# Patient Record
Sex: FEMALE | Race: WHITE | NOT HISPANIC OR LATINO | Employment: OTHER | URBAN - METROPOLITAN AREA
[De-identification: names, ages, dates, MRNs, and addresses within clinical notes are randomized per-mention and may not be internally consistent; named-entity substitution may affect disease eponyms.]

---

## 2018-01-17 ENCOUNTER — OFFICE VISIT CONVERTED (OUTPATIENT)
Dept: ONCOLOGY | Facility: HOSPITAL | Age: 68
End: 2018-01-17
Attending: INTERNAL MEDICINE

## 2018-01-29 ENCOUNTER — CONVERSION ENCOUNTER (OUTPATIENT)
Dept: FAMILY MEDICINE CLINIC | Facility: CLINIC | Age: 68
End: 2018-01-29

## 2018-01-29 ENCOUNTER — OFFICE VISIT CONVERTED (OUTPATIENT)
Dept: FAMILY MEDICINE CLINIC | Facility: CLINIC | Age: 68
End: 2018-01-29
Attending: PHYSICIAN ASSISTANT

## 2018-03-01 ENCOUNTER — OFFICE VISIT CONVERTED (OUTPATIENT)
Dept: FAMILY MEDICINE CLINIC | Facility: CLINIC | Age: 68
End: 2018-03-01
Attending: PHYSICIAN ASSISTANT

## 2018-03-22 ENCOUNTER — OFFICE VISIT CONVERTED (OUTPATIENT)
Dept: ONCOLOGY | Facility: HOSPITAL | Age: 68
End: 2018-03-22
Attending: INTERNAL MEDICINE

## 2018-05-22 ENCOUNTER — OFFICE VISIT CONVERTED (OUTPATIENT)
Dept: FAMILY MEDICINE CLINIC | Facility: CLINIC | Age: 68
End: 2018-05-22
Attending: PHYSICIAN ASSISTANT

## 2018-07-10 ENCOUNTER — CONVERSION ENCOUNTER (OUTPATIENT)
Dept: GASTROENTEROLOGY | Facility: CLINIC | Age: 68
End: 2018-07-10

## 2018-07-10 ENCOUNTER — OFFICE VISIT CONVERTED (OUTPATIENT)
Dept: GASTROENTEROLOGY | Facility: CLINIC | Age: 68
End: 2018-07-10
Attending: NURSE PRACTITIONER

## 2018-08-24 ENCOUNTER — CONVERSION ENCOUNTER (OUTPATIENT)
Dept: FAMILY MEDICINE CLINIC | Facility: CLINIC | Age: 68
End: 2018-08-24

## 2018-08-24 ENCOUNTER — OFFICE VISIT CONVERTED (OUTPATIENT)
Dept: FAMILY MEDICINE CLINIC | Facility: CLINIC | Age: 68
End: 2018-08-24
Attending: PHYSICIAN ASSISTANT

## 2018-09-05 ENCOUNTER — OFFICE VISIT CONVERTED (OUTPATIENT)
Dept: ONCOLOGY | Facility: HOSPITAL | Age: 68
End: 2018-09-05
Attending: INTERNAL MEDICINE

## 2018-10-16 ENCOUNTER — OFFICE VISIT CONVERTED (OUTPATIENT)
Dept: ONCOLOGY | Facility: HOSPITAL | Age: 68
End: 2018-10-16
Attending: INTERNAL MEDICINE

## 2018-11-08 ENCOUNTER — OFFICE VISIT CONVERTED (OUTPATIENT)
Dept: FAMILY MEDICINE CLINIC | Facility: CLINIC | Age: 68
End: 2018-11-08
Attending: PHYSICIAN ASSISTANT

## 2018-11-19 ENCOUNTER — OFFICE VISIT CONVERTED (OUTPATIENT)
Dept: FAMILY MEDICINE CLINIC | Facility: CLINIC | Age: 68
End: 2018-11-19
Attending: PHYSICIAN ASSISTANT

## 2018-12-18 ENCOUNTER — CONVERSION ENCOUNTER (OUTPATIENT)
Dept: FAMILY MEDICINE CLINIC | Facility: CLINIC | Age: 68
End: 2018-12-18

## 2018-12-18 ENCOUNTER — OFFICE VISIT CONVERTED (OUTPATIENT)
Dept: FAMILY MEDICINE CLINIC | Facility: CLINIC | Age: 68
End: 2018-12-18
Attending: PHYSICIAN ASSISTANT

## 2019-01-10 ENCOUNTER — HOSPITAL ENCOUNTER (OUTPATIENT)
Dept: LAB | Facility: HOSPITAL | Age: 69
Discharge: HOME | End: 2019-01-10
Attending: NURSE PRACTITIONER

## 2019-01-10 LAB
BASOPHILS # BLD AUTO: 0.08 10*3/UL (ref 0–0.2)
BASOPHILS NFR BLD AUTO: 0.68 % (ref 0–3)
EOSINOPHIL # BLD AUTO: 0.12 10*3/UL (ref 0–0.7)
EOSINOPHIL # BLD AUTO: 1.1 % (ref 0–7)
ERYTHROCYTE [DISTWIDTH] IN BLOOD BY AUTOMATED COUNT: 13.5 % (ref 11.5–14.5)
HBA1C MFR BLD: 15.3 G/DL (ref 12–16)
HCT VFR BLD AUTO: 46.8 % (ref 37–47)
LYMPHOCYTES # BLD AUTO: 4.55 10*3/UL (ref 1–5)
MCH RBC QN AUTO: 32.2 PG (ref 27–31)
MCHC RBC AUTO-ENTMCNC: 32.8 G/DL (ref 33–37)
MCV RBC AUTO: 98.4 FL (ref 81–99)
MONOCYTES # BLD AUTO: 0.54 10*3/UL (ref 0.2–1.2)
MONOCYTES NFR BLD AUTO: 4.86 % (ref 3–10)
NEUTROPHILS # BLD AUTO: 5.81 10*3/UL (ref 2–8)
NEUTROPHILS NFR BLD AUTO: 52.4 % (ref 30–85)
NRBC BLD AUTO-RTO: 0 % (ref 0–0.01)
PLATELET # BLD AUTO: 328 10*3/UL (ref 130–400)
PMV BLD AUTO: 7.1 FL (ref 7.4–10.4)
RBC # BLD AUTO: 4.75 10*6/UL (ref 4.2–5.4)
VARIANT LYMPHS NFR BLD MANUAL: 41 % (ref 20–45)
WBC # BLD AUTO: 11.1 10*3/UL (ref 4.8–10.8)

## 2019-01-11 LAB
ALBUMIN SERPL-MCNC: 3.7 G/DL (ref 2.9–4.4)
ALBUMIN/GLOB SERPL: 0.8 {RATIO} (ref 0.7–1.7)
ALPHA2 GLOB SERPL ELPH-MCNC: 0.8 G/DL (ref 0.4–1)
BETA GLOBULIN: 1.1 G/DL (ref 0.7–1.3)
CONV ALPHA-1-GLOBULIN: 0.2 G/DL (ref 0–0.4)
CONV IMMUNOGLOBULIN G (IGG): 2966 MG/DL (ref 700–1600)
CONV IMMUNOGLOBULIN G (IGG): 3094 MG/DL (ref 700–1600)
CONV IMMUNOGLOBULIN M (IGM): 50 MG/DL (ref 26–217)
CONV IMMUNOGLOBULIN M (IGM): 52 MG/DL (ref 26–217)
CONV PE INTERPRETATION: ABNORMAL
CONV PE NOTE: ABNORMAL
CONV TOTAL PROTEIN: 8.4 G/DL (ref 6–8.5)
FREE LIGHT CHAINS: 11.7 MG/L (ref 3.3–19.4)
GAMMA GLOB SERPL ELPH-MCNC: 2.7 G/DL (ref 0.4–1.8)
GLOBULIN UR ELPH-MCNC: 4.7 G/DL (ref 2.2–3.9)
IGA SERPL-MCNC: 40 MG/DL (ref 87–352)
IGA SERPL-MCNC: 40 MG/DL (ref 87–352)
KAPPA LC/LAMBDA SER: 1.38 {RATIO} (ref 0.26–1.65)
LAMBDA LC FREE SERPL-MCNC: 8.5 MG/L (ref 5.7–26.3)
M-SPIKE: 2.5 G/DL
PROT PATTERN SERPL IFE-IMP: ABNORMAL

## 2019-01-14 ENCOUNTER — HOSPITAL ENCOUNTER (OUTPATIENT)
Dept: ONCOLOGY | Facility: HOSPITAL | Age: 69
Discharge: HOME | End: 2019-01-14
Attending: INTERNAL MEDICINE

## 2019-01-14 ENCOUNTER — OFFICE VISIT CONVERTED (OUTPATIENT)
Dept: ONCOLOGY | Facility: HOSPITAL | Age: 69
End: 2019-01-14
Attending: INTERNAL MEDICINE

## 2019-01-14 LAB
ALBUMIN 24H MFR UR ELPH: 49.8 %
ALPHA1 GLOB 24H MFR UR ELPH: 0 %
ALPHA2 GLOB 24H MFR UR ELPH: 4.5 %
B-GLOBULIN MFR UR ELPH: 29.7 %
CONV IMMUNOFIXATION (URINE): ABNORMAL
CONV PE NOTE: ABNORMAL
GAMMA GLOB 24H MFR UR ELPH: 15.9 %
M PROTEIN MFR UR ELPH: 12.6 %
PROT UR-MCNC: 158.5 MG/DL

## 2019-02-07 ENCOUNTER — HOSPITAL ENCOUNTER (OUTPATIENT)
Dept: OTHER | Facility: HOSPITAL | Age: 69
Discharge: HOME | End: 2019-02-07
Attending: INTERNAL MEDICINE

## 2019-02-12 ENCOUNTER — HOSPITAL ENCOUNTER (OUTPATIENT)
Dept: OTHER | Facility: HOSPITAL | Age: 69
Discharge: HOME | End: 2019-02-12
Attending: INTERNAL MEDICINE

## 2019-02-12 LAB
C DIFF TOX B STL QL CT TISS CULT: POSITIVE
CONV 027 TOXIN: NEGATIVE

## 2019-04-01 ENCOUNTER — HOSPITAL ENCOUNTER (OUTPATIENT)
Dept: PET IMAGING | Facility: HOSPITAL | Age: 69
Discharge: HOME | End: 2019-04-01
Attending: NURSE PRACTITIONER

## 2019-04-08 ENCOUNTER — HOSPITAL ENCOUNTER (OUTPATIENT)
Dept: OTHER | Facility: HOSPITAL | Age: 69
Discharge: HOME | End: 2019-04-08
Attending: INTERNAL MEDICINE

## 2019-04-08 ENCOUNTER — OFFICE VISIT CONVERTED (OUTPATIENT)
Dept: ONCOLOGY | Facility: HOSPITAL | Age: 69
End: 2019-04-08
Attending: INTERNAL MEDICINE

## 2019-04-08 LAB
ALBUMIN SERPL-MCNC: 3.5 G/DL (ref 3.5–5)
ALBUMIN/GLOB SERPL: 0.8 {RATIO} (ref 1.4–2.6)
ALP SERPL-CCNC: 75 U/L (ref 43–160)
ALT SERPL-CCNC: 10 U/L (ref 10–40)
ANION GAP SERPL CALC-SCNC: 13 MMOL/L (ref 8–19)
AST SERPL-CCNC: 13 U/L (ref 15–50)
BASOPHILS # BLD AUTO: 0.07 10*3/UL (ref 0–0.2)
BASOPHILS NFR BLD AUTO: 0.7 % (ref 0–3)
BILIRUB SERPL-MCNC: 0.35 MG/DL (ref 0.2–1.3)
BUN SERPL-MCNC: 8 MG/DL (ref 5–25)
BUN/CREAT SERPL: 13 {RATIO} (ref 6–20)
CALCIUM SERPL-MCNC: 9.1 MG/DL (ref 8.7–10.4)
CHLORIDE SERPL-SCNC: 108 MMOL/L (ref 99–111)
CONV ABS IMM GRAN: 0.04 10*3/UL (ref 0–0.2)
CONV CO2: 22 MMOL/L (ref 22–32)
CONV IMMATURE GRAN: 0.4 % (ref 0–1.8)
CONV TOTAL PROTEIN: 7.9 G/DL (ref 6.3–8.2)
CREAT UR-MCNC: 0.64 MG/DL (ref 0.5–0.9)
DEPRECATED RDW RBC AUTO: 56.5 FL (ref 36.4–46.3)
EOSINOPHIL # BLD AUTO: 0.2 10*3/UL (ref 0–0.7)
EOSINOPHIL # BLD AUTO: 2 % (ref 0–7)
ERYTHROCYTE [DISTWIDTH] IN BLOOD BY AUTOMATED COUNT: 15.2 % (ref 11.7–14.4)
GFR SERPLBLD BASED ON 1.73 SQ M-ARVRAT: >60 ML/MIN/{1.73_M2}
GLOBULIN UR ELPH-MCNC: 4.4 G/DL (ref 2–3.5)
GLUCOSE SERPL-MCNC: 90 MG/DL (ref 65–99)
HBA1C MFR BLD: 13.5 G/DL (ref 12–16)
HCT VFR BLD AUTO: 42.2 % (ref 37–47)
LYMPHOCYTES # BLD AUTO: 4.09 10*3/UL (ref 1–5)
MCH RBC QN AUTO: 31.9 PG (ref 27–31)
MCHC RBC AUTO-ENTMCNC: 32 G/DL (ref 33–37)
MCV RBC AUTO: 99.8 FL (ref 81–99)
MONOCYTES # BLD AUTO: 0.52 10*3/UL (ref 0.2–1.2)
MONOCYTES NFR BLD AUTO: 5.3 % (ref 3–10)
NEUTROPHILS # BLD AUTO: 4.95 10*3/UL (ref 2–8)
NEUTROPHILS NFR BLD AUTO: 50.2 % (ref 30–85)
NRBC CBCN: 0 % (ref 0–0.7)
OSMOLALITY SERPL CALC.SUM OF ELEC: 286 MOSM/KG (ref 273–304)
PLATELET # BLD AUTO: 274 10*3/UL (ref 130–400)
PMV BLD AUTO: 9.4 FL (ref 9.4–12.3)
POTASSIUM SERPL-SCNC: 3.9 MMOL/L (ref 3.5–5.3)
RBC # BLD AUTO: 4.23 10*6/UL (ref 4.2–5.4)
SODIUM SERPL-SCNC: 139 MMOL/L (ref 135–147)
VARIANT LYMPHS NFR BLD MANUAL: 41.4 % (ref 20–45)
WBC # BLD AUTO: 9.87 10*3/UL (ref 4.8–10.8)

## 2019-04-09 LAB
ALBUMIN SERPL-MCNC: 3.5 G/DL (ref 2.9–4.4)
ALBUMIN/GLOB SERPL: 0.9 {RATIO} (ref 0.7–1.7)
ALPHA2 GLOB SERPL ELPH-MCNC: 0.7 G/DL (ref 0.4–1)
BETA GLOBULIN: 0.8 G/DL (ref 0.7–1.3)
CONV ALPHA-1-GLOBULIN: 0.2 G/DL (ref 0–0.4)
CONV IMMUNOGLOBULIN G (IGG): 2592 MG/DL (ref 700–1600)
CONV IMMUNOGLOBULIN M (IGM): 44 MG/DL (ref 26–217)
CONV PE INTERPRETATION: ABNORMAL
CONV PE NOTE: ABNORMAL
CONV TOTAL PROTEIN: 7.5 G/DL (ref 6–8.5)
GAMMA GLOB SERPL ELPH-MCNC: 2.3 G/DL (ref 0.4–1.8)
GLOBULIN UR ELPH-MCNC: 4 G/DL (ref 2.2–3.9)
IGA SERPL-MCNC: 32 MG/DL (ref 87–352)
M-SPIKE: 2.2 G/DL
PROT PATTERN SERPL IFE-IMP: ABNORMAL

## 2019-04-10 LAB
ALBUMIN 24H MFR UR ELPH: 32.4 %
ALPHA1 GLOB 24H MFR UR ELPH: 4.4 %
ALPHA2 GLOB 24H MFR UR ELPH: 16.9 %
CONV BETA GLOBULIN, URINE: 28.6 %
GAMMA GLOB 24H MFR UR ELPH: 17.6 %
Lab: ABNORMAL
M PROTEIN 24H MFR UR ELPH: 5.7 %
PROT UR-MCNC: 13.5 MG/DL

## 2019-04-11 ENCOUNTER — HOSPITAL ENCOUNTER (OUTPATIENT)
Dept: LAB | Facility: HOSPITAL | Age: 69
Discharge: HOME | End: 2019-04-11
Attending: NURSE PRACTITIONER

## 2019-04-11 LAB
APTT BLD: 22.1 S (ref 22.2–34.2)
INR PPP: 0.96 (ref 2–3)
PROTHROMBIN TIME: 10.1 S (ref 9.4–12)

## 2019-04-12 ENCOUNTER — HOSPITAL ENCOUNTER (OUTPATIENT)
Dept: CT IMAGING | Facility: HOSPITAL | Age: 69
Discharge: HOME | End: 2019-04-12
Attending: INTERNAL MEDICINE

## 2019-04-23 ENCOUNTER — OFFICE VISIT CONVERTED (OUTPATIENT)
Dept: ONCOLOGY | Facility: HOSPITAL | Age: 69
End: 2019-04-23
Attending: INTERNAL MEDICINE

## 2019-04-23 ENCOUNTER — HOSPITAL ENCOUNTER (OUTPATIENT)
Dept: OTHER | Facility: HOSPITAL | Age: 69
Discharge: HOME | End: 2019-04-23
Attending: INTERNAL MEDICINE

## 2019-05-01 ENCOUNTER — HOSPITAL ENCOUNTER (OUTPATIENT)
Dept: LAB | Facility: HOSPITAL | Age: 69
Discharge: HOME | End: 2019-05-01
Attending: INTERNAL MEDICINE

## 2019-05-01 LAB
C DIFF TOX B STL QL CT TISS CULT: POSITIVE
CONV 027 TOXIN: NEGATIVE

## 2019-05-03 LAB
CONV NEUTRAL FATS: NORMAL
FAT STL QL: NORMAL

## 2019-05-05 LAB
CHLORIDE STL-SCNT: 56 MMOL/L
POTASSIUM STL-SCNC: 46 MMOL/L
SODIUM STL-SCNC: 91 MMOL/L

## 2019-07-03 ENCOUNTER — HOSPITAL ENCOUNTER (OUTPATIENT)
Dept: LAB | Facility: HOSPITAL | Age: 69
Discharge: HOME | End: 2019-07-03
Attending: INTERNAL MEDICINE

## 2019-07-03 LAB
C DIFF TOX B STL QL CT TISS CULT: NEGATIVE
CONV 027 TOXIN: NEGATIVE

## 2019-08-07 ENCOUNTER — HOSPITAL ENCOUNTER (OUTPATIENT)
Dept: LAB | Facility: HOSPITAL | Age: 69
Discharge: HOME | End: 2019-08-07

## 2019-08-08 LAB
C DIFF TOX B STL QL CT TISS CULT: NEGATIVE
CONV 027 TOXIN: NEGATIVE

## 2019-08-10 LAB
TTG IGA SER-ACNC: 0 U/ML (ref 0–3)
TTG IGG SER-ACNC: 2 U/ML (ref 0–5)

## 2019-09-05 ENCOUNTER — HOSPITAL ENCOUNTER (OUTPATIENT)
Dept: GENERAL RADIOLOGY | Facility: HOSPITAL | Age: 69
Discharge: HOME | End: 2019-09-05
Attending: PHYSICIAN ASSISTANT

## 2019-09-05 ENCOUNTER — OFFICE VISIT CONVERTED (OUTPATIENT)
Dept: FAMILY MEDICINE CLINIC | Facility: CLINIC | Age: 69
End: 2019-09-05
Attending: PHYSICIAN ASSISTANT

## 2020-01-28 ENCOUNTER — OFFICE VISIT CONVERTED (OUTPATIENT)
Dept: FAMILY MEDICINE CLINIC | Facility: CLINIC | Age: 70
End: 2020-01-28
Attending: PHYSICIAN ASSISTANT

## 2020-01-28 ENCOUNTER — HOSPITAL ENCOUNTER (OUTPATIENT)
Dept: LAB | Facility: HOSPITAL | Age: 70
Discharge: HOME | End: 2020-01-28
Attending: PHYSICIAN ASSISTANT

## 2020-01-28 LAB
25(OH)D3 SERPL-MCNC: 16.6 NG/ML (ref 30–100)
ALBUMIN SERPL-MCNC: 4.1 G/DL (ref 3.5–5)
ALBUMIN/GLOB SERPL: 0.9 {RATIO} (ref 1.4–2.6)
ALP SERPL-CCNC: 72 U/L (ref 43–160)
ALT SERPL-CCNC: 12 U/L (ref 10–40)
AMYLASE SERPL-CCNC: 67 U/L (ref 30–150)
ANION GAP SERPL CALC-SCNC: 19 MMOL/L (ref 8–19)
APPEARANCE UR: CLEAR
AST SERPL-CCNC: 16 U/L (ref 15–50)
BASOPHILS # BLD AUTO: 0.06 10*3/UL (ref 0–0.2)
BASOPHILS NFR BLD AUTO: 0.6 % (ref 0–3)
BILIRUB SERPL-MCNC: 0.38 MG/DL (ref 0.2–1.3)
BILIRUB UR QL: NEGATIVE
BUN SERPL-MCNC: 10 MG/DL (ref 5–25)
BUN/CREAT SERPL: 13 {RATIO} (ref 6–20)
CALCIUM SERPL-MCNC: 9.4 MG/DL (ref 8.7–10.4)
CHLORIDE SERPL-SCNC: 103 MMOL/L (ref 99–111)
CHOLEST SERPL-MCNC: 164 MG/DL (ref 107–200)
CHOLEST/HDLC SERPL: 3.5 {RATIO} (ref 3–6)
COLOR UR: YELLOW
CONV ABS IMM GRAN: 0.04 10*3/UL (ref 0–0.2)
CONV BACTERIA: NEGATIVE
CONV CO2: 20 MMOL/L (ref 22–32)
CONV COLLECTION SOURCE (UA): ABNORMAL
CONV IMMATURE GRAN: 0.4 % (ref 0–1.8)
CONV TOTAL PROTEIN: 8.8 G/DL (ref 6.3–8.2)
CONV UROBILINOGEN IN URINE BY AUTOMATED TEST STRIP: 0.2 {EHRLICHU}/DL (ref 0.1–1)
CREAT UR-MCNC: 0.76 MG/DL (ref 0.5–0.9)
DEPRECATED RDW RBC AUTO: 52 FL (ref 36.4–46.3)
EOSINOPHIL # BLD AUTO: 0.15 10*3/UL (ref 0–0.7)
EOSINOPHIL # BLD AUTO: 1.5 % (ref 0–7)
ERYTHROCYTE [DISTWIDTH] IN BLOOD BY AUTOMATED COUNT: 14.1 % (ref 11.7–14.4)
FOLATE SERPL-MCNC: 17.7 NG/ML (ref 4.8–20)
GFR SERPLBLD BASED ON 1.73 SQ M-ARVRAT: >60 ML/MIN/{1.73_M2}
GLOBULIN UR ELPH-MCNC: 4.7 G/DL (ref 2–3.5)
GLUCOSE SERPL-MCNC: 87 MG/DL (ref 65–99)
GLUCOSE UR QL: NEGATIVE MG/DL
HCT VFR BLD AUTO: 44 % (ref 37–47)
HDLC SERPL-MCNC: 47 MG/DL (ref 40–60)
HGB BLD-MCNC: 14.2 G/DL (ref 12–16)
HGB UR QL STRIP: ABNORMAL
KETONES UR QL STRIP: NEGATIVE MG/DL
LDLC SERPL CALC-MCNC: 62 MG/DL (ref 70–100)
LEUKOCYTE ESTERASE UR QL STRIP: NEGATIVE
LIPASE SERPL-CCNC: 32 U/L (ref 5–51)
LYMPHOCYTES # BLD AUTO: 4.5 10*3/UL (ref 1–5)
LYMPHOCYTES NFR BLD AUTO: 44.7 % (ref 20–45)
MCH RBC QN AUTO: 32.1 PG (ref 27–31)
MCHC RBC AUTO-ENTMCNC: 32.3 G/DL (ref 33–37)
MCV RBC AUTO: 99.3 FL (ref 81–99)
MONOCYTES # BLD AUTO: 0.67 10*3/UL (ref 0.2–1.2)
MONOCYTES NFR BLD AUTO: 6.7 % (ref 3–10)
NEUTROPHILS # BLD AUTO: 4.64 10*3/UL (ref 2–8)
NEUTROPHILS NFR BLD AUTO: 46.1 % (ref 30–85)
NITRITE UR QL STRIP: NEGATIVE
NRBC CBCN: 0 % (ref 0–0.7)
OSMOLALITY SERPL CALC.SUM OF ELEC: 284 MOSM/KG (ref 273–304)
PH UR STRIP.AUTO: 5 [PH] (ref 5–8)
PLATELET # BLD AUTO: 342 10*3/UL (ref 130–400)
PMV BLD AUTO: 9 FL (ref 9.4–12.3)
POTASSIUM SERPL-SCNC: 4 MMOL/L (ref 3.5–5.3)
PROT UR QL: NEGATIVE MG/DL
RBC # BLD AUTO: 4.43 10*6/UL (ref 4.2–5.4)
RBC #/AREA URNS HPF: ABNORMAL /[HPF]
SODIUM SERPL-SCNC: 138 MMOL/L (ref 135–147)
SP GR UR: 1.02 (ref 1–1.03)
SQUAMOUS SPT QL MICRO: ABNORMAL /[HPF]
T4 FREE SERPL-MCNC: 1.5 NG/DL (ref 0.9–1.8)
TRIGL SERPL-MCNC: 276 MG/DL (ref 40–150)
TSH SERPL-ACNC: 1.95 M[IU]/L (ref 0.27–4.2)
VIT B12 SERPL-MCNC: 218 PG/ML (ref 211–911)
VLDLC SERPL-MCNC: 55 MG/DL (ref 5–37)
WBC # BLD AUTO: 10.06 10*3/UL (ref 4.8–10.8)
WBC #/AREA URNS HPF: ABNORMAL /[HPF]

## 2020-01-29 ENCOUNTER — HOSPITAL ENCOUNTER (OUTPATIENT)
Dept: GENERAL RADIOLOGY | Facility: HOSPITAL | Age: 70
Discharge: HOME | End: 2020-01-29
Attending: PHYSICIAN ASSISTANT

## 2020-02-05 ENCOUNTER — HOSPITAL ENCOUNTER (OUTPATIENT)
Dept: ONCOLOGY | Facility: HOSPITAL | Age: 70
Discharge: HOME | End: 2020-02-05
Attending: INTERNAL MEDICINE

## 2020-02-05 ENCOUNTER — OFFICE VISIT CONVERTED (OUTPATIENT)
Dept: ONCOLOGY | Facility: HOSPITAL | Age: 70
End: 2020-02-05
Attending: INTERNAL MEDICINE

## 2020-02-05 LAB — LDH SERPL-CCNC: 171 U/L (ref 120–240)

## 2020-02-06 LAB
ALBUMIN SERPL-MCNC: 3.4 G/DL (ref 2.9–4.4)
ALBUMIN/GLOB SERPL: 0.8 {RATIO} (ref 0.7–1.7)
ALPHA2 GLOB SERPL ELPH-MCNC: 0.8 G/DL (ref 0.4–1)
BETA GLOBULIN: 1 G/DL (ref 0.7–1.3)
CONV ALPHA-1-GLOBULIN: 0.2 G/DL (ref 0–0.4)
CONV IMMUNOGLOBULIN G (IGG): 2821 MG/DL (ref 700–1600)
CONV IMMUNOGLOBULIN M (IGM): 41 MG/DL (ref 26–217)
CONV PE INTERPRETATION: ABNORMAL
CONV PE NOTE: ABNORMAL
CONV TOTAL PROTEIN: 7.8 G/DL (ref 6–8.5)
FREE LIGHT CHAINS: 10.2 MG/L (ref 3.3–19.4)
GAMMA GLOB SERPL ELPH-MCNC: 2.4 G/DL (ref 0.4–1.8)
GLOBULIN UR ELPH-MCNC: 4.4 G/DL (ref 2.2–3.9)
IGA SERPL-MCNC: 35 MG/DL (ref 87–352)
KAPPA LC/LAMBDA SER: 1.31 {RATIO} (ref 0.26–1.65)
LAMBDA LC FREE SERPL-MCNC: 7.8 MG/L (ref 5.7–26.3)
M-SPIKE: 2.2 G/DL
PROT PATTERN SERPL IFE-IMP: ABNORMAL

## 2020-02-07 LAB
ALBUMIN 24H MFR UR ELPH: 36.2 %
ALPHA1 GLOB 24H MFR UR ELPH: 0.7 %
ALPHA2 GLOB 24H MFR UR ELPH: 9.7 %
B-GLOBULIN MFR UR ELPH: 28.9 %
CONV IMMUNOFIXATION (URINE): ABNORMAL
CONV PE NOTE: ABNORMAL
GAMMA GLOB 24H MFR UR ELPH: 24.5 %
M PROTEIN MFR UR ELPH: 9.1 %
PROT UR-MCNC: 14.9 MG/DL

## 2020-02-19 ENCOUNTER — HOSPITAL ENCOUNTER (OUTPATIENT)
Dept: GENERAL RADIOLOGY | Facility: HOSPITAL | Age: 70
Discharge: HOME | End: 2020-02-19
Attending: PHYSICIAN ASSISTANT

## 2020-02-28 ENCOUNTER — CONVERSION ENCOUNTER (OUTPATIENT)
Dept: FAMILY MEDICINE CLINIC | Facility: CLINIC | Age: 70
End: 2020-02-28

## 2020-02-28 ENCOUNTER — OFFICE VISIT CONVERTED (OUTPATIENT)
Dept: FAMILY MEDICINE CLINIC | Facility: CLINIC | Age: 70
End: 2020-02-28
Attending: PHYSICIAN ASSISTANT

## 2020-03-03 ENCOUNTER — HOSPITAL ENCOUNTER (OUTPATIENT)
Dept: GENERAL RADIOLOGY | Facility: HOSPITAL | Age: 70
Discharge: HOME | End: 2020-03-03
Attending: PHYSICIAN ASSISTANT

## 2020-04-28 ENCOUNTER — HOSPITAL ENCOUNTER (OUTPATIENT)
Dept: FAMILY MEDICINE CLINIC | Facility: CLINIC | Age: 70
Discharge: HOME | End: 2020-04-28
Attending: PHYSICIAN ASSISTANT

## 2020-04-28 ENCOUNTER — OFFICE VISIT CONVERTED (OUTPATIENT)
Dept: FAMILY MEDICINE CLINIC | Facility: CLINIC | Age: 70
End: 2020-04-28
Attending: PHYSICIAN ASSISTANT

## 2020-05-05 ENCOUNTER — HOSPITAL ENCOUNTER (OUTPATIENT)
Dept: LAB | Facility: HOSPITAL | Age: 70
Discharge: HOME | End: 2020-05-05
Attending: PHYSICIAN ASSISTANT

## 2020-05-05 LAB
25(OH)D3 SERPL-MCNC: 21.8 NG/ML (ref 30–100)
ALBUMIN SERPL-MCNC: 3.6 G/DL (ref 3.5–5)
ALBUMIN/GLOB SERPL: 0.9 {RATIO} (ref 1.4–2.6)
ALP SERPL-CCNC: 77 U/L (ref 43–160)
ALT SERPL-CCNC: 10 U/L (ref 10–40)
ANION GAP SERPL CALC-SCNC: 17 MMOL/L (ref 8–19)
AST SERPL-CCNC: 13 U/L (ref 15–50)
BASOPHILS # BLD AUTO: 0.08 10*3/UL (ref 0–0.2)
BASOPHILS NFR BLD AUTO: 1.1 % (ref 0–3)
BILIRUB SERPL-MCNC: 0.3 MG/DL (ref 0.2–1.3)
BUN SERPL-MCNC: 8 MG/DL (ref 5–25)
BUN/CREAT SERPL: 11 {RATIO} (ref 6–20)
CALCIUM SERPL-MCNC: 8.8 MG/DL (ref 8.7–10.4)
CHLORIDE SERPL-SCNC: 109 MMOL/L (ref 99–111)
CHOLEST SERPL-MCNC: 142 MG/DL (ref 107–200)
CHOLEST/HDLC SERPL: 3.6 {RATIO} (ref 3–6)
CONV ABS IMM GRAN: 0.03 10*3/UL (ref 0–0.2)
CONV CO2: 22 MMOL/L (ref 22–32)
CONV IMMATURE GRAN: 0.4 % (ref 0–1.8)
CONV TOTAL PROTEIN: 7.8 G/DL (ref 6.3–8.2)
CREAT UR-MCNC: 0.72 MG/DL (ref 0.5–0.9)
DEPRECATED RDW RBC AUTO: 51.3 FL (ref 36.4–46.3)
EOSINOPHIL # BLD AUTO: 0.17 10*3/UL (ref 0–0.7)
EOSINOPHIL # BLD AUTO: 2.3 % (ref 0–7)
ERYTHROCYTE [DISTWIDTH] IN BLOOD BY AUTOMATED COUNT: 14.3 % (ref 11.7–14.4)
EST. AVERAGE GLUCOSE BLD GHB EST-MCNC: 126 MG/DL
GFR SERPLBLD BASED ON 1.73 SQ M-ARVRAT: >60 ML/MIN/{1.73_M2}
GLOBULIN UR ELPH-MCNC: 4.2 G/DL (ref 2–3.5)
GLUCOSE SERPL-MCNC: 96 MG/DL (ref 65–99)
HBA1C MFR BLD: 6 % (ref 3.5–5.7)
HCT VFR BLD AUTO: 43.9 % (ref 37–47)
HDLC SERPL-MCNC: 40 MG/DL (ref 40–60)
HGB BLD-MCNC: 14.2 G/DL (ref 12–16)
LDLC SERPL CALC-MCNC: 45 MG/DL (ref 70–100)
LYMPHOCYTES # BLD AUTO: 3.57 10*3/UL (ref 1–5)
LYMPHOCYTES NFR BLD AUTO: 47.9 % (ref 20–45)
MCH RBC QN AUTO: 31.3 PG (ref 27–31)
MCHC RBC AUTO-ENTMCNC: 32.3 G/DL (ref 33–37)
MCV RBC AUTO: 96.7 FL (ref 81–99)
MONOCYTES # BLD AUTO: 0.49 10*3/UL (ref 0.2–1.2)
MONOCYTES NFR BLD AUTO: 6.6 % (ref 3–10)
NEUTROPHILS # BLD AUTO: 3.11 10*3/UL (ref 2–8)
NEUTROPHILS NFR BLD AUTO: 41.7 % (ref 30–85)
NRBC CBCN: 0 % (ref 0–0.7)
OSMOLALITY SERPL CALC.SUM OF ELEC: 296 MOSM/KG (ref 273–304)
PLATELET # BLD AUTO: 309 10*3/UL (ref 130–400)
PMV BLD AUTO: 9.1 FL (ref 9.4–12.3)
POTASSIUM SERPL-SCNC: 3.7 MMOL/L (ref 3.5–5.3)
RBC # BLD AUTO: 4.54 10*6/UL (ref 4.2–5.4)
SODIUM SERPL-SCNC: 144 MMOL/L (ref 135–147)
T4 FREE SERPL-MCNC: 1.2 NG/DL (ref 0.9–1.8)
TRIGL SERPL-MCNC: 284 MG/DL (ref 40–150)
TSH SERPL-ACNC: 2.8 M[IU]/L (ref 0.27–4.2)
VLDLC SERPL-MCNC: 57 MG/DL (ref 5–37)
WBC # BLD AUTO: 7.45 10*3/UL (ref 4.8–10.8)

## 2020-05-08 ENCOUNTER — HOSPITAL ENCOUNTER (OUTPATIENT)
Dept: PREADMISSION TESTING | Facility: HOSPITAL | Age: 70
Discharge: HOME | End: 2020-05-08
Attending: PHYSICIAN ASSISTANT

## 2020-05-10 LAB — SARS-COV-2 RNA SPEC QL NAA+PROBE: NOT DETECTED

## 2020-05-11 ENCOUNTER — HOSPITAL ENCOUNTER (OUTPATIENT)
Dept: CARDIOLOGY | Facility: HOSPITAL | Age: 70
Discharge: HOME | End: 2020-05-11
Attending: PHYSICIAN ASSISTANT

## 2020-07-28 ENCOUNTER — OFFICE VISIT CONVERTED (OUTPATIENT)
Dept: FAMILY MEDICINE CLINIC | Facility: CLINIC | Age: 70
End: 2020-07-28
Attending: PHYSICIAN ASSISTANT

## 2020-07-29 ENCOUNTER — HOSPITAL ENCOUNTER (OUTPATIENT)
Dept: ONCOLOGY | Facility: HOSPITAL | Age: 70
Discharge: HOME | End: 2020-07-29
Attending: INTERNAL MEDICINE

## 2020-07-29 LAB
ALBUMIN SERPL-MCNC: 3.9 G/DL (ref 3.5–5)
ALBUMIN/GLOB SERPL: 1 {RATIO} (ref 1.4–2.6)
ALP SERPL-CCNC: 89 U/L (ref 43–160)
ALT SERPL-CCNC: 12 U/L (ref 10–40)
ANION GAP SERPL CALC-SCNC: 16 MMOL/L (ref 8–19)
AST SERPL-CCNC: 14 U/L (ref 15–50)
BASOPHILS # BLD AUTO: 0.05 10*3/UL (ref 0–0.2)
BASOPHILS NFR BLD AUTO: 0.6 % (ref 0–3)
BILIRUB SERPL-MCNC: 0.49 MG/DL (ref 0.2–1.3)
BUN SERPL-MCNC: 7 MG/DL (ref 5–25)
BUN/CREAT SERPL: 9 {RATIO} (ref 6–20)
CALCIUM SERPL-MCNC: 9.4 MG/DL (ref 8.7–10.4)
CHLORIDE SERPL-SCNC: 108 MMOL/L (ref 99–111)
CONV ABS IMM GRAN: 0.03 10*3/UL (ref 0–0.2)
CONV CO2: 19 MMOL/L (ref 22–32)
CONV IMMATURE GRAN: 0.4 % (ref 0–1.8)
CONV TOTAL PROTEIN: 7.8 G/DL (ref 6.3–8.2)
CREAT UR-MCNC: 0.75 MG/DL (ref 0.5–0.9)
DEPRECATED RDW RBC AUTO: 51.8 FL (ref 36.4–46.3)
EOSINOPHIL # BLD AUTO: 0.13 10*3/UL (ref 0–0.7)
EOSINOPHIL # BLD AUTO: 1.6 % (ref 0–7)
ERYTHROCYTE [DISTWIDTH] IN BLOOD BY AUTOMATED COUNT: 14.7 % (ref 11.7–14.4)
GFR SERPLBLD BASED ON 1.73 SQ M-ARVRAT: >60 ML/MIN/{1.73_M2}
GLOBULIN UR ELPH-MCNC: 3.9 G/DL (ref 2–3.5)
GLUCOSE SERPL-MCNC: 98 MG/DL (ref 65–99)
HCT VFR BLD AUTO: 43.1 % (ref 37–47)
HGB BLD-MCNC: 14.3 G/DL (ref 12–16)
LDH SERPL-CCNC: 153 U/L (ref 120–240)
LYMPHOCYTES # BLD AUTO: 3.07 10*3/UL (ref 1–5)
LYMPHOCYTES NFR BLD AUTO: 36.7 % (ref 20–45)
MCH RBC QN AUTO: 31.3 PG (ref 27–31)
MCHC RBC AUTO-ENTMCNC: 33.2 G/DL (ref 33–37)
MCV RBC AUTO: 94.3 FL (ref 81–99)
MONOCYTES # BLD AUTO: 0.47 10*3/UL (ref 0.2–1.2)
MONOCYTES NFR BLD AUTO: 5.6 % (ref 3–10)
NEUTROPHILS # BLD AUTO: 4.62 10*3/UL (ref 2–8)
NEUTROPHILS NFR BLD AUTO: 55.1 % (ref 30–85)
NRBC CBCN: 0 % (ref 0–0.7)
OSMOLALITY SERPL CALC.SUM OF ELEC: 286 MOSM/KG (ref 273–304)
PLATELET # BLD AUTO: 299 10*3/UL (ref 130–400)
PMV BLD AUTO: 9.1 FL (ref 9.4–12.3)
POTASSIUM SERPL-SCNC: 3.5 MMOL/L (ref 3.5–5.3)
RBC # BLD AUTO: 4.57 10*6/UL (ref 4.2–5.4)
SODIUM SERPL-SCNC: 139 MMOL/L (ref 135–147)
WBC # BLD AUTO: 8.37 10*3/UL (ref 4.8–10.8)

## 2020-07-30 LAB
ALBUMIN SERPL-MCNC: 3.5 G/DL (ref 2.9–4.4)
ALBUMIN/GLOB SERPL: 0.8 {RATIO} (ref 0.7–1.7)
ALPHA2 GLOB SERPL ELPH-MCNC: 0.8 G/DL (ref 0.4–1)
BETA GLOBULIN: 0.9 G/DL (ref 0.7–1.3)
CONV ALPHA-1-GLOBULIN: 0.2 G/DL (ref 0–0.4)
CONV IMMUNOGLOBULIN G (IGG): 2619 MG/DL (ref 586–1602)
CONV IMMUNOGLOBULIN M (IGM): 40 MG/DL (ref 26–217)
CONV PE INTERPRETATION: ABNORMAL
CONV PE NOTE: ABNORMAL
CONV TOTAL PROTEIN: 7.7 G/DL (ref 6–8.5)
FREE LIGHT CHAINS: 11.2 MG/L (ref 3.3–19.4)
GAMMA GLOB SERPL ELPH-MCNC: 2.3 G/DL (ref 0.4–1.8)
GLOBULIN UR ELPH-MCNC: 4.2 G/DL (ref 2.2–3.9)
IGA SERPL-MCNC: 31 MG/DL (ref 87–352)
KAPPA LC/LAMBDA SER: 1.42 {RATIO} (ref 0.26–1.65)
LAMBDA LC FREE SERPL-MCNC: 7.9 MG/L (ref 5.7–26.3)
M-SPIKE: 2.1 G/DL
PROT PATTERN SERPL IFE-IMP: ABNORMAL

## 2020-07-31 LAB
ALBUMIN 24H MFR UR ELPH: 26.8 %
ALPHA1 GLOB 24H MFR UR ELPH: 2.3 %
ALPHA2 GLOB 24H MFR UR ELPH: 11.9 %
B-GLOBULIN MFR UR ELPH: 30.9 %
CONV IMMUNOFIXATION (URINE): NORMAL
CONV PE NOTE: NORMAL
GAMMA GLOB 24H MFR UR ELPH: 28 %
M PROTEIN MFR UR ELPH: NORMAL %
PROT UR-MCNC: 14 MG/DL

## 2020-08-05 ENCOUNTER — OFFICE VISIT CONVERTED (OUTPATIENT)
Dept: ONCOLOGY | Facility: HOSPITAL | Age: 70
End: 2020-08-05
Attending: INTERNAL MEDICINE

## 2020-08-05 ENCOUNTER — HOSPITAL ENCOUNTER (OUTPATIENT)
Dept: OTHER | Facility: HOSPITAL | Age: 70
Discharge: HOME | End: 2020-08-05
Attending: INTERNAL MEDICINE

## 2020-11-30 ENCOUNTER — HOSPITAL ENCOUNTER (OUTPATIENT)
Dept: LAB | Facility: HOSPITAL | Age: 70
Discharge: HOME | End: 2020-11-30
Attending: PHYSICIAN ASSISTANT

## 2020-11-30 ENCOUNTER — OFFICE VISIT CONVERTED (OUTPATIENT)
Dept: FAMILY MEDICINE CLINIC | Facility: CLINIC | Age: 70
End: 2020-11-30
Attending: PHYSICIAN ASSISTANT

## 2020-11-30 LAB
25(OH)D3 SERPL-MCNC: 19.3 NG/ML (ref 30–100)
ALBUMIN SERPL-MCNC: 3.6 G/DL (ref 3.5–5)
ALBUMIN/GLOB SERPL: 0.8 {RATIO} (ref 1.4–2.6)
ALP SERPL-CCNC: 76 U/L (ref 43–160)
ALT SERPL-CCNC: <5 U/L (ref 10–40)
ANION GAP SERPL CALC-SCNC: 15 MMOL/L (ref 8–19)
APPEARANCE UR: ABNORMAL
AST SERPL-CCNC: 17 U/L (ref 15–50)
BASOPHILS # BLD AUTO: 0.08 10*3/UL (ref 0–0.2)
BASOPHILS NFR BLD AUTO: 0.5 % (ref 0–3)
BILIRUB SERPL-MCNC: 0.33 MG/DL (ref 0.2–1.3)
BILIRUB UR QL: NEGATIVE
BUN SERPL-MCNC: 11 MG/DL (ref 5–25)
BUN/CREAT SERPL: 12 {RATIO} (ref 6–20)
CALCIUM SERPL-MCNC: 9.9 MG/DL (ref 8.7–10.4)
CHLORIDE SERPL-SCNC: 111 MMOL/L (ref 99–111)
CHOLEST SERPL-MCNC: 145 MG/DL (ref 107–200)
CHOLEST/HDLC SERPL: 4.1 {RATIO} (ref 3–6)
COLOR UR: YELLOW
CONV ABS IMM GRAN: 0.06 10*3/UL (ref 0–0.2)
CONV BACTERIA: ABNORMAL
CONV CO2: 18 MMOL/L (ref 22–32)
CONV COLLECTION SOURCE (UA): ABNORMAL
CONV HYALINE CASTS IN URINE MICRO: ABNORMAL /[LPF]
CONV IMMATURE GRAN: 0.4 % (ref 0–1.8)
CONV TOTAL PROTEIN: 8.3 G/DL (ref 6.3–8.2)
CONV UROBILINOGEN IN URINE BY AUTOMATED TEST STRIP: 0.2 {EHRLICHU}/DL (ref 0.1–1)
CREAT UR-MCNC: 0.9 MG/DL (ref 0.5–0.9)
DEPRECATED RDW RBC AUTO: 54.6 FL (ref 36.4–46.3)
EOSINOPHIL # BLD AUTO: 0.16 10*3/UL (ref 0–0.7)
EOSINOPHIL # BLD AUTO: 1.1 % (ref 0–7)
ERYTHROCYTE [DISTWIDTH] IN BLOOD BY AUTOMATED COUNT: 15.3 % (ref 11.7–14.4)
EST. AVERAGE GLUCOSE BLD GHB EST-MCNC: 146 MG/DL
GFR SERPLBLD BASED ON 1.73 SQ M-ARVRAT: >60 ML/MIN/{1.73_M2}
GLOBULIN UR ELPH-MCNC: 4.7 G/DL (ref 2–3.5)
GLUCOSE SERPL-MCNC: 88 MG/DL (ref 65–99)
GLUCOSE UR QL: NEGATIVE MG/DL
HBA1C MFR BLD: 6.7 % (ref 3.5–5.7)
HCT VFR BLD AUTO: 46.8 % (ref 37–47)
HDLC SERPL-MCNC: 35 MG/DL (ref 40–60)
HGB BLD-MCNC: 14.9 G/DL (ref 12–16)
HGB UR QL STRIP: ABNORMAL
KETONES UR QL STRIP: NEGATIVE MG/DL
LDLC SERPL CALC-MCNC: 53 MG/DL (ref 70–100)
LEUKOCYTE ESTERASE UR QL STRIP: NEGATIVE
LYMPHOCYTES # BLD AUTO: 3.64 10*3/UL (ref 1–5)
LYMPHOCYTES NFR BLD AUTO: 24.5 % (ref 20–45)
MCH RBC QN AUTO: 30.8 PG (ref 27–31)
MCHC RBC AUTO-ENTMCNC: 31.8 G/DL (ref 33–37)
MCV RBC AUTO: 96.7 FL (ref 81–99)
MONOCYTES # BLD AUTO: 0.8 10*3/UL (ref 0.2–1.2)
MONOCYTES NFR BLD AUTO: 5.4 % (ref 3–10)
NEUTROPHILS # BLD AUTO: 10.11 10*3/UL (ref 2–8)
NEUTROPHILS NFR BLD AUTO: 68.1 % (ref 30–85)
NITRITE UR QL STRIP: NEGATIVE
NRBC CBCN: 0 % (ref 0–0.7)
OSMOLALITY SERPL CALC.SUM OF ELEC: 289 MOSM/KG (ref 273–304)
PH UR STRIP.AUTO: 5 [PH] (ref 5–8)
PLATELET # BLD AUTO: 304 10*3/UL (ref 130–400)
PMV BLD AUTO: 9.9 FL (ref 9.4–12.3)
POTASSIUM SERPL-SCNC: 4.3 MMOL/L (ref 3.5–5.3)
PROT UR QL: NEGATIVE MG/DL
RBC # BLD AUTO: 4.84 10*6/UL (ref 4.2–5.4)
RBC #/AREA URNS HPF: ABNORMAL /[HPF]
SODIUM SERPL-SCNC: 140 MMOL/L (ref 135–147)
SP GR UR: 1.02 (ref 1–1.03)
SQUAMOUS SPT QL MICRO: ABNORMAL /[HPF]
T4 FREE SERPL-MCNC: 1.2 NG/DL (ref 0.9–1.8)
TRIGL SERPL-MCNC: 287 MG/DL (ref 40–150)
TSH SERPL-ACNC: 1.81 M[IU]/L (ref 0.27–4.2)
VLDLC SERPL-MCNC: 57 MG/DL (ref 5–37)
WBC # BLD AUTO: 14.85 10*3/UL (ref 4.8–10.8)
WBC #/AREA URNS HPF: ABNORMAL /[HPF]

## 2020-12-17 ENCOUNTER — CONVERSION ENCOUNTER (OUTPATIENT)
Dept: GASTROENTEROLOGY | Facility: CLINIC | Age: 70
End: 2020-12-17

## 2020-12-17 ENCOUNTER — OFFICE VISIT CONVERTED (OUTPATIENT)
Dept: GASTROENTEROLOGY | Facility: CLINIC | Age: 70
End: 2020-12-17
Attending: INTERNAL MEDICINE

## 2021-01-18 ENCOUNTER — OFFICE VISIT CONVERTED (OUTPATIENT)
Dept: GASTROENTEROLOGY | Facility: CLINIC | Age: 71
End: 2021-01-18
Attending: NURSE PRACTITIONER

## 2021-02-22 ENCOUNTER — HOSPITAL ENCOUNTER (OUTPATIENT)
Dept: GENERAL RADIOLOGY | Facility: HOSPITAL | Age: 71
Discharge: HOME | End: 2021-02-22
Attending: INTERNAL MEDICINE

## 2021-02-22 LAB
CREAT BLD-MCNC: 0.8 MG/DL (ref 0.6–1.4)
GFR SERPLBLD BASED ON 1.73 SQ M-ARVRAT: >60 ML/MIN/{1.73_M2}

## 2021-02-24 ENCOUNTER — HOSPITAL ENCOUNTER (OUTPATIENT)
Dept: OTHER | Facility: HOSPITAL | Age: 71
Discharge: HOME | End: 2021-02-24
Attending: INTERNAL MEDICINE

## 2021-02-24 ENCOUNTER — OFFICE VISIT CONVERTED (OUTPATIENT)
Dept: ONCOLOGY | Facility: HOSPITAL | Age: 71
End: 2021-02-24
Attending: INTERNAL MEDICINE

## 2021-03-30 ENCOUNTER — OFFICE VISIT CONVERTED (OUTPATIENT)
Dept: FAMILY MEDICINE CLINIC | Facility: CLINIC | Age: 71
End: 2021-03-30
Attending: PHYSICIAN ASSISTANT

## 2021-05-12 NOTE — PROGRESS NOTES
Progress Note      Patient Name: Anca Samson   Patient ID: [id number]   Sex: Female   YOB: 1950    Primary Care Provider: Jaret Macias PA-C   Referring Provider: Jaret Macias PA-C    Visit Date: April 28, 2020    Provider: Jaret Macias PA-C   Location: [facility]   Location Address: [address] [place] [address], [address]  [place], [place]  [id number]   Location Phone: [phone]          Chief Complaint  · 2 mth follow up      History Of Present Illness  Anca Samson is a 69 year old /White female who presents for evaluation and treatment of:      Pt refused telehealth - and wanted a physical visit    Pt wants to have skin lesion removed from her back.  She states that bleeds and causing her pain on occ.    She is feeling good overall.  No SI/HI  She states that she wants to get back to work.    Pt wants to have skin lesion removed from her back.    She is feeling good overall    She has two cancers - colon and multiple myleoma       Past Medical History  Disease Name Date Onset Notes   Anxiety --  --    Anxiety disorder 10/23/2017 --    Asthma --  --    C. difficile diarrhea --  --    Chronic bronchitis --  --    Colon cancer 07/21/2017 --    COPD (chronic obstructive pulmonary disease) 10/23/2017 --    Depression --  --    Diverticulitis --  --    Dysphagia --  --    Emphysema --  --    Heartburn --  --    Hyperlipidemia --  --    Hypertension --  --    Impaired fasting glucose 08/14/2015 --    Lumbago --  --    Major depressive disorder 10/27/2016 --    Melena --  --    Multiple myeloma --  --    Multiple myeloma 07/21/2017 --    Nicotine dependence 05/10/2017 --    Psychiatric Care --  --    Renal Insufficiency --  --    Screening Mammogram 02/20/2020 Normal - repeat in one year (ABIEL)   Seizures --  --    Shortness of breath --  --    Tobacco use disorder 08/14/2015 --    Vitamin D deficiency --  --          Past Surgical History  Procedure Name Date Notes   *Other 2016 bone  biopsy   Colonoscopy 2018,2017;2019 [facility]-DR LE:DIVERTICULOSIS AND ERYTHEMA OF MUCOSA   Endoscopy 2018 --    Hysterectomy 1982 --    Laparotomy  --    Port Placement --  --    Right hemicolectomy 2017 --          Medication List  Name Date Started Instructions   fluoxetine 20 mg oral capsule 2020 take 1 capsule (20 mg) by oral route once daily   pantoprazole 20 mg oral tablet,delayed release (/EC) 07/10/2018 take 1 tablet (20 mg) by oral route once daily for 30 days   promethazine 25 mg oral tablet 2020 take 1 tablet (25 mg) by oral route every 4-6 hours as needed   Symbicort 160-4.5 mcg/actuation inhalation HFA aerosol inhaler 2020 inhale 2 puffs by inhalation route 2 times per day in the morning and evening   Vitamin D2 50,000 unit oral capsule 2020 take 1 capsule (50,000 unit) by oral route once weekly         Allergy List  Allergen Name Date Reaction Notes   Keflex --  --  --    morphine --  --  --    PENICILLINS --  --  --    SULFA (SULFONAMIDES) --  --  --        Allergies Reconciled  Family Medical History  Disease Name Relative/Age Notes   Stroke Father/   aunt/uncle   Heart Disease Father/  Mother/   aunt/uncle   - No Family History of Colorectal Cancer  --    Lung cancer Mother/   --    Kidney Cancer Father/   --          Reproductive History  Menstrual   Age Menarche: 11 Last Menstrual Period: 1982   Pregnancy Summary   Total Pregnancies: 3 Full Term: 3 Premature: 0   Ab Induced: 0 Ab Spontaneous: 0 Ectopics: 0   Multiples: 0 Livin         Social History  Finding Status Start/Stop Quantity Notes   Alcohol Never --/-- --  2018 - 2018 - 2018 -     --  --/-- --  --    No known infection risk --  --/-- --  --    Sedentary --  --/-- --  --    Tobacco Former  1/2ppd --          Immunizations  NameDate Admin Mfg Trade Name Lot Number Route Inj VIS Given VIS Publication   Hmdwjzucs89/14/2015 SKB Fluarix,  "quadrivalent, preservative free [id number] NE NE 12/14/2015 07/02/2012   Comments: June   Ooaqxuzhp99/14/2014 SKMAXINE Fluarix, quadrivalent, preservative free  NE NE 11/14/2014 07/02/2012   Comments:    Udlssqbxulvg02/21/2015 NE Not Entered  NE NE 12/14/2015    Comments: June         Review of Systems  · Constitutional  o Denies  o : fever, fatigue, weight loss, weight gain  · Cardiovascular  o Denies  o : lower extremity edema, claudication, chest pressure, palpitations  · Respiratory  o Denies  o : shortness of breath, wheezing, cough, hemoptysis, dyspnea on exertion  · Gastrointestinal  o Denies  o : nausea, vomiting, diarrhea, constipation, abdominal pain      Vitals  Date Time BP Position Site L\R Cuff Size HR RR TEMP (F) WT  HT  BMI kg/m2 BSA m2 O2 Sat        04/28/2020 10:23 /84 Sitting    85 - R   193lbs 6oz 5'  4\" 33.19 1.99 97 %          Physical Examination  · Constitutional  o Appearance  o : overweight, well developed, alert, in no acute distress  · Head and Face  o Head  o : normocephalic, atraumatic  · Neck  o Inspection/Palpation  o : normal appearance, no masses or tenderness, trachea midline  o Thyroid  o : gland size normal, nontender, no nodules or masses present on palpation  · Respiratory  o Respiratory Effort  o : breathing unlabored  o Inspection of Chest  o : chest rise symmetric bilaterally  o Auscultation of Lungs  o : clear to auscultation bilaterally throughout inspiration and expiration  · Cardiovascular  o Heart  o :   § Auscultation of Heart  § : regular rate and rhythm, no murmurs, gallops or rubs  o Peripheral Vascular System  o :   § Extremities  § : no edema  · Lymphatic  o Neck  o : no cervical lymphadenopathy, no supraclavicular lymphadenopathy  · Psychiatric  o Mood and Affect  o : mood normal, affect appropriate     Skin - lesion on back - irreg and discolored               Assessment  · Essential " hypertension     401.9/I10  · Hyperlipidemia     272.4/E78.5  · Impaired fasting glucose     790.21/R73.01  · Vitamin D deficiency     268.9/E55.9  · Colon cancer     153.9/C18.9  · Multiple myeloma     203.00/C90.00  · Tobacco use disorder     305.1/F17.200  · Anxiety     300.02/F41.1  · Depression     296.31  · Diverticulitis     562.11/K57.92  · Emphysema     492.8/J43.9  · Seizures     345.80  · Renal Insufficiency     586  · Dysphagia     787.20/R13.10      Plan  · Orders  o ACO-39: Current medications updated and reviewed () - - 04/28/2020  · Medications  o promethazine 25 mg oral tablet   SIG: take 1 tablet (25 mg) by oral route every 4-6 hours as needed   DISP: (20) tablets with 0 refills  Refilled on 04/28/2020     o Symbicort 160-4.5 mcg/actuation inhalation HFA aerosol inhaler   SIG: inhale 2 puffs by inhalation route 2 times per day in the morning and evening   DISP: (1) 6 gm aer w/adap with 5 refills  Refilled on 04/28/2020     o Medications have been Reconciled  o Transition of Care or Provider Policy  · Instructions  o Patient advised to monitor blood pressure (B/P) at home and journal readings. Patient informed that a B/P reading at home of more than 130/80 is considered hypertension. For readings greater bril744/90 or higher patient is advised to follow up in the office with readings for management. Patient advised to limit sodium intake.  o Patient was educated and given low cholesterol diet information.  o Recommended exercise program to assist with cholesterol, weight loss and overall health improvement.  o Advised that cheeses and other sources of dairy fats, animal fats, fast food, and the extras (candy, pastries, pies, doughnuts and cookies) all contain LDL raising nutrients. Advised to increase fruits, vegetables, whole grains, and to monitor portion sizes.   o Instructed patient to watch their diet and exercise.  o Take all medications as prescribed/directed.  o Patient  instructed/educated on their diet and exercise program.  o Patient was educated/instructed on their diagnosis, treatment and medications prior to discharge from the clinic today.  o Patient counseled to reduce calorie intake.  o Patient was instructed to exercise regularly.  o Discussed Covid-19 precautions including, but not limited to, social distancing, avoid touching your face, and hand washing.   o PROCEDURE - cleaned are with alcohol and betadine, then anesth with 1% lido 1.5 cc instilled, removed lesion with scalpel and tweezers, sent to path, no immediate complications appreciated. Cleaned and bandaged the wound. Keep clean and dry for 24 hrs, then may shower as usual. Watch for s/s of infection.  · Disposition  o Call or Return if symptoms worsen or persist.  o F/U in 3 months.  o Care Transition            Electronically Signed by: Jaret Macias PA-C -Author on April 29, 2020 02:49:47 PM

## 2021-05-13 NOTE — PROGRESS NOTES
Progress Note      Patient Name: Anca Samson   Patient ID: [id number]   Sex: Female   YOB: 1950    Primary Care Provider: Jaret Macias PA-C   Referring Provider: Jaret Macias PA-C    Visit Date: November 30, 2020    Provider: Jaret Macias PA-C   Location: [facility]   Location Address: [address] [place] [address], [address]  [place], [place]  [id number]   Location Phone: [phone]          Chief Complaint  · 4 month follow up      History Of Present Illness  Anca Samson is a 70 year old /White female who presents for evaluation and treatment of: 4 month follow up.      pt presents today for 4 month follow up.    pt states no new issues or complaints to discuss today.    Labs-just did them this morning  mammo-2/20  cln 6/19  flu 9/20    Pt is doing well overall.      Pt has lost wt.  She states that nothing has changed.    PMH of Multiple myeloma and colon cancer  Colonoscopy is due.  She has chronic diarrhea - she switched her colon surgeon from [facility] to [facility].  2-3 mos ago she should have seen the specialist in [facility].  She states that her s/s continue.    Pt has had two fecal transplants    Pt states that her  tells her at times she stinks, She is wondering if she is having fecal incont       Past Medical History  Disease Name Date Onset Notes   Anxiety --  --    Anxiety disorder 10/23/2017 --    Asthma --  --    C. difficile diarrhea --  --    Chronic bronchitis --  --    Colon cancer 07/21/2017 --    COPD (chronic obstructive pulmonary disease) 10/23/2017 --    Depression --  --    Diverticulitis --  --    Dysphagia --  --    Emphysema --  --    Heartburn --  --    Hyperlipidemia --  --    Hypertension --  --    Impaired fasting glucose 08/14/2015 --    Lumbago --  --    Major depressive disorder 10/27/2016 --    Melena --  --    Multiple myeloma --  --    Multiple myeloma 07/21/2017 --    Nicotine dependence 05/10/2017 --    Psychiatric Care  --  --    Renal Insufficiency --  --    Screening Mammogram 2020 Normal - repeat in one year ([facility])   Seizures --  --    Shortness of breath --  --    Tobacco use disorder 2015 --    Vitamin D deficiency --  --          Past Surgical History  Procedure Name Date Notes   *Other 2016 bone biopsy   Colonoscopy 2018,2017;2019 [facility]DR [facility]:DIVERTICULOSIS AND ERYTHEMA OF MUCOSA   Endoscopy  --    Hysterectomy 1982  --    Laparotomy  --    Port Placement --  --    Right hemicolectomy  --          Medication List  Name Date Started Instructions   fluoxetine 20 mg oral capsule 11/10/2020 TAKE 1 CAPSULE BY MOUTH EVERY DAY for 30 days   pantoprazole 20 mg oral tablet,delayed release (/EC) 07/10/2018 take 1 tablet (20 mg) by oral route once daily for 30 days   promethazine 25 mg oral tablet 2020 take 1 tablet (25 mg) by oral route every 4-6 hours as needed   Symbicort 160-4.5 mcg/actuation inhalation HFA aerosol inhaler 2020 inhale 2 puffs by inhalation route 2 times per day in the morning and evening   Ventolin HFA 90 mcg/actuation inhalation HFA aerosol inhaler 2020 inhale 1 - 2 puffs (90 - 180 mcg) by inhalation route every 4-6 hours as needed for 30 days         Allergy List  Allergen Name Date Reaction Notes   Keflex --  --  --    morphine --  --  --    PENICILLINS --  --  --    SULFA (SULFONAMIDES) --  --  --        Allergies Reconciled  Family Medical History  Disease Name Relative/Age Notes   Stroke Father/   aunt/uncle   Heart Disease Father/  Mother/   aunt/uncle   - No Family History of Colorectal Cancer  --    Lung cancer Mother/   --    Kidney Cancer Father/   --          Reproductive History  Menstrual   Age Menarche: 11 Last Menstrual Period: 1982   Pregnancy Summary   Total Pregnancies: 3 Full Term: 3 Premature: 0   Ab Induced: 0 Ab Spontaneous: 0 Ectopics: 0   Multiples: 0 Livin         Social History  Finding Status Start/Stop  Quantity Notes   Alcohol Never --/-- --  05/22/2018 - 03/01/2018 - 01/29/2018 -     --  --/-- --  --    No known infection risk --  --/-- --  --    Sedentary --  --/-- --  --    Tobacco Former 27/69 1/2ppd --          Immunizations  NameDate Admin Mfg Trade Name Lot Number Route Inj VIS Given VIS Publication   Rkpthyuzf45/14/2015 SKB Fluarix, quadrivalent, preservative free [id number] NE NE 12/14/2015 07/02/2012   Comments: June   Zdisveezhglh83/21/2015 NE Not Entered  NE NE 12/14/2015    Comments: June         Review of Systems  · Constitutional  o Admits  o : weight loss  o Denies  o : fever, fatigue, weight gain  · Cardiovascular  o Denies  o : lower extremity edema, claudication, chest pressure, palpitations  · Respiratory  o Denies  o : shortness of breath, wheezing, cough, hemoptysis, dyspnea on exertion  · Gastrointestinal  o Admits  o : diarrhea  o Denies  o : nausea, vomiting, constipation, abdominal pain      Vitals  Date Time BP Position Site L\R Cuff Size HR RR TEMP (F) WT  HT  BMI kg/m2 BSA m2 O2 Sat FR L/min FiO2        11/30/2020 10:22 /59 Sitting    89 - R   184lbs 8oz 5'   36.03 1.88 98 %            Physical Examination  · Constitutional  o Appearance  o : overweight, well developed  · Head and Face  o Head  o : normocephalic, atraumatic  · Neck  o Inspection/Palpation  o : normal appearance, no masses or tenderness, trachea midline  o Thyroid  o : gland size normal, nontender, no nodules or masses present on palpation  · Respiratory  o Respiratory Effort  o : breathing unlabored  o Inspection of Chest  o : chest rise symmetric bilaterally  o Auscultation of Lungs  o : clear to auscultation bilaterally throughout inspiration and expiration  · Cardiovascular  o Heart  o :   § Auscultation of Heart  § : regular rate and rhythm, no murmurs, gallops or rubs  o Peripheral Vascular System  o :   § Extremities  § : mild lower extremity edema present, no cyanosis, no distal hair loss,  normal capillary refill  · Lymphatic  o Neck  o : no cervical lymphadenopathy, no supraclavicular lymphadenopathy  · Psychiatric  o Mood and Affect  o : mood normal, affect appropriate          Assessment  · COPD (chronic obstructive pulmonary disease)     496/J44.9  · Essential hypertension     401.9/I10  · Hyperlipidemia     272.4/E78.5  · Major depressive disorder     296.20/F32.2  · Screening for depression     V79.0/Z13.89  · Need for influenza vaccination     V04.81/Z23  · Colon cancer     153.9/C18.9  · Anxiety     300.02/F41.1  · Emphysema     492.8/J43.9      Plan  · Orders  o ACO-18: Positive screen for clinical depression using a standardized tool and a follow-up plan documented () - 296.20/F32.2 - 11/30/2020  o ACO-14: Influenza immunization was not administered for reasons documented () - V04.81/Z23 - 11/30/2020   already rcvd  o ACO-39: Current medications updated and reviewed (1159F, ) - - 11/30/2020  o ACO-18: Positive screen for clinical depression using a standardized tool and a follow-up plan documented () - - 11/30/2020  o ACO-20: Screening Mammography documented and reviewed [facility] () - - 11/30/2020  o ACO-19: Colorectal cancer screening results documented and reviewed (3017F) - - 11/30/2020  · Medications  o Medications have been Reconciled  o Transition of Care or Provider Policy  · Instructions  o Patient advised to monitor blood pressure (B/P) at home and journal readings. Patient informed that a B/P reading at home of more than 130/80 is considered hypertension. For readings greater fbno286/90 or higher patient is advised to follow up in the office with readings for management. Patient advised to limit sodium intake.  o Patient was educated and given low cholesterol diet information.  o Recommended exercise program to assist with cholesterol, weight loss and overall health improvement.  o Advised that cheeses and other sources of dairy fats, animal fats, fast food, and  the extras (candy, pastries, pies, doughnuts and cookies) all contain LDL raising nutrients. Advised to increase fruits, vegetables, whole grains, and to monitor portion sizes.   o Depression Screen completed and scanned into the EMR under the designated folder within the patient's documents.  o Today's PHQ-9 result is 12___  o Flu vaccine declined.  o Take all medications as prescribed/directed.  o Patient instructed/educated on their diet and exercise program.  o Patient was educated/instructed on their diagnosis, treatment and medications prior to discharge from the clinic today.  o Patient counseled to reduce calorie intake.  o Patient was instructed to exercise regularly.  o Discussed Covid-19 precautions including, but not limited to, social distancing, avoid touching your face, and hand washing.   o Pt is taking her ssri  · Disposition  o Call or Return if symptoms worsen or persist.  o F/U in 4-6 months  o Care Transition            Electronically Signed by: Jaret Macias PA-C -Author on November 30, 2020 10:51:20 AM

## 2021-05-13 NOTE — PROGRESS NOTES
Progress Note      Patient Name: Anca Samson   Patient ID: [id number]   Sex: Female   YOB: 1950    Primary Care Provider: Jaret Macias PA-C   Referring Provider: Jaret Macias PA-C    Visit Date: July 28, 2020    Provider: Jaret Macias PA-C   Location: [facility]   Location Address: [address] [place] [address], [address]  [place], [place]  [id number]   Location Phone: [phone]          Chief Complaint  · Follow up on meds      History Of Present Illness  Anca Samson is a 69 year old /White female who presents for evaluation and treatment of:      a follow up    No complaints or concerns at this time    Pt did present today with SOA, however she has a diagnosis of COPD and Emphysema.  After resting, SOA decreased.  O2 stat @ 95%    Pt does request a refill of Vit D 50,000IU weekly and Promethazine     CLN: 06/21/2019  Mammo: 02/19/20  Lasb: 05/05/20 (A1C was 6.0%)  Dexa: 12/07/16       Past Medical History  Disease Name Date Onset Notes   Anxiety --  --    Anxiety disorder 10/23/2017 --    Asthma --  --    C. difficile diarrhea --  --    Chronic bronchitis --  --    Colon cancer 07/21/2017 --    COPD (chronic obstructive pulmonary disease) 10/23/2017 --    Depression --  --    Diverticulitis --  --    Dysphagia --  --    Emphysema --  --    Heartburn --  --    Hyperlipidemia --  --    Hypertension --  --    Impaired fasting glucose 08/14/2015 --    Lumbago --  --    Major depressive disorder 10/27/2016 --    Melena --  --    Multiple myeloma --  --    Multiple myeloma 07/21/2017 --    Nicotine dependence 05/10/2017 --    Psychiatric Care --  --    Renal Insufficiency --  --    Screening Mammogram 02/20/2020 Normal - repeat in one year (ABIEL)   Seizures --  --    Shortness of breath --  --    Tobacco use disorder 08/14/2015 --    Vitamin D deficiency --  --          Past Surgical History  Procedure Name Date Notes   *Other 2016 bone biopsy   Colonoscopy 5/2018,5/2017;06/21/2019  [facility]-DR LE:DIVERTICULOSIS AND ERYTHEMA OF MUCOSA   Endoscopy 2018 --    Hysterectomy 1982  --    Laparotomy  --    Port Placement --  --    Right hemicolectomy 2017 --          Medication List  Name Date Started Instructions   fluoxetine 20 mg oral capsule 2020 TAKE 1 CAPSULE BY MOUTH EVERY DAY   pantoprazole 20 mg oral tablet,delayed release (/EC) 07/10/2018 take 1 tablet (20 mg) by oral route once daily for 30 days   promethazine 25 mg oral tablet 2020 take 1 tablet (25 mg) by oral route every 4-6 hours as needed   Symbicort 160-4.5 mcg/actuation inhalation HFA aerosol inhaler 2020 inhale 2 puffs by inhalation route 2 times per day in the morning and evening   Ventolin HFA 90 mcg/actuation inhalation HFA aerosol inhaler 2020 inhale 1 - 2 puffs (90 - 180 mcg) by inhalation route every 4-6 hours as needed for 30 days   Vitamin D2 1,250 mcg (50,000 unit) oral capsule 2020 take 1 capsule (50,000 unit) by oral route once weekly for 90 days         Allergy List  Allergen Name Date Reaction Notes   Keflex --  --  --    morphine --  --  --    PENICILLINS --  --  --    SULFA (SULFONAMIDES) --  --  --          Family Medical History  Disease Name Relative/Age Notes   Stroke Father/   aunt/uncle   Heart Disease Father/  Mother/   aunt/uncle   - No Family History of Colorectal Cancer  --    Lung cancer Mother/   --    Kidney Cancer Father/   --          Reproductive History  Menstrual   Age Menarche: 11 Last Menstrual Period: 1982   Pregnancy Summary   Total Pregnancies: 3 Full Term: 3 Premature: 0   Ab Induced: 0 Ab Spontaneous: 0 Ectopics: 0   Multiples: 0 Livin         Social History  Finding Status Start/Stop Quantity Notes   Alcohol Never --/-- --  2018 - 2018 - 2018 -     --  --/-- --  --    No known infection risk --  --/-- --  --    Sedentary --  --/-- --  --    Tobacco Former  1/2ppd --          Immunizations  NameDate  Admin Mfg Trade Name Lot Number Route Inj VIS Given VIS Publication   Uzxuisied17/14/2015 SKB Fluarix, quadrivalent, preservative free [id number] NE NE 12/14/2015 07/02/2012   Comments: June   Afwrvdgop33/14/2014 SKB Fluarix, quadrivalent, preservative free  NE NE 11/14/2014 07/02/2012   Comments:    Lvvvtcfyudpz63/21/2015 NE Not Entered  NE NE 12/14/2015    Comments: June         Review of Systems  · Constitutional  o Denies  o : fever, fatigue, weight loss, weight gain  · Cardiovascular  o Denies  o : lower extremity edema, claudication, chest pressure, palpitations  · Respiratory  o Denies  o : shortness of breath, wheezing, cough, hemoptysis, dyspnea on exertion  · Gastrointestinal  o Denies  o : nausea, vomiting, diarrhea, constipation, abdominal pain      Vitals  Date Time BP Position Site L\R Cuff Size HR RR TEMP (F) WT  HT  BMI kg/m2 BSA m2 O2 Sat        07/28/2020 10:10 /76 Sitting    87 - R   191lbs 2oz 5'   37.33 1.92 95 %          Physical Examination  · Constitutional  o Appearance  o : overweight, well developed, alert, in no acute distress  · Head and Face  o Head  o : normocephalic, atraumatic  · Neck  o Inspection/Palpation  o : normal appearance, no masses or tenderness, trachea midline  o Thyroid  o : gland size normal, nontender, no nodules or masses present on palpation  · Respiratory  o Respiratory Effort  o : breathing unlabored  o Inspection of Chest  o : chest rise symmetric bilaterally  o Auscultation of Lungs  o : clear to auscultation bilaterally throughout inspiration and expiration  · Cardiovascular  o Heart  o :   § Auscultation of Heart  § : regular rate and rhythm, no murmurs, gallops or rubs  o Peripheral Vascular System  o :   § Extremities  § : no edema  · Lymphatic  o Neck  o : no cervical lymphadenopathy, no supraclavicular lymphadenopathy  · Psychiatric  o Mood and Affect  o : mood normal, affect appropriate          Assessment  · Anxiety  disorder     300.00/F41.9  · Asthma     493.90/J45.909  · Impaired fasting glucose     790.21/R73.01  · Obesity     278.00/E66.9  · Vitamin D deficiency     268.9/E55.9  · Colon cancer     153.9/C18.9  · Multiple myeloma     203.00/C90.00  · Seizures     345.80  · Renal Insufficiency     586  · Nausea     787.02/R11.0      Plan  · Orders  o Free T4 (36235) - 790.21/R73.01, 203.00/C90.00, 586 - 10/28/2020  o Hgb A1c [place] (36958) - 790.21/R73.01 - 10/28/2020  o Physical, Primary Care Panel (CBC, CMP, Lipid, TSH) [place] (79520, 01993, 27217, 89708) - 790.21/R73.01, 203.00/C90.00, 586 - 10/28/2020  o Urinalysis with Reflex Microscopy if abnormal ([place]) (51272) - 790.21/R73.01 - 10/28/2020  o Vitamin D (25-Hydroxy) Level (38443) - 268.9/E55.9 - 10/28/2020  o ACO-39: Current medications updated and reviewed () - - 07/28/2020  · Medications  o Medications have been Reconciled  o Transition of Care or Provider Policy  · Instructions  o Instructed patient to watch their diet and exercise.  o Take all medications as prescribed/directed.  o Patient instructed/educated on their diet and exercise program.  o Patient was educated/instructed on their diagnosis, treatment and medications prior to discharge from the clinic today.  o Patient counseled to reduce calorie intake.  o Patient was instructed to exercise regularly.  o Discussed Covid-19 precautions including, but not limited to, social distancing, avoid touching your face, and hand washing.   · Disposition  o Call or Return if symptoms worsen or persist.  o F/U in 4-6 months  o Care Transition            Electronically Signed by: Jaret Macias PA-C -Author on July 28, 2020 11:55:05 AM

## 2021-05-14 ENCOUNTER — HOSPITAL ENCOUNTER (OUTPATIENT)
Dept: CARDIOLOGY | Facility: HOSPITAL | Age: 71
Discharge: HOME | End: 2021-05-14
Attending: PHYSICIAN ASSISTANT

## 2021-05-14 ENCOUNTER — HOSPITAL ENCOUNTER (OUTPATIENT)
Dept: GENERAL RADIOLOGY | Facility: HOSPITAL | Age: 71
Discharge: HOME | End: 2021-05-14
Attending: PHYSICIAN ASSISTANT

## 2021-05-14 ENCOUNTER — OFFICE VISIT CONVERTED (OUTPATIENT)
Dept: FAMILY MEDICINE CLINIC | Facility: CLINIC | Age: 71
End: 2021-05-14
Attending: PHYSICIAN ASSISTANT

## 2021-05-14 VITALS
HEART RATE: 93 BPM | SYSTOLIC BLOOD PRESSURE: 125 MMHG | HEIGHT: 60 IN | OXYGEN SATURATION: 97 % | BODY MASS INDEX: 37.18 KG/M2 | DIASTOLIC BLOOD PRESSURE: 54 MMHG | WEIGHT: 189.37 LBS

## 2021-05-14 VITALS
OXYGEN SATURATION: 96 % | BODY MASS INDEX: 36.36 KG/M2 | SYSTOLIC BLOOD PRESSURE: 158 MMHG | HEART RATE: 88 BPM | RESPIRATION RATE: 12 BRPM | HEIGHT: 60 IN | DIASTOLIC BLOOD PRESSURE: 72 MMHG | WEIGHT: 185.19 LBS

## 2021-05-14 VITALS
WEIGHT: 184.5 LBS | DIASTOLIC BLOOD PRESSURE: 59 MMHG | HEIGHT: 60 IN | SYSTOLIC BLOOD PRESSURE: 139 MMHG | HEART RATE: 89 BPM | OXYGEN SATURATION: 98 % | BODY MASS INDEX: 36.22 KG/M2

## 2021-05-14 VITALS
RESPIRATION RATE: 16 BRPM | WEIGHT: 187 LBS | BODY MASS INDEX: 36.71 KG/M2 | HEART RATE: 85 BPM | HEIGHT: 60 IN | DIASTOLIC BLOOD PRESSURE: 64 MMHG | SYSTOLIC BLOOD PRESSURE: 157 MMHG | OXYGEN SATURATION: 100 %

## 2021-05-14 NOTE — PROGRESS NOTES
Progress Note      Patient Name: Anca Samson   Patient ID: [id number]   Sex: Female   YOB: 1950    Primary Care Provider: Jaret Macias PA-C   Referring Provider: Jaret Macias PA-C    Visit Date: March 30, 2021    Provider: Jaret Macias PA-C   [place] [facility] [place]   Location Address: [address] [place] [address], [address]  [place], [place]  [id number]   Location Phone: [phone]          Chief Complaint  · Annual Wellness Exam      History Of Present Illness  The patient is a 70 year old /White female who has come to this office for her Annual Wellness Visit.   Her Primary Care Provider is Jaret Macias PA-C. Her comprehensive Care Team list, including suppliers, has been updated on the Facesheet. Her medical/family history, height, weight, BMI, and blood pressure have been reviewed and are in the chart. The Health Risk Assessment has been completed and scanned in the chart.   Medications are listed in the medication list.   The active problem list includes: Anxiety, Asthma, C. difficile diarrhea, Colon cancer, COPD (chronic obstructive pulmonary disease), Depression, Diverticulitis, Dysphagia, Heartburn, Hyperlipidemia, Hypertension, Impaired fasting glucose, Lumbago, Major depressive disorder, Melena, Multiple myeloma, Nicotine dependence, Psychiatric Care, Renal Insufficiency, Seizures, Shortness of breath, Tobacco use disorder, and Vitamin D deficiency   The patient does not have a history of substance use.   Patient reports her diet is adequate.   The Mini-Cog has been administered and is scanned in chart. The results are negative. Her cognitive function is without limitation.   A hearing loss screen was completed today and the result is negative.   Patient has the following risk factors for depression: currently has depression. Patient completed the PHQ-9 today and it has been scanned in the chart. The total score is 15-19.   The Timed Up and Go screen was  administered today and the result is negative.   The Ruiz Index of [place] in ADLs indicated full function (score of 6).   A Falls Risk Assessment has been completed, including a review of home fall hazards and medication review.   Overall, the patient's functional ability is noted by this provider to be within normal limits. Her level of safety is noted to be within normal limits. Her balance/gait is within normal limits. There have been no falls in the past year. Patient-specific home safety recommendations have been reviewed and a copy has been given to patient.   She denies issues with leaking urine.   There are no additional risk factors identified.   Living Will/Advanced Directive has not previously been completed.   Personalized health advice was given to the patient and a written health screening schedule was established; see Plan for details.   Anca Samson is a 70 year old /White female who presents for evaluation and treatment of:       Past Medical History  Disease Name Date Onset Notes   Anxiety --  --    Asthma --  --    C. difficile diarrhea --  --    Colon cancer 07/21/2017 --    COPD (chronic obstructive pulmonary disease) 10/23/2017 --    Depression --  --    Diverticulitis --  --    Dysphagia --  --    Heartburn --  --    Hyperlipidemia --  --    Hypertension --  --    Impaired fasting glucose 08/14/2015 --    Lumbago --  --    Major depressive disorder 10/27/2016 --    Melena --  --    Multiple myeloma --  --    Nicotine dependence 05/10/2017 --    Psychiatric Care --  --    Renal Insufficiency --  --    Screening Mammogram 02/20/2020 Normal - repeat in one year (ABIEL)   Seizures --  --    Shortness of breath --  --    Tobacco use disorder 08/14/2015 --    Vitamin D deficiency --  --          Past Surgical History  Procedure Name Date Notes   Bone marrow biopsy 2016 --    Colonoscopy 2018,2017;2019 [facility]-DR [facility]:DIVERTICULOSIS AND ERYTHEMA OF MUCOSA   Endoscopy   --    Fecal transplant 2019 --    Hysterectomy 1982 --    Laparotomy  --    Port Placement --  --    Right hemicolectomy 2017 --          Medication List  Name Date Started Instructions   colestipol 1 gram oral tablet  take 3 tablets by oral route 2 times a day   fluoxetine 20 mg oral capsule 2021 TAKE 1 CAPSULE BY MOUTH EVERY DAY for 30 days   promethazine 25 mg oral tablet 2020 take 1 tablet (25 mg) by oral route every 4-6 hours as needed   Symbicort 160-4.5 mcg/actuation inhalation HFA aerosol inhaler 2020 inhale 2 puffs by inhalation route 2 times per day in the morning and evening   Ventolin HFA 90 mcg/actuation inhalation HFA aerosol inhaler 2020 inhale 1 - 2 puffs (90 - 180 mcg) by inhalation route every 4-6 hours as needed for 30 days   VITAMIN D2 1.25MG(50,000 UNIT) 2021 TAKE 1 CAPSULE BY MOUTH EVERY 7 DAYS         Allergy List  Allergen Name Date Reaction Notes   Keflex --  --  --    morphine --  --  --    PENICILLINS --  --  --    SULFA (SULFONAMIDES) --  --  --          Family Medical History  Disease Name Relative/Age Notes   Stroke Father/   aunt/uncle   Heart Disease Father/  Mother/   aunt/uncle   - No Family History of Colorectal Cancer  --    Lung cancer Mother/   --    Kidney Cancer Father/   --          Reproductive History  Menstrual   Age Menarche: 11 Last Menstrual Period: 1982   Pregnancy Summary   Total Pregnancies: 3 Full Term: 3 Premature: 0   Ab Induced: 0 Ab Spontaneous: 0 Ectopics: 0   Multiples: 0 Livin         Social History  Finding Status Start/Stop Quantity Notes   Alcohol Never --/-- --  2018 - 2018 - 2018 -     --  --/-- --  --    No known infection risk --  --/-- --  --    Sedentary --  --/-- --  --    Tobacco Current some day 2ppd --          Immunizations  NameDate Admin Mfg Trade Name Lot Number Route Inj VIS Given VIS Publication   Stywdkjln26/14/2015 SKB Fluarix, quadrivalent,  preservative free [id number] NE NE 12/14/2015 07/02/2012   Comments: June   Kvrbfqquqffn82/21/2015 NE Not Entered  NE NE 12/14/2015    Comments: June         Vitals  Date Time BP Position Site L\R Cuff Size HR RR TEMP (F) WT  HT  BMI kg/m2 BSA m2 O2 Sat FR L/min FiO2 HC       03/30/2021 10:24 /54 Sitting    93 - R   189lbs 6oz 5'   36.98 1.91 97 %            Physical Examination  · Head and Face  o Head  o : normocephalic, atraumatic  · Ears, Nose, Mouth and Throat  o Ears  o :   § External Ears  § : external auditory canal appearance normal, no discharge present  § Otoscopic Examination  § : tympanic membranes pearly white/gray bilaterally  o Nose  o :   § External Nose  § : no lesions noted  § Nasopharynx  § : no discharge present  o Oral Cavity  o :   § Oral Mucosa  § : oral mucosa light pink  o Throat  o :   § Oropharynx  § : tonsils without exudate, no palatal petechiae  · Neck  o Inspection/Palpation  o : normal appearance, no masses or tenderness, trachea midline  o Thyroid  o : gland size normal, nontender, no nodules or masses present on palpation  · Respiratory  o Respiratory Effort  o : breathing unlabored  o Inspection of Chest  o : chest rise symmetric bilaterally  o Auscultation of Lungs  o : clear to auscultation bilaterally throughout inspiration and expiration  · Cardiovascular  o Heart  o :   § Auscultation of Heart  § : regular rate and rhythm, no murmurs, gallops or rubs  o Peripheral Vascular System  o :   § Extremities  § : no edema  · Lymphatic  o Neck  o : no cervical lymphadenopathy, no supraclavicular lymphadenopathy  · Psychiatric  o Mood and Affect  o : mood normal, affect appropriate          Assessment  · Encounter for Medicare annual wellness exam     V70.0/Z00.00  · Screening for depression     V79.0/Z13.89  · Screening for alcoholism     V79.1/Z13.39      Plan  · Orders  o Falls Risk Assessment Completed (3288F) - V70.0/Z00.00 - 03/30/2021  o Brief hearing screening  (written) [facility] () - V70.0/Z00.00 - 03/30/2021  o Annual Wellness Visit-includes a Personalized Prevention Plan of Service (PPS), SUBSEQUENT VISIT (Medicare) () - V70.0/Z00.00 - 03/30/2021  o Presence or absence of urinary incontinence assessed ([place]LAZE) (1090F) - V70.0/Z00.00 - 03/30/2021  o ACO-18: Positive screen for clinical depression using a standardized tool and a follow-up plan documented () - V79.0/Z13.89 - 03/30/2021  o Negative alcohol screening () - V79.1/Z13.39 - 03/30/2021  o ACO-19: Colorectal cancer screening results documented and reviewed (3017F) - - 03/30/2021  o ACO-20: Screening Mammography documented and reviewed () - - 03/30/2021  o ACO-14: Influenza immunization administered or previously received [facility] () - - 03/30/2021  o ACO-15: Pneumococcal Vaccine Administered or Previously Received (4040F) - - 03/30/2021  o ACO-39: Current medications updated and reviewed (, 1159F) - - 03/30/2021  o ACO-18: Positive screen for clinical depression using a standardized tool and a follow-up plan documented () - - 03/30/2021  o ACO-13: Fall Risk Screening with no falls in past year or only one fall without injury in the past year (1101F) - - 03/30/2021  · Medications  o Medications have been Reconciled  o Transition of Care or Provider Policy  · Instructions  o Health Risk Assessment has been reviewed with the patient.  o Written health screening schedule for next 5-10 years was established with patient; information scanned in chart and given/mailed to patient.  o Fall prevention methods discussed and a copy of recommendations given/mailed to patient.  o Depression Screen completed and scanned into the EMR under the designated folder within the patient's documents.  o Today's PHQ-9 result is 15___  o Patient was educated/instructed on their diagnosis, treatment and medications prior to discharge from the clinic today.            Electronically Signed by: Jaret Macias PA-C  -Author on March 31, 2021 04:44:49 PM

## 2021-05-14 NOTE — PROGRESS NOTES
Progress Note      Patient Name: Anca Samson   Patient ID: [id number]   Sex: Female   YOB: 1950    Primary Care Provider: Jaret Macias PA-C   Referring Provider: Jaret Macias PA-C    Visit Date: March 30, 2021    Provider: Jaret Macias PA-C   Location: [facility] [place]   Location Address: [address] [place] [address], [address]  [place], [place]  [id number]   Location Phone: [phone]          Chief Complaint  · 4 month follow up      History Of Present Illness  Anca Samson is a 70 year old /White female who presents for evaluation and treatment of: 4 month follow up.      pt presents today for 4 month follow up.    pt states no new issues or concerns to discuss.    Labs 11/20  A1C 6.7  flu 9/20  AWV due today    Pt is doing well.    [facility] - GI - Dr. Desai - GI - chronic diarrhea    Pt cont to smoke and not ready to quit.  PMH of colon cancer and MM    No falls in the past year.  COPD - symbicort and ventolin - fair control cont to smoke  Vit D Def - taking 50,000 IU qweekly         Past Medical History  Disease Name Date Onset Notes   Anxiety --  --    Asthma --  --    C. difficile diarrhea --  --    Colon cancer 07/21/2017 --    COPD (chronic obstructive pulmonary disease) 10/23/2017 --    Depression --  --    Diverticulitis --  --    Dysphagia --  --    Heartburn --  --    Hyperlipidemia --  --    Hypertension --  --    Impaired fasting glucose 08/14/2015 --    Lumbago --  --    Major depressive disorder 10/27/2016 --    Melena --  --    Multiple myeloma --  --    Nicotine dependence 05/10/2017 --    Psychiatric Care --  --    Renal Insufficiency --  --    Screening Mammogram 02/20/2020 Normal - repeat in one year (ABIEL)   Seizures --  --    Shortness of breath --  --    Tobacco use disorder 08/14/2015 --    Vitamin D deficiency --  --          Past Surgical History  Procedure Name Date Notes   Bone marrow biopsy 2016 --    Colonoscopy 2018,2017;2019 [facility]  [facility]-DR LE:DIVERTICULOSIS AND ERYTHEMA OF MUCOSA   Endoscopy 2018 --    Fecal transplant 2019 --    Hysterectomy 1982 --    Laparotomy  --    Port Placement --  --    Right hemicolectomy 2017 --          Medication List  Name Date Started Instructions   colestipol 1 gram oral tablet  take 3 tablets by oral route 2 times a day   fluoxetine 20 mg oral capsule 2021 TAKE 1 CAPSULE BY MOUTH EVERY DAY for 30 days   promethazine 25 mg oral tablet 2020 take 1 tablet (25 mg) by oral route every 4-6 hours as needed   Symbicort 160-4.5 mcg/actuation inhalation HFA aerosol inhaler 2020 inhale 2 puffs by inhalation route 2 times per day in the morning and evening   Ventolin HFA 90 mcg/actuation inhalation HFA aerosol inhaler 2020 inhale 1 - 2 puffs (90 - 180 mcg) by inhalation route every 4-6 hours as needed for 30 days   VITAMIN D2 1.25MG(50,000 UNIT) 2021 TAKE 1 CAPSULE BY MOUTH EVERY 7 DAYS         Allergy List  Allergen Name Date Reaction Notes   Keflex --  --  --    morphine --  --  --    PENICILLINS --  --  --    SULFA (SULFONAMIDES) --  --  --        Allergies Reconciled  Family Medical History  Disease Name Relative/Age Notes   Stroke Father/   aunt/uncle   Heart Disease Father/  Mother/   aunt/uncle   - No Family History of Colorectal Cancer  --    Lung cancer Mother/   --    Kidney Cancer Father/   --          Reproductive History  Menstrual   Age Menarche: 11 Last Menstrual Period: 1982   Pregnancy Summary   Total Pregnancies: 3 Full Term: 3 Premature: 0   Ab Induced: 0 Ab Spontaneous: 0 Ectopics: 0   Multiples: 0 Livin         Social History  Finding Status Start/Stop Quantity Notes   Alcohol Never --/-- --  2018 - 2018 - 2018 -     --  --/-- --  --    No known infection risk --  --/-- --  --    Sedentary --  --/-- --  --    Tobacco Current some day  1/2ppd --          Immunizations  NameDate Admin Mfg Trade Name Lot Number  Route Inj VIS Given VIS Publication   Hqsarlhhn90/14/2015 SKB Fluarix, quadrivalent, preservative free [id number] NE NE 12/14/2015 07/02/2012   Comments: June   Pqbrewexlywb07/21/2015 NE Not Entered  NE NE 12/14/2015    Comments: June         Review of Systems  · Constitutional  o Denies  o : fever, fatigue, weight loss, weight gain  · Cardiovascular  o Denies  o : lower extremity edema, claudication, chest pressure, palpitations  · Respiratory  o Denies  o : shortness of breath, wheezing, cough, hemoptysis, dyspnea on exertion  · Gastrointestinal  o Admits  o : diarrhea  o Denies  o : nausea, vomiting, constipation, abdominal pain      Vitals  Date Time BP Position Site L\R Cuff Size HR RR TEMP (F) WT  HT  BMI kg/m2 BSA m2 O2 Sat FR L/min FiO2 HC       03/30/2021 10:24 /54 Sitting    93 - R   189lbs 6oz 5'   36.98 1.91 97 %            Physical Examination  · Constitutional  o Appearance  o : overweight, well developed  · Head and Face  o Head  o : normocephalic, atraumatic  · Neck  o Inspection/Palpation  o : normal appearance, no masses or tenderness, trachea midline  o Thyroid  o : gland size normal, nontender, no nodules or masses present on palpation  · Respiratory  o Respiratory Effort  o : breathing unlabored  o Inspection of Chest  o : chest rise symmetric bilaterally  o Auscultation of Lungs  o : diminished breath sounds present    · Cardiovascular  o Heart  o :   § Auscultation of Heart  § : regular rate and rhythm, no murmurs, gallops or rubs  o Peripheral Vascular System  o :   § Extremities  § : no edema  · Lymphatic  o Neck  o : no cervical lymphadenopathy, no supraclavicular lymphadenopathy  · Psychiatric  o Mood and Affect  o : mood normal, affect appropriate          Assessment  · COPD (chronic obstructive pulmonary disease)     496/J44.9  · Hyperlipidemia     272.4/E78.5  · Nicotine dependence     305.1/F17.200  · Class 2 severe obesity due to excess calories with serious comorbidity and  body mass index (BMI) of 36.0 to 36.9 in adult       Morbid (severe) obesity due to excess calories     278.01/E66.01  Body mass index [BMI] 36.0-36.9, adult     278.01/Z68.36  · Vitamin D deficiency     268.9/E55.9  · Screening for depression     V79.0/Z13.89  · Need for influenza vaccination     V04.81/Z23  · Colon cancer     153.9/C18.9  · Tobacco use disorder     305.1/F17.200  · Multiple myeloma     203.00/C90.00      Plan  · Orders  o ACO-17: Screened for tobacco use AND received tobacco cessation intervention (4004F) - 305.1/F17.200 - 03/30/2021  o ACO-18: Positive screen for clinical depression using a standardized tool and a follow-up plan documented () - V79.0/Z13.89 - 03/30/2021  o ACO-14: Influenza immunization was not administered for reasons documented () - V04.81/Z23 - 03/30/2021   rcvd 9/20  o Free T4 (66152) - - 03/30/2021  o Physical, Primary Care Panel (CBC, CMP, Lipid, TSH) [facility] (21461, 26563, 61824, 42184) - - 03/30/2021  o Urinalysis with Reflex Microscopy ([facility]) (46671) - - 03/30/2021  o Vitamin D (25-Hydroxy) Level (16205) - - 03/30/2021  o ACO-13: Fall Risk Screening with no falls in past year or only one fall without injury in the past year (1101F) - - 03/30/2021  o ACO - Pt declines to or was not able to provide an Advance Care Plan or name a Surrogate Decision Maker (1124F) - - 03/30/2021  o ACO-39: Current medications updated and reviewed (1539F, ) - - 03/30/2021  · Medications  o Medications have been Reconciled  o Transition of Care or Provider Policy  · Instructions  o Patient was educated and given low cholesterol diet information.  o Recommended exercise program to assist with cholesterol, weight loss and overall health improvement.  o Advised that cheeses and other sources of dairy fats, animal fats, fast food, and the extras (candy, pastries, pies, doughnuts and cookies) all contain LDL raising nutrients. Advised to increase fruits, vegetables, whole grains, and to  monitor portion sizes.   o *Form of nicotine being used: cigs  o Patient was strongly encouraged to discontinue use of any nicotine containing product or minimize the use of the product.  o Depression Screen completed and scanned into the EMR under the designated folder within the patient's documents.  o Today's PHQ-9 result is _15__  o Take all medications as prescribed/directed.  o Patient instructed/educated on their diet and exercise program.  o Patient was educated/instructed on their diagnosis, treatment and medications prior to discharge from the clinic today.  o Patient counseled to reduce calorie intake.  o Patient was instructed to exercise regularly.  o Discussed Covid-19 precautions including, but not limited to, social distancing, avoid touching your face, and hand washing.   · Disposition  o Call or Return if symptoms worsen or persist.  o F/U in 3 months.  o Care Transition            Electronically Signed by: Jaret Macias PA-C -Author on March 31, 2021 04:47:13 PM

## 2021-05-14 NOTE — PROGRESS NOTES
Progress Note      Patient Name: Anca Samson   Patient ID: [id number]   Sex: Female   YOB: 1950    Primary Care Provider: Jaret Macias PA-C   Referring Provider: Jaret Macias PA-C    Visit Date: December 17, 2020    Provider: Jarvis Borges MD   [facility]   Location Address: [address] [place] [address]  [id number]   Location Phone: [phone]          Chief Complaint     History of colon cancer stage III 2017  History of stool transplant for recurrent C. difficile 2019   chronic diarrhea       History Of Present Illness     70-year-old female with a history as above returns in follow-up after long absence.  She had F MT x2 in 2019.  Dr. Carballo performed cecum resection 2017 for her colon cancer.  She also has smoldering multiple myeloma and is followed by Dr. Patel.    The patient presents now with complaints of chronic diarrhea 6-8 loose stools per day with evidence of fecal incontinence, nocturnal diarrhea.  She does describe some abdominal cramping and bloating.  She denies any recent stool studies.  She denies any rectal bleeding, weight loss, nausea or vomiting.       Past Medical History  Anxiety; Anxiety disorder; Asthma; C. difficile diarrhea; Chronic bronchitis; Colon cancer; COPD (chronic obstructive pulmonary disease); Depression; Diverticulitis; Dysphagia; Emphysema; Heartburn; Hyperlipidemia; Hypertension; Impaired fasting glucose; Lumbago; Major depressive disorder; Melena; Multiple myeloma; Multiple myeloma; Nicotine dependence; Psychiatric Care; Renal Insufficiency; Screening Mammogram; Seizures; Shortness of breath; Tobacco use disorder; Vitamin D deficiency         Past Surgical History  Bone marrow biopsy; Colonoscopy; Endoscopy; Fecal transplant; Hysterectomy; Laparotomy; Port Placement; Right hemicolectomy         Medication List  fluoxetine 20 mg oral capsule; promethazine 25 mg oral tablet; Symbicort 160-4.5 mcg/actuation  inhalation HFA aerosol inhaler; Ventolin HFA 90 mcg/actuation inhalation HFA aerosol inhaler; Vitamin D2 1,250 mcg (50,000 unit) oral capsule         Allergy List  Keflex; morphine; PENICILLINS; SULFA (SULFONAMIDES)       Allergies Reconciled  Family Medical History  Stroke; Heart Disease; - No Family History of Colorectal Cancer; Lung cancer; Kidney Cancer         Reproductive History   3 Para 3 0 0 0       Social History  Alcohol (Never); ; No known infection risk; Sedentary; Tobacco (Former)         Immunizations  Name Date Admin   Influenza 2015   Influenza 2014   Pneumococcal 2015         Review of Systems  · Constitutional  o Denies  o : chills, fever  · Cardiovascular  o Denies  o : exertional chest pain  · Respiratory  o Denies  o : shortness of breath  · Gastrointestinal  o Denies  o : nausea, vomiting, dysphagia  · Endocrine  o Denies  o : weight gain, weight loss      Vitals  Date Time BP Position Site L\R Cuff Size HR RR TEMP (F) WT  HT  BMI kg/m2 BSA m2 O2 Sat FR L/min FiO2        2020 04:10 /72 Sitting    88 - R 12  185lbs 3oz 5'   36.17 1.89 96 %            Physical Examination  · Constitutional  o Appearance  o : Healthy-appearing, awake and alert in no acute distress  · Head and Face  o Head  o : Normocephalic with no worriesome skin lesions  · Eyes  o Vision  o :   § Visual Fields  § : eyes move symmetrical in all directions  o Sclerae  o : sclerae anicteric  · Neck  o Inspection/Palpation  o : Trachea is midline, no adenopathy  · Respiratory  o Respiratory Effort  o : Breathing is unlabored.  o Inspection of Chest  o : normal appearance  o Auscultation of Lungs  o : Chest is clear to auscultation bilaterally.  · Cardiovascular  o Heart  o :   § Auscultation of Heart  § : no murmurs, rubs, or gallops  o Peripheral Vascular System  o :   § Extremities  § : no cyanosis, clubbing or edema;   · Gastrointestinal  o Abdominal Examination  o : Abdomen is soft,  nontender to palpation, with normal active bowel sounds, no appreciable hepatosplenomegaly.              Assessment  · Colon Neoplasm, Malignant     153.9  · Diarrhea     787.91/R19.7  · Diarrhea, Functional     564.5/K59.1      Plan  · Medications  o Medications have been Reconciled  o Transition of Care or Provider Policy  · Instructions  o The patient currently is having chronic diarrhea after having right hemicolectomy for colon cancer in 2017 and recurrent C. difficile infection in 2019. She had to FMT's and her symptoms do not sound like recurrent C. difficile although that is certainly possible. I will therefore give her a trial of colestipol 3 g p.o. twice daily with instructions to begin Imodium 2 pills 3 times daily in 1 week if her diarrhea is not improved. She will follow-up with me in 3 to 4 weeks. If she continues to have persistent diarrhea with no improvement I will plan to repeat stool studies for C. difficile. I will also consider need for colonoscopy based on the results of C. difficile studies.            Electronically Signed by: Jarvis Borges MD -Author on December 17, 2020 04:51:16 PM

## 2021-05-14 NOTE — PROGRESS NOTES
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  I last office visit, she was given a trial of colestipol 3 g twice daily and to begin Imodium 2 pills 3 times daily in 1 week if her diarrhea did not improve.  She took colestipol 3 g twice daily for 1 week, which caused gas and constipation.  She decreased her dose to 2 g twice daily, and is very happy with the results.  She has a bowel movement depending on what she eats.  She will have anywhere from 1-2 bowel movements per day, and she states that Sunday she does not have a bowel movement.  She did not have to take the Imodium.  Colonoscopy 06/2019 by Dr. Desai revealed diverticulosis and erythema of the colon mucosa related to recent infection with C. difficile.       Past Medical History  Anxiety; Asthma; C. difficile diarrhea; Colon cancer; COPD (chronic obstructive pulmonary disease); Depression; Diverticulitis; Dysphagia; Heartburn; Hyperlipidemia; Hypertension; Impaired fasting glucose; Lumbago; Major depressive disorder; Melena; Multiple myeloma; Nicotine dependence; Psychiatric Care; Renal Insufficiency; Screening Mammogram; Seizures; Shortness of breath; Tobacco use disorder; Vitamin D deficiency         Past Surgical History  Bone marrow biopsy; Colonoscopy; Endoscopy; Fecal transplant; Hysterectomy; Laparotomy; Port  Placement; Right hemicolectomy         Medication List  colestipol 1 gram oral tablet; fluoxetine 20 mg oral capsule; promethazine 25 mg oral tablet; Symbicort 160-4.5 mcg/actuation inhalation HFA aerosol inhaler; Ventolin HFA 90 mcg/actuation inhalation HFA aerosol inhaler; Vitamin D2 1,250 mcg (50,000 unit) oral capsule         Allergy List  Keflex; morphine; PENICILLINS; SULFA (SULFONAMIDES)       Allergies Reconciled  Family Medical History  Stroke; Heart Disease; - No Family History of Colorectal Cancer; Lung cancer; Kidney Cancer         Reproductive History   3 Para 3 0 0 0       Social History  Alcohol (Never); ; No known infection risk; Sedentary; Tobacco (Current some day)         Immunizations  Name Date Admin   Influenza 2015   Influenza 2014   Pneumococcal 2015         Review of Systems  · Constitutional  o Denies  o : chills, fever  · Cardiovascular  o Denies  o : chest pain  · Respiratory  o Denies  o : shortness of breath  · Gastrointestinal  o Denies  o : nausea, vomiting, dysphagia  · Endocrine  o Denies  o : weight gain, weight loss      Vitals  Date Time BP Position Site L\R Cuff Size HR RR TEMP (F) WT  HT  BMI kg/m2 BSA m2 O2 Sat FR L/min FiO2 HC       2021 09:26 /64 Sitting    85 - R 16  187lbs 0oz 5'   36.52 1.89 100 %            Physical Examination  · Constitutional  o Appearance  o : obese, well developed  · Head and Face  o Head  o : Normocephalic with no worriesome skin lesions  · Eyes  o Vision  o :   § Visual Fields  § : eyes move symmetrical in all directions  o Sclerae  o : sclerae anicteric  · Neck  o Inspection/Palpation  o : Trachea is midline, no adenopathy  · Respiratory  o Respiratory Effort  o : Breathing is unlabored.  o Inspection of Chest  o : normal appearance  o Auscultation of Lungs  o : Chest is clear to auscultation bilaterally.  · Cardiovascular  o Heart  o :   § Auscultation of Heart  § : no murmurs, rubs, or  gallops  o Peripheral Vascular System  o :   § Extremities  § : no cyanosis, clubbing or edema;   · Gastrointestinal  o Abdominal Examination  o : Abdomen is soft, nontender to palpation, with normal active bowel sounds, no appreciable hepatosplenomegaly.          Assessment  · Colon Neoplasm, Malignant     153.9  · Chronic diarrhea     787.91/K52.9      Plan  · Medications  o Medications have been Reconciled  o Transition of Care or Provider Policy  · Instructions  o 70-year-old female with a history of colon cancer, stool transplant, and chronic diarrhea returns for follow-up. She is very pleased with taking colestipol 2 g twice daily and how many bowel movements that she has. We have discussed how to try to treat the medication. She is going to follow-up in 6 months.            Electronically Signed by: LINETTE Simmons -Author on January 18, 2021 09:49:41 AM  Electronically Co-signed by: Jarvis Borges MD -Reviewer on February 1, 2021 08:39:28 PM

## 2021-05-15 VITALS
HEART RATE: 85 BPM | SYSTOLIC BLOOD PRESSURE: 128 MMHG | DIASTOLIC BLOOD PRESSURE: 84 MMHG | WEIGHT: 193.37 LBS | OXYGEN SATURATION: 97 % | BODY MASS INDEX: 33.01 KG/M2 | HEIGHT: 64 IN

## 2021-05-15 VITALS
HEIGHT: 60 IN | DIASTOLIC BLOOD PRESSURE: 76 MMHG | WEIGHT: 191.12 LBS | HEART RATE: 87 BPM | BODY MASS INDEX: 37.52 KG/M2 | SYSTOLIC BLOOD PRESSURE: 134 MMHG | OXYGEN SATURATION: 95 %

## 2021-05-15 VITALS
HEIGHT: 60 IN | BODY MASS INDEX: 37.99 KG/M2 | OXYGEN SATURATION: 94 % | SYSTOLIC BLOOD PRESSURE: 124 MMHG | WEIGHT: 193.5 LBS | DIASTOLIC BLOOD PRESSURE: 78 MMHG | HEART RATE: 88 BPM

## 2021-05-15 VITALS
HEIGHT: 60 IN | OXYGEN SATURATION: 100 % | BODY MASS INDEX: 38.28 KG/M2 | SYSTOLIC BLOOD PRESSURE: 130 MMHG | HEART RATE: 91 BPM | DIASTOLIC BLOOD PRESSURE: 75 MMHG | WEIGHT: 195 LBS

## 2021-05-15 VITALS
TEMPERATURE: 98.7 F | BODY MASS INDEX: 36.22 KG/M2 | DIASTOLIC BLOOD PRESSURE: 84 MMHG | HEIGHT: 60 IN | WEIGHT: 184.5 LBS | OXYGEN SATURATION: 96 % | HEART RATE: 90 BPM | SYSTOLIC BLOOD PRESSURE: 130 MMHG

## 2021-05-16 VITALS
TEMPERATURE: 98.4 F | HEIGHT: 60 IN | SYSTOLIC BLOOD PRESSURE: 137 MMHG | BODY MASS INDEX: 33.42 KG/M2 | WEIGHT: 170.25 LBS | HEART RATE: 94 BPM | DIASTOLIC BLOOD PRESSURE: 95 MMHG | OXYGEN SATURATION: 98 %

## 2021-05-16 VITALS
WEIGHT: 180 LBS | HEIGHT: 60 IN | TEMPERATURE: 98.6 F | SYSTOLIC BLOOD PRESSURE: 110 MMHG | HEART RATE: 89 BPM | DIASTOLIC BLOOD PRESSURE: 70 MMHG | OXYGEN SATURATION: 95 % | BODY MASS INDEX: 35.34 KG/M2

## 2021-05-16 VITALS
HEIGHT: 60 IN | WEIGHT: 179 LBS | SYSTOLIC BLOOD PRESSURE: 122 MMHG | TEMPERATURE: 97.6 F | OXYGEN SATURATION: 96 % | DIASTOLIC BLOOD PRESSURE: 67 MMHG | BODY MASS INDEX: 35.14 KG/M2 | HEART RATE: 95 BPM

## 2021-05-16 VITALS
DIASTOLIC BLOOD PRESSURE: 51 MMHG | WEIGHT: 183.12 LBS | OXYGEN SATURATION: 97 % | BODY MASS INDEX: 35.95 KG/M2 | HEIGHT: 60 IN | SYSTOLIC BLOOD PRESSURE: 105 MMHG | HEART RATE: 102 BPM

## 2021-05-16 VITALS
SYSTOLIC BLOOD PRESSURE: 120 MMHG | WEIGHT: 177 LBS | BODY MASS INDEX: 34.75 KG/M2 | HEART RATE: 90 BPM | OXYGEN SATURATION: 95 % | DIASTOLIC BLOOD PRESSURE: 80 MMHG | HEIGHT: 60 IN

## 2021-05-16 VITALS
TEMPERATURE: 98.3 F | HEIGHT: 60 IN | WEIGHT: 176 LBS | DIASTOLIC BLOOD PRESSURE: 81 MMHG | SYSTOLIC BLOOD PRESSURE: 135 MMHG | HEART RATE: 95 BPM | BODY MASS INDEX: 34.55 KG/M2

## 2021-05-16 VITALS
HEART RATE: 88 BPM | HEIGHT: 60 IN | BODY MASS INDEX: 34.75 KG/M2 | SYSTOLIC BLOOD PRESSURE: 110 MMHG | WEIGHT: 177 LBS | OXYGEN SATURATION: 96 % | DIASTOLIC BLOOD PRESSURE: 70 MMHG

## 2021-05-16 VITALS — WEIGHT: 175.12 LBS | DIASTOLIC BLOOD PRESSURE: 65 MMHG | SYSTOLIC BLOOD PRESSURE: 143 MMHG | HEART RATE: 108 BPM

## 2021-05-22 ENCOUNTER — TRANSCRIBE ORDERS (OUTPATIENT)
Dept: ADMINISTRATIVE | Facility: HOSPITAL | Age: 71
End: 2021-05-22

## 2021-05-22 DIAGNOSIS — Z78.0 OSTEOPENIA AFTER MENOPAUSE: Primary | ICD-10-CM

## 2021-05-22 DIAGNOSIS — C18.9 MALIGNANT NEOPLASM OF COLON, UNSPECIFIED PART OF COLON (HCC): Primary | ICD-10-CM

## 2021-05-22 DIAGNOSIS — M85.80 OSTEOPENIA AFTER MENOPAUSE: Primary | ICD-10-CM

## 2021-05-28 VITALS
WEIGHT: 187.39 LBS | SYSTOLIC BLOOD PRESSURE: 138 MMHG | BODY MASS INDEX: 36.6 KG/M2 | DIASTOLIC BLOOD PRESSURE: 62 MMHG | OXYGEN SATURATION: 97 % | TEMPERATURE: 98.5 F | HEART RATE: 104 BPM | RESPIRATION RATE: 24 BRPM

## 2021-05-28 VITALS
OXYGEN SATURATION: 98 % | SYSTOLIC BLOOD PRESSURE: 127 MMHG | SYSTOLIC BLOOD PRESSURE: 135 MMHG | OXYGEN SATURATION: 97 % | DIASTOLIC BLOOD PRESSURE: 75 MMHG | DIASTOLIC BLOOD PRESSURE: 55 MMHG | TEMPERATURE: 98.6 F | HEIGHT: 61 IN | HEART RATE: 85 BPM | RESPIRATION RATE: 24 BRPM | BODY MASS INDEX: 34.34 KG/M2 | RESPIRATION RATE: 16 BRPM | WEIGHT: 181.88 LBS | SYSTOLIC BLOOD PRESSURE: 141 MMHG | HEART RATE: 98 BPM | DIASTOLIC BLOOD PRESSURE: 64 MMHG | WEIGHT: 188.05 LBS | OXYGEN SATURATION: 97 % | SYSTOLIC BLOOD PRESSURE: 128 MMHG | RESPIRATION RATE: 16 BRPM | TEMPERATURE: 97.8 F | HEIGHT: 61 IN | RESPIRATION RATE: 18 BRPM | DIASTOLIC BLOOD PRESSURE: 69 MMHG | TEMPERATURE: 98.7 F | HEART RATE: 78 BPM | TEMPERATURE: 97.4 F | OXYGEN SATURATION: 96 % | BODY MASS INDEX: 35.17 KG/M2 | DIASTOLIC BLOOD PRESSURE: 48 MMHG | WEIGHT: 180.34 LBS | OXYGEN SATURATION: 98 % | WEIGHT: 179.23 LBS | SYSTOLIC BLOOD PRESSURE: 139 MMHG | DIASTOLIC BLOOD PRESSURE: 82 MMHG | BODY MASS INDEX: 36.71 KG/M2 | DIASTOLIC BLOOD PRESSURE: 59 MMHG | BODY MASS INDEX: 34.05 KG/M2 | HEIGHT: 61 IN | WEIGHT: 186.29 LBS | TEMPERATURE: 98.4 F | HEART RATE: 92 BPM | HEIGHT: 61 IN | OXYGEN SATURATION: 97 % | OXYGEN SATURATION: 99 % | HEART RATE: 99 BPM | SYSTOLIC BLOOD PRESSURE: 140 MMHG | BODY MASS INDEX: 35.25 KG/M2 | HEIGHT: 61 IN | RESPIRATION RATE: 16 BRPM | WEIGHT: 186.73 LBS | TEMPERATURE: 98.5 F | HEART RATE: 95 BPM | BODY MASS INDEX: 33.84 KG/M2 | WEIGHT: 194.45 LBS | HEART RATE: 86 BPM | SYSTOLIC BLOOD PRESSURE: 120 MMHG | BODY MASS INDEX: 36.73 KG/M2 | TEMPERATURE: 98.3 F | RESPIRATION RATE: 18 BRPM | HEIGHT: 61 IN

## 2021-05-28 VITALS
TEMPERATURE: 97.7 F | HEART RATE: 93 BPM | WEIGHT: 174.38 LBS | HEART RATE: 110 BPM | BODY MASS INDEX: 32.92 KG/M2 | SYSTOLIC BLOOD PRESSURE: 138 MMHG | OXYGEN SATURATION: 95 % | BODY MASS INDEX: 33.34 KG/M2 | DIASTOLIC BLOOD PRESSURE: 50 MMHG | TEMPERATURE: 97.8 F | HEIGHT: 61 IN | OXYGEN SATURATION: 96 % | WEIGHT: 176.59 LBS | DIASTOLIC BLOOD PRESSURE: 84 MMHG | SYSTOLIC BLOOD PRESSURE: 117 MMHG | HEIGHT: 61 IN

## 2021-05-28 NOTE — PROGRESS NOTES
Patient: MAURICIO ALBRIGHT     Acct: [id number]     Report: #[id number]  UNIT #: [id number]     : 1950    Encounter Date:10/16/2018  PRIMARY CARE: IFRAH JOYA  ***Signed***  --------------------------------------------------------------------------------------------------------------------  Visit Type      Established Patient Visit            Chief Complaint      MONOCLONAL GAMMONOPATHY      Intent of Therapy:  Curative            Referring Provider/Copies To      Referring Provider:  IFRAH JOYA            Allergies      Coded Allergies:             MORPHINE (Verified  Allergy, Severe, TROUBLE BREATHING, FACIAL REDNESS, ,     18)           CEPHALEXIN (Verified  Allergy, Unknown, ITCHING, 18)           PENICILLINS (Verified  Allergy, Unknown, RASH, 18)           SULFA (SULFONAMIDE ANTIBIOTICS) (Verified  Allergy, Unknown, RASH, 18)            Medications      Last Reconciled on 10/16/18 17:14 by LINETTE MEDEIROS      Pantoprazole (Protonix*) 40 Mg Tablet.      40 MG PO QDAY@07, #90 TAB 0 Refills         Prov: DIMA LOBO         18       DULoxetine (Cymbalta) 30 Mg Capsule.      30 MG PO BID, #60 CAP 0 Refills         Reported         17       Budesonide/Formoterol Fumarate (Symbicort 160/4.5 Mcg) 10.2 Gm Inh      2 PUFF INH RTBID PRN for SHORTNESS OF BREATH, #1 INH 0 Refills         Reported         5/10/17      Medications Reviewed:  No Changes made to meds            History and Present Illness      Past Oncology Illness History      Mrs. Albright is a 67-year-old  female who was recently noted to have     abnormal labs. A serum protein electrophoresis was performed on 2016 which    showed a markedly elevated IgG at 2941, decreased IgA at 46, IgM 87, and     markedly increased monoclonal spike at 2.2 g/dL. 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 Her last CBC from 12/14/2015 showed a     white count of 10,400, hemoglobin 14.2, and platelet count of 328,000 all of     which were within normal limits. CMP performed 12/14/2015 showed a calcium which    was normal at 9.2 a total protein elevated at 8.4 and an albumin normal at 4.1.     She underwent a bone marrow biopsy on 03/02/16 which showed a 30% plasma cell     population consistent with plasma cell dyscrasia. She had a skeletal survey     performed which was without any lytic lesions. She again states that she had had    some back pain and leg pain.             She then developed acute right sided pain and CT imaging showed a mass in the     colon. Laboratory evaluation performed by her PCP at that time also showed     leukocytosis. Colonoscopy was performed by gastroenterology which showed a     tumor. The patient then underwent a right hemicolectomy on 05/16/2017 which     revealed a low-grade 7.6 cm adenocarcinoma of the cecum. All margins were     negative. Lymphovascular invasion and perineural invasion were not identified.     26 lymph nodes were removed and unfortunately 1 was involved with metastatic     deposit. This was staged as a pT3 pN1a colorectal cancer. Imaging performed at     that time revealed no evidence of distant metastatic disease. She is now s/p     colectomy with colostomy.        s/p 6 mo of adjuvant xeloda. 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  Continues with chronic diarrhea     despite numerous meds prescribed by GI and PCP--has appt w/GI end of this     month--reports bloody diarrhea yesterday but only 1 episode-again referred pt to    GI to discuss-last colonoscopy benign and recommended f/u scope in 4mo.  Bone     survey on 9/17/18 showed mild degenerative changes of the cervical and thoracic     spine; otherwise unremarkable.   M-spike remains present on 9/5/18 @ 2.2; stable    however, present.   She denies significant pain or excess fatigue.  Discussed     bone marrow transplant and which facility would be more feasible for her.            Cancer Details            dx 5/2017--low grade adenocarcinoma of ascending colon     Smoldering      Myeloma            Clinical Staging      Smoldering Myeloma; Stage III colon cancer            Clinical Trial Participant      No            ECOG Performance Status      0            PAST, FAMILY   Past Medical History      Past Medical History:  COPD, Depression, Seizures, Arthritis, Osteoporosis      Hematology/oncology:  REPORTS HX OF: Previous Treatment for CA (ORAL CHEMO),     Myeloma, Other cancer history (COLON CANCER - SURGERY MAY 16, 2017)            Past Surgical History      REPORTS HX OF: Appendectomy, Hysterectomy, VAD Placement, Other Past Surgical Hx    (FECAL TRANSPLANT)            Family History      REPORTS HX OF: Lung cancer (FATHER, MOTHER)            Social History      Lives independently:  Yes      Occupation:  RETIRED            Tobacco Use      Tobacco status:  Light tobacco smoker      Smoking packs/day:  1      Smoking history:  25-50 pack years            Alcohol Use      Alcohol intake:  None            Substance Use      Substance use:  Denies use            REVIEW OF SYSTEMS      General:  Denies: Appetite change, Excessive sweating, Fatigue, Fever, Night     sweats, Weight gain, Weight loss, Other      Eyes:  Denies: Blurred vision, Corrective lenses, Diplopia, Eye irritation, Eye     pain, Eye redness, Spots in vision, Vision loss, Other      Ears, nose, mouth, throat:  Denies: Headache, Seizures, Visual Changes, Hearing     loss, Sinus Congestion, Hoarseness, Sore throat, Other      Cardiovascular:  Denies: Chest pain, Irregular heartbeat, Palpitations, Swollen     ankles/legs, Other      Respiratory:  Denies: Chest pain, Shortness of Air, Productive cough, Coughing     blood, Other      Gastrointestinal:  Complains of: Diarrhea; Denies:  Nausea, Vomiting, Problem     swallowing, Frequent heartburn, Constipation, Tarry stools, Bloody stools,     Unable to control bowels, Other      Kidney/Bladder:  Denies: Painful Urination, Change in urinary stream, Blood in     urine, Incontinence, Frequent Urination, Decreased urine stream, Other      Musculoskeletal:  Denies: New Back pain, Leg Cramps, Painful Joints, Swollen     Joints, Muscle Pain, Muscle weakness, Other      Skin:  DENIES: Jaundice, Easy Bleeding, Lesions/changes in moles, Nail changes,     Skin Discoloration, Rash, Other      Neurological:  Denies: Dizziness, Fainting, Numbness\Tingling, Paralysis,     Seizures, Other      Psychiatric:  Complains of: AAO X 3, Depression; Denies: Anxiety, Panic attacks,    Memory loss, Other      Endocrine:  DiabetesThyroid DisorderOsteoporosisEndocrine Other      Hematologic/lymphatic:  Denies: Bruising, Bleeding, Enlarged Lymph Nodes,     Recurrent infections, Other      Reproductive:  Denies Pregnant, Denies Menopause, Denies Still Menstruating,     Denies Heavy Periods, Denies Other            VITAL SIGNS,PAIN/FATIGUE SCORE      Vitals      Height 5 ft 1.00 in / 154.94 cm      Weight 181 lbs 14.072 oz / 82.5 kg      BSA 1.81 m2      BMI 34.4 kg/m2      Temperature 98.5 F / 36.94 C - Temporal      Pulse 95      Respirations 16      Blood Pressure 140/55 Sitting, Left Arm      Pulse Oximetry 97%, RM AIR            Pain Score      Pain Scale Used:  Numerical      Pain Intensity:  0            Fatigue Score      Fatigue (0-10 scale):  0 (none)            EXAM      General Appearance:  Alert, Oriented X3, Cooperative, No acute distress      Eyes:  Anicteric Sclerae, Moist Conjunctiva      HEENT:  Orophraynx clear, Dentition (poor dentition)      Neck:  Supple, No Masses or JVD      Respiratory:  CTAB, Normal Respiratory Effort      Chest:  Port in Place      Abdomen:  Bowel Sounds, Soft, Other (well-healed surgical incision);          No Distention, No  Hepatosplenomegaly, No Tenderness      Cardio:  RRR, No Murmur, No, Peripheral Edema      Psychiatric:  Appropriate Affect, Intact Judgement      Extremities:  No Digital Cyanosis (upper extremities), No Digital Ischemia     (upper extremities)            PREVENTION      Hx Influenza Vaccination:  Yes      Date Influenza Vaccine Given:  Oct 1, 2018      Influenza Vaccine Declined:  No      2 or More Falls Past Year?:  No      Fall Past Year with Injury?:  No      Hx Pneumococcal Vaccination:  Yes (UTD)      Encouraged to follow-up with:  PCP regarding preventative exams.      Chart initiated by      FRANSISCO CASTANEDA MA            IMPRESSION/PLAN      Impression      Multiple myeloma-smoldering, colon cancer            Diagnosis      Smoldering myeloma - C90.00      Patient had 30% plasma cell monoclonal population on her bone marrow. Her M     spike has been fluctuating somewhat but remains above 2 g/dL. Recent skeletal     survey did not show any clear progression of her disease. She is in relatively     good health. She has never been evaluated by bone marrow transplant service for     consideration of transplant in the overall treatment of myeloma. It would appear    that her disease is slowly worsening and she will likely need treatment sooner     than later. Bone marrow transplant is still considered one of the best options     for myeloma at the present time. I feel that she would benefit from at least a     discussion with bone marrow transplant service to which she is agreeable.     Referral will be made to [facility]. Port flush today and monthly.    I will see her back in 3 months with labs prior or sooner based on her     discussion with you well.      New Diagnostics      * CBC With Auto Diff, 3 Months      * IMMUNOGLOBULIN QUANT IGAMQ, 3 Months      * SPEP with Immunofixation, 3 Months      * Free Light Chains Ka, 3 Months      * IMMUNOFIX PROT ELECTROPH URINE, 3 Months      New Referrals       * Oncology, As Soon As Possible         [facility]/Dr. Hoyos            Colon cancer         Malignant neoplasm of colon, unspecified part of colon - C18.9         Colon location: unspecified part of colon      Stage IIIa. (T3, N1, M0) disease. Status post resection followed by adjuvant     Xeloda 6 months. ADARSH.            Patient Education      Patient Education Provided:  Yes                 Disclaimer: Converted document may not contain table formatting or lab diagrams. Please see [facility] for the authenticated document.

## 2021-05-28 NOTE — PROGRESS NOTES
Patient: MAURICIO ALBRIGHT     Acct: [id number]     Report: #[id number]  UNIT #: [id number]     : 1950    Encounter Date:2021  PRIMARY CARE: IFRAH JOYA  ***Signed***  --------------------------------------------------------------------------------------------------------------------  NURSE INTAKE      Visit Type      Established Patient Visit            Chief Complaint      COLON CA F/U      Intent of Therapy:  Curative            Referring Provider/Copies To      Primary Care Provider:  IFRAH JOYA      Copies To:   IFRAH JOYA; Jarvis Borges            History and Present Illness      Past Oncology Illness History      Patient returns for follow-up of her smoldering myeloma and colon cancer.  She     is status post resection followed by adjuvant chemotherapy completed . Mrs. Albright is a 70-year-old  female who had a serum protein     electrophoresis was performed on 2016 which showed a markedly elevated IgG    at 2941, decreased IgA at 46, IgM 87, and markedly increased monoclonal spike at    2.2 g/dL. Interestingly, she had a serum protein electrophoresis performed on     2015 which again showed a hypergammaglobulinemia with an IgG of 2695. Her     last CBC from 2015 showed a white count of 10,400, hemoglobin 14.2, and     platelet count of 328,000 all of which were within normal limits. CMP performed     2015 showed a calcium which was normal at 9.2 a total protein elevated at     8.4 and an albumin normal at 4.1.  She underwent a bone marrow biopsy on     16 which showed a 30% plasma cell population consistent with plasma cell     dyscrasia. She had a skeletal survey performed which was without any lytic     lesions. She again states that she had had some back pain and leg pain.             She then developed acute right sided pain and CT imaging showed a mass in the     colon. Laboratory evaluation performed by her PCP at that time also  showed     leukocytosis. Colonoscopy was performed by gastroenterology which showed a     tumor. The patient then underwent a right hemicolectomy on 2017 which     revealed a low-grade 7.6 cm adenocarcinoma of the cecum. All margins were     negative. Lymphovascular invasion and perineural invasion were not identified.     26 lymph nodes were removed and unfortunately 1 was involved with metastatic     deposit. This was staged as a pT3 pN1a colorectal cancer. Imaging performed at     that time revealed no evidence of distant metastatic disease. She is now s/p     colectomy with colostomy.        s/p 6 mo of adjuvant xeloda. completed             HPI - Oncology Interim      Patient returns for follow-up of her colon cancer and myeloma.  She is status     post treatments as outlined for her colon cancer.  She is currently on     observation for myeloma.  On return today, she states she is feeling well.  She     reports normal bowel habits without blood present, pain or melena.  She does     have a ventral umbilical hernia but not causing any problems.  She denies any     masses or adenopathy.  No unusual aches or pains.  She reports good appetite and    energy level.  She reports normal bladder habits.            Cancer Details            Low grade adenocarcinoma of ascending colon          Smoldering Myeloma            Clinical Staging      Smoldering Myeloma; Colon ([id number])            Treatments      Chemotherapy      2017 completed 6mo adjuvant Xeloda            Clinical Trial Participant      No            ECOG Performance Status      0            Most Recent Imaging Findings      Patient: MAURICIO ALBRIGHT   Acct: #[id number]   Report: #[id number]            UNIT #: [id number]    DOS: 21       INSURANCE:MEDICARE PART A   LOCATION:[facility]     : 1950            PROVIDERS      ADMITTING:     ATTENDING: MO JACKSON      FAMILY:  IFRAH JOYA   ORDERING:  MO JACKSON          OTHER: Jarvis Barrluis   DICTATING:  PRITI UMAÑA MD            REQ #:[id number]   EXAM:CTACPWC - CT ABD CHEST PEL w CONTR      REASON FOR EXAM:  COLON CA/MULTIPLE MYELOMA      REASON FOR VISIT:  COLON CA/MULTIPLE MYELOMA            *******Signed******         PROCEDURE:   CT ABDOMEN; CT CHEST; CT PELVIS WITH CONTRAST             COMPARISON:   [facility], CT, CT CHEST ABD PEL W CONTRAST,    3/15/2018, 10:25.        [facility], CT, CT ABD AND PELVIS W/WO CONTRAST,     8/16/2011, 9:24.        [facility], CT, ABDOMEN/PELVIS WITH CONTRAST, 1/29/2020,     8:35.  [facility], CT, CHEST W/O CONTRAST, 3/03/2020, 8:50.             INDICATIONS:   FOLLOW UP COLON CA. COUGH. COPD. SMOKER. GFR GREATER THAN 60     CREATININE 0.8  2-22-21.             TECHNIQUE:   After obtaining the patient's consent, CT images were obtained with    intravenous contrast       material.             FINDINGS:         CT chest:      The visualized soft tissue structures at the base of the neck including the     thyroid appear within       normal limits.  There is no lower cervical or axillary adenopathy.  The heart     size is normal.        There is no pericardial effusion.  There is aortic and coronary artery     atherosclerotic disease.        There is no mediastinal or hilar lymphadenopathy.  The esophagus is normal in     course and caliber.             The tracheobronchial tree is patent.  There is upper lobe predominant     centrilobular emphysema.        There is bandlike atelectasis within the bilateral lower lobes which appears     stable from the prior       examination.  There is a stable calcified granuloma within the left lower lobe.     There is a stable       6 mm nodule within the right middle lobe on image 45. There is a stable pleural-    based thickening       within the anterior right upper lobe measuring up to 7 mm in thickness best  seen    on image 20. There       are multilevel degenerative changes of the thoracic spine.  There is no     suspicious lytic or       sclerotic osseous lesion within the thorax.             CT abdomen and pelvis:      The liver is normal in size and contour.  There is no focal hepatic lesion.  The    gallbladder is       present without wall thickening.  There is a dependent gallstone.  There is no     intrahepatic or       extrahepatic biliary ductal dilatation.  The spleen, pancreas, and right adrenal    gland are       unremarkable.  There is a stable 1.9 x 2.5 cm left adrenal nodule.             The kidneys are symmetric in size and enhancement.  There is a nonobstructing     stone within the       lower pole of the left kidney.  There is no hydronephrosis or hydroureter.  The     urinary bladder is       fluid filled without wall thickening.  The uterus is surgically absent.  There     are no adnexal       masses.             The stomach and duodenum are normal in caliber and configuration.  There are no     abnormally dilated       loops of small bowel to suggest small bowel obstruction or small bowel     inflammation.  The patient       is status post right hemicolectomy.  There is a supraumbilical midline ventral     hernia containing       portions of the mid transverse colon near the anastomosis.  There is no evidence    of obstruction.        Enteric contrast traverses to the rectum.  There is extensive descending and     sigmoid diverticulosis       without evidence of acute diverticulitis.             The aorta is normal in caliber without evidence of aneurysm formation.  There is    no adenopathy       within the abdomen or pelvis.  There are degenerative changes of the lumbar     spine.  There is a       stable lytic lesion involving the left iliac wing best seen on image 70.              CONCLUSION:         CT chest:      1. No evidence of metastatic disease within the chest.      2. Centrilobular  emphysema with bilateral lower lobe bandlike atelectasis.      3. Stable 6 mm pulmonary nodule within the right middle lobe.             CT abdomen and pelvis:        1. Postsurgical changes of prior right hemicolectomy.       2. Supraumbilical hernia containing portions of the mid transverse colon at the     ileocolonic       anastomosis.         3. Unchanged lytic lesion involving the left iliac wing.               4.  Extensive descending and sigmoid colonic diverticulosis.                PRITI UMAÑA MD             Electronically Signed and Approved By: PRITI UMAÑA MD on 2/22/2021 at 15:15                                  Until signed, this is an unconfirmed preliminary report that may contain      errors and is subject to change.                                              BATB1:      D:02/22/21 1515            PAST, FAMILY   Past Medical History      Past Medical History:  COPD, Depression      Hematology/Oncology (F):  Colorectal Cancer, Myeloma            Past Surgical History      Biopsy, Hysterectomy, VAD Placement            Family History      Family History:  Lung Cancer            Social History      Lives independently:  Yes      Number of Children:  3      Occupation:  RETIRED            Tobacco Use      Tobacco status:  Light tobacco smoker, Former smoker      Smoking packs/day:  1            Substance Use      Substance use:  Denies use            REVIEW OF SYSTEMS      General:  Denies: Fatigue      Eye:  Admits Corrective Lenses      Respiratory:  Admits: Shortness of Air;          Denies: Productive Cough      Gastrointestinal:  Denies Diarrhea, Denies Nausea/Vomiting      Musculoskeletal:  Denies Back Pain, Denies Muscle Pain      Integumentary:  Denies Itching      Psychiatric:  Admits Depression            VITAL SIGNS AND SCORES      Vitals      Weight 187 lbs 6.256 oz / 85 kg      Temperature 98.5 F / 36.94 C - Temporal      Pulse 104      Respirations 24      Blood Pressure 138/62  Sitting, Left Arm      Pulse Oximetry 97%, RM AIR            Pain Score      Pain Scale Used:  Numerical      Pain Intensity:  0            Fatigue Score      Fatigue (0-10 scale):  0 (none)            EXAM      General Appearance:  Positive for: Alert, Cooperative;          Negative for: Acute Distress      Eye:  Positive for: Anicteric Sclerae, Moist Conjunctiva      Neck:  Positive for: Supple;          Negative for: JVD, Masses      Respiratory:  Positive for: CTAB, Normal Respiratory Effort      Abdomen/Gastro:  Positive for: Normal Active Bowel Sounds, Hernias (Ventral     incisional hernia without incarceration), Soft;          Negative for: Distention, Hepatosplenomegaly, Tenderness      Cardiovascular:  Positive for: RRR;          Negative for: Gallop, Murmur, Peripheral Edema, Rub      Psychiatric:  Positive for: Appropriate Affect, Intact Judgement      Lymphatic:  Negative for: Cervical, Infraclavicular, Supraclavicular            PREVENTION      Hx Influenza Vaccination:  Yes      Date Influenza Vaccine Given:  Jul 1, 2020      Influenza Vaccine Declined:  No      2 or More Falls in Past Year?:  No      Fall Past Year with Injury?:  No      Hx Pneumococcal Vaccination:  Yes      Encouraged to follow-up with:  PCP regarding preventative exams.      Chart initiated by      FRANSISCO CASTANEDA MA            ALLERGY/MEDS      Allergies      Coded Allergies:             MORPHINE (Verified  Allergy, Severe, TROUBLE BREATHING, FACIAL REDNESS, ,     2/24/21)           CEPHALEXIN (Verified  Allergy, Unknown, ITCHING, 2/24/21)           PENICILLINS (Verified  Allergy, Unknown, RASH, 2/24/21)           SULFA (SULFONAMIDE ANTIBIOTICS) (Verified  Allergy, Unknown, RASH, 2/24/21)            Medications      Last Reconciled on 2/24/21 16:15 by MO JACKSON      Vitamin B Complex (B Complex-Vitamin B-12) 1 Each Tablet      1 TAB PO QDAY, #30 TAB         Reported         2/5/20       Cyanocobalamin (Vitamin B-12) Inj  (Cyanocobalamin Inj) 1,000 Mcg/1 Ml Vial      1000 MCG IM FR, VIAL         Reported         2/5/20       Budesonide/Formoterol Fumarate (Symbicort 160/4.5 Mcg) 10.2 Gm Inh      2 PUFF INH RTBID, #1 INH 0 Refills         Reported         2/5/20       DULoxetine (Cymbalta) 30 Mg Capsule.dr      30 MG PO BID, #60 CAP 0 Refills         Reported         6/8/17      Medications Reviewed:  No Changes made to meds            IMPRESSION/PLAN      Diagnosis      Colon cancer         Malignant neoplasm of ascending colon         Colon location: ascending      Status post treatments as outlined.  I see no evidence of disease recurrence by     history, physical examination or recent CT scans.  She is up-to-date on     endoscopy.  Follow-up 6 months for ongoing surveillance with labs and scan            Smoldering myeloma - C90.00      Patient is currently on observation.  She was supposed to have lab work before     this visit the lab did not draw the right blood work.  She is not interested in     having additional sticks at this point.  Given the prior stability, I will plan     to recheck at her next visit in 6 months.            Notes      New Diagnostics      * CBC, 6 Months         Dx: Smoldering myeloma - C90.00      * Comp Metabolic Panel, 6 Months         Dx: Smoldering myeloma - C90.00      * IMMUNOGLOBULIN QUANT IGAMQ, 6 Months         Dx: Smoldering myeloma - C90.00      * FREE KAPPA LAMBDA LT CHAINS QU, 6 Months         Dx: Smoldering myeloma - C90.00      * LDH, 6 Months         Dx: Smoldering myeloma - C90.00      * SPEP with Immuno (IEPS), 6 Months         Dx: Smoldering myeloma - C90.00      * PROTEIN ELECTROPH SERUM, 6 Months         Dx: Smoldering myeloma - C90.00      * CT Abd/Pelvis/Chest W/Contrast, 6 Months         Dx: Monoclonal gammopathy - D47.2            Patient Education      Patient Education Provided:  Yes            Electronically signed by MO JACKSON  02/24/2021 16:15       Disclaimer:  Converted document may not contain table formatting or lab diagrams. Please see [facility] [facility] for the authenticated document.

## 2021-05-28 NOTE — PROGRESS NOTES
Patient: MAURICIO ALBRIGHT     Acct: [id number]     Report: #[id number]  UNIT #: [id number]     : 1950    Encounter Date:2019  PRIMARY CARE: IFRAH JOYA  ***Signed***  --------------------------------------------------------------------------------------------------------------------  NURSE INTAKE      Visit Type      Established Patient Visit            Chief Complaint      MONOCLONAL GAMMOPATHY            Referring Provider/Copies To      Referring Provider:  IFRAH JOYA      Primary Care Provider:  IFRAH JOYA            History and Present Illness      Past History      Mrs. Albright is a 67-year-old  female who was recently noted to have     abnormal labs. A serum protein electrophoresis was performed on 2016 which    showed a markedly elevated IgG at 2941, decreased IgA at 46, IgM 87, and     markedly increased monoclonal spike at 2.2 g/dL. 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 Her last CBC from 2015 showed     a white count of 10,400, hemoglobin 14.2, and platelet count of 328,000 all of     which were within normal limits. CMP performed 2015 showed a calcium which    was normal at 9.2 a total protein elevated at 8.4 and an albumin normal at 4.1.     She underwent a bone marrow biopsy on 16 which showed a 30% plasma cell     population consistent with plasma cell dyscrasia. She had a skeletal survey     performed which was without any lytic lesions. She again states that she had had    some back pain and leg pain.             She then developed acute right sided pain and CT imaging showed a mass in the     colon. Laboratory evaluation performed by her PCP at that time also showed leuko    cytosis. Colonoscopy was performed by gastroenterology which showed a tumor. The    patient then underwent a right hemicolectomy on 2017 which revealed a  "low-    grade 7.6 cm adenocarcinoma of the cecum. All margins were negative.     Lymphovascular invasion and perineural invasion were not identified. 26 lymph     nodes were removed and unfortunately 1 was involved with metastatic deposit.     This was staged as a pT3 pN1a colorectal cancer. Imaging performed at that time     revealed no evidence of distant metastatic disease. She is now s/p colectomy     with colostomy.        s/p 6 mo of adjuvant xeloda. 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  She was not able to see Dr. Hoyos/LU[facility] transplant as she had a colonoscopy on 11/29/18.  They are doing     colonoscopy q4mo at this time and she continues to have multiple polyps.  She     continues to suffer with diarrhea as well.  She denies any new pain or     discomfort; however, is aware of pain in her left hip.  Lab work on 1/10/19     showed M-spike to 2.5; however, creatinine, calcium and HH are wnl with a normal    kappa/lambda ratio.  No fever, lymphadenopathy or distress at this time.            Most Recent Lab Findings      Laboratory Tests      1/10/19 10:35            PAST, FAMILY   Past Medical History      Past Medical History:  COPD, Depression      Hematology/Oncology (F):  Colorectal Cancer, Myeloma (MULTIPLE)      Other Hematology/Oncology      Smoldering myeloma            Past Surgical History      Biopsy (TOOK OUT PART OF COLON), Hysterectomy, VAD Placement            Family History      Family History:  Lung Cancer (MOTHER, SEVERAL MATERNAL AND PATERNAL AUNTS.)      FATHER HAD CANCER \"EVERYWHERE\"            Social History      Marital Status:        Lives independently:  Yes      Number of Children:  3      Occupation:  RETIRED            Tobacco Use      Tobacco status:  Light tobacco smoker      Smoking packs/day:  1      Smoking history:  25-50 pack years            Alcohol Use      Alcohol intake:  None            Substance " Use      Substance use:  Denies use            REVIEW OF SYSTEMS      General:  Admits: Fatigue;          Denies: Appetite Change, Fever, Night Sweats, Weight Gain, Weight Loss      Eye:  Denies: Blurred Vision, Corrective Lenses, Diplopia, Eye Irritation, Eye     Pain, Eye Redness, Spots in Vision, Vision Loss      ENT:  Denies: Headache, Hearing Loss, Hoarseness, Seizures, Sinus Congestion,     Sore Throat      Cardiovascular:  Denies: Chest Pain, Edema Ankles, Edema Legs, Irregular     Heartbeat, Palpitations      Respiratory:  Denies: Coughing Blood, Productive Cough, Shortness of Air,     Wheezing      Gastrointestinal:  Admits: Diarrhea;          Denies: Bloody Stools, Constipation, Frequent Heartburn, Nausea, Problem     Swallowing, Tarry Stools, Unable to Control Bowels, Vomiting      Genitourinary (female):  Denies: Blood in Urine, Decrease Urine Stream, Frequent    Urination, Incontinence, Painful Urination      Musculoskeletal:  Denies: Back Pain, Leg Cramps, Muscle Pain, Muscle Weakness,     Painful Joints, Swollen Joints      Integumentary:  Denies: Bleeds Easily, Bruises Easily, Hair Changes, Jaundice,     Lesions, Mole Changes, Nail Changes, Pigment Changes, Rash, Skin Discoloration      Neurologic:  Denies: Dizziness, Fainting, Numbness\Tingling, Paralysis, Seizures      Psychiatric:  Admits: Depression;          Denies: Anxiety, Confused, Disoriented, Memory Loss      Endocrine:  Denies: Cold Intolerance, Diabetes, Excessive Sweating, Excessive     Thirst, Excessive Urination, Heat Intolerance, Flushing, Hyperthyroidism,     Hypothyroidism      Hematologic/Lymphatic:  Denies: Bruising, Bleeding, Enlarged Lymph Nodes,     Recurrent Infections, Transfusions            VITAL SIGNS AND SCORES      Vitals      Height 5 ft 1.00 in / 154.94 cm      Weight 179 lbs 3.744 oz / 81.3 kg      BSA 1.80 m2      BMI 33.9 kg/m2      Temperature 97.8 F / 36.56 C - Temporal      Pulse 85      Respirations 18       Blood Pressure 127/69 Sitting, Left Arm      Pulse Oximetry 99%, RM AIR            Pain Score      Pain Scale Used:  Numerical      Pain Intensity:  0            Fatigue Score      Fatigue (0-10 scale):  7            EXAM      General Appearance:  Positive for: Alert, Oriented x3, Cooperative;          Negative for: Acute Distress      Neck:  Positive for: Supple;          Negative for: JVD, Masses      Respiratory:  Positive for: CTAB, Normal Respiratory Effort      Abdomen/Gastro:  Positive for: Normal Active Bowel Sounds, Soft;          Negative for: Distention, Hepatosplenomegaly, Tenderness      Cardiovascular:  Positive for: RRR;          Negative for: Gallop, Murmur, Peripheral Edema, Rub      Psychiatric:  Positive for: Appropriate Affect, Intact Judgement      Lower Extremities:  Negative for: Edema      Upper Extremities:  Negative for: Clubbing, Digital Cyanosis, Digital Ischemia      Lymphatic:  Negative for: Axillary, Cervical, Infraclavicular, Supraclavicular            PREVENTION      Hx Influenza Vaccination:  Yes (2018)      Date Influenza Vaccine Given:  Oct 1, 2018      Influenza Vaccine Declined:  No      2 or More Falls Past Year?:  No      Fall Past Year with Injury?:  No      Hx Pneumococcal Vaccination:  Yes (UTD)      Encouraged to follow-up with:  PCP regarding preventative exams.      Chart initiated by      FRANSISCO CASTANEDA MA            ALLERGY/MEDS      Allergies      Coded Allergies:             MORPHINE (Verified  Allergy, Severe, TROUBLE BREATHING, FACIAL REDNESS, ,     1/14/19)           CEPHALEXIN (Verified  Allergy, Unknown, ITCHING, 1/14/19)           PENICILLINS (Verified  Allergy, Unknown, RASH, 1/14/19)           SULFA (SULFONAMIDE ANTIBIOTICS) (Verified  Allergy, Unknown, RASH, 1/14/19)            Medications      Last Reconciled on 1/14/19 15:05 by LINETTE MEDEIROS      DULoxetine (Cymbalta) 30 Mg Capsule.dr      30 MG PO BID, #60 CAP 0 Refills         Reported          6/8/17      Medications Reviewed:  No Changes made to meds            IMPRESSION/PLAN      Impression      Smoldering myeloma.  Colon cancer, status post surgery now with multiple colon     polyps.            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  Her    hemoglobin, creatinine, calcium are all normal.  Skeletal survey did not     demonstrate any osseous abnormalities but there was suggestion of 1 on CT scans.     My plan will be to obtain a PET scan along with lab work prior to her next vis    it.  Appointment with [facility] rescheduled for bone marrow     transplant discussion.      New Diagnostics      * Pet Scan Init Whole Body, Routine      * IMMUNOFIX PROT ELECTROPH URINE, 3 Months      * IMMUNOGLOBULIN QUANT IGAMQ, 3 Months      * FREE KAPPA LAMBDA LT CHAINS QU, 3 Months      * SPEP with Immuno (IEPS), 3 Months      * CMP Comp Metabolic Panel, 3 Months      * CBC With Auto Diff, 3 Months            Colon cancer         Malignant neoplasm of colon, unspecified part of colon - C18.9         Colon location: unspecified part of colon      Patient has had multiple benign polyps removed.  We discussed genetic counseling    for polyposis syndromes but she will consider that.  Given the multiple     occurrences, it would be reasonable to consider colon resection and she reports     an appointment at [facility] in the near future for that     discussion.  She should continue to follow up with endoscopies as planned until     that time.            Patient Education      Patient Education Provided:  Yes            Topics Patient Counseled on      Urgency of colon polyps vs smoldering myeloma                 Disclaimer: Converted document may not contain table formatting or lab diagrams. Please see [employer] for the authenticated document.

## 2021-05-28 NOTE — PROGRESS NOTES
Patient: MAURICIO ALBRIGHT     Acct: [id number]     Report: #[id number]  UNIT #: [id number]     : 1950    Encounter Date:2018  PRIMARY CARE: IFRAH JOYA  ***Signed***  --------------------------------------------------------------------------------------------------------------------  Visit Type      Established Patient Visit            Chief Complaint      MONOCLONAL GAMMOPATHY      Intent of Therapy:  Palliative            Allergies      Coded Allergies:             MORPHINE (Verified  Allergy, Severe, TROUBLE BREATHING, FACIAL REDNESS, ,     18)           CEPHALEXIN (Verified  Allergy, Unknown, ITCHING, 18)           PENICILLINS (Verified  Allergy, Unknown, RASH, 18)           SULFA (SULFONAMIDE ANTIBIOTICS) (Verified  Allergy, Unknown, RASH, 18)            Medications      Last Reconciled on 18 08:30 by LINETTE MEDEIROS      Pantoprazole (Protonix*) 40 Mg Tablet.      40 MG PO QDAY@07, #90 TAB 0 Refills         Prov: DIMA LOBO         18       DULoxetine (Cymbalta) 30 Mg Capsule.      30 MG PO BID, #60 CAP 0 Refills         Reported         17       Budesonide/Formoterol Fumarate (Symbicort 160/4.5 Mcg) 10.2 Gm Inh      2 PUFF INH RTBID PRN for SHORTNESS OF BREATH, #1 INH 0 Refills         Reported         5/10/17      Medications Reviewed:  No Changes made to meds            History and Present Illness      Past Oncology Illness History      Mrs. Albright is a 67-year-old  female who was recently noted to have     abnormal labs. A serum protein electrophoresis was performed on 2016 which    showed a markedly elevated IgG at 2941, decreased IgA at 46, IgM 87, and     markedly increased monoclonal spike at 2.2 g/dL. 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 Her last CBC from 2015 showed     a white count of 10,400,  hemoglobin 14.2, and platelet count of 328,000 all of     which were within normal limits. CMP performed 12/14/2015 showed a calcium which    was normal at 9.2 a total protein elevated at 8.4 and an albumin normal at 4.1.     She underwent a bone marrow biopsy on 03/02/16 which showed a 30% plasma cell     population consistent with plasma cell dyscrasia. She had a skeletal survey     performed which was without any lytic lesions. She again states that she had had    some back pain and leg pain.             She then developed acute right sided pain and CT imaging showed a mass in the     colon. Laboratory evaluation performed by her PCP at that time also showed     leukocytosis. Colonoscopy was performed by gastroenterology which showed a     tumor. The patient then underwent a right hemicolectomy on 05/16/2017 which     revealed a low-grade 7.6 cm adenocarcinoma of the cecum. All margins were     negative. Lymphovascular invasion and perineural invasion were not identified.     26 lymph nodes were removed and unfortunately 1 was involved with metastatic     deposit. This was staged as a pT3 pN1a colorectal cancer. Imaging performed at     that time revealed no evidence of distant metastatic disease. She is now s/p     colectomy with colostomy.        s/p 6 mo of adjuvant xeloda. completed 11/17            HPI - Oncology Interim      Presents today for f/u of Colon ca and Myeloma.  Reports feeling well.      Continues to deal with diarrhea routinely; GI MD is aware.  Colonoscopy in July 2018 was benign with polyps removed.  She does report some new pain at left     hip/thigh--inc pain with applying pressure; does not radiate all the way down     leg.  Denies n/v, fatigue, fever or any lymphadenopathy.            Cancer Details            dx 5/2017--low grade adenocarcinoma of ascending colon            Clinical Staging      Smoldering Myeloma; Stage III colon cancer            Treatments      Chemotherapy       Completed adjuvant Xeloda 11/2017            Clinical Trial Participant      No            ECOG Performance Status      0            Most Recent Lab Findings      Laboratory Tests      8/27/18 10:06            PAST, FAMILY   Past Medical History      Past Medical History:  COPD, Depression, Seizures, Arthritis, Osteoporosis      Hematology/oncology:  REPORTS HX OF: Previous Treatment for CA (ORAL CHEMO),     Myeloma, Other cancer history (COLON CANCER - SURGERY MAY 16, 2017)            Past Surgical History      REPORTS HX OF: Appendectomy, Hysterectomy, VAD Placement            Family History      REPORTS HX OF: Lung cancer (FATHER, MOTHER)            Social History      Lives independently:  Yes      Occupation:  RETIRED            Tobacco Use      Tobacco status:  Light tobacco smoker      Smoking packs/day:  1      Smoking history:  25-50 pack years            Alcohol Use      Alcohol intake:  None            Substance Use      Substance use:  Denies use            REVIEW OF SYSTEMS      General:  Complains of: Fatigue; Denies: Appetite change, Excessive sweating,     Fever, Night sweats, Weight gain, Weight loss, Other      Eyes:  Denies: Blurred vision, Corrective lenses, Diplopia, Eye irritation, Eye     pain, Eye redness, Spots in vision, Vision loss, Other      Ears, nose, mouth, throat:  Denies: Headache, Seizures, Visual Changes, Hearing     loss, Sinus Congestion, Hoarseness, Sore throat, Other      Cardiovascular:  Denies: Chest pain, Irregular heartbeat, Palpitations, Swollen     ankles/legs, Other      Respiratory:  Denies: Chest pain, Shortness of Air, Productive cough, Coughing     blood, Other      Gastrointestinal:  Denies: Nausea, Vomiting, Problem swallowing, Frequent     heartburn, Constipation, Diarrhea, Tarry stools, Bloody stools, Unable to     control bowels, Other      Kidney/Bladder:  Denies: Painful Urination, Change in urinary stream, Blood in     urine, Incontinence, Frequent  Urination, Decreased urine stream, Other      Musculoskeletal:  Denies: New Back pain, Leg Cramps, Painful Joints, Swollen     Joints, Muscle Pain, Muscle weakness, Other      Skin:  DENIES: Jaundice, Easy Bleeding, Lesions/changes in moles, Nail changes,     Skin Discoloration, Rash, Other      Neurological:  Denies: Dizziness, Fainting, Numbness\Tingling, Paralysis,     Seizures, Other      Psychiatric:  Complains of: AAO X 3; Denies: Anxiety, Panic attacks, Depression,    Memory loss, Other      Endocrine:  DiabetesThyroid DisorderOsteoporosisEndocrine Other      Hematologic/lymphatic:  Denies: Bruising, Bleeding, Enlarged Lymph Nodes,     Recurrent infections, Other      Reproductive:  Denies Pregnant, Denies Menopause, Denies Still Menstruating,     Denies Heavy Periods, Denies Other            VITAL SIGNS,PAIN/FATIGUE SCORE      Vitals      Height 5 ft 1.00 in / 154.94 cm      Weight 180 lbs 5.380 oz / 81.8 kg      BSA 1.91 m2      BMI 34.1 kg/m2      Temperature 98.6 F / 37 C - Temporal      Pulse 99      Respirations 16      Blood Pressure 139/64 Sitting, Left Arm      Pulse Oximetry 96%, RM AIR            Pain Score      Experiencing any pain?:  No            Fatigue Score      Experiencing any fatigue?:  Yes      Fatigue (0-10 scale):  3            General Appearance:  Alert, Oriented X3, Cooperative, No acute distress      Eyes:  Anicteric Sclerae, Moist Conjunctiva      Neck:  Supple, No Masses or JVD      Respiratory:  CTAB, Normal Respiratory Effort      Abdomen\Gastro:  Soft, No NABS, Other (well-healed midline incision); No Masses,    No Hepatosplenomegaly      Cardio:  RRR, No Murmur, No, Peripheral Edema      Psychiatric:  Appropriate Affect, Intact Judgement      Muscularskeletal:  Normal Gait and Station, Other (no tenderness to palpation or    percussion of the spine)      Extremities:  No Digital Cyanosis (upper extremities), No Digital Ischemia     (upper extremities)      Lymphatic:  No  Cervical, No Supraclavicular, No Infraclavicular, No Axillary            PREVENTION      Hx Influenza Vaccination:  Yes (2017)      Date Influenza Vaccine Given:  Sep 1, 2017      Influenza Vaccine Declined:  No      2 or More Falls Past Year?:  No      Fall Past Year with Injury?:  No      Hx Pneumococcal Vaccination:  Yes (UTD)      Encouraged to follow-up with:  PCP regarding preventative exams.      Chart initiated by      FRANSISCO CASTANEDA MA            IMPRESSION/PLAN      Impression      Smoldering myeloma            Colon cancer, stage III (T3, N1, M0). Status post resection followed by 6 months    of adjuvant Xeloda            Diagnosis      Colon cancer         Malignant neoplasm of ascending colon - C18.2         Colon location: ascending      Status post resection and adjuvant therapy. Recent colonoscopy showed only     benign polyps. She will have CT chest, abdomen, pelvis for ongoing surveillance.    I will plan to see her back to review results.      New Diagnostics      * Abd/Pelv W/Wo Con CT, SCHEDULED PROCEDURE            Plasma cell dyscrasia - E88.09      Patient's total protein remains elevated. She will need lab work as outlined     below as well as a skeletal survey. I'll see her in the near future to review     results of testing as outlined below      New Diagnostics      * CMP Comp Metabolic Panel, Routine      * IMMUNOFIX PROT ELECTROPH URINE, Routine      * FREE KAPPA LAMBDA LT CHAINS QU, Routine      * CBC With Auto Diff, Routine      * SPEP with Immunofixation, Routine      * Skeletal Survey Adult, Routine      * Chest W/ Cont CT, SCHEDULED PROCEDURE            Patient Education      Patient Education Provided:  Yes                 Disclaimer: Converted document may not contain table formatting or lab diagrams. Please see [employer] for the authenticated document.

## 2021-05-28 NOTE — PROGRESS NOTES
Patient: MAURICIO ALBRIGHT     Acct: [id number]     Report: #[id number]  UNIT #: [id number]     : 1950    Encounter Date:2019  PRIMARY CARE: IFRAH JOYA  ***Signed***  --------------------------------------------------------------------------------------------------------------------  NURSE INTAKE      Visit Type      Established Patient Visit            Chief Complaint      COLON CA/SMOLDERING MYELOMA      Intent of Therapy:  Curative            Referring Provider/Copies To      Referring Provider:  IFRAH JOYA      Primary Care Provider:  IFRAH JOYA            History and Present Illness      Past Oncology Illness History      Mrs. Albright is a 67-year-old  female who was recently noted to have     abnormal labs. A serum protein electrophoresis was performed on 2016 which    showed a markedly elevated IgG at 2941, decreased IgA at 46, IgM 87, and     markedly increased monoclonal spike at 2.2 g/dL. 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 Her last CBC from 2015 showed     a white count of 10,400, hemoglobin 14.2, and platelet count of 328,000 all of     which were within normal limits. CMP performed 2015 showed a calcium which    was normal at 9.2 a total protein elevated at 8.4 and an albumin normal at 4.1.     She underwent a bone marrow biopsy on 16 which showed a 30% plasma cell     population consistent with plasma cell dyscrasia. She had a skeletal survey     performed which was without any lytic lesions. She again states that she had had    some back pain and leg pain.             She then developed acute right sided pain and CT imaging showed a mass in the     colon. Laboratory evaluation performed by her PCP at that time also showed     leukocytosis. Colonoscopy was performed by gastroenterology which showed a     tumor. 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 All margins were     negative. Lymphovascular invasion and perineural invasion were not identified.     26 lymph nodes were removed and unfortunately 1 was involved with metastatic     deposit. This was staged as a pT3 pN1a colorectal cancer. Imaging performed at     that time revealed no evidence of distant metastatic disease. She is now s/p     colectomy with colostomy.        s/p 6 mo of adjuvant xeloda. 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      Recent labs 4/8/19 showed M-spike down to 2.2, from 2.5.  She feels well w/o     complaints at this time.  Last colonoscopy 11/2018.  Concerned regarding hernia     found on scan and she can \"hear it\".  She denies acute pain, n/v or distress at     this time.            Cancer Details            Low grade adenocarcinoma of ascending colon          Smoldering Myeloma            Clinical Staging      Smoldering Myeloma; Colon ([id number])            Treatments      Chemotherapy      11/2017 completed 6mo adjuvant Xeloda            Clinical Trial Participant      No            ECOG Performance Status      0            PAST, FAMILY   Past Medical History      Past Medical History:  COPD, Depression      Hematology/Oncology (F):  Colorectal Cancer, Myeloma (MULTIPLE)            Past Surgical History      Biopsy (TOOK OUT PART OF COLON), Hysterectomy, VAD Placement            Family History      Family History:  Lung Cancer (MOTHER, SEVERAL MATERNAL AND PATERNAL AUNTS.)            Social History      Marital Status:        Lives independently:  Yes      Number of Children:  3      Occupation:  RETIRED            Tobacco Use      Tobacco status:  Light tobacco smoker      Smoking packs/day:  1 "      Smoking history:  25-50 pack years            Alcohol Use      Alcohol intake:  None            Substance Use      Substance use:  Denies use            REVIEW OF SYSTEMS      General:  Admits: Fatigue;          Denies: Appetite Change, Fever, Night Sweats, Weight Gain, Weight Loss      Eye:  Denies: Blurred Vision, Corrective Lenses, Diplopia, Eye Irritation, Eye     Pain, Eye Redness, Spots in Vision, Vision Loss      ENT:  Denies: Headache, Hearing Loss, Hoarseness, Seizures, Sinus Congestion,     Sore Throat      Cardiovascular:  Denies: Chest Pain, Edema Ankles, Edema Legs, Irregular     Heartbeat, Palpitations      Respiratory:  Admits: Shortness of Air;          Denies: Coughing Blood, Productive Cough, Wheezing      Gastrointestinal:  Denies: Bloody Stools, Constipation, Diarrhea, Frequent     Heartburn, Nausea, Problem Swallowing, Tarry Stools, Unable to Control Bowels,     Vomiting      Genitourinary (female):  Denies: Blood in Urine, Decrease Urine Stream, Frequent    Urination, Incontinence, Painful Urination      Musculoskeletal:  Denies: Back Pain, Leg Cramps, Muscle Pain, Muscle Weakness,     Painful Joints, Swollen Joints      Integumentary:  Admits: Lesions;          Denies: Bleeds Easily, Bruises Easily, Hair Changes, Jaundice, Mole Changes,     Nail Changes, Pigment Changes, Rash, Skin Discoloration      Neurologic:  Denies: Dizziness, Fainting, Numbness\Tingling, Paralysis, Seizures      Psychiatric:  Denies: Anxiety, Confused, Depression, Disoriented, Memory Loss      Endocrine:  Denies: Cold Intolerance, Diabetes, Excessive Sweating, Excessive     Thirst, Excessive Urination, Heat Intolerance, Flushing, Hyperthyroidism,     Hypothyroidism      Hematologic/Lymphatic:  Denies: Bruising, Bleeding, Enlarged Lymph Nodes, Recu    rrent Infections, Transfusions            VITAL SIGNS AND SCORES      Vitals      Height 5 ft 1.00 in / 154.94 cm      Weight 186 lbs 4.619 oz / 84.5 kg      BSA 1.83  m2      BMI 35.2 kg/m2      Temperature 97.36 F / 36.31 C - Temporal      Pulse 92      Respirations 16      Blood Pressure 120/59 Sitting, Left Arm      Pulse Oximetry 97%, RM AIR            Pain Score      Pain Scale Used:  Numerical      Pain Intensity:  0            Fatigue Score      Fatigue (0-10 scale):  4            EXAM      General Appearance:  Positive for: Alert, Oriented x3, Cooperative;          Negative for: Acute Distress      Eye:  Positive for: Anicteric Sclerae, Moist Conjunctiva      Neck:  Positive for: Supple;          Negative for: JVD, Masses      Respiratory:  Positive for: CTAB, Normal Respiratory Effort      Abdomen/Gastro:  Positive for: Normal Active Bowel Sounds, Soft;          Negative for: Distention, Hepatosplenomegaly, Tenderness      Other      Well-healed surgical scar      Cardiovascular:  Positive for: RRR;          Negative for: Gallop, Murmur, Peripheral Edema, Rub      Psychiatric:  Positive for: Appropriate Affect, Intact Judgement      Upper Extremities:  Negative for: Clubbing, Digital Cyanosis, Digital Ischemia      Lymphatic:  Negative for: Axillary, Cervical, Infraclavicular, Supraclavicular            PREVENTION      Hx Influenza Vaccination:  Yes      Date Influenza Vaccine Given:  Oct 1, 2018      Influenza Vaccine Declined:  No      2 or More Falls Past Year?:  No      Fall Past Year with Injury?:  No      Encouraged to follow-up with:  PCP regarding preventative exams.      Chart initiated by      FRANSISCO CASTANEDA MA            ALLERGY/MEDS      Allergies      Coded Allergies:             MORPHINE (Verified  Allergy, Severe, TROUBLE BREATHING, FACIAL REDNESS, ,     4/23/19)           CEPHALEXIN (Verified  Allergy, Unknown, ITCHING, 4/23/19)           PENICILLINS (Verified  Allergy, Unknown, RASH, 4/23/19)           SULFA (SULFONAMIDE ANTIBIOTICS) (Verified  Allergy, Unknown, RASH, 4/23/19)            Medications      Last Reconciled on 4/23/19 14:04 by SHAUN AHMADI  LINETTE VEGA      DULoxetine (Cymbalta) 30 Mg Capsule.      30 MG PO BID, #60 CAP 0 Refills         Reported         6/8/17      Medications Reviewed:  No Changes made to meds            IMPRESSION/PLAN      Impression      Smoldering myeloma.  Colon cancer, stage III.            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      The abnormality on PET scan was reviewed with radiology and appears to been st    able for many years.  At this point I do not see any evidence to suggest her     smoldering myeloma has worsened.  No indication for treatment at this point.      Plan to recheck in 3 months with lab work as below.      New Diagnostics      * FREE KAPPA LAMBDA LT CHAINS QU, 3 Months      * IMMUNOGLOBULIN QUANT IGAMQ, 3 Months      * SPEP with Immuno (IEPS), 3 Months      * CMP Comp Metabolic Panel, 3 Months      * CBC With Auto Diff, 3 Months      * Cea/Carcinoembryonic, 3 Months            Colon cancer         Malignant neoplasm of colon, unspecified part of colon - C18.9         Colon location: unspecified part of colon      Status post resection followed by 6 months of adjuvant Xeloda.  ADARSH.  Repeat lab    work next visit including CEA            Patient Education            DI for Hernia Repair      Hernias: Causes and Treatment Options      Patient Education Provided:  Yes                 Disclaimer: Converted document may not contain table formatting or lab diagrams. Please see [employer] for the authenticated document.

## 2021-05-28 NOTE — PROGRESS NOTES
Patient: MAURICIO ALBRIGHT     Acct: [id number]     Report: #[id number]  UNIT #: [id number]     : 1950    Encounter Date:2018  PRIMARY CARE: IFRAH JOYA  ***Signed***  --------------------------------------------------------------------------------------------------------------------  Chief Complaint      Mar 22, 2018      Myeloma (smoldering)+ Colon cancer (surveillance)            Vitals      Height 5 ft 1.00 in / 154.94 cm      Weight 176 lbs 9.415 oz / 80.1 kg      BSA 1.89 m2      BMI 33.4 kg/m2      Temperature 97.7 F / 36.5 C - Temporal      Pulse 93      Blood Pressure 138/50 Sitting, Right Arm      Pulse Oximetry 95%, ROOM AIR            General Information      Primary Provider:  IFRAH JOYA            Allergies      Coded Allergies:             MORPHINE (Verified  Allergy, Severe, TROUBLE BREATHING, FACIAL REDNESS, , )           CEPHALEXIN (Verified  Allergy, Unknown, ITCHING, 18)           PENICILLINS (Verified  Allergy, Unknown, RASH, 18)           SULFA (SULFONAMIDE ANTIBIOTICS) (Verified  Allergy, Unknown, RASH, 18)            Medications      Last Reconciled on 3/22/18 12:30 by IDALMIS ASTUDILLO MD      Varenicline Tartrate (Chantix) 1 Mg Tablet      1 MG PO BID for 30 Days, #60 TAB         Reported         3/22/18       DULoxetine (Cymbalta) 30 Mg Capsule.dr      30 MG PO BID, #60 CAP 0 Refills         Reported         17       Budesonide/Formoterol Fumarate (Symbicort 160/4.5 Mcg) 10.2 Gm Inh      2 PUFF INH RTBID, #1 INH 0 Refills         Reported         5/10/17      Current Medications      Current Medications Reviewed 3/22/18            Pain and Fatigue Scales      Pain Assessment:           Experiencing any pain?:  No      Fatigue:           Experiencing any fatigue?:  Yes         Fatigue (0-10 scale):  5            Chemo Status      On Oral Chemotherapy?:  Yes      Comment:        XELODA            Treatment Cycle      Planned Number of Cycles:  8             Other      Clinical Trial Participant?:  No            Past Medical History      Yes COPD, Yes Depression, Yes Seizures, Yes Arthritis, Yes Osteoporosis      Hematology/oncology:  REPORTS HX OF: Previous Treatment for CA (ORAL CHEMO),     Myeloma, Other cancer history (COLON CANCER - SURGERY MAY 16, 2017)      Previous Blood Transfusion:  Prev Blood Transfusion,how many? (NONE)            Past Surgical History      REPORTS HX OF: Appendectomy, Hysterectomy, VAD Placement            Family History      REPORTS HX OF: Lung cancer (FATHER, MOTHER)            Social History      Yes      RETIRED            Tobacco Use      Tobacco status:  Light tobacco smoker      Smoking packs/day:  1      Smoking history:  25-50 pack years            Alcohol Use      Alcohol intake:  None      Counseling given:  None            Substance Use      Substance use:  Denies use      Counseling given:  None            Past Oncology Illness History      Chief Complaint: Smoldering IgG Myeloma         Mrs. Anca Samson is a 67-year-old  female who was recently noted to     have abnormal labs. Although she is unsure why she is here I believe that it is     due to a recently noted abnormal serum protein electrophoresis. A serum protein     electrophoresis was performed on 01/27/2016 which showed a markedly elevated     IgG at 2941, decreased IgA at 46, IgM 87, and markedly increased monoclonal     spike at 2.2 g/dL. Interestingly, she had a serum protein electrophoresis     performed on 03/19/2015 which again showed a hypergammaglobulinemia with an IgG     of 2695. Her last CBC from 12/14/2015 showed a white count of 10,400,     hemoglobin 14.2, and platelet count of 328,000 all of which were within normal     limits. CMP performed 12/14/2015 showed a calcium which was normal at 9.2 a     total protein elevated at 8.4 and an albumin normal at 4.1.            She was then referred to my clinic for discussion of these  "abnormal laboratory     findings. She first presented on 02/22/2016.            She underwent a bone marrow biopsy on 03/02/16 which showed a 30% plasma cell     population consistent with plasma cell dyscrasia. She had a skeletal survey     performed which was without any lytic lesions. She again states that she had     had some back pain and leg pain. No fevers. No confusion. No infections. She     does have some fatigue. She previously stated she wanted something \"that will     make me quit smoking\". She stated that she had completely quit smoking. She     again denies any infections. No fevers.            She developed acute right sided pain and CT imaging showed a mass in the colon.     Laboratory evaluation performed by her PCP at that time also showed     leukocytosis. Colonoscopy was performed by gastroenterology which showed a     tumor. The patient then underwent a right hemicolectomy on 05/16/2017 which     revealed a low-grade 7.6 cm adenocarcinoma of the cecum. All margins were     negative. Lymphovascular invasion and perineural invasion were not identified.     26 lymph nodes were removed and unfortunately 1 was involved with metastatic     deposit. This was staged as a pT3 pN1a colorectal cancer. Imaging performed at     that time revealed no evidence of distant metastatic disease. She is now s/p     colectomy with colostomy.              She has some incisional pain although this is stable. Denies fever, chills or     infections. She is in week 3 of cycle #1 Xeloda. No rash. No worsening diarrhea.            Interim history:      Monthly follow up while on Xeloda. She denies any significant rash and reports     mild diarrhea. No fever. No chest pain. No swelling. No nausea. Last Myeloma     labs were performed 07/2017.            Interim history: 9/25/17      Follow up on Xeloda with lab results pending.  She denies diarrhea or nausea.      Myeloma labs are due. She states that she has more energy " and feels better when     she is on Xeloda than off Xeloda. Imaging 2017 showed no evidence of     progression or recurrence of her colon cancer.            Interim history:  10/30/17      Scheduled monthly follow up for labs and toxicity check while on Xeloda. She     denies fever, pain, or weight loss. She is pleased with her last imaging which     showed no evidence of disease. She has 2 weeks remaining of her Xeloda     treatment.            Interim history:  18      3 month follow up and lab review. She denies fever, pain or weight loss. No     infections. She still has occasional diarrhea which she has had for months     after she stopped chemotherapy.            Interim History: 3/22/18      Patient presents in clinic today for 3 month follow-up for smoldering myeloma     and colon cancer. Labs from 18 IgG 2750, IgA37, IgM 52, CARL M-spike 2.3,     Free Kappa/Lambda Ratio 1.07. She denies any recent fever, chills or s/s of     infection. She stopped smoking 7 weeks ago. No bleeding. No bowel movement     issues. No weight loss.            OTHER:      MOST RECENT IMAGING: 2017      MOST RECENT MYELOMA LABS: ordered 18               [facility]                PACS RADIOLOGY REPORT            Patient: MAURICIO ALBRIGHT   Acct: #[id number]   Report: #[id number]            UNIT #: [id number]    DOS: 17 0845      INSURANCE:[facility]   ORDER #:[id number]      LOCATION:[facility]     : 1950            PROVIDERS      ADMITTING:     ATTENDING: Shiva Norman      FAMILY:  IFRAH JOYA   ORDERING:  Shiva Norman         OTHER:    DICTATING:  LEANDRO MOSELEY MD            REQ #:[id number]    CPT CODE:   EXAM:CTACPWC - CT ABD CHEST PEL w CONTR      REASON FOR EXAM:        REASON FOR VISIT:  COLON CA            *******Signed******         PROCEDURE:   CT ABDOMEN; CT CHEST; CT PELVIS WITH CONTRAST             COMPARISON:    [facility], CT, CT ABD AND PELVIS W/WO     CONTRAST, 8/16/2011, 9:24.        [facility], CT, ABDOMEN/PELVIS WITH CONTRAST, 5/10/2017, 15:35.             INDICATIONS:   NEW DX COLON CANCER, MAY 2017, DIARRHEA, VOMITING, CRAMPING, HX     OF COPD, SMOKER, CREAT       0.77  8/28/17, GFR >60             TECHNIQUE:   After obtaining the patient's consent, CT images were obtained     with intravenous contrast       material.      PROTOCOL:     Standard imaging protocol performed                RADIATION:     DLP: 2202mGy*cm          Automated exposure control was utilized to minimize radiation dose.       CONTRAST:   100cc Optiray 320 I.V.      LABS:     eGFR: >60ml/min/1.73m2             FINDINGS:         CHEST:      There is a right-sided port catheter with the tip at the mid SVC. Heart size     normal. No pericardial       or pleural effusion. There is no mediastinal, hilar, or axillary     lymphadenopathy. No central       pulmonary embolus. The thoracic aorta and aortic arch branch vasculature are     patent. Visualized       lower neck is without acute abnormality. There is a 4 mm nodule at the right     middle lobe on image       50 which is stable from 2011 consistent with benign etiology. There is mild     centrilobular       emphysema. The trachea and mainstem bronchi are patent. Mild linear atelectasis     at the dependent       right left lower lobes. Mild pleural-parenchymal scarring at the apices. No     aggressive osseous       lesion or acute fracture. Advanced disc disease in lower cervical spine at the     C5-6 and C6-7       levels.             ABDOMEN AND PELVIS:      The liver, spleen, and adrenal glands are normal in size and contour. The     pancreas is without       evidence of acute pancreatitis. The gallbladder is present. Dependent     gallstones. The kidneys       enhance symmetrically. No obstructive uropathy. Urinary bladder is fluid-filled     and  thin-walled.       Uterus not visualized. No adnexal mass.             There are postoperative changes of interval right hemicolectomy. The     anastomotic site is without       acute complication. Oral contrast reaches the level of the rectum. There are     scattered sigmoid       diverticula without findings of acute diverticulitis. Negative for     pneumoperitoneum. No findings of       bowel obstruction. Abdominal aorta and branch vessels are patent. There is     stranding within the       subcutaneous fat of the midline consistent with postsurgical changes. No     drainable fluid       collection. There are moderate atherosclerotic calcifications of the abdominal     aorta and branch       vessels without aneurysm. This area of lucency in the left iliac wing which is     unchanged from 2011       consistent with a benign process. No fracture or aggressive osseous lesion.             CONCLUSION:         1. Postsurgical changes of right hemicolectomy without evidence of residual or     metastatic disease       in the chest, abdomen, or pelvis.      2. Cholelithiasis.      3. Sigmoid diverticulosis without acute diverticulitis.      4. Mild centrilobular emphysema.              LEANDRO MOSELEY MD             Electronically Signed and Approved By: LEANDRO MOSELEY MD on 9/18/2017 at 10:28                        Until signed, this is an unconfirmed preliminary report that may contain      errors and is subject to change.                                              CICI:      D:09/18/17 1028            ROS      General:  Complains of: Fatigue      Eyes:  Complains of: Corrective lenses      Ears, nose, mouth, throat:  Denies: Seizures      Cardiovascular:  Denies: Irregular heartbeat      Respiratory:  Denies: Productive cough      Gastrointestinal:  Denies: Problem swallowing      Kidney/Bladder:  Denies: Change in urinary stream      Musculoskeletal:  Denies: Leg Cramps      Skin:  DENIES: Easy Bleeding       Neurological:  Denies: Dizziness      Psychiatric:  Complains of: AAO X 3      Endocrine:  Diabetes      Hematologic/lymphatic:  Denies: Bruising      Reproductive:  Denies Pregnant            General Appearance:  Alert, Oriented X3, No acute distress      Eyes:  Anicteric Sclerae, Moist Conjunctiva      HEENT:  Orophraynx clear, No Erythema      Respiratory:  CTAB, No Diminished Breath      Abdomen\Gastro:  Soft, No Masses      Cardio:  RRR, No Murmur, No, Peripheral Edema      Skin:  Normal Temperature, Normal Tone, No Rash, lesions, ulcers      Psychiatric:  Appropriate Affect, AAO x3      Neuro:  Cranial Nerve II-XII Inta, No Focal Sensory Deficit      Muscularskeletal:  Normal Gait and Station, Full ROM of extremeties      Extremities:  No Digital Cyanosis, No Digital Ischemia      Lymphatic:  No Cervical, No Supraclavicular, No Axillary            Current Plan      I reviewed the results of her previous laboratory evaluation including the     results of a serum protein electrophoresis which was performed on 01/27/2016.     This study was quite concerning for a possible underlying monoclonal gammopathy     given the increase in IgG at 2941 and increase in the monoclonal spike at 2.2 g/    dL.            I reviewed the possible differential of this finding and counseled her that I     felt we should repeat a serum protein electrophoresis with immunofixation     electrophoresis to verify this finding. I also counseled her that I would     perform additional testing including a urine protein electrophoresis with     immunofixation electrophoresis, serum free light chains, and beta-2     microglobulin level. This workup would be performed to try to further identify     a possible underlying plasma cell disorder such as smoldering myeloma, multiple     myeloma, or monoclonal gammopathy of undetermined significance. I counseled her     that the ultimate diagnosis for this issue would require a bone marrow biopsy      to evaluate the plasma cell population cytogenetics which could be contributing     to this issue. She voiced understanding of these recommendations and was     reluctantly agreeable to pursuing a bone marrow biopsy if indicated.            My plan at this time is to reevaluate these findings as noted above. If she is     found again to have a monoclonal gammopathy I would then pursue additional     workup with bone marrow biopsy as noted. She voiced understanding of these     recommendations and was in agreement.            Smoldering Myeloma: As noted her bone marrow biopsy was consistent with a 30%     plasma cell population consistent with plasma cell dyscrasia. I counseled her     today on the NCCN definition of smoldering vs active multiple myeloma. I     counseled her that in order to be defined as having active multiple myeloma she     would need to have greater than 10% biopsy-proven plasma cells in the bone     marrow (she has 30%) AND one or more of the following: Hypercalcemia, renal     insufficiency with creatinine greater than 2, anemia with hemoglobin less than     10, bone marrow plasma cells greater than or equal to 60%, serum free light     chain ratio greater than or equal to 100 or less than 0.01, or one or more     osteolytic bone lesions on imaging.            As noted skeletal survey was negative for any evidence of lytic lesions. As she     does not meet any of the criteria other than the 30% plasma cell cut off to be     defined as active multiple myeloma she meets criteria for smoldering myeloma. I     counseled her that for smoldering myeloma treatment would be active     surveillance. For active multiple myeloma the treatment would be chemotherapy.     She will return every 3 months for SPEP with CARL, UPEP with CRAL, serum free     light chains, CBC, CMP, etc. We will perform the skeletal survey 1-2 times     yearly. I did  her that this disease could progress to multiple myeloma  "    and required active surveillance. She voiced understanding and was in agreement     with these recommendations.             SPEP with CARL, UPEP, SFLC pending. Other labs pending today. Previous labs     reviewed. M-spike relatively stable. She has some back pain and appears to have     severe facet arthropathy on her last skeletal survey. I have again asked her to     discuss this with her PCP. She was given copies of her bone marrow pathology     results and the skeletal survey results. At the conclusion of a previous clinic     appointment she was given handouts on patient information regarding plasma cell     disorders including multiple myeloma.             I again recommended immediate smoking cessation. She was taking Chantix. I     counseled her that there was no such thing as a smoking cessation device or     program that would \"make her quit completely\" and that she would have to make     the choice to quit and then use aides to assist in smoking cessation. She was     disappointed but voiced understanding. On 04/18/17 she stated that she had     successfully quit smoking. She has started smoking again. Here in follow up her     M-Sahil was 2.2 and in July 2017 2.1. This was 2.4 in January 2017.  Will     continue to monitor. Will repeat skeletal survey yearly. She is still smoking     but now wants to quit.            Next labs are due approximately 04/2018. Most recent labs were again reviewed.     Will monitor this disease for now especially given that she is receiving     adjuvant chemotherapy for colon cancer. M-spike and other labs appear stable.     We will continue to monitor. Labs placed.            Colon Cancer: As noted in April she reported abdominal pain so severe she went     to the ER. CT scan noted a mass with C-scope consistent with colon cancer. As     noted she then underwent a right hemicolectomy on 05/16/2017 which revealed a     low-grade 7.6 cm adenocarcinoma of the cecum. All " margins were negative.     Lymphovascular invasion and perineural invasion were not identified. 26 lymph     nodes were removed and unfortunately 1 was involved with metastatic deposit.     This was staged as a pT3 pN1a colorectal cancer. Imaging performed at that time     revealed no evidence of distant metastatic disease. She is now s/p colectomy     with colostomy. Unfortunately one of the 26 nodes removed at surgery was     positive and I explained this makes her a stage 3 colon cancer that would     require adjuvant chemotherapy. We discussed FOLFOX, CAPEOX, and single agent     Xeloda. I discussed that there appears to be limited benefit to the addition of     Oxaliplatin in patients over the age of 70. As she is age 66 with numerous     comorbidities I counseled her that I felt the addition of Oxaliplatin would     like have more adverse effects than benefits in her clinical scenario. She also     would prefer Xeloda oral chemotherapy. She reported significant difficulty with     accessing veins so I referred her for a port placement as well.  I feel a port     is a good option to draw labs and deliver hydration if needed. She was in     agreement. Orders were written for Xeloda and Zofran. I reviewed the risks and     potential complications of chemotherapy and she voiced understanding of these     risks and was in agreement. Xeloda will continue for 8 cycles until     approximately 12/2017.            PLAN      She returned on 7/26/17 for a toxicity check while receiving single agent     Xeloda. Toxicity monitoring. Labs including myeloma labs pending and     chemotherapy reviewed. Diarrhea noted although this is stable. I will monitor     her weight. No rash. Will plan on 6 months of adjuvant treatment. CBC, CMP and     CEA each visit. I will continue monitoring her myeloma labs in addition to her     CEA.            She returns on 8/28/17 for scheduled follow up while taking Xeloda. Lab results      including CEA pending.  Reviewed her myeloma labs from July 2017 which I felt     were stable. Ordered CT scans as she is now roughly half way through treatment.      She states she continues having lower abdominal pain from her cancer, [place]     7.5 #40 written on 08/28/17.             She is here on 9/25/17 monthly toxicity check while on Xeloda.  Her skin     appears normal no rash noted.  Myeloma labs ordered and are pending. Repeat     imaging 09/2017 showed no evidence of progression or recurrence; reviewed at     bedside. Toxicity monitoring. Labs and chemotherapy reviewed.            Monthly toxicity check while on Xeloda on 10/30/17.  Her skin appears normal no     rash noted.  Toxicity monitoring. Labs and chemotherapy reviewed. Previous     imaging again reviewed which showed no evidence of progression or recurrence;     this was again reviewed. Will repeat imaging every 3-6 months. Labs pending.     Chemo reviewed.            She has completed Xeloda , now on 3 month follow up and labs for smoldering     myeloma and colon cancer surveillance.  She has minimal complaints but does     report chronic diarrhea; I have recommended medical management. I had     recommended GI referral for chronic diarrhea but she politely declined. I     counseled her her myeloma labs have been stable for over a year now but we will     continue to monitor every 3-6 months. Her scan for colon cancer surveillance     due in March 2018 and I will see her the week after to review.  She was in     agreement.  Mammogram ordered. She stated she was up to date on colonoscopy.              I again counseled her on the importance of smoking cessation. She states     Chantix was denied by insurance. She states she will discuss this further with     her PCP.            Current Plan: 3/22/18      Ms. Samson completed her Xeloda November of 2017. I reviewed her C/A/P CT     scans at bedside with the patient which are stable and show no  new recurrence     or progression of disease.             Her port is unable to draw blood at this time, I have placed orders for removal     with Dr. Carballo. I will repeat imaging again in 6 months for colon cancer.             Will continue q 3 month surveillance labs for smoldering myeloma. M-spike     remain stable 2.3 in January 2018.             At the conclusion of this clinic appointment she was given time to ask     questions and these questions were answered in turn. She had no additional     questions or concerns and all questions were answered to her satisfaction.            Available for immediate release. Please forward a copy of this dictation to     Paulino Macias and Dr. Carballo.            TREATMENT HISTORY      Chemotherapy:      1. C6(starting 8/30/17) Xeloda (oral chemo; Rx written 6/1/17) completed 11/15/    17 ?      CLL (chronic lymphocytic leukemia) - C91.90            Colon cancer - C18.9            Notes      New Medications      * Varenicline Tartrate (Chantix) 1 MG TABLET: 1 MG PO BID 30 Days #60       Instructions: FOR SMOKING CESSATION      New Diagnostics      * Free Light Chains Ka, As Soon As Possible       Dx: CLL (chronic lymphocytic leukemia) - C91.90      * SPEP with Immunofixation, As Soon As Possible       Dx: CLL (chronic lymphocytic leukemia) - C91.90      * PROTEIN ELECTROPH SERUM, As Soon As Possible       Dx: CLL (chronic lymphocytic leukemia) - C91.90      * CBC, As Soon As Possible       Dx: CLL (chronic lymphocytic leukemia) - C91.90      * CMP Comp Metabolic Panel, As Soon As Possible       Dx: CLL (chronic lymphocytic leukemia) - C91.90      * Cea/Carcinoembryonic, As Soon As Possible       Dx: CLL (chronic lymphocytic leukemia) - C91.90      Follow-up:  3 Months      Next Visit Labs:  CBC, CMP, Other      Intensive Monitor for Toxicity:  Labs Reviewed, Chemo Orders Reviewd, Meds\    Narcotics Reviewed      Labs Reviewed During Visit?:  Yes      Review/Other:  Received Lab  Test, Ordered Lab Test, Reviewed Radiology Test,     Ordered Radiology Test, Reviewed Medicine Test      ECOG Score:  0      Clinical Staging      Smoldering Myeloma; Stage III colon cancer            Hx Influenza Vaccination:  Yes      Date Influenza Vaccine Given:  Sep 1, 2017      Influenza Vaccine Declined:  No      Hx Pneumococcal Vaccination:  Yes      Encouraged to follow-up with:  PCP regarding preventative exams.                 Disclaimer: Converted document may not contain table formatting or lab diagrams. Please see [employer] [facility] for the authenticated document.

## 2021-05-28 NOTE — H&P
Patient: MAURICIO ALBRIGHT     Acct: [id number]     Report: #[id number]  UNIT #: [id number]     : 1950    Encounter Date:2018  PRIMARY CARE: IFRAH JOYA  ***Signed***  --------------------------------------------------------------------------------------------------------------------  Chief Complaint      2018      COLON CANCER      Myeloma (smoldering)+ Colon cancer (surveillance)            Vitals      Height 5 ft 1.00 in / 154.94 cm      Weight 174 lbs 6.142 oz / 79.1 kg      BSA 1.88 m2      BMI 32.9 kg/m2      Temperature 97.8 F / 36.56 C - Temporal      Pulse 110      Blood Pressure 117/84 Sitting, Right Arm      Pulse Oximetry 96%, room air            General Information      Primary Provider:  IFRAH JOYA            Allergies      Coded Allergies:             MORPHINE (Verified  Allergy, Severe, TROUBLE BREATHING, FACIAL REDNESS, ,     10/30/17)           CEPHALEXIN (Verified  Allergy, Unknown, ITCHING, 10/30/17)           PENICILLINS (Verified  Allergy, Unknown, RASH, 10/30/17)           SULFA (SULFONAMIDE ANTIBIOTICS) (Verified  Allergy, Unknown, RASH, 10/30/17    )            Medications      Last Reconciled on 18 15:50 by IDALMIS ASTUDILLO MD      DULoxetine (Cymbalta) 30 Mg Capsule.      30 MG PO BID, #60 CAP 0 Refills         Reported         17       Budesonide/Formoterol Fumarate (Symbicort 160/4.5 Mcg) 10.2 Gm Inh      2 PUFF INH RTBID, #1 INH 0 Refills         Reported         5/10/17      Current Medications      Current Medications Reviewed 18            Pain and Fatigue Scales      Pain Assessment:           Experiencing any pain?:  No      Fatigue:           Fatigue (0-10 scale):  3            Chemo Status      On Oral Chemotherapy?:  Yes      Comment:        XELODA            Treatment Cycle      Planned Number of Cycles:  8            Other      Clinical Trial Participant?:  No            Past Medical History      Yes COPD, Yes Depression, Yes Seizures,  Yes Arthritis, Yes Osteoporosis      Hematology/oncology:  REPORTS HX OF: Previous Treatment for CA (ORAL CHEMO),     Myeloma, Other cancer history (COLON CANCER - SURGERY MAY 16, 2017)      Previous Blood Transfusion:  Prev Blood Transfusion,how many? (NONE)            Past Surgical History      REPORTS HX OF: Appendectomy, Hysterectomy, VAD Placement            Family History      REPORTS HX OF: Lung cancer (FATHER, MOTHER)            Social History      Yes      RETIRED            Tobacco Use      Tobacco status:  Light tobacco smoker      Smoking packs/day:  1      Smoking history:  25-50 pack years            Alcohol Use      Alcohol intake:  None      Counseling given:  None            Substance Use      Substance use:  Denies use      Counseling given:  None            Past Oncology Illness History      Chief Complaint: Smoldering IgG Myeloma         Mrs. Anca Samson is a 67-year-old  female who was recently noted to     have abnormal labs. Although she is unsure why she is here I believe that it is     due to a recently noted abnormal serum protein electrophoresis. A serum protein     electrophoresis was performed on 01/27/2016 which showed a markedly elevated     IgG at 2941, decreased IgA at 46, IgM 87, and markedly increased monoclonal     spike at 2.2 g/dL. Interestingly, she had a serum protein electrophoresis     performed on 03/19/2015 which again showed a hypergammaglobulinemia with an IgG     of 2695. Her last CBC from 12/14/2015 showed a white count of 10,400,     hemoglobin 14.2, and platelet count of 328,000 all of which were within normal     limits. CMP performed 12/14/2015 showed a calcium which was normal at 9.2 a     total protein elevated at 8.4 and an albumin normal at 4.1.            She was then referred to my clinic for discussion of these abnormal laboratory     findings. She first presented on 02/22/2016.            She underwent a bone marrow biopsy on 03/02/16 which  "showed a 30% plasma cell     population consistent with plasma cell dyscrasia. She had a skeletal survey     performed which was without any lytic lesions. She again states that she had     had some back pain and leg pain. No fevers. No confusion. No infections. She     does have some fatigue. She previously stated she wanted something \"that will     make me quit smoking\". She stated that she had completely quit smoking. She     again denies any infections. No fevers.            She developed acute right sided pain and CT imaging showed a mass in the colon.     Laboratory evaluation performed by her PCP at that time also showed     leukocytosis. Colonoscopy was performed by gastroenterology which showed a     tumor. The patient then underwent a right hemicolectomy on 05/16/2017 which     revealed a low-grade 7.6 cm adenocarcinoma of the cecum. All margins were     negative. Lymphovascular invasion and perineural invasion were not identified.     26 lymph nodes were removed and unfortunately 1 was involved with metastatic     deposit. This was staged as a pT3 pN1a colorectal cancer. Imaging performed at     that time revealed no evidence of distant metastatic disease. She is now s/p     colectomy with colostomy.              She has some incisional pain although this is stable. Denies fever, chills or     infections. She is in week 3 of cycle #1 Xeloda. No rash. No worsening diarrhea.            Interim history:      Monthly follow up while on Xeloda. She denies any significant rash and reports     mild diarrhea. No fever. No chest pain. No swelling. No nausea. Last Myeloma     labs were performed 07/2017.            Interim history: 9/25/17      Follow up on Xeloda with lab results pending.  She denies diarrhea or nausea.      Myeloma labs are due. She states that she has more energy and feels better when     she is on Xeloda than off Xeloda. Imaging 09/2017 showed no evidence of     progression or recurrence of her " colon cancer.            Interim history:  10/30/17      Scheduled monthly follow up for labs and toxicity check while on Xeloda. She     denies fever, pain, or weight loss. She is pleased with her last imaging which     showed no evidence of disease. She has 2 weeks remaining of her Xeloda     treatment.            Interim history:  18      3 month follow up and lab review. She denies fever, pain or weight loss. Ni     infections. She still has occasional diarrhea which she has had for months     after she stopped chemotherapy.            OTHER:      MOST RECENT IMAGING: 2017      MOST RECENT MYELOMA LABS: ordered 18               [facility]                PACS RADIOLOGY REPORT            Patient: MAURICIO ALBRIGHT   Acct: #[id number]   Report: #[id number]            UNIT #: [id number]    DOS: 17 0845      INSURANCE:[employer] - O   ORDER #:[id number]      LOCATION:[facility]     : 1950            PROVIDERS      ADMITTING:     ATTENDING: Shiva Norman      FAMILY:  IFRAH JOYA   ORDERING:  Shiva Norman         OTHER:    DICTATING:  LEANDRO MOSELEY MD            REQ #:[id number]    CPT CODE:   EXAM:CTACPWC - CT ABD CHEST PEL w CONTR      REASON FOR EXAM:        REASON FOR VISIT:  COLON CA            *******Signed******         PROCEDURE:   CT ABDOMEN; CT CHEST; CT PELVIS WITH CONTRAST             COMPARISON:   [facility], CT, CT ABD AND PELVIS W/WO     CONTRAST, 2011, 9:24.        [facility], CT, ABDOMEN/PELVIS WITH CONTRAST, 5/10/2017, 15:35.             INDICATIONS:   NEW DX COLON CANCER, MAY 2017, DIARRHEA, VOMITING, CRAMPING, HX     OF COPD, SMOKER, CREAT       0.77  17, GFR >60             TECHNIQUE:   After obtaining the patient's consent, CT images were obtained     with intravenous contrast       material.      PROTOCOL:     Standard imaging protocol performed                 RADIATION:     DLP: 2202mGy*cm          Automated exposure control was utilized to minimize radiation dose.       CONTRAST:   100cc Optiray 320 I.V.      LABS:     eGFR: >60ml/min/1.73m2             FINDINGS:         CHEST:      There is a right-sided port catheter with the tip at the mid SVC. Heart size     normal. No pericardial       or pleural effusion. There is no mediastinal, hilar, or axillary     lymphadenopathy. No central       pulmonary embolus. The thoracic aorta and aortic arch branch vasculature are     patent. Visualized       lower neck is without acute abnormality. There is a 4 mm nodule at the right     middle lobe on image       50 which is stable from 2011 consistent with benign etiology. There is mild     centrilobular       emphysema. The trachea and mainstem bronchi are patent. Mild linear atelectasis     at the dependent       right left lower lobes. Mild pleural-parenchymal scarring at the apices. No     aggressive osseous       lesion or acute fracture. Advanced disc disease in lower cervical spine at the     C5-6 and C6-7       levels.             ABDOMEN AND PELVIS:      The liver, spleen, and adrenal glands are normal in size and contour. The     pancreas is without       evidence of acute pancreatitis. The gallbladder is present. Dependent     gallstones. The kidneys       enhance symmetrically. No obstructive uropathy. Urinary bladder is fluid-filled     and thin-walled.       Uterus not visualized. No adnexal mass.             There are postoperative changes of interval right hemicolectomy. The     anastomotic site is without       acute complication. Oral contrast reaches the level of the rectum. There are     scattered sigmoid       diverticula without findings of acute diverticulitis. Negative for     pneumoperitoneum. No findings of       bowel obstruction. Abdominal aorta and branch vessels are patent. There is     stranding within the       subcutaneous fat of the midline  consistent with postsurgical changes. No     drainable fluid       collection. There are moderate atherosclerotic calcifications of the abdominal     aorta and branch       vessels without aneurysm. This area of lucency in the left iliac wing which is     unchanged from 2011       consistent with a benign process. No fracture or aggressive osseous lesion.             CONCLUSION:         1. Postsurgical changes of right hemicolectomy without evidence of residual or     metastatic disease       in the chest, abdomen, or pelvis.      2. Cholelithiasis.      3. Sigmoid diverticulosis without acute diverticulitis.      4. Mild centrilobular emphysema.              LEANDRO MOSELEY MD             Electronically Signed and Approved By: LEANDRO MOSELEY MD on 9/18/2017 at 10:28                        Until signed, this is an unconfirmed preliminary report that may contain      errors and is subject to change.                                              CICI:      D:09/18/17 1028            ROS      General:  Complains of: Fatigue      Eyes:  Complains of: Corrective lenses      Cardiovascular:  Denies: Chest pain      Respiratory:  Complains of: Shortness of Air      Gastrointestinal:  Complains of: Diarrhea, Denies: Nausea      Kidney/Bladder:  Denies: Painful Urination      Musculoskeletal:  Denies: New Back pain      Skin:  DENIES: Jaundice      Neurological:  Denies: Dizziness      Psychiatric:  Complains of: AAO X 3      Endocrine:  Diabetes      Hematologic/lymphatic:  Denies: Bruising      Reproductive:  Denies Pregnant            General Appearance:  Alert, Oriented X3, No acute distress      Eyes:  Anicteric Sclerae, Moist Conjunctiva      HEENT:  Orophraynx clear, No Erythema      Respiratory:  CTAB, Diminished Breath      Abdomen\Gastro:  Soft, No Masses      Cardio:  RRR, No Murmur, No, Peripheral Edema      Skin:  Normal Temperature, Normal Tone, No Rash, lesions, ulcers      Psychiatric:  Appropriate Affect, AAO x3       Neuro:  Cranial Nerve II-XII Inta, No Focal Sensory Deficit      Muscularskeletal:  Normal Gait and Station, Full ROM of extremeties      Extremities:  No Digital Cyanosis, No Digital Ischemia      Lymphatic:  No Cervical, No Supraclavicular, No Axillary            Current Plan      I reviewed the results of her previous laboratory evaluation including the     results of a serum protein electrophoresis which was performed on 01/27/2016.     This study was quite concerning for a possible underlying monoclonal gammopathy     given the increase in IgG at 2941 and increase in the monoclonal spike at 2.2 g/    dL.            I reviewed the possible differential of this finding and counseled her that I     felt we should repeat a serum protein electrophoresis with immunofixation     electrophoresis to verify this finding. I also counseled her that I would     perform additional testing including a urine protein electrophoresis with     immunofixation electrophoresis, serum free light chains, and beta-2     microglobulin level. This workup would be performed to try to further identify     a possible underlying plasma cell disorder such as smoldering myeloma, multiple     myeloma, or monoclonal gammopathy of undetermined significance. I counseled her     that the ultimate diagnosis for this issue would require a bone marrow biopsy     to evaluate the plasma cell population cytogenetics which could be contributing     to this issue. She voiced understanding of these recommendations and was     reluctantly agreeable to pursuing a bone marrow biopsy if indicated.            My plan at this time is to reevaluate these findings as noted above. If she is     found again to have a monoclonal gammopathy I would then pursue additional     workup with bone marrow biopsy as noted. She voiced understanding of these     recommendations and was in agreement.            Smoldering Myeloma: As noted her bone marrow biopsy was  consistent with a 30%     plasma cell population consistent with plasma cell dyscrasia. I counseled her     today on the NCCN definition of smoldering vs active multiple myeloma. I     counseled her that in order to be defined as having active multiple myeloma she     would need to have greater than 10% biopsy-proven plasma cells in the bone     marrow (she has 30%) AND one or more of the following: Hypercalcemia, renal     insufficiency with creatinine greater than 2, anemia with hemoglobin less than     10, bone marrow plasma cells greater than or equal to 60%, serum free light     chain ratio greater than or equal to 100 or less than 0.01, or one or more     osteolytic bone lesions on imaging.            As noted skeletal survey was negative for any evidence of lytic lesions. As she     does not meet any of the criteria other than the 30% plasma cell cut off to be     defined as active multiple myeloma she meets criteria for smoldering myeloma. I     counseled her that for smoldering myeloma treatment would be active     surveillance. For active multiple myeloma the treatment would be chemotherapy.     She will return every 3 months for SPEP with CARL, UPEP with CARL, serum free     light chains, CBC, CMP, etc. We will perform the skeletal survey 1-2 times     yearly. I did  her that this disease could progress to multiple myeloma     and required active surveillance. She voiced understanding and was in agreement     with these recommendations.             SPEP with CARL, UPEP, SFLC pending. Other labs pending today. Previous labs     reviewed. M-spike relatively stable. She has some back pain and appears to have     severe facet arthropathy on her last skeletal survey. I have again asked her to     discuss this with her PCP. She was given copies of her bone marrow pathology     results and the skeletal survey results. At the conclusion of a previous clinic     appointment she was given handouts on patient  "information regarding plasma cell     disorders including multiple myeloma.             I again recommended immediate smoking cessation. She was taking Chantix. I     counseled her that there was no such thing as a smoking cessation device or     program that would \"make her quit completely\" and that she would have to make     the choice to quit and then use aides to assist in smoking cessation. She was     disappointed but voiced understanding. On 04/18/17 she stated that she had     successfully quit smoking. She has started smoking again. Here in follow up her     M-Sahil was 2.2 and in July 2017 2.1. This was 2.4 in January 2017.  Will     continue to monitor. Will repeat skeletal survey yearly. She is still smoking     but now wants to quit.            Next labs are due January 2018. Labs ordered October 2017 were again reviewed.     Will monitor this disease for now especially given that she is receiving     adjuvant chemotherapy for colon cancer. M-spike and other labs appear stable.     We will continue to monitor. Labs placed.            Colon Cancer: As noted in April she reported abdominal pain so severe she went     to the ER. CT scan noted a mass with C-scope consistent with colon cancer. As     noted she then underwent a right hemicolectomy on 05/16/2017 which revealed a     low-grade 7.6 cm adenocarcinoma of the cecum. All margins were negative.     Lymphovascular invasion and perineural invasion were not identified. 26 lymph     nodes were removed and unfortunately 1 was involved with metastatic deposit.     This was staged as a pT3 pN1a colorectal cancer. Imaging performed at that time     revealed no evidence of distant metastatic disease. She is now s/p colectomy     with colostomy. Unfortunately one of the 26 nodes removed at surgery was     positive and I explained this makes her a stage 3 colon cancer that would     require adjuvant chemotherapy. We discussed FOLFOX, CAPEOX, and single agent   "   Xeloda. I discussed that there appears to be limited benefit to the addition of     Oxaliplatin in patients over the age of 70. As she is age 66 with numerous     comorbidities I counseled her that I felt the addition of Oxaliplatin would     like have more adverse effects than benefits in her clinical scenario. She also     would prefer Xeloda oral chemotherapy. She reported significant difficulty with     accessing veins so I referred her for a port placement as well.  I feel a port     is a good option to draw labs and deliver hydration if needed. She was in     agreement. Orders were written for Xeloda and Zofran. I reviewed the risks and     potential complications of chemotherapy and she voiced understanding of these     risks and was in agreement. Xeloda will continue for 8 cycles until     approximately 12/2017.            RECENT PLAN      She returned on 7/26/17 for a toxicity check while receiving single agent     Xeloda. Toxicity monitoring. Labs including myeloma labs pending and     chemotherapy reviewed. Diarrhea noted although this is stable. I will monitor     her weight. No rash. Will plan on 6 months of adjuvant treatment. CBC, CMP and     CEA each visit. I will continue monitoring her myeloma labs in addition to her     CEA.            She returns on 8/28/17 for scheduled follow up while taking Xeloda. Lab results     including CEA pending.  Reviewed her myeloma labs from July 2017 which I felt     were stable. Ordered CT scans as she is now roughly half way through treatment.      She states she continues having lower abdominal pain from her cancer, [place]     7.5 #40 written on 08/28/17.             She is here on 9/25/17 monthly toxicity check while on Xeloda.  Her skin     appears normal no rash noted.  Myeloma labs ordered and are pending. Repeat     imaging 09/2017 showed no evidence of progression or recurrence; reviewed at     bedside. Toxicity monitoring. Labs and chemotherapy reviewed.             Monthly toxicity check while on Xeloda on 10/30/17.  Her skin appears normal no     rash noted.  Toxicity monitoring. Labs and chemotherapy reviewed. Previous     imaging again reviewed which showed no evidence of progression or recurrence;     this was again reviewed. Will repeat imaging every 3-6 months. Labs pending.     Chemo reviewed.            She has completed Xeloda , now on 3 month follow up and labs for smoldering     myeloma and colon cancer surveillance.  She has minimal complaints but does     report chronic diarrhea; I have recommended medical management. I had     recommended GI referral for chronic diarrhea but she politely declined. I     counseled her her myeloma labs have been stable for over a year now but we will     continue to monitor every 3-6 months. Her scan for colon cancer surveillance     due in March 2018 and I will see her the week after to review.  She was in     agreement.  Mammogram ordered. She stated she was up to date on colonoscopy.              I again counseled her on the importance of smoking cessation. She states     Chantix was denied by insurance. She states she will discuss this further with     her PCP.            Available for immediate release. Please forward a copy of this dictation to     Paulino Macias and Dr. Carballo.            TREATMENT HISTORY      Chemotherapy:      1. C6(starting 8/30/17) Xeloda (oral chemo; Rx written 6/1/17) completed 11/15/    17 ?      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            Diarrhea - R19.7            Notes      Discontinued Medications      * (Hydrocodone/APAP 10/325 MG) 1 TAB TAB: 1 TAB PO Q6H PRN SEVERE PAIN (PAIN     LEVEL 7-10) #30      * Prochlorperazine (Compazine) 5 MG TAB: 10 MG PO TID NAUSEA #60      New Diagnostics      *  Cea/Carcinoembryonic, As Soon As Possible       Dx: Monoclonal gammopathy - D47.2      * Comp Metabolic Panel, As Soon As Possible      * FREE KAPPA LAMBDA LT CHAINS QU, As Soon As Possible      * IMMUNOGLOBULIN QUANT IGAMQ, As Soon As Possible      * IMMUNOFIX PROT ELECTROPH URINE, As Soon As Possible      * PROTEIN ELECTROPH SERUM, As Soon As Possible      * Abd/Pel W/ Cont CT, 03/19/18       Dx: Monoclonal gammopathy - D47.2      * Chest W/ Cont CT, 03/19/18       Dx: Monoclonal gammopathy - D47.2      Follow-up:  3 Months      Next Visit Labs:  CBC, CMP, Other      Intensive Monitor for Toxicity:  Labs Reviewed, Chemo Orders Reviewd, Meds\    Narcotics Reviewed      Labs Reviewed During Visit?:  Yes      Review/Other:  Received Lab Test, Ordered Lab Test, Reviewed Radiology Test,     Ordered Radiology Test, Reviewed Medicine Test      ECOG Score:  0      Clinical Staging      Smoldering Myeloma; Stage III colon cancer            Hx Influenza Vaccination:  Yes      Date Influenza Vaccine Given:  Sep 1, 2017      Influenza Vaccine Declined:  No      2 or More Falls Past Year?:  No      Fall Past Year with Injury?:  No      Hx Pneumococcal Vaccination:  Yes      Encouraged to follow-up with:  PCP regarding preventative exams.      Chart initiated by      DAMIEN ELLIS                 Disclaimer: Converted document may not contain table formatting or lab diagrams. Please see [employer] for the authenticated document.

## 2021-05-28 NOTE — PROGRESS NOTES
Patient: MAURICIO ALBRIGHT     Acct: [id number]     Report: #[id number]  UNIT #: [id number]     : 1950    Encounter Date:2020  PRIMARY CARE: IFRAH JOYA  ***Signed***  --------------------------------------------------------------------------------------------------------------------  NURSE INTAKE      Visit Type      Established Patient Visit            Chief Complaint      SMOLDERING MYELOMA F.U            Referring Provider/Copies To      Referring Provider:  IFRAH JOYA      Primary Care Provider:  IFRAH JOYA      Copies To:   FIRAH JOYA; Jarvis Borges            History and Present Illness      Past Oncology Illness History      Mrs. Albright is a 67-year-old  female who was recently noted to have     abnormal labs. A serum protein electrophoresis was performed on 2016 which    showed a markedly elevated IgG at 2941, decreased IgA at 46, IgM 87, and     markedly increased monoclonal spike at 2.2 g/dL. 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 Her last CBC from 2015 showed     a white count of 10,400, hemoglobin 14.2, and platelet count of 328,000 all of     which were within normal limits. CMP performed 2015 showed a calcium which    was normal at 9.2 a total protein elevated at 8.4 and an albumin normal at 4.1.     She underwent a bone marrow biopsy on 16 which showed a 30% plasma cell     population consistent with plasma cell dyscrasia. She had a skeletal survey     performed which was without any lytic lesions. She again states that she had had    some back pain and leg pain.             She then developed acute right sided pain and CT imaging showed a mass in the     colon. Laboratory evaluation performed by her PCP at that time also showed     leukocytosis. Colonoscopy was performed by gastroenterology which showed a     tumor. 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 All margins were     negative. Lymphovascular invasion and perineural invasion were not identified.     26 lymph nodes were removed and unfortunately 1 was involved with metastatic     deposit. This was staged as a pT3 pN1a colorectal cancer. Imaging performed at     that time revealed no evidence of distant metastatic disease. She is now s/p     colectomy with colostomy.        s/p 6 mo of adjuvant xeloda. 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  She     is status post resection followed by adjuvant chemotherapy completed 11/17.  She    states that the bowels generally are doing well.  She reports occasional     diarrhea but denies blood per rectum, melena or pain with bowel movement.  She     denies any abdominal or pelvic pain.  She denies any masses or lymphadenopathy.     She is eating drinking well, her weight is maintained.  She reports good energy     level.            Cancer Details            Low grade adenocarcinoma of ascending colon          Smoldering Myeloma            Clinical Staging      Smoldering Myeloma; Colon ([id number])            Treatments      Chemotherapy      11/2017 completed 6mo adjuvant Xeloda            Clinical Trial Participant      No            ECOG Performance Status      0            Most Recent Lab Findings            Item Value  Date Time             Immunoglobulin G 2619 mg/dL H 7/29/20 1000             Immunoglobulin A 31 mg/dL L 7/29/20 1000             Serum Immunofixation COMMENT NA 7/29/20 1000             M-Sahil (CARL) 2.1 g/dL H 7/29/20 1000             Free Kappa Light Chains 11.2 mg/L 7/29/20 1000             Free Lambda Light Chains 7.9 mg/L 7/29/20 1000             Free Kappa/Lambda Light Chain Ratio 1.42 NA 7/29/20 1000            Laboratory Tests      7/29/20  10:00            PAST, FAMILY   Past Medical History      Past Medical History:  COPD, Depression      Hematology/Oncology (F):  Colorectal Cancer, Myeloma (MULTIPLE)            Past Surgical History      Biopsy (TOOK OUT PART OF COLON), Hysterectomy, VAD Placement            Family History      Family History:  Lung Cancer (MOTHER, SEVERAL MATERNAL AND PATERNAL AUNTS.)            Social History      Marital Status:        Lives independently:  Yes      Number of Children:  3      Occupation:  RETIRED            Tobacco Use      Tobacco status:  Light tobacco smoker, Former smoker      Smoking packs/day:  1      Smoking history:  25-50 pack years      Quit status:  Quit date established (9/2019)            Alcohol Use      Alcohol intake:  None            Substance Use      Substance use:  Denies use            REVIEW OF SYSTEMS      General:  Admits: Fatigue, Weight Loss      Eye:  Admits Corrective Lenses      ENT:  Denies Headache      Cardiovascular:  Denies Chest Pain      Respiratory:  Admits: Shortness of Air      Gastrointestinal:  Admits Diarrhea, Admits Nausea/Vomiting      Musculoskeletal:  Denies Muscle Pain      Psychiatric:  Admits Depression            VITAL SIGNS AND SCORES      Vitals      Weight 188 lbs 0.838 oz / 85.3 kg      Temperature 98.4 F / 36.89 C - Temporal      Pulse 86      Respirations 24      Blood Pressure 128/82 Sitting, Left Arm      Pulse Oximetry 98%, RM AIR            Pain Score      Pain Scale Used:  Numerical      Pain Intensity:  0            Fatigue Score      Fatigue (0-10 scale):  5            EXAM      General Appearance:  Positive for: Alert, Cooperative;          Negative for: Acute Distress      Eye:  Positive for: Anicteric Sclerae, Moist Conjunctiva      Neck:  Positive for: Supple;          Negative for: JVD, Masses      Respiratory:  Positive for: CTAB, Normal Respiratory Effort      Abdomen/Gastro:  Positive for: Normal Active Bowel Sounds, Soft;           Negative for: Distention, Hepatosplenomegaly, Tenderness      Other      Well-healed surgical incision      Cardiovascular:  Positive for: RRR;          Negative for: Gallop, Murmur, Peripheral Edema, Rub      Psychiatric:  Positive for: Appropriate Affect, Intact Judgement      Lymphatic:  Negative for: Cervical, Infraclavicular, Supraclavicular            PREVENTION      Hx Influenza Vaccination:  Yes      Date Influenza Vaccine Given:  Oct 1, 2019      Influenza Vaccine Declined:  No      2 or More Falls in Past Year?:  No      Fall Past Year with Injury?:  No      Hx Pneumococcal Vaccination:  Yes      Encouraged to follow-up with:  PCP regarding preventative exams.      Chart initiated by      FRANSISCO CASTANEDA MA            ALLERGY/MEDS      Allergies      Coded Allergies:             MORPHINE (Verified  Allergy, Severe, TROUBLE BREATHING, FACIAL REDNESS, ,     8/5/20)           CEPHALEXIN (Verified  Allergy, Unknown, ITCHING, 8/5/20)           PENICILLINS (Verified  Allergy, Unknown, RASH, 8/5/20)           SULFA (SULFONAMIDE ANTIBIOTICS) (Verified  Allergy, Unknown, RASH, 8/5/20)            Medications      Last Reconciled on 8/5/20 16:21 by MO JACKSON      Vitamin B Complex (B Complex-Vitamin B-12) 1 Each Tablet      1 TAB PO QDAY, #30 TAB         Reported         2/5/20       Cyanocobalamin (Vitamin B-12) Inj (Cyanocobalamin Inj) 1,000 Mcg/1 Ml Vial      1000 MCG IM FR, VIAL         Reported         2/5/20       Budesonide/Formoterol Fumarate (Symbicort 160/4.5 Mcg) 10.2 Gm Inh      2 PUFF INH RTBID, #1 INH 0 Refills         Reported         2/5/20       DULoxetine (Cymbalta) 30 Mg Capsule.dr      30 MG PO BID, #60 CAP 0 Refills         Reported         6/8/17      Medications Reviewed:  No Changes made to meds            IMPRESSION/PLAN      Diagnosis      Colon cancer         Malignant neoplasm of colon, unspecified part of colon - C18.9         Colon location: unspecified part of colon      Status  post resection followed by adjuvant chemotherapy.  Patient is doing well.     I see no evidence of disease recurrence by history, physical examination and     lab work.  She is due for CT scans which will be ordered at her convenience.      She is also due for endoscopy which she deferred due to the COVID pandemic.  T    his office will put her back in touch with her gastroenterologist to schedule     her scope.  I will plan to see her back in 6 months for ongoing surveillance     with labs and scans prior.            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  Her other lab work is reasonable.  She was due for     skeletal survey but did not have those done.  These will be reordered and I will    let her know the results.  I will plan to see her back in 6 months for     reevaluation.            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 Please see [employer] for the authenticated document.

## 2021-05-28 NOTE — PROGRESS NOTES
Patient: MAURICIO ALBRIGHT     Acct: [id number]     Report: #[id number]  UNIT #: [id number]     : 1950    Encounter Date:2019  PRIMARY CARE: IFRAH JOYA  ***Signed***  --------------------------------------------------------------------------------------------------------------------  NURSE INTAKE      Visit Type      Established Patient Visit            Chief Complaint      monoclonal gammopathy            Referring Provider/Copies To      Referring Provider:  IFRAH JOYA      Primary Care Provider:  IFRAH JOYA            History and Present Illness      Past History      Mrs. Albright is a 67-year-old  female who was recently noted to have     abnormal labs. A serum protein electrophoresis was performed on 2016 which    showed a markedly elevated IgG at 2941, decreased IgA at 46, IgM 87, and     markedly increased monoclonal spike at 2.2 g/dL. 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 Her last CBC from 2015 showed     a white count of 10,400, hemoglobin 14.2, and platelet count of 328,000 all of     which were within normal limits. CMP performed 2015 showed a calcium which    was normal at 9.2 a total protein elevated at 8.4 and an albumin normal at 4.1.     She underwent a bone marrow biopsy on 16 which showed a 30% plasma cell     population consistent with plasma cell dyscrasia. She had a skeletal survey     performed which was without any lytic lesions. She again states that she had had    some back pain and leg pain.             She then developed acute right sided pain and CT imaging showed a mass in the     colon. Laboratory evaluation performed by her PCP at that time also showed     leukocytosis. Colonoscopy was performed by gastroenterology which showed a     tumor. 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 All margins were     negative. Lymphovascular invasion and perineural invasion were not identified.     26 lymph nodes were removed and unfortunately 1 was involved with metastatic     deposit. This was staged as a pT3 pN1a colorectal cancer. Imaging performed at     that time revealed no evidence of distant metastatic disease. She is now s/p     colectomy with colostomy.        s/p 6 mo of adjuvant xeloda. completed 11/17            HPI - Hematology Interim      F/u smoldering Myeloma and colon cancer--pt reports she saw new GI-Dr. Desai in    [place] and will return to see again 4/30/19 (hx colon ca) and was also seen     by Dr. Mckeon to discuss bone marrow tx.  Those notes will be requested.  She     reports her energy comes and goes.  She reports a good appetite.  She denies     acute pain or discomfort at this time.  She continues to have diarrhea but has a    follow-up with gastroenterology as noted            Most Recent Imaging Findings      4/1/19            PROCEDURE:   PET CT WHOLE BODY INITIAL             COMPARISON:   [facility], CT, CT CHEST ABD PEL W CONTRAST,    9/11/2018, 14:56.             INDICATIONS:   MULTIPLE MYELOMA, colon cancer             TECHNIQUE:   After obtaining the patient's consent, F-18 FDG was administered     intravenously.  Whole       body PET/CT imaging was performed of the entire body, skull to feet, with multi-    planar imaging       without oral or intravenous contrast material, using a dedicated integrated     PET/CT scanner.               RADIONUCLIDE:     12.0 MCI   F18 FDG- I.V.      LABS:                          Blood Glucose 75 mg/dl             FINDINGS:         HEAD/NECK:   Normal.  No pathological FDG activity.        LUNGS:   Stable 1.2 cm nodule along anterior right upper lobe on image 93.     Stable 7 mm ground-glass       nodule along the right minor fissure on image 114.  Stable 6 mm right middle lobe    nodule on image       119. Stable 5 mm ground-glass left upper lobe nodule on image 113. None of these    pulmonary nodules       demonstrate FDG avidity.  No new pulmonary nodule is identified.  There is     emphysema and minimal       bibasilar atelectasis.        MEDIASTINUM/BEAR:   Right subclavian port with tip in mid SVC.  No pathological     FDG activity.        CHEST WALL/AXILLA:   Normal.  No pathological FDG activity.        ABDOMEN:   Stable changes from right hemicolectomy.  Stable ventral hernia     containing a loop of       transverse colon, with no evidence of obstruction.  Sigmoid diverticulosis.  No     pathological FDG       activity.        PELVIS:   Normal.  No pathological FDG activity.        BONES:   A 1.3 cm lytic lesion in the left iliac wing on image 206 is unchanged     in size and has FDG       avidity with SUV max 6.3.  No additional aggressive appearing or FDG avid     osseous lesions are       identified.  There is some mucosal disease within the maxillary sinuses.      LOWER EXTREMITIES:   Normal.  No pathological FDG activity.  No suspicious     lesions on CT imaging.        OTHER:   Negative.               CONCLUSION:         1. FDG avid lytic lesion along left iliac wing is stable in size.  No additional    FDG avid osseous       lesions are identified.      2. Several pulmonary nodules measuring up to 1.2 cm are stable, and none     demonstrate FDG avidity.        No new pulmonary nodules identified.      3. Stable changes from right hemicolectomy.            PAST, FAMILY   Past Medical History      Past Medical History:  COPD, Depression      Hematology/Oncology (F):  Colorectal Cancer, Myeloma (MULTIPLE)      Other Hematology/Oncology      MONOCLONAL GAMMOPATHY            Past Surgical History      Biopsy (TOOK OUT PART OF COLON), Hysterectomy, VAD Placement            Family History      Family History:  Lung Cancer (MOTHER, SEVERAL MATERNAL  AND PATERNAL AUNTS.)            Social History      Marital Status:        Lives independently:  Yes      Number of Children:  3      Occupation:  RETIRED            Tobacco Use      Tobacco status:  Light tobacco smoker      Smoking packs/day:  1      Smoking history:  25-50 pack years            Alcohol Use      Alcohol intake:  None            Substance Use      Substance use:  Denies use            REVIEW OF SYSTEMS      General:  Denies: Fatigue      Eye:  Denies: Eye Pain      ENT:  Denies: Sore Throat      Cardiovascular:  Denies: Irregular Heartbeat      Respiratory:  Denies: Wheezing      Gastrointestinal:  Admits: Diarrhea, Vomiting      Genitourinary (female):  Denies: Painful Urination      Integumentary:  Denies: Skin Discoloration      Neurologic:  Denies: Numbness\Tingling            VITAL SIGNS AND SCORES      Vitals      Height 5 ft 1.00 in / 154.94 cm      Weight 186 lbs 11.674 oz / 84.7 kg      BSA 1.83 m2      BMI 35.3 kg/m2      Temperature 98.7 F / 37.06 C - Temporal      Pulse 78      Respirations 18      Blood Pressure 141/75 Sitting, Left Arm      Pulse Oximetry 97%, RM AIR            Pain Score      Pain Scale Used:  Numerical      Pain Intensity:  0            Fatigue Score      Fatigue (0-10 scale):  0 (none)            EXAM      General Appearance:  Positive for: Alert, Oriented x3, Cooperative;          Negative for: Acute Distress      Neck:  Positive for: Supple;          Negative for: JVD, Masses      Respiratory:  Positive for: CTAB, Normal Respiratory Effort      Chest:  Port in Place      Abdomen/Gastro:  Positive for: Normal Active Bowel Sounds, Hernias (Ventral     without incarceration), Soft;          Negative for: Distention, Hepatosplenomegaly, Tenderness      Cardiovascular:  Positive for: RRR;          Negative for: Gallop, Murmur, Peripheral Edema, Rub      Psychiatric:  Positive for: Appropriate Affect, Intact Judgement      Lower Extremities:  Negative for: Edema       Lymphatic:  Negative for: Axillary, Cervical, Infraclavicular, Supraclavicular            PREVENTION      Date Influenza Vaccine Given:  Oct 1, 2018      Influenza Vaccine Declined:  No      2 or More Falls Past Year?:  No      Fall Past Year with Injury?:  No      Encouraged to follow-up with:  PCP regarding preventative exams.      Chart initiated by      FRANSISCO CASTANEDA MA            ALLERGY/MEDS      Allergies      Coded Allergies:             MORPHINE (Verified  Allergy, Severe, TROUBLE BREATHING, FACIAL REDNESS, ,     4/8/19)           CEPHALEXIN (Verified  Allergy, Unknown, ITCHING, 4/8/19)           PENICILLINS (Verified  Allergy, Unknown, RASH, 4/8/19)           SULFA (SULFONAMIDE ANTIBIOTICS) (Verified  Allergy, Unknown, RASH, 4/8/19)            Medications      Last Reconciled on 4/8/19 12:47 by LINETTE MEDEIROS      DULoxetine (Cymbalta) 30 Mg Capsule.dr      30 MG PO BID, #60 CAP 0 Refills         Reported         6/8/17      Medications Reviewed:  No Changes made to meds            IMPRESSION/PLAN      Impression      Smoldering myeloma.  Colon cancer.            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  Her lab work looks okay.  PET scan does     demonstrate one lytic area in the iliac bone as discussed below.  Biopsy will be    obtained.  Records from [facility] bone marrow transplant service     will be obtained for review.  If this does represent a focus of myeloma, this     would constitute active disease and would warrant therapy      New Diagnostics      * SPEP with Immuno (IEPS), Routine      * PROTEIN ELECTROPH UA RANDOM, Routine      * IMMUNOGLOBULIN QUANT IGAMQ, Routine      * CMP Comp Metabolic Panel, Routine      * CBC With Auto Diff, Routine            Lytic lesion of bone on x-ray - M89.9      Unclear whether this represents a focus of myeloma, colon cancer or perhaps a     more benign process.  Given those possibilities, I  believe the area needs to be     biopsied so that appropriate therapy can be instituted.  She is agreeable to     biopsy which will be scheduled at her earliest convenience.  I will see her back    afterward to review.      New Diagnostics      * BIOPSY CT, SCHEDULED PROCEDURE            Colon cancer         Malignant neoplasm of colon, unspecified part of colon - C18.9         Colon location: unspecified part of colon      Stage III.  Status post resection followed by 6 months of adjuvant Xeloda.      Patient has follow-up with gastroenterology for ongoing endoscopy.  Recent scan     shows no solid organ abnormalities or lymphadenopathy.            Patient Education            DI for Bone Biopsy      Patient Education Provided:  Yes                 Disclaimer: Converted document may not contain table formatting or lab diagrams. Please see [employer] for the authenticated document.

## 2021-05-28 NOTE — PROGRESS NOTES
Patient: MAURICIO ALBRIGHT     Acct: [id number]     Report: #[id number]  UNIT #: [id number]     : 1950    Encounter Date:2020  PRIMARY CARE: IFRAH JOYA  ***Signed***  --------------------------------------------------------------------------------------------------------------------  NURSE INTAKE      Visit Type      Established Patient Visit            Chief Complaint      smoldering myeloma here for f/u            Referring Provider/Copies To      Referring Provider:  IFRAH JOYA      Primary Care Provider:  IFRAH JOYA            History and Present Illness      Past Oncology Illness History      Mrs. Albright is a 67-year-old  female who was recently noted to have     abnormal labs. A serum protein electrophoresis was performed on 2016 which    showed a markedly elevated IgG at 2941, decreased IgA at 46, IgM 87, and     markedly increased monoclonal spike at 2.2 g/dL. 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 Her last CBC from 2015 showed     a white count of 10,400, hemoglobin 14.2, and platelet count of 328,000 all of     which were within normal limits. CMP performed 2015 showed a calcium which    was normal at 9.2 a total protein elevated at 8.4 and an albumin normal at 4.1.     She underwent a bone marrow biopsy on 16 which showed a 30% plasma cell     population consistent with plasma cell dyscrasia. She had a skeletal survey     performed which was without any lytic lesions. She again states that she had had    some back pain and leg pain.             She then developed acute right sided pain and CT imaging showed a mass in the     colon. Laboratory evaluation performed by her PCP at that time also showed     leukocytosis. Colonoscopy was performed by gastroenterology which showed a tumor    . The patient then underwent a right hemicolectomy on  05/16/2017 which revealed     a low-grade 7.6 cm adenocarcinoma of the cecum. All margins were negative.     Lymphovascular invasion and perineural invasion were not identified. 26 lymph     nodes were removed and unfortunately 1 was involved with metastatic deposit.     This was staged as a pT3 pN1a colorectal cancer. Imaging performed at that time     revealed no evidence of distant metastatic disease. She is now s/p colectomy     with colostomy.        s/p 6 mo of adjuvant xeloda. completed 11/17            HPI - Oncology Interim      Patient returns today for follow-up of her colon cancer and smoldering myeloma.     She is status post surgery followed by adjuvant chemotherapy for colon cancer     and is currently on observation for myeloma.  She states that she has been doing    well since her last visit.  She is up-to-date on colonoscopy with her next     scheduled for later this year.  She reports normal bowel habits without blood     per rectum, melena or pain.  She denies any masses lymphadenopathy.  No unusual     aches or pains.  She notes good appetite and her weight is maintained.  She     reports good energy level.  With regard to her myeloma, she denies any symptoms.            Cancer Details            Low grade adenocarcinoma of ascending colon          Smoldering Myeloma            Clinical Staging      Smoldering Myeloma; Colon ([id number])            Treatments      Chemotherapy      11/2017 completed 6mo adjuvant Xeloda            Clinical Trial Participant      No            ECOG Performance Status      0            Most Recent Lab Findings      Laboratory Tests      1/28/20 10:42            PAST, FAMILY   Past Medical History      Past Medical History:  COPD, Depression      Hematology/Oncology (F):  Colorectal Cancer, Myeloma (MULTIPLE)            Past Surgical History      Biopsy (TOOK OUT PART OF COLON), Hysterectomy, VAD Placement            Family History      Family History:  Lung Cancer  (MOTHER, SEVERAL MATERNAL AND PATERNAL AUNTS.)            Social History      Marital Status:        Lives independently:  Yes      Number of Children:  3      Occupation:  RETIRED            Tobacco Use      Tobacco status:  Light tobacco smoker, Former smoker      Smoking packs/day:  1      Smoking history:  25-50 pack years      Quit status:  Quit date established (9/2019)            Alcohol Use      Alcohol intake:  None            Substance Use      Substance use:  Denies use            REVIEW OF SYSTEMS      General:  Admits: Fatigue, Weight Gain      Eye:  Denies Diplopia      ENT:  Denies Hoarseness      Cardiovascular:  Denies Palpitations      Respiratory:  Denies: Shortness of Air      Gastrointestinal:  Admits Diarrhea      Genitourinary:  Denies Incontinence      Musculoskeletal:  Denies Muscle Pain      Integumentary:  Denies Lesions      Neurologic:  Denies Numbness\Tingling      Psychiatric:  Admits Depression      Endocrine:  Denies Cold Intolerance      Hematologic/Lymphatic:  Denies Bruising            VITAL SIGNS AND SCORES      Vitals      Height 5 ft 1 in / 154.94 cm      Weight 194 lbs 7.132 oz / 88.2 kg      BSA 1.87 m2      BMI 36.7 kg/m2      Temperature 98.3 F / 36.83 C - Temporal      Pulse 98      Blood Pressure 135/48 Sitting, Left Arm      Pulse Oximetry 98%, room air            Pain Score      Experiencing any pain?:  No      Pain Scale Used:  Numerical      Pain Intensity:  0            Fatigue Score      Experiencing any fatigue?:  Yes      Fatigue (0-10 scale):  6            EXAM      General Appearance:  Positive for: Alert, Oriented x3, Cooperative;          Negative for: Acute Distress      Eye:  Positive for: Anicteric Sclerae, Moist Conjunctiva      Neck:  Positive for: Supple;          Negative for: JVD, Masses      Respiratory:  Positive for: CTAB, Normal Respiratory Effort      Abdomen/Gastro:  Positive for: Normal Active Bowel Sounds, Soft;          Negative for:  Distention, Hepatosplenomegaly, Tenderness      Cardiovascular:  Positive for: RRR;          Negative for: Gallop, Murmur, Peripheral Edema, Rub      Psychiatric:  Positive for: Appropriate Affect, Intact Judgement      Lymphatic:  Negative for: Cervical, Infraclavicular, Supraclavicular            PREVENTION      Hx Influenza Vaccination:  Yes      Date Influenza Vaccine Given:  Oct 1, 2019      Influenza Vaccine Declined:  No      2 or More Falls Past Year?:  No      Fall Past Year with Injury?:  No      Hx Pneumococcal Vaccination:  Yes      Encouraged to follow-up with:  PCP regarding preventative exams.      Chart initiated by      cresencio sebastian cma            ALLERGY/MEDS      Allergies      Coded Allergies:             MORPHINE (Verified  Allergy, Severe, TROUBLE BREATHING, FACIAL REDNESS, ,     2/5/20)           CEPHALEXIN (Verified  Allergy, Unknown, ITCHING, 2/5/20)           PENICILLINS (Verified  Allergy, Unknown, RASH, 2/5/20)           SULFA (SULFONAMIDE ANTIBIOTICS) (Verified  Allergy, Unknown, RASH, 2/5/20)            Medications      Last Reconciled on 2/5/20 16:19 by MO JACKSON      Vitamin B Complex (B Complex-Vitamin B-12) 1 Each Tablet      1 TAB PO QDAY, #30 TAB         Reported         2/5/20       Cyanocobalamin (Vitamin B-12) Inj (Cyanocobalamin Inj) 1,000 Mcg/1 Ml Vial      1000 MCG IM FR, VIAL         Reported         2/5/20       Budesonide/Formoterol Fumarate (Symbicort 160/4.5 Mcg) 10.2 Gm Inh      2 PUFF INH RTBID, #1 INH 0 Refills         Reported         2/5/20       DULoxetine (Cymbalta) 30 Mg Capsule.dr      30 MG PO BID, #60 CAP 0 Refills         Reported         6/8/17      Medications Reviewed:  Changes made to meds            IMPRESSION/PLAN      Diagnosis      Colon cancer         Malignant neoplasm of ascending colon         Colon location: ascending      Patient is doing well.  I see no evidence of disease recurrence by history,     physical examination, lab work or  recent CT scans.  She is up-to-date on     colonoscopy.  RTC 6 months for ongoing surveillance.            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  I will repeat protein studies today.  Her     other recent lab work looks good.  Assuming no change, I would plan to recheck     in 6 months with skeletal survey as well.            Notes      New Medications      * Budesonide/Formoterol Fumarate (Symbicort 160/4.5 Mcg) 10.2 GM INH: 2 PUFF INH      RTBID #1      * Cyanocobalamin (Vitamin B-12) Inj (Cyanocobalamin Inj) 1,000 MCG/1 ML VIAL:       1,000 MCG IM FR      * VITAMIN B COMPLEX (B Complex-Vitamin B-12) 1 EACH TABLET: 1 TAB PO QDAY #30      New Diagnostics      * CBC, 6 Months         Dx: Smoldering myeloma - C90.00      * Comp Metabolic Panel, 6 Months         Dx: Smoldering myeloma - C90.00      * IMMUNOGLOBULIN QUANT IGAMQ, 6 Months         Dx: Smoldering myeloma - C90.00      * FREE KAPPA LAMBDA LT CHAINS QU, 6 Months         Dx: Smoldering myeloma - C90.00      * LDH, 6 Months         Dx: Smoldering myeloma - C90.00      * SPEP with Immuno (IEPS), 6 Months         Dx: Smoldering myeloma - C90.00      * IMMUNOFIX PROT ELECTRO URINE, 6 Months         Dx: Smoldering myeloma - C90.00      * Skeletal Survey Adult, 6 Months         Dx: Smoldering myeloma - C90.00      * IMMUNOGLOBULIN QUANT IGAMQ, Routine         Dx: Smoldering myeloma - C90.00      * FREE KAPPA LAMBDA LT CHAINS QU, Routine         Dx: Smoldering myeloma - C90.00      * LDH, Routine         Dx: Smoldering myeloma - C90.00      * SPEP with Immuno (IEPS), Routine         Dx: Smoldering myeloma - C90.00      * IMMUNOFIX PROT ELECTRO URINE, Routine         Dx: Smoldering myeloma - C90.00            Patient Education      Patient Education Provided:  Yes            Electronically signed by MO JACKSON  02/05/2020 16:19       Disclaimer: Converted document may not contain table formatting or lab diagrams. Please see [facility] for the authenticated document.

## 2021-06-05 NOTE — PROGRESS NOTES
Progress Note      Patient Name: Anca Samson   Patient ID: [id number]   Sex: Female   YOB: 1950    Primary Care Provider: Jaret Macias PA-C   Referring Provider: Jaret Macias PA-C    Visit Date: May 14, 2021    Provider: Jaret Macias PA-C   Location: [facility]   Location Address: [address] [place] [address], [address]  [place], [place]  [id number]   Location Phone: [phone]          Chief Complaint  · left leg pain w/swelling      History Of Present Illness  Anca Samson is a 70 year old /White female who presents for evaluation and treatment of: left leg pain w/swelling.      pt presents today for left leg pain w/swelling.    pt states for the last 2-3 weeks she has been having left leg pain w/swelling. pt states the pain is from  mid thigh to mid calf. pt states there is numbness and tingling, the swelling is around knee area only. denies any injury.    pt states she would like to discuss getting a B12 shot today.    pt has not had any covid vaccines.    Labs 11/20  A1C 6.7    Pt has pmh of colon cancer and multiple myeloma both in remission.    Pt does smoke and not ready to quit.  Asthma - controlled with albuterol and steroid inhaler       Past Medical History  Disease Name Date Onset Notes   Anxiety --  --    Asthma --  --    C. difficile diarrhea --  --    Colon cancer 07/21/2017 --    COPD (chronic obstructive pulmonary disease) 10/23/2017 --    Depression --  --    Diverticulitis --  --    Dysphagia --  --    Heartburn --  --    Hyperlipidemia --  --    Hypertension --  --    Impaired fasting glucose 08/14/2015 --    Lumbago --  --    Major depressive disorder 10/27/2016 --    Melena --  --    Multiple myeloma --  --    Nicotine dependence 05/10/2017 --    Psychiatric Care --  --    Renal Insufficiency --  --    Screening Mammogram 02/20/2020 Normal - repeat in one year (ABIEL)   Seizures --  --    Shortness of breath --  --    Tobacco use disorder  2015 --    Vitamin D deficiency --  --          Past Surgical History  Procedure Name Date Notes   Bone marrow biopsy 2016 --    Colonoscopy ,2017;2019 [facility]:DIVERTICULOSIS AND ERYTHEMA OF MUCOSA   Endoscopy  --    Fecal transplant 2019 --    Hysterectomy 1982  --    Laparotomy  --    Port Placement --  --    Right hemicolectomy 2017 --          Medication List  Name Date Started Instructions   colestipol 1 gram oral tablet  take 3 tablets by oral route 2 times a day   diclofenac sodium 75 mg oral tablet,delayed release (/EC) 05/15/2021 take 1 tablet (75 mg) by oral route 2 times per day   fluoxetine 20 mg oral capsule 2021 TAKE 1 CAPSULE BY MOUTH EVERY DAY for 30 days   promethazine 25 mg oral tablet 2020 take 1 tablet (25 mg) by oral route every 4-6 hours as needed   Symbicort 160-4.5 mcg/actuation inhalation HFA aerosol inhaler 2020 inhale 2 puffs by inhalation route 2 times per day in the morning and evening   Ventolin HFA 90 mcg/actuation inhalation HFA aerosol inhaler 2020 inhale 1 - 2 puffs (90 - 180 mcg) by inhalation route every 4-6 hours as needed for 30 days         Allergy List  Allergen Name Date Reaction Notes   Keflex --  --  --    morphine --  --  --    PENICILLINS --  --  --    SULFA (SULFONAMIDES) --  --  --        Allergies Reconciled  Family Medical History  Disease Name Relative/Age Notes   Stroke Father/   aunt/uncle   Heart Disease Father/  Mother/   aunt/uncle   - No Family History of Colorectal Cancer  --    Lung cancer Mother/   --    Kidney Cancer Father/   --          Reproductive History  Menstrual   Age Menarche: 11 Last Menstrual Period: 1982   Pregnancy Summary   Total Pregnancies: 3 Full Term: 3 Premature: 0   Ab Induced: 0 Ab Spontaneous: 0 Ectopics: 0   Multiples: 0 Livin         Social History  Finding Status Start/Stop Quantity Notes   Alcohol Never --/-- --  2018 - 2018 - 2018 -      --  --/-- --  --    No known infection risk --  --/-- --  --    Sedentary --  --/-- --  --    Tobacco Current some day 27/69 1/2ppd --          Immunizations  NameDate Admin Mfg Trade Name Lot Number Route Inj VIS Given VIS Publication   Jebppsphd53/14/2015 SKB Fluarix, quadrivalent, preservative free [id number] NE NE 12/14/2015 07/02/2012   Comments: June   Mqiydxfxfdxb08/21/2015 NE Not Entered  NE NE 12/14/2015    Comments: June         Review of Systems  · Constitutional  o Denies  o : fever, fatigue, weight loss, weight gain  · Cardiovascular  o Denies  o : lower extremity edema, claudication, chest pressure, palpitations  · Respiratory  o Denies  o : shortness of breath, wheezing, cough, hemoptysis, dyspnea on exertion  · Gastrointestinal  o Denies  o : nausea, vomiting, diarrhea, constipation, abdominal pain      Vitals  Date Time BP Position Site L\R Cuff Size HR RR TEMP (F) WT  HT  BMI kg/m2 BSA m2 O2 Sat FR L/min FiO2        05/14/2021 09:38 /74 Sitting    84 - R   193lbs 2oz 5'   37.72 1.93 98 %            Physical Examination  · Constitutional  o Appearance  o : overweight, well developed  · Head and Face  o Head  o : normocephalic, atraumatic  · Neck  o Inspection/Palpation  o : normal appearance, no masses or tenderness, trachea midline  o Thyroid  o : gland size normal, nontender, no nodules or masses present on palpation  · Respiratory  o Respiratory Effort  o : breathing unlabored  o Inspection of Chest  o : chest rise symmetric bilaterally  o Auscultation of Lungs  o : clear to auscultation bilaterally throughout inspiration and expiration  · Cardiovascular  o Heart  o :   § Auscultation of Heart  § : regular rate and rhythm, no murmurs, gallops or rubs  o Peripheral Vascular System  o :   § Extremities  § : no edema  · Lymphatic  o Neck  o : no cervical lymphadenopathy, no supraclavicular lymphadenopathy  · Psychiatric  o Mood and Affect  o : mood normal, affect appropriate      Left LE - edematous, tenderness calf and inner thigh, left knee tenderness, no bruising.           Assessment  · Nicotine dependence     305.1/F17.200  · Class 2 severe obesity due to excess calories with serious comorbidity and body mass index (BMI) of 37.0 to 37.9 in adult       Morbid (severe) obesity due to excess calories     278.01/E66.01  Body mass index [BMI] 37.0-37.9, adult     278.01/Z68.37  · Vitamin D deficiency     268.9/E55.9  · Leg pain, left     729.5/M79.605  · Swelling of knee     719.06/M25.469  · Numbness and tingling       Anesthesia of skin     782.0/R20.0  Paresthesia of skin     782.0/R20.2  · Left leg pain     729.5/M79.605  · Pedal edema     782.3/R60.0      Plan  · Orders  o ACO-17: Screened for tobacco use AND received tobacco cessation intervention (4004F) - 305.1/F17.200 - 05/14/2021  o ACO-39: Current medications updated and reviewed (1159F, ) - - 05/14/2021  o VELE (Dop Scan) Unilateral. (23483) - 729.5/M79.605, 719.06/M25.469, 782.3/R60.0 - 05/14/2021   STAT call report - LLE edema and pain  o Knee (Left) [facility] ([id number]) - - 05/14/2021  · Medications  o Vitamin D2 1,250 mcg (50,000 unit) oral capsule   SIG: take 1 capsule by oral route once a week for 90 days   DISP: (13) Capsule with 1 refills  Prescribed on 05/14/2021     o VITAMIN D2 1.25MG(50,000 UNIT)   SIG: TAKE 1 CAPSULE BY MOUTH EVERY 7 DAYS   DISP: (12) Capsule with 1 refills  Discontinued on 05/14/2021     o Medications have been Reconciled  o Transition of Care or Provider Policy  · Instructions  o *Form of nicotine being used: cigs  o Patient was strongly encouraged to discontinue use of any nicotine containing product or minimize the use of the product.  o Take all medications as prescribed/directed.  o Patient instructed/educated on their diet and exercise program.  o Patient was educated/instructed on their diagnosis, treatment and medications prior to discharge from the clinic today.  o Patient  counseled to stop smoking.  o Discussed Covid-19 precautions including, but not limited to, social distancing, avoid touching your face, and hand washing.   · Disposition  o Call or Return if symptoms worsen or persist.  o F/U in clinic in 1 month.  o Care Transition            Electronically Signed by: Jaret Macias PA-C -Author on May 16, 2021 12:24:40 PM

## 2021-06-25 DIAGNOSIS — R52 PAIN: Primary | ICD-10-CM

## 2021-06-25 RX ORDER — DICLOFENAC SODIUM 75 MG/1
TABLET, DELAYED RELEASE ORAL
Qty: 60 TABLET | Refills: 1 | Status: SHIPPED | OUTPATIENT
Start: 2021-06-25 | End: 2021-07-28 | Stop reason: SDUPTHER

## 2021-07-15 VITALS
BODY MASS INDEX: 37.92 KG/M2 | HEIGHT: 60 IN | OXYGEN SATURATION: 98 % | WEIGHT: 193.12 LBS | DIASTOLIC BLOOD PRESSURE: 74 MMHG | HEART RATE: 84 BPM | SYSTOLIC BLOOD PRESSURE: 135 MMHG

## 2021-07-28 ENCOUNTER — OFFICE VISIT (OUTPATIENT)
Dept: FAMILY MEDICINE CLINIC | Facility: CLINIC | Age: 71
End: 2021-07-28

## 2021-07-28 VITALS
SYSTOLIC BLOOD PRESSURE: 126 MMHG | HEIGHT: 61 IN | OXYGEN SATURATION: 97 % | WEIGHT: 196.6 LBS | BODY MASS INDEX: 37.12 KG/M2 | HEART RATE: 85 BPM | DIASTOLIC BLOOD PRESSURE: 72 MMHG

## 2021-07-28 DIAGNOSIS — J44.9 CHRONIC OBSTRUCTIVE PULMONARY DISEASE, UNSPECIFIED COPD TYPE (HCC): ICD-10-CM

## 2021-07-28 DIAGNOSIS — E78.00 PURE HYPERCHOLESTEROLEMIA: ICD-10-CM

## 2021-07-28 DIAGNOSIS — R73.01 IMPAIRED FASTING GLUCOSE: ICD-10-CM

## 2021-07-28 DIAGNOSIS — C18.9 MALIGNANT NEOPLASM OF COLON, UNSPECIFIED PART OF COLON (HCC): Primary | Chronic | ICD-10-CM

## 2021-07-28 DIAGNOSIS — E55.9 VITAMIN D DEFICIENCY: Chronic | ICD-10-CM

## 2021-07-28 DIAGNOSIS — C90.00 MULTIPLE MYELOMA, REMISSION STATUS UNSPECIFIED (HCC): ICD-10-CM

## 2021-07-28 DIAGNOSIS — R56.9 SEIZURES (HCC): ICD-10-CM

## 2021-07-28 DIAGNOSIS — I10 ESSENTIAL HYPERTENSION: ICD-10-CM

## 2021-07-28 DIAGNOSIS — Z11.59 ENCOUNTER FOR HEPATITIS C SCREENING TEST FOR LOW RISK PATIENT: ICD-10-CM

## 2021-07-28 DIAGNOSIS — Z72.0 TOBACCO USE: ICD-10-CM

## 2021-07-28 DIAGNOSIS — F17.210 CIGARETTE NICOTINE DEPENDENCE WITHOUT COMPLICATION: Chronic | ICD-10-CM

## 2021-07-28 DIAGNOSIS — R52 PAIN: ICD-10-CM

## 2021-07-28 DIAGNOSIS — E66.01 CLASS 2 SEVERE OBESITY DUE TO EXCESS CALORIES WITH SERIOUS COMORBIDITY AND BODY MASS INDEX (BMI) OF 37.0 TO 37.9 IN ADULT (HCC): Chronic | ICD-10-CM

## 2021-07-28 PROBLEM — M54.50 LOW BACK PAIN: Status: ACTIVE | Noted: 2021-07-28

## 2021-07-28 PROBLEM — K57.92 DIVERTICULITIS: Status: ACTIVE | Noted: 2021-07-28

## 2021-07-28 PROBLEM — F41.9 ANXIETY: Status: ACTIVE | Noted: 2021-07-28

## 2021-07-28 PROBLEM — F17.200 NICOTINE DEPENDENCE: Status: ACTIVE | Noted: 2017-05-10

## 2021-07-28 PROBLEM — J45.909 ASTHMA: Status: ACTIVE | Noted: 2021-07-28

## 2021-07-28 PROBLEM — R13.10 DYSPHAGIA: Status: ACTIVE | Noted: 2021-07-28

## 2021-07-28 PROBLEM — A04.72 C. DIFFICILE DIARRHEA: Status: ACTIVE | Noted: 2021-07-28

## 2021-07-28 PROBLEM — E66.812 CLASS 2 SEVERE OBESITY DUE TO EXCESS CALORIES WITH SERIOUS COMORBIDITY AND BODY MASS INDEX (BMI) OF 37.0 TO 37.9 IN ADULT: Status: ACTIVE | Noted: 2021-07-28

## 2021-07-28 PROBLEM — E78.5 HYPERLIPIDEMIA: Status: ACTIVE | Noted: 2021-07-28

## 2021-07-28 PROBLEM — R12 HEARTBURN: Status: ACTIVE | Noted: 2021-07-28

## 2021-07-28 PROBLEM — N28.9 RENAL INSUFFICIENCY: Status: ACTIVE | Noted: 2021-07-28

## 2021-07-28 PROCEDURE — 99214 OFFICE O/P EST MOD 30 MIN: CPT | Performed by: PHYSICIAN ASSISTANT

## 2021-07-28 RX ORDER — PROMETHAZINE HYDROCHLORIDE 25 MG/1
TABLET ORAL
COMMUNITY
End: 2021-07-28 | Stop reason: SDUPTHER

## 2021-07-28 RX ORDER — SERTRALINE HYDROCHLORIDE 25 MG/1
TABLET, FILM COATED ORAL
COMMUNITY
End: 2021-07-28

## 2021-07-28 RX ORDER — FLUOXETINE HYDROCHLORIDE 20 MG/1
20 CAPSULE ORAL DAILY
COMMUNITY
Start: 2021-05-05 | End: 2021-09-08 | Stop reason: SDUPTHER

## 2021-07-28 RX ORDER — PROMETHAZINE HYDROCHLORIDE 25 MG/1
25 TABLET ORAL EVERY 6 HOURS PRN
Qty: 30 TABLET | Refills: 1 | Status: SHIPPED | OUTPATIENT
Start: 2021-07-28 | End: 2022-08-19 | Stop reason: SDUPTHER

## 2021-07-28 RX ORDER — ERGOCALCIFEROL 1.25 MG/1
CAPSULE ORAL
COMMUNITY
Start: 2021-05-14 | End: 2021-07-28 | Stop reason: SDUPTHER

## 2021-07-28 RX ORDER — METOPROLOL SUCCINATE 50 MG/1
TABLET, EXTENDED RELEASE ORAL
COMMUNITY
End: 2021-07-28

## 2021-07-28 RX ORDER — ALBUTEROL SULFATE 90 UG/1
2 AEROSOL, METERED RESPIRATORY (INHALATION) EVERY 4 HOURS PRN
COMMUNITY
End: 2021-11-29 | Stop reason: SDUPTHER

## 2021-07-28 RX ORDER — ERGOCALCIFEROL 1.25 MG/1
50000 CAPSULE ORAL
Qty: 13 CAPSULE | Refills: 3 | Status: SHIPPED | OUTPATIENT
Start: 2021-07-28 | End: 2021-11-29 | Stop reason: SDUPTHER

## 2021-07-28 RX ORDER — DICLOFENAC SODIUM 75 MG/1
75 TABLET, DELAYED RELEASE ORAL 2 TIMES DAILY
Qty: 60 TABLET | Refills: 2 | Status: SHIPPED | OUTPATIENT
Start: 2021-07-28 | End: 2021-08-17

## 2021-07-28 RX ORDER — BUDESONIDE AND FORMOTEROL FUMARATE DIHYDRATE 160; 4.5 UG/1; UG/1
2 AEROSOL RESPIRATORY (INHALATION)
COMMUNITY
End: 2021-11-29 | Stop reason: SDUPTHER

## 2021-07-28 RX ORDER — MONTELUKAST SODIUM 4 MG/1
TABLET, CHEWABLE ORAL
COMMUNITY
Start: 2021-06-04 | End: 2021-08-30

## 2021-07-28 RX ORDER — DULOXETIN HYDROCHLORIDE 30 MG/1
CAPSULE, DELAYED RELEASE ORAL
COMMUNITY
End: 2021-09-08 | Stop reason: SDUPTHER

## 2021-07-28 NOTE — PATIENT INSTRUCTIONS
Steps to Quit Smoking  Smoking tobacco is the leading cause of preventable death. It can affect almost every organ in the body. Smoking puts you and those around you at risk for developing many serious chronic diseases. Quitting smoking can be difficult, but it is one of the best things that you can do for your health. It is never too late to quit.  How do I get ready to quit?  When you decide to quit smoking, create a plan to help you succeed. Before you quit:  · Pick a date to quit. Set a date within the next 2 weeks to give you time to prepare.  · Write down the reasons why you are quitting. Keep this list in places where you will see it often.  · Tell your family, friends, and co-workers that you are quitting. Support from your loved ones can make quitting easier.  · Talk with your health care provider about your options for quitting smoking.  · Find out what treatment options are covered by your health insurance.  · Identify people, places, things, and activities that make you want to smoke (triggers). Avoid them.  What first steps can I take to quit smoking?  · Throw away all cigarettes at home, at work, and in your car.  · Throw away smoking accessories, such as ashtrays and lighters.  · Clean your car. Make sure to empty the ashtray.  · Clean your home, including curtains and carpets.  What strategies can I use to quit smoking?  Talk with your health care provider about combining strategies, such as taking medicines while you are also receiving in-person counseling. Using these two strategies together makes you more likely to succeed in quitting than if you used either strategy on its own.  · If you are pregnant or breastfeeding, talk with your health care provider about finding counseling or other support strategies to quit smoking. Do not take medicine to help you quit smoking unless your health care provider tells you to do so.  To quit smoking:  Quit right away  · Quit smoking completely, instead of  gradually reducing how much you smoke over a period of time. Research shows that stopping smoking right away is more successful than gradually quitting.  · Attend in-person counseling to help you build problem-solving skills. You are more likely to succeed in quitting if you attend counseling sessions regularly. Even short sessions of 10 minutes can be effective.  Take medicine  You may take medicines to help you quit smoking. Some medicines require a prescription and some you can purchase over-the-counter. Medicines may have nicotine in them to replace the nicotine in cigarettes. Medicines may:  · Help to stop cravings.  · Help to relieve withdrawal symptoms.  Your health care provider may recommend:  · Nicotine patches, gum, or lozenges.  · Nicotine inhalers or sprays.  · Non-nicotine medicine that is taken by mouth.  Find resources  Find resources and support systems that can help you to quit smoking and remain smoke-free after you quit. These resources are most helpful when you use them often. They include:  · Online chats with a counselor.  · Telephone quitlines.  · Printed self-help materials.  · Support groups or group counseling.  · Text messaging programs.  · Mobile phone apps or applications. Use apps that can help you stick to your quit plan by providing reminders, tips, and encouragement. There are many free apps for mobile devices as well as websites. Examples include Quit Guide from the CDC and smokefree.gov  What things can I do to make it easier to quit?    · Reach out to your family and friends for support and encouragement. Call telephone quitlines (9-027-QUIT-NOW), reach out to support groups, or work with a counselor for support.  · Ask people who smoke to avoid smoking around you.  · Avoid places that trigger you to smoke, such as bars, parties, or smoke-break areas at work.  · Spend time with people who do not smoke.  · Lessen the stress in your life. Stress can be a smoking trigger for some  people. To lessen stress, try:  ? Exercising regularly.  ? Doing deep-breathing exercises.  ? Doing yoga.  ? Meditating.  ? Performing a body scan. This involves closing your eyes, scanning your body from head to toe, and noticing which parts of your body are particularly tense. Try to relax the muscles in those areas.  How will I feel when I quit smoking?  Day 1 to 3 weeks  Within the first 24 hours of quitting smoking, you may start to feel withdrawal symptoms. These symptoms are usually most noticeable 2-3 days after quitting, but they usually do not last for more than 2-3 weeks. You may experience these symptoms:  · Mood swings.  · Restlessness, anxiety, or irritability.  · Trouble concentrating.  · Dizziness.  · Strong cravings for sugary foods and nicotine.  · Mild weight gain.  · Constipation.  · Nausea.  · Coughing or a sore throat.  · Changes in how the medicines that you take for unrelated issues work in your body.  · Depression.  · Trouble sleeping (insomnia).  Week 3 and afterward  After the first 2-3 weeks of quitting, you may start to notice more positive results, such as:  · Improved sense of smell and taste.  · Decreased coughing and sore throat.  · Slower heart rate.  · Lower blood pressure.  · Clearer skin.  · The ability to breathe more easily.  · Fewer sick days.  Quitting smoking can be very challenging. Do not get discouraged if you are not successful the first time. Some people need to make many attempts to quit before they achieve long-term success. Do your best to stick to your quit plan, and talk with your health care provider if you have any questions or concerns.  Summary  · Smoking tobacco is the leading cause of preventable death. Quitting smoking is one of the best things that you can do for your health.  · When you decide to quit smoking, create a plan to help you succeed.  · Quit smoking right away, not slowly over a period of time.  · When you start quitting, seek help from your  health care provider, family, or friends.  This information is not intended to replace advice given to you by your health care provider. Make sure you discuss any questions you have with your health care provider.  Document Revised: 09/11/2020 Document Reviewed: 03/07/2020  Elsevier Patient Education © 2021 [employer]

## 2021-07-28 NOTE — PROGRESS NOTES
"Chief Complaint  COPD (4 month follow up), Depression, Heartburn, Hyperlipidemia, Hypertension, and Vitamin D Deficiency    Subjective          Anca Samson presents to [facility]  History of Present Illness  Pt presents today for 4 month follow up.    Pt states no new issues or complaints to discuss.    Pt has not had any covid vaccines.    Labs 11/30/20  A1C 6.7     September - osteoporisis  August - scans - oncology  September - GI Jony    Pt is feeling good overall.  Pt cont to smoke and not ready to quit.    Objective   Vital Signs:   /72 (BP Location: Left arm)   Pulse 85   Ht 154.9 cm (61\")   Wt 89.2 kg (196 lb 9.6 oz)   SpO2 97%   BMI 37.15 kg/m²     Physical Exam  Vitals and nursing note reviewed.   Constitutional:       Appearance: Normal appearance. She is obese.   HENT:      Head: Normocephalic and atraumatic.   Cardiovascular:      Rate and Rhythm: Normal rate and regular rhythm.   Pulmonary:      Effort: Pulmonary effort is normal.      Breath sounds: Normal breath sounds.   Musculoskeletal:      Cervical back: Neck supple.   Neurological:      Mental Status: She is alert.   Psychiatric:         Mood and Affect: Mood normal.         Behavior: Behavior normal.        Result Review :     Common labs    Common Labsle 11/30/20 11/30/20    1003 1003   Glucose  88   BUN  11   Creatinine  0.90   Sodium  140   Potassium  4.3   Chloride  111   Calcium  9.9   Albumin  3.6   Total Bilirubin  0.33   Alkaline Phosphatase  76   AST (SGOT)  17   ALT (SGPT)  <5 (A)   WBC  14.85 (A)   Hemoglobin  14.9   Hematocrit  46.8   Platelets  304   Total Cholesterol  145   Triglycerides  287 (A)   HDL Cholesterol  35 (A)   LDL Cholesterol   53 (A)   Hemoglobin A1C 6.7 (A)    (A) Abnormal value       Comments are available for some flowsheets but are not being displayed.                       Assessment and Plan      Diagnoses and all orders for this visit:    1. Malignant " neoplasm of colon, unspecified part of colon (CMS/HCC) (Primary)  Comments:  Remission - seeing GI on regular basis  Orders:  -     promethazine (PHENERGAN) 25 MG tablet; Take 1 tablet by mouth Every 6 (Six) Hours As Needed for Nausea or Vomiting.  Dispense: 30 tablet; Refill: 1    2. Pain  -     diclofenac (VOLTAREN) 75 MG EC tablet; Take 1 tablet by mouth 2 (Two) Times a Day.  Dispense: 60 tablet; Refill: 2    3. Vitamin D deficiency  Comments:  Stable with Vit D supplement weekly  Orders:  -     vitamin D (ERGOCALCIFEROL) 1.25 MG (54293 UT) capsule capsule; Take 1 capsule by mouth Every 7 (Seven) Days.  Dispense: 13 capsule; Refill: 3  -     Vitamin D 25 Hydroxy; Future    4. Tobacco use    5. Cigarette nicotine dependence without complication  Comments:  Cont to smoke 1 pack every two days    6. Multiple myeloma, remission status unspecified (CMS/HCC)  Overview:  Stable seeing Onc       7. Chronic obstructive pulmonary disease, unspecified COPD type (CMS/HCC)    8. Seizures (CMS/HCC)    9. Class 2 severe obesity due to excess calories with serious comorbidity and body mass index (BMI) of 37.0 to 37.9 in adult (CMS/HCC)  Comments:  Fair control with diet and exercise    10. Pure hypercholesterolemia  -     Lipid Panel; Future    11. Essential hypertension  -     CBC & Differential; Future  -     Comprehensive Metabolic Panel; Future  -     TSH Rfx On Abnormal To Free T4; Future  -     Urinalysis With Microscopic If Indicated (No Culture) - Urine, Clean Catch; Future    12. Impaired fasting glucose  -     Hemoglobin A1c; Future    13. Encounter for hepatitis C screening test for low risk patient  -     Hepatitis C antibody; Future       Follow Up     Return in about 4 months (around 11/28/2021).      I have reviewed information obtained and documented by others and I have confirmed the accuracy of this documented note.     Patient was given instructions and counseling regarding her condition or for health  maintenance advice. Please see specific information pulled into the AVS if appropriate.     LACI Jordan

## 2021-08-09 ENCOUNTER — LAB (OUTPATIENT)
Dept: LAB | Facility: HOSPITAL | Age: 71
End: 2021-08-09

## 2021-08-09 DIAGNOSIS — I10 ESSENTIAL HYPERTENSION: ICD-10-CM

## 2021-08-09 DIAGNOSIS — R73.01 IMPAIRED FASTING GLUCOSE: ICD-10-CM

## 2021-08-09 DIAGNOSIS — E55.9 VITAMIN D DEFICIENCY: Chronic | ICD-10-CM

## 2021-08-09 DIAGNOSIS — Z11.59 ENCOUNTER FOR HEPATITIS C SCREENING TEST FOR LOW RISK PATIENT: ICD-10-CM

## 2021-08-09 DIAGNOSIS — E78.00 PURE HYPERCHOLESTEROLEMIA: ICD-10-CM

## 2021-08-09 LAB
25(OH)D3 SERPL-MCNC: 25.6 NG/ML (ref 30–100)
ALBUMIN SERPL-MCNC: 3.8 G/DL (ref 3.5–5.2)
ALBUMIN/GLOB SERPL: 0.8 G/DL
ALP SERPL-CCNC: 69 U/L (ref 39–117)
ALT SERPL W P-5'-P-CCNC: 20 U/L (ref 1–33)
ANION GAP SERPL CALCULATED.3IONS-SCNC: 13.2 MMOL/L (ref 5–15)
AST SERPL-CCNC: 18 U/L (ref 1–32)
BACTERIA UR QL AUTO: ABNORMAL /HPF
BASOPHILS # BLD AUTO: 0.07 10*3/MM3 (ref 0–0.2)
BASOPHILS NFR BLD AUTO: 0.7 % (ref 0–1.5)
BILIRUB SERPL-MCNC: 0.3 MG/DL (ref 0–1.2)
BILIRUB UR QL STRIP: NEGATIVE
BUN SERPL-MCNC: 17 MG/DL (ref 8–23)
BUN/CREAT SERPL: 15 (ref 7–25)
CALCIUM SPEC-SCNC: 8.9 MG/DL (ref 8.6–10.5)
CHLORIDE SERPL-SCNC: 104 MMOL/L (ref 98–107)
CHOLEST SERPL-MCNC: 156 MG/DL (ref 0–200)
CLARITY UR: ABNORMAL
CO2 SERPL-SCNC: 18.8 MMOL/L (ref 22–29)
COD CRY URNS QL: ABNORMAL /HPF
COLOR UR: ABNORMAL
CREAT SERPL-MCNC: 1.13 MG/DL (ref 0.57–1)
DEPRECATED RDW RBC AUTO: 51.4 FL (ref 37–54)
EOSINOPHIL # BLD AUTO: 0.54 10*3/MM3 (ref 0–0.4)
EOSINOPHIL NFR BLD AUTO: 5.5 % (ref 0.3–6.2)
ERYTHROCYTE [DISTWIDTH] IN BLOOD BY AUTOMATED COUNT: 14.7 % (ref 12.3–15.4)
GFR SERPL CREATININE-BSD FRML MDRD: 47 ML/MIN/1.73
GLOBULIN UR ELPH-MCNC: 4.7 GM/DL
GLUCOSE SERPL-MCNC: 100 MG/DL (ref 65–99)
GLUCOSE UR STRIP-MCNC: NEGATIVE MG/DL
HBA1C MFR BLD: 5.79 % (ref 4.8–5.6)
HCT VFR BLD AUTO: 37.3 % (ref 34–46.6)
HCV AB SER DONR QL: NORMAL
HDLC SERPL-MCNC: 35 MG/DL (ref 40–60)
HGB BLD-MCNC: 12.4 G/DL (ref 12–15.9)
HGB UR QL STRIP.AUTO: NEGATIVE
HYALINE CASTS UR QL AUTO: ABNORMAL /LPF
IMM GRANULOCYTES # BLD AUTO: 0.04 10*3/MM3 (ref 0–0.05)
IMM GRANULOCYTES NFR BLD AUTO: 0.4 % (ref 0–0.5)
KETONES UR QL STRIP: ABNORMAL
LDLC SERPL CALC-MCNC: 67 MG/DL (ref 0–100)
LDLC/HDLC SERPL: 1.52 {RATIO}
LEUKOCYTE ESTERASE UR QL STRIP.AUTO: ABNORMAL
LYMPHOCYTES # BLD AUTO: 3.79 10*3/MM3 (ref 0.7–3.1)
LYMPHOCYTES NFR BLD AUTO: 38.5 % (ref 19.6–45.3)
MCH RBC QN AUTO: 31.9 PG (ref 26.6–33)
MCHC RBC AUTO-ENTMCNC: 33.2 G/DL (ref 31.5–35.7)
MCV RBC AUTO: 95.9 FL (ref 79–97)
MONOCYTES # BLD AUTO: 0.57 10*3/MM3 (ref 0.1–0.9)
MONOCYTES NFR BLD AUTO: 5.8 % (ref 5–12)
NEUTROPHILS NFR BLD AUTO: 4.84 10*3/MM3 (ref 1.7–7)
NEUTROPHILS NFR BLD AUTO: 49.1 % (ref 42.7–76)
NITRITE UR QL STRIP: NEGATIVE
NRBC BLD AUTO-RTO: 0 /100 WBC (ref 0–0.2)
PH UR STRIP.AUTO: 5.5 [PH] (ref 5–8)
PLATELET # BLD AUTO: 178 10*3/MM3 (ref 140–450)
PMV BLD AUTO: 10.4 FL (ref 6–12)
POTASSIUM SERPL-SCNC: 4.6 MMOL/L (ref 3.5–5.2)
PROT SERPL-MCNC: 8.5 G/DL (ref 6–8.5)
PROT UR QL STRIP: ABNORMAL
RBC # BLD AUTO: 3.89 10*6/MM3 (ref 3.77–5.28)
RBC # UR: ABNORMAL /HPF
REF LAB TEST METHOD: ABNORMAL
SODIUM SERPL-SCNC: 136 MMOL/L (ref 136–145)
SP GR UR STRIP: 1.02 (ref 1–1.03)
SQUAMOUS #/AREA URNS HPF: ABNORMAL /HPF
TRIGL SERPL-MCNC: 339 MG/DL (ref 0–150)
TSH SERPL DL<=0.05 MIU/L-ACNC: 4.09 UIU/ML (ref 0.27–4.2)
UROBILINOGEN UR QL STRIP: ABNORMAL
VLDLC SERPL-MCNC: 54 MG/DL (ref 5–40)
WBC # BLD AUTO: 9.85 10*3/MM3 (ref 3.4–10.8)
WBC UR QL AUTO: ABNORMAL /HPF

## 2021-08-09 PROCEDURE — 85025 COMPLETE CBC W/AUTO DIFF WBC: CPT

## 2021-08-09 PROCEDURE — 81001 URINALYSIS AUTO W/SCOPE: CPT

## 2021-08-09 PROCEDURE — 80061 LIPID PANEL: CPT

## 2021-08-09 PROCEDURE — 84443 ASSAY THYROID STIM HORMONE: CPT

## 2021-08-09 PROCEDURE — 82306 VITAMIN D 25 HYDROXY: CPT

## 2021-08-09 PROCEDURE — 80053 COMPREHEN METABOLIC PANEL: CPT

## 2021-08-09 PROCEDURE — 86803 HEPATITIS C AB TEST: CPT

## 2021-08-09 PROCEDURE — 83036 HEMOGLOBIN GLYCOSYLATED A1C: CPT

## 2021-08-09 PROCEDURE — 36415 COLL VENOUS BLD VENIPUNCTURE: CPT

## 2021-08-10 ENCOUNTER — TELEPHONE (OUTPATIENT)
Dept: FAMILY MEDICINE CLINIC | Facility: CLINIC | Age: 71
End: 2021-08-10

## 2021-08-10 NOTE — TELEPHONE ENCOUNTER
----- Message from LACI Jordan sent at 8/10/2021  6:28 AM EDT -----  Low GFR - renal insuff - avoid NSAIDs, push fluids  Vit D def - ensure pt is taking weekly Vit D 50,000 IU   Elevated Trigs - 339 - normal less gutierrez 150 - avoid fried, fatty greasy foods

## 2021-08-12 ENCOUNTER — TELEPHONE (OUTPATIENT)
Dept: FAMILY MEDICINE CLINIC | Facility: CLINIC | Age: 71
End: 2021-08-12

## 2021-08-12 DIAGNOSIS — G89.29 CHRONIC PAIN OF LEFT KNEE: Primary | ICD-10-CM

## 2021-08-12 DIAGNOSIS — M25.562 CHRONIC PAIN OF LEFT KNEE: Primary | ICD-10-CM

## 2021-08-12 NOTE — TELEPHONE ENCOUNTER
Caller: Anca Samson    Relationship: Self    Best call back number: [phone]    What orders are you requesting (i.e. lab or imaging): MRI    In what timeframe would the patient need to come in: AS SOON AS POSSIBLE.    Where will you receive your lab/imaging services: [facility]     Additional notes: THE PATIENT STATED YESTERDAY 08/11 HER LEFT KNEE WAS HURTING SO BAD THAT SHE HAD FELL AND LANDED ON COUCH.

## 2021-08-17 ENCOUNTER — TELEPHONE (OUTPATIENT)
Dept: FAMILY MEDICINE CLINIC | Facility: CLINIC | Age: 71
End: 2021-08-17

## 2021-08-17 DIAGNOSIS — M25.569 KNEE PAIN, UNSPECIFIED CHRONICITY, UNSPECIFIED LATERALITY: Primary | ICD-10-CM

## 2021-08-17 RX ORDER — MELOXICAM 15 MG/1
15 TABLET ORAL DAILY
Qty: 30 TABLET | Refills: 0 | Status: SHIPPED | OUTPATIENT
Start: 2021-08-17 | End: 2021-09-07

## 2021-08-17 NOTE — TELEPHONE ENCOUNTER
Caller: Anca Samson    Relationship: Self    Best call back number:[phone]    What medication are you requesting: SOMETHING THAT [facility] WAS GOING TO SWITCH FOR PATIENT'S KNEE    What are your current symptoms: KNEE PAIN    How long have you been experiencing symptoms: PATIENT IS HAVING MRI OF KNEE NEXT Thursday. AT LAST VISIT DR JOYA WAS GOING TO CHANGE A MEDICATION BUT PATIENT SAID NO. PATIENT HAS CHANGED HER MIND.    Have you had these symptoms before:    [x] Yes  [] No    Have you been treated for these symptoms before:   [] Yes  [x] No    If a prescription is needed, what is your preferred pharmacy and phone number:        [facility]/pharmacy #[id number] - [place] - [address] - [phone] [facility] [phone]   [phone]                Additional notes: PLEASE LET PATIENT KNOW ONCE SENT TO PHARMACY

## 2021-08-23 ENCOUNTER — HOSPITAL ENCOUNTER (OUTPATIENT)
Dept: CT IMAGING | Facility: HOSPITAL | Age: 71
Discharge: HOME | End: 2021-08-23
Admitting: INTERNAL MEDICINE

## 2021-08-23 DIAGNOSIS — C18.9 MALIGNANT NEOPLASM OF COLON, UNSPECIFIED PART OF COLON (HCC): ICD-10-CM

## 2021-08-23 PROCEDURE — 74177 CT ABD & PELVIS W/CONTRAST: CPT

## 2021-08-23 PROCEDURE — 0 IOPAMIDOL PER 1 ML: Performed by: INTERNAL MEDICINE

## 2021-08-23 PROCEDURE — 71260 CT THORAX DX C+: CPT

## 2021-08-23 RX ADMIN — IOPAMIDOL 100 ML: 755 INJECTION, SOLUTION INTRAVENOUS at 11:06

## 2021-08-25 ENCOUNTER — LAB (OUTPATIENT)
Dept: ONCOLOGY | Facility: HOSPITAL | Age: 71
End: 2021-08-25

## 2021-08-25 ENCOUNTER — APPOINTMENT (OUTPATIENT)
Dept: ONCOLOGY | Facility: HOSPITAL | Age: 71
End: 2021-08-25

## 2021-08-25 ENCOUNTER — LAB (OUTPATIENT)
Dept: LAB | Facility: HOSPITAL | Age: 71
End: 2021-08-25

## 2021-08-25 ENCOUNTER — TELEPHONE (OUTPATIENT)
Dept: ONCOLOGY | Facility: HOSPITAL | Age: 71
End: 2021-08-25

## 2021-08-25 DIAGNOSIS — C90.00 MULTIPLE MYELOMA, REMISSION STATUS UNSPECIFIED (HCC): ICD-10-CM

## 2021-08-25 DIAGNOSIS — C90.00 MULTIPLE MYELOMA, REMISSION STATUS UNSPECIFIED (HCC): Primary | ICD-10-CM

## 2021-08-25 LAB
ALBUMIN SERPL-MCNC: 3.9 G/DL (ref 3.5–5.2)
ALBUMIN/GLOB SERPL: 0.9 G/DL
ALP SERPL-CCNC: 68 U/L (ref 39–117)
ALT SERPL W P-5'-P-CCNC: 15 U/L (ref 1–33)
ANION GAP SERPL CALCULATED.3IONS-SCNC: 11.3 MMOL/L (ref 5–15)
ANISOCYTOSIS BLD QL: NORMAL
AST SERPL-CCNC: 15 U/L (ref 1–32)
BASOPHILS # BLD AUTO: 0.09 10*3/MM3 (ref 0–0.2)
BASOPHILS NFR BLD AUTO: 0.8 % (ref 0–1.5)
BILIRUB SERPL-MCNC: 0.2 MG/DL (ref 0–1.2)
BUN SERPL-MCNC: 18 MG/DL (ref 8–23)
BUN/CREAT SERPL: 17.6 (ref 7–25)
CALCIUM SPEC-SCNC: 9.7 MG/DL (ref 8.6–10.5)
CHLORIDE SERPL-SCNC: 111 MMOL/L (ref 98–107)
CO2 SERPL-SCNC: 19.7 MMOL/L (ref 22–29)
CREAT SERPL-MCNC: 1.02 MG/DL (ref 0.57–1)
DEPRECATED RDW RBC AUTO: 55.8 FL (ref 37–54)
EOSINOPHIL # BLD AUTO: 0.71 10*3/MM3 (ref 0–0.4)
EOSINOPHIL NFR BLD AUTO: 6.3 % (ref 0.3–6.2)
ERYTHROCYTE [DISTWIDTH] IN BLOOD BY AUTOMATED COUNT: 15.5 % (ref 12.3–15.4)
GFR SERPL CREATININE-BSD FRML MDRD: 53 ML/MIN/1.73
GLOBULIN UR ELPH-MCNC: 4.3 GM/DL
GLUCOSE SERPL-MCNC: 86 MG/DL (ref 65–99)
HCT VFR BLD AUTO: 38.6 % (ref 34–46.6)
HGB BLD-MCNC: 12.8 G/DL (ref 12–15.9)
IMM GRANULOCYTES # BLD AUTO: 0.05 10*3/MM3 (ref 0–0.05)
IMM GRANULOCYTES NFR BLD AUTO: 0.4 % (ref 0–0.5)
LDH SERPL-CCNC: 168 U/L (ref 135–214)
LYMPHOCYTES # BLD AUTO: 4.98 10*3/MM3 (ref 0.7–3.1)
LYMPHOCYTES NFR BLD AUTO: 44.3 % (ref 19.6–45.3)
MCH RBC QN AUTO: 32.5 PG (ref 26.6–33)
MCHC RBC AUTO-ENTMCNC: 33.2 G/DL (ref 31.5–35.7)
MCV RBC AUTO: 98 FL (ref 79–97)
MONOCYTES # BLD AUTO: 0.78 10*3/MM3 (ref 0.1–0.9)
MONOCYTES NFR BLD AUTO: 6.9 % (ref 5–12)
NEUTROPHILS NFR BLD AUTO: 4.64 10*3/MM3 (ref 1.7–7)
NEUTROPHILS NFR BLD AUTO: 41.3 % (ref 42.7–76)
NRBC BLD AUTO-RTO: 0 /100 WBC (ref 0–0.2)
PLATELET # BLD AUTO: 357 10*3/MM3 (ref 140–450)
PMV BLD AUTO: 9.3 FL (ref 6–12)
POTASSIUM SERPL-SCNC: 4.5 MMOL/L (ref 3.5–5.2)
PROT SERPL-MCNC: 8.2 G/DL (ref 6–8.5)
RBC # BLD AUTO: 3.94 10*6/MM3 (ref 3.77–5.28)
SMALL PLATELETS BLD QL SMEAR: ADEQUATE
SODIUM SERPL-SCNC: 142 MMOL/L (ref 136–145)
WBC # BLD AUTO: 11.25 10*3/MM3 (ref 3.4–10.8)
WBC MORPH BLD: NORMAL

## 2021-08-25 PROCEDURE — 36415 COLL VENOUS BLD VENIPUNCTURE: CPT

## 2021-08-25 PROCEDURE — 80053 COMPREHEN METABOLIC PANEL: CPT

## 2021-08-25 PROCEDURE — 85007 BL SMEAR W/DIFF WBC COUNT: CPT

## 2021-08-25 PROCEDURE — 83883 ASSAY NEPHELOMETRY NOT SPEC: CPT

## 2021-08-25 PROCEDURE — 85025 COMPLETE CBC W/AUTO DIFF WBC: CPT

## 2021-08-25 PROCEDURE — 84165 PROTEIN E-PHORESIS SERUM: CPT

## 2021-08-25 PROCEDURE — 83615 LACTATE (LD) (LDH) ENZYME: CPT

## 2021-08-26 ENCOUNTER — HOSPITAL ENCOUNTER (OUTPATIENT)
Dept: MRI IMAGING | Facility: HOSPITAL | Age: 71
Discharge: HOME | End: 2021-08-26
Admitting: PHYSICIAN ASSISTANT

## 2021-08-26 DIAGNOSIS — G89.29 CHRONIC PAIN OF LEFT KNEE: ICD-10-CM

## 2021-08-26 DIAGNOSIS — M25.562 CHRONIC PAIN OF LEFT KNEE: ICD-10-CM

## 2021-08-26 PROCEDURE — 73721 MRI JNT OF LWR EXTRE W/O DYE: CPT

## 2021-08-27 DIAGNOSIS — R93.6 ABNORMAL MRI, KNEE: Primary | ICD-10-CM

## 2021-08-27 LAB
ALBUMIN SERPL ELPH-MCNC: 3.6 G/DL (ref 2.9–4.4)
ALBUMIN/GLOB SERPL: 0.8 {RATIO} (ref 0.7–1.7)
ALPHA1 GLOB SERPL ELPH-MCNC: 0.2 G/DL (ref 0–0.4)
ALPHA2 GLOB SERPL ELPH-MCNC: 0.8 G/DL (ref 0.4–1)
B-GLOBULIN SERPL ELPH-MCNC: 1 G/DL (ref 0.7–1.3)
GAMMA GLOB SERPL ELPH-MCNC: 2.6 G/DL (ref 0.4–1.8)
GLOBULIN SER CALC-MCNC: 4.5 G/DL (ref 2.2–3.9)
KAPPA LC FREE SER-MCNC: 17.3 MG/L (ref 3.3–19.4)
KAPPA LC FREE/LAMBDA FREE SER: 1.73 {RATIO} (ref 0.26–1.65)
LABORATORY COMMENT REPORT: ABNORMAL
LAMBDA LC FREE SERPL-MCNC: 10 MG/L (ref 5.7–26.3)
M PROTEIN SERPL ELPH-MCNC: 2.5 G/DL
PROT PATTERN SERPL ELPH-IMP: ABNORMAL
PROT SERPL-MCNC: 8.1 G/DL (ref 6–8.5)

## 2021-08-30 RX ORDER — MONTELUKAST SODIUM 4 MG/1
TABLET, CHEWABLE ORAL
Qty: 360 TABLET | Refills: 3 | Status: SHIPPED | OUTPATIENT
Start: 2021-08-30 | End: 2022-10-11 | Stop reason: SDUPTHER

## 2021-09-01 ENCOUNTER — TELEPHONE (OUTPATIENT)
Dept: ONCOLOGY | Facility: HOSPITAL | Age: 71
End: 2021-09-01

## 2021-09-01 DIAGNOSIS — C90.01 MULTIPLE MYELOMA IN REMISSION (HCC): Primary | ICD-10-CM

## 2021-09-03 ENCOUNTER — OFFICE VISIT (OUTPATIENT)
Dept: ORTHOPEDIC SURGERY | Facility: CLINIC | Age: 71
End: 2021-09-03

## 2021-09-03 VITALS — RESPIRATION RATE: 14 BRPM | BODY MASS INDEX: 37 KG/M2 | HEIGHT: 61 IN | WEIGHT: 196 LBS

## 2021-09-03 DIAGNOSIS — M94.262 CHONDROMALACIA OF LEFT KNEE: Primary | ICD-10-CM

## 2021-09-03 DIAGNOSIS — M25.562 LEFT KNEE PAIN, UNSPECIFIED CHRONICITY: ICD-10-CM

## 2021-09-03 DIAGNOSIS — S83.242A ACUTE MEDIAL MENISCUS TEAR OF LEFT KNEE, INITIAL ENCOUNTER: ICD-10-CM

## 2021-09-03 PROCEDURE — 99203 OFFICE O/P NEW LOW 30 MIN: CPT | Performed by: ORTHOPAEDIC SURGERY

## 2021-09-03 PROCEDURE — 20610 DRAIN/INJ JOINT/BURSA W/O US: CPT | Performed by: ORTHOPAEDIC SURGERY

## 2021-09-03 RX ADMIN — LIDOCAINE HYDROCHLORIDE 5 ML: 10 INJECTION, SOLUTION INFILTRATION; PERINEURAL at 10:10

## 2021-09-03 NOTE — PROGRESS NOTES
"Chief Complaint  Pain of the Left Knee     Subjective      Anca Samson presents to [facility] for evaluation of the left knee. The patient's PCP ordered an MRI of her left knee that we reviewed with her today. She has been having knee pain for over 2 months. She has no known injury or trauma. She reports pain with activity and at rest. She reports instability to the knee. She locates pain to the anterior medial knee and radiates down the leg. She denies any swelling. She has no other complaints. Her PCP prescribed her meloxicam. She has not had any treatment.     Allergies   Allergen Reactions   • Morphine Anaphylaxis   • Cephalexin Hives   • Penicillins Hives   • Sulfa Antibiotics Hives        Social History     Socioeconomic History   • Marital status:      Spouse name: Not on file   • Number of children: Not on file   • Years of education: Not on file   • Highest education level: Not on file   Tobacco Use   • Smoking status: Current Some Day Smoker     Packs/day: 0.50     Years: 47.00     Pack years: 23.50     Types: Cigarettes   • Smokeless tobacco: Never Used   • Tobacco comment: STARTED AT AGE 26/ STOPPED AT AGE 68   Vaping Use   • Vaping Use: Never used   Substance and Sexual Activity   • Alcohol use: Never   • Drug use: Never        Review of Systems     Objective   Vital Signs:   Resp 14   Ht 154.9 cm (61\")   Wt 88.9 kg (196 lb)   BMI 37.03 kg/m²       Physical Exam  Constitutional:       Appearance: Normal appearance. The patient is well-developed and normal weight.   HENT:      Head: Normocephalic.      Right Ear: Hearing and external ear normal.      Left Ear: Hearing and external ear normal.      Nose: Nose normal.   Eyes:      Conjunctiva/sclera: Conjunctivae normal.   Cardiovascular:      Rate and Rhythm: Normal rate.   Pulmonary:      Effort: Pulmonary effort is normal.      Breath sounds: No wheezing or rales.   Abdominal:      Palpations: Abdomen is " soft.      Tenderness: There is no abdominal tenderness.   Musculoskeletal:      Cervical back: Normal range of motion.   Skin:     Findings: No rash.   Neurological:      Mental Status: The patient is alert and oriented to person, place, and time.   Psychiatric:         Mood and Affect: Mood and affect normal.         Judgment: Judgment normal.       Ortho Exam      Left knee- ROM 0-130. Stable to varus/valgus stress. Stable to anterior/posterior drawer. Neurovascularly intact. Positive EHL, FHL, GS and TA. Sensation intact to all 5 nerves of the foot. Positive pulses. Tender to medial joint line. No effusion. Negative Hui's. Negative Lachman's. Mild swelling to the leg.     Large Joint Arthrocentesis: L knee  Date/Time: 9/3/2021 10:10 AM  Consent given by: patient  Site marked: site marked  Timeout: Immediately prior to procedure a time out was called to verify the correct patient, procedure, equipment, support staff and site/side marked as required   Supporting Documentation  Indications: pain   Procedure Details  Location: knee - L knee  Needle gauge: 21 g.  Medications administered: 32 mg Triamcinolone Acetonide 32 MG; 5 mL lidocaine 1 %  Patient tolerance: patient tolerated the procedure well with no immediate complications            Imaging Results (Most Recent)     None           Result Review :             MRI Knee Left Without Contrast    Result Date: 8/26/2021  Narrative: PROCEDURE: MRI KNEE LEFT  WO CONTRAST  COMPARISON: [facility], CR, KNEE 3 VIEWS LT, 5/14/2021, 12:15.  INDICATIONS: GENERALIZED LEFT KNEE PAIN WITH SHOOTING PAINS UP/DOWN LEG, NO KNOWN INJURY.  TECHNIQUE: A complete multi-planar MRI was performed.   FINDINGS:  MRI of the left knee joint is compared to plain film images of the left knee joint performed on 5/14/2021.  Today's study reveals no evidence of fracture or dislocation or bone tumor or osteomyelitis in the left knee joint.  There is a very small left  knee joint effusion.  A very small posterior medial Baker cyst is seen.  The anterior posterior cruciate ligaments are intact.  The medial and lateral collateral ligaments are intact.  There is a tear of the body and posterior horn-posterior root of the medial meniscus.  Mild chondromalacia is seen in the medial knee joint compartment.  No evidence of tear of the lateral meniscus.  No evidence of chondromalacia or osteochondral injury in the lateral knee joint compartment.  CONCLUSION:  1. Very small left knee joint effusion. 2. Very small posterior medial Baker cyst. 3. Tear of the body and posterior horn and posterior root the medial meniscus. 4. Mild chondromalacia in the medial knee joint compartment.     GERARD MARTINEZ MD       Electronically Signed and Approved By: GERARD MARTINEZ MD on 8/26/2021 at 15:33                      Assessment and Plan     DX: Left knee chondromalacia. Left knee Meniscus tear.     Discussed the treatment plan with the patient.  Plan to proceed with conservative treatment. Discussed the risks and benefits of a left knee Zilretta injection. The patient expressed understanding and wished to proceed. She tolerated the injection well. Order for physical therapy given today.     Call or return if worsening symptoms.    Follow Up     6 weeks      Patient was given instructions and counseling regarding her condition or for health maintenance advice. Please see specific information pulled into the AVS if appropriate.     Scribed for Nicholas Tipton MD by Mi Monge.  09/03/21   09:47 EDT    I have personally performed the services described in this document as scribed by the above individual and it is both accurate and complete. Nicholas Tipton MD 09/06/21

## 2021-09-06 RX ORDER — LIDOCAINE HYDROCHLORIDE 10 MG/ML
5 INJECTION, SOLUTION INFILTRATION; PERINEURAL
Status: COMPLETED | OUTPATIENT
Start: 2021-09-03 | End: 2021-09-03

## 2021-09-07 DIAGNOSIS — M25.569 KNEE PAIN, UNSPECIFIED CHRONICITY, UNSPECIFIED LATERALITY: ICD-10-CM

## 2021-09-07 RX ORDER — MELOXICAM 15 MG/1
TABLET ORAL
Qty: 30 TABLET | Refills: 1 | Status: SHIPPED | OUTPATIENT
Start: 2021-09-07 | End: 2021-09-30 | Stop reason: SDUPTHER

## 2021-09-08 ENCOUNTER — OFFICE VISIT (OUTPATIENT)
Dept: ONCOLOGY | Facility: HOSPITAL | Age: 71
End: 2021-09-08

## 2021-09-08 VITALS
TEMPERATURE: 99 F | HEART RATE: 86 BPM | OXYGEN SATURATION: 97 % | DIASTOLIC BLOOD PRESSURE: 57 MMHG | WEIGHT: 192 LBS | RESPIRATION RATE: 24 BRPM | BODY MASS INDEX: 36.28 KG/M2 | SYSTOLIC BLOOD PRESSURE: 142 MMHG

## 2021-09-08 DIAGNOSIS — C18.9 MALIGNANT NEOPLASM OF COLON, UNSPECIFIED PART OF COLON (HCC): ICD-10-CM

## 2021-09-08 DIAGNOSIS — C90.00 MULTIPLE MYELOMA NOT HAVING ACHIEVED REMISSION (HCC): Primary | ICD-10-CM

## 2021-09-08 PROCEDURE — G0463 HOSPITAL OUTPT CLINIC VISIT: HCPCS

## 2021-09-08 PROCEDURE — G0463 HOSPITAL OUTPT CLINIC VISIT: HCPCS | Performed by: INTERNAL MEDICINE

## 2021-09-08 PROCEDURE — 99214 OFFICE O/P EST MOD 30 MIN: CPT | Performed by: INTERNAL MEDICINE

## 2021-09-08 NOTE — ASSESSMENT & PLAN NOTE
Smoldering.  Her M spike and free light chain ratio have increased slightly although the rest of her lab work looks okay.  She denies any signs or symptoms.  I will continue with surveillance at this point and repeat her lab work and PET scan in 6 months or sooner based on symptoms.  She does have significant emphysema noted on CT scan and she should follow-up with her PCP or pulmonologist

## 2021-09-08 NOTE — ASSESSMENT & PLAN NOTE
Status post resection followed by adjuvant chemotherapy.  She is now 4 years out from her treatment.  Doing well.  I see no evidence of disease recurrence by history, physical examination, lab work or recent scans.  She is up-to-date on endoscopy-she has follow-up with gastroenterology later this month.  I will see her back in 6 months for continued surveillance

## 2021-09-08 NOTE — PROGRESS NOTES
Chief Complaint  Colon Cancer and Follow-up    Paulino Macias PA Ball, Randy, PA Subjective          Anca Samson presents to [facility] for follow-up of colon cancer and smoldering myeloma.  She is currently on observation.  She notes that she has occasional loose bowels but denies blood per rectum, pain or melena.  She is up-to-date on colonoscopy.  She denies new masses or adenopathy.  No unusual aches or pains.  She notes adequate appetite.  Her energy level is lower than she would like but adequate for her daily needs.  She denies unusual bone pain.  She reports normal urinary habits.  She denies fever or chills.    Oncology/Hematology History Overview Note   Low grade adenocarcinoma of ascending colon          Smoldering Myeloma        5/16/2017 right hemicolectomy which  revealed a low-grade 7.6 cm adenocarcinoma of the cecum. All margins were  negative. Lymphovascular invasion and perineural invasion were not identified.     26 lymph nodes were removed and unfortunately 1 was involved with metastatic deposit. pT3 pN1a colorectal cancer.        Clinical Staging      Smoldering Myeloma; Colon ([id number])            Treatments      Chemotherapy      11/2017 completed 6mo adjuvant Xeloda         Colon cancer (CMS/HCC)   7/21/2017 Initial Diagnosis    Colon cancer (CMS/HCC)         Review of Systems   Constitutional: Negative for appetite change, diaphoresis, fatigue, fever, unexpected weight gain and unexpected weight loss.   HENT: Negative for hearing loss, mouth sores, sore throat, swollen glands, trouble swallowing and voice change.    Eyes: Negative for blurred vision.   Respiratory: Positive for shortness of breath and wheezing.    Cardiovascular: Negative for chest pain and palpitations.   Gastrointestinal: Positive for diarrhea. Negative for abdominal pain, blood in stool, constipation, nausea and vomiting.   Endocrine: Negative for cold intolerance and heat  intolerance.   Genitourinary: Negative for difficulty urinating, dysuria, frequency, hematuria and urinary incontinence.   Musculoskeletal: Negative for arthralgias, back pain and myalgias.   Skin: Negative for rash, skin lesions and bruise.   Neurological: Negative for dizziness, seizures, weakness, numbness and headache.   Hematological: Does not bruise/bleed easily.   Psychiatric/Behavioral: Negative for depressed mood. The patient is not nervous/anxious.      Current Outpatient Medications on File Prior to Visit   Medication Sig Dispense Refill   • albuterol sulfate  (90 Base) MCG/ACT inhaler Inhale 2 puffs Every 4 (Four) Hours As Needed for Wheezing.     • budesonide-formoterol (SYMBICORT) 160-4.5 MCG/ACT inhaler Inhale 2 puffs 2 (Two) Times a Day.     • colestipol (COLESTID) 1 g tablet TAKE 2 TABLETS BY MOUTH TWICE A DAY . SWALLOW WHOLE WITH ANY LIQUID. DONT CRUSH, CHEW AND/OR DIVIDIE 360 tablet 3   • meloxicam (MOBIC) 15 MG tablet TAKE 1 TABLET BY MOUTH EVERY DAY 30 tablet 1   • promethazine (PHENERGAN) 25 MG tablet Take 1 tablet by mouth Every 6 (Six) Hours As Needed for Nausea or Vomiting. 30 tablet 1   • vitamin D (ERGOCALCIFEROL) 1.25 MG (06558 UT) capsule capsule Take 1 capsule by mouth Every 7 (Seven) Days. 13 capsule 3   • [DISCONTINUED] DULoxetine (CYMBALTA) 30 MG capsule duloxetine 30 mg oral capsule,delayed release(DR/EC) take 1 capsule (30 mg) by oral route 2 times per day   Suspended     • [DISCONTINUED] FLUoxetine (PROzac) 20 MG capsule Take 20 mg by mouth Daily.       No current facility-administered medications on file prior to visit.       Allergies   Allergen Reactions   • Morphine Anaphylaxis   • Cephalexin Hives   • Penicillins Hives   • Sulfa Antibiotics Hives     Past Medical History:   Diagnosis Date   • Anxiety    • Asthma    • C. difficile diarrhea    • Colon cancer (CMS/[facility]) 07/21/2017   • COPD (chronic obstructive pulmonary disease) (CMS/[facility]) 10/23/2017   • Depression    •  Diverticulitis    • Dysphagia    • Heartburn    • Hx of psychiatric care    • Hyperlipidemia    • Hypertension    • Impaired fasting glucose 08/14/2015   • Lumbago    • Major depressive disorder 10/27/2016   • Melena    • Multiple myeloma (CMS/HCC)    • Nicotine dependence 05/10/2017   • Renal insufficiency    • Seizures (CMS/HCC)    • Shortness of breath    • Tobacco use disorder 08/14/2015   • Visit for screening mammogram 02/20/2020    NORMAL- REPEAT IN ONE YEAR   • Vitamin D deficiency      Past Surgical History:   Procedure Laterality Date   • BONE MARROW BIOPSY  2016   • COLON SURGERY  2017   • COLONOSCOPY  2018,2017,2019    [facility] DR LE:DIVERTICULOSIS AND ERYTHEMA OF MUCOSA   • ENDOSCOPY  2018   • FECAL DISIMPACTION  06/2019    TRANSPLANT    • HYSTERECTOMY  1982,1984   • MINI-LAPAROTOMY  2017   • PORTACATH PLACEMENT       Social History     Socioeconomic History   • Marital status:      Spouse name: Not on file   • Number of children: Not on file   • Years of education: Not on file   • Highest education level: Not on file   Tobacco Use   • Smoking status: Current Some Day Smoker     Packs/day: 0.50     Years: 47.00     Pack years: 23.50     Types: Cigarettes   • Smokeless tobacco: Never Used   • Tobacco comment: STARTED AT AGE 26/ STOPPED AT AGE 68   Vaping Use   • Vaping Use: Never used   Substance and Sexual Activity   • Alcohol use: Never   • Drug use: Never     Family History   Problem Relation Age of Onset   • Heart disease Mother    • Lung cancer Mother    • Stroke Father    • Heart disease Father    • Kidney cancer Father    • Stroke Other         UNCLE/AUNT       Objective   Physical Exam  Vitals reviewed. Exam conducted with a chaperone present.   Constitutional:       General: She is not in acute distress.     Appearance: Normal appearance.   HENT:      Head: Normocephalic and atraumatic.   Cardiovascular:      Rate and Rhythm: Normal rate and regular rhythm.      Heart  sounds: Normal heart sounds. No murmur heard.   No gallop.    Pulmonary:      Effort: Pulmonary effort is normal.      Breath sounds: Wheezing (Scattered end expiratory wheezes) present.   Abdominal:      General: Abdomen is flat. Bowel sounds are normal. There is no distension.      Palpations: Abdomen is soft.      Tenderness: There is no abdominal tenderness.      Comments: No HSM or masses   Musculoskeletal:      Cervical back: Neck supple.      Right lower leg: No edema.      Left lower leg: No edema.   Lymphadenopathy:      Cervical: No cervical adenopathy.   Neurological:      Mental Status: She is alert and oriented to person, place, and time.   Psychiatric:         Mood and Affect: Mood normal.         Behavior: Behavior normal.         Vitals:    09/08/21 1435   BP: 142/57   Pulse: 86   Resp: 24   Temp: 99 °F (37.2 °C)   SpO2: 97%   Weight: 87.1 kg (192 lb)   PainSc:   3   PainLoc: Knee     ECOG score: 1         PHQ-9 Total Score:                    Result Review :   The following data was reviewed by: West Nicolas MD on 09/08/2021:  Lab Results   Component Value Date    HGB 12.8 08/25/2021    HCT 38.6 08/25/2021    MCV 98.0 (H) 08/25/2021     08/25/2021    WBC 11.25 (H) 08/25/2021    NEUTROABS 4.64 08/25/2021    LYMPHSABS 4.98 (H) 08/25/2021    MONOSABS 0.78 08/25/2021    EOSABS 0.71 (H) 08/25/2021    BASOSABS 0.09 08/25/2021     Lab Results   Component Value Date    GLUCOSE 86 08/25/2021    BUN 18 08/25/2021    CREATININE 1.02 (H) 08/25/2021     08/25/2021    K 4.5 08/25/2021     (H) 08/25/2021    CO2 19.7 (L) 08/25/2021    CALCIUM 9.7 08/25/2021    PROTEINTOT 8.2 08/25/2021    ALBUMIN 3.90 08/25/2021    ALBUMIN 3.6 08/25/2021    BILITOT 0.2 08/25/2021    ALKPHOS 68 08/25/2021    AST 15 08/25/2021    ALT 15 08/25/2021     Data reviewed: Radiologic studies CT chest, abdomen, pelvis      Assessment and Plan    Diagnoses and all orders for this visit:    1. Multiple myeloma not  having achieved remission (CMS/HCC) (Primary)  Assessment & Plan:  Smoldering.  Her M spike and free light chain ratio have increased slightly although the rest of her lab work looks okay.  She denies any signs or symptoms.  I will continue with surveillance at this point and repeat her lab work and PET scan in 6 months or sooner based on symptoms.  She does have significant emphysema noted on CT scan and she should follow-up with her PCP or pulmonologist    Orders:  -     CBC & Differential; Future  -     Comprehensive Metabolic Panel; Future  -     Lactate Dehydrogenase; Future  -     CARL,PE and FLC, Serum; Future  -     IgG, IgA, IgM; Future  -     NM Pet Skull Base To Mid Thigh; Future    2. Malignant neoplasm of colon, unspecified part of colon (CMS/HCC)  Assessment & Plan:  Status post resection followed by adjuvant chemotherapy.  She is now 4 years out from her treatment.  Doing well.  I see no evidence of disease recurrence by history, physical examination, lab work or recent scans.  She is up-to-date on endoscopy-she has follow-up with gastroenterology later this month.  I will see her back in 6 months for continued surveillance            Patient Follow Up: 6 months    Patient was given instructions and counseling regarding her condition or for health maintenance advice. Please see specific information pulled into the AVS if appropriate.     West Nicolas MD    9/8/2021

## 2021-09-10 ENCOUNTER — APPOINTMENT (OUTPATIENT)
Dept: CT IMAGING | Facility: HOSPITAL | Age: 71
End: 2021-09-10

## 2021-09-16 ENCOUNTER — HOSPITAL ENCOUNTER (OUTPATIENT)
Dept: BONE DENSITY | Facility: HOSPITAL | Age: 71
Discharge: HOME | End: 2021-09-16
Admitting: PHYSICIAN ASSISTANT

## 2021-09-16 ENCOUNTER — TELEPHONE (OUTPATIENT)
Dept: FAMILY MEDICINE CLINIC | Facility: CLINIC | Age: 71
End: 2021-09-16

## 2021-09-16 DIAGNOSIS — M81.0 AGE-RELATED OSTEOPOROSIS WITHOUT CURRENT PATHOLOGICAL FRACTURE: Primary | ICD-10-CM

## 2021-09-16 DIAGNOSIS — M85.80 OSTEOPENIA AFTER MENOPAUSE: ICD-10-CM

## 2021-09-16 DIAGNOSIS — Z13.820 ENCOUNTER FOR OSTEOPOROSIS SCREENING IN ASYMPTOMATIC POSTMENOPAUSAL PATIENT: Primary | ICD-10-CM

## 2021-09-16 DIAGNOSIS — Z78.0 ENCOUNTER FOR OSTEOPOROSIS SCREENING IN ASYMPTOMATIC POSTMENOPAUSAL PATIENT: Primary | ICD-10-CM

## 2021-09-16 DIAGNOSIS — Z78.0 OSTEOPENIA AFTER MENOPAUSE: ICD-10-CM

## 2021-09-16 PROCEDURE — 77080 DXA BONE DENSITY AXIAL: CPT

## 2021-09-16 RX ORDER — RISEDRONATE SODIUM 150 MG/1
150 TABLET, FILM COATED ORAL
Qty: 3 TABLET | Refills: 3 | Status: SHIPPED | OUTPATIENT
Start: 2021-09-16 | End: 2021-11-29

## 2021-09-16 NOTE — TELEPHONE ENCOUNTER
Received fax from [facility] stating pt's insurance prefers Alendronate instead of Risedronate    Is this okay to send instead?

## 2021-09-17 RX ORDER — ALENDRONATE SODIUM 70 MG/1
70 TABLET ORAL
Qty: 12 TABLET | Refills: 2 | Status: SHIPPED | OUTPATIENT
Start: 2021-09-17 | End: 2021-09-28

## 2021-09-27 ENCOUNTER — TREATMENT (OUTPATIENT)
Dept: PHYSICAL THERAPY | Facility: CLINIC | Age: 71
End: 2021-09-27

## 2021-09-27 DIAGNOSIS — M94.262 CHONDROMALACIA, LEFT KNEE: ICD-10-CM

## 2021-09-27 DIAGNOSIS — R26.9 GAIT DISTURBANCE: ICD-10-CM

## 2021-09-27 DIAGNOSIS — M25.562 LEFT KNEE PAIN, UNSPECIFIED CHRONICITY: Primary | ICD-10-CM

## 2021-09-27 DIAGNOSIS — S83.242D ACUTE MEDIAL MENISCUS TEAR OF LEFT KNEE, SUBSEQUENT ENCOUNTER: ICD-10-CM

## 2021-09-27 PROCEDURE — 97110 THERAPEUTIC EXERCISES: CPT | Performed by: PHYSICAL THERAPIST

## 2021-09-27 PROCEDURE — 97161 PT EVAL LOW COMPLEX 20 MIN: CPT | Performed by: PHYSICAL THERAPIST

## 2021-09-27 NOTE — PROGRESS NOTES
Physical Therapy Initial Evaluation and Plan of Care    Patient: Anca Samson   : 1950  Diagnosis/ICD-10 Code:  Left knee pain, unspecified chronicity [M25.562]  Referring practitioner: Nicholas Tipton MD  Date of Initial Visit: 2021  Today's Date: 2021  Patient seen for 1 sessions           Subjective Questionnaire: LEFS: 41/80      Subjective Evaluation    History of Present Illness  Mechanism of injury: Pt began having knee pain about 3-4 months ago which came on without reason or warning that pt can recall. She did sustain a meniscus tear and had an injection about 3 weeks ago which helped greatly. She states it resolved all pain with the exception of some pain just to the inside of the knee cap.     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     Pt has diagnosis of multiple myeloma for which she is monitored for and has a PET scan is 2022.    Pt lives with her  in a house without stairs.     Pain  Current pain ratin  At worst pain ratin    Patient Goals  Patient goals for therapy: decreased pain, improved balance, independence with ADLs/IADLs and increased strength             Objective          Active Range of Motion   Left Knee   Flexion: WFL  Extension: WFL    Strength/Myotome Testing     Left Hip   Planes of Motion   Flexion: 4  Extension: 4-  Abduction: 4-  Adduction: 3+    Left Knee   Flexion: 4  Extension: 4+    Ambulation     Observational Gait     Additional Observational Gait Details  Pt is no longer requiring an AD for ambulation (pt states she was using a cane prior to her injection), but PT does note a mild limp on the L.         See Exercise, Manual, and Modality Logs for complete treatment.       Assessment & Plan     Assessment  Impairments: abnormal gait, abnormal muscle firing, abnormal muscle tone, activity intolerance, impaired balance, impaired physical strength, lacks  appropriate home exercise program, pain with function, safety issue and weight-bearing intolerance  Assessment details: Pt requires skilled intervention to address the above listed impairments to allow pt to return to their normal daily functional mobility to include normal ambulation over level and unlevel surfaces as well as functional transitions such as sit to and from stand.   Prognosis: good  Functional Limitations: walking, uncomfortable because of pain, moving in bed and standing  Goals  Plan Goals:  1. Mobility: Walking/Moving Around Functional Limitation     LTG 1: 12 weeks:  The patient will demonstrate 20-39% limitation by achieving a score of 60 on the Lower Extremity Functional Scale.   STATUS:  New   STG 1a: 6 weeks:  The patient will demonstrate 20-39% limitation by achieving a score of 50 on the Lower Extremity Functional Scale.     STATUS:  New     2. The patient has limited strength of the L knee.   LTG 2: 12 weeks: The patient will demonstrate 5/5 strength for L knee flexion and extension in order to allow patient improved joint stability.   STATUS:  New   STG 2a: 6 weeks: The patient will demonstrate 5/5 strength for L knee flexion and extension   STATUS:  New       3. The patient complains of L knee pain.   LTG 3: 12 weeks:  The patient will report a pain rating of 0-1/10 or better in order to improve tolerance to activities of daily living and improve sleep quality.   STATUS:  New   STG 3a: 6 weeks:  The patient will report a pain rating of 1-2/10 or better.   STATUS:  New                Plan  Therapy options: will be seen for skilled physical therapy services  Planned modality interventions: electrical stimulation/Russian stimulation, dry needling and TENS  Planned therapy interventions: joint mobilization, therapeutic activities, stretching, strengthening, soft tissue mobilization, neuromuscular re-education, manual therapy, motor coordination training, balance/weight-bearing training,  flexibility, body mechanics training, functional ROM exercises, gait training and home exercise program  Frequency: 3x week  Duration in weeks: 12  Treatment plan discussed with: patient        History # of Personal Factors and/or Comorbidities: MODERATE (1-2)  Examination of Body System(s): # of elements: LOW (1-2)  Clinical Presentation: STABLE   Clinical Decision Making: LOW       Timed:         Manual Therapy:    3     mins  22447;     Therapeutic Exercise:    15     mins  28488;     Neuromuscular Arnoldo:        mins  14754;    Therapeutic Activity:          mins  91102;     Gait Training:           mins  89379;     Ultrasound:          mins  25266;    Ionto                                   mins   86183  Self Care                            mins   17885  Canalith Repos         mins 26859      Un-Timed:  Electrical Stimulation:         mins  68276 ( );  Dry Needling          mins self-pay  Traction          mins 16629  Low Eval     25     Mins  11803  Mod Eval          Mins  47184  High Eval                            Mins  48663  Re-Eval                               mins  81816        Timed Treatment:   18   mins   Total Treatment:    43  mins    PT SIGNATURE: Roxi Bass, NADYA   DATE TREATMENT INITIATED: 9/27/2021    Initial Certification  Certification Period: 12/26/2021  I certify that the therapy services are furnished while this patient is under my care.  The services outlined above are required by this patient, and will be reviewed every 90 days.     PHYSICIAN: Nicholas Tipton MD      DATE:     Please sign and return via fax to [phone] Thank you, [facility].

## 2021-09-28 ENCOUNTER — OFFICE VISIT (OUTPATIENT)
Dept: GASTROENTEROLOGY | Facility: CLINIC | Age: 71
End: 2021-09-28

## 2021-09-28 VITALS
SYSTOLIC BLOOD PRESSURE: 155 MMHG | HEIGHT: 61 IN | OXYGEN SATURATION: 99 % | HEART RATE: 75 BPM | DIASTOLIC BLOOD PRESSURE: 59 MMHG | BODY MASS INDEX: 36.82 KG/M2 | WEIGHT: 195 LBS

## 2021-09-28 DIAGNOSIS — R19.7 DIARRHEA, UNSPECIFIED TYPE: ICD-10-CM

## 2021-09-28 DIAGNOSIS — R12 HEARTBURN: ICD-10-CM

## 2021-09-28 DIAGNOSIS — R13.10 DYSPHAGIA, UNSPECIFIED TYPE: ICD-10-CM

## 2021-09-28 DIAGNOSIS — C18.9 MALIGNANT NEOPLASM OF COLON, UNSPECIFIED PART OF COLON (HCC): Primary | ICD-10-CM

## 2021-09-28 PROCEDURE — 99213 OFFICE O/P EST LOW 20 MIN: CPT | Performed by: INTERNAL MEDICINE

## 2021-09-28 NOTE — PROGRESS NOTES
"Chief Complaint    Anca Samson is a 71 y.o. female who presents to [facility]STRO[facility]OLOG[facility] for a history of stage III colon cancer status post right hemicolectomy, multiple myeloma, COPD, and chronic diarrhea.  She returns now using colestipol 2 g p.o. twice daily with significant improvement of her diarrhea.  Her last colonoscopy was in 2019 after an episode of C. difficile required stool transplant.  That was performed per Dr. Desai.    Result Review :     The following data was reviewed by: Jarvis Borges MD on 09/28/2021:             Past Medical History:   Diagnosis Date   • Anxiety    • Asthma    • C. difficile diarrhea    • Colon cancer (CMS/[facility]) 07/21/2017   • COPD (chronic obstructive pulmonary disease) (CMS/[facility]) 10/23/2017   • Depression    • Diverticulitis    • Dysphagia    • Heartburn    • Hx of psychiatric care    • Hyperlipidemia    • Hypertension    • Impaired fasting glucose 08/14/2015   • Lumbago    • Major depressive disorder 10/27/2016   • Melena    • Multiple myeloma (CMS/[facility])    • Nicotine dependence 05/10/2017   • Renal insufficiency    • Seizures (CMS/[facility])    • Shortness of breath    • Tobacco use disorder 08/14/2015   • Visit for screening mammogram 02/20/2020    NORMAL- REPEAT IN ONE YEAR   • Vitamin D deficiency        Past Surgical History:   Procedure Laterality Date   • BONE MARROW BIOPSY  2016   • COLON SURGERY  2017   • COLONOSCOPY  2018,2017,2019    [facility]- DR DESAI:DIVERTICULOSIS AND ERYTHEMA OF MUCOSA   • ENDOSCOPY  2018   • FECAL DISIMPACTION  06/2019    TRANSPLANT    • HEMICOLECTOMY Right 05/2017   • HYSTERECTOMY  1982,1984   • MINI-LAPAROTOMY  2017   • PORTACATH PLACEMENT     • UPPER GASTROINTESTINAL ENDOSCOPY  2018       Social History     Social History Narrative   • Not on file       Objective     Vital Signs:   /59   Pulse 75   Ht 154.9 cm (61\")   Wt 88.5 kg (195 lb)   SpO2 99%   BMI 36.84 kg/m²     Body mass index is " 36.84 kg/m².    Physical Exam            Assessment and Plan    Diagnoses and all orders for this visit:    1. Malignant neoplasm of colon, unspecified part of colon (HCC) (Primary)    2. Dysphagia, unspecified type    3. Heartburn    4. Diarrhea, unspecified type    Overall patient's diarrhea has improved with use of colestipol and I recommended she use Imodium in addition if needed.  Also recommended she get a Covid vaccine given her multiple comorbidities.  Finally she will follow up with me in 6 months and needs to be scheduled for a colonoscopy at that time.  We discussed performing an hour but she would like to defer for now given the ongoing Covid outbreak.              Follow Up   No follow-ups on file.  Patient was given instructions and counseling regarding her condition or for health maintenance advice. Please see specific information pulled into the AVS if appropriate.

## 2021-09-30 DIAGNOSIS — M25.569 KNEE PAIN, UNSPECIFIED CHRONICITY, UNSPECIFIED LATERALITY: ICD-10-CM

## 2021-09-30 RX ORDER — MELOXICAM 15 MG/1
15 TABLET ORAL DAILY
Qty: 30 TABLET | Refills: 2 | Status: SHIPPED | OUTPATIENT
Start: 2021-09-30 | End: 2021-11-29 | Stop reason: SDUPTHER

## 2021-10-04 ENCOUNTER — TREATMENT (OUTPATIENT)
Dept: PHYSICAL THERAPY | Facility: CLINIC | Age: 71
End: 2021-10-04

## 2021-10-04 DIAGNOSIS — M25.562 LEFT KNEE PAIN, UNSPECIFIED CHRONICITY: Primary | ICD-10-CM

## 2021-10-04 DIAGNOSIS — S83.242D ACUTE MEDIAL MENISCUS TEAR OF LEFT KNEE, SUBSEQUENT ENCOUNTER: ICD-10-CM

## 2021-10-04 DIAGNOSIS — R26.9 GAIT DISTURBANCE: ICD-10-CM

## 2021-10-04 DIAGNOSIS — M94.262 CHONDROMALACIA, LEFT KNEE: ICD-10-CM

## 2021-10-04 PROCEDURE — 97110 THERAPEUTIC EXERCISES: CPT | Performed by: PHYSICAL THERAPIST

## 2021-10-04 NOTE — PROGRESS NOTES
Physical Therapy Daily Treatment Note      Patient: Anca Samson   : 1950  Referring practitioner: Nicholas Tipton MD  Date of Initial Visit: Type: THERAPY  Noted: 2021  Today's Date: 10/4/2021  Patient seen for 2 sessions           Subjective   Anca Samson reports: Pt reports 7/10 pain today.     Objective   See Exercise, Manual, and Modality Logs for complete treatment.     Assessment & Plan     Assessment  Assessment details: Pt tolerated session fairly overall. Some exercises were modified for comfort level due to complaint of pain. Ice was used post-session to help with pain.    Plan  Plan details: Continue with POC.        Visit Diagnoses:    ICD-10-CM ICD-9-CM   1. Left knee pain, unspecified chronicity  M25.562 719.46   2. Chondromalacia, left knee  M94.262 717.7   3. Gait disturbance  R26.9 781.2   4. Acute medial meniscus tear of left knee, subsequent encounter  S83.242D V58.89     836.0       Progress per Plan of Care           Timed:  Manual Therapy:    6     mins  08980;  Therapeutic Exercise:    18     mins  53730;     Neuromuscular Arnoldo:    0    mins  67496;    Therapeutic Activity:     0     mins  50972;     Gait Trainin     mins  49581;     Aquatic Therapy     0     mins  44874;     Ultrasound:     0     mins  37325;    Electrical Stimulation:    0     mins  37710 ( );    Untimed:  Electrical Stimulation:    0     mins  34037 ( );  Mechanical Traction:    0     mins  27027;     Timed Treatment:   24   mins   Total Treatment:     24   mins  Amada Montez, PT  Physical Therapist

## 2021-10-06 ENCOUNTER — TREATMENT (OUTPATIENT)
Dept: PHYSICAL THERAPY | Facility: CLINIC | Age: 71
End: 2021-10-06

## 2021-10-06 DIAGNOSIS — S83.242D ACUTE MEDIAL MENISCUS TEAR OF LEFT KNEE, SUBSEQUENT ENCOUNTER: ICD-10-CM

## 2021-10-06 DIAGNOSIS — M25.562 LEFT KNEE PAIN, UNSPECIFIED CHRONICITY: Primary | ICD-10-CM

## 2021-10-06 DIAGNOSIS — M94.262 CHONDROMALACIA, LEFT KNEE: ICD-10-CM

## 2021-10-06 DIAGNOSIS — R26.9 GAIT DISTURBANCE: ICD-10-CM

## 2021-10-06 PROCEDURE — 97110 THERAPEUTIC EXERCISES: CPT | Performed by: PHYSICAL THERAPIST

## 2021-10-06 PROCEDURE — 97140 MANUAL THERAPY 1/> REGIONS: CPT | Performed by: PHYSICAL THERAPIST

## 2021-10-06 NOTE — PROGRESS NOTES
Physical Therapy Daily Progress Note    VISIT#: 3    Subjective   Anca Samson reports her knee hurting a lot more over the weekend then it worsened after her first follow up.       Objective     See Exercise, Manual, and Modality Logs for complete treatment.     Assessment/Plan  Pt reported a shooting pain with just quad sets today but any other activity caused increased pain. She tolerated some mild stretching and PT applied KT Tape with instructions to remove if pt's pain worsens.     Progress per Plan of Care            Timed:         Manual Therapy:    19     mins  73808;  Therapeutic Exercise:    5     mins  72912;  Neuromuscular Arnoldo:        mins  48162;  Therapeutic Activity:          mins  92630;     Gait Training:           mins  14041;  Ultrasound:          mins  63078;  Canalith Repos                   mins  13971    Un-Timed:  Electrical Stimulation:         mins  75516 ( );  Dry Needling          mins self-pay  Traction          mins 63962      Timed Treatment:   24   mins   Total Treatment:     24   mins    Roxi Bass PT  Physical Therapist

## 2021-10-18 ENCOUNTER — DOCUMENTATION (OUTPATIENT)
Dept: PHYSICAL THERAPY | Facility: CLINIC | Age: 71
End: 2021-10-18

## 2021-10-18 NOTE — PROGRESS NOTES
Discharge Summary  Discharge Summary from Physical Therapy Report      Discharge Status of Patient: Pt called clinic stating she was having no pain in her knee and felt she would like to discontinue therapy.     Goals: Partially Met; outcome measure not provided due to phone cancellation.     Discharge Plan: DC to [facility].    Date of Discharge 10/18/21        Roxi Bass PT  Physical Therapist

## 2021-11-09 DIAGNOSIS — F32.A DEPRESSION, UNSPECIFIED DEPRESSION TYPE: Primary | ICD-10-CM

## 2021-11-09 RX ORDER — FLUOXETINE HYDROCHLORIDE 20 MG/1
20 CAPSULE ORAL DAILY
COMMUNITY
End: 2021-11-09 | Stop reason: SDUPTHER

## 2021-11-09 RX ORDER — FLUOXETINE HYDROCHLORIDE 20 MG/1
20 CAPSULE ORAL DAILY
Qty: 90 CAPSULE | Refills: 0 | Status: SHIPPED | OUTPATIENT
Start: 2021-11-09 | End: 2022-01-26

## 2021-11-29 ENCOUNTER — OFFICE VISIT (OUTPATIENT)
Dept: FAMILY MEDICINE CLINIC | Facility: CLINIC | Age: 71
End: 2021-11-29

## 2021-11-29 VITALS
HEART RATE: 85 BPM | HEIGHT: 61 IN | SYSTOLIC BLOOD PRESSURE: 146 MMHG | WEIGHT: 195.6 LBS | BODY MASS INDEX: 36.93 KG/M2 | DIASTOLIC BLOOD PRESSURE: 67 MMHG | OXYGEN SATURATION: 97 %

## 2021-11-29 DIAGNOSIS — M25.569 KNEE PAIN, UNSPECIFIED CHRONICITY, UNSPECIFIED LATERALITY: ICD-10-CM

## 2021-11-29 DIAGNOSIS — E55.9 VITAMIN D DEFICIENCY: Chronic | ICD-10-CM

## 2021-11-29 DIAGNOSIS — J44.9 CHRONIC OBSTRUCTIVE PULMONARY DISEASE, UNSPECIFIED COPD TYPE (HCC): Primary | ICD-10-CM

## 2021-11-29 DIAGNOSIS — R56.9 SEIZURES (HCC): Chronic | ICD-10-CM

## 2021-11-29 DIAGNOSIS — F17.200 NICOTINE DEPENDENCE WITH CURRENT USE: Chronic | ICD-10-CM

## 2021-11-29 DIAGNOSIS — E55.9 VITAMIN D DEFICIENCY: ICD-10-CM

## 2021-11-29 PROCEDURE — 99214 OFFICE O/P EST MOD 30 MIN: CPT | Performed by: PHYSICIAN ASSISTANT

## 2021-11-29 RX ORDER — ALBUTEROL SULFATE 90 UG/1
2 AEROSOL, METERED RESPIRATORY (INHALATION) EVERY 4 HOURS PRN
Qty: 18 G | Refills: 1 | Status: SHIPPED | OUTPATIENT
Start: 2021-11-29 | End: 2022-08-19 | Stop reason: SDUPTHER

## 2021-11-29 RX ORDER — MELOXICAM 15 MG/1
15 TABLET ORAL DAILY
Qty: 30 TABLET | Refills: 2 | Status: SHIPPED | OUTPATIENT
Start: 2021-11-29 | End: 2022-04-25

## 2021-11-29 RX ORDER — ERGOCALCIFEROL 1.25 MG/1
50000 CAPSULE ORAL
Qty: 13 CAPSULE | Refills: 3 | Status: SHIPPED | OUTPATIENT
Start: 2021-11-29 | End: 2022-09-02

## 2021-11-29 RX ORDER — BUDESONIDE AND FORMOTEROL FUMARATE DIHYDRATE 160; 4.5 UG/1; UG/1
2 AEROSOL RESPIRATORY (INHALATION)
Qty: 1 EACH | Refills: 1 | Status: SHIPPED | OUTPATIENT
Start: 2021-11-29 | End: 2021-12-17

## 2021-11-29 NOTE — PROGRESS NOTES
"Chief Complaint  Hyperlipidemia (4 month follow up) and Hypertension    Subjective          Anca Samson presents to [facility]  History of Present Illness  Pt presents today for 4 month follow up.    Pt states she is having bilateral knee pain. Pt states she has had the pain for awhile . Pt states she has a tear and cyst on left knee. Pt is established w/[facility]on next f/u 12/7/21.     Pt was in Phys Therapy - and it made her symptoms worse.      Pt has rcvd an injection. Pt states her pain level is at a 6/10 today. Pt states it will get where she can't walk or sleep.     Pt states she has been having SOA. Pt states she has had but is progressing lately.  Pt states she is soa anytime during the day, she doesn't have to be active to be soa.  Pt states she stays fatigue a lot.    Labs 8/25  A1C 5.7    Dr. Borges - GI  Dr. Nicolas - Onc      Current Outpatient Medications:   •  albuterol sulfate  (90 Base) MCG/ACT inhaler, Inhale 2 puffs Every 4 (Four) Hours As Needed for Wheezing., Disp: 18 g, Rfl: 1  •  budesonide-formoterol (SYMBICORT) 160-4.5 MCG/ACT inhaler, Inhale 2 puffs 2 (Two) Times a Day., Disp: 1 each, Rfl: 1  •  colestipol (COLESTID) 1 g tablet, TAKE 2 TABLETS BY MOUTH TWICE A DAY . SWALLOW WHOLE WITH ANY LIQUID. DONT CRUSH, CHEW AND/OR DIVIDIE, Disp: 360 tablet, Rfl: 3  •  FLUoxetine (PROzac) 20 MG capsule, Take 1 capsule by mouth Daily., Disp: 90 capsule, Rfl: 0  •  meloxicam (MOBIC) 15 MG tablet, Take 1 tablet by mouth Daily., Disp: 30 tablet, Rfl: 2  •  promethazine (PHENERGAN) 25 MG tablet, Take 1 tablet by mouth Every 6 (Six) Hours As Needed for Nausea or Vomiting., Disp: 30 tablet, Rfl: 1  •  vitamin D (ERGOCALCIFEROL) 1.25 MG (69945 UT) capsule capsule, Take 1 capsule by mouth Every 7 (Seven) Days., Disp: 13 capsule, Rfl: 3    Objective      Vital Signs:   /67 (BP Location: Right arm)   Pulse 85   Ht 154.9 cm (61\")   Wt 88.7 kg (195 lb 9.6 oz) " "  SpO2 97%   BMI 36.96 kg/m²    Estimated body mass index is 36.96 kg/m² as calculated from the following:    Height as of this encounter: 154.9 cm (61\").    Weight as of this encounter: 88.7 kg (195 lb 9.6 oz).     Physical Exam  Vitals and nursing note reviewed.   Constitutional:       Appearance: Normal appearance. She is obese.   HENT:      Head: Normocephalic and atraumatic.   Cardiovascular:      Rate and Rhythm: Normal rate and regular rhythm.   Pulmonary:      Effort: Pulmonary effort is normal.      Breath sounds: Normal breath sounds.      Comments: Decreased breath sounds  Musculoskeletal:      Cervical back: Neck supple.   Neurological:      Mental Status: She is alert.   Psychiatric:         Mood and Affect: Mood normal.         Behavior: Behavior normal.        Result Review :     Common labs    Common Labsle 8/9/21 8/9/21 8/9/21 8/9/21 8/25/21 8/25/21 8/25/21    0905 0905 0905 0905 1454 1454 1454   Glucose    100 (A) 86     BUN    17 18     Creatinine    1.13 (A) 1.02 (A)     eGFR Non  Am    47 (A) 53 (A)     Sodium    136 142     Potassium    4.6 4.5     Chloride    104 111 (A)     Calcium    8.9 9.7     Total Protein       8.1   Albumin    3.80 3.90  3.6   Total Bilirubin    0.3 0.2     Alkaline Phosphatase    69 68     AST (SGOT)    18 15     ALT (SGPT)    20 15     WBC 9.85     11.25 (A)    Hemoglobin 12.4     12.8    Hematocrit 37.3     38.6    Platelets 178     357    Total Cholesterol   156       Triglycerides   339 (A)       HDL Cholesterol   35 (A)       LDL Cholesterol    67       Hemoglobin A1C  5.79 (A)        (A) Abnormal value       Comments are available for some flowsheets but are not being displayed.                         Assessment and Plan      Diagnoses and all orders for this visit:    1. Chronic obstructive pulmonary disease, unspecified COPD type (HCC) (Primary)  -     albuterol sulfate  (90 Base) MCG/ACT inhaler; Inhale 2 puffs Every 4 (Four) Hours As Needed " for Wheezing.  Dispense: 18 g; Refill: 1  -     budesonide-formoterol (SYMBICORT) 160-4.5 MCG/ACT inhaler; Inhale 2 puffs 2 (Two) Times a Day.  Dispense: 1 each; Refill: 1  -     Ambulatory Referral to Pulmonology    2. Knee pain, unspecified chronicity, unspecified laterality  Comments:  Dr. Tipton - ortho - continue  Orders:  -     meloxicam (MOBIC) 15 MG tablet; Take 1 tablet by mouth Daily.  Dispense: 30 tablet; Refill: 2    3. 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 Vitamin D deficiency  -     vitamin D (ERGOCALCIFEROL) 1.25 MG (30237 UT) capsule capsule; Take 1 capsule by mouth Every 7 (Seven) Days.  Dispense: 13 capsule; Refill: 3    5. Vitamin D deficiency  Comments:  Stable with Vit D supplement weekly  Orders:  -     vitamin D (ERGOCALCIFEROL) 1.25 MG (75195 UT) capsule capsule; Take 1 capsule by mouth Every 7 (Seven) Days.  Dispense: 13 capsule; Refill: 3    6. Nicotine dependence with current use  Comments:  Cont to smoke cigs       Pt assistance program for chantix    Follow Up     Return in about 3 months (around 2/28/2022).    I have reviewed information obtained and documented by others and I have confirmed the accuracy of this documented note.     Patient was given instructions and counseling regarding her condition or for health maintenance advice. Please see specific information pulled into the AVS if appropriate.     LACI Jordan

## 2021-12-07 ENCOUNTER — PREP FOR SURGERY (OUTPATIENT)
Dept: OTHER | Facility: HOSPITAL | Age: 71
End: 2021-12-07

## 2021-12-07 ENCOUNTER — OFFICE VISIT (OUTPATIENT)
Dept: ORTHOPEDIC SURGERY | Facility: CLINIC | Age: 71
End: 2021-12-07

## 2021-12-07 VITALS — BODY MASS INDEX: 36.82 KG/M2 | WEIGHT: 195 LBS | HEIGHT: 61 IN

## 2021-12-07 DIAGNOSIS — S83.242D ACUTE MEDIAL MENISCUS TEAR OF LEFT KNEE, SUBSEQUENT ENCOUNTER: Primary | ICD-10-CM

## 2021-12-07 DIAGNOSIS — S83.242D ACUTE MEDIAL MENISCUS TEAR OF LEFT KNEE, SUBSEQUENT ENCOUNTER: ICD-10-CM

## 2021-12-07 DIAGNOSIS — M94.262 CHONDROMALACIA OF LEFT KNEE: ICD-10-CM

## 2021-12-07 DIAGNOSIS — M25.562 LEFT KNEE PAIN, UNSPECIFIED CHRONICITY: Primary | ICD-10-CM

## 2021-12-07 PROBLEM — S83.207A TEAR OF MENISCUS OF LEFT KNEE AS CURRENT INJURY: Status: ACTIVE | Noted: 2021-12-07

## 2021-12-07 PROCEDURE — 99214 OFFICE O/P EST MOD 30 MIN: CPT | Performed by: ORTHOPAEDIC SURGERY

## 2021-12-07 RX ORDER — CLINDAMYCIN PHOSPHATE 900 MG/50ML
900 INJECTION INTRAVENOUS ONCE
Status: CANCELLED | OUTPATIENT
Start: 2021-12-07 | End: 2021-12-07

## 2021-12-07 RX ORDER — HYDROCODONE BITARTRATE AND ACETAMINOPHEN 7.5; 325 MG/1; MG/1
1 TABLET ORAL EVERY 4 HOURS PRN
Qty: 18 TABLET | Refills: 0 | Status: SHIPPED | OUTPATIENT
Start: 2021-12-07 | End: 2022-01-03 | Stop reason: HOSPADM

## 2021-12-07 NOTE — PROGRESS NOTES
"Chief Complaint  Pain of the Left Knee     Subjective      Anca Samson presents to [facility] for follow up evaluation of the left knee. She previously had a left knee Zilretta injection that provided her with only about 1-2 months of relief. She locates her knee to the medial knee that radiates to the posterior knee and up and down her leg. She reports the pain is a constant pain. She has no new injury or trauma. She has no other complaints.     Allergies   Allergen Reactions   • Morphine Anaphylaxis   • Cephalexin Hives   • Penicillins Hives   • Sulfa Antibiotics Hives        Social History     Socioeconomic History   • Marital status:    Tobacco Use   • Smoking status: Current Some Day Smoker     Packs/day: 0.50     Years: 47.00     Pack years: 23.50     Types: Cigarettes   • Smokeless tobacco: Never Used   • Tobacco comment: STARTED AT AGE 26/ STOPPED AT AGE 68   Vaping Use   • Vaping Use: Never used   Substance and Sexual Activity   • Alcohol use: Never   • Drug use: Never   • Sexual activity: Defer        Review of Systems     Objective   Vital Signs:   Ht 154.9 cm (61\")   Wt 88.5 kg (195 lb)   BMI 36.84 kg/m²       Physical Exam  Constitutional:       Appearance: Normal appearance. The patient is well-developed and normal weight.   HENT:      Head: Normocephalic.      Right Ear: Hearing and external ear normal.      Left Ear: Hearing and external ear normal.      Nose: Nose normal.   Eyes:      Conjunctiva/sclera: Conjunctivae normal.   Cardiovascular:      Rate and Rhythm: Normal rate.   Pulmonary:      Effort: Pulmonary effort is normal.      Breath sounds: No wheezing or rales.   Abdominal:      Palpations: Abdomen is soft.      Tenderness: There is no abdominal tenderness.   Musculoskeletal:      Cervical back: Normal range of motion.   Skin:     Findings: No rash.   Neurological:      Mental Status: The patient is alert and oriented to person, place, and time. "   Psychiatric:         Mood and Affect: Mood and affect normal.         Judgment: Judgment normal.       Ortho Exam      Left knee-  ROM 0-110. Stable to varus/valgus stress. Stable to anterior/posterior drawer. Neurovascularly intact. Positive EHL, FHL, GS and TA. Sensation intact to all 5 nerves of the foot. Positive pulses. Tender to medial joint line. No effusion. Positive Hui's. Negative Lachman's. Mild swelling to the leg.     Procedures    X-Ray Report:  Left knee(s) X-Ray  Indication: Evaluation of left knee pain  AP, Lateral, Standing and Sunrise view(s)  Findings: mild degenerative changes mild medial joint space narrowing. No acute fracture.   Prior studies available for comparison: yes         Imaging Results (Most Recent)     None           Result Review :       No results found.           Assessment and Plan     DX: Left knee medial meniscus tear, left knee chondromalacia.     Discussed the treatment options with the patient, operative vs non-operative. Discussed the risks and benefits of a Left knee arthroscopic medial menisectomy and chondroplasty. The patient expressed understanding and wished to proceed.     Discussed surgery., Risks/benefits discussed with patient including, but not limited to: infection, bleeding, neurovascular damage, malunion, nonunion, aesthetic deformity, need for further surgery, and death., Discussed with patient the implant type being used during surgery and patient understands and desires to proceed., Surgery pamphlet given. and Call or return if worsening symptoms.    Follow Up     2 weeks postoperatively.        Patient was given instructions and counseling regarding her condition or for health maintenance advice. Please see specific information pulled into the AVS if appropriate.     Scribed for Nicholas Tipton MD by Mi Monge.  12/07/21   07:56 EST    I have personally performed the services described in this document as scribed by the above  individual and it is both accurate and complete. Nicholas Tipton MD 12/07/21

## 2021-12-13 ENCOUNTER — TELEPHONE (OUTPATIENT)
Dept: FAMILY MEDICINE CLINIC | Facility: CLINIC | Age: 71
End: 2021-12-13

## 2021-12-13 DIAGNOSIS — F17.200 NICOTINE DEPENDENCE WITH CURRENT USE: Primary | ICD-10-CM

## 2021-12-13 RX ORDER — VARENICLINE TARTRATE 1 MG/1
1 TABLET, FILM COATED ORAL 2 TIMES DAILY
Qty: 56 TABLET | Refills: 4 | Status: SHIPPED | OUTPATIENT
Start: 2022-01-10 | End: 2022-02-17

## 2021-12-13 NOTE — TELEPHONE ENCOUNTER
----- Message from Anca Samson sent at 12/13/2021 11:49 AM EST -----  Regarding: Surgery  No, it's for a new prescription.

## 2021-12-14 ENCOUNTER — TELEPHONE (OUTPATIENT)
Dept: FAMILY MEDICINE CLINIC | Facility: CLINIC | Age: 71
End: 2021-12-14

## 2021-12-17 ENCOUNTER — OFFICE VISIT (OUTPATIENT)
Dept: PULMONOLOGY | Facility: CLINIC | Age: 71
End: 2021-12-17

## 2021-12-17 VITALS
HEIGHT: 61 IN | TEMPERATURE: 96.6 F | RESPIRATION RATE: 16 BRPM | WEIGHT: 195 LBS | BODY MASS INDEX: 36.82 KG/M2 | OXYGEN SATURATION: 99 %

## 2021-12-17 DIAGNOSIS — G47.33 OSA (OBSTRUCTIVE SLEEP APNEA): ICD-10-CM

## 2021-12-17 DIAGNOSIS — T78.40XA ALLERGY, INITIAL ENCOUNTER: ICD-10-CM

## 2021-12-17 DIAGNOSIS — IMO0001 LUNG NODULE < 6CM ON CT: ICD-10-CM

## 2021-12-17 DIAGNOSIS — F17.200 CURRENT SMOKER: ICD-10-CM

## 2021-12-17 DIAGNOSIS — J44.9 CHRONIC OBSTRUCTIVE PULMONARY DISEASE, UNSPECIFIED COPD TYPE (HCC): Primary | ICD-10-CM

## 2021-12-17 DIAGNOSIS — J31.0 CHRONIC RHINITIS: ICD-10-CM

## 2021-12-17 DIAGNOSIS — R05.9 COUGH: ICD-10-CM

## 2021-12-17 DIAGNOSIS — E66.09 CLASS 2 OBESITY DUE TO EXCESS CALORIES WITH BODY MASS INDEX (BMI) OF 36.0 TO 36.9 IN ADULT, UNSPECIFIED WHETHER SERIOUS COMORBIDITY PRESENT: ICD-10-CM

## 2021-12-17 PROCEDURE — 99203 OFFICE O/P NEW LOW 30 MIN: CPT | Performed by: SPECIALIST

## 2021-12-17 RX ORDER — ALBUTEROL SULFATE 2.5 MG/3ML
2.5 SOLUTION RESPIRATORY (INHALATION) EVERY 4 HOURS PRN
Qty: 120 EACH | Refills: 5
Start: 2021-12-17 | End: 2022-09-08 | Stop reason: SDUPTHER

## 2021-12-17 RX ORDER — BUDESONIDE 0.5 MG/2ML
0.5 INHALANT ORAL 2 TIMES DAILY
Qty: 60 EACH | Refills: 5
Start: 2021-12-17 | End: 2022-09-08 | Stop reason: SDUPTHER

## 2021-12-17 RX ORDER — FORMOTEROL FUMARATE 20 UG/2ML
20 SOLUTION RESPIRATORY (INHALATION)
Qty: 60 EACH | Refills: 5
Start: 2021-12-17 | End: 2022-09-08 | Stop reason: SDUPTHER

## 2021-12-17 RX ORDER — ALENDRONATE SODIUM 70 MG/1
TABLET ORAL
COMMUNITY
Start: 2021-12-11 | End: 2022-03-29

## 2021-12-17 NOTE — PROGRESS NOTES
CONSULT NOTE     CHIEF COMPLAINT  Chief Complaint   Patient presents with   • Establish Care     New Patient COPD   • Shortness of Breath   • Sleeping Problem     states that she snores, has excessive daytime sleepiness   • Cough     clear- occasinally productive   • Fatigue        Primary Care Provider  Paulino Macias PA     Referring Provider  LACI Jordan    Patient Complaint    ICD-10-CM ICD-9-CM   1. Chronic obstructive pulmonary disease, unspecified COPD type (HCC)  J44.9 496   2. Current smoker  F17.200 305.1   3. Class 2 obesity due to excess calories with body mass index (BMI) of 36.0 to 36.9 in adult, unspecified whether serious comorbidity present  E66.09 278.00    Z68.36 V85.36   4. NICK (obstructive sleep apnea)  G47.33 327.23   5. Cough  R05.9 786.2   6. Chronic rhinitis  J31.0 472.0   7. Allergy, initial encounter  T78.40XA 995.3   8. Lung nodule < 6cm on CT  R91.1 793.11            Subjective          History of Presenting Illness  Anca Samson is a 71 y.o. female referred to pulmonary service for the worsening of the shortness of breath and cough.  Patient unfortunately is a current smoker and she tried multiple times to quit smoking but not able to.  She is afraid to quit smoking as many other family members soon as the quit smoking they .  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  Her cough is usually dry and unable to bring up any phlegm.  Denied any fever, chills, sweating, or any exposure to anybody who is sick around.  No recent travel history.  Denied any loss of taste or smell.  Denied any chest pain, palpitation or any other related diaphoretic symptoms patient weight did not change significantly but admits for snoring and quite a bit of disturbed sleep and feeling tired during  the day and feeling sleepy anytime of the day.      I have personally reviewed the review of systems, past family, social, medical and surgical histories; and agree with their findings.    HISTORY    Family History   Problem Relation Age of Onset   • Heart disease Mother    • Lung cancer Mother    • Cancer Mother    • Stroke Father    • Heart disease Father    • Kidney cancer Father    • Cancer Father    • Stroke Other         UNCLE/AUNT        Social History     Socioeconomic History   • Marital status:    Tobacco Use   • Smoking status: Current Some Day Smoker     Packs/day: 1.00     Years: 47.00     Pack years: 47.00     Types: Cigarettes   • Smokeless tobacco: Never Used   • Tobacco comment: started age 26   Vaping Use   • Vaping Use: Never used   Substance and Sexual Activity   • Alcohol use: Never   • Drug use: Never   • Sexual activity: Defer        Past Medical History:   Diagnosis Date   • Anxiety    • Asthma    • C. 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   • Shortness of breath    • Tobacco use disorder 08/14/2015   • Visit for screening mammogram 02/20/2020    NORMAL- REPEAT IN ONE YEAR   • Vitamin D deficiency         Immunization History   Administered Date(s) Administered   • Flu Vaccine Quad PF >18YRS 09/14/2015   • Fluzone High Dose =>65 Years ([employer] ONLY) 09/24/2020, 09/24/2021   • Fluzone High-Dose 65+yrs 09/24/2020, 09/24/2021   • Pneumococcal Conjugate 13-Valent (PCV13) 09/21/2015       Allergies   Allergen Reactions    • Morphine Anaphylaxis   • Cephalexin Hives   • Penicillins Hives   • Sulfa Antibiotics Hives          Current Outpatient Medications:   •  albuterol sulfate  (90 Base) MCG/ACT inhaler, Inhale 2 puffs Every 4 (Four) Hours As Needed for Wheezing., Disp: 18 g, Rfl: 1  •  albuterol (PROVENTIL) (2.5 MG/3ML) 0.083% nebulizer solution, Take 2.5 mg by nebulization Every 4 (Four) Hours As Needed for Wheezing., Disp: 120 each, Rfl: 5  •  alendronate (FOSAMAX) 70 MG tablet, , Disp: , Rfl:   •  budesonide (Pulmicort) 0.5 MG/2ML nebulizer solution, Take 2 mL by nebulization 2 (Two) Times a Day for 30 days., Disp: 60 each, Rfl: 5  •  colestipol (COLESTID) 1 g tablet, TAKE 2 TABLETS BY MOUTH TWICE A DAY . SWALLOW WHOLE WITH ANY LIQUID. DONT CRUSH, CHEW AND/OR DIVIDIE (Patient taking differently: Take 2 g by mouth 2 (Two) Times a Day.), Disp: 360 tablet, Rfl: 3  •  FLUoxetine (PROzac) 20 MG capsule, Take 1 capsule by mouth Daily. (Patient taking differently: Take 20 mg by mouth Daily. Pt not currently taking), Disp: 90 capsule, Rfl: 0  •  formoterol (PERFOROMIST) 20 MCG/2ML nebulizer solution, Take 2 mL by nebulization 2 (Two) Times a Day., Disp: 60 each, Rfl: 5  •  HYDROcodone-acetaminophen (Norco) 7.5-325 MG per tablet, Take 1 tablet by mouth Every 4 (Four) Hours As Needed for Moderate Pain ., Disp: 18 tablet, Rfl: 0  •  meloxicam (MOBIC) 15 MG tablet, Take 1 tablet by mouth Daily., Disp: 30 tablet, Rfl: 2  •  promethazine (PHENERGAN) 25 MG tablet, Take 1 tablet by mouth Every 6 (Six) Hours As Needed for Nausea or Vomiting., Disp: 30 tablet, Rfl: 1  •  revefenacin (YUPELRI) 175 MCG/3ML nebulizer solution, Take 3 mL by nebulization Daily., Disp: 90 mL, Rfl: 5  •  varenicline (CHANTIX MOOKIE) 0.5 MG X 11 & 1 MG X 42 tablet, Take 0.5 mg po daily x 3 days, then 0.5 mg po bid x 4 days, then 1 mg po bid, Disp: 53 tablet, Rfl: 0  •  [START ON 1/10/2022] varenicline (CHANTIX) 1 MG tablet, Take 1 tablet by mouth 2 (Two) Times a Day  for 140 days., Disp: 56 tablet, Rfl: 4  •  vitamin D (ERGOCALCIFEROL) 1.25 MG (66496 UT) capsule capsule, Take 1 capsule by mouth Every 7 (Seven) Days. (Patient taking differently: Take 50,000 Units by mouth Every 7 (Seven) Days. Takes on fridays), Disp: 13 capsule, Rfl: 3     Smoking status: Current      Objective     Vital Signs:   Vitals:    12/17/21 1546   Resp: 16   Temp: 96.6 °F (35.9 °C)   SpO2: 99%        Physical Exam:  Very pleasant female, alert and oriented and answering questions appropriately.  HEENT: Atraumatic, anicteric.  Patient is using the facemask for COVID-19 precautions.  Deviated nasal septum with significantly congested nose and hypertrophied inferior turbinates bilaterally but more prominent on the left side.  Mallampati class IV airway.  High arched palate.  Poor bite.  Relatively large tongue.  Neck: Supple, no JVD, trachea central and no bruits heard.  No masses palpable.  Chest and lungs: Decreased breath sounds with prolonged expiratory phase, nontender.  Occasional scattered rhonchi.  Cardiovascular system: S1 and S2 no murmurs appreciated.  Abdomen: Obese, soft, bowel sounds present.  Extremities: No clubbing, no cyanosis, no edema.  Central nervous system is grossly intact.     Result Review :   I have personally reviewed the CT scan of the chest was done which was reported as stable pulmonary nodules about 4 mm in lingula and 5 mm in the right middle lobe which are unchanged from before.  Patient stated that she is going to get the PET scan for the multiple myeloma         Impression:  Current smoker  Clinical signs and symptoms of COPD/emphysema and the present medications are not working.  Patient is on Symbicort but DPI is only 30  History of colon cancer  History of multiple myeloma.  Obesity  Obstructive sleep apnea    Plan:  Reviewed and discussed with the patient about the present clinical findings.  Discussed extensively about quitting smoking and its benefits of quitting  and the consequences of continued smoking.  I changed the Symbicort to nebulizer therapies with Perforomist, Pulmicort, Yupelri and albuterol nebulizer and the nebulizer was given along with instructions  Home sleep study  Complete pulmonary function testing with bronchodilators and 6-minute walk.  IgE and mini Rast test.  Alpha-1 antitrypsin with phenotype  Lexie nasal sinus rinse  Patient many day follow-up CT scan of the chest intermittently are at least once per year in view of the current smoking and the small lung nodules need to be followed at least 2 years.  History of other medical problems including multiple myeloma and colon cancer follow-up and is being well managed by primary team physician and other consultants.    If any problem patient is going to call this practice.  If any emergency patient go to the emergency room.      Follow Up   Return in about 2 months (around 2/17/2022).  Patient was given instructions and counseling regarding her condition or for health maintenance advice. Please see specific information pulled into the AVS if appropriate.     Anam Arredondo MD

## 2021-12-17 NOTE — PATIENT INSTRUCTIONS
Allergies, Adult  An allergy means that your body reacts to something that bothers it (allergen). This can happen from something that you eat, breathe in, or touch.  Allergies often affect the nose, eyes, skin, and stomach. They can be mild, moderate, or very bad (severe). An allergy cannot spread from person to person. They can happen at any age. Sometimes, people outgrow them.  What are the causes?  · Outdoor things, such as pollen, car fumes, and mold.  · Indoor things, such as dust, smoke, mold, and pets.  · Foods.  · Medicines.  · Things that bother your skin, such as perfume and bug bites.  What increases the risk?  · Having family members with allergies or asthma.  What are the signs or symptoms?  Symptoms depend on how bad your allergy is.  Mild to moderate symptoms  · Runny nose, stuffy nose, or sneezing.  · Itchy mouth, ears, or throat.  · A feeling of mucus dripping down the back of your throat.  · Sore throat.  · Eyes that are itchy, red, watery, or puffy.  · A skin rash, or red, swollen areas of skin (hives).  · Stomach cramps or bloating.  Severe symptoms  Very bad allergies to food, medicine, or bug bites may cause a very bad allergy reaction (anaphylaxis). This can be life-threatening. Symptoms include:  · A red face.  · Wheezing or coughing.  · Swollen lips, tongue, or mouth.  · Tight or swollen throat.  · Chest pain or tightness, or a fast heartbeat.  · Trouble breathing or shortness of breath.  · Pain in your belly (abdomen), vomiting, or watery poop (diarrhea).  · Feeling dizzy or fainting.  How is this treated?         Treatment for this condition depends on your symptoms. Treatment may include:  · Cold, wet cloths for itching and swelling.  · Eye drops, nose sprays, or skin creams.  · Washing out your nose each day.  · A humidifier.  · Medicines.  · A change to the foods you eat.  · Being exposed again and again to tiny amounts of allergens. This helps your body get used to them. You might  have:  ? Allergy shots.  ? Very small amounts of allergen put under your tongue.  · An emergency shot (auto-injector pen) if you have a very bad allergy reaction.  ? This is a medicine with a needle. You can put it into your skin by yourself.  ? Your doctor will teach you how to use it.  Follow these instructions at home:  Medicines    · Take or apply over-the-counter and prescription medicines only as told by your doctor.  · If you are at risk for a very bad allergy reaction, keep an auto-injector pen with you all the time.    Eating and drinking  · Follow instructions from your doctor about what to eat and drink.  · Drink enough fluid to keep your pee (urine) pale yellow.  General instructions  · If you have ever had a very bad allergy reaction, wear a medical alert bracelet or necklace.  · Stay away from things that you are allergic to.  · Keep all follow-up visits as told by your doctor. This is important.  Contact a doctor if:  · Your symptoms do not get better with treatment.  Get help right away if:  · You have symptoms of a very bad allergy reaction. These include:  ? A swollen mouth, tongue, or throat.  ? Pain or tightness in your chest.  ? Trouble breathing.  ? Being short of breath.  ? Dizziness.  ? Fainting.  ? Very bad pain in your belly.  ? Vomiting.  ? Watery poop.  These symptoms may be an emergency. Do not wait to see if the symptoms will go away. Get medical help right away. Call your local emergency services (911 in the [place] Do not drive yourself to the hospital.  Summary  · Take or apply over-the-counter and prescription medicines only as told by your doctor.  · Stay away from things you are allergic to.  · If you are at risk for a very bad allergy reaction, carry an auto-injector pen all the time.  · Wear a medical alert bracelet or necklace.  · Very bad allergy reactions can be life-threatening. Get help right away.  This information is not intended to replace advice given to you by your  health care provider. Make sure you discuss any questions you have with your health care provider.  Document Revised: 10/28/2020 Document Reviewed: 10/28/2020  [employer] Patient Education © 2021 [employer]    Chronic Obstructive Pulmonary Disease  Chronic obstructive pulmonary disease (COPD) is a long-term (chronic) lung problem. When you have COPD, it is hard for air to get in and out of your lungs. Usually the condition gets worse over time, and your lungs will never return to normal. There are things you can do to keep yourself as healthy as possible.  · Your doctor may treat your condition with:  ? Medicines.  ? Oxygen.  ? Lung surgery.  · Your doctor may also recommend:  ? Rehabilitation. This includes steps to make your body work better. It may involve a team of specialists.  ? Quitting smoking, if you smoke.  ? Exercise and changes to your diet.  ? Comfort measures (palliative care).  Follow these instructions at home:  Medicines  · Take over-the-counter and prescription medicines only as told by your doctor.  · Talk to your doctor before taking any cough or allergy medicines. You may need to avoid medicines that cause your lungs to be dry.  Lifestyle  · If you smoke, stop. Smoking makes the problem worse. If you need help quitting, ask your doctor.  · Avoid being around things that make your breathing worse. This may include smoke, chemicals, and fumes.  · Stay active, but remember to rest as well.  · Learn and use tips on how to relax.  · Make sure you get enough sleep. Most adults need at least 7 hours of sleep every night.  · Eat healthy foods. Eat smaller meals more often. Rest before meals.  Controlled breathing  Learn and use tips on how to control your breathing as told by your doctor. Try:  · Breathing in (inhaling) through your nose for 1 second. Then, pucker your lips and breath out (exhale) through your lips for 2 seconds.  · Putting one hand on your belly (abdomen). Breathe in slowly through  your nose for 1 second. Your hand on your belly should move out. Pucker your lips and breathe out slowly through your lips. Your hand on your belly should move in as you breathe out.    Controlled coughing  Learn and use controlled coughing to clear mucus from your lungs. Follow these steps:  1. Lean your head a little forward.  2. Breathe in deeply.  3. Try to hold your breath for 3 seconds.  4. Keep your mouth slightly open while coughing 2 times.  5. Spit any mucus out into a tissue.  6. Rest and do the steps again 1 or 2 times as needed.  General instructions  · Make sure you get all the shots (vaccines) that your doctor recommends. Ask your doctor about a flu shot and a pneumonia shot.  · Use oxygen therapy and pulmonary rehabilitation if told by your doctor. If you need home oxygen therapy, ask your doctor if you should buy a tool to measure your oxygen level (oximeter).  · Make a COPD action plan with your doctor. This helps you to know what to do if you feel worse than usual.  · Manage any other conditions you have as told by your doctor.  · Avoid going outside when it is very hot, cold, or humid.  · Avoid people who have a sickness you can catch (contagious).  · Keep all follow-up visits as told by your doctor. This is important.  Contact a doctor if:  · You cough up more mucus than usual.  · There is a change in the color or thickness of the mucus.  · It is harder to breathe than usual.  · Your breathing is faster than usual.  · You have trouble sleeping.  · You need to use your medicines more often than usual.  · You have trouble doing your normal activities such as getting dressed or walking around the house.  Get help right away if:  · You have shortness of breath while resting.  · You have shortness of breath that stops you from:  ? Being able to talk.  ? Doing normal activities.  · Your chest hurts for longer than 5 minutes.  · Your skin color is more blue than usual.  · Your pulse oximeter shows that  you have low oxygen for longer than 5 minutes.  · You have a fever.  · You feel too tired to breathe normally.  Summary  · Chronic obstructive pulmonary disease (COPD) is a long-term lung problem.  · The way your lungs work will never return to normal. Usually the condition gets worse over time. There are things you can do to keep yourself as healthy as possible.  · Take over-the-counter and prescription medicines only as told by your doctor.  · If you smoke, stop. Smoking makes the problem worse.  This information is not intended to replace advice given to you by your health care provider. Make sure you discuss any questions you have with your health care provider.  Document Revised: 11/30/2018 Document Reviewed: 01/22/2018  [employer] Patient Education © 2021 [employer]    Obesity, Adult  Obesity is having too much body fat. Being obese means that your weight is more than what is healthy for you.  BMI is a number that explains how much body fat you have. If you have a BMI of 30 or more, you are obese. Obesity is often caused by eating or drinking more calories than your body uses. Changing your lifestyle can help you lose weight.  Obesity can cause serious health problems, such as:  · Stroke.  · Coronary artery disease (CAD).  · Type 2 diabetes.  · Some types of cancer, including cancers of the colon, breast, uterus, and gallbladder.  · Osteoarthritis.  · High blood pressure (hypertension).  · High cholesterol.  · Sleep apnea.  · Gallbladder stones.  · Infertility problems.  What are the causes?  · Eating meals each day that are high in calories, sugar, and fat.  · Being born with genes that may make you more likely to become obese.  · Having a medical condition that causes obesity.  · Taking certain medicines.  · Sitting a lot (having a sedentary lifestyle).  · Not getting enough sleep.  · Drinking a lot of drinks that have sugar in them.  What increases the risk?  · Having a family history of obesity.  · Being  an  woman.  · Being a  man.  · Living in an area with limited access to:  ? [place], recreation centers, or sidewalks.  ? Healthy food choices, such as grocery stores and [employer] markets.  What are the signs or symptoms?  The main sign is having too much body fat.  How is this treated?  · Treatment for this condition often includes changing your lifestyle. Treatment may include:  ? Changing your diet. This may include making a healthy meal plan.  ? Exercise. This may include activity that causes your heart to beat faster (aerobic exercise) and strength training. Work with your doctor to design a program that works for you.  ? Medicine to help you lose weight. This may be used if you are not able to lose 1 pound a week after 6 weeks of healthy eating and more exercise.  ? Treating conditions that cause the obesity.  ? Surgery. Options may include gastric banding and gastric bypass. This may be done if:  § Other treatments have not helped to improve your condition.  § You have a BMI of 40 or higher.  § You have life-threatening health problems related to obesity.  Follow these instructions at home:  Eating and drinking    · Follow advice from your doctor about what to eat and drink. Your doctor may tell you to:  ? Limit fast food, sweets, and processed snack foods.  ? Choose low-fat options. For example, choose low-fat milk instead of whole milk.  ? Eat 5 or more servings of fruits or vegetables each day.  ? Eat at home more often. This gives you more control over what you eat.  ? Choose healthy foods when you eat out.  ? Learn to read food labels. This will help you learn how much food is in 1 serving.  ? Keep low-fat snacks available.  ? Avoid drinks that have a lot of sugar in them. These include soda, fruit juice, iced tea with sugar, and flavored milk.  · Drink enough water to keep your pee (urine) pale yellow.  · Do not go on fad diets.    Physical activity  · Exercise often, as told by  your doctor. Most adults should get up to 150 minutes of moderate-intensity exercise every week.Ask your doctor:  ? What types of exercise are safe for you.  ? How often you should exercise.  · Warm up and stretch before being active.  · Do slow stretching after being active (cool down).  · Rest between times of being active.  Lifestyle  · Work with your doctor and a food expert (dietitian) to set a weight-loss goal that is best for you.  · Limit your screen time.  · Find ways to reward yourself that do not involve food.  · Do not drink alcohol if:  ? Your doctor tells you not to drink.  ? You are pregnant, may be pregnant, or are planning to become pregnant.  · If you drink alcohol:  ? Limit how much you use to:  § 0-1 drink a day for women.  § 0-2 drinks a day for men.  ? Be aware of how much alcohol is in your drink. In the [place] one drink equals one 12 oz bottle of beer (355 mL), one 5 oz glass of wine (148 mL), or one 1½ oz glass of hard liquor (44 mL).  General instructions  · Keep a weight-loss journal. This can help you keep track of:  ? The food that you eat.  ? How much exercise you get.  · Take over-the-counter and prescription medicines only as told by your doctor.  · Take vitamins and supplements only as told by your doctor.  · Think about joining a support group.  · Keep all follow-up visits as told by your doctor. This is important.  Contact a doctor if:  · You cannot meet your weight loss goal after you have changed your diet and lifestyle for 6 weeks.  Get help right away if you:  · Are having trouble breathing.  · Are having thoughts of harming yourself.  Summary  · Obesity is having too much body fat.  · Being obese means that your weight is more than what is healthy for you.  · Work with your doctor to set a weight-loss goal.  · Get regular exercise as told by your doctor.  This information is not intended to replace advice given to you by your health care provider. Make sure you discuss any  questions you have with your health care provider.  Document Revised: 08/22/2019 Document Reviewed: 08/22/2019  [employer] Patient Education © 2021 [employer]    Nonallergic Rhinitis  Nonallergic rhinitis is inflammation of the mucous membrane inside the nose. The mucous membrane is the tissue that produces mucus. This condition is different from having allergic rhinitis, which is an allergy that affects the nose. Allergic rhinitis occurs when the body's defense system, or immune system, reacts to a substance that a person is allergic to (allergen), such as pollen, pet dander, mold, or dust. Nonallergic rhinitis has many similar symptoms, but it is not caused by allergens.  Nonallergic rhinitis can be an acute or chronic problem. This means it can be short-term or long-term.  What are the causes?  This condition may be caused by many different things. Some common types of nonallergic rhinitis include:  · Infectious rhinitis. This is usually caused by an infection in the nose, throat, or upper airways (upper respiratory system).  · Vasomotor rhinitis. This is the most common type of chronic nonallergic rhinitis. It is caused by too much blood flow through your nose, and it leads to swelling in your nose. It is triggered by strong odors, cold air, stress, drinking alcohol, cigarette smoke, or changes in the weather.  · Occupational rhinitis. This type is caused by triggers in the workplace, such as chemicals, dust, animal dander, or air pollution.  · Hormonal rhinitis, in teenage girls and women. This type is caused by an increase in the hormone estrogen and may happen during pregnancy, puberty, or monthly menstrual periods. Hormonal rhinitis gives you fewer symptoms when estrogen levels drop.  · Drug-induced rhinitis. Several types of medicines can cause this, such as medicines for high blood pressure or heart disease, aspirin, or NSAIDs.  · Nonallergic rhinitis with eosinophilia syndrome (NARES). This type is  caused by having too much eosinophil, a type of white blood cell.  Other causes include a reaction to eating hot or spicy foods. This does not usually cause long-term symptoms.  In some cases, the cause of nonallergic rhinitis is not known.  What increases the risk?  You are more likely to develop this condition if:  · You are 30-60 years of age.  · You are a woman. Women are twice as likely to have this condition.  What are the signs or symptoms?  Common symptoms of this condition include:  · Stuffy nose (nasal congestion).  · Runny nose.  · A feeling of mucus dripping down the back of your throat (postnasal drip).  · Trouble sleeping.  · Tiredness, or fatigue.  Other symptoms include:  · Sneezing.  · Coughing.  · Itchy nose.  · Bloodshot eyes.  How is this diagnosed?  This type may be diagnosed based on:  · Your symptoms and medical history.  · A physical exam.  · Allergy testing to rule out allergic rhinitis. You may have skin tests or blood tests.  Your health care provider may also take a swab of nasal discharge to look for an increased number of eosinophils. This would be done to confirm a diagnosis of NARES.  How is this treated?  Treatment for this condition depends on the cause. No single treatment works for everyone. Work with your health care provider to find the best treatment for you. Treatment may include:  · Avoiding the things that trigger your symptoms.  · Medicines to relieve congestion, such as:  ? Steroid nasal spray. There are many types. You may need to try a few to find out which one works best.  ? Decongestant medicine. This treats nasal congestion and may be given by mouth or as a nasal spray. These medicines are used only for a short time.  · Medicines to relieve a runny nose. These may include antihistamine medicines or anticholinergic nasal sprays.  · Nasal irrigation. This involves using a salt-water (saline) spray or saline container called a neti pot. Nasal irrigation helps to clear  away mucus and keep your nasal passages moist.  · Surgery to remove part of your mucous membrane. This is done in severe cases if the condition has not improved after 6-12 months of treatment.  Follow these instructions at home:  Medicines  · Take or use over-the-counter and prescription medicines only as told by your health care provider. Do not stop using your medicine even if you start to feel better.  · Do not take NSAIDs, such as ibuprofen, or medicines that contain aspirin if they make your symptoms worse.  Lifestyle  · Do not drink alcohol if it makes your symptoms worse.  · Do not use any products that contain nicotine or tobacco, such as cigarettes, e-cigarettes, and chewing tobacco. If you need help quitting, ask your health care provider.  · Avoid secondhand smoke.  General instructions  · Avoid triggers that make your symptoms worse.  · Use nasal irrigation as told by your health care provider.  · Get exercise. Exercise may help reduce symptoms for some people.  · Sleep with the head of your bed raised. This may reduce nasal congestion when you sleep.  · Drink enough fluid to keep your urine pale yellow.  · Keep all follow-up visits as told by your health care provider. This is important.  Contact a health care provider if:  · You have a fever.  · Your symptoms are getting worse at home.  · Your symptoms do not lessen with medicine.  · You develop new symptoms, especially a headache or nosebleed.  Summary  · Nonallergic rhinitis is inflammation inside the nose that is not caused by allergens. Nonallergic rhinitis can be a short-term or long-term problem.  · Treatment may include avoiding the things that trigger your symptoms.  · Take or use over-the-counter and prescription medicines only as told by your health care provider. Do not stop using your medicine even if you start to feel better.  · Contact a health care provider if your symptoms do not lessen with medicine.  This information is not intended  to replace advice given to you by your health care provider. Make sure you discuss any questions you have with your health care provider.  Document Revised: 10/26/2020 Document Reviewed: 10/26/2020  [employer] Patient Education © 2021 [employer]    Pulmonary Nodule  A pulmonary nodule is tissue that has grown on your lung. A nodule may be cancer, but most nodules are not cancer.  Follow these instructions at home:    · Take over-the-counter and prescription medicines only as told by your doctor.  · Do not use any products that have nicotine or tobacco, such as cigarettes and e-cigarettes. If you need help quitting, ask your doctor.  · Keep all follow-up visits as told by your doctor. This is important.  Contact a doctor if:  · You have trouble breathing when doing activities.  · You feel sick.  · You feel more tired than normal.  · You do not feel like eating.  · You lose weight without trying.  · You have chills.  · You have night sweats.  Get help right away if:  · You cannot catch your breath.  · You start making whistling sounds when breathing (wheezing).  · You cannot stop coughing.  · You cough up blood.  · You get dizzy.  · You feel like you are going to pass out (faint).  · You have sudden chest pain.  · You have a fever or symptoms for more than 2-3 days.  · You have a fever and your symptoms suddenly get worse.  Summary  · A pulmonary nodule is tissue that has grown on your lung.  · Most nodules are not cancer.  · Your doctor will do tests to know what kind of nodule you have, and whether you need treatment for it.  This information is not intended to replace advice given to you by your health care provider. Make sure you discuss any questions you have with your health care provider.  Document Revised: 01/11/2019 Document Reviewed: 01/16/2018  [employer] Patient Education © 2021 [employer]    Sleep Apnea  Sleep apnea affects breathing during sleep. It causes breathing to stop for a short time or to  become shallow. It can also increase the risk of:  · Heart attack.  · Stroke.  · Being very overweight (obese).  · Diabetes.  · Heart failure.  · Irregular heartbeat.  The goal of treatment is to help you breathe normally again.  What are the causes?  There are three kinds of sleep apnea:  · Obstructive sleep apnea. This is caused by a blocked or collapsed airway.  · Central sleep apnea. This happens when the brain does not send the right signals to the muscles that control breathing.  · Mixed sleep apnea. This is a combination of obstructive and central sleep apnea.  The most common cause of this condition is a collapsed or blocked airway. This can happen if:  · Your throat muscles are too relaxed.  · Your tongue and tonsils are too large.  · You are overweight.  · Your airway is too small.  What increases the risk?  · Being overweight.  · Smoking.  · Having a small airway.  · Being older.  · Being male.  · Drinking alcohol.  · Taking medicines to calm yourself (sedatives or tranquilizers).  · Having family members with the condition.  What are the signs or symptoms?  · Trouble staying asleep.  · Being sleepy or tired during the day.  · Getting angry a lot.  · Loud snoring.  · Headaches in the morning.  · Not being able to focus your mind (concentrate).  · Forgetting things.  · Less interest in sex.  · Mood swings.  · Personality changes.  · Feelings of sadness (depression).  · Waking up a lot during the night to pee (urinate).  · Dry mouth.  · Sore throat.  How is this diagnosed?  · Your medical history.  · A physical exam.  · A test that is done when you are sleeping (sleep study). The test is most often done in a sleep lab but may also be done at home.  How is this treated?    · Sleeping on your side.  · Using a medicine to get rid of mucus in your nose (decongestant).  · Avoiding the use of alcohol, medicines to help you relax, or certain pain medicines (narcotics).  · Losing weight, if needed.  · Changing your  diet.  · Not smoking.  · Using a machine to open your airway while you sleep, such as:  ? An oral appliance. This is a mouthpiece that shifts your lower jaw forward.  ? A CPAP device. This device blows air through a mask when you breathe out (exhale).  ? An EPAP device. This has valves that you put in each nostril.  ? A BPAP device. This device blows air through a mask when you breathe in (inhale) and breathe out.  · Having surgery if other treatments do not work.  It is important to get treatment for sleep apnea. Without treatment, it can lead to:  · High blood pressure.  · Coronary artery disease.  · In men, not being able to have an erection (impotence).  · Reduced thinking ability.  Follow these instructions at home:  Lifestyle  · Make changes that your doctor recommends.  · Eat a healthy diet.  · Lose weight if needed.  · Avoid alcohol, medicines to help you relax, and some pain medicines.  · Do not use any products that contain nicotine or tobacco, such as cigarettes, e-cigarettes, and chewing tobacco. If you need help quitting, ask your doctor.  General instructions  · Take over-the-counter and prescription medicines only as told by your doctor.  · If you were given a machine to use while you sleep, use it only as told by your doctor.  · If you are having surgery, make sure to tell your doctor you have sleep apnea. You may need to bring your device with you.  · Keep all follow-up visits as told by your doctor. This is important.  Contact a doctor if:  · The machine that you were given to use during sleep bothers you or does not seem to be working.  · You do not get better.  · You get worse.  Get help right away if:  · Your chest hurts.  · You have trouble breathing in enough air.  · You have an uncomfortable feeling in your back, arms, or stomach.  · You have trouble talking.  · One side of your body feels weak.  · A part of your face is hanging down.  These symptoms may be an emergency. Do not wait to see if  the symptoms will go away. Get medical help right away. Call your local emergency services (911 in the [place] Do not drive yourself to the hospital.  Summary  · This condition affects breathing during sleep.  · The most common cause is a collapsed or blocked airway.  · The goal of treatment is to help you breathe normally while you sleep.  This information is not intended to replace advice given to you by your health care provider. Make sure you discuss any questions you have with your health care provider.  Document Revised: 10/04/2019 Document Reviewed: 08/13/2019  [employer] Patient Education © 2021 [employer] Inc.    Smoking Tobacco Information, Adult  Smoking tobacco can be harmful to your health. Tobacco contains a poisonous (toxic), colorless chemical called nicotine. Nicotine is addictive. It changes the brain and can make it hard to stop smoking. Tobacco also has other toxic chemicals that can hurt your body and raise your risk of many cancers.  How can smoking tobacco affect me?  Smoking tobacco puts you at risk for:  · Cancer. Smoking is most commonly associated with lung cancer, but can also lead to cancer in other parts of the body.  · Chronic obstructive pulmonary disease (COPD). This is a long-term lung condition that makes it hard to breathe. It also gets worse over time.  · High blood pressure (hypertension), heart disease, stroke, or heart attack.  · Lung infections, such as pneumonia.  · Cataracts. This is when the lenses in the eyes become clouded.  · Digestive problems. This may include peptic ulcers, heartburn, and gastroesophageal reflux disease (GERD).  · Oral health problems, such as gum disease and tooth loss.  · Loss of taste and smell.  Smoking can affect your appearance by causing:  · Wrinkles.  · Yellow or stained teeth, fingers, and fingernails.  Smoking tobacco can also affect your social life, because:  · It may be challenging to find places to smoke when away from home. Many workplaces,  restaurants, hotels, and public places are tobacco-free.  · Smoking is expensive. This is due to the cost of tobacco and the long-term costs of treating health problems from smoking.  · Secondhand smoke may affect those around you. Secondhand smoke can cause lung cancer, breathing problems, and heart disease. Children of smokers have a higher risk for:  ? Sudden infant death syndrome (SIDS).  ? Ear infections.  ? Lung infections.  If you currently smoke tobacco, quitting now can help you:  · Lead a longer and healthier life.  · Look, smell, breathe, and feel better over time.  · Save money.  · Protect others from the harms of secondhand smoke.  What actions can I take to prevent health problems?  Quit smoking    · Do not start smoking. Quit if you already do.  · Make a plan to quit smoking and commit to it. Look for programs to help you and ask your health care provider for recommendations and ideas.  · Set a date and write down all the reasons you want to quit.  · Let your friends and family know you are quitting so they can help and support you. Consider finding friends who also want to quit. It can be easier to quit with someone else, so that you can support each other.  · Talk with your health care provider about using nicotine replacement medicines to help you quit, such as gum, lozenges, patches, sprays, or pills.  · Do not replace cigarette smoking with electronic cigarettes, which are commonly called e-cigarettes. The safety of e-cigarettes is not known, and some may contain harmful chemicals.  · If you try to quit but return to smoking, stay positive. It is common to slip up when you first quit, so take it one day at a time.  · Be prepared for cravings. When you feel the urge to smoke, chew gum or suck on hard candy.    Lifestyle  · Stay busy and take care of your body.  · Drink enough fluid to keep your urine pale yellow.  · Get plenty of exercise and eat a healthy diet. This can help prevent weight gain  after quitting.  · Monitor your eating habits. Quitting smoking can cause you to have a larger appetite than when you smoke.  · Find ways to relax. Go out with friends or family to a movie or a restaurant where people do not smoke.  · Ask your health care provider about having regular tests (screenings) to check for cancer. This may include blood tests, imaging tests, and other tests.  · Find ways to manage your stress, such as meditation, yoga, or exercise.  Where to find support  To get support to quit smoking, consider:  · Asking your health care provider for more information and resources.  · Taking classes to learn more about quitting smoking.  · Looking for local organizations that offer resources about quitting smoking.  · Joining a support group for people who want to quit smoking in your local community.  · Calling the [employer] counselor helpline: 8-365-Quit-Now (1-725.387.8866)  Where to find more information  You may find more information about quitting smoking from:  · HelpGuide.org: www.helpguide.org  · Smokefree.gov: smokefree.gov  · American Lung Association: www.lung.org  Contact a health care provider if you:  · Have problems breathing.  · Notice that your lips, nose, or fingers turn blue.  · Have chest pain.  · Are coughing up blood.  · Feel faint or you pass out.  · Have other health changes that cause you to worry.  Summary  · Smoking tobacco can negatively affect your health, the health of those around you, your finances, and your social life.  · Do not start smoking. Quit if you already do. If you need help quitting, ask your health care provider.  · Think about joining a support group for people who want to quit smoking in your local community. There are many effective programs that will help you to quit this behavior.  This information is not intended to replace advice given to you by your health care provider. Make sure you discuss any questions you have with your health care  provider.  Document Revised: 09/11/2020 Document Reviewed: 01/02/2018  [employer] Patient Education © 2021 [employer]    Steps to Quit Smoking  Smoking tobacco is the leading cause of preventable death. It can affect almost every organ in the body. Smoking puts you and people around you at risk for many serious, long-lasting (chronic) diseases. Quitting smoking can be hard, but it is one of the best things that you can do for your health. It is never too late to quit.  How do I get ready to quit?  When you decide to quit smoking, make a plan to help you succeed. Before you quit:  · Pick a date to quit. Set a date within the next 2 weeks to give you time to prepare.  · Write down the reasons why you are quitting. Keep this list in places where you will see it often.  · Tell your family, friends, and co-workers that you are quitting. Their support is important.  · Talk with your doctor about the choices that may help you quit.  · Find out if your health insurance will pay for these treatments.  · Know the people, places, things, and activities that make you want to smoke (triggers). Avoid them.  What first steps can I take to quit smoking?  · Throw away all cigarettes at home, at work, and in your car.  · Throw away the things that you use when you smoke, such as ashtrays and lighters.  · Clean your car. Make sure to empty the ashtray.  · Clean your home, including curtains and carpets.  What can I do to help me quit smoking?  Talk with your doctor about taking medicines and seeing a counselor at the same time. You are more likely to succeed when you do both.  · If you are pregnant or breastfeeding, talk with your doctor about counseling or other ways to quit smoking. Do not take medicine to help you quit smoking unless your doctor tells you to do so.  To quit smoking:  Quit right away  · Quit smoking totally, instead of slowly cutting back on how much you smoke over a period of time.  · Go to counseling. You are  more likely to quit if you go to counseling sessions regularly.  Take medicine  You may take medicines to help you quit. Some medicines need a prescription, and some you can buy over-the-counter. Some medicines may contain a drug called nicotine to replace the nicotine in cigarettes. Medicines may:  · Help you to stop having the desire to smoke (cravings).  · Help to stop the problems that come when you stop smoking (withdrawal symptoms).  Your doctor may ask you to use:  · Nicotine patches, gum, or lozenges.  · Nicotine inhalers or sprays.  · Non-nicotine medicine that is taken by mouth.  Find resources  Find resources and other ways to help you quit smoking and remain smoke-free after you quit. These resources are most helpful when you use them often. They include:  · Online chats with a counselor.  · Phone quitlines.  · Printed self-help materials.  · Support groups or group counseling.  · Text messaging programs.  · Mobile phone apps. Use apps on your mobile phone or tablet that can help you stick to your quit plan. There are many free apps for mobile phones and tablets as well as websites. Examples include Quit Guide from the CDC and smokefree.gov    What things can I do to make it easier to quit?    · Talk to your family and friends. Ask them to support and encourage you.  · Call a phone quitline (-800-QUIT-NOW), reach out to support groups, or work with a counselor.  · Ask people who smoke to not smoke around you.  · Avoid places that make you want to smoke, such as:  ? Bars.  ? Parties.  ? Smoke-break areas at work.  · Spend time with people who do not smoke.  · Lower the stress in your life. Stress can make you want to smoke. Try these things to help your stress:  ? Getting regular exercise.  ? Doing deep-breathing exercises.  ? Doing yoga.  ? Meditating.  ? Doing a body scan. To do this, close your eyes, focus on one area of your body at a time from head to toe. Notice which parts of your body are tense.  Try to relax the muscles in those areas.  How will I feel when I quit smoking?  Day 1 to 3 weeks  Within the first 24 hours, you may start to have some problems that come from quitting tobacco. These problems are very bad 2-3 days after you quit, but they do not often last for more than 2-3 weeks. You may get these symptoms:  · Mood swings.  · Feeling restless, nervous, angry, or annoyed.  · Trouble concentrating.  · Dizziness.  · Strong desire for high-sugar foods and nicotine.  · Weight gain.  · Trouble pooping (constipation).  · Feeling like you may vomit (nausea).  · Coughing or a sore throat.  · Changes in how the medicines that you take for other issues work in your body.  · Depression.  · Trouble sleeping (insomnia).  Week 3 and afterward  After the first 2-3 weeks of quitting, you may start to notice more positive results, such as:  · Better sense of smell and taste.  · Less coughing and sore throat.  · Slower heart rate.  · Lower blood pressure.  · Clearer skin.  · Better breathing.  · Fewer sick days.  Quitting smoking can be hard. Do not give up if you fail the first time. Some people need to try a few times before they succeed. Do your best to stick to your quit plan, and talk with your doctor if you have any questions or concerns.  Summary  · Smoking tobacco is the leading cause of preventable death. Quitting smoking can be hard, but it is one of the best things that you can do for your health.  · When you decide to quit smoking, make a plan to help you succeed.  · Quit smoking right away, not slowly over a period of time.  · When you start quitting, seek help from your doctor, family, or friends.  This information is not intended to replace advice given to you by your health care provider. Make sure you discuss any questions you have with your health care provider.  Document Revised: 09/11/2020 Document Reviewed: 03/07/2020  [employer] Patient Education © 2021 [employer]vier Inc.

## 2021-12-20 ENCOUNTER — LAB (OUTPATIENT)
Dept: LAB | Facility: HOSPITAL | Age: 71
End: 2021-12-20

## 2021-12-20 DIAGNOSIS — J31.0 CHRONIC RHINITIS: ICD-10-CM

## 2021-12-20 DIAGNOSIS — S83.242D ACUTE MEDIAL MENISCUS TEAR OF LEFT KNEE, SUBSEQUENT ENCOUNTER: ICD-10-CM

## 2021-12-20 DIAGNOSIS — T78.40XA ALLERGY, INITIAL ENCOUNTER: ICD-10-CM

## 2021-12-20 DIAGNOSIS — F17.200 CURRENT SMOKER: ICD-10-CM

## 2021-12-20 DIAGNOSIS — J44.9 CHRONIC OBSTRUCTIVE PULMONARY DISEASE, UNSPECIFIED COPD TYPE (HCC): ICD-10-CM

## 2021-12-20 LAB
B-HCG UR QL: NEGATIVE
BACTERIA UR QL AUTO: ABNORMAL /HPF
BILIRUB UR QL STRIP: NEGATIVE
CLARITY UR: CLEAR
COLOR UR: YELLOW
GLUCOSE UR STRIP-MCNC: NEGATIVE MG/DL
HGB UR QL STRIP.AUTO: ABNORMAL
HYALINE CASTS UR QL AUTO: ABNORMAL /LPF
KETONES UR QL STRIP: NEGATIVE
LEUKOCYTE ESTERASE UR QL STRIP.AUTO: NEGATIVE
NITRITE UR QL STRIP: NEGATIVE
PH UR STRIP.AUTO: 5.5 [PH] (ref 5–8)
PROT UR QL STRIP: NEGATIVE
RBC # UR STRIP: ABNORMAL /HPF
REF LAB TEST METHOD: ABNORMAL
SP GR UR STRIP: 1.02 (ref 1–1.03)
SQUAMOUS #/AREA URNS HPF: ABNORMAL /HPF
UROBILINOGEN UR QL STRIP: ABNORMAL
WBC # UR STRIP: ABNORMAL /HPF

## 2021-12-20 PROCEDURE — 86003 ALLG SPEC IGE CRUDE XTRC EA: CPT

## 2021-12-20 PROCEDURE — 82103 ALPHA-1-ANTITRYPSIN TOTAL: CPT | Performed by: SPECIALIST

## 2021-12-20 PROCEDURE — 82104 ALPHA-1-ANTITRYPSIN PHENO: CPT | Performed by: SPECIALIST

## 2021-12-20 PROCEDURE — 36415 COLL VENOUS BLD VENIPUNCTURE: CPT

## 2021-12-20 PROCEDURE — 81001 URINALYSIS AUTO W/SCOPE: CPT

## 2021-12-20 PROCEDURE — 82785 ASSAY OF IGE: CPT

## 2021-12-20 PROCEDURE — 81025 URINE PREGNANCY TEST: CPT

## 2021-12-22 LAB
A1AT PHENOTYP SERPL IFE: NORMAL
A1AT SERPL-MCNC: 109 MG/DL (ref 101–187)
IGE SERPL-ACNC: 6.6 KU/L

## 2021-12-24 LAB
A ALTERNATA IGE QN: <0.1 KU/L
A FUMIGATUS IGE QN: <0.1 KU/L
AMER ROACH IGE QN: <0.1 KU/L
BAHIA GRASS IGE QN: <0.1 KU/L
BAYBERRY POLN IGE QN: <0.1 KU/L
BERMUDA GRASS IGE QN: <0.1 KU/L
BOXELDER IGE QN: <0.1 KU/L
C HERBARUM IGE QN: <0.1 KU/L
CAT DANDER IGE QN: <0.1 KU/L
COMMON RAGWEED IGE QN: <0.1 KU/L
CONV CLASS DESCRIPTION: NORMAL
D FARINAE IGE QN: <0.1 KU/L
D PTERONYSS IGE QN: <0.1 KU/L
DOG DANDER IGE QN: <0.1 KU/L
DOG FENNEL IGE QN: <0.1 KU/L
ENGL PLANTAIN IGE QN: <0.1 KU/L
GOOSEFOOT IGE QN: <0.1 KU/L
GUM-TREE IGE QN: <0.1 KU/L
ITALIAN CYPRESS IGE QN: <0.1 KU/L
JOHNSON GRASS IGE QN: <0.1 KU/L
M RACEMOSUS IGE QN: <0.1 KU/L
P NOTATUM IGE QN: <0.1 KU/L
PEPPER TREE IGE QN: <0.1 KU/L
PER RYE GRASS IGE QN: <0.1 KU/L
PRIVET IGE QN: <0.1 KU/L
QUEEN PALM IGE QN: <0.1 KU/L
S BOTRYOSUM IGE QN: <0.1 KU/L
SHEEP SORREL IGE QN: <0.1 KU/L
VIRG LIVE OAK IGE QN: <0.1 KU/L
WHITE ELM IGE QN: <0.1 KU/L

## 2021-12-29 ENCOUNTER — LAB (OUTPATIENT)
Dept: LAB | Facility: HOSPITAL | Age: 71
End: 2021-12-29

## 2021-12-29 DIAGNOSIS — S83.242D ACUTE MEDIAL MENISCUS TEAR OF LEFT KNEE, SUBSEQUENT ENCOUNTER: Primary | ICD-10-CM

## 2021-12-29 PROCEDURE — U0004 COV-19 TEST NON-CDC HGH THRU: HCPCS

## 2021-12-29 PROCEDURE — U0005 INFEC AGEN DETEC AMPLI PROBE: HCPCS

## 2021-12-30 LAB — SARS-COV-2 RNA PNL SPEC NAA+PROBE: NOT DETECTED

## 2022-01-03 ENCOUNTER — ANESTHESIA EVENT (OUTPATIENT)
Dept: PERIOP | Facility: HOSPITAL | Age: 72
End: 2022-01-03

## 2022-01-03 ENCOUNTER — HOSPITAL ENCOUNTER (OUTPATIENT)
Facility: HOSPITAL | Age: 72
Discharge: HOME | End: 2022-01-03
Attending: ORTHOPAEDIC SURGERY | Admitting: ORTHOPAEDIC SURGERY

## 2022-01-03 ENCOUNTER — ANESTHESIA (OUTPATIENT)
Dept: PERIOP | Facility: HOSPITAL | Age: 72
End: 2022-01-03

## 2022-01-03 VITALS
HEART RATE: 89 BPM | BODY MASS INDEX: 36.84 KG/M2 | OXYGEN SATURATION: 90 % | WEIGHT: 195.11 LBS | DIASTOLIC BLOOD PRESSURE: 83 MMHG | TEMPERATURE: 96.3 F | RESPIRATION RATE: 12 BRPM | HEIGHT: 61 IN | SYSTOLIC BLOOD PRESSURE: 127 MMHG

## 2022-01-03 DIAGNOSIS — S83.242D ACUTE MEDIAL MENISCUS TEAR OF LEFT KNEE, SUBSEQUENT ENCOUNTER: ICD-10-CM

## 2022-01-03 PROCEDURE — 29881 ARTHRS KNE SRG MNISECTMY M/L: CPT | Performed by: ORTHOPAEDIC SURGERY

## 2022-01-03 PROCEDURE — 63710000001 ACETAMINOPHEN 500 MG TABLET: Performed by: ANESTHESIOLOGY

## 2022-01-03 PROCEDURE — A9270 NON-COVERED ITEM OR SERVICE: HCPCS | Performed by: ANESTHESIOLOGY

## 2022-01-03 PROCEDURE — 25010000002 DEXAMETHASONE PER 1 MG: Performed by: NURSE ANESTHETIST, CERTIFIED REGISTERED

## 2022-01-03 PROCEDURE — 25010000002 FENTANYL CITRATE (PF) 50 MCG/ML SOLUTION: Performed by: NURSE ANESTHETIST, CERTIFIED REGISTERED

## 2022-01-03 PROCEDURE — 0 MEPERIDINE PER 100 MG: Performed by: ANESTHESIOLOGY

## 2022-01-03 PROCEDURE — 25010000002 PROPOFOL 10 MG/ML EMULSION: Performed by: NURSE ANESTHETIST, CERTIFIED REGISTERED

## 2022-01-03 PROCEDURE — 25010000002 ONDANSETRON PER 1 MG: Performed by: NURSE ANESTHETIST, CERTIFIED REGISTERED

## 2022-01-03 PROCEDURE — 25010000002 MIDAZOLAM PER 1 MG: Performed by: ANESTHESIOLOGY

## 2022-01-03 RX ORDER — CLINDAMYCIN PHOSPHATE 900 MG/50ML
900 INJECTION INTRAVENOUS ONCE
Status: COMPLETED | OUTPATIENT
Start: 2022-01-03 | End: 2022-01-03

## 2022-01-03 RX ORDER — HYDROCODONE BITARTRATE AND ACETAMINOPHEN 7.5; 325 MG/1; MG/1
1-2 TABLET ORAL EVERY 4 HOURS PRN
Qty: 36 TABLET | Refills: 0 | Status: SHIPPED | OUTPATIENT
Start: 2022-01-03 | End: 2022-01-12 | Stop reason: SDUPTHER

## 2022-01-03 RX ORDER — FENTANYL CITRATE 50 UG/ML
INJECTION, SOLUTION INTRAMUSCULAR; INTRAVENOUS AS NEEDED
Status: DISCONTINUED | OUTPATIENT
Start: 2022-01-03 | End: 2022-01-03 | Stop reason: SURG

## 2022-01-03 RX ORDER — PROPOFOL 10 MG/ML
VIAL (ML) INTRAVENOUS AS NEEDED
Status: DISCONTINUED | OUTPATIENT
Start: 2022-01-03 | End: 2022-01-03 | Stop reason: SURG

## 2022-01-03 RX ORDER — EPHEDRINE SULFATE 50 MG/ML
INJECTION, SOLUTION INTRAVENOUS AS NEEDED
Status: DISCONTINUED | OUTPATIENT
Start: 2022-01-03 | End: 2022-01-03 | Stop reason: SURG

## 2022-01-03 RX ORDER — PHENYLEPHRINE HCL IN 0.9% NACL 1 MG/10 ML
SYRINGE (ML) INTRAVENOUS AS NEEDED
Status: DISCONTINUED | OUTPATIENT
Start: 2022-01-03 | End: 2022-01-03 | Stop reason: SURG

## 2022-01-03 RX ORDER — DEXAMETHASONE SODIUM PHOSPHATE 4 MG/ML
INJECTION, SOLUTION INTRA-ARTICULAR; INTRALESIONAL; INTRAMUSCULAR; INTRAVENOUS; SOFT TISSUE AS NEEDED
Status: DISCONTINUED | OUTPATIENT
Start: 2022-01-03 | End: 2022-01-03 | Stop reason: SURG

## 2022-01-03 RX ORDER — LIDOCAINE HYDROCHLORIDE 20 MG/ML
INJECTION, SOLUTION INFILTRATION; PERINEURAL AS NEEDED
Status: DISCONTINUED | OUTPATIENT
Start: 2022-01-03 | End: 2022-01-03 | Stop reason: SURG

## 2022-01-03 RX ORDER — ASPIRIN 325 MG
325 TABLET, DELAYED RELEASE (ENTERIC COATED) ORAL DAILY
Qty: 14 TABLET | Refills: 0 | Status: SHIPPED | OUTPATIENT
Start: 2022-01-03 | End: 2022-02-17

## 2022-01-03 RX ORDER — MIDAZOLAM HYDROCHLORIDE 1 MG/ML
2 INJECTION INTRAMUSCULAR; INTRAVENOUS ONCE
Status: COMPLETED | OUTPATIENT
Start: 2022-01-03 | End: 2022-01-03

## 2022-01-03 RX ORDER — ACETAMINOPHEN 500 MG
1000 TABLET ORAL ONCE
Status: COMPLETED | OUTPATIENT
Start: 2022-01-03 | End: 2022-01-03

## 2022-01-03 RX ORDER — PROMETHAZINE HYDROCHLORIDE 12.5 MG/1
25 TABLET ORAL ONCE AS NEEDED
Status: DISCONTINUED | OUTPATIENT
Start: 2022-01-03 | End: 2022-01-03 | Stop reason: HOSPADM

## 2022-01-03 RX ORDER — MEPERIDINE HYDROCHLORIDE 25 MG/ML
12.5 INJECTION INTRAMUSCULAR; INTRAVENOUS; SUBCUTANEOUS
Status: COMPLETED | OUTPATIENT
Start: 2022-01-03 | End: 2022-01-03

## 2022-01-03 RX ORDER — ONDANSETRON 2 MG/ML
4 INJECTION INTRAMUSCULAR; INTRAVENOUS ONCE AS NEEDED
Status: DISCONTINUED | OUTPATIENT
Start: 2022-01-03 | End: 2022-01-03 | Stop reason: HOSPADM

## 2022-01-03 RX ORDER — ONDANSETRON 2 MG/ML
INJECTION INTRAMUSCULAR; INTRAVENOUS AS NEEDED
Status: DISCONTINUED | OUTPATIENT
Start: 2022-01-03 | End: 2022-01-03 | Stop reason: SURG

## 2022-01-03 RX ORDER — MAGNESIUM HYDROXIDE 1200 MG/15ML
LIQUID ORAL AS NEEDED
Status: DISCONTINUED | OUTPATIENT
Start: 2022-01-03 | End: 2022-01-03 | Stop reason: HOSPADM

## 2022-01-03 RX ORDER — OXYCODONE HYDROCHLORIDE 5 MG/1
5 TABLET ORAL
Status: DISCONTINUED | OUTPATIENT
Start: 2022-01-03 | End: 2022-01-03

## 2022-01-03 RX ORDER — PROMETHAZINE HYDROCHLORIDE 25 MG/1
25 SUPPOSITORY RECTAL ONCE AS NEEDED
Status: DISCONTINUED | OUTPATIENT
Start: 2022-01-03 | End: 2022-01-03 | Stop reason: HOSPADM

## 2022-01-03 RX ORDER — SODIUM CHLORIDE, SODIUM LACTATE, POTASSIUM CHLORIDE, CALCIUM CHLORIDE 600; 310; 30; 20 MG/100ML; MG/100ML; MG/100ML; MG/100ML
9 INJECTION, SOLUTION INTRAVENOUS CONTINUOUS PRN
Status: DISCONTINUED | OUTPATIENT
Start: 2022-01-03 | End: 2022-01-03 | Stop reason: HOSPADM

## 2022-01-03 RX ORDER — BUPIVACAINE HYDROCHLORIDE 5 MG/ML
INJECTION, SOLUTION EPIDURAL; INTRACAUDAL AS NEEDED
Status: DISCONTINUED | OUTPATIENT
Start: 2022-01-03 | End: 2022-01-03 | Stop reason: HOSPADM

## 2022-01-03 RX ADMIN — LIDOCAINE HYDROCHLORIDE 80 MG: 20 INJECTION, SOLUTION INFILTRATION; PERINEURAL at 09:55

## 2022-01-03 RX ADMIN — MEPERIDINE HYDROCHLORIDE 12.5 MG: 25 INJECTION INTRAMUSCULAR; INTRAVENOUS; SUBCUTANEOUS at 11:38

## 2022-01-03 RX ADMIN — FENTANYL CITRATE 50 MCG: 50 INJECTION, SOLUTION INTRAMUSCULAR; INTRAVENOUS at 10:07

## 2022-01-03 RX ADMIN — SODIUM CHLORIDE, POTASSIUM CHLORIDE, SODIUM LACTATE AND CALCIUM CHLORIDE: 600; 310; 30; 20 INJECTION, SOLUTION INTRAVENOUS at 09:52

## 2022-01-03 RX ADMIN — FENTANYL CITRATE 25 MCG: 50 INJECTION, SOLUTION INTRAMUSCULAR; INTRAVENOUS at 10:30

## 2022-01-03 RX ADMIN — EPHEDRINE SULFATE 10 MG: 50 INJECTION INTRAVENOUS at 10:17

## 2022-01-03 RX ADMIN — DEXAMETHASONE SODIUM PHOSPHATE 4 MG: 4 INJECTION INTRA-ARTICULAR; INTRALESIONAL; INTRAMUSCULAR; INTRAVENOUS; SOFT TISSUE at 10:02

## 2022-01-03 RX ADMIN — PROPOFOL 140 MG: 10 INJECTION, EMULSION INTRAVENOUS at 09:55

## 2022-01-03 RX ADMIN — CLINDAMYCIN PHOSPHATE 900 MG: 900 INJECTION, SOLUTION INTRAVENOUS at 09:52

## 2022-01-03 RX ADMIN — MIDAZOLAM 2 MG: 1 INJECTION INTRAMUSCULAR; INTRAVENOUS at 09:41

## 2022-01-03 RX ADMIN — ACETAMINOPHEN 1000 MG: 500 TABLET ORAL at 09:01

## 2022-01-03 RX ADMIN — Medication 100 MCG: at 10:17

## 2022-01-03 RX ADMIN — MEPERIDINE HYDROCHLORIDE 12.5 MG: 25 INJECTION INTRAMUSCULAR; INTRAVENOUS; SUBCUTANEOUS at 11:17

## 2022-01-03 RX ADMIN — FENTANYL CITRATE 50 MCG: 50 INJECTION, SOLUTION INTRAMUSCULAR; INTRAVENOUS at 10:05

## 2022-01-03 RX ADMIN — ONDANSETRON 4 MG: 2 INJECTION INTRAMUSCULAR; INTRAVENOUS at 10:02

## 2022-01-03 RX ADMIN — FENTANYL CITRATE 25 MCG: 50 INJECTION, SOLUTION INTRAMUSCULAR; INTRAVENOUS at 10:37

## 2022-01-03 NOTE — ANESTHESIA POSTPROCEDURE EVALUATION
Patient: Anca Samson    Procedure Summary     Date: 01/03/22 Room / Location: [facility] OR  / [facility]    Anesthesia Start: 0952 Anesthesia Stop: 1050    Procedure: KNEE ARTHROSCOPY WITH PARTIAL MEDIAL MENISCECTOMY,  CHONDROPLASTY (Left ) Diagnosis:       Acute medial meniscus tear of left knee, subsequent encounter      (Acute medial meniscus tear of left knee, subsequent encounter [S83.242D])    Surgeons: Nicholas Tipton MD Provider: Tad Patterson MD    Anesthesia Type: general ASA Status: 3          Anesthesia Type: general    Vitals  Vitals Value Taken Time   /73 01/03/22 1114   Temp 36.6 °C (97.8 °F) 01/03/22 1050   Pulse 90 01/03/22 1119   Resp 16 01/03/22 1055   SpO2 94 % 01/03/22 1119   Vitals shown include unvalidated device data.        Post Anesthesia Care and Evaluation    Patient location during evaluation: bedside  Patient participation: complete - patient participated  Level of consciousness: awake  Pain management: adequate  Airway patency: patent  Anesthetic complications: No anesthetic complications  PONV Status: none  Cardiovascular status: acceptable and stable  Respiratory status: acceptable  Hydration status: acceptable    Comments: An Anesthesiologist personally participated in the most demanding procedures (including induction and emergence if applicable) in the anesthesia plan, monitored the course of anesthesia administration at frequent intervals and remained physically present and available for immediate diagnosis and treatment of emergencies.

## 2022-01-03 NOTE — ANESTHESIA PREPROCEDURE EVALUATION
Anesthesia Evaluation     Patient summary reviewed and Nursing notes reviewed   no history of anesthetic complications:  NPO Solid Status: > 8 hours  NPO Liquid Status: > 2 hours           Airway   Mallampati: I  TM distance: >3 FB  Neck ROM: full  No difficulty expected  Dental    (+) upper dentures        Pulmonary     breath sounds clear to auscultation  (+) a smoker Current, COPD, shortness of breath, decreased breath sounds,   Cardiovascular - normal exam  Exercise tolerance: poor (<4 METS)    Rhythm: regular    (+) hypertension, hyperlipidemia,       Neuro/Psych  (+) psychiatric history Depression,     GI/Hepatic/Renal/Endo    (+) obesity,   renal disease CRI,     Musculoskeletal     Abdominal    Substance History      OB/GYN negative ob/gyn ROS         Other   arthritis,    history of cancer (h/o colon CA, current multiple myeloma)                    Anesthesia Plan    ASA 3     general       Anesthetic plan, all risks, benefits, and alternatives have been provided, discussed and informed consent has been obtained with: patient.

## 2022-01-03 NOTE — OP NOTE
KNEE ARTHROSCOPY WITH CHONDROPLASTY  Procedure Report    Patient Name:  Anca Samson  YOB: 1950    Date of Surgery:  1/3/2022     Indications: The patient has failed conservative treatment and wishes to proceed with operative treatment.  We discussed the risk of surgery including bleeding, infection, damage to neurovascular structures, anesthesia complications, continued pain and disability, need for additional procedures among others.  Informed consent was obtained and they wished to proceed.    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  The patient was brought to the operating room and placed supine on the operating room table.  General anesthesia was given.  All bony prominences were padded.  The operative leg had a nonsterile tourniquet placed on the thigh and was placed in arthroscopic leg rubio.  The opposite leg was placed in a padded leg rubio and the foot of the bed was lowered.      The patient received preoperative antibiotic.  After formal timeout was held, Esmarch was used to exsanguinate the limb and tourniquet was inflated.  A stab incision was made over the anterolateral aspect of the knee and a trocar was inserted into the knee.  The knee was extended, and diagnostic arthroscopy was performed. Anteromedial portal was made with the  spinal needle from outside in.  A stab incision was made.  An arthroscopic shaver was inserted into the knee and the knee was débrided.      Patellofemoral inspection revealed chondromalacia with patellofemoral joint grade 3.  This is over the patella and the trochlea.  A motorized shaver was used from a chondroplasty removing the damaged cartilage and smoothing the surface of the patella and trochlea.     The knee was flexed.  The valgus stress was placed to the knee.  A probe was inserted into the medial compartment.  Medial compartment exam revealed medial meniscus tear.  The complex degenerative type tear to the posterior horn and body of the medial meniscus.  There was grade III-IV chondromalacia of portion of the medial femoral condyle.  Mostly grade III chondromalacia with a small area of grade IV chondromalacia involving the weightbearing aspect.  This is debrided with a motorized shaver for a chondroplasty back to a stable border of cartilage.  A straight biter and  motorized shaver was used to perform a partial medial meniscectomy.     At this point attention was turned to the notch in the femur.  The ACL was probed and found to be stable. The PCL was intact.     The lateral compartment findings included an area of grade II-III chondromalacia over the lateral femoral condyle.  Lateral meniscus was intact.  Chondroplasty was performed of the lateral femoral condyle with a motorized shaver.    At this point then fluid was drained from the knee.  Tourniquet was released.  The incisions were closed with 3-0 nylon in figure-of-8 fashion.  Local anesthetic, 10 mL of Marcaine and 10 mg of morphine, were injected into the knee.  Xeroform, 4x4s, soft roll and an Ace wrap were placed.  The patient awoke from anesthesia in stable condition.  There were no complications.  All counts were correct.  The patient was stable to recovery.           Nicholas Tipton MD     Date: 1/3/2022  Time: 10:45 EST

## 2022-01-12 DIAGNOSIS — S83.242D ACUTE MEDIAL MENISCUS TEAR OF LEFT KNEE, SUBSEQUENT ENCOUNTER: ICD-10-CM

## 2022-01-12 RX ORDER — HYDROCODONE BITARTRATE AND ACETAMINOPHEN 7.5; 325 MG/1; MG/1
1-2 TABLET ORAL EVERY 4 HOURS PRN
Qty: 36 TABLET | Refills: 0 | Status: SHIPPED | OUTPATIENT
Start: 2022-01-12 | End: 2022-02-01 | Stop reason: SDUPTHER

## 2022-01-12 NOTE — TELEPHONE ENCOUNTER
Patient is requesting a refill on pain medicine. She uses [facility] on [address] [employer] [facility]. Her phone number is [phone]

## 2022-01-18 ENCOUNTER — OFFICE VISIT (OUTPATIENT)
Dept: ORTHOPEDIC SURGERY | Facility: CLINIC | Age: 72
End: 2022-01-18

## 2022-01-18 VITALS — WEIGHT: 195 LBS | HEIGHT: 61 IN | OXYGEN SATURATION: 97 % | HEART RATE: 104 BPM | BODY MASS INDEX: 36.82 KG/M2

## 2022-01-18 DIAGNOSIS — Z98.890 S/P LEFT KNEE ARTHROSCOPY: Primary | ICD-10-CM

## 2022-01-18 PROCEDURE — 99024 POSTOP FOLLOW-UP VISIT: CPT | Performed by: ORTHOPAEDIC SURGERY

## 2022-01-18 NOTE — PROGRESS NOTES
"Chief Complaint  Pain of the Left Knee     Subjective      Anca Samson presents to [facility] for follow up evaluation of the left knee. The patient is S/P Left knee partial medial menisectomy and chondroplasty 1/3/22. Her sutures were removed today. She is overall doing well. She is ambulating with a cane.     Allergies   Allergen Reactions   • Morphine Anaphylaxis   • Cephalexin Hives   • Penicillins Hives   • Sulfa Antibiotics Hives        Social History     Socioeconomic History   • Marital status:    Tobacco Use   • Smoking status: Current Some Day Smoker     Packs/day: 1.00     Years: 47.00     Pack years: 47.00     Types: Cigarettes   • Smokeless tobacco: Never Used   • Tobacco comment: started age 26   Vaping Use   • Vaping Use: Never used   Substance and Sexual Activity   • Alcohol use: Never   • Drug use: Never   • Sexual activity: Defer        Review of Systems     Objective   Vital Signs:   Pulse 104   Ht 154.9 cm (61\")   Wt 88.5 kg (195 lb)   SpO2 97%   BMI 36.84 kg/m²       Physical Exam  Constitutional:       Appearance: Normal appearance. The patient is well-developed and normal weight.   HENT:      Head: Normocephalic.      Right Ear: Hearing and external ear normal.      Left Ear: Hearing and external ear normal.      Nose: Nose normal.   Eyes:      Conjunctiva/sclera: Conjunctivae normal.   Cardiovascular:      Rate and Rhythm: Normal rate.   Pulmonary:      Effort: Pulmonary effort is normal.      Breath sounds: No wheezing or rales.   Abdominal:      Palpations: Abdomen is soft.      Tenderness: There is no abdominal tenderness.   Musculoskeletal:      Cervical back: Normal range of motion.   Skin:     Findings: No rash.   Neurological:      Mental Status: The patient is alert and oriented to person, place, and time.   Psychiatric:         Mood and Affect: Mood and affect normal.         Judgment: Judgment normal.       Ortho Exam      Left knee- " incision well healing. Mild bruising to anterior medial knee. Sutures Removed. Stable to varus/valgus stress. Stable to anterior/posterior drawer. ROM 0-120 degrees. Neurovascularly intact. Sensation grossly intact.     Procedures        Imaging Results (Most Recent)     None           Result Review :       No results found.           Assessment and Plan     DX: S/P Left knee partial medial menisectomy and chondroplasty     Discussed the treatment plan with the patient.  Work note given today to return to work. Plan to continue home exercises.     Call or return if worsening symptoms.    Follow Up     4 weeks      Patient was given instructions and counseling regarding her condition or for health maintenance advice. Please see specific information pulled into the AVS if appropriate.     Scribed for Nicholas Tipton MD by Mi Monge.  01/18/22   09:39 EST    I have personally performed the services described in this document as scribed by the above individual and it is both accurate and complete. Nicholas Tipton MD 01/19/22

## 2022-01-26 DIAGNOSIS — F32.A DEPRESSION, UNSPECIFIED DEPRESSION TYPE: ICD-10-CM

## 2022-01-26 RX ORDER — FLUOXETINE HYDROCHLORIDE 20 MG/1
CAPSULE ORAL
Qty: 90 CAPSULE | Refills: 0 | Status: SHIPPED | OUTPATIENT
Start: 2022-01-26 | End: 2022-05-20 | Stop reason: SDUPTHER

## 2022-02-01 DIAGNOSIS — S83.242D ACUTE MEDIAL MENISCUS TEAR OF LEFT KNEE, SUBSEQUENT ENCOUNTER: ICD-10-CM

## 2022-02-01 RX ORDER — HYDROCODONE BITARTRATE AND ACETAMINOPHEN 7.5; 325 MG/1; MG/1
TABLET ORAL
Qty: 36 TABLET | Refills: 0 | Status: SHIPPED | OUTPATIENT
Start: 2022-02-01 | End: 2022-02-17

## 2022-02-01 NOTE — TELEPHONE ENCOUNTER
Patient requesting refill on Webb 7.5/325mg. [facility] pharmacy in [place]. S/P L knee scope 01/03/22.

## 2022-02-14 NOTE — PROGRESS NOTES
Primary Care Provider  Paulino Macias PA     Referring Provider  No ref. provider found     Chief Complaint  Asthma, COPD, Follow-up (2 Month ), Cough, Shortness of Breath, and Wheezing    Subjective          History of Presenting Illness  Patient is a 71-year-old female last seen by Dr. Arredondo who has been referred to pulmonary service for worsening shortness of breath.  Patient states that since last visit her breathing is at baseline.  Patient states that she will get short of breath that is worse with exertion, moderate in severity, and improved with rest.  Patient states she is taking Pulmicort, Perforomist, and Yupelri every day as prescribed and uses albuterol inhaler and albuterol nebulizer treatments as needed.  Patient states she is under the care of Dr. Nicolas for multiple myeloma and is scheduled to follow-up with him next week.  Patient states that she does snore and was scheduled to have a home sleep study however she canceled sleep study due to having knee surgery.  Patient states she does not want to have a sleep study completed at this time.  Patient is a current cigarette smoker and has tried multiple times to quit smoking but not been successful.  Patient is willing to try nicotine patches to help her to quit smoking. Patient had alpha-1 antitrypsin drawn last office visit came back with a normal genotype of MM, with a normal level of 109.  IgE level came back within normal level of 6.60.  Allergy zone panel came back unremarkable. Patient denies fever, chills, night sweats, swollen glands in the head and neck, unintentional weight loss, hemoptysis, purulent sputum production, dysphagia, chest pain, palpitations, chest tightness, abdominal pain, nausea, vomiting, and diarrhea.  Patient also denies any myalgias, changes in sense of taste and/or smell, sore throat, any other coronavirus or flu-like symptoms.  Patient denies any leg swelling, orthopnea, paroxysmal nocturnal dyspnea.  Patient is able  to perform activities of daily living.      Review of Systems   Constitutional: Negative for activity change, appetite change, chills, diaphoresis, fatigue, fever, unexpected weight gain and unexpected weight loss.        Negative for Insomnia   HENT: Negative for congestion (Nasal), mouth sores, nosebleeds, postnasal drip, sore throat, swollen glands and trouble swallowing.         Negative for Thrush  Negative for Hoarseness  Negative for Allergies/Hay Fever  Negative for Recent Head injury  Negative for Ear Fullness  Negative for Nasal or Sinus pain  Negative for Dry lips  Negative for Nasal discharge   Respiratory: Positive for shortness of breath. Negative for apnea, cough, chest tightness and wheezing.         Negative for Hemoptysis  Negative for Pleuritic pain   Cardiovascular: Negative for chest pain, palpitations and leg swelling.        Negative for Claudication  Negative for Cyanosis  Negative for Dyspnea on exertion   Gastrointestinal: Negative for abdominal pain, diarrhea, nausea, vomiting and GERD.   Musculoskeletal: Negative for joint swelling and myalgias.        Negative for Joint pain  Negative for Joint stiffness   Skin: Negative for color change, dry skin, pallor and rash.   Neurological: Negative for syncope, weakness and headache.   Hematological: Negative for adenopathy. Does not bruise/bleed easily.        Family History   Problem Relation Age of Onset   • Heart disease Mother    • Lung cancer Mother    • Cancer Mother    • Stroke Father    • Heart disease Father    • Kidney cancer Father    • Cancer Father    • Stroke Other         UNCLE/AUNT        Social History     Socioeconomic History   • Marital status:    Tobacco Use   • Smoking status: Current Some Day Smoker     Packs/day: 0.50     Years: 47.00     Pack years: 23.50     Types: Cigarettes   • Smokeless tobacco: Never Used   • Tobacco comment: started age 26   Vaping Use   • Vaping Use: Never used   Substance and Sexual  Activity   • Alcohol use: Never   • Drug use: Never   • Sexual activity: Defer        Past Medical History:   Diagnosis Date   • Anxiety    • Asthma    • C. difficile diarrhea    • Colon cancer ([facility]) 07/21/2017   • COPD (chronic obstructive pulmonary disease) ([facility]) 10/23/2017   • Depression    • Diverticulitis    • Dysphagia    • Heartburn    • Heartburn    • Heartburn    • Hx of psychiatric care    • Hyperlipidemia    • Hypertension    • Impaired fasting glucose 08/14/2015   • Lumbago    • Lung nodule 2/17/2022   • Major depressive disorder 10/27/2016   • Melena    • Multiple myeloma ([facility])    • Nicotine dependence 05/10/2017   • Renal insufficiency    • Seizures ([facility])     pseudo seizures, 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   • Shortness of breath    • Tobacco use disorder 08/14/2015   • Visit for screening mammogram 02/20/2020    NORMAL- REPEAT IN ONE YEAR   • Vitamin D deficiency         Immunization History   Administered Date(s) Administered   • Flu Vaccine Quad PF >18YRS 09/14/2015   • Fluzone High Dose =>65 Years ([employer] ONLY) 09/24/2020, 09/24/2021   • Fluzone High-Dose 65+yrs 09/24/2020, 09/24/2021   • Pneumococcal Conjugate 13-Valent (PCV13) 09/21/2015       Allergies   Allergen Reactions   • Morphine Anaphylaxis   • Cephalexin Hives   • Penicillins Hives   • Sulfa Antibiotics Hives          Current Outpatient Medications:   •  albuterol (PROVENTIL) (2.5 MG/3ML) 0.083% nebulizer solution, Take 2.5 mg by nebulization Every 4 (Four) Hours As Needed for Wheezing., Disp: 120 each, Rfl: 5  •  albuterol sulfate  (90 Base) MCG/ACT inhaler, Inhale 2 puffs Every 4 (Four) Hours As Needed for Wheezing., Disp: 18 g, Rfl: 1  •  alendronate (FOSAMAX) 70 MG tablet, , Disp: , Rfl:   •  colestipol (COLESTID) 1 g tablet, TAKE 2 TABLETS BY MOUTH TWICE A DAY . SWALLOW WHOLE WITH ANY LIQUID. DONT CRUSH, CHEW AND/OR DIVIDIE (Patient taking differently: Take 2 g by mouth 2 (Two) Times a Day.), Disp: 360 tablet, Rfl: 3  •  FLUoxetine  "(PROzac) 20 MG capsule, TAKE 1 CAPSULE BY MOUTH EVERY DAY, Disp: 90 capsule, Rfl: 0  •  formoterol (PERFOROMIST) 20 MCG/2ML nebulizer solution, Take 2 mL by nebulization 2 (Two) Times a Day., Disp: 60 each, Rfl: 5  •  meloxicam (MOBIC) 15 MG tablet, Take 1 tablet by mouth Daily., Disp: 30 tablet, Rfl: 2  •  promethazine (PHENERGAN) 25 MG tablet, Take 1 tablet by mouth Every 6 (Six) Hours As Needed for Nausea or Vomiting., Disp: 30 tablet, Rfl: 1  •  revefenacin (YUPELRI) 175 MCG/3ML nebulizer solution, Take 3 mL by nebulization Daily., Disp: 90 mL, Rfl: 5  •  vitamin D (ERGOCALCIFEROL) 1.25 MG (39567 UT) capsule capsule, Take 1 capsule by mouth Every 7 (Seven) Days. (Patient taking differently: Take 50,000 Units by mouth Every 7 (Seven) Days. Takes on fridays), Disp: 13 capsule, Rfl: 3  •  budesonide (Pulmicort) 0.5 MG/2ML nebulizer solution, Take 2 mL by nebulization 2 (Two) Times a Day for 30 days., Disp: 60 each, Rfl: 5  •  nicotine (NICODERM CQ) 21 MG/24HR patch, Place 1 patch on the skin as directed by provider Daily., Disp: 30 patch, Rfl: 0     Objective     Physical Exam  Vital Signs Reviewed  WDWN, Alert, NAD.    HEENT:  PERRL, EOMI.  OP, nares clear, no sinus tenderness  Neck:  Supple, no JVD, no thyromegaly.  Mallampati classification IV airway.  Lymph: no axillary, cervical, supraclavicular lymphadenopathy noted bilaterally  Chest: Minutes breath sounds bilaterally. No wheezes, rales, or rhonchi appreciated.  Normal work of breathing noted.  Patient is able speak full sentences without difficulty.  CV: RRR, no MGR, pulses 2+, equal.  Abd:  Soft, NT, ND, + BS, no HSM  EXT:  no clubbing, no cyanosis, no edema, no joint tenderness  Neuro:  A&Ox3, CN grossly intact, no focal deficits.  Skin: No rashes or lesions noted.    Vital Signs:   /75 (BP Location: Right arm, Patient Position: Sitting, Cuff Size: Large Adult)   Pulse 85   Temp 97.7 °F (36.5 °C) (Temporal)   Resp 19   Ht 154.9 cm (61\")   Wt " 89.8 kg (198 lb)   SpO2 97% Comment: room air  BMI 37.41 kg/m²         Result Review :   I have personally reviewed Dr. Arredondo's last office visit note.  I also reviewed alpha-1 antitrypsin results and lab results.  See scanned reports.         Assessment and Plan      Assessment:  1.  Dyspnea.  2.  Centrilobular emphysema on chest CT scan.  3.  History of colon cancer.  4.  History of multiple myeloma.  Patient under the care of Dr. Nicolas.  5.  Lung nodules.  6.  Obesity with a BMI of   7.  Alpha-1 with a normal genotype of M/M with a normal level of 109.98. Multiple lung nodules.  8.  Snoring.  9.  Tobacco abuse of cigarettes ongoing.      Plan:  1.  Continue Pulmicort, Perforomist, and Yupelri every day as prescribed and rinse mouth out after each use.  2.  Continue albuterol inhaler and albuterol nebulizer treatments as needed.  3.  Patient reports that Dr. Nicolas is ordering follow-up chest CT scans.  Patient states she is also scheduled to have a PET scan in March, 2022.  Patient is advised to have a copy of report sent to our office.  4.  Patient is scheduled to have a pulmonary function test and a 6-minute walk test on 4/12/2022 at 11:30 AM.  5.  Patient is not wanting sleep study completed at this time.  Risks of not treating sleep apnea discussed with the patient.  Patient verbalized understanding.  6.  Smoking status: patient is a current cigarette smoker.   Patient reports that they have been smoking cigarettes.  Patient started smoking about 47 years ago.  Patient has a 23.50 pack-year smoking history.  Patient has never used smokeless tobacco.  I have educated patient on the risk of diseases from using tobacco products such as cancer, COPD and heart disease. I advised patient to quit and patient is willing to quit. We have discussed the following method/s for tobacco cessation:  Education Material Counseling [place].  Nicotine patches sent to the pharmacy today.  How to take medication and  possible side effects of medication discussed with the patient.  Patient verbalized understanding and compliance.  Together we have set a quit date for 2 weeks from today.  Patient will follow up in 3-4 months or sooner to check on their progress. I spent 4 minutes counseling the patient.  7.  Patient reports she is up-to-date with her flu and pneumonia vaccines.  Patient declines COVID-19 vaccination.  Discussed with patient the benefits of vaccination including decreased risk of severe illness, hospitalization and death related to COVID-19.  Patient verbalized understanding.  Patient is advised to continue to follow CDC recommendations such as social distancing, wearing a mask, and washing hands for least 20 seconds.  8.  Patient to call the office, 911, or go to the ER with new or worsening symptoms.  9.  Follow-up in 3 to 4 months, sooner if needed.          Follow Up   Return for 3-4 months .  Patient was given instructions and counseling regarding her condition or for health maintenance advice. Please see specific information pulled into the AVS if appropriate.

## 2022-02-14 NOTE — PATIENT INSTRUCTIONS
Managing the Challenge of Quitting Smoking  Quitting smoking is a physical and mental challenge. You will face cravings, withdrawal symptoms, and temptation. Before quitting, work with your health care provider to make a plan that can help you manage quitting. Preparation can help you quit and keep you from giving in.  How to manage lifestyle changes  Managing stress  Stress can make you want to smoke, and wanting to smoke may cause stress. It is important to find ways to manage your stress. You might try some of the following:  · Practice relaxation techniques.  ? Breathe slowly and deeply, in through your nose and out through your mouth.  ? Listen to music.  ? Soak in a bath or take a shower.  ? Imagine a peaceful place or vacation.  · Get some support.  ? Talk with family or friends about your stress.  ? Join a support group.  ? Talk with a counselor or therapist.  · Get some physical activity.  ? Go for a walk, run, or bike ride.  ? Play a favorite sport.  ? Practice yoga.    Medicines  Talk with your health care provider about medicines that might help you deal with cravings and make quitting easier for you.  Relationships  Social situations can be difficult when you are quitting smoking. To manage this, you can:  · Avoid parties and other social situations where people might be smoking.  · Avoid alcohol.  · Leave right away if you have the urge to smoke.  · Explain to your family and friends that you are quitting smoking. Ask for support and let them know you might be a bit grumpy.  · Plan activities where smoking is not an option.  General instructions  Be aware that many people gain weight after they quit smoking. However, not everyone does. To keep from gaining weight, have a plan in place before you quit and stick to the plan after you quit. Your plan should include:  · Having healthy snacks. When you have a craving, it may help to:  ? Eat popcorn, carrots, celery, or other cut vegetables.  ? Chew  sugar-free gum.  · Changing how you eat.  ? Eat small portion sizes at meals.  ? Eat 4-6 small meals throughout the day instead of 1-2 large meals a day.  ? Be mindful when you eat. Do not watch television or do other things that might distract you as you eat.  · Exercising regularly.  ? Make time to exercise each day. If you do not have time for a long workout, do short bouts of exercise for 5-10 minutes several times a day.  ? Do some form of strengthening exercise, such as weight lifting.  ? Do some exercise that gets your heart beating and causes you to breathe deeply, such as walking fast, running, swimming, or biking. This is very important.  · Drinking plenty of water or other low-calorie or no-calorie drinks. Drink 6-8 glasses of water daily.    How to recognize withdrawal symptoms  Your body and mind may experience discomfort as you try to get used to not having nicotine in your system. These effects are called withdrawal symptoms. They may include:  · Feeling hungrier than normal.  · Having trouble concentrating.  · Feeling irritable or restless.  · Having trouble sleeping.  · Feeling depressed.  · Craving a cigarette.  To manage withdrawal symptoms:  · Avoid places, people, and activities that trigger your cravings.  · Remember why you want to quit.  · Get plenty of sleep.  · Avoid coffee and other caffeinated drinks. These may worsen some of your symptoms.  These symptoms may surprise you. But be assured that they are normal to have when quitting smoking.  How to manage cravings  Come up with a plan for how to deal with your cravings. The plan should include the following:  · A definition of the specific situation you want to deal with.  · An alternative action you will take.  · A clear idea for how this action will help.  · The name of someone who might help you with this.  Cravings usually last for 5-10 minutes. Consider taking the following actions to help you with your plan to deal with  cravings:  · Keep your mouth busy.  ? Chew sugar-free gum.  ? Suck on hard candies or a straw.  ? Brush your teeth.  · Keep your hands and body busy.  ? Change to a different activity right away.  ? Squeeze or play with a ball.  ? Do an activity or a hobby, such as making bead jewelry, practicing needlepoint, or working with wood.  ? Mix up your normal routine.  ? Take a short exercise break. Go for a quick walk or run up and down stairs.  · Focus on doing something kind or helpful for someone else.  · Call a friend or family member to talk during a craving.  · Join a support group.  · Contact a quitline.  Where to find support  To get help or find a support group:  · Call the [employer]'s Smoking Quitline: 2-277-QUIT NOW (475-0936)  · Visit the website of the Substance Abuse and Mental Health Services Administration: www.samhsa.gov  · Text QUIT to SmokefreeTXT: 378728  Where to find more information  Visit these websites to find more information on quitting smoking:  · [employer]: www.smokefree.gov  · American Lung Association: www.lung.org  · American Cancer Society: www.cancer.org  · Centers for Disease Control and Prevention: www.cdc.gov  · American Heart Association: www.heart.org  Contact a health care provider if:  · You want to change your plan for quitting.  · The medicines you are taking are not helping.  · Your eating feels out of control or you cannot sleep.  Get help right away if:  · You feel depressed or become very anxious.  Summary  · Quitting smoking is a physical and mental challenge. You will face cravings, withdrawal symptoms, and temptation to smoke again. Preparation can help you as you go through these challenges.  · Try different techniques to manage stress, handle social situations, and prevent weight gain.  · You can deal with cravings by keeping your mouth busy (such as by chewing gum), keeping your hands and body busy, calling family or friends, or  contacting a quitline for people who want to quit smoking.  · You can deal with withdrawal symptoms by avoiding places where people smoke, getting plenty of rest, and avoiding drinks with caffeine.  This information is not intended to replace advice given to you by your health care provider. Make sure you discuss any questions you have with your health care provider.  Document Revised: 10/06/2020 Document Reviewed: 10/06/2020  Elsevier Patient Education © 2021 [employer]

## 2022-02-17 ENCOUNTER — OFFICE VISIT (OUTPATIENT)
Dept: PULMONOLOGY | Facility: CLINIC | Age: 72
End: 2022-02-17

## 2022-02-17 VITALS
DIASTOLIC BLOOD PRESSURE: 75 MMHG | OXYGEN SATURATION: 97 % | SYSTOLIC BLOOD PRESSURE: 137 MMHG | RESPIRATION RATE: 19 BRPM | HEIGHT: 61 IN | BODY MASS INDEX: 37.38 KG/M2 | HEART RATE: 85 BPM | WEIGHT: 198 LBS | TEMPERATURE: 97.7 F

## 2022-02-17 DIAGNOSIS — R91.1 LUNG NODULE: ICD-10-CM

## 2022-02-17 DIAGNOSIS — Z72.0 TOBACCO USE: ICD-10-CM

## 2022-02-17 DIAGNOSIS — R06.00 DYSPNEA, UNSPECIFIED TYPE: ICD-10-CM

## 2022-02-17 DIAGNOSIS — C90.00 MULTIPLE MYELOMA, REMISSION STATUS UNSPECIFIED: ICD-10-CM

## 2022-02-17 DIAGNOSIS — C18.9 MALIGNANT NEOPLASM OF COLON, UNSPECIFIED PART OF COLON: ICD-10-CM

## 2022-02-17 DIAGNOSIS — J43.2 CENTRILOBULAR EMPHYSEMA: Primary | ICD-10-CM

## 2022-02-17 DIAGNOSIS — R06.83 SNORING: ICD-10-CM

## 2022-02-17 DIAGNOSIS — R05.9 COUGH: ICD-10-CM

## 2022-02-17 PROCEDURE — 99214 OFFICE O/P EST MOD 30 MIN: CPT | Performed by: NURSE PRACTITIONER

## 2022-02-17 PROCEDURE — 99406 BEHAV CHNG SMOKING 3-10 MIN: CPT | Performed by: NURSE PRACTITIONER

## 2022-02-17 RX ORDER — NICOTINE 21 MG/24HR
1 PATCH, TRANSDERMAL 24 HOURS TRANSDERMAL EVERY 24 HOURS
Qty: 30 PATCH | Refills: 0 | Status: SHIPPED | OUTPATIENT
Start: 2022-02-17 | End: 2022-03-29

## 2022-02-18 ENCOUNTER — OFFICE VISIT (OUTPATIENT)
Dept: ORTHOPEDIC SURGERY | Facility: CLINIC | Age: 72
End: 2022-02-18

## 2022-02-18 VITALS — BODY MASS INDEX: 37.65 KG/M2 | OXYGEN SATURATION: 98 % | HEART RATE: 107 BPM | HEIGHT: 61 IN | WEIGHT: 199.4 LBS

## 2022-02-18 DIAGNOSIS — Z47.89 AFTERCARE FOLLOWING SURGERY OF THE MUSCULOSKELETAL SYSTEM: Primary | ICD-10-CM

## 2022-02-18 PROCEDURE — 99024 POSTOP FOLLOW-UP VISIT: CPT | Performed by: PHYSICIAN ASSISTANT

## 2022-02-18 NOTE — PROGRESS NOTES
"Chief Complaint  Pain and Follow-up of the Left Knee    Subjective          Anca Samson is a 71 y.o. female  presents to [facility] for   History of Present Illness    Patient presents for follow-up evaluation of left knee arthroscopic partial medial meniscectomy, chondroplasty, 1/3/2022.  Patient states she is doing well she states the pain she had prior to the surgery is gone she states it still has occasional pain with certain movements.  Patient denies swelling.  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  No new complaints today.  Allergies   Allergen Reactions   • Morphine Anaphylaxis   • Cephalexin Hives   • Penicillins Hives   • Sulfa Antibiotics Hives        Social History     Socioeconomic History   • Marital status:    Tobacco Use   • Smoking status: Current Some Day Smoker     Packs/day: 0.50     Years: 47.00     Pack years: 23.50     Types: Cigarettes   • Smokeless tobacco: Never Used   • Tobacco comment: started age 26   Vaping Use   • Vaping Use: Never used   Substance and Sexual Activity   • Alcohol use: Never   • Drug use: Never   • Sexual activity: Defer        REVIEW OF SYSTEMS    Constitutional: Denies fevers, chills, weight loss  Cardiovascular: Denies chest pain, shortness of breath  Skin: Denies rashes, acute skin changes  Neurologic: Denies headache, loss of consciousness  MSK: Left knee pain      Objective   Vital Signs:   Pulse 107   Ht 154.9 cm (61\")   Wt 90.4 kg (199 lb 6.4 oz)   SpO2 98%   BMI 37.68 kg/m²     Body mass index is 37.68 kg/m².    Physical Exam    Left knee: Well-healed arthroscopic incisions, no erythema, no ecchymosis, no swelling, no signs of infection, full extension, flexion 120, stable to varus/valgus stress, stable " anterior/posterior drawer, 5 out of 5 strength, nontender calf, negative Homans' sign.    Procedures    Imaging Results (Most Recent)     None           Result Review :   The following data was reviewed by: LACI Kathleen on 02/18/2022:               Assessment and Plan    Diagnoses and all orders for this visit:    1. Aftercare following surgery of left knee arthroscopy partial medial meniscectomy, chondroplasty, 1/3/2022 (Primary)        Discussed diagnosis and treatment options with the patient she was advised to continue activity as tolerated if any new or concerning symptoms occur follow-up sooner otherwise follow-up as needed, patient agrees.    Call or return if worsening symptoms.    Follow Up   Return if symptoms worsen or fail to improve.  Patient was given instructions and counseling regarding her condition or for health maintenance advice. Please see specific information pulled into the AVS if appropriate.

## 2022-02-28 ENCOUNTER — TELEPHONE (OUTPATIENT)
Dept: ONCOLOGY | Facility: HOSPITAL | Age: 72
End: 2022-02-28

## 2022-02-28 NOTE — TELEPHONE ENCOUNTER
Caller: MAURICIO    Relationship to patient: SELF    Best call back number: [phone]    Chief complaint: R/S 3-7-2022 LAB APPT TO EARLIER IN THE AM    Type of visit: LAB

## 2022-03-04 ENCOUNTER — TRANSCRIBE ORDERS (OUTPATIENT)
Dept: ADMINISTRATIVE | Facility: HOSPITAL | Age: 72
End: 2022-03-04

## 2022-03-04 DIAGNOSIS — C90.00 MYELOMA ASSOCIATED AMYLOIDOSIS: Primary | ICD-10-CM

## 2022-03-04 DIAGNOSIS — E85.9 MYELOMA ASSOCIATED AMYLOIDOSIS: Primary | ICD-10-CM

## 2022-03-07 ENCOUNTER — HOSPITAL ENCOUNTER (OUTPATIENT)
Dept: PET IMAGING | Facility: HOSPITAL | Age: 72
Discharge: HOME | End: 2022-03-07

## 2022-03-07 ENCOUNTER — LAB (OUTPATIENT)
Dept: ONCOLOGY | Facility: HOSPITAL | Age: 72
End: 2022-03-07

## 2022-03-07 DIAGNOSIS — C90.00 MYELOMA ASSOCIATED AMYLOIDOSIS: ICD-10-CM

## 2022-03-07 DIAGNOSIS — E85.9 MYELOMA ASSOCIATED AMYLOIDOSIS: ICD-10-CM

## 2022-03-07 DIAGNOSIS — C90.00 MULTIPLE MYELOMA NOT HAVING ACHIEVED REMISSION: ICD-10-CM

## 2022-03-07 LAB
ALBUMIN SERPL-MCNC: 3.98 G/DL (ref 3.5–5.2)
ALBUMIN/GLOB SERPL: 1 G/DL
ALP SERPL-CCNC: 71 U/L (ref 39–117)
ALT SERPL W P-5'-P-CCNC: 12 U/L (ref 1–33)
ANION GAP SERPL CALCULATED.3IONS-SCNC: 9.8 MMOL/L (ref 5–15)
AST SERPL-CCNC: 14 U/L (ref 1–32)
BASOPHILS # BLD AUTO: 0.05 10*3/MM3 (ref 0–0.2)
BASOPHILS NFR BLD AUTO: 0.6 % (ref 0–1.5)
BILIRUB SERPL-MCNC: 0.5 MG/DL (ref 0–1.2)
BUN SERPL-MCNC: 15 MG/DL (ref 8–23)
BUN/CREAT SERPL: 17.9 (ref 7–25)
CALCIUM SPEC-SCNC: 9.3 MG/DL (ref 8.6–10.5)
CHLORIDE SERPL-SCNC: 107 MMOL/L (ref 98–107)
CO2 SERPL-SCNC: 18.2 MMOL/L (ref 22–29)
CREAT SERPL-MCNC: 0.84 MG/DL (ref 0.57–1)
DEPRECATED RDW RBC AUTO: 52.1 FL (ref 37–54)
EGFRCR SERPLBLD CKD-EPI 2021: 73.9 ML/MIN/1.73
EOSINOPHIL # BLD AUTO: 0.25 10*3/MM3 (ref 0–0.4)
EOSINOPHIL NFR BLD AUTO: 3.1 % (ref 0.3–6.2)
ERYTHROCYTE [DISTWIDTH] IN BLOOD BY AUTOMATED COUNT: 14.3 % (ref 12.3–15.4)
GLOBULIN UR ELPH-MCNC: 4.1 GM/DL
GLUCOSE SERPL-MCNC: 116 MG/DL (ref 65–99)
HCT VFR BLD AUTO: 43.7 % (ref 34–46.6)
HGB BLD-MCNC: 14.6 G/DL (ref 12–15.9)
IMM GRANULOCYTES # BLD AUTO: 0.01 10*3/MM3 (ref 0–0.05)
IMM GRANULOCYTES NFR BLD AUTO: 0.1 % (ref 0–0.5)
LDH SERPL-CCNC: 181 U/L (ref 135–214)
LYMPHOCYTES # BLD AUTO: 3.58 10*3/MM3 (ref 0.7–3.1)
LYMPHOCYTES NFR BLD AUTO: 43.8 % (ref 19.6–45.3)
MCH RBC QN AUTO: 32.7 PG (ref 26.6–33)
MCHC RBC AUTO-ENTMCNC: 33.4 G/DL (ref 31.5–35.7)
MCV RBC AUTO: 98 FL (ref 79–97)
MONOCYTES # BLD AUTO: 0.62 10*3/MM3 (ref 0.1–0.9)
MONOCYTES NFR BLD AUTO: 7.6 % (ref 5–12)
NEUTROPHILS NFR BLD AUTO: 3.67 10*3/MM3 (ref 1.7–7)
NEUTROPHILS NFR BLD AUTO: 44.8 % (ref 42.7–76)
PLATELET # BLD AUTO: 284 10*3/MM3 (ref 140–450)
PMV BLD AUTO: 8.7 FL (ref 6–12)
POTASSIUM SERPL-SCNC: 3.7 MMOL/L (ref 3.5–5.2)
PROT SERPL-MCNC: 8.1 G/DL (ref 6–8.5)
RBC # BLD AUTO: 4.46 10*6/MM3 (ref 3.77–5.28)
SODIUM SERPL-SCNC: 135 MMOL/L (ref 136–145)
WBC NRBC COR # BLD: 8.18 10*3/MM3 (ref 3.4–10.8)

## 2022-03-07 PROCEDURE — 36415 COLL VENOUS BLD VENIPUNCTURE: CPT

## 2022-03-07 PROCEDURE — 0 FLUDEOXYGLUCOSE F18 SOLUTION: Performed by: INTERNAL MEDICINE

## 2022-03-07 PROCEDURE — 85025 COMPLETE CBC W/AUTO DIFF WBC: CPT

## 2022-03-07 PROCEDURE — 83521 IG LIGHT CHAINS FREE EACH: CPT

## 2022-03-07 PROCEDURE — 86334 IMMUNOFIX E-PHORESIS SERUM: CPT

## 2022-03-07 PROCEDURE — A9552 F18 FDG: HCPCS | Performed by: INTERNAL MEDICINE

## 2022-03-07 PROCEDURE — 84165 PROTEIN E-PHORESIS SERUM: CPT

## 2022-03-07 PROCEDURE — 83615 LACTATE (LD) (LDH) ENZYME: CPT

## 2022-03-07 PROCEDURE — 78816 PET IMAGE W/CT FULL BODY: CPT

## 2022-03-07 PROCEDURE — 80053 COMPREHEN METABOLIC PANEL: CPT

## 2022-03-07 RX ADMIN — FLUDEOXYGLUCOSE F18 1 DOSE: 300 INJECTION INTRAVENOUS at 09:47

## 2022-03-08 ENCOUNTER — OFFICE VISIT (OUTPATIENT)
Dept: ONCOLOGY | Facility: HOSPITAL | Age: 72
End: 2022-03-08

## 2022-03-08 VITALS
RESPIRATION RATE: 24 BRPM | TEMPERATURE: 98.9 F | HEART RATE: 110 BPM | OXYGEN SATURATION: 98 % | WEIGHT: 195.99 LBS | BODY MASS INDEX: 37.03 KG/M2 | DIASTOLIC BLOOD PRESSURE: 68 MMHG | SYSTOLIC BLOOD PRESSURE: 124 MMHG

## 2022-03-08 DIAGNOSIS — C18.9 MALIGNANT NEOPLASM OF COLON, UNSPECIFIED PART OF COLON: ICD-10-CM

## 2022-03-08 DIAGNOSIS — C90.00 MULTIPLE MYELOMA, REMISSION STATUS UNSPECIFIED: Primary | ICD-10-CM

## 2022-03-08 PROCEDURE — G0463 HOSPITAL OUTPT CLINIC VISIT: HCPCS

## 2022-03-08 PROCEDURE — 99214 OFFICE O/P EST MOD 30 MIN: CPT | Performed by: INTERNAL MEDICINE

## 2022-03-08 NOTE — ASSESSMENT & PLAN NOTE
Status post resection.  She is now almost 5 years out from her diagnosis and adjuvant treatment.  No evidence of disease recurrence.  She is up-to-date on colonoscopies.  Return in 6 months for continued surveillance.

## 2022-03-08 NOTE — ASSESSMENT & PLAN NOTE
Patient is currently on observation.  PET scan reviewed with patient and daughter.  There is one lesion in the iliac region that has hypermetabolic activity but has been stable dating back to at least 2017.  The remainder of the PET scan is negative.  Her protein studies are pending from yesterday however the M spike and free light chain ratio have been slowly increasing over time.  We discussed the possibility of therapy at some point in the near future although if she remains asymptomatic and the labs are stable then surveillance would be a reasonable option.  She is very reticent about beginning therapy.  I will let her know the results of her lab work when it returns.  If it stable then continued surveillance and repeat visit in 6-month is reasonable.  If it has worsened, particularly if the M spike is >3 g then consideration of therapy should be undertaken and we will see her back sooner.

## 2022-03-08 NOTE — PROGRESS NOTES
Chief Complaint  Colon Cancer    Paulino Macias PA Ball, Randy, PA Subjective          Anca Samson presents to [facility] for follow-up of multiple myeloma and colon cancer.  She is now almost 5 years out from her colon cancer treatment including adjuvant Xeloda.  She reports normal bowel habits without blood productive, pain or melena.  No new masses or adenopathy, no unusual aches or pains.  She notes good appetite and energy level.  She is also on observation for myeloma and her M spike has been slowly increasing.  She denies any symptoms such as bone pain, changes in urination or energy.  She had lab work obtained yesterday.    Oncology/Hematology History Overview Note   Low grade adenocarcinoma of ascending colon          Smoldering Myeloma        5/16/2017 right hemicolectomy which  revealed a low-grade 7.6 cm adenocarcinoma of the cecum. All margins were  negative. Lymphovascular invasion and perineural invasion were not identified.     26 lymph nodes were removed and unfortunately 1 was involved with metastatic deposit. pT3 pN1a colorectal cancer.        Clinical Staging      Smoldering Myeloma; Colon ([id number])            Treatments      Chemotherapy      11/2017 completed 6mo adjuvant Xeloda         Colon cancer (HCC)   7/21/2017 Initial Diagnosis    Colon cancer (CMS/HCC)         Review of Systems   Constitutional: Negative for appetite change, diaphoresis, fatigue, fever, unexpected weight gain and unexpected weight loss.   HENT: Negative for hearing loss, mouth sores, sore throat, swollen glands, trouble swallowing and voice change.    Eyes: Negative for blurred vision.   Respiratory: Positive for shortness of breath and wheezing. Negative for cough.    Cardiovascular: Negative for chest pain and palpitations.   Gastrointestinal: Positive for diarrhea. Negative for abdominal pain, blood in stool, constipation, nausea and vomiting.   Endocrine: Negative for  cold intolerance and heat intolerance.   Genitourinary: Negative for difficulty urinating, dysuria, frequency, hematuria and urinary incontinence.   Musculoskeletal: Negative for arthralgias, back pain and myalgias.   Skin: Negative for rash, skin lesions and wound.   Neurological: Negative for dizziness, seizures, weakness, numbness and headache.   Hematological: Does not bruise/bleed easily.   Psychiatric/Behavioral: Negative for depressed mood. The patient is not nervous/anxious.      Current Outpatient Medications on File Prior to Visit   Medication Sig Dispense Refill   • albuterol (PROVENTIL) (2.5 MG/3ML) 0.083% nebulizer solution Take 2.5 mg by nebulization Every 4 (Four) Hours As Needed for Wheezing. 120 each 5   • albuterol sulfate  (90 Base) MCG/ACT inhaler Inhale 2 puffs Every 4 (Four) Hours As Needed for Wheezing. 18 g 1   • alendronate (FOSAMAX) 70 MG tablet      • colestipol (COLESTID) 1 g tablet TAKE 2 TABLETS BY MOUTH TWICE A DAY . SWALLOW WHOLE WITH ANY LIQUID. DONT CRUSH, CHEW AND/OR DIVIDIE (Patient taking differently: Take 2 g by mouth 2 (Two) Times a Day.) 360 tablet 3   • FLUoxetine (PROzac) 20 MG capsule TAKE 1 CAPSULE BY MOUTH EVERY DAY 90 capsule 0   • formoterol (PERFOROMIST) 20 MCG/2ML nebulizer solution Take 2 mL by nebulization 2 (Two) Times a Day. 60 each 5   • meloxicam (MOBIC) 15 MG tablet Take 1 tablet by mouth Daily. 30 tablet 2   • promethazine (PHENERGAN) 25 MG tablet Take 1 tablet by mouth Every 6 (Six) Hours As Needed for Nausea or Vomiting. 30 tablet 1   • revefenacin (YUPELRI) 175 MCG/3ML nebulizer solution Take 3 mL by nebulization Daily. 90 mL 5   • vitamin D (ERGOCALCIFEROL) 1.25 MG (58173 UT) capsule capsule Take 1 capsule by mouth Every 7 (Seven) Days. (Patient taking differently: Take 50,000 Units by mouth Every 7 (Seven) Days. Takes on fridays) 13 capsule 3   • budesonide (Pulmicort) 0.5 MG/2ML nebulizer solution Take 2 mL by nebulization 2 (Two) Times a Day for  30 days. 60 each 5   • nicotine (NICODERM CQ) 21 MG/24HR patch Place 1 patch on the skin as directed by provider Daily. 30 patch 0     No current facility-administered medications on file prior to visit.       Allergies   Allergen Reactions   • Morphine Anaphylaxis   • Cephalexin Hives   • Penicillins Hives   • Sulfa Antibiotics Hives     Past Medical History:   Diagnosis Date   • Anxiety    • Asthma    • C. difficile diarrhea    • Colon cancer ([facility]) 07/21/2017   • COPD (chronic obstructive pulmonary disease) ([facility]) 10/23/2017   • Depression    • Diverticulitis    • Dysphagia    • Heartburn    • Heartburn    • Heartburn    • Hx of psychiatric care    • Hyperlipidemia    • Hypertension    • Impaired fasting glucose 08/14/2015   • Lumbago    • Lung nodule 2/17/2022   • Major depressive disorder 10/27/2016   • Melena    • Multiple myeloma ([facility])    • Nicotine dependence 05/10/2017   • Renal insufficiency    • Seizures ([facility])     pseudo seizures, 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   • Shortness of breath    • Tobacco use disorder 08/14/2015   • Visit for screening mammogram 02/20/2020    NORMAL- REPEAT IN ONE YEAR   • Vitamin D deficiency      Past Surgical History:   Procedure Laterality Date   • BONE MARROW BIOPSY  2016   • COLON SURGERY  2017   • COLONOSCOPY  2018,2017,2019    [facility]- DR LE:DIVERTICULOSIS AND ERYTHEMA OF MUCOSA   • ENDOSCOPY  2018   • FECAL DISIMPACTION  06/2019    TRANSPLANT    • HEMICOLECTOMY Right 05/2017   • HYSTERECTOMY  1982,1984   • KNEE ARTHROSCOPY Left 1/3/2022    Procedure: KNEE ARTHROSCOPY WITH PARTIAL MEDIAL MENISCECTOMY,  CHONDROPLASTY;  Surgeon: Nicholas Tipton MD;  Location: [facility] OR [facility] [place];  Service: Orthopedics;  Laterality: Left;   • MINI-LAPAROTOMY  2017   • PORTACATH PLACEMENT     • UPPER GASTROINTESTINAL ENDOSCOPY  2018     Social History     Socioeconomic History   • Marital status:    Tobacco Use   • Smoking status: Current Some Day Smoker     Packs/day: 0.50     Years:  47.00     Pack years: 23.50     Types: Cigarettes   • Smokeless tobacco: Never Used   • Tobacco comment: started age 26   Vaping Use   • Vaping Use: Never used   Substance and Sexual Activity   • Alcohol use: Never   • Drug use: Never   • Sexual activity: Defer     Family History   Problem Relation Age of Onset   • Heart disease Mother    • Lung cancer Mother    • Cancer Mother    • Stroke Father    • Heart disease Father    • Kidney cancer Father    • Cancer Father    • Stroke Other         UNCLE/AUNT       Objective   Physical Exam  Vitals reviewed. Exam conducted with a chaperone present.   Constitutional:       General: She is not in acute distress.     Appearance: Normal appearance.   Cardiovascular:      Rate and Rhythm: Normal rate and regular rhythm.      Heart sounds: Normal heart sounds. No murmur heard.    No gallop.   Pulmonary:      Effort: Pulmonary effort is normal.      Breath sounds: Normal breath sounds.   Abdominal:      General: Abdomen is flat. Bowel sounds are normal. There is no distension.      Palpations: Abdomen is soft.      Tenderness: There is no abdominal tenderness.   Musculoskeletal:      Cervical back: Neck supple.      Right lower leg: No edema.      Left lower leg: No edema.   Lymphadenopathy:      Cervical: No cervical adenopathy.   Neurological:      Mental Status: She is alert and oriented to person, place, and time.   Psychiatric:         Mood and Affect: Mood normal.         Behavior: Behavior normal.         Vitals:    03/08/22 1343   BP: 124/68   Pulse: 110   Resp: 24   Temp: 98.9 °F (37.2 °C)   SpO2: 98%   Weight: 88.9 kg (195 lb 15.8 oz)   PainSc: 0-No pain     ECOG score: 2         PHQ-9 Total Score:                    Result Review :   The following data was reviewed by: West Nicolas MD on 03/08/2022:  Lab Results   Component Value Date    HGB 14.6 03/07/2022    HCT 43.7 03/07/2022    MCV 98.0 (H) 03/07/2022     03/07/2022    WBC 8.18 03/07/2022    NEUTROABS  3.67 03/07/2022    LYMPHSABS 3.58 (H) 03/07/2022    MONOSABS 0.62 03/07/2022    EOSABS 0.25 03/07/2022    BASOSABS 0.05 03/07/2022     Lab Results   Component Value Date    GLUCOSE 116 (H) 03/07/2022    BUN 15 03/07/2022    CREATININE 0.84 03/07/2022     (L) 03/07/2022    K 3.7 03/07/2022     03/07/2022    CO2 18.2 (L) 03/07/2022    CALCIUM 9.3 03/07/2022    PROTEINTOT 8.1 03/07/2022    ALBUMIN 3.98 03/07/2022    BILITOT 0.5 03/07/2022    ALKPHOS 71 03/07/2022    AST 14 03/07/2022    ALT 12 03/07/2022     Lab Results   Component Value Date    FREET4 1.2 11/30/2020    TSH 4.090 08/09/2021       Data reviewed: Radiologic studies PET scan reviewed demonstrating a small hypermetabolic focus in the left iliac wing measuring about 1 cm in size which is stable compared to imaging going back to at least 2017      Assessment and Plan {CC Problem List  Visit Diagnosis   ROS  Review (Popup)  Health Maintenance  Quality  BestPractice  Medications  SmartSets  SnapShot Encounters  Media :23}   Diagnoses and all orders for this visit:    1. Multiple myeloma, remission status unspecified (HCC) (Primary)  Assessment & Plan:  Patient is currently on observation.  PET scan reviewed with patient and daughter.  There is one lesion in the iliac region that has hypermetabolic activity but has been stable dating back to at least 2017.  The remainder of the PET scan is negative.  Her protein studies are pending from yesterday however the M spike and free light chain ratio have been slowly increasing over time.  We discussed the possibility of therapy at some point in the near future although if she remains asymptomatic and the labs are stable then surveillance would be a reasonable option.  She is very reticent about beginning therapy.  I will let her know the results of her lab work when it returns.  If it stable then continued surveillance and repeat visit in 6-month is reasonable.  If it has worsened, particularly  if the M spike is >3 g then consideration of therapy should be undertaken and we will see her back sooner.    Orders:  -     CBC & Differential; Future  -     Comprehensive Metabolic Panel; Future  -     CARL,PE and FLC, Serum; Future    2. Malignant neoplasm of colon, unspecified part of colon (HCC)  Assessment & Plan:  Status post resection.  She is now almost 5 years out from her diagnosis and adjuvant treatment.  No evidence of disease recurrence.  She is up-to-date on colonoscopies.  Return in 6 months for continued surveillance.            Patient Follow Up: 6 months    Patient was given instructions and counseling regarding her condition or for health maintenance advice. Please see specific information pulled into the AVS if appropriate.     West Nicolas MD    3/8/2022

## 2022-03-09 LAB
ALBUMIN SERPL ELPH-MCNC: 3.6 G/DL (ref 2.9–4.4)
ALBUMIN/GLOB SERPL: 0.9 {RATIO} (ref 0.7–1.7)
ALPHA1 GLOB SERPL ELPH-MCNC: 0.2 G/DL (ref 0–0.4)
ALPHA2 GLOB SERPL ELPH-MCNC: 0.7 G/DL (ref 0.4–1)
B-GLOBULIN SERPL ELPH-MCNC: 0.9 G/DL (ref 0.7–1.3)
GAMMA GLOB SERPL ELPH-MCNC: 2.4 G/DL (ref 0.4–1.8)
GLOBULIN SER-MCNC: 4.2 G/DL (ref 2.2–3.9)
IGA SERPL-MCNC: 29 MG/DL (ref 64–422)
IGG SERPL-MCNC: 2820 MG/DL (ref 586–1602)
IGM SERPL-MCNC: 35 MG/DL (ref 26–217)
INTERPRETATION SERPL IEP-IMP: ABNORMAL
KAPPA LC FREE SER-MCNC: 12.5 MG/L (ref 3.3–19.4)
KAPPA LC FREE/LAMBDA FREE SER: 1.98 {RATIO} (ref 0.26–1.65)
LABORATORY COMMENT REPORT: ABNORMAL
LAMBDA LC FREE SERPL-MCNC: 6.3 MG/L (ref 5.7–26.3)
M PROTEIN SERPL ELPH-MCNC: 2.3 G/DL
PROT SERPL-MCNC: 7.8 G/DL (ref 6–8.5)

## 2022-03-29 ENCOUNTER — OFFICE VISIT (OUTPATIENT)
Dept: GASTROENTEROLOGY | Facility: CLINIC | Age: 72
End: 2022-03-29

## 2022-03-29 VITALS
BODY MASS INDEX: 37.19 KG/M2 | HEART RATE: 94 BPM | HEIGHT: 61 IN | SYSTOLIC BLOOD PRESSURE: 148 MMHG | OXYGEN SATURATION: 100 % | WEIGHT: 197 LBS | DIASTOLIC BLOOD PRESSURE: 68 MMHG

## 2022-03-29 DIAGNOSIS — R13.10 DYSPHAGIA, UNSPECIFIED TYPE: ICD-10-CM

## 2022-03-29 DIAGNOSIS — R12 HEARTBURN: Primary | ICD-10-CM

## 2022-03-29 DIAGNOSIS — R19.7 DIARRHEA, UNSPECIFIED TYPE: ICD-10-CM

## 2022-03-29 DIAGNOSIS — C18.2 MALIGNANT NEOPLASM OF ASCENDING COLON: ICD-10-CM

## 2022-03-29 PROCEDURE — 99213 OFFICE O/P EST LOW 20 MIN: CPT | Performed by: INTERNAL MEDICINE

## 2022-03-29 NOTE — PROGRESS NOTES
Chief Complaint    Anca Samson is a 71 y.o. female who presents to [facility] for follow-up of a history of colon cancer in 2017 status post right hemicolectomy, recurrent C. difficile requiring FM T in 2019x2, esophageal reflux and chronic diarrhea.  Patient is currently doing quite well using colestipol 2 g daily she describes 2-3 loose stools per day she denies significant ongoing reflux now and no longer has to take any medication for it she does have rare vomiting once or twice per month but this has been a longstanding issue and she thinks nothing of it she denies hematemesis or weight loss.  She did have a recent oncology evaluation and reports a negative PET scan.  She also has multiple myeloma but is not under treatment currently.    Result Review :     The following data was reviewed by: Jarvis Borges MD on 03/29/2022:    CMP    CMP 8/9/21 8/25/21 8/25/21 3/7/22 3/7/22     1454 1454 0915 0915   Glucose 100 (A) 86  116 (A)    BUN 17 18  15    Creatinine 1.13 (A) 1.02 (A)  0.84    eGFR Non  Am 47 (A) 53 (A)      Sodium 136 142  135 (A)    Potassium 4.6 4.5  3.7    Chloride 104 111 (A)  107    Calcium 8.9 9.7  9.3    Total Protein   8.1  7.8   Albumin 3.80 3.90 3.6 3.98 3.6   Globulin   4.5 (A)  4.2 (A)   Total Bilirubin 0.3 0.2  0.5    Alkaline Phosphatase 69 68  71    AST (SGOT) 18 15  14    ALT (SGPT) 20 15  12    (A) Abnormal value       Comments are available for some flowsheets but are not being displayed.           CBC    CBC 8/9/21 8/25/21 3/7/22   WBC 9.85 11.25 (A) 8.18   RBC 3.89 3.94 4.46   Hemoglobin 12.4 12.8 14.6   Hematocrit 37.3 38.6 43.7   MCV 95.9 98.0 (A) 98.0 (A)   MCH 31.9 32.5 32.7   MCHC 33.2 33.2 33.4   RDW 14.7 15.5 (A) 14.3   Platelets 178 357 284   (A) Abnormal value                   Past Medical History:   Diagnosis Date   • Anxiety    • Asthma    • C. 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   • Shortness of breath    • Tobacco use disorder 08/14/2015   • Visit for screening mammogram 02/20/2020    NORMAL- REPEAT IN ONE YEAR   • Vitamin D deficiency        Past Surgical History:   Procedure Laterality Date   • APPENDECTOMY     • BONE MARROW BIOPSY  2016   • COLON SURGERY  2017   • COLONOSCOPY  2018,2017,2019    [facility] DR LE:DIVERTICULOSIS AND ERYTHEMA OF MUCOSA   • ENDOSCOPY  2018   • FECAL DISIMPACTION  06/2019    TRANSPLANT    • HEMICOLECTOMY Right 05/2017   • HYSTERECTOMY  1982,1984   • KNEE ARTHROSCOPY Left 01/03/2022    Procedure: KNEE ARTHROSCOPY WITH PARTIAL MEDIAL MENISCECTOMY,  CHONDROPLASTY;  Surgeon: Nicholas Tipton MD;  Location: [facility] OR [facility] [place];  Service: Orthopedics;  Laterality: Left;   • MINI-LAPAROTOMY  2017   • PORTACATH PLACEMENT     • UPPER GASTROINTESTINAL ENDOSCOPY  2018       Social History     Social History Narrative   • Not on file       Objective     Vital Signs:   /68   Pulse 94   Ht 154.9 cm (61\")   Wt 89.4 kg (197 lb)   SpO2 100%   BMI 37.22 kg/m²     Body mass index is 37.22 kg/m².    Physical Exam            Assessment and Plan    Diagnoses and all orders for this visit:    1. Heartburn (Primary)    2. Dysphagia, unspecified type    3. Diarrhea, unspecified type    4. Malignant neoplasm of ascending colon (HCC)    Overall the patient continues to do well with the use of colestipol which she will continue 2 g daily.  Her last " colonoscopy was in June 2019 in the setting of FMP for recurrent C. difficile.  She will therefore return to see us in 6 months at that time be scheduled for screening colonoscopy due to personal history of colon cancer, Which will be about 3 years from her last.            Follow Up   No follow-ups on file.  Patient was given instructions and counseling regarding her condition or for health maintenance advice. Please see specific information pulled into the AVS if appropriate.

## 2022-04-12 ENCOUNTER — OFFICE VISIT (OUTPATIENT)
Dept: FAMILY MEDICINE CLINIC | Facility: CLINIC | Age: 72
End: 2022-04-12

## 2022-04-12 ENCOUNTER — PROCEDURE VISIT (OUTPATIENT)
Dept: CARDIAC REHAB | Facility: HOSPITAL | Age: 72
End: 2022-04-12

## 2022-04-12 ENCOUNTER — HOSPITAL ENCOUNTER (OUTPATIENT)
Dept: RESPIRATORY THERAPY | Facility: HOSPITAL | Age: 72
Discharge: HOME | End: 2022-04-12

## 2022-04-12 VITALS
SYSTOLIC BLOOD PRESSURE: 139 MMHG | BODY MASS INDEX: 36.93 KG/M2 | OXYGEN SATURATION: 98 % | WEIGHT: 195.6 LBS | HEART RATE: 92 BPM | HEIGHT: 61 IN | DIASTOLIC BLOOD PRESSURE: 89 MMHG

## 2022-04-12 VITALS
SYSTOLIC BLOOD PRESSURE: 139 MMHG | OXYGEN SATURATION: 98 % | WEIGHT: 195.6 LBS | BODY MASS INDEX: 36.93 KG/M2 | DIASTOLIC BLOOD PRESSURE: 89 MMHG | HEIGHT: 61 IN | HEART RATE: 92 BPM

## 2022-04-12 DIAGNOSIS — E66.01 CLASS 2 SEVERE OBESITY DUE TO EXCESS CALORIES WITH SERIOUS COMORBIDITY AND BODY MASS INDEX (BMI) OF 36.0 TO 36.9 IN ADULT: Chronic | ICD-10-CM

## 2022-04-12 DIAGNOSIS — F17.200 CURRENT SMOKER: ICD-10-CM

## 2022-04-12 DIAGNOSIS — C90.00 MULTIPLE MYELOMA, REMISSION STATUS UNSPECIFIED: ICD-10-CM

## 2022-04-12 DIAGNOSIS — Z12.31 ENCOUNTER FOR SCREENING MAMMOGRAM FOR MALIGNANT NEOPLASM OF BREAST: ICD-10-CM

## 2022-04-12 DIAGNOSIS — J44.9 CHRONIC OBSTRUCTIVE PULMONARY DISEASE, UNSPECIFIED COPD TYPE: ICD-10-CM

## 2022-04-12 DIAGNOSIS — C18.2 MALIGNANT NEOPLASM OF ASCENDING COLON: ICD-10-CM

## 2022-04-12 DIAGNOSIS — R73.01 IMPAIRED FASTING GLUCOSE: Primary | ICD-10-CM

## 2022-04-12 DIAGNOSIS — E55.9 VITAMIN D DEFICIENCY: ICD-10-CM

## 2022-04-12 DIAGNOSIS — F17.200 NICOTINE DEPENDENCE WITH CURRENT USE: Chronic | ICD-10-CM

## 2022-04-12 DIAGNOSIS — Z23 NEED FOR PNEUMOCOCCAL VACCINATION: ICD-10-CM

## 2022-04-12 DIAGNOSIS — Z00.00 MEDICARE ANNUAL WELLNESS VISIT, SUBSEQUENT: Primary | ICD-10-CM

## 2022-04-12 LAB
ARTERIAL PATENCY WRIST A: POSITIVE
BASE EXCESS BLDA CALC-SCNC: -4.4 MMOL/L (ref -2–2)
BDY SITE: ABNORMAL
COHGB MFR BLD: 2.9 % (ref 0–1.5)
FHHB: 2.2 % (ref 0–5)
HCO3 BLDA-SCNC: 17.1 MMOL/L (ref 22–26)
HGB BLDA-MCNC: 15 G/DL (ref 11.7–14.6)
INHALED O2 CONCENTRATION: 21 %
METHGB BLD QL: 0.1 % (ref 0–1.5)
MODALITY: ABNORMAL
OXYHGB MFR BLDV: 94.8 % (ref 94–99)
PCO2 BLDA: 24 MM HG (ref 35–45)
PH BLDA: 7.47 PH UNITS (ref 7.35–7.45)
PO2 BLD: 562 MM[HG] (ref 0–500)
PO2 BLDA: 118.1 MM HG (ref 80–100)
SAO2 % BLDCOA: 97.7 % (ref 95–99)

## 2022-04-12 PROCEDURE — G0009 ADMIN PNEUMOCOCCAL VACCINE: HCPCS | Performed by: PHYSICIAN ASSISTANT

## 2022-04-12 PROCEDURE — 82375 ASSAY CARBOXYHB QUANT: CPT | Performed by: SPECIALIST

## 2022-04-12 PROCEDURE — 1126F AMNT PAIN NOTED NONE PRSNT: CPT | Performed by: PHYSICIAN ASSISTANT

## 2022-04-12 PROCEDURE — 94729 DIFFUSING CAPACITY: CPT | Performed by: INTERNAL MEDICINE

## 2022-04-12 PROCEDURE — 99214 OFFICE O/P EST MOD 30 MIN: CPT | Performed by: PHYSICIAN ASSISTANT

## 2022-04-12 PROCEDURE — 94060 EVALUATION OF WHEEZING: CPT

## 2022-04-12 PROCEDURE — 83050 HGB METHEMOGLOBIN QUAN: CPT | Performed by: SPECIALIST

## 2022-04-12 PROCEDURE — 1170F FXNL STATUS ASSESSED: CPT | Performed by: PHYSICIAN ASSISTANT

## 2022-04-12 PROCEDURE — 94060 EVALUATION OF WHEEZING: CPT | Performed by: INTERNAL MEDICINE

## 2022-04-12 PROCEDURE — 94729 DIFFUSING CAPACITY: CPT

## 2022-04-12 PROCEDURE — 82805 BLOOD GASES W/O2 SATURATION: CPT | Performed by: SPECIALIST

## 2022-04-12 PROCEDURE — 36600 WITHDRAWAL OF ARTERIAL BLOOD: CPT | Performed by: SPECIALIST

## 2022-04-12 PROCEDURE — 94726 PLETHYSMOGRAPHY LUNG VOLUMES: CPT

## 2022-04-12 PROCEDURE — G0439 PPPS, SUBSEQ VISIT: HCPCS | Performed by: PHYSICIAN ASSISTANT

## 2022-04-12 PROCEDURE — 94618 PULMONARY STRESS TESTING: CPT

## 2022-04-12 PROCEDURE — 1160F RVW MEDS BY RX/DR IN RCRD: CPT | Performed by: PHYSICIAN ASSISTANT

## 2022-04-12 PROCEDURE — 94726 PLETHYSMOGRAPHY LUNG VOLUMES: CPT | Performed by: INTERNAL MEDICINE

## 2022-04-12 PROCEDURE — 90732 PPSV23 VACC 2 YRS+ SUBQ/IM: CPT | Performed by: PHYSICIAN ASSISTANT

## 2022-04-12 RX ORDER — ALBUTEROL SULFATE 2.5 MG/3ML
2.5 SOLUTION RESPIRATORY (INHALATION) ONCE
Status: COMPLETED | OUTPATIENT
Start: 2022-04-12 | End: 2022-04-12

## 2022-04-12 RX ADMIN — ALBUTEROL SULFATE 2.5 MG: 2.5 SOLUTION RESPIRATORY (INHALATION) at 11:20

## 2022-04-12 NOTE — PROGRESS NOTES
Chief Complaint  Hypertension (3 month follow up), Hyperlipidemia, Anxiety, and Depression    Subjective          Anca Samson presents to [facility]  History of Present Illness  Anca Samson is a 71 y.o. female who presents today for a 3 month follow up HTN, HLD, Anxiety, Depression    Pt offers no complaints at this time. She is due for mammo. Pt cont to smoke 0.5ppd x45 years, not ready to quit.     Labs-3/2022  Mammo-2/2020  Dexa-9/2021  Colonoscopy-6/2019    Pt refuses COVID-19 vaccine.     No CP, SOA, HA    Pt is seeing Dr. Nicolas - Onc and Pulmo    Pt complains some with stocking type n/t in bilat LE.    Past Medical History:   Diagnosis Date   • Anxiety    • Asthma    • C. 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   • Shortness of breath    • Tobacco use disorder 08/14/2015   • Visit for screening mammogram 02/20/2020    NORMAL- REPEAT IN ONE YEAR   • Vitamin D deficiency       Family History   Problem Relation Age of Onset   • Heart disease Mother    • Lung cancer Mother    • Cancer Mother    • Stroke Father    • Heart disease Father    • Kidney cancer Father    • Cancer Father    • Stroke Other         UNCLE/AUNT   • Colon cancer Neg Hx       Past Surgical History:   Procedure Laterality Date   • APPENDECTOMY     • BONE MARROW BIOPSY  2016   • COLON SURGERY  2017   • COLONOSCOPY  2018,2017,2019    [facility]-   [facility]I:DIVERTICULOSIS AND ERYTHEMA OF MUCOSA   • ENDOSCOPY  2018   • FECAL DISIMPACTION  06/2019    TRANSPLANT    • HEMICOLECTOMY Right 05/2017   • HYSTERECTOMY  1982,1984   • KNEE ARTHROSCOPY Left 01/03/2022    Procedure: KNEE ARTHROSCOPY WITH PARTIAL MEDIAL MENISCECTOMY,  CHONDROPLASTY;  Surgeon: Nicholas Tipton MD;  Location: [facility] OR [facility] [place];  Service: Orthopedics;  Laterality: Left;   • MINI-LAPAROTOMY  2017   • PORTACATH PLACEMENT     • UPPER GASTROINTESTINAL ENDOSCOPY  2018        Current Outpatient Medications:   •  albuterol (PROVENTIL) (2.5 MG/3ML) 0.083% nebulizer solution, Take 2.5 mg by nebulization Every 4 (Four) Hours As Needed for Wheezing., Disp: 120 each, Rfl: 5  •  albuterol sulfate  (90 Base) MCG/ACT inhaler, Inhale 2 puffs Every 4 (Four) Hours As Needed for Wheezing., Disp: 18 g, Rfl: 1  •  colestipol (COLESTID) 1 g tablet, TAKE 2 TABLETS BY MOUTH TWICE A DAY . SWALLOW WHOLE WITH ANY LIQUID. DONT CRUSH, CHEW AND/OR DIVIDIE (Patient taking differently: Take 2 g by mouth 2 (Two) Times a Day.), Disp: 360 tablet, Rfl: 3  •  FLUoxetine (PROzac) 20 MG capsule, TAKE 1 CAPSULE BY MOUTH EVERY DAY, Disp: 90 capsule, Rfl: 0  •  formoterol (PERFOROMIST) 20 MCG/2ML nebulizer solution, Take 2 mL by nebulization 2 (Two) Times a Day., Disp: 60 each, Rfl: 5  •  meloxicam (MOBIC) 15 MG tablet, Take 1 tablet by mouth Daily., Disp: 30 tablet, Rfl: 2  •  promethazine (PHENERGAN) 25 MG tablet, Take 1 tablet by mouth Every 6 (Six) Hours As Needed for Nausea or Vomiting., Disp: 30 tablet, Rfl: 1  •  revefenacin (YUPELRI) 175 MCG/3ML nebulizer solution, Take 3 mL by nebulization Daily., Disp: 90 mL, Rfl: 5  •  vitamin D (ERGOCALCIFEROL) 1.25 MG (77959 UT) capsule capsule, Take 1 capsule by mouth Every 7 (Seven) Days. (Patient taking differently: Take 50,000 Units by mouth Every 7 (Seven) Days. Takes on fridays), Disp: 13 capsule, Rfl: 3  •  budesonide (Pulmicort) 0.5 MG/2ML nebulizer solution, Take 2 mL by  "nebulization 2 (Two) Times a Day for 30 days., Disp: 60 each, Rfl: 5  No current facility-administered medications for this visit.    Objective     Vital Signs:     /89 (BP Location: Left arm)   Pulse 92   Ht 154.9 cm (61\")   Wt 88.7 kg (195 lb 9.6 oz)   SpO2 98%   BMI 36.96 kg/m²    Estimated body mass index is 36.96 kg/m² as calculated from the following:    Height as of this encounter: 154.9 cm (61\").    Weight as of this encounter: 88.7 kg (195 lb 9.6 oz).     Wt Readings from Last 3 Encounters:   04/12/22 88.7 kg (195 lb 9.6 oz)   03/29/22 89.4 kg (197 lb)   03/08/22 88.9 kg (195 lb 15.8 oz)     BP Readings from Last 3 Encounters:   04/12/22 139/89   03/29/22 148/68   03/08/22 124/68     BMI is above normal parameters. Recommendations: exercise counseling/recommendations and nutrition counseling/recommendations     Physical Exam  Vitals and nursing note reviewed.   Constitutional:       Appearance: Normal appearance. She is obese.   HENT:      Head: Normocephalic and atraumatic.   Cardiovascular:      Rate and Rhythm: Normal rate and regular rhythm.      Heart sounds: Normal heart sounds.   Pulmonary:      Effort: Pulmonary effort is normal.      Breath sounds: Wheezing present.   Musculoskeletal:      Cervical back: Neck supple.   Neurological:      Mental Status: She is alert.   Psychiatric:         Mood and Affect: Mood normal.         Behavior: Behavior normal.        Result Review :     Common labs    Common Labsle 8/9/21 8/9/21 8/9/21 8/9/21 8/25/21 8/25/21 8/25/21 3/7/22 3/7/22 3/7/22    0905 0905 0905 0905 1454 1454 1454 0915 0915 0915   Glucose    100 (A) 86   116 (A)     BUN    17 18   15     Creatinine    1.13 (A) 1.02 (A)   0.84     eGFR Non  Am    47 (A) 53 (A)        Sodium    136 142   135 (A)     Potassium    4.6 4.5   3.7     Chloride    104 111 (A)   107     Calcium    8.9 9.7   9.3     Total Protein       8.1   7.8   Albumin    3.80 3.90  3.6 3.98  3.6   Total Bilirubin    " 0.3 0.2   0.5     Alkaline Phosphatase    69 68   71     AST (SGOT)    18 15   14     ALT (SGPT)    20 15   12     WBC 9.85     11.25 (A)   8.18    Hemoglobin 12.4     12.8   14.6    Hematocrit 37.3     38.6   43.7    Platelets 178     357   284    Total Cholesterol   156          Triglycerides   339 (A)          HDL Cholesterol   35 (A)          LDL Cholesterol    67          Hemoglobin A1C  5.79 (A)           (A) Abnormal value       Comments are available for some flowsheets but are not being displayed.                           Assessment and Plan      Diagnoses and all orders for this visit:    1. Impaired fasting glucose (Primary)  -     Hemoglobin A1c; Future    2. Encounter for screening mammogram for malignant neoplasm of breast  -     Mammo Screening Digital Tomosynthesis Bilateral With CAD; Future    3. Nicotine dependence with current use  Comments:  Continues to smoke, not ready to quit    4. Class 2 severe obesity due to excess calories with serious comorbidity and body mass index (BMI) of 36.0 to 36.9 in adult (HCC)  Comments:  Poor control - Disc diet and exercise    5. Need for pneumococcal vaccination  -     pneumococcal polysaccharide 23-valent (PNEUMOVAX-23) vaccine 0.5 mL    6. Malignant neoplasm of ascending colon (HCC)    7. Multiple myeloma, remission status unspecified ([facility])  Overview:  Stable seeing Onc       8. Vitamin D deficiency  -     Vitamin D 25 hydroxy; Future    Colon CA in remission  Multiple Myeloma - stable without current treatment    PFT and 6 minute walk today at [facility]    Follow Up {Instructions Charge Capture  Follow-up Communications :23}    Return in about 4 months (around 8/12/2022).    Patient was given instructions and counseling regarding her condition or for health maintenance advice. Please see specific information pulled into the AVS if appropriate.     I have reviewed information obtained and documented by others and I have confirmed the accuracy of this  documented note.    LACI Jordan

## 2022-04-12 NOTE — PROGRESS NOTES
The ABCs of the Annual Wellness Visit  Subsequent Medicare Wellness Visit    Chief Complaint   Patient presents with   • Medicare Wellness-subsequent     AWV      Subjective    History of Present Illness:  Anca Samson is a 71 y.o. female who presents for a Subsequent Medicare Wellness Visit.    The following portions of the patient's history were reviewed and   updated as appropriate: allergies, current medications, past family history, past medical history, past social history, past surgical history and problem list.    Compared to one year ago, the patient feels her physical   health is the same.    Compared to one year ago, the patient feels her mental   health is the same.    Recent Hospitalizations:  She was not admitted to the hospital during the last year.     Current Medical Providers:  Patient Care Team:  Paulino Macias PA as PCP - General (Physician Assistant)  Wets Nicolas MD as Consulting Physician (Hematology and Oncology)    Outpatient Medications Prior to Visit   Medication Sig Dispense Refill   • albuterol (PROVENTIL) (2.5 MG/3ML) 0.083% nebulizer solution Take 2.5 mg by nebulization Every 4 (Four) Hours As Needed for Wheezing. 120 each 5   • albuterol sulfate  (90 Base) MCG/ACT inhaler Inhale 2 puffs Every 4 (Four) Hours As Needed for Wheezing. 18 g 1   • colestipol (COLESTID) 1 g tablet TAKE 2 TABLETS BY MOUTH TWICE A DAY . SWALLOW WHOLE WITH ANY LIQUID. DONT CRUSH, CHEW AND/OR DIVIDIE (Patient taking differently: Take 2 g by mouth 2 (Two) Times a Day.) 360 tablet 3   • FLUoxetine (PROzac) 20 MG capsule TAKE 1 CAPSULE BY MOUTH EVERY DAY 90 capsule 0   • formoterol (PERFOROMIST) 20 MCG/2ML nebulizer solution Take 2 mL by nebulization 2 (Two) Times a Day. 60 each 5   • meloxicam (MOBIC) 15 MG tablet Take 1 tablet by mouth Daily. 30 tablet 2   • promethazine (PHENERGAN) 25 MG tablet Take 1 tablet by mouth Every 6 (Six) Hours As Needed for Nausea or Vomiting. 30 tablet 1   • revefenacin  (YUPELRI) 175 MCG/3ML nebulizer solution Take 3 mL by nebulization Daily. 90 mL 5   • vitamin D (ERGOCALCIFEROL) 1.25 MG (33043 UT) capsule capsule Take 1 capsule by mouth Every 7 (Seven) Days. (Patient taking differently: Take 50,000 Units by mouth Every 7 (Seven) Days. Takes on fridays) 13 capsule 3   • budesonide (Pulmicort) 0.5 MG/2ML nebulizer solution Take 2 mL by nebulization 2 (Two) Times a Day for 30 days. 60 each 5     No facility-administered medications prior to visit.       No opioid medication identified on active medication list. I have reviewed chart for other potential  high risk medication/s and harmful drug interactions in the elderly.          Aspirin is not on active medication list.  Aspirin use is not indicated based on review of current medical condition/s. Risk of harm outweighs potential benefits.  .    Patient Active Problem List   Diagnosis   • Anxiety   • Asthma   • C. difficile diarrhea   • Colon cancer ([facility])   • COPD (chronic obstructive pulmonary disease) ([facility])   • Diverticulitis   • Dysphagia   • Heartburn   • Hyperlipidemia   • Hypertension   • Impaired fasting glucose   • Low back pain   • Major depressive disorder   • Multiple myeloma ([facility])   • Renal insufficiency   • Seizures ([facility])   • Nicotine dependence with current use   • Vitamin D deficiency   • Tobacco use   • Class 2 severe obesity due to excess calories with serious comorbidity and body mass index (BMI) of 36.0 to 36.9 in adult ([facility])   • Acute medial meniscus tear of left knee   • Chondromalacia of left knee   • Diarrhea   • Tear of meniscus of left knee as current injury   • S/P Left knee partial medial menisectomy and chondroplasty    • Dyspnea   • Cough   • Lung nodule   • Snoring   • Aftercare following surgery of left knee thorascopic partial medial meniscectomy, chondroplasty, 1/3/2022     Advance Care Planning  Advance Directive is not on file.  ACP discussion was held with the patient during this visit. Patient  "does not have an advance directive, information provided.          Objective    Vitals:    04/12/22 0848   BP: 139/89   BP Location: Left arm   Pulse: 92   SpO2: 98%   Weight: 88.7 kg (195 lb 9.6 oz)   Height: 154.9 cm (61\")   PainSc: 0-No pain     BMI Readings from Last 1 Encounters:   04/12/22 36.96 kg/m²   BMI is above normal parameters. Recommendations include: nutrition counseling    Does the patient have evidence of cognitive impairment? No              HEALTH RISK ASSESSMENT    Smoking Status:  Social History     Tobacco Use   Smoking Status Current Some Day Smoker   • Packs/day: 0.50   • Years: 47.00   • Pack years: 23.50   • Types: Cigarettes   Smokeless Tobacco Never Used   Tobacco Comment    started age 26     Alcohol Consumption:  Social History     Substance and Sexual Activity   Alcohol Use Never     Fall Risk Screen:    NORA Fall Risk Assessment was completed, and patient is at LOW risk for falls.Assessment completed on:4/12/2022    Depression Screening:  PHQ-2/PHQ-9 Depression Screening 4/12/2022   Retired PHQ-9 Total Score -   Retired Total Score -   Little Interest or Pleasure in Doing Things 0-->not at all   Feeling Down, Depressed or Hopeless 0-->not at all   PHQ-9: Brief Depression Severity Measure Score 0       Health Habits and Functional and Cognitive Screening:  Functional & Cognitive Status 4/12/2022   Do you have difficulty preparing food and eating? No   Do you have difficulty bathing yourself, getting dressed or grooming yourself? No   Do you have difficulty using the toilet? No   Do you have difficulty moving around from place to place? No   Do you have trouble with steps or getting out of a bed or a chair? No   Current Diet Unhealthy Diet   Dental Exam Up to date   Eye Exam Up to date   Exercise (times per week) 0 times per week   Current Exercises Include No Regular Exercise   Do you need help using the phone?  No   Are you deaf or do you have serious difficulty hearing?  No   Do " you need help with transportation? No   Do you need help shopping? No   Do you need help preparing meals?  No   Do you need help with housework?  No   Do you need help with laundry? No   Do you need help taking your medications? No   Do you need help managing money? No   Do you ever drive or ride in a car without wearing a seat belt? No   Have you felt unusual stress, anger or loneliness in the last month? No   Who do you live with? Spouse   If you need help, do you have trouble finding someone available to you? No   Have you been bothered in the last four weeks by sexual problems? No   Do you have difficulty concentrating, remembering or making decisions? No       Age-appropriate Screening Schedule:  Refer to the list below for future screening recommendations based on patient's age, sex and/or medical conditions. Orders for these recommended tests are listed in the plan section. The patient has been provided with a written plan.    Health Maintenance   Topic Date Due   • TDAP/TD VACCINES (1 - Tdap) Never done   • ZOSTER VACCINE (1 of 2) Never done   • MAMMOGRAM  02/19/2022   • INFLUENZA VACCINE  08/01/2022   • LIPID PANEL  08/09/2022   • DXA SCAN  09/16/2023              Assessment/Plan   CMS Preventative Services Quick Reference  Risk Factors Identified During Encounter  Inactivity/Sedentary  Obesity/Overweight   The above risks/problems have been discussed with the patient.  Follow up actions/plans if indicated are seen below in the Assessment/Plan Section.  Pertinent information has been shared with the patient in the After Visit Summary.    Diagnoses and all orders for this visit:    1. Medicare annual wellness visit, subsequent (Primary)        Follow Up:   Return in about 4 months (around 8/12/2022).     An After Visit Summary and PPPS were made available to the patient.             I have reviewed information obtained and documented by others and I have confirmed the accuracy of this documented note.  [place]auil[place]  LACI Macias

## 2022-04-15 ENCOUNTER — APPOINTMENT (OUTPATIENT)
Dept: MAMMOGRAPHY | Facility: HOSPITAL | Age: 72
End: 2022-04-15

## 2022-04-24 DIAGNOSIS — M25.569 KNEE PAIN, UNSPECIFIED CHRONICITY, UNSPECIFIED LATERALITY: ICD-10-CM

## 2022-04-25 RX ORDER — MELOXICAM 15 MG/1
TABLET ORAL
Qty: 30 TABLET | Refills: 2 | Status: SHIPPED | OUTPATIENT
Start: 2022-04-25 | End: 2022-08-19

## 2022-05-13 NOTE — PROGRESS NOTES
Primary Care Provider  Paulino Macias PA     Referring Provider  No ref. provider found     Chief Complaint  Asthma and COPD    Subjective          History of Presenting Illness  Patient is a 71-year-old female who presents for management of dyspnea who presents for follow-up visit today.  Patient states that since last visit her breathing is at baseline.  Patient states that she will get short of breath that is worse with exertion, moderate in severity, and improved with rest.  Patient states she also has intermittent coughing and wheezing.  Patient states that she is taking Perforomist, Pulmicort, and Yupelri, however admits at times she only takes performance of Pulmicort once daily instead of twice daily.  Patient reports that she uses albuterol inhaler and albuterol nebulizer treatments as needed.  Patient is under the care of Dr. Nicolas for multiple myeloma and recently had a PET scan performed and was told that she did not have any new lesions or evidence of malignant progression.  Patient states that she has not had a take any antibiotics or steroids since last visit and denies any hospitalizations since last visit.  Patient states that she is needing a new mouthpiece for her nebulizer machine. Since last office visit patient had a pulmonary function test and ABGs and a 6-minute walk test completed on 4/12/2022.  Pulmonary function test report and ABG report showed mild airflow obstruction with no bronchodilator response present. Air-trapping present.  Diffusion capacity moderately reduced.  Room air ABG with no evidence of chronic hypoxemia or hypercapnia.  Does have metabolic acidosis that is well compensated.  Patient's  6-minute walk test did not show any significant desaturations on room air. Patient denies fever, chills, night sweats, swollen glands in the head and neck, unintentional weight loss, hemoptysis, purulent sputum production, dysphagia, chest pain, palpitations, chest tightness, abdominal  pain, nausea, vomiting, and diarrhea.  Patient also denies any myalgias, changes in sense of taste and/or smell, sore throat, any other coronavirus or flu-like symptoms.  Patient denies any leg swelling, orthopnea, paroxysmal nocturnal dyspnea.  Patient is able to perform activities of daily living.      Review of Systems   Constitutional: Negative for activity change, appetite change, chills, diaphoresis, fatigue, fever, unexpected weight gain and unexpected weight loss.        Negative for Insomnia   HENT: Negative for congestion (Nasal), mouth sores, nosebleeds, postnasal drip, sore throat, swollen glands and trouble swallowing.         Negative for Thrush  Negative for Hoarseness  Negative for Allergies/Hay Fever  Negative for Recent Head injury  Negative for Ear Fullness  Negative for Nasal or Sinus pain  Negative for Dry lips  Negative for Nasal discharge   Respiratory: Negative for apnea, cough, chest tightness, shortness of breath and wheezing.         Negative for Hemoptysis  Negative for Pleuritic pain   Cardiovascular: Negative for chest pain, palpitations and leg swelling.        Negative for Claudication  Negative for Cyanosis  Negative for Dyspnea on exertion   Gastrointestinal: Negative for abdominal pain, diarrhea, nausea, vomiting and GERD.   Musculoskeletal: Negative for joint swelling and myalgias.        Negative for Joint pain  Negative for Joint stiffness   Skin: Negative for color change, dry skin, pallor and rash.   Neurological: Negative for syncope, weakness and headache.   Hematological: Negative for adenopathy. Does not bruise/bleed easily.        Family History   Problem Relation Age of Onset   • Heart disease Mother    • Lung cancer Mother    • Cancer Mother    • Stroke Father    • Heart disease Father    • Kidney cancer Father    • Cancer Father    • Stroke Other         UNCLE/AUNT   • Colon cancer Neg Hx         Social History     Socioeconomic History   • Marital status:     Tobacco Use   • Smoking status: Current Some Day Smoker     Packs/day: 1.00     Years: 47.00     Pack years: 47.00     Types: Cigarettes     Start date: 1975   • Smokeless tobacco: Never Used   • Tobacco comment: started age 26   Vaping Use   • Vaping Use: Never used   Substance and Sexual Activity   • Alcohol use: Never   • Drug use: Never   • Sexual activity: Defer        Past Medical History:   Diagnosis Date   • Anxiety    • Asthma    • C. difficile diarrhea    • Colon cancer ([facility]) 07/21/2017   • Colon polyp    • COPD (chronic obstructive pulmonary disease) ([facility]) 10/23/2017   • Depression    • Diverticulitis    • Dysphagia    • Heartburn    • Heartburn    • Heartburn    • Hernia 2019   • Hx of psychiatric care    • Hyperlipidemia    • Hypertension    • Impaired fasting glucose 08/14/2015   • Lumbago    • Lung nodule 02/17/2022   • Major depressive disorder 10/27/2016   • Melena    • Multiple myeloma ([facility])    • Nicotine dependence 05/10/2017   • Renal insufficiency    • Seizures ([facility])     pseudo seizures, 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   • Shortness of breath    • Tobacco use disorder 08/14/2015   • Visit for screening mammogram 02/20/2020    NORMAL- REPEAT IN ONE YEAR   • Vitamin D deficiency         Immunization History   Administered Date(s) Administered   • Fluzone High Dose =>65 Years ([employer] ONLY) 09/24/2020, 09/24/2021   • Fluzone High-Dose 65+yrs 09/24/2020, 09/24/2021   • Fluzone Split Quad (Multi-dose) 09/14/2015   • Pneumococcal Conjugate 13-Valent (PCV13) 09/21/2015   • Pneumococcal Polysaccharide (PPSV23) 04/12/2022       Allergies   Allergen Reactions   • Morphine Anaphylaxis   • Cephalexin Hives   • Penicillins Hives   • Sulfa Antibiotics Hives          Current Outpatient Medications:   •  albuterol (PROVENTIL) (2.5 MG/3ML) 0.083% nebulizer solution, Take 2.5 mg by nebulization Every 4 (Four) Hours As Needed for Wheezing., Disp: 120 each, Rfl: 5  •  albuterol sulfate  (90 Base) MCG/ACT inhaler, Inhale  2 puffs Every 4 (Four) Hours As Needed for Wheezing., Disp: 18 g, Rfl: 1  •  budesonide (Pulmicort) 0.5 MG/2ML nebulizer solution, Take 2 mL by nebulization 2 (Two) Times a Day for 30 days., Disp: 60 each, Rfl: 5  •  colestipol (COLESTID) 1 g tablet, TAKE 2 TABLETS BY MOUTH TWICE A DAY . SWALLOW WHOLE WITH ANY LIQUID. DONT CRUSH, CHEW AND/OR DIVIDIE (Patient taking differently: Take 2 g by mouth 2 (Two) Times a Day.), Disp: 360 tablet, Rfl: 3  •  FLUoxetine (PROzac) 20 MG capsule, TAKE 1 CAPSULE BY MOUTH EVERY DAY, Disp: 90 capsule, Rfl: 0  •  formoterol (PERFOROMIST) 20 MCG/2ML nebulizer solution, Take 2 mL by nebulization 2 (Two) Times a Day., Disp: 60 each, Rfl: 5  •  meloxicam (MOBIC) 15 MG tablet, TAKE 1 TABLET BY MOUTH EVERY DAY, Disp: 30 tablet, Rfl: 2  •  promethazine (PHENERGAN) 25 MG tablet, Take 1 tablet by mouth Every 6 (Six) Hours As Needed for Nausea or Vomiting., Disp: 30 tablet, Rfl: 1  •  revefenacin (YUPELRI) 175 MCG/3ML nebulizer solution, Take 3 mL by nebulization Daily., Disp: 90 mL, Rfl: 5  •  vitamin D (ERGOCALCIFEROL) 1.25 MG (18572 UT) capsule capsule, Take 1 capsule by mouth Every 7 (Seven) Days. (Patient taking differently: Take 50,000 Units by mouth Every 7 (Seven) Days. Takes on fridays), Disp: 13 capsule, Rfl: 3     Objective     Physical Exam  Vital Signs:   WDWN, Alert, NAD.    HEENT:  PERRL, EOMI.  OP, nares clear, no sinus tenderness  Neck:  Supple, no JVD, no thyromegaly.  Lymph: no axillary, cervical, supraclavicular lymphadenopathy noted bilaterally  Chest: Diminished breath sounds bilaterally. No wheezes, rales, or rhonchi appreciated.  Normal work of breathing noted.  Patient is able speak full sentences without difficulty.  CV: RRR, no MGR, pulses 2+, equal.  Abd:  Soft, NT, ND, + BS, no HSM  EXT:  no clubbing, no cyanosis, no edema, no joint tenderness  Neuro:  A&Ox3, CN grossly intact, no focal deficits.  Skin: No rashes or lesions noted.    /70   Pulse 87   Resp 14   " Ht 154.9 cm (61\")   Wt 88.5 kg (195 lb)   SpO2 97% Comment: room air  BMI 36.84 kg/m²         Result Review :   I have reviewed my last office visit note.  I also reviewed pulmonary function test report, ABGs, and 6-minute walk test report.  See scanned reports.         Assessment and Plan      Assessment:  1.    Mild COPD. FEV1 83% predicated. Alpha 1 antitrypsin phenotype MM with a level of 109.  2.  Centrilobular emphysema on chest CT scan.  3.  History of colon cancer.  4.  History of multiple myeloma.  Patient under the care of Dr. Nicolas.  5.  Lung nodules.  6.   Dyspnea.  7.  Multiple lung nodules.  8.  Snoring.  Patient refuses sleep study.  9.  Tobacco abuse of cigarettes ongoing.      Plan:  1.    Patient is advised to take all nebulizer treatments as prescribed.  Continue Pulmicort, Perforomist, and Yupelri every day as prescribed and rinse mouth out after each use.  Medication compliance discussed with patient in the office today. Risks of not taking medications as prescribed discussed with the patient.  Patient verbalized understanding and compliance.   2.  Continue albuterol inhaler and albuterol nebulizer treatments as needed.  3.    Follow-up with Dr. Nicolas as scheduled.  4.    Patient is still not wanting sleep study completed at this time.  Risks of not treating sleep apnea discussed with the patient.  Patient verbalized understanding.  5.  Vaccination status:  patient reports they are up-to-date with flu and pneumonia vaccines.  Patient declines COVID-19 vaccination.  Discussed with patient the benefits of vaccination including decreased risk of severe illness, hospitalization and death related to COVID-19.  Patient verbalized understanding.  Patient is advised to continue to follow CDC recommendations such as social distancing, wearing a mask, and washing hands for least 20 seconds.  6.  Smoking status: patient is a current cigarette smoker.  I told the patient on smoking cessation.  I " counseled the patient on the risks of continued smoking including the risk of lung cancer, head and neck cancer, renal cancer, heart disease, stroke, and early death.  Patient refuses nicotine replacement therapy or pharmacotherapy at this time.  Patient is advised to decrease the number of cigarettes they are smoking up until the point to where they can quit.  7.  Patient to call the office, 911, or go to the ER with new or worsening symptoms.  8.  Follow-up in 4 months, sooner if needed.              Follow Up   Return in about 4 months (around 9/19/2022).  Patient was given instructions and counseling regarding her condition or for health maintenance advice. Please see specific information pulled into the AVS if appropriate.

## 2022-05-19 ENCOUNTER — OFFICE VISIT (OUTPATIENT)
Dept: PULMONOLOGY | Facility: CLINIC | Age: 72
End: 2022-05-19

## 2022-05-19 VITALS
WEIGHT: 195 LBS | HEART RATE: 87 BPM | RESPIRATION RATE: 14 BRPM | OXYGEN SATURATION: 97 % | HEIGHT: 61 IN | DIASTOLIC BLOOD PRESSURE: 70 MMHG | BODY MASS INDEX: 36.82 KG/M2 | SYSTOLIC BLOOD PRESSURE: 146 MMHG

## 2022-05-19 DIAGNOSIS — R06.83 SNORING: ICD-10-CM

## 2022-05-19 DIAGNOSIS — Z72.0 TOBACCO USE: ICD-10-CM

## 2022-05-19 DIAGNOSIS — R06.00 DYSPNEA, UNSPECIFIED TYPE: ICD-10-CM

## 2022-05-19 DIAGNOSIS — C90.00 MULTIPLE MYELOMA, REMISSION STATUS UNSPECIFIED: ICD-10-CM

## 2022-05-19 DIAGNOSIS — J43.9 PULMONARY EMPHYSEMA, UNSPECIFIED EMPHYSEMA TYPE: ICD-10-CM

## 2022-05-19 DIAGNOSIS — R91.1 LUNG NODULE: Primary | ICD-10-CM

## 2022-05-19 PROCEDURE — 99214 OFFICE O/P EST MOD 30 MIN: CPT | Performed by: NURSE PRACTITIONER

## 2022-05-20 ENCOUNTER — TELEPHONE (OUTPATIENT)
Dept: FAMILY MEDICINE CLINIC | Facility: CLINIC | Age: 72
End: 2022-05-20

## 2022-05-20 ENCOUNTER — PATIENT MESSAGE (OUTPATIENT)
Dept: FAMILY MEDICINE CLINIC | Facility: CLINIC | Age: 72
End: 2022-05-20

## 2022-05-20 DIAGNOSIS — F32.A DEPRESSION, UNSPECIFIED DEPRESSION TYPE: ICD-10-CM

## 2022-05-20 RX ORDER — FLUOXETINE HYDROCHLORIDE 20 MG/1
20 CAPSULE ORAL DAILY
Qty: 90 CAPSULE | Refills: 0 | Status: SHIPPED | OUTPATIENT
Start: 2022-05-20 | End: 2022-11-14 | Stop reason: SDUPTHER

## 2022-05-23 DIAGNOSIS — F17.200 NICOTINE DEPENDENCE WITH CURRENT USE: Primary | ICD-10-CM

## 2022-05-27 ENCOUNTER — TELEPHONE (OUTPATIENT)
Dept: FAMILY MEDICINE CLINIC | Facility: CLINIC | Age: 72
End: 2022-05-27

## 2022-05-28 DIAGNOSIS — F17.200 NICOTINE DEPENDENCE WITH CURRENT USE: ICD-10-CM

## 2022-06-27 ENCOUNTER — TELEPHONE (OUTPATIENT)
Dept: FAMILY MEDICINE CLINIC | Facility: CLINIC | Age: 72
End: 2022-06-27

## 2022-06-27 RX ORDER — VARENICLINE TARTRATE 1 MG/1
1 TABLET, FILM COATED ORAL 2 TIMES DAILY
Qty: 60 TABLET | Refills: 0 | Status: SHIPPED | OUTPATIENT
Start: 2022-06-27 | End: 2022-08-19

## 2022-06-27 RX ORDER — VARENICLINE TARTRATE 1 MG/1
1 TABLET, FILM COATED ORAL 2 TIMES DAILY
COMMUNITY
End: 2022-06-27 | Stop reason: SDUPTHER

## 2022-06-27 NOTE — TELEPHONE ENCOUNTER
----- Message from Anca Samson sent at 6/27/2022  8:09 AM EDT -----  Regarding: Chantix  1mg, twice a day.

## 2022-08-19 ENCOUNTER — OFFICE VISIT (OUTPATIENT)
Dept: FAMILY MEDICINE CLINIC | Facility: CLINIC | Age: 72
End: 2022-08-19

## 2022-08-19 VITALS
SYSTOLIC BLOOD PRESSURE: 107 MMHG | WEIGHT: 208 LBS | HEART RATE: 102 BPM | HEIGHT: 61 IN | OXYGEN SATURATION: 98 % | DIASTOLIC BLOOD PRESSURE: 47 MMHG | BODY MASS INDEX: 39.27 KG/M2

## 2022-08-19 DIAGNOSIS — K46.9 ABDOMINAL HERNIA WITHOUT OBSTRUCTION AND WITHOUT GANGRENE, RECURRENCE NOT SPECIFIED, UNSPECIFIED HERNIA TYPE: ICD-10-CM

## 2022-08-19 DIAGNOSIS — E66.01 CLASS 2 SEVERE OBESITY DUE TO EXCESS CALORIES WITH SERIOUS COMORBIDITY AND BODY MASS INDEX (BMI) OF 39.0 TO 39.9 IN ADULT: Chronic | ICD-10-CM

## 2022-08-19 DIAGNOSIS — I10 ESSENTIAL HYPERTENSION: Chronic | ICD-10-CM

## 2022-08-19 DIAGNOSIS — C90.00 MULTIPLE MYELOMA, REMISSION STATUS UNSPECIFIED: Chronic | ICD-10-CM

## 2022-08-19 DIAGNOSIS — C18.9 MALIGNANT NEOPLASM OF COLON, UNSPECIFIED PART OF COLON: Chronic | ICD-10-CM

## 2022-08-19 DIAGNOSIS — J44.9 CHRONIC OBSTRUCTIVE PULMONARY DISEASE, UNSPECIFIED COPD TYPE: ICD-10-CM

## 2022-08-19 DIAGNOSIS — M79.605 BILATERAL LEG PAIN: ICD-10-CM

## 2022-08-19 DIAGNOSIS — E78.00 PURE HYPERCHOLESTEROLEMIA: ICD-10-CM

## 2022-08-19 DIAGNOSIS — E55.9 VITAMIN D DEFICIENCY: Primary | ICD-10-CM

## 2022-08-19 DIAGNOSIS — Z87.891 QUIT SMOKING WITHIN PAST YEAR: Chronic | ICD-10-CM

## 2022-08-19 DIAGNOSIS — M79.604 BILATERAL LEG PAIN: ICD-10-CM

## 2022-08-19 DIAGNOSIS — M25.569 KNEE PAIN, UNSPECIFIED CHRONICITY, UNSPECIFIED LATERALITY: ICD-10-CM

## 2022-08-19 PROCEDURE — 99213 OFFICE O/P EST LOW 20 MIN: CPT | Performed by: PHYSICIAN ASSISTANT

## 2022-08-19 RX ORDER — MELOXICAM 15 MG/1
TABLET ORAL
Qty: 30 TABLET | Refills: 2 | Status: SHIPPED | OUTPATIENT
Start: 2022-08-19 | End: 2022-12-19 | Stop reason: SDUPTHER

## 2022-08-19 RX ORDER — ALBUTEROL SULFATE 90 UG/1
2 AEROSOL, METERED RESPIRATORY (INHALATION) EVERY 4 HOURS PRN
Qty: 18 G | Refills: 1 | Status: SHIPPED | OUTPATIENT
Start: 2022-08-19

## 2022-08-19 RX ORDER — PROMETHAZINE HYDROCHLORIDE 25 MG/1
25 TABLET ORAL EVERY 6 HOURS PRN
Qty: 30 TABLET | Refills: 1 | Status: SHIPPED | OUTPATIENT
Start: 2022-08-19 | End: 2023-03-30 | Stop reason: SDUPTHER

## 2022-08-19 NOTE — PROGRESS NOTES
Chief Complaint  Hyperlipidemia (4 month follow up), Hypertension, Vitamin D Deficiency, and Anxiety    SUBJECTIVE  Anca Samson presents to [facility]   History of Present Illness     Pt presents today for 4 month follow up.    Pt states she would like to discuss getting her hernia removed. Pt states it is getting bigger and she is unable to wear her clothes.    Pt states her legs from knee down to her feet have been hurting for the last couple of months.  Pt states there may be some swelling but not much.    Pt states she would like to discuss if she needs a routine mammo due to just having a PET in March.    Labs 8/9/21  A1C 5.79  LDL 67  PET 3/7/22  mammo 2/19/20    Pt states she has not smoked since Mima 3.    Past Medical History:   Diagnosis Date   • Anxiety    • Asthma    • C. 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   • Shortness of breath    • Tobacco use disorder 08/14/2015   • Visit for screening mammogram 02/20/2020    NORMAL- REPEAT IN ONE YEAR   • Vitamin D deficiency       Family History   Problem Relation Age of Onset   • Heart disease Mother    • Lung cancer Mother    • Cancer Mother    • Stroke Father    • Heart disease Father    • Kidney cancer Father    • Cancer Father    • Stroke Other         UNCLE/AUNT   • Colon cancer Neg Hx       Past Surgical History:   Procedure Laterality Date   • APPENDECTOMY     • BONE  MARROW BIOPSY  2016   • COLON SURGERY  2017   • COLONOSCOPY  2018,2017,2019    [facility] DR LE:DIVERTICULOSIS AND ERYTHEMA OF [place]UCOSA   • ENDOSCOPY  2018   • FECAL DISIMPACTION  06/2019    TRANSPLANT    • HEMICOLECTOMY Right 05/2017   • HYSTERECTOMY  1982,1984   • KNEE ARTHROSCOPY Left 01/03/2022    Procedure: KNEE ARTHROSCOPY WITH PARTIAL MEDIAL MENISCECTOMY,  CHONDROPLASTY;  Surgeon: Nicholas Tipton MD;  [place] [facility] [place] [facility] [place]  Service: Orthopedics;  Laterality: Left;   • MINI-LAPAROTOMY  2017   • PORTACATH PLACEMENT     • UPPER GASTROINTESTINAL ENDOSCOPY  2018        Current Outpatient Medications:   •  albuterol (PROVENTIL) (2.5 MG/3ML) 0.083% nebulizer solution, Take 2.5 mg by nebulization Every 4 (Four) Hours As Needed for Wheezing., Disp: 120 each, Rfl: 5  •  albuterol sulfate  (90 Base) MCG/ACT inhaler, Inhale 2 puffs Every 4 (Four) Hours As Needed for Wheezing., Disp: 18 g, Rfl: 1  •  colestipol (COLESTID) 1 g tablet, TAKE 2 TABLETS BY MOUTH TWICE A DAY . SWALLOW WHOLE WITH ANY LIQUID. DONT CRUSH, CHEW AND/OR DIVIDIE (Patient taking differently: Take 2 g by mouth 2 (Two) Times a Day.), Disp: 360 tablet, Rfl: 3  •  FLUoxetine (PROzac) 20 MG capsule, Take 1 capsule by mouth Daily., Disp: 90 capsule, Rfl: 0  •  formoterol (PERFOROMIST) 20 MCG/2ML nebulizer solution, Take 2 mL by nebulization 2 (Two) Times a Day., Disp: 60 each, Rfl: 5  •  meloxicam (MOBIC) 15 MG tablet, TAKE 1 TABLET BY MOUTH EVERY DAY, Disp: 30 tablet, Rfl: 2  •  promethazine (PHENERGAN) 25 MG tablet, Take 1 tablet by mouth Every 6 (Six) Hours As Needed for Nausea or Vomiting., Disp: 30 tablet, Rfl: 1  •  revefenacin (YUPELRI) 175 MCG/3ML nebulizer solution, Take 3 mL by nebulization Daily., Disp: 90 mL, Rfl: 5  •  vitamin D (ERGOCALCIFEROL) 1.25 MG (16633 UT) capsule capsule, Take 1 capsule by mouth Every 7 (Seven) Days. (Patient taking differently: Take 50,000 Units by mouth Every 7 (Seven) Days. Takes on  "fridays), Disp: 13 capsule, Rfl: 3  •  budesonide (Pulmicort) 0.5 MG/2ML nebulizer solution, Take 2 mL by nebulization 2 (Two) Times a Day for 30 days., Disp: 60 each, Rfl: 5    OBJECTIVE  Vital Signs:   /47 (BP Location: Left arm)   Pulse 102   Ht 154.9 cm (60.98\")   Wt 94.3 kg (208 lb)   SpO2 98%   BMI 39.32 kg/m²    Estimated body mass index is 39.32 kg/m² as calculated from the following:    Height as of this encounter: 154.9 cm (60.98\").    Weight as of this encounter: 94.3 kg (208 lb).     Wt Readings from Last 3 Encounters:   08/19/22 94.3 kg (208 lb)   05/19/22 88.5 kg (195 lb)   04/12/22 88.7 kg (195 lb 9.6 oz)     BP Readings from Last 3 Encounters:   08/19/22 107/47   05/19/22 146/70   04/12/22 139/89     Physical Exam  Vitals and nursing note reviewed.   Constitutional:       Appearance: Normal appearance. She is obese.   HENT:      Head: Normocephalic and atraumatic.   Cardiovascular:      Rate and Rhythm: Normal rate and regular rhythm.      Heart sounds: Normal heart sounds.   Pulmonary:      Effort: Pulmonary effort is normal.      Breath sounds: Normal breath sounds.   Musculoskeletal:         General: Swelling present.      Cervical back: Neck supple.   Neurological:      Mental Status: She is alert.   Psychiatric:         Mood and Affect: Mood normal.         Behavior: Behavior normal.        Result Review    Common labs    Common Labsle 8/25/21 8/25/21 8/25/21 3/7/22 3/7/22 3/7/22    1454 1454 1454 0915 0915 0915   Glucose 86   116 (A)     BUN 18   15     Creatinine 1.02 (A)   0.84     eGFR Non  Am 53 (A)        Sodium 142   135 (A)     Potassium 4.5   3.7     Chloride 111 (A)   107     Calcium 9.7   9.3     Total Protein   8.1   7.8   Albumin 3.90  3.6 3.98  3.6   Total Bilirubin 0.2   0.5     Alkaline Phosphatase 68   71     AST (SGOT) 15   14     ALT (SGPT) 15   12     WBC  11.25 (A)   8.18    Hemoglobin  12.8   14.6    Hematocrit  38.6   43.7    Platelets  357   284    (A) " Abnormal value            No Images in the past 120 days found..  .result    The above data has been reviewed by LACI Jordan 08/19/2022 11:28 EDT.          Patient Care Team:  Paulino Macias PA as PCP - General (Physician Assistant)  Paulino Macias PA (Physician Assistant)  West Nicolas MD as Consulting Physician (Hematology and Oncology)    ASSESSMENT & PLAN    Diagnoses and all orders for this visit:    1. Vitamin D deficiency (Primary)  -     Vitamin D 25 Hydroxy; Future    2. Chronic obstructive pulmonary disease, unspecified COPD type (HCC)  -     albuterol sulfate  (90 Base) MCG/ACT inhaler; Inhale 2 puffs Every 4 (Four) Hours As Needed for Wheezing.  Dispense: 18 g; Refill: 1    3. Malignant neoplasm of colon, unspecified part of colon ([facility])  Comments:  Remission - seeing GI on regular basis - cont Onc  Orders:  -     promethazine (PHENERGAN) 25 MG tablet; Take 1 tablet by mouth Every 6 (Six) Hours As Needed for Nausea or Vomiting.  Dispense: 30 tablet; Refill: 1    4. Essential hypertension  Comments:  Diet and exercise controlled  Orders:  -     Lipid Panel; Future  -     TSH; Future    5. Pure hypercholesterolemia  -     Lipid Panel; Future    6. Bilateral leg pain  -     EMG & Nerve Conduction Test; Future    7. Multiple myeloma, remission status unspecified ([facility])  Comments:  Cont Onc  Overview:  Stable seeing Onc       8. Class 2 severe obesity due to excess calories with serious comorbidity and body mass index (BMI) of 39.0 to 39.9 in adult ([facility])  Comments:  Disc diet and exericise    9. Abdominal hernia without obstruction and without gangrene, recurrence not specified, unspecified hernia type  -     Ambulatory Referral to General Surgery    10. Quit smoking within past year     Tobacco Use: Medium Risk   • Smoking Tobacco Use: Former Smoker   • Smokeless Tobacco Use: Never Used     Follow Up     Return in about 4 months (around 12/19/2022).    Patient was given instructions and counseling  regarding her condition or for health maintenance advice. Please see specific information pulled into the AVS if appropriate.   I have reviewed information obtained and documented by others and I have confirmed the accuracy of this documented note.    LACI Jordan

## 2022-08-26 ENCOUNTER — OFFICE VISIT (OUTPATIENT)
Dept: SURGERY | Facility: CLINIC | Age: 72
End: 2022-08-26

## 2022-08-26 VITALS — RESPIRATION RATE: 16 BRPM | HEIGHT: 61 IN | BODY MASS INDEX: 38.89 KG/M2 | WEIGHT: 206 LBS

## 2022-08-26 DIAGNOSIS — K43.2 VENTRAL INCISIONAL HERNIA: Primary | ICD-10-CM

## 2022-08-26 PROCEDURE — 99203 OFFICE O/P NEW LOW 30 MIN: CPT | Performed by: SURGERY

## 2022-08-26 NOTE — PROGRESS NOTES
Chief Complaint:  Hernia    Primary Care Provider: Paulino Macias PA    Referring Provider: Paulino Macias PA    History of Present Illness  Anca Samson is a 71 y.o. female referred by LACI Jordan for a ventral hernia.  Patient has a bulge at the midline of her abdomen that has been present for a few years, and it is getting larger in size.  Mild amount of pain occasionally at the location of the bulge.  Patient had colon cancer and had a laparotomy in 2017 to remove the colon cancer.  She is due to get her next follow-up colonoscopy in a few months by another physician.  Patient says she just quit smoking a few months ago.  She says she has gained about 20 pounds since she quit smoking.    Allergies: Morphine, Cephalexin, Penicillins, and Sulfa antibiotics    Outpatient Medications Marked as Taking for the 8/26/22 encounter (Office Visit) with Alcon Delgado MD   Medication Sig Dispense Refill   • albuterol sulfate  (90 Base) MCG/ACT inhaler Inhale 2 puffs Every 4 (Four) Hours As Needed for Wheezing. 18 g 1   • colestipol (COLESTID) 1 g tablet TAKE 2 TABLETS BY MOUTH TWICE A DAY . SWALLOW WHOLE WITH ANY LIQUID. DONT CRUSH, CHEW AND/OR DIVIDIE (Patient taking differently: Take 2 g by mouth 2 (Two) Times a Day.) 360 tablet 3   • FLUoxetine (PROzac) 20 MG capsule Take 1 capsule by mouth Daily. 90 capsule 0   • meloxicam (MOBIC) 15 MG tablet TAKE 1 TABLET BY MOUTH EVERY DAY 30 tablet 2   • promethazine (PHENERGAN) 25 MG tablet Take 1 tablet by mouth Every 6 (Six) Hours As Needed for Nausea or Vomiting. 30 tablet 1   • vitamin D (ERGOCALCIFEROL) 1.25 MG (98243 UT) capsule capsule Take 1 capsule by mouth Every 7 (Seven) Days. (Patient taking differently: Take 50,000 Units by mouth Every 7 (Seven) Days. Takes on fridays) 13 capsule 3       Past Medical History:   • Anxiety   • Asthma   • C. 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   • Shortness of breath   • Tobacco use disorder   • Visit for screening mammogram    NORMAL- REPEAT IN ONE YEAR   • Vitamin D deficiency        Past Surgical History:   • APPENDECTOMY   • BONE MARROW BIOPSY   • COLON SURGERY   • COLONOSCOPY    [facility] DR LE:DIVERTICULOSIS AND ERYTHEMA OF [place]UCOSA   • ENDOSCOPY   • FECAL DISIMPACTION    TRANSPLANT    • HEMICOLECTOMY   • HYSTERECTOMY   • KNEE ARTHROSCOPY    Procedure: KNEE ARTHROSCOPY WITH PARTIAL MEDIAL MENISCECTOMY,  CHONDROPLASTY;  Surgeon: Nicholas Tipton MD;  [place] [facility] [place] [facility] [place]  Service: Orthopedics;  Laterality: Left;   • MINI-LAPAROTOMY   • PORTACATH PLACEMENT   • UPPER GASTROINTESTINAL ENDOSCOPY       Family History:   Family History   Problem Relation Age of Onset   • Heart disease Mother    • Lung cancer Mother    • Cancer Mother    • Stroke Father    • Heart disease Father    • Kidney cancer Father    • Cancer Father    • Stroke Other         UNCLE/AUNT   • Colon cancer Neg Hx         Social History:  Social History     Tobacco Use   • Smoking status: Former Smoker     Packs/day: 0.00     Years: 47.00     Pack years: 0.00     Types: Cigarettes     Start date: 1975     Quit date: 6/3/2022     Years since quittin.2   • Smokeless tobacco: Never Used   • Tobacco comment: started age 26   Substance Use Topics   • Alcohol use: Never       Objective     Vital Signs:  Resp 16   Ht 154.9 cm (61\")   Wt 93.4 kg (206 lb)   BMI 38.92 kg/m²   • Constitutional: alert, no acute distress, reliable historian  • HENT:  NCAT, no visible deformities or " lesions  • Eyes:  sclerae clear, conjunctivae clear, EOMI  • Neck:  normal appearance, no masses, trachea midline  • Respiratory:  breathing not labored, respiratory effort appears normal  • Cardiovascular:  heart regular rate  • Abdomen:  soft, nontender, nondistended, moderate size bulge at the midline just above the umbilicus.  Bulge is soft and easily reducible.  Midline surgical scar.  • Skin and subcutaneous tissue:  no visible concerning rashes or lesions, no jaundice  • Musculoskeletal: moving all extremities symmetrically and purposefully  • Neurologic:  no obvious motor or sensory deficits, normal gait, able to stand without difficulty, cerebellar function without any obvious abnormalities, alert & oriented x 3, speech clear  • Psychiatric:  judgment and insight intact, mood normal, affect appropriate, cooperative    Assessment:  Ventral incisional hernia    Plan:  Recommend robotic ventral incisional hernia repair but beforehand recommend patient lose weight.  We set a goal of a 20 pound weight loss (weight today is 206 lb).  Additionally, patient will first have her colonoscopy for colon cancer surveillance (see above HPI section).  Patient will see me again sometime later this year or next year after she loses weight and after she has her colonoscopy.  Symptoms that would be concerning for incarceration were discussed, and patient knows to seek medical attention promptly should any of those symptoms occur.    Alcon Delgado MD  08/26/2022    Electronically signed by Alcon Delgado MD, 08/26/22, 1:57 PM EDT.

## 2022-08-30 ENCOUNTER — TELEPHONE (OUTPATIENT)
Dept: FAMILY MEDICINE CLINIC | Facility: CLINIC | Age: 72
End: 2022-08-30

## 2022-09-01 ENCOUNTER — LAB (OUTPATIENT)
Dept: ONCOLOGY | Facility: HOSPITAL | Age: 72
End: 2022-09-01

## 2022-09-01 DIAGNOSIS — I10 ESSENTIAL HYPERTENSION: Chronic | ICD-10-CM

## 2022-09-01 DIAGNOSIS — E55.9 VITAMIN D DEFICIENCY: ICD-10-CM

## 2022-09-01 DIAGNOSIS — C90.00 MULTIPLE MYELOMA, REMISSION STATUS UNSPECIFIED: ICD-10-CM

## 2022-09-01 DIAGNOSIS — C90.01 MULTIPLE MYELOMA IN REMISSION: ICD-10-CM

## 2022-09-01 DIAGNOSIS — R73.01 IMPAIRED FASTING GLUCOSE: ICD-10-CM

## 2022-09-01 DIAGNOSIS — E78.00 PURE HYPERCHOLESTEROLEMIA: ICD-10-CM

## 2022-09-01 LAB
25(OH)D3 SERPL-MCNC: 28.8 NG/ML (ref 30–100)
ALBUMIN SERPL-MCNC: 4 G/DL (ref 3.5–5.2)
ALBUMIN/GLOB SERPL: 0.9 G/DL
ALP SERPL-CCNC: 76 U/L (ref 39–117)
ALT SERPL W P-5'-P-CCNC: 17 U/L (ref 1–33)
ANION GAP SERPL CALCULATED.3IONS-SCNC: 11.3 MMOL/L (ref 5–15)
AST SERPL-CCNC: 17 U/L (ref 1–32)
BILIRUB SERPL-MCNC: 0.5 MG/DL (ref 0–1.2)
BUN SERPL-MCNC: 13 MG/DL (ref 8–23)
BUN/CREAT SERPL: 16.3 (ref 7–25)
CALCIUM SPEC-SCNC: 8.9 MG/DL (ref 8.6–10.5)
CHLORIDE SERPL-SCNC: 106 MMOL/L (ref 98–107)
CHOLEST SERPL-MCNC: 159 MG/DL (ref 0–200)
CO2 SERPL-SCNC: 19.7 MMOL/L (ref 22–29)
CREAT SERPL-MCNC: 0.8 MG/DL (ref 0.57–1)
EGFRCR SERPLBLD CKD-EPI 2021: 78.4 ML/MIN/1.73
GLOBULIN UR ELPH-MCNC: 4.4 GM/DL
GLUCOSE SERPL-MCNC: 97 MG/DL (ref 65–99)
HBA1C MFR BLD: 5.8 % (ref 4.8–5.6)
HDLC SERPL-MCNC: 42 MG/DL (ref 40–60)
LDH SERPL-CCNC: 146 U/L (ref 135–214)
LDLC SERPL CALC-MCNC: 65 MG/DL (ref 0–100)
LDLC/HDLC SERPL: 1.22 {RATIO}
POTASSIUM SERPL-SCNC: 3.8 MMOL/L (ref 3.5–5.2)
PROT SERPL-MCNC: 8.4 G/DL (ref 6–8.5)
SODIUM SERPL-SCNC: 137 MMOL/L (ref 136–145)
TRIGL SERPL-MCNC: 329 MG/DL (ref 0–150)
TSH SERPL DL<=0.05 MIU/L-ACNC: 3.23 UIU/ML (ref 0.27–4.2)
VLDLC SERPL-MCNC: 52 MG/DL (ref 5–40)

## 2022-09-01 PROCEDURE — 83615 LACTATE (LD) (LDH) ENZYME: CPT

## 2022-09-01 PROCEDURE — 86334 IMMUNOFIX E-PHORESIS SERUM: CPT

## 2022-09-01 PROCEDURE — 83521 IG LIGHT CHAINS FREE EACH: CPT

## 2022-09-01 PROCEDURE — 82306 VITAMIN D 25 HYDROXY: CPT

## 2022-09-01 PROCEDURE — 84165 PROTEIN E-PHORESIS SERUM: CPT

## 2022-09-01 PROCEDURE — 83036 HEMOGLOBIN GLYCOSYLATED A1C: CPT

## 2022-09-01 PROCEDURE — 36415 COLL VENOUS BLD VENIPUNCTURE: CPT

## 2022-09-01 PROCEDURE — 80061 LIPID PANEL: CPT

## 2022-09-01 PROCEDURE — 84443 ASSAY THYROID STIM HORMONE: CPT

## 2022-09-01 PROCEDURE — 82784 ASSAY IGA/IGD/IGG/IGM EACH: CPT

## 2022-09-01 PROCEDURE — 80053 COMPREHEN METABOLIC PANEL: CPT

## 2022-09-02 ENCOUNTER — LAB (OUTPATIENT)
Dept: LAB | Facility: HOSPITAL | Age: 72
End: 2022-09-02

## 2022-09-02 DIAGNOSIS — E55.9 VITAMIN D DEFICIENCY: ICD-10-CM

## 2022-09-02 DIAGNOSIS — C90.00 MULTIPLE MYELOMA, REMISSION STATUS UNSPECIFIED: ICD-10-CM

## 2022-09-02 DIAGNOSIS — E55.9 VITAMIN D DEFICIENCY: Primary | ICD-10-CM

## 2022-09-02 DIAGNOSIS — C90.00 MULTIPLE MYELOMA, REMISSION STATUS UNSPECIFIED: Primary | ICD-10-CM

## 2022-09-02 LAB
BASOPHILS # BLD AUTO: 0.06 10*3/MM3 (ref 0–0.2)
BASOPHILS NFR BLD AUTO: 0.7 % (ref 0–1.5)
DEPRECATED RDW RBC AUTO: 47.5 FL (ref 37–54)
EOSINOPHIL # BLD AUTO: 0.17 10*3/MM3 (ref 0–0.4)
EOSINOPHIL NFR BLD AUTO: 2.1 % (ref 0.3–6.2)
ERYTHROCYTE [DISTWIDTH] IN BLOOD BY AUTOMATED COUNT: 13.9 % (ref 12.3–15.4)
HCT VFR BLD AUTO: 40 % (ref 34–46.6)
HGB BLD-MCNC: 13.4 G/DL (ref 12–15.9)
IMM GRANULOCYTES # BLD AUTO: 0.01 10*3/MM3 (ref 0–0.05)
IMM GRANULOCYTES NFR BLD AUTO: 0.1 % (ref 0–0.5)
LYMPHOCYTES # BLD AUTO: 4.14 10*3/MM3 (ref 0.7–3.1)
LYMPHOCYTES NFR BLD AUTO: 51.3 % (ref 19.6–45.3)
MCH RBC QN AUTO: 31.8 PG (ref 26.6–33)
MCHC RBC AUTO-ENTMCNC: 33.5 G/DL (ref 31.5–35.7)
MCV RBC AUTO: 95 FL (ref 79–97)
MONOCYTES # BLD AUTO: 0.58 10*3/MM3 (ref 0.1–0.9)
MONOCYTES NFR BLD AUTO: 7.2 % (ref 5–12)
NEUTROPHILS NFR BLD AUTO: 3.11 10*3/MM3 (ref 1.7–7)
NEUTROPHILS NFR BLD AUTO: 38.6 % (ref 42.7–76)
NRBC BLD AUTO-RTO: 0 /100 WBC (ref 0–0.2)
PLATELET # BLD AUTO: 277 10*3/MM3 (ref 140–450)
PMV BLD AUTO: 9.3 FL (ref 6–12)
RBC # BLD AUTO: 4.21 10*6/MM3 (ref 3.77–5.28)
WBC NRBC COR # BLD: 8.07 10*3/MM3 (ref 3.4–10.8)

## 2022-09-02 PROCEDURE — 82784 ASSAY IGA/IGD/IGG/IGM EACH: CPT

## 2022-09-02 PROCEDURE — 84165 PROTEIN E-PHORESIS SERUM: CPT

## 2022-09-02 PROCEDURE — 83521 IG LIGHT CHAINS FREE EACH: CPT

## 2022-09-02 PROCEDURE — 84155 ASSAY OF PROTEIN SERUM: CPT

## 2022-09-02 PROCEDURE — 86334 IMMUNOFIX E-PHORESIS SERUM: CPT

## 2022-09-02 PROCEDURE — 85025 COMPLETE CBC W/AUTO DIFF WBC: CPT

## 2022-09-02 PROCEDURE — 36415 COLL VENOUS BLD VENIPUNCTURE: CPT

## 2022-09-02 RX ORDER — ERGOCALCIFEROL 1.25 MG/1
50000 CAPSULE ORAL 2 TIMES WEEKLY
Qty: 26 CAPSULE | Refills: 1 | Status: SHIPPED | OUTPATIENT
Start: 2022-09-05 | End: 2022-12-19 | Stop reason: SDUPTHER

## 2022-09-02 RX ORDER — ERGOCALCIFEROL 1.25 MG/1
50000 CAPSULE ORAL 2 TIMES WEEKLY
Qty: 26 CAPSULE | Refills: 1 | Status: SHIPPED | OUTPATIENT
Start: 2022-09-05 | End: 2022-09-02 | Stop reason: SDUPTHER

## 2022-09-03 LAB
ALBUMIN SERPL ELPH-MCNC: 3.5 G/DL (ref 2.9–4.4)
ALBUMIN/GLOB SERPL: 0.9 {RATIO} (ref 0.7–1.7)
ALPHA1 GLOB SERPL ELPH-MCNC: 0.2 G/DL (ref 0–0.4)
ALPHA2 GLOB SERPL ELPH-MCNC: 0.7 G/DL (ref 0.4–1)
B-GLOBULIN SERPL ELPH-MCNC: 0.9 G/DL (ref 0.7–1.3)
GAMMA GLOB SERPL ELPH-MCNC: 2.4 G/DL (ref 0.4–1.8)
GLOBULIN SER-MCNC: 4.2 G/DL (ref 2.2–3.9)
IGA SERPL-MCNC: 26 MG/DL (ref 64–422)
IGG SERPL-MCNC: 2811 MG/DL (ref 586–1602)
IGM SERPL-MCNC: 35 MG/DL (ref 26–217)
INTERPRETATION SERPL IEP-IMP: ABNORMAL
KAPPA LC FREE SER-MCNC: 15.3 MG/L (ref 3.3–19.4)
KAPPA LC FREE/LAMBDA FREE SER: 2.19 {RATIO} (ref 0.26–1.65)
LABORATORY COMMENT REPORT: ABNORMAL
LAMBDA LC FREE SERPL-MCNC: 7 MG/L (ref 5.7–26.3)
M PROTEIN SERPL ELPH-MCNC: 2.3 G/DL
PROT SERPL-MCNC: 7.7 G/DL (ref 6–8.5)

## 2022-09-07 LAB
ALBUMIN SERPL ELPH-MCNC: 3.5 G/DL (ref 2.9–4.4)
ALBUMIN/GLOB SERPL: 0.9 {RATIO} (ref 0.7–1.7)
ALPHA1 GLOB SERPL ELPH-MCNC: 0.2 G/DL (ref 0–0.4)
ALPHA2 GLOB SERPL ELPH-MCNC: 0.7 G/DL (ref 0.4–1)
B-GLOBULIN SERPL ELPH-MCNC: 0.9 G/DL (ref 0.7–1.3)
GAMMA GLOB SERPL ELPH-MCNC: 2.4 G/DL (ref 0.4–1.8)
GLOBULIN SER-MCNC: 4.1 G/DL (ref 2.2–3.9)
IGA SERPL-MCNC: 25 MG/DL (ref 64–422)
IGG SERPL-MCNC: 2988 MG/DL (ref 586–1602)
IGM SERPL-MCNC: 32 MG/DL (ref 26–217)
INTERPRETATION SERPL IEP-IMP: ABNORMAL
KAPPA LC FREE SER-MCNC: 16.4 MG/L (ref 3.3–19.4)
KAPPA LC FREE/LAMBDA FREE SER: 2.08 {RATIO} (ref 0.26–1.65)
LABORATORY COMMENT REPORT: ABNORMAL
LAMBDA LC FREE SERPL-MCNC: 7.9 MG/L (ref 5.7–26.3)
M PROTEIN SERPL ELPH-MCNC: 2.2 G/DL
PROT SERPL-MCNC: 7.6 G/DL (ref 6–8.5)

## 2022-09-08 ENCOUNTER — OFFICE VISIT (OUTPATIENT)
Dept: ONCOLOGY | Facility: HOSPITAL | Age: 72
End: 2022-09-08

## 2022-09-08 VITALS
RESPIRATION RATE: 22 BRPM | BODY MASS INDEX: 39.61 KG/M2 | WEIGHT: 209.66 LBS | TEMPERATURE: 97.8 F | OXYGEN SATURATION: 97 % | SYSTOLIC BLOOD PRESSURE: 155 MMHG | DIASTOLIC BLOOD PRESSURE: 72 MMHG | HEART RATE: 85 BPM

## 2022-09-08 DIAGNOSIS — C18.2 MALIGNANT NEOPLASM OF ASCENDING COLON: ICD-10-CM

## 2022-09-08 DIAGNOSIS — C90.00 MULTIPLE MYELOMA, REMISSION STATUS UNSPECIFIED: Primary | ICD-10-CM

## 2022-09-08 PROCEDURE — 99214 OFFICE O/P EST MOD 30 MIN: CPT | Performed by: INTERNAL MEDICINE

## 2022-09-08 PROCEDURE — G0463 HOSPITAL OUTPT CLINIC VISIT: HCPCS | Performed by: INTERNAL MEDICINE

## 2022-09-08 NOTE — PROGRESS NOTES
Chief Complaint  Colon Cancer    West Nicolas MD Ball, Randy, PA    Subjective          Anca Samson presents to [facility] for follow-up and ongoing observation of her myeloma.  She is currently on surveillance.  She also has a history of colon cancer which was resected in 2017 followed by 6 months of adjuvant Xeloda.  She reports that the bowels are working normally.  She denies new masses or adenopathy, no unusual aches or pains.  She notes that she has a hernia but the surgeons want her to lose some weight prior to considering hernia repair.  She has been trying to exercise.    Oncology/Hematology History Overview Note   Low grade adenocarcinoma of ascending colon          Smoldering Myeloma        5/16/2017 right hemicolectomy which  revealed a low-grade 7.6 cm adenocarcinoma of the cecum. All margins were  negative. Lymphovascular invasion and perineural invasion were not identified.     26 lymph nodes were removed and unfortunately 1 was involved with metastatic deposit. pT3 pN1a colorectal cancer.        Clinical Staging      Smoldering Myeloma; Colon ([id number])            Treatments      Chemotherapy      11/2017 completed 6mo adjuvant Xeloda         Malignant neoplasm of ascending colon (HCC)   7/21/2017 Initial Diagnosis    Colon cancer (CMS/HCC)         Review of Systems   Constitutional: Positive for fatigue. Negative for appetite change, diaphoresis, fever, unexpected weight gain and unexpected weight loss.   HENT: Negative for hearing loss, mouth sores, sore throat, swollen glands, trouble swallowing and voice change.    Eyes: Negative for blurred vision.   Respiratory: Positive for shortness of breath (copd). Negative for cough and wheezing.    Cardiovascular: Negative for chest pain and palpitations.   Gastrointestinal: Positive for diarrhea (normal for her). Negative for abdominal pain, blood in stool, constipation, nausea and vomiting.    Endocrine: Negative for cold intolerance and heat intolerance.   Genitourinary: Negative for difficulty urinating, dysuria, frequency, hematuria and urinary incontinence.   Musculoskeletal: Negative for arthralgias, back pain and myalgias.   Skin: Negative for rash, skin lesions and wound.   Neurological: Negative for dizziness, seizures, weakness, numbness and headache.   Hematological: Does not bruise/bleed easily.   Psychiatric/Behavioral: Negative for depressed mood. The patient is not nervous/anxious.      Current Outpatient Medications on File Prior to Visit   Medication Sig Dispense Refill   • albuterol sulfate  (90 Base) MCG/ACT inhaler Inhale 2 puffs Every 4 (Four) Hours As Needed for Wheezing. 18 g 1   • colestipol (COLESTID) 1 g tablet TAKE 2 TABLETS BY MOUTH TWICE A DAY . SWALLOW WHOLE WITH ANY LIQUID. DONT CRUSH, CHEW AND/OR DIVIDIE (Patient taking differently: Take 2 g by mouth 2 (Two) Times a Day.) 360 tablet 3   • FLUoxetine (PROzac) 20 MG capsule Take 1 capsule by mouth Daily. 90 capsule 0   • meloxicam (MOBIC) 15 MG tablet TAKE 1 TABLET BY MOUTH EVERY DAY 30 tablet 2   • promethazine (PHENERGAN) 25 MG tablet Take 1 tablet by mouth Every 6 (Six) Hours As Needed for Nausea or Vomiting. 30 tablet 1   • vitamin D (ERGOCALCIFEROL) 1.25 MG (23219 UT) capsule capsule Take 1 capsule by mouth 2 (Two) Times a Week. 26 capsule 1   • [DISCONTINUED] albuterol (PROVENTIL) (2.5 MG/3ML) 0.083% nebulizer solution Take 2.5 mg by nebulization Every 4 (Four) Hours As Needed for Wheezing. 120 each 5   • [DISCONTINUED] budesonide (Pulmicort) 0.5 MG/2ML nebulizer solution Take 2 mL by nebulization 2 (Two) Times a Day for 30 days. 60 each 5   • [DISCONTINUED] formoterol (PERFOROMIST) 20 MCG/2ML nebulizer solution Take 2 mL by nebulization 2 (Two) Times a Day. 60 each 5   • [DISCONTINUED] revefenacin (YUPELRI) 175 MCG/3ML nebulizer solution Take 3 mL by nebulization Daily. 90 mL 5     No current  facility-administered medications on file prior to visit.       Allergies   Allergen Reactions   • Morphine Anaphylaxis   • Cephalexin Hives   • Penicillins Hives   • Sulfa Antibiotics Hives     Past Medical History:   Diagnosis Date   • Anxiety    • Asthma    • C. difficile diarrhea    • Colon cancer (HCC) 07/21/2017   • Colon polyp    • COPD (chronic obstructive pulmonary disease) (HCC) 10/23/2017   • Depression    • Diverticulitis    • Dysphagia    • Heartburn    • Heartburn    • Heartburn    • Heartburn    • Hernia 2019   • Hx of psychiatric care    • Hyperlipidemia    • Hypertension    • Impaired fasting glucose 08/14/2015   • Lumbago    • Lung nodule 02/17/2022   • Major depressive disorder 10/27/2016   • Malignant neoplasm of ascending colon (HCC) 7/21/2017   • Melena    • Multiple myeloma ([facility])    • Nicotine dependence 05/10/2017   • Renal insufficiency    • Seizures ([facility])     pseudo seizures, 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 DR LE:DIVERTICULOSIS AND ERYTHEMA OF MUCOSA   • ENDOSCOPY  2018   • FECAL DISIMPACTION  06/2019    TRANSPLANT    • HEMICOLECTOMY Right 05/2017   • HYSTERECTOMY  1982,1984   • KNEE ARTHROSCOPY Left 01/03/2022    Procedure: KNEE ARTHROSCOPY WITH PARTIAL MEDIAL MENISCECTOMY,  CHONDROPLASTY;  Surgeon: Nicholas Tipton MD;  [facility] [place];  Service: Orthopedics;  Laterality: Left;   • MINI-LAPAROTOMY  2017   • PORTACATH PLACEMENT     • UPPER GASTROINTESTINAL ENDOSCOPY  2018     Social History     Socioeconomic History   • Marital status:    Tobacco Use   • Smoking status: Former Smoker     Packs/day: 0.00     Years: 47.00     Pack years: 0.00      Types: Cigarettes     Start date: 1975     Quit date: 6/3/2022     Years since quittin.2   • Smokeless tobacco: Never Used   • Tobacco comment: started age 26   Vaping Use   • Vaping Use: Never used   Substance and Sexual Activity   • Alcohol use: Never   • Drug use: Never   • Sexual activity: Defer     Family History   Problem Relation Age of Onset   • Heart disease Mother    • Lung cancer Mother    • Cancer Mother    • Stroke Father    • Heart disease Father    • Kidney cancer Father    • Cancer Father    • Stroke Other         UNCLE/AUNT   • Colon cancer Neg Hx        Objective   Physical Exam  Vitals reviewed. Exam conducted with a chaperone present.   Constitutional:       General: She is not in acute distress.     Appearance: Normal appearance.   Cardiovascular:      Rate and Rhythm: Normal rate and regular rhythm.      Heart sounds: Normal heart sounds. No murmur heard.    No gallop.   Pulmonary:      Effort: Pulmonary effort is normal.      Breath sounds: Normal breath sounds.   Abdominal:      General: Abdomen is flat. Bowel sounds are normal.      Palpations: Abdomen is soft.   Musculoskeletal:      Cervical back: Neck supple.      Right lower leg: No edema.      Left lower leg: No edema.   Lymphadenopathy:      Cervical: No cervical adenopathy.   Neurological:      Mental Status: She is alert and oriented to person, place, and time.   Psychiatric:         Mood and Affect: Mood normal.         Behavior: Behavior normal.         Vitals:    22 0850   BP: 155/72   Pulse: 85   Resp: 22   Temp: 97.8 °F (36.6 °C)   SpO2: 97%   Weight: 95.1 kg (209 lb 10.5 oz)   PainSc:   6   PainLoc: Leg     ECOG score: 0         PHQ-9 Total Score:                    Result Review :   The following data was reviewed by: West Nicolas MD on 2022:  Lab Results   Component Value Date    HGB 13.4 2022    HCT 40.0 2022    MCV 95.0 2022     2022    WBC 8.07 2022    NEUTROABS  3.11 09/02/2022    LYMPHSABS 4.14 (H) 09/02/2022    MONOSABS 0.58 09/02/2022    EOSABS 0.17 09/02/2022    BASOSABS 0.06 09/02/2022     Lab Results   Component Value Date    GLUCOSE 97 09/01/2022    BUN 13 09/01/2022    CREATININE 0.80 09/01/2022     09/01/2022    K 3.8 09/01/2022     09/01/2022    CO2 19.7 (L) 09/01/2022    CALCIUM 8.9 09/01/2022    PROTEINTOT 8.4 09/01/2022    ALBUMIN 3.5 09/02/2022    BILITOT 0.5 09/01/2022    ALKPHOS 76 09/01/2022    AST 17 09/01/2022    ALT 17 09/01/2022     Lab Results   Component Value Date    FREET4 1.2 11/30/2020    TSH 3.230 09/01/2022     SPEP shows an M spike 2.2 g/dL.          Assessment and Plan    Diagnoses and all orders for this visit:    1. Multiple myeloma, remission status unspecified (HCC) (Primary)  Assessment & Plan:  Currently on observation.  M spike remains elevated 2.2 g/dL but this is actually slightly improved over previous.  Her other lab work looks good.    She will continue with surveillance.  I will see her back in 6 months for that purpose with lab work prior as well as PET scan    Orders:  -     CBC & Differential; Future  -     Comprehensive Metabolic Panel; Future  -     CARL,PE and FLC, Serum; Future  -     Lactate Dehydrogenase; Future  -     NM Pet Whole Body; Future    2. Malignant neoplasm of ascending colon (HCC)  Assessment & Plan:  Status post resection followed by adjuvant Xeloda completed in 2017.  Patient is doing well.  I see no evidence of disease recurrence by her history, physical examination.  She is up-to-date on colonoscopy.            Patient Follow Up: 6 months    Patient was given instructions and counseling regarding her condition or for health maintenance advice. Please see specific information pulled into the AVS if appropriate.     West Nicolas MD    9/8/2022

## 2022-09-08 NOTE — ASSESSMENT & PLAN NOTE
Currently on observation.  M spike remains elevated 2.2 g/dL but this is actually slightly improved over previous.  Her other lab work looks good.    She will continue with surveillance.  I will see her back in 6 months for that purpose with lab work prior as well as PET scan

## 2022-09-08 NOTE — ASSESSMENT & PLAN NOTE
Status post resection followed by adjuvant Xeloda completed in 2017.  Patient is doing well.  I see no evidence of disease recurrence by her history, physical examination.  She is up-to-date on colonoscopy.

## 2022-10-11 ENCOUNTER — PREP FOR SURGERY (OUTPATIENT)
Dept: OTHER | Facility: HOSPITAL | Age: 72
End: 2022-10-11

## 2022-10-11 ENCOUNTER — OFFICE VISIT (OUTPATIENT)
Dept: GASTROENTEROLOGY | Facility: CLINIC | Age: 72
End: 2022-10-11

## 2022-10-11 VITALS
WEIGHT: 211 LBS | OXYGEN SATURATION: 98 % | HEIGHT: 61 IN | SYSTOLIC BLOOD PRESSURE: 151 MMHG | DIASTOLIC BLOOD PRESSURE: 72 MMHG | BODY MASS INDEX: 39.84 KG/M2 | HEART RATE: 95 BPM

## 2022-10-11 DIAGNOSIS — R12 HEARTBURN: Primary | ICD-10-CM

## 2022-10-11 DIAGNOSIS — R19.7 DIARRHEA, UNSPECIFIED TYPE: ICD-10-CM

## 2022-10-11 DIAGNOSIS — A04.72 C. DIFFICILE DIARRHEA: Primary | ICD-10-CM

## 2022-10-11 DIAGNOSIS — C18.2 MALIGNANT NEOPLASM OF ASCENDING COLON: ICD-10-CM

## 2022-10-11 PROCEDURE — 99214 OFFICE O/P EST MOD 30 MIN: CPT | Performed by: INTERNAL MEDICINE

## 2022-10-11 RX ORDER — SODIUM PICOSULFATE, MAGNESIUM OXIDE, AND ANHYDROUS CITRIC ACID 10; 3.5; 12 MG/160ML; G/160ML; G/160ML
160 LIQUID ORAL ONCE
Qty: 160 ML | Refills: 0 | COMMUNITY
Start: 2022-10-11 | End: 2022-10-11

## 2022-10-11 RX ORDER — MONTELUKAST SODIUM 4 MG/1
2 TABLET, CHEWABLE ORAL 2 TIMES DAILY
Qty: 120 TABLET | Refills: 5 | Status: SHIPPED | OUTPATIENT
Start: 2022-10-11 | End: 2023-03-20 | Stop reason: SDUPTHER

## 2022-10-11 NOTE — PROGRESS NOTES
Chief Complaint    Anca Samson is a 72 y.o. female who presents to [facility] GASTRO[facility] for a history of colon cancer in 2017 status post right hemicolectomy and chemotherapy.  Dr. Carballo did her surgery and she initially was followed by Dr. Solomon and now is followed by Dr. Nunez and oncology.  She has chronic diarrhea secondary to her right hemicolectomy which is well managed with colestipol.  If she takes too much she does get constipation.  She denies abdominal pain, she has mild reflux intermittently but takes no medications for this.  She does have a ventral hernia that needs surgical repair and has been evaluated by surgery.  Weight loss was also recommended.    Result Review :     The following data was reviewed by: Jarvis Borges MD on 10/11/2022:    CMP    CMP 3/7/22 3/7/22 9/1/22 9/1/22 9/2/22    0915 0915 0901 0901    Glucose 116 (A)  97     BUN 15  13     Creatinine 0.84  0.80     Sodium 135 (A)  137     Potassium 3.7  3.8     Chloride 107  106     Calcium 9.3  8.9     Total Protein  7.8  7.7 7.6   Albumin 3.98 3.6 4.00 3.5 3.5   Globulin  4.2 (A)  4.2 (A) 4.1 (A)   Total Bilirubin 0.5  0.5     Alkaline Phosphatase 71  76     AST (SGOT) 14  17     ALT (SGPT) 12  17     (A) Abnormal value            CBC    CBC 3/7/22 9/2/22   WBC 8.18 8.07   RBC 4.46 4.21   Hemoglobin 14.6 13.4   Hematocrit 43.7 40.0   MCV 98.0 (A) 95.0   MCH 32.7 31.8   MCHC 33.4 33.5   RDW 14.3 13.9   Platelets 284 277   (A) Abnormal value              Data reviewed: GI studies Last colonoscopy 2019     Past Medical History:   Diagnosis Date   • Anxiety    • Asthma    • C. difficile diarrhea    • Colon cancer (HCC) 07/21/2017   • Colon polyp    • COPD (chronic obstructive pulmonary disease) ([facility]) 10/23/2017   • Depression    • Diverticulitis    • Dysphagia    • Heartburn    • Heartburn    • Heartburn    • Heartburn    • Hernia 2019   • Hx of psychiatric care    • Hyperlipidemia    • Hypertension    •  "Impaired fasting glucose 08/14/2015   • Lumbago    • Lung nodule 02/17/2022   • Major depressive disorder 10/27/2016   • Malignant neoplasm of ascending colon (HCC) 7/21/2017   • Melena    • Multiple myeloma (HCC)    • Nicotine dependence 05/10/2017   • Renal insufficiency    • Seizures (HCC)     pseudo seizures, 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   • Shortness of breath    • Tobacco use disorder 08/14/2015   • Visit for screening mammogram 02/20/2020    NORMAL- REPEAT IN ONE YEAR   • Vitamin D deficiency        Past Surgical History:   Procedure Laterality Date   • APPENDECTOMY     • BONE MARROW BIOPSY  2016   • COLON SURGERY  2017   • COLONOSCOPY  2018,2017,2019    [facility]- DR DESAI:DIVERTICULOSIS AND ERYTHEMA OF MUCOSA   • ENDOSCOPY  2018   • FECAL DISIMPACTION  06/2019    TRANSPLANT    • HEMICOLECTOMY Right 05/2017   • HYSTERECTOMY  1982,1984   • KNEE ARTHROSCOPY Left 01/03/2022    Procedure: KNEE ARTHROSCOPY WITH PARTIAL MEDIAL MENISCECTOMY,  CHONDROPLASTY;  Surgeon: Nicholas Tipton MD;  Location: [facility] OR [facility];  Service: Orthopedics;  Laterality: Left;   • MINI-LAPAROTOMY  2017   • PORTACATH PLACEMENT     • UPPER GASTROINTESTINAL ENDOSCOPY  2018       Social History     Social History Narrative   • Not on file       Objective     Vital Signs:   /72 (BP Location: Right arm, Patient Position: Sitting, Cuff Size: Adult)   Pulse 95   Ht 154.9 cm (61\")   Wt 95.7 kg (211 lb)   SpO2 98%   BMI 39.87 kg/m²     Body mass index is 39.87 kg/m².    Physical Exam            Assessment and Plan    Diagnoses and all orders for this visit:    1. Heartburn (Primary)    2. Diarrhea, unspecified type    3. Malignant neoplasm of ascending colon (HCC)    I will schedule the patient for screening colonoscopy given her history of colon cancer in 2017.  Her last colonoscopy was 2019 by Dr. Desai.  She also has a history of C. difficile and prior stool transplant.  Her diarrhea is well controlled now with colestipol and I " have reordered this for her.  She does report that her colestipol is somewhat expensive and therefore if her insurance covers powder form better than this can be changed and she will notify us.              Follow Up   No follow-ups on file.  Patient was given instructions and counseling regarding her condition or for health maintenance advice. Please see specific information pulled into the AVS if appropriate.

## 2022-10-11 NOTE — PROGRESS NOTES
I gave ms freedman a sample of bowel prep, Clenpiq [id number] exp 01/23. Colonoscopy scheduled 12/20/22. gisell

## 2022-11-01 ENCOUNTER — APPOINTMENT (OUTPATIENT)
Dept: GENERAL RADIOLOGY | Facility: HOSPITAL | Age: 72
End: 2022-11-01

## 2022-11-01 ENCOUNTER — HOSPITAL ENCOUNTER (EMERGENCY)
Facility: HOSPITAL | Age: 72
Discharge: HOME | End: 2022-11-01
Attending: EMERGENCY MEDICINE | Admitting: EMERGENCY MEDICINE

## 2022-11-01 VITALS
OXYGEN SATURATION: 96 % | HEIGHT: 61 IN | BODY MASS INDEX: 38.42 KG/M2 | DIASTOLIC BLOOD PRESSURE: 72 MMHG | HEART RATE: 82 BPM | SYSTOLIC BLOOD PRESSURE: 118 MMHG | TEMPERATURE: 98.1 F | WEIGHT: 203.48 LBS | RESPIRATION RATE: 21 BRPM

## 2022-11-01 DIAGNOSIS — J20.9 ACUTE BRONCHITIS, UNSPECIFIED ORGANISM: Primary | ICD-10-CM

## 2022-11-01 LAB
ALBUMIN SERPL-MCNC: 3.5 G/DL (ref 3.5–5.2)
ALBUMIN/GLOB SERPL: 0.7 G/DL
ALP SERPL-CCNC: 81 U/L (ref 39–117)
ALT SERPL W P-5'-P-CCNC: 36 U/L (ref 1–33)
ANION GAP SERPL CALCULATED.3IONS-SCNC: 13.4 MMOL/L (ref 5–15)
AST SERPL-CCNC: 34 U/L (ref 1–32)
BASOPHILS # BLD AUTO: 0.03 10*3/MM3 (ref 0–0.2)
BASOPHILS NFR BLD AUTO: 0.5 % (ref 0–1.5)
BILIRUB SERPL-MCNC: 0.2 MG/DL (ref 0–1.2)
BUN SERPL-MCNC: 15 MG/DL (ref 8–23)
BUN/CREAT SERPL: 13.2 (ref 7–25)
CALCIUM SPEC-SCNC: 8.6 MG/DL (ref 8.6–10.5)
CHLORIDE SERPL-SCNC: 112 MMOL/L (ref 98–107)
CO2 SERPL-SCNC: 15.6 MMOL/L (ref 22–29)
CREAT SERPL-MCNC: 1.14 MG/DL (ref 0.57–1)
DEPRECATED RDW RBC AUTO: 51.2 FL (ref 37–54)
EGFRCR SERPLBLD CKD-EPI 2021: 50.9 ML/MIN/1.73
EOSINOPHIL # BLD AUTO: 0.07 10*3/MM3 (ref 0–0.4)
EOSINOPHIL NFR BLD AUTO: 1.1 % (ref 0.3–6.2)
ERYTHROCYTE [DISTWIDTH] IN BLOOD BY AUTOMATED COUNT: 14.6 % (ref 12.3–15.4)
GLOBULIN UR ELPH-MCNC: 4.8 GM/DL
GLUCOSE SERPL-MCNC: 117 MG/DL (ref 65–99)
HCT VFR BLD AUTO: 40.4 % (ref 34–46.6)
HGB BLD-MCNC: 13.6 G/DL (ref 12–15.9)
HOLD SPECIMEN: NORMAL
HOLD SPECIMEN: NORMAL
IMM GRANULOCYTES # BLD AUTO: 0.04 10*3/MM3 (ref 0–0.05)
IMM GRANULOCYTES NFR BLD AUTO: 0.6 % (ref 0–0.5)
LYMPHOCYTES # BLD AUTO: 2.85 10*3/MM3 (ref 0.7–3.1)
LYMPHOCYTES NFR BLD AUTO: 43.2 % (ref 19.6–45.3)
MCH RBC QN AUTO: 32.2 PG (ref 26.6–33)
MCHC RBC AUTO-ENTMCNC: 33.7 G/DL (ref 31.5–35.7)
MCV RBC AUTO: 95.7 FL (ref 79–97)
MONOCYTES # BLD AUTO: 0.52 10*3/MM3 (ref 0.1–0.9)
MONOCYTES NFR BLD AUTO: 7.9 % (ref 5–12)
NEUTROPHILS NFR BLD AUTO: 3.08 10*3/MM3 (ref 1.7–7)
NEUTROPHILS NFR BLD AUTO: 46.7 % (ref 42.7–76)
NRBC BLD AUTO-RTO: 0 /100 WBC (ref 0–0.2)
NT-PROBNP SERPL-MCNC: 53.7 PG/ML (ref 0–900)
PLATELET # BLD AUTO: 236 10*3/MM3 (ref 140–450)
PMV BLD AUTO: 9.2 FL (ref 6–12)
POTASSIUM SERPL-SCNC: 3.7 MMOL/L (ref 3.5–5.2)
PROT SERPL-MCNC: 8.3 G/DL (ref 6–8.5)
RBC # BLD AUTO: 4.22 10*6/MM3 (ref 3.77–5.28)
SODIUM SERPL-SCNC: 141 MMOL/L (ref 136–145)
TROPONIN T SERPL-MCNC: <0.01 NG/ML (ref 0–0.03)
WBC NRBC COR # BLD: 6.59 10*3/MM3 (ref 3.4–10.8)
WHOLE BLOOD HOLD COAG: NORMAL
WHOLE BLOOD HOLD SPECIMEN: NORMAL

## 2022-11-01 PROCEDURE — 83880 ASSAY OF NATRIURETIC PEPTIDE: CPT

## 2022-11-01 PROCEDURE — 80053 COMPREHEN METABOLIC PANEL: CPT

## 2022-11-01 PROCEDURE — 99283 EMERGENCY DEPT VISIT LOW MDM: CPT

## 2022-11-01 PROCEDURE — 93005 ELECTROCARDIOGRAM TRACING: CPT | Performed by: EMERGENCY MEDICINE

## 2022-11-01 PROCEDURE — 71045 X-RAY EXAM CHEST 1 VIEW: CPT

## 2022-11-01 PROCEDURE — 84484 ASSAY OF TROPONIN QUANT: CPT

## 2022-11-01 PROCEDURE — 93010 ELECTROCARDIOGRAM REPORT: CPT | Performed by: SPECIALIST

## 2022-11-01 PROCEDURE — 36415 COLL VENOUS BLD VENIPUNCTURE: CPT

## 2022-11-01 PROCEDURE — 85025 COMPLETE CBC W/AUTO DIFF WBC: CPT

## 2022-11-01 PROCEDURE — 93005 ELECTROCARDIOGRAM TRACING: CPT

## 2022-11-01 RX ORDER — SODIUM CHLORIDE 0.9 % (FLUSH) 0.9 %
10 SYRINGE (ML) INJECTION AS NEEDED
Status: DISCONTINUED | OUTPATIENT
Start: 2022-11-01 | End: 2022-11-02 | Stop reason: HOSPADM

## 2022-11-01 RX ORDER — AZITHROMYCIN 250 MG/1
TABLET, FILM COATED ORAL
Qty: 6 TABLET | Refills: 0 | Status: SHIPPED | OUTPATIENT
Start: 2022-11-01 | End: 2022-12-16

## 2022-11-01 RX ORDER — BENZONATATE 200 MG/1
200 CAPSULE ORAL 3 TIMES DAILY PRN
Qty: 20 CAPSULE | Refills: 0 | Status: SHIPPED | OUTPATIENT
Start: 2022-11-01 | End: 2022-12-16

## 2022-11-02 ENCOUNTER — TELEPHONE (OUTPATIENT)
Dept: CASE MANAGEMENT | Facility: OTHER | Age: 72
End: 2022-11-02

## 2022-11-02 LAB — QT INTERVAL: 367 MS

## 2022-11-02 NOTE — TELEPHONE ENCOUNTER
Patient identified as potential CCM/HRCM candidate. Spoke with patient about health care issues, states she doesn't have any concerns at this time other than her current bronchitis she was treated for in the ER yesterday. Advised to call office and make follow-up if not feeling better or needs anything.  Leidy Montalvo RN  Ambulatory Case Management    11/2/2022, 17:13 EDT

## 2022-11-02 NOTE — ED PROVIDER NOTES
Time: 9:47 PM EDT  Arrived by: private car  Chief Complaint: nausea, vomiting  History provided by: patient  History is limited by: N/A     History of Present Illness:      Patient is a 72 y.o. year old female who presents to the emergency department with diarrhea, vomiting, cough, nausea. Pt denies a recent fever. Pt states the diarrhea and cough is chronic and she has seen someone for it. Pt reports nasal congestion. Pt denies exposure to known illness. Pt denies dysuria and frequent urination.       History provided by:  Patient   used: No    Vomiting  The primary symptoms include nausea, vomiting and diarrhea (chronic ). Primary symptoms do not include fever, abdominal pain, dysuria or myalgias.   The illness does not include chills.   Cough  Cough characteristics:  Productive  Sputum characteristics:  Clear  Severity:  Moderate  Onset quality:  Gradual  Duration:  3 days  Timing:  Constant  Progression:  Worsening  Chronicity:  Recurrent  Relieved by:  Nothing  Worsened by:  Nothing  Associated symptoms: no chest pain, no chills, no fever, no headaches, no myalgias, no rhinorrhea, no shortness of breath and no sore throat        Similar Symptoms Previously: N/A  Recently seen: N/A      Patient Care Team  Primary Care Provider: Rena Guerra PA    Past Medical History:     Allergies   Allergen Reactions   • Cephalexin Hives   • Morphine Anaphylaxis   • Penicillins Hives   • Sulfa Antibiotics Hives     Past Medical History:   Diagnosis Date   • Anxiety    • Asthma    • C. difficile diarrhea    • Colon cancer ([facility]) 07/21/2017   • Colon polyp    • COPD (chronic obstructive pulmonary disease) ([facility]) 10/23/2017   • Depression    • Diverticulitis    • Dysphagia    • Heartburn    • Heartburn    • Heartburn    • Heartburn    • Hernia 2019   • Hx of psychiatric care    • Hyperlipidemia    • Hypertension    • Impaired fasting glucose 08/14/2015   • Lumbago    • Lung nodule 02/17/2022   • Major  depressive disorder 10/27/2016   • Malignant neoplasm of ascending colon (HCC) 7/21/2017   • Melena    • Multiple myeloma (HCC)    • Nicotine dependence 05/10/2017   • Renal insufficiency    • Seizures (HCC)     pseudo seizures, 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   • Shortness of breath    • Tobacco use disorder 08/14/2015   • Visit for screening mammogram 02/20/2020    NORMAL- REPEAT IN ONE YEAR   • Vitamin D deficiency      Past Surgical History:   Procedure Laterality Date   • APPENDECTOMY     • BONE MARROW BIOPSY  2016   • COLON SURGERY  2017   • COLONOSCOPY  2018,2017,2019    [facility] DR LE:DIVERTICULOSIS AND ERYTHEMA OF MUCOSA   • ENDOSCOPY  2018   • FECAL DISIMPACTION  06/2019    TRANSPLANT    • HEMICOLECTOMY Right 05/2017   • HYSTERECTOMY  1982,1984   • KNEE ARTHROSCOPY Left 01/03/2022    Procedure: KNEE ARTHROSCOPY WITH PARTIAL MEDIAL MENISCECTOMY,  CHONDROPLASTY;  Surgeon: Nicholas Tipton MD;  Location: [facility] OR [facility];  Service: Orthopedics;  Laterality: Left;   • MINI-LAPAROTOMY  2017   • PORTACATH PLACEMENT     • UPPER GASTROINTESTINAL ENDOSCOPY  2018     Family History   Problem Relation Age of Onset   • Heart disease Mother    • Lung cancer Mother    • Cancer Mother    • Stroke Father    • Heart disease Father    • Kidney cancer Father    • Cancer Father    • Stroke Other         UNCLE/AUNT   • Colon cancer Neg Hx        Home Medications:  Prior to Admission medications    Medication Sig Start Date End Date Taking? Authorizing Provider   albuterol sulfate  (90 Base) MCG/ACT inhaler Inhale 2 puffs Every 4 (Four) Hours As Needed for Wheezing. 8/19/22   Paulino Macias PA   colestipol (COLESTID) 1 g tablet Take 2 tablets by mouth 2 (Two) Times a Day. 10/11/22   Jarvis Borges MD   FLUoxetine (PROzac) 20 MG capsule Take 1 capsule by mouth Daily. 5/20/22   Paulino Macias PA   meloxicam (MOBIC) 15 MG tablet TAKE 1 TABLET BY MOUTH EVERY DAY 8/19/22   Paulino Macias PA   promethazine (PHENERGAN) 25 MG  "tablet Take 1 tablet by mouth Every 6 (Six) Hours As Needed for Nausea or Vomiting. 22   Paulino Macias PA   vitamin D (ERGOCALCIFEROL) 1.25 MG (36984 UT) capsule capsule Take 1 capsule by mouth 2 (Two) Times a Week. 22   Paulino Macias PA        Social History:   Social History     Tobacco Use   • Smoking status: Former     Packs/day: 0.00     Years: 47.00     Pack years: 0.00     Types: Cigarettes     Start date: 1975     Quit date: 6/3/2022     Years since quittin.4   • Smokeless tobacco: Never   • Tobacco comments:     started age 26   Vaping Use   • Vaping Use: Never used   Substance Use Topics   • Alcohol use: Never   • Drug use: Never     Recent travel: not applicable     Review of Systems:  Review of Systems   Constitutional: Negative for chills and fever.   HENT: Positive for congestion. Negative for rhinorrhea and sore throat.    Eyes: Negative for pain and visual disturbance.   Respiratory: Negative for apnea, cough, chest tightness and shortness of breath.    Cardiovascular: Negative for chest pain and palpitations.   Gastrointestinal: Positive for diarrhea (chronic ), nausea and vomiting. Negative for abdominal pain.   Genitourinary: Negative for difficulty urinating and dysuria.   Musculoskeletal: Negative for joint swelling and myalgias.   Skin: Negative for color change.   Neurological: Negative for seizures and headaches.   Psychiatric/Behavioral: Negative.         Physical Exam:  /72 (BP Location: Right arm, Patient Position: Lying)   Pulse 82   Temp 98.1 °F (36.7 °C) (Oral)   Resp 21   Ht 154.9 cm (61\")   Wt 92.3 kg (203 lb 7.8 oz)   SpO2 96%   BMI 38.45 kg/m²     Physical Exam  Vitals and nursing note reviewed.   Constitutional:       General: She is not in acute distress.     Appearance: Normal appearance. She is not toxic-appearing.   HENT:      Head: Normocephalic and atraumatic.      Jaw: There is normal jaw occlusion.   Eyes:      General: Lids are normal.      " Extraocular Movements: Extraocular movements intact.      Conjunctiva/sclera: Conjunctivae normal.      Pupils: Pupils are equal, round, and reactive to light.   Cardiovascular:      Rate and Rhythm: Normal rate and regular rhythm.      Pulses: Normal pulses.      Heart sounds: Normal heart sounds.   Pulmonary:      Effort: Pulmonary effort is normal. No respiratory distress.      Breath sounds: Normal breath sounds. No wheezing or rhonchi.   Abdominal:      General: Abdomen is flat.      Palpations: Abdomen is soft.      Tenderness: There is no abdominal tenderness. There is no guarding or rebound.   Musculoskeletal:         General: Normal range of motion.      Cervical back: Normal range of motion and neck supple.      Right lower leg: No tenderness. Edema (trace) present.      Left lower leg: No tenderness. Edema (trace) present.      Comments: No popliteal tenderness   Skin:     General: Skin is warm and dry.   Neurological:      Mental Status: She is alert and oriented to person, place, and time. Mental status is at baseline.   Psychiatric:         Mood and Affect: Mood normal.                Medications in the Emergency Department:  Medications   sodium chloride 0.9 % flush 10 mL (has no administration in time range)        Labs  Lab Results (last 24 hours)     Procedure Component Value Units Date/Time    CBC & Differential [[id number]]  (Abnormal) Collected: 11/01/22 2000    Specimen: Blood Updated: 11/01/22 2022    Narrative:      The following orders were created for panel order CBC & Differential.  Procedure                               Abnormality         Status                     ---------                               -----------         ------                     CBC Auto Differential[[id number]]        Abnormal            Final result                 Please view results for these tests on the individual orders.    Comprehensive Metabolic Panel [[id number]]  (Abnormal) Collected: 11/01/22 2000     Specimen: Blood Updated: 11/01/22 2101     Glucose 117 mg/dL      BUN 15 mg/dL      Creatinine 1.14 mg/dL      Sodium 141 mmol/L      Potassium 3.7 mmol/L      Comment: Slight hemolysis detected by analyzer. Results may be affected.        Chloride 112 mmol/L      CO2 15.6 mmol/L      Calcium 8.6 mg/dL      Total Protein 8.3 g/dL      Albumin 3.50 g/dL      ALT (SGPT) 36 U/L      AST (SGOT) 34 U/L      Comment: Slight hemolysis detected by analyzer. Results may be affected.        Alkaline Phosphatase 81 U/L      Total Bilirubin 0.2 mg/dL      Globulin 4.8 gm/dL      A/G Ratio 0.7 g/dL      BUN/Creatinine Ratio 13.2     Anion Gap 13.4 mmol/L      eGFR 50.9 mL/min/1.73      Comment: National Kidney Foundation and American Society of Nephrology (ASN) Task Force recommended calculation based on the Chronic Kidney Disease Epidemiology Collaboration (CKD-EPI) equation refit without adjustment for race.       Narrative:      GFR Normal >60  Chronic Kidney Disease <60  Kidney Failure <15    The GFR formula is only valid for adults with stable renal function between ages 18 and 70.    BNP [[id number]]  (Normal) Collected: 11/01/22 2000    Specimen: Blood Updated: 11/01/22 2053     proBNP 53.7 pg/mL     Narrative:      Among patients with dyspnea, NT-proBNP is highly sensitive for the detection of acute congestive heart failure. In addition NT-proBNP of <300 pg/ml effectively rules out acute congestive heart failure with 99% negative predictive value.    Results may be falsely decreased if patient taking Biotin.      Troponin [[id number]]  (Normal) Collected: 11/01/22 2000    Specimen: Blood Updated: 11/01/22 2056     Troponin T <0.010 ng/mL     Narrative:      Troponin T Reference Range:  <= 0.03 ng/mL-   Negative for AMI  >0.03 ng/mL-     Abnormal for myocardial necrosis.  Clinicians would have to utilize clinical acumen, EKG, Troponin and serial changes to determine if it is an Acute Myocardial Infarction or myocardial  injury due to an underlying chronic condition.       Results may be falsely decreased if patient taking Biotin.      CBC Auto Differential [[id number]]  (Abnormal) Collected: 11/01/22 2000    Specimen: Blood Updated: 11/01/22 2022     WBC 6.59 10*3/mm3      RBC 4.22 10*6/mm3      Hemoglobin 13.6 g/dL      Hematocrit 40.4 %      MCV 95.7 fL      MCH 32.2 pg      MCHC 33.7 g/dL      RDW 14.6 %      RDW-SD 51.2 fl      MPV 9.2 fL      Platelets 236 10*3/mm3      Neutrophil % 46.7 %      Lymphocyte % 43.2 %      Monocyte % 7.9 %      Eosinophil % 1.1 %      Basophil % 0.5 %      Immature Grans % 0.6 %      Neutrophils, Absolute 3.08 10*3/mm3      Lymphocytes, Absolute 2.85 10*3/mm3      Monocytes, Absolute 0.52 10*3/mm3      Eosinophils, Absolute 0.07 10*3/mm3      Basophils, Absolute 0.03 10*3/mm3      Immature Grans, Absolute 0.04 10*3/mm3      nRBC 0.0 /100 WBC            Imaging:  XR Chest 1 View    Result Date: 11/1/2022  PROCEDURE: XR CHEST 1 VW  COMPARISON: [facility], CR, CHEST PA/AP & LAT 2V, 9/05/2019, 14:28.  INDICATIONS: SHORTNESS OF BREATH  FINDINGS:  LUNGS: Normal.  No significant pulmonary parenchymal abnormalities.  VASCULATURE: Normal.  Unremarkable pulmonary vasculature.  CARDIAC: Normal.  No cardiac silhouette abnormality or cardiomegaly.  MEDIASTINUM: Normal.  No visible mass or adenopathy.  PLEURA: Normal.  No effusion or pleural thickening.  BONES: Normal.  No fracture or visible bony lesion.  OTHER: A right subclavian port has its tip at the upper SVC.       No acute cardiopulmonary process identified.       YENIFER HANNA MD       Electronically Signed and Approved By: YENIFER HANNA MD on 11/01/2022 at 21:34               Procedures:  Procedures    Progress                            Medical Decision Making:  MDM  Number of Diagnoses or Management Options  Acute bronchitis, unspecified organism  Diagnosis management comments: 10:00 PM - Pt was told she would be  discharged.     The patient presented with a productive cough.  The patient´s CBC that was reviewed and interpreted by me shows no abnormalities of critical concern. Of note, there is no anemia requiring a blood transfusion and the platelet count is acceptable.  The patient´s CMP that was reviewed and interpretted by me shows no abnormalities of critical concern. Of note, the patient´s sodium and potassium are acceptable. The patient´s liver enzymes are unremarkable. The patient´s renal function (creatinine) is preserved. The patient has a normal anion gap.  Troponin is negative.  BNP is 53.7.  The patient is well-appearing and in no respiratory distress. The patient has a normal mental status and is neurologically intact. The patient appears well and is able to tolerate food for fluid by mouth and there's no significant dehydration. There is no respiratory distress and no signs of systemic toxicity. The history, exam, diagnostic testing and current condition do not demonstrate an infectious process such as meningitis, severe pneumonia, retropharyngeal abscess, epiglottitis, sepsis or other serious bacterial infection requiring further testing, treatment, consultation, or admission at this time. The patient has no additional oxygen requirement nor are there any signs of respiratory distress. The patient has a chest x-ray interpreted by me that is negative for pneumonia. Asthma, URI, GERD are also considered. The vital signs have been stable and the patient has had no hypoxia. The patient's condition is stable and appropriate for discharge. The patient will pursue further outpatient evaluation with the primary care physician, or designated physician. The patient will expressed a clear and thorough understanding and agreed to follow-up as instructed.       Amount and/or Complexity of Data Reviewed  Clinical lab tests: reviewed  Tests in the radiology section of CPT®: reviewed  Tests in the medicine section of CPT®:  reviewed  Independent visualization of images, tracings, or specimens: yes    Risk of Complications, Morbidity, and/or Mortality  Presenting problems: moderate  Management options: moderate    Patient Progress  Patient progress: stable       Final diagnoses:   Acute bronchitis, unspecified organism        Disposition:  ED Disposition     ED Disposition   Discharge    Condition   Stable    Comment   --             This medical record created using voice recognition software.    Rossy ENRIQUEZ, provided documentation assistance for and in the presence of Dr. Ortiz.             Rossy Alfaro  11/01/22 2201       Rossy Alfaro  11/01/22 2202       Rhonda Becker MD  11/01/22 2207       Rhonda Becker MD  11/01/22 2209

## 2022-11-14 DIAGNOSIS — F32.A DEPRESSION, UNSPECIFIED DEPRESSION TYPE: ICD-10-CM

## 2022-11-15 RX ORDER — FLUOXETINE HYDROCHLORIDE 20 MG/1
20 CAPSULE ORAL DAILY
Qty: 30 CAPSULE | Refills: 0 | Status: SHIPPED | OUTPATIENT
Start: 2022-11-15 | End: 2022-12-19 | Stop reason: SDUPTHER

## 2022-12-06 ENCOUNTER — TELEPHONE (OUTPATIENT)
Dept: GASTROENTEROLOGY | Facility: CLINIC | Age: 72
End: 2022-12-06

## 2022-12-06 NOTE — TELEPHONE ENCOUNTER
I spoke with Ms Samson, confirmed her scheduled Colonoscopy on 12.20.22, arrival time of 10:30am. Reminded of liquid diet the day prior. Reminded of bowel prep and instructions. Voiced understanding. gisell

## 2022-12-16 NOTE — PRE-PROCEDURE INSTRUCTIONS
PT INSTRUCTED ON CLEAR LIQ DIET AND PO SPLIT PREP LAST BM SHOULD BE CLEAR   No meds  ARRIVAL TIME IS 1000 am ON 12/20/22

## 2022-12-19 ENCOUNTER — OFFICE VISIT (OUTPATIENT)
Dept: FAMILY MEDICINE CLINIC | Facility: CLINIC | Age: 72
End: 2022-12-19

## 2022-12-19 VITALS
SYSTOLIC BLOOD PRESSURE: 122 MMHG | DIASTOLIC BLOOD PRESSURE: 84 MMHG | WEIGHT: 212.4 LBS | RESPIRATION RATE: 24 BRPM | BODY MASS INDEX: 40.1 KG/M2 | HEART RATE: 106 BPM | HEIGHT: 61 IN | OXYGEN SATURATION: 97 %

## 2022-12-19 DIAGNOSIS — I10 HYPERTENSION, UNSPECIFIED TYPE: ICD-10-CM

## 2022-12-19 DIAGNOSIS — R03.0 ELEVATED BLOOD PRESSURE READING: ICD-10-CM

## 2022-12-19 DIAGNOSIS — E78.5 HYPERLIPIDEMIA, UNSPECIFIED HYPERLIPIDEMIA TYPE: Primary | Chronic | ICD-10-CM

## 2022-12-19 DIAGNOSIS — Z12.31 ENCOUNTER FOR SCREENING MAMMOGRAM FOR MALIGNANT NEOPLASM OF BREAST: ICD-10-CM

## 2022-12-19 DIAGNOSIS — M25.569 KNEE PAIN, UNSPECIFIED CHRONICITY, UNSPECIFIED LATERALITY: Chronic | ICD-10-CM

## 2022-12-19 DIAGNOSIS — E55.9 VITAMIN D DEFICIENCY: Chronic | ICD-10-CM

## 2022-12-19 DIAGNOSIS — F32.A DEPRESSION, UNSPECIFIED DEPRESSION TYPE: Chronic | ICD-10-CM

## 2022-12-19 DIAGNOSIS — Z13.29 SCREENING FOR THYROID DISORDER: ICD-10-CM

## 2022-12-19 PROBLEM — A04.72 C. DIFFICILE DIARRHEA: Status: RESOLVED | Noted: 2021-07-28 | Resolved: 2022-12-19

## 2022-12-19 PROBLEM — S83.207A TEAR OF MENISCUS OF LEFT KNEE AS CURRENT INJURY: Status: RESOLVED | Noted: 2021-12-07 | Resolved: 2022-12-19

## 2022-12-19 PROBLEM — F17.200 NICOTINE DEPENDENCE WITH CURRENT USE: Status: RESOLVED | Noted: 2017-05-10 | Resolved: 2022-12-19

## 2022-12-19 PROBLEM — Z72.0 TOBACCO USE: Status: RESOLVED | Noted: 2021-07-28 | Resolved: 2022-12-19

## 2022-12-19 PROBLEM — S83.242A ACUTE MEDIAL MENISCUS TEAR OF LEFT KNEE: Status: RESOLVED | Noted: 2021-09-03 | Resolved: 2022-12-19

## 2022-12-19 PROCEDURE — 99214 OFFICE O/P EST MOD 30 MIN: CPT | Performed by: PHYSICIAN ASSISTANT

## 2022-12-19 RX ORDER — MELOXICAM 15 MG/1
15 TABLET ORAL DAILY
Qty: 90 TABLET | Refills: 1 | Status: SHIPPED | OUTPATIENT
Start: 2022-12-19 | End: 2023-03-30 | Stop reason: SDUPTHER

## 2022-12-19 RX ORDER — ERGOCALCIFEROL 1.25 MG/1
50000 CAPSULE ORAL 2 TIMES WEEKLY
Qty: 26 CAPSULE | Refills: 1 | Status: SHIPPED | OUTPATIENT
Start: 2022-12-19

## 2022-12-19 RX ORDER — FLUOXETINE HYDROCHLORIDE 20 MG/1
20 CAPSULE ORAL DAILY
Qty: 90 CAPSULE | Refills: 1 | Status: SHIPPED | OUTPATIENT
Start: 2022-12-19 | End: 2023-02-23

## 2022-12-19 NOTE — PROGRESS NOTES
Chief Complaint  Chief Complaint   Patient presents with   • Establish Care   • COPD   • Depression   • Hyperlipidemia   • Hypertension       Subjective          Anca Samson presents to [facility] to Establish Care, former patient of LACI Jordan. Pt is here for 4 month f/u for HTN, COPD, Depression.      History of Present Illness    Elevated blood pressure:  Patient is currently not prescribed medication for HTN. Patient denies chest pain, shortness of air and edema. Patient does not check blood pressure at home.    COPD: Patient presents for management of COPD. Patient is currently on Albuterol Sulfate. Patient denies any shortness of air, cough or wheezing. Patient has/has not had any recent COPD exacerbations.      Depression: Patient is currently on Fluoxetine. Patient states that symptoms of depression are well controlled and denies any side effects of the medication.    OA: patient is on Mobic with good results.    Vitamin D def: currently on vitamin D 2 pills weekly but has been out for the past month.    Mammo: 2-19-20(DUE)- ordered, not performed  DXA- 9-16-21  Colon- Pt scheduled for colonoscopy tomorrow.       Medical History: has a past medical history of Anxiety, Asthma, C. difficile diarrhea, Colon cancer (HCC) (07/21/2017), Colon polyp, COPD (chronic obstructive pulmonary disease) (HCC) (10/23/2017), Depression, Diverticulitis, Dysphagia, Heartburn, Heartburn, Heartburn, Heartburn, Hernia (2019), History of chemotherapy, psychiatric care, Hyperlipidemia, Impaired fasting glucose (08/14/2015), Lumbago, Lung nodule (02/17/2022), Major depressive disorder (10/27/2016), Malignant neoplasm of ascending colon (HCC) (07/21/2017), Melena, Multiple myeloma (HCC), Nicotine dependence (05/10/2017), Seizures (HCC), Shortness of breath, Tobacco use (7/28/2021), Tobacco use disorder (08/14/2015), Visit for screening mammogram (02/20/2020), and Vitamin D deficiency.  "  Surgical History: has a past surgical history that includes Bone marrow biopsy (2016); Colonoscopy (2018,2017,2019); Esophagogastroduodenoscopy (2018); Fecal disimpaction (06/2019); Hysterectomy (1982,1984); Mini-laparotomy (2017); Portacath placement (N/A); Colon surgery (2017); Hemicolectomy (Right, 05/2017); Upper gastrointestinal endoscopy (2018); Knee Arthroscopy (Left, 01/03/2022); and Appendectomy.   Family History: family history includes Cancer in her father and mother; Heart disease in her father and mother; Kidney cancer in her father; Lung cancer in her mother; Stroke in her father and another family member.   Social History: reports that she quit smoking about 6 months ago. Her smoking use included cigarettes. She started smoking about 47 years ago. She has never used smokeless tobacco. She reports that she does not drink alcohol and does not use drugs.    Allergies: Cephalexin, Morphine, Penicillins, and Sulfa antibiotics    There are no preventive care reminders to display for this patient.    Immunization History   Administered Date(s) Administered   • Fluzone High Dose =>65 Years ([employer] ONLY) 09/24/2020, 09/24/2021   • Fluzone High-Dose 65+yrs 09/24/2020, 09/24/2021, 09/20/2022   • Fluzone Quad >6mos (Multi-dose) 09/14/2015   • Pneumococcal Conjugate 13-Valent (PCV13) 09/21/2015   • Pneumococcal Polysaccharide (PPSV23) 04/12/2022       Objective     Vitals:    12/19/22 1132 12/19/22 1216   BP: (!) 139/117 122/84   Pulse: 106    Resp: 24    SpO2: 97%    Weight: 96.3 kg (212 lb 6.4 oz)    Height: 154.9 cm (60.98\")      Body mass index is 40.16 kg/m².     Wt Readings from Last 3 Encounters:   12/19/22 96.3 kg (212 lb 6.4 oz)   11/01/22 92.3 kg (203 lb 7.8 oz)   11/01/22 95.7 kg (211 lb)     BP Readings from Last 3 Encounters:   12/19/22 122/84   11/01/22 118/72   11/01/22 133/74       Class 2 Severe Obesity (BMI >=35 and <=39.9). Obesity-related health conditions include the following: " dyslipidemias and osteoarthritis. Obesity is unchanged. BMI is is above average; BMI management plan is completed. We discussed portion control and increasing exercise.      Patient Care Team:  Rena Guerra PA as PCP - General (Family Medicine)  Paulino Macias PA (Physician Assistant)  West Nicolas MD as Consulting Physician (Hematology and Oncology)  Alcon Delgado MD as Consulting Physician (General Surgery)  Jarvis Borges MD as Consulting Physician (Gastroenterology)  Erendira Clayton APRN as Nurse Practitioner (Gastroenterology)    Physical Exam  Vitals and nursing note reviewed.   Constitutional:       Appearance: Normal appearance. She is obese.   HENT:      Head: Normocephalic and atraumatic.   Neck:      Vascular: No carotid bruit.      Comments: Thyroid : gland size normal, nontender, no nodules or masses present on palpation   Cardiovascular:      Rate and Rhythm: Normal rate and regular rhythm.      Pulses: Normal pulses.      Heart sounds: Normal heart sounds.   Pulmonary:      Effort: Pulmonary effort is normal.      Breath sounds: Normal breath sounds.   Musculoskeletal:      Cervical back: Neck supple. No tenderness.      Right lower leg: No edema.      Left lower leg: No edema.   Lymphadenopathy:      Cervical: No cervical adenopathy.   Neurological:      Mental Status: She is alert.   Psychiatric:         Mood and Affect: Mood normal.         Behavior: Behavior normal.           Result Review :                           Assessment and Plan      Diagnoses and all orders for this visit:    1. Hyperlipidemia, unspecified hyperlipidemia type (Primary)  Comments:  Unsure of stability; not currently on medication; check fasting labs.  Orders:  -     Comprehensive Metabolic Panel; Future  -     Lipid Panel; Future    2. Depression, unspecified depression type  Comments:  Stable on Prozac 20mg daily; continue and follow up in 6mths.  Orders:  -     FLUoxetine (PROzac) 20 MG  capsule; Take 1 capsule by mouth Daily.  Dispense: 90 capsule; Refill: 1    3. Knee pain, unspecified chronicity, unspecified laterality  Comments:  Stable; continue with Mobic 15mg daily; and continue with ortho as needed.  Orders:  -     meloxicam (MOBIC) 15 MG tablet; Take 1 tablet by mouth Daily.  Dispense: 90 tablet; Refill: 1    4. Vitamin D deficiency  Comments:  Unsure of stability; continue with vitamin D [id number] twice weekly and recheck labs in 3mths.  Orders:  -     vitamin D (ERGOCALCIFEROL) 1.25 MG (43898 UT) capsule capsule; Take 1 capsule by mouth 2 (Two) Times a Week.  Dispense: 26 capsule; Refill: 1  -     Vitamin D,25-Hydroxy; Future    5. Elevated blood pressure reading  Comments:  Recheck was normal; continue to monitor.  Orders:  -     CBC & Differential; Future  -     TSH; Future    6. Screening for thyroid disorder  -     TSH; Future    7. Hypertension, unspecified type  -     TSH; Future    8. Encounter for screening mammogram for malignant neoplasm of breast  -     Mammo Screening Digital Tomosynthesis Bilateral With CAD; Future            Follow Up     Return in about 6 months (around 6/19/2023).    Patient was given instructions and counseling regarding her condition or for health maintenance advice. Please see specific information pulled into the AVS if appropriate.

## 2022-12-20 ENCOUNTER — ANESTHESIA EVENT (OUTPATIENT)
Dept: GASTROENTEROLOGY | Facility: HOSPITAL | Age: 72
End: 2022-12-20

## 2022-12-20 ENCOUNTER — ANESTHESIA (OUTPATIENT)
Dept: GASTROENTEROLOGY | Facility: HOSPITAL | Age: 72
End: 2022-12-20

## 2022-12-20 ENCOUNTER — HOSPITAL ENCOUNTER (OUTPATIENT)
Facility: HOSPITAL | Age: 72
Setting detail: HOSPITAL OUTPATIENT SURGERY
Discharge: HOME | End: 2022-12-20
Attending: INTERNAL MEDICINE | Admitting: INTERNAL MEDICINE

## 2022-12-20 VITALS
OXYGEN SATURATION: 96 % | BODY MASS INDEX: 39.71 KG/M2 | TEMPERATURE: 97.7 F | WEIGHT: 210.32 LBS | DIASTOLIC BLOOD PRESSURE: 66 MMHG | HEIGHT: 61 IN | RESPIRATION RATE: 20 BRPM | HEART RATE: 84 BPM | SYSTOLIC BLOOD PRESSURE: 132 MMHG

## 2022-12-20 DIAGNOSIS — A04.72 C. DIFFICILE DIARRHEA: ICD-10-CM

## 2022-12-20 PROCEDURE — 88305 TISSUE EXAM BY PATHOLOGIST: CPT | Performed by: INTERNAL MEDICINE

## 2022-12-20 PROCEDURE — 45380 COLONOSCOPY AND BIOPSY: CPT | Performed by: INTERNAL MEDICINE

## 2022-12-20 PROCEDURE — 25010000002 PROPOFOL 10 MG/ML EMULSION: Performed by: NURSE ANESTHETIST, CERTIFIED REGISTERED

## 2022-12-20 PROCEDURE — 45390 COLONOSCOPY W/RESECTION: CPT | Performed by: INTERNAL MEDICINE

## 2022-12-20 DEVICE — DEV CLIP ENDO RESOLUTION360 CONTRL ROT 235CM: Type: IMPLANTABLE DEVICE | Site: ASCENDING COLON | Status: FUNCTIONAL

## 2022-12-20 RX ORDER — LIDOCAINE HYDROCHLORIDE 20 MG/ML
INJECTION, SOLUTION EPIDURAL; INFILTRATION; INTRACAUDAL; PERINEURAL AS NEEDED
Status: DISCONTINUED | OUTPATIENT
Start: 2022-12-20 | End: 2022-12-20 | Stop reason: SURG

## 2022-12-20 RX ORDER — SODIUM CHLORIDE, SODIUM LACTATE, POTASSIUM CHLORIDE, CALCIUM CHLORIDE 600; 310; 30; 20 MG/100ML; MG/100ML; MG/100ML; MG/100ML
30 INJECTION, SOLUTION INTRAVENOUS CONTINUOUS
Status: DISCONTINUED | OUTPATIENT
Start: 2022-12-20 | End: 2022-12-20 | Stop reason: HOSPADM

## 2022-12-20 RX ADMIN — SODIUM CHLORIDE, POTASSIUM CHLORIDE, SODIUM LACTATE AND CALCIUM CHLORIDE 30 ML/HR: 600; 310; 30; 20 INJECTION, SOLUTION INTRAVENOUS at 09:33

## 2022-12-20 RX ADMIN — PROPOFOL 250 MCG/KG/MIN: 10 INJECTION, EMULSION INTRAVENOUS at 10:39

## 2022-12-20 RX ADMIN — LIDOCAINE HYDROCHLORIDE 40 MG: 20 INJECTION, SOLUTION EPIDURAL; INFILTRATION; INTRACAUDAL; PERINEURAL at 10:39

## 2022-12-20 NOTE — ANESTHESIA PREPROCEDURE EVALUATION
Anesthesia Evaluation     Patient summary reviewed and Nursing notes reviewed   no history of anesthetic complications:  NPO Solid Status: > 8 hours  NPO Liquid Status: > 2 hours           Airway   Mallampati: II  TM distance: >3 FB  Neck ROM: full  No difficulty expected  Dental          Pulmonary - normal exam    breath sounds clear to auscultation  (+) COPD, shortness of breath,   Cardiovascular - normal exam  Exercise tolerance: good (4-7 METS)    Rhythm: regular  Rate: normal    (+) hypertension, hyperlipidemia,       Neuro/Psych  (+) seizures,    GI/Hepatic/Renal/Endo    (+) morbid obesity,  renal disease CRI,     Musculoskeletal     Abdominal    Substance History      OB/GYN          Other      history of cancer (colon CA, multiple myeloma) remission    ROS/Med Hx Other: PAT Nursing Notes unavailable.                   Anesthesia Plan    ASA 3     general   total IV anesthesia    Anesthetic plan, risks, benefits, and alternatives have been provided, discussed and informed consent has been obtained with: patient.        CODE STATUS:

## 2022-12-20 NOTE — ANESTHESIA POSTPROCEDURE EVALUATION
Patient: Anca Samson    Procedure Summary     Date: 12/20/22 Room / Location: [facility] 2 / [facility]    Anesthesia Start: 1038 Anesthesia Stop: 1105    Procedure: COLONOSCOPY WITH ELEVIEW INJECTION, POLYPECTOMY, HOT SNARE, CLIP APPLICATION X3, BIOPSIES Diagnosis:       C. difficile diarrhea      (C. difficile diarrhea [A04.72])    Surgeons: Jarvis Borges MD Provider: Tad Patterson MD    Anesthesia Type: general ASA Status: 3          Anesthesia Type: general    Vitals  Vitals Value Taken Time   /66 12/20/22 1125   Temp 36.5 °C (97.7 °F) 12/20/22 1105   Pulse 82 12/20/22 1127   Resp 20 12/20/22 1125   SpO2 95 % 12/20/22 1126   Vitals shown include unvalidated device data.        Post Anesthesia Care and Evaluation    Patient location during evaluation: bedside  Patient participation: complete - patient participated  Level of consciousness: awake  Pain management: adequate    Airway patency: patent  Anesthetic complications: No anesthetic complications  PONV Status: none  Cardiovascular status: acceptable and stable  Respiratory status: acceptable  Hydration status: acceptable    Comments: An Anesthesiologist personally participated in the most demanding procedures (including induction and emergence if applicable) in the anesthesia plan, monitored the course of anesthesia administration at frequent intervals and remained physically present and available for immediate diagnosis and treatment of emergencies.

## 2022-12-20 NOTE — H&P
ScreeningPre Procedure History & Physical    Chief Complaint:   Screening     Subjective     HPI:   Screening     Past Medical History:   Past Medical History:   Diagnosis Date   • Anxiety    • Asthma    • C. difficile diarrhea    • Colon cancer (HCC) 07/21/2017   • Colon polyp    • COPD (chronic obstructive pulmonary disease) (HCC) 10/23/2017   • Depression    • Diverticulitis    • Dysphagia    • Heartburn    • Heartburn    • Heartburn    • Heartburn    • Hernia 2019   • History of chemotherapy    • Hx of psychiatric care    • Hyperlipidemia    • Impaired fasting glucose 08/14/2015   • Lumbago    • Lung nodule 02/17/2022   • Major depressive disorder 10/27/2016   • Malignant neoplasm of ascending colon (HCC) 07/21/2017   • Melena    • Multiple myeloma (HCC)    • Nicotine dependence 05/10/2017   • Seizures (HCC)     pseudo seizures, 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 [facility]   • Shortness of breath    • Tobacco use 7/28/2021   • Tobacco use disorder 08/14/2015   • Visit for screening mammogram 02/20/2020    NORMAL- REPEAT IN ONE YEAR   • Vitamin D deficiency        Past Surgical History:  Past Surgical History:   Procedure Laterality Date   • APPENDECTOMY     • BONE MARROW BIOPSY  2016   • COLON SURGERY  2017   • COLONOSCOPY  2018,2017,2019    [facility]:DIVERTICULOSIS AND ERYTHEMA OF MUCOSA   • ENDOSCOPY  2018   • FECAL DISIMPACTION  06/2019    TRANSPLANT    • HEMICOLECTOMY Right 05/2017   • HYSTERECTOMY  1982,1984   • KNEE ARTHROSCOPY Left 01/03/2022    Procedure: KNEE ARTHROSCOPY WITH PARTIAL MEDIAL MENISCECTOMY,  CHONDROPLASTY;  Surgeon: Nciholas Tipton MD;  Location: [facility];  Service: Orthopedics;  Laterality: Left;   • MINI-LAPAROTOMY  2017   • PORTACATH PLACEMENT N/A     pt states that her port does not work   • UPPER GASTROINTESTINAL ENDOSCOPY  2018       Family History:  Family History   Problem Relation Age of Onset   • Heart disease Mother    • Lung cancer Mother    • Cancer Mother    •  "Stroke Father    • Heart disease Father    • Kidney cancer Father    • Cancer Father    • Stroke Other         UNCLE/AUNT   • Colon cancer Neg Hx    • Malig Hyperthermia Neg Hx        Social History:   reports that she quit smoking about 6 months ago. Her smoking use included cigarettes. She started smoking about 48 years ago. She has never used smokeless tobacco. She reports that she does not drink alcohol and does not use drugs.    Medications:   Medications Prior to Admission   Medication Sig Dispense Refill Last Dose   • albuterol sulfate  (90 Base) MCG/ACT inhaler Inhale 2 puffs Every 4 (Four) Hours As Needed for Wheezing. 18 g 1 Past Week   • colestipol (COLESTID) 1 g tablet Take 2 tablets by mouth 2 (Two) Times a Day. 120 tablet 5 Past Week   • FLUoxetine (PROzac) 20 MG capsule Take 1 capsule by mouth Daily. 90 capsule 1 Past Week   • meloxicam (MOBIC) 15 MG tablet Take 1 tablet by mouth Daily. 90 tablet 1 Past Week   • promethazine (PHENERGAN) 25 MG tablet Take 1 tablet by mouth Every 6 (Six) Hours As Needed for Nausea or Vomiting. 30 tablet 1 Past Week   • vitamin D (ERGOCALCIFEROL) 1.25 MG (80287 UT) capsule capsule Take 1 capsule by mouth 2 (Two) Times a Week. 26 capsule 1 Past Week       Allergies:  Cephalexin, Morphine, Penicillins, and Sulfa antibiotics        Objective     Blood pressure 145/75, pulse 106, temperature 97.1 °F (36.2 °C), temperature source Temporal, resp. rate 16, height 154.9 cm (60.98\"), weight 95.4 kg (210 lb 5.1 oz), SpO2 95 %, not currently breastfeeding.    Physical Exam   Constitutional: Pt is oriented to person, place, and time and well-developed, well-nourished, and in no distress.   Mouth/Throat: Oropharynx is clear and moist.   Neck: Normal range of motion.   Cardiovascular: Normal rate, regular rhythm and normal heart sounds.    Pulmonary/Chest: Effort normal and breath sounds normal.   Abdominal: Soft. Nontender  Skin: Skin is warm and dry.   Psychiatric: Mood, " memory, affect and judgment normal.     Assessment & Plan     Diagnosis:  Screening colonoscopy  H/o colon polyps  H/o colon cancer     Anticipated Surgical Procedure:  colonoscopy    The risks, benefits, and alternatives of this procedure have been discussed with the patient or the responsible party- the patient understands and agrees to proceed.

## 2022-12-21 LAB
CYTO UR: NORMAL
LAB AP CASE REPORT: NORMAL
LAB AP CLINICAL INFORMATION: NORMAL
PATH REPORT.FINAL DX SPEC: NORMAL
PATH REPORT.GROSS SPEC: NORMAL

## 2023-02-21 ENCOUNTER — PATIENT MESSAGE (OUTPATIENT)
Dept: FAMILY MEDICINE CLINIC | Facility: CLINIC | Age: 73
End: 2023-02-21
Payer: MEDICARE

## 2023-02-22 ENCOUNTER — PATIENT MESSAGE (OUTPATIENT)
Dept: FAMILY MEDICINE CLINIC | Facility: CLINIC | Age: 73
End: 2023-02-22
Payer: MEDICARE

## 2023-02-22 NOTE — TELEPHONE ENCOUNTER
From: Anca Samson  To: Rena Erica Charlie  Sent: 2/21/2023 9:40 PM EST  Subject: Change Medication    Mrs. Guerra, I don't think my Prozac is working right. Could you change it to something else? I've been on it for some time.    Thanks,     Sarah Samson

## 2023-02-23 ENCOUNTER — OFFICE VISIT (OUTPATIENT)
Dept: FAMILY MEDICINE CLINIC | Facility: CLINIC | Age: 73
End: 2023-02-23
Payer: MEDICARE

## 2023-02-23 VITALS
SYSTOLIC BLOOD PRESSURE: 145 MMHG | BODY MASS INDEX: 40.75 KG/M2 | HEART RATE: 108 BPM | WEIGHT: 215.8 LBS | DIASTOLIC BLOOD PRESSURE: 57 MMHG | OXYGEN SATURATION: 98 % | HEIGHT: 61 IN

## 2023-02-23 DIAGNOSIS — F32.A DEPRESSION, UNSPECIFIED DEPRESSION TYPE: Chronic | ICD-10-CM

## 2023-02-23 PROCEDURE — 99214 OFFICE O/P EST MOD 30 MIN: CPT | Performed by: NURSE PRACTITIONER

## 2023-02-23 RX ORDER — FLUOXETINE HYDROCHLORIDE 20 MG/1
40 CAPSULE ORAL DAILY
Qty: 60 CAPSULE | Refills: 0 | Status: SHIPPED | OUTPATIENT
Start: 2023-02-23 | End: 2023-03-30 | Stop reason: SDUPTHER

## 2023-02-23 NOTE — TELEPHONE ENCOUNTER
From: Anca Samson  To: Rena Guerra  Sent: 2/22/2023 3:52 PM EST  Subject: Visit    The earliest I could see you was on March 30.     Thanks,  Sarah

## 2023-02-23 NOTE — PROGRESS NOTES
"Chief Complaint  Depression    Subjective      Anca Samson  Is a 72 year old female that presents to [facility] with her .  Today she c/o increased depression symptoms that have been going on for about 1 week.  She c/o weight gain, sleeping more, poor appetite, little interest in being around others and she does not want to drive or get out of the house. She denies any changes in home life or new stressors. No thoughts of SI/HI. She states that she just feels like she is \"fat, old and a terrible wife and mother\". She does say that she has a very supportive  and family.  She has been on prozac for quite some time and states that it has been helpful for her until now. She has never had the dosage changed. She does not currently go to therapy or psych. She states that she is not interested at this time.  History of Present Illness    Current Outpatient Medications   Medication Instructions   • albuterol sulfate  (90 Base) MCG/ACT inhaler 2 puffs, Inhalation, Every 4 Hours PRN   • colestipol (COLESTID) 2 g, Oral, 2 Times Daily   • FLUoxetine (PROZAC) 40 mg, Oral, Daily   • meloxicam (MOBIC) 15 mg, Oral, Daily   • promethazine (PHENERGAN) 25 mg, Oral, Every 6 Hours PRN   • vitamin D (ERGOCALCIFEROL) 50,000 Units, Oral, 2 Times Weekly       The following portions of the patient's history were reviewed and updated as appropriate: allergies, current medications, past family history, past medical history, past social history, past surgical history, and problem list.    Objective   Vital Signs:   /57 (BP Location: Left arm, Patient Position: Sitting, Cuff Size: Large Adult)   Pulse 108   Ht 154.9 cm (60.98\")   Wt 97.9 kg (215 lb 12.8 oz)   SpO2 98%   BMI 40.80 kg/m²     Wt Readings from Last 3 Encounters:   02/23/23 97.9 kg (215 lb 12.8 oz)   12/20/22 95.4 kg (210 lb 5.1 oz)   12/19/22 96.3 kg (212 lb 6.4 oz)     BP Readings from Last 3 Encounters: "   02/23/23 145/57   12/20/22 132/66   12/19/22 122/84     Physical Exam  Vitals reviewed.   Constitutional:       General: She is not in acute distress.     Appearance: Normal appearance. She is well-developed. She is obese.      Comments:  at side, holding her hand.   HENT:      Head: Normocephalic and atraumatic.      Mouth/Throat:      Pharynx: No oropharyngeal exudate.   Eyes:      Conjunctiva/sclera: Conjunctivae normal.      Pupils: Pupils are equal, round, and reactive to light.   Cardiovascular:      Rate and Rhythm: Normal rate and regular rhythm.      Heart sounds: No murmur heard.    No friction rub.   Pulmonary:      Effort: Pulmonary effort is normal.      Breath sounds: Normal breath sounds. No wheezing or rhonchi.   Skin:     General: Skin is warm and dry.   Neurological:      Mental Status: She is alert and oriented to person, place, and time.   Psychiatric:         Attention and Perception: Attention normal.         Mood and Affect: Affect normal. Mood is anxious (leg shaking). Affect is not tearful.         Speech: Speech normal.         Behavior: Behavior normal.         Thought Content: Thought content does not include homicidal or suicidal ideation. Thought content does not include homicidal or suicidal plan.          Result Review :  The following data was reviewed by: LINETTE Silva on 02/23/2023:        Lab Results (last 72 hours)     ** No results found for the last 72 hours. **               Procedures        Assessment and Plan   Diagnoses and all orders for this visit:    1. Depression, unspecified depression type  Comments:  Increased Prozac to 40mg daily.  Follow up in 1 month. (appointment already scheduled)  Orders:  -     FLUoxetine (PROzac) 20 MG capsule; Take 2 capsules by mouth Daily for 30 days.  Dispense: 60 capsule; Refill: 0       She currently has about 1 month left of her current prozac prescription. Instructed to take 2 capsules instead of 1 and a new  prescription for 40mg was sent to the pharmacy. She will follow up with Rena Guerra on 3/30/23.  Encouraged to ensure she is eating and drinking well, find a hobby to do either alone or with her . Offered therapy referral, declined at this time. Patient and spouse in agreement with plan of care.    Medications Discontinued During This Encounter   Medication Reason   • FLUoxetine (PROzac) 20 MG capsule           Follow Up   No follow-ups on file.  Patient was given instructions and counseling regarding her condition or for health maintenance advice. Please see specific information pulled into the AVS if appropriate.       Rosie Delvalle, APRN  02/23/23  12:51 EST

## 2023-03-07 ENCOUNTER — HOSPITAL ENCOUNTER (OUTPATIENT)
Dept: PET IMAGING | Facility: HOSPITAL | Age: 73
Discharge: HOME | End: 2023-03-07
Payer: MEDICARE

## 2023-03-07 ENCOUNTER — LAB (OUTPATIENT)
Dept: ONCOLOGY | Facility: HOSPITAL | Age: 73
End: 2023-03-07
Payer: MEDICARE

## 2023-03-07 DIAGNOSIS — C90.00 MULTIPLE MYELOMA, REMISSION STATUS UNSPECIFIED: ICD-10-CM

## 2023-03-07 LAB
ALBUMIN SERPL-MCNC: 3.5 G/DL (ref 3.5–5.2)
ALBUMIN/GLOB SERPL: 0.8 G/DL
ALP SERPL-CCNC: 56 U/L (ref 39–117)
ALT SERPL W P-5'-P-CCNC: 22 U/L (ref 1–33)
ANION GAP SERPL CALCULATED.3IONS-SCNC: 8.8 MMOL/L (ref 5–15)
AST SERPL-CCNC: 22 U/L (ref 1–32)
BASOPHILS # BLD AUTO: 0.02 10*3/MM3 (ref 0–0.2)
BASOPHILS NFR BLD AUTO: 0.3 % (ref 0–1.5)
BILIRUB SERPL-MCNC: 0.3 MG/DL (ref 0–1.2)
BUN SERPL-MCNC: 11 MG/DL (ref 8–23)
BUN/CREAT SERPL: 12.9 (ref 7–25)
CALCIUM SPEC-SCNC: 8.9 MG/DL (ref 8.6–10.5)
CHLORIDE SERPL-SCNC: 110 MMOL/L (ref 98–107)
CO2 SERPL-SCNC: 19.2 MMOL/L (ref 22–29)
CREAT SERPL-MCNC: 0.85 MG/DL (ref 0.57–1)
DEPRECATED RDW RBC AUTO: 52.8 FL (ref 37–54)
EGFRCR SERPLBLD CKD-EPI 2021: 72.4 ML/MIN/1.73
EOSINOPHIL # BLD AUTO: 0.23 10*3/MM3 (ref 0–0.4)
EOSINOPHIL NFR BLD AUTO: 3.6 % (ref 0.3–6.2)
ERYTHROCYTE [DISTWIDTH] IN BLOOD BY AUTOMATED COUNT: 14.8 % (ref 12.3–15.4)
GLOBULIN UR ELPH-MCNC: 4.4 GM/DL
GLUCOSE SERPL-MCNC: 104 MG/DL (ref 65–99)
HCT VFR BLD AUTO: 39 % (ref 34–46.6)
HGB BLD-MCNC: 13.1 G/DL (ref 12–15.9)
IMM GRANULOCYTES # BLD AUTO: 0.01 10*3/MM3 (ref 0–0.05)
IMM GRANULOCYTES NFR BLD AUTO: 0.2 % (ref 0–0.5)
LDH SERPL-CCNC: 173 U/L (ref 135–214)
LYMPHOCYTES # BLD AUTO: 3.45 10*3/MM3 (ref 0.7–3.1)
LYMPHOCYTES NFR BLD AUTO: 53.8 % (ref 19.6–45.3)
MCH RBC QN AUTO: 32.2 PG (ref 26.6–33)
MCHC RBC AUTO-ENTMCNC: 33.6 G/DL (ref 31.5–35.7)
MCV RBC AUTO: 95.8 FL (ref 79–97)
MONOCYTES # BLD AUTO: 0.42 10*3/MM3 (ref 0.1–0.9)
MONOCYTES NFR BLD AUTO: 6.6 % (ref 5–12)
NEUTROPHILS NFR BLD AUTO: 2.28 10*3/MM3 (ref 1.7–7)
NEUTROPHILS NFR BLD AUTO: 35.5 % (ref 42.7–76)
PLATELET # BLD AUTO: 247 10*3/MM3 (ref 140–450)
PMV BLD AUTO: 9.1 FL (ref 6–12)
POTASSIUM SERPL-SCNC: 3.8 MMOL/L (ref 3.5–5.2)
PROT SERPL-MCNC: 7.9 G/DL (ref 6–8.5)
RBC # BLD AUTO: 4.07 10*6/MM3 (ref 3.77–5.28)
SODIUM SERPL-SCNC: 138 MMOL/L (ref 136–145)
WBC NRBC COR # BLD: 6.41 10*3/MM3 (ref 3.4–10.8)

## 2023-03-07 PROCEDURE — 36415 COLL VENOUS BLD VENIPUNCTURE: CPT

## 2023-03-07 PROCEDURE — 85025 COMPLETE CBC W/AUTO DIFF WBC: CPT

## 2023-03-07 PROCEDURE — A9552 F18 FDG: HCPCS | Performed by: INTERNAL MEDICINE

## 2023-03-07 PROCEDURE — 78816 PET IMAGE W/CT FULL BODY: CPT

## 2023-03-07 PROCEDURE — 83615 LACTATE (LD) (LDH) ENZYME: CPT

## 2023-03-07 PROCEDURE — 0 FLUDEOXYGLUCOSE F18 SOLUTION: Performed by: INTERNAL MEDICINE

## 2023-03-07 PROCEDURE — 80053 COMPREHEN METABOLIC PANEL: CPT

## 2023-03-07 RX ADMIN — FLUDEOXYGLUCOSE F18 1 DOSE: 300 INJECTION INTRAVENOUS at 09:35

## 2023-03-20 RX ORDER — MONTELUKAST SODIUM 4 MG/1
2 TABLET, CHEWABLE ORAL 2 TIMES DAILY
Qty: 120 TABLET | Refills: 5 | Status: SHIPPED | OUTPATIENT
Start: 2023-03-20

## 2023-03-21 ENCOUNTER — OFFICE VISIT (OUTPATIENT)
Dept: ONCOLOGY | Facility: HOSPITAL | Age: 73
End: 2023-03-21
Payer: MEDICARE

## 2023-03-21 ENCOUNTER — LAB (OUTPATIENT)
Dept: ONCOLOGY | Facility: HOSPITAL | Age: 73
End: 2023-03-21
Payer: MEDICARE

## 2023-03-21 VITALS
BODY MASS INDEX: 40.97 KG/M2 | OXYGEN SATURATION: 98 % | TEMPERATURE: 97 F | WEIGHT: 216.71 LBS | DIASTOLIC BLOOD PRESSURE: 67 MMHG | RESPIRATION RATE: 22 BRPM | HEART RATE: 110 BPM | SYSTOLIC BLOOD PRESSURE: 139 MMHG

## 2023-03-21 DIAGNOSIS — C90.00 MULTIPLE MYELOMA, REMISSION STATUS UNSPECIFIED: Primary | ICD-10-CM

## 2023-03-21 PROCEDURE — 1159F MED LIST DOCD IN RCRD: CPT | Performed by: INTERNAL MEDICINE

## 2023-03-21 PROCEDURE — 1160F RVW MEDS BY RX/DR IN RCRD: CPT | Performed by: INTERNAL MEDICINE

## 2023-03-21 PROCEDURE — 1126F AMNT PAIN NOTED NONE PRSNT: CPT | Performed by: INTERNAL MEDICINE

## 2023-03-21 PROCEDURE — G0463 HOSPITAL OUTPT CLINIC VISIT: HCPCS

## 2023-03-21 PROCEDURE — 99213 OFFICE O/P EST LOW 20 MIN: CPT | Performed by: INTERNAL MEDICINE

## 2023-03-21 PROCEDURE — 3075F SYST BP GE 130 - 139MM HG: CPT | Performed by: INTERNAL MEDICINE

## 2023-03-21 PROCEDURE — 3078F DIAST BP <80 MM HG: CPT | Performed by: INTERNAL MEDICINE

## 2023-03-21 NOTE — PROGRESS NOTES
Chief Complaint  Colon Cancer    Paulino Macias PA Robinson, LACI Umana          Anca Samson presents to [facility] for follow-up of multiple myeloma.  She is currently on observation.  She states she is feeling well.  She denies any unusual aches or pains.  No masses or adenopathy.  She has good appetite.  She notes fatigue but she is able to perform her ADLs she is not exercising.    Oncology/Hematology History Overview Note   Low grade adenocarcinoma of ascending colon          Smoldering Myeloma        5/16/2017 right hemicolectomy which  revealed a low-grade 7.6 cm adenocarcinoma of the cecum. All margins were  negative. Lymphovascular invasion and perineural invasion were not identified.     26 lymph nodes were removed and unfortunately 1 was involved with metastatic deposit. pT3 pN1a colorectal cancer.        Clinical Staging      Smoldering Myeloma; Colon ([id number])            Treatments      Chemotherapy      11/2017 completed 6mo adjuvant Xeloda        6/2022 Stopped Smoking     Malignant neoplasm of ascending colon (HCC)   7/21/2017 Initial Diagnosis    Colon cancer (CMS/HCC)         Review of Systems   Constitutional: Positive for fatigue. Negative for appetite change, diaphoresis, fever, unexpected weight gain and unexpected weight loss.   HENT: Negative for hearing loss, mouth sores, sore throat, swollen glands, trouble swallowing and voice change.    Eyes: Negative for blurred vision.   Respiratory: Positive for shortness of breath. Negative for cough and wheezing.    Cardiovascular: Negative for chest pain and palpitations.   Gastrointestinal: Negative for abdominal pain, blood in stool, constipation, diarrhea, nausea and vomiting.   Endocrine: Negative for cold intolerance and heat intolerance.   Genitourinary: Negative for difficulty urinating, dysuria, frequency, hematuria and urinary incontinence.   Musculoskeletal: Negative for  arthralgias, back pain and myalgias.   Skin: Negative for rash, skin lesions and wound.   Neurological: Negative for dizziness, seizures, weakness, numbness and headache.   Hematological: Does not bruise/bleed easily.   Psychiatric/Behavioral: Negative for depressed mood. The patient is not nervous/anxious.      Current Outpatient Medications on File Prior to Visit   Medication Sig Dispense Refill   • albuterol sulfate  (90 Base) MCG/ACT inhaler Inhale 2 puffs Every 4 (Four) Hours As Needed for Wheezing. 18 g 1   • colestipol (COLESTID) 1 g tablet Take 2 tablets by mouth 2 (Two) Times a Day. 120 tablet 5   • FLUoxetine (PROzac) 20 MG capsule Take 2 capsules by mouth Daily for 30 days. 60 capsule 0   • meloxicam (MOBIC) 15 MG tablet Take 1 tablet by mouth Daily. 90 tablet 1   • promethazine (PHENERGAN) 25 MG tablet Take 1 tablet by mouth Every 6 (Six) Hours As Needed for Nausea or Vomiting. 30 tablet 1   • vitamin D (ERGOCALCIFEROL) 1.25 MG (57818 UT) capsule capsule Take 1 capsule by mouth 2 (Two) Times a Week. 26 capsule 1     No current facility-administered medications on file prior to visit.       Allergies   Allergen Reactions   • Cephalexin Hives   • Morphine Anaphylaxis   • Penicillins Hives   • Sulfa Antibiotics Hives     Past Medical History:   Diagnosis Date   • Anxiety    • Asthma    • C. difficile diarrhea    • Colon cancer ([facility]) 07/21/2017   • Colon polyp    • COPD (chronic obstructive pulmonary disease) ([facility]) 10/23/2017   • Depression    • Diverticulitis    • Dysphagia    • Heartburn    • Heartburn    • Heartburn    • Heartburn    • Hernia 2019   • History of chemotherapy    • Hx of psychiatric care    • Hyperlipidemia    • Impaired fasting glucose 08/14/2015   • Lumbago    • Lung nodule 02/17/2022   • Major depressive disorder 10/27/2016   • Malignant neoplasm of ascending colon ([facility]) 07/21/2017   • Melena    • Multiple myeloma ([facility])    • Nicotine dependence 05/10/2017   • Seizures ([facility])      pseudo seizures, 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 [facility]   • Shortness of breath    • Tobacco use 2021   • Tobacco use disorder 2015   • Visit for screening mammogram 2020    NORMAL- REPEAT IN ONE YEAR   • Vitamin D deficiency      Past Surgical History:   Procedure Laterality Date   • APPENDECTOMY     • BONE MARROW BIOPSY  2016   • COLON SURGERY  2017   • COLONOSCOPY  2018,,2019    [facility]- DR LE:DIVERTICULOSIS AND ERYTHEMA OF MUCOSA   • COLONOSCOPY N/A 2022    Procedure: COLONOSCOPY WITH ELEVIEW INJECTION, POLYPECTOMY, HOT SNARE, CLIP APPLICATION X3, BIOPSIES;  Surgeon: Jarvis Borges MD;  Location: [facility] ENDOSCOPY;  Service: Gastroenterology;  Laterality: N/A;  COLON POLYP, DIVERTICULOSIS, ANASTOMOSIS RIGHT COLON   • ENDOSCOPY     • FECAL DISIMPACTION  2019    TRANSPLANT    • HEMICOLECTOMY Right 2017   • HYSTERECTOMY  ,   • KNEE ARTHROSCOPY Left 2022    Procedure: KNEE ARTHROSCOPY WITH PARTIAL MEDIAL MENISCECTOMY,  CHONDROPLASTY;  Surgeon: Nicholas Tipton MD;  Location: [facility] OR [facility];  Service: Orthopedics;  Laterality: Left;   • MINI-LAPAROTOMY     • PORTACATH PLACEMENT N/A     pt states that her port does not work   • UPPER GASTROINTESTINAL ENDOSCOPY  2018     Social History     Socioeconomic History   • Marital status:    Tobacco Use   • Smoking status: Former     Packs/day: 0.00     Years: 47.00     Pack years: 0.00     Types: Cigarettes     Start date: 1975     Quit date: 6/3/2022     Years since quittin.7   • Smokeless tobacco: Never   • Tobacco comments:     started age 26   Vaping Use   • Vaping Use: Never used   Substance and Sexual Activity   • Alcohol use: Never   • Drug use: Never   • Sexual activity: Defer     Family History   Problem Relation Age of Onset   • Heart disease Mother    • Lung cancer Mother    • Cancer Mother    • Stroke Father    • Heart disease Father    • Kidney cancer Father    • Cancer Father     • Stroke Other         UNCLE/AUNT   • Colon cancer Neg Hx    • Malig Hyperthermia Neg Hx        Objective   Physical Exam  Vitals reviewed. Exam conducted with a chaperone present.   Constitutional:       General: She is not in acute distress.     Appearance: Normal appearance.   Cardiovascular:      Rate and Rhythm: Normal rate and regular rhythm.      Heart sounds: Normal heart sounds. No murmur heard.    No gallop.   Pulmonary:      Effort: Pulmonary effort is normal.      Breath sounds: Normal breath sounds.   Abdominal:      General: Abdomen is flat. Bowel sounds are normal.      Palpations: Abdomen is soft.   Musculoskeletal:      Cervical back: Neck supple.      Right lower leg: No edema.      Left lower leg: No edema.   Lymphadenopathy:      Cervical: No cervical adenopathy.   Neurological:      Mental Status: She is alert and oriented to person, place, and time.   Psychiatric:         Mood and Affect: Mood normal.         Behavior: Behavior normal.         Vitals:    03/21/23 1259   BP: 139/67   Pulse: 110   Resp: 22   Temp: 97 °F (36.1 °C)   TempSrc: Temporal   SpO2: 98%   Weight: 98.3 kg (216 lb 11.4 oz)   PainSc: 0-No pain     ECOG score: 1         PHQ-9 Total Score:                    Result Review :   The following data was reviewed by: West Nicolas MD on 03/21/2023:  Lab Results   Component Value Date    HGB 13.1 03/07/2023    HCT 39.0 03/07/2023    MCV 95.8 03/07/2023     03/07/2023    WBC 6.41 03/07/2023    NEUTROABS 2.28 03/07/2023    LYMPHSABS 3.45 (H) 03/07/2023    MONOSABS 0.42 03/07/2023    EOSABS 0.23 03/07/2023    BASOSABS 0.02 03/07/2023     Lab Results   Component Value Date    GLUCOSE 104 (H) 03/07/2023    BUN 11 03/07/2023    CREATININE 0.85 03/07/2023     03/07/2023    K 3.8 03/07/2023     (H) 03/07/2023    CO2 19.2 (L) 03/07/2023    CALCIUM 8.9 03/07/2023    PROTEINTOT 7.9 03/07/2023    ALBUMIN 3.5 03/07/2023    BILITOT 0.3 03/07/2023    ALKPHOS 56 03/07/2023     AST 22 03/07/2023    ALT 22 03/07/2023     Lab Results   Component Value Date    FREET4 1.2 11/30/2020    TSH 3.230 09/01/2022     No results found for: IRON, LABIRON, TRANSFERRIN, TIBC  Lab Results   Component Value Date     03/07/2023    BOISTYUY32 218 01/28/2020    FOLATE 17.7 01/28/2020     No results found for: PSA, CEA, AFP, ,     Data reviewed: Radiologic studies PET scan images reviewed demonstrating a subtle area in the iliac bone stable compared to previous      Assessment and Plan    Diagnoses and all orders for this visit:    1. Multiple myeloma, remission status unspecified (HCC) (Primary)  Assessment & Plan:  Patient is currently on observation.  Her lab work including hemoglobin, calcium, creatinine, albumin, total protein are all normal.  SPEP/CARL/FLC was ordered but lab did not draw it.  This will be obtained today.  I will let her know the results.  PET scan shows 1 subtle area in the iliac bone which is stable compared to previous.  I see no clear evidence to initiate treatment at this point although I will follow-up with the SPEP when he returns.  I will plan to see her back in 6 months for continued surveillance with labs prior.    Orders:  -     CBC & Differential; Future  -     Comprehensive Metabolic Panel; Future  -     CARL,PE and FLC, Serum; Future  -     Cancel: Lactate Dehydrogenase; Future  -     CARL & PE, Random Urine - Urine, Clean Catch; Future          Patient Follow Up: 6 months    Patient was given instructions and counseling regarding her condition or for health maintenance advice. Please see specific information pulled into the AVS if appropriate.     West Nicolas MD    3/21/2023

## 2023-03-21 NOTE — ASSESSMENT & PLAN NOTE
Patient is currently on observation.  Her lab work including hemoglobin, calcium, creatinine, albumin, total protein are all normal.  SPEP/CARL/FLC was ordered but lab did not draw it.  This will be obtained today.  I will let her know the results.  PET scan shows 1 subtle area in the iliac bone which is stable compared to previous.  I see no clear evidence to initiate treatment at this point although I will follow-up with the SPEP when he returns.  I will plan to see her back in 6 months for continued surveillance with labs prior.

## 2023-03-22 ENCOUNTER — HOSPITAL ENCOUNTER (OUTPATIENT)
Dept: MAMMOGRAPHY | Facility: HOSPITAL | Age: 73
Discharge: HOME | End: 2023-03-22
Admitting: PHYSICIAN ASSISTANT
Payer: MEDICARE

## 2023-03-22 DIAGNOSIS — Z12.31 ENCOUNTER FOR SCREENING MAMMOGRAM FOR MALIGNANT NEOPLASM OF BREAST: ICD-10-CM

## 2023-03-22 PROCEDURE — 77063 BREAST TOMOSYNTHESIS BI: CPT

## 2023-03-22 PROCEDURE — 77067 SCR MAMMO BI INCL CAD: CPT

## 2023-03-29 ENCOUNTER — LAB (OUTPATIENT)
Dept: LAB | Facility: HOSPITAL | Age: 73
End: 2023-03-29
Payer: MEDICARE

## 2023-03-29 DIAGNOSIS — C90.00 MULTIPLE MYELOMA, REMISSION STATUS UNSPECIFIED: ICD-10-CM

## 2023-03-29 DIAGNOSIS — I10 HYPERTENSION, UNSPECIFIED TYPE: ICD-10-CM

## 2023-03-29 DIAGNOSIS — E78.5 HYPERLIPIDEMIA, UNSPECIFIED HYPERLIPIDEMIA TYPE: Chronic | ICD-10-CM

## 2023-03-29 DIAGNOSIS — Z13.29 SCREENING FOR THYROID DISORDER: ICD-10-CM

## 2023-03-29 DIAGNOSIS — E55.9 VITAMIN D DEFICIENCY: Chronic | ICD-10-CM

## 2023-03-29 DIAGNOSIS — R03.0 ELEVATED BLOOD PRESSURE READING: ICD-10-CM

## 2023-03-29 LAB
25(OH)D3 SERPL-MCNC: 49.8 NG/ML (ref 30–100)
ALBUMIN SERPL-MCNC: 4 G/DL (ref 3.5–5.2)
ALBUMIN/GLOB SERPL: 0.8 G/DL
ALP SERPL-CCNC: 60 U/L (ref 39–117)
ALT SERPL W P-5'-P-CCNC: 22 U/L (ref 1–33)
ANION GAP SERPL CALCULATED.3IONS-SCNC: 17 MMOL/L (ref 5–15)
AST SERPL-CCNC: 23 U/L (ref 1–32)
BILIRUB SERPL-MCNC: 0.5 MG/DL (ref 0–1.2)
BUN SERPL-MCNC: 11 MG/DL (ref 8–23)
BUN/CREAT SERPL: 11.5 (ref 7–25)
CALCIUM SPEC-SCNC: 9.4 MG/DL (ref 8.6–10.5)
CHLORIDE SERPL-SCNC: 104 MMOL/L (ref 98–107)
CHOLEST SERPL-MCNC: 138 MG/DL (ref 0–200)
CO2 SERPL-SCNC: 16 MMOL/L (ref 22–29)
CREAT SERPL-MCNC: 0.96 MG/DL (ref 0.57–1)
EGFRCR SERPLBLD CKD-EPI 2021: 62.6 ML/MIN/1.73
GLOBULIN UR ELPH-MCNC: 4.8 GM/DL
GLUCOSE SERPL-MCNC: 121 MG/DL (ref 65–99)
HDLC SERPL-MCNC: 39 MG/DL (ref 40–60)
LDLC SERPL CALC-MCNC: 53 MG/DL (ref 0–100)
LDLC/HDLC SERPL: 1.03 {RATIO}
POTASSIUM SERPL-SCNC: 4.2 MMOL/L (ref 3.5–5.2)
PROT SERPL-MCNC: 8.8 G/DL (ref 6–8.5)
SODIUM SERPL-SCNC: 137 MMOL/L (ref 136–145)
TRIGL SERPL-MCNC: 295 MG/DL (ref 0–150)
TSH SERPL DL<=0.05 MIU/L-ACNC: 8.67 UIU/ML (ref 0.27–4.2)
VLDLC SERPL-MCNC: 46 MG/DL (ref 5–40)

## 2023-03-29 PROCEDURE — 86334 IMMUNOFIX E-PHORESIS SERUM: CPT

## 2023-03-29 PROCEDURE — 82784 ASSAY IGA/IGD/IGG/IGM EACH: CPT

## 2023-03-29 PROCEDURE — 84443 ASSAY THYROID STIM HORMONE: CPT

## 2023-03-29 PROCEDURE — 83521 IG LIGHT CHAINS FREE EACH: CPT

## 2023-03-29 PROCEDURE — 84165 PROTEIN E-PHORESIS SERUM: CPT

## 2023-03-29 PROCEDURE — 36415 COLL VENOUS BLD VENIPUNCTURE: CPT

## 2023-03-29 PROCEDURE — 82306 VITAMIN D 25 HYDROXY: CPT

## 2023-03-29 PROCEDURE — 80053 COMPREHEN METABOLIC PANEL: CPT

## 2023-03-29 PROCEDURE — 80061 LIPID PANEL: CPT

## 2023-03-30 ENCOUNTER — LAB (OUTPATIENT)
Dept: LAB | Facility: HOSPITAL | Age: 73
End: 2023-03-30
Payer: MEDICARE

## 2023-03-30 ENCOUNTER — OFFICE VISIT (OUTPATIENT)
Dept: FAMILY MEDICINE CLINIC | Facility: CLINIC | Age: 73
End: 2023-03-30
Payer: MEDICARE

## 2023-03-30 VITALS
WEIGHT: 218.2 LBS | DIASTOLIC BLOOD PRESSURE: 84 MMHG | OXYGEN SATURATION: 97 % | SYSTOLIC BLOOD PRESSURE: 120 MMHG | HEIGHT: 61 IN | BODY MASS INDEX: 41.19 KG/M2 | HEART RATE: 103 BPM

## 2023-03-30 DIAGNOSIS — F33.1 MODERATE EPISODE OF RECURRENT MAJOR DEPRESSIVE DISORDER: ICD-10-CM

## 2023-03-30 DIAGNOSIS — M25.569 KNEE PAIN, UNSPECIFIED CHRONICITY, UNSPECIFIED LATERALITY: Chronic | ICD-10-CM

## 2023-03-30 DIAGNOSIS — C90.00 MULTIPLE MYELOMA, REMISSION STATUS UNSPECIFIED: ICD-10-CM

## 2023-03-30 DIAGNOSIS — E03.9 HYPOTHYROIDISM, UNSPECIFIED TYPE: Primary | ICD-10-CM

## 2023-03-30 DIAGNOSIS — R11.0 NAUSEA: ICD-10-CM

## 2023-03-30 LAB
ALBUMIN SERPL ELPH-MCNC: 3.5 G/DL (ref 2.9–4.4)
ALBUMIN/GLOB SERPL: 0.8 {RATIO} (ref 0.7–1.7)
ALPHA1 GLOB SERPL ELPH-MCNC: 0.2 G/DL (ref 0–0.4)
ALPHA2 GLOB SERPL ELPH-MCNC: 0.9 G/DL (ref 0.4–1)
B-GLOBULIN SERPL ELPH-MCNC: 1 G/DL (ref 0.7–1.3)
BASOPHILS # BLD MANUAL: 0.09 10*3/MM3 (ref 0–0.2)
BASOPHILS NFR BLD MANUAL: 1 % (ref 0–1.5)
DEPRECATED RDW RBC AUTO: 56.1 FL (ref 37–54)
EOSINOPHIL # BLD MANUAL: 0.17 10*3/MM3 (ref 0–0.4)
EOSINOPHIL NFR BLD MANUAL: 2 % (ref 0.3–6.2)
ERYTHROCYTE [DISTWIDTH] IN BLOOD BY AUTOMATED COUNT: 15.6 % (ref 12.3–15.4)
GAMMA GLOB SERPL ELPH-MCNC: 2.9 G/DL (ref 0.4–1.8)
GIANT PLATELETS: ABNORMAL
GLOBULIN SER-MCNC: 5 G/DL (ref 2.2–3.9)
HCT VFR BLD AUTO: 42.5 % (ref 34–46.6)
HGB BLD-MCNC: 14 G/DL (ref 12–15.9)
IGA SERPL-MCNC: 27 MG/DL (ref 64–422)
IGG SERPL-MCNC: 3490 MG/DL (ref 586–1602)
IGM SERPL-MCNC: 39 MG/DL (ref 26–217)
INTERPRETATION SERPL IEP-IMP: ABNORMAL
KAPPA LC FREE SER-MCNC: 20.3 MG/L (ref 3.3–19.4)
KAPPA LC FREE/LAMBDA FREE SER: 2.42 {RATIO} (ref 0.26–1.65)
LABORATORY COMMENT REPORT: ABNORMAL
LAMBDA LC FREE SERPL-MCNC: 8.4 MG/L (ref 5.7–26.3)
LARGE PLATELETS: ABNORMAL
LYMPHOCYTES # BLD MANUAL: 4.31 10*3/MM3 (ref 0.7–3.1)
LYMPHOCYTES NFR BLD MANUAL: 8 % (ref 5–12)
M PROTEIN SERPL ELPH-MCNC: 2.7 G/DL
MCH RBC QN AUTO: 32 PG (ref 26.6–33)
MCHC RBC AUTO-ENTMCNC: 32.9 G/DL (ref 31.5–35.7)
MCV RBC AUTO: 97.3 FL (ref 79–97)
MONOCYTES # BLD: 0.69 10*3/MM3 (ref 0.1–0.9)
NEUTROPHILS # BLD AUTO: 3.36 10*3/MM3 (ref 1.7–7)
NEUTROPHILS NFR BLD MANUAL: 39 % (ref 42.7–76)
PLATELET # BLD AUTO: 251 10*3/MM3 (ref 140–450)
PMV BLD AUTO: 9.9 FL (ref 6–12)
PROT SERPL-MCNC: 8.5 G/DL (ref 6–8.5)
RBC # BLD AUTO: 4.37 10*6/MM3 (ref 3.77–5.28)
RBC MORPH BLD: NORMAL
VARIANT LYMPHS NFR BLD MANUAL: 50 % (ref 19.6–45.3)
WBC MORPH BLD: NORMAL
WBC NRBC COR # BLD: 8.61 10*3/MM3 (ref 3.4–10.8)

## 2023-03-30 PROCEDURE — 85027 COMPLETE CBC AUTOMATED: CPT

## 2023-03-30 PROCEDURE — 85007 BL SMEAR W/DIFF WBC COUNT: CPT

## 2023-03-30 PROCEDURE — 36415 COLL VENOUS BLD VENIPUNCTURE: CPT

## 2023-03-30 RX ORDER — PROMETHAZINE HYDROCHLORIDE 25 MG/1
25 TABLET ORAL EVERY 6 HOURS PRN
Qty: 30 TABLET | Refills: 1 | Status: SHIPPED | OUTPATIENT
Start: 2023-03-30

## 2023-03-30 RX ORDER — MELOXICAM 15 MG/1
15 TABLET ORAL DAILY
Qty: 90 TABLET | Refills: 1 | Status: SHIPPED | OUTPATIENT
Start: 2023-03-30

## 2023-03-30 RX ORDER — FLUOXETINE HYDROCHLORIDE 20 MG/1
40 CAPSULE ORAL DAILY
Qty: 60 CAPSULE | Refills: 2 | Status: SHIPPED | OUTPATIENT
Start: 2023-03-30

## 2023-03-30 RX ORDER — LEVOTHYROXINE SODIUM 0.03 MG/1
25 TABLET ORAL
Qty: 90 TABLET | Refills: 0 | Status: SHIPPED | OUTPATIENT
Start: 2023-03-30

## 2023-03-30 NOTE — ASSESSMENT & PLAN NOTE
Newly diagnosed:  Start levothyroxine 25mcg daily. This medication is to be taken first thing in the morning with water only--no juice or milk at the time of administration. Patient should wait to eat/drink for about 30 minutes. Patient should not take vitamins at the same time--they can be taken later in the day. New medication will be sent in and labs ordered for a recheck in 2 months.

## 2023-03-30 NOTE — PROGRESS NOTES
Chief Complaint  Chief Complaint   Patient presents with   • Depression   • COPD   • Urine Leakage       Subjective          Anca Samson presents to [facility] for medication change. Pt states that her depression has recently gotten much worse. Pt states that she has had urine incontinence over the last 6 MO    History of Present Illness    Depression: Patient is currently on Prozac 20MG taking 2 daily. Patient was seen by Rosie Delvalle on February 22 and Prozac was increased from 20mg to 40 mg.  Patient states that she feels fatigued, feels worthless and thinks about harming herself by slitting her wrist however she declines that she would never do such because she feels that that is the once in that God will not forgive and she will go to hell therefore while she has thoughts of suicide she states that she would never act on such thoughts.  Patient states that she has felt worse in the past few months.  Patient has been hospitalized into [facility] for about 2 weeks in the early 2000's.  She previously saw Dr. Adler for psychiatry but does not see anyone presently.  Upon review of labs it is noted that patient's TSH is elevated.  Patient states that she has dry skin and fatigue, depressive symptoms, and weight gain.  Patient states that she feels very depressed by the weight gain as well.    Chronic knee pain: Stable with meloxicam.    Colon: 12.20.22  Mammo: 3.22.23    Medical History: has a past medical history of Anxiety, Asthma, C. difficile diarrhea, Colon cancer (HCC) (07/21/2017), Colon polyp, COPD (chronic obstructive pulmonary disease) (HCC) (10/23/2017), Depression, Diverticulitis, Dysphagia, Heartburn, Heartburn, Heartburn, Heartburn, Hernia (2019), History of chemotherapy, psychiatric care, Hyperlipidemia, Impaired fasting glucose (08/14/2015), Lumbago, Lung nodule (02/17/2022), Major depressive disorder (10/27/2016), Malignant neoplasm of ascending colon  "(HCC) (07/21/2017), Melena, Multiple myeloma (HCC), Nicotine dependence (05/10/2017), Seizures (HCC), Shortness of breath, Tobacco use (7/28/2021), Tobacco use disorder (08/14/2015), Visit for screening mammogram (02/20/2020), and Vitamin D deficiency.   Surgical History: has a past surgical history that includes Bone marrow biopsy (2016); Colonoscopy (2018,2017,2019); Esophagogastroduodenoscopy (2018); Fecal disimpaction (06/2019); Hysterectomy (1982,1984); Mini-laparotomy (2017); Portacath placement (N/A); Colon surgery (2017); Hemicolectomy (Right, 05/2017); Upper gastrointestinal endoscopy (2018); Knee Arthroscopy (Left, 01/03/2022); Appendectomy; and Colonoscopy (N/A, 12/20/2022).   Family History: family history includes Cancer in her father and mother; Heart disease in her father and mother; Kidney cancer in her father; Lung cancer in her mother; Stroke in her father and another family member.   Social History: reports that she quit smoking about 9 months ago. Her smoking use included cigarettes. She started smoking about 48 years ago. She has never used smokeless tobacco. She reports that she does not drink alcohol and does not use drugs.    Allergies: Cephalexin, Morphine, Penicillins, and Sulfa antibiotics    There are no preventive care reminders to display for this patient.    Immunization History   Administered Date(s) Administered   • Fluzone High Dose =>65 Years ([employer] ONLY) 09/24/2020, 09/24/2021   • Fluzone High-Dose 65+yrs 09/24/2020, 09/24/2021, 09/20/2022   • Fluzone Quad >6mos (Multi-dose) 09/14/2015   • Pneumococcal Conjugate 13-Valent (PCV13) 09/21/2015   • Pneumococcal Polysaccharide (PPSV23) 04/12/2022       Objective     Vitals:    03/30/23 1039 03/30/23 1126   BP: 122/98 120/84   BP Location: Left arm    Patient Position: Sitting    Cuff Size: Large Adult    Pulse: 103    SpO2: 97%    Weight: 99 kg (218 lb 3.2 oz)    Height: 154.9 cm (60.98\")      Body mass index is 41.26 kg/m².     Wt " Readings from Last 3 Encounters:   03/30/23 99 kg (218 lb 3.2 oz)   03/21/23 98.3 kg (216 lb 11.4 oz)   02/23/23 97.9 kg (215 lb 12.8 oz)     BP Readings from Last 3 Encounters:   03/30/23 120/84   03/21/23 139/67   02/23/23 145/57       Class 3 Severe Obesity (BMI >=40). Obesity-related health conditions include the following: none. Obesity is worsening. BMI is is above average; BMI management plan is completed. We discussed portion control, increasing exercise and treatment for hypothyroidism.      Patient Care Team:  Rena Guerra PA as PCP - General (Family Medicine)  Paulino Macias PA (Physician Assistant)  West Nicolas MD as Consulting Physician (Hematology and Oncology)  Alcon Delgado MD as Consulting Physician (General Surgery)  Jarvis Borges MD as Consulting Physician (Gastroenterology)  Erendira Clayton APRN as Nurse Practitioner (Gastroenterology)    Physical Exam  Vitals and nursing note reviewed.   Constitutional:       Appearance: Normal appearance.   HENT:      Head: Normocephalic and atraumatic.   Neck:      Vascular: No carotid bruit.      Comments: Thyroid : gland size normal, nontender, no nodules or masses present on palpation   Cardiovascular:      Rate and Rhythm: Normal rate and regular rhythm.      Pulses: Normal pulses.      Heart sounds: Normal heart sounds.   Pulmonary:      Effort: Pulmonary effort is normal.      Breath sounds: Normal breath sounds.   Musculoskeletal:      Cervical back: Neck supple. No tenderness.      Right lower leg: No edema.      Left lower leg: No edema.   Lymphadenopathy:      Cervical: No cervical adenopathy.   Neurological:      Mental Status: She is alert.   Psychiatric:         Mood and Affect: Mood normal.         Behavior: Behavior normal.      Comments: After labs discussed and patient given reason of hypothyroidism for some of her symptoms, patient was smiling. No tearfullness. Patient states that she has no intent on harming  herself.           Result Review :                           Assessment and Plan      Diagnoses and all orders for this visit:    1. Hypothyroidism, unspecified type (Primary)  Assessment & Plan:  Newly diagnosed:  Start levothyroxine 25mcg daily. This medication is to be taken first thing in the morning with water only--no juice or milk at the time of administration. Patient should wait to eat/drink for about 30 minutes. Patient should not take vitamins at the same time--they can be taken later in the day. New medication will be sent in and labs ordered for a recheck in 2 months.        Orders:  -     T4, free; Future  -     levothyroxine (SYNTHROID, LEVOTHROID) 25 MCG tablet; Take 1 tablet by mouth Every Morning.  Dispense: 90 tablet; Refill: 0  -     TSH+Free T4; Future    2. Moderate episode of recurrent major depressive disorder (HCC)  Comments:  Not currently stable: continue on current medication for now; referral to psychiatry; unsure if worsening sx couldn't be related to new hypothyroidism dx.  Orders:  -     Ambulatory Referral to Psychiatry  -     FLUoxetine (PROzac) 20 MG capsule; Take 2 capsules by mouth Daily.  Dispense: 60 capsule; Refill: 2    3. Knee pain, unspecified chronicity, unspecified laterality  Comments:  Stable; continue with Mobic 15mg daily; and continue with ortho as needed.  Orders:  -     meloxicam (MOBIC) 15 MG tablet; Take 1 tablet by mouth Daily.  Dispense: 90 tablet; Refill: 1    4. Nausea  -     promethazine (PHENERGAN) 25 MG tablet; Take 1 tablet by mouth Every 6 (Six) Hours As Needed for Nausea or Vomiting.  Dispense: 30 tablet; Refill: 1      I spent 46 minutes caring for Anca on this date of service. This time includes time spent by me in the following activities:preparing for the visit, reviewing tests, obtaining and/or reviewing a separately obtained history, performing a medically appropriate examination and/or evaluation , counseling and educating the  patient/family/caregiver, ordering medications, tests, or procedures, referring and communicating with other health care professionals , documenting information in the medical record, independently interpreting results and communicating that information with the patient/family/caregiver and care coordination    Follow Up     Return in about 2 months (around 5/30/2023).    Patient was given instructions and counseling regarding her condition or for health maintenance advice. Please see specific information pulled into the AVS if appropriate.

## 2023-03-31 NOTE — PROGRESS NOTES
Patient was called with myeloma results. M-spike 2.7 from 2.2. kappa increased slightly to 20.3 from 16.4 and ratio increased to 2.42 from 2.08.     Stable results. Return in 6 months as scheduled.

## 2023-04-10 ENCOUNTER — TELEPHONE (OUTPATIENT)
Dept: FAMILY MEDICINE CLINIC | Facility: CLINIC | Age: 73
End: 2023-04-10
Payer: MEDICARE

## 2023-05-25 ENCOUNTER — LAB (OUTPATIENT)
Dept: LAB | Facility: HOSPITAL | Age: 73
End: 2023-05-25
Payer: MEDICARE

## 2023-05-25 DIAGNOSIS — E03.9 HYPOTHYROIDISM, UNSPECIFIED TYPE: ICD-10-CM

## 2023-05-25 LAB
T4 FREE SERPL-MCNC: 1.2 NG/DL (ref 0.93–1.7)
TSH SERPL DL<=0.05 MIU/L-ACNC: 3.7 UIU/ML (ref 0.27–4.2)

## 2023-05-25 PROCEDURE — 84439 ASSAY OF FREE THYROXINE: CPT

## 2023-05-25 PROCEDURE — 36415 COLL VENOUS BLD VENIPUNCTURE: CPT

## 2023-05-25 PROCEDURE — 84443 ASSAY THYROID STIM HORMONE: CPT

## 2023-06-01 DIAGNOSIS — F33.1 MODERATE EPISODE OF RECURRENT MAJOR DEPRESSIVE DISORDER: ICD-10-CM

## 2023-06-01 NOTE — TELEPHONE ENCOUNTER
----- Message from Anca Samson sent at 5/31/2023  5:03 PM EDT -----  Regarding: Refill  Contact: [phone]  Ms. Chase, I need you to call in a prescription for Prozac please.  I have enough for the end of this week. Will be seeing you on the 12th of June.    Thanks you  Sarah

## 2023-06-02 RX ORDER — FLUOXETINE HYDROCHLORIDE 20 MG/1
40 CAPSULE ORAL DAILY
Qty: 60 CAPSULE | Refills: 0 | Status: SHIPPED | OUTPATIENT
Start: 2023-06-02 | End: 2023-06-12 | Stop reason: SDUPTHER

## 2023-06-12 ENCOUNTER — HOSPITAL ENCOUNTER (OUTPATIENT)
Dept: GENERAL RADIOLOGY | Facility: HOSPITAL | Age: 73
Discharge: HOME | End: 2023-06-12
Admitting: PHYSICIAN ASSISTANT
Payer: MEDICARE

## 2023-06-12 DIAGNOSIS — R05.1 ACUTE COUGH: ICD-10-CM

## 2023-06-12 PROCEDURE — 71046 X-RAY EXAM CHEST 2 VIEWS: CPT

## 2023-08-06 NOTE — PATIENT INSTRUCTIONS
Chronic Obstructive Pulmonary Disease Exacerbation    Chronic obstructive pulmonary disease (COPD) is a long-term (chronic) condition that affects the lungs. COPD is a general term that can be used to describe many different lung problems that cause lung inflammation and limit airflow, including chronic bronchitis and emphysema. COPD exacerbations are episodes when breathing symptoms flare up, become much worse, and require extra treatment.  COPD exacerbations are usually caused by infections. Without treatment, COPD exacerbations can be severe and even life threatening. Frequent COPD exacerbations can cause further damage to the lungs.  What are the causes?  This condition may be caused by:  Respiratory infections, including viral and bacterial infections.  Exposure to smoke.  Exposure to air pollution, chemical fumes, or dust.  Things that can cause an allergic reaction (allergens).  Not taking your usual COPD medicines as directed.  Underlying medical problems, such as congestive heart failure or infections not involving the lungs.  In many cases, the cause of this condition is not known.  What increases the risk?  The following factors may make you more likely to develop this condition:  Smoking cigarettes.  Being an older adult.  Having frequent prior COPD exacerbations.  What are the signs or symptoms?  Symptoms of this condition include:  Increased coughing.  Increased production of mucus from your lungs.  Increased wheezing and shortness of breath.  Rapid or labored breathing.  Chest tightness.  Less energy than usual.  Sleep disruption from symptoms.  Confusion  Increased sleepiness.  Often, these symptoms happen or get worse even with the use of medicines.  How is this diagnosed?  This condition is diagnosed based on:  Your medical history.  A physical exam.  You may also have tests, including:  A chest X-ray.  Blood tests.  Lung (pulmonary) function tests.  How is this treated?  Treatment for this  condition depends on the severity and cause of the symptoms. You may need to be admitted to a hospital for treatment. Some of the treatments commonly used to treat COPD exacerbations are:  Antibiotic medicines. These may be used for severe exacerbations caused by a lung infection, such as pneumonia.  Bronchodilators. These are inhaled medicines that expand the air passages and allow increased airflow. They may make your breathing more comfortable.  Steroid medicines. These act to reduce inflammation in the airways. They may be given with an inhaler, taken by mouth, or given through an IV tube inserted into one of your veins.  Supplemental oxygen therapy.  Airway clearing techniques, such as noninvasive ventilation (NIV) and positive expiratory pressure (PEP). These provide respiratory support through a mask or other noninvasive device. An example of this would be using a continuous positive airway pressure (CPAP) machine to improve delivery of oxygen into your lungs.  Follow these instructions at home:  Medicines  Take over-the-counter and prescription medicines only as told by your health care provider.  It is important to use correct technique with inhaled medicines.  If you were prescribed an antibiotic medicine or oral steroid, take it as told by your health care provider. Do not stop taking the medicine even if you start to feel better.  Lifestyle  Do not use any products that contain nicotine or tobacco. These products include cigarettes, chewing tobacco, and vaping devices, such as e-cigarettes. If you need help quitting, ask your health care provider.  Eat a healthy diet.  Exercise regularly.  Get enough sleep. Most adults need 7 or more hours per night.  Avoid exposure to all substances that irritate the airway, especially tobacco smoke.  Regularly wash your hands with soap and water for at least 20 seconds. If soap and water are not available, use hand . This may help prevent you from getting  infections.  During flu season, avoid enclosed spaces that are crowded with people.  General instructions  Drink enough fluid to keep your urine pale yellow, unless you have a medical condition that requires fluid restriction.  Use a cool mist vaporizer. This humidifies the air and makes it easier for you to clear your chest when you cough.  If you have a home nebulizer and oxygen, continue to use them as told by your health care provider.  Keep all follow-up visits. This is important.  How is this prevented?  Stay up-to-date on pneumococcal and flu (influenza) vaccines. A flu shot is recommended every year to help prevent exacerbations.  Quitting smoking is very important in preventing COPD from getting worse and in preventing exacerbations from happening as often.  Follow all instructions for pulmonary rehabilitation after a recent exacerbation. This can help prevent future exacerbations.  Work with your health care provider to develop and follow an action plan. This tells you what steps to take when you experience certain symptoms.  Contact a health care provider if:  You have a worsening of your regular COPD symptoms.  Get help right away if:  You have worsening shortness of breath, even when resting.  You have trouble talking.  You have severe chest pain.  You cough up blood.  You have a fever.  You have weakness, vomit repeatedly, or faint.  You feel confused.  You are not able to sleep because of your symptoms.  You have trouble doing daily activities.  These symptoms may represent a serious problem that is an emergency. Do not wait to see if the symptoms will go away. Get medical help right away. Call your local emergency services (911 in the [place] Do not drive yourself to the hospital.  Summary  COPD exacerbations are episodes when breathing symptoms become much worse and require extra treatment above your normal treatment.  Exacerbations can be severe and even life threatening. Frequent COPD exacerbations  can cause further damage to your lungs.  COPD exacerbations are usually triggered by infections such as the flu, colds, and even pneumonia.  Treatment for this condition depends on the severity and cause of the symptoms. You may need to be admitted to a hospital for treatment.  Quitting smoking is very important to prevent COPD from getting worse and to prevent exacerbations from happening as often.  This information is not intended to replace advice given to you by your health care provider. Make sure you discuss any questions you have with your health care provider.  Document Revised: 10/26/2021 Document Reviewed: 10/26/2021  [employer] Patient Education c 2023 [employer]  Managing the Challenge of Quitting Smoking  Quitting smoking is a physical and mental challenge. You may have cravings, withdrawal symptoms, and temptation to smoke. Before quitting, work with your health care provider to make a plan that can help you manage quitting. Making a plan before you quit may keep you from smoking when you have the urge to smoke while trying to quit.  How to manage lifestyle changes  Managing stress  Stress can make you want to smoke, and wanting to smoke may cause stress. It is important to find ways to manage your stress. You could try some of the following:  Practice relaxation techniques.  Breathe slowly and deeply, in through your nose and out through your mouth.  Listen to music.  Soak in a bath or take a shower.  Imagine a peaceful place or vacation.  Get some support.  Talk with family or friends about your stress.  Join a support group.  Talk with a counselor or therapist.  Get some physical activity.  Go for a walk, run, or bike ride.  Play a favorite sport.  Practice yoga.    Medicines  Talk with your health care provider about medicines that might help you deal with cravings and make quitting easier for you.  Relationships  Social situations can be difficult when you are quitting smoking. To manage this,  you can:  Avoid parties and other social situations where people might be smoking.  Avoid alcohol.  Leave right away if you have the urge to smoke.  Explain to your family and friends that you are quitting smoking. Ask for support and let them know you might be a bit grumpy.  Plan activities where smoking is not an option.  General instructions  Be aware that many people gain weight after they quit smoking. However, not everyone does. To keep from gaining weight, have a plan in place before you quit, and stick to the plan after you quit. Your plan should include:  Eating healthy snacks. When you have a craving, it may help to:  Eat popcorn, or try carrots, celery, or other cut vegetables.  Chew sugar-free gum.  Changing how you eat.  Eat small portion sizes at meals.  Eat 4-6 small meals throughout the day instead of 1-2 large meals a day.  Be mindful when you eat. You should avoid watching television or doing other things that might distract you as you eat.  Exercising regularly.  Make time to exercise each day. If you do not have time for a long workout, do short bouts of exercise for 5-10 minutes several times a day.  Do some form of strengthening exercise, such as weight lifting.  Do some exercise that gets your heart beating and causes you to breathe deeply, such as walking fast, running, swimming, or biking. This is very important.  Drinking plenty of water or other low-calorie or no-calorie drinks. Drink enough fluid to keep your urine pale yellow.    How to recognize withdrawal symptoms  Your body and mind may experience discomfort as you try to get used to not having nicotine in your system. These effects are called withdrawal symptoms. They may include:  Feeling hungrier than normal.  Having trouble concentrating.  Feeling irritable or restless.  Having trouble sleeping.  Feeling depressed.  Craving a cigarette.  These symptoms may surprise you, but they are normal to have when quitting smoking.  To manage  withdrawal symptoms:  Avoid places, people, and activities that trigger your cravings.  Remember why you want to quit.  Get plenty of sleep.  Avoid coffee and other drinks that contain caffeine. These may worsen some of your symptoms.  How to manage cravings  Come up with a plan for how to deal with your cravings. The plan should include the following:  A definition of the specific situation you want to deal with.  An activity or action you will take to replace smoking.  A clear idea for how this action will help.  The name of someone who could help you with this.  Cravings usually last for 5-10 minutes. Consider taking the following actions to help you with your plan to deal with cravings:  Keep your mouth busy.  Chew sugar-free gum.  Suck on hard candies or a straw.  Brush your teeth.  Keep your hands and body busy.  Change to a different activity right away.  Squeeze or play with a ball.  Do an activity or a hobby, such as making bead jewelry, practicing needlepoint, or working with wood.  Mix up your normal routine.  Take a short exercise break. Go for a quick walk, or run up and down stairs.  Focus on doing something kind or helpful for someone else.  Call a friend or family member to talk during a craving.  Join a support group.  Contact a quitline.  Where to find support  To get help or find a support group:  Call the National [employer] [place]'s Smoking Quitline: -732-QUIT-NOW (856-3508)  Text QUIT to SmokefreeTXT: 990556  Where to find more information  Visit these websites to find more information on quitting smoking:  [place] Department of Health and Human Services: www.smokefree.gov  American Lung Association: www.freedomfromsmoking.org  Centers for Disease Control and Prevention (CDC): www.cdc.gov  American Heart Association: www.heart.org  Contact a health care provider if:  You want to change your plan for quitting.  The medicines you are taking are not helping.  Your eating feels out of control or you  cannot sleep.  You feel depressed or become very anxious.  Summary  Quitting smoking is a physical and mental challenge. You will face cravings, withdrawal symptoms, and temptation to smoke again. Preparation can help you as you go through these challenges.  Try different techniques to manage stress, handle social situations, and prevent weight gain.  You can deal with cravings by keeping your mouth busy (such as by chewing gum), keeping your hands and body busy, calling family or friends, or contacting a quitline for people who want to quit smoking.  You can deal with withdrawal symptoms by avoiding places where people smoke, getting plenty of rest, and avoiding drinks that contain caffeine.  This information is not intended to replace advice given to you by your health care provider. Make sure you discuss any questions you have with your health care provider.  Document Revised: 12/09/2022 Document Reviewed: 12/09/2022  [employer] Patient Education c 2023 [employer]  Chronic Obstructive Pulmonary Disease Exacerbation    Chronic obstructive pulmonary disease (COPD) is a long-term (chronic) condition that affects the lungs. COPD is a general term that can be used to describe many different lung problems that cause lung inflammation and limit airflow, including chronic bronchitis and emphysema. COPD exacerbations are episodes when breathing symptoms flare up, become much worse, and require extra treatment.  COPD exacerbations are usually caused by infections. Without treatment, COPD exacerbations can be severe and even life threatening. Frequent COPD exacerbations can cause further damage to the lungs.  What are the causes?  This condition may be caused by:  Respiratory infections, including viral and bacterial infections.  Exposure to smoke.  Exposure to air pollution, chemical fumes, or dust.  Things that can cause an allergic reaction (allergens).  Not taking your usual COPD medicines as directed.  Underlying  medical problems, such as congestive heart failure or infections not involving the lungs.  In many cases, the cause of this condition is not known.  What increases the risk?  The following factors may make you more likely to develop this condition:  Smoking cigarettes.  Being an older adult.  Having frequent prior COPD exacerbations.  What are the signs or symptoms?  Symptoms of this condition include:  Increased coughing.  Increased production of mucus from your lungs.  Increased wheezing and shortness of breath.  Rapid or labored breathing.  Chest tightness.  Less energy than usual.  Sleep disruption from symptoms.  Confusion  Increased sleepiness.  Often, these symptoms happen or get worse even with the use of medicines.  How is this diagnosed?  This condition is diagnosed based on:  Your medical history.  A physical exam.  You may also have tests, including:  A chest X-ray.  Blood tests.  Lung (pulmonary) function tests.  How is this treated?  Treatment for this condition depends on the severity and cause of the symptoms. You may need to be admitted to a hospital for treatment. Some of the treatments commonly used to treat COPD exacerbations are:  Antibiotic medicines. These may be used for severe exacerbations caused by a lung infection, such as pneumonia.  Bronchodilators. These are inhaled medicines that expand the air passages and allow increased airflow. They may make your breathing more comfortable.  Steroid medicines. These act to reduce inflammation in the airways. They may be given with an inhaler, taken by mouth, or given through an IV tube inserted into one of your veins.  Supplemental oxygen therapy.  Airway clearing techniques, such as noninvasive ventilation (NIV) and positive expiratory pressure (PEP). These provide respiratory support through a mask or other noninvasive device. An example of this would be using a continuous positive airway pressure (CPAP) machine to improve delivery of oxygen  into your lungs.  Follow these instructions at home:  Medicines  Take over-the-counter and prescription medicines only as told by your health care provider.  It is important to use correct technique with inhaled medicines.  If you were prescribed an antibiotic medicine or oral steroid, take it as told by your health care provider. Do not stop taking the medicine even if you start to feel better.  Lifestyle  Do not use any products that contain nicotine or tobacco. These products include cigarettes, chewing tobacco, and vaping devices, such as e-cigarettes. If you need help quitting, ask your health care provider.  Eat a healthy diet.  Exercise regularly.  Get enough sleep. Most adults need 7 or more hours per night.  Avoid exposure to all substances that irritate the airway, especially tobacco smoke.  Regularly wash your hands with soap and water for at least 20 seconds. If soap and water are not available, use hand . This may help prevent you from getting infections.  During flu season, avoid enclosed spaces that are crowded with people.  General instructions  Drink enough fluid to keep your urine pale yellow, unless you have a medical condition that requires fluid restriction.  Use a cool mist vaporizer. This humidifies the air and makes it easier for you to clear your chest when you cough.  If you have a home nebulizer and oxygen, continue to use them as told by your health care provider.  Keep all follow-up visits. This is important.  How is this prevented?  Stay up-to-date on pneumococcal and flu (influenza) vaccines. A flu shot is recommended every year to help prevent exacerbations.  Quitting smoking is very important in preventing COPD from getting worse and in preventing exacerbations from happening as often.  Follow all instructions for pulmonary rehabilitation after a recent exacerbation. This can help prevent future exacerbations.  Work with your health care provider to develop and follow an  action plan. This tells you what steps to take when you experience certain symptoms.  Contact a health care provider if:  You have a worsening of your regular COPD symptoms.  Get help right away if:  You have worsening shortness of breath, even when resting.  You have trouble talking.  You have severe chest pain.  You cough up blood.  You have a fever.  You have weakness, vomit repeatedly, or faint.  You feel confused.  You are not able to sleep because of your symptoms.  You have trouble doing daily activities.  These symptoms may represent a serious problem that is an emergency. Do not wait to see if the symptoms will go away. Get medical help right away. Call your local emergency services (911 in the [place] Do not drive yourself to the hospital.  Summary  COPD exacerbations are episodes when breathing symptoms become much worse and require extra treatment above your normal treatment.  Exacerbations can be severe and even life threatening. Frequent COPD exacerbations can cause further damage to your lungs.  COPD exacerbations are usually triggered by infections such as the flu, colds, and even pneumonia.  Treatment for this condition depends on the severity and cause of the symptoms. You may need to be admitted to a hospital for treatment.  Quitting smoking is very important to prevent COPD from getting worse and to prevent exacerbations from happening as often.  This information is not intended to replace advice given to you by your health care provider. Make sure you discuss any questions you have with your health care provider.  Document Revised: 10/26/2021 Document Reviewed: 10/26/2021  [employer] Patient Education c 2023 [employer]

## 2023-08-06 NOTE — PROGRESS NOTES
Primary Care Provider  Rena Guerra PA     Referring Provider  No ref. provider found     Chief Complaint  COPD, Shortness of Breath, Wheezing, Asthma, and Follow-up    Subjective          History of Presenting Illness  Patient is a 72-year-old female who presents for management of dyspnea who presents for follow-up visit today.  Of note, patient has not followed in the office since May 2022.  Patient's daughter is present with the patient in the office today.  Patient states that she does get short of breath that is worse with exertion, moderate to severe in severity, and improved with rest.  Patient states that she is also having intermittent coughing and wheezing.  Patient states that she does have an albuterol inhaler that she takes as needed.  Patient is currently not on any daily or maintenance inhalers or nebulizer treatments.  Patient reports that she quit smoking 14 months ago.  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  Patient denies any history of any drug use.  Patient denies any recent travel.  Patient denies any known exposure to any ill contacts.  Patient denies any history of any bleeding or blood clotting disorders.  Patient is not on any hormone replacement therapy.  Patient denies any history of any TB or positive PPD test in the past.  Patient does have excessive daytime sleepiness and snoring and it was recommended that she proceed with a sleep study last year, however patient refused.  Patient states that she is willing to go for sleep study now.  Patient is under the care of Dr. Nicolas for multiple myeloma and a history of colon cancer.  Patient reports that she is up-to-date with her pneumonia and flu vaccinations, however continues to decline COVID vaccines. Patient denies fever, chills, night sweats, swollen glands in the head and neck, unintentional weight loss, hemoptysis,  dysphagia, chest pain, palpitations, chest tightness, abdominal  pain, nausea, vomiting, and diarrhea.  Patient also denies any myalgias, changes in sense of taste and/or smell, sore throat, any other coronavirus or flu-like symptoms.  Patient denies any leg swelling and paroxysmal nocturnal dyspnea.  Patient is able to perform activities of daily living.        Review of Systems   Constitutional:  Positive for fatigue. Negative for activity change, appetite change, chills, diaphoresis, fever, unexpected weight gain and unexpected weight loss.        Negative for Insomnia  Positive for snoring   HENT:  Negative for congestion (Nasal), mouth sores, nosebleeds, postnasal drip, sore throat, swollen glands and trouble swallowing.         Negative for Thrush  Negative for Hoarseness  Negative for Allergies/Hay Fever  Negative for Recent Head injury  Negative for Ear Fullness  Negative for Nasal or Sinus pain  Negative for Dry lips  Negative for Nasal discharge   Respiratory:  Positive for cough, shortness of breath and wheezing. Negative for apnea and chest tightness.         Negative for Hemoptysis  Negative for Pleuritic pain  Positive for orthopnea   Cardiovascular:  Negative for chest pain, palpitations and leg swelling.        Negative for Claudication  Negative for Cyanosis  Negative for Dyspnea on exertion   Gastrointestinal:  Negative for abdominal pain, diarrhea, nausea, vomiting and GERD.   Musculoskeletal:  Negative for joint swelling and myalgias.        Negative for Joint pain  Negative for Joint stiffness   Skin:  Negative for color change, dry skin, pallor and rash.   Neurological:  Negative for syncope, weakness and headache.   Hematological:  Negative for adenopathy. Does not bruise/bleed easily.      Family History   Problem Relation Age of Onset    Heart disease Mother     Lung cancer Mother     Cancer Mother     Stroke Father     Heart disease Father     Kidney cancer Father     Cancer Father     Stroke Other         UNCLE/AUNT    Colon cancer Neg Hx     [place]  Hyperthermia Neg Hx         Social History     Socioeconomic History    Marital status:    Tobacco Use    Smoking status: Former     Packs/day: 1.00     Years: 47.00     Pack years: 47.00     Types: Cigarettes     Start date: 1975     Quit date: 6/3/2022     Years since quittin.2     Passive exposure: Past    Smokeless tobacco: Never    Tobacco comments:     started age 26   Vaping Use    Vaping Use: Never used   Substance and Sexual Activity    Alcohol use: Never    Drug use: Never    Sexual activity: Defer        Past Medical History:   Diagnosis Date    Anxiety     Asthma     Atherosclerosis of native coronary artery of native heart with angina pectoris 8/15/2023    C. difficile diarrhea     Colon cancer 2017    Colon polyp     COPD (chronic obstructive pulmonary disease) 10/23/2017    Depression     Diverticulitis     Dysphagia     Heartburn     Heartburn     Heartburn     Heartburn     Hernia 2019    History of chemotherapy     Hx of psychiatric care     Hyperlipidemia     Impaired fasting glucose 2015    Lumbago     Lung nodule 2022    Major depressive disorder 10/27/2016    Malignant neoplasm of ascending colon 2017    Melena     Multiple myeloma     Nicotine dependence 05/10/2017    Seizures     pseudo seizures, 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 [facility]    Shortness of breath     Tobacco use 2021    Tobacco use disorder 2015    Visit for screening mammogram 2020    NORMAL- REPEAT IN ONE YEAR    Vitamin D deficiency         Immunization History   Administered Date(s) Administered    Fluzone High Dose =>65 Years ([employer] ONLY) 2020, 2021    Fluzone High-Dose 65+yrs 2020, 2021, 2022    Fluzone Quad >6mos (Multi-dose) 2015    Pneumococcal Conjugate 13-Valent (PCV13) 2015    Pneumococcal Polysaccharide (PPSV23) 2022       Allergies   Allergen Reactions    Cephalexin Hives    Morphine Anaphylaxis    Penicillins Hives     Sulfa Antibiotics Hives          Current Outpatient Medications:     albuterol sulfate  (90 Base) MCG/ACT inhaler, Inhale 2 puffs Every 4 (Four) Hours As Needed for Wheezing., Disp: 18 g, Rfl: 11    colestipol (COLESTID) 1 g tablet, Take 2 tablets by mouth 2 (Two) Times a Day., Disp: 120 tablet, Rfl: 5    FLUoxetine (PROzac) 40 MG capsule, Take 1 capsule by mouth Daily., Disp: 90 capsule, Rfl: 1    levothyroxine (SYNTHROID, LEVOTHROID) 25 MCG tablet, Take 1 tablet by mouth Every Morning., Disp: 90 tablet, Rfl: 1    meloxicam (MOBIC) 15 MG tablet, Take 1 tablet by mouth Daily., Disp: 90 tablet, Rfl: 1    promethazine (PHENERGAN) 25 MG tablet, Take 1 tablet by mouth Every 6 (Six) Hours As Needed for Nausea or Vomiting., Disp: 30 tablet, Rfl: 1    vitamin D (ERGOCALCIFEROL) 1.25 MG (70562 UT) capsule capsule, Take 1 capsule by mouth 2 (Two) Times a Week., Disp: 26 capsule, Rfl: 1    albuterol (PROVENTIL) (2.5 MG/3ML) 0.083% nebulizer solution, Take 2.5 mg by nebulization 4 (Four) Times a Day As Needed for Wheezing or Shortness of Air for up to 30 days., Disp: 120 each, Rfl: 11    Budeson-Glycopyrrol-Formoterol (Breztri Aerosphere) 160-9-4.8 MCG/ACT aerosol inhaler, Inhale 2 puffs 2 (Two) Times a Day for 30 days. Rinse mouth out after each use., Disp: 1 each, Rfl: 11    doxycycline (VIBRAMYCIN) 100 MG capsule, Take 1 capsule by mouth 2 (Two) Times a Day for 7 days., Disp: 14 capsule, Rfl: 0    predniSONE (DELTASONE) 20 MG tablet, Take 2 tablets by mouth Daily., Disp: 14 tablet, Rfl: 0     Objective     Physical Exam  Vital Signs:   Obese, WDWN, Alert, NAD.    HEENT:  PERRL, EOMI.  OP, nares clear, no sinus tenderness  Neck:  Supple, no JVD, no thyromegaly.  Lymph: no axillary, cervical, supraclavicular lymphadenopathy noted bilaterally  Chest: decreased breath sounds throughout some expiratory wheezes.  Normal work of breathing noted.  Patient is able speak full sentences without difficulty.  CV: RRR, no MGR,  "pulses 2+, equal.  Abd:  Soft, NT, ND, + BS, no HSM  EXT:  no clubbing, no cyanosis, no edema, no joint tenderness  Neuro:  A&Ox3, CN grossly intact, no focal deficits.  Skin: No rashes or lesions noted.    /85 (BP Location: Right arm, Patient Position: Sitting, Cuff Size: Large Adult)   Pulse 110   Resp 24   Ht 154.9 cm (60.98\")   Wt 96.6 kg (213 lb)   SpO2 98% Comment: room air  BMI 40.27 kg/mý         Result Review :   I have reviewed my last office visit note.    Procedures:              Assessment and Plan      Assessment:  1.  Mild COPD with acute exacerbation.  FEV1 of 83% predicted.   2.  Centrilobular emphysema on chest CT scan.  3.  History of colon cancer.  4.  History of multiple myeloma.  Patient under the care of Dr. Nicolas.  5.  Lung nodules.  6.  Chronic dyspnea, worsening.  7.  Cough.  8.  Wheezing.  9.  Multiple lung nodules.  10.  Snoring.    11.  Excessive daytime sleepiness.  12.  Coronary atherosclerotic disease on chest CT scan.  13.  Orthopnea.  14.  Chronic hypoxic respiratory failure: Patient will be set up on home oxygen today.  15.  Tobacco abuse of cigarettes in remission.        Plan:  1.    Patient had a six minute walk test completed  in the office today.  Patient was found to be hypoxic  with a resting room air SPO2 of . Upon ambulation  patient's SPO2 dropped to 88%. Oxygen was  applied at 2L per minute via nasal cannula and patient's SPO2 came back up to 96%. Patient qualifies for oxygen. I will have our DME coordinator set patient up with oxygen.  Patient was sent home with oxygen from the office today.  2.  Feno/exhaled nitric oxide testing performed in the office today with a level of 14 signifying no eosinophilic airway inflammation.  3.  For COPD exacerbation, will start patient doxycycline 100 mg twice daily x10 days and a prednisone burst.  Expectations and course of treatment discussed with the patient. How to take medications and possible side effects of " medications discussed with the patient. Patient verbalized understanding and compliance.  4.  Will order a chest CT scan, echocardiogram, EKG, updated pulmonary function test, CBC, CMP, IgE level, proBNP, respiratory culture, and AFB culture for further evaluation.  5.  For coronary atherosclerotic disease will refer patient to cardiologist.  Patient is requesting to see Dr. Banks.  Referral placed today.  6.  Recommend patient restart straight nebulizer treatments, however patient does not want nebulizer treatments.  Will start patient on Breztri 2 puffs twice daily.  Patient is advised to rinse her mouth out after each use.  Samples of Breztri given to the patient in the office today.  Demonstration/instruction on how to use inhaler performed in the office today.  Spacer device given to the patient in the office today.  7.  Will restart patient on albuterol inhaler and albuterol nebulizer treatments as needed.  8.  Alpha-1 antitrypsin level and genotype drawn in the office today.  9.  For excessive daytime sleepiness and snoring we will order sleep study to evaluate for obstructive sleep apnea.  Order placed today.  10. Follow-up with Dr. Nicolas as scheduled.  11.  Vaccination status:  patient reports they are up-to-date with flu and pneumonia vaccines.  Patient declines COVID-19 vaccination.  Discussed with patient the benefits of vaccination including decreased risk of severe illness, hospitalization and death related to COVID-19.  Patient verbalized understanding.  Patient is advised to continue to follow CDC recommendations such as social distancing, wearing a mask, and washing hands for least 20 seconds.  12.  I counseled the patient on diet and exercise. Patient's BMI and weight were discussed.  The patient was counseled on initiating and intensifying attempts to lose weight.  Patient was counseled about the risks of obesity and advised to decrease caloric intake by 500 calories a day, eat three small  meals a day and advised to minimize snacking.  I recommended 30-60 minutes of daily exercise.  13.  Smoking status: patient is a former cigarette smoker.    14.  Patient to call the office, 911, or go to the ER with new or worsening symptoms.  15.  Follow-up in 4 to 6 weeks, sooner if needed.      I spent 53 minutes caring for Anca on this date of service. This time includes time spent by me in the following activities:preparing for the visit, reviewing tests, obtaining and/or reviewing a separately obtained history, performing a medically appropriate examination and/or evaluation , counseling and educating the patient/family/caregiver, ordering medications, tests, or procedures, referring and communicating with other health care professionals , documenting information in the medical record, independently interpreting results and communicating that information with the patient/family/caregiver, and care coordination    Follow Up   Return for 4 to 6 weeks.  Patient was given instructions and counseling regarding her condition or for health maintenance advice. Please see specific information pulled into the AVS if appropriate.

## 2023-08-15 ENCOUNTER — OFFICE VISIT (OUTPATIENT)
Dept: PULMONOLOGY | Facility: CLINIC | Age: 73
End: 2023-08-15
Payer: MEDICARE

## 2023-08-15 VITALS
RESPIRATION RATE: 24 BRPM | SYSTOLIC BLOOD PRESSURE: 113 MMHG | HEIGHT: 61 IN | OXYGEN SATURATION: 98 % | WEIGHT: 213 LBS | BODY MASS INDEX: 40.22 KG/M2 | HEART RATE: 110 BPM | DIASTOLIC BLOOD PRESSURE: 85 MMHG

## 2023-08-15 DIAGNOSIS — R06.00 DYSPNEA, UNSPECIFIED TYPE: ICD-10-CM

## 2023-08-15 DIAGNOSIS — Z72.0 TOBACCO ABUSE: ICD-10-CM

## 2023-08-15 DIAGNOSIS — G47.19 EXCESSIVE DAYTIME SLEEPINESS: ICD-10-CM

## 2023-08-15 DIAGNOSIS — J44.9 CHRONIC OBSTRUCTIVE PULMONARY DISEASE, UNSPECIFIED COPD TYPE: Primary | ICD-10-CM

## 2023-08-15 DIAGNOSIS — C90.00 MULTIPLE MYELOMA, REMISSION STATUS UNSPECIFIED: ICD-10-CM

## 2023-08-15 DIAGNOSIS — C18.2 MALIGNANT NEOPLASM OF ASCENDING COLON: ICD-10-CM

## 2023-08-15 DIAGNOSIS — R91.8 LUNG NODULES: ICD-10-CM

## 2023-08-15 DIAGNOSIS — R06.83 SNORING: ICD-10-CM

## 2023-08-15 DIAGNOSIS — J96.11 CHRONIC RESPIRATORY FAILURE WITH HYPOXIA: ICD-10-CM

## 2023-08-15 DIAGNOSIS — I25.119 ATHEROSCLEROSIS OF NATIVE CORONARY ARTERY OF NATIVE HEART WITH ANGINA PECTORIS: ICD-10-CM

## 2023-08-15 LAB — EXHALED NITROUS OXIDE: 14

## 2023-08-15 PROCEDURE — 94618 PULMONARY STRESS TESTING: CPT | Performed by: NURSE PRACTITIONER

## 2023-08-15 PROCEDURE — 82103 ALPHA-1-ANTITRYPSIN TOTAL: CPT | Performed by: NURSE PRACTITIONER

## 2023-08-15 RX ORDER — BUDESONIDE, GLYCOPYRROLATE, AND FORMOTEROL FUMARATE 160; 9; 4.8 UG/1; UG/1; UG/1
2 AEROSOL, METERED RESPIRATORY (INHALATION) 2 TIMES DAILY
Qty: 1 EACH | Refills: 11 | Status: SHIPPED | OUTPATIENT
Start: 2023-08-15 | End: 2023-09-14

## 2023-08-15 RX ORDER — ALBUTEROL SULFATE 2.5 MG/3ML
2.5 SOLUTION RESPIRATORY (INHALATION) 4 TIMES DAILY PRN
Qty: 120 EACH | Refills: 11 | Status: SHIPPED | OUTPATIENT
Start: 2023-08-15 | End: 2023-09-14

## 2023-08-15 RX ORDER — ALBUTEROL SULFATE 90 UG/1
2 AEROSOL, METERED RESPIRATORY (INHALATION) EVERY 4 HOURS PRN
Qty: 18 G | Refills: 11 | Status: SHIPPED | OUTPATIENT
Start: 2023-08-15

## 2023-08-15 RX ORDER — PREDNISONE 20 MG/1
40 TABLET ORAL DAILY
Qty: 14 TABLET | Refills: 0 | Status: SHIPPED | OUTPATIENT
Start: 2023-08-15

## 2023-08-15 RX ORDER — DOXYCYCLINE HYCLATE 100 MG/1
100 CAPSULE ORAL 2 TIMES DAILY
Qty: 14 CAPSULE | Refills: 0 | Status: SHIPPED | OUTPATIENT
Start: 2023-08-15 | End: 2023-08-22

## 2023-08-16 ENCOUNTER — TELEPHONE (OUTPATIENT)
Dept: PULMONOLOGY | Facility: CLINIC | Age: 73
End: 2023-08-16

## 2023-08-16 NOTE — TELEPHONE ENCOUNTER
Caller: ANYA JOYNER CALLED IN INFORM THEY RCV ORDERS FOR OXYGEN YESTERDAY BUT ORDER IS MISSING A SIGNATURE, ANYA FAXED BACK PAPER WORK TODAY

## 2023-08-18 ENCOUNTER — OFFICE VISIT (OUTPATIENT)
Dept: CARDIOLOGY | Facility: CLINIC | Age: 73
End: 2023-08-18
Payer: MEDICARE

## 2023-08-18 VITALS
DIASTOLIC BLOOD PRESSURE: 106 MMHG | WEIGHT: 215 LBS | HEART RATE: 90 BPM | BODY MASS INDEX: 40.59 KG/M2 | SYSTOLIC BLOOD PRESSURE: 145 MMHG | HEIGHT: 61 IN

## 2023-08-18 DIAGNOSIS — R06.02 SHORTNESS OF BREATH: Primary | ICD-10-CM

## 2023-08-18 PROCEDURE — 99203 OFFICE O/P NEW LOW 30 MIN: CPT | Performed by: SPECIALIST

## 2023-08-18 PROCEDURE — 1160F RVW MEDS BY RX/DR IN RCRD: CPT | Performed by: SPECIALIST

## 2023-08-18 PROCEDURE — 3077F SYST BP >= 140 MM HG: CPT | Performed by: SPECIALIST

## 2023-08-18 PROCEDURE — 1159F MED LIST DOCD IN RCRD: CPT | Performed by: SPECIALIST

## 2023-08-18 PROCEDURE — 3080F DIAST BP >= 90 MM HG: CPT | Performed by: SPECIALIST

## 2023-08-18 NOTE — PROGRESS NOTES
[facility]   Cardiology Consult Note    Patient Name: Anca Samson  : 1950  Referring Physician: LINETTE Fisher  Subjective   Subjective     Reason for Consult/ Chief Complaint:   SOB    HPI:  Anca Samson is a 72 y.o. female with history of shortness of breath secondary to COPD.  Shortness of breath is on exertion and rest on home O2.  No chest pain.    Review of Systems:    Constitutional no fever,  no weight loss   Skin no rash   Otolaryngeal no difficulty swallowing   Cardiovascular See HPI   Pulmonary no cough, no sputum production   Gastrointestinal no constipation, no diarrhea   Genitourinary no dysuria, no hematuria   Hematologic no easy bruisability, no abnormal bleeding   Musculoskeletal no muscle pain   Neurologic no dizziness, no falls       Personal History     Past Medical History:  Past Medical History:   Diagnosis Date    Anxiety     Asthma     Atherosclerosis of native coronary artery of native heart with angina pectoris 08/15/2023    C. difficile diarrhea     Colon cancer 2017    Colon polyp     COPD (chronic obstructive pulmonary disease) 10/23/2017    Depression     Diverticulitis     Dysphagia     Heartburn     Heartburn     Heartburn     Heartburn     Hernia 2019    History of chemotherapy     Hx of psychiatric care     Hyperlipidemia     Impaired fasting glucose 2015    Lumbago     Lung nodule 2022    Major depressive disorder 10/27/2016    Malignant neoplasm of ascending colon 2017    Melena     Multiple myeloma     Nicotine dependence 05/10/2017    Seizures     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    Shortness of breath     Tobacco use 2021    Tobacco use disorder 2015    Visit for screening mammogram 2020    NORMAL- REPEAT IN ONE YEAR    Vitamin D deficiency        Family History:   Family History   Problem Relation Age of Onset    Heart disease Mother     Lung cancer Mother     Cancer Mother     Stroke Father      Heart disease Father     Kidney cancer Father     Cancer Father     Stroke Other         UNCLE/AUNT    Colon cancer Neg Hx     [place] Hyperthermia Neg Hx        Social History:  reports that she quit smoking about 14 months ago. Her smoking use included cigarettes. She started smoking about 48 years ago. She has been exposed to tobacco smoke. She has never used smokeless tobacco. She reports that she does not drink alcohol and does not use drugs.    Home Medications:  Budeson-Glycopyrrol-Formoterol, FLUoxetine, albuterol, albuterol sulfate HFA, colestipol, doxycycline, levothyroxine, meloxicam, predniSONE, promethazine, and vitamin D    Allergies:  Allergies   Allergen Reactions    Cephalexin Hives    Morphine Anaphylaxis    Penicillins Hives    Sulfa Antibiotics Hives       Objective    Objective     Vitals:   Heart Rate:  [90] 90  BP: (145-160)/(101-106) 145/106  Body mass index is 40.65 kg/mý.  PHYSICAL EXAM:    General Appearance:   well developed  well nourished  HENT:   oropharynx moist  lips not cyanotic  Neck:  thyroid not enlarged  supple  Respiratory:  no respiratory distress  normal breath sounds  no rales  Cardiovascular:  no jugular venous distention  regular rhythm  apical impulse normal  S1 normal, S2 normal  no S3, no S4   no murmur  no rub, no thrill  carotid pulses normal; no bruit  pedal pulses normal  lower extremity edema: none    Skin:   warm, dry  Psychiatric:  judgement and insight appropriate  normal mood and affect    RESULTS:    EKG reviewed by me and shows sinus rhythm       Result Review    Result Review:  I have personally reviewed the available results:  [x]  Laboratory  [x]  EKG/Telemetry   [x]  Cardiology/Vascular   [x] Medications  [x]  Old records  Lab Results   Component Value Date    CHOL 138 03/29/2023    CHOL 159 09/01/2022    CHOL 156 08/09/2021     Lab Results   Component Value Date    TRIG 295 (H) 03/29/2023    TRIG 329 (H) 09/01/2022    TRIG 339 (H) 08/09/2021     Lab  Results   Component Value Date    HDL 39 (L) 03/29/2023    HDL 42 09/01/2022    HDL 35 (L) 08/09/2021     Lab Results   Component Value Date    LDL 53 03/29/2023    LDL 65 09/01/2022    LDL 67 08/09/2021     Lab Results   Component Value Date    VLDL 46 (H) 03/29/2023    VLDL 52 (H) 09/01/2022    VLDL 54 (H) 08/09/2021         Procedures     Impression/Plan  1. Shortness of breath/COPD: Echocardiogram to evaluate left ventricular systolic function and for pulmonary hypertension.  Lexiscan sestamibi stress test to evaluate any ischemia as she cannot walk on treadmill.  Continue current bronchodilators.  Check BMP and BNP.  2.  Hypothyroidism: Continue Synthroid 25 mcg once a day.        Electronically signed by Artem Banks MD, 08/18/23, 11:29 AM EDT.

## 2023-08-23 ENCOUNTER — HOSPITAL ENCOUNTER (OUTPATIENT)
Dept: SLEEP MEDICINE | Facility: HOSPITAL | Age: 73
End: 2023-08-23
Payer: MEDICARE

## 2023-08-23 DIAGNOSIS — C18.2 MALIGNANT NEOPLASM OF ASCENDING COLON: ICD-10-CM

## 2023-08-23 DIAGNOSIS — R06.00 DYSPNEA, UNSPECIFIED TYPE: ICD-10-CM

## 2023-08-23 DIAGNOSIS — J44.9 CHRONIC OBSTRUCTIVE PULMONARY DISEASE, UNSPECIFIED COPD TYPE: ICD-10-CM

## 2023-08-23 DIAGNOSIS — C90.00 MULTIPLE MYELOMA, REMISSION STATUS UNSPECIFIED: ICD-10-CM

## 2023-08-23 DIAGNOSIS — Z72.0 TOBACCO ABUSE: ICD-10-CM

## 2023-08-23 DIAGNOSIS — R91.8 LUNG NODULES: ICD-10-CM

## 2023-08-23 DIAGNOSIS — G47.19 EXCESSIVE DAYTIME SLEEPINESS: ICD-10-CM

## 2023-08-23 DIAGNOSIS — R06.83 SNORING: ICD-10-CM

## 2023-08-23 PROCEDURE — 95811 POLYSOM 6/>YRS CPAP 4/> PARM: CPT

## 2023-08-25 ENCOUNTER — PROCEDURE VISIT (OUTPATIENT)
Dept: CARDIAC REHAB | Facility: HOSPITAL | Age: 73
End: 2023-08-25
Payer: MEDICARE

## 2023-08-25 ENCOUNTER — HOSPITAL ENCOUNTER (OUTPATIENT)
Dept: RESPIRATORY THERAPY | Facility: HOSPITAL | Age: 73
Discharge: HOME | End: 2023-08-25
Payer: MEDICARE

## 2023-08-25 ENCOUNTER — HOSPITAL ENCOUNTER (OUTPATIENT)
Dept: CT IMAGING | Facility: HOSPITAL | Age: 73
Discharge: HOME | End: 2023-08-25
Payer: MEDICARE

## 2023-08-25 DIAGNOSIS — Z72.0 TOBACCO ABUSE: ICD-10-CM

## 2023-08-25 DIAGNOSIS — R91.8 LUNG NODULES: ICD-10-CM

## 2023-08-25 DIAGNOSIS — J44.9 CHRONIC OBSTRUCTIVE PULMONARY DISEASE, UNSPECIFIED COPD TYPE: ICD-10-CM

## 2023-08-25 DIAGNOSIS — R06.00 DYSPNEA, UNSPECIFIED TYPE: ICD-10-CM

## 2023-08-25 DIAGNOSIS — C90.00 MULTIPLE MYELOMA, REMISSION STATUS UNSPECIFIED: ICD-10-CM

## 2023-08-25 DIAGNOSIS — C18.2 MALIGNANT NEOPLASM OF ASCENDING COLON: ICD-10-CM

## 2023-08-25 PROCEDURE — 94060 EVALUATION OF WHEEZING: CPT

## 2023-08-25 PROCEDURE — 71250 CT THORAX DX C-: CPT

## 2023-08-25 PROCEDURE — 94726 PLETHYSMOGRAPHY LUNG VOLUMES: CPT

## 2023-08-25 PROCEDURE — 94729 DIFFUSING CAPACITY: CPT

## 2023-08-25 RX ORDER — LEVALBUTEROL INHALATION SOLUTION 1.25 MG/3ML
1.25 SOLUTION RESPIRATORY (INHALATION) ONCE
Status: COMPLETED | OUTPATIENT
Start: 2023-08-25 | End: 2023-08-25

## 2023-08-25 RX ADMIN — LEVALBUTEROL HYDROCHLORIDE 1.25 MG: 1.25 SOLUTION RESPIRATORY (INHALATION) at 15:06

## 2023-08-25 NOTE — PROGRESS NOTES
Pt had a 6MWT ordered, Pt arrived in Pulmonary rehab via wheelchair from PFT, pt is SOA at this time unable to perform testing.

## 2023-08-28 DIAGNOSIS — G47.33 OSA (OBSTRUCTIVE SLEEP APNEA): Primary | ICD-10-CM

## 2023-09-01 ENCOUNTER — APPOINTMENT (OUTPATIENT)
Dept: LAB | Facility: HOSPITAL | Age: 73
End: 2023-09-01
Payer: MEDICARE

## 2023-09-01 ENCOUNTER — LAB (OUTPATIENT)
Dept: LAB | Facility: HOSPITAL | Age: 73
End: 2023-09-01
Payer: MEDICARE

## 2023-09-01 DIAGNOSIS — R06.83 SNORING: ICD-10-CM

## 2023-09-01 DIAGNOSIS — R06.00 DYSPNEA, UNSPECIFIED TYPE: ICD-10-CM

## 2023-09-01 DIAGNOSIS — J96.11 CHRONIC RESPIRATORY FAILURE WITH HYPOXIA: ICD-10-CM

## 2023-09-01 DIAGNOSIS — R91.8 LUNG NODULES: ICD-10-CM

## 2023-09-01 DIAGNOSIS — G47.19 EXCESSIVE DAYTIME SLEEPINESS: ICD-10-CM

## 2023-09-01 DIAGNOSIS — C18.2 MALIGNANT NEOPLASM OF ASCENDING COLON: ICD-10-CM

## 2023-09-01 DIAGNOSIS — Z72.0 TOBACCO ABUSE: ICD-10-CM

## 2023-09-01 DIAGNOSIS — C90.00 MULTIPLE MYELOMA, REMISSION STATUS UNSPECIFIED: ICD-10-CM

## 2023-09-01 DIAGNOSIS — J44.9 CHRONIC OBSTRUCTIVE PULMONARY DISEASE, UNSPECIFIED COPD TYPE: ICD-10-CM

## 2023-09-01 LAB
ALBUMIN SERPL-MCNC: 3.5 G/DL (ref 3.5–5.2)
ALBUMIN/GLOB SERPL: 0.6 G/DL
ALP SERPL-CCNC: 56 U/L (ref 39–117)
ALPHA1 GLOB MFR UR ELPH: 131 MG/DL (ref 90–200)
ALT SERPL W P-5'-P-CCNC: 10 U/L (ref 1–33)
ANION GAP SERPL CALCULATED.3IONS-SCNC: 13.7 MMOL/L (ref 5–15)
AST SERPL-CCNC: 18 U/L (ref 1–32)
BASOPHILS # BLD AUTO: 0.06 10*3/MM3 (ref 0–0.2)
BASOPHILS NFR BLD AUTO: 0.6 % (ref 0–1.5)
BILIRUB SERPL-MCNC: 0.4 MG/DL (ref 0–1.2)
BUN SERPL-MCNC: 14 MG/DL (ref 8–23)
BUN/CREAT SERPL: 13.2 (ref 7–25)
CALCIUM SPEC-SCNC: 9 MG/DL (ref 8.6–10.5)
CHLORIDE SERPL-SCNC: 105 MMOL/L (ref 98–107)
CO2 SERPL-SCNC: 16.3 MMOL/L (ref 22–29)
CREAT SERPL-MCNC: 1.06 MG/DL (ref 0.57–1)
DEPRECATED RDW RBC AUTO: 48.7 FL (ref 37–54)
EGFRCR SERPLBLD CKD-EPI 2021: 55.6 ML/MIN/1.73
EOSINOPHIL # BLD AUTO: 0.14 10*3/MM3 (ref 0–0.4)
EOSINOPHIL NFR BLD AUTO: 1.4 % (ref 0.3–6.2)
ERYTHROCYTE [DISTWIDTH] IN BLOOD BY AUTOMATED COUNT: 14.4 % (ref 12.3–15.4)
GLOBULIN UR ELPH-MCNC: 6.2 GM/DL
GLUCOSE SERPL-MCNC: 143 MG/DL (ref 65–99)
HCT VFR BLD AUTO: 38.3 % (ref 34–46.6)
HGB BLD-MCNC: 12.8 G/DL (ref 12–15.9)
IMM GRANULOCYTES # BLD AUTO: 0.15 10*3/MM3 (ref 0–0.05)
IMM GRANULOCYTES NFR BLD AUTO: 1.5 % (ref 0–0.5)
LYMPHOCYTES # BLD AUTO: 3.62 10*3/MM3 (ref 0.7–3.1)
LYMPHOCYTES NFR BLD AUTO: 36.1 % (ref 19.6–45.3)
MCH RBC QN AUTO: 31.8 PG (ref 26.6–33)
MCHC RBC AUTO-ENTMCNC: 33.4 G/DL (ref 31.5–35.7)
MCV RBC AUTO: 95.3 FL (ref 79–97)
MONOCYTES # BLD AUTO: 0.62 10*3/MM3 (ref 0.1–0.9)
MONOCYTES NFR BLD AUTO: 6.2 % (ref 5–12)
NEUTROPHILS NFR BLD AUTO: 5.44 10*3/MM3 (ref 1.7–7)
NEUTROPHILS NFR BLD AUTO: 54.2 % (ref 42.7–76)
NRBC BLD AUTO-RTO: 0.3 /100 WBC (ref 0–0.2)
NT-PROBNP SERPL-MCNC: 95 PG/ML (ref 0–900)
PLATELET # BLD AUTO: 259 10*3/MM3 (ref 140–450)
PMV BLD AUTO: 9.5 FL (ref 6–12)
POTASSIUM SERPL-SCNC: 4.1 MMOL/L (ref 3.5–5.2)
PROT SERPL-MCNC: 9.7 G/DL (ref 6–8.5)
RBC # BLD AUTO: 4.02 10*6/MM3 (ref 3.77–5.28)
SODIUM SERPL-SCNC: 135 MMOL/L (ref 136–145)
WBC NRBC COR # BLD: 10.03 10*3/MM3 (ref 3.4–10.8)

## 2023-09-01 PROCEDURE — 83880 ASSAY OF NATRIURETIC PEPTIDE: CPT

## 2023-09-01 PROCEDURE — 83521 IG LIGHT CHAINS FREE EACH: CPT

## 2023-09-01 PROCEDURE — 36415 COLL VENOUS BLD VENIPUNCTURE: CPT

## 2023-09-01 PROCEDURE — 85025 COMPLETE CBC W/AUTO DIFF WBC: CPT

## 2023-09-01 PROCEDURE — 80053 COMPREHEN METABOLIC PANEL: CPT

## 2023-09-01 PROCEDURE — 86335 IMMUNFIX E-PHORSIS/URINE/CSF: CPT

## 2023-09-01 PROCEDURE — 84156 ASSAY OF PROTEIN URINE: CPT

## 2023-09-01 PROCEDURE — 86334 IMMUNOFIX E-PHORESIS SERUM: CPT

## 2023-09-01 PROCEDURE — 82785 ASSAY OF IGE: CPT

## 2023-09-01 PROCEDURE — 82784 ASSAY IGA/IGD/IGG/IGM EACH: CPT

## 2023-09-01 PROCEDURE — 84166 PROTEIN E-PHORESIS/URINE/CSF: CPT

## 2023-09-01 PROCEDURE — 84165 PROTEIN E-PHORESIS SERUM: CPT

## 2023-09-05 LAB
ALBUMIN SERPL ELPH-MCNC: 3.4 G/DL (ref 2.9–4.4)
ALBUMIN/GLOB SERPL: 0.6 {RATIO} (ref 0.7–1.7)
ALPHA1 GLOB SERPL ELPH-MCNC: 0.3 G/DL (ref 0–0.4)
ALPHA2 GLOB SERPL ELPH-MCNC: 0.8 G/DL (ref 0.4–1)
B-GLOBULIN SERPL ELPH-MCNC: 1 G/DL (ref 0.7–1.3)
GAMMA GLOB SERPL ELPH-MCNC: 3.9 G/DL (ref 0.4–1.8)
GLOBULIN SER-MCNC: 6 G/DL (ref 2.2–3.9)
IGA SERPL-MCNC: 20 MG/DL (ref 64–422)
IGE SERPL-ACNC: 9 IU/ML (ref 6–495)
IGG SERPL-MCNC: 4854 MG/DL (ref 586–1602)
IGM SERPL-MCNC: 35 MG/DL (ref 26–217)
INTERPRETATION SERPL IEP-IMP: ABNORMAL
KAPPA LC FREE SER-MCNC: 33.6 MG/L (ref 3.3–19.4)
KAPPA LC FREE/LAMBDA FREE SER: 4.6 {RATIO} (ref 0.26–1.65)
LABORATORY COMMENT REPORT: ABNORMAL
LAMBDA LC FREE SERPL-MCNC: 7.3 MG/L (ref 5.7–26.3)
M PROTEIN SERPL ELPH-MCNC: 3.8 G/DL
PROT SERPL-MCNC: 9.4 G/DL (ref 6–8.5)

## 2023-09-06 ENCOUNTER — APPOINTMENT (OUTPATIENT)
Dept: ONCOLOGY | Facility: HOSPITAL | Age: 73
End: 2023-09-06
Payer: MEDICARE

## 2023-09-06 ENCOUNTER — HOSPITAL ENCOUNTER (OUTPATIENT)
Dept: CARDIOLOGY | Facility: HOSPITAL | Age: 73
Discharge: HOME | End: 2023-09-06
Admitting: NURSE PRACTITIONER
Payer: MEDICARE

## 2023-09-06 DIAGNOSIS — C18.2 MALIGNANT NEOPLASM OF ASCENDING COLON: ICD-10-CM

## 2023-09-06 DIAGNOSIS — Z72.0 TOBACCO ABUSE: ICD-10-CM

## 2023-09-06 DIAGNOSIS — R91.8 LUNG NODULES: ICD-10-CM

## 2023-09-06 DIAGNOSIS — R06.00 DYSPNEA, UNSPECIFIED TYPE: ICD-10-CM

## 2023-09-06 DIAGNOSIS — C90.00 MULTIPLE MYELOMA, REMISSION STATUS UNSPECIFIED: ICD-10-CM

## 2023-09-06 DIAGNOSIS — J44.9 CHRONIC OBSTRUCTIVE PULMONARY DISEASE, UNSPECIFIED COPD TYPE: ICD-10-CM

## 2023-09-06 LAB
ALBUMIN 24H MFR UR ELPH: NORMAL %
ALPHA1 GLOB 24H MFR UR ELPH: NORMAL %
ALPHA2 GLOB 24H MFR UR ELPH: NORMAL %
B-GLOBULIN MFR UR ELPH: NORMAL %
BH CV ECHO MEAS - AO ROOT DIAM: 2.34 CM
BH CV ECHO MEAS - EDV(CUBED): 81.6 ML
BH CV ECHO MEAS - EDV(MOD-SP2): 46.9 ML
BH CV ECHO MEAS - EDV(MOD-SP4): 25.3 ML
BH CV ECHO MEAS - EF(MOD-BP): 61 %
BH CV ECHO MEAS - EF(MOD-SP2): 57.4 %
BH CV ECHO MEAS - EF(MOD-SP4): 61.7 %
BH CV ECHO MEAS - ESV(CUBED): 24.7 ML
BH CV ECHO MEAS - ESV(MOD-SP2): 20 ML
BH CV ECHO MEAS - ESV(MOD-SP4): 9.7 ML
BH CV ECHO MEAS - FS: 32.9 %
BH CV ECHO MEAS - IVS/LVPW: 0.9 CM
BH CV ECHO MEAS - IVSD: 0.96 CM
BH CV ECHO MEAS - LA DIMENSION: 2.9 CM
BH CV ECHO MEAS - LAT PEAK E' VEL: 4.6 CM/SEC
BH CV ECHO MEAS - LV DIASTOLIC VOL/BSA (35-75): 12.8 CM2
BH CV ECHO MEAS - LV MASS(C)D: 147.7 GRAMS
BH CV ECHO MEAS - LV SYSTOLIC VOL/BSA (12-30): 4.9 CM2
BH CV ECHO MEAS - LVIDD: 4.3 CM
BH CV ECHO MEAS - LVIDS: 2.9 CM
BH CV ECHO MEAS - LVPWD: 1.07 CM
BH CV ECHO MEAS - MED PEAK E' VEL: 6.2 CM/SEC
BH CV ECHO MEAS - MV A MAX VEL: 88.7 CM/SEC
BH CV ECHO MEAS - MV DEC SLOPE: 360.6 CM/SEC2
BH CV ECHO MEAS - MV DEC TIME: 0.14 MSEC
BH CV ECHO MEAS - MV E MAX VEL: 51.4 CM/SEC
BH CV ECHO MEAS - MV E/A: 0.58
BH CV ECHO MEAS - RVDD: 3.4 CM
BH CV ECHO MEAS - SI(MOD-SP2): 13.6 ML/M2
BH CV ECHO MEAS - SI(MOD-SP4): 7.9 ML/M2
BH CV ECHO MEAS - SV(MOD-SP2): 26.9 ML
BH CV ECHO MEAS - SV(MOD-SP4): 15.6 ML
BH CV ECHO MEASUREMENTS AVERAGE E/E' RATIO: 9.52
GAMMA GLOB 24H MFR UR ELPH: NORMAL %
HIV 1 & 2 AB SER-IMP: NORMAL
INTERPRETATION UR IFE-IMP: NORMAL
LEFT ATRIUM VOLUME INDEX: 26.5 ML/M2
M PROTEIN 24H MFR UR ELPH: NORMAL %
PROT UR-MCNC: 154.4 MG/DL

## 2023-09-06 PROCEDURE — 93306 TTE W/DOPPLER COMPLETE: CPT

## 2023-09-07 NOTE — TELEPHONE ENCOUNTER
Patient called and stated that Breztri was expensive and would like to be switched to something cheaper.  I have spoke to the patient she is aware that we can try Trelegy, she will reach out to the office if she has any issues picking up medication. Radha is currently out of the office will send medication approval to Marianela Hernandez. Thanks.

## 2023-09-08 RX ORDER — FLUTICASONE FUROATE, UMECLIDINIUM BROMIDE AND VILANTEROL TRIFENATATE 100; 62.5; 25 UG/1; UG/1; UG/1
1 POWDER RESPIRATORY (INHALATION)
Qty: 1 EACH | Refills: 3 | Status: SHIPPED | OUTPATIENT
Start: 2023-09-08

## 2023-09-15 ENCOUNTER — TELEPHONE (OUTPATIENT)
Dept: PULMONOLOGY | Facility: CLINIC | Age: 73
End: 2023-09-15
Payer: MEDICARE

## 2023-09-15 NOTE — TELEPHONE ENCOUNTER
Called pt and went over her results again.  Pt is aware of results and is going to be set up next week or the week after with Gene Michelle

## 2023-09-15 NOTE — TELEPHONE ENCOUNTER
Patient called and is wanting her results from her sleep study. She is also needing a fitting for her mask. Patient would like a call back. Thanks

## 2023-09-20 ENCOUNTER — OFFICE VISIT (OUTPATIENT)
Dept: ONCOLOGY | Facility: HOSPITAL | Age: 73
End: 2023-09-20
Payer: MEDICARE

## 2023-09-20 VITALS
TEMPERATURE: 96.6 F | SYSTOLIC BLOOD PRESSURE: 104 MMHG | RESPIRATION RATE: 20 BRPM | HEART RATE: 106 BPM | DIASTOLIC BLOOD PRESSURE: 60 MMHG | OXYGEN SATURATION: 99 % | WEIGHT: 212.96 LBS | BODY MASS INDEX: 40.26 KG/M2

## 2023-09-20 DIAGNOSIS — C90.00 MULTIPLE MYELOMA, REMISSION STATUS UNSPECIFIED: Primary | ICD-10-CM

## 2023-09-20 PROCEDURE — G0463 HOSPITAL OUTPT CLINIC VISIT: HCPCS | Performed by: INTERNAL MEDICINE

## 2023-09-20 NOTE — PROGRESS NOTES
Chief Complaint  Colon Cancer    Paulino Macias PA Robinson, LACI Umana          Anca Samson presents to [facility]EMAT[facility]Y for follow-up of smoldering myeloma.  She also has history of colon cancer status post resection and adjuvant treatment in 2017.  She states that she has had several changes in her health over the summer.  She has recently been diagnosed with COPD and is now on continuous supplemental oxygen as well as obstructive sleep apnea requiring CPAP.  She notes that this has impaired her activities, she is unable to do much.  She can perform her ADLs.  She denies any obvious masses or adenopathy.  She notes low energy but normal appetite.    Oncology/Hematology History Overview Note   Smoldering Myeloma    Initially diagnosed in 2016 with bone marrow biopsy demonstrating 30% CD56 positive monoclonal plasma cell population.  45 XX normal female chromosome pattern  FISH panel negative for myeloma associated mutations including 1q21, 9q34,11q13,14q32,15q24, 17q13  No anemia, renal insufficiency, hypercalcemia.  On observation since that time    Low grade adenocarcinoma of ascending colon      5/16/2017 right hemicolectomy which  revealed a low-grade 7.6 cm adenocarcinoma of the cecum. All margins were  negative. Lymphovascular invasion and perineural invasion were not identified.     26 lymph nodes were removed and unfortunately 1 was involved with metastatic deposit. pT3 pN1a colorectal cancer.        Clinical Staging      Smoldering Myeloma; Colon ([id number])            Treatments      Chemotherapy      11/2017 completed 6mo adjuvant Xeloda        6/2022 Stopped Smoking     Malignant neoplasm of ascending colon   7/21/2017 Initial Diagnosis    Colon cancer (CMS/HCC)     Multiple myeloma   7/28/2021 Initial Diagnosis    Multiple myeloma         Review of Systems   Constitutional:  Positive for fatigue. Negative for appetite change, diaphoresis,  fever, unexpected weight gain and unexpected weight loss.   HENT:  Negative for hearing loss, mouth sores, sore throat, swollen glands, trouble swallowing and voice change.    Eyes:  Negative for blurred vision.   Respiratory:  Positive for shortness of breath. Negative for cough and wheezing.    Cardiovascular:  Negative for chest pain and palpitations.   Gastrointestinal:  Negative for abdominal pain, blood in stool, constipation, diarrhea, nausea and vomiting.   Endocrine: Negative for cold intolerance and heat intolerance.   Genitourinary:  Negative for difficulty urinating, dysuria, frequency, hematuria and urinary incontinence.   Musculoskeletal:  Negative for arthralgias, back pain and myalgias.   Skin:  Negative for rash, skin lesions and wound.   Neurological:  Positive for weakness. Negative for dizziness, seizures, numbness and headache.   Hematological:  Does not bruise/bleed easily.   Psychiatric/Behavioral:  Negative for depressed mood. The patient is not nervous/anxious.    Current Outpatient Medications on File Prior to Visit   Medication Sig Dispense Refill    albuterol sulfate  (90 Base) MCG/ACT inhaler Inhale 2 puffs Every 4 (Four) Hours As Needed for Wheezing. 18 g 11    colestipol (COLESTID) 1 g tablet Take 2 tablets by mouth 2 (Two) Times a Day. 120 tablet 5    FLUoxetine (PROzac) 40 MG capsule Take 1 capsule by mouth Daily. 90 capsule 1    Fluticasone-Umeclidin-Vilant (Trelegy Ellipta) 100-62.5-25 MCG/ACT inhaler Inhale 1 puff Daily. 1 each 3    levothyroxine (SYNTHROID, LEVOTHROID) 25 MCG tablet Take 1 tablet by mouth Every Morning. 90 tablet 1    meloxicam (MOBIC) 15 MG tablet Take 1 tablet by mouth Daily. 90 tablet 1    promethazine (PHENERGAN) 25 MG tablet Take 1 tablet by mouth Every 6 (Six) Hours As Needed for Nausea or Vomiting. 30 tablet 1    vitamin D (ERGOCALCIFEROL) 1.25 MG (96347 UT) capsule capsule Take 1 capsule by mouth 2 (Two) Times a Week. 26 capsule 1    predniSONE  (DELTASONE) 20 MG tablet Take 2 tablets by mouth Daily. (Patient not taking: Reported on 9/20/2023) 14 tablet 0     No current facility-administered medications on file prior to visit.       Allergies   Allergen Reactions    Cephalexin Hives    Morphine Anaphylaxis    Penicillins Hives    Sulfa Antibiotics Hives     Past Medical History:   Diagnosis Date    Anxiety     Asthma     Atherosclerosis of native coronary artery of native heart with angina pectoris 08/15/2023    C. 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 [facility]    Shortness of breath     Tobacco use 07/28/2021    Tobacco use disorder 08/14/2015    Visit for screening mammogram 02/20/2020    NORMAL- REPEAT IN ONE YEAR    Vitamin D deficiency      Past Surgical History:   Procedure Laterality Date    APPENDECTOMY      BONE MARROW BIOPSY  2016    COLON SURGERY  2017    COLONOSCOPY  2018,2017,2019    [facility] LE:DIVERTICULOSIS AND ERYTHEMA OF MUCOSA    COLONOSCOPY N/A 12/20/2022    Procedure: COLONOSCOPY WITH ELEVIEW INJECTION, POLYPECTOMY, HOT SNARE, CLIP APPLICATION X3, BIOPSIES;  Surgeon: Jarvis Borges MD;  Location: [facility] ENDOSCOPY;  Service: Gastroenterology;  Laterality: N/A;  COLON POLYP, DIVERTICULOSIS, ANASTOMOSIS RIGHT COLON    ENDOSCOPY  2018    FECAL DISIMPACTION  06/2019    TRANSPLANT     HEMICOLECTOMY Right 05/2017    HYSTERECTOMY  1982,1984    KNEE ARTHROSCOPY Left  2022    Procedure: KNEE ARTHROSCOPY WITH PARTIAL MEDIAL MENISCECTOMY,  CHONDROPLASTY;  Surgeon: Nicholas Tipton MD;  [place] [facility] [place] [facility]  Service: Orthopedics;  Laterality: Left;    MINI-LAPAROTOMY  2017    PORTACATH PLACEMENT N/A     pt states that her port does not work    UPPER GASTROINTESTINAL ENDOSCOPY  2018     Social History     Socioeconomic History    Marital status:    Tobacco Use    Smoking status: Former     Packs/day: 0.00     Years: 47.00     Pack years: 0.00     Types: Cigarettes     Start date: 1975     Quit date: 6/3/2022     Years since quittin.2     Passive exposure: Past    Smokeless tobacco: Never    Tobacco comments:     started age 26   Vaping Use    Vaping Use: Never used   Substance and Sexual Activity    Alcohol use: Never    Drug use: Never    Sexual activity: Defer     Family History   Problem Relation Age of Onset    Heart disease Mother     Lung cancer Mother     Cancer Mother     Stroke Father     Heart disease Father     Kidney cancer Father     Cancer Father     Stroke Other         UNCLE/AUNT    Colon cancer Neg Hx     Malig Hyperthermia Neg Hx        Objective   Physical Exam  Vitals reviewed. Exam conducted with a chaperone present.   Constitutional:       General: She is not in acute distress.     Appearance: Normal appearance.   HENT:      Nose:      Comments: Nasal cannula in place  Cardiovascular:      Rate and Rhythm: Normal rate and regular rhythm.      Heart sounds: Normal heart sounds. No murmur heard.    No gallop.   Pulmonary:      Effort: Pulmonary effort is normal.      Breath sounds: Normal breath sounds.   Abdominal:      General: Abdomen is flat. Bowel sounds are normal.      Palpations: Abdomen is soft.   Musculoskeletal:      Cervical back: Neck supple.   Lymphadenopathy:      Cervical: No cervical adenopathy.   Neurological:      Mental Status: She is alert.   Psychiatric:         Mood and Affect: Mood normal.         Behavior:  Behavior normal.       Vitals:    09/20/23 1353   BP: 104/60   Pulse: 106   Resp: 20   Temp: 96.6 °F (35.9 °C)   TempSrc: Temporal   SpO2: 99%  Comment: 2 l   Weight: 96.6 kg (212 lb 15.4 oz)  Comment: sob   PainSc: 0-No pain     ECOG score: 1 (patient is very sob at the moment)         PHQ-9 Total Score:                    Result Review :   The following data was reviewed by: West Nicolas MD on 09/20/2023:  Lab Results   Component Value Date    HGB 12.8 09/01/2023    HCT 38.3 09/01/2023    MCV 95.3 09/01/2023     09/01/2023    WBC 10.03 09/01/2023    NEUTROABS 5.44 09/01/2023    LYMPHSABS 3.62 (H) 09/01/2023    MONOSABS 0.62 09/01/2023    EOSABS 0.14 09/01/2023    BASOSABS 0.06 09/01/2023     Lab Results   Component Value Date    GLUCOSE 143 (H) 09/01/2023    BUN 14 09/01/2023    CREATININE 1.06 (H) 09/01/2023     (L) 09/01/2023    K 4.1 09/01/2023     09/01/2023    CO2 16.3 (L) 09/01/2023    CALCIUM 9.0 09/01/2023    PROTEINTOT 9.7 (H) 09/01/2023    ALBUMIN 3.5 09/01/2023    ALBUMIN 3.4 09/01/2023    BILITOT 0.4 09/01/2023    ALKPHOS 56 09/01/2023    AST 18 09/01/2023    ALT 10 09/01/2023     Lab Results   Component Value Date    FREET4 1.20 05/25/2023    TSH 3.700 05/25/2023     No results found for: IRON, LABIRON, TRANSFERRIN, TIBC  Lab Results   Component Value Date     03/07/2023    AHXXTZMX77 218 01/28/2020    FOLATE 17.7 01/28/2020     No results found for: PSA, CEA, AFP, ,   IgG level 4854, IgA level 20, IgM level 35  SPEP demonstrates an M spike 3.8 g/dL, IgG kappa  Kappa light chain 33.6, lambda light chain 7.3, kappa lambda ratio 4.6  UPEP sent but insufficient for testing          Assessment and Plan    Diagnoses and all orders for this visit:    1. Multiple myeloma, remission status unspecified (Primary)  Assessment & Plan:  Patient has had a fairly marked increase in her M spike up to 3.8 g/dL.  UPEP sent but not enough material for testing.  Her other lab work  including hemoglobin, creatinine, calcium are normal.  She has had 1 small area on the iliac bone of unclear significance but stable on PET scan for several years.  Given the jump in her M spike, I am concerned about increasing activity of her myeloma.  I will order restaging including repeat bone marrow aspiration and biopsy along with PET scan.  I will order 24-hour urine for UPEP since the random specimen was insufficient.  She has been recently diagnosed with other comorbidities including oxygen dependent COPD.  I would like her to see [facility] for their opinion regarding need for treatment and transplant candidacy.  We discussed initial treatment such as the VRD regimen consisting of Velcade, Revlimid, dexamethasone and information provided on those agents.  This is generally well-tolerated and offers good response.  Other potential options could include an oral regimen only such as RD consisting of Revlimid and dexamethasone without the injection.  We discussed that there are number of treatment options for myeloma that can now be employed.  She would prefer not to have treatment unless absolutely necessary which is understandable.  I will see her back in 1 month to review her restaging tests and [facility] recommendations.    Orders:  -     NM PET/CT Skull Base to Mid Thigh; Future  -     CT Guided Bone Marrow Biopsy And Aspiration; Future  -     Tissue Pathology Exam; Standing  -     Flow Cytometry; Standing  -     Ambulatory Referral to Oncology  -     Protein Electrophoresis, 24 Hr Urine - Urine, Clean Catch; Future            Patient Follow Up: 1 month    Patient was given instructions and counseling regarding her condition or for health maintenance advice. Please see specific information pulled into the AVS if appropriate.     West Nicolas MD    9/20/2023

## 2023-09-20 NOTE — ASSESSMENT & PLAN NOTE
Patient has had a fairly marked increase in her M spike up to 3.8 g/dL.  UPEP sent but not enough material for testing.  Her other lab work including hemoglobin, creatinine, calcium are normal.  She has had 1 small area on the iliac bone of unclear significance but stable on PET scan for several years.  Given the jump in her M spike, I am concerned about increasing activity of her myeloma.  I will order restaging including repeat bone marrow aspiration and biopsy along with PET scan.  I will order 24-hour urine for UPEP since the random specimen was insufficient.  She has been recently diagnosed with other comorbidities including oxygen dependent COPD.  I would like her to see [facility] for their opinion regarding need for treatment and transplant candidacy.  We discussed initial treatment such as the VRD regimen consisting of Velcade, Revlimid, dexamethasone and information provided on those agents.  This is generally well-tolerated and offers good response.  Other potential options could include an oral regimen only such as RD consisting of Revlimid and dexamethasone without the injection.  We discussed that there are number of treatment options for myeloma that can now be employed.  She would prefer not to have treatment unless absolutely necessary which is understandable.  I will see her back in 1 month to review her restaging tests and [facility] recommendations.

## 2023-09-22 ENCOUNTER — TELEPHONE (OUTPATIENT)
Dept: PULMONOLOGY | Facility: CLINIC | Age: 73
End: 2023-09-22
Payer: MEDICARE

## 2023-09-22 ENCOUNTER — HOSPITAL ENCOUNTER (OUTPATIENT)
Dept: NUCLEAR MEDICINE | Facility: HOSPITAL | Age: 73
Discharge: HOME | End: 2023-09-22
Payer: MEDICARE

## 2023-09-22 ENCOUNTER — TELEPHONE (OUTPATIENT)
Dept: CARDIOLOGY | Facility: CLINIC | Age: 73
End: 2023-09-22
Payer: MEDICARE

## 2023-09-22 DIAGNOSIS — R06.02 SHORTNESS OF BREATH: ICD-10-CM

## 2023-09-22 LAB
BH CV IMMEDIATE POST TECH DATA BLOOD PRESSURE: NORMAL MMHG
BH CV IMMEDIATE POST TECH DATA HEART RATE: 112 BPM
BH CV IMMEDIATE POST TECH DATA OXYGEN SATS: 98 %
BH CV REST NUCLEAR ISOTOPE DOSE: 9.5 MCI
BH CV SIX MINUTE RECOVERY TECH DATA BLOOD PRESSURE: NORMAL
BH CV SIX MINUTE RECOVERY TECH DATA HEART RATE: 102 BPM
BH CV SIX MINUTE RECOVERY TECH DATA OXYGEN SATURATION: 97 %
BH CV STRESS BP STAGE 1: NORMAL
BH CV STRESS COMMENTS STAGE 1: NORMAL
BH CV STRESS DOSE REGADENOSON STAGE 1: 0.4
BH CV STRESS DURATION MIN STAGE 1: 0
BH CV STRESS DURATION SEC STAGE 1: 10
BH CV STRESS HR STAGE 1: 103
BH CV STRESS NUCLEAR ISOTOPE DOSE: 36.5 MCI
BH CV STRESS O2 STAGE 1: 98
BH CV STRESS PROTOCOL 1: NORMAL
BH CV STRESS RECOVERY BP: NORMAL MMHG
BH CV STRESS RECOVERY HR: 102 BPM
BH CV STRESS RECOVERY O2: 97 %
BH CV STRESS STAGE 1: 1
BH CV THREE MINUTE POST TECH DATA BLOOD PRESSURE: NORMAL MMHG
BH CV THREE MINUTE POST TECH DATA HEART RATE: 104 BPM
BH CV THREE MINUTE POST TECH DATA OXYGEN SATURATION: 98 %
LV EF NUC BP: 55 %
MAXIMAL PREDICTED HEART RATE: 146 BPM
PERCENT MAX PREDICTED HR: 77.4 %
STRESS BASELINE BP: NORMAL MMHG
STRESS BASELINE HR: 86 BPM
STRESS O2 SAT REST: 97 %
STRESS PERCENT HR: 91 %
STRESS POST O2 SAT PEAK: 98 %
STRESS POST PEAK BP: NORMAL MMHG
STRESS POST PEAK HR: 113 BPM
STRESS TARGET HR: 124 BPM

## 2023-09-22 PROCEDURE — 93017 CV STRESS TEST TRACING ONLY: CPT

## 2023-09-22 PROCEDURE — A9502 TC99M TETROFOSMIN: HCPCS | Performed by: SPECIALIST

## 2023-09-22 PROCEDURE — 78452 HT MUSCLE IMAGE SPECT MULT: CPT

## 2023-09-22 PROCEDURE — 0 TECHNETIUM TETROFOSMIN KIT: Performed by: SPECIALIST

## 2023-09-22 PROCEDURE — 25010000002 REGADENOSON 0.4 MG/5ML SOLUTION: Performed by: SPECIALIST

## 2023-09-22 RX ORDER — REGADENOSON 0.08 MG/ML
0.4 INJECTION, SOLUTION INTRAVENOUS
Status: COMPLETED | OUTPATIENT
Start: 2023-09-22 | End: 2023-09-22

## 2023-09-22 RX ADMIN — TETROFOSMIN 1 DOSE: 1.38 INJECTION, POWDER, LYOPHILIZED, FOR SOLUTION INTRAVENOUS at 08:05

## 2023-09-22 RX ADMIN — REGADENOSON 0.4 MG: 0.08 INJECTION, SOLUTION INTRAVENOUS at 09:36

## 2023-09-22 RX ADMIN — TETROFOSMIN 1 DOSE: 1.38 INJECTION, POWDER, LYOPHILIZED, FOR SOLUTION INTRAVENOUS at 09:37

## 2023-09-22 NOTE — TELEPHONE ENCOUNTER
----- Message from LINETTE Ryan sent at 9/22/2023 12:59 PM EDT -----  Notify patient stress test is negative for ischemia, follow-up if symptoms persist or worsen

## 2023-09-26 ENCOUNTER — HOSPITAL ENCOUNTER (OUTPATIENT)
Dept: CT IMAGING | Facility: HOSPITAL | Age: 73
Discharge: HOME | End: 2023-09-26
Payer: MEDICARE

## 2023-09-26 VITALS
WEIGHT: 212 LBS | SYSTOLIC BLOOD PRESSURE: 147 MMHG | OXYGEN SATURATION: 96 % | BODY MASS INDEX: 40.02 KG/M2 | RESPIRATION RATE: 18 BRPM | DIASTOLIC BLOOD PRESSURE: 71 MMHG | HEART RATE: 83 BPM | HEIGHT: 61 IN

## 2023-09-26 DIAGNOSIS — C90.00 MULTIPLE MYELOMA, REMISSION STATUS UNSPECIFIED: ICD-10-CM

## 2023-09-26 LAB
ANISOCYTOSIS BLD QL: ABNORMAL
APTT PPP: 24.2 SECONDS (ref 24.2–34.2)
BASOPHILS # BLD AUTO: 0.05 10*3/MM3 (ref 0–0.2)
BASOPHILS NFR BLD AUTO: 0.6 % (ref 0–1.5)
DEPRECATED RDW RBC AUTO: 57.1 FL (ref 37–54)
EOSINOPHIL # BLD AUTO: 0.15 10*3/MM3 (ref 0–0.4)
EOSINOPHIL # BLD MANUAL: 0.16 10*3/MM3 (ref 0–0.4)
EOSINOPHIL NFR BLD AUTO: 1.9 % (ref 0.3–6.2)
EOSINOPHIL NFR BLD MANUAL: 2 % (ref 0.3–6.2)
ERYTHROCYTE [DISTWIDTH] IN BLOOD BY AUTOMATED COUNT: 15.9 % (ref 12.3–15.4)
HCT VFR BLD AUTO: 35.8 % (ref 34–46.6)
HGB BLD-MCNC: 11.6 G/DL (ref 12–15.9)
INR PPP: 1.11 (ref 0.86–1.15)
LYMPHOCYTES # BLD AUTO: 3.58 10*3/MM3 (ref 0.7–3.1)
LYMPHOCYTES # BLD MANUAL: 3.11 10*3/MM3 (ref 0.7–3.1)
LYMPHOCYTES NFR BLD AUTO: 44.9 % (ref 19.6–45.3)
LYMPHOCYTES NFR BLD MANUAL: 10 % (ref 5–12)
MACROCYTES BLD QL SMEAR: ABNORMAL
MCH RBC QN AUTO: 31.7 PG (ref 26.6–33)
MCHC RBC AUTO-ENTMCNC: 32.4 G/DL (ref 31.5–35.7)
MCV RBC AUTO: 97.8 FL (ref 79–97)
MONOCYTES # BLD AUTO: 0.67 10*3/MM3 (ref 0.1–0.9)
MONOCYTES # BLD: 0.8 10*3/MM3 (ref 0.1–0.9)
MONOCYTES NFR BLD AUTO: 8.4 % (ref 5–12)
NEUTROPHILS # BLD AUTO: 3.91 10*3/MM3 (ref 1.7–7)
NEUTROPHILS NFR BLD AUTO: 3.42 10*3/MM3 (ref 1.7–7)
NEUTROPHILS NFR BLD AUTO: 42.8 % (ref 42.7–76)
NEUTROPHILS NFR BLD MANUAL: 49 % (ref 42.7–76)
PLATELET # BLD AUTO: 263 10*3/MM3 (ref 140–450)
PMV BLD AUTO: 9.2 FL (ref 6–12)
PROTHROMBIN TIME: 14.4 SECONDS (ref 11.8–14.9)
RBC # BLD AUTO: 3.66 10*6/MM3 (ref 3.77–5.28)
SMALL PLATELETS BLD QL SMEAR: ADEQUATE
VARIANT LYMPHS NFR BLD MANUAL: 39 % (ref 19.6–45.3)
WBC MORPH BLD: NORMAL
WBC NRBC COR # BLD: 7.98 10*3/MM3 (ref 3.4–10.8)

## 2023-09-26 PROCEDURE — 77012 CT SCAN FOR NEEDLE BIOPSY: CPT

## 2023-09-26 PROCEDURE — 25010000002 FENTANYL CITRATE (PF) 50 MCG/ML SOLUTION: Performed by: RADIOLOGY

## 2023-09-26 PROCEDURE — 85025 COMPLETE CBC W/AUTO DIFF WBC: CPT | Performed by: RADIOLOGY

## 2023-09-26 PROCEDURE — 25010000002 MIDAZOLAM PER 1MG: Performed by: RADIOLOGY

## 2023-09-26 PROCEDURE — 85610 PROTHROMBIN TIME: CPT | Performed by: RADIOLOGY

## 2023-09-26 PROCEDURE — 85007 BL SMEAR W/DIFF WBC COUNT: CPT | Performed by: RADIOLOGY

## 2023-09-26 PROCEDURE — 85730 THROMBOPLASTIN TIME PARTIAL: CPT | Performed by: RADIOLOGY

## 2023-09-26 RX ORDER — MIDAZOLAM HYDROCHLORIDE 2 MG/2ML
INJECTION, SOLUTION INTRAMUSCULAR; INTRAVENOUS AS NEEDED
Status: COMPLETED | OUTPATIENT
Start: 2023-09-26 | End: 2023-09-26

## 2023-09-26 RX ORDER — FENTANYL CITRATE 50 UG/ML
INJECTION, SOLUTION INTRAMUSCULAR; INTRAVENOUS AS NEEDED
Status: COMPLETED | OUTPATIENT
Start: 2023-09-26 | End: 2023-09-26

## 2023-09-26 RX ORDER — LIDOCAINE HYDROCHLORIDE 20 MG/ML
INJECTION, SOLUTION INFILTRATION; PERINEURAL
Status: DISPENSED
Start: 2023-09-26 | End: 2023-09-26

## 2023-09-26 RX ADMIN — FENTANYL CITRATE 50 MCG: 50 INJECTION, SOLUTION INTRAMUSCULAR; INTRAVENOUS at 11:24

## 2023-09-26 RX ADMIN — FENTANYL CITRATE 50 MCG: 50 INJECTION, SOLUTION INTRAMUSCULAR; INTRAVENOUS at 11:36

## 2023-09-26 RX ADMIN — MIDAZOLAM HYDROCHLORIDE 1 MG: 1 INJECTION, SOLUTION INTRAMUSCULAR; INTRAVENOUS at 11:21

## 2023-09-26 RX ADMIN — MIDAZOLAM HYDROCHLORIDE 0.5 MG: 1 INJECTION, SOLUTION INTRAMUSCULAR; INTRAVENOUS at 11:35

## 2023-09-26 RX ADMIN — MIDAZOLAM HYDROCHLORIDE 0.5 MG: 1 INJECTION, SOLUTION INTRAMUSCULAR; INTRAVENOUS at 11:32

## 2023-09-27 LAB — Lab: NORMAL

## 2023-10-02 LAB
CYTO UR: NORMAL
DIFF PNL MAR: NORMAL
LAB AP ASPIRATE SMEAR: NORMAL
LAB AP CASE REPORT: NORMAL
LAB AP CLINICAL INFORMATION: NORMAL
LAB AP CLOT SECTION: NORMAL
LAB AP CORE BIOPSY: NORMAL
LAB AP DIAGNOSIS COMMENT: NORMAL
LAB AP SPECIAL STAINS: NORMAL
PATH REPORT.FINAL DX SPEC: NORMAL
PATH REPORT.GROSS SPEC: NORMAL

## 2023-10-04 ENCOUNTER — PREP FOR SURGERY (OUTPATIENT)
Dept: OTHER | Facility: HOSPITAL | Age: 73
End: 2023-10-04
Payer: MEDICARE

## 2023-10-04 ENCOUNTER — CLINICAL SUPPORT (OUTPATIENT)
Dept: GASTROENTEROLOGY | Facility: CLINIC | Age: 73
End: 2023-10-04
Payer: MEDICARE

## 2023-10-04 ENCOUNTER — TELEPHONE (OUTPATIENT)
Dept: GASTROENTEROLOGY | Facility: CLINIC | Age: 73
End: 2023-10-04
Payer: MEDICARE

## 2023-10-04 DIAGNOSIS — Z12.11 ENCOUNTER FOR SCREENING FOR MALIGNANT NEOPLASM OF COLON: Primary | ICD-10-CM

## 2023-10-04 DIAGNOSIS — Z86.010 HISTORY OF COLON POLYPS: ICD-10-CM

## 2023-10-04 PROBLEM — Z86.0100 HISTORY OF COLON POLYPS: Status: ACTIVE | Noted: 2023-10-04

## 2023-10-04 LAB — PLASMA CELL MYELOMA TARGETGENE PANEL RESULT: NORMAL

## 2023-10-04 RX ORDER — SODIUM PICOSULFATE, MAGNESIUM OXIDE, AND ANHYDROUS CITRIC ACID 12; 3.5; 1 G/175ML; G/175ML; MG/175ML
175 LIQUID ORAL TAKE AS DIRECTED
Qty: 350 ML | Refills: 0 | Status: SHIPPED | OUTPATIENT
Start: 2023-10-04

## 2023-10-04 NOTE — TELEPHONE ENCOUNTER
10/4/2023    Dear Dr. Banks,      Patient Name: Anca Samson  : 1950      This patient is waiting to have a Colonoscopy which I will perform at [facility] on 23. Please respond to this request noting your recommendations regarding clearance from a Cardiac  standpoint.  You may contact our office at [phone] Option 1 with any questions. I appreciate your prompt response in this matter. Please return this form to our office as soon as possible to [phone].    ____ I approve my patient from a Cardiac  standpoint    ____ I do NOT approve my patient from a Cardiac  standpoint at this time      Please specify clearance expiration date:____________________________________      Approving physician name (please print): _____________________________________________      Approving physician signature: ________________________________ Date:________________  Sincerely,  [facility] - Gastroenterology   Dr. Jarvis Borges          Please fax approval or denial to our office as soon as possible.

## 2023-10-04 NOTE — TELEPHONE ENCOUNTER
10/4/2023    Dear LINETTE Fisher,      Patient Name: Anca Samson  : 1950      This patient is waiting to have a Colonoscopy which I will perform at [facility] on 23. Please respond to this request noting your recommendations regarding clearance from a Pulmonary  standpoint.  You may contact our office at [phone] Option 1 with any questions. I appreciate your prompt response in this matter. Please return this form to our office as soon as possible to [phone].    ____ I approve my patient from a Pulmonary  standpoint    ____ I do NOT approve my patient from a Pulmonary  standpoint at this time      Please specify clearance expiration date:____________________________________      Approving physician name (please print): _____________________________________________      Approving physician signature: ________________________________ Date:________________  Sincerely,  [facility] - Gastroenterology   Dr. Jravis Borges          Please fax approval or denial to our office as soon as possible.

## 2023-10-04 NOTE — TELEPHONE ENCOUNTER
Procedure: Colonoscopy and/or EGD     Med Directive: NA     PMH: HTN, HLD, CAD     Last Seen: 8/18/23

## 2023-10-04 NOTE — PROGRESS NOTES
Anca Samson  1950  73 y.o.    Reason for call: Recall- 1 yr recall, hx colon polyps, last colon 2022- [facility]  Prep prescribed: Clenpiq  Prep instructions reviewed with patient and sent to patient via [employer]  Is the patient currently on any injectable medications for weight loss or diabetes? No  Clearance needed? Yes  If yes, what clearance is needed? Cardiology/Pulmonology  Clearance has been requested from Darren/Rajesh  The patient has been scheduled for: Colonoscopy  Family history of colon cancer? No  If yes, indicate relative: N/A  Family History   Problem Relation Age of Onset    Heart disease Mother     Lung cancer Mother     Cancer Mother     Stroke Father     Heart disease Father     Kidney cancer Father     Cancer Father     Stroke Other         UNCLE/AUNT    Colon cancer Neg Hx     Malig Hyperthermia Neg Hx      Past Medical History:   Diagnosis Date    Anxiety     Asthma     Atherosclerosis of native coronary artery of native heart with angina pectoris 08/15/2023    C. 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 [facility]    Shortness of breath     Tobacco use 07/28/2021    Tobacco use disorder 08/14/2015    Visit for screening mammogram 02/20/2020    NORMAL- REPEAT IN ONE YEAR    Vitamin D deficiency      Allergies   Allergen Reactions    Cephalexin Hives    Morphine Anaphylaxis    Penicillins Hives    Sulfa Antibiotics Hives     Past Surgical  History:   Procedure Laterality Date    APPENDECTOMY      BONE MARROW BIOPSY  2016    COLON SURGERY  2017    COLONOSCOPY  2018,2017,2019    [facility] DR LE:DIVERTICULOSIS AND ERYTHEMA OF MUCOSA    COLONOSCOPY N/A 2022    Procedure: COLONOSCOPY WITH ELEVIEW INJECTION, POLYPECTOMY, HOT SNARE, CLIP APPLICATION X3, BIOPSIES;  Surgeon: Jarvis Borges MD;  Location: [facility] ENDOSCOPY;  Service: Gastroenterology;  Laterality: N/A;  COLON POLYP, DIVERTICULOSIS, ANASTOMOSIS RIGHT COLON    ENDOSCOPY  2018    FECAL DISIMPACTION  2019    TRANSPLANT     HEMICOLECTOMY Right 2017    HYSTERECTOMY  ,    KNEE ARTHROSCOPY Left 2022    Procedure: KNEE ARTHROSCOPY WITH PARTIAL MEDIAL MENISCECTOMY,  CHONDROPLASTY;  Surgeon: Nicholas Tipton MD;  Location: [facility];  Service: Orthopedics;  Laterality: Left;    MINI-LAPAROTOMY      PORTACATH PLACEMENT N/A     pt states that her port does not work    UPPER GASTROINTESTINAL ENDOSCOPY  2018     Social History     Socioeconomic History    Marital status:    Tobacco Use    Smoking status: Former     Packs/day: 0.00     Years: 47.00     Pack years: 0.00     Types: Cigarettes     Start date: 1975     Quit date: 6/3/2022     Years since quittin.3     Passive exposure: Past    Smokeless tobacco: Never    Tobacco comments:     started age 26   Vaping Use    Vaping Use: Never used   Substance and Sexual Activity    Alcohol use: Never    Drug use: Never    Sexual activity: Defer       Current Outpatient Medications:     albuterol sulfate  (90 Base) MCG/ACT inhaler, Inhale 2 puffs Every 4 (Four) Hours As Needed for Wheezing., Disp: 18 g, Rfl: 11    colestipol (COLESTID) 1 g tablet, Take 2 tablets by mouth 2 (Two) Times a Day., Disp: 120 tablet, Rfl: 5    FLUoxetine (PROzac) 40 MG capsule, Take 1 capsule by mouth Daily., Disp: 90 capsule, Rfl: 1    Fluticasone-Umeclidin-Vilant (Trelegy Ellipta) 100-62.5-25 MCG/ACT inhaler,  Inhale 1 puff Daily., Disp: 1 each, Rfl: 3    levothyroxine (SYNTHROID, LEVOTHROID) 25 MCG tablet, Take 1 tablet by mouth Every Morning., Disp: 90 tablet, Rfl: 1    meloxicam (MOBIC) 15 MG tablet, Take 1 tablet by mouth Daily., Disp: 90 tablet, Rfl: 1    promethazine (PHENERGAN) 25 MG tablet, Take 1 tablet by mouth Every 6 (Six) Hours As Needed for Nausea or Vomiting., Disp: 30 tablet, Rfl: 1    vitamin D (ERGOCALCIFEROL) 1.25 MG (62554 UT) capsule capsule, Take 1 capsule by mouth 2 (Two) Times a Week., Disp: 26 capsule, Rfl: 1    predniSONE (DELTASONE) 20 MG tablet, Take 2 tablets by mouth Daily. (Patient not taking: Reported on 10/4/2023), Disp: 14 tablet, Rfl: 0

## 2023-10-04 NOTE — TELEPHONE ENCOUNTER
She may proceed with upcoming colonoscopy and/or EGD at moderate risk for perioperative cardiac events.

## 2023-10-09 LAB — CYTOGENETICS RESULT: NORMAL

## 2023-10-10 LAB — KARYOTYP MAR: NORMAL

## 2023-10-13 LAB — REF LAB TEST METHOD: NORMAL

## 2023-10-15 LAB — CCV RESULT: NORMAL

## 2023-10-20 DIAGNOSIS — C90.00 MULTIPLE MYELOMA NOT HAVING ACHIEVED REMISSION: Primary | ICD-10-CM

## 2023-10-23 ENCOUNTER — HOSPITAL ENCOUNTER (OUTPATIENT)
Dept: PET IMAGING | Facility: HOSPITAL | Age: 73
Discharge: HOME | End: 2023-10-23
Payer: MEDICARE

## 2023-10-23 DIAGNOSIS — C90.00 MULTIPLE MYELOMA NOT HAVING ACHIEVED REMISSION: ICD-10-CM

## 2023-10-23 PROCEDURE — 0 FLUDEOXYGLUCOSE F18 SOLUTION: Performed by: INTERNAL MEDICINE

## 2023-10-23 PROCEDURE — 78816 PET IMAGE W/CT FULL BODY: CPT

## 2023-10-23 PROCEDURE — A9552 F18 FDG: HCPCS | Performed by: INTERNAL MEDICINE

## 2023-10-23 RX ADMIN — FLUDEOXYGLUCOSE F18 1 DOSE: 300 INJECTION INTRAVENOUS at 13:15

## 2023-10-26 ENCOUNTER — DOCUMENTATION (OUTPATIENT)
Dept: PHARMACY | Facility: HOSPITAL | Age: 73
End: 2023-10-26
Payer: MEDICARE

## 2023-10-26 ENCOUNTER — SPECIALTY PHARMACY (OUTPATIENT)
Dept: PHARMACY | Facility: HOSPITAL | Age: 73
End: 2023-10-26
Payer: MEDICARE

## 2023-10-26 ENCOUNTER — PREP FOR SURGERY (OUTPATIENT)
Dept: OTHER | Facility: HOSPITAL | Age: 73
End: 2023-10-26
Payer: MEDICARE

## 2023-10-26 ENCOUNTER — OFFICE VISIT (OUTPATIENT)
Dept: ONCOLOGY | Facility: HOSPITAL | Age: 73
End: 2023-10-26
Payer: MEDICARE

## 2023-10-26 VITALS
BODY MASS INDEX: 39.89 KG/M2 | SYSTOLIC BLOOD PRESSURE: 153 MMHG | DIASTOLIC BLOOD PRESSURE: 70 MMHG | WEIGHT: 210.98 LBS | HEART RATE: 100 BPM | TEMPERATURE: 97.7 F | RESPIRATION RATE: 16 BRPM | OXYGEN SATURATION: 96 %

## 2023-10-26 DIAGNOSIS — Z95.828 PORT-A-CATH IN PLACE: ICD-10-CM

## 2023-10-26 DIAGNOSIS — C90.00 MULTIPLE MYELOMA, REMISSION STATUS UNSPECIFIED: Primary | ICD-10-CM

## 2023-10-26 PROCEDURE — G0463 HOSPITAL OUTPT CLINIC VISIT: HCPCS

## 2023-10-26 RX ORDER — ATOVAQUONE 750 MG/5ML
1500 SUSPENSION ORAL DAILY
Qty: 300 ML | Refills: 5 | Status: SHIPPED | OUTPATIENT
Start: 2023-10-26 | End: 2023-11-25

## 2023-10-26 RX ORDER — ONDANSETRON HYDROCHLORIDE 8 MG/1
8 TABLET, FILM COATED ORAL 3 TIMES DAILY PRN
Qty: 30 TABLET | Refills: 5 | Status: SHIPPED | OUTPATIENT
Start: 2023-10-26

## 2023-10-26 RX ORDER — DEXAMETHASONE 4 MG/1
TABLET ORAL
Qty: 48 TABLET | Refills: 1 | Status: SHIPPED | OUTPATIENT
Start: 2023-10-26

## 2023-10-26 RX ORDER — ERGOCALCIFEROL 1.25 MG/1
50000 CAPSULE ORAL
COMMUNITY
Start: 2023-10-10 | End: 2023-10-30

## 2023-10-26 RX ORDER — LEVOFLOXACIN 5 MG/ML
500 INJECTION, SOLUTION INTRAVENOUS ONCE
OUTPATIENT
Start: 2023-10-26 | End: 2023-10-26

## 2023-10-26 RX ORDER — ACYCLOVIR 400 MG/1
400 TABLET ORAL 2 TIMES DAILY
Qty: 60 TABLET | Refills: 11 | Status: SHIPPED | OUTPATIENT
Start: 2023-10-26

## 2023-10-26 NOTE — ASSESSMENT & PLAN NOTE
Patient now has active myeloma with 70% plasma cells on bone marrow biopsy.  Evaluated by transplant service and not felt to be a transplant candidate.  The recommendation is for daratumumab-RD consisting of daratumumab, Revlimid, dexamethasone.  Daratumumab given as injection, Revlimid and dexamethasone are oral medications.  She does have bony involvement based on her PET scan and I would add Xgeva to her regimen which is given as an injection once a month.  She will need dental clearance before beginning bone modifying agents.  Side effects, risk and benefits of the regimen discussed including injection site reactions, allergic reactions, increased risk of infection, increased risk of blood clot, liver dysfunction, kidney dysfunction, low blood counts, nausea, vomiting, diarrhea  Recommended baby aspirin daily to help mitigate the risk of thromboembolic events.  She will also be on antimicrobial prophylaxis with acyclovir for viral infections.  She reports allergy to sulfa so I will use atovaquone for PCP prophylaxis.  She has a lot of trouble with venous access and she has a nonfunctioning port.  I will refer her to general surgery for port replacement.  I will plan her initial cycle in the near future.  I will see her back for cycle 2-day 1 with lab work prior to monitor for toxicities.

## 2023-10-26 NOTE — PROGRESS NOTES
"PHARMACY C1D1 PRE VISIT ASSESSMENT    Patient will present for C1D1 of daratumumab + lenalidomide + dexamethasone    73 y.o. female  Estimated body surface area is 1.93 meters squared as calculated from the following:    Height as of 9/26/23: 154.9 cm (60.98\").    Weight as of an earlier encounter on 10/26/23: 95.7 kg (210 lb 15.7 oz).    Past Medical History:   Diagnosis Date    Anxiety     Asthma     Atherosclerosis of native coronary artery of native heart with angina pectoris 08/15/2023    C. 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 [facility]    Shortness of breath     Tobacco use 07/28/2021    Tobacco use disorder 08/14/2015    Visit for screening mammogram 02/20/2020    NORMAL- REPEAT IN ONE YEAR    Vitamin D deficiency        Oncology/Hematology History   Malignant neoplasm of ascending colon   7/21/2017 Initial Diagnosis    Colon cancer (CMS/HCC)     Multiple myeloma   7/28/2021 Initial Diagnosis    Multiple myeloma     11/2/2023 -  Chemotherapy    OP MULTIPLE MYELOMA Daratumumab / Lenalidomide / Dexamethasone       Relevant Pathology:   Final Diagnosis   1-2. Bone marrow, aspirate smears, clot section and core biopsy               - Persistent plasma cell neoplasm (70% of bone marrow cellularity)     3. Peripheral blood (CBC and smear):               - WBC:  Lymphocytosis, normal morphology               - RBC:  Macrocytic anemia with anisocytosis           "     - Platelets:  Normal platelet count and morphology      Electronically signed by Cayetano Arenas MD on 10/11/2023 at 1517  Preliminary result electronically signed by Cayetano Arenas MD on 10/2/2023 at 1229       Relevant Imaging:  10/23/23 - PET  IMPRESSION:  1. Lytic lesion left iliac bone measures about 2.1 centimeters similar in size to prior PET scan   3/7/2023.  Maximum SUV is 7.9 appears increased.  2. Mild asymmetric metabolic activity in the right proximal femur without focal lesion is   nonspecific.  3. Small 0.7 centimeter right middle lobe lung nodule demonstrates maximum SUV 1.5 similar to lung background.    4. Small focal area opacity in the medial left upper lobe.  There is also increased peripheral   opacity in the left upper lobe with mild metabolic activity maximum SUV 4. Infectious or   inflammatory process favored over neoplasm.  Recommend continued follow-up.  5. There is opacity in the posterior right upper lobe maximum SUV 4. Findings could reflect   pneumonia.  Recommend continued follow-up.  Emphysematous changes are seen. Correlate for symptoms.  DOMO GHOSH MD         Electronically Signed and Approved By: DOMO GHOSH MD on 10/24/2023 at 10:15      Current treatment regimen has insurance authorization approval? N/A - Medicare    Medication treatment plan entered is appropriate per NCCN Guidelines    Pharmacy Follow-up Considerations:   Patient not a transplant candidate per MD note. Will initiate treatment with daratumumab + lenalidomide + dexamethasone. Patient with bone involvement on PET so MD to add xgeva for bone support. Will continue to follow labs while on treatment and will  patient on presentation for cycle 1 day 1 infusion.

## 2023-10-26 NOTE — PROGRESS NOTES
Chief Complaint  Colon Cancer    Rena Guerra,*  Rena Guerra, PA    Subjective          Anca Samson presents to [facility] for discussion of treatment options for recently progressed myeloma.  She was evaluated by Dr. Mckeon, bone marrow transplant service at [facility].  Not felt to be a transplant candidate.  Their recommendation was for daratumumab-RD.  Patient has chronic oxygen dependence but denies any changes to her breathing.  She does get winded easily with activity including things like showering.  She reports adequate appetite.    She has poor dentition and has not seen a dentist for some time.  She has a Port-A-Cath but has been working to get blood.    Oncology/Hematology History Overview Note   Smoldering Myeloma    Initially diagnosed in 2016 with bone marrow biopsy demonstrating 30% CD56 positive monoclonal plasma cell population.  45 XX normal female chromosome pattern  FISH panel negative for myeloma associated mutations including 1q21, 9q34,11q13,14q32,15q24, 17q13  No anemia, renal insufficiency, hypercalcemia.  On observation since that time    Low grade adenocarcinoma of ascending colon      5/16/2017 right hemicolectomy which  revealed a low-grade 7.6 cm adenocarcinoma of the cecum. All margins were  negative. Lymphovascular invasion and perineural invasion were not identified.     26 lymph nodes were removed and unfortunately 1 was involved with metastatic deposit. pT3 pN1a colorectal cancer.        Clinical Staging      Smoldering Myeloma; Colon ([id number])            Treatments      Chemotherapy      11/2017 completed 6mo adjuvant Xeloda        6/2022 Stopped Smoking     Malignant neoplasm of ascending colon   7/21/2017 Initial Diagnosis    Colon cancer (CMS/HCC)     Multiple myeloma   7/28/2021 Initial Diagnosis    Multiple myeloma     11/2/2023 -  Chemotherapy    OP MULTIPLE MYELOMA Daratumumab / Lenalidomide  / Dexamethasone         Review of Systems   Constitutional:  Positive for fatigue. Negative for appetite change, diaphoresis, fever, unexpected weight gain and unexpected weight loss.   HENT:  Negative for hearing loss, mouth sores, sore throat, swollen glands, trouble swallowing and voice change.    Eyes:  Negative for blurred vision.   Respiratory:  Positive for shortness of breath. Negative for cough and wheezing.    Cardiovascular:  Negative for chest pain and palpitations.   Gastrointestinal:  Negative for abdominal pain, blood in stool, constipation, diarrhea, nausea and vomiting.   Endocrine: Negative for cold intolerance and heat intolerance.   Genitourinary:  Negative for difficulty urinating, dysuria, frequency, hematuria and urinary incontinence.   Musculoskeletal:  Negative for arthralgias, back pain and myalgias.   Skin:  Negative for rash, skin lesions and wound.   Neurological:  Positive for weakness. Negative for dizziness, seizures, numbness and headache.   Hematological:  Does not bruise/bleed easily.   Psychiatric/Behavioral:  Negative for depressed mood. The patient is not nervous/anxious.      Current Outpatient Medications on File Prior to Visit   Medication Sig Dispense Refill    albuterol sulfate  (90 Base) MCG/ACT inhaler Inhale 2 puffs Every 4 (Four) Hours As Needed for Wheezing. 18 g 11    colestipol (COLESTID) 1 g tablet Take 2 tablets by mouth 2 (Two) Times a Day. 120 tablet 5    ergocalciferol (ERGOCALCIFEROL) 1.25 MG (90481 UT) capsule 50,000 Int'l Units.      FLUoxetine (PROzac) 40 MG capsule Take 1 capsule by mouth Daily. 90 capsule 1    Fluticasone-Umeclidin-Vilant (Trelegy Ellipta) 100-62.5-25 MCG/ACT inhaler Inhale 1 puff Daily. 1 each 3    levothyroxine (SYNTHROID, LEVOTHROID) 25 MCG tablet Take 1 tablet by mouth Every Morning. 90 tablet 1    meloxicam (MOBIC) 15 MG tablet Take 1 tablet by mouth Daily. 90 tablet 1    promethazine (PHENERGAN) 25 MG tablet Take 1 tablet by  mouth Every 6 (Six) Hours As Needed for Nausea or Vomiting. 30 tablet 1    Sod Picosulfate-Mag Ox-Cit Acd (Clenpiq) 10-3.5-12 MG-GM -GM/175ML solution Take 175 mL by mouth Take As Directed. 350 mL 0    vitamin D (ERGOCALCIFEROL) 1.25 MG (97188 UT) capsule capsule Take 1 capsule by mouth 2 (Two) Times a Week. 26 capsule 1    predniSONE (DELTASONE) 20 MG tablet Take 2 tablets by mouth Daily. (Patient not taking: Reported on 10/4/2023) 14 tablet 0     No current facility-administered medications on file prior to visit.       Allergies   Allergen Reactions    Cephalexin Hives    Morphine Anaphylaxis    Penicillins Hives    Sulfa Antibiotics Hives     Past Medical History:   Diagnosis Date    Anxiety     Asthma     Atherosclerosis of native coronary artery of native heart with angina pectoris 08/15/2023    C. 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 [facility]    Shortness of breath     Tobacco use 07/28/2021    Tobacco use disorder 08/14/2015    Visit for screening mammogram 02/20/2020    NORMAL- REPEAT IN ONE YEAR    Vitamin D deficiency      Past Surgical History:   Procedure Laterality Date    APPENDECTOMY      BONE MARROW BIOPSY  2016    COLON SURGERY  2017    COLONOSCOPY  2018,2017,2019    [facility]- DR LE:DIVERTICULOSIS AND ERYTHEMA OF MUCOSA    COLONOSCOPY N/A 12/20/2022    Procedure: COLONOSCOPY WITH ELEVIEW INJECTION, POLYPECTOMY, HOT SNARE, CLIP  APPLICATION X3, BIOPSIES;  Surgeon: Jarvis Borgse MD;  Location: [facility] ENDOSCOPY;  Service: Gastroenterology;  Laterality: N/A;  COLON POLYP, DIVERTICULOSIS, ANASTOMOSIS RIGHT COLON    ENDOSCOPY  2018    FECAL DISIMPACTION  2019    TRANSPLANT     HEMICOLECTOMY Right 2017    HYSTERECTOMY  1982,1984    KNEE ARTHROSCOPY Left 2022    Procedure: KNEE ARTHROSCOPY WITH PARTIAL MEDIAL MENISCECTOMY,  CHONDROPLASTY;  Surgeon: Nicholas Tipton MD;  Location: [facility] OR [facility];  Service: Orthopedics;  Laterality: Left;    MINI-LAPAROTOMY  2017    PORTACATH PLACEMENT N/A     pt states that her port does not work    UPPER GASTROINTESTINAL ENDOSCOPY  2018     Social History     Socioeconomic History    Marital status:    Tobacco Use    Smoking status: Former     Packs/day: 0.00     Years: 47.00     Additional pack years: 0.00     Total pack years: 0.00     Types: Cigarettes     Start date: 1975     Quit date: 6/3/2022     Years since quittin.3     Passive exposure: Past    Smokeless tobacco: Never    Tobacco comments:     started age 26   Vaping Use    Vaping Use: Never used   Substance and Sexual Activity    Alcohol use: Never    Drug use: Never    Sexual activity: Defer     Family History   Problem Relation Age of Onset    Heart disease Mother     Lung cancer Mother     Cancer Mother     Stroke Father     Heart disease Father     Kidney cancer Father     Cancer Father     Stroke Other         UNCLE/AUNT    Colon cancer Neg Hx     Malig Hyperthermia Neg Hx        Objective   Physical Exam  Vitals reviewed. Exam conducted with a chaperone present.   Constitutional:       General: She is not in acute distress.     Appearance: Normal appearance.   HENT:      Nose:      Comments: Nasal cannula in place  Cardiovascular:      Rate and Rhythm: Normal rate and regular rhythm.      Heart sounds: Normal heart sounds. No murmur heard.     No gallop.   Pulmonary:      Effort: Pulmonary effort is normal.  "     Breath sounds: Normal breath sounds.   Abdominal:      General: Abdomen is flat. Bowel sounds are normal.      Palpations: Abdomen is soft.   Neurological:      Mental Status: She is alert and oriented to person, place, and time.   Psychiatric:         Mood and Affect: Mood normal.         Behavior: Behavior normal.         Vitals:    10/26/23 0855   BP: 153/70   Pulse: 100   Resp: 16   Temp: 97.7 °F (36.5 °C)   TempSrc: Temporal   SpO2: 96%  Comment: 2 l   Weight: 95.7 kg (210 lb 15.7 oz)   PainSc: 0-No pain     ECOG score: 1         PHQ-9 Total Score:                    Result Review :   The following data was reviewed by: West Nicolas MD on 10/26/2023:  Lab Results   Component Value Date    HGB 11.6 (L) 09/26/2023    HCT 35.8 09/26/2023    MCV 97.8 (H) 09/26/2023     09/26/2023    WBC 7.98 09/26/2023    NEUTROABS 3.42 09/26/2023    NEUTROABS 3.91 09/26/2023    LYMPHSABS 3.58 (H) 09/26/2023    MONOSABS 0.67 09/26/2023    EOSABS 0.15 09/26/2023    EOSABS 0.16 09/26/2023    BASOSABS 0.05 09/26/2023     Lab Results   Component Value Date    GLUCOSE 143 (H) 09/01/2023    BUN 14 09/01/2023    CREATININE 1.06 (H) 09/01/2023     (L) 09/01/2023    K 4.1 09/01/2023     09/01/2023    CO2 16.3 (L) 09/01/2023    CALCIUM 9.0 09/01/2023    PROTEINTOT 9.7 (H) 09/01/2023    ALBUMIN 3.5 09/01/2023    ALBUMIN 3.4 09/01/2023    BILITOT 0.4 09/01/2023    ALKPHOS 56 09/01/2023    AST 18 09/01/2023    ALT 10 09/01/2023     Lab Results   Component Value Date    FREET4 1.20 05/25/2023    TSH 3.700 05/25/2023     No results found for: \"IRON\", \"LABIRON\", \"TRANSFERRIN\", \"TIBC\"  Lab Results   Component Value Date     03/07/2023    ILVUXMTY31 218 01/28/2020    FOLATE 17.7 01/28/2020     No results found for: \"PSA\", \"CEA\", \"AFP\", \"\", \"\"          Assessment and Plan    Diagnoses and all orders for this visit:    1. Multiple myeloma, remission status unspecified (Primary)  Assessment & Plan:  Patient " now has active myeloma with 70% plasma cells on bone marrow biopsy.  Evaluated by transplant service and not felt to be a transplant candidate.  The recommendation is for daratumumab-RD consisting of daratumumab, Revlimid, dexamethasone.  Daratumumab given as injection, Revlimid and dexamethasone are oral medications.  She does have bony involvement based on her PET scan and I would add Xgeva to her regimen which is given as an injection once a month.  She will need dental clearance before beginning bone modifying agents.  Side effects, risk and benefits of the regimen discussed including injection site reactions, allergic reactions, increased risk of infection, increased risk of blood clot, liver dysfunction, kidney dysfunction, low blood counts, nausea, vomiting, diarrhea  Recommended baby aspirin daily to help mitigate the risk of thromboembolic events.  She will also be on antimicrobial prophylaxis with acyclovir for viral infections.  She reports allergy to sulfa so I will use atovaquone for PCP prophylaxis.  She has a lot of trouble with venous access and she has a nonfunctioning port.  I will refer her to general surgery for port replacement.  I will plan her initial cycle in the near future.  I will see her back for cycle 2-day 1 with lab work prior to monitor for toxicities.    Orders:  -     Daratumumab Type & Screen; Future  -     Lab Appointment Request; Future  -     Ambulatory Referral to General Surgery  -     dexAMETHasone (DECADRON) 4 MG tablet; Take 10 tablets on D 1,8,15,22 and take 1 tablet on D 2,3,9,10,16,17,23,24.  Take in the morning with food.  Dispense: 48 tablet; Refill: 1  -     acyclovir (ZOVIRAX) 400 MG tablet; Take 1 tablet by mouth 2 (Two) Times a Day. Take one tablet by mouth twice daily.  Dispense: 60 tablet; Refill: 11  -     ondansetron (ZOFRAN) 8 MG tablet; Take 1 tablet by mouth 3 (Three) Times a Day As Needed for Nausea or Vomiting.  Dispense: 30 tablet; Refill: 5  -     CBC  and Differential; Future  -     Comprehensive metabolic panel; Future  -     Thyroid Panel With TSH; Future    Other orders  -     atovaquone (MEPRON) 750 MG/5ML suspension; Take 10 mL by mouth Daily for 30 days.  Dispense: 300 mL; Refill: 5            Patient Follow Up: Cycle 2-day 1    Patient was given instructions and counseling regarding her condition or for health maintenance advice. Please see specific information pulled into the AVS if appropriate.     West Nicolas MD    10/26/2023

## 2023-10-27 ENCOUNTER — PREP FOR SURGERY (OUTPATIENT)
Dept: OTHER | Facility: HOSPITAL | Age: 73
End: 2023-10-27
Payer: MEDICARE

## 2023-10-27 ENCOUNTER — LAB (OUTPATIENT)
Dept: LAB | Facility: HOSPITAL | Age: 73
End: 2023-10-27
Payer: MEDICARE

## 2023-10-27 ENCOUNTER — OFFICE VISIT (OUTPATIENT)
Dept: SURGERY | Facility: CLINIC | Age: 73
End: 2023-10-27
Payer: MEDICARE

## 2023-10-27 VITALS — WEIGHT: 211.64 LBS | RESPIRATION RATE: 16 BRPM | HEIGHT: 61 IN | BODY MASS INDEX: 39.96 KG/M2

## 2023-10-27 DIAGNOSIS — C90.00 MULTIPLE MYELOMA, REMISSION STATUS UNSPECIFIED: ICD-10-CM

## 2023-10-27 DIAGNOSIS — Z95.828 PORT-A-CATH IN PLACE: Primary | ICD-10-CM

## 2023-10-27 DIAGNOSIS — C90.00 MULTIPLE MYELOMA NOT HAVING ACHIEVED REMISSION: ICD-10-CM

## 2023-10-27 LAB
ABO GROUP BLD: NORMAL
ALBUMIN SERPL-MCNC: 3.3 G/DL (ref 3.5–5.2)
ALBUMIN/GLOB SERPL: 0.5 G/DL
ALP SERPL-CCNC: 44 U/L (ref 39–117)
ALT SERPL W P-5'-P-CCNC: 14 U/L (ref 1–33)
ANION GAP SERPL CALCULATED.3IONS-SCNC: 11.2 MMOL/L (ref 5–15)
AST SERPL-CCNC: 21 U/L (ref 1–32)
BASOPHILS # BLD AUTO: 0.03 10*3/MM3 (ref 0–0.2)
BASOPHILS NFR BLD AUTO: 0.4 % (ref 0–1.5)
BILIRUB SERPL-MCNC: 0.3 MG/DL (ref 0–1.2)
BLD GP AB SCN SERPL QL: NEGATIVE
BUN SERPL-MCNC: 12 MG/DL (ref 8–23)
BUN/CREAT SERPL: 16 (ref 7–25)
CALCIUM SPEC-SCNC: 8.6 MG/DL (ref 8.6–10.5)
CHLORIDE SERPL-SCNC: 107 MMOL/L (ref 98–107)
CO2 SERPL-SCNC: 17.8 MMOL/L (ref 22–29)
CREAT SERPL-MCNC: 0.75 MG/DL (ref 0.57–1)
DEPRECATED RDW RBC AUTO: 61.7 FL (ref 37–54)
EGFRCR SERPLBLD CKD-EPI 2021: 83.7 ML/MIN/1.73
EOSINOPHIL # BLD AUTO: 0.09 10*3/MM3 (ref 0–0.4)
EOSINOPHIL NFR BLD AUTO: 1.3 % (ref 0.3–6.2)
ERYTHROCYTE [DISTWIDTH] IN BLOOD BY AUTOMATED COUNT: 16.8 % (ref 12.3–15.4)
GLOBULIN UR ELPH-MCNC: 6.3 GM/DL
GLUCOSE SERPL-MCNC: 118 MG/DL (ref 65–99)
HCT VFR BLD AUTO: 35.7 % (ref 34–46.6)
HGB BLD-MCNC: 11.3 G/DL (ref 12–15.9)
IMM GRANULOCYTES # BLD AUTO: 0.07 10*3/MM3 (ref 0–0.05)
IMM GRANULOCYTES NFR BLD AUTO: 1 % (ref 0–0.5)
LYMPHOCYTES # BLD AUTO: 3.32 10*3/MM3 (ref 0.7–3.1)
LYMPHOCYTES NFR BLD AUTO: 47.6 % (ref 19.6–45.3)
MCH RBC QN AUTO: 31.9 PG (ref 26.6–33)
MCHC RBC AUTO-ENTMCNC: 31.7 G/DL (ref 31.5–35.7)
MCV RBC AUTO: 100.8 FL (ref 79–97)
MONOCYTES # BLD AUTO: 0.7 10*3/MM3 (ref 0.1–0.9)
MONOCYTES NFR BLD AUTO: 10 % (ref 5–12)
NEUTROPHILS NFR BLD AUTO: 2.76 10*3/MM3 (ref 1.7–7)
NEUTROPHILS NFR BLD AUTO: 39.7 % (ref 42.7–76)
NRBC BLD AUTO-RTO: 0.4 /100 WBC (ref 0–0.2)
PLATELET # BLD AUTO: 270 10*3/MM3 (ref 140–450)
PMV BLD AUTO: 9.1 FL (ref 6–12)
POTASSIUM SERPL-SCNC: 3.2 MMOL/L (ref 3.5–5.2)
PROT SERPL-MCNC: 9.6 G/DL (ref 6–8.5)
RBC # BLD AUTO: 3.54 10*6/MM3 (ref 3.77–5.28)
RH BLD: NEGATIVE
SODIUM SERPL-SCNC: 136 MMOL/L (ref 136–145)
T&S EXPIRATION DATE: NORMAL
T-UPTAKE NFR SERPL: 1.04 TBI (ref 0.8–1.3)
T4 SERPL-MCNC: 7.53 MCG/DL (ref 4.5–11.7)
TSH SERPL DL<=0.05 MIU/L-ACNC: 1.75 UIU/ML (ref 0.27–4.2)
WBC NRBC COR # BLD: 6.97 10*3/MM3 (ref 3.4–10.8)

## 2023-10-27 PROCEDURE — 36415 COLL VENOUS BLD VENIPUNCTURE: CPT

## 2023-10-27 PROCEDURE — 84479 ASSAY OF THYROID (T3 OR T4): CPT

## 2023-10-27 PROCEDURE — 86850 RBC ANTIBODY SCREEN: CPT

## 2023-10-27 PROCEDURE — 86900 BLOOD TYPING SEROLOGIC ABO: CPT

## 2023-10-27 PROCEDURE — 85025 COMPLETE CBC W/AUTO DIFF WBC: CPT

## 2023-10-27 PROCEDURE — 80053 COMPREHEN METABOLIC PANEL: CPT

## 2023-10-27 PROCEDURE — 84443 ASSAY THYROID STIM HORMONE: CPT

## 2023-10-27 PROCEDURE — 84436 ASSAY OF TOTAL THYROXINE: CPT

## 2023-10-27 PROCEDURE — 1159F MED LIST DOCD IN RCRD: CPT | Performed by: SURGERY

## 2023-10-27 PROCEDURE — 1160F RVW MEDS BY RX/DR IN RCRD: CPT | Performed by: SURGERY

## 2023-10-27 PROCEDURE — 99202 OFFICE O/P NEW SF 15 MIN: CPT | Performed by: SURGERY

## 2023-10-27 PROCEDURE — 86901 BLOOD TYPING SEROLOGIC RH(D): CPT

## 2023-10-27 RX ORDER — LEVOFLOXACIN 5 MG/ML
500 INJECTION, SOLUTION INTRAVENOUS ONCE
OUTPATIENT
Start: 2023-10-27 | End: 2023-10-27

## 2023-10-27 NOTE — PROGRESS NOTES
Chief Complaint:  Port     Primary Care Provider: Rena Guerra PA    Referring Provider: West Nicolas MD    History of Present Illness  Anca Samson is a 73 y.o. female referred by West Nicolas MD a portacatheter placed.  The patient has myeloma that has progressed.  He has a portacatheter but it has not been working for blood draws.  Patient would like to have the port removed and a new one placed so that it can be used to receive medications but also for blood draws.  Patient says the port was placed in 2017 and quit working for blood draws shortly thereafter.    Allergies: Cephalexin, Morphine, Penicillins, and Sulfa antibiotics    Outpatient Medications Marked as Taking for the 10/27/23 encounter (Office Visit) with Alcon Delgado MD   Medication Sig Dispense Refill    acyclovir (ZOVIRAX) 400 MG tablet Take 1 tablet by mouth 2 (Two) Times a Day. Take one tablet by mouth twice daily. 60 tablet 11    albuterol sulfate  (90 Base) MCG/ACT inhaler Inhale 2 puffs Every 4 (Four) Hours As Needed for Wheezing. 18 g 11    atovaquone (MEPRON) 750 MG/5ML suspension Take 10 mL by mouth Daily for 30 days. 300 mL 5    colestipol (COLESTID) 1 g tablet Take 2 tablets by mouth 2 (Two) Times a Day. 120 tablet 5    dexAMETHasone (DECADRON) 4 MG tablet Take 10 tablets on D 1,8,15,22 and take 1 tablet on D 2,3,9,10,16,17,23,24.  Take in the morning with food. 48 tablet 1    ergocalciferol (ERGOCALCIFEROL) 1.25 MG (19043 UT) capsule 50,000 Int'l Units.      FLUoxetine (PROzac) 40 MG capsule Take 1 capsule by mouth Daily. 90 capsule 1    Fluticasone-Umeclidin-Vilant (Trelegy Ellipta) 100-62.5-25 MCG/ACT inhaler Inhale 1 puff Daily. 1 each 3    levothyroxine (SYNTHROID, LEVOTHROID) 25 MCG tablet Take 1 tablet by mouth Every Morning. 90 tablet 1    meloxicam (MOBIC) 15 MG tablet Take 1 tablet by mouth Daily. 90 tablet 1    ondansetron (ZOFRAN) 8 MG tablet Take 1 tablet by mouth 3 (Three) Times a Day As  Needed for Nausea or Vomiting. 30 tablet 5    predniSONE (DELTASONE) 20 MG tablet Take 2 tablets by mouth Daily. 14 tablet 0    promethazine (PHENERGAN) 25 MG tablet Take 1 tablet by mouth Every 6 (Six) Hours As Needed for Nausea or Vomiting. 30 tablet 1    Sod Picosulfate-Mag Ox-Cit Acd (Clenpiq) 10-3.5-12 MG-GM -GM/175ML solution Take 175 mL by mouth Take As Directed. 350 mL 0    vitamin D (ERGOCALCIFEROL) 1.25 MG (01976 UT) capsule capsule Take 1 capsule by mouth 2 (Two) Times a Week. 26 capsule 1       Past Medical History:    Anxiety    Asthma    Atherosclerosis of native coronary artery of native heart with angina pectoris    C. difficile diarrhea    Colon cancer    Colon polyp    COPD (chronic obstructive pulmonary disease)    Depression    Diverticulitis    Dysphagia    Heartburn    Heartburn    Heartburn    Heartburn    Hernia    History of chemotherapy    Hx of psychiatric care    Hyperlipidemia    Impaired fasting glucose    Lumbago    Lung nodule    Major depressive disorder    Malignant neoplasm of ascending colon    Melena    Multiple myeloma    Nicotine dependence    Seizures    pseudo seizures, 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 [facility]    Shortness of breath    Tobacco use    Tobacco use disorder    Visit for screening mammogram    NORMAL- REPEAT IN ONE YEAR    Vitamin D deficiency        Past Surgical History:    APPENDECTOMY    BONE MARROW BIOPSY    COLON SURGERY    COLONOSCOPY    [facility]- DR LE:DIVERTICULOSIS AND ERYTHEMA OF MUCOSA    COLONOSCOPY    Procedure: COLONOSCOPY WITH ELEVIEW INJECTION, POLYPECTOMY, HOT SNARE, CLIP APPLICATION X3, BIOPSIES;  Surgeon: Jarvis Borges MD;  Location: [facility] ENDOSCOPY;  Service: Gastroenterology;  Laterality: N/A;  COLON POLYP, DIVERTICULOSIS, ANASTOMOSIS RIGHT COLON    ENDOSCOPY    FECAL DISIMPACTION    TRANSPLANT     HEMICOLECTOMY    HYSTERECTOMY    KNEE ARTHROSCOPY    Procedure: KNEE ARTHROSCOPY WITH PARTIAL MEDIAL MENISCECTOMY,  CHONDROPLASTY;   "Surgeon: Nicholas Tipton MD;  [facility];  Service: Orthopedics;  Laterality: Left;    MINI-LAPAROTOMY    PORTACATH PLACEMENT    pt states that her port does not work    UPPER GASTROINTESTINAL ENDOSCOPY       Family History:   Family History   Problem Relation Age of Onset    Heart disease Mother     Lung cancer Mother     Cancer Mother     Stroke Father     Heart disease Father     Kidney cancer Father     Cancer Father     Stroke Other         UNCLE/AUNT    Colon cancer Neg Hx     Malig Hyperthermia Neg Hx         Social History:  Social History     Tobacco Use    Smoking status: Former     Packs/day: 0.00     Years: 47.00     Additional pack years: 0.00     Total pack years: 0.00     Types: Cigarettes     Start date: 1975     Quit date: 6/3/2022     Years since quittin.4     Passive exposure: Past    Smokeless tobacco: Never    Tobacco comments:     started age 26   Substance Use Topics    Alcohol use: Never       Objective     Vital Signs:  Resp 16   Ht 154.9 cm (60.98\")   Wt 96 kg (211 lb 10.3 oz)   BMI 40.01 kg/m²   Respiratory:  breathing not labored, respiratory effort appears normal, using nasal canula oxygen  Cardiovascular:  heart regular rate  Musculoskeletal: moving all extremities symmetrically and purposefully  Neurologic:  no obvious motor or sensory deficits, speech clear  Psychiatric:  judgment and insight intact      Assessment:  1. Port-A-Cath in place, not functioning properly  2. Multiple myeloma    Plan:  Port-a-catheter removal  Port-a-catheter placement    Discussion: Indications, options, risks, benefits, and expected outcomes of planned surgery were discussed with the patient and she agrees to proceed.    Alcon Delgado MD  10/27/2023    Electronically signed by Alcon Delgado MD, 10/27/23, 2:00 PM EDT.        "

## 2023-10-27 NOTE — H&P (VIEW-ONLY)
Chief Complaint:  Port     Primary Care Provider: Rena Guerra PA    Referring Provider: West Nicolas MD    History of Present Illness  Anca Samson is a 73 y.o. female referred by West Nicolas MD a portacatheter placed.  The patient has myeloma that has progressed.  He has a portacatheter but it has not been working for blood draws.  Patient would like to have the port removed and a new one placed so that it can be used to receive medications but also for blood draws.  Patient says the port was placed in 2017 and quit working for blood draws shortly thereafter.    Allergies: Cephalexin, Morphine, Penicillins, and Sulfa antibiotics    Outpatient Medications Marked as Taking for the 10/27/23 encounter (Office Visit) with Alcon Delgado MD   Medication Sig Dispense Refill    acyclovir (ZOVIRAX) 400 MG tablet Take 1 tablet by mouth 2 (Two) Times a Day. Take one tablet by mouth twice daily. 60 tablet 11    albuterol sulfate  (90 Base) MCG/ACT inhaler Inhale 2 puffs Every 4 (Four) Hours As Needed for Wheezing. 18 g 11    atovaquone (MEPRON) 750 MG/5ML suspension Take 10 mL by mouth Daily for 30 days. 300 mL 5    colestipol (COLESTID) 1 g tablet Take 2 tablets by mouth 2 (Two) Times a Day. 120 tablet 5    dexAMETHasone (DECADRON) 4 MG tablet Take 10 tablets on D 1,8,15,22 and take 1 tablet on D 2,3,9,10,16,17,23,24.  Take in the morning with food. 48 tablet 1    ergocalciferol (ERGOCALCIFEROL) 1.25 MG (05596 UT) capsule 50,000 Int'l Units.      FLUoxetine (PROzac) 40 MG capsule Take 1 capsule by mouth Daily. 90 capsule 1    Fluticasone-Umeclidin-Vilant (Trelegy Ellipta) 100-62.5-25 MCG/ACT inhaler Inhale 1 puff Daily. 1 each 3    levothyroxine (SYNTHROID, LEVOTHROID) 25 MCG tablet Take 1 tablet by mouth Every Morning. 90 tablet 1    meloxicam (MOBIC) 15 MG tablet Take 1 tablet by mouth Daily. 90 tablet 1    ondansetron (ZOFRAN) 8 MG tablet Take 1 tablet by mouth 3 (Three) Times a Day As  Needed for Nausea or Vomiting. 30 tablet 5    predniSONE (DELTASONE) 20 MG tablet Take 2 tablets by mouth Daily. 14 tablet 0    promethazine (PHENERGAN) 25 MG tablet Take 1 tablet by mouth Every 6 (Six) Hours As Needed for Nausea or Vomiting. 30 tablet 1    Sod Picosulfate-Mag Ox-Cit Acd (Clenpiq) 10-3.5-12 MG-GM -GM/175ML solution Take 175 mL by mouth Take As Directed. 350 mL 0    vitamin D (ERGOCALCIFEROL) 1.25 MG (58112 UT) capsule capsule Take 1 capsule by mouth 2 (Two) Times a Week. 26 capsule 1       Past Medical History:    Anxiety    Asthma    Atherosclerosis of native coronary artery of native heart with angina pectoris    C. difficile diarrhea    Colon cancer    Colon polyp    COPD (chronic obstructive pulmonary disease)    Depression    Diverticulitis    Dysphagia    Heartburn    Heartburn    Heartburn    Heartburn    Hernia    History of chemotherapy    Hx of psychiatric care    Hyperlipidemia    Impaired fasting glucose    Lumbago    Lung nodule    Major depressive disorder    Malignant neoplasm of ascending colon    Melena    Multiple myeloma    Nicotine dependence    Seizures    pseudo seizures, 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 [facility]    Shortness of breath    Tobacco use    Tobacco use disorder    Visit for screening mammogram    NORMAL- REPEAT IN ONE YEAR    Vitamin D deficiency        Past Surgical History:    APPENDECTOMY    BONE MARROW BIOPSY    COLON SURGERY    COLONOSCOPY    [facility]- DR LE:DIVERTICULOSIS AND ERYTHEMA OF MUCOSA    COLONOSCOPY    Procedure: COLONOSCOPY WITH ELEVIEW INJECTION, POLYPECTOMY, HOT SNARE, CLIP APPLICATION X3, BIOPSIES;  Surgeon: Jarvis Borges MD;  Location: [facility] ENDOSCOPY;  Service: Gastroenterology;  Laterality: N/A;  COLON POLYP, DIVERTICULOSIS, ANASTOMOSIS RIGHT COLON    ENDOSCOPY    FECAL DISIMPACTION    TRANSPLANT     HEMICOLECTOMY    HYSTERECTOMY    KNEE ARTHROSCOPY    Procedure: KNEE ARTHROSCOPY WITH PARTIAL MEDIAL MENISCECTOMY,  CHONDROPLASTY;   "Surgeon: Nicholas Tipton MD;  Location[facility] [place] [facility];  Service: Orthopedics;  Laterality: Left;    MINI-LAPAROTOMY    PORTACATH PLACEMENT    pt states that her port does not work    UPPER GASTROINTESTINAL ENDOSCOPY       Family History:   Family History   Problem Relation Age of Onset    Heart disease Mother     Lung cancer Mother     Cancer Mother     Stroke Father     Heart disease Father     Kidney cancer Father     Cancer Father     Stroke Other         UNCLE/AUNT    Colon cancer Neg Hx     Malig Hyperthermia Neg Hx         Social History:  Social History     Tobacco Use    Smoking status: Former     Packs/day: 0.00     Years: 47.00     Additional pack years: 0.00     Total pack years: 0.00     Types: Cigarettes     Start date: 1975     Quit date: 6/3/2022     Years since quittin.4     Passive exposure: Past    Smokeless tobacco: Never    Tobacco comments:     started age 26   Substance Use Topics    Alcohol use: Never       Objective     Vital Signs:  Resp 16   Ht 154.9 cm (60.98\")   Wt 96 kg (211 lb 10.3 oz)   BMI 40.01 kg/m²   Respiratory:  breathing not labored, respiratory effort appears normal, using nasal canula oxygen  Cardiovascular:  heart regular rate  Musculoskeletal: moving all extremities symmetrically and purposefully  Neurologic:  no obvious motor or sensory deficits, speech clear  Psychiatric:  judgment and insight intact      Assessment:  1. Port-A-Cath in place, not functioning properly  2. Multiple myeloma    Plan:  Port-a-catheter removal  Port-a-catheter placement    Discussion: Indications, options, risks, benefits, and expected outcomes of planned surgery were discussed with the patient and she agrees to proceed.    Alcon Delgado MD  10/27/2023    Electronically signed by Alcon Delgado MD, 10/27/23, 2:00 PM EDT.        "

## 2023-10-29 ENCOUNTER — TELEPHONE (OUTPATIENT)
Dept: PULMONOLOGY | Facility: CLINIC | Age: 73
End: 2023-10-29
Payer: MEDICARE

## 2023-10-29 NOTE — PROGRESS NOTES
Primary Care Provider  Rena Guerra PA     Referring Provider  No ref. provider found     Chief Complaint  Shortness of Breath, COPD, Sleep Apnea (C pap aerocare ), and Follow-up    Subjective          History of Presenting Illness  Patient is a 73-year-old female who presents for management of dyspnea who presents for follow-up visit today. Last office visit patient had an alpha-1 antitrypsin level and genotype drawn.  Alpha-1 came back with a normal genotype of M/M with a level of 231.  Patient had a proBNP and an IgE level drawn on 9/1/2023.  IgE level came back normal at 9. ProBNP came back normal.  Since last office visit patient had a chest CT scan and a pulmonary function test completed on 8/25/2023.  Chest CT scan report states moderate upper lobe predominant centrilobular emphysema without active airspace process.  Lung nodules are without significant change since 2021 imaging suggesting benign process.  Recommend clinical evaluation for possible enrollment into the low-dose chest CT lung screening program.  No suspicious bone lesion in patient with history of multiple myeloma.  Pulmonary function test report states mild obstruction without significant response to bronchodilator with evidence of air trapping and reduction in DLCO consistent with COPD/emphysema.   Patient had an echocardiogram completed on 9/6/2023.  Report states normal left ventricular systolic function.  No significant valve abnormalities.  Patient states that she does get short of breath that is worse with exertion, moderate severity, and improved with rest.  Patient states that she is taking Brovana, Pulmicort, and Yupelri every day as prescribed use albuterol inhaler as needed.  Patient states that she is getting ready to start chemotherapy tomorrow for multiple myeloma and is under the care of Dr. Nicolas.  Patient is on 2 L of oxygen per minute via nasal cannula continuously. Patient had a sleep study completed on  8/23/2023.  Report states severe obstructive sleep apnea recommending a trial of auto CPAP at 6 to 16 cm of water with a flex of 2 cm and a heated humidifier.  Patient has been set up with CPAP machine.  Patient reports that she was set up with a CPAP machine a month ago.  Upon reviewing patient CPAP compliance report patient's average daily use is 5 hours and 54 minutes with auto titrating pressures of 6 to 16 cm of water with a median pressure of 10.7 cm of water with apnea-hypopnea index of 0.7.  Patient denies any morning headaches or excessive daytime sleepiness. Patient denies fever, chills, night sweats, swollen glands in the head and neck, unintentional weight loss, hemoptysis, purulent sputum production, dysphagia, chest pain, palpitations, chest tightness, abdominal pain, nausea, vomiting, and diarrhea.  Patient also denies any myalgias, changes in sense of taste and/or smell, sore throat, any other coronavirus or flu-like symptoms.  Patient denies any leg swelling, orthopnea, paroxysmal nocturnal dyspnea.  Patient is able to perform activities of daily living.        Review of Systems   Constitutional:  Negative for activity change, appetite change, chills, diaphoresis, fatigue, fever, unexpected weight gain and unexpected weight loss.        Negative for Insomnia   HENT:  Negative for congestion (Nasal), mouth sores, nosebleeds, postnasal drip, sore throat, swollen glands and trouble swallowing.         Negative for Thrush  Negative for Hoarseness  Negative for Allergies/Hay Fever  Negative for Recent Head injury  Negative for Ear Fullness  Negative for Nasal or Sinus pain  Negative for Dry lips  Negative for Nasal discharge   Respiratory:  Positive for cough (intermittent) and shortness of breath. Negative for apnea, chest tightness and wheezing.         Negative for Hemoptysis  Negative for Pleuritic pain   Cardiovascular:  Negative for chest pain, palpitations and leg swelling.        Negative for  Claudication  Negative for Cyanosis  Negative for Dyspnea on exertion   Gastrointestinal:  Negative for abdominal pain, diarrhea, nausea, vomiting and GERD.   Musculoskeletal:  Negative for joint swelling and myalgias.        Negative for Joint pain  Negative for Joint stiffness   Skin:  Negative for color change, dry skin, pallor and rash.   Neurological:  Negative for syncope, weakness and headache.   Hematological:  Negative for adenopathy. Does not bruise/bleed easily.        Family History   Problem Relation Age of Onset    Heart disease Mother     Lung cancer Mother     Cancer Mother     Stroke Father     Heart disease Father     Kidney cancer Father     Cancer Father     Stroke Other         UNCLE/AUNT    Colon cancer Neg Hx     Malig Hyperthermia Neg Hx         Social History     Socioeconomic History    Marital status:    Tobacco Use    Smoking status: Former     Packs/day: 1.00     Years: 47.00     Additional pack years: 0.00     Total pack years: 47.00     Types: Cigarettes     Start date: 1975     Quit date: 6/3/2022     Years since quittin.4     Passive exposure: Past    Smokeless tobacco: Never    Tobacco comments:     started age 26   Vaping Use    Vaping Use: Never used   Substance and Sexual Activity    Alcohol use: Never    Drug use: Never    Sexual activity: Defer        Past Medical History:   Diagnosis Date    Anxiety     Asthma     Asthma 2021    Atherosclerosis of native coronary artery of native heart with angina pectoris 08/15/2023    FOLLOWED BY DR GONZALES/DINA PETTIT. DENIES CP BUT GETS SOA WITH EXERTION HAS COPD WEARS AT 2LPM N/C. DECREASED ACTIVITY D/T SOA    C. difficile diarrhea     DENIES ANY CURRENT ISSUES    Colon cancer 2017    Colon polyp     COPD (chronic obstructive pulmonary disease) 10/23/2017    COPD (chronic obstructive pulmonary disease) 10/23/2017    Depression     Diverticulitis     Dysphagia     Essential hypertension 2021    Heartburn      Hernia 2019    History of chemotherapy     2017    Hx of psychiatric care     Hyperlipidemia     Impaired fasting glucose 08/14/2015    Lumbago     Lung nodule 02/17/2022    Major depressive disorder 10/27/2016    Malignant neoplasm of ascending colon 07/21/2017    Melena     Multiple myeloma     Nicotine dependence 05/10/2017    NICK (obstructive sleep apnea) 11/6/2023    Oxygen dependent     REPORTS USES 02 AT 2LPM VIA N/C. CAN GET VERY SOA WITH MINIMAL EXERTION    Renal insufficiency 07/28/2021    Seizures     PSEUDO SEIZURES LAST ONE AROUND NOV 2022    Shortness of breath     CAN GET VERY SOA WITH MINIMAL EXERTION    Sleep apnea     Tobacco use 07/28/2021    QUIT SMOKING JUNE 2022    Visit for screening mammogram 02/20/2020    NORMAL- REPEAT IN ONE YEAR    Vitamin D deficiency         Immunization History   Administered Date(s) Administered    Fluzone High Dose =>65 Years ([employer] ONLY) 09/24/2020, 09/24/2021    Fluzone High-Dose 65+yrs 09/24/2020, 09/24/2021, 09/20/2022, 10/10/2023    Fluzone Quad >6mos (Multi-dose) 09/14/2015    Pneumococcal Conjugate 13-Valent (PCV13) 09/21/2015    Pneumococcal Polysaccharide (PPSV23) 04/12/2022       Allergies   Allergen Reactions    Cephalexin Hives    Morphine Anaphylaxis    Penicillins Hives    Sulfa Antibiotics Hives          Current Outpatient Medications:     acyclovir (ZOVIRAX) 400 MG tablet, Take 1 tablet by mouth 2 (Two) Times a Day. Take one tablet by mouth twice daily., Disp: 60 tablet, Rfl: 11    albuterol sulfate  (90 Base) MCG/ACT inhaler, Inhale 2 puffs Every 4 (Four) Hours As Needed for Wheezing., Disp: 18 g, Rfl: 11    arformoterol (BROVANA) 15 MCG/2ML nebulizer solution, Take 2 mL by nebulization 2 (Two) Times a Day., Disp: , Rfl:     budesonide (PULMICORT) 0.5 MG/2ML nebulizer solution, Take 2 mL by nebulization 2 (Two) Times a Day., Disp: , Rfl:     colestipol (COLESTID) 1 g tablet, Take 2 tablets by mouth 2 (Two) Times a Day., Disp: 120 tablet,  Rfl: 5    dapsone 25 MG tablet, Take 2 tablets by mouth 2 (Two) Times a Day., Disp: 120 tablet, Rfl: 5    dexAMETHasone (DECADRON) 4 MG tablet, Take 10 tablets on D 1,8,15,22 and take 1 tablet on D 2,3,9,10,16,17,23,24.  Take in the morning with food., Disp: 48 tablet, Rfl: 1    FLUoxetine (PROzac) 40 MG capsule, Take 1 capsule by mouth Daily., Disp: 90 capsule, Rfl: 1    HYDROcodone-acetaminophen (Norco) 5-325 MG per tablet, Take 1 tablet by mouth Every 6 (Six) Hours As Needed for Moderate Pain or Severe Pain., Disp: 6 tablet, Rfl: 0    lenalidomide (Revlimid) 25 MG capsule, Take 1 capsule by mouth Daily for 21 days. Take daily on Days 1-21, and off 7 days, on a 28 day cycle (Patient taking differently: Take 1 capsule by mouth Daily. Take daily on Days 1-21, and off 7 days, on a 28 day cycle PT REPORTS HAS NOT RECEIVED AS OF YET 10/30/23), Disp: 21 capsule, Rfl: 0    levothyroxine (SYNTHROID, LEVOTHROID) 25 MCG tablet, Take 1 tablet by mouth Every Morning., Disp: 90 tablet, Rfl: 1    meloxicam (MOBIC) 15 MG tablet, Take 1 tablet by mouth Daily., Disp: 90 tablet, Rfl: 1    ondansetron (ZOFRAN) 8 MG tablet, Take 1 tablet by mouth 3 (Three) Times a Day As Needed for Nausea or Vomiting. (Patient taking differently: Take 1 tablet by mouth 3 (Three) Times a Day As Needed for Nausea or Vomiting. TO START WHEN STARTS CHEMO 11/3/23), Disp: 30 tablet, Rfl: 5    promethazine (PHENERGAN) 25 MG tablet, Take 1 tablet by mouth Every 6 (Six) Hours As Needed for Nausea or Vomiting., Disp: 30 tablet, Rfl: 1    revefenacin (YUPELRI) 175 MCG/3ML nebulizer solution, Take 3 mL by nebulization Daily., Disp: , Rfl:     Sod Picosulfate-Mag Ox-Cit Acd (Clenpiq) 10-3.5-12 MG-GM -GM/175ML solution, Take 175 mL by mouth Take As Directed. (Patient taking differently: Take 175 mL by mouth Take As Directed. IS TO HAVE COLONOSCOPY ON 11/9/23), Disp: 350 mL, Rfl: 0    vitamin D (ERGOCALCIFEROL) 1.25 MG (69089 UT) capsule capsule, Take 1 capsule by  "mouth 2 (Two) Times a Week. (Patient taking differently: Take 1 capsule by mouth Every 7 (Seven) Days. TAKES ON FRIDAY), Disp: 26 capsule, Rfl: 1     Objective     Physical Exam  Vital Signs:   WDWN, Alert, NAD.    HEENT:  PERRL, EOMI.  OP, nares clear, no sinus tenderness  Neck:  Supple, no JVD, no thyromegaly.  Lymph: no axillary, cervical, supraclavicular lymphadenopathy noted bilaterally  Chest: Breath sounds bilaterally.  No wheezes, rales, or rhonchi appreciated.  Normal work of breathing noted.  Patient is able speak full sentences without difficulty.  Patient is on 2 L of oxygen per minute via nasal cannula.  CV: RRR, no MGR, pulses 2+, equal.  Abd:  Soft, NT, ND, + BS, no HSM  EXT:  no clubbing, no cyanosis, no edema, no joint tenderness  Neuro:  A&Ox3, CN grossly intact, no focal deficits.  Skin: No rashes or lesions noted.    /46 (BP Location: Right arm, Patient Position: Sitting, Cuff Size: Large Adult)   Pulse 90   Resp 18   Ht 154.9 cm (60.98\")   Wt 95.1 kg (209 lb 11.2 oz)   SpO2 96% Comment: 2 liters of oxygen  BMI 39.64 kg/m²         Result Review :   I have reviewed my last office visit note.  I also reviewed alpha-1 antitrypsin level and genotype results.  I also reviewed IgE level and proBNP level completed on 9/1/2023.  I also reviewed chest CT report pulmonary function test report dated from 8/25/2023.   I also reviewed echocardiogram report dated from 9/6/2023.  I also reviewed CPAP compliance report.  See scanned reports.     Procedures:                Assessment and Plan      Assessment:  1.  Mild COPD.  FEV1 of 79% predicted.  Alpha 1 antitrypsin with a normal genotype of M/M with a level of 231.  2.  Centrilobular emphysema on chest CT scan.  3.  History of colon cancer.  4.  History of multiple myeloma now has active multiple myeloma.  Patient is under the care of Dr. Nicolas and will be starting chemotherapy per patient report.  5.  Lung nodules.  6.  Chronic dyspnea.  7.  " Cough.  8.  Wheezing.  9.  Multiple lung nodules.  10.  Snoring.    11.  Excessive daytime sleepiness.  12.  Severe obstructive sleep apnea.  Patient is on a CPAP machine.  13.  Coronary atherosclerotic disease on chest CT scan.  14.  Orthopnea.  15.  Chronic hypoxic respiratory failure: Patient will be set up on home oxygen today.  16.  Tobacco abuse of cigarettes in remission.           Plan:  1.  Continue Brovana, Pulmicort, and Yupelri every day as prescribed and rinse mouth out after each use.  2.  Continue albuterol inhaler as needed.   3.  Continue oxygen to keep SPO2 at 89% and above.  4.  Follow-up with Dr. Nicolas as scheduled.  5.  Continue CPAP with oxygen bled in at night and with naps at current settings and clean mask and tubing daily.  6.  Follow-up with oncology and cardiology as scheduled.  7.  Vaccination status: patient reports they are up-to-date with flu, pneumonia, and Covid vaccines.  Patient is advised to continue to follow CDC recommendations such as social distancing wearing a mask and washing hands for at least 20 seconds.  8.  Smoking status: Patient is a former cigarette smoker.  9.  Patient to call the office, 911, or go to the ER with new or worsening symptoms.  10.  Follow-up in 2 to 3 months, sooner if needed.              Follow Up   Return for 2-3 months with Dr. Sandoval.  Patient was given instructions and counseling regarding her condition or for health maintenance advice. Please see specific information pulled into the AVS if appropriate.

## 2023-10-30 ENCOUNTER — SPECIALTY PHARMACY (OUTPATIENT)
Dept: PHARMACY | Facility: HOSPITAL | Age: 73
End: 2023-10-30
Payer: MEDICARE

## 2023-10-30 DIAGNOSIS — C90.00 MULTIPLE MYELOMA, REMISSION STATUS UNSPECIFIED: Primary | ICD-10-CM

## 2023-10-30 RX ORDER — LENALIDOMIDE 25 MG/1
25 CAPSULE ORAL DAILY
Qty: 21 CAPSULE | Refills: 0 | Status: SHIPPED | OUTPATIENT
Start: 2023-10-30 | End: 2023-11-20

## 2023-10-30 RX ORDER — LENALIDOMIDE 25 MG/1
25 CAPSULE ORAL DAILY
Qty: 21 CAPSULE | Refills: 0 | Status: SHIPPED | OUTPATIENT
Start: 2023-10-30 | End: 2023-10-30 | Stop reason: SDUPTHER

## 2023-10-30 RX ORDER — ARFORMOTEROL TARTRATE 15 UG/2ML
15 SOLUTION RESPIRATORY (INHALATION)
COMMUNITY

## 2023-10-30 RX ORDER — BUDESONIDE 0.5 MG/2ML
0.5 INHALANT ORAL 2 TIMES DAILY
COMMUNITY

## 2023-10-30 NOTE — PROGRESS NOTES
I completed an independent review of the medication order and prescription. I confirm Dr. Saldaña sent the lenalidomide prescription to [facility] specialty pharmacy as described.      Poonam Ahn, PharmD, BCOP - Oncology Clinical Pharmacist [phone]    10/30/2023  09:43 EDT

## 2023-10-30 NOTE — PROGRESS NOTES
Oral Chemotherapy - New Referral    Received a referral from Dr. Nicolas    Treatment Plan: Revlimid (lenalidomide) + Daratumumab + Dexamethasone  Start date of treatment planned for:  11/3/23  Indication: Smoldering multiple myeloma  Relevant past treatments: none  Is the therapy appropriate based on treatment guidelines and FDA labeling?: yes  Therapeutic Goals: Continue treatment until progression or intolerable toxicity  Patient can self-administer oral medications: Yes    Drug-Drug Interactions: The current medication list was reviewed and there are no relevant drug-drug interactions with the specialty medication.  Medication Allergies: The patient has no relevant allergies as it relates to their oral specialty medication  Review of Labs/Dose Adjustments: The patient's most recent labs were reviewed and are appropriate to start treatment at this dose    A prescription was released to [facility]  specialty pharmacy for   Drug: Revlimid (lenalidomide)  Strength: 25 mg  Directions: Take 1 capsule by mouth daily ON days 1-21, OFF for 7 days of each 28 day cycle  Quantity: 21  Refills: 0  REMS Auth # [id number] Exp 11/29/23     Pharmacy education is not yet scheduled., CCA and consent are signed.    Madisyn Saldaña, PharmD, [facility]  Oncology Clinical Pharmacist  10/30/2023  07:34 EDT

## 2023-10-31 ENCOUNTER — ANESTHESIA (OUTPATIENT)
Dept: PERIOP | Facility: HOSPITAL | Age: 73
End: 2023-10-31
Payer: MEDICARE

## 2023-10-31 ENCOUNTER — APPOINTMENT (OUTPATIENT)
Dept: GENERAL RADIOLOGY | Facility: HOSPITAL | Age: 73
End: 2023-10-31
Payer: MEDICARE

## 2023-10-31 ENCOUNTER — TELEPHONE (OUTPATIENT)
Dept: ONCOLOGY | Facility: HOSPITAL | Age: 73
End: 2023-10-31

## 2023-10-31 ENCOUNTER — HOSPITAL ENCOUNTER (OUTPATIENT)
Facility: HOSPITAL | Age: 73
Setting detail: HOSPITAL OUTPATIENT SURGERY
Discharge: HOME | End: 2023-10-31
Attending: SURGERY | Admitting: SURGERY
Payer: MEDICARE

## 2023-10-31 ENCOUNTER — ANESTHESIA EVENT (OUTPATIENT)
Dept: PERIOP | Facility: HOSPITAL | Age: 73
End: 2023-10-31
Payer: MEDICARE

## 2023-10-31 VITALS
HEIGHT: 61 IN | HEART RATE: 92 BPM | OXYGEN SATURATION: 92 % | SYSTOLIC BLOOD PRESSURE: 109 MMHG | BODY MASS INDEX: 39.88 KG/M2 | WEIGHT: 211.2 LBS | RESPIRATION RATE: 20 BRPM | DIASTOLIC BLOOD PRESSURE: 46 MMHG | TEMPERATURE: 97.6 F

## 2023-10-31 DIAGNOSIS — C90.00 MULTIPLE MYELOMA, REMISSION STATUS UNSPECIFIED: ICD-10-CM

## 2023-10-31 DIAGNOSIS — C90.00 MULTIPLE MYELOMA NOT HAVING ACHIEVED REMISSION: ICD-10-CM

## 2023-10-31 DIAGNOSIS — Z95.828 PORT-A-CATH IN PLACE: ICD-10-CM

## 2023-10-31 PROCEDURE — 76937 US GUIDE VASCULAR ACCESS: CPT | Performed by: SURGERY

## 2023-10-31 PROCEDURE — 36561 INSERT TUNNELED CV CATH: CPT | Performed by: SURGERY

## 2023-10-31 PROCEDURE — 71045 X-RAY EXAM CHEST 1 VIEW: CPT

## 2023-10-31 PROCEDURE — 36590 REMOVAL TUNNELED CV CATH: CPT | Performed by: SURGERY

## 2023-10-31 PROCEDURE — 25010000002 LEVOFLOXACIN PER 250 MG: Performed by: SURGERY

## 2023-10-31 PROCEDURE — 25010000002 MIDAZOLAM PER 1MG: Performed by: ANESTHESIOLOGY

## 2023-10-31 PROCEDURE — 25010000002 BUPIVACAINE (PF) 0.25 % SOLUTION: Performed by: SURGERY

## 2023-10-31 PROCEDURE — 25010000002 PROPOFOL 10 MG/ML EMULSION: Performed by: NURSE ANESTHETIST, CERTIFIED REGISTERED

## 2023-10-31 PROCEDURE — 76000 FLUOROSCOPY <1 HR PHYS/QHP: CPT

## 2023-10-31 PROCEDURE — C1788 PORT, INDWELLING, IMP: HCPCS | Performed by: SURGERY

## 2023-10-31 PROCEDURE — 25810000003 LACTATED RINGERS PER 1000 ML: Performed by: ANESTHESIOLOGY

## 2023-10-31 PROCEDURE — 25010000002 ONDANSETRON PER 1 MG: Performed by: NURSE ANESTHETIST, CERTIFIED REGISTERED

## 2023-10-31 PROCEDURE — 25010000002 HEPARIN (PORCINE) PER 1000 UNITS: Performed by: SURGERY

## 2023-10-31 PROCEDURE — 25010000002 FENTANYL CITRATE (PF) 50 MCG/ML SOLUTION: Performed by: NURSE ANESTHETIST, CERTIFIED REGISTERED

## 2023-10-31 PROCEDURE — 77001 FLUOROGUIDE FOR VEIN DEVICE: CPT | Performed by: SURGERY

## 2023-10-31 DEVICE — PRT INTRO VASC/INTERV VORTEX FILL/HL DETACH/POLYURET/CATH 8F: Type: IMPLANTABLE DEVICE | Site: INTERNAL JUGULAR | Status: FUNCTIONAL

## 2023-10-31 RX ORDER — PROPOFOL 10 MG/ML
VIAL (ML) INTRAVENOUS AS NEEDED
Status: DISCONTINUED | OUTPATIENT
Start: 2023-10-31 | End: 2023-10-31 | Stop reason: SURG

## 2023-10-31 RX ORDER — PHENYLEPHRINE HCL IN 0.9% NACL 1 MG/10 ML
SYRINGE (ML) INTRAVENOUS AS NEEDED
Status: DISCONTINUED | OUTPATIENT
Start: 2023-10-31 | End: 2023-10-31 | Stop reason: SURG

## 2023-10-31 RX ORDER — FENTANYL CITRATE 50 UG/ML
INJECTION, SOLUTION INTRAMUSCULAR; INTRAVENOUS AS NEEDED
Status: DISCONTINUED | OUTPATIENT
Start: 2023-10-31 | End: 2023-10-31 | Stop reason: SURG

## 2023-10-31 RX ORDER — SODIUM CHLORIDE, SODIUM LACTATE, POTASSIUM CHLORIDE, CALCIUM CHLORIDE 600; 310; 30; 20 MG/100ML; MG/100ML; MG/100ML; MG/100ML
9 INJECTION, SOLUTION INTRAVENOUS CONTINUOUS PRN
Status: DISCONTINUED | OUTPATIENT
Start: 2023-10-31 | End: 2023-10-31 | Stop reason: HOSPADM

## 2023-10-31 RX ORDER — PROMETHAZINE HYDROCHLORIDE 12.5 MG/1
25 TABLET ORAL ONCE AS NEEDED
Status: DISCONTINUED | OUTPATIENT
Start: 2023-10-31 | End: 2023-10-31 | Stop reason: HOSPADM

## 2023-10-31 RX ORDER — MIDAZOLAM HYDROCHLORIDE 2 MG/2ML
0.5 INJECTION, SOLUTION INTRAMUSCULAR; INTRAVENOUS ONCE
Status: COMPLETED | OUTPATIENT
Start: 2023-10-31 | End: 2023-10-31

## 2023-10-31 RX ORDER — LIDOCAINE HYDROCHLORIDE 20 MG/ML
INJECTION, SOLUTION EPIDURAL; INFILTRATION; INTRACAUDAL; PERINEURAL AS NEEDED
Status: DISCONTINUED | OUTPATIENT
Start: 2023-10-31 | End: 2023-10-31 | Stop reason: SURG

## 2023-10-31 RX ORDER — ONDANSETRON 2 MG/ML
INJECTION INTRAMUSCULAR; INTRAVENOUS AS NEEDED
Status: DISCONTINUED | OUTPATIENT
Start: 2023-10-31 | End: 2023-10-31 | Stop reason: SURG

## 2023-10-31 RX ORDER — HYDROCODONE BITARTRATE AND ACETAMINOPHEN 5; 325 MG/1; MG/1
1 TABLET ORAL EVERY 6 HOURS PRN
Qty: 6 TABLET | Refills: 0 | Status: SHIPPED | OUTPATIENT
Start: 2023-10-31

## 2023-10-31 RX ORDER — ACETAMINOPHEN 500 MG
1000 TABLET ORAL ONCE
Status: COMPLETED | OUTPATIENT
Start: 2023-10-31 | End: 2023-10-31

## 2023-10-31 RX ORDER — DAPSONE 25 MG/1
50 TABLET ORAL 2 TIMES DAILY
Qty: 120 TABLET | Refills: 5 | Status: SHIPPED | OUTPATIENT
Start: 2023-10-31

## 2023-10-31 RX ORDER — LEVOFLOXACIN 5 MG/ML
500 INJECTION, SOLUTION INTRAVENOUS ONCE
Status: COMPLETED | OUTPATIENT
Start: 2023-10-31 | End: 2023-10-31

## 2023-10-31 RX ORDER — SODIUM CHLORIDE 9 MG/ML
40 INJECTION, SOLUTION INTRAVENOUS AS NEEDED
Status: DISCONTINUED | OUTPATIENT
Start: 2023-10-31 | End: 2023-10-31 | Stop reason: HOSPADM

## 2023-10-31 RX ORDER — EPHEDRINE SULFATE 50 MG/ML
INJECTION, SOLUTION INTRAVENOUS AS NEEDED
Status: DISCONTINUED | OUTPATIENT
Start: 2023-10-31 | End: 2023-10-31 | Stop reason: SURG

## 2023-10-31 RX ORDER — ONDANSETRON 2 MG/ML
4 INJECTION INTRAMUSCULAR; INTRAVENOUS ONCE AS NEEDED
Status: DISCONTINUED | OUTPATIENT
Start: 2023-10-31 | End: 2023-10-31 | Stop reason: HOSPADM

## 2023-10-31 RX ORDER — BUPIVACAINE HYDROCHLORIDE 2.5 MG/ML
INJECTION, SOLUTION EPIDURAL; INFILTRATION; INTRACAUDAL AS NEEDED
Status: DISCONTINUED | OUTPATIENT
Start: 2023-10-31 | End: 2023-10-31 | Stop reason: HOSPADM

## 2023-10-31 RX ORDER — PROMETHAZINE HYDROCHLORIDE 25 MG/1
25 SUPPOSITORY RECTAL ONCE AS NEEDED
Status: DISCONTINUED | OUTPATIENT
Start: 2023-10-31 | End: 2023-10-31 | Stop reason: HOSPADM

## 2023-10-31 RX ORDER — LEVOFLOXACIN 5 MG/ML
500 INJECTION, SOLUTION INTRAVENOUS ONCE
Status: DISCONTINUED | OUTPATIENT
Start: 2023-10-31 | End: 2023-10-31

## 2023-10-31 RX ADMIN — EPHEDRINE SULFATE 5 MG: 50 INJECTION INTRAVENOUS at 12:17

## 2023-10-31 RX ADMIN — FENTANYL CITRATE 25 MCG: 50 INJECTION, SOLUTION INTRAMUSCULAR; INTRAVENOUS at 12:25

## 2023-10-31 RX ADMIN — FENTANYL CITRATE 25 MCG: 50 INJECTION, SOLUTION INTRAMUSCULAR; INTRAVENOUS at 11:55

## 2023-10-31 RX ADMIN — FENTANYL CITRATE 25 MCG: 50 INJECTION, SOLUTION INTRAMUSCULAR; INTRAVENOUS at 11:50

## 2023-10-31 RX ADMIN — ACETAMINOPHEN 1000 MG: 500 TABLET ORAL at 10:47

## 2023-10-31 RX ADMIN — Medication 100 MCG: at 11:57

## 2023-10-31 RX ADMIN — Medication 200 MCG: at 12:17

## 2023-10-31 RX ADMIN — LEVOFLOXACIN 500 MG: 5 INJECTION, SOLUTION INTRAVENOUS at 11:47

## 2023-10-31 RX ADMIN — LIDOCAINE HYDROCHLORIDE 80 MG: 20 INJECTION, SOLUTION EPIDURAL; INFILTRATION; INTRACAUDAL; PERINEURAL at 11:43

## 2023-10-31 RX ADMIN — ONDANSETRON 4 MG: 2 INJECTION INTRAMUSCULAR; INTRAVENOUS at 11:54

## 2023-10-31 RX ADMIN — MIDAZOLAM HYDROCHLORIDE 0.5 MG: 1 INJECTION, SOLUTION INTRAMUSCULAR; INTRAVENOUS at 11:30

## 2023-10-31 RX ADMIN — FENTANYL CITRATE 25 MCG: 50 INJECTION, SOLUTION INTRAMUSCULAR; INTRAVENOUS at 12:15

## 2023-10-31 RX ADMIN — Medication 200 MCG: at 12:10

## 2023-10-31 RX ADMIN — SODIUM CHLORIDE, POTASSIUM CHLORIDE, SODIUM LACTATE AND CALCIUM CHLORIDE 9 ML/HR: 600; 310; 30; 20 INJECTION, SOLUTION INTRAVENOUS at 11:30

## 2023-10-31 RX ADMIN — PROPOFOL 175 MCG/KG/MIN: 10 INJECTION, EMULSION INTRAVENOUS at 11:44

## 2023-10-31 RX ADMIN — EPHEDRINE SULFATE 10 MG: 50 INJECTION INTRAVENOUS at 11:57

## 2023-10-31 RX ADMIN — PROPOFOL 100 MG: 10 INJECTION, EMULSION INTRAVENOUS at 11:43

## 2023-10-31 RX ADMIN — Medication 200 MCG: at 12:32

## 2023-10-31 RX ADMIN — PROPOFOL 50 MG: 10 INJECTION, EMULSION INTRAVENOUS at 11:45

## 2023-10-31 NOTE — TELEPHONE ENCOUNTER
"Caller: Anca Albright \"Sarah\"    Relationship to patient: Self    Best call back number: [phone] PT GOES INTO SURGERY AT 10:30 AM     SHE STATES IF SHE DOES NOT ANSWER IT WILL BE HER  AND SHE CAN TALK TO HIM, NIMA ALBRIGHT.    Patient is needing: TO REQUEST CALL BACK FROM [place].  PT NEEDS TO TALK ABOUT GETTING ATOVAQUONE PAID FOR BECAUSE SHE CANNOT AFFORD IT.  "

## 2023-10-31 NOTE — PROGRESS NOTES
Pharmacy contacted due to high cost of patient atovaquone for PJP prophylaxis. PCC completed benefits investigation on Dapsone which can also be considered for PJP prophylaxis in pts with sulfa antibiotics; Of note, there is possible cross-allergenicity with dapsone and pts with allergy to sulfa antibiotics; however, per LexiComp, cross-allergenicity is rare and unpredictable. Provider okay with change and Rx sent to pharmacy as below. Pharmacy will discuss change with pt during education visit.    A prescription was released to [facility] pharmacy for   Drug: Dapsone  Strength: 25 mg  Directions: Take 2 tablets by mouth twice daily  Quantity: 120  Refills: 5    Thank you,  Jaylene Broderick, PharmD  10/31/23  12:28 EDT

## 2023-10-31 NOTE — OP NOTE
INSERTION VENOUS ACCESS DEVICE  Procedure Report    Patient Name:  Anca Samson  YOB: 1950    Date of Surgery:  10/31/2023     Pre-op Diagnosis:   Multiple myeloma, remission status unspecified [C90.00]  Port-A-Cath in place [Z95.828]    Pre-Op Diagnosis Codes:     * Multiple myeloma, remission status unspecified [C90.00]     * Port-A-Cath in place [Z95.828]       Post-op Diagnosis:   Post-Op Diagnosis Codes:     * Multiple myeloma, remission status unspecified [C90.00]     * Port-A-Cath in place [Z95.828]    Procedure:    Venous access device removal  Venous access device placement    Staff:  Surgeon(s):  Alcon Delgado MD    Anesthesia: Monitored Anesthesia Care    Estimated Blood Loss: Minimal    Complications:  None    Drains:  None    Packing:  None    Implants:    Implant Name Type Inv. Item Serial No.  Lot No. LRB No. Used Action   PRT INTRO VASC/INTERV VORTEX FILL/HL DETACH/POLYURET/CATH 8F - [id number] Implant PRT INTRO VASC/INTERV VORTEX FILL/HL DETACH/POLYURET/CATH 8F  [employer] [id number] N/A 1 Implanted     Specimen: None    Indications:  See my preop note.     Findings:  Port with attached catheter removed.  Catheter examined and it was completely intact.  Angiodynamics Smartport placed via right internal jugular vein.    Description of Procedure: Patient was taken to the operating room and placed supine on the operating table.  Timeout verification was performed. MAC anesthesia was administered.  Patient was prepped and draped in usual fashion.   Local anesthesia was infiltrated into the skin and subcutaneous tissue where the port is located at the right upper chest.  An incision was made through the old surgical scar and deepened into the subcutaneous tissue.  The port and attached catheter were identified and dissected free from the chest wall tissue and removed.  The catheter was examined and it was completely intact.  There was adequate hemostasis.  The wound  was closed with buried absorbable suture.      Local anesthesia was infiltrated into the skin and subcutaneous tissue at the right lower neck and right upper chest.  Using ultrasound, the right internal jugular vein was identified and then accessed with a needle.  A guidewire was placed through the needle and the needle was withdrawn.  Fluoroscopy was used to confirm the guidewire was positioned appropriately in the superior vena cava.  A subcutaneous pocket was created at the right upper chest to accommodate the port.  The inner dilator was placed inside of the tear-away sheath and both were placed over the guidewire into the right internal jugular vein.  The inner guidewire and dilator were removed.  The catheter was placed through the tear-away sheath and the sheath was withdrawn.  The tunneler trocar was used to tunnel the catheter to the subcutaneous pocket.  Then under direct visualization with fluoroscopy, the catheter was pulled back until the tip was positioned appropriately in the superior vena cava.  The catheter was cut to the appropriate length and the locking cap was placed onto the catheter.   The catheter was connected to the port, the locking cap was secured, and the port was placed within the subcutaneous pocket.  I accessed the port with a Khan needle.  I could easily aspirate venous blood.  The port was then flushed with heparinized solution.  The wound was closed appropriately with buried absorbable suture.      Appropriate dressings were placed.  No complications.  Sponge needle and instrument counts were correct.  Patient was transported to the recovery area in stable condition.      Alcon Delgado MD     Date: 10/31/2023  Time: 12:34 EDT    Electronically signed by Alcon Delgado MD, 10/31/23, 12:34 PM EDT.

## 2023-10-31 NOTE — ANESTHESIA PREPROCEDURE EVALUATION
Anesthesia Evaluation     Patient summary reviewed and Nursing notes reviewed                Airway   Mallampati: II  TM distance: >3 FB  Neck ROM: full  Large neck circumference and Possible difficult intubation  Dental    (+) lower dentures, upper dentures and poor dentition    Pulmonary - normal exam    breath sounds clear to auscultation  (+) COPD, asthma,shortness of breath, sleep apnea  Cardiovascular - normal exam    Rhythm: regular  Rate: normal    (+) hypertension, CAD, angina, hyperlipidemia      Neuro/Psych  (+) seizures, psychiatric history  GI/Hepatic/Renal/Endo    (+) morbid obesity, renal disease-, thyroid problem     Musculoskeletal     Abdominal    Substance History - negative use     OB/GYN negative ob/gyn ROS         Other   arthritis,   history of cancer                Anesthesia Plan    ASA 4     general     intravenous induction     Anesthetic plan, risks, benefits, and alternatives have been provided, discussed and informed consent has been obtained with: patient.    CODE STATUS:

## 2023-10-31 NOTE — DISCHARGE INSTRUCTIONS
Dr. Delgado's Instructions          DIET  Resume your regular diet.     ACTIVITY  Do not lift anything greater than 15 pounds with your right arm for one week.  After one week, you have no activity restrictions and may gradually increase your activities using common sense.     WOUND CARE & SHOWERING/BATHING  Your incisions are covered with a plastic type material that will flake off on its own in the next few weeks.  If the plastic type material has not flaked off on its own by 2 weeks after your surgery then use tweezers to pull it off.  You have sutures in your incisions but they are placed below the level of the skin and they will slowly dissolve (they do not need to be removed).  The skin around your incisions will likely have some bruising.  This is normal.  You can shower beginning tomorrow but wait two weeks after your surgery before taking any tub baths.       HOME MEDICATIONS  Resume all of your normal home medications.     PAIN CONTROL  You will receive a narcotic pain medicine prescription before you are discharged home.  Be sure to take the narcotic pain medication with some food so as not to upset your stomach.  Do not drive while you are taking the prescription pain medication.  Take Tylenol or Motrin for mild pain.     BOWEL MOVEMENTS  It is not unusual for narcotic pain medications to cause constipation.  Also, the medications and anesthesia you received for your surgery can have a constipating effect.  To help avoid constipation, drink at least four eight-ounce glasses of water each day and use over-the-counter laxatives/stool softeners (dulcolax, milk of magnesia, senokot, etc.).  I recommend drinking the aforementioned amount of water daily and taking 30 ml of milk of magnesia two times each day while you are using the prescribed narcotic pain medication.  If your bowel movements become too loose or too frequent, then simply stop following these recommendations unless you start to feel  constipated.     FOLLOW-UP VISIT & QUESTIONS/CONCERNS  Call Dr. Delgado´s office at [phone] and schedule a follow-up appointment for about two weeks after your surgery date.  However, if you are doing fine and don´t have any concerns then simply cancel the follow-up appointment.  Should you have any questions or concerns, please call Dr. Ha office.                DISCHARGE INSTRUCTIONS  INSERTION OF   PORT-A-CATH      For your surgery you had:  General anesthesia (you may have a sore throat for the first 24 hours)    You may experience dizziness, drowsiness, or light-headedness for several hours following surgery/procedure.  Do not stay alone today or tonight.  Limit your activity for 24 hours.  You should not drive, operate machinery, drink alcohol, or sign legally binding documents for 24 hours or while you are taking pain medication.  Resume your diet slowly.  Follow whatever special dietary instructions you may have been given by your doctor.  Last dose of pain medication was given at:   .  NOTIFY YOUR DOCTOR IF YOU EXPERIENCE ANY OF THE FOLLOWING:  Temperature greater than 101 degrees Fahrenheit  Shaking Chills  Redness or excessive drainage from incision  Nausea, vomiting and/opr pain that is not controlled by prescribed medications  Increase in bleeding or bleeding that is excessive  Unable to urinate in 6 hours after surgery  If unable to reach your doctor, please go to the closest Emergency Room [facility]  Apply an ice pack intermittently for 20 minutes for a total of 24-48 hours.  Do not apply directly to the skin.       Port should be flushed/heparinized once a month (even when not being used).  Keep Port-A-Cath ID Card in your purse of wallet.  Medications per physician instructions as indicated on Discharge Medication Information Sheet.  You should see    for follow-up care  on   .                                                Phone number:       SPECIAL  INSTRUCTIONS:

## 2023-10-31 NOTE — ANESTHESIA POSTPROCEDURE EVALUATION
Patient: Anca Samson    Procedure Summary       Date: 10/31/23 Room / Location: [facility] OR  / [facility]    Anesthesia Start: 1137 Anesthesia Stop: 1244    Procedure: Port-a-catheter removal and port-a-catheter placement Diagnosis:       Multiple myeloma, remission status unspecified      Port-A-Cath in place      (Multiple myeloma, remission status unspecified [C90.00])      (Port-A-Cath in place [Z95.828])    Surgeons: Alcon Delgado MD Provider: Beto Farrell MD    Anesthesia Type: general ASA Status: 4            Anesthesia Type: general    Vitals  Vitals Value Taken Time   BP 94/64 10/31/23 1316   Temp 36.4 °C (97.6 °F) 10/31/23 1239   Pulse 81 10/31/23 1329   Resp 20 10/31/23 1239   SpO2 95 % 10/31/23 1329   Vitals shown include unfiled device data.        Post Anesthesia Care and Evaluation    Patient location during evaluation: bedside  Patient participation: complete - patient participated  Level of consciousness: awake  Pain management: adequate    Airway patency: patent  PONV Status: none  Cardiovascular status: acceptable  Respiratory status: acceptable  Hydration status: acceptable    Comments: An Anesthesiologist personally participated in the most demanding procedures (including induction and emergence if applicable) in the anesthesia plan, monitored the course of anesthesia administration at frequent intervals and remained physically present and available for immediate diagnosis and treatment of emergencies.

## 2023-11-01 ENCOUNTER — SPECIALTY PHARMACY (OUTPATIENT)
Dept: PHARMACY | Facility: HOSPITAL | Age: 73
End: 2023-11-01
Payer: MEDICARE

## 2023-11-01 ENCOUNTER — TELEPHONE (OUTPATIENT)
Dept: ONCOLOGY | Facility: HOSPITAL | Age: 73
End: 2023-11-01

## 2023-11-01 NOTE — PRE-PROCEDURE INSTRUCTIONS
"Instructed on date and arrival time of 1200. Come to entrance \"C\". Must have  over age 18 to drive home.  May have two visitors; however, children under 12 must stay in waiting room.  Discussed clear liquid diet (no red or purple) and bowel prep.  May take medications as usual except for blood thinners, diabetic medications, and weight loss medications.  Bring list of medications.  Verbalized understanding of instructions given.  Instructed to call for questions or concerns.  Cardiac and pulmonary clearances noted in chart.  "

## 2023-11-01 NOTE — PROGRESS NOTES
Specialty Pharmacy Patient Management Program  Oncology Initial Assessment       Anca Samson is a 73 y.o. female with multiple myeloma seen by an Oncology provider and enrolled in the Oncology Patient Management program offered by [facility].  An initial outreach was conducted, including assessment of therapy appropriateness and specialty medication education for Revlimid (lenalidomide). The patient was introduced to services offered by [facility], including: regular assessments, refill coordination, curbside pick-up or mail order delivery options, prior authorization maintenance, and financial assistance programs as applicable. The patient was also provided with contact information for the pharmacy team.     Regimen: Revlimid (lenalidomide) 25 mg by mouth daily on days 1-21, then off 7 days of each 28 day cycle + Darzalex Faspro (daratumumab) 7276-5047 mg SQ on days 1,8,15, and 22 of cycle 1 and 2, then  days 1 and 15 of cycle 3-6, then day 1 on cycle 7-12 + dexamethasone    Start date of oral specialty medication:  Tuesday, 11/7/23    Relevant Past Medical History, Comorbidities, and Vaccines  Relevant medical history and concomitant health conditions were discussed with the patient. The patient's chart has been reviewed for relevant past medical history and comorbid health conditions and updated as necessary.  Vaccines are coordinated by the patient's oncologist and primary care provider.  Past Medical History:   Diagnosis Date    Anxiety     Asthma     Atherosclerosis of native coronary artery of native heart with angina pectoris 08/15/2023    FOLLOWED BY DR GONZALES/DINA PETTIT. DENIES CP BUT GETS SOA WITH EXERTION HAS COPD WEARS AT 2LPM N/C. DECREASED ACTIVITY D/T SOA    C. difficile diarrhea     DENIES ANY CURRENT ISSUES    Colon cancer 07/21/2017    Colon polyp     COPD (chronic obstructive pulmonary disease) 10/23/2017    Depression     Diverticulitis      Dysphagia     Heartburn     Hernia 2019    History of chemotherapy     2017    Hx of psychiatric care     Hyperlipidemia     Impaired fasting glucose 2015    Lumbago     Lung nodule 2022    Major depressive disorder 10/27/2016    Malignant neoplasm of ascending colon 2017    Melena     Multiple myeloma     Nicotine dependence 05/10/2017    Oxygen dependent     REPORTS USES 02 AT 2LPM VIA N/C. CAN GET VERY SOA WITH MINIMAL EXERTION    Seizures     PSEUDO SEIZURES LAST ONE AROUND 2022    Shortness of breath     CAN GET VERY SOA WITH MINIMAL EXERTION    Sleep apnea     Tobacco use 2021    QUIT SMOKING 2022    Visit for screening mammogram 2020    NORMAL- REPEAT IN ONE YEAR    Vitamin D deficiency      Social History     Socioeconomic History    Marital status:    Tobacco Use    Smoking status: Former     Packs/day: 0.00     Years: 47.00     Additional pack years: 0.00     Total pack years: 0.00     Types: Cigarettes     Start date: 1975     Quit date: 6/3/2022     Years since quittin.4     Passive exposure: Past    Smokeless tobacco: Never    Tobacco comments:     started age 26   Vaping Use    Vaping Use: Never used   Substance and Sexual Activity    Alcohol use: Never    Drug use: Never    Sexual activity: Defer       Allergies  Known allergies and reactions were discussed with the patient. The patient's chart has been reviewed for allergy information and updated as necessary.   Allergies   Allergen Reactions    Cephalexin Hives    Morphine Anaphylaxis    Penicillins Hives    Sulfa Antibiotics Hives        Current Medication List  This medication list has been reviewed with the patient and evaluated for any interactions or necessary modifications/recommendations, and updated to include all prescription medications, OTC medications, and supplements the patient is currently taking.  This list reflects what is contained in the patient's profile, which has also been  marked as reviewed to communicate to other providers it is the most up to date version of the patient's current medication therapy.   Prior to Admission medications    Medication Sig Start Date End Date Taking? Authorizing Provider   acyclovir (ZOVIRAX) 400 MG tablet Take 1 tablet by mouth 2 (Two) Times a Day. Take one tablet by mouth twice daily.  Patient taking differently: Take 1 tablet by mouth 2 (Two) Times a Day. Take one tablet by mouth twice daily.  REPORTS THAT WILL NOT BE STARTED UNTIL CHEMO STARTS ON 11/3/23 10/26/23   West Nicolas MD   albuterol sulfate  (90 Base) MCG/ACT inhaler Inhale 2 puffs Every 4 (Four) Hours As Needed for Wheezing. 8/15/23   Radha Mean APRN   arformoterol (BROVANA) 15 MCG/2ML nebulizer solution Take 2 mL by nebulization 2 (Two) Times a Day.    ProviderHerson MD   budesonide (PULMICORT) 0.5 MG/2ML nebulizer solution Take 2 mL by nebulization 2 (Two) Times a Day.    ProviderHerson MD   colestipol (COLESTID) 1 g tablet Take 2 tablets by mouth 2 (Two) Times a Day. 3/20/23   Kaelyn Fitch APRN   dapsone 25 MG tablet Take 2 tablets by mouth 2 (Two) Times a Day. 10/31/23   West Nicolas MD   dexAMETHasone (DECADRON) 4 MG tablet Take 10 tablets on D 1,8,15,22 and take 1 tablet on D 2,3,9,10,16,17,23,24.  Take in the morning with food.  Patient taking differently: Take 10 tablets on D 1,8,15,22 and take 1 tablet on D 2,3,9,10,16,17,23,24.  Take in the morning with food.  REPORTS HAS NOT TAKEN AS OF YET TO START WITH CHEMO ON 11/3/23 10/26/23   West Nicolas MD   FLUoxetine (PROzac) 40 MG capsule Take 1 capsule by mouth Daily. 6/12/23   Rena Guerra PA   HYDROcodone-acetaminophen (Norco) 5-325 MG per tablet Take 1 tablet by mouth Every 6 (Six) Hours As Needed for Moderate Pain or Severe Pain. 10/31/23   Alcon Delgado MD   lenalidomide (Revlimid) 25 MG capsule Take 1 capsule by mouth Daily for 21 days. Take daily on Days 1-21, and off 7  days, on a 28 day cycle  Patient taking differently: Take 1 capsule by mouth Daily. Take daily on Days 1-21, and off 7 days, on a 28 day cycle  PT REPORTS HAS NOT RECEIVED AS OF YET 10/30/23 10/30/23 11/20/23  West Nicolas MD   levothyroxine (SYNTHROID, LEVOTHROID) 25 MCG tablet Take 1 tablet by mouth Every Morning. 6/12/23   Rena Guerra PA   meloxicam (MOBIC) 15 MG tablet Take 1 tablet by mouth Daily. 3/30/23   Rena Guerra PA   ondansetron (ZOFRAN) 8 MG tablet Take 1 tablet by mouth 3 (Three) Times a Day As Needed for Nausea or Vomiting.  Patient taking differently: Take 1 tablet by mouth 3 (Three) Times a Day As Needed for Nausea or Vomiting. TO START WHEN STARTS CHEMO 11/3/23 10/26/23   West Nicolas MD   promethazine (PHENERGAN) 25 MG tablet Take 1 tablet by mouth Every 6 (Six) Hours As Needed for Nausea or Vomiting. 6/12/23   Rena Guerra PA   revefenacin (YUPELRI) 175 MCG/3ML nebulizer solution Take 3 mL by nebulization Daily.    Provider, MD Herson   Sod Picosulfate-Mag Ox-Cit Acd (Clenpiq) 10-3.5-12 MG-GM -GM/175ML solution Take 175 mL by mouth Take As Directed.  Patient taking differently: Take 175 mL by mouth Take As Directed. IS TO HAVE COLONOSCOPY ON 11/9/23 10/4/23   Erendira Clayton APRN   vitamin D (ERGOCALCIFEROL) 1.25 MG (25924 UT) capsule capsule Take 1 capsule by mouth 2 (Two) Times a Week.  Patient taking differently: Take 1 capsule by mouth Every 7 (Seven) Days. TAKES ON FRIDAY 12/19/22   Rena Guerra PA   atovaquone (MEPRON) 750 MG/5ML suspension Take 10 mL by mouth Daily for 30 days.  Patient taking differently: Take 10 mL by mouth Daily. REPORTS HAS NOT STARTED AS OF YET 10/30/23 10/26/23 10/31/23  West Nicolas MD   ergocalciferol (ERGOCALCIFEROL) 1.25 MG (33293 UT) capsule 50,000 Int'l Units. 10/10/23 10/30/23  Provider, MD Herson   Fluticasone-Umeclidin-Vilant (Trelegy Ellipta) 100-62.5-25 MCG/ACT inhaler Inhale 1 puff  Daily. 9/8/23 10/30/23  Marianela Hernandez APRN   lenalidomide (Revlimid) 25 MG capsule Take 1 capsule by mouth Daily for 21 days. Take daily on Days 1-21, and off 7 days. Do not crush, break or chew. 10/30/23 10/30/23  West Nicolas MD   predniSONE (DELTASONE) 20 MG tablet Take 2 tablets by mouth Daily. 8/15/23 10/30/23  Radha Mena APRN       Drug Interactions  Reviewed concomitant medications, allergies, labs, comorbidities/medical history, quality of life, and immunization history.   Drug-drug interactions noted and discussed during education: no significant drug interactions noted. . Reminded the patient to let us know before making any changes or starting any new prescription or OTC medications so we can first assess drug interactions.  Drug-food interactions noted and discussed during education. Patient was instructed to avoid  None     Recommended Medications Assessment  Bone Health (such as calcium/vitamin D, bisphosphonate, RANKL inhibitor) - Not Indicated   VTE prophylaxis - Indicated, not currently taking The patient will start taking Aspirin 81 mg PO daily as directed by provider  Prophylactic antimicrobials - Indicated, not currently taking The patient will start taking Antiviral: acyclovir 400 mg by mouth twice daily and PJP Prophylaxis: Dapsone 50 mg twice daily (of note, Rx for atovaquone originally sent to pharmacy due to patient sulfa antibiotic allergy listed; however, cost was over $300 and pt could not afford. Discussed with provider that pt could try Dapsone. Discussed with pt that cross-allergenicity is rare and unpredictable (per LexiComp), but possible. We discussed s/sx of allergy reaction and importance of communicating any allergic reaction. Pt verbalized understanding.  Tumor lysis syndrome prophylaxis - Risk for TLS Low. Adequate hydration was discussed during education.    Relevant Laboratory Values  Lab Results   Component Value Date    GLUCOSE 118 (H) 10/27/2023    CALCIUM  8.6 10/27/2023     10/27/2023    K 3.2 (L) 10/27/2023    CO2 17.8 (L) 10/27/2023     10/27/2023    BUN 12 10/27/2023    CREATININE 0.75 10/27/2023    EGFRIFNONA 53 (L) 08/25/2021    BCR 16.0 10/27/2023    ANIONGAP 11.2 10/27/2023     Lab Results   Component Value Date    WBC 6.97 10/27/2023    RBC 3.54 (L) 10/27/2023    HGB 11.3 (L) 10/27/2023    HCT 35.7 10/27/2023    .8 (H) 10/27/2023    MCH 31.9 10/27/2023    MCHC 31.7 10/27/2023    RDW 16.8 (H) 10/27/2023    RDWSD 61.7 (H) 10/27/2023    MPV 9.1 10/27/2023     10/27/2023    NEUTRORELPCT 39.7 (L) 10/27/2023    LYMPHORELPCT 47.6 (H) 10/27/2023    MONORELPCT 10.0 10/27/2023    EOSRELPCT 1.3 10/27/2023    BASORELPCT 0.4 10/27/2023    AUTOIGPER 1.0 (H) 10/27/2023    NEUTROABS 2.76 10/27/2023    LYMPHSABS 3.32 (H) 10/27/2023    MONOSABS 0.70 10/27/2023    EOSABS 0.09 10/27/2023    BASOSABS 0.03 10/27/2023    AUTOIGNUM 0.07 (H) 10/27/2023    NRBC 0.4 (H) 10/27/2023       The above labs have been reviewed. No dose adjustments are needed for the oral specialty medication(s) based on the labs.    Initial Education Provided for Specialty Medication  The patient has been provided with the following education. All questions and concerns have been addressed prior to the patient receiving the medication, and the patient has verbalized understanding of the education and any materials provided.  Additional patient education shall be provided and documented upon request by the patient, provider or payer.      Provided patient with:   Chemo calendar to help improve adherence., Education sheets about the medication, 24-hour clinic phone number and my contact information and instructions to call should additional questions arise.     Medication Education Sheets Provided: (select all that apply)  Oral Specialty Medication: Revlimid (lenalidomide)  IV: Darzalex Faspro (daratumumab) SQ Therapy  Steroid: Dexamethasone    Other Education Sheets Provided: (select  all that apply)  CINV and Diarrhea    TOPICS COMMENTS   Storage and Handling of Oral Specialty Medication Store in the original container, in a dry location out of direct sunlight, and out of reach of children or pets. and Store at room temperature.  Discussed safe handling and what to do with any unused medication.   Administration of Oral Specialty Medication Take with or without food at the same time(s) each day. and Do not open or dissolve capsules, unless otherwise instructed by the pharmacist.   Adherence to Oral Specialty Regimen and Handling Missed Doses Patient is likely to have good treatment adherence; reinforced the importance of adherence. Reviewed how to address missed doses and encouraged the patient to let us know of any missed doses.   Anemia: role of RBC, cause, s/s, ways to manage, role of transfusion Reviewed the role of RBC and the use of transfusions if hemoglobin decreases too much.  Patient to notify us if she experiences shortness of breath, dizziness, or palpitations.  Also let patient know that she could feel more tired than usual and to try to stay active, but rest if she needs to.    Thrombocytopenia: role of platelet, cause, s/s, ways to prevent bleeding, things to avoid, when to seek help Reviewed the role of platelets in blood clotting and when to call clinic (bloody nose that bleeds for 5 minutes despite pressure, a cut that won't stop bleeding despite pressure, gums that bleed excessively with brushing or flossing). Recommended using an electric razor, soft bristle toothbrush, and blowing your nose gently.    Neutropenia: role of WBC, cause, infection precautions, s/s of infection, when to call MD Reviewed the role of WBC, good infection prevention practices, and when to call the clinic (temperature 100.4F, sore throat, burning urination, etc).  COVID Vaccines:  Received vaccine in past  Flu Vaccine: 2023 flu vaccine received   Nutrition and Appetite Changes:  importance of  maintaining healthy diet & weight, ways to manage to improve intake, dietary consult, exercise regimen, electrolyte and/or blood glucose abnormalities Steroid-Induced Heartburn: This can occur with high-dose steroids. Instructed the patient to notify the clinic if it becomes problematic. Medication (this will be discussed with provider) was started to prevent heartburn., Increased Blood Sugar: This patient's oncology therapy can increase blood sugar.  Recommended that the patient monitor blood sugar more closely and contact their primary care provider for management recommendations if blood glucose values start trending upward., Electrolyte Abnormalities:  Explained that the oncology therapy may lead to abnormalities in electrolytes, specifically: low calcium   Diarrhea: causes, s/s of dehydration, ways to manage, dietary changes, when to call MD Both: Discussed the risk of diarrhea and immune related colitis. Can use OTC loperamide with diarrhea onset, but call the clinic if 4-6 episodes in 24 hours not relieved by OTC loperamide. Discussed the role of high-dose steroids for the treatment of immune related colitis.    Constipation: causes, ways to manage, dietary changes, when to call MD Provided supplementary handout with instructions for use of docusate and other OTC therapies to manage constipation.  Patient was instructed to call us if medications aren't working.   Nausea/Vomiting: cause, use of antiemetics, dietary changes, when to call MD Emetic risk: Moderate to high  Premeds: Dexamethasone  - pt to take as part of treatment as well. Discussed importance of taking dexamethasone morning before her Darzalex Faspro injection  PRN home meds: Ondansetron  Pharmacy home meds sent to:     Instructed the patient to take a dose of the PRN medication at the first onset of nausea and if it's not working to call us for additional medications.  Also provided non-drug measures to mitigate nausea.   Mouth Sores: causes,  oral care, ways to manage    Alopecia: cause, ways to manage, resources    Nervous System Changes: causes, s/s, neuropathies, cognitive changes, ways to manage Discussed possibility of fatigue and importance of continuing ADL, but resting when needed.   Pain: causes, ways to manage Chemo: Discussed muscle and joint aches/pains with chemotherapy, and recommended the use of OTC pain relief with ibuprofen or acetaminophen if needed.   Skin/Nail Changes: cause, s/s, ways to manage Immunotherapy - Discussed potential for a rash or itchy skin from immunotherapy, offered nonpharmacologic prevention strategies, and instructed patient to call if a rash develops that worsens or gets larger, Discussed s/sx of allergic reaction that may be associated with any medication prescribed. Discussed importance of going to ER if she experiences SOB, swelling of lips, throat or mouth and/or excessive hives   Organ Toxicities: cause, s/s, need for diagnostic tests, labs, when to notify MD Discussed potential effects on organ systems, monitoring, diagnostic tests, labs, and when to notify the MD. Discussed the signs/symptoms of the following: cardiotoxicity, hepatotoxicity, hypertension - recommended close monitoring of blood pressure, provided BP log, and explained how to track values, lung changes, and skin changes.   Infusion-Related Reactions or Injection-Site Reaction: Cause, s/s, anaphylaxis, monitoring, etc. Premeds: Acetaminophen, Dexamethasone, and Diphenhydramine (pt will receive APAP and diphenhydramine during infusion visit)  The patient was instructed to take the following medications at home to prevent infusion reaction: Dexamethasone.  These were sent to [facility] pharmacy or the patient was instructed to purchase over the counter.     Discussed the risk of an infusion reaction and symptoms such as: fever, chills, dizziness, itchiness or rash, flushing, trouble breathing, wheezing, sudden back pain, or feeling faint.  Instructed  the patient to notify her nurse if she starts feeling weird at any point during the infusion.    Discussed with pt that her injection is given SQ in the abdomen about 3 inches on either side of her navel; Discussed with pt that nurse will administer slowly over 3-5 minutes    Discussed the possibility of injection-site reaction and the need for mandatory monitoring at the infusion center    Reviewed how infusion or injection-site reactions are managed.   Survivorship: distress, distress assessment, secondary malignancies, early/late effects, follow-up, social issues, social support Discussed the rare, but possible risk of secondary malignancies months to years after treatment, most commonly acute myeloid leukemia., non-melanoma skin cancer.   Miscellaneous Financial Issues: Unknown - Requested PCC contact dispensing pharmacy to ensure no financial barriers  Lab Draws:  Baseline, then with each infusion visit (weekly for first 2 cycles, then every 2 weeks for cycle 3-6, then monthly thereafter).  Fluid Retention: Explained the signs/symptoms of fluid retention around ankles, feet, and possibly in the hands.  Reviewed strategies to minimize this and recommended that the patient call the clinic if @HE@ gains 5 or more pounds in 1 week or experiences shortness of breath without exertion.    Blood Clots: Explained the rare, but possible risk of DVT or PE.  Reviewed the signs and symptoms of VTE and stressed the urgency to call 911 immediately. Patient will start ASA 81 mg daily to minimize the risk of blood clots.     Infertility and Sexuality:  causes, fertility preservation options, sexuality changes, ways to manage, importance of birth control Oral Oncology Therapy: Reviewed safe sex practices and the importance of minimizing exposure to body fluids while on oral oncology therapy., The patient is not of childbearing potential. Discussed with pt the importance of not sharing Revlimid with any one and that she is unable  to donate blood as Revlimid carries warning of birth defects.   Home Care: how to manage bodily fluids Counseled on management of soiled linens and proper flush technique.  Discussed how to manage all the side effects at home and advised when to contact the MD office     Adherence and Self-Administration  Barriers to Patient Adherence and/or Self-Administration: None   Methods for Supporting Patient Adherence and/or Self-Administration: dosing calendar  Expected duration of therapy:  12 planned cycles    Goals of Therapy  Patient Goals of Therapy:   Consistently take medications as prescribed  Patient will adhere to medication regimen  Patient will report any medication side effects to healthcare provider  Clinical Goals:    Goals Addressed Today        Specialty Pharmacy General Goal      Clinical goal/therapeutic target: disease control, per the recent oncology clinic notes and labs.             Support patient understanding of medication regimen  Ensure patient knows the pharmacy contact information  Schedule regular follow-up to monitor the treatment serious adverse events  Schedule regular follow-up to confirm medication adherence  Schedule regular follow-up to monitor disease progression or stability      Reassessment Plan & Follow-Up  Pharmacist to perform regular reassessments no more than (6) months from the previous assessment.  Welcome information and patient satisfaction survey to be sent by retail team with patient's initial fill.  Care Coordinator to set up future refill outreaches, coordinate prescription delivery, and escalate clinical questions to pharmacist.     Additional Plans, Therapy Recommendations or Therapy Problems to Be Addressed: Determine any financial barriers, have pt sign all consent forms; Pt received electronic version of all education information, but also provide pt with paper copy on day of infusion as well.    Attestation  I attest the patient was actively involved in and has  agreed to the above plan of care. If the prescribed therapy is at any point deemed not appropriate based on the current or future assessments, a consultation will be initiated with the patient's specialty care provider to determine the best course of action. The revised plan of therapy will be documented along with any required assessments and/or additional patient education provided.     Jaylene Broderick, PharmD    Date and Time: 11/1/2023  12:49 EDT

## 2023-11-01 NOTE — TELEPHONE ENCOUNTER
"  Caller: Anca Samson \"Sarah\"    Relationship: Self    Best call back number: [phone]    What is the best time to reach you: ANYTIME    Who are you requesting to speak with (clinical staff, provider,  specific staff member): JAYCE        What was the call regarding: NEED TO TALK TO  JAYCE ABOUT CHEMO MEDICATION GETTING.       "

## 2023-11-03 ENCOUNTER — TELEPHONE (OUTPATIENT)
Dept: ONCOLOGY | Facility: HOSPITAL | Age: 73
End: 2023-11-03

## 2023-11-03 NOTE — TELEPHONE ENCOUNTER
"Caller: Anca Samson \"Sarah\"    Relationship to patient: Self    Best call back number: [phone]    Patient is needing: TO TELL SHAILA THAT THE PHARMACY DID NOT CALL YESTERDAY. SHE HAS NOT HEARD ABOUT THE CHEMO MEDICATION.  "

## 2023-11-06 ENCOUNTER — OFFICE VISIT (OUTPATIENT)
Dept: PULMONOLOGY | Facility: CLINIC | Age: 73
End: 2023-11-06
Payer: MEDICARE

## 2023-11-06 VITALS
DIASTOLIC BLOOD PRESSURE: 46 MMHG | RESPIRATION RATE: 18 BRPM | OXYGEN SATURATION: 96 % | SYSTOLIC BLOOD PRESSURE: 131 MMHG | HEIGHT: 61 IN | BODY MASS INDEX: 39.59 KG/M2 | WEIGHT: 209.7 LBS | HEART RATE: 90 BPM

## 2023-11-06 DIAGNOSIS — C90.00 MULTIPLE MYELOMA, REMISSION STATUS UNSPECIFIED: Primary | ICD-10-CM

## 2023-11-06 DIAGNOSIS — G47.19 EXCESSIVE DAYTIME SLEEPINESS: ICD-10-CM

## 2023-11-06 DIAGNOSIS — R06.00 DYSPNEA, UNSPECIFIED TYPE: ICD-10-CM

## 2023-11-06 DIAGNOSIS — G47.33 OSA (OBSTRUCTIVE SLEEP APNEA): ICD-10-CM

## 2023-11-06 DIAGNOSIS — J44.9 CHRONIC OBSTRUCTIVE PULMONARY DISEASE, UNSPECIFIED COPD TYPE: ICD-10-CM

## 2023-11-06 DIAGNOSIS — R06.83 SNORING: ICD-10-CM

## 2023-11-06 DIAGNOSIS — R91.8 LUNG NODULES: ICD-10-CM

## 2023-11-06 DIAGNOSIS — J96.11 CHRONIC RESPIRATORY FAILURE WITH HYPOXIA: ICD-10-CM

## 2023-11-06 PROCEDURE — 1159F MED LIST DOCD IN RCRD: CPT | Performed by: NURSE PRACTITIONER

## 2023-11-06 PROCEDURE — 3078F DIAST BP <80 MM HG: CPT | Performed by: NURSE PRACTITIONER

## 2023-11-06 PROCEDURE — 99214 OFFICE O/P EST MOD 30 MIN: CPT | Performed by: NURSE PRACTITIONER

## 2023-11-06 PROCEDURE — 3075F SYST BP GE 130 - 139MM HG: CPT | Performed by: NURSE PRACTITIONER

## 2023-11-06 PROCEDURE — 1160F RVW MEDS BY RX/DR IN RCRD: CPT | Performed by: NURSE PRACTITIONER

## 2023-11-06 NOTE — PROGRESS NOTES
Specialty Pharmacy Patient Management Program  Oncology Refill Outreach      [facility]  Revlimid rcvd: 11/06/2023  Revlimid started: 11/07/2023     Shawanda West  Care Coordinator, [facility]  11/6/2023  12:14 EST

## 2023-11-07 ENCOUNTER — DOCUMENTATION (OUTPATIENT)
Dept: ONCOLOGY | Facility: HOSPITAL | Age: 73
End: 2023-11-07
Payer: MEDICARE

## 2023-11-07 ENCOUNTER — HOSPITAL ENCOUNTER (OUTPATIENT)
Dept: ONCOLOGY | Facility: HOSPITAL | Age: 73
Discharge: HOME | End: 2023-11-07
Admitting: INTERNAL MEDICINE
Payer: MEDICARE

## 2023-11-07 ENCOUNTER — DOCUMENTATION (OUTPATIENT)
Dept: NUTRITION | Facility: HOSPITAL | Age: 73
End: 2023-11-07
Payer: MEDICARE

## 2023-11-07 VITALS
TEMPERATURE: 97.6 F | RESPIRATION RATE: 20 BRPM | DIASTOLIC BLOOD PRESSURE: 64 MMHG | HEART RATE: 88 BPM | BODY MASS INDEX: 41.38 KG/M2 | OXYGEN SATURATION: 98 % | HEIGHT: 60 IN | WEIGHT: 210.76 LBS | SYSTOLIC BLOOD PRESSURE: 143 MMHG

## 2023-11-07 DIAGNOSIS — C90.00 MULTIPLE MYELOMA, REMISSION STATUS UNSPECIFIED: Primary | ICD-10-CM

## 2023-11-07 DIAGNOSIS — Z45.2 ENCOUNTER FOR ADJUSTMENT OR MANAGEMENT OF VASCULAR ACCESS DEVICE: ICD-10-CM

## 2023-11-07 LAB
ALBUMIN SERPL-MCNC: 3.3 G/DL (ref 3.5–5.2)
ALBUMIN/GLOB SERPL: 0.5 G/DL
ALP SERPL-CCNC: 51 U/L (ref 39–117)
ALT SERPL W P-5'-P-CCNC: 10 U/L (ref 1–33)
ANION GAP SERPL CALCULATED.3IONS-SCNC: 4.1 MMOL/L (ref 5–15)
AST SERPL-CCNC: 18 U/L (ref 1–32)
BASOPHILS # BLD AUTO: 0.02 10*3/MM3 (ref 0–0.2)
BASOPHILS NFR BLD AUTO: 0.4 % (ref 0–1.5)
BILIRUB SERPL-MCNC: 0.4 MG/DL (ref 0–1.2)
BUN SERPL-MCNC: 10 MG/DL (ref 8–23)
BUN/CREAT SERPL: 12.8 (ref 7–25)
CALCIUM SPEC-SCNC: 8.5 MG/DL (ref 8.6–10.5)
CHLORIDE SERPL-SCNC: 107 MMOL/L (ref 98–107)
CO2 SERPL-SCNC: 25.9 MMOL/L (ref 22–29)
CREAT SERPL-MCNC: 0.78 MG/DL (ref 0.57–1)
DEPRECATED RDW RBC AUTO: 62.3 FL (ref 37–54)
EGFRCR SERPLBLD CKD-EPI 2021: 79.8 ML/MIN/1.73
EOSINOPHIL # BLD AUTO: 0.14 10*3/MM3 (ref 0–0.4)
EOSINOPHIL NFR BLD AUTO: 2.6 % (ref 0.3–6.2)
ERYTHROCYTE [DISTWIDTH] IN BLOOD BY AUTOMATED COUNT: 17 % (ref 12.3–15.4)
GLOBULIN UR ELPH-MCNC: 6.1 GM/DL
GLUCOSE SERPL-MCNC: 139 MG/DL (ref 65–99)
HCT VFR BLD AUTO: 34.1 % (ref 34–46.6)
HGB BLD-MCNC: 11.1 G/DL (ref 12–15.9)
IMM GRANULOCYTES # BLD AUTO: 0.07 10*3/MM3 (ref 0–0.05)
IMM GRANULOCYTES NFR BLD AUTO: 1.3 % (ref 0–0.5)
LYMPHOCYTES # BLD AUTO: 2.32 10*3/MM3 (ref 0.7–3.1)
LYMPHOCYTES NFR BLD AUTO: 42.7 % (ref 19.6–45.3)
MCH RBC QN AUTO: 32.4 PG (ref 26.6–33)
MCHC RBC AUTO-ENTMCNC: 32.6 G/DL (ref 31.5–35.7)
MCV RBC AUTO: 99.4 FL (ref 79–97)
MONOCYTES # BLD AUTO: 0.44 10*3/MM3 (ref 0.1–0.9)
MONOCYTES NFR BLD AUTO: 8.1 % (ref 5–12)
NEUTROPHILS NFR BLD AUTO: 2.44 10*3/MM3 (ref 1.7–7)
NEUTROPHILS NFR BLD AUTO: 44.9 % (ref 42.7–76)
PLATELET # BLD AUTO: 238 10*3/MM3 (ref 140–450)
PMV BLD AUTO: 8.3 FL (ref 6–12)
POTASSIUM SERPL-SCNC: 3.2 MMOL/L (ref 3.5–5.2)
PROT SERPL-MCNC: 9.4 G/DL (ref 6–8.5)
RBC # BLD AUTO: 3.43 10*6/MM3 (ref 3.77–5.28)
SODIUM SERPL-SCNC: 137 MMOL/L (ref 136–145)
WBC NRBC COR # BLD: 5.43 10*3/MM3 (ref 3.4–10.8)

## 2023-11-07 PROCEDURE — 25010000002 DIPHENHYDRAMINE PER 50 MG: Performed by: INTERNAL MEDICINE

## 2023-11-07 PROCEDURE — 63710000001 ACETAMINOPHEN EXTRA STRENGTH 500 MG TABLET: Performed by: INTERNAL MEDICINE

## 2023-11-07 PROCEDURE — 96375 TX/PRO/DX INJ NEW DRUG ADDON: CPT

## 2023-11-07 PROCEDURE — 25010000002 DARATUMUMAB-HYALURONIDASE-FIHJ 1800-30000 MG-UT/15ML SOLUTION: Performed by: INTERNAL MEDICINE

## 2023-11-07 PROCEDURE — A9270 NON-COVERED ITEM OR SERVICE: HCPCS | Performed by: INTERNAL MEDICINE

## 2023-11-07 PROCEDURE — 25810000003 SODIUM CHLORIDE 0.9 % SOLUTION: Performed by: INTERNAL MEDICINE

## 2023-11-07 PROCEDURE — 96374 THER/PROPH/DIAG INJ IV PUSH: CPT

## 2023-11-07 PROCEDURE — 80053 COMPREHEN METABOLIC PANEL: CPT | Performed by: INTERNAL MEDICINE

## 2023-11-07 PROCEDURE — 85025 COMPLETE CBC W/AUTO DIFF WBC: CPT | Performed by: INTERNAL MEDICINE

## 2023-11-07 PROCEDURE — 96401 CHEMO ANTI-NEOPL SQ/IM: CPT

## 2023-11-07 PROCEDURE — 25010000002 METHYLPREDNISOLONE PER 125 MG: Performed by: INTERNAL MEDICINE

## 2023-11-07 PROCEDURE — 25010000002 HEPARIN LOCK FLUSH PER 10 UNITS: Performed by: INTERNAL MEDICINE

## 2023-11-07 PROCEDURE — 63710000001 MONTELUKAST 10 MG TABLET: Performed by: INTERNAL MEDICINE

## 2023-11-07 RX ORDER — FAMOTIDINE 10 MG/ML
20 INJECTION, SOLUTION INTRAVENOUS AS NEEDED
Status: DISCONTINUED | OUTPATIENT
Start: 2023-11-07 | End: 2023-11-08 | Stop reason: HOSPADM

## 2023-11-07 RX ORDER — MEPERIDINE HYDROCHLORIDE 25 MG/ML
25 INJECTION INTRAMUSCULAR; INTRAVENOUS; SUBCUTANEOUS
Status: CANCELLED | OUTPATIENT
Start: 2023-11-07

## 2023-11-07 RX ORDER — MEPERIDINE HYDROCHLORIDE 25 MG/ML
25 INJECTION INTRAMUSCULAR; INTRAVENOUS; SUBCUTANEOUS
Status: ACTIVE | OUTPATIENT
Start: 2023-11-07 | End: 2023-11-07

## 2023-11-07 RX ORDER — ACETAMINOPHEN 500 MG
1000 TABLET ORAL ONCE
OUTPATIENT
Start: 2023-11-28

## 2023-11-07 RX ORDER — MEPERIDINE HYDROCHLORIDE 25 MG/ML
25 INJECTION INTRAMUSCULAR; INTRAVENOUS; SUBCUTANEOUS
OUTPATIENT
Start: 2023-11-14

## 2023-11-07 RX ORDER — DIPHENHYDRAMINE HYDROCHLORIDE 50 MG/ML
50 INJECTION INTRAMUSCULAR; INTRAVENOUS AS NEEDED
OUTPATIENT
Start: 2023-11-28

## 2023-11-07 RX ORDER — FAMOTIDINE 10 MG/ML
20 INJECTION, SOLUTION INTRAVENOUS AS NEEDED
Status: CANCELLED | OUTPATIENT
Start: 2023-11-07

## 2023-11-07 RX ORDER — ACETAMINOPHEN 500 MG
1000 TABLET ORAL ONCE
OUTPATIENT
Start: 2023-11-14

## 2023-11-07 RX ORDER — ACETAMINOPHEN 500 MG
1000 TABLET ORAL ONCE
Status: CANCELLED | OUTPATIENT
Start: 2023-11-07

## 2023-11-07 RX ORDER — SODIUM CHLORIDE 9 MG/ML
20 INJECTION, SOLUTION INTRAVENOUS ONCE
Status: COMPLETED | OUTPATIENT
Start: 2023-11-07 | End: 2023-11-07

## 2023-11-07 RX ORDER — METHYLPREDNISOLONE SODIUM SUCCINATE 125 MG/2ML
60 INJECTION, POWDER, LYOPHILIZED, FOR SOLUTION INTRAMUSCULAR; INTRAVENOUS ONCE
OUTPATIENT
Start: 2023-11-28

## 2023-11-07 RX ORDER — SODIUM CHLORIDE 0.9 % (FLUSH) 0.9 %
20 SYRINGE (ML) INJECTION AS NEEDED
OUTPATIENT
Start: 2023-11-07

## 2023-11-07 RX ORDER — MEPERIDINE HYDROCHLORIDE 25 MG/ML
25 INJECTION INTRAMUSCULAR; INTRAVENOUS; SUBCUTANEOUS
OUTPATIENT
Start: 2023-11-21

## 2023-11-07 RX ORDER — METHYLPREDNISOLONE SODIUM SUCCINATE 125 MG/2ML
100 INJECTION, POWDER, LYOPHILIZED, FOR SOLUTION INTRAMUSCULAR; INTRAVENOUS ONCE
Status: COMPLETED | OUTPATIENT
Start: 2023-11-07 | End: 2023-11-07

## 2023-11-07 RX ORDER — HEPARIN SODIUM (PORCINE) LOCK FLUSH IV SOLN 100 UNIT/ML 100 UNIT/ML
500 SOLUTION INTRAVENOUS AS NEEDED
Status: DISCONTINUED | OUTPATIENT
Start: 2023-11-07 | End: 2023-11-08 | Stop reason: HOSPADM

## 2023-11-07 RX ORDER — MONTELUKAST SODIUM 10 MG/1
10 TABLET ORAL ONCE
Status: COMPLETED | OUTPATIENT
Start: 2023-11-07 | End: 2023-11-07

## 2023-11-07 RX ORDER — SODIUM CHLORIDE 9 MG/ML
20 INJECTION, SOLUTION INTRAVENOUS ONCE
OUTPATIENT
Start: 2023-11-14

## 2023-11-07 RX ORDER — DIPHENHYDRAMINE HYDROCHLORIDE 50 MG/ML
50 INJECTION INTRAMUSCULAR; INTRAVENOUS AS NEEDED
Status: DISCONTINUED | OUTPATIENT
Start: 2023-11-07 | End: 2023-11-08 | Stop reason: HOSPADM

## 2023-11-07 RX ORDER — ACETAMINOPHEN 500 MG
1000 TABLET ORAL ONCE
Status: COMPLETED | OUTPATIENT
Start: 2023-11-07 | End: 2023-11-07

## 2023-11-07 RX ORDER — FAMOTIDINE 10 MG/ML
20 INJECTION, SOLUTION INTRAVENOUS AS NEEDED
OUTPATIENT
Start: 2023-11-14

## 2023-11-07 RX ORDER — SODIUM CHLORIDE 9 MG/ML
20 INJECTION, SOLUTION INTRAVENOUS ONCE
OUTPATIENT
Start: 2023-11-21

## 2023-11-07 RX ORDER — SODIUM CHLORIDE 9 MG/ML
20 INJECTION, SOLUTION INTRAVENOUS ONCE
OUTPATIENT
Start: 2023-11-28

## 2023-11-07 RX ORDER — SODIUM CHLORIDE 0.9 % (FLUSH) 0.9 %
20 SYRINGE (ML) INJECTION AS NEEDED
Status: DISCONTINUED | OUTPATIENT
Start: 2023-11-07 | End: 2023-11-08 | Stop reason: HOSPADM

## 2023-11-07 RX ORDER — DIPHENHYDRAMINE HYDROCHLORIDE 50 MG/ML
50 INJECTION INTRAMUSCULAR; INTRAVENOUS AS NEEDED
OUTPATIENT
Start: 2023-11-21

## 2023-11-07 RX ORDER — METHYLPREDNISOLONE SODIUM SUCCINATE 125 MG/2ML
60 INJECTION, POWDER, LYOPHILIZED, FOR SOLUTION INTRAMUSCULAR; INTRAVENOUS ONCE
OUTPATIENT
Start: 2023-11-21

## 2023-11-07 RX ORDER — FAMOTIDINE 10 MG/ML
20 INJECTION, SOLUTION INTRAVENOUS AS NEEDED
OUTPATIENT
Start: 2023-11-28

## 2023-11-07 RX ORDER — HEPARIN SODIUM (PORCINE) LOCK FLUSH IV SOLN 100 UNIT/ML 100 UNIT/ML
500 SOLUTION INTRAVENOUS AS NEEDED
OUTPATIENT
Start: 2023-11-07

## 2023-11-07 RX ORDER — METHYLPREDNISOLONE SODIUM SUCCINATE 125 MG/2ML
100 INJECTION, POWDER, LYOPHILIZED, FOR SOLUTION INTRAMUSCULAR; INTRAVENOUS ONCE
OUTPATIENT
Start: 2023-11-14

## 2023-11-07 RX ORDER — SODIUM CHLORIDE 9 MG/ML
20 INJECTION, SOLUTION INTRAVENOUS ONCE
Status: CANCELLED | OUTPATIENT
Start: 2023-11-07

## 2023-11-07 RX ORDER — MONTELUKAST SODIUM 10 MG/1
10 TABLET ORAL ONCE
Status: CANCELLED | OUTPATIENT
Start: 2023-11-07

## 2023-11-07 RX ORDER — DIPHENHYDRAMINE HYDROCHLORIDE 50 MG/ML
50 INJECTION INTRAMUSCULAR; INTRAVENOUS AS NEEDED
Status: CANCELLED | OUTPATIENT
Start: 2023-11-07

## 2023-11-07 RX ORDER — METHYLPREDNISOLONE SODIUM SUCCINATE 125 MG/2ML
100 INJECTION, POWDER, LYOPHILIZED, FOR SOLUTION INTRAMUSCULAR; INTRAVENOUS ONCE
Status: CANCELLED | OUTPATIENT
Start: 2023-11-07

## 2023-11-07 RX ORDER — FAMOTIDINE 10 MG/ML
20 INJECTION, SOLUTION INTRAVENOUS AS NEEDED
OUTPATIENT
Start: 2023-11-21

## 2023-11-07 RX ORDER — ACETAMINOPHEN 500 MG
1000 TABLET ORAL ONCE
OUTPATIENT
Start: 2023-11-21

## 2023-11-07 RX ORDER — MEPERIDINE HYDROCHLORIDE 25 MG/ML
25 INJECTION INTRAMUSCULAR; INTRAVENOUS; SUBCUTANEOUS
OUTPATIENT
Start: 2023-11-28

## 2023-11-07 RX ORDER — DIPHENHYDRAMINE HYDROCHLORIDE 50 MG/ML
50 INJECTION INTRAMUSCULAR; INTRAVENOUS AS NEEDED
OUTPATIENT
Start: 2023-11-14

## 2023-11-07 RX ADMIN — DARATUMUMAB AND HYALURONIDASE-FIHJ (HUMAN RECOMBINANT) 1800 MG: 1800; 30000 INJECTION SUBCUTANEOUS at 13:00

## 2023-11-07 RX ADMIN — MONTELUKAST 10 MG: 10 TABLET, FILM COATED ORAL at 11:59

## 2023-11-07 RX ADMIN — Medication 20 ML: at 14:50

## 2023-11-07 RX ADMIN — DIPHENHYDRAMINE HYDROCHLORIDE 50 MG: 50 INJECTION, SOLUTION INTRAMUSCULAR; INTRAVENOUS at 12:03

## 2023-11-07 RX ADMIN — SODIUM CHLORIDE 20 ML/HR: 9 INJECTION, SOLUTION INTRAVENOUS at 11:56

## 2023-11-07 RX ADMIN — METHYLPREDNISOLONE SODIUM SUCCINATE 100 MG: 125 INJECTION INTRAMUSCULAR; INTRAVENOUS at 12:01

## 2023-11-07 RX ADMIN — HEPARIN SODIUM (PORCINE) LOCK FLUSH IV SOLN 100 UNIT/ML 500 UNITS: 100 SOLUTION at 14:50

## 2023-11-07 RX ADMIN — ACETAMINOPHEN 1000 MG: 500 TABLET ORAL at 11:59

## 2023-11-07 NOTE — PROGRESS NOTES
Reason for Referral: Rounded with new patients at [facility] clinic    Diagnosis: Multiple myeloma    Distress Score: Needs to be completed location of Distress Screening: Not yet completed    PHQ Score: 0 Patient noted she is taking medication prescribed by her primary care physician.      Current Treatment Plan: Chemotherapy    Previous Cancer TX: Patient stated she had colon cancer in 2017 did not receive chemotherapy or radiation.    Mental Status: Patient was very pleasant and easy to engage.  Patient was oriented x4.  Patient seem to have her cognitive and memory intact.  Patient appeared to be stable and alert.  Patient reported that she has a history of being treated for a number of years first after a 2-week stay in inpatient, referred to psychiatrist in the community and upon that psychiatrist [facility], continued treatment through her PCP.  Patient denied any thoughts of suicide or homicide.  Patient's  was at her side and seem to be a good support system for her.  OSW provided education on the link between depression and anxiety and oxygen dependency.  OSW provided emotional support through active listening, empathizing, normalizing, and validating patient's thoughts and feelings.    Mental Health Treatment: Patient reported that she has a history of being treated for a number of years first after a 2-week stay in inpatient, referred to psychiatrist in the community and upon that psychiatrist [facility], continued treatment through her PCP.  Patient denied any thoughts of suicide or homicide.     Substance Use/Tobacco Use: Patient stated she smoked tobacco until June 2022.  Patient has no other reported substance use history.    Spirituality: Patient reported she is a member at [employer].    Hobbies: Patient reported that her hobbies are limited to watching TV, streaming music, and watching sci-fi documentaries.    Support systems: Patient reported her primary support system  is her , 2 daughters, 1 son, a 1 sister, and 1 brother.    Residential status/Who lives in the home: Patient reported that her and her  live in the home with no pets.    Transportation: Patient's  stated he will bring patient to and from her treatment and medical appointments.    Financial Concerns: Patient reported that she is disabled and her  is full-time employed.  Patient stated they have no concerns for meeting her basic needs.  And no concerns with her health insurance and coverage of the cost of providing her care.    Home Care Needs: Patient stated she has oxygen, cane, and CPAP machine in the home    Advanced Directive/Living Will: None on file    Insurance: MEDICARE A & B and Aet[employer] supplement    Resources/Referrals: OSW provided her business card and overview of oncology social work services.  Patient did not identify any barriers to care or any unmet needs.  Patient stated she appreciated meeting with the  today and receiving contact information in the event she needed additional supports.      Additional Comment: Patient's spouse stated he has completed his FMLA paperwork with intermittent leave.

## 2023-11-07 NOTE — PROGRESS NOTES
Outpatient Nutrition Oncology Assessment    Patient Name: Anca Samson  YOB: 1950  MRN: [id number]  Assessment Date: 11/7/2023    CLINICAL NUTRITION ASSESSMENT    Dx:  Multiple myeloma      Type of Cancer Treatment Darzalex Faspro          Reason for Assessment  Assessment         Current Problems:   Patient Active Problem List   Diagnosis Code    Anxiety F41.9    Asthma J45.909    Malignant neoplasm of ascending colon C18.2    COPD (chronic obstructive pulmonary disease) J44.9    Diverticulitis K57.92    Dysphagia R13.10    Heartburn R12    Hyperlipidemia E78.5    Essential hypertension I10    Impaired fasting glucose R73.01    Low back pain M54.50    Major depressive disorder F32.9    Multiple myeloma C90.00    Renal insufficiency N28.9    Seizures R56.9    Vitamin D deficiency E55.9    Tobacco abuse Z72.0    Class 2 severe obesity due to excess calories with serious comorbidity and body mass index (BMI) of 39.0 to 39.9 in adult E66.01, Z68.39    Chondromalacia of left knee M94.262    Diarrhea R19.7    S/P Left knee partial medial menisectomy and chondroplasty  Z98.890    Dyspnea R06.00    Lung nodules R91.8    Aftercare following surgery of left knee thorascopic partial medial meniscectomy, chondroplasty, 1/3/2022 Z47.89    Hypothyroidism E03.9    Atherosclerosis of native coronary artery of native heart with angina pectoris I25.119    Chronic respiratory failure with hypoxia J96.11    Excessive daytime sleepiness G47.19    Snoring R06.83    Encounter for screening for malignant neoplasm of colon Z12.11    History of colon polyps Z86.010    NICK (obstructive sleep apnea) G47.33    Encounter for adjustment or management of vascular access device Z45.2         Anthropometrics       Row Name 11/07/23 1255          Anthropometrics    Weight for Calculation 95.6 kg (210 lb 12.2 oz)                         Weight Hx  Wt Readings from Last 30 Encounters:   11/07/23 0959 95.6 kg (210 lb 12.2 oz)    11/06/23 0945 95.1 kg (209 lb 11.2 oz)   10/31/23 1017 95.8 kg (211 lb 3.2 oz)   10/27/23 1351 96 kg (211 lb 10.3 oz)   10/26/23 0855 95.7 kg (210 lb 15.7 oz)   09/26/23 0911 96.2 kg (212 lb)   09/20/23 1353 96.6 kg (212 lb 15.4 oz)   08/18/23 1112 97.5 kg (215 lb)   08/15/23 0959 96.6 kg (213 lb)   06/12/23 1104 97.3 kg (214 lb 9.6 oz)   03/30/23 1039 99 kg (218 lb 3.2 oz)   03/21/23 1259 98.3 kg (216 lb 11.4 oz)   02/23/23 1037 97.9 kg (215 lb 12.8 oz)   12/20/22 0902 95.4 kg (210 lb 5.1 oz)   12/16/22 0958 94.3 kg (208 lb)   12/19/22 1132 96.3 kg (212 lb 6.4 oz)   11/01/22 1940 92.3 kg (203 lb 7.8 oz)   11/01/22 1908 95.7 kg (211 lb)   10/11/22 1315 95.7 kg (211 lb)   09/08/22 0850 95.1 kg (209 lb 10.5 oz)   08/26/22 1344 93.4 kg (206 lb)   08/19/22 1200 94.3 kg (208 lb)   05/19/22 1114 88.5 kg (195 lb)   04/12/22 0848 88.7 kg (195 lb 9.6 oz)   04/12/22 0846 88.7 kg (195 lb 9.6 oz)   03/29/22 1353 89.4 kg (197 lb)   03/08/22 1343 88.9 kg (195 lb 15.8 oz)   02/18/22 0948 90.4 kg (199 lb 6.4 oz)   02/17/22 1052 89.8 kg (198 lb)   01/18/22 0923 88.5 kg (195 lb)   01/03/22 0800 88.5 kg (195 lb 1.7 oz)         Estimated/Assessed Needs - Anthropometrics       Row Name 11/07/23 1255          Anthropometrics    Weight for Calculation 95.6 kg (210 lb 12.2 oz)        Estimated/Assessed Needs    Additional Documentation Fluid Requirements (Group);KCAL/KG (Group);Protein Requirements (Group)        KCAL/KG    KCAL/KG 25 Kcal/Kg (kcal)     25 Kcal/Kg (kcal) 2390        Protein Requirements    Weight Used For Protein Calculations 45.5 kg (100 lb 5 oz)     Est Protein Requirement Amount (gms/kg) 1.3 gm protein     Estimated Protein Requirements (gms/day) 59.15        Fluid Requirements    Fluid Requirements (mL/day) 2390     RDA Method (mL) 2390                      Labs/Medications        Pertinent Labs Reviewed.   Results from last 7 days   Lab Units 11/07/23  1018   SODIUM mmol/L 137   POTASSIUM mmol/L 3.2*   CHLORIDE  mmol/L 107   CO2 mmol/L 25.9   BUN mg/dL 10   CREATININE mg/dL 0.78   CALCIUM mg/dL 8.5*   BILIRUBIN mg/dL 0.4   ALK PHOS U/L 51   ALT (SGPT) U/L 10   AST (SGOT) U/L 18   GLUCOSE mg/dL 139*     Results from last 7 days   Lab Units 11/07/23  1018   HEMOGLOBIN g/dL 11.1*   HEMATOCRIT % 34.1       Pertinent Medications FLUoxetine, HYDROcodone-acetaminophen, Sod Picosulfate-Mag Ox-Cit Acd, acyclovir, albuterol sulfate HFA, arformoterol, budesonide, colestipol, dapsone, dexAMETHasone, lenalidomide, levothyroxine, meloxicam, ondansetron, promethazine, revefenacin, and vitamin D     Current Nutrition Orders & Evaluation of Intake       Oral Nutrition     Current PO Diet Reports to consume 1 good meal per day (no snacks or other meals)   Supplement      Nutrition Diagnosis        Nutrition Dx Problem 1 Increased nutrient needs related to increased nutrient needs due to catabolic disease as evidenced by physiological causes increasing nutrient needs.       Nutrition Intervention       RD Action Nutrition assessment (review of medical record + pt / spouse interview):  Reviewed recent nutrition-related concerns (none)  Recent wt changes (pt reports a 50# wt gain recently- see below)  Reviewed current diet    Discussed:  Importance of very gradual wt decline during tx (1/2#/week and only if pt not experiencing nutrition concerns or wt maintenance  Importance of adequate proteins & fluids for hydration  Sources of protein  Potential tx-related side effects (n/v, diarrhea, mucositis)    Written materials given:  Nausea & Vomiting  Diarrhea  Oral Care  High Calorie, High Protein Foods     Monitor/Evaluation       Monitor Per oncology nutrition protocol.     Comments:    Initial infusion today.  Pt denies current nutrition concerns.  She reports a 50# wt gain after she stopped smoking, however her wt has been fairly stable for the past 11 months (wt range of 210-212# on average).  Discussed all of the above with pt & spouse- both  v/u.    Electronically signed by:  Yin Rinaldi RD  11/07/23 12:56 EST

## 2023-11-07 NOTE — PROGRESS NOTES
"Presents for C1D1 of daratumumab/hyaluronidase      95.6 kg (210 lb 12.2 oz)  153.5 cm (60.43\")  Body surface area is 1.92 meters squared.    Doses verified using today's parameters and are within acceptable limits of variation and rounding.     Labs reviewed and are within EPIC hold parameter limits to proceed.     Patient was educated on information about each medication including dose, route, frequency, and common adverse effects. Patient was provided both verbal and written counseling. UpToDate patient information printed and provided to patient and key information verbally highlighted including: Overview of regimen including but not limited to infusion times; recognition and management of allergic/infusion reactions; \"call your doctor right away\" parameters.     All of the patient's questions were answered and patient expressed verbal understanding    "

## 2023-11-08 ENCOUNTER — ANESTHESIA EVENT (OUTPATIENT)
Dept: GASTROENTEROLOGY | Facility: HOSPITAL | Age: 73
End: 2023-11-08
Payer: MEDICARE

## 2023-11-08 NOTE — ANESTHESIA PREPROCEDURE EVALUATION
" Anesthesia Evaluation     Patient summary reviewed and Nursing notes reviewed   NPO Solid Status: > 8 hours  NPO Liquid Status: > 2 hours           Airway   Mallampati: II  TM distance: >3 FB  Neck ROM: full  No difficulty expected and Large neck circumference  Dental    (+) upper dentures        Pulmonary    (+) COPD mild, asthma,home oxygen (2L all the time per pt), shortness of breath, sleep apnea (2L O2 at night) on CPAP, decreased breath sounds    ROS comment: PFT 8/25/23  Impression:  Mild obstruction seen on spirometry.  FEV1 79% predicted.  No significant response to bronchodilator.  Lung volumes with evidence of air trapping.  Significant reduction in DLCO.  Cardiovascular   Exercise tolerance: poor (<4 METS)    ECG reviewed  Rhythm: regular  Rate: normal    (+) hypertension, CAD, angina with exertion, PRAKASH, hyperlipidemia    ROS comment: Echo 9/6/23  Normal left ventricular systolic function.  No significant valve abnormalities noted.    Stress test 9/22/23  ·  Left ventricular ejection fraction is normal (Calculated EF = 55%).  ·  Myocardial perfusion imaging indicates a normal myocardial perfusion study with no evidence of ischemia.  ·  Findings consistent with a normal ECG stress test.       Neuro/Psych  (+) seizures, psychiatric history Anxiety and Depression  GI/Hepatic/Renal/Endo    (+) obesity, morbid obesity, renal disease- CRI, thyroid problem hypothyroidism    Musculoskeletal     Abdominal     Abdomen: soft.   Substance History      OB/GYN          Other   arthritis,   history of cancer    ROS/Med Hx Other: \"I get nauseated at nighttime sometimes\"    ECHO 09/06/23:   Left Ventricle Left ventricular systolic function is normal. Calculated left ventricular EF = 61%     Normal left ventricular cavity size and wall thickness noted. All left ventricular wall segments contract normally.  Right Ventricle Normal right ventricular cavity size, wall thickness, systolic function and septal motion " noted.  Left Atrium Normal left atrial size and volume noted.  Right Atrium Normal right atrial cavity size noted.  Aortic Valve No aortic valve regurgitation or stenosis is present. The aortic valve is abnormal in structure. The aortic valve exhibits sclerosis.  Mitral Valve The mitral valve is structurally normal with no regurgitation or significant stenosis present.  Tricuspid Valve The tricuspid valve is structurally normal with no significant regurgitation or significant stenosis present. Estimated right ventricular systolic pressure from tricuspid regurgitation is normal (<35 mmHg).  Pulmonic Valve The pulmonic valve is structurally normal with no regurgitation or significant stenosis present.  Greater Vessels. No dilation of the aortic root is present.  Pericardium The pericardium is normal. There is no evidence of pericardial effusion. .    EKG 11/01/22: SR 82                            Anesthesia Plan    ASA 4     general     (Total IV Anesthesia    Patient understands anesthesia not responsible for dental damage.  )  intravenous induction     Anesthetic plan, risks, benefits, and alternatives have been provided, discussed and informed consent has been obtained with: patient and spouse/significant other.    Plan discussed with CRNA.        CODE STATUS:

## 2023-11-09 ENCOUNTER — TELEPHONE (OUTPATIENT)
Dept: ONCOLOGY | Facility: HOSPITAL | Age: 73
End: 2023-11-09
Payer: MEDICARE

## 2023-11-09 ENCOUNTER — ANESTHESIA (OUTPATIENT)
Dept: GASTROENTEROLOGY | Facility: HOSPITAL | Age: 73
End: 2023-11-09
Payer: MEDICARE

## 2023-11-09 ENCOUNTER — HOSPITAL ENCOUNTER (OUTPATIENT)
Facility: HOSPITAL | Age: 73
Setting detail: HOSPITAL OUTPATIENT SURGERY
Discharge: HOME | End: 2023-11-09
Attending: INTERNAL MEDICINE | Admitting: INTERNAL MEDICINE
Payer: MEDICARE

## 2023-11-09 VITALS
RESPIRATION RATE: 16 BRPM | WEIGHT: 206.35 LBS | HEART RATE: 75 BPM | HEIGHT: 61 IN | SYSTOLIC BLOOD PRESSURE: 163 MMHG | DIASTOLIC BLOOD PRESSURE: 87 MMHG | BODY MASS INDEX: 38.96 KG/M2 | OXYGEN SATURATION: 94 % | TEMPERATURE: 97.3 F

## 2023-11-09 DIAGNOSIS — C90.00 MULTIPLE MYELOMA, REMISSION STATUS UNSPECIFIED: ICD-10-CM

## 2023-11-09 PROCEDURE — 25010000002 HEPARIN LOCK FLUSH PER 10 UNITS

## 2023-11-09 PROCEDURE — 25010000002 PROPOFOL 10 MG/ML EMULSION

## 2023-11-09 PROCEDURE — G0105 COLORECTAL SCRN; HI RISK IND: HCPCS | Performed by: INTERNAL MEDICINE

## 2023-11-09 PROCEDURE — 25010000002 ONDANSETRON PER 1 MG

## 2023-11-09 PROCEDURE — 25810000003 LACTATED RINGERS PER 1000 ML

## 2023-11-09 RX ORDER — SODIUM CHLORIDE 0.9 % (FLUSH) 0.9 %
10 SYRINGE (ML) INJECTION AS NEEDED
Status: DISCONTINUED | OUTPATIENT
Start: 2023-11-09 | End: 2023-11-09 | Stop reason: HOSPADM

## 2023-11-09 RX ORDER — SODIUM CHLORIDE 9 MG/ML
40 INJECTION, SOLUTION INTRAVENOUS AS NEEDED
Status: DISCONTINUED | OUTPATIENT
Start: 2023-11-09 | End: 2023-11-09 | Stop reason: HOSPADM

## 2023-11-09 RX ORDER — LIDOCAINE HYDROCHLORIDE 20 MG/ML
INJECTION, SOLUTION EPIDURAL; INFILTRATION; INTRACAUDAL; PERINEURAL AS NEEDED
Status: DISCONTINUED | OUTPATIENT
Start: 2023-11-09 | End: 2023-11-09 | Stop reason: SURG

## 2023-11-09 RX ORDER — PROPOFOL 10 MG/ML
VIAL (ML) INTRAVENOUS AS NEEDED
Status: DISCONTINUED | OUTPATIENT
Start: 2023-11-09 | End: 2023-11-09 | Stop reason: SURG

## 2023-11-09 RX ORDER — SODIUM CHLORIDE, SODIUM LACTATE, POTASSIUM CHLORIDE, CALCIUM CHLORIDE 600; 310; 30; 20 MG/100ML; MG/100ML; MG/100ML; MG/100ML
30 INJECTION, SOLUTION INTRAVENOUS CONTINUOUS
Status: DISCONTINUED | OUTPATIENT
Start: 2023-11-09 | End: 2023-11-09 | Stop reason: HOSPADM

## 2023-11-09 RX ORDER — ONDANSETRON 2 MG/ML
4 INJECTION INTRAMUSCULAR; INTRAVENOUS ONCE
Status: COMPLETED | OUTPATIENT
Start: 2023-11-09 | End: 2023-11-09

## 2023-11-09 RX ORDER — SODIUM CHLORIDE 0.9 % (FLUSH) 0.9 %
20 SYRINGE (ML) INJECTION AS NEEDED
Status: DISCONTINUED | OUTPATIENT
Start: 2023-11-09 | End: 2023-11-09 | Stop reason: HOSPADM

## 2023-11-09 RX ORDER — HEPARIN SODIUM (PORCINE) LOCK FLUSH IV SOLN 100 UNIT/ML 100 UNIT/ML
5 SOLUTION INTRAVENOUS AS NEEDED
Status: DISCONTINUED | OUTPATIENT
Start: 2023-11-09 | End: 2023-11-09 | Stop reason: HOSPADM

## 2023-11-09 RX ORDER — SODIUM CHLORIDE 0.9 % (FLUSH) 0.9 %
10 SYRINGE (ML) INJECTION EVERY 12 HOURS SCHEDULED
Status: DISCONTINUED | OUTPATIENT
Start: 2023-11-09 | End: 2023-11-09 | Stop reason: HOSPADM

## 2023-11-09 RX ADMIN — PROPOFOL 150 MCG/KG/MIN: 10 INJECTION, EMULSION INTRAVENOUS at 14:35

## 2023-11-09 RX ADMIN — HEPARIN 500 UNITS: 100 SYRINGE at 15:38

## 2023-11-09 RX ADMIN — PROPOFOL 20 MG: 10 INJECTION, EMULSION INTRAVENOUS at 14:51

## 2023-11-09 RX ADMIN — PROPOFOL 80 MG: 10 INJECTION, EMULSION INTRAVENOUS at 14:35

## 2023-11-09 RX ADMIN — SODIUM CHLORIDE, POTASSIUM CHLORIDE, SODIUM LACTATE AND CALCIUM CHLORIDE 30 ML/HR: 600; 310; 30; 20 INJECTION, SOLUTION INTRAVENOUS at 13:20

## 2023-11-09 RX ADMIN — LIDOCAINE HYDROCHLORIDE 40 MG: 20 INJECTION, SOLUTION EPIDURAL; INFILTRATION; INTRACAUDAL; PERINEURAL at 14:35

## 2023-11-09 RX ADMIN — ONDANSETRON 4 MG: 2 INJECTION INTRAMUSCULAR; INTRAVENOUS at 15:36

## 2023-11-09 NOTE — TELEPHONE ENCOUNTER
Anca Samson 1950       Symptoms    Does patient have nausea and vomiting?    Does patient have medications prescribed for N/V?    Does patient have diarrhea?    Does the patient have diarrhea medications?    Does the patient have pain?    Is pain medications effective?    Discharge  Do you have any questions about your discharge instructions?    Patient Satisfaction with [facility]    Where you satisfied with the care you received?    Pt suggestions for the [facility]    Do you have any suggestions to improve your care?    Comments    Follow up phone call comments  No one answered the phone. This nurse left a vm.

## 2023-11-09 NOTE — ANESTHESIA POSTPROCEDURE EVALUATION
Patient: Anca Samson    Procedure Summary       Date: 11/09/23 Room / Location: [facility] ENDOSCOPY 3 / [facility] ENDOSCOPY    Anesthesia Start: 1431 Anesthesia Stop: 1510    Procedure: COLONOSCOPY Diagnosis:       Encounter for screening for malignant neoplasm of colon      History of colon polyps      (Encounter for screening for malignant neoplasm of colon [Z12.11])      (History of colon polyps [Z86.010])    Surgeons: Jarvis Borges MD Provider: Mari Ramon CRNA    Anesthesia Type: general ASA Status: 4            Anesthesia Type: general    Vitals  Vitals Value Taken Time   /87 11/09/23 1523   Temp 36.3 °C (97.3 °F) 11/09/23 1508   Pulse 77 11/09/23 1539   Resp 16 11/09/23 1538   SpO2 94 % 11/09/23 1539   Vitals shown include unfiled device data.        Post Anesthesia Care and Evaluation    Post-procedure mental status: acceptable.  Pain management: satisfactory to patient    Airway patency: patent  Anesthetic complications: No anesthetic complications    Cardiovascular status: acceptable  Respiratory status: acceptable, spontaneous ventilation and nasal cannula (2L O2 - O2 dependent at home)    Comments: Per chart review

## 2023-11-09 NOTE — H&P
Pre Procedure History & Physical    Chief Complaint:   H/o colon cancer and polyps    Subjective     HPI:   As above    Past Medical History:   Past Medical History:   Diagnosis Date    Anxiety     Asthma     Asthma 07/28/2021    Atherosclerosis of native coronary artery of native heart with angina pectoris 08/15/2023    FOLLOWED BY DR GONZALES/DINA PETTIT. DENIES CP BUT GETS SOA WITH EXERTION HAS COPD WEARS AT 2LPM N/C. DECREASED ACTIVITY D/T SOA    C. difficile diarrhea     DENIES ANY CURRENT ISSUES    Colon cancer 07/21/2017    Colon polyp     COPD (chronic obstructive pulmonary disease) 10/23/2017    COPD (chronic obstructive pulmonary disease) 10/23/2017    Depression     Disease of thyroid gland     Diverticulitis     Dysphagia     Essential hypertension 07/28/2021    Heartburn     Hernia 2019    History of chemotherapy     2017    Hx of psychiatric care     Hyperlipidemia     Impaired fasting glucose 08/14/2015    Lumbago     Lung nodule 02/17/2022    Major depressive disorder 10/27/2016    Malignant neoplasm of ascending colon 07/21/2017    Melena     Multiple myeloma     Nicotine dependence 05/10/2017    NICK (obstructive sleep apnea) 11/06/2023    Oxygen dependent     REPORTS USES 02 AT 2LPM VIA N/C. CAN GET VERY SOA WITH MINIMAL EXERTION    Renal insufficiency 07/28/2021    Seizures     PSEUDO SEIZURES LAST ONE AROUND NOV 2022    Shortness of breath     CAN GET VERY SOA WITH MINIMAL EXERTION    Sleep apnea     Tobacco use 07/28/2021    QUIT SMOKING JUNE 2022    Visit for screening mammogram 02/20/2020    NORMAL- REPEAT IN ONE YEAR    Vitamin D deficiency        Past Surgical History:  Past Surgical History:   Procedure Laterality Date    APPENDECTOMY      BONE MARROW BIOPSY  2016    COLON SURGERY  2017    COLONOSCOPY  2018,2017,2019    [facility]- DR LE:DIVERTICULOSIS AND ERYTHEMA OF MUCOSA    COLONOSCOPY N/A 12/20/2022    Procedure: COLONOSCOPY WITH ELEVIEW INJECTION, POLYPECTOMY, HOT SNARE, CLIP  APPLICATION X3, BIOPSIES;  Surgeon: Jarvis Borges MD;  Location: [facility] ENDOSCOPY;  Service: Gastroenterology;  Laterality: N/A;  COLON POLYP, DIVERTICULOSIS, ANASTOMOSIS RIGHT COLON    ENDOSCOPY  2018    FECAL DISIMPACTION  06/2019    TRANSPLANT     HEMICOLECTOMY Right 05/2017    HYSTERECTOMY  1982,1984    KNEE ARTHROSCOPY Left 01/03/2022    Procedure: KNEE ARTHROSCOPY WITH PARTIAL MEDIAL MENISCECTOMY,  CHONDROPLASTY;  Surgeon: Nicholas Tipton MD;  [facility] [place] [facility];  Service: Orthopedics;  Laterality: Left;    MINI-LAPAROTOMY  2017    PORTACATH PLACEMENT N/A     pt states that her port does not work    UPPER GASTROINTESTINAL ENDOSCOPY  2018    VENOUS ACCESS DEVICE (PORT) INSERTION N/A 10/31/2023    Procedure: Port-a-catheter removal and port-a-catheter placement;  Surgeon: Alcon Delgado MD;  [facility] [place] [facility];  Service: General;  Laterality: N/A;       Family History:  Family History   Problem Relation Age of Onset    Heart disease Mother     Lung cancer Mother     Cancer Mother     Stroke Father     Heart disease Father     Kidney cancer Father     Cancer Father     Stroke Other         UNCLE/AUNT    Colon cancer Neg Hx     Malig Hyperthermia Neg Hx        Social History:   reports that she quit smoking about 17 months ago. Her smoking use included cigarettes. She started smoking about 48 years ago. She has a 47.00 pack-year smoking history. She has been exposed to tobacco smoke. She has never used smokeless tobacco. She reports that she does not drink alcohol and does not use drugs.    Medications:   Medications Prior to Admission   Medication Sig Dispense Refill Last Dose    acyclovir (ZOVIRAX) 400 MG tablet Take 1 tablet by mouth 2 (Two) Times a Day. Take one tablet by mouth twice daily. 60 tablet 11 11/8/2023    albuterol sulfate  (90 Base) MCG/ACT inhaler Inhale 2 puffs Every 4 (Four) Hours As Needed for Wheezing. 18 g 11 11/8/2023    arformoterol (BROVANA) 15 MCG/2ML  nebulizer solution Take 2 mL by nebulization 2 (Two) Times a Day.   11/8/2023    budesonide (PULMICORT) 0.5 MG/2ML nebulizer solution Take 2 mL by nebulization 2 (Two) Times a Day.   11/8/2023    colestipol (COLESTID) 1 g tablet Take 2 tablets by mouth 2 (Two) Times a Day. 120 tablet 5 11/8/2023    dapsone 25 MG tablet Take 2 tablets by mouth 2 (Two) Times a Day. 120 tablet 5 11/8/2023    dexAMETHasone (DECADRON) 4 MG tablet Take 10 tablets on D 1,8,15,22 and take 1 tablet on D 2,3,9,10,16,17,23,24.  Take in the morning with food. 48 tablet 1 11/8/2023    FLUoxetine (PROzac) 40 MG capsule Take 1 capsule by mouth Daily. 90 capsule 1 11/8/2023    lenalidomide (Revlimid) 25 MG capsule Take 1 capsule by mouth Daily for 21 days. Take daily on Days 1-21, and off 7 days, on a 28 day cycle (Patient taking differently: Take 1 capsule by mouth Daily. Take daily on Days 1-21, and off 7 days, on a 28 day cycle  PT REPORTS HAS NOT RECEIVED AS OF YET 10/30/23) 21 capsule 0 11/8/2023    levothyroxine (SYNTHROID, LEVOTHROID) 25 MCG tablet Take 1 tablet by mouth Every Morning. 90 tablet 1 11/8/2023    meloxicam (MOBIC) 15 MG tablet Take 1 tablet by mouth Daily. 90 tablet 1 11/8/2023    ondansetron (ZOFRAN) 8 MG tablet Take 1 tablet by mouth 3 (Three) Times a Day As Needed for Nausea or Vomiting. (Patient taking differently: Take 1 tablet by mouth 3 (Three) Times a Day As Needed for Nausea or Vomiting. TO START WHEN STARTS CHEMO 11/3/23) 30 tablet 5 11/8/2023    promethazine (PHENERGAN) 25 MG tablet Take 1 tablet by mouth Every 6 (Six) Hours As Needed for Nausea or Vomiting. 30 tablet 1 11/8/2023    revefenacin (YUPELRI) 175 MCG/3ML nebulizer solution Take 3 mL by nebulization Daily.   11/8/2023    vitamin D (ERGOCALCIFEROL) 1.25 MG (42157 UT) capsule capsule Take 1 capsule by mouth 2 (Two) Times a Week. (Patient taking differently: Take 1 capsule by mouth Every 7 (Seven) Days. TAKES ON FRIDAY) 26 capsule 1 11/8/2023     "HYDROcodone-acetaminophen (Norco) 5-325 MG per tablet Take 1 tablet by mouth Every 6 (Six) Hours As Needed for Moderate Pain or Severe Pain. (Patient not taking: Reported on 11/7/2023) 6 tablet 0 Unknown    Sod Picosulfate-Mag Ox-Cit Acd (Clenpiq) 10-3.5-12 MG-GM -GM/175ML solution Take 175 mL by mouth Take As Directed. (Patient taking differently: Take 175 mL by mouth Take As Directed. IS TO HAVE COLONOSCOPY ON 11/9/23) 350 mL 0        Allergies:  Cephalexin, Morphine, Penicillins, and Sulfa antibiotics        Objective     Blood pressure 146/66, pulse 71, temperature 97.5 °F (36.4 °C), temperature source Temporal, resp. rate 20, height 154.9 cm (61\"), weight 93.6 kg (206 lb 5.6 oz), SpO2 94%, not currently breastfeeding.    Physical Exam   Constitutional: Pt is oriented to person, place, and time and well-developed, well-nourished, and in no distress.   Mouth/Throat: Oropharynx is clear and moist.   Neck: Normal range of motion.   Cardiovascular: Normal rate, regular rhythm and normal heart sounds.    Pulmonary/Chest: Effort normal and breath sounds normal.   Abdominal: Soft. Nontender  Skin: Skin is warm and dry.   Psychiatric: Mood, memory, affect and judgment normal.     Assessment & Plan     Diagnosis:  H/o colon cancer      Anticipated Surgical Procedure:  colonoscopy    The risks, benefits, and alternatives of this procedure have been discussed with the patient or the responsible party- the patient understands and agrees to proceed.            "

## 2023-11-10 ENCOUNTER — TELEPHONE (OUTPATIENT)
Dept: GASTROENTEROLOGY | Facility: CLINIC | Age: 73
End: 2023-11-10
Payer: MEDICARE

## 2023-11-10 NOTE — TELEPHONE ENCOUNTER
I have reviewed the patients colonoscopy report. The report showed a normal colonoscopy with healthy anastomosis.  No polyps removed or specimens collected.  Repeat colonoscopy in 2 years due to personal history of colon cancer. Please place in recall. Send letter.

## 2023-11-13 ENCOUNTER — TELEPHONE (OUTPATIENT)
Dept: SURGERY | Facility: CLINIC | Age: 73
End: 2023-11-13
Payer: MEDICARE

## 2023-11-13 NOTE — TELEPHONE ENCOUNTER
CALLED PT TO OFFER R/S OF CX'D POST OP ON 11/14 W/ CHEY    SPOKE W/ PT WHO DECLINED TO RS STATING SHE IS HEALING WELL, ANYTHING ELSE TO DO?

## 2023-11-14 ENCOUNTER — SPECIALTY PHARMACY (OUTPATIENT)
Dept: PHARMACY | Facility: HOSPITAL | Age: 73
End: 2023-11-14
Payer: MEDICARE

## 2023-11-14 NOTE — PROGRESS NOTES
Specialty Pharmacy Note: 1 Week Toxicity Check     Patient reports starting lenalidomide on 11/7. Thus far, patient denies side effects, aside from fatigue and vomiting. Patient has not taken any nausea medicaiton at this time. Instructed patient to take nausea medication as soon as she starts to feel nauseous. Advised patient to alert office if this changes. Thus far, no missed doses and no medication changes. Advised pt to call office if any changes. Patient expressed understanding and had no additional questions at this time.       Madisyn Saldaña, PharmD, [facility]  Oncology Clinical Pharmacist  11/14/2023  09:15 EST

## 2023-11-15 ENCOUNTER — HOSPITAL ENCOUNTER (OUTPATIENT)
Dept: ONCOLOGY | Facility: HOSPITAL | Age: 73
Discharge: HOME | End: 2023-11-15
Admitting: INTERNAL MEDICINE
Payer: MEDICARE

## 2023-11-15 VITALS
BODY MASS INDEX: 41.29 KG/M2 | DIASTOLIC BLOOD PRESSURE: 75 MMHG | TEMPERATURE: 97.5 F | WEIGHT: 210.32 LBS | OXYGEN SATURATION: 96 % | RESPIRATION RATE: 22 BRPM | SYSTOLIC BLOOD PRESSURE: 133 MMHG | HEART RATE: 83 BPM | HEIGHT: 60 IN

## 2023-11-15 DIAGNOSIS — Z45.2 ENCOUNTER FOR ADJUSTMENT OR MANAGEMENT OF VASCULAR ACCESS DEVICE: Primary | ICD-10-CM

## 2023-11-15 DIAGNOSIS — C90.00 MULTIPLE MYELOMA, REMISSION STATUS UNSPECIFIED: ICD-10-CM

## 2023-11-15 LAB
BASOPHILS # BLD AUTO: 0.01 10*3/MM3 (ref 0–0.2)
BASOPHILS NFR BLD AUTO: 0.2 % (ref 0–1.5)
DEPRECATED RDW RBC AUTO: 60.3 FL (ref 37–54)
EOSINOPHIL # BLD AUTO: 0.17 10*3/MM3 (ref 0–0.4)
EOSINOPHIL NFR BLD AUTO: 3.9 % (ref 0.3–6.2)
ERYTHROCYTE [DISTWIDTH] IN BLOOD BY AUTOMATED COUNT: 16.4 % (ref 12.3–15.4)
HCT VFR BLD AUTO: 35.4 % (ref 34–46.6)
HGB BLD-MCNC: 11.3 G/DL (ref 12–15.9)
IMM GRANULOCYTES # BLD AUTO: 0.02 10*3/MM3 (ref 0–0.05)
IMM GRANULOCYTES NFR BLD AUTO: 0.5 % (ref 0–0.5)
LYMPHOCYTES # BLD AUTO: 0.92 10*3/MM3 (ref 0.7–3.1)
LYMPHOCYTES NFR BLD AUTO: 20.9 % (ref 19.6–45.3)
MCH RBC QN AUTO: 31.9 PG (ref 26.6–33)
MCHC RBC AUTO-ENTMCNC: 31.9 G/DL (ref 31.5–35.7)
MCV RBC AUTO: 100 FL (ref 79–97)
MONOCYTES # BLD AUTO: 0.37 10*3/MM3 (ref 0.1–0.9)
MONOCYTES NFR BLD AUTO: 8.4 % (ref 5–12)
NEUTROPHILS NFR BLD AUTO: 2.91 10*3/MM3 (ref 1.7–7)
NEUTROPHILS NFR BLD AUTO: 66.1 % (ref 42.7–76)
PLATELET # BLD AUTO: 174 10*3/MM3 (ref 140–450)
PMV BLD AUTO: 10.2 FL (ref 6–12)
RBC # BLD AUTO: 3.54 10*6/MM3 (ref 3.77–5.28)
WBC NRBC COR # BLD: 4.4 10*3/MM3 (ref 3.4–10.8)

## 2023-11-15 PROCEDURE — 63710000001 ACETAMINOPHEN EXTRA STRENGTH 500 MG TABLET: Performed by: INTERNAL MEDICINE

## 2023-11-15 PROCEDURE — 25010000002 METHYLPREDNISOLONE PER 125 MG: Performed by: INTERNAL MEDICINE

## 2023-11-15 PROCEDURE — 96374 THER/PROPH/DIAG INJ IV PUSH: CPT

## 2023-11-15 PROCEDURE — 25010000002 DIPHENHYDRAMINE PER 50 MG: Performed by: INTERNAL MEDICINE

## 2023-11-15 PROCEDURE — 25810000003 SODIUM CHLORIDE 0.9 % SOLUTION: Performed by: INTERNAL MEDICINE

## 2023-11-15 PROCEDURE — 25010000002 HEPARIN LOCK FLUSH PER 10 UNITS: Performed by: INTERNAL MEDICINE

## 2023-11-15 PROCEDURE — 85025 COMPLETE CBC W/AUTO DIFF WBC: CPT | Performed by: INTERNAL MEDICINE

## 2023-11-15 PROCEDURE — 25010000002 DARATUMUMAB-HYALURONIDASE-FIHJ 1800-30000 MG-UT/15ML SOLUTION: Performed by: INTERNAL MEDICINE

## 2023-11-15 PROCEDURE — A9270 NON-COVERED ITEM OR SERVICE: HCPCS | Performed by: INTERNAL MEDICINE

## 2023-11-15 PROCEDURE — 96401 CHEMO ANTI-NEOPL SQ/IM: CPT

## 2023-11-15 PROCEDURE — 96375 TX/PRO/DX INJ NEW DRUG ADDON: CPT

## 2023-11-15 RX ORDER — HEPARIN SODIUM (PORCINE) LOCK FLUSH IV SOLN 100 UNIT/ML 100 UNIT/ML
500 SOLUTION INTRAVENOUS AS NEEDED
Status: DISCONTINUED | OUTPATIENT
Start: 2023-11-15 | End: 2023-11-16 | Stop reason: HOSPADM

## 2023-11-15 RX ORDER — SODIUM CHLORIDE 0.9 % (FLUSH) 0.9 %
20 SYRINGE (ML) INJECTION AS NEEDED
Status: DISCONTINUED | OUTPATIENT
Start: 2023-11-15 | End: 2023-11-16 | Stop reason: HOSPADM

## 2023-11-15 RX ORDER — FAMOTIDINE 10 MG/ML
20 INJECTION, SOLUTION INTRAVENOUS AS NEEDED
Status: DISCONTINUED | OUTPATIENT
Start: 2023-11-15 | End: 2023-11-16 | Stop reason: HOSPADM

## 2023-11-15 RX ORDER — MEPERIDINE HYDROCHLORIDE 25 MG/ML
25 INJECTION INTRAMUSCULAR; INTRAVENOUS; SUBCUTANEOUS
Status: ACTIVE | OUTPATIENT
Start: 2023-11-15 | End: 2023-11-15

## 2023-11-15 RX ORDER — SODIUM CHLORIDE 0.9 % (FLUSH) 0.9 %
20 SYRINGE (ML) INJECTION AS NEEDED
OUTPATIENT
Start: 2023-11-15

## 2023-11-15 RX ORDER — SODIUM CHLORIDE 9 MG/ML
20 INJECTION, SOLUTION INTRAVENOUS ONCE
Status: COMPLETED | OUTPATIENT
Start: 2023-11-15 | End: 2023-11-15

## 2023-11-15 RX ORDER — METHYLPREDNISOLONE SODIUM SUCCINATE 125 MG/2ML
100 INJECTION, POWDER, LYOPHILIZED, FOR SOLUTION INTRAMUSCULAR; INTRAVENOUS ONCE
Status: COMPLETED | OUTPATIENT
Start: 2023-11-15 | End: 2023-11-15

## 2023-11-15 RX ORDER — HEPARIN SODIUM (PORCINE) LOCK FLUSH IV SOLN 100 UNIT/ML 100 UNIT/ML
500 SOLUTION INTRAVENOUS AS NEEDED
OUTPATIENT
Start: 2023-11-15

## 2023-11-15 RX ORDER — ACETAMINOPHEN 500 MG
1000 TABLET ORAL ONCE
Status: COMPLETED | OUTPATIENT
Start: 2023-11-15 | End: 2023-11-15

## 2023-11-15 RX ORDER — DIPHENHYDRAMINE HYDROCHLORIDE 50 MG/ML
50 INJECTION INTRAMUSCULAR; INTRAVENOUS AS NEEDED
Status: DISCONTINUED | OUTPATIENT
Start: 2023-11-15 | End: 2023-11-16 | Stop reason: HOSPADM

## 2023-11-15 RX ADMIN — HEPARIN SODIUM (PORCINE) LOCK FLUSH IV SOLN 100 UNIT/ML 500 UNITS: 100 SOLUTION at 11:23

## 2023-11-15 RX ADMIN — DIPHENHYDRAMINE HYDROCHLORIDE 50 MG: 50 INJECTION, SOLUTION INTRAMUSCULAR; INTRAVENOUS at 09:21

## 2023-11-15 RX ADMIN — DARATUMUMAB AND HYALURONIDASE-FIHJ (HUMAN RECOMBINANT) 1800 MG: 1800; 30000 INJECTION SUBCUTANEOUS at 10:36

## 2023-11-15 RX ADMIN — ACETAMINOPHEN 1000 MG: 500 TABLET ORAL at 09:17

## 2023-11-15 RX ADMIN — METHYLPREDNISOLONE SODIUM SUCCINATE 100 MG: 125 INJECTION INTRAMUSCULAR; INTRAVENOUS at 09:20

## 2023-11-15 RX ADMIN — SODIUM CHLORIDE 20 ML/HR: 9 INJECTION, SOLUTION INTRAVENOUS at 09:10

## 2023-11-15 RX ADMIN — Medication 20 ML: at 11:23

## 2023-11-21 DIAGNOSIS — C90.00 MULTIPLE MYELOMA, REMISSION STATUS UNSPECIFIED: ICD-10-CM

## 2023-11-22 ENCOUNTER — HOSPITAL ENCOUNTER (OUTPATIENT)
Dept: ONCOLOGY | Facility: HOSPITAL | Age: 73
Discharge: HOME | End: 2023-11-22
Admitting: INTERNAL MEDICINE
Payer: MEDICARE

## 2023-11-22 VITALS — RESPIRATION RATE: 24 BRPM | OXYGEN SATURATION: 95 % | HEART RATE: 84 BPM | TEMPERATURE: 96.8 F

## 2023-11-22 DIAGNOSIS — Z45.2 ENCOUNTER FOR ADJUSTMENT OR MANAGEMENT OF VASCULAR ACCESS DEVICE: ICD-10-CM

## 2023-11-22 DIAGNOSIS — C90.00 MULTIPLE MYELOMA, REMISSION STATUS UNSPECIFIED: Primary | ICD-10-CM

## 2023-11-22 LAB
BASOPHILS # BLD AUTO: 0.01 10*3/MM3 (ref 0–0.2)
BASOPHILS NFR BLD AUTO: 0.4 % (ref 0–1.5)
DEPRECATED RDW RBC AUTO: 59 FL (ref 37–54)
EOSINOPHIL # BLD AUTO: 0.09 10*3/MM3 (ref 0–0.4)
EOSINOPHIL NFR BLD AUTO: 3.6 % (ref 0.3–6.2)
ERYTHROCYTE [DISTWIDTH] IN BLOOD BY AUTOMATED COUNT: 15.9 % (ref 12.3–15.4)
HCT VFR BLD AUTO: 34.7 % (ref 34–46.6)
HGB BLD-MCNC: 11.2 G/DL (ref 12–15.9)
IMM GRANULOCYTES # BLD AUTO: 0.03 10*3/MM3 (ref 0–0.05)
IMM GRANULOCYTES NFR BLD AUTO: 1.2 % (ref 0–0.5)
LYMPHOCYTES # BLD AUTO: 0.32 10*3/MM3 (ref 0.7–3.1)
LYMPHOCYTES NFR BLD AUTO: 12.6 % (ref 19.6–45.3)
MCH RBC QN AUTO: 32.4 PG (ref 26.6–33)
MCHC RBC AUTO-ENTMCNC: 32.3 G/DL (ref 31.5–35.7)
MCV RBC AUTO: 100.3 FL (ref 79–97)
MONOCYTES # BLD AUTO: 0.22 10*3/MM3 (ref 0.1–0.9)
MONOCYTES NFR BLD AUTO: 8.7 % (ref 5–12)
NEUTROPHILS NFR BLD AUTO: 1.86 10*3/MM3 (ref 1.7–7)
NEUTROPHILS NFR BLD AUTO: 73.5 % (ref 42.7–76)
PLATELET # BLD AUTO: 187 10*3/MM3 (ref 140–450)
PMV BLD AUTO: 10.9 FL (ref 6–12)
RBC # BLD AUTO: 3.46 10*6/MM3 (ref 3.77–5.28)
WBC NRBC COR # BLD AUTO: 2.53 10*3/MM3 (ref 3.4–10.8)

## 2023-11-22 PROCEDURE — 63710000001 ACETAMINOPHEN EXTRA STRENGTH 500 MG TABLET: Performed by: INTERNAL MEDICINE

## 2023-11-22 PROCEDURE — 96401 CHEMO ANTI-NEOPL SQ/IM: CPT

## 2023-11-22 PROCEDURE — 96374 THER/PROPH/DIAG INJ IV PUSH: CPT

## 2023-11-22 PROCEDURE — 25010000002 DARATUMUMAB-HYALURONIDASE-FIHJ 1800-30000 MG-UT/15ML SOLUTION: Performed by: INTERNAL MEDICINE

## 2023-11-22 PROCEDURE — 25810000003 SODIUM CHLORIDE 0.9 % SOLUTION: Performed by: INTERNAL MEDICINE

## 2023-11-22 PROCEDURE — 96375 TX/PRO/DX INJ NEW DRUG ADDON: CPT

## 2023-11-22 PROCEDURE — 25010000002 HEPARIN LOCK FLUSH PER 10 UNITS: Performed by: INTERNAL MEDICINE

## 2023-11-22 PROCEDURE — 85025 COMPLETE CBC W/AUTO DIFF WBC: CPT | Performed by: INTERNAL MEDICINE

## 2023-11-22 PROCEDURE — A9270 NON-COVERED ITEM OR SERVICE: HCPCS | Performed by: INTERNAL MEDICINE

## 2023-11-22 PROCEDURE — 25010000002 DIPHENHYDRAMINE PER 50 MG: Performed by: INTERNAL MEDICINE

## 2023-11-22 RX ORDER — SODIUM CHLORIDE 0.9 % (FLUSH) 0.9 %
20 SYRINGE (ML) INJECTION AS NEEDED
OUTPATIENT
Start: 2023-11-22

## 2023-11-22 RX ORDER — SODIUM CHLORIDE 9 MG/ML
20 INJECTION, SOLUTION INTRAVENOUS ONCE
Status: COMPLETED | OUTPATIENT
Start: 2023-11-22 | End: 2023-11-22

## 2023-11-22 RX ORDER — SODIUM CHLORIDE 0.9 % (FLUSH) 0.9 %
20 SYRINGE (ML) INJECTION AS NEEDED
Status: DISCONTINUED | OUTPATIENT
Start: 2023-11-22 | End: 2023-11-23 | Stop reason: HOSPADM

## 2023-11-22 RX ORDER — DEXAMETHASONE 4 MG/1
TABLET ORAL
Qty: 28 TABLET | Refills: 1 | Status: SHIPPED | OUTPATIENT
Start: 2023-11-22

## 2023-11-22 RX ORDER — FAMOTIDINE 10 MG/ML
20 INJECTION, SOLUTION INTRAVENOUS AS NEEDED
Status: DISCONTINUED | OUTPATIENT
Start: 2023-11-22 | End: 2023-11-23 | Stop reason: HOSPADM

## 2023-11-22 RX ORDER — MEPERIDINE HYDROCHLORIDE 25 MG/ML
25 INJECTION INTRAMUSCULAR; INTRAVENOUS; SUBCUTANEOUS
Status: ACTIVE | OUTPATIENT
Start: 2023-11-22 | End: 2023-11-22

## 2023-11-22 RX ORDER — HEPARIN SODIUM (PORCINE) LOCK FLUSH IV SOLN 100 UNIT/ML 100 UNIT/ML
500 SOLUTION INTRAVENOUS AS NEEDED
OUTPATIENT
Start: 2023-11-22

## 2023-11-22 RX ORDER — HEPARIN SODIUM (PORCINE) LOCK FLUSH IV SOLN 100 UNIT/ML 100 UNIT/ML
500 SOLUTION INTRAVENOUS AS NEEDED
Status: DISCONTINUED | OUTPATIENT
Start: 2023-11-22 | End: 2023-11-23 | Stop reason: HOSPADM

## 2023-11-22 RX ORDER — ACETAMINOPHEN 500 MG
1000 TABLET ORAL ONCE
Status: COMPLETED | OUTPATIENT
Start: 2023-11-22 | End: 2023-11-22

## 2023-11-22 RX ORDER — DIPHENHYDRAMINE HYDROCHLORIDE 50 MG/ML
50 INJECTION INTRAMUSCULAR; INTRAVENOUS AS NEEDED
Status: DISCONTINUED | OUTPATIENT
Start: 2023-11-22 | End: 2023-11-23 | Stop reason: HOSPADM

## 2023-11-22 RX ADMIN — HEPARIN SODIUM (PORCINE) LOCK FLUSH IV SOLN 100 UNIT/ML 500 UNITS: 100 SOLUTION at 11:32

## 2023-11-22 RX ADMIN — ACETAMINOPHEN 1000 MG: 500 TABLET ORAL at 10:17

## 2023-11-22 RX ADMIN — DARATUMUMAB AND HYALURONIDASE-FIHJ (HUMAN RECOMBINANT) 1800 MG: 1800; 30000 INJECTION SUBCUTANEOUS at 11:30

## 2023-11-22 RX ADMIN — SODIUM CHLORIDE 20 ML/HR: 9 INJECTION, SOLUTION INTRAVENOUS at 10:15

## 2023-11-22 RX ADMIN — DIPHENHYDRAMINE HYDROCHLORIDE 25 MG: 50 INJECTION, SOLUTION INTRAMUSCULAR; INTRAVENOUS at 10:17

## 2023-11-22 RX ADMIN — Medication 20 ML: at 11:32

## 2023-11-27 ENCOUNTER — SPECIALTY PHARMACY (OUTPATIENT)
Dept: PHARMACY | Facility: HOSPITAL | Age: 73
End: 2023-11-27
Payer: MEDICARE

## 2023-11-27 DIAGNOSIS — C90.00 MULTIPLE MYELOMA, REMISSION STATUS UNSPECIFIED: ICD-10-CM

## 2023-11-27 RX ORDER — LENALIDOMIDE 25 MG/1
CAPSULE ORAL
Refills: 0 | OUTPATIENT
Start: 2023-11-27

## 2023-11-27 RX ORDER — LENALIDOMIDE 25 MG/1
25 CAPSULE ORAL DAILY
Qty: 21 CAPSULE | Refills: 0 | OUTPATIENT
Start: 2023-11-27 | End: 2023-11-29 | Stop reason: SDUPTHER

## 2023-11-27 NOTE — PROGRESS NOTES
Re: Refills of Oral Specialty Medication - Revlimid (lenalidomide)    Drug-Drug Interactions: The current medication list was reviewed and there are no relevant drug-drug interactions with the specialty medication.  Medication Allergies: The patient has no relevant allergies as it relates to their oral specialty medication  Review of Labs/Dose Adjustments: NO DOSE CHANGE - I reviewed the most recent note and labs and the patient will continue without any dose changes.  I sent refills as described below.    Drug: Revlimid (lenalidomide)  Strength: 25 mg  Directions: Take 1 capsule by mouth daily ON days 1-21, OFF for 7 days of each 28 day cycle  Quantity: 21  Refills: 0  REMS Auth # [id number] Exp 12/27/23   Pharmacy prescription sent to: [facility]  Specialty Pharmacy      Madisyn Saldaña, PharmD, BCPS  Oncology Clinical Pharmacist  11/27/2023  09:59 EST

## 2023-11-28 DIAGNOSIS — F33.1 MODERATE EPISODE OF RECURRENT MAJOR DEPRESSIVE DISORDER: Chronic | ICD-10-CM

## 2023-11-28 DIAGNOSIS — M25.569 KNEE PAIN, UNSPECIFIED CHRONICITY, UNSPECIFIED LATERALITY: Chronic | ICD-10-CM

## 2023-11-28 RX ORDER — FLUOXETINE HYDROCHLORIDE 40 MG/1
40 CAPSULE ORAL DAILY
Qty: 90 CAPSULE | Refills: 1 | OUTPATIENT
Start: 2023-11-28

## 2023-11-28 RX ORDER — MELOXICAM 15 MG/1
15 TABLET ORAL DAILY
Qty: 90 TABLET | Refills: 1 | OUTPATIENT
Start: 2023-11-28

## 2023-11-29 ENCOUNTER — HOSPITAL ENCOUNTER (OUTPATIENT)
Dept: ONCOLOGY | Facility: HOSPITAL | Age: 73
Discharge: HOME | End: 2023-11-29
Payer: MEDICARE

## 2023-11-29 ENCOUNTER — HOSPITAL ENCOUNTER (OUTPATIENT)
Dept: GENERAL RADIOLOGY | Facility: HOSPITAL | Age: 73
Discharge: HOME | End: 2023-11-29
Payer: MEDICARE

## 2023-11-29 ENCOUNTER — SPECIALTY PHARMACY (OUTPATIENT)
Dept: PHARMACY | Facility: HOSPITAL | Age: 73
End: 2023-11-29
Payer: MEDICARE

## 2023-11-29 ENCOUNTER — OFFICE VISIT (OUTPATIENT)
Dept: ONCOLOGY | Facility: HOSPITAL | Age: 73
End: 2023-11-29
Payer: MEDICARE

## 2023-11-29 VITALS
DIASTOLIC BLOOD PRESSURE: 63 MMHG | HEART RATE: 88 BPM | BODY MASS INDEX: 39.69 KG/M2 | TEMPERATURE: 97.3 F | OXYGEN SATURATION: 99 % | WEIGHT: 202.16 LBS | SYSTOLIC BLOOD PRESSURE: 123 MMHG | RESPIRATION RATE: 22 BRPM | HEIGHT: 60 IN

## 2023-11-29 VITALS
SYSTOLIC BLOOD PRESSURE: 123 MMHG | BODY MASS INDEX: 38.92 KG/M2 | DIASTOLIC BLOOD PRESSURE: 63 MMHG | OXYGEN SATURATION: 99 % | RESPIRATION RATE: 22 BRPM | HEART RATE: 88 BPM | WEIGHT: 202.16 LBS | TEMPERATURE: 97.3 F

## 2023-11-29 DIAGNOSIS — C90.00 MULTIPLE MYELOMA, REMISSION STATUS UNSPECIFIED: ICD-10-CM

## 2023-11-29 DIAGNOSIS — Z45.2 ENCOUNTER FOR ADJUSTMENT OR MANAGEMENT OF VASCULAR ACCESS DEVICE: Primary | ICD-10-CM

## 2023-11-29 DIAGNOSIS — E86.0 DEHYDRATION: ICD-10-CM

## 2023-11-29 DIAGNOSIS — R06.02 SHORTNESS OF BREATH: ICD-10-CM

## 2023-11-29 DIAGNOSIS — C18.2 MALIGNANT NEOPLASM OF ASCENDING COLON: ICD-10-CM

## 2023-11-29 DIAGNOSIS — C90.00 MULTIPLE MYELOMA NOT HAVING ACHIEVED REMISSION: Primary | ICD-10-CM

## 2023-11-29 DIAGNOSIS — F41.9 ANXIETY: ICD-10-CM

## 2023-11-29 LAB
BASOPHILS # BLD AUTO: 0.02 10*3/MM3 (ref 0–0.2)
BASOPHILS NFR BLD AUTO: 1.5 % (ref 0–1.5)
DEPRECATED RDW RBC AUTO: 58.7 FL (ref 37–54)
EOSINOPHIL # BLD AUTO: 0.12 10*3/MM3 (ref 0–0.4)
EOSINOPHIL NFR BLD AUTO: 8.9 % (ref 0.3–6.2)
ERYTHROCYTE [DISTWIDTH] IN BLOOD BY AUTOMATED COUNT: 16.1 % (ref 12.3–15.4)
HCT VFR BLD AUTO: 32.6 % (ref 34–46.6)
HGB BLD-MCNC: 10.4 G/DL (ref 12–15.9)
IMM GRANULOCYTES # BLD AUTO: 0 10*3/MM3 (ref 0–0.05)
IMM GRANULOCYTES NFR BLD AUTO: 0 % (ref 0–0.5)
LYMPHOCYTES # BLD AUTO: 0.69 10*3/MM3 (ref 0.7–3.1)
LYMPHOCYTES NFR BLD AUTO: 51.1 % (ref 19.6–45.3)
MCH RBC QN AUTO: 31.6 PG (ref 26.6–33)
MCHC RBC AUTO-ENTMCNC: 31.9 G/DL (ref 31.5–35.7)
MCV RBC AUTO: 99.1 FL (ref 79–97)
MONOCYTES # BLD AUTO: 0.14 10*3/MM3 (ref 0.1–0.9)
MONOCYTES NFR BLD AUTO: 10.4 % (ref 5–12)
NEUTROPHILS NFR BLD AUTO: 0.38 10*3/MM3 (ref 1.7–7)
NEUTROPHILS NFR BLD AUTO: 28.1 % (ref 42.7–76)
PLATELET # BLD AUTO: 403 10*3/MM3 (ref 140–450)
PMV BLD AUTO: 10.7 FL (ref 6–12)
RBC # BLD AUTO: 3.29 10*6/MM3 (ref 3.77–5.28)
RBC MORPH BLD: NORMAL
SMALL PLATELETS BLD QL SMEAR: ADEQUATE
WBC MORPH BLD: NORMAL
WBC NRBC COR # BLD AUTO: 1.35 10*3/MM3 (ref 3.4–10.8)

## 2023-11-29 PROCEDURE — 71046 X-RAY EXAM CHEST 2 VIEWS: CPT

## 2023-11-29 PROCEDURE — 96360 HYDRATION IV INFUSION INIT: CPT

## 2023-11-29 PROCEDURE — A9270 NON-COVERED ITEM OR SERVICE: HCPCS | Performed by: INTERNAL MEDICINE

## 2023-11-29 PROCEDURE — 85025 COMPLETE CBC W/AUTO DIFF WBC: CPT | Performed by: INTERNAL MEDICINE

## 2023-11-29 PROCEDURE — 96361 HYDRATE IV INFUSION ADD-ON: CPT

## 2023-11-29 PROCEDURE — 25010000002 LORAZEPAM PER 2 MG: Performed by: INTERNAL MEDICINE

## 2023-11-29 PROCEDURE — 96401 CHEMO ANTI-NEOPL SQ/IM: CPT

## 2023-11-29 PROCEDURE — 96375 TX/PRO/DX INJ NEW DRUG ADDON: CPT

## 2023-11-29 PROCEDURE — 25010000002 DARATUMUMAB-HYALURONIDASE-FIHJ 1800-30000 MG-UT/15ML SOLUTION: Performed by: INTERNAL MEDICINE

## 2023-11-29 PROCEDURE — 96374 THER/PROPH/DIAG INJ IV PUSH: CPT

## 2023-11-29 PROCEDURE — 25010000002 HEPARIN LOCK FLUSH PER 10 UNITS: Performed by: INTERNAL MEDICINE

## 2023-11-29 PROCEDURE — 63710000001 ACETAMINOPHEN EXTRA STRENGTH 500 MG TABLET: Performed by: INTERNAL MEDICINE

## 2023-11-29 PROCEDURE — 25810000003 SODIUM CHLORIDE 0.9 % SOLUTION: Performed by: INTERNAL MEDICINE

## 2023-11-29 PROCEDURE — 25010000002 DIPHENHYDRAMINE PER 50 MG: Performed by: INTERNAL MEDICINE

## 2023-11-29 PROCEDURE — 85007 BL SMEAR W/DIFF WBC COUNT: CPT | Performed by: INTERNAL MEDICINE

## 2023-11-29 PROCEDURE — 25010000002 METHYLPREDNISOLONE PER 125 MG: Performed by: INTERNAL MEDICINE

## 2023-11-29 RX ORDER — METHYLPREDNISOLONE SODIUM SUCCINATE 125 MG/2ML
60 INJECTION, POWDER, LYOPHILIZED, FOR SOLUTION INTRAMUSCULAR; INTRAVENOUS ONCE
Status: COMPLETED | OUTPATIENT
Start: 2023-11-29 | End: 2023-11-29

## 2023-11-29 RX ORDER — HEPARIN SODIUM (PORCINE) LOCK FLUSH IV SOLN 100 UNIT/ML 100 UNIT/ML
500 SOLUTION INTRAVENOUS AS NEEDED
Status: DISCONTINUED | OUTPATIENT
Start: 2023-11-29 | End: 2023-11-30 | Stop reason: HOSPADM

## 2023-11-29 RX ORDER — SODIUM CHLORIDE 0.9 % (FLUSH) 0.9 %
20 SYRINGE (ML) INJECTION AS NEEDED
Status: DISCONTINUED | OUTPATIENT
Start: 2023-11-29 | End: 2023-11-30 | Stop reason: HOSPADM

## 2023-11-29 RX ORDER — HEPARIN SODIUM (PORCINE) LOCK FLUSH IV SOLN 100 UNIT/ML 100 UNIT/ML
500 SOLUTION INTRAVENOUS AS NEEDED
OUTPATIENT
Start: 2023-11-29

## 2023-11-29 RX ORDER — LORAZEPAM 1 MG/1
1 TABLET ORAL 2 TIMES DAILY PRN
Qty: 14 TABLET | Refills: 0 | Status: SHIPPED | OUTPATIENT
Start: 2023-11-29

## 2023-11-29 RX ORDER — FLUTICASONE FUROATE, UMECLIDINIUM BROMIDE AND VILANTEROL TRIFENATATE 100; 62.5; 25 UG/1; UG/1; UG/1
POWDER RESPIRATORY (INHALATION)
COMMUNITY
Start: 2023-11-28

## 2023-11-29 RX ORDER — LENALIDOMIDE 15 MG/1
15 CAPSULE ORAL DAILY
Qty: 21 CAPSULE | Refills: 0 | OUTPATIENT
Start: 2023-11-29 | End: 2023-12-04 | Stop reason: SDUPTHER

## 2023-11-29 RX ORDER — SODIUM CHLORIDE 0.9 % (FLUSH) 0.9 %
20 SYRINGE (ML) INJECTION AS NEEDED
OUTPATIENT
Start: 2023-11-29

## 2023-11-29 RX ORDER — LORAZEPAM 2 MG/ML
1 INJECTION INTRAMUSCULAR ONCE
Status: COMPLETED | OUTPATIENT
Start: 2023-11-29 | End: 2023-11-29

## 2023-11-29 RX ORDER — ACETAMINOPHEN 500 MG
1000 TABLET ORAL ONCE
Status: COMPLETED | OUTPATIENT
Start: 2023-11-29 | End: 2023-11-29

## 2023-11-29 RX ORDER — SODIUM CHLORIDE 9 MG/ML
20 INJECTION, SOLUTION INTRAVENOUS ONCE
Status: COMPLETED | OUTPATIENT
Start: 2023-11-29 | End: 2023-11-29

## 2023-11-29 RX ADMIN — HEPARIN SODIUM (PORCINE) LOCK FLUSH IV SOLN 100 UNIT/ML 500 UNITS: 100 SOLUTION at 12:19

## 2023-11-29 RX ADMIN — Medication 20 ML: at 12:19

## 2023-11-29 RX ADMIN — SODIUM CHLORIDE 20 ML/HR: 9 INJECTION, SOLUTION INTRAVENOUS at 11:12

## 2023-11-29 RX ADMIN — DIPHENHYDRAMINE HYDROCHLORIDE 25 MG: 50 INJECTION, SOLUTION INTRAMUSCULAR; INTRAVENOUS at 11:24

## 2023-11-29 RX ADMIN — METHYLPREDNISOLONE SODIUM SUCCINATE 60 MG: 125 INJECTION INTRAMUSCULAR; INTRAVENOUS at 11:14

## 2023-11-29 RX ADMIN — ACETAMINOPHEN 1000 MG: 500 TABLET ORAL at 11:11

## 2023-11-29 RX ADMIN — LORAZEPAM 1 MG: 2 INJECTION INTRAMUSCULAR; INTRAVENOUS at 10:38

## 2023-11-29 RX ADMIN — SODIUM CHLORIDE 1000 ML: 9 INJECTION, SOLUTION INTRAVENOUS at 10:38

## 2023-11-29 RX ADMIN — DARATUMUMAB AND HYALURONIDASE-FIHJ (HUMAN RECOMBINANT) 1800 MG: 1800; 30000 INJECTION SUBCUTANEOUS at 12:11

## 2023-11-29 NOTE — ASSESSMENT & PLAN NOTE
Patient is in the midst of her initial cycle of daratumumab, lenalidomide, dexamethasone.  Blood counts today are low most notably with an ANC of 380.  Revlimid will be held until ANC is >1000 and then I would resume at the 15 mg dose.  Continue daratumumab as scheduled as this should not interfere with her blood counts.  She will continue the steroid with dose reduction as outlined last week.  I will see her back for cycle 2-day 1 with lab work prior to monitor for toxicities.

## 2023-11-29 NOTE — ASSESSMENT & PLAN NOTE
Patient has not been keeping up with her losses from diarrhea.  I will give her a liter of normal saline today.  Push oral intake.  Resume colestipol for diarrhea as discussed.

## 2023-11-29 NOTE — PROGRESS NOTES
Oral Chemotherapy - Double Check    Drug: lenalidomide  Strength: 15 mg capsule  Directions: Take 1 capsule PO daily on Days 1-21 then off 7 days on a 28-day cycle  QTY: 21  RF:0    Released to pharmacy: [facility]    Completed independent double check on medication order/RX.    Carmen Sorto PharmD, [facility]  Clinical Oncology Pharmacy Specialist  Phone: [phone]    11/29/2023  14:48 EST

## 2023-11-29 NOTE — ASSESSMENT & PLAN NOTE
Patient is on Prozac.  She notes increased anxiety and jitteriness.  I suspect this is in part related to her new treatment and the steroid dose which has now been decreased.  I will give her a small number of Ativan to use for the next couple of weeks which will hopefully allow her to acclimate to her myeloma regimen.  If she continues to have anxiety I would add buspirone.

## 2023-11-29 NOTE — ASSESSMENT & PLAN NOTE
Patient has chronic COPD and is oxygen dependent.  She has more wheezing despite her inhalers.  I will order chest x-ray today.

## 2023-11-29 NOTE — PROGRESS NOTES
Re: Refills of Oral Specialty Medication - Revlimid (lenalidomide)    Drug-Drug Interactions: The current medication list was reviewed and there are no relevant drug-drug interactions with the specialty medication.  Medication Allergies: The patient has no relevant allergies as it relates to their oral specialty medication  Review of Labs/Dose Adjustments: DOSE CHANGE - I reviewed the most recent note and labs. Due to neutropenia the dose is being decreased. I sent refills as described below.  Patient instructed not to restart lenalidomide until ANC is greater than 1000.    Drug: Revlimid (lenalidomide)  Strength: 15 mg  Directions: Take 1 capsule by mouth daily ON days 1-21, OFF for 7 days of each 28 day cycle  Quantity: 21  Refills: 0  REMS Auth # [id number] Exp 12/27/23   Pharmacy prescription sent to: [facility]  Specialty Pharmacy      Madisyn Saldaña, PharmD, [facility]  Oncology Clinical Pharmacist  11/29/2023  13:43 EST

## 2023-11-29 NOTE — ASSESSMENT & PLAN NOTE
Status post resection 2017.  She has had chronic diarrhea since that time.  She stopped taking her colestipol.  She has had more diarrhea symptoms.  Colestipol will not interfere with her regimen and I recommended that she go back to taking that as prescribed since it controlled her symptoms.  She can also use Imodium as needed.

## 2023-11-29 NOTE — PROGRESS NOTES
Chief Complaint  Rena Martinez PA    Subjective          Anca Samson presents to [facility] for unscheduled visit for side effects.  Patient has started on her initial cycle of daratumumab, lenalidomide, dexamethasone.  She has had a lot of trouble with jitteriness and tremors since starting her regimen.  Last week her steroid dose was decreased by half.  She also notes more anxiety.  She has been taking her Prozac.  She notes that that helped a little but incompletely.  She has had a long history of diarrhea since her colon surgery in 2017.  She was previously on colestipol which controlled her symptoms but she stopped that.  She has not been taking Imodium.  She notes frequent watery diarrhea without blood, melena or pain with bowel movement.  She has not been eating very well.  She does feel like she is drinking fluids adequately but not keeping up with her diarrhea losses.  She has COPD and is oxygen dependent.  She has been using her inhalers.  She reports that her pulse ox has been normal but she feels more short of breath.  She denies productive cough, hemoptysis, fever or chills.      Oncology/Hematology History Overview Note   Smoldering Myeloma    Initially diagnosed in 2016 with bone marrow biopsy demonstrating 30% CD56 positive monoclonal plasma cell population.  45 XX normal female chromosome pattern  FISH panel negative for myeloma associated mutations including 1q21, 9q34,11q13,14q32,15q24, 17q13  No anemia, renal insufficiency, hypercalcemia.  On observation since that time    Low grade adenocarcinoma of ascending colon      5/16/2017 right hemicolectomy which  revealed a low-grade 7.6 cm adenocarcinoma of the cecum. All margins were  negative. Lymphovascular invasion and perineural invasion were not identified.     26 lymph nodes were removed and unfortunately 1 was involved with metastatic deposit. pT3 pN1a colorectal  cancer.        Clinical Staging      Smoldering Myeloma; Colon ([id number])            Treatments      Chemotherapy      11/2017 completed 6mo adjuvant Xeloda        6/2022 Stopped Smoking     Malignant neoplasm of ascending colon   7/21/2017 Initial Diagnosis    Colon cancer (CMS/HCC)     Multiple myeloma   7/28/2021 Initial Diagnosis    Multiple myeloma     11/7/2023 -  Chemotherapy    OP MULTIPLE MYELOMA Daratumumab / Lenalidomide / Dexamethasone         Review of Systems   Constitutional:  Positive for fatigue. Negative for appetite change, diaphoresis, fever, unexpected weight gain and unexpected weight loss.   HENT:  Negative for hearing loss, mouth sores, sore throat, swollen glands, trouble swallowing and voice change.    Eyes:  Negative for blurred vision.   Respiratory:  Positive for cough and shortness of breath. Negative for wheezing.    Cardiovascular:  Negative for chest pain and palpitations.   Gastrointestinal:  Negative for abdominal pain, blood in stool, constipation, diarrhea, nausea and vomiting.   Endocrine: Negative for cold intolerance and heat intolerance.   Genitourinary:  Negative for difficulty urinating, dysuria, frequency, hematuria and urinary incontinence.   Musculoskeletal:  Negative for arthralgias, back pain and myalgias.   Skin:  Negative for rash, skin lesions and wound.   Neurological:  Positive for weakness. Negative for dizziness, seizures, numbness and headache.   Hematological:  Does not bruise/bleed easily.   Psychiatric/Behavioral:  Negative for depressed mood. The patient is nervous/anxious.      Current Outpatient Medications on File Prior to Visit   Medication Sig Dispense Refill    acyclovir (ZOVIRAX) 400 MG tablet Take 1 tablet by mouth 2 (Two) Times a Day. Take one tablet by mouth twice daily. 60 tablet 11    albuterol sulfate  (90 Base) MCG/ACT inhaler Inhale 2 puffs Every 4 (Four) Hours As Needed for Wheezing. 18 g 11    arformoterol (BROVANA) 15 MCG/2ML  nebulizer solution Take 2 mL by nebulization 2 (Two) Times a Day.      budesonide (PULMICORT) 0.5 MG/2ML nebulizer solution Take 2 mL by nebulization 2 (Two) Times a Day.      colestipol (COLESTID) 1 g tablet Take 2 tablets by mouth 2 (Two) Times a Day. 120 tablet 5    dapsone 25 MG tablet Take 2 tablets by mouth 2 (Two) Times a Day. 120 tablet 5    dexAMETHasone (DECADRON) 4 MG tablet Take 5 tablets on D 1,8,15,22 and take 1 tablet on D 2,3,9,10,16,17,23,24.  Take in the morning with food. 28 tablet 1    FLUoxetine (PROzac) 40 MG capsule Take 1 capsule by mouth Daily. 90 capsule 1    HYDROcodone-acetaminophen (Norco) 5-325 MG per tablet Take 1 tablet by mouth Every 6 (Six) Hours As Needed for Moderate Pain or Severe Pain. 6 tablet 0    lenalidomide (Revlimid) 25 MG capsule Take 1 capsule by mouth Daily. On Days 1-21, and off 7 days, on a 28 day cycle 21 capsule 0    levothyroxine (SYNTHROID, LEVOTHROID) 25 MCG tablet Take 1 tablet by mouth Every Morning. 90 tablet 1    meloxicam (MOBIC) 15 MG tablet Take 1 tablet by mouth Daily. 90 tablet 1    ondansetron (ZOFRAN) 8 MG tablet Take 1 tablet by mouth 3 (Three) Times a Day As Needed for Nausea or Vomiting. (Patient taking differently: Take 1 tablet by mouth 3 (Three) Times a Day As Needed for Nausea or Vomiting. TO START WHEN STARTS CHEMO 11/3/23) 30 tablet 5    promethazine (PHENERGAN) 25 MG tablet Take 1 tablet by mouth Every 6 (Six) Hours As Needed for Nausea or Vomiting. 30 tablet 1    revefenacin (YUPELRI) 175 MCG/3ML nebulizer solution Take 3 mL by nebulization Daily.      Sod Picosulfate-Mag Ox-Cit Acd (Clenpiq) 10-3.5-12 MG-GM -GM/175ML solution Take 175 mL by mouth Take As Directed. (Patient taking differently: Take 175 mL by mouth Take As Directed. IS TO HAVE COLONOSCOPY ON 11/9/23) 350 mL 0    Trelegy Ellipta 100-62.5-25 MCG/ACT inhaler       vitamin D (ERGOCALCIFEROL) 1.25 MG (50707 UT) capsule capsule Take 1 capsule by mouth 2 (Two) Times a Week. (Patient  taking differently: Take 1 capsule by mouth Every 7 (Seven) Days. TAKES ON FRIDAY) 26 capsule 1     Current Facility-Administered Medications on File Prior to Visit   Medication Dose Route Frequency Provider Last Rate Last Admin    [COMPLETED] acetaminophen (TYLENOL) tablet 1,000 mg  1,000 mg Oral Once West Nicolas MD   1,000 mg at 11/29/23 1111    [COMPLETED] daratumumab-hyaluronidase-fij (DARZALEX FASPRO) 1800-91849 MG-UT/15ML injection 1,800 mg  1,800 mg Subcutaneous Once West Nicolas MD   1,800 mg at 11/29/23 1211    [COMPLETED] diphenhydrAMINE (BENADRYL) IVPB 25 mg  25 mg Intravenous Once West Nicolas MD   Stopped at 11/29/23 1139    heparin injection 500 Units  500 Units Intravenous PRN West Nicolas MD   500 Units at 11/29/23 1219    [COMPLETED] LORazepam (ATIVAN) injection 1 mg  1 mg Intravenous Once West Nicolas MD   1 mg at 11/29/23 1038    [COMPLETED] methylPREDNISolone sodium succinate (SOLU-Medrol) injection 60 mg  60 mg Intravenous Once West Nicolas MD   60 mg at 11/29/23 1114    [COMPLETED] sodium chloride 0.9 % bolus 1,000 mL  1,000 mL Intravenous Once West Nicolas MD   Stopped at 11/29/23 1138    sodium chloride 0.9 % flush 20 mL  20 mL Intravenous PRN West Nicolas MD   20 mL at 11/29/23 1219    [COMPLETED] sodium chloride 0.9 % infusion  20 mL/hr Intravenous Once West Nicolas MD   Stopped at 11/29/23 1219       Allergies   Allergen Reactions    Cephalexin Hives    Morphine Anaphylaxis    Penicillins Hives    Sulfa Antibiotics Hives     Past Medical History:   Diagnosis Date    Anxiety     Asthma     Asthma 07/28/2021    Atherosclerosis of native coronary artery of native heart with angina pectoris 08/15/2023    FOLLOWED BY DR GONZALES/DINA PETTIT. DENIES CP BUT GETS SOA WITH EXERTION HAS COPD WEARS AT 2LPM N/C. DECREASED ACTIVITY D/T SOA    C. difficile diarrhea     DENIES ANY CURRENT ISSUES    Colon cancer 07/21/2017    Colon polyp     COPD (chronic  obstructive pulmonary disease) 10/23/2017    COPD (chronic obstructive pulmonary disease) 10/23/2017    Depression     Disease of thyroid gland     Diverticulitis     Dysphagia     Essential hypertension 07/28/2021    Heartburn     Hernia 2019    History of chemotherapy     2017    Hx of psychiatric care     Hyperlipidemia     Impaired fasting glucose 08/14/2015    Lumbago     Lung nodule 02/17/2022    Major depressive disorder 10/27/2016    Malignant neoplasm of ascending colon 07/21/2017    Melena     Multiple myeloma     Nicotine dependence 05/10/2017    NICK (obstructive sleep apnea) 11/06/2023    Oxygen dependent     REPORTS USES 02 AT 2LPM VIA N/C. CAN GET VERY SOA WITH MINIMAL EXERTION    Renal insufficiency 07/28/2021    Seizures     PSEUDO SEIZURES LAST ONE AROUND NOV 2022    Shortness of breath     CAN GET VERY SOA WITH MINIMAL EXERTION    Sleep apnea     Tobacco use 07/28/2021    QUIT SMOKING JUNE 2022    Visit for screening mammogram 02/20/2020    NORMAL- REPEAT IN ONE YEAR    Vitamin D deficiency      Past Surgical History:   Procedure Laterality Date    APPENDECTOMY      BONE MARROW BIOPSY  2016    COLON SURGERY  2017    COLONOSCOPY  2018,2017,2019    [facility]- DR LE:DIVERTICULOSIS AND ERYTHEMA OF MUCOSA    COLONOSCOPY N/A 12/20/2022    Procedure: COLONOSCOPY WITH ELEVIEW INJECTION, POLYPECTOMY, HOT SNARE, CLIP APPLICATION X3, BIOPSIES;  Surgeon: Jarvis Borges MD;  Location: [facility] ENDOSCOPY;  Service: Gastroenterology;  Laterality: N/A;  COLON POLYP, DIVERTICULOSIS, ANASTOMOSIS RIGHT COLON    COLONOSCOPY N/A 11/9/2023    Procedure: COLONOSCOPY;  Surgeon: Jarvis Borges MD;  Location: [facility] ENDOSCOPY;  Service: Gastroenterology;  Laterality: N/A;  DIVERTICULOSIS, ANASTOMOSIS IN ASCENDING COLON    ENDOSCOPY  2018    FECAL DISIMPACTION  06/2019    TRANSPLANT     HEMICOLECTOMY Right 05/2017    HYSTERECTOMY  1982,1984    KNEE ARTHROSCOPY Left 01/03/2022    Procedure: KNEE  ARTHROSCOPY WITH PARTIAL MEDIAL MENISCECTOMY,  CHONDROPLASTY;  Surgeon: Nicholas Tipton MD;  [place] [facility] [place] [facility] [place]  Service: [place]opedics;  Laterality: Left;    MINI-LAPAROTOMY  2017    PORTACATH PLACEMENT N/A     pt states that her port does not work    UPPER GASTROINTESTINAL ENDOSCOPY  2018    VENOUS ACCESS DEVICE (PORT) INSERTION N/A 10/31/2023    Procedure: Port-a-catheter removal and port-a-catheter placement;  Surgeon: Alcon Delgado MD;  [place] [facility] [place] [facility] [place]  Service: General;  Laterality: N/A;     Social History     Socioeconomic History    Marital status:    Tobacco Use    Smoking status: Former     Packs/day: 1.00     Years: 47.00     Additional pack years: 0.00     Total pack years: 47.00     Types: Cigarettes     Start date: 1975     Quit date: 6/3/2022     Years since quittin.4     Passive exposure: Past    Smokeless tobacco: Never    Tobacco comments:     started age 26   Vaping Use    Vaping Use: Never used   Substance and Sexual Activity    Alcohol use: Never    Drug use: Never    Sexual activity: Defer     Family History   Problem Relation Age of Onset    Heart disease Mother     Lung cancer Mother     Cancer Mother     Stroke Father     Heart disease Father     Kidney cancer Father     Cancer Father     Stroke Other         UNCLE/AUNT    Colon cancer Neg Hx     Malig Hyperthermia Neg Hx        Objective   Physical Exam  Vitals reviewed. Exam conducted with a chaperone present.   Constitutional:       General: She is not in acute distress.     Appearance: Normal appearance.   HENT:      Nose:      Comments: Nasal cannula in place  Cardiovascular:      Rate and Rhythm: Normal rate and regular rhythm.      Heart sounds: Normal heart sounds.   Pulmonary:      Effort: Pulmonary effort is normal.      Breath sounds: Wheezing (Scattered end expiratory wheezes bilateral) present.   Abdominal:      General: Abdomen is flat. Bowel sounds are normal.      Palpations: Abdomen is  "soft.   Musculoskeletal:      Right lower leg: No edema.      Left lower leg: No edema.   Neurological:      Mental Status: She is alert and oriented to person, place, and time.   Psychiatric:         Mood and Affect: Mood normal.         Behavior: Behavior normal.         Vitals:    11/29/23 0945   BP: 123/63   Pulse: 88   Resp: 22   Temp: 97.3 °F (36.3 °C)   TempSrc: Temporal   SpO2: 99%   Weight: 91.7 kg (202 lb 2.6 oz)   PainSc: 0-No pain     ECOG score: 1         PHQ-9 Total Score:                    Result Review :   The following data was reviewed by: West Nicolas MD on 11/29/2023:  Lab Results   Component Value Date    HGB 10.4 (L) 11/29/2023    HCT 32.6 (L) 11/29/2023    MCV 99.1 (H) 11/29/2023     11/29/2023    WBC 1.35 (L) 11/29/2023    NEUTROABS 0.38 (L) 11/29/2023    LYMPHSABS 0.69 (L) 11/29/2023    MONOSABS 0.14 11/29/2023    EOSABS 0.12 11/29/2023    BASOSABS 0.02 11/29/2023     Lab Results   Component Value Date    GLUCOSE 139 (H) 11/07/2023    BUN 10 11/07/2023    CREATININE 0.78 11/07/2023     11/07/2023    K 3.2 (L) 11/07/2023     11/07/2023    CO2 25.9 11/07/2023    CALCIUM 8.5 (L) 11/07/2023    PROTEINTOT 9.4 (H) 11/07/2023    ALBUMIN 3.3 (L) 11/07/2023    BILITOT 0.4 11/07/2023    ALKPHOS 51 11/07/2023    AST 18 11/07/2023    ALT 10 11/07/2023     Lab Results   Component Value Date    FREET4 1.20 05/25/2023    TSH 1.750 10/27/2023     No results found for: \"IRON\", \"LABIRON\", \"TRANSFERRIN\", \"TIBC\"  Lab Results   Component Value Date     03/07/2023    AHIOFFFL02 218 01/28/2020    FOLATE 17.7 01/28/2020     No results found for: \"PSA\", \"CEA\", \"AFP\", \"\", \"\"          Assessment and Plan    Diagnoses and all orders for this visit:    1. Multiple myeloma not having achieved remission (Primary)  Assessment & Plan:  Patient is in the midst of her initial cycle of daratumumab, lenalidomide, dexamethasone.  Blood counts today are low most notably with an ANC of " 380.  Revlimid will be held until ANC is >1000 and then I would resume at the 15 mg dose.  Continue daratumumab as scheduled as this should not interfere with her blood counts.  She will continue the steroid with dose reduction as outlined last week.  I will see her back for cycle 2-day 1 with lab work prior to monitor for toxicities.      2. Shortness of breath  Assessment & Plan:  Patient has chronic COPD and is oxygen dependent.  She has more wheezing despite her inhalers.  I will order chest x-ray today.    Orders:  -     XR Chest PA & Lateral; Future    3. Dehydration  Assessment & Plan:  Patient has not been keeping up with her losses from diarrhea.  I will give her a liter of normal saline today.  Push oral intake.  Resume colestipol for diarrhea as discussed.    Orders:  -     ONCBCN INFUSION APPOINTMENT REQUEST 01; Future    4. Anxiety  Assessment & Plan:  Patient is on Prozac.  She notes increased anxiety and jitteriness.  I suspect this is in part related to her new treatment and the steroid dose which has now been decreased.  I will give her a small number of Ativan to use for the next couple of weeks which will hopefully allow her to acclimate to her myeloma regimen.  If she continues to have anxiety I would add buspirone.    Orders:  -     LORazepam (Ativan) 1 MG tablet; Take 1 tablet by mouth 2 (Two) Times a Day As Needed for Anxiety.  Dispense: 14 tablet; Refill: 0    5. Malignant neoplasm of ascending colon  Assessment & Plan:  Status post resection 2017.  She has had chronic diarrhea since that time.  She stopped taking her colestipol.  She has had more diarrhea symptoms.  Colestipol will not interfere with her regimen and I recommended that she go back to taking that as prescribed since it controlled her symptoms.  She can also use Imodium as needed.      Other orders  -     Cancel: sodium chloride 0.9 % bolus 1,000 mL            Patient Follow Up: Cycle 2-day 1    Patient was given instructions  and counseling regarding her condition or for health maintenance advice. Please see specific information pulled into the AVS if appropriate.     West Nicolas MD    11/29/2023

## 2023-11-30 ENCOUNTER — TELEPHONE (OUTPATIENT)
Dept: ONCOLOGY | Facility: HOSPITAL | Age: 73
End: 2023-11-30
Payer: MEDICARE

## 2023-11-30 NOTE — TELEPHONE ENCOUNTER
Called patient to inform of the chest xray results. Left a message to let them know that the chest xray showed no issues.

## 2023-12-04 ENCOUNTER — SPECIALTY PHARMACY (OUTPATIENT)
Dept: PHARMACY | Facility: HOSPITAL | Age: 73
End: 2023-12-04
Payer: MEDICARE

## 2023-12-04 DIAGNOSIS — C90.00 MULTIPLE MYELOMA, REMISSION STATUS UNSPECIFIED: ICD-10-CM

## 2023-12-04 RX ORDER — LENALIDOMIDE 15 MG/1
15 CAPSULE ORAL DAILY
Qty: 21 CAPSULE | Refills: 0 | OUTPATIENT
Start: 2023-12-04 | End: 2023-12-04 | Stop reason: SDUPTHER

## 2023-12-04 RX ORDER — LENALIDOMIDE 15 MG/1
15 CAPSULE ORAL DAILY
Qty: 21 CAPSULE | Refills: 0 | Status: SHIPPED | OUTPATIENT
Start: 2023-12-04

## 2023-12-06 ENCOUNTER — SPECIALTY PHARMACY (OUTPATIENT)
Dept: PHARMACY | Facility: HOSPITAL | Age: 73
End: 2023-12-06
Payer: MEDICARE

## 2023-12-06 ENCOUNTER — HOSPITAL ENCOUNTER (OUTPATIENT)
Dept: ONCOLOGY | Facility: HOSPITAL | Age: 73
Discharge: HOME | End: 2023-12-06
Admitting: INTERNAL MEDICINE
Payer: MEDICARE

## 2023-12-06 ENCOUNTER — OFFICE VISIT (OUTPATIENT)
Dept: ONCOLOGY | Facility: HOSPITAL | Age: 73
End: 2023-12-06
Payer: MEDICARE

## 2023-12-06 VITALS
SYSTOLIC BLOOD PRESSURE: 120 MMHG | HEART RATE: 88 BPM | TEMPERATURE: 97.6 F | DIASTOLIC BLOOD PRESSURE: 78 MMHG | OXYGEN SATURATION: 96 % | BODY MASS INDEX: 39.54 KG/M2 | WEIGHT: 205.4 LBS | RESPIRATION RATE: 26 BRPM

## 2023-12-06 DIAGNOSIS — R73.09 ELEVATED GLUCOSE: Primary | ICD-10-CM

## 2023-12-06 DIAGNOSIS — R73.09 ELEVATED GLUCOSE: ICD-10-CM

## 2023-12-06 DIAGNOSIS — C90.00 MULTIPLE MYELOMA NOT HAVING ACHIEVED REMISSION: Primary | ICD-10-CM

## 2023-12-06 DIAGNOSIS — E03.9 HYPOTHYROIDISM, UNSPECIFIED TYPE: ICD-10-CM

## 2023-12-06 DIAGNOSIS — Z45.2 ENCOUNTER FOR ADJUSTMENT OR MANAGEMENT OF VASCULAR ACCESS DEVICE: ICD-10-CM

## 2023-12-06 DIAGNOSIS — C90.00 MULTIPLE MYELOMA, REMISSION STATUS UNSPECIFIED: Primary | ICD-10-CM

## 2023-12-06 LAB
ALBUMIN SERPL-MCNC: 3.3 G/DL (ref 3.5–5.2)
ALBUMIN/GLOB SERPL: 1.3 G/DL
ALP SERPL-CCNC: 78 U/L (ref 39–117)
ALT SERPL W P-5'-P-CCNC: 20 U/L (ref 1–33)
ANION GAP SERPL CALCULATED.3IONS-SCNC: 8.1 MMOL/L (ref 5–15)
AST SERPL-CCNC: 8 U/L (ref 1–32)
BASOPHILS # BLD AUTO: 0.04 10*3/MM3 (ref 0–0.2)
BASOPHILS NFR BLD AUTO: 1.3 % (ref 0–1.5)
BILIRUB SERPL-MCNC: 0.4 MG/DL (ref 0–1.2)
BUN SERPL-MCNC: 14 MG/DL (ref 8–23)
BUN/CREAT SERPL: 18.9 (ref 7–25)
CALCIUM SPEC-SCNC: 9 MG/DL (ref 8.6–10.5)
CHLORIDE SERPL-SCNC: 112 MMOL/L (ref 98–107)
CO2 SERPL-SCNC: 21.9 MMOL/L (ref 22–29)
CREAT SERPL-MCNC: 0.74 MG/DL (ref 0.57–1)
DEPRECATED RDW RBC AUTO: 64.5 FL (ref 37–54)
EGFRCR SERPLBLD CKD-EPI 2021: 85 ML/MIN/1.73
EOSINOPHIL # BLD AUTO: 0.01 10*3/MM3 (ref 0–0.4)
EOSINOPHIL NFR BLD AUTO: 0.3 % (ref 0.3–6.2)
ERYTHROCYTE [DISTWIDTH] IN BLOOD BY AUTOMATED COUNT: 17.1 % (ref 12.3–15.4)
GLOBULIN UR ELPH-MCNC: 2.6 GM/DL
GLUCOSE SERPL-MCNC: 224 MG/DL (ref 65–99)
HBA1C MFR BLD: 5.8 % (ref 4.8–5.6)
HCT VFR BLD AUTO: 29.9 % (ref 34–46.6)
HGB BLD-MCNC: 9.5 G/DL (ref 12–15.9)
IMM GRANULOCYTES # BLD AUTO: 0.03 10*3/MM3 (ref 0–0.05)
IMM GRANULOCYTES NFR BLD AUTO: 1 % (ref 0–0.5)
LYMPHOCYTES # BLD AUTO: 1.49 10*3/MM3 (ref 0.7–3.1)
LYMPHOCYTES NFR BLD AUTO: 47.9 % (ref 19.6–45.3)
MCH RBC QN AUTO: 32.6 PG (ref 26.6–33)
MCHC RBC AUTO-ENTMCNC: 31.8 G/DL (ref 31.5–35.7)
MCV RBC AUTO: 102.7 FL (ref 79–97)
MONOCYTES # BLD AUTO: 0.66 10*3/MM3 (ref 0.1–0.9)
MONOCYTES NFR BLD AUTO: 21.2 % (ref 5–12)
NEUTROPHILS NFR BLD AUTO: 0.88 10*3/MM3 (ref 1.7–7)
NEUTROPHILS NFR BLD AUTO: 28.3 % (ref 42.7–76)
PLATELET # BLD AUTO: 471 10*3/MM3 (ref 140–450)
PMV BLD AUTO: 9.5 FL (ref 6–12)
POTASSIUM SERPL-SCNC: 3.5 MMOL/L (ref 3.5–5.2)
PROT SERPL-MCNC: 5.9 G/DL (ref 6–8.5)
RBC # BLD AUTO: 2.91 10*6/MM3 (ref 3.77–5.28)
SODIUM SERPL-SCNC: 142 MMOL/L (ref 136–145)
T4 FREE SERPL-MCNC: 1.55 NG/DL (ref 0.93–1.7)
TSH SERPL DL<=0.05 MIU/L-ACNC: 0.93 UIU/ML (ref 0.27–4.2)
WBC NRBC COR # BLD AUTO: 3.11 10*3/MM3 (ref 3.4–10.8)

## 2023-12-06 PROCEDURE — 96401 CHEMO ANTI-NEOPL SQ/IM: CPT

## 2023-12-06 PROCEDURE — 85025 COMPLETE CBC W/AUTO DIFF WBC: CPT | Performed by: INTERNAL MEDICINE

## 2023-12-06 PROCEDURE — A9270 NON-COVERED ITEM OR SERVICE: HCPCS | Performed by: INTERNAL MEDICINE

## 2023-12-06 PROCEDURE — 25010000002 HEPARIN LOCK FLUSH PER 10 UNITS: Performed by: INTERNAL MEDICINE

## 2023-12-06 PROCEDURE — 84443 ASSAY THYROID STIM HORMONE: CPT | Performed by: PHYSICIAN ASSISTANT

## 2023-12-06 PROCEDURE — 25010000002 DARATUMUMAB-HYALURONIDASE-FIHJ 1800-30000 MG-UT/15ML SOLUTION: Performed by: INTERNAL MEDICINE

## 2023-12-06 PROCEDURE — 80053 COMPREHEN METABOLIC PANEL: CPT | Performed by: INTERNAL MEDICINE

## 2023-12-06 PROCEDURE — 83036 HEMOGLOBIN GLYCOSYLATED A1C: CPT | Performed by: PHYSICIAN ASSISTANT

## 2023-12-06 PROCEDURE — 25810000003 SODIUM CHLORIDE 0.9 % SOLUTION: Performed by: INTERNAL MEDICINE

## 2023-12-06 PROCEDURE — 25010000002 METHYLPREDNISOLONE PER 125 MG: Performed by: INTERNAL MEDICINE

## 2023-12-06 PROCEDURE — 84439 ASSAY OF FREE THYROXINE: CPT | Performed by: PHYSICIAN ASSISTANT

## 2023-12-06 PROCEDURE — 63710000001 ACETAMINOPHEN EXTRA STRENGTH 500 MG TABLET: Performed by: INTERNAL MEDICINE

## 2023-12-06 PROCEDURE — 96375 TX/PRO/DX INJ NEW DRUG ADDON: CPT

## 2023-12-06 PROCEDURE — 96374 THER/PROPH/DIAG INJ IV PUSH: CPT

## 2023-12-06 PROCEDURE — 25010000002 DIPHENHYDRAMINE PER 50 MG: Performed by: INTERNAL MEDICINE

## 2023-12-06 RX ORDER — SODIUM CHLORIDE 9 MG/ML
20 INJECTION, SOLUTION INTRAVENOUS ONCE
OUTPATIENT
Start: 2023-12-27

## 2023-12-06 RX ORDER — DIPHENHYDRAMINE HYDROCHLORIDE 50 MG/ML
50 INJECTION INTRAMUSCULAR; INTRAVENOUS AS NEEDED
OUTPATIENT
Start: 2023-12-27

## 2023-12-06 RX ORDER — ACETAMINOPHEN 500 MG
1000 TABLET ORAL ONCE
OUTPATIENT
Start: 2023-12-13

## 2023-12-06 RX ORDER — MEPERIDINE HYDROCHLORIDE 25 MG/ML
25 INJECTION INTRAMUSCULAR; INTRAVENOUS; SUBCUTANEOUS
Status: CANCELLED | OUTPATIENT
Start: 2023-12-06

## 2023-12-06 RX ORDER — ACETAMINOPHEN 500 MG
1000 TABLET ORAL ONCE
Status: CANCELLED | OUTPATIENT
Start: 2023-12-06

## 2023-12-06 RX ORDER — MEPERIDINE HYDROCHLORIDE 25 MG/ML
25 INJECTION INTRAMUSCULAR; INTRAVENOUS; SUBCUTANEOUS
OUTPATIENT
Start: 2023-12-20

## 2023-12-06 RX ORDER — ACETAMINOPHEN 500 MG
1000 TABLET ORAL ONCE
Status: COMPLETED | OUTPATIENT
Start: 2023-12-06 | End: 2023-12-06

## 2023-12-06 RX ORDER — FAMOTIDINE 10 MG/ML
20 INJECTION, SOLUTION INTRAVENOUS AS NEEDED
OUTPATIENT
Start: 2023-12-20

## 2023-12-06 RX ORDER — DIPHENHYDRAMINE HYDROCHLORIDE 50 MG/ML
50 INJECTION INTRAMUSCULAR; INTRAVENOUS AS NEEDED
Status: CANCELLED | OUTPATIENT
Start: 2023-12-06

## 2023-12-06 RX ORDER — METHYLPREDNISOLONE SODIUM SUCCINATE 125 MG/2ML
60 INJECTION, POWDER, LYOPHILIZED, FOR SOLUTION INTRAMUSCULAR; INTRAVENOUS ONCE
OUTPATIENT
Start: 2023-12-20

## 2023-12-06 RX ORDER — DIPHENHYDRAMINE HYDROCHLORIDE 50 MG/ML
50 INJECTION INTRAMUSCULAR; INTRAVENOUS AS NEEDED
OUTPATIENT
Start: 2023-12-20

## 2023-12-06 RX ORDER — HEPARIN SODIUM (PORCINE) LOCK FLUSH IV SOLN 100 UNIT/ML 100 UNIT/ML
500 SOLUTION INTRAVENOUS AS NEEDED
Status: DISCONTINUED | OUTPATIENT
Start: 2023-12-06 | End: 2023-12-07 | Stop reason: HOSPADM

## 2023-12-06 RX ORDER — METHYLPREDNISOLONE SODIUM SUCCINATE 125 MG/2ML
60 INJECTION, POWDER, LYOPHILIZED, FOR SOLUTION INTRAMUSCULAR; INTRAVENOUS ONCE
OUTPATIENT
Start: 2023-12-13

## 2023-12-06 RX ORDER — FAMOTIDINE 10 MG/ML
20 INJECTION, SOLUTION INTRAVENOUS AS NEEDED
OUTPATIENT
Start: 2023-12-13

## 2023-12-06 RX ORDER — SODIUM CHLORIDE 0.9 % (FLUSH) 0.9 %
20 SYRINGE (ML) INJECTION AS NEEDED
Status: DISCONTINUED | OUTPATIENT
Start: 2023-12-06 | End: 2023-12-07 | Stop reason: HOSPADM

## 2023-12-06 RX ORDER — DIPHENHYDRAMINE HYDROCHLORIDE 50 MG/ML
50 INJECTION INTRAMUSCULAR; INTRAVENOUS AS NEEDED
OUTPATIENT
Start: 2023-12-13

## 2023-12-06 RX ORDER — METHYLPREDNISOLONE SODIUM SUCCINATE 125 MG/2ML
60 INJECTION, POWDER, LYOPHILIZED, FOR SOLUTION INTRAMUSCULAR; INTRAVENOUS ONCE
Status: CANCELLED | OUTPATIENT
Start: 2023-12-06

## 2023-12-06 RX ORDER — MEPERIDINE HYDROCHLORIDE 25 MG/ML
25 INJECTION INTRAMUSCULAR; INTRAVENOUS; SUBCUTANEOUS
OUTPATIENT
Start: 2023-12-27

## 2023-12-06 RX ORDER — DIPHENHYDRAMINE HYDROCHLORIDE 50 MG/ML
50 INJECTION INTRAMUSCULAR; INTRAVENOUS AS NEEDED
Status: DISCONTINUED | OUTPATIENT
Start: 2023-12-06 | End: 2023-12-07 | Stop reason: HOSPADM

## 2023-12-06 RX ORDER — ACETAMINOPHEN 500 MG
1000 TABLET ORAL ONCE
OUTPATIENT
Start: 2023-12-27

## 2023-12-06 RX ORDER — ACETAMINOPHEN 500 MG
1000 TABLET ORAL ONCE
OUTPATIENT
Start: 2023-12-20

## 2023-12-06 RX ORDER — METHYLPREDNISOLONE SODIUM SUCCINATE 125 MG/2ML
60 INJECTION, POWDER, LYOPHILIZED, FOR SOLUTION INTRAMUSCULAR; INTRAVENOUS ONCE
OUTPATIENT
Start: 2023-12-27

## 2023-12-06 RX ORDER — METHYLPREDNISOLONE SODIUM SUCCINATE 125 MG/2ML
60 INJECTION, POWDER, LYOPHILIZED, FOR SOLUTION INTRAMUSCULAR; INTRAVENOUS ONCE
Status: COMPLETED | OUTPATIENT
Start: 2023-12-06 | End: 2023-12-06

## 2023-12-06 RX ORDER — SODIUM CHLORIDE 9 MG/ML
20 INJECTION, SOLUTION INTRAVENOUS ONCE
Status: COMPLETED | OUTPATIENT
Start: 2023-12-06 | End: 2023-12-06

## 2023-12-06 RX ORDER — SODIUM CHLORIDE 9 MG/ML
20 INJECTION, SOLUTION INTRAVENOUS ONCE
OUTPATIENT
Start: 2023-12-13

## 2023-12-06 RX ORDER — MEPERIDINE HYDROCHLORIDE 25 MG/ML
25 INJECTION INTRAMUSCULAR; INTRAVENOUS; SUBCUTANEOUS
OUTPATIENT
Start: 2023-12-13

## 2023-12-06 RX ORDER — SODIUM CHLORIDE 9 MG/ML
20 INJECTION, SOLUTION INTRAVENOUS ONCE
OUTPATIENT
Start: 2023-12-20

## 2023-12-06 RX ORDER — MEPERIDINE HYDROCHLORIDE 25 MG/ML
25 INJECTION INTRAMUSCULAR; INTRAVENOUS; SUBCUTANEOUS
Status: ACTIVE | OUTPATIENT
Start: 2023-12-06 | End: 2023-12-06

## 2023-12-06 RX ORDER — SODIUM CHLORIDE 0.9 % (FLUSH) 0.9 %
20 SYRINGE (ML) INJECTION AS NEEDED
OUTPATIENT
Start: 2023-12-06

## 2023-12-06 RX ORDER — FAMOTIDINE 10 MG/ML
20 INJECTION, SOLUTION INTRAVENOUS AS NEEDED
Status: CANCELLED | OUTPATIENT
Start: 2023-12-06

## 2023-12-06 RX ORDER — HEPARIN SODIUM (PORCINE) LOCK FLUSH IV SOLN 100 UNIT/ML 100 UNIT/ML
500 SOLUTION INTRAVENOUS AS NEEDED
OUTPATIENT
Start: 2023-12-06

## 2023-12-06 RX ORDER — FAMOTIDINE 10 MG/ML
20 INJECTION, SOLUTION INTRAVENOUS AS NEEDED
OUTPATIENT
Start: 2023-12-27

## 2023-12-06 RX ORDER — SODIUM CHLORIDE 9 MG/ML
20 INJECTION, SOLUTION INTRAVENOUS ONCE
Status: CANCELLED | OUTPATIENT
Start: 2023-12-06

## 2023-12-06 RX ORDER — FAMOTIDINE 10 MG/ML
20 INJECTION, SOLUTION INTRAVENOUS AS NEEDED
Status: DISCONTINUED | OUTPATIENT
Start: 2023-12-06 | End: 2023-12-07 | Stop reason: HOSPADM

## 2023-12-06 RX ADMIN — METHYLPREDNISOLONE SODIUM SUCCINATE 60 MG: 125 INJECTION INTRAMUSCULAR; INTRAVENOUS at 11:34

## 2023-12-06 RX ADMIN — Medication 20 ML: at 12:54

## 2023-12-06 RX ADMIN — ACETAMINOPHEN 1000 MG: 500 TABLET ORAL at 11:36

## 2023-12-06 RX ADMIN — HEPARIN SODIUM (PORCINE) LOCK FLUSH IV SOLN 100 UNIT/ML 500 UNITS: 100 SOLUTION at 12:54

## 2023-12-06 RX ADMIN — DIPHENHYDRAMINE HYDROCHLORIDE 25 MG: 50 INJECTION, SOLUTION INTRAMUSCULAR; INTRAVENOUS at 11:37

## 2023-12-06 RX ADMIN — SODIUM CHLORIDE 20 ML/HR: 9 INJECTION, SOLUTION INTRAVENOUS at 11:29

## 2023-12-06 RX ADMIN — DARATUMUMAB AND HYALURONIDASE-FIHJ (HUMAN RECOMBINANT) 1800 MG: 1800; 30000 INJECTION SUBCUTANEOUS at 12:46

## 2023-12-06 NOTE — ASSESSMENT & PLAN NOTE
Patient is due for cycle 2 of daratumumab, lenalidomide, dexamethasone.  She had a lot of issues with steroid with the first cycle but with decreased doses she appears to be doing better today.  CBC is still notable for neutropeni with ANC approximately 900.  I will hold off on lenalidomide with this cycle to allow for appropriate bone marrow recovery and then resume with dose reduction.  Proceed with daratumumab cycle 2 as planned.  Continue dexamethasone at the decreased dose.  Continue Ativan as needed although I encouraged her to minimize use.  I will see her back for cycle 3-day 1 with lab work prior to monitor for toxicities.

## 2023-12-06 NOTE — PROGRESS NOTES
Chief Complaint  Chemotherapy    Rena Guerra,*  Rena Guerra, PA    Subjective          Anca Samson presents to [facility] for consideration of ongoing treatment of her myeloma.  She is on daratumumab, lenalidomide, dexamethasone.  She had a lot of trouble with anxiety, jitteriness with her initial cycle.  She had dose reduction of her steroid which seemed to help.  She is also taking a small dose of Ativan as needed which seems to have also improved her anxiety and tremors.  She reports adequate appetite.  She is still very fatigued.  She states she nods off frequently.  She does have a long history of COPD, oxygen dependent, sleep apnea.    Oncology/Hematology History Overview Note   Smoldering Myeloma    Initially diagnosed in 2016 with bone marrow biopsy demonstrating 30% CD56 positive monoclonal plasma cell population.  45 XX normal female chromosome pattern  FISH panel negative for myeloma associated mutations including 1q21, 9q34,11q13,14q32,15q24, 17q13  No anemia, renal insufficiency, hypercalcemia.  On observation since that time    Low grade adenocarcinoma of ascending colon      5/16/2017 right hemicolectomy which  revealed a low-grade 7.6 cm adenocarcinoma of the cecum. All margins were  negative. Lymphovascular invasion and perineural invasion were not identified.     26 lymph nodes were removed and unfortunately 1 was involved with metastatic deposit. pT3 pN1a colorectal cancer.        Clinical Staging      Smoldering Myeloma; Colon ([id number])            Treatments      Chemotherapy      11/2017 completed 6mo adjuvant Xeloda        6/2022 Stopped Smoking     Malignant neoplasm of ascending colon   7/21/2017 Initial Diagnosis    Colon cancer (CMS/HCC)     Multiple myeloma   7/28/2021 Initial Diagnosis    Multiple myeloma     11/7/2023 -  Chemotherapy    OP MULTIPLE MYELOMA Daratumumab / Lenalidomide / Dexamethasone         Review of  Systems   Constitutional:  Negative for appetite change, diaphoresis, fatigue, fever, unexpected weight gain and unexpected weight loss.   HENT:  Negative for hearing loss, mouth sores, sore throat, swollen glands, trouble swallowing and voice change.    Eyes:  Negative for blurred vision.   Respiratory:  Negative for cough, shortness of breath and wheezing.    Cardiovascular:  Negative for chest pain and palpitations.   Gastrointestinal:  Negative for abdominal pain, blood in stool, constipation, diarrhea, nausea and vomiting.   Endocrine: Negative for cold intolerance and heat intolerance.   Genitourinary:  Negative for difficulty urinating, dysuria, frequency, hematuria and urinary incontinence.   Musculoskeletal:  Negative for arthralgias, back pain and myalgias.   Skin:  Negative for rash, skin lesions and wound.   Neurological:  Negative for dizziness, seizures, weakness, numbness and headache.   Hematological:  Does not bruise/bleed easily.   Psychiatric/Behavioral:  Negative for depressed mood. The patient is not nervous/anxious.      Current Outpatient Medications on File Prior to Visit   Medication Sig Dispense Refill    acyclovir (ZOVIRAX) 400 MG tablet Take 1 tablet by mouth 2 (Two) Times a Day. Take one tablet by mouth twice daily. 60 tablet 11    albuterol sulfate  (90 Base) MCG/ACT inhaler Inhale 2 puffs Every 4 (Four) Hours As Needed for Wheezing. 18 g 11    arformoterol (BROVANA) 15 MCG/2ML nebulizer solution Take 2 mL by nebulization 2 (Two) Times a Day.      budesonide (PULMICORT) 0.5 MG/2ML nebulizer solution Take 2 mL by nebulization 2 (Two) Times a Day.      colestipol (COLESTID) 1 g tablet Take 2 tablets by mouth 2 (Two) Times a Day. 120 tablet 5    dapsone 25 MG tablet Take 2 tablets by mouth 2 (Two) Times a Day. 120 tablet 5    dexAMETHasone (DECADRON) 4 MG tablet Take 5 tablets on D 1,8,15,22 and take 1 tablet on D 2,3,9,10,16,17,23,24.  Take in the morning with food. 28 tablet 1     FLUoxetine (PROzac) 40 MG capsule Take 1 capsule by mouth Daily. 90 capsule 1    HYDROcodone-acetaminophen (Norco) 5-325 MG per tablet Take 1 tablet by mouth Every 6 (Six) Hours As Needed for Moderate Pain or Severe Pain. 6 tablet 0    lenalidomide (REVLIMID) 15 MG capsule Take 1 capsule by mouth Daily. On Days 1-21, and off 7 days, on a 28 day cycle 21 capsule 0    levothyroxine (SYNTHROID, LEVOTHROID) 25 MCG tablet Take 1 tablet by mouth Every Morning. 90 tablet 1    LORazepam (Ativan) 1 MG tablet Take 1 tablet by mouth 2 (Two) Times a Day As Needed for Anxiety. 14 tablet 0    meloxicam (MOBIC) 15 MG tablet Take 1 tablet by mouth Daily. 90 tablet 1    ondansetron (ZOFRAN) 8 MG tablet Take 1 tablet by mouth 3 (Three) Times a Day As Needed for Nausea or Vomiting. (Patient taking differently: Take 1 tablet by mouth 3 (Three) Times a Day As Needed for Nausea or Vomiting. TO START WHEN STARTS CHEMO 11/3/23) 30 tablet 5    promethazine (PHENERGAN) 25 MG tablet Take 1 tablet by mouth Every 6 (Six) Hours As Needed for Nausea or Vomiting. 30 tablet 1    revefenacin (YUPELRI) 175 MCG/3ML nebulizer solution Take 3 mL by nebulization Daily.      Sod Picosulfate-Mag Ox-Cit Acd (Clenpiq) 10-3.5-12 MG-GM -GM/175ML solution Take 175 mL by mouth Take As Directed. (Patient taking differently: Take 175 mL by mouth Take As Directed. IS TO HAVE COLONOSCOPY ON 11/9/23) 350 mL 0    Trelegy Ellipta 100-62.5-25 MCG/ACT inhaler       vitamin D (ERGOCALCIFEROL) 1.25 MG (48345 UT) capsule capsule Take 1 capsule by mouth 2 (Two) Times a Week. (Patient taking differently: Take 1 capsule by mouth Every 7 (Seven) Days. TAKES ON FRIDAY) 26 capsule 1     Current Facility-Administered Medications on File Prior to Visit   Medication Dose Route Frequency Provider Last Rate Last Admin    [COMPLETED] acetaminophen (TYLENOL) tablet 1,000 mg  1,000 mg Oral Once West Nicolas MD   1,000 mg at 12/06/23 1136    [COMPLETED] daratumumab-hyaluronidase-[facility]  (DARZALEX FASPRO) 1800-56178 MG-UT/15ML injection 1,800 mg  1,800 mg Subcutaneous Once West Nicolas MD   1,800 mg at 12/06/23 1246    diphenhydrAMINE (BENADRYL) injection 50 mg  50 mg Intravenous PRN West Nicolas MD        [COMPLETED] diphenhydrAMINE (BENADRYL) IVPB 25 mg  25 mg Intravenous Once West Nicolas MD   Stopped at 12/06/23 1152    famotidine (PEPCID) injection 20 mg  20 mg Intravenous PRN West Nicolas MD        heparin injection 500 Units  500 Units Intravenous PRN West Nicolas MD   500 Units at 12/06/23 1254    Hydrocortisone Sod Suc (PF) (Solu-CORTEF) injection 100 mg  100 mg Intravenous PRN West Nicolas MD        meperidine (DEMEROL) injection 25 mg  25 mg Intravenous Q20 Min PRN West Nicolas MD        [COMPLETED] methylPREDNISolone sodium succinate (SOLU-Medrol) injection 60 mg  60 mg Intravenous Once West Nicolas MD   60 mg at 12/06/23 1134    sodium chloride 0.9 % flush 20 mL  20 mL Intravenous PRN West Nicolas MD   20 mL at 12/06/23 1254    [COMPLETED] sodium chloride 0.9 % infusion  20 mL/hr Intravenous Once West Nicolas MD   Stopped at 12/06/23 1250       Allergies   Allergen Reactions    Cephalexin Hives    Morphine Anaphylaxis    Penicillins Hives    Sulfa Antibiotics Hives     Past Medical History:   Diagnosis Date    Anxiety     Asthma     Asthma 07/28/2021    Atherosclerosis of native coronary artery of native heart with angina pectoris 08/15/2023    FOLLOWED BY DR GONZALES/DINA PETTIT. DENIES CP BUT GETS SOA WITH EXERTION HAS COPD WEARS AT 2LPM N/C. DECREASED ACTIVITY D/T SOA    C. difficile diarrhea     DENIES ANY CURRENT ISSUES    Colon cancer 07/21/2017    Colon polyp     COPD (chronic obstructive pulmonary disease) 10/23/2017    COPD (chronic obstructive pulmonary disease) 10/23/2017    Depression     Disease of thyroid gland     Diverticulitis     Dysphagia     Essential hypertension 07/28/2021    Heartburn     Hernia 2019    History of  chemotherapy     2017    Hx of psychiatric care     Hyperlipidemia     Impaired fasting glucose 08/14/2015    Lumbago     Lung nodule 02/17/2022    Major depressive disorder 10/27/2016    Malignant neoplasm of ascending colon 07/21/2017    Melena     Multiple myeloma     Nicotine dependence 05/10/2017    NICK (obstructive sleep apnea) 11/06/2023    Oxygen dependent     REPORTS USES 02 AT 2LPM VIA N/C. CAN GET VERY SOA WITH MINIMAL EXERTION    Renal insufficiency 07/28/2021    Seizures     PSEUDO SEIZURES LAST ONE AROUND NOV 2022    Shortness of breath     CAN GET VERY SOA WITH MINIMAL EXERTION    Sleep apnea     Tobacco use 07/28/2021    QUIT SMOKING JUNE 2022    Visit for screening mammogram 02/20/2020    NORMAL- REPEAT IN ONE YEAR    Vitamin D deficiency      Past Surgical History:   Procedure Laterality Date    APPENDECTOMY      BONE MARROW BIOPSY  2016    COLON SURGERY  2017    COLONOSCOPY  2018,2017,2019    [facility]- DR LE:DIVERTICULOSIS AND ERYTHEMA OF MUCOSA    COLONOSCOPY N/A 12/20/2022    Procedure: COLONOSCOPY WITH ELEVIEW INJECTION, POLYPECTOMY, HOT SNARE, CLIP APPLICATION X3, BIOPSIES;  Surgeon: Jarvis Borges MD;  Location: [facility] ENDOSCOPY;  Service: Gastroenterology;  Laterality: N/A;  COLON POLYP, DIVERTICULOSIS, ANASTOMOSIS RIGHT COLON    COLONOSCOPY N/A 11/9/2023    Procedure: COLONOSCOPY;  Surgeon: Jarvis Borges MD;  Location: [facility] ENDOSCOPY;  Service: Gastroenterology;  Laterality: N/A;  DIVERTICULOSIS, ANASTOMOSIS IN ASCENDING COLON    ENDOSCOPY  2018    FECAL DISIMPACTION  06/2019    TRANSPLANT     HEMICOLECTOMY Right 05/2017    HYSTERECTOMY  1982,1984    KNEE ARTHROSCOPY Left 01/03/2022    Procedure: KNEE ARTHROSCOPY WITH PARTIAL MEDIAL MENISCECTOMY,  CHONDROPLASTY;  Surgeon: Nicholas Tipton MD;  [place] [facility] [place] [facility] [place]  Service: Orthopedics;  Laterality: Left;    MINI-LAPAROTOMY  2017    PORTACATH PLACEMENT N/A     pt states that her port does not work     UPPER GASTROINTESTINAL ENDOSCOPY  2018    VENOUS ACCESS DEVICE (PORT) INSERTION N/A 10/31/2023    Procedure: Port-a-catheter removal and port-a-catheter placement;  Surgeon: Alcon Delgado MD;  [place] [facility] [place] [facility] [place]  Service: General;  Laterality: N/A;     Social History     Socioeconomic History    Marital status:    Tobacco Use    Smoking status: Former     Packs/day: 1.00     Years: 47.00     Additional pack years: 0.00     Total pack years: 47.00     Types: Cigarettes     Start date: 1975     Quit date: 6/3/2022     Years since quittin.5     Passive exposure: Past    Smokeless tobacco: Never    Tobacco comments:     started age 26   Vaping Use    Vaping Use: Never used   Substance and Sexual Activity    Alcohol use: Never    Drug use: Never    Sexual activity: Defer     Family History   Problem Relation Age of Onset    Heart disease Mother     Lung cancer Mother     Cancer Mother     Stroke Father     Heart disease Father     Kidney cancer Father     Cancer Father     Stroke Other         UNCLE/AUNT    Colon cancer Neg Hx     Malig Hyperthermia Neg Hx        Objective   Physical Exam  Vitals reviewed. Exam conducted with a chaperone present.   Constitutional:       General: She is not in acute distress.     Appearance: Normal appearance.   HENT:      Nose:      Comments: Nasal cannula in place  Cardiovascular:      Rate and Rhythm: Normal rate and regular rhythm.      Heart sounds: Normal heart sounds. No murmur heard.     No gallop.   Pulmonary:      Effort: Pulmonary effort is normal.      Breath sounds: Normal breath sounds.   Abdominal:      General: Abdomen is flat. Bowel sounds are normal.      Palpations: Abdomen is soft.   Neurological:      Mental Status: She is alert.   Psychiatric:         Mood and Affect: Mood normal.         Behavior: Behavior normal.         There were no vitals filed for this visit.            PHQ-9 Total Score:                    Result Review :   The  "following data was reviewed by: West Nicolas MD on 12/06/2023:  Lab Results   Component Value Date    HGB 9.5 (L) 12/06/2023    HCT 29.9 (L) 12/06/2023    .7 (H) 12/06/2023     (H) 12/06/2023    WBC 3.11 (L) 12/06/2023    NEUTROABS 0.88 (L) 12/06/2023    LYMPHSABS 1.49 12/06/2023    MONOSABS 0.66 12/06/2023    EOSABS 0.01 12/06/2023    BASOSABS 0.04 12/06/2023     Lab Results   Component Value Date    GLUCOSE 224 (H) 12/06/2023    BUN 14 12/06/2023    CREATININE 0.74 12/06/2023     12/06/2023    K 3.5 12/06/2023     (H) 12/06/2023    CO2 21.9 (L) 12/06/2023    CALCIUM 9.0 12/06/2023    PROTEINTOT 5.9 (L) 12/06/2023    ALBUMIN 3.3 (L) 12/06/2023    BILITOT 0.4 12/06/2023    ALKPHOS 78 12/06/2023    AST 8 12/06/2023    ALT 20 12/06/2023     Lab Results   Component Value Date    FREET4 1.55 12/06/2023    TSH 0.931 12/06/2023     No results found for: \"IRON\", \"LABIRON\", \"TRANSFERRIN\", \"TIBC\"  Lab Results   Component Value Date     03/07/2023    HQCLBYXH30 218 01/28/2020    FOLATE 17.7 01/28/2020     No results found for: \"PSA\", \"CEA\", \"AFP\", \"\", \"\"          Assessment and Plan    Diagnoses and all orders for this visit:    1. Multiple myeloma not having achieved remission (Primary)  Assessment & Plan:  Patient is due for cycle 2 of daratumumab, lenalidomide, dexamethasone.  She had a lot of issues with steroid with the first cycle but with decreased doses she appears to be doing better today.  CBC is still notable for neutropeni with ANC approximately 900.  I will hold off on lenalidomide with this cycle to allow for appropriate bone marrow recovery and then resume with dose reduction.  Proceed with daratumumab cycle 2 as planned.  Continue dexamethasone at the decreased dose.  Continue Ativan as needed although I encouraged her to minimize use.  I will see her back for cycle 3-day 1 with lab work prior to monitor for toxicities.    Orders:  -     CBC and Differential; " Future  -     CBC and Differential; Future  -     CBC and Differential; Future  -     OK To Treat    Other orders  -     Cancel: sodium chloride 0.9 % infusion  -     Cancel: acetaminophen (TYLENOL) tablet 1,000 mg  -     Cancel: methylPREDNISolone sodium succinate (SOLU-Medrol) injection 60 mg  -     Cancel: diphenhydrAMINE (BENADRYL) IVPB 25 mg  -     Cancel: daratumumab-hyaluronidase-fihj (DARZALEX FASPRO) 1800-48262 MG-UT/15ML injection 1,800 mg  -     Cancel: Hydrocortisone Sod Suc (PF) (Solu-CORTEF) injection 100 mg  -     Cancel: diphenhydrAMINE (BENADRYL) injection 50 mg  -     Cancel: famotidine (PEPCID) injection 20 mg  -     Cancel: meperidine (DEMEROL) injection 25 mg  -     sodium chloride 0.9 % infusion  -     acetaminophen (TYLENOL) tablet 1,000 mg  -     methylPREDNISolone sodium succinate (SOLU-Medrol) injection 60 mg  -     diphenhydrAMINE (BENADRYL) IVPB 25 mg  -     daratumumab-hyaluronidase-fihj (DARZALEX FASPRO) 1800-55857 MG-UT/15ML injection 1,800 mg  -     Hydrocortisone Sod Suc (PF) (Solu-CORTEF) injection 100 mg  -     diphenhydrAMINE (BENADRYL) injection 50 mg  -     famotidine (PEPCID) injection 20 mg  -     meperidine (DEMEROL) injection 25 mg  -     sodium chloride 0.9 % infusion  -     acetaminophen (TYLENOL) tablet 1,000 mg  -     methylPREDNISolone sodium succinate (SOLU-Medrol) injection 60 mg  -     diphenhydrAMINE (BENADRYL) IVPB 25 mg  -     daratumumab-hyaluronidase-fihj (DARZALEX FASPRO) [id number] MG-UT/15ML injection 1,800 mg  -     Hydrocortisone Sod Suc (PF) (Solu-CORTEF) injection 100 mg  -     diphenhydrAMINE (BENADRYL) injection 50 mg  -     famotidine (PEPCID) injection 20 mg  -     meperidine (DEMEROL) injection 25 mg  -     sodium chloride 0.9 % infusion  -     acetaminophen (TYLENOL) tablet 1,000 mg  -     methylPREDNISolone sodium succinate (SOLU-Medrol) injection 60 mg  -     diphenhydrAMINE (BENADRYL) IVPB 25 mg  -     daratumumab-hyaluronidase-fihj (DARZALEX  FASPRO) 1800-47243 MG-UT/15ML injection 1,800 mg  -     Hydrocortisone Sod Suc (PF) (Solu-CORTEF) injection 100 mg  -     diphenhydrAMINE (BENADRYL) injection 50 mg  -     famotidine (PEPCID) injection 20 mg  -     meperidine (DEMEROL) injection 25 mg            Patient Follow Up: Cycle 3-day 1    Patient was given instructions and counseling regarding her condition or for health maintenance advice. Please see specific information pulled into the AVS if appropriate.     West Nicolas MD    12/6/2023

## 2023-12-06 NOTE — NURSING NOTE
Informed pt of lab results. Advised pt of infection control measures such as frequent handwashing, avoiding large crowds, wearing masks if around people, avoiding those who are sick, sharing utensils, drinks, etc. Pt and spouse v/u. Denies further questions or concerns.

## 2023-12-07 ENCOUNTER — SPECIALTY PHARMACY (OUTPATIENT)
Dept: PHARMACY | Facility: HOSPITAL | Age: 73
End: 2023-12-07
Payer: MEDICARE

## 2023-12-08 ENCOUNTER — OFFICE VISIT (OUTPATIENT)
Dept: FAMILY MEDICINE CLINIC | Facility: CLINIC | Age: 73
End: 2023-12-08
Payer: MEDICARE

## 2023-12-08 VITALS
BODY MASS INDEX: 39.34 KG/M2 | OXYGEN SATURATION: 93 % | WEIGHT: 200.4 LBS | HEIGHT: 60 IN | DIASTOLIC BLOOD PRESSURE: 64 MMHG | SYSTOLIC BLOOD PRESSURE: 110 MMHG | RESPIRATION RATE: 22 BRPM | HEART RATE: 90 BPM

## 2023-12-08 DIAGNOSIS — E03.9 HYPOTHYROIDISM, UNSPECIFIED TYPE: ICD-10-CM

## 2023-12-08 DIAGNOSIS — F33.1 MODERATE EPISODE OF RECURRENT MAJOR DEPRESSIVE DISORDER: Chronic | ICD-10-CM

## 2023-12-08 DIAGNOSIS — E55.9 VITAMIN D DEFICIENCY: Chronic | ICD-10-CM

## 2023-12-08 PROCEDURE — 1159F MED LIST DOCD IN RCRD: CPT | Performed by: PHYSICIAN ASSISTANT

## 2023-12-08 PROCEDURE — 99214 OFFICE O/P EST MOD 30 MIN: CPT | Performed by: PHYSICIAN ASSISTANT

## 2023-12-08 PROCEDURE — 3078F DIAST BP <80 MM HG: CPT | Performed by: PHYSICIAN ASSISTANT

## 2023-12-08 PROCEDURE — 1160F RVW MEDS BY RX/DR IN RCRD: CPT | Performed by: PHYSICIAN ASSISTANT

## 2023-12-08 PROCEDURE — 3074F SYST BP LT 130 MM HG: CPT | Performed by: PHYSICIAN ASSISTANT

## 2023-12-08 RX ORDER — ERGOCALCIFEROL 1.25 MG/1
50000 CAPSULE ORAL 2 TIMES WEEKLY
Qty: 26 CAPSULE | Refills: 1 | Status: SHIPPED | OUTPATIENT
Start: 2023-12-11

## 2023-12-08 RX ORDER — FLUOXETINE HYDROCHLORIDE 40 MG/1
40 CAPSULE ORAL DAILY
Qty: 90 CAPSULE | Refills: 1 | Status: SHIPPED | OUTPATIENT
Start: 2023-12-08

## 2023-12-08 RX ORDER — LEVOTHYROXINE SODIUM 0.03 MG/1
25 TABLET ORAL
Qty: 90 TABLET | Refills: 1 | Status: SHIPPED | OUTPATIENT
Start: 2023-12-08

## 2023-12-08 NOTE — PROGRESS NOTES
Chief Complaint  Chief Complaint   Patient presents with    Follow-up     6 month     Hypothyroidism    Hyperlipidemia       Subjective          Anca Samson presents to [facility] for 6 month follow up.    History of Present Illness    Hypothyroidism: Patient is currently on Levothyroxoine and doing well.     Depression: Patient is currently on Fluoxetine. Patient states that symptoms of depression are controlled and denies any side effects of the medication.    Vitamin D deficiency: Patient is currently on Ergocalciferol for vitamin D deficiency.    Patient does state fatigue and increased sleepiness.    Medical History: has a past medical history of Anxiety, Asthma, Asthma (07/28/2021), Atherosclerosis of native coronary artery of native heart with angina pectoris (08/15/2023), C. difficile diarrhea, Colon cancer (07/21/2017), Colon polyp, COPD (chronic obstructive pulmonary disease) (10/23/2017), COPD (chronic obstructive pulmonary disease) (10/23/2017), Depression, Disease of thyroid gland, Diverticulitis, Dysphagia, Essential hypertension (07/28/2021), Heartburn, Hernia (2019), History of chemotherapy, psychiatric care, Hyperlipidemia, Impaired fasting glucose (08/14/2015), Lumbago, Lung nodule (02/17/2022), Major depressive disorder (10/27/2016), Malignant neoplasm of ascending colon (07/21/2017), Melena, Multiple myeloma, Nicotine dependence (05/10/2017), NICK (obstructive sleep apnea) (11/06/2023), Oxygen dependent, Renal insufficiency (07/28/2021), Seizures, Shortness of breath, Sleep apnea, Tobacco use (07/28/2021), Visit for screening mammogram (02/20/2020), and Vitamin D deficiency.   Surgical History: has a past surgical history that includes Bone marrow biopsy (2016); Colonoscopy (2018,2017,2019); Esophagogastroduodenoscopy (2018); Fecal disimpaction (06/2019); Hysterectomy (1982,1984); Mini-laparotomy (2017); Portacath placement (N/A); Colon surgery (2017);  "Hemicolectomy (Right, 05/2017); Upper gastrointestinal endoscopy (2018); Knee Arthroscopy (Left, 01/03/2022); Appendectomy; Colonoscopy (N/A, 12/20/2022); Venous Access Device (Port) (N/A, 10/31/2023); and Colonoscopy (N/A, 11/9/2023).   Family History: family history includes Cancer in her father and mother; Heart disease in her father and mother; Kidney cancer in her father; Lung cancer in her mother; Stroke in her father and another family member.   Social History: reports that she quit smoking about 18 months ago. Her smoking use included cigarettes. She started smoking about 49 years ago. She has a 47.00 pack-year smoking history. She has been exposed to tobacco smoke. She has never used smokeless tobacco. She reports that she does not drink alcohol and does not use drugs.    Allergies: Cephalexin, Morphine, Penicillins, and Sulfa antibiotics    Health Maintenance Due   Topic Date Due    TDAP/TD VACCINES (1 - Tdap) Never done    DXA SCAN  09/16/2023       Immunization History   Administered Date(s) Administered    Fluzone High Dose =>65 Years ([employer] ONLY) 09/24/2020, 09/24/2021    Fluzone High-Dose 65+yrs 09/24/2020, 09/24/2021, 09/20/2022, 10/10/2023    Fluzone Quad >6mos (Multi-dose) 09/14/2015    Pneumococcal Conjugate 13-Valent (PCV13) 09/21/2015    Pneumococcal Polysaccharide (PPSV23) 04/12/2022       Objective     Vitals:    12/08/23 0849   BP: 110/64   BP Location: Left arm   Patient Position: Sitting   Cuff Size: Large Adult   Pulse: 90   Resp: 22   SpO2: 93%   Weight: 90.9 kg (200 lb 6.4 oz)   Height: 153.5 cm (60.43\")     Body mass index is 38.58 kg/m².     Wt Readings from Last 3 Encounters:   12/20/23 91.1 kg (200 lb 13.4 oz)   12/13/23 91.3 kg (201 lb 4.5 oz)   12/08/23 90.9 kg (200 lb 6.4 oz)     BP Readings from Last 3 Encounters:   12/20/23 134/57   12/13/23 130/70   12/08/23 110/64     Patient Care Team:  Rena Guerra PA as PCP - General (Family Medicine)  Paulino Macias PA " (Physician Assistant)  West Nicolas MD as Consulting Physician (Hematology and Oncology)  Alcon Delgado MD as Consulting Physician (General Surgery)  Jarvis Borges MD as Consulting Physician (Gastroenterology)  Erendira Clayton APRN as Nurse Practitioner (Gastroenterology)    Physical Exam  Vitals and nursing note reviewed.   Constitutional:       Appearance: She is obese.      Comments: Presents in wheelchair.   HENT:      Head: Normocephalic and atraumatic.   Neck:      Vascular: No carotid bruit.      Comments: Thyroid : gland size normal, nontender, no nodules or masses present on palpation   Cardiovascular:      Rate and Rhythm: Normal rate and regular rhythm.      Pulses: Normal pulses.      Heart sounds: Normal heart sounds.   Pulmonary:      Effort: Pulmonary effort is normal.      Breath sounds: Normal breath sounds.   Musculoskeletal:      Cervical back: Neck supple. No tenderness.   Lymphadenopathy:      Cervical: No cervical adenopathy.   Neurological:      Mental Status: She is alert.   Psychiatric:         Mood and Affect: Mood normal.         Behavior: Behavior normal.           Result Review :   Hospital Outpatient Visit on 12/06/2023   Component Date Value Ref Range Status    Glucose 12/06/2023 224 (H)  65 - 99 mg/dL Final    BUN 12/06/2023 14  8 - 23 mg/dL Final    Creatinine 12/06/2023 0.74  0.57 - 1.00 mg/dL Final    Sodium 12/06/2023 142  136 - 145 mmol/L Final    Potassium 12/06/2023 3.5  3.5 - 5.2 mmol/L Final    Chloride 12/06/2023 112 (H)  98 - 107 mmol/L Final    CO2 12/06/2023 21.9 (L)  22.0 - 29.0 mmol/L Final    Calcium 12/06/2023 9.0  8.6 - 10.5 mg/dL Final    Total Protein 12/06/2023 5.9 (L)  6.0 - 8.5 g/dL Final    Albumin 12/06/2023 3.3 (L)  3.5 - 5.2 g/dL Final    ALT (SGPT) 12/06/2023 20  1 - 33 U/L Final    AST (SGOT) 12/06/2023 8  1 - 32 U/L Final    Alkaline Phosphatase 12/06/2023 78  39 - 117 U/L Final    Total Bilirubin 12/06/2023 0.4  0.0 - 1.2 mg/dL Final     Globulin 12/06/2023 2.6  gm/dL Final    A/G Ratio 12/06/2023 1.3  g/dL Final    BUN/Creatinine Ratio 12/06/2023 18.9  7.0 - 25.0 Final    Anion Gap 12/06/2023 8.1  5.0 - 15.0 mmol/L Final    eGFR 12/06/2023 85.0  >60.0 mL/min/1.73 Final    WBC 12/06/2023 3.11 (L)  3.40 - 10.80 10*3/mm3 Final    RBC 12/06/2023 2.91 (L)  3.77 - 5.28 10*6/mm3 Final    Hemoglobin 12/06/2023 9.5 (L)  12.0 - 15.9 g/dL Final    Hematocrit 12/06/2023 29.9 (L)  34.0 - 46.6 % Final    MCV 12/06/2023 102.7 (H)  79.0 - 97.0 fL Final    MCH 12/06/2023 32.6  26.6 - 33.0 pg Final    MCHC 12/06/2023 31.8  31.5 - 35.7 g/dL Final    RDW 12/06/2023 17.1 (H)  12.3 - 15.4 % Final    RDW-SD 12/06/2023 64.5 (H)  37.0 - 54.0 fl Final    MPV 12/06/2023 9.5  6.0 - 12.0 fL Final    Platelets 12/06/2023 471 (H)  140 - 450 10*3/mm3 Final    Neutrophil % 12/06/2023 28.3 (L)  42.7 - 76.0 % Final    Lymphocyte % 12/06/2023 47.9 (H)  19.6 - 45.3 % Final    Monocyte % 12/06/2023 21.2 (H)  5.0 - 12.0 % Final    Eosinophil % 12/06/2023 0.3  0.3 - 6.2 % Final    Basophil % 12/06/2023 1.3  0.0 - 1.5 % Final    Immature Grans % 12/06/2023 1.0 (H)  0.0 - 0.5 % Final    Neutrophils, Absolute 12/06/2023 0.88 (L)  1.70 - 7.00 10*3/mm3 Final    Lymphocytes, Absolute 12/06/2023 1.49  0.70 - 3.10 10*3/mm3 Final    Monocytes, Absolute 12/06/2023 0.66  0.10 - 0.90 10*3/mm3 Final    Eosinophils, Absolute 12/06/2023 0.01  0.00 - 0.40 10*3/mm3 Final    Basophils, Absolute 12/06/2023 0.04  0.00 - 0.20 10*3/mm3 Final    Immature Grans, Absolute 12/06/2023 0.03  0.00 - 0.05 10*3/mm3 Final    Hemoglobin A1C 12/06/2023 5.80 (H)  4.80 - 5.60 % Final    TSH 12/06/2023 0.931  0.270 - 4.200 uIU/mL Final    Free T4 12/06/2023 1.55  0.93 - 1.70 ng/dL Final    T4 results may be falsely increased if patient taking Biotin.   Hospital Outpatient Visit on 11/29/2023   Component Date Value Ref Range Status    WBC 11/29/2023 1.35 (L)  3.40 - 10.80 10*3/mm3 Final    RBC 11/29/2023 3.29 (L)  3.77 -  5.28 10*6/mm3 Final    Hemoglobin 11/29/2023 10.4 (L)  12.0 - 15.9 g/dL Final    Hematocrit 11/29/2023 32.6 (L)  34.0 - 46.6 % Final    MCV 11/29/2023 99.1 (H)  79.0 - 97.0 fL Final    MCH 11/29/2023 31.6  26.6 - 33.0 pg Final    MCHC 11/29/2023 31.9  31.5 - 35.7 g/dL Final    RDW 11/29/2023 16.1 (H)  12.3 - 15.4 % Final    RDW-SD 11/29/2023 58.7 (H)  37.0 - 54.0 fl Final    MPV 11/29/2023 10.7  6.0 - 12.0 fL Final    Platelets 11/29/2023 403  140 - 450 10*3/mm3 Final    Neutrophil % 11/29/2023 28.1 (L)  42.7 - 76.0 % Final    Lymphocyte % 11/29/2023 51.1 (H)  19.6 - 45.3 % Final    Monocyte % 11/29/2023 10.4  5.0 - 12.0 % Final    Eosinophil % 11/29/2023 8.9 (H)  0.3 - 6.2 % Final    Basophil % 11/29/2023 1.5  0.0 - 1.5 % Final    Immature Grans % 11/29/2023 0.0  0.0 - 0.5 % Final    Neutrophils, Absolute 11/29/2023 0.38 (L)  1.70 - 7.00 10*3/mm3 Final    Lymphocytes, Absolute 11/29/2023 0.69 (L)  0.70 - 3.10 10*3/mm3 Final    Monocytes, Absolute 11/29/2023 0.14  0.10 - 0.90 10*3/mm3 Final    Eosinophils, Absolute 11/29/2023 0.12  0.00 - 0.40 10*3/mm3 Final    Basophils, Absolute 11/29/2023 0.02  0.00 - 0.20 10*3/mm3 Final    Immature Grans, Absolute 11/29/2023 0.00  0.00 - 0.05 10*3/mm3 Final    RBC Morphology 11/29/2023 Normal  Normal Final    WBC Morphology 11/29/2023 Normal  Normal Final    Platelet Estimate 11/29/2023 Adequate  Normal Final   Hospital Outpatient Visit on 11/22/2023   Component Date Value Ref Range Status    WBC 11/22/2023 2.53 (L)  3.40 - 10.80 10*3/mm3 Final    RBC 11/22/2023 3.46 (L)  3.77 - 5.28 10*6/mm3 Final    Hemoglobin 11/22/2023 11.2 (L)  12.0 - 15.9 g/dL Final    Hematocrit 11/22/2023 34.7  34.0 - 46.6 % Final    MCV 11/22/2023 100.3 (H)  79.0 - 97.0 fL Final    MCH 11/22/2023 32.4  26.6 - 33.0 pg Final    MCHC 11/22/2023 32.3  31.5 - 35.7 g/dL Final    RDW 11/22/2023 15.9 (H)  12.3 - 15.4 % Final    RDW-SD 11/22/2023 59.0 (H)  37.0 - 54.0 fl Final    MPV 11/22/2023 10.9  6.0 - 12.0  fL Final    Platelets 11/22/2023 187  140 - 450 10*3/mm3 Final    Neutrophil % 11/22/2023 73.5  42.7 - 76.0 % Final    Lymphocyte % 11/22/2023 12.6 (L)  19.6 - 45.3 % Final    Monocyte % 11/22/2023 8.7  5.0 - 12.0 % Final    Eosinophil % 11/22/2023 3.6  0.3 - 6.2 % Final    Basophil % 11/22/2023 0.4  0.0 - 1.5 % Final    Immature Grans % 11/22/2023 1.2 (H)  0.0 - 0.5 % Final    Neutrophils, Absolute 11/22/2023 1.86  1.70 - 7.00 10*3/mm3 Final    Lymphocytes, Absolute 11/22/2023 0.32 (L)  0.70 - 3.10 10*3/mm3 Final    Monocytes, Absolute 11/22/2023 0.22  0.10 - 0.90 10*3/mm3 Final    Eosinophils, Absolute 11/22/2023 0.09  0.00 - 0.40 10*3/mm3 Final    Basophils, Absolute 11/22/2023 0.01  0.00 - 0.20 10*3/mm3 Final    Immature Grans, Absolute 11/22/2023 0.03  0.00 - 0.05 10*3/mm3 Final   Hospital Outpatient Visit on 11/15/2023   Component Date Value Ref Range Status    WBC 11/15/2023 4.40  3.40 - 10.80 10*3/mm3 Final    RBC 11/15/2023 3.54 (L)  3.77 - 5.28 10*6/mm3 Final    Hemoglobin 11/15/2023 11.3 (L)  12.0 - 15.9 g/dL Final    Hematocrit 11/15/2023 35.4  34.0 - 46.6 % Final    MCV 11/15/2023 100.0 (H)  79.0 - 97.0 fL Final    MCH 11/15/2023 31.9  26.6 - 33.0 pg Final    MCHC 11/15/2023 31.9  31.5 - 35.7 g/dL Final    RDW 11/15/2023 16.4 (H)  12.3 - 15.4 % Final    RDW-SD 11/15/2023 60.3 (H)  37.0 - 54.0 fl Final    MPV 11/15/2023 10.2  6.0 - 12.0 fL Final    Platelets 11/15/2023 174  140 - 450 10*3/mm3 Final    Neutrophil % 11/15/2023 66.1  42.7 - 76.0 % Final    Lymphocyte % 11/15/2023 20.9  19.6 - 45.3 % Final    Monocyte % 11/15/2023 8.4  5.0 - 12.0 % Final    Eosinophil % 11/15/2023 3.9  0.3 - 6.2 % Final    Basophil % 11/15/2023 0.2  0.0 - 1.5 % Final    Immature Grans % 11/15/2023 0.5  0.0 - 0.5 % Final    Neutrophils, Absolute 11/15/2023 2.91  1.70 - 7.00 10*3/mm3 Final    Lymphocytes, Absolute 11/15/2023 0.92  0.70 - 3.10 10*3/mm3 Final    Monocytes, Absolute 11/15/2023 0.37  0.10 - 0.90 10*3/mm3 Final     Eosinophils, Absolute 11/15/2023 0.17  0.00 - 0.40 10*3/mm3 Final    Basophils, Absolute 11/15/2023 0.01  0.00 - 0.20 10*3/mm3 Final    Immature Grans, Absolute 11/15/2023 0.02  0.00 - 0.05 10*3/mm3 Final                             Assessment and Plan      Diagnoses and all orders for this visit:    1. Moderate episode of recurrent major depressive disorder  Comments:  Stable on Prozac 40mg daily; continue.  Orders:  -     FLUoxetine (PROzac) 40 MG capsule; Take 1 capsule by mouth Daily.  Dispense: 90 capsule; Refill: 1    2. Hypothyroidism, unspecified type  Comments:  Improved on Levothyroxine 25mcg daily; continue and check labs.  Orders:  -     levothyroxine (SYNTHROID, LEVOTHROID) 25 MCG tablet; Take 1 tablet by mouth Every Morning.  Dispense: 90 tablet; Refill: 1    3. Vitamin D deficiency  Comments:  Unsure of stability; continue with vitamin D 34792of twice weekly and recheck labs in 3mths.  Orders:  -     vitamin D (ERGOCALCIFEROL) 1.25 MG (76215 UT) capsule capsule; Take 1 capsule by mouth 2 (Two) Times a Week.  Dispense: 26 capsule; Refill: 1    Patient advised to get up regularly to walk, get some steps each hour while awake, get resistance bands to work arms.         Follow Up     Return in about 6 months (around 6/8/2024).    Patient was given instructions and counseling regarding her condition or for health maintenance advice. Please see specific information pulled into the AVS if appropriate.

## 2023-12-13 ENCOUNTER — HOSPITAL ENCOUNTER (OUTPATIENT)
Dept: ONCOLOGY | Facility: HOSPITAL | Age: 73
Discharge: HOME | End: 2023-12-13
Admitting: INTERNAL MEDICINE
Payer: MEDICARE

## 2023-12-13 VITALS
WEIGHT: 201.28 LBS | HEART RATE: 96 BPM | DIASTOLIC BLOOD PRESSURE: 70 MMHG | RESPIRATION RATE: 20 BRPM | SYSTOLIC BLOOD PRESSURE: 130 MMHG | HEIGHT: 60 IN | TEMPERATURE: 97.7 F | BODY MASS INDEX: 39.52 KG/M2 | OXYGEN SATURATION: 94 %

## 2023-12-13 DIAGNOSIS — Z45.2 ENCOUNTER FOR ADJUSTMENT OR MANAGEMENT OF VASCULAR ACCESS DEVICE: Primary | ICD-10-CM

## 2023-12-13 DIAGNOSIS — C90.00 MULTIPLE MYELOMA, REMISSION STATUS UNSPECIFIED: ICD-10-CM

## 2023-12-13 LAB
BASOPHILS # BLD AUTO: 0.04 10*3/MM3 (ref 0–0.2)
BASOPHILS NFR BLD AUTO: 0.4 % (ref 0–1.5)
DEPRECATED RDW RBC AUTO: 73.7 FL (ref 37–54)
EOSINOPHIL # BLD AUTO: 0 10*3/MM3 (ref 0–0.4)
EOSINOPHIL NFR BLD AUTO: 0 % (ref 0.3–6.2)
ERYTHROCYTE [DISTWIDTH] IN BLOOD BY AUTOMATED COUNT: 19 % (ref 12.3–15.4)
HCT VFR BLD AUTO: 33.2 % (ref 34–46.6)
HGB BLD-MCNC: 10.4 G/DL (ref 12–15.9)
IMM GRANULOCYTES # BLD AUTO: 0.12 10*3/MM3 (ref 0–0.05)
IMM GRANULOCYTES NFR BLD AUTO: 1.2 % (ref 0–0.5)
LYMPHOCYTES # BLD AUTO: 2.57 10*3/MM3 (ref 0.7–3.1)
LYMPHOCYTES NFR BLD AUTO: 25.3 % (ref 19.6–45.3)
MCH RBC QN AUTO: 32.9 PG (ref 26.6–33)
MCHC RBC AUTO-ENTMCNC: 31.3 G/DL (ref 31.5–35.7)
MCV RBC AUTO: 105.1 FL (ref 79–97)
MONOCYTES # BLD AUTO: 0.62 10*3/MM3 (ref 0.1–0.9)
MONOCYTES NFR BLD AUTO: 6.1 % (ref 5–12)
NEUTROPHILS NFR BLD AUTO: 6.81 10*3/MM3 (ref 1.7–7)
NEUTROPHILS NFR BLD AUTO: 67 % (ref 42.7–76)
PLATELET # BLD AUTO: 407 10*3/MM3 (ref 140–450)
PMV BLD AUTO: 9.6 FL (ref 6–12)
RBC # BLD AUTO: 3.16 10*6/MM3 (ref 3.77–5.28)
WBC NRBC COR # BLD AUTO: 10.16 10*3/MM3 (ref 3.4–10.8)

## 2023-12-13 PROCEDURE — A9270 NON-COVERED ITEM OR SERVICE: HCPCS | Performed by: INTERNAL MEDICINE

## 2023-12-13 PROCEDURE — 25010000002 HEPARIN LOCK FLUSH PER 10 UNITS: Performed by: INTERNAL MEDICINE

## 2023-12-13 PROCEDURE — 96375 TX/PRO/DX INJ NEW DRUG ADDON: CPT

## 2023-12-13 PROCEDURE — 25010000002 DARATUMUMAB-HYALURONIDASE-FIHJ 1800-30000 MG-UT/15ML SOLUTION: Performed by: INTERNAL MEDICINE

## 2023-12-13 PROCEDURE — 85025 COMPLETE CBC W/AUTO DIFF WBC: CPT | Performed by: INTERNAL MEDICINE

## 2023-12-13 PROCEDURE — 63710000001 ACETAMINOPHEN EXTRA STRENGTH 500 MG TABLET: Performed by: INTERNAL MEDICINE

## 2023-12-13 PROCEDURE — 96401 CHEMO ANTI-NEOPL SQ/IM: CPT

## 2023-12-13 PROCEDURE — 96374 THER/PROPH/DIAG INJ IV PUSH: CPT

## 2023-12-13 PROCEDURE — 25010000002 DIPHENHYDRAMINE PER 50 MG: Performed by: INTERNAL MEDICINE

## 2023-12-13 PROCEDURE — 25010000002 METHYLPREDNISOLONE PER 125 MG: Performed by: INTERNAL MEDICINE

## 2023-12-13 PROCEDURE — 25810000003 SODIUM CHLORIDE 0.9 % SOLUTION: Performed by: INTERNAL MEDICINE

## 2023-12-13 RX ORDER — SODIUM CHLORIDE 0.9 % (FLUSH) 0.9 %
20 SYRINGE (ML) INJECTION AS NEEDED
OUTPATIENT
Start: 2023-12-13

## 2023-12-13 RX ORDER — METHYLPREDNISOLONE SODIUM SUCCINATE 125 MG/2ML
60 INJECTION, POWDER, LYOPHILIZED, FOR SOLUTION INTRAMUSCULAR; INTRAVENOUS ONCE
Status: COMPLETED | OUTPATIENT
Start: 2023-12-13 | End: 2023-12-13

## 2023-12-13 RX ORDER — HEPARIN SODIUM (PORCINE) LOCK FLUSH IV SOLN 100 UNIT/ML 100 UNIT/ML
500 SOLUTION INTRAVENOUS AS NEEDED
OUTPATIENT
Start: 2023-12-13

## 2023-12-13 RX ORDER — SODIUM CHLORIDE 0.9 % (FLUSH) 0.9 %
20 SYRINGE (ML) INJECTION AS NEEDED
Status: DISCONTINUED | OUTPATIENT
Start: 2023-12-13 | End: 2023-12-14 | Stop reason: HOSPADM

## 2023-12-13 RX ORDER — HEPARIN SODIUM (PORCINE) LOCK FLUSH IV SOLN 100 UNIT/ML 100 UNIT/ML
500 SOLUTION INTRAVENOUS AS NEEDED
Status: DISCONTINUED | OUTPATIENT
Start: 2023-12-13 | End: 2023-12-14 | Stop reason: HOSPADM

## 2023-12-13 RX ORDER — DIPHENHYDRAMINE HYDROCHLORIDE 50 MG/ML
50 INJECTION INTRAMUSCULAR; INTRAVENOUS AS NEEDED
Status: DISCONTINUED | OUTPATIENT
Start: 2023-12-13 | End: 2023-12-14 | Stop reason: HOSPADM

## 2023-12-13 RX ORDER — MEPERIDINE HYDROCHLORIDE 25 MG/ML
25 INJECTION INTRAMUSCULAR; INTRAVENOUS; SUBCUTANEOUS
Status: ACTIVE | OUTPATIENT
Start: 2023-12-13 | End: 2023-12-13

## 2023-12-13 RX ORDER — FAMOTIDINE 10 MG/ML
20 INJECTION, SOLUTION INTRAVENOUS AS NEEDED
Status: DISCONTINUED | OUTPATIENT
Start: 2023-12-13 | End: 2023-12-14 | Stop reason: HOSPADM

## 2023-12-13 RX ORDER — ACETAMINOPHEN 500 MG
1000 TABLET ORAL ONCE
Status: COMPLETED | OUTPATIENT
Start: 2023-12-13 | End: 2023-12-13

## 2023-12-13 RX ORDER — SODIUM CHLORIDE 9 MG/ML
20 INJECTION, SOLUTION INTRAVENOUS ONCE
Status: COMPLETED | OUTPATIENT
Start: 2023-12-13 | End: 2023-12-13

## 2023-12-13 RX ADMIN — ACETAMINOPHEN 1000 MG: 500 TABLET ORAL at 09:17

## 2023-12-13 RX ADMIN — Medication 20 ML: at 10:55

## 2023-12-13 RX ADMIN — SODIUM CHLORIDE 20 ML/HR: 9 INJECTION, SOLUTION INTRAVENOUS at 09:09

## 2023-12-13 RX ADMIN — DIPHENHYDRAMINE HYDROCHLORIDE 25 MG: 50 INJECTION, SOLUTION INTRAMUSCULAR; INTRAVENOUS at 09:21

## 2023-12-13 RX ADMIN — HEPARIN SODIUM (PORCINE) LOCK FLUSH IV SOLN 100 UNIT/ML 500 UNITS: 100 SOLUTION at 10:55

## 2023-12-13 RX ADMIN — METHYLPREDNISOLONE SODIUM SUCCINATE 60 MG: 125 INJECTION INTRAMUSCULAR; INTRAVENOUS at 09:20

## 2023-12-13 RX ADMIN — DARATUMUMAB AND HYALURONIDASE-FIHJ (HUMAN RECOMBINANT) 1800 MG: 1800; 30000 INJECTION SUBCUTANEOUS at 10:42

## 2023-12-20 ENCOUNTER — HOSPITAL ENCOUNTER (OUTPATIENT)
Dept: ONCOLOGY | Facility: HOSPITAL | Age: 73
Discharge: HOME | End: 2023-12-20
Admitting: INTERNAL MEDICINE
Payer: MEDICARE

## 2023-12-20 VITALS
SYSTOLIC BLOOD PRESSURE: 134 MMHG | RESPIRATION RATE: 21 BRPM | HEART RATE: 91 BPM | BODY MASS INDEX: 38.66 KG/M2 | DIASTOLIC BLOOD PRESSURE: 57 MMHG | WEIGHT: 200.84 LBS | OXYGEN SATURATION: 96 % | TEMPERATURE: 98.2 F

## 2023-12-20 DIAGNOSIS — Z45.2 ENCOUNTER FOR ADJUSTMENT OR MANAGEMENT OF VASCULAR ACCESS DEVICE: ICD-10-CM

## 2023-12-20 DIAGNOSIS — C90.00 MULTIPLE MYELOMA, REMISSION STATUS UNSPECIFIED: Primary | ICD-10-CM

## 2023-12-20 LAB
BASOPHILS # BLD AUTO: 0.02 10*3/MM3 (ref 0–0.2)
BASOPHILS NFR BLD AUTO: 0.1 % (ref 0–1.5)
DEPRECATED RDW RBC AUTO: 79 FL (ref 37–54)
EOSINOPHIL # BLD AUTO: 0.08 10*3/MM3 (ref 0–0.4)
EOSINOPHIL NFR BLD AUTO: 0.6 % (ref 0.3–6.2)
ERYTHROCYTE [DISTWIDTH] IN BLOOD BY AUTOMATED COUNT: 19.7 % (ref 12.3–15.4)
HCT VFR BLD AUTO: 34.7 % (ref 34–46.6)
HGB BLD-MCNC: 10.9 G/DL (ref 12–15.9)
IMM GRANULOCYTES # BLD AUTO: 0.21 10*3/MM3 (ref 0–0.05)
IMM GRANULOCYTES NFR BLD AUTO: 1.5 % (ref 0–0.5)
LYMPHOCYTES # BLD AUTO: 3.13 10*3/MM3 (ref 0.7–3.1)
LYMPHOCYTES NFR BLD AUTO: 22.3 % (ref 19.6–45.3)
MCH RBC QN AUTO: 33.7 PG (ref 26.6–33)
MCHC RBC AUTO-ENTMCNC: 31.4 G/DL (ref 31.5–35.7)
MCV RBC AUTO: 107.4 FL (ref 79–97)
MONOCYTES # BLD AUTO: 1.31 10*3/MM3 (ref 0.1–0.9)
MONOCYTES NFR BLD AUTO: 9.3 % (ref 5–12)
NEUTROPHILS NFR BLD AUTO: 66.2 % (ref 42.7–76)
NEUTROPHILS NFR BLD AUTO: 9.31 10*3/MM3 (ref 1.7–7)
PLATELET # BLD AUTO: 355 10*3/MM3 (ref 140–450)
PMV BLD AUTO: 9 FL (ref 6–12)
RBC # BLD AUTO: 3.23 10*6/MM3 (ref 3.77–5.28)
WBC NRBC COR # BLD AUTO: 14.06 10*3/MM3 (ref 3.4–10.8)

## 2023-12-20 PROCEDURE — A9270 NON-COVERED ITEM OR SERVICE: HCPCS | Performed by: INTERNAL MEDICINE

## 2023-12-20 PROCEDURE — 25010000002 HEPARIN LOCK FLUSH PER 10 UNITS: Performed by: INTERNAL MEDICINE

## 2023-12-20 PROCEDURE — 25010000002 DIPHENHYDRAMINE PER 50 MG: Performed by: INTERNAL MEDICINE

## 2023-12-20 PROCEDURE — 25810000003 SODIUM CHLORIDE 0.9 % SOLUTION: Performed by: INTERNAL MEDICINE

## 2023-12-20 PROCEDURE — 96401 CHEMO ANTI-NEOPL SQ/IM: CPT

## 2023-12-20 PROCEDURE — 85025 COMPLETE CBC W/AUTO DIFF WBC: CPT | Performed by: INTERNAL MEDICINE

## 2023-12-20 PROCEDURE — 63710000001 ACETAMINOPHEN EXTRA STRENGTH 500 MG TABLET: Performed by: INTERNAL MEDICINE

## 2023-12-20 PROCEDURE — 25010000002 DARATUMUMAB-HYALURONIDASE-FIHJ 1800-30000 MG-UT/15ML SOLUTION: Performed by: INTERNAL MEDICINE

## 2023-12-20 PROCEDURE — 96374 THER/PROPH/DIAG INJ IV PUSH: CPT

## 2023-12-20 PROCEDURE — 96375 TX/PRO/DX INJ NEW DRUG ADDON: CPT

## 2023-12-20 PROCEDURE — 25010000002 METHYLPREDNISOLONE PER 125 MG: Performed by: INTERNAL MEDICINE

## 2023-12-20 RX ORDER — MEPERIDINE HYDROCHLORIDE 25 MG/ML
25 INJECTION INTRAMUSCULAR; INTRAVENOUS; SUBCUTANEOUS
Status: ACTIVE | OUTPATIENT
Start: 2023-12-20 | End: 2023-12-20

## 2023-12-20 RX ORDER — SODIUM CHLORIDE 0.9 % (FLUSH) 0.9 %
20 SYRINGE (ML) INJECTION AS NEEDED
Status: DISCONTINUED | OUTPATIENT
Start: 2023-12-20 | End: 2023-12-21 | Stop reason: HOSPADM

## 2023-12-20 RX ORDER — HEPARIN SODIUM (PORCINE) LOCK FLUSH IV SOLN 100 UNIT/ML 100 UNIT/ML
500 SOLUTION INTRAVENOUS AS NEEDED
Status: DISCONTINUED | OUTPATIENT
Start: 2023-12-20 | End: 2023-12-21 | Stop reason: HOSPADM

## 2023-12-20 RX ORDER — HEPARIN SODIUM (PORCINE) LOCK FLUSH IV SOLN 100 UNIT/ML 100 UNIT/ML
500 SOLUTION INTRAVENOUS AS NEEDED
OUTPATIENT
Start: 2023-12-20

## 2023-12-20 RX ORDER — DIPHENHYDRAMINE HYDROCHLORIDE 50 MG/ML
50 INJECTION INTRAMUSCULAR; INTRAVENOUS AS NEEDED
Status: DISCONTINUED | OUTPATIENT
Start: 2023-12-20 | End: 2023-12-21 | Stop reason: HOSPADM

## 2023-12-20 RX ORDER — SODIUM CHLORIDE 9 MG/ML
20 INJECTION, SOLUTION INTRAVENOUS ONCE
Status: COMPLETED | OUTPATIENT
Start: 2023-12-20 | End: 2023-12-20

## 2023-12-20 RX ORDER — METHYLPREDNISOLONE SODIUM SUCCINATE 125 MG/2ML
60 INJECTION, POWDER, LYOPHILIZED, FOR SOLUTION INTRAMUSCULAR; INTRAVENOUS ONCE
Status: COMPLETED | OUTPATIENT
Start: 2023-12-20 | End: 2023-12-20

## 2023-12-20 RX ORDER — FAMOTIDINE 10 MG/ML
20 INJECTION, SOLUTION INTRAVENOUS AS NEEDED
Status: DISCONTINUED | OUTPATIENT
Start: 2023-12-20 | End: 2023-12-21 | Stop reason: HOSPADM

## 2023-12-20 RX ORDER — SODIUM CHLORIDE 0.9 % (FLUSH) 0.9 %
20 SYRINGE (ML) INJECTION AS NEEDED
OUTPATIENT
Start: 2023-12-20

## 2023-12-20 RX ORDER — ACETAMINOPHEN 500 MG
1000 TABLET ORAL ONCE
Status: COMPLETED | OUTPATIENT
Start: 2023-12-20 | End: 2023-12-20

## 2023-12-20 RX ADMIN — Medication 20 ML: at 10:53

## 2023-12-20 RX ADMIN — HEPARIN SODIUM (PORCINE) LOCK FLUSH IV SOLN 100 UNIT/ML 500 UNITS: 100 SOLUTION at 10:53

## 2023-12-20 RX ADMIN — ACETAMINOPHEN 1000 MG: 500 TABLET ORAL at 09:10

## 2023-12-20 RX ADMIN — DARATUMUMAB AND HYALURONIDASE-FIHJ (HUMAN RECOMBINANT) 1800 MG: 1800; 30000 INJECTION SUBCUTANEOUS at 10:40

## 2023-12-20 RX ADMIN — SODIUM CHLORIDE 20 ML/HR: 9 INJECTION, SOLUTION INTRAVENOUS at 09:11

## 2023-12-20 RX ADMIN — DIPHENHYDRAMINE HYDROCHLORIDE 25 MG: 50 INJECTION, SOLUTION INTRAMUSCULAR; INTRAVENOUS at 09:15

## 2023-12-20 RX ADMIN — METHYLPREDNISOLONE SODIUM SUCCINATE 60 MG: 125 INJECTION INTRAMUSCULAR; INTRAVENOUS at 09:12

## 2023-12-27 ENCOUNTER — HOSPITAL ENCOUNTER (OUTPATIENT)
Dept: ONCOLOGY | Facility: HOSPITAL | Age: 73
Discharge: HOME | End: 2023-12-27
Admitting: INTERNAL MEDICINE
Payer: MEDICARE

## 2023-12-27 ENCOUNTER — DOCUMENTATION (OUTPATIENT)
Dept: ONCOLOGY | Facility: HOSPITAL | Age: 73
End: 2023-12-27
Payer: MEDICARE

## 2023-12-27 VITALS
SYSTOLIC BLOOD PRESSURE: 130 MMHG | DIASTOLIC BLOOD PRESSURE: 66 MMHG | RESPIRATION RATE: 20 BRPM | HEART RATE: 98 BPM | BODY MASS INDEX: 38.02 KG/M2 | TEMPERATURE: 97 F | OXYGEN SATURATION: 95 % | WEIGHT: 197.5 LBS

## 2023-12-27 DIAGNOSIS — Z45.2 ENCOUNTER FOR ADJUSTMENT OR MANAGEMENT OF VASCULAR ACCESS DEVICE: ICD-10-CM

## 2023-12-27 DIAGNOSIS — C90.00 MULTIPLE MYELOMA, REMISSION STATUS UNSPECIFIED: Primary | ICD-10-CM

## 2023-12-27 LAB
BASOPHILS # BLD AUTO: 0.02 10*3/MM3 (ref 0–0.2)
BASOPHILS NFR BLD AUTO: 0.1 % (ref 0–1.5)
DEPRECATED RDW RBC AUTO: 76.3 FL (ref 37–54)
EOSINOPHIL # BLD AUTO: 0.01 10*3/MM3 (ref 0–0.4)
EOSINOPHIL NFR BLD AUTO: 0.1 % (ref 0.3–6.2)
ERYTHROCYTE [DISTWIDTH] IN BLOOD BY AUTOMATED COUNT: 19.1 % (ref 12.3–15.4)
HCT VFR BLD AUTO: 33 % (ref 34–46.6)
HGB BLD-MCNC: 10.6 G/DL (ref 12–15.9)
IMM GRANULOCYTES # BLD AUTO: 0.21 10*3/MM3 (ref 0–0.05)
IMM GRANULOCYTES NFR BLD AUTO: 1.4 % (ref 0–0.5)
LYMPHOCYTES # BLD AUTO: 4.11 10*3/MM3 (ref 0.7–3.1)
LYMPHOCYTES NFR BLD AUTO: 28.2 % (ref 19.6–45.3)
MCH RBC QN AUTO: 34.8 PG (ref 26.6–33)
MCHC RBC AUTO-ENTMCNC: 32.1 G/DL (ref 31.5–35.7)
MCV RBC AUTO: 108.2 FL (ref 79–97)
MONOCYTES # BLD AUTO: 1.18 10*3/MM3 (ref 0.1–0.9)
MONOCYTES NFR BLD AUTO: 8.1 % (ref 5–12)
NEUTROPHILS NFR BLD AUTO: 62.1 % (ref 42.7–76)
NEUTROPHILS NFR BLD AUTO: 9.02 10*3/MM3 (ref 1.7–7)
PLATELET # BLD AUTO: 284 10*3/MM3 (ref 140–450)
PMV BLD AUTO: 9 FL (ref 6–12)
RBC # BLD AUTO: 3.05 10*6/MM3 (ref 3.77–5.28)
WBC NRBC COR # BLD AUTO: 14.55 10*3/MM3 (ref 3.4–10.8)

## 2023-12-27 PROCEDURE — 85025 COMPLETE CBC W/AUTO DIFF WBC: CPT | Performed by: INTERNAL MEDICINE

## 2023-12-27 PROCEDURE — 25010000002 HEPARIN LOCK FLUSH PER 10 UNITS: Performed by: INTERNAL MEDICINE

## 2023-12-27 PROCEDURE — A9270 NON-COVERED ITEM OR SERVICE: HCPCS | Performed by: INTERNAL MEDICINE

## 2023-12-27 PROCEDURE — 96374 THER/PROPH/DIAG INJ IV PUSH: CPT

## 2023-12-27 PROCEDURE — 25010000002 DIPHENHYDRAMINE PER 50 MG: Performed by: INTERNAL MEDICINE

## 2023-12-27 PROCEDURE — 25810000003 SODIUM CHLORIDE 0.9 % SOLUTION: Performed by: INTERNAL MEDICINE

## 2023-12-27 PROCEDURE — 25010000002 METHYLPREDNISOLONE PER 125 MG: Performed by: INTERNAL MEDICINE

## 2023-12-27 PROCEDURE — 96401 CHEMO ANTI-NEOPL SQ/IM: CPT

## 2023-12-27 PROCEDURE — 25010000002 DARATUMUMAB-HYALURONIDASE-FIHJ 1800-30000 MG-UT/15ML SOLUTION: Performed by: INTERNAL MEDICINE

## 2023-12-27 PROCEDURE — 63710000001 ACETAMINOPHEN EXTRA STRENGTH 500 MG TABLET: Performed by: INTERNAL MEDICINE

## 2023-12-27 PROCEDURE — 96375 TX/PRO/DX INJ NEW DRUG ADDON: CPT

## 2023-12-27 RX ORDER — DEXAMETHASONE 4 MG/1
TABLET ORAL
Qty: 44 TABLET | Refills: 3 | Status: SHIPPED | OUTPATIENT
Start: 2023-12-27

## 2023-12-27 RX ORDER — SODIUM CHLORIDE 9 MG/ML
20 INJECTION, SOLUTION INTRAVENOUS ONCE
Status: COMPLETED | OUTPATIENT
Start: 2023-12-27 | End: 2023-12-27

## 2023-12-27 RX ORDER — HEPARIN SODIUM (PORCINE) LOCK FLUSH IV SOLN 100 UNIT/ML 100 UNIT/ML
500 SOLUTION INTRAVENOUS AS NEEDED
OUTPATIENT
Start: 2023-12-27

## 2023-12-27 RX ORDER — SODIUM CHLORIDE 0.9 % (FLUSH) 0.9 %
20 SYRINGE (ML) INJECTION AS NEEDED
Status: DISCONTINUED | OUTPATIENT
Start: 2023-12-27 | End: 2023-12-28 | Stop reason: HOSPADM

## 2023-12-27 RX ORDER — DIPHENHYDRAMINE HYDROCHLORIDE 50 MG/ML
50 INJECTION INTRAMUSCULAR; INTRAVENOUS AS NEEDED
Status: DISCONTINUED | OUTPATIENT
Start: 2023-12-27 | End: 2023-12-28 | Stop reason: HOSPADM

## 2023-12-27 RX ORDER — HEPARIN SODIUM (PORCINE) LOCK FLUSH IV SOLN 100 UNIT/ML 100 UNIT/ML
500 SOLUTION INTRAVENOUS AS NEEDED
Status: DISCONTINUED | OUTPATIENT
Start: 2023-12-27 | End: 2023-12-28 | Stop reason: HOSPADM

## 2023-12-27 RX ORDER — SODIUM CHLORIDE 0.9 % (FLUSH) 0.9 %
20 SYRINGE (ML) INJECTION AS NEEDED
OUTPATIENT
Start: 2023-12-27

## 2023-12-27 RX ORDER — ACETAMINOPHEN 500 MG
1000 TABLET ORAL ONCE
Status: COMPLETED | OUTPATIENT
Start: 2023-12-27 | End: 2023-12-27

## 2023-12-27 RX ORDER — METHYLPREDNISOLONE SODIUM SUCCINATE 125 MG/2ML
60 INJECTION, POWDER, LYOPHILIZED, FOR SOLUTION INTRAMUSCULAR; INTRAVENOUS ONCE
Status: COMPLETED | OUTPATIENT
Start: 2023-12-27 | End: 2023-12-27

## 2023-12-27 RX ORDER — FAMOTIDINE 10 MG/ML
20 INJECTION, SOLUTION INTRAVENOUS AS NEEDED
Status: DISCONTINUED | OUTPATIENT
Start: 2023-12-27 | End: 2023-12-28 | Stop reason: HOSPADM

## 2023-12-27 RX ORDER — MEPERIDINE HYDROCHLORIDE 25 MG/ML
25 INJECTION INTRAMUSCULAR; INTRAVENOUS; SUBCUTANEOUS
Status: ACTIVE | OUTPATIENT
Start: 2023-12-27 | End: 2023-12-27

## 2023-12-27 RX ADMIN — ACETAMINOPHEN 1000 MG: 500 TABLET ORAL at 09:00

## 2023-12-27 RX ADMIN — METHYLPREDNISOLONE SODIUM SUCCINATE 60 MG: 125 INJECTION INTRAMUSCULAR; INTRAVENOUS at 08:59

## 2023-12-27 RX ADMIN — HEPARIN SODIUM (PORCINE) LOCK FLUSH IV SOLN 100 UNIT/ML 500 UNITS: 100 SOLUTION at 10:21

## 2023-12-27 RX ADMIN — DIPHENHYDRAMINE HYDROCHLORIDE 25 MG: 50 INJECTION, SOLUTION INTRAMUSCULAR; INTRAVENOUS at 09:02

## 2023-12-27 RX ADMIN — SODIUM CHLORIDE 20 ML/HR: 9 INJECTION, SOLUTION INTRAVENOUS at 08:55

## 2023-12-27 RX ADMIN — DARATUMUMAB AND HYALURONIDASE-FIHJ (HUMAN RECOMBINANT) 1800 MG: 1800; 30000 INJECTION SUBCUTANEOUS at 10:14

## 2023-12-27 RX ADMIN — Medication 20 ML: at 10:21

## 2023-12-27 NOTE — PROGRESS NOTES
Diagnosis: Multiple Myeloma     Reason for Referral: Rounding     Content of Visit: [facility] met with Mrs. Samson and her spouse in the medical oncology infusion center this morning to provide her with a Fall comfort kit donated by the [employer]. She is agreeable to being contacted by them to discuss their support services and programs. No additional needs were identified at this time. Encouraged [facility] support remains available. She expressed appreciation.     Resources/Referrals Provided: Fall comfort kit through the [employer]

## 2023-12-29 DIAGNOSIS — C90.00 MULTIPLE MYELOMA, REMISSION STATUS UNSPECIFIED: ICD-10-CM

## 2024-01-03 ENCOUNTER — OFFICE VISIT (OUTPATIENT)
Dept: ONCOLOGY | Facility: HOSPITAL | Age: 74
End: 2024-01-03
Payer: MEDICARE

## 2024-01-03 ENCOUNTER — HOSPITAL ENCOUNTER (OUTPATIENT)
Dept: ONCOLOGY | Facility: HOSPITAL | Age: 74
Discharge: HOME | End: 2024-01-03
Admitting: INTERNAL MEDICINE
Payer: MEDICARE

## 2024-01-03 VITALS
OXYGEN SATURATION: 96 % | HEIGHT: 60 IN | RESPIRATION RATE: 22 BRPM | HEIGHT: 60 IN | DIASTOLIC BLOOD PRESSURE: 61 MMHG | TEMPERATURE: 97.4 F | HEART RATE: 114 BPM | WEIGHT: 201.5 LBS | HEART RATE: 114 BPM | TEMPERATURE: 97.4 F | OXYGEN SATURATION: 96 % | SYSTOLIC BLOOD PRESSURE: 147 MMHG | SYSTOLIC BLOOD PRESSURE: 147 MMHG | BODY MASS INDEX: 39.56 KG/M2 | BODY MASS INDEX: 39.56 KG/M2 | WEIGHT: 201.5 LBS | RESPIRATION RATE: 22 BRPM | DIASTOLIC BLOOD PRESSURE: 61 MMHG

## 2024-01-03 DIAGNOSIS — C90.00 MULTIPLE MYELOMA, REMISSION STATUS UNSPECIFIED: Primary | ICD-10-CM

## 2024-01-03 DIAGNOSIS — R68.89 CHANGE IN WEIGHT: ICD-10-CM

## 2024-01-03 DIAGNOSIS — C90.00 MULTIPLE MYELOMA NOT HAVING ACHIEVED REMISSION: Primary | ICD-10-CM

## 2024-01-03 DIAGNOSIS — Z45.2 ENCOUNTER FOR ADJUSTMENT OR MANAGEMENT OF VASCULAR ACCESS DEVICE: ICD-10-CM

## 2024-01-03 LAB
ALBUMIN SERPL-MCNC: 4 G/DL (ref 3.5–5.2)
ALBUMIN/GLOB SERPL: 1.5 G/DL
ALP SERPL-CCNC: 71 U/L (ref 39–117)
ALT SERPL W P-5'-P-CCNC: 15 U/L (ref 1–33)
ANION GAP SERPL CALCULATED.3IONS-SCNC: 13.2 MMOL/L (ref 5–15)
AST SERPL-CCNC: 9 U/L (ref 1–32)
BASOPHILS # BLD AUTO: 0.01 10*3/MM3 (ref 0–0.2)
BASOPHILS NFR BLD AUTO: 0.1 % (ref 0–1.5)
BILIRUB SERPL-MCNC: 0.4 MG/DL (ref 0–1.2)
BUN SERPL-MCNC: 13 MG/DL (ref 8–23)
BUN/CREAT SERPL: 16.3 (ref 7–25)
CALCIUM SPEC-SCNC: 8.9 MG/DL (ref 8.6–10.5)
CHLORIDE SERPL-SCNC: 109 MMOL/L (ref 98–107)
CO2 SERPL-SCNC: 16.8 MMOL/L (ref 22–29)
CREAT SERPL-MCNC: 0.8 MG/DL (ref 0.57–1)
DEPRECATED RDW RBC AUTO: 72.9 FL (ref 37–54)
EGFRCR SERPLBLD CKD-EPI 2021: 77.4 ML/MIN/1.73
EOSINOPHIL # BLD AUTO: 0.01 10*3/MM3 (ref 0–0.4)
EOSINOPHIL NFR BLD AUTO: 0.1 % (ref 0.3–6.2)
ERYTHROCYTE [DISTWIDTH] IN BLOOD BY AUTOMATED COUNT: 17.7 % (ref 12.3–15.4)
GLOBULIN UR ELPH-MCNC: 2.7 GM/DL
GLUCOSE SERPL-MCNC: 182 MG/DL (ref 65–99)
HCT VFR BLD AUTO: 32.8 % (ref 34–46.6)
HGB BLD-MCNC: 10.5 G/DL (ref 12–15.9)
IMM GRANULOCYTES # BLD AUTO: 0.14 10*3/MM3 (ref 0–0.05)
IMM GRANULOCYTES NFR BLD AUTO: 1.2 % (ref 0–0.5)
LYMPHOCYTES # BLD AUTO: 3.63 10*3/MM3 (ref 0.7–3.1)
LYMPHOCYTES NFR BLD AUTO: 31.1 % (ref 19.6–45.3)
MCH RBC QN AUTO: 35 PG (ref 26.6–33)
MCHC RBC AUTO-ENTMCNC: 32 G/DL (ref 31.5–35.7)
MCV RBC AUTO: 109.3 FL (ref 79–97)
MONOCYTES # BLD AUTO: 1.15 10*3/MM3 (ref 0.1–0.9)
MONOCYTES NFR BLD AUTO: 9.8 % (ref 5–12)
NEUTROPHILS NFR BLD AUTO: 57.7 % (ref 42.7–76)
NEUTROPHILS NFR BLD AUTO: 6.75 10*3/MM3 (ref 1.7–7)
PLATELET # BLD AUTO: 297 10*3/MM3 (ref 140–450)
PMV BLD AUTO: 8.9 FL (ref 6–12)
POTASSIUM SERPL-SCNC: 3.2 MMOL/L (ref 3.5–5.2)
PROT SERPL-MCNC: 6.7 G/DL (ref 6–8.5)
RBC # BLD AUTO: 3 10*6/MM3 (ref 3.77–5.28)
SODIUM SERPL-SCNC: 139 MMOL/L (ref 136–145)
T-UPTAKE NFR SERPL: 1.08 TBI (ref 0.8–1.3)
T4 SERPL-MCNC: 7.66 MCG/DL (ref 4.5–11.7)
TSH SERPL DL<=0.05 MIU/L-ACNC: 2.53 UIU/ML (ref 0.27–4.2)
WBC NRBC COR # BLD AUTO: 11.69 10*3/MM3 (ref 3.4–10.8)

## 2024-01-03 PROCEDURE — 96401 CHEMO ANTI-NEOPL SQ/IM: CPT

## 2024-01-03 PROCEDURE — 25010000002 DARATUMUMAB-HYALURONIDASE-FIHJ 1800-30000 MG-UT/15ML SOLUTION: Performed by: INTERNAL MEDICINE

## 2024-01-03 PROCEDURE — 96374 THER/PROPH/DIAG INJ IV PUSH: CPT

## 2024-01-03 PROCEDURE — 63710000001 ACETAMINOPHEN EXTRA STRENGTH 500 MG TABLET: Performed by: INTERNAL MEDICINE

## 2024-01-03 PROCEDURE — 85025 COMPLETE CBC W/AUTO DIFF WBC: CPT | Performed by: INTERNAL MEDICINE

## 2024-01-03 PROCEDURE — 25010000002 DIPHENHYDRAMINE PER 50 MG: Performed by: INTERNAL MEDICINE

## 2024-01-03 PROCEDURE — 84436 ASSAY OF TOTAL THYROXINE: CPT | Performed by: INTERNAL MEDICINE

## 2024-01-03 PROCEDURE — 25010000002 HEPARIN LOCK FLUSH PER 10 UNITS: Performed by: INTERNAL MEDICINE

## 2024-01-03 PROCEDURE — 84479 ASSAY OF THYROID (T3 OR T4): CPT | Performed by: INTERNAL MEDICINE

## 2024-01-03 PROCEDURE — 84443 ASSAY THYROID STIM HORMONE: CPT | Performed by: INTERNAL MEDICINE

## 2024-01-03 PROCEDURE — 25810000003 SODIUM CHLORIDE 0.9 % SOLUTION: Performed by: INTERNAL MEDICINE

## 2024-01-03 PROCEDURE — A9270 NON-COVERED ITEM OR SERVICE: HCPCS | Performed by: INTERNAL MEDICINE

## 2024-01-03 PROCEDURE — 96375 TX/PRO/DX INJ NEW DRUG ADDON: CPT

## 2024-01-03 PROCEDURE — 25010000002 METHYLPREDNISOLONE PER 125 MG: Performed by: INTERNAL MEDICINE

## 2024-01-03 PROCEDURE — 80053 COMPREHEN METABOLIC PANEL: CPT | Performed by: INTERNAL MEDICINE

## 2024-01-03 RX ORDER — HEPARIN SODIUM (PORCINE) LOCK FLUSH IV SOLN 100 UNIT/ML 100 UNIT/ML
500 SOLUTION INTRAVENOUS AS NEEDED
Status: DISCONTINUED | OUTPATIENT
Start: 2024-01-03 | End: 2024-01-04 | Stop reason: HOSPADM

## 2024-01-03 RX ORDER — FAMOTIDINE 10 MG/ML
20 INJECTION, SOLUTION INTRAVENOUS AS NEEDED
OUTPATIENT
Start: 2024-01-17

## 2024-01-03 RX ORDER — SODIUM CHLORIDE 9 MG/ML
20 INJECTION, SOLUTION INTRAVENOUS ONCE
OUTPATIENT
Start: 2024-01-17

## 2024-01-03 RX ORDER — METHYLPREDNISOLONE SODIUM SUCCINATE 125 MG/2ML
60 INJECTION, POWDER, LYOPHILIZED, FOR SOLUTION INTRAMUSCULAR; INTRAVENOUS ONCE
OUTPATIENT
Start: 2024-01-17

## 2024-01-03 RX ORDER — DIPHENHYDRAMINE HYDROCHLORIDE 50 MG/ML
50 INJECTION INTRAMUSCULAR; INTRAVENOUS AS NEEDED
Status: CANCELLED | OUTPATIENT
Start: 2024-01-03

## 2024-01-03 RX ORDER — HEPARIN SODIUM (PORCINE) LOCK FLUSH IV SOLN 100 UNIT/ML 100 UNIT/ML
500 SOLUTION INTRAVENOUS AS NEEDED
OUTPATIENT
Start: 2024-01-03

## 2024-01-03 RX ORDER — MEPERIDINE HYDROCHLORIDE 25 MG/ML
25 INJECTION INTRAMUSCULAR; INTRAVENOUS; SUBCUTANEOUS
Status: CANCELLED | OUTPATIENT
Start: 2024-01-03

## 2024-01-03 RX ORDER — MEPERIDINE HYDROCHLORIDE 25 MG/ML
25 INJECTION INTRAMUSCULAR; INTRAVENOUS; SUBCUTANEOUS
Status: ACTIVE | OUTPATIENT
Start: 2024-01-03 | End: 2024-01-03

## 2024-01-03 RX ORDER — ACETAMINOPHEN 500 MG
1000 TABLET ORAL ONCE
Status: COMPLETED | OUTPATIENT
Start: 2024-01-03 | End: 2024-01-03

## 2024-01-03 RX ORDER — SODIUM CHLORIDE 0.9 % (FLUSH) 0.9 %
20 SYRINGE (ML) INJECTION AS NEEDED
Status: DISCONTINUED | OUTPATIENT
Start: 2024-01-03 | End: 2024-01-04 | Stop reason: HOSPADM

## 2024-01-03 RX ORDER — METHYLPREDNISOLONE SODIUM SUCCINATE 125 MG/2ML
60 INJECTION, POWDER, LYOPHILIZED, FOR SOLUTION INTRAMUSCULAR; INTRAVENOUS ONCE
Status: CANCELLED | OUTPATIENT
Start: 2024-01-03

## 2024-01-03 RX ORDER — SODIUM CHLORIDE 9 MG/ML
20 INJECTION, SOLUTION INTRAVENOUS ONCE
Status: CANCELLED | OUTPATIENT
Start: 2024-01-03

## 2024-01-03 RX ORDER — METHYLPREDNISOLONE SODIUM SUCCINATE 125 MG/2ML
60 INJECTION, POWDER, LYOPHILIZED, FOR SOLUTION INTRAMUSCULAR; INTRAVENOUS ONCE
Status: COMPLETED | OUTPATIENT
Start: 2024-01-03 | End: 2024-01-03

## 2024-01-03 RX ORDER — DIPHENHYDRAMINE HYDROCHLORIDE 50 MG/ML
50 INJECTION INTRAMUSCULAR; INTRAVENOUS AS NEEDED
OUTPATIENT
Start: 2024-01-17

## 2024-01-03 RX ORDER — FAMOTIDINE 10 MG/ML
20 INJECTION, SOLUTION INTRAVENOUS AS NEEDED
Status: CANCELLED | OUTPATIENT
Start: 2024-01-03

## 2024-01-03 RX ORDER — FAMOTIDINE 10 MG/ML
20 INJECTION, SOLUTION INTRAVENOUS AS NEEDED
Status: DISCONTINUED | OUTPATIENT
Start: 2024-01-03 | End: 2024-01-04 | Stop reason: HOSPADM

## 2024-01-03 RX ORDER — ACETAMINOPHEN 500 MG
1000 TABLET ORAL ONCE
Status: CANCELLED | OUTPATIENT
Start: 2024-01-03

## 2024-01-03 RX ORDER — ACETAMINOPHEN 500 MG
1000 TABLET ORAL ONCE
OUTPATIENT
Start: 2024-01-17

## 2024-01-03 RX ORDER — SODIUM CHLORIDE 9 MG/ML
20 INJECTION, SOLUTION INTRAVENOUS ONCE
Status: COMPLETED | OUTPATIENT
Start: 2024-01-03 | End: 2024-01-03

## 2024-01-03 RX ORDER — DIPHENHYDRAMINE HYDROCHLORIDE 50 MG/ML
50 INJECTION INTRAMUSCULAR; INTRAVENOUS AS NEEDED
Status: DISCONTINUED | OUTPATIENT
Start: 2024-01-03 | End: 2024-01-04 | Stop reason: HOSPADM

## 2024-01-03 RX ORDER — MEPERIDINE HYDROCHLORIDE 25 MG/ML
25 INJECTION INTRAMUSCULAR; INTRAVENOUS; SUBCUTANEOUS
OUTPATIENT
Start: 2024-01-17

## 2024-01-03 RX ORDER — SODIUM CHLORIDE 0.9 % (FLUSH) 0.9 %
20 SYRINGE (ML) INJECTION AS NEEDED
OUTPATIENT
Start: 2024-01-03

## 2024-01-03 RX ADMIN — DIPHENHYDRAMINE HYDROCHLORIDE 25 MG: 50 INJECTION, SOLUTION INTRAMUSCULAR; INTRAVENOUS at 11:13

## 2024-01-03 RX ADMIN — SODIUM CHLORIDE 20 ML/HR: 9 INJECTION, SOLUTION INTRAVENOUS at 11:11

## 2024-01-03 RX ADMIN — Medication 20 ML: at 12:38

## 2024-01-03 RX ADMIN — HEPARIN SODIUM (PORCINE) LOCK FLUSH IV SOLN 100 UNIT/ML 500 UNITS: 100 SOLUTION at 12:38

## 2024-01-03 RX ADMIN — DARATUMUMAB AND HYALURONIDASE-FIHJ (HUMAN RECOMBINANT) 1800 MG: 1800; 30000 INJECTION SUBCUTANEOUS at 12:31

## 2024-01-03 RX ADMIN — METHYLPREDNISOLONE SODIUM SUCCINATE 60 MG: 125 INJECTION INTRAMUSCULAR; INTRAVENOUS at 11:11

## 2024-01-03 RX ADMIN — ACETAMINOPHEN 1000 MG: 500 TABLET ORAL at 11:11

## 2024-01-03 NOTE — PROGRESS NOTES
Chief Complaint  Chemotherapy    Rena Guerra,*  Rena Guerra, PA    Subjective          Anca Samson presents to [facility] for ongoing treatment of her multiple myeloma.  She is on daratumumab, lenalidomide, dexamethasone.  Lenalidomide was held with last cycle due to low blood counts.  She states she has been feeling pretty good.  She has chronic tremor which was exacerbated by her steroids but with decreased doses it has improved although not resolved.  She reports adequate appetite.  She reports normal bladder and bowel habits.    Oncology/Hematology History Overview Note   Smoldering Myeloma    Initially diagnosed in 2016 with bone marrow biopsy demonstrating 30% CD56 positive monoclonal plasma cell population.  45 XX normal female chromosome pattern  FISH panel negative for myeloma associated mutations including 1q21, 9q34,11q13,14q32,15q24, 17q13  No anemia, renal insufficiency, hypercalcemia.  On observation since that time    Low grade adenocarcinoma of ascending colon      5/16/2017 right hemicolectomy which  revealed a low-grade 7.6 cm adenocarcinoma of the cecum. All margins were  negative. Lymphovascular invasion and perineural invasion were not identified.     26 lymph nodes were removed and unfortunately 1 was involved with metastatic deposit. pT3 pN1a colorectal cancer.        Clinical Staging      Smoldering Myeloma; Colon ([id number])            Treatments      Chemotherapy      11/2017 completed 6mo adjuvant Xeloda        6/2022 Stopped Smoking     Malignant neoplasm of ascending colon   7/21/2017 Initial Diagnosis    Colon cancer (CMS/HCC)     Multiple myeloma   7/28/2021 Initial Diagnosis    Multiple myeloma     11/7/2023 -  Chemotherapy    OP MULTIPLE MYELOMA Daratumumab / Lenalidomide / Dexamethasone         Review of Systems   Constitutional:  Positive for fatigue. Negative for appetite change, diaphoresis, fever, unexpected  weight gain and unexpected weight loss.   HENT:  Negative for hearing loss, mouth sores, sore throat, swollen glands, trouble swallowing and voice change.    Eyes:  Negative for blurred vision.   Respiratory:  Negative for cough, shortness of breath and wheezing.    Cardiovascular:  Negative for chest pain and palpitations.   Gastrointestinal:  Negative for abdominal pain, blood in stool, constipation, diarrhea, nausea and vomiting.   Endocrine: Negative for cold intolerance and heat intolerance.   Genitourinary:  Negative for difficulty urinating, dysuria, frequency, hematuria and urinary incontinence.   Musculoskeletal:  Negative for arthralgias, back pain and myalgias.   Skin:  Negative for rash, skin lesions and wound.   Neurological:  Negative for dizziness, seizures, weakness, numbness and headache.   Hematological:  Does not bruise/bleed easily.   Psychiatric/Behavioral:  Negative for depressed mood. The patient is not nervous/anxious.      Current Outpatient Medications on File Prior to Visit   Medication Sig Dispense Refill    acyclovir (ZOVIRAX) 400 MG tablet Take 1 tablet by mouth 2 (Two) Times a Day. Take one tablet by mouth twice daily. 60 tablet 11    albuterol sulfate  (90 Base) MCG/ACT inhaler Inhale 2 puffs Every 4 (Four) Hours As Needed for Wheezing. 18 g 11    arformoterol (BROVANA) 15 MCG/2ML nebulizer solution Take 2 mL by nebulization 2 (Two) Times a Day.      budesonide (PULMICORT) 0.5 MG/2ML nebulizer solution Take 2 mL by nebulization 2 (Two) Times a Day.      colestipol (COLESTID) 1 g tablet Take 2 tablets by mouth 2 (Two) Times a Day. 120 tablet 5    dapsone 25 MG tablet Take 2 tablets by mouth 2 (Two) Times a Day. 120 tablet 5    dexAMETHasone (DECADRON) 4 MG tablet Take 5 tablets on D 1,8,15,22 and take 1 tablet on D 2,3,9,10,16,17,23,24.  Take in the morning with food. 28 tablet 1    dexAMETHasone (DECADRON) 4 MG tablet Take 10 tablets on D 1,8,15,22 and take 1 tablet on D  2,3,16,17.  Take in the morning with food. 44 tablet 3    FLUoxetine (PROzac) 40 MG capsule Take 1 capsule by mouth Daily. 90 capsule 1    HYDROcodone-acetaminophen (Norco) 5-325 MG per tablet Take 1 tablet by mouth Every 6 (Six) Hours As Needed for Moderate Pain or Severe Pain. 6 tablet 0    lenalidomide (REVLIMID) 15 MG capsule Take 1 capsule by mouth Daily. On Days 1-21, and off 7 days, on a 28 day cycle 21 capsule 0    levothyroxine (SYNTHROID, LEVOTHROID) 25 MCG tablet Take 1 tablet by mouth Every Morning. 90 tablet 1    LORazepam (Ativan) 1 MG tablet Take 1 tablet by mouth 2 (Two) Times a Day As Needed for Anxiety. 14 tablet 0    ondansetron (ZOFRAN) 8 MG tablet Take 1 tablet by mouth 3 (Three) Times a Day As Needed for Nausea or Vomiting. (Patient taking differently: Take 1 tablet by mouth 3 (Three) Times a Day As Needed for Nausea or Vomiting. TO START WHEN STARTS CHEMO 11/3/23) 30 tablet 5    promethazine (PHENERGAN) 25 MG tablet Take 1 tablet by mouth Every 6 (Six) Hours As Needed for Nausea or Vomiting. 30 tablet 1    revefenacin (YUPELRI) 175 MCG/3ML nebulizer solution Take 3 mL by nebulization Daily.      Trelegy Ellipta 100-62.5-25 MCG/ACT inhaler       vitamin D (ERGOCALCIFEROL) 1.25 MG (03262 UT) capsule capsule Take 1 capsule by mouth 2 (Two) Times a Week. 26 capsule 1    meloxicam (MOBIC) 15 MG tablet Take 1 tablet by mouth Daily. (Patient not taking: Reported on 12/13/2023) 90 tablet 1     Current Facility-Administered Medications on File Prior to Visit   Medication Dose Route Frequency Provider Last Rate Last Admin    [COMPLETED] acetaminophen (TYLENOL) tablet 1,000 mg  1,000 mg Oral Once West Nicolas MD   1,000 mg at 01/03/24 1111    [COMPLETED] [facility] (DARZALEX FASPRO) 1800-16605 MG-UT/15ML injection 1,800 mg  1,800 mg Subcutaneous Once West Nicolas MD   1,800 mg at 01/03/24 1231    diphenhydrAMINE (BENADRYL) injection 50 mg  50 mg Intravenous PRN Maria Isabel,  West POTTER MD        [COMPLETED] diphenhydrAMINE (BENADRYL) IVPB 25 mg  25 mg Intravenous Once West Nicolas MD   Stopped at 01/03/24 1128    famotidine (PEPCID) injection 20 mg  20 mg Intravenous PRN West Nicolas MD        heparin injection 500 Units  500 Units Intravenous PRN West Nicolas MD   500 Units at 01/03/24 1238    Hydrocortisone Sod Suc (PF) (Solu-CORTEF) injection 100 mg  100 mg Intravenous PRN West Nicolas MD        meperidine (DEMEROL) injection 25 mg  25 mg Intravenous Q20 Min PRN West Nicolas MD        [COMPLETED] methylPREDNISolone sodium succinate (SOLU-Medrol) injection 60 mg  60 mg Intravenous Once West Nicolas MD   60 mg at 01/03/24 1111    sodium chloride 0.9 % flush 20 mL  20 mL Intravenous PRN West Nicolas MD   20 mL at 01/03/24 1238    [COMPLETED] sodium chloride 0.9 % infusion  20 mL/hr Intravenous Once West Nicolas MD   Stopped at 01/03/24 1210       Allergies   Allergen Reactions    Cephalexin Hives    Morphine Anaphylaxis    Penicillins Hives    Sulfa Antibiotics Hives     Past Medical History:   Diagnosis Date    Anxiety     Asthma     Asthma 07/28/2021    Atherosclerosis of native coronary artery of native heart with angina pectoris 08/15/2023    FOLLOWED BY DR GONZALES/DINA PETTIT. DENIES CP BUT GETS SOA WITH EXERTION HAS COPD WEARS AT 2LPM N/C. DECREASED ACTIVITY D/T SOA    C. difficile diarrhea     DENIES ANY CURRENT ISSUES    Colon cancer 07/21/2017    Colon polyp     COPD (chronic obstructive pulmonary disease) 10/23/2017    COPD (chronic obstructive pulmonary disease) 10/23/2017    Depression     Disease of thyroid gland     Diverticulitis     Dysphagia     Essential hypertension 07/28/2021    Heartburn     Hernia 2019    History of chemotherapy     2017    Hx of psychiatric care     Hyperlipidemia     Impaired fasting glucose 08/14/2015    Lumbago     Lung nodule 02/17/2022    Major depressive disorder 10/27/2016    Malignant neoplasm of  ascending colon 07/21/2017    Melena     Multiple myeloma     Nicotine dependence 05/10/2017    NICK (obstructive sleep apnea) 11/06/2023    Oxygen dependent     REPORTS USES 02 AT 2LPM VIA N/C. CAN GET VERY SOA WITH MINIMAL EXERTION    Renal insufficiency 07/28/2021    Seizures     PSEUDO SEIZURES LAST ONE AROUND NOV 2022    Shortness of breath     CAN GET VERY SOA WITH MINIMAL EXERTION    Sleep apnea     Tobacco use 07/28/2021    QUIT SMOKING JUNE 2022    Visit for screening mammogram 02/20/2020    NORMAL- REPEAT IN ONE YEAR    Vitamin D deficiency      Past Surgical History:   Procedure Laterality Date    APPENDECTOMY      BONE MARROW BIOPSY  2016    COLON SURGERY  2017    COLONOSCOPY  2018,2017,2019    [facility]- DR LE:DIVERTICULOSIS AND ERYTHEMA OF MUCOSA    COLONOSCOPY N/A 12/20/2022    Procedure: COLONOSCOPY WITH ELEVIEW INJECTION, POLYPECTOMY, HOT SNARE, CLIP APPLICATION X3, BIOPSIES;  Surgeon: Jarvis Borges MD;  Location: [facility] ENDOSCOPY;  Service: Gastroenterology;  Laterality: N/A;  COLON POLYP, DIVERTICULOSIS, ANASTOMOSIS RIGHT COLON    COLONOSCOPY N/A 11/9/2023    Procedure: COLONOSCOPY;  Surgeon: Jarvis Borges MD;  Location: [facility] ENDOSCOPY;  Service: Gastroenterology;  Laterality: N/A;  DIVERTICULOSIS, ANASTOMOSIS IN ASCENDING COLON    ENDOSCOPY  2018    FECAL DISIMPACTION  06/2019    TRANSPLANT     HEMICOLECTOMY Right 05/2017    HYSTERECTOMY  1982,1984    KNEE ARTHROSCOPY Left 01/03/2022    Procedure: KNEE ARTHROSCOPY WITH PARTIAL MEDIAL MENISCECTOMY,  CHONDROPLASTY;  Surgeon: Nicholas Tipton MD;  Location: [facility] OR [facility];  Service: Orthopedics;  Laterality: Left;    MINI-LAPAROTOMY  2017    PORTACATH PLACEMENT N/A     pt states that her port does not work    UPPER GASTROINTESTINAL ENDOSCOPY  2018    VENOUS ACCESS DEVICE (PORT) INSERTION N/A 10/31/2023    Procedure: Port-a-catheter removal and port-a-catheter placement;  Surgeon: Alcon Delgado MD;  Location:   "[facility];  Service: General;  Laterality: N/A;     Social History     Socioeconomic History    Marital status:    Tobacco Use    Smoking status: Former     Packs/day: 1.00     Years: 47.00     Additional pack years: 0.00     Total pack years: 47.00     Types: Cigarettes     Start date: 1975     Quit date: 6/3/2022     Years since quittin.5     Passive exposure: Past    Smokeless tobacco: Never    Tobacco comments:     started age 26   Vaping Use    Vaping Use: Never used   Substance and Sexual Activity    Alcohol use: Never    Drug use: Never    Sexual activity: Defer     Family History   Problem Relation Age of Onset    Heart disease Mother     Lung cancer Mother     Cancer Mother     Stroke Father     Heart disease Father     Kidney cancer Father     Cancer Father     Stroke Other         UNCLE/AUNT    Colon cancer Neg Hx     Malig Hyperthermia Neg Hx        Objective   Physical Exam  Vitals reviewed. Exam conducted with a chaperone present.   Constitutional:       General: She is not in acute distress.     Appearance: Normal appearance.   Cardiovascular:      Rate and Rhythm: Normal rate and regular rhythm.      Heart sounds: Normal heart sounds. No murmur heard.     No gallop.   Pulmonary:      Effort: Pulmonary effort is normal.      Breath sounds: Normal breath sounds.      Comments: Port-A-Cath  Abdominal:      General: Abdomen is flat. Bowel sounds are normal.      Palpations: Abdomen is soft.   Musculoskeletal:      Right lower leg: No edema.      Left lower leg: No edema.   Neurological:      Mental Status: She is alert and oriented to person, place, and time.      Comments: Tremor   Psychiatric:         Mood and Affect: Mood normal.         Behavior: Behavior normal.         Vitals:    24 1007   BP: 147/61   Pulse: 114   Resp: 22   Temp: 97.4 °F (36.3 °C)   SpO2: 96%  Comment: 2L of O2   Weight: 91.4 kg (201 lb 8 oz)   Height: 153.5 cm (60.43\")   PainSc: 0-No pain     ECOG score: 2 " "        PHQ-9 Total Score:                    Result Review :   The following data was reviewed by: West Nicolas MD on 01/03/2024:  Lab Results   Component Value Date    HGB 10.5 (L) 01/03/2024    HCT 32.8 (L) 01/03/2024    .3 (H) 01/03/2024     01/03/2024    WBC 11.69 (H) 01/03/2024    NEUTROABS 6.75 01/03/2024    LYMPHSABS 3.63 (H) 01/03/2024    MONOSABS 1.15 (H) 01/03/2024    EOSABS 0.01 01/03/2024    BASOSABS 0.01 01/03/2024     Lab Results   Component Value Date    GLUCOSE 182 (H) 01/03/2024    BUN 13 01/03/2024    CREATININE 0.80 01/03/2024     01/03/2024    K 3.2 (L) 01/03/2024     (H) 01/03/2024    CO2 16.8 (L) 01/03/2024    CALCIUM 8.9 01/03/2024    PROTEINTOT 6.7 01/03/2024    ALBUMIN 4.0 01/03/2024    BILITOT 0.4 01/03/2024    ALKPHOS 71 01/03/2024    AST 9 01/03/2024    ALT 15 01/03/2024     Lab Results   Component Value Date    FREET4 1.55 12/06/2023    TSH 0.931 12/06/2023     No results found for: \"IRON\", \"LABIRON\", \"TRANSFERRIN\", \"TIBC\"  Lab Results   Component Value Date     03/07/2023    KYEAZEVY27 218 01/28/2020    FOLATE 17.7 01/28/2020     No results found for: \"PSA\", \"CEA\", \"AFP\", \"\", \"\"          Assessment and Plan    Diagnoses and all orders for this visit:    1. Multiple myeloma not having achieved remission (Primary)  Assessment & Plan:  Patient is on active treatment with daratumumab, lenalidomide, dexamethasone.  She is due for cycle 3.  Blood counts look much better.  Proceed with cycle 3 as planned including lenalidomide.  Lenalidomide dose will be reduced to 15 mg.  I will see her back for cycle 4-day 1 with lab work prior to monitor for toxicities as well as myeloma protein studies to assess response to therapy.  She would like to talk to dietitian regarding healthy options for weight loss while on treatment.    Orders:  -     Cancel: CARL,PE and FLC, Serum; Future  -     Cancel: Protein Electrophoresis, Random Urine - Urine, Clean Catch; " Future  -     Cancel: CARL,PE and FLC, Serum; Future  -     Protein Electrophoresis, Random Urine - Urine, Clean Catch; Future  -     Ambulatory Referral to OP ONC Nutrition Services  -     CBC and Differential; Future  -     Multiple Myeloma, Daratumumab Specific CARL; Future    2. Change in weight  -     Ambulatory Referral to OP ONC Nutrition Services    Other orders  -     Cancel: sodium chloride 0.9 % infusion  -     Cancel: acetaminophen (TYLENOL) tablet 1,000 mg  -     Cancel: methylPREDNISolone sodium succinate (SOLU-Medrol) injection 60 mg  -     Cancel: diphenhydrAMINE (BENADRYL) IVPB 25 mg  -     Cancel: daratumumab-hyaluronidase-fihj (DARZALEX FASPRO) 1800-49732 MG-UT/15ML injection 1,800 mg  -     Cancel: Hydrocortisone Sod Suc (PF) (Solu-CORTEF) injection 100 mg  -     Cancel: diphenhydrAMINE (BENADRYL) injection 50 mg  -     Cancel: famotidine (PEPCID) injection 20 mg  -     Cancel: meperidine (DEMEROL) injection 25 mg  -     sodium chloride 0.9 % infusion  -     acetaminophen (TYLENOL) tablet 1,000 mg  -     methylPREDNISolone sodium succinate (SOLU-Medrol) injection 60 mg  -     diphenhydrAMINE (BENADRYL) IVPB 25 mg  -     daratumumab-hyaluronidase-fihj (DARZALEX FASPRO) 1800-94360 MG-UT/15ML injection 1,800 mg  -     Hydrocortisone Sod Suc (PF) (Solu-CORTEF) injection 100 mg  -     diphenhydrAMINE (BENADRYL) injection 50 mg  -     famotidine (PEPCID) injection 20 mg  -     meperidine (DEMEROL) injection 25 mg            Patient Follow Up: Cycle 4-day 1    Patient was given instructions and counseling regarding her condition or for health maintenance advice. Please see specific information pulled into the AVS if appropriate.     West Nicolas MD    1/3/2024

## 2024-01-03 NOTE — CODE DOCUMENTATION
Spoke with patient and spouse and told them that her potassium was a little low and to increase potassium rich foods in her diet. Both v/u.

## 2024-01-03 NOTE — ASSESSMENT & PLAN NOTE
Patient is on active treatment with daratumumab, lenalidomide, dexamethasone.  She is due for cycle 3.  Blood counts look much better.  Proceed with cycle 3 as planned including lenalidomide.  Lenalidomide dose will be reduced to 15 mg.  I will see her back for cycle 4-day 1 with lab work prior to monitor for toxicities as well as myeloma protein studies to assess response to therapy.  She would like to talk to dietitian regarding healthy options for weight loss while on treatment.

## 2024-01-04 ENCOUNTER — SPECIALTY PHARMACY (OUTPATIENT)
Dept: PHARMACY | Facility: HOSPITAL | Age: 74
End: 2024-01-04
Payer: MEDICARE

## 2024-01-04 ENCOUNTER — TELEPHONE (OUTPATIENT)
Dept: NUTRITION | Facility: HOSPITAL | Age: 74
End: 2024-01-04
Payer: MEDICARE

## 2024-01-04 NOTE — PROGRESS NOTES
"Outpatient Nutrition Oncology Follow Up    Patient Name: Anca Samson  YOB: 1950  MRN: [id number]    Recommendation(s):   1700 calorie diet to promote gradual, healthy wt decline of no more than 1# wt loss per week.  60 grams of protein daily.  55-60 oz of fluid daily.      Reason for Consult:  Nutrition Referral (from MD)     Current Treatment / Dx:  Darzalex Faspro / Multiple Myeloma      Weight:  91.4 kg on 1/3/24    Weight Change:   Pt lost 6.2% body wt in first 6 weeks of chemotherapy  Has gained 4-5# over the past few weeks    Nutrition-related concerns:  concerned with recent wt gain, but also c/o decreased appetite & early satiety    Current PO intake:  2 good meals daily, low-fair amount of protein intake     Current Nutrition Supplement intake:  n/a    Consult or Intervention:  MD requested referral due to pt interested in losing gradual wt during her current tx.  Pt lost significant wt over 6 weeks while receiving immunotherapy / chemotherapy and reports it was due to a decline in appetite, but some of this was also intentional.  Pt has hx of COPD and is on O2.  She stated that when her wt is low, she can breathe easier and function better.  Currently, she says she can only lay on the couch due to even slight wt gain recently.  Pt reports she used to weigh 110# years ago, but was put on a sz medication (reports it was given to her \"incorrectly and she never needed it\") and she gained wt.  She reports when she d/c'd the medication, she could not lose the wt.  Explained importance of gradual, healthy methods for wt decline during Ca tx.  Explained importance of no more than 1# wt decline per week and adequate consumption of protein foods to maintain LBM.  Pt does not eat much protein, per diet recall.  She prefers to eat only a couple of good meals per day, but per recall her intake is minimal.  She reports early satiety at times, but other times (usually after tx and receiving steroids) " her appetite is significantly better.  She has never tried oral nutrition shakes before, but not opposed.  She reports ongoing fatigue as well, and struggles to prepare even convenience foods some days.  She reports that her  will prepare her favorite foods, but by the time he serves these she has no appetite to eat (early satiety).      Plan:  Developed a 1700 calorie meal plan to promote no more than 1# wt decline per week.  This includes a variety of foods (most are convenient, such as applesauce, canned fruits / lite syrup, and low calorie milk), but also high protein / lower calorie foods, along with proper portions.  Since pt experiences fatigue and early satiety, suggested to incorporate high protein oral nutrition shakes into her schedule as well (lower-moderate kcals, high protein).  Provided 2 sample meal plans, low calorie / high protein food list, and supplemental protein products.  Pt was agreeable for the written information to be mailed to her.  Samples of high protein ONS will be provided at her next infusion 1/17/23.    Nutrition Diagnosis: Food and nutrition knowledge deficit related to  no prior education received  as evidenced by  pt desires education on healthy wt reduction during Ca tx.    Plan: Will follow up per oncology nutrition protocol

## 2024-01-09 ENCOUNTER — SPECIALTY PHARMACY (OUTPATIENT)
Dept: PHARMACY | Facility: HOSPITAL | Age: 74
End: 2024-01-09
Payer: MEDICARE

## 2024-01-09 DIAGNOSIS — C90.00 MULTIPLE MYELOMA, REMISSION STATUS UNSPECIFIED: ICD-10-CM

## 2024-01-09 RX ORDER — LENALIDOMIDE 15 MG/1
15 CAPSULE ORAL DAILY
Qty: 21 CAPSULE | Refills: 0 | Status: SHIPPED | OUTPATIENT
Start: 2024-01-09

## 2024-01-09 RX ORDER — LENALIDOMIDE 15 MG/1
CAPSULE ORAL
Refills: 0 | OUTPATIENT
Start: 2024-01-09

## 2024-01-09 NOTE — PROGRESS NOTES
Re: Refills of Oral Specialty Medication - Revlimid (lenalidomide)    Drug-Drug Interactions: The current medication list was reviewed and there are no relevant drug-drug interactions with the specialty medication.  Medication Allergies: The patient has no relevant allergies as it relates to their oral specialty medication  Review of Labs/Dose Adjustments: NO DOSE CHANGE - I reviewed the most recent note and labs and the patient will continue without any dose changes.  I sent refills as described below.    Drug: Revlimid (lenalidomide)  Strength: 15 mg  Directions: Take 1 capsule by mouth daily ON days 1-21, OFF for 7 days of each 28 day cycle  Quantity: 21  Refills: 0  Auth # [id number] Exp 2/8/24   Pharmacy prescription sent to: [facility]      Madisyn Saldaña PharmD, BCPS  Oncology Clinical Pharmacist  1/9/2024  13:16 EST

## 2024-01-17 ENCOUNTER — HOSPITAL ENCOUNTER (OUTPATIENT)
Dept: ONCOLOGY | Facility: HOSPITAL | Age: 74
Discharge: HOME | End: 2024-01-17
Admitting: INTERNAL MEDICINE
Payer: MEDICARE

## 2024-01-17 VITALS
SYSTOLIC BLOOD PRESSURE: 139 MMHG | WEIGHT: 199.74 LBS | BODY MASS INDEX: 38.45 KG/M2 | HEART RATE: 98 BPM | TEMPERATURE: 98.3 F | DIASTOLIC BLOOD PRESSURE: 57 MMHG | RESPIRATION RATE: 22 BRPM

## 2024-01-17 DIAGNOSIS — M94.262 CHONDROMALACIA OF LEFT KNEE: ICD-10-CM

## 2024-01-17 DIAGNOSIS — R06.83 SNORING: ICD-10-CM

## 2024-01-17 DIAGNOSIS — R19.7 DIARRHEA, UNSPECIFIED TYPE: ICD-10-CM

## 2024-01-17 DIAGNOSIS — G47.19 EXCESSIVE DAYTIME SLEEPINESS: ICD-10-CM

## 2024-01-17 DIAGNOSIS — I25.119 ATHEROSCLEROSIS OF NATIVE CORONARY ARTERY OF NATIVE HEART WITH ANGINA PECTORIS: ICD-10-CM

## 2024-01-17 DIAGNOSIS — R12 HEARTBURN: ICD-10-CM

## 2024-01-17 DIAGNOSIS — R06.00 DYSPNEA, UNSPECIFIED TYPE: ICD-10-CM

## 2024-01-17 DIAGNOSIS — N28.9 RENAL INSUFFICIENCY: ICD-10-CM

## 2024-01-17 DIAGNOSIS — Z47.89 AFTERCARE FOLLOWING SURGERY OF THE MUSCULOSKELETAL SYSTEM: ICD-10-CM

## 2024-01-17 DIAGNOSIS — Z72.0 TOBACCO ABUSE: ICD-10-CM

## 2024-01-17 DIAGNOSIS — Z98.890 S/P LEFT KNEE ARTHROSCOPY: ICD-10-CM

## 2024-01-17 DIAGNOSIS — C90.00 MULTIPLE MYELOMA, REMISSION STATUS UNSPECIFIED: Primary | ICD-10-CM

## 2024-01-17 DIAGNOSIS — Z12.11 ENCOUNTER FOR SCREENING FOR MALIGNANT NEOPLASM OF COLON: ICD-10-CM

## 2024-01-17 DIAGNOSIS — I10 ESSENTIAL HYPERTENSION: ICD-10-CM

## 2024-01-17 DIAGNOSIS — R91.8 LUNG NODULES: ICD-10-CM

## 2024-01-17 DIAGNOSIS — F41.9 ANXIETY: ICD-10-CM

## 2024-01-17 DIAGNOSIS — E03.9 HYPOTHYROIDISM, UNSPECIFIED TYPE: ICD-10-CM

## 2024-01-17 DIAGNOSIS — R73.01 IMPAIRED FASTING GLUCOSE: ICD-10-CM

## 2024-01-17 DIAGNOSIS — E86.0 DEHYDRATION: ICD-10-CM

## 2024-01-17 DIAGNOSIS — E66.01 CLASS 2 SEVERE OBESITY DUE TO EXCESS CALORIES WITH SERIOUS COMORBIDITY AND BODY MASS INDEX (BMI) OF 39.0 TO 39.9 IN ADULT: ICD-10-CM

## 2024-01-17 DIAGNOSIS — C18.2 MALIGNANT NEOPLASM OF ASCENDING COLON: ICD-10-CM

## 2024-01-17 DIAGNOSIS — E55.9 VITAMIN D DEFICIENCY: ICD-10-CM

## 2024-01-17 DIAGNOSIS — Z45.2 ENCOUNTER FOR ADJUSTMENT OR MANAGEMENT OF VASCULAR ACCESS DEVICE: ICD-10-CM

## 2024-01-17 DIAGNOSIS — Z86.010 HISTORY OF COLON POLYPS: ICD-10-CM

## 2024-01-17 DIAGNOSIS — C90.00 MULTIPLE MYELOMA NOT HAVING ACHIEVED REMISSION: ICD-10-CM

## 2024-01-17 DIAGNOSIS — J96.11 CHRONIC RESPIRATORY FAILURE WITH HYPOXIA: ICD-10-CM

## 2024-01-17 DIAGNOSIS — G47.33 OSA (OBSTRUCTIVE SLEEP APNEA): ICD-10-CM

## 2024-01-17 DIAGNOSIS — K57.92 DIVERTICULITIS: ICD-10-CM

## 2024-01-17 DIAGNOSIS — R56.9 SEIZURES: ICD-10-CM

## 2024-01-17 LAB
BASOPHILS # BLD AUTO: 0.01 10*3/MM3 (ref 0–0.2)
BASOPHILS NFR BLD AUTO: 0.1 % (ref 0–1.5)
DEPRECATED RDW RBC AUTO: 66 FL (ref 37–54)
EOSINOPHIL # BLD AUTO: 0.02 10*3/MM3 (ref 0–0.4)
EOSINOPHIL NFR BLD AUTO: 0.3 % (ref 0.3–6.2)
ERYTHROCYTE [DISTWIDTH] IN BLOOD BY AUTOMATED COUNT: 16.6 % (ref 12.3–15.4)
HCT VFR BLD AUTO: 35.7 % (ref 34–46.6)
HGB BLD-MCNC: 11.3 G/DL (ref 12–15.9)
IMM GRANULOCYTES # BLD AUTO: 0.02 10*3/MM3 (ref 0–0.05)
IMM GRANULOCYTES NFR BLD AUTO: 0.3 % (ref 0–0.5)
LYMPHOCYTES # BLD AUTO: 2.65 10*3/MM3 (ref 0.7–3.1)
LYMPHOCYTES NFR BLD AUTO: 37.9 % (ref 19.6–45.3)
MCH RBC QN AUTO: 33.9 PG (ref 26.6–33)
MCHC RBC AUTO-ENTMCNC: 31.7 G/DL (ref 31.5–35.7)
MCV RBC AUTO: 107.2 FL (ref 79–97)
MONOCYTES # BLD AUTO: 0.8 10*3/MM3 (ref 0.1–0.9)
MONOCYTES NFR BLD AUTO: 11.4 % (ref 5–12)
NEUTROPHILS NFR BLD AUTO: 3.49 10*3/MM3 (ref 1.7–7)
NEUTROPHILS NFR BLD AUTO: 50 % (ref 42.7–76)
PLATELET # BLD AUTO: 314 10*3/MM3 (ref 140–450)
PMV BLD AUTO: 9.6 FL (ref 6–12)
RBC # BLD AUTO: 3.33 10*6/MM3 (ref 3.77–5.28)
WBC NRBC COR # BLD AUTO: 6.99 10*3/MM3 (ref 3.4–10.8)

## 2024-01-17 PROCEDURE — 25010000002 DIPHENHYDRAMINE PER 50 MG: Performed by: INTERNAL MEDICINE

## 2024-01-17 PROCEDURE — 86334 IMMUNOFIX E-PHORESIS SERUM: CPT | Performed by: INTERNAL MEDICINE

## 2024-01-17 PROCEDURE — 82784 ASSAY IGA/IGD/IGG/IGM EACH: CPT | Performed by: INTERNAL MEDICINE

## 2024-01-17 PROCEDURE — 96374 THER/PROPH/DIAG INJ IV PUSH: CPT

## 2024-01-17 PROCEDURE — 25010000002 METHYLPREDNISOLONE PER 125 MG: Performed by: INTERNAL MEDICINE

## 2024-01-17 PROCEDURE — A9270 NON-COVERED ITEM OR SERVICE: HCPCS | Performed by: INTERNAL MEDICINE

## 2024-01-17 PROCEDURE — 84156 ASSAY OF PROTEIN URINE: CPT | Performed by: INTERNAL MEDICINE

## 2024-01-17 PROCEDURE — 96375 TX/PRO/DX INJ NEW DRUG ADDON: CPT

## 2024-01-17 PROCEDURE — 84166 PROTEIN E-PHORESIS/URINE/CSF: CPT | Performed by: INTERNAL MEDICINE

## 2024-01-17 PROCEDURE — 25810000003 SODIUM CHLORIDE 0.9 % SOLUTION: Performed by: INTERNAL MEDICINE

## 2024-01-17 PROCEDURE — 96401 CHEMO ANTI-NEOPL SQ/IM: CPT

## 2024-01-17 PROCEDURE — 63710000001 ACETAMINOPHEN EXTRA STRENGTH 500 MG TABLET: Performed by: INTERNAL MEDICINE

## 2024-01-17 PROCEDURE — 25010000002 DARATUMUMAB-HYALURONIDASE-FIHJ 1800-30000 MG-UT/15ML SOLUTION: Performed by: INTERNAL MEDICINE

## 2024-01-17 PROCEDURE — 25010000002 HEPARIN LOCK FLUSH PER 10 UNITS: Performed by: INTERNAL MEDICINE

## 2024-01-17 PROCEDURE — 85025 COMPLETE CBC W/AUTO DIFF WBC: CPT | Performed by: INTERNAL MEDICINE

## 2024-01-17 RX ORDER — SODIUM CHLORIDE 9 MG/ML
20 INJECTION, SOLUTION INTRAVENOUS ONCE
Status: COMPLETED | OUTPATIENT
Start: 2024-01-17 | End: 2024-01-17

## 2024-01-17 RX ORDER — SODIUM CHLORIDE 0.9 % (FLUSH) 0.9 %
20 SYRINGE (ML) INJECTION AS NEEDED
Status: DISCONTINUED | OUTPATIENT
Start: 2024-01-17 | End: 2024-01-18 | Stop reason: HOSPADM

## 2024-01-17 RX ORDER — HEPARIN SODIUM (PORCINE) LOCK FLUSH IV SOLN 100 UNIT/ML 100 UNIT/ML
500 SOLUTION INTRAVENOUS AS NEEDED
Status: DISCONTINUED | OUTPATIENT
Start: 2024-01-17 | End: 2024-01-18 | Stop reason: HOSPADM

## 2024-01-17 RX ORDER — HEPARIN SODIUM (PORCINE) LOCK FLUSH IV SOLN 100 UNIT/ML 100 UNIT/ML
500 SOLUTION INTRAVENOUS AS NEEDED
OUTPATIENT
Start: 2024-01-17

## 2024-01-17 RX ORDER — SODIUM CHLORIDE 0.9 % (FLUSH) 0.9 %
20 SYRINGE (ML) INJECTION AS NEEDED
OUTPATIENT
Start: 2024-01-17

## 2024-01-17 RX ORDER — METHYLPREDNISOLONE SODIUM SUCCINATE 125 MG/2ML
60 INJECTION, POWDER, LYOPHILIZED, FOR SOLUTION INTRAMUSCULAR; INTRAVENOUS ONCE
Status: COMPLETED | OUTPATIENT
Start: 2024-01-17 | End: 2024-01-17

## 2024-01-17 RX ORDER — ACETAMINOPHEN 500 MG
1000 TABLET ORAL ONCE
Status: COMPLETED | OUTPATIENT
Start: 2024-01-17 | End: 2024-01-17

## 2024-01-17 RX ADMIN — HEPARIN SODIUM (PORCINE) LOCK FLUSH IV SOLN 100 UNIT/ML 500 UNITS: 100 SOLUTION at 11:22

## 2024-01-17 RX ADMIN — ACETAMINOPHEN 1000 MG: 500 TABLET ORAL at 09:58

## 2024-01-17 RX ADMIN — DIPHENHYDRAMINE HYDROCHLORIDE 25 MG: 50 INJECTION, SOLUTION INTRAMUSCULAR; INTRAVENOUS at 09:59

## 2024-01-17 RX ADMIN — DARATUMUMAB AND HYALURONIDASE-FIHJ (HUMAN RECOMBINANT) 1800 MG: 1800; 30000 INJECTION SUBCUTANEOUS at 11:13

## 2024-01-17 RX ADMIN — SODIUM CHLORIDE 20 ML/HR: 9 INJECTION, SOLUTION INTRAVENOUS at 09:56

## 2024-01-17 RX ADMIN — METHYLPREDNISOLONE SODIUM SUCCINATE 60 MG: 125 INJECTION INTRAMUSCULAR; INTRAVENOUS at 10:00

## 2024-01-17 RX ADMIN — Medication 20 ML: at 11:22

## 2024-01-17 NOTE — ADDENDUM NOTE
Encounter addended by: Erendira Bethea on: 1/17/2024 2:39 PM   Actions taken: Visit diagnoses modified, Order list changed, Diagnosis association updated, Child order released for a procedure order, [place] created

## 2024-01-19 DIAGNOSIS — M25.569 KNEE PAIN, UNSPECIFIED CHRONICITY, UNSPECIFIED LATERALITY: Chronic | ICD-10-CM

## 2024-01-19 LAB
ALBUMIN MFR UR ELPH: 45.4 %
ALPHA1 GLOB MFR UR ELPH: 2.8 %
ALPHA2 GLOB MFR UR ELPH: 15.3 %
B-GLOBULIN MFR UR ELPH: 23.1 %
GAMMA GLOB MFR UR ELPH: 13.5 %
LABORATORY COMMENT REPORT: ABNORMAL
M PROTEIN MFR UR ELPH: 4 %
PROT UR-MCNC: 48.4 MG/DL

## 2024-01-19 RX ORDER — MELOXICAM 15 MG/1
15 TABLET ORAL DAILY
Qty: 90 TABLET | Refills: 1 | Status: SHIPPED | OUTPATIENT
Start: 2024-01-19

## 2024-01-23 LAB
IGA SERPL-MCNC: 6 MG/DL (ref 64–422)
IGG SERPL-MCNC: 1346 MG/DL (ref 586–1602)
IGM SERPL-MCNC: 33 MG/DL (ref 26–217)
PROT PATTERN SERPL IFE-IMP: ABNORMAL

## 2024-01-31 ENCOUNTER — HOSPITAL ENCOUNTER (OUTPATIENT)
Dept: ONCOLOGY | Facility: HOSPITAL | Age: 74
Discharge: HOME | End: 2024-01-31
Payer: MEDICARE

## 2024-01-31 ENCOUNTER — OFFICE VISIT (OUTPATIENT)
Dept: ONCOLOGY | Facility: HOSPITAL | Age: 74
End: 2024-01-31
Payer: MEDICARE

## 2024-01-31 VITALS
WEIGHT: 198.85 LBS | OXYGEN SATURATION: 95 % | TEMPERATURE: 97.1 F | HEART RATE: 100 BPM | DIASTOLIC BLOOD PRESSURE: 70 MMHG | SYSTOLIC BLOOD PRESSURE: 124 MMHG | BODY MASS INDEX: 38.28 KG/M2 | RESPIRATION RATE: 20 BRPM

## 2024-01-31 VITALS
OXYGEN SATURATION: 95 % | DIASTOLIC BLOOD PRESSURE: 70 MMHG | RESPIRATION RATE: 20 BRPM | HEIGHT: 60 IN | BODY MASS INDEX: 39.04 KG/M2 | HEART RATE: 100 BPM | WEIGHT: 198.85 LBS | TEMPERATURE: 97.1 F | SYSTOLIC BLOOD PRESSURE: 124 MMHG

## 2024-01-31 DIAGNOSIS — Z45.2 ENCOUNTER FOR ADJUSTMENT OR MANAGEMENT OF VASCULAR ACCESS DEVICE: ICD-10-CM

## 2024-01-31 DIAGNOSIS — C90.00 MULTIPLE MYELOMA, REMISSION STATUS UNSPECIFIED: Primary | ICD-10-CM

## 2024-01-31 LAB
ALBUMIN SERPL-MCNC: 4 G/DL (ref 3.5–5.2)
ALBUMIN/GLOB SERPL: 1.7 G/DL
ALP SERPL-CCNC: 64 U/L (ref 39–117)
ALT SERPL W P-5'-P-CCNC: 17 U/L (ref 1–33)
ANION GAP SERPL CALCULATED.3IONS-SCNC: 10.1 MMOL/L (ref 5–15)
AST SERPL-CCNC: 11 U/L (ref 1–32)
BASOPHILS # BLD AUTO: 0.02 10*3/MM3 (ref 0–0.2)
BASOPHILS NFR BLD AUTO: 0.2 % (ref 0–1.5)
BILIRUB SERPL-MCNC: 0.5 MG/DL (ref 0–1.2)
BUN SERPL-MCNC: 13 MG/DL (ref 8–23)
BUN/CREAT SERPL: 16.5 (ref 7–25)
CALCIUM SPEC-SCNC: 9.1 MG/DL (ref 8.6–10.5)
CHLORIDE SERPL-SCNC: 107 MMOL/L (ref 98–107)
CO2 SERPL-SCNC: 19.9 MMOL/L (ref 22–29)
CREAT SERPL-MCNC: 0.79 MG/DL (ref 0.57–1)
DEPRECATED RDW RBC AUTO: 64.8 FL (ref 37–54)
EGFRCR SERPLBLD CKD-EPI 2021: 78.6 ML/MIN/1.73
EOSINOPHIL # BLD AUTO: 0 10*3/MM3 (ref 0–0.4)
EOSINOPHIL NFR BLD AUTO: 0 % (ref 0.3–6.2)
ERYTHROCYTE [DISTWIDTH] IN BLOOD BY AUTOMATED COUNT: 16.3 % (ref 12.3–15.4)
GLOBULIN UR ELPH-MCNC: 2.3 GM/DL
GLUCOSE SERPL-MCNC: 177 MG/DL (ref 65–99)
HCT VFR BLD AUTO: 35.4 % (ref 34–46.6)
HGB BLD-MCNC: 11.3 G/DL (ref 12–15.9)
IMM GRANULOCYTES # BLD AUTO: 0.09 10*3/MM3 (ref 0–0.05)
IMM GRANULOCYTES NFR BLD AUTO: 0.8 % (ref 0–0.5)
LYMPHOCYTES # BLD AUTO: 3.8 10*3/MM3 (ref 0.7–3.1)
LYMPHOCYTES NFR BLD AUTO: 32.9 % (ref 19.6–45.3)
MCH RBC QN AUTO: 33.8 PG (ref 26.6–33)
MCHC RBC AUTO-ENTMCNC: 31.9 G/DL (ref 31.5–35.7)
MCV RBC AUTO: 106 FL (ref 79–97)
MONOCYTES # BLD AUTO: 1.55 10*3/MM3 (ref 0.1–0.9)
MONOCYTES NFR BLD AUTO: 13.4 % (ref 5–12)
NEUTROPHILS NFR BLD AUTO: 52.7 % (ref 42.7–76)
NEUTROPHILS NFR BLD AUTO: 6.09 10*3/MM3 (ref 1.7–7)
PLATELET # BLD AUTO: 519 10*3/MM3 (ref 140–450)
PMV BLD AUTO: 8.9 FL (ref 6–12)
POTASSIUM SERPL-SCNC: 3.8 MMOL/L (ref 3.5–5.2)
PROT SERPL-MCNC: 6.3 G/DL (ref 6–8.5)
RBC # BLD AUTO: 3.34 10*6/MM3 (ref 3.77–5.28)
SODIUM SERPL-SCNC: 137 MMOL/L (ref 136–145)
WBC NRBC COR # BLD AUTO: 11.55 10*3/MM3 (ref 3.4–10.8)

## 2024-01-31 PROCEDURE — 25010000002 DIPHENHYDRAMINE PER 50 MG: Performed by: INTERNAL MEDICINE

## 2024-01-31 PROCEDURE — 85025 COMPLETE CBC W/AUTO DIFF WBC: CPT | Performed by: INTERNAL MEDICINE

## 2024-01-31 PROCEDURE — 96401 CHEMO ANTI-NEOPL SQ/IM: CPT

## 2024-01-31 PROCEDURE — 96375 TX/PRO/DX INJ NEW DRUG ADDON: CPT

## 2024-01-31 PROCEDURE — 25010000002 HEPARIN LOCK FLUSH PER 10 UNITS: Performed by: INTERNAL MEDICINE

## 2024-01-31 PROCEDURE — A9270 NON-COVERED ITEM OR SERVICE: HCPCS | Performed by: INTERNAL MEDICINE

## 2024-01-31 PROCEDURE — 25010000002 DARATUMUMAB-HYALURONIDASE-FIHJ 1800-30000 MG-UT/15ML SOLUTION: Performed by: INTERNAL MEDICINE

## 2024-01-31 PROCEDURE — 25010000002 METHYLPREDNISOLONE PER 125 MG: Performed by: INTERNAL MEDICINE

## 2024-01-31 PROCEDURE — 25810000003 SODIUM CHLORIDE 0.9 % SOLUTION: Performed by: INTERNAL MEDICINE

## 2024-01-31 PROCEDURE — 63710000001 ACETAMINOPHEN EXTRA STRENGTH 500 MG TABLET: Performed by: INTERNAL MEDICINE

## 2024-01-31 PROCEDURE — 96374 THER/PROPH/DIAG INJ IV PUSH: CPT

## 2024-01-31 PROCEDURE — 80053 COMPREHEN METABOLIC PANEL: CPT | Performed by: INTERNAL MEDICINE

## 2024-01-31 RX ORDER — SODIUM CHLORIDE 9 MG/ML
20 INJECTION, SOLUTION INTRAVENOUS ONCE
Status: CANCELLED | OUTPATIENT
Start: 2024-01-31

## 2024-01-31 RX ORDER — FAMOTIDINE 10 MG/ML
20 INJECTION, SOLUTION INTRAVENOUS AS NEEDED
Status: CANCELLED | OUTPATIENT
Start: 2024-01-31

## 2024-01-31 RX ORDER — SODIUM CHLORIDE 0.9 % (FLUSH) 0.9 %
20 SYRINGE (ML) INJECTION AS NEEDED
Status: DISCONTINUED | OUTPATIENT
Start: 2024-01-31 | End: 2024-02-01 | Stop reason: HOSPADM

## 2024-01-31 RX ORDER — SODIUM CHLORIDE 9 MG/ML
20 INJECTION, SOLUTION INTRAVENOUS ONCE
Status: COMPLETED | OUTPATIENT
Start: 2024-01-31 | End: 2024-01-31

## 2024-01-31 RX ORDER — DIPHENHYDRAMINE HYDROCHLORIDE 50 MG/ML
50 INJECTION INTRAMUSCULAR; INTRAVENOUS AS NEEDED
Status: DISCONTINUED | OUTPATIENT
Start: 2024-01-31 | End: 2024-02-01 | Stop reason: HOSPADM

## 2024-01-31 RX ORDER — ACETAMINOPHEN 500 MG
1000 TABLET ORAL ONCE
Status: COMPLETED | OUTPATIENT
Start: 2024-01-31 | End: 2024-01-31

## 2024-01-31 RX ORDER — SODIUM CHLORIDE 0.9 % (FLUSH) 0.9 %
20 SYRINGE (ML) INJECTION AS NEEDED
OUTPATIENT
Start: 2024-01-31

## 2024-01-31 RX ORDER — SODIUM CHLORIDE 9 MG/ML
20 INJECTION, SOLUTION INTRAVENOUS ONCE
OUTPATIENT
Start: 2024-02-14

## 2024-01-31 RX ORDER — MEPERIDINE HYDROCHLORIDE 25 MG/ML
25 INJECTION INTRAMUSCULAR; INTRAVENOUS; SUBCUTANEOUS
OUTPATIENT
Start: 2024-02-14

## 2024-01-31 RX ORDER — METHYLPREDNISOLONE SODIUM SUCCINATE 125 MG/2ML
60 INJECTION, POWDER, LYOPHILIZED, FOR SOLUTION INTRAMUSCULAR; INTRAVENOUS ONCE
Status: CANCELLED | OUTPATIENT
Start: 2024-01-31

## 2024-01-31 RX ORDER — FAMOTIDINE 10 MG/ML
20 INJECTION, SOLUTION INTRAVENOUS AS NEEDED
Status: DISCONTINUED | OUTPATIENT
Start: 2024-01-31 | End: 2024-02-01 | Stop reason: HOSPADM

## 2024-01-31 RX ORDER — MEPERIDINE HYDROCHLORIDE 25 MG/ML
25 INJECTION INTRAMUSCULAR; INTRAVENOUS; SUBCUTANEOUS
Status: CANCELLED | OUTPATIENT
Start: 2024-01-31

## 2024-01-31 RX ORDER — HEPARIN SODIUM (PORCINE) LOCK FLUSH IV SOLN 100 UNIT/ML 100 UNIT/ML
500 SOLUTION INTRAVENOUS AS NEEDED
OUTPATIENT
Start: 2024-01-31

## 2024-01-31 RX ORDER — METHYLPREDNISOLONE SODIUM SUCCINATE 125 MG/2ML
60 INJECTION, POWDER, LYOPHILIZED, FOR SOLUTION INTRAMUSCULAR; INTRAVENOUS ONCE
Status: COMPLETED | OUTPATIENT
Start: 2024-01-31 | End: 2024-01-31

## 2024-01-31 RX ORDER — ACETAMINOPHEN 500 MG
1000 TABLET ORAL ONCE
OUTPATIENT
Start: 2024-02-14

## 2024-01-31 RX ORDER — MEPERIDINE HYDROCHLORIDE 25 MG/ML
25 INJECTION INTRAMUSCULAR; INTRAVENOUS; SUBCUTANEOUS
Status: DISCONTINUED | OUTPATIENT
Start: 2024-01-31 | End: 2024-02-01 | Stop reason: HOSPADM

## 2024-01-31 RX ORDER — DIPHENHYDRAMINE HYDROCHLORIDE 50 MG/ML
50 INJECTION INTRAMUSCULAR; INTRAVENOUS AS NEEDED
OUTPATIENT
Start: 2024-02-14

## 2024-01-31 RX ORDER — DIPHENHYDRAMINE HYDROCHLORIDE 50 MG/ML
50 INJECTION INTRAMUSCULAR; INTRAVENOUS AS NEEDED
Status: CANCELLED | OUTPATIENT
Start: 2024-01-31

## 2024-01-31 RX ORDER — HEPARIN SODIUM (PORCINE) LOCK FLUSH IV SOLN 100 UNIT/ML 100 UNIT/ML
500 SOLUTION INTRAVENOUS AS NEEDED
Status: DISCONTINUED | OUTPATIENT
Start: 2024-01-31 | End: 2024-02-01 | Stop reason: HOSPADM

## 2024-01-31 RX ORDER — FAMOTIDINE 10 MG/ML
20 INJECTION, SOLUTION INTRAVENOUS AS NEEDED
OUTPATIENT
Start: 2024-02-14

## 2024-01-31 RX ORDER — ACETAMINOPHEN 500 MG
1000 TABLET ORAL ONCE
Status: CANCELLED | OUTPATIENT
Start: 2024-01-31

## 2024-01-31 RX ORDER — METHYLPREDNISOLONE SODIUM SUCCINATE 125 MG/2ML
60 INJECTION, POWDER, LYOPHILIZED, FOR SOLUTION INTRAMUSCULAR; INTRAVENOUS ONCE
OUTPATIENT
Start: 2024-02-14

## 2024-01-31 RX ADMIN — SODIUM CHLORIDE 20 ML/HR: 9 INJECTION, SOLUTION INTRAVENOUS at 11:48

## 2024-01-31 RX ADMIN — Medication 20 ML: at 13:10

## 2024-01-31 RX ADMIN — DARATUMUMAB AND HYALURONIDASE-FIHJ (HUMAN RECOMBINANT) 1800 MG: 1800; 30000 INJECTION SUBCUTANEOUS at 13:03

## 2024-01-31 RX ADMIN — METHYLPREDNISOLONE SODIUM SUCCINATE 60 MG: 125 INJECTION INTRAMUSCULAR; INTRAVENOUS at 11:50

## 2024-01-31 RX ADMIN — ACETAMINOPHEN 1000 MG: 500 TABLET ORAL at 11:49

## 2024-01-31 RX ADMIN — HEPARIN SODIUM (PORCINE) LOCK FLUSH IV SOLN 100 UNIT/ML 500 UNITS: 100 SOLUTION at 13:10

## 2024-01-31 RX ADMIN — DIPHENHYDRAMINE HYDROCHLORIDE 25 MG: 50 INJECTION, SOLUTION INTRAMUSCULAR; INTRAVENOUS at 11:55

## 2024-01-31 NOTE — ASSESSMENT & PLAN NOTE
Patient is on active therapy with daratumumab, lenalidomide, dexamethasone.  She tolerated her last cycle fairly well.  Her M spike has been declining nicely.  This will be rechecked again today.  She reports some tremors with her steroid treatment but overall feeling better.  Lab work is notable for mild anemia but the calcium, creatinine look good.  Proceed with cycle 4 as planned.  I will see her back for cycle 5-day 1 with lab work prior to monitor for toxicities.  She understands that myeloma is not curable and her goal of therapy is control of her disease and decreased issues from her myeloma while balancing again side effects of treatment.  I would continue her current regimen as long as she is showing response to therapy and tolerating well.

## 2024-01-31 NOTE — PROGRESS NOTES
Chief Complaint  Multiple myeloma not having achieved remission    Rena Guerra,*  Rena Guerra, PA    Subjective          Anca Samson presents to [facility] for ongoing treatment of her multiple myeloma.  She is on daratumumab, lenalidomide, dexamethasone.  She notes that the last cycle went better.  She denies any issues or side effects from the medication.  She denies new masses or adenopathy, no unusual aches or pains.  She has chronic issues from her emphysema and is oxygen dependent.  She notes that her breathing limits her activity.  She reports good appetite.  She has history of colon cancer treated with resection and adjuvant chemotherapy in 2017.  She reports the bowels are working normally.  She reports ongoing tremors but feels like it is mostly related to her albuterol inhalers.  She does see some with her dexamethasone treatments.    Oncology/Hematology History Overview Note   Smoldering Myeloma    Initially diagnosed in 2016 with bone marrow biopsy demonstrating 30% CD56 positive monoclonal plasma cell population.  45 XX normal female chromosome pattern  FISH panel negative for myeloma associated mutations including 1q21, 9q34,11q13,14q32,15q24, 17q13  No anemia, renal insufficiency, hypercalcemia.  On observation since that time    Low grade adenocarcinoma of ascending colon      5/16/2017 right hemicolectomy which  revealed a low-grade 7.6 cm adenocarcinoma of the cecum. All margins were  negative. Lymphovascular invasion and perineural invasion were not identified.     26 lymph nodes were removed and unfortunately 1 was involved with metastatic deposit. pT3 pN1a colorectal cancer.        Clinical Staging      Smoldering Myeloma; Colon ([id number])            Treatments      Chemotherapy      11/2017 completed 6mo adjuvant Xeloda        6/2022 Stopped Smoking     Malignant neoplasm of ascending colon   7/21/2017 Initial Diagnosis     Colon cancer (CMS/HCC)     Multiple myeloma   7/28/2021 Initial Diagnosis    Multiple myeloma     11/7/2023 -  Chemotherapy    OP MULTIPLE MYELOMA Daratumumab / Lenalidomide / Dexamethasone         Review of Systems   Constitutional:  Positive for fatigue. Negative for appetite change, diaphoresis, fever, unexpected weight gain and unexpected weight loss.   HENT:  Negative for hearing loss, sore throat and voice change.    Eyes:  Negative for blurred vision, double vision, pain, redness and visual disturbance.   Respiratory:  Positive for shortness of breath and wheezing. Negative for cough.    Cardiovascular:  Negative for chest pain, palpitations and leg swelling.   Endocrine: Negative for cold intolerance, heat intolerance, polydipsia and polyuria.   Genitourinary:  Negative for decreased urine volume, difficulty urinating, frequency and urinary incontinence.   Musculoskeletal:  Negative for arthralgias, back pain, joint swelling and myalgias.   Skin:  Negative for color change, rash, skin lesions and wound.   Neurological:  Positive for tremors and weakness. Negative for dizziness, seizures, numbness and headache.   Hematological:  Negative for adenopathy. Does not bruise/bleed easily.   Psychiatric/Behavioral:  Negative for depressed mood. The patient is not nervous/anxious.      Current Outpatient Medications on File Prior to Visit   Medication Sig Dispense Refill    acyclovir (ZOVIRAX) 400 MG tablet Take 1 tablet by mouth 2 (Two) Times a Day. Take one tablet by mouth twice daily. 60 tablet 11    albuterol sulfate  (90 Base) MCG/ACT inhaler Inhale 2 puffs Every 4 (Four) Hours As Needed for Wheezing. 18 g 11    arformoterol (BROVANA) 15 MCG/2ML nebulizer solution Take 2 mL by nebulization 2 (Two) Times a Day.      budesonide (PULMICORT) 0.5 MG/2ML nebulizer solution Take 2 mL by nebulization 2 (Two) Times a Day.      colestipol (COLESTID) 1 g tablet Take 2 tablets by mouth 2 (Two) Times a Day. 120 tablet  5    dapsone 25 MG tablet Take 2 tablets by mouth 2 (Two) Times a Day. 120 tablet 5    dexAMETHasone (DECADRON) 4 MG tablet Take 5 tablets on D 1,8,15,22 and take 1 tablet on D 2,3,9,10,16,17,23,24.  Take in the morning with food. 28 tablet 1    dexAMETHasone (DECADRON) 4 MG tablet Take 10 tablets on D 1,8,15,22 and take 1 tablet on D 2,3,16,17.  Take in the morning with food. 44 tablet 3    FLUoxetine (PROzac) 40 MG capsule Take 1 capsule by mouth Daily. 90 capsule 1    HYDROcodone-acetaminophen (Norco) 5-325 MG per tablet Take 1 tablet by mouth Every 6 (Six) Hours As Needed for Moderate Pain or Severe Pain. 6 tablet 0    lenalidomide (REVLIMID) 15 MG capsule Take 1 capsule by mouth Daily. On Days 1-21, and off 7 days, on a 28 day cycle 21 capsule 0    levothyroxine (SYNTHROID, LEVOTHROID) 25 MCG tablet Take 1 tablet by mouth Every Morning. 90 tablet 1    LORazepam (Ativan) 1 MG tablet Take 1 tablet by mouth 2 (Two) Times a Day As Needed for Anxiety. 14 tablet 0    meloxicam (MOBIC) 15 MG tablet TAKE 1 TABLET BY MOUTH EVERY DAY 90 tablet 1    ondansetron (ZOFRAN) 8 MG tablet Take 1 tablet by mouth 3 (Three) Times a Day As Needed for Nausea or Vomiting. (Patient taking differently: Take 1 tablet by mouth 3 (Three) Times a Day As Needed for Nausea or Vomiting. TO START WHEN STARTS CHEMO 11/3/23) 30 tablet 5    promethazine (PHENERGAN) 25 MG tablet Take 1 tablet by mouth Every 6 (Six) Hours As Needed for Nausea or Vomiting. 30 tablet 1    revefenacin (YUPELRI) 175 MCG/3ML nebulizer solution Take 3 mL by nebulization Daily.      Trelegy Ellipta 100-62.5-25 MCG/ACT inhaler       vitamin D (ERGOCALCIFEROL) 1.25 MG (40909 UT) capsule capsule Take 1 capsule by mouth 2 (Two) Times a Week. 26 capsule 1     Current Facility-Administered Medications on File Prior to Visit   Medication Dose Route Frequency Provider Last Rate Last Admin    [COMPLETED] acetaminophen (TYLENOL) tablet 1,000 mg  1,000 mg Oral Once West Nicolas,  MD   1,000 mg at 01/31/24 1149    [COMPLETED] daratumumab-hyaluronidase-fihj (DARZALEX FASPRO) 1800-71103 MG-UT/15ML injection 1,800 mg  1,800 mg Subcutaneous Once West Nicolas MD   1,800 mg at 01/31/24 1303    diphenhydrAMINE (BENADRYL) injection 50 mg  50 mg Intravenous PRN West Nicolas MD        [COMPLETED] diphenhydrAMINE (BENADRYL) IVPB 25 mg  25 mg Intravenous Once West Nicolas MD   Stopped at 01/31/24 1207    famotidine (PEPCID) injection 20 mg  20 mg Intravenous PRN West Nicolas MD        heparin injection 500 Units  500 Units Intravenous PRN West Nicolas MD   500 Units at 01/31/24 1310    Hydrocortisone Sod Suc (PF) (Solu-CORTEF) injection 100 mg  100 mg Intravenous PRN West Nicolas MD        meperidine (DEMEROL) injection 25 mg  25 mg Intravenous Q20 Min PRN West Nicolas MD        [COMPLETED] methylPREDNISolone sodium succinate (SOLU-Medrol) injection 60 mg  60 mg Intravenous Once West Nicolas MD   60 mg at 01/31/24 1150    sodium chloride 0.9 % flush 20 mL  20 mL Intravenous PRN West Nicolas MD   20 mL at 01/31/24 1310    [COMPLETED] sodium chloride 0.9 % infusion  20 mL/hr Intravenous Once West Nicolas MD   Stopped at 01/31/24 1310       Allergies   Allergen Reactions    Cephalexin Hives    Morphine Anaphylaxis    Penicillins Hives    Sulfa Antibiotics Hives     Past Medical History:   Diagnosis Date    Anxiety     Asthma     Asthma 07/28/2021    Atherosclerosis of native coronary artery of native heart with angina pectoris 08/15/2023    FOLLOWED BY DR GONZALES/DINA PTETIT. DENIES CP BUT GETS SOA WITH EXERTION HAS COPD WEARS AT 2LPM N/C. DECREASED ACTIVITY D/T SOA    C. difficile diarrhea     DENIES ANY CURRENT ISSUES    Colon cancer 07/21/2017    Colon polyp     COPD (chronic obstructive pulmonary disease) 10/23/2017    COPD (chronic obstructive pulmonary disease) 10/23/2017    Depression     Disease of thyroid gland     Diverticulitis     Dysphagia      Essential hypertension 07/28/2021    Heartburn     Hernia 2019    History of chemotherapy     2017    Hx of psychiatric care     Hyperlipidemia     Impaired fasting glucose 08/14/2015    Lumbago     Lung nodule 02/17/2022    Major depressive disorder 10/27/2016    Malignant neoplasm of ascending colon 07/21/2017    Melena     Multiple myeloma     Nicotine dependence 05/10/2017    NICK (obstructive sleep apnea) 11/06/2023    Oxygen dependent     REPORTS USES 02 AT 2LPM VIA N/C. CAN GET VERY SOA WITH MINIMAL EXERTION    Renal insufficiency 07/28/2021    Seizures     PSEUDO SEIZURES LAST ONE AROUND NOV 2022    Shortness of breath     CAN GET VERY SOA WITH MINIMAL EXERTION    Sleep apnea     Tobacco use 07/28/2021    QUIT SMOKING JUNE 2022    Visit for screening mammogram 02/20/2020    NORMAL- REPEAT IN ONE YEAR    Vitamin D deficiency      Past Surgical History:   Procedure Laterality Date    APPENDECTOMY      BONE MARROW BIOPSY  2016    COLON SURGERY  2017    COLONOSCOPY  2018,2017,2019    [facility]- DR LE:DIVERTICULOSIS AND ERYTHEMA OF MUCOSA    COLONOSCOPY N/A 12/20/2022    Procedure: COLONOSCOPY WITH ELEVIEW INJECTION, POLYPECTOMY, HOT SNARE, CLIP APPLICATION X3, BIOPSIES;  Surgeon: Jarvis Borges MD;  Location: [facility] ENDOSCOPY;  Service: Gastroenterology;  Laterality: N/A;  COLON POLYP, DIVERTICULOSIS, ANASTOMOSIS RIGHT COLON    COLONOSCOPY N/A 11/9/2023    Procedure: COLONOSCOPY;  Surgeon: Jarvis Borges MD;  Location: [facility] ENDOSCOPY;  Service: Gastroenterology;  Laterality: N/A;  DIVERTICULOSIS, ANASTOMOSIS IN ASCENDING COLON    ENDOSCOPY  2018    FECAL DISIMPACTION  06/2019    TRANSPLANT     HEMICOLECTOMY Right 05/2017    HYSTERECTOMY  1982,1984    KNEE ARTHROSCOPY Left 01/03/2022    Procedure: KNEE ARTHROSCOPY WITH PARTIAL MEDIAL MENISCECTOMY,  CHONDROPLASTY;  Surgeon: Nicholas Tipton MD;  Location: [facility] OR [facility];  Service: Orthopedics;  Laterality: Left;    MINI-LAPAROTOMY   2017    PORTACATH PLACEMENT N/A     pt states that her port does not work    UPPER GASTROINTESTINAL ENDOSCOPY  2018    VENOUS ACCESS DEVICE (PORT) INSERTION N/A 10/31/2023    Procedure: Port-a-catheter removal and port-a-catheter placement;  Surgeon: Alcon Delgado MD;  [facility]: [facility];  Service: General;  Laterality: N/A;     Social History     Socioeconomic History    Marital status:    Tobacco Use    Smoking status: Former     Packs/day: 1.00     Years: 47.00     Additional pack years: 0.00     Total pack years: 47.00     Types: Cigarettes     Start date: 1975     Quit date: 6/3/2022     Years since quittin.6     Passive exposure: Past    Smokeless tobacco: Never    Tobacco comments:     started age 26   Vaping Use    Vaping Use: Never used   Substance and Sexual Activity    Alcohol use: Never    Drug use: Never    Sexual activity: Defer     Family History   Problem Relation Age of Onset    Heart disease Mother     Lung cancer Mother     Cancer Mother     Stroke Father     Heart disease Father     Kidney cancer Father     Cancer Father     Stroke Other         UNCLE/AUNT    Colon cancer Neg Hx     Malig Hyperthermia Neg Hx        Objective   Physical Exam  Vitals reviewed. Exam conducted with a chaperone present.   Constitutional:       General: She is not in acute distress.     Appearance: Normal appearance.   HENT:      Nose:      Comments: Nasal cannula in place  Cardiovascular:      Rate and Rhythm: Normal rate and regular rhythm.      Heart sounds: Normal heart sounds. No murmur heard.     No gallop.   Pulmonary:      Effort: Pulmonary effort is normal.      Breath sounds: Normal breath sounds.   Abdominal:      General: Abdomen is flat. Bowel sounds are normal.      Palpations: Abdomen is soft.   Musculoskeletal:      Cervical back: Neck supple.   Lymphadenopathy:      Cervical: No cervical adenopathy.   Neurological:      Mental Status: She is alert.   Psychiatric:         Mood and  "Affect: Mood normal.         Behavior: Behavior normal.         Vitals:    01/31/24 0924   BP: 124/70   Pulse: 100   Resp: 20   Temp: 97.1 °F (36.2 °C)   TempSrc: Temporal   SpO2: 95%   Weight: 90.2 kg (198 lb 13.7 oz)   PainSc: 0-No pain     ECOG score: 2         PHQ-9 Total Score:                    Result Review :   The following data was reviewed by: West Nicolas MD on 01/31/2024:  Lab Results   Component Value Date    HGB 11.3 (L) 01/31/2024    HCT 35.4 01/31/2024    .0 (H) 01/31/2024     (H) 01/31/2024    WBC 11.55 (H) 01/31/2024    NEUTROABS 6.09 01/31/2024    LYMPHSABS 3.80 (H) 01/31/2024    MONOSABS 1.55 (H) 01/31/2024    EOSABS 0.00 01/31/2024    BASOSABS 0.02 01/31/2024     Lab Results   Component Value Date    GLUCOSE 177 (H) 01/31/2024    BUN 13 01/31/2024    CREATININE 0.79 01/31/2024     01/31/2024    K 3.8 01/31/2024     01/31/2024    CO2 19.9 (L) 01/31/2024    CALCIUM 9.1 01/31/2024    PROTEINTOT 6.3 01/31/2024    ALBUMIN 4.0 01/31/2024    BILITOT 0.5 01/31/2024    ALKPHOS 64 01/31/2024    AST 11 01/31/2024    ALT 17 01/31/2024     Lab Results   Component Value Date    FREET4 1.55 12/06/2023    TSH 2.530 01/03/2024     No results found for: \"IRON\", \"LABIRON\", \"TRANSFERRIN\", \"TIBC\"  Lab Results   Component Value Date     03/07/2023    NEFZVLYT06 218 01/28/2020    FOLATE 17.7 01/28/2020     No results found for: \"PSA\", \"CEA\", \"AFP\", \"\", \"\"          Assessment and Plan    Diagnoses and all orders for this visit:    1. Multiple myeloma, remission status unspecified (Primary)  Assessment & Plan:  Patient is on active therapy with daratumumab, lenalidomide, dexamethasone.  She tolerated her last cycle fairly well.  Her M spike has been declining nicely.  This will be rechecked again today.  She reports some tremors with her steroid treatment but overall feeling better.  Lab work is notable for mild anemia but the calcium, creatinine look good.  Proceed with " cycle 4 as planned.  I will see her back for cycle 5-day 1 with lab work prior to monitor for toxicities.  She understands that myeloma is not curable and her goal of therapy is control of her disease and decreased issues from her myeloma while balancing again side effects of treatment.  I would continue her current regimen as long as she is showing response to therapy and tolerating well.    Orders:  -     CBC and Differential; Future    Other orders  -     Cancel: sodium chloride 0.9 % infusion  -     Cancel: acetaminophen (TYLENOL) tablet 1,000 mg  -     Cancel: methylPREDNISolone sodium succinate (SOLU-Medrol) injection 60 mg  -     Cancel: diphenhydrAMINE (BENADRYL) IVPB 25 mg  -     Cancel: daratumumab-hyaluronidase-fihj (DARZALEX FASPRO) 1800-24868 MG-UT/15ML injection 1,800 mg  -     Cancel: Hydrocortisone Sod Suc (PF) (Solu-CORTEF) injection 100 mg  -     Cancel: diphenhydrAMINE (BENADRYL) injection 50 mg  -     Cancel: famotidine (PEPCID) injection 20 mg  -     Cancel: meperidine (DEMEROL) injection 25 mg  -     sodium chloride 0.9 % infusion  -     acetaminophen (TYLENOL) tablet 1,000 mg  -     methylPREDNISolone sodium succinate (SOLU-Medrol) injection 60 mg  -     diphenhydrAMINE (BENADRYL) IVPB 25 mg  -     daratumumab-hyaluronidase-fihj (DARZALEX FASPRO) 1800-03261 MG-UT/15ML injection 1,800 mg  -     Hydrocortisone Sod Suc (PF) (Solu-CORTEF) injection 100 mg  -     diphenhydrAMINE (BENADRYL) injection 50 mg  -     famotidine (PEPCID) injection 20 mg  -     meperidine (DEMEROL) injection 25 mg            Patient Follow Up: Cycle 5-day 1    Patient was given instructions and counseling regarding her condition or for health maintenance advice. Please see specific information pulled into the AVS if appropriate.     West Nicolas MD    1/31/2024

## 2024-02-01 ENCOUNTER — SPECIALTY PHARMACY (OUTPATIENT)
Dept: PHARMACY | Facility: HOSPITAL | Age: 74
End: 2024-02-01
Payer: MEDICARE

## 2024-02-05 ENCOUNTER — TELEPHONE (OUTPATIENT)
Dept: PULMONOLOGY | Facility: CLINIC | Age: 74
End: 2024-02-05
Payer: MEDICARE

## 2024-02-05 NOTE — TELEPHONE ENCOUNTER
Left voicemail for patient in regards to switching her appointment on 2/15/24 to be with [facility] instead of [facility]. Per [facility] request.

## 2024-02-11 ENCOUNTER — TELEPHONE (OUTPATIENT)
Dept: PULMONOLOGY | Facility: CLINIC | Age: 74
End: 2024-02-11
Payer: MEDICARE

## 2024-02-13 ENCOUNTER — SPECIALTY PHARMACY (OUTPATIENT)
Dept: PHARMACY | Facility: HOSPITAL | Age: 74
End: 2024-02-13
Payer: MEDICARE

## 2024-02-13 DIAGNOSIS — C90.00 MULTIPLE MYELOMA, REMISSION STATUS UNSPECIFIED: ICD-10-CM

## 2024-02-13 RX ORDER — LENALIDOMIDE 15 MG/1
15 CAPSULE ORAL DAILY
Qty: 21 CAPSULE | Refills: 0 | Status: SHIPPED | OUTPATIENT
Start: 2024-02-13 | End: 2024-02-15 | Stop reason: SDUPTHER

## 2024-02-13 RX ORDER — LENALIDOMIDE 15 MG/1
CAPSULE ORAL
Refills: 0 | OUTPATIENT
Start: 2024-02-13

## 2024-02-14 ENCOUNTER — HOSPITAL ENCOUNTER (OUTPATIENT)
Dept: ONCOLOGY | Facility: HOSPITAL | Age: 74
Discharge: HOME | End: 2024-02-14
Admitting: INTERNAL MEDICINE
Payer: MEDICARE

## 2024-02-14 VITALS
RESPIRATION RATE: 20 BRPM | HEART RATE: 94 BPM | HEIGHT: 60 IN | WEIGHT: 198.19 LBS | BODY MASS INDEX: 38.91 KG/M2 | OXYGEN SATURATION: 95 % | TEMPERATURE: 98.1 F | SYSTOLIC BLOOD PRESSURE: 116 MMHG | DIASTOLIC BLOOD PRESSURE: 69 MMHG

## 2024-02-14 DIAGNOSIS — C90.00 MULTIPLE MYELOMA, REMISSION STATUS UNSPECIFIED: ICD-10-CM

## 2024-02-14 DIAGNOSIS — Z45.2 ENCOUNTER FOR ADJUSTMENT OR MANAGEMENT OF VASCULAR ACCESS DEVICE: ICD-10-CM

## 2024-02-14 DIAGNOSIS — C90.00 MULTIPLE MYELOMA, REMISSION STATUS UNSPECIFIED: Primary | ICD-10-CM

## 2024-02-14 LAB
BASOPHILS # BLD AUTO: 0.03 10*3/MM3 (ref 0–0.2)
BASOPHILS NFR BLD AUTO: 0.3 % (ref 0–1.5)
DEPRECATED RDW RBC AUTO: 62.5 FL (ref 37–54)
EOSINOPHIL # BLD AUTO: 0 10*3/MM3 (ref 0–0.4)
EOSINOPHIL NFR BLD AUTO: 0 % (ref 0.3–6.2)
ERYTHROCYTE [DISTWIDTH] IN BLOOD BY AUTOMATED COUNT: 16.5 % (ref 12.3–15.4)
HCT VFR BLD AUTO: 34 % (ref 34–46.6)
HGB BLD-MCNC: 11 G/DL (ref 12–15.9)
IMM GRANULOCYTES # BLD AUTO: 0.04 10*3/MM3 (ref 0–0.05)
IMM GRANULOCYTES NFR BLD AUTO: 0.4 % (ref 0–0.5)
LYMPHOCYTES # BLD AUTO: 2.01 10*3/MM3 (ref 0.7–3.1)
LYMPHOCYTES NFR BLD AUTO: 18.4 % (ref 19.6–45.3)
MCH RBC QN AUTO: 34.1 PG (ref 26.6–33)
MCHC RBC AUTO-ENTMCNC: 32.4 G/DL (ref 31.5–35.7)
MCV RBC AUTO: 105.3 FL (ref 79–97)
MONOCYTES # BLD AUTO: 0.95 10*3/MM3 (ref 0.1–0.9)
MONOCYTES NFR BLD AUTO: 8.7 % (ref 5–12)
NEUTROPHILS NFR BLD AUTO: 7.92 10*3/MM3 (ref 1.7–7)
NEUTROPHILS NFR BLD AUTO: 72.2 % (ref 42.7–76)
PLATELET # BLD AUTO: 287 10*3/MM3 (ref 140–450)
PMV BLD AUTO: 10.1 FL (ref 6–12)
RBC # BLD AUTO: 3.23 10*6/MM3 (ref 3.77–5.28)
WBC NRBC COR # BLD AUTO: 10.95 10*3/MM3 (ref 3.4–10.8)

## 2024-02-14 PROCEDURE — 25010000002 METHYLPREDNISOLONE PER 125 MG: Performed by: INTERNAL MEDICINE

## 2024-02-14 PROCEDURE — A9270 NON-COVERED ITEM OR SERVICE: HCPCS | Performed by: INTERNAL MEDICINE

## 2024-02-14 PROCEDURE — 25010000002 HEPARIN LOCK FLUSH PER 10 UNITS: Performed by: INTERNAL MEDICINE

## 2024-02-14 PROCEDURE — 25810000003 SODIUM CHLORIDE 0.9 % SOLUTION: Performed by: INTERNAL MEDICINE

## 2024-02-14 PROCEDURE — 25010000002 DIPHENHYDRAMINE PER 50 MG: Performed by: INTERNAL MEDICINE

## 2024-02-14 PROCEDURE — 85025 COMPLETE CBC W/AUTO DIFF WBC: CPT | Performed by: INTERNAL MEDICINE

## 2024-02-14 PROCEDURE — 63710000001 ACETAMINOPHEN EXTRA STRENGTH 500 MG TABLET: Performed by: INTERNAL MEDICINE

## 2024-02-14 PROCEDURE — 25010000002 DARATUMUMAB-HYALURONIDASE-FIHJ 1800-30000 MG-UT/15ML SOLUTION: Performed by: INTERNAL MEDICINE

## 2024-02-14 PROCEDURE — 96374 THER/PROPH/DIAG INJ IV PUSH: CPT

## 2024-02-14 PROCEDURE — 96401 CHEMO ANTI-NEOPL SQ/IM: CPT

## 2024-02-14 PROCEDURE — 96375 TX/PRO/DX INJ NEW DRUG ADDON: CPT

## 2024-02-14 RX ORDER — MEPERIDINE HYDROCHLORIDE 25 MG/ML
25 INJECTION INTRAMUSCULAR; INTRAVENOUS; SUBCUTANEOUS
Status: DISCONTINUED | OUTPATIENT
Start: 2024-02-14 | End: 2024-02-15 | Stop reason: HOSPADM

## 2024-02-14 RX ORDER — HEPARIN SODIUM (PORCINE) LOCK FLUSH IV SOLN 100 UNIT/ML 100 UNIT/ML
500 SOLUTION INTRAVENOUS AS NEEDED
OUTPATIENT
Start: 2024-02-14

## 2024-02-14 RX ORDER — DIPHENHYDRAMINE HYDROCHLORIDE 50 MG/ML
50 INJECTION INTRAMUSCULAR; INTRAVENOUS AS NEEDED
Status: DISCONTINUED | OUTPATIENT
Start: 2024-02-14 | End: 2024-02-15 | Stop reason: HOSPADM

## 2024-02-14 RX ORDER — SODIUM CHLORIDE 0.9 % (FLUSH) 0.9 %
20 SYRINGE (ML) INJECTION AS NEEDED
Status: DISCONTINUED | OUTPATIENT
Start: 2024-02-14 | End: 2024-02-15 | Stop reason: HOSPADM

## 2024-02-14 RX ORDER — SODIUM CHLORIDE 0.9 % (FLUSH) 0.9 %
20 SYRINGE (ML) INJECTION AS NEEDED
OUTPATIENT
Start: 2024-02-14

## 2024-02-14 RX ORDER — SODIUM CHLORIDE 9 MG/ML
20 INJECTION, SOLUTION INTRAVENOUS ONCE
Status: COMPLETED | OUTPATIENT
Start: 2024-02-14 | End: 2024-02-14

## 2024-02-14 RX ORDER — ACETAMINOPHEN 500 MG
1000 TABLET ORAL ONCE
Status: COMPLETED | OUTPATIENT
Start: 2024-02-14 | End: 2024-02-14

## 2024-02-14 RX ORDER — METHYLPREDNISOLONE SODIUM SUCCINATE 125 MG/2ML
60 INJECTION, POWDER, LYOPHILIZED, FOR SOLUTION INTRAMUSCULAR; INTRAVENOUS ONCE
Status: COMPLETED | OUTPATIENT
Start: 2024-02-14 | End: 2024-02-14

## 2024-02-14 RX ORDER — HEPARIN SODIUM (PORCINE) LOCK FLUSH IV SOLN 100 UNIT/ML 100 UNIT/ML
500 SOLUTION INTRAVENOUS AS NEEDED
Status: DISCONTINUED | OUTPATIENT
Start: 2024-02-14 | End: 2024-02-15 | Stop reason: HOSPADM

## 2024-02-14 RX ORDER — FAMOTIDINE 10 MG/ML
20 INJECTION, SOLUTION INTRAVENOUS AS NEEDED
Status: DISCONTINUED | OUTPATIENT
Start: 2024-02-14 | End: 2024-02-15 | Stop reason: HOSPADM

## 2024-02-14 RX ADMIN — SODIUM CHLORIDE 20 ML/HR: 9 INJECTION, SOLUTION INTRAVENOUS at 10:58

## 2024-02-14 RX ADMIN — DARATUMUMAB AND HYALURONIDASE-FIHJ (HUMAN RECOMBINANT) 1800 MG: 1800; 30000 INJECTION SUBCUTANEOUS at 12:21

## 2024-02-14 RX ADMIN — HEPARIN SODIUM (PORCINE) LOCK FLUSH IV SOLN 100 UNIT/ML 500 UNITS: 100 SOLUTION at 12:29

## 2024-02-14 RX ADMIN — ACETAMINOPHEN 1000 MG: 500 TABLET ORAL at 10:58

## 2024-02-14 RX ADMIN — Medication 20 ML: at 12:29

## 2024-02-14 RX ADMIN — DIPHENHYDRAMINE HYDROCHLORIDE 25 MG: 50 INJECTION, SOLUTION INTRAMUSCULAR; INTRAVENOUS at 11:04

## 2024-02-14 RX ADMIN — METHYLPREDNISOLONE SODIUM SUCCINATE 60 MG: 125 INJECTION INTRAMUSCULAR; INTRAVENOUS at 11:01

## 2024-02-15 ENCOUNTER — OFFICE VISIT (OUTPATIENT)
Dept: PULMONOLOGY | Facility: CLINIC | Age: 74
End: 2024-02-15
Payer: MEDICARE

## 2024-02-15 ENCOUNTER — SPECIALTY PHARMACY (OUTPATIENT)
Dept: PHARMACY | Facility: HOSPITAL | Age: 74
End: 2024-02-15
Payer: MEDICARE

## 2024-02-15 VITALS
BODY MASS INDEX: 38.15 KG/M2 | SYSTOLIC BLOOD PRESSURE: 116 MMHG | HEART RATE: 103 BPM | OXYGEN SATURATION: 95 % | RESPIRATION RATE: 24 BRPM | HEIGHT: 60 IN | DIASTOLIC BLOOD PRESSURE: 80 MMHG | TEMPERATURE: 98 F

## 2024-02-15 DIAGNOSIS — G47.33 OSA (OBSTRUCTIVE SLEEP APNEA): ICD-10-CM

## 2024-02-15 DIAGNOSIS — F17.201 TOBACCO ABUSE, IN REMISSION: ICD-10-CM

## 2024-02-15 DIAGNOSIS — J96.11 CHRONIC RESPIRATORY FAILURE WITH HYPOXIA: ICD-10-CM

## 2024-02-15 DIAGNOSIS — F17.211 CIGARETTE NICOTINE DEPENDENCE IN REMISSION: ICD-10-CM

## 2024-02-15 DIAGNOSIS — Z12.2 ENCOUNTER FOR SCREENING FOR LUNG CANCER: ICD-10-CM

## 2024-02-15 DIAGNOSIS — K44.9 HIATAL HERNIA: ICD-10-CM

## 2024-02-15 DIAGNOSIS — J44.9 CHRONIC OBSTRUCTIVE PULMONARY DISEASE, UNSPECIFIED COPD TYPE: Primary | ICD-10-CM

## 2024-02-15 DIAGNOSIS — R91.8 LUNG NODULES: ICD-10-CM

## 2024-02-15 DIAGNOSIS — C90.00 MULTIPLE MYELOMA, REMISSION STATUS UNSPECIFIED: ICD-10-CM

## 2024-02-15 RX ORDER — LENALIDOMIDE 15 MG/1
15 CAPSULE ORAL DAILY
Qty: 21 CAPSULE | Refills: 0 | Status: SHIPPED | OUTPATIENT
Start: 2024-02-15

## 2024-02-15 RX ORDER — BUDESONIDE, GLYCOPYRROLATE, AND FORMOTEROL FUMARATE 160; 9; 4.8 UG/1; UG/1; UG/1
2 AEROSOL, METERED RESPIRATORY (INHALATION) 2 TIMES DAILY
Qty: 2 EACH | Refills: 0 | COMMUNITY
Start: 2024-02-15 | End: 2024-02-16

## 2024-02-15 RX ORDER — ALBUTEROL SULFATE 90 UG/1
2 AEROSOL, METERED RESPIRATORY (INHALATION) EVERY 4 HOURS PRN
Qty: 18 G | Refills: 11 | Status: SHIPPED | OUTPATIENT
Start: 2024-02-15

## 2024-02-15 RX ORDER — BUDESONIDE, GLYCOPYRROLATE, AND FORMOTEROL FUMARATE 160; 9; 4.8 UG/1; UG/1; UG/1
2 AEROSOL, METERED RESPIRATORY (INHALATION) 2 TIMES DAILY
Qty: 1 EACH | Refills: 11 | Status: SHIPPED | OUTPATIENT
Start: 2024-02-15

## 2024-02-15 RX ORDER — LENALIDOMIDE 15 MG/1
CAPSULE ORAL
Refills: 0 | OUTPATIENT
Start: 2024-02-15

## 2024-02-21 ENCOUNTER — OFFICE VISIT (OUTPATIENT)
Dept: FAMILY MEDICINE CLINIC | Facility: CLINIC | Age: 74
End: 2024-02-21
Payer: MEDICARE

## 2024-02-21 DIAGNOSIS — R26.81 GAIT INSTABILITY: ICD-10-CM

## 2024-02-21 DIAGNOSIS — Z79.899 LONG TERM USE OF DRUG: Primary | ICD-10-CM

## 2024-02-21 DIAGNOSIS — G25.2 COARSE TREMORS: ICD-10-CM

## 2024-02-21 DIAGNOSIS — R00.0 TACHYCARDIA: ICD-10-CM

## 2024-02-21 NOTE — PROGRESS NOTES
"Chief Complaint  Chief Complaint   Patient presents with    Shaking    Tremors              Subjective          Anca Samson presents to [facility] for shaking and tremors  History of Present Illness    Patient presents today with daughter. Upon being triaged and walking back to room patient started to have episode  of shaking and shortness of air which was the reason for her visit. Heartrate became elevated, patient began to shake primarily upper extremities, breathing was labored. 2 medical attendants were attending to patient. I was notified and mentioned that the patient needed to go to ER. Her symptoms worsened. I guided her through breathing and said we would get an EKG first. When I returned to the room, symptoms had resolved. Patient states that she has these episodes when attempting to walk any distance. Patient's  called during visit to mention that the patient had had 2 episodes of syncope \"going down to the floor\"; no loss of consciousness. Daughter also mentions symptoms occasionally with taking on the phone. Patient and daughters states this has been going on for some time/ about 2 years (was occurring with previous PCP) but is getting worse. Heart rate improved to 111, o2 sat at 96% on room air (turned o2 off for a bit). Patient states being diagnosed with psuedoseizures but patient has never been seen by neuorology. Patient was scheduled but did not show. EEG ordered in 2021; never done. Unsure who/when patient was diagnosed with pseudoseizures. Just based on today's events, her symptoms seem to be provoked by mental and physical stress. When she was calm and I mentioned the ER again, symptoms of labored breathing and hands shaking started again but I spoke to her calmly and said we would do MRI first and get her a wheelchair for transport and symptoms quickly resolved and did not get to the intensity as I had seen them before.    I certify that the Anca " UMESH Samson is under my care and that I had a Face to Face encounter that meets the face-to-face encounter requirements with the patient on 2/29/2024.    Wheelchair: Standard or Lightweight Wheelchair: Patient is unable to safely use a cane or walker. Due to immobility related to diagnosis, a (lightweight or standard) wheelchair is needed to assist with daily living activities inside the home. Patient has the upper body strength and mental capabilities to propel the wheelchair inside the home.     The patient has a mobility limitation that significantly impairs her ability to participate in toileting, preparing meals, grooming, and bathing due to gait instability, tremors and weakness in lower extremities during episodes.    Medical History: has a past medical history of Anxiety, Asthma, Asthma (07/28/2021), Atherosclerosis of native coronary artery of native heart with angina pectoris (08/15/2023), C. difficile diarrhea, Colon cancer (07/21/2017), Colon polyp, COPD (chronic obstructive pulmonary disease) (10/23/2017), COPD (chronic obstructive pulmonary disease) (10/23/2017), Depression, Disease of thyroid gland, Diverticulitis, Dysphagia, Essential hypertension (07/28/2021), Heartburn, Hernia (2019), History of chemotherapy, psychiatric care, Hyperlipidemia, Impaired fasting glucose (08/14/2015), Lumbago, Lung nodule (02/17/2022), Major depressive disorder (10/27/2016), Malignant neoplasm of ascending colon (07/21/2017), Melena, Multiple myeloma, Nicotine dependence (05/10/2017), NICK (obstructive sleep apnea) (11/06/2023), Oxygen dependent, Renal insufficiency (07/28/2021), Seizures, Shortness of breath, Sleep apnea, Tobacco use (07/28/2021), Visit for screening mammogram (02/20/2020), and Vitamin D deficiency.   Surgical History: has a past surgical history that includes Bone marrow biopsy (2016); Colonoscopy (2018,2017,2019); Esophagogastroduodenoscopy (2018); Fecal disimpaction (06/2019); Hysterectomy  "(1982,1984); Mini-laparotomy (2017); Portacath placement (N/A); Colon surgery (2017); Hemicolectomy (Right, 05/2017); Upper gastrointestinal endoscopy (2018); Knee Arthroscopy (Left, 01/03/2022); Appendectomy; Colonoscopy (N/A, 12/20/2022); Venous Access Device (Port) (N/A, 10/31/2023); and Colonoscopy (N/A, 11/9/2023).   Family History: family history includes Cancer in her father and mother; Heart disease in her father and mother; Kidney cancer in her father; Lung cancer in her mother; Stroke in her father and another family member.   Social History: reports that she quit smoking about 20 months ago. Her smoking use included cigarettes. She started smoking about 49 years ago. She has a 47.00 pack-year smoking history. She has been exposed to tobacco smoke. She has never used smokeless tobacco. She reports that she does not drink alcohol and does not use drugs.    Allergies: Cephalexin, Morphine, Penicillins, and Sulfa antibiotics    Health Maintenance Due   Topic Date Due    TDAP/TD VACCINES (1 - Tdap) Never done    DXA SCAN  09/16/2023       Immunization History   Administered Date(s) Administered    Fluzone High Dose =>65 Years ([employer] ONLY) 09/24/2020, 09/24/2021    Fluzone High-Dose 65+yrs 09/24/2020, 09/24/2021, 09/20/2022, 10/10/2023    Fluzone Quad >6mos (Multi-dose) 09/14/2015    Pneumococcal Conjugate 13-Valent (PCV13) 09/21/2015    Pneumococcal Polysaccharide (PPSV23) 04/12/2022       Objective     Vitals:    02/21/24 1048 02/21/24 1120   BP: 156/55    Pulse: (!) 130 111   SpO2: 94% 96%   Weight: 89.3 kg (196 lb 12.8 oz)    Height: 153.5 cm (60.43\")      Body mass index is 37.89 kg/m².     Wt Readings from Last 3 Encounters:   02/28/24 91.1 kg (200 lb 13.4 oz)   02/28/24 91.1 kg (200 lb 13.4 oz)   02/21/24 89.3 kg (196 lb 12.8 oz)     BP Readings from Last 3 Encounters:   02/28/24 132/75   02/28/24 132/75   02/21/24 156/55     Patient Care Team:  Rena Guerra PA as PCP - General (Family " Medicine)  Paulino Macias PA (Physician Assistant)  West Nicolas MD as Consulting Physician (Hematology and Oncology)  Alcon Delgado MD as Consulting Physician (General Surgery)  Jarvis Borges MD as Consulting Physician (Gastroenterology)  Erendira Clayton APRN as Nurse Practitioner (Gastroenterology)    Physical Exam  Vitals and nursing note reviewed.   Constitutional:       Appearance: Normal appearance. She is obese. She is not ill-appearing or diaphoretic.      Comments: In wheelchair; on 02 via nasal cannula.    Exam varied during my time with patient.    Patient initially was in acute distress but breathing calmed during visit; upon discharge patient was not in acute distress.   HENT:      Head: Normocephalic and atraumatic.   Neck:      Vascular: No carotid bruit.      Comments: Thyroid : gland size normal, nontender, no nodules or masses present on palpation   Cardiovascular:      Rate and Rhythm: Regular rhythm. Tachycardia present.      Pulses: Normal pulses.      Heart sounds: Normal heart sounds.   Pulmonary:      Effort: Pulmonary effort is normal.      Breath sounds: Normal breath sounds.   Musculoskeletal:      Cervical back: Neck supple. No tenderness.      Right lower leg: No edema.      Left lower leg: No edema.   Lymphadenopathy:      Cervical: No cervical adenopathy.   Neurological:      General: No focal deficit present.      Mental Status: She is alert.      Comments: No tremor at rest when patient is not having an episode.   Psychiatric:         Mood and Affect: Mood is anxious.         Behavior: Behavior normal.           Result Review :   POC Urine Drug Screen [facility] (02/21/2024 13:11)     Thyroid Panel With TSH (01/03/2024 10:05)  Comprehensive metabolic panel (01/31/2024 09:22)  CBC and Differential (02/14/2024 10:09)                 ECG 12 Lead    Date/Time: 2/21/2024 2:44 PM  Performed by: Rena Guerra PA    Authorized by: Rena Guerra PA   Comparison: compared with previous ECG from 11/1/2022  Comparison to previous ECG: Previous EKG was NSR; today patient was in sinus tachycardia  Rhythm: sinus tachycardia  Rate: normal  Other findings: left atrial abnormality    Clinical impression: abnormal EKG              Assessment and Plan      Diagnoses and all orders for this visit:    1. Long term use of drug (Primary)  -     POC Urine Drug Screen [facility]    2. Tachycardia  -     ECG 12 Lead    3. Gait instability  -     Wheelchair  -     MRI Brain With & Without Contrast; Future    4. Coarse tremors  -     MRI Brain With & Without Contrast; Future    Worsening symptoms of episodic tremors, labored breathing and tachycardia unsure of prognosis needing further workup. Order MRI for further evaluation. Order wheelchair for transport and mobility within the home and many symptoms occur when patient is trying to ambulate. Consider both neurology and psychiatry referrals. Patient has been given small amount of Ativan from hem/onc to use prior to treatment. Will go ahead today and obtain contract, [facility]DS and [facility] in case patient needs additional med prior to MRI and hem/onc happens not to refill. Patient to attempt to log episodes and activities prior to episodes.    I spent 45 minutes caring for Anca on this date of service. This time includes time spent by me in the following activities:preparing for the visit, reviewing tests, obtaining and/or reviewing a separately obtained history, performing a medically appropriate examination and/or evaluation , counseling and educating the patient/family/caregiver, ordering medications, tests, or procedures, documenting information in the medical record, and care coordination    Follow Up     Return for Next scheduled follow up, After MRI..    I currently manage patient's acute and chronic conditions, providing ongoing preventative services, and counseling, in addition to addressing behavioral health concerns and  social needs.      Patient was given instructions and counseling regarding her condition or for health maintenance advice. Please see specific information pulled into the AVS if appropriate.

## 2024-02-23 RX ORDER — DAPSONE 25 MG/1
50 TABLET ORAL 2 TIMES DAILY
Qty: 360 TABLET | Refills: 1 | Status: SHIPPED | OUTPATIENT
Start: 2024-02-23

## 2024-02-28 ENCOUNTER — HOSPITAL ENCOUNTER (OUTPATIENT)
Dept: ONCOLOGY | Facility: HOSPITAL | Age: 74
Discharge: HOME | End: 2024-02-28
Admitting: INTERNAL MEDICINE
Payer: MEDICARE

## 2024-02-28 ENCOUNTER — OFFICE VISIT (OUTPATIENT)
Dept: ONCOLOGY | Facility: HOSPITAL | Age: 74
End: 2024-02-28
Payer: MEDICARE

## 2024-02-28 VITALS
RESPIRATION RATE: 22 BRPM | BODY MASS INDEX: 39.43 KG/M2 | DIASTOLIC BLOOD PRESSURE: 75 MMHG | HEART RATE: 86 BPM | SYSTOLIC BLOOD PRESSURE: 132 MMHG | HEIGHT: 60 IN | TEMPERATURE: 98.6 F | OXYGEN SATURATION: 91 % | WEIGHT: 200.84 LBS

## 2024-02-28 VITALS
TEMPERATURE: 98.6 F | HEIGHT: 60 IN | HEART RATE: 86 BPM | RESPIRATION RATE: 22 BRPM | BODY MASS INDEX: 39.43 KG/M2 | DIASTOLIC BLOOD PRESSURE: 75 MMHG | SYSTOLIC BLOOD PRESSURE: 132 MMHG | OXYGEN SATURATION: 91 % | WEIGHT: 200.84 LBS

## 2024-02-28 DIAGNOSIS — C90.00 MULTIPLE MYELOMA, REMISSION STATUS UNSPECIFIED: Primary | ICD-10-CM

## 2024-02-28 DIAGNOSIS — F41.9 ANXIETY: ICD-10-CM

## 2024-02-28 DIAGNOSIS — Z12.2 ENCOUNTER FOR SCREENING FOR LUNG CANCER: ICD-10-CM

## 2024-02-28 LAB
ALBUMIN SERPL-MCNC: 3.7 G/DL (ref 3.5–5.2)
ALBUMIN/GLOB SERPL: 2.1 G/DL
ALP SERPL-CCNC: 61 U/L (ref 39–117)
ALT SERPL W P-5'-P-CCNC: 13 U/L (ref 1–33)
ANION GAP SERPL CALCULATED.3IONS-SCNC: 6.4 MMOL/L (ref 5–15)
AST SERPL-CCNC: 9 U/L (ref 1–32)
BASOPHILS # BLD AUTO: 0.06 10*3/MM3 (ref 0–0.2)
BASOPHILS NFR BLD AUTO: 0.7 % (ref 0–1.5)
BILIRUB SERPL-MCNC: 0.5 MG/DL (ref 0–1.2)
BUN SERPL-MCNC: 13 MG/DL (ref 8–23)
BUN/CREAT SERPL: 16 (ref 7–25)
CALCIUM SPEC-SCNC: 8.5 MG/DL (ref 8.6–10.5)
CHLORIDE SERPL-SCNC: 111 MMOL/L (ref 98–107)
CO2 SERPL-SCNC: 23.6 MMOL/L (ref 22–29)
CREAT SERPL-MCNC: 0.81 MG/DL (ref 0.57–1)
DEPRECATED RDW RBC AUTO: 69.6 FL (ref 37–54)
EGFRCR SERPLBLD CKD-EPI 2021: 76.3 ML/MIN/1.73
EOSINOPHIL # BLD AUTO: 0 10*3/MM3 (ref 0–0.4)
EOSINOPHIL NFR BLD AUTO: 0 % (ref 0.3–6.2)
ERYTHROCYTE [DISTWIDTH] IN BLOOD BY AUTOMATED COUNT: 18 % (ref 12.3–15.4)
GLOBULIN UR ELPH-MCNC: 1.8 GM/DL
GLUCOSE SERPL-MCNC: 202 MG/DL (ref 65–99)
HCT VFR BLD AUTO: 33.4 % (ref 34–46.6)
HGB BLD-MCNC: 10.7 G/DL (ref 12–15.9)
IMM GRANULOCYTES # BLD AUTO: 0.03 10*3/MM3 (ref 0–0.05)
IMM GRANULOCYTES NFR BLD AUTO: 0.4 % (ref 0–0.5)
LYMPHOCYTES # BLD AUTO: 4.23 10*3/MM3 (ref 0.7–3.1)
LYMPHOCYTES NFR BLD AUTO: 50.7 % (ref 19.6–45.3)
MCH RBC QN AUTO: 33.8 PG (ref 26.6–33)
MCHC RBC AUTO-ENTMCNC: 32 G/DL (ref 31.5–35.7)
MCV RBC AUTO: 105.4 FL (ref 79–97)
MONOCYTES # BLD AUTO: 0.91 10*3/MM3 (ref 0.1–0.9)
MONOCYTES NFR BLD AUTO: 10.9 % (ref 5–12)
NEUTROPHILS NFR BLD AUTO: 3.11 10*3/MM3 (ref 1.7–7)
NEUTROPHILS NFR BLD AUTO: 37.3 % (ref 42.7–76)
PLATELET # BLD AUTO: 309 10*3/MM3 (ref 140–450)
PMV BLD AUTO: 9.1 FL (ref 6–12)
POTASSIUM SERPL-SCNC: 3.5 MMOL/L (ref 3.5–5.2)
PROT SERPL-MCNC: 5.5 G/DL (ref 6–8.5)
RBC # BLD AUTO: 3.17 10*6/MM3 (ref 3.77–5.28)
SODIUM SERPL-SCNC: 141 MMOL/L (ref 136–145)
T-UPTAKE NFR SERPL: 1.13 TBI (ref 0.8–1.3)
T4 SERPL-MCNC: 8.37 MCG/DL (ref 4.5–11.7)
TSH SERPL DL<=0.05 MIU/L-ACNC: 1.55 UIU/ML (ref 0.27–4.2)
WBC NRBC COR # BLD AUTO: 8.34 10*3/MM3 (ref 3.4–10.8)

## 2024-02-28 PROCEDURE — 25010000002 DIPHENHYDRAMINE PER 50 MG: Performed by: INTERNAL MEDICINE

## 2024-02-28 PROCEDURE — 96401 CHEMO ANTI-NEOPL SQ/IM: CPT

## 2024-02-28 PROCEDURE — A9270 NON-COVERED ITEM OR SERVICE: HCPCS | Performed by: INTERNAL MEDICINE

## 2024-02-28 PROCEDURE — 96374 THER/PROPH/DIAG INJ IV PUSH: CPT

## 2024-02-28 PROCEDURE — 85025 COMPLETE CBC W/AUTO DIFF WBC: CPT | Performed by: INTERNAL MEDICINE

## 2024-02-28 PROCEDURE — 63710000001 ACETAMINOPHEN EXTRA STRENGTH 500 MG TABLET: Performed by: INTERNAL MEDICINE

## 2024-02-28 PROCEDURE — 84479 ASSAY OF THYROID (T3 OR T4): CPT | Performed by: INTERNAL MEDICINE

## 2024-02-28 PROCEDURE — 25010000002 HEPARIN LOCK FLUSH PER 10 UNITS: Performed by: INTERNAL MEDICINE

## 2024-02-28 PROCEDURE — 25810000003 SODIUM CHLORIDE 0.9 % SOLUTION: Performed by: INTERNAL MEDICINE

## 2024-02-28 PROCEDURE — 84443 ASSAY THYROID STIM HORMONE: CPT | Performed by: INTERNAL MEDICINE

## 2024-02-28 PROCEDURE — 25010000002 DARATUMUMAB-HYALURONIDASE-FIHJ 1800-30000 MG-UT/15ML SOLUTION: Performed by: INTERNAL MEDICINE

## 2024-02-28 PROCEDURE — 80053 COMPREHEN METABOLIC PANEL: CPT | Performed by: INTERNAL MEDICINE

## 2024-02-28 PROCEDURE — 84436 ASSAY OF TOTAL THYROXINE: CPT | Performed by: INTERNAL MEDICINE

## 2024-02-28 PROCEDURE — 96375 TX/PRO/DX INJ NEW DRUG ADDON: CPT

## 2024-02-28 PROCEDURE — 25010000002 METHYLPREDNISOLONE PER 125 MG: Performed by: INTERNAL MEDICINE

## 2024-02-28 RX ORDER — HEPARIN SODIUM (PORCINE) LOCK FLUSH IV SOLN 100 UNIT/ML 100 UNIT/ML
500 SOLUTION INTRAVENOUS AS NEEDED
OUTPATIENT
Start: 2024-02-28

## 2024-02-28 RX ORDER — DIPHENHYDRAMINE HYDROCHLORIDE 50 MG/ML
50 INJECTION INTRAMUSCULAR; INTRAVENOUS AS NEEDED
OUTPATIENT
Start: 2024-03-13

## 2024-02-28 RX ORDER — SODIUM CHLORIDE 9 MG/ML
20 INJECTION, SOLUTION INTRAVENOUS ONCE
OUTPATIENT
Start: 2024-03-13

## 2024-02-28 RX ORDER — METHYLPREDNISOLONE SODIUM SUCCINATE 125 MG/2ML
60 INJECTION, POWDER, LYOPHILIZED, FOR SOLUTION INTRAMUSCULAR; INTRAVENOUS ONCE
Status: COMPLETED | OUTPATIENT
Start: 2024-02-28 | End: 2024-02-28

## 2024-02-28 RX ORDER — SODIUM CHLORIDE 9 MG/ML
20 INJECTION, SOLUTION INTRAVENOUS ONCE
Status: COMPLETED | OUTPATIENT
Start: 2024-02-28 | End: 2024-02-28

## 2024-02-28 RX ORDER — SODIUM CHLORIDE 0.9 % (FLUSH) 0.9 %
20 SYRINGE (ML) INJECTION AS NEEDED
Status: DISCONTINUED | OUTPATIENT
Start: 2024-02-28 | End: 2024-02-29 | Stop reason: HOSPADM

## 2024-02-28 RX ORDER — ACETAMINOPHEN 500 MG
1000 TABLET ORAL ONCE
OUTPATIENT
Start: 2024-03-13

## 2024-02-28 RX ORDER — LORAZEPAM 1 MG/1
1 TABLET ORAL 2 TIMES DAILY PRN
Qty: 14 TABLET | Refills: 0 | Status: SHIPPED | OUTPATIENT
Start: 2024-02-28

## 2024-02-28 RX ORDER — FAMOTIDINE 10 MG/ML
20 INJECTION, SOLUTION INTRAVENOUS AS NEEDED
Status: CANCELLED | OUTPATIENT
Start: 2024-02-28

## 2024-02-28 RX ORDER — DIPHENHYDRAMINE HYDROCHLORIDE 50 MG/ML
50 INJECTION INTRAMUSCULAR; INTRAVENOUS AS NEEDED
Status: CANCELLED | OUTPATIENT
Start: 2024-02-28

## 2024-02-28 RX ORDER — ACETAMINOPHEN 500 MG
1000 TABLET ORAL ONCE
Status: CANCELLED | OUTPATIENT
Start: 2024-02-28

## 2024-02-28 RX ORDER — SODIUM CHLORIDE 9 MG/ML
20 INJECTION, SOLUTION INTRAVENOUS ONCE
Status: CANCELLED | OUTPATIENT
Start: 2024-02-28

## 2024-02-28 RX ORDER — SODIUM CHLORIDE 0.9 % (FLUSH) 0.9 %
20 SYRINGE (ML) INJECTION AS NEEDED
OUTPATIENT
Start: 2024-02-28

## 2024-02-28 RX ORDER — HEPARIN SODIUM (PORCINE) LOCK FLUSH IV SOLN 100 UNIT/ML 100 UNIT/ML
500 SOLUTION INTRAVENOUS AS NEEDED
Status: DISCONTINUED | OUTPATIENT
Start: 2024-02-28 | End: 2024-02-29 | Stop reason: HOSPADM

## 2024-02-28 RX ORDER — MEPERIDINE HYDROCHLORIDE 25 MG/ML
25 INJECTION INTRAMUSCULAR; INTRAVENOUS; SUBCUTANEOUS
Status: CANCELLED | OUTPATIENT
Start: 2024-02-28

## 2024-02-28 RX ORDER — METHYLPREDNISOLONE SODIUM SUCCINATE 125 MG/2ML
60 INJECTION, POWDER, LYOPHILIZED, FOR SOLUTION INTRAMUSCULAR; INTRAVENOUS ONCE
OUTPATIENT
Start: 2024-03-13

## 2024-02-28 RX ORDER — FAMOTIDINE 10 MG/ML
20 INJECTION, SOLUTION INTRAVENOUS AS NEEDED
OUTPATIENT
Start: 2024-03-13

## 2024-02-28 RX ORDER — METHYLPREDNISOLONE SODIUM SUCCINATE 125 MG/2ML
60 INJECTION, POWDER, LYOPHILIZED, FOR SOLUTION INTRAMUSCULAR; INTRAVENOUS ONCE
Status: CANCELLED | OUTPATIENT
Start: 2024-02-28

## 2024-02-28 RX ORDER — ACETAMINOPHEN 500 MG
1000 TABLET ORAL ONCE
Status: COMPLETED | OUTPATIENT
Start: 2024-02-28 | End: 2024-02-28

## 2024-02-28 RX ORDER — MEPERIDINE HYDROCHLORIDE 25 MG/ML
25 INJECTION INTRAMUSCULAR; INTRAVENOUS; SUBCUTANEOUS
OUTPATIENT
Start: 2024-03-13

## 2024-02-28 RX ADMIN — ACETAMINOPHEN 1000 MG: 500 TABLET ORAL at 10:48

## 2024-02-28 RX ADMIN — SODIUM CHLORIDE 20 ML/HR: 9 INJECTION, SOLUTION INTRAVENOUS at 10:48

## 2024-02-28 RX ADMIN — METHYLPREDNISOLONE SODIUM SUCCINATE 60 MG: 125 INJECTION INTRAMUSCULAR; INTRAVENOUS at 10:51

## 2024-02-28 RX ADMIN — DARATUMUMAB AND HYALURONIDASE-FIHJ (HUMAN RECOMBINANT) 1800 MG: 1800; 30000 INJECTION SUBCUTANEOUS at 12:05

## 2024-02-28 RX ADMIN — Medication 20 ML: at 12:01

## 2024-02-28 RX ADMIN — DIPHENHYDRAMINE HYDROCHLORIDE 25 MG: 50 INJECTION, SOLUTION INTRAMUSCULAR; INTRAVENOUS at 10:49

## 2024-02-28 RX ADMIN — HEPARIN 500 UNITS: 100 SYRINGE at 12:01

## 2024-02-28 NOTE — PROGRESS NOTES
Chief Complaint  Chemotherapy    Rena Guerra,*  Rena Guerra, PA    Subjective          Anca Samson presents to [facility] for ongoing treatment of her multiple myeloma.  She is on daratumumab, lenalidomide, steroid.  She is tolerating her treatment well.  She denies any issues or side effects.  She denies new masses or adenopathy, no unusual aches or pains.  She reports adequate appetite and her weight is maintained.  Her energy level is low but she can perform her ADLs.  She is having more trouble from her emphysema and has upcoming follow-up with her pulmonologist.  She has prior history of colon cancer but denies any problems from her bowels.  She notes some more anxiety.  She is taking her Prozac.  She requests refill of Ativan.    Oncology/Hematology History Overview Note   Smoldering Myeloma    Initially diagnosed in 2016 with bone marrow biopsy demonstrating 30% CD56 positive monoclonal plasma cell population.  45 XX normal female chromosome pattern  FISH panel negative for myeloma associated mutations including 1q21, 9q34,11q13,14q32,15q24, 17q13  No anemia, renal insufficiency, hypercalcemia.  On observation since that time    Low grade adenocarcinoma of ascending colon      5/16/2017 right hemicolectomy which  revealed a low-grade 7.6 cm adenocarcinoma of the cecum. All margins were  negative. Lymphovascular invasion and perineural invasion were not identified.     26 lymph nodes were removed and unfortunately 1 was involved with metastatic deposit. pT3 pN1a colorectal cancer.        Clinical Staging      Smoldering Myeloma; Colon ([id number])            Treatments      Chemotherapy      11/2017 completed 6mo adjuvant Xeloda        6/2022 Stopped Smoking     Malignant neoplasm of ascending colon   7/21/2017 Initial Diagnosis    Colon cancer (CMS/HCC)     Multiple myeloma   7/28/2021 Initial Diagnosis    Multiple myeloma     11/7/2023 -   Chemotherapy    OP MULTIPLE MYELOMA Daratumumab / Lenalidomide / Dexamethasone         Review of Systems   Constitutional:  Positive for fatigue. Negative for appetite change, diaphoresis, fever, unexpected weight gain and unexpected weight loss.   HENT:  Negative for hearing loss, mouth sores, sore throat, swollen glands, trouble swallowing and voice change.    Eyes:  Negative for blurred vision.   Respiratory:  Negative for cough, shortness of breath and wheezing.    Cardiovascular:  Negative for chest pain and palpitations.   Gastrointestinal:  Negative for abdominal pain, blood in stool, constipation, diarrhea, nausea and vomiting.   Endocrine: Negative for cold intolerance and heat intolerance.   Genitourinary:  Negative for difficulty urinating, dysuria, frequency, hematuria and urinary incontinence.   Musculoskeletal:  Negative for arthralgias, back pain and myalgias.   Skin:  Negative for rash, skin lesions and wound.   Neurological:  Negative for dizziness, seizures, weakness, numbness and headache.   Hematological:  Does not bruise/bleed easily.   Psychiatric/Behavioral:  Negative for depressed mood. The patient is not nervous/anxious.      Current Outpatient Medications on File Prior to Visit   Medication Sig Dispense Refill    acyclovir (ZOVIRAX) 400 MG tablet Take 1 tablet by mouth 2 (Two) Times a Day. Take one tablet by mouth twice daily. 60 tablet 11    albuterol sulfate  (90 Base) MCG/ACT inhaler Inhale 2 puffs Every 4 (Four) Hours As Needed for Wheezing. 18 g 11    Budeson-Glycopyrrol-Formoterol (Breztri Aerosphere) 160-9-4.8 MCG/ACT aerosol inhaler Inhale 2 puffs 2 (Two) Times a Day. Rinse mouth out after each use 1 each 11    colestipol (COLESTID) 1 g tablet Take 2 tablets by mouth 2 (Two) Times a Day. 120 tablet 5    dapsone 25 MG tablet TAKE 2 TABLETS BY MOUTH TWICE A  tablet 1    dexAMETHasone (DECADRON) 4 MG tablet Take 5 tablets on D 1,8,15,22 and take 1 tablet on D  2,3,9,10,16,17,23,24.  Take in the morning with food. 28 tablet 1    dexAMETHasone (DECADRON) 4 MG tablet Take 10 tablets on D 1,8,15,22 and take 1 tablet on D 2,3,16,17.  Take in the morning with food. 44 tablet 3    FLUoxetine (PROzac) 40 MG capsule Take 1 capsule by mouth Daily. 90 capsule 1    lenalidomide (REVLIMID) 15 MG capsule Take 1 capsule by mouth Daily. On Days 1-21, and off 7 days, on a 28 day cycle 21 capsule 0    ondansetron (ZOFRAN) 8 MG tablet Take 1 tablet by mouth 3 (Three) Times a Day As Needed for Nausea or Vomiting. (Patient taking differently: Take 1 tablet by mouth 3 (Three) Times a Day As Needed for Nausea or Vomiting. TO START WHEN STARTS CHEMO 11/3/23) 30 tablet 5    promethazine (PHENERGAN) 25 MG tablet Take 1 tablet by mouth Every 6 (Six) Hours As Needed for Nausea or Vomiting. 30 tablet 1    vitamin D (ERGOCALCIFEROL) 1.25 MG (36813 UT) capsule capsule Take 1 capsule by mouth 2 (Two) Times a Week. 26 capsule 1    [DISCONTINUED] LORazepam (Ativan) 1 MG tablet Take 1 tablet by mouth 2 (Two) Times a Day As Needed for Anxiety. 14 tablet 0    arformoterol (BROVANA) 15 MCG/2ML nebulizer solution Take 2 mL by nebulization 2 (Two) Times a Day. (Patient not taking: Reported on 2/15/2024)      HYDROcodone-acetaminophen (Norco) 5-325 MG per tablet Take 1 tablet by mouth Every 6 (Six) Hours As Needed for Moderate Pain or Severe Pain. (Patient not taking: Reported on 2/15/2024) 6 tablet 0    levothyroxine (SYNTHROID, LEVOTHROID) 25 MCG tablet Take 1 tablet by mouth Every Morning. (Patient not taking: Reported on 2/15/2024) 90 tablet 1    meloxicam (MOBIC) 15 MG tablet TAKE 1 TABLET BY MOUTH EVERY DAY (Patient not taking: Reported on 2/21/2024) 90 tablet 1     Current Facility-Administered Medications on File Prior to Visit   Medication Dose Route Frequency Provider Last Rate Last Admin    [COMPLETED] acetaminophen (TYLENOL) tablet 1,000 mg  1,000 mg Oral Once West Nicolas MD   1,000 mg at  02/28/24 1048    [COMPLETED] daratumumab-hyaluronidase-fij (DARZALEX FASPRO) 1800-32559 MG-UT/15ML injection 1,800 mg  1,800 mg Subcutaneous Once West Nicolas MD   1,800 mg at 02/28/24 1205    [COMPLETED] diphenhydrAMINE (BENADRYL) IVPB 25 mg  25 mg Intravenous Once West Nicolas MD   Stopped at 02/28/24 1101    heparin injection 500 Units  500 Units Intravenous PRN West Nicolas MD   500 Units at 02/28/24 1201    [COMPLETED] methylPREDNISolone sodium succinate (SOLU-Medrol) injection 60 mg  60 mg Intravenous Once West Nicolas MD   60 mg at 02/28/24 1051    sodium chloride 0.9 % flush 20 mL  20 mL Intravenous PRN West Nicolas MD   20 mL at 02/28/24 1201    [COMPLETED] sodium chloride 0.9 % infusion  20 mL/hr Intravenous Once West Nicolas MD   Stopped at 02/28/24 1200       Allergies   Allergen Reactions    Cephalexin Hives    Morphine Anaphylaxis    Penicillins Hives    Sulfa Antibiotics Hives     Past Medical History:   Diagnosis Date    Anxiety     Asthma     Asthma 07/28/2021    Atherosclerosis of native coronary artery of native heart with angina pectoris 08/15/2023    FOLLOWED BY DR GONZALES/DINA PETTIT. DENIES CP BUT GETS SOA WITH EXERTION HAS COPD WEARS AT 2LPM N/C. DECREASED ACTIVITY D/T SOA    C. difficile diarrhea     DENIES ANY CURRENT ISSUES    Colon cancer 07/21/2017    Colon polyp     COPD (chronic obstructive pulmonary disease) 10/23/2017    COPD (chronic obstructive pulmonary disease) 10/23/2017    Depression     Disease of thyroid gland     Diverticulitis     Dysphagia     Essential hypertension 07/28/2021    Heartburn     Hernia 2019    History of chemotherapy     2017    Hx of psychiatric care     Hyperlipidemia     Impaired fasting glucose 08/14/2015    Lumbago     Lung nodule 02/17/2022    Major depressive disorder 10/27/2016    Malignant neoplasm of ascending colon 07/21/2017    Melena     Multiple myeloma     Nicotine dependence 05/10/2017    NICK (obstructive sleep  apnea) 11/06/2023    Oxygen dependent     REPORTS USES 02 AT 2LPM VIA N/C. CAN GET VERY SOA WITH MINIMAL EXERTION    Renal insufficiency 07/28/2021    Seizures     PSEUDO SEIZURES LAST ONE AROUND NOV 2022    Shortness of breath     CAN GET VERY SOA WITH MINIMAL EXERTION    Sleep apnea     Tobacco use 07/28/2021    QUIT SMOKING JUNE 2022    Visit for screening mammogram 02/20/2020    NORMAL- REPEAT IN ONE YEAR    Vitamin D deficiency      Past Surgical History:   Procedure Laterality Date    APPENDECTOMY      BONE MARROW BIOPSY  2016    COLON SURGERY  2017    COLONOSCOPY  2018,2017,2019    [facility] DR LE:DIVERTICULOSIS AND ERYTHEMA OF MUCOSA    COLONOSCOPY N/A 12/20/2022    Procedure: COLONOSCOPY WITH ELEVIEW INJECTION, POLYPECTOMY, HOT SNARE, CLIP APPLICATION X3, BIOPSIES;  Surgeon: Jarvis Borges MD;  Location: [facility] ENDOSCOPY;  Service: Gastroenterology;  Laterality: N/A;  COLON POLYP, DIVERTICULOSIS, ANASTOMOSIS RIGHT COLON    COLONOSCOPY N/A 11/9/2023    Procedure: COLONOSCOPY;  Surgeon: Jarvis Borges MD;  Location: [facility] ENDOSCOPY;  Service: Gastroenterology;  Laterality: N/A;  DIVERTICULOSIS, ANASTOMOSIS IN ASCENDING COLON    ENDOSCOPY  2018    FECAL DISIMPACTION  06/2019    TRANSPLANT     HEMICOLECTOMY Right 05/2017    HYSTERECTOMY  1982,1984    KNEE ARTHROSCOPY Left 01/03/2022    Procedure: KNEE ARTHROSCOPY WITH PARTIAL MEDIAL MENISCECTOMY,  CHONDROPLASTY;  Surgeon: Nicholas Tipton MD;  Location: [facility] OR [facility];  Service: Orthopedics;  Laterality: Left;    MINI-LAPAROTOMY  2017    PORTACATH PLACEMENT N/A     pt states that her port does not work    UPPER GASTROINTESTINAL ENDOSCOPY  2018    VENOUS ACCESS DEVICE (PORT) INSERTION N/A 10/31/2023    Procedure: Port-a-catheter removal and port-a-catheter placement;  Surgeon: Alcon Delgado MD;  Location: [facility] OR [facility];  Service: General;  Laterality: N/A;     Social History     Socioeconomic History    Marital status:  "   Tobacco Use    Smoking status: Former     Packs/day: 1.00     Years: 47.00     Additional pack years: 0.00     Total pack years: 47.00     Types: Cigarettes     Start date: 1975     Quit date: 6/3/2022     Years since quittin.7     Passive exposure: Past    Smokeless tobacco: Never   Vaping Use    Vaping Use: Never used   Substance and Sexual Activity    Alcohol use: Never    Drug use: Never    Sexual activity: Defer     Family History   Problem Relation Age of Onset    Heart disease Mother     Lung cancer Mother     Cancer Mother     Stroke Father     Heart disease Father     Kidney cancer Father     Cancer Father     Stroke Other         UNCLE/AUNT    Colon cancer Neg Hx     Malig Hyperthermia Neg Hx        Objective   Physical Exam  Vitals reviewed. Exam conducted with a chaperone present.   Constitutional:       General: She is not in acute distress.     Appearance: Normal appearance.   Cardiovascular:      Rate and Rhythm: Normal rate and regular rhythm.      Heart sounds: Normal heart sounds. No murmur heard.     No gallop.   Pulmonary:      Effort: Pulmonary effort is normal.      Breath sounds: Normal breath sounds.      Comments: Port-A-Cath  Abdominal:      General: Abdomen is flat. Bowel sounds are normal.      Palpations: Abdomen is soft.   Musculoskeletal:      Cervical back: Neck supple.      Right lower leg: No edema.      Left lower leg: No edema.   Lymphadenopathy:      Cervical: No cervical adenopathy.   Neurological:      Mental Status: She is alert and oriented to person, place, and time.   Psychiatric:         Mood and Affect: Mood normal.         Behavior: Behavior normal.         Vitals:    24 0938   BP: 132/75   Pulse: 86   Resp: 22   Temp: 98.6 °F (37 °C)   SpO2: 91%   Weight: 91.1 kg (200 lb 13.4 oz)   Height: 153.5 cm (60.43\")   PainSc: 0-No pain     ECOG score: 2         PHQ-9 Total Score:                    Result Review :   The following data was reviewed by: " "West Nicolas MD on 02/28/2024:  Lab Results   Component Value Date    HGB 10.7 (L) 02/28/2024    HCT 33.4 (L) 02/28/2024    .4 (H) 02/28/2024     02/28/2024    WBC 8.34 02/28/2024    NEUTROABS 3.11 02/28/2024    LYMPHSABS 4.23 (H) 02/28/2024    MONOSABS 0.91 (H) 02/28/2024    EOSABS 0.00 02/28/2024    BASOSABS 0.06 02/28/2024     Lab Results   Component Value Date    GLUCOSE 202 (H) 02/28/2024    BUN 13 02/28/2024    CREATININE 0.81 02/28/2024     02/28/2024    K 3.5 02/28/2024     (H) 02/28/2024    CO2 23.6 02/28/2024    CALCIUM 8.5 (L) 02/28/2024    PROTEINTOT 5.5 (L) 02/28/2024    ALBUMIN 3.7 02/28/2024    BILITOT 0.5 02/28/2024    ALKPHOS 61 02/28/2024    AST 9 02/28/2024    ALT 13 02/28/2024     Lab Results   Component Value Date    FREET4 1.55 12/06/2023    TSH 1.550 02/28/2024     No results found for: \"IRON\", \"LABIRON\", \"TRANSFERRIN\", \"TIBC\"  Lab Results   Component Value Date     03/07/2023    JBPZWHTN90 218 01/28/2020    FOLATE 17.7 01/28/2020     No results found for: \"PSA\", \"CEA\", \"AFP\", \"\", \"\"          Assessment and Plan    Diagnoses and all orders for this visit:    1. Multiple myeloma, remission status unspecified (Primary)  Assessment & Plan:  Patient is on active therapy with daratumumab, lenalidomide, dexamethasone.  Tolerating her regimen well.  Good response thus far with decrease in her monoclonal protein.  Mild anemia on CBC but not enough to require dose adjustment.  Proceed with cycle 5 as planned.  I will see her back for cycle 6-day 1 with lab work prior to monitor for toxicities.  Myeloma protein studies will also be obtained.    Orders:  -     CBC and Differential; Future  -     Comprehensive metabolic panel; Future  -     Thyroid Panel With TSH; Future  -     CARL, Jessica-Specific, Serum; Future  -     CBC and Differential; Future    2. Anxiety  Assessment & Plan:  Continue Prozac.  Amandeep reviewed and no discrepancies.  I will refill a small " number of Ativan as needed.  If she continues to have issues, she may benefit from behavioral health evaluation    Orders:  -     LORazepam (Ativan) 1 MG tablet; Take 1 tablet by mouth 2 (Two) Times a Day As Needed for Anxiety.  Dispense: 14 tablet; Refill: 0    Other orders  -     Cancel: sodium chloride 0.9 % infusion  -     Cancel: acetaminophen (TYLENOL) tablet 1,000 mg  -     Cancel: methylPREDNISolone sodium succinate (SOLU-Medrol) injection 60 mg  -     Cancel: diphenhydrAMINE (BENADRYL) IVPB 25 mg  -     Cancel: daratumumab-hyaluronidase-fihj (DARZALEX FASPRO) 1800-07410 MG-UT/15ML injection 1,800 mg  -     Cancel: Hydrocortisone Sod Suc (PF) (Solu-CORTEF) injection 100 mg  -     Cancel: diphenhydrAMINE (BENADRYL) injection 50 mg  -     Cancel: famotidine (PEPCID) injection 20 mg  -     Cancel: meperidine (DEMEROL) injection 25 mg  -     sodium chloride 0.9 % infusion  -     acetaminophen (TYLENOL) tablet 1,000 mg  -     methylPREDNISolone sodium succinate (SOLU-Medrol) injection 60 mg  -     diphenhydrAMINE (BENADRYL) IVPB 25 mg  -     daratumumab-hyaluronidase-fihj (DARZALEX FASPRO) 1800-84268 MG-UT/15ML injection 1,800 mg  -     Hydrocortisone Sod Suc (PF) (Solu-CORTEF) injection 100 mg  -     diphenhydrAMINE (BENADRYL) injection 50 mg  -     famotidine (PEPCID) injection 20 mg  -     meperidine (DEMEROL) injection 25 mg            Patient Follow Up: Cycle 6-day 1    Patient was given instructions and counseling regarding her condition or for health maintenance advice. Please see specific information pulled into the AVS if appropriate.     West Nicolas MD    2/28/2024

## 2024-02-28 NOTE — ASSESSMENT & PLAN NOTE
Continue Prozac.  Amandeep reviewed and no discrepancies.  I will refill a small number of Ativan as needed.  If she continues to have issues, she may benefit from behavioral health evaluation

## 2024-02-29 ENCOUNTER — SPECIALTY PHARMACY (OUTPATIENT)
Dept: PHARMACY | Facility: HOSPITAL | Age: 74
End: 2024-02-29
Payer: MEDICARE

## 2024-02-29 VITALS
WEIGHT: 196.8 LBS | OXYGEN SATURATION: 96 % | HEIGHT: 60 IN | HEART RATE: 111 BPM | DIASTOLIC BLOOD PRESSURE: 55 MMHG | BODY MASS INDEX: 38.64 KG/M2 | SYSTOLIC BLOOD PRESSURE: 156 MMHG

## 2024-03-01 ENCOUNTER — HOSPITAL ENCOUNTER (OUTPATIENT)
Dept: CT IMAGING | Facility: HOSPITAL | Age: 74
Discharge: HOME | End: 2024-03-01
Payer: MEDICARE

## 2024-03-01 DIAGNOSIS — F17.211 CIGARETTE NICOTINE DEPENDENCE IN REMISSION: ICD-10-CM

## 2024-03-01 PROCEDURE — 71271 CT THORAX LUNG CANCER SCR C-: CPT

## 2024-03-03 DIAGNOSIS — F17.211 CIGARETTE NICOTINE DEPENDENCE IN REMISSION: Primary | ICD-10-CM

## 2024-03-08 ENCOUNTER — SPECIALTY PHARMACY (OUTPATIENT)
Dept: PHARMACY | Facility: HOSPITAL | Age: 74
End: 2024-03-08
Payer: MEDICARE

## 2024-03-08 DIAGNOSIS — C90.00 MULTIPLE MYELOMA, REMISSION STATUS UNSPECIFIED: ICD-10-CM

## 2024-03-08 RX ORDER — LENALIDOMIDE 15 MG/1
15 CAPSULE ORAL DAILY
Qty: 21 CAPSULE | Refills: 0 | Status: SHIPPED | OUTPATIENT
Start: 2024-03-08

## 2024-03-08 RX ORDER — LENALIDOMIDE 15 MG/1
CAPSULE ORAL
Refills: 0 | OUTPATIENT
Start: 2024-03-08

## 2024-03-08 NOTE — PROGRESS NOTES
Re: Refills of Oral Specialty Medication - Revlimid (lenalidomide)    Drug-Drug Interactions: The current medication list was reviewed and there are no relevant drug-drug interactions with the specialty medication.  Medication Allergies: The patient has no relevant allergies as it relates to their oral specialty medication  Review of Labs/Dose Adjustments: NO DOSE CHANGE - I reviewed the most recent note and labs and the patient will continue without any dose changes.  I sent refills as described below.    Drug: Revlimid (lenalidomide)  Strength: 15 mg  Directions: Take 1 capsule by mouth daily ON days 1-21, OFF for 7 days of each 28 day cycle  Quantity: 21  Refills: 0  REMS Auth # [id number] Exp 4/7/24   Pharmacy prescription sent to: [facility]      Walker BrysonD, BCPS  Oncology Clinical Pharmacist  3/8/2024  08:59 EST

## 2024-03-13 ENCOUNTER — HOSPITAL ENCOUNTER (OUTPATIENT)
Dept: ONCOLOGY | Facility: HOSPITAL | Age: 74
Discharge: HOME | End: 2024-03-13
Admitting: INTERNAL MEDICINE
Payer: MEDICARE

## 2024-03-13 VITALS
WEIGHT: 197.97 LBS | BODY MASS INDEX: 38.87 KG/M2 | HEIGHT: 60 IN | SYSTOLIC BLOOD PRESSURE: 128 MMHG | RESPIRATION RATE: 20 BRPM | OXYGEN SATURATION: 96 % | TEMPERATURE: 98.5 F | DIASTOLIC BLOOD PRESSURE: 73 MMHG | HEART RATE: 96 BPM

## 2024-03-13 DIAGNOSIS — Z12.2 ENCOUNTER FOR SCREENING FOR LUNG CANCER: Primary | ICD-10-CM

## 2024-03-13 DIAGNOSIS — C90.00 MULTIPLE MYELOMA, REMISSION STATUS UNSPECIFIED: ICD-10-CM

## 2024-03-13 LAB
BASOPHILS # BLD AUTO: 0.02 10*3/MM3 (ref 0–0.2)
BASOPHILS NFR BLD AUTO: 0.3 % (ref 0–1.5)
DEPRECATED RDW RBC AUTO: 64.5 FL (ref 37–54)
EOSINOPHIL # BLD AUTO: 0 10*3/MM3 (ref 0–0.4)
EOSINOPHIL NFR BLD AUTO: 0 % (ref 0.3–6.2)
ERYTHROCYTE [DISTWIDTH] IN BLOOD BY AUTOMATED COUNT: 16.8 % (ref 12.3–15.4)
HCT VFR BLD AUTO: 34.9 % (ref 34–46.6)
HGB BLD-MCNC: 11.3 G/DL (ref 12–15.9)
IMM GRANULOCYTES # BLD AUTO: 0.02 10*3/MM3 (ref 0–0.05)
IMM GRANULOCYTES NFR BLD AUTO: 0.3 % (ref 0–0.5)
LYMPHOCYTES # BLD AUTO: 3.16 10*3/MM3 (ref 0.7–3.1)
LYMPHOCYTES NFR BLD AUTO: 40 % (ref 19.6–45.3)
MCH RBC QN AUTO: 33.4 PG (ref 26.6–33)
MCHC RBC AUTO-ENTMCNC: 32.4 G/DL (ref 31.5–35.7)
MCV RBC AUTO: 103.3 FL (ref 79–97)
MONOCYTES # BLD AUTO: 0.85 10*3/MM3 (ref 0.1–0.9)
MONOCYTES NFR BLD AUTO: 10.8 % (ref 5–12)
NEUTROPHILS NFR BLD AUTO: 3.85 10*3/MM3 (ref 1.7–7)
NEUTROPHILS NFR BLD AUTO: 48.6 % (ref 42.7–76)
PLATELET # BLD AUTO: 224 10*3/MM3 (ref 140–450)
PMV BLD AUTO: 9.9 FL (ref 6–12)
RBC # BLD AUTO: 3.38 10*6/MM3 (ref 3.77–5.28)
WBC NRBC COR # BLD AUTO: 7.9 10*3/MM3 (ref 3.4–10.8)

## 2024-03-13 PROCEDURE — A9270 NON-COVERED ITEM OR SERVICE: HCPCS | Performed by: INTERNAL MEDICINE

## 2024-03-13 PROCEDURE — 85025 COMPLETE CBC W/AUTO DIFF WBC: CPT | Performed by: INTERNAL MEDICINE

## 2024-03-13 PROCEDURE — 86334 IMMUNOFIX E-PHORESIS SERUM: CPT | Performed by: INTERNAL MEDICINE

## 2024-03-13 PROCEDURE — 25010000002 HEPARIN LOCK FLUSH PER 10 UNITS: Performed by: INTERNAL MEDICINE

## 2024-03-13 PROCEDURE — 96401 CHEMO ANTI-NEOPL SQ/IM: CPT

## 2024-03-13 PROCEDURE — 96374 THER/PROPH/DIAG INJ IV PUSH: CPT

## 2024-03-13 PROCEDURE — 82784 ASSAY IGA/IGD/IGG/IGM EACH: CPT | Performed by: INTERNAL MEDICINE

## 2024-03-13 PROCEDURE — 25810000003 SODIUM CHLORIDE 0.9 % SOLUTION: Performed by: INTERNAL MEDICINE

## 2024-03-13 PROCEDURE — 63710000001 ACETAMINOPHEN EXTRA STRENGTH 500 MG TABLET: Performed by: INTERNAL MEDICINE

## 2024-03-13 PROCEDURE — 96375 TX/PRO/DX INJ NEW DRUG ADDON: CPT

## 2024-03-13 PROCEDURE — 25010000002 DIPHENHYDRAMINE PER 50 MG: Performed by: INTERNAL MEDICINE

## 2024-03-13 PROCEDURE — 25010000002 DARATUMUMAB-HYALURONIDASE-FIHJ 1800-30000 MG-UT/15ML SOLUTION: Performed by: INTERNAL MEDICINE

## 2024-03-13 PROCEDURE — 25010000002 METHYLPREDNISOLONE PER 125 MG: Performed by: INTERNAL MEDICINE

## 2024-03-13 RX ORDER — HEPARIN SODIUM (PORCINE) LOCK FLUSH IV SOLN 100 UNIT/ML 100 UNIT/ML
500 SOLUTION INTRAVENOUS AS NEEDED
OUTPATIENT
Start: 2024-03-13

## 2024-03-13 RX ORDER — ACETAMINOPHEN 500 MG
1000 TABLET ORAL ONCE
Status: COMPLETED | OUTPATIENT
Start: 2024-03-13 | End: 2024-03-13

## 2024-03-13 RX ORDER — SODIUM CHLORIDE 9 MG/ML
20 INJECTION, SOLUTION INTRAVENOUS ONCE
Status: COMPLETED | OUTPATIENT
Start: 2024-03-13 | End: 2024-03-13

## 2024-03-13 RX ORDER — SODIUM CHLORIDE 0.9 % (FLUSH) 0.9 %
20 SYRINGE (ML) INJECTION AS NEEDED
Status: DISCONTINUED | OUTPATIENT
Start: 2024-03-13 | End: 2024-03-14 | Stop reason: HOSPADM

## 2024-03-13 RX ORDER — DIPHENHYDRAMINE HYDROCHLORIDE 50 MG/ML
50 INJECTION INTRAMUSCULAR; INTRAVENOUS AS NEEDED
Status: DISCONTINUED | OUTPATIENT
Start: 2024-03-13 | End: 2024-03-14 | Stop reason: HOSPADM

## 2024-03-13 RX ORDER — SODIUM CHLORIDE 0.9 % (FLUSH) 0.9 %
20 SYRINGE (ML) INJECTION AS NEEDED
OUTPATIENT
Start: 2024-03-13

## 2024-03-13 RX ORDER — METHYLPREDNISOLONE SODIUM SUCCINATE 125 MG/2ML
60 INJECTION, POWDER, LYOPHILIZED, FOR SOLUTION INTRAMUSCULAR; INTRAVENOUS ONCE
Status: COMPLETED | OUTPATIENT
Start: 2024-03-13 | End: 2024-03-13

## 2024-03-13 RX ORDER — MEPERIDINE HYDROCHLORIDE 25 MG/ML
25 INJECTION INTRAMUSCULAR; INTRAVENOUS; SUBCUTANEOUS
Status: DISCONTINUED | OUTPATIENT
Start: 2024-03-13 | End: 2024-03-14 | Stop reason: HOSPADM

## 2024-03-13 RX ORDER — HEPARIN SODIUM (PORCINE) LOCK FLUSH IV SOLN 100 UNIT/ML 100 UNIT/ML
500 SOLUTION INTRAVENOUS AS NEEDED
Status: DISCONTINUED | OUTPATIENT
Start: 2024-03-13 | End: 2024-03-14 | Stop reason: HOSPADM

## 2024-03-13 RX ORDER — FAMOTIDINE 10 MG/ML
20 INJECTION, SOLUTION INTRAVENOUS AS NEEDED
Status: DISCONTINUED | OUTPATIENT
Start: 2024-03-13 | End: 2024-03-14 | Stop reason: HOSPADM

## 2024-03-13 RX ADMIN — ACETAMINOPHEN 1000 MG: 500 TABLET ORAL at 08:26

## 2024-03-13 RX ADMIN — DIPHENHYDRAMINE HYDROCHLORIDE 25 MG: 50 INJECTION, SOLUTION INTRAMUSCULAR; INTRAVENOUS at 08:30

## 2024-03-13 RX ADMIN — METHYLPREDNISOLONE SODIUM SUCCINATE 60 MG: 125 INJECTION INTRAMUSCULAR; INTRAVENOUS at 08:28

## 2024-03-13 RX ADMIN — SODIUM CHLORIDE 20 ML/HR: 9 INJECTION, SOLUTION INTRAVENOUS at 08:23

## 2024-03-13 RX ADMIN — DARATUMUMAB AND HYALURONIDASE-FIHJ (HUMAN RECOMBINANT) 1800 MG: 1800; 30000 INJECTION SUBCUTANEOUS at 10:03

## 2024-03-13 RX ADMIN — HEPARIN 500 UNITS: 100 SYRINGE at 10:10

## 2024-03-13 RX ADMIN — Medication 20 ML: at 10:09

## 2024-03-14 ENCOUNTER — SPECIALTY PHARMACY (OUTPATIENT)
Dept: PHARMACY | Facility: HOSPITAL | Age: 74
End: 2024-03-14
Payer: MEDICARE

## 2024-03-19 LAB
IGA SERPL-MCNC: 5 MG/DL (ref 64–422)
IGG SERPL-MCNC: 825 MG/DL (ref 586–1602)
IGM SERPL-MCNC: 16 MG/DL (ref 26–217)
PROT PATTERN SERPL IFE-IMP: ABNORMAL

## 2024-03-24 ENCOUNTER — PATIENT MESSAGE (OUTPATIENT)
Dept: FAMILY MEDICINE CLINIC | Facility: CLINIC | Age: 74
End: 2024-03-24
Payer: MEDICARE

## 2024-03-24 DIAGNOSIS — F41.9 ANXIETY: Primary | ICD-10-CM

## 2024-03-24 NOTE — PROGRESS NOTES
Primary Care Provider  Rena Guerra PA     Referring Provider  No ref. provider found     Chief Complaint  COPD, Shortness of Breath, Wheezing, Follow-up (6-8 week follow up. ), and Asthma    Subjective          History of Presenting Illness  Patient is a 73-year-old female who presents for management of dyspnea who presents for a follow-up visit today.   Patient's daughter is present with the patient in the office today.  Since last office visit patient had a low-dose chest CT scan completed on 3/1/2024.  Report states no evidence of lung cancer. Stable bilateral pulmonary nodules, likely benign. Mild bibasilar atelectasis. Moderate emphysema.  LUNG RADS: 2 (benign appearance, less than 1% chance of malignancy).   Patient does get short of breath that is worse with exertion, moderate in severity, and improved with rest.  Patient states that she is taking Breztri every day as prescribed and uses albuterol inhaler as needed.  Patient states that she is not able to tolerate any nebulizer medications and therefore continues to decline any nebulizer medications.  Patient is on 2 to 4 L of oxygen per minute via nasal cannula and receives her oxygen through [facility].  Patient states that she is wearing her CPAP machine with oxygen bled in.  Patient denies any morning headaches or excessive daytime sleepiness.  Patient states that she does have anxiety and is currently taking Prozac prescribed by her primary care provider and also has Ativan to take as needed that is prescribed by Dr. Nicolas, oncologist. Patient denies fever, chills, night sweats, swollen glands in the head and neck, unintentional weight loss, hemoptysis, purulent sputum production, dysphagia, chest pain, palpitations, chest tightness, abdominal pain, nausea, vomiting, and diarrhea.  Patient also denies any myalgias, changes in sense of taste and/or smell, sore throat, any other coronavirus or flu-like symptoms.  Patient denies any leg swelling,  orthopnea, paroxysmal nocturnal dyspnea.  Patient is able to perform activities of daily living.        Review of Systems     Family History   Problem Relation Age of Onset    Heart disease Mother     Lung cancer Mother     Cancer Mother     Stroke Father     Heart disease Father     Kidney cancer Father     Cancer Father     Stroke Other         UNCLE/AUNT    Colon cancer Neg Hx     Malig Hyperthermia Neg Hx         Social History     Socioeconomic History    Marital status:    Tobacco Use    Smoking status: Former     Current packs/day: 0.00     Average packs/day: 1 pack/day for 47.4 years (47.4 ttl pk-yrs)     Types: Cigarettes     Start date: 1975     Quit date: 6/3/2022     Years since quittin.8     Passive exposure: Past    Smokeless tobacco: Never   Vaping Use    Vaping status: Never Used   Substance and Sexual Activity    Alcohol use: Never    Drug use: Never    Sexual activity: Defer        Past Medical History:   Diagnosis Date    Anxiety     Asthma     Asthma 2021    Atherosclerosis of native coronary artery of native heart with angina pectoris 08/15/2023    FOLLOWED BY DR GONZALES/DINA PETTIT. DENIES CP BUT GETS SOA WITH EXERTION HAS COPD WEARS AT 2LPM N/C. DECREASED ACTIVITY D/T SOA    C. difficile diarrhea     DENIES ANY CURRENT ISSUES    Colon cancer 2017    Colon polyp     COPD (chronic obstructive pulmonary disease) 10/23/2017    COPD (chronic obstructive pulmonary disease) 10/23/2017    Depression     Disease of thyroid gland     Diverticulitis     Dysphagia     Essential hypertension 2021    Heartburn     Hernia 2019    History of chemotherapy     2017    Hx of psychiatric care     Hyperlipidemia     Impaired fasting glucose 2015    Lumbago     Lung nodule 2022    Major depressive disorder 10/27/2016    Malignant neoplasm of ascending colon 2017    Melena     Multiple myeloma     Nicotine dependence 05/10/2017    NICK (obstructive sleep apnea)  11/06/2023    Oxygen dependent     REPORTS USES 02 AT 2LPM VIA N/C. CAN GET VERY SOA WITH MINIMAL EXERTION    Renal insufficiency 07/28/2021    Seizures     PSEUDO SEIZURES LAST ONE AROUND NOV 2022    Shortness of breath     CAN GET VERY SOA WITH MINIMAL EXERTION    Sleep apnea     Tobacco use 07/28/2021    QUIT SMOKING JUNE 2022    Visit for screening mammogram 02/20/2020    NORMAL- REPEAT IN ONE YEAR    Vitamin D deficiency         Immunization History   Administered Date(s) Administered    Fluzone High Dose =>65 Years ([facility] ONLY) 09/24/2020, 09/24/2021    Fluzone High-Dose 65+yrs 09/24/2020, 09/24/2021, 09/20/2022, 10/10/2023    Fluzone Quad >6mos (Multi-dose) 09/14/2015    Pneumococcal Conjugate 13-Valent (PCV13) 09/21/2015    Pneumococcal Polysaccharide (PPSV23) 04/12/2022       Allergies   Allergen Reactions    Cephalexin Hives    Morphine Anaphylaxis    Penicillins Hives    Sulfa Antibiotics Hives          Current Outpatient Medications:     acyclovir (ZOVIRAX) 400 MG tablet, Take 1 tablet by mouth 2 (Two) Times a Day. Take one tablet by mouth twice daily., Disp: 60 tablet, Rfl: 11    albuterol sulfate  (90 Base) MCG/ACT inhaler, Inhale 2 puffs Every 4 (Four) Hours As Needed for Wheezing., Disp: 18 g, Rfl: 11    Budeson-Glycopyrrol-Formoterol (Breztri Aerosphere) 160-9-4.8 MCG/ACT aerosol inhaler, Inhale 2 puffs 2 (Two) Times a Day. Rinse mouth out after each use, Disp: 1 each, Rfl: 11    colestipol (COLESTID) 1 g tablet, Take 2 tablets by mouth 2 (Two) Times a Day., Disp: 120 tablet, Rfl: 5    dapsone 25 MG tablet, TAKE 2 TABLETS BY MOUTH TWICE A DAY, Disp: 360 tablet, Rfl: 1    dexAMETHasone (DECADRON) 4 MG tablet, Take 10 tablets on D 1,8,15,22 and take 1 tablet on D 2,3,16,17.  Take in the morning with food., Disp: 44 tablet, Rfl: 3    FLUoxetine (PROzac) 40 MG capsule, Take 1 capsule by mouth Daily., Disp: 90 capsule, Rfl: 1    lenalidomide (REVLIMID) 15 MG capsule, Take 1 capsule by mouth  Daily. On Days 1-21, and off 7 days, on a 28 day cycle, Disp: 21 capsule, Rfl: 0    LORazepam (Ativan) 1 MG tablet, Take 1 tablet by mouth 2 (Two) Times a Day As Needed for Anxiety., Disp: 14 tablet, Rfl: 0    ondansetron (ZOFRAN) 8 MG tablet, Take 1 tablet by mouth 3 (Three) Times a Day As Needed for Nausea or Vomiting. (Patient taking differently: Take 1 tablet by mouth 3 (Three) Times a Day As Needed for Nausea or Vomiting. TO START WHEN STARTS CHEMO 11/3/23), Disp: 30 tablet, Rfl: 5    promethazine (PHENERGAN) 25 MG tablet, Take 1 tablet by mouth Every 6 (Six) Hours As Needed for Nausea or Vomiting., Disp: 30 tablet, Rfl: 1    vitamin D (ERGOCALCIFEROL) 1.25 MG (63051 UT) capsule capsule, Take 1 capsule by mouth 2 (Two) Times a Week., Disp: 26 capsule, Rfl: 1    dexAMETHasone (DECADRON) 4 MG tablet, Take 5 tablets on D 1,8,15,22 and take 1 tablet on D 2,3,9,10,16,17,23,24.  Take in the morning with food., Disp: 28 tablet, Rfl: 1    HYDROcodone-acetaminophen (Norco) 5-325 MG per tablet, Take 1 tablet by mouth Every 6 (Six) Hours As Needed for Moderate Pain or Severe Pain. (Patient not taking: Reported on 2/15/2024), Disp: 6 tablet, Rfl: 0    levothyroxine (SYNTHROID, LEVOTHROID) 25 MCG tablet, Take 1 tablet by mouth Every Morning. (Patient not taking: Reported on 2/15/2024), Disp: 90 tablet, Rfl: 1    meloxicam (MOBIC) 15 MG tablet, TAKE 1 TABLET BY MOUTH EVERY DAY (Patient not taking: Reported on 2/21/2024), Disp: 90 tablet, Rfl: 1     Objective     Physical Exam  Vital Signs:   WDWN, Alert, NAD.    HEENT:  PERRL, EOMI.  OP, nares clear, no sinus tenderness  Neck:  Supple, no JVD, no thyromegaly.  Lymph: no axillary, cervical, supraclavicular lymphadenopathy noted bilaterally  Chest:  Mildly decreased breath sounds throughout. No wheezes, rales, or rhonchi appreciated. Normal work of breathing noted. Patient is able speak full sentences without difficulty. Patient is on 2 L of oxygen per minute via nasal  "cannula.   CV: RRR, no MGR, pulses 2+, equal.  Abd:  Soft, NT, ND, + BS, no HSM  EXT:  no clubbing, no cyanosis, no edema, no joint tenderness  Neuro:  A&Ox3, CN grossly intact, no focal deficits.  Skin: No rashes or lesions noted.    /86 (BP Location: Left arm, Patient Position: Sitting, Cuff Size: Large Adult)   Pulse 111   Resp 24   Ht 153.5 cm (60.43\")   Wt 88 kg (194 lb) Comment: patient reported.  SpO2 96%   BMI 37.35 kg/m²         Result Review :   I have reviewed my last office visit note. I also reviewed low dose chest CT scan report dated from 3/1/2024. See scanned report.     Procedures:      CT Chest Low Dose Cancer Screening WO    Result Date: 3/1/2024    1. No evidence of lung cancer. 2. Stable bilateral pulmonary nodules, likely benign. 3. Mild bibasilar atelectasis. 4. Moderate emphysema.  LUNG RADS:                           2 (benign appearance, less than 1% chance of malignancy)     YENIFER HANNA MD       Electronically Signed and Approved By: YENIFER HANNA MD on 3/01/2024 at 11:02                Assessment and Plan      Assessment:  1.  Mild COPD.  FEV1 of 79% predicted.  Alpha 1 antitrypsin with a normal genotype of M/M with a level of 231.  2.  Centrilobular emphysema on chest CT scan.  3.  History of colon cancer.  4.  History of multiple myeloma now has active multiple myeloma.  Patient is under the care of Dr. Nicolas and will be starting chemotherapy per patient report.  5.  Lung nodules without significant change since 2021 imaging suggesting benign process per chest CT report dated from 8/25/2023.  6.  Chronic dyspnea.  7.  Cough.  8.  Wheezing.  9.  Multiple lung nodules. Stable on low dose chest CT scan dated from 3/1/2024.  10.  Severe obstructive sleep apnea.  Patient is on a CPAP machine.  11.  Chronic hypoxic respiratory failure: Patient is on supplemental oxygen.  12.  Hiatal hernia.  13.  Tobacco abuse of cigarettes in remission.  Patient is enrolled in lung " cancer screening.        Plan:  1.  Continue Breztri as prescribed and rinse mouth out after each use.  2.  Continue albuterol inhaler as needed.  Patient declines any nebulizer treatments at this time.  3.  Continue oxygen to keep SPO2 at 89% and above.  4.  Follow-up with Dr. Nicolas as scheduled.  5.  Continue CPAP with oxygen bled in at night and with naps at current settings and clean mask and tubing daily.  Will request a copy of CPAP compliance report and notify patient if any changes need to be made.  6.  Follow-up with primary care provider as scheduled.  7.  Vaccination status:  patient reports they are up-to-date with flu and pneumonia vaccines.  Patient declines COVID-19 vaccination.  Discussed with patient the benefits of vaccination including decreased risk of severe illness, hospitalization and death related to flu, pneumonia, and COVID-19.  Patient verbalized understanding and will consider getting vaccinated.  Patient is advised to continue to follow CDC recommendations such as social distancing, wearing a mask, and washing hands for least 20 seconds.  8.  Smoking status: Patient is a former cigarette smoker. Patient is enrolled in lung cancer screening and will be due for a repeat low dose chest CT scan in March 2025. Order placed prior to office visit today.  9.  Patient to call the office, 911, or go to the ER with new or worsening symptoms.  10.  Follow-up in 3 months, sooner if needed.              Follow Up   Return in 3 months (on 7/1/2024).  Patient was given instructions and counseling regarding her condition or for health maintenance advice. Please see specific information pulled into the AVS if appropriate.

## 2024-03-27 ENCOUNTER — HOSPITAL ENCOUNTER (OUTPATIENT)
Dept: ONCOLOGY | Facility: HOSPITAL | Age: 74
Discharge: HOME | End: 2024-03-27
Admitting: INTERNAL MEDICINE
Payer: MEDICARE

## 2024-03-27 ENCOUNTER — OFFICE VISIT (OUTPATIENT)
Dept: ONCOLOGY | Facility: HOSPITAL | Age: 74
End: 2024-03-27
Payer: MEDICARE

## 2024-03-27 VITALS
HEIGHT: 60 IN | WEIGHT: 197.31 LBS | TEMPERATURE: 98.6 F | BODY MASS INDEX: 38.74 KG/M2 | OXYGEN SATURATION: 96 % | DIASTOLIC BLOOD PRESSURE: 51 MMHG | HEART RATE: 81 BPM | SYSTOLIC BLOOD PRESSURE: 111 MMHG | RESPIRATION RATE: 20 BRPM

## 2024-03-27 VITALS
HEART RATE: 81 BPM | HEIGHT: 60 IN | DIASTOLIC BLOOD PRESSURE: 51 MMHG | TEMPERATURE: 98.6 F | SYSTOLIC BLOOD PRESSURE: 111 MMHG | RESPIRATION RATE: 20 BRPM | OXYGEN SATURATION: 96 % | WEIGHT: 197.31 LBS | BODY MASS INDEX: 38.74 KG/M2

## 2024-03-27 DIAGNOSIS — Z12.2 ENCOUNTER FOR SCREENING FOR LUNG CANCER: ICD-10-CM

## 2024-03-27 DIAGNOSIS — R53.83 FATIGUE, UNSPECIFIED TYPE: ICD-10-CM

## 2024-03-27 DIAGNOSIS — C90.00 MULTIPLE MYELOMA, REMISSION STATUS UNSPECIFIED: Primary | ICD-10-CM

## 2024-03-27 LAB
ALBUMIN SERPL-MCNC: 3.8 G/DL (ref 3.5–5.2)
ALBUMIN/GLOB SERPL: 2 G/DL
ALP SERPL-CCNC: 80 U/L (ref 39–117)
ALT SERPL W P-5'-P-CCNC: 14 U/L (ref 1–33)
ANION GAP SERPL CALCULATED.3IONS-SCNC: 10.9 MMOL/L (ref 5–15)
AST SERPL-CCNC: 8 U/L (ref 1–32)
BASOPHILS # BLD AUTO: 0.07 10*3/MM3 (ref 0–0.2)
BASOPHILS NFR BLD AUTO: 0.7 % (ref 0–1.5)
BILIRUB SERPL-MCNC: 0.5 MG/DL (ref 0–1.2)
BUN SERPL-MCNC: 15 MG/DL (ref 8–23)
BUN/CREAT SERPL: 19 (ref 7–25)
CALCIUM SPEC-SCNC: 9.1 MG/DL (ref 8.6–10.5)
CHLORIDE SERPL-SCNC: 111 MMOL/L (ref 98–107)
CO2 SERPL-SCNC: 20.1 MMOL/L (ref 22–29)
CREAT SERPL-MCNC: 0.79 MG/DL (ref 0.57–1)
DEPRECATED RDW RBC AUTO: 70.4 FL (ref 37–54)
EGFRCR SERPLBLD CKD-EPI 2021: 78.6 ML/MIN/1.73
EOSINOPHIL # BLD AUTO: 0 10*3/MM3 (ref 0–0.4)
EOSINOPHIL NFR BLD AUTO: 0 % (ref 0.3–6.2)
ERYTHROCYTE [DISTWIDTH] IN BLOOD BY AUTOMATED COUNT: 18.3 % (ref 12.3–15.4)
FOLATE SERPL-MCNC: 8.95 NG/ML (ref 4.78–24.2)
GLOBULIN UR ELPH-MCNC: 1.9 GM/DL
GLUCOSE SERPL-MCNC: 185 MG/DL (ref 65–99)
HCT VFR BLD AUTO: 33.8 % (ref 34–46.6)
HGB BLD-MCNC: 11 G/DL (ref 12–15.9)
IMM GRANULOCYTES # BLD AUTO: 0.05 10*3/MM3 (ref 0–0.05)
IMM GRANULOCYTES NFR BLD AUTO: 0.5 % (ref 0–0.5)
LYMPHOCYTES # BLD AUTO: 4.46 10*3/MM3 (ref 0.7–3.1)
LYMPHOCYTES NFR BLD AUTO: 44.2 % (ref 19.6–45.3)
MCH RBC QN AUTO: 34 PG (ref 26.6–33)
MCHC RBC AUTO-ENTMCNC: 32.5 G/DL (ref 31.5–35.7)
MCV RBC AUTO: 104.3 FL (ref 79–97)
MONOCYTES # BLD AUTO: 1.04 10*3/MM3 (ref 0.1–0.9)
MONOCYTES NFR BLD AUTO: 10.3 % (ref 5–12)
NEUTROPHILS NFR BLD AUTO: 4.47 10*3/MM3 (ref 1.7–7)
NEUTROPHILS NFR BLD AUTO: 44.3 % (ref 42.7–76)
PLATELET # BLD AUTO: 508 10*3/MM3 (ref 140–450)
PMV BLD AUTO: 9.1 FL (ref 6–12)
POTASSIUM SERPL-SCNC: 3.7 MMOL/L (ref 3.5–5.2)
PROT SERPL-MCNC: 5.7 G/DL (ref 6–8.5)
RBC # BLD AUTO: 3.24 10*6/MM3 (ref 3.77–5.28)
SODIUM SERPL-SCNC: 142 MMOL/L (ref 136–145)
VIT B12 BLD-MCNC: 166 PG/ML (ref 211–946)
WBC NRBC COR # BLD AUTO: 10.09 10*3/MM3 (ref 3.4–10.8)

## 2024-03-27 PROCEDURE — 25010000002 DIPHENHYDRAMINE PER 50 MG: Performed by: INTERNAL MEDICINE

## 2024-03-27 PROCEDURE — 25810000003 SODIUM CHLORIDE 0.9 % SOLUTION: Performed by: INTERNAL MEDICINE

## 2024-03-27 PROCEDURE — 25010000002 METHYLPREDNISOLONE PER 125 MG: Performed by: INTERNAL MEDICINE

## 2024-03-27 PROCEDURE — 85025 COMPLETE CBC W/AUTO DIFF WBC: CPT | Performed by: INTERNAL MEDICINE

## 2024-03-27 PROCEDURE — 25010000002 DARATUMUMAB-HYALURONIDASE-FIHJ 1800-30000 MG-UT/15ML SOLUTION: Performed by: INTERNAL MEDICINE

## 2024-03-27 PROCEDURE — 96401 CHEMO ANTI-NEOPL SQ/IM: CPT

## 2024-03-27 PROCEDURE — 80053 COMPREHEN METABOLIC PANEL: CPT | Performed by: INTERNAL MEDICINE

## 2024-03-27 PROCEDURE — 96374 THER/PROPH/DIAG INJ IV PUSH: CPT

## 2024-03-27 PROCEDURE — 63710000001 ACETAMINOPHEN EXTRA STRENGTH 500 MG TABLET: Performed by: INTERNAL MEDICINE

## 2024-03-27 PROCEDURE — 25010000002 HEPARIN LOCK FLUSH PER 10 UNITS: Performed by: INTERNAL MEDICINE

## 2024-03-27 PROCEDURE — A9270 NON-COVERED ITEM OR SERVICE: HCPCS | Performed by: INTERNAL MEDICINE

## 2024-03-27 PROCEDURE — 96375 TX/PRO/DX INJ NEW DRUG ADDON: CPT

## 2024-03-27 PROCEDURE — 82746 ASSAY OF FOLIC ACID SERUM: CPT | Performed by: INTERNAL MEDICINE

## 2024-03-27 PROCEDURE — 82607 VITAMIN B-12: CPT | Performed by: INTERNAL MEDICINE

## 2024-03-27 RX ORDER — SODIUM CHLORIDE 9 MG/ML
20 INJECTION, SOLUTION INTRAVENOUS ONCE
Status: CANCELLED | OUTPATIENT
Start: 2024-03-27

## 2024-03-27 RX ORDER — HEPARIN SODIUM (PORCINE) LOCK FLUSH IV SOLN 100 UNIT/ML 100 UNIT/ML
500 SOLUTION INTRAVENOUS AS NEEDED
OUTPATIENT
Start: 2024-03-27

## 2024-03-27 RX ORDER — FAMOTIDINE 10 MG/ML
20 INJECTION, SOLUTION INTRAVENOUS AS NEEDED
OUTPATIENT
Start: 2024-04-10

## 2024-03-27 RX ORDER — DIPHENHYDRAMINE HYDROCHLORIDE 50 MG/ML
50 INJECTION INTRAMUSCULAR; INTRAVENOUS AS NEEDED
Status: CANCELLED | OUTPATIENT
Start: 2024-03-27

## 2024-03-27 RX ORDER — SODIUM CHLORIDE 9 MG/ML
20 INJECTION, SOLUTION INTRAVENOUS ONCE
Status: COMPLETED | OUTPATIENT
Start: 2024-03-27 | End: 2024-03-27

## 2024-03-27 RX ORDER — FAMOTIDINE 10 MG/ML
20 INJECTION, SOLUTION INTRAVENOUS AS NEEDED
Status: CANCELLED | OUTPATIENT
Start: 2024-03-27

## 2024-03-27 RX ORDER — METHYLPREDNISOLONE SODIUM SUCCINATE 125 MG/2ML
60 INJECTION, POWDER, LYOPHILIZED, FOR SOLUTION INTRAMUSCULAR; INTRAVENOUS ONCE
OUTPATIENT
Start: 2024-04-10

## 2024-03-27 RX ORDER — SODIUM CHLORIDE 9 MG/ML
20 INJECTION, SOLUTION INTRAVENOUS ONCE
OUTPATIENT
Start: 2024-04-10

## 2024-03-27 RX ORDER — SODIUM CHLORIDE 0.9 % (FLUSH) 0.9 %
20 SYRINGE (ML) INJECTION AS NEEDED
Status: DISCONTINUED | OUTPATIENT
Start: 2024-03-27 | End: 2024-03-28 | Stop reason: HOSPADM

## 2024-03-27 RX ORDER — SODIUM CHLORIDE 0.9 % (FLUSH) 0.9 %
20 SYRINGE (ML) INJECTION AS NEEDED
OUTPATIENT
Start: 2024-03-27

## 2024-03-27 RX ORDER — ACETAMINOPHEN 500 MG
1000 TABLET ORAL ONCE
OUTPATIENT
Start: 2024-04-10

## 2024-03-27 RX ORDER — MEPERIDINE HYDROCHLORIDE 25 MG/ML
25 INJECTION INTRAMUSCULAR; INTRAVENOUS; SUBCUTANEOUS
Status: CANCELLED | OUTPATIENT
Start: 2024-03-27

## 2024-03-27 RX ORDER — ACETAMINOPHEN 500 MG
1000 TABLET ORAL ONCE
Status: CANCELLED | OUTPATIENT
Start: 2024-03-27

## 2024-03-27 RX ORDER — METHYLPREDNISOLONE SODIUM SUCCINATE 125 MG/2ML
60 INJECTION, POWDER, LYOPHILIZED, FOR SOLUTION INTRAMUSCULAR; INTRAVENOUS ONCE
Status: COMPLETED | OUTPATIENT
Start: 2024-03-27 | End: 2024-03-27

## 2024-03-27 RX ORDER — METHYLPREDNISOLONE SODIUM SUCCINATE 125 MG/2ML
60 INJECTION, POWDER, LYOPHILIZED, FOR SOLUTION INTRAMUSCULAR; INTRAVENOUS ONCE
Status: CANCELLED | OUTPATIENT
Start: 2024-03-27

## 2024-03-27 RX ORDER — ACETAMINOPHEN 500 MG
1000 TABLET ORAL ONCE
Status: COMPLETED | OUTPATIENT
Start: 2024-03-27 | End: 2024-03-27

## 2024-03-27 RX ORDER — HEPARIN SODIUM (PORCINE) LOCK FLUSH IV SOLN 100 UNIT/ML 100 UNIT/ML
500 SOLUTION INTRAVENOUS AS NEEDED
Status: DISCONTINUED | OUTPATIENT
Start: 2024-03-27 | End: 2024-03-28 | Stop reason: HOSPADM

## 2024-03-27 RX ORDER — DIPHENHYDRAMINE HYDROCHLORIDE 50 MG/ML
50 INJECTION INTRAMUSCULAR; INTRAVENOUS AS NEEDED
OUTPATIENT
Start: 2024-04-10

## 2024-03-27 RX ORDER — MEPERIDINE HYDROCHLORIDE 25 MG/ML
25 INJECTION INTRAMUSCULAR; INTRAVENOUS; SUBCUTANEOUS
OUTPATIENT
Start: 2024-04-10

## 2024-03-27 RX ADMIN — DARATUMUMAB AND HYALURONIDASE-FIHJ (HUMAN RECOMBINANT) 1800 MG: 1800; 30000 INJECTION SUBCUTANEOUS at 11:57

## 2024-03-27 RX ADMIN — ACETAMINOPHEN 1000 MG: 500 TABLET ORAL at 10:47

## 2024-03-27 RX ADMIN — SODIUM CHLORIDE 20 ML/HR: 9 INJECTION, SOLUTION INTRAVENOUS at 10:46

## 2024-03-27 RX ADMIN — HEPARIN 500 UNITS: 100 SYRINGE at 11:54

## 2024-03-27 RX ADMIN — Medication 20 ML: at 11:54

## 2024-03-27 RX ADMIN — DIPHENHYDRAMINE HYDROCHLORIDE 25 MG: 50 INJECTION, SOLUTION INTRAMUSCULAR; INTRAVENOUS at 10:49

## 2024-03-27 RX ADMIN — METHYLPREDNISOLONE SODIUM SUCCINATE 60 MG: 125 INJECTION INTRAMUSCULAR; INTRAVENOUS at 10:52

## 2024-03-27 NOTE — ASSESSMENT & PLAN NOTE
Patient is on active therapy with daratumumab, lenalidomide, dexamethasone.  Tolerating well.  SPEP demonstrates a faint M spike but too small to quantify.  Overall good response to therapy.  Lab work looks adequate for treatment.  She does have mild cytopenias but not enough for dose adjustment.  Proceed with cycle 6 as planned.  I will see her back for cycle 7-day 1 with lab work prior to monitor for toxicities.  Continue prophylactic antibiotics with acyclovir and dapsone.

## 2024-03-27 NOTE — PROGRESS NOTES
Chief Complaint  Chemotherapy    Rena Guerra,*  Rena Guerra, PA    Subjective          Anca Samson presents to [facility] for ongoing treatment of her multiple myeloma.  She is on daratumumab, lenalidomide, dexamethasone.  Tolerating her treatments well.  She denies any issues or side effects.  She notes normal appetite and her weight is maintained.  Energy level is low.  Her activity is restricted by her underlying COPD.  She is oxygen dependent.  She denies new masses, adenopathy.  No issues from Port-A-Cath.    Oncology/Hematology History Overview Note   Smoldering Myeloma    Initially diagnosed in 2016 with bone marrow biopsy demonstrating 30% CD56 positive monoclonal plasma cell population.  45 XX normal female chromosome pattern  FISH panel negative for myeloma associated mutations including 1q21, 9q34,11q13,14q32,15q24, 17q13  No anemia, renal insufficiency, hypercalcemia.  On observation since that time    Low grade adenocarcinoma of ascending colon      5/16/2017 right hemicolectomy which  revealed a low-grade 7.6 cm adenocarcinoma of the cecum. All margins were  negative. Lymphovascular invasion and perineural invasion were not identified.     26 lymph nodes were removed and unfortunately 1 was involved with metastatic deposit. pT3 pN1a colorectal cancer.        Clinical Staging      Smoldering Myeloma; Colon ([id number])            Treatments      Chemotherapy      11/2017 completed 6mo adjuvant Xeloda        6/2022 Stopped Smoking     Malignant neoplasm of ascending colon   7/21/2017 Initial Diagnosis    Colon cancer (CMS/HCC)     Multiple myeloma   7/28/2021 Initial Diagnosis    Multiple myeloma     11/7/2023 -  Chemotherapy    OP MULTIPLE MYELOMA Daratumumab / Lenalidomide / Dexamethasone         Review of Systems   Constitutional:  Positive for fatigue. Negative for appetite change, diaphoresis, fever, unexpected weight gain and  unexpected weight loss.   HENT:  Negative for hearing loss, mouth sores, sore throat, swollen glands, trouble swallowing and voice change.    Eyes:  Negative for blurred vision.   Respiratory:  Positive for shortness of breath. Negative for cough and wheezing.    Cardiovascular:  Negative for chest pain and palpitations.   Gastrointestinal:  Negative for abdominal pain, blood in stool, constipation, diarrhea, nausea and vomiting.   Endocrine: Negative for cold intolerance and heat intolerance.   Genitourinary:  Negative for difficulty urinating, dysuria, frequency, hematuria and urinary incontinence.   Musculoskeletal:  Negative for arthralgias, back pain and myalgias.   Skin:  Negative for rash, skin lesions and wound.   Neurological:  Negative for dizziness, seizures, weakness, numbness and headache.   Hematological:  Does not bruise/bleed easily.   Psychiatric/Behavioral:  Negative for depressed mood. The patient is not nervous/anxious.      Current Outpatient Medications on File Prior to Visit   Medication Sig Dispense Refill    acyclovir (ZOVIRAX) 400 MG tablet Take 1 tablet by mouth 2 (Two) Times a Day. Take one tablet by mouth twice daily. 60 tablet 11    albuterol sulfate  (90 Base) MCG/ACT inhaler Inhale 2 puffs Every 4 (Four) Hours As Needed for Wheezing. 18 g 11    Budeson-Glycopyrrol-Formoterol (Breztri Aerosphere) 160-9-4.8 MCG/ACT aerosol inhaler Inhale 2 puffs 2 (Two) Times a Day. Rinse mouth out after each use 1 each 11    colestipol (COLESTID) 1 g tablet Take 2 tablets by mouth 2 (Two) Times a Day. 120 tablet 5    dapsone 25 MG tablet TAKE 2 TABLETS BY MOUTH TWICE A  tablet 1    dexAMETHasone (DECADRON) 4 MG tablet Take 10 tablets on D 1,8,15,22 and take 1 tablet on D 2,3,16,17.  Take in the morning with food. 44 tablet 3    FLUoxetine (PROzac) 40 MG capsule Take 1 capsule by mouth Daily. 90 capsule 1    lenalidomide (REVLIMID) 15 MG capsule Take 1 capsule by mouth Daily. On Days 1-21,  and off 7 days, on a 28 day cycle 21 capsule 0    LORazepam (Ativan) 1 MG tablet Take 1 tablet by mouth 2 (Two) Times a Day As Needed for Anxiety. 14 tablet 0    ondansetron (ZOFRAN) 8 MG tablet Take 1 tablet by mouth 3 (Three) Times a Day As Needed for Nausea or Vomiting. (Patient taking differently: Take 1 tablet by mouth 3 (Three) Times a Day As Needed for Nausea or Vomiting. TO START WHEN STARTS CHEMO 11/3/23) 30 tablet 5    promethazine (PHENERGAN) 25 MG tablet Take 1 tablet by mouth Every 6 (Six) Hours As Needed for Nausea or Vomiting. 30 tablet 1    vitamin D (ERGOCALCIFEROL) 1.25 MG (63408 UT) capsule capsule Take 1 capsule by mouth 2 (Two) Times a Week. 26 capsule 1    arformoterol (BROVANA) 15 MCG/2ML nebulizer solution Take 2 mL by nebulization 2 (Two) Times a Day. (Patient not taking: Reported on 2/15/2024)      dexAMETHasone (DECADRON) 4 MG tablet Take 5 tablets on D 1,8,15,22 and take 1 tablet on D 2,3,9,10,16,17,23,24.  Take in the morning with food. 28 tablet 1    HYDROcodone-acetaminophen (Norco) 5-325 MG per tablet Take 1 tablet by mouth Every 6 (Six) Hours As Needed for Moderate Pain or Severe Pain. (Patient not taking: Reported on 2/15/2024) 6 tablet 0    levothyroxine (SYNTHROID, LEVOTHROID) 25 MCG tablet Take 1 tablet by mouth Every Morning. (Patient not taking: Reported on 2/15/2024) 90 tablet 1    meloxicam (MOBIC) 15 MG tablet TAKE 1 TABLET BY MOUTH EVERY DAY (Patient not taking: Reported on 2/21/2024) 90 tablet 1     No current facility-administered medications on file prior to visit.       Allergies   Allergen Reactions    Cephalexin Hives    Morphine Anaphylaxis    Penicillins Hives    Sulfa Antibiotics Hives     Past Medical History:   Diagnosis Date    Anxiety     Asthma     Asthma 07/28/2021    Atherosclerosis of native coronary artery of native heart with angina pectoris 08/15/2023    FOLLOWED BY DR GONZALES/DINA PETTIT. DENIES CP BUT GETS SOA WITH EXERTION HAS COPD WEARS AT 2LPM N/C.  DECREASED ACTIVITY D/T SOA    C. difficile diarrhea     DENIES ANY CURRENT ISSUES    Colon cancer 07/21/2017    Colon polyp     COPD (chronic obstructive pulmonary disease) 10/23/2017    COPD (chronic obstructive pulmonary disease) 10/23/2017    Depression     Disease of thyroid gland     Diverticulitis     Dysphagia     Essential hypertension 07/28/2021    Heartburn     Hernia 2019    History of chemotherapy     2017    Hx of psychiatric care     Hyperlipidemia     Impaired fasting glucose 08/14/2015    Lumbago     Lung nodule 02/17/2022    Major depressive disorder 10/27/2016    Malignant neoplasm of ascending colon 07/21/2017    Melena     Multiple myeloma     Nicotine dependence 05/10/2017    NICK (obstructive sleep apnea) 11/06/2023    Oxygen dependent     REPORTS USES 02 AT 2LPM VIA N/C. CAN GET VERY SOA WITH MINIMAL EXERTION    Renal insufficiency 07/28/2021    Seizures     PSEUDO SEIZURES LAST ONE AROUND NOV 2022    Shortness of breath     CAN GET VERY SOA WITH MINIMAL EXERTION    Sleep apnea     Tobacco use 07/28/2021    QUIT SMOKING JUNE 2022    Visit for screening mammogram 02/20/2020    NORMAL- REPEAT IN ONE YEAR    Vitamin D deficiency      Past Surgical History:   Procedure Laterality Date    APPENDECTOMY      BONE MARROW BIOPSY  2016    COLON SURGERY  2017    COLONOSCOPY  2018,2017,2019    [facility]- DR LE:DIVERTICULOSIS AND ERYTHEMA OF MUCOSA    COLONOSCOPY N/A 12/20/2022    Procedure: COLONOSCOPY WITH ELEVIEW INJECTION, POLYPECTOMY, HOT SNARE, CLIP APPLICATION X3, BIOPSIES;  Surgeon: Jarvis Borges MD;  Location: [facility] ENDOSCOPY;  Service: Gastroenterology;  Laterality: N/A;  COLON POLYP, DIVERTICULOSIS, ANASTOMOSIS RIGHT COLON    COLONOSCOPY N/A 11/9/2023    Procedure: COLONOSCOPY;  Surgeon: Jarvis Borges MD;  Location: [facility] ENDOSCOPY;  Service: Gastroenterology;  Laterality: N/A;  DIVERTICULOSIS, ANASTOMOSIS IN ASCENDING COLON    ENDOSCOPY  2018    FECAL  DISIMPACTION  2019    TRANSPLANT     HEMICOLECTOMY Right 2017    HYSTERECTOMY  1982,1984    KNEE ARTHROSCOPY Left 2022    Procedure: KNEE ARTHROSCOPY WITH PARTIAL MEDIAL MENISCECTOMY,  CHONDROPLASTY;  Surgeon: Nicholas Tipton MD;  Location: [facility] OR [facility];  Service: Orthopedics;  Laterality: Left;    MINI-LAPAROTOMY  2017    PORTACATH PLACEMENT N/A     pt states that her port does not work    UPPER GASTROINTESTINAL ENDOSCOPY      VENOUS ACCESS DEVICE (PORT) INSERTION N/A 10/31/2023    Procedure: Port-a-catheter removal and port-a-catheter placement;  Surgeon: Alcon Delgado MD;  Location: [facility] OR [facility];  Service: General;  Laterality: N/A;     Social History     Socioeconomic History    Marital status:    Tobacco Use    Smoking status: Former     Current packs/day: 0.00     Average packs/day: 1 pack/day for 47.4 years (47.4 ttl pk-yrs)     Types: Cigarettes     Start date: 1975     Quit date: 6/3/2022     Years since quittin.8     Passive exposure: Past    Smokeless tobacco: Never   Vaping Use    Vaping status: Never Used   Substance and Sexual Activity    Alcohol use: Never    Drug use: Never    Sexual activity: Defer     Family History   Problem Relation Age of Onset    Heart disease Mother     Lung cancer Mother     Cancer Mother     Stroke Father     Heart disease Father     Kidney cancer Father     Cancer Father     Stroke Other         UNCLE/AUNT    Colon cancer Neg Hx     Malig Hyperthermia Neg Hx        Objective   Physical Exam  Vitals reviewed. Exam conducted with a chaperone present.   Constitutional:       General: She is not in acute distress.     Appearance: Normal appearance.   HENT:      Nose:      Comments: Nasal cannula in place    Cardiovascular:      Rate and Rhythm: Normal rate and regular rhythm.      Heart sounds: Normal heart sounds. No murmur heard.     No gallop.   Pulmonary:      Effort: Pulmonary effort is normal.      Breath sounds: Normal breath  "sounds.      Comments: Port    Abdominal:      General: Abdomen is flat. Bowel sounds are normal.      Palpations: Abdomen is soft.   Neurological:      Mental Status: She is alert.   Psychiatric:         Mood and Affect: Mood normal.         Behavior: Behavior normal.         Vitals:    03/27/24 0937   BP: 111/51   Pulse: 81   Resp: 20   Temp: 98.6 °F (37 °C)   SpO2: 96%  Comment: 2L of O2   Weight: 89.5 kg (197 lb 5 oz)   Height: 153.5 cm (60.43\")   PainSc: 0-No pain     ECOG score: 2         PHQ-9 Total Score:                    Result Review :   The following data was reviewed by: West Nicolas MD on 03/27/2024:  Lab Results   Component Value Date    HGB 11.0 (L) 03/27/2024    HCT 33.8 (L) 03/27/2024    .3 (H) 03/27/2024     (H) 03/27/2024    WBC 10.09 03/27/2024    NEUTROABS 4.47 03/27/2024    LYMPHSABS 4.46 (H) 03/27/2024    MONOSABS 1.04 (H) 03/27/2024    EOSABS 0.00 03/27/2024    BASOSABS 0.07 03/27/2024     Lab Results   Component Value Date    GLUCOSE 185 (H) 03/27/2024    BUN 15 03/27/2024    CREATININE 0.79 03/27/2024     03/27/2024    K 3.7 03/27/2024     (H) 03/27/2024    CO2 20.1 (L) 03/27/2024    CALCIUM 9.1 03/27/2024    PROTEINTOT 5.7 (L) 03/27/2024    ALBUMIN 3.8 03/27/2024    BILITOT 0.5 03/27/2024    ALKPHOS 80 03/27/2024    AST 8 03/27/2024    ALT 14 03/27/2024     Lab Results   Component Value Date    FREET4 1.55 12/06/2023    TSH 1.550 02/28/2024     No results found for: \"IRON\", \"LABIRON\", \"TRANSFERRIN\", \"TIBC\"  Lab Results   Component Value Date     03/07/2023    JGGSABYR20 218 01/28/2020    FOLATE 17.7 01/28/2020     No results found for: \"PSA\", \"CEA\", \"AFP\", \"\", \"\"          Assessment and Plan    Diagnoses and all orders for this visit:    1. Multiple myeloma, remission status unspecified (Primary)  Assessment & Plan:  Patient is on active therapy with daratumumab, lenalidomide, dexamethasone.  Tolerating well.  SPEP demonstrates a faint M " spike but too small to quantify.  Overall good response to therapy.  Lab work looks adequate for treatment.  She does have mild cytopenias but not enough for dose adjustment.  Proceed with cycle 6 as planned.  I will see her back for cycle 7-day 1 with lab work prior to monitor for toxicities.  Continue prophylactic antibiotics with acyclovir and dapsone.    Orders:  -     CBC and Differential; Future  -     Comprehensive metabolic panel; Future  -     CBC and Differential; Future    2. Fatigue, unspecified type  Assessment & Plan:  B12 and folic acid levels will be obtained    Orders:  -     Vitamin B12; Future  -     Folate; Future    Other orders  -     Cancel: sodium chloride 0.9 % infusion  -     Cancel: acetaminophen (TYLENOL) tablet 1,000 mg  -     Cancel: methylPREDNISolone sodium succinate (SOLU-Medrol) injection 60 mg  -     Cancel: diphenhydrAMINE (BENADRYL) IVPB 25 mg  -     Cancel: daratumumab-hyaluronidase-fihj (DARZALEX FASPRO) 1800-13479 MG-UT/15ML injection 1,800 mg  -     Cancel: Hydrocortisone Sod Suc (PF) (Solu-CORTEF) injection 100 mg  -     Cancel: diphenhydrAMINE (BENADRYL) injection 50 mg  -     Cancel: famotidine (PEPCID) injection 20 mg  -     Cancel: meperidine (DEMEROL) injection 25 mg  -     sodium chloride 0.9 % infusion  -     acetaminophen (TYLENOL) tablet 1,000 mg  -     methylPREDNISolone sodium succinate (SOLU-Medrol) injection 60 mg  -     diphenhydrAMINE (BENADRYL) IVPB 25 mg  -     daratumumab-hyaluronidase-fihj (DARZALEX FASPRO) 1800-24351 MG-UT/15ML injection 1,800 mg  -     Hydrocortisone Sod Suc (PF) (Solu-CORTEF) injection 100 mg  -     diphenhydrAMINE (BENADRYL) injection 50 mg  -     famotidine (PEPCID) injection 20 mg  -     meperidine (DEMEROL) injection 25 mg            Patient Follow Up: Cycle 7-day 1    Patient was given instructions and counseling regarding her condition or for health maintenance advice. Please see specific information pulled into the AVS if  appropriate.     West Nicolas MD    3/27/2024

## 2024-03-28 ENCOUNTER — SPECIALTY PHARMACY (OUTPATIENT)
Dept: PHARMACY | Facility: HOSPITAL | Age: 74
End: 2024-03-28
Payer: MEDICARE

## 2024-03-28 ENCOUNTER — TELEPHONE (OUTPATIENT)
Dept: ONCOLOGY | Facility: HOSPITAL | Age: 74
End: 2024-03-28
Payer: MEDICARE

## 2024-03-28 DIAGNOSIS — E53.8 B12 DEFICIENCY: Primary | ICD-10-CM

## 2024-03-28 NOTE — TELEPHONE ENCOUNTER
Spoke with patient and informed her to start taking 1000mcg of b12 daily because her lab was low. Informed her that we will recheck in one month to see how it is doing. Patient v/u.

## 2024-03-28 NOTE — TELEPHONE ENCOUNTER
----- Message from West Nicolas MD sent at 3/28/2024  8:04 AM EDT -----  B12 level is low. Have her start B12 by mouth 1000mcg daily. Recheck next visit. If no improvement,will do injections  ----- Message -----  From: Lab, Background User  Sent: 3/27/2024   9:36 AM EDT  To: West Nicolas MD

## 2024-04-01 ENCOUNTER — OFFICE VISIT (OUTPATIENT)
Dept: PULMONOLOGY | Facility: CLINIC | Age: 74
End: 2024-04-01
Payer: MEDICARE

## 2024-04-01 ENCOUNTER — TELEPHONE (OUTPATIENT)
Dept: PULMONOLOGY | Facility: CLINIC | Age: 74
End: 2024-04-01

## 2024-04-01 VITALS
DIASTOLIC BLOOD PRESSURE: 86 MMHG | WEIGHT: 194 LBS | RESPIRATION RATE: 24 BRPM | BODY MASS INDEX: 38.09 KG/M2 | HEART RATE: 111 BPM | HEIGHT: 60 IN | SYSTOLIC BLOOD PRESSURE: 100 MMHG | OXYGEN SATURATION: 96 %

## 2024-04-01 DIAGNOSIS — K44.9 HIATAL HERNIA: ICD-10-CM

## 2024-04-01 DIAGNOSIS — R91.8 LUNG NODULES: ICD-10-CM

## 2024-04-01 DIAGNOSIS — G47.33 OSA (OBSTRUCTIVE SLEEP APNEA): ICD-10-CM

## 2024-04-01 DIAGNOSIS — C90.00 MULTIPLE MYELOMA NOT HAVING ACHIEVED REMISSION: ICD-10-CM

## 2024-04-01 DIAGNOSIS — F17.201 TOBACCO ABUSE, IN REMISSION: ICD-10-CM

## 2024-04-01 DIAGNOSIS — C18.2 MALIGNANT NEOPLASM OF ASCENDING COLON: ICD-10-CM

## 2024-04-01 DIAGNOSIS — R06.09 CHRONIC DYSPNEA: ICD-10-CM

## 2024-04-01 DIAGNOSIS — J44.9 CHRONIC OBSTRUCTIVE PULMONARY DISEASE, UNSPECIFIED COPD TYPE: Primary | ICD-10-CM

## 2024-04-01 DIAGNOSIS — J43.2 CENTRILOBULAR EMPHYSEMA: ICD-10-CM

## 2024-04-01 DIAGNOSIS — J96.11 CHRONIC RESPIRATORY FAILURE WITH HYPOXIA: ICD-10-CM

## 2024-04-01 PROCEDURE — 3079F DIAST BP 80-89 MM HG: CPT | Performed by: NURSE PRACTITIONER

## 2024-04-01 PROCEDURE — 3074F SYST BP LT 130 MM HG: CPT | Performed by: NURSE PRACTITIONER

## 2024-04-01 PROCEDURE — 1160F RVW MEDS BY RX/DR IN RCRD: CPT | Performed by: NURSE PRACTITIONER

## 2024-04-01 PROCEDURE — 99214 OFFICE O/P EST MOD 30 MIN: CPT | Performed by: NURSE PRACTITIONER

## 2024-04-01 PROCEDURE — 1159F MED LIST DOCD IN RCRD: CPT | Performed by: NURSE PRACTITIONER

## 2024-04-01 NOTE — TELEPHONE ENCOUNTER
From: Anca Samson  To: Rena Guerra  Sent: 3/24/2024 6:15 PM EDT  Subject: MRI    Ms. GARCIA, when are you going to send a prescription in for me when I take that MRI in April?

## 2024-04-05 ENCOUNTER — SPECIALTY PHARMACY (OUTPATIENT)
Dept: PHARMACY | Facility: HOSPITAL | Age: 74
End: 2024-04-05
Payer: MEDICARE

## 2024-04-05 DIAGNOSIS — C90.00 MULTIPLE MYELOMA, REMISSION STATUS UNSPECIFIED: ICD-10-CM

## 2024-04-05 RX ORDER — LENALIDOMIDE 15 MG/1
15 CAPSULE ORAL DAILY
Qty: 21 CAPSULE | Refills: 0 | Status: SHIPPED | OUTPATIENT
Start: 2024-04-05

## 2024-04-05 NOTE — PROGRESS NOTES
Re: Refills of Oral Specialty Medication - Revlimid (lenalidomide)    Drug-Drug Interactions: The current medication list was reviewed and there are no relevant drug-drug interactions with the specialty medication.  Medication Allergies: The patient has no relevant allergies as it relates to their oral specialty medication  Review of Labs/Dose Adjustments: NO DOSE CHANGE - I reviewed the most recent note and labs and the patient will continue without any dose changes.  I sent refills as described below.    Drug: Revlimid (lenalidomide)  Strength: 15 mg  Directions: Take 1 capsule by mouth daily ON days 1-21, OFF for 7 days of each 28 day cycle  Quantity: 21  Refills: 0  REMS Auth # [id number] Exp 5/5/24  Pharmacy prescription sent to: [facility]      Madisyn Saldaña, PharmD, [facility]  Oncology Clinical Pharmacist  4/5/2024  13:34 EDT

## 2024-04-10 ENCOUNTER — HOSPITAL ENCOUNTER (OUTPATIENT)
Dept: ONCOLOGY | Facility: HOSPITAL | Age: 74
Discharge: HOME | End: 2024-04-10
Admitting: INTERNAL MEDICINE
Payer: MEDICARE

## 2024-04-10 VITALS
SYSTOLIC BLOOD PRESSURE: 142 MMHG | OXYGEN SATURATION: 96 % | WEIGHT: 197.53 LBS | DIASTOLIC BLOOD PRESSURE: 64 MMHG | TEMPERATURE: 97.2 F | HEART RATE: 91 BPM | RESPIRATION RATE: 20 BRPM | BODY MASS INDEX: 38.78 KG/M2 | HEIGHT: 60 IN

## 2024-04-10 DIAGNOSIS — C90.00 MULTIPLE MYELOMA, REMISSION STATUS UNSPECIFIED: Primary | ICD-10-CM

## 2024-04-10 DIAGNOSIS — Z12.2 ENCOUNTER FOR SCREENING FOR LUNG CANCER: ICD-10-CM

## 2024-04-10 LAB
BASOPHILS # BLD AUTO: 0.02 10*3/MM3 (ref 0–0.2)
BASOPHILS NFR BLD AUTO: 0.2 % (ref 0–1.5)
DEPRECATED RDW RBC AUTO: 70.5 FL (ref 37–54)
EOSINOPHIL # BLD AUTO: 0.03 10*3/MM3 (ref 0–0.4)
EOSINOPHIL NFR BLD AUTO: 0.3 % (ref 0.3–6.2)
ERYTHROCYTE [DISTWIDTH] IN BLOOD BY AUTOMATED COUNT: 17.7 % (ref 12.3–15.4)
HCT VFR BLD AUTO: 34.2 % (ref 34–46.6)
HGB BLD-MCNC: 10.9 G/DL (ref 12–15.9)
IMM GRANULOCYTES # BLD AUTO: 0.04 10*3/MM3 (ref 0–0.05)
IMM GRANULOCYTES NFR BLD AUTO: 0.3 % (ref 0–0.5)
LYMPHOCYTES # BLD AUTO: 4.11 10*3/MM3 (ref 0.7–3.1)
LYMPHOCYTES NFR BLD AUTO: 35 % (ref 19.6–45.3)
MCH RBC QN AUTO: 34 PG (ref 26.6–33)
MCHC RBC AUTO-ENTMCNC: 31.9 G/DL (ref 31.5–35.7)
MCV RBC AUTO: 106.5 FL (ref 79–97)
MONOCYTES # BLD AUTO: 1.24 10*3/MM3 (ref 0.1–0.9)
MONOCYTES NFR BLD AUTO: 10.6 % (ref 5–12)
NEUTROPHILS NFR BLD AUTO: 53.6 % (ref 42.7–76)
NEUTROPHILS NFR BLD AUTO: 6.29 10*3/MM3 (ref 1.7–7)
PLATELET # BLD AUTO: 388 10*3/MM3 (ref 140–450)
PMV BLD AUTO: 10.1 FL (ref 6–12)
RBC # BLD AUTO: 3.21 10*6/MM3 (ref 3.77–5.28)
WBC NRBC COR # BLD AUTO: 11.73 10*3/MM3 (ref 3.4–10.8)

## 2024-04-10 PROCEDURE — 25010000002 DIPHENHYDRAMINE PER 50 MG: Performed by: INTERNAL MEDICINE

## 2024-04-10 PROCEDURE — 25010000002 METHYLPREDNISOLONE PER 125 MG: Performed by: INTERNAL MEDICINE

## 2024-04-10 PROCEDURE — 25810000003 SODIUM CHLORIDE 0.9 % SOLUTION: Performed by: INTERNAL MEDICINE

## 2024-04-10 PROCEDURE — 25010000002 DARATUMUMAB-HYALURONIDASE-FIHJ 1800-30000 MG-UT/15ML SOLUTION: Performed by: INTERNAL MEDICINE

## 2024-04-10 PROCEDURE — 85025 COMPLETE CBC W/AUTO DIFF WBC: CPT | Performed by: INTERNAL MEDICINE

## 2024-04-10 PROCEDURE — 25010000002 HEPARIN LOCK FLUSH PER 10 UNITS: Performed by: INTERNAL MEDICINE

## 2024-04-10 PROCEDURE — 96375 TX/PRO/DX INJ NEW DRUG ADDON: CPT

## 2024-04-10 PROCEDURE — 96401 CHEMO ANTI-NEOPL SQ/IM: CPT

## 2024-04-10 PROCEDURE — 96374 THER/PROPH/DIAG INJ IV PUSH: CPT

## 2024-04-10 PROCEDURE — 63710000001 ACETAMINOPHEN EXTRA STRENGTH 500 MG TABLET: Performed by: INTERNAL MEDICINE

## 2024-04-10 PROCEDURE — A9270 NON-COVERED ITEM OR SERVICE: HCPCS | Performed by: INTERNAL MEDICINE

## 2024-04-10 RX ORDER — DIPHENHYDRAMINE HYDROCHLORIDE 50 MG/ML
50 INJECTION INTRAMUSCULAR; INTRAVENOUS AS NEEDED
Status: DISCONTINUED | OUTPATIENT
Start: 2024-04-10 | End: 2024-04-11 | Stop reason: HOSPADM

## 2024-04-10 RX ORDER — DEXAMETHASONE 4 MG/1
TABLET ORAL
Qty: 42 TABLET | Refills: 5 | Status: SHIPPED | OUTPATIENT
Start: 2024-04-10

## 2024-04-10 RX ORDER — METHYLPREDNISOLONE SODIUM SUCCINATE 125 MG/2ML
60 INJECTION, POWDER, LYOPHILIZED, FOR SOLUTION INTRAMUSCULAR; INTRAVENOUS ONCE
Status: COMPLETED | OUTPATIENT
Start: 2024-04-10 | End: 2024-04-10

## 2024-04-10 RX ORDER — HEPARIN SODIUM (PORCINE) LOCK FLUSH IV SOLN 100 UNIT/ML 100 UNIT/ML
500 SOLUTION INTRAVENOUS AS NEEDED
OUTPATIENT
Start: 2024-04-10

## 2024-04-10 RX ORDER — ACETAMINOPHEN 500 MG
1000 TABLET ORAL ONCE
Status: COMPLETED | OUTPATIENT
Start: 2024-04-10 | End: 2024-04-10

## 2024-04-10 RX ORDER — SODIUM CHLORIDE 0.9 % (FLUSH) 0.9 %
20 SYRINGE (ML) INJECTION AS NEEDED
OUTPATIENT
Start: 2024-04-10

## 2024-04-10 RX ORDER — HEPARIN SODIUM (PORCINE) LOCK FLUSH IV SOLN 100 UNIT/ML 100 UNIT/ML
500 SOLUTION INTRAVENOUS AS NEEDED
Status: DISCONTINUED | OUTPATIENT
Start: 2024-04-10 | End: 2024-04-11 | Stop reason: HOSPADM

## 2024-04-10 RX ORDER — MEPERIDINE HYDROCHLORIDE 25 MG/ML
25 INJECTION INTRAMUSCULAR; INTRAVENOUS; SUBCUTANEOUS
Status: DISCONTINUED | OUTPATIENT
Start: 2024-04-10 | End: 2024-04-11 | Stop reason: HOSPADM

## 2024-04-10 RX ORDER — FAMOTIDINE 10 MG/ML
20 INJECTION, SOLUTION INTRAVENOUS AS NEEDED
Status: DISCONTINUED | OUTPATIENT
Start: 2024-04-10 | End: 2024-04-11 | Stop reason: HOSPADM

## 2024-04-10 RX ORDER — SODIUM CHLORIDE 0.9 % (FLUSH) 0.9 %
20 SYRINGE (ML) INJECTION AS NEEDED
Status: DISCONTINUED | OUTPATIENT
Start: 2024-04-10 | End: 2024-04-11 | Stop reason: HOSPADM

## 2024-04-10 RX ORDER — SODIUM CHLORIDE 9 MG/ML
20 INJECTION, SOLUTION INTRAVENOUS ONCE
Status: COMPLETED | OUTPATIENT
Start: 2024-04-10 | End: 2024-04-10

## 2024-04-10 RX ADMIN — Medication 20 ML: at 10:47

## 2024-04-10 RX ADMIN — ACETAMINOPHEN 1000 MG: 500 TABLET ORAL at 09:46

## 2024-04-10 RX ADMIN — METHYLPREDNISOLONE SODIUM SUCCINATE 60 MG: 125 INJECTION INTRAMUSCULAR; INTRAVENOUS at 09:46

## 2024-04-10 RX ADMIN — DIPHENHYDRAMINE HYDROCHLORIDE 25 MG: 50 INJECTION, SOLUTION INTRAMUSCULAR; INTRAVENOUS at 09:44

## 2024-04-10 RX ADMIN — HEPARIN 500 UNITS: 100 SYRINGE at 10:47

## 2024-04-10 RX ADMIN — SODIUM CHLORIDE 20 ML/HR: 9 INJECTION, SOLUTION INTRAVENOUS at 09:43

## 2024-04-10 RX ADMIN — DARATUMUMAB AND HYALURONIDASE-FIHJ (HUMAN RECOMBINANT) 1800 MG: 1800; 30000 INJECTION SUBCUTANEOUS at 10:50

## 2024-04-15 ENCOUNTER — HOSPITAL ENCOUNTER (OUTPATIENT)
Dept: GENERAL RADIOLOGY | Facility: HOSPITAL | Age: 74
Discharge: HOME | End: 2024-04-15
Payer: MEDICARE

## 2024-04-15 ENCOUNTER — LAB (OUTPATIENT)
Dept: LAB | Facility: HOSPITAL | Age: 74
End: 2024-04-15
Payer: MEDICARE

## 2024-04-15 ENCOUNTER — OFFICE VISIT (OUTPATIENT)
Dept: PULMONOLOGY | Facility: CLINIC | Age: 74
End: 2024-04-15
Payer: MEDICARE

## 2024-04-15 VITALS
DIASTOLIC BLOOD PRESSURE: 52 MMHG | OXYGEN SATURATION: 90 % | TEMPERATURE: 97.5 F | RESPIRATION RATE: 20 BRPM | HEART RATE: 113 BPM | BODY MASS INDEX: 38.46 KG/M2 | WEIGHT: 195.9 LBS | HEIGHT: 60 IN | SYSTOLIC BLOOD PRESSURE: 144 MMHG

## 2024-04-15 DIAGNOSIS — J44.9 CHRONIC OBSTRUCTIVE PULMONARY DISEASE, UNSPECIFIED COPD TYPE: ICD-10-CM

## 2024-04-15 DIAGNOSIS — K44.9 HIATAL HERNIA: ICD-10-CM

## 2024-04-15 DIAGNOSIS — R05.9 COUGH, UNSPECIFIED TYPE: ICD-10-CM

## 2024-04-15 DIAGNOSIS — G47.33 OSA (OBSTRUCTIVE SLEEP APNEA): ICD-10-CM

## 2024-04-15 DIAGNOSIS — J96.11 CHRONIC RESPIRATORY FAILURE WITH HYPOXIA: ICD-10-CM

## 2024-04-15 DIAGNOSIS — E53.8 B12 DEFICIENCY: ICD-10-CM

## 2024-04-15 DIAGNOSIS — R06.00 DYSPNEA, UNSPECIFIED TYPE: ICD-10-CM

## 2024-04-15 DIAGNOSIS — J43.2 CENTRILOBULAR EMPHYSEMA: ICD-10-CM

## 2024-04-15 DIAGNOSIS — R06.2 WHEEZING: ICD-10-CM

## 2024-04-15 DIAGNOSIS — J44.9 CHRONIC OBSTRUCTIVE PULMONARY DISEASE, UNSPECIFIED COPD TYPE: Primary | ICD-10-CM

## 2024-04-15 DIAGNOSIS — F17.201 TOBACCO ABUSE, IN REMISSION: ICD-10-CM

## 2024-04-15 DIAGNOSIS — R91.8 MULTIPLE LUNG NODULES: ICD-10-CM

## 2024-04-15 LAB
ALBUMIN SERPL-MCNC: 3.7 G/DL (ref 3.5–5.2)
ALBUMIN/GLOB SERPL: 1.9 G/DL
ALP SERPL-CCNC: 71 U/L (ref 39–117)
ALT SERPL W P-5'-P-CCNC: 21 U/L (ref 1–33)
ANION GAP SERPL CALCULATED.3IONS-SCNC: 15.5 MMOL/L (ref 5–15)
AST SERPL-CCNC: 19 U/L (ref 1–32)
BASOPHILS # BLD AUTO: 0.07 10*3/MM3 (ref 0–0.2)
BASOPHILS NFR BLD AUTO: 0.8 % (ref 0–1.5)
BILIRUB SERPL-MCNC: 0.6 MG/DL (ref 0–1.2)
BUN SERPL-MCNC: 9 MG/DL (ref 8–23)
BUN/CREAT SERPL: 12 (ref 7–25)
CALCIUM SPEC-SCNC: 8.8 MG/DL (ref 8.6–10.5)
CHLORIDE SERPL-SCNC: 108 MMOL/L (ref 98–107)
CO2 SERPL-SCNC: 16.5 MMOL/L (ref 22–29)
CREAT SERPL-MCNC: 0.75 MG/DL (ref 0.57–1)
DEPRECATED RDW RBC AUTO: 58.7 FL (ref 37–54)
EGFRCR SERPLBLD CKD-EPI 2021: 83.7 ML/MIN/1.73
EOSINOPHIL # BLD AUTO: 0.54 10*3/MM3 (ref 0–0.4)
EOSINOPHIL NFR BLD AUTO: 6.1 % (ref 0.3–6.2)
ERYTHROCYTE [DISTWIDTH] IN BLOOD BY AUTOMATED COUNT: 15.2 % (ref 12.3–15.4)
GLOBULIN UR ELPH-MCNC: 2 GM/DL
GLUCOSE SERPL-MCNC: 160 MG/DL (ref 65–99)
HCT VFR BLD AUTO: 35.9 % (ref 34–46.6)
HGB BLD-MCNC: 11.5 G/DL (ref 12–15.9)
IMM GRANULOCYTES # BLD AUTO: 0.04 10*3/MM3 (ref 0–0.05)
IMM GRANULOCYTES NFR BLD AUTO: 0.5 % (ref 0–0.5)
LYMPHOCYTES # BLD AUTO: 3.98 10*3/MM3 (ref 0.7–3.1)
LYMPHOCYTES NFR BLD AUTO: 45.3 % (ref 19.6–45.3)
MCH RBC QN AUTO: 33.6 PG (ref 26.6–33)
MCHC RBC AUTO-ENTMCNC: 32 G/DL (ref 31.5–35.7)
MCV RBC AUTO: 105 FL (ref 79–97)
MONOCYTES # BLD AUTO: 1.14 10*3/MM3 (ref 0.1–0.9)
MONOCYTES NFR BLD AUTO: 13 % (ref 5–12)
NEUTROPHILS NFR BLD AUTO: 3.02 10*3/MM3 (ref 1.7–7)
NEUTROPHILS NFR BLD AUTO: 34.3 % (ref 42.7–76)
NRBC BLD AUTO-RTO: 0 /100 WBC (ref 0–0.2)
NT-PROBNP SERPL-MCNC: 121 PG/ML (ref 0–900)
PLATELET # BLD AUTO: 299 10*3/MM3 (ref 140–450)
PMV BLD AUTO: 10.3 FL (ref 6–12)
POTASSIUM SERPL-SCNC: 3.9 MMOL/L (ref 3.5–5.2)
PROT SERPL-MCNC: 5.7 G/DL (ref 6–8.5)
RBC # BLD AUTO: 3.42 10*6/MM3 (ref 3.77–5.28)
SODIUM SERPL-SCNC: 140 MMOL/L (ref 136–145)
VIT B12 BLD-MCNC: 612 PG/ML (ref 211–946)
WBC NRBC COR # BLD AUTO: 8.79 10*3/MM3 (ref 3.4–10.8)

## 2024-04-15 PROCEDURE — 83880 ASSAY OF NATRIURETIC PEPTIDE: CPT

## 2024-04-15 PROCEDURE — 85025 COMPLETE CBC W/AUTO DIFF WBC: CPT

## 2024-04-15 PROCEDURE — 82785 ASSAY OF IGE: CPT

## 2024-04-15 PROCEDURE — 82607 VITAMIN B-12: CPT

## 2024-04-15 PROCEDURE — 1160F RVW MEDS BY RX/DR IN RCRD: CPT | Performed by: NURSE PRACTITIONER

## 2024-04-15 PROCEDURE — 99214 OFFICE O/P EST MOD 30 MIN: CPT | Performed by: NURSE PRACTITIONER

## 2024-04-15 PROCEDURE — 3078F DIAST BP <80 MM HG: CPT | Performed by: NURSE PRACTITIONER

## 2024-04-15 PROCEDURE — 3077F SYST BP >= 140 MM HG: CPT | Performed by: NURSE PRACTITIONER

## 2024-04-15 PROCEDURE — 71046 X-RAY EXAM CHEST 2 VIEWS: CPT

## 2024-04-15 PROCEDURE — 1159F MED LIST DOCD IN RCRD: CPT | Performed by: NURSE PRACTITIONER

## 2024-04-15 PROCEDURE — 80053 COMPREHEN METABOLIC PANEL: CPT

## 2024-04-15 PROCEDURE — 36415 COLL VENOUS BLD VENIPUNCTURE: CPT

## 2024-04-15 RX ORDER — CYANOCOBALAMIN (VITAMIN B-12) 500 MCG
1000 TABLET ORAL DAILY
COMMUNITY

## 2024-04-15 NOTE — PROGRESS NOTES
Primary Care Provider  Rena Guerra PA     Referring Provider  No ref. provider found     Chief Complaint  COPD, Cough, Shortness of Breath, Wheezing, Follow-up, and Asthma    Subjective          History of Presenting Illness  Patient is a 73-year-old female who presents for management of dyspnea who presents for a follow-up visit today.  Patient's  is present with the patient in the office today. Patient does get short of breath that is worse with exertion and at times when lying down, moderate in severity, and improved with rest.  Patient states that she has been having some coughing and wheezing.  Patient states that she is taking Breztri every day as prescribed and uses albuterol inhaler as needed. Patient states that she is not able to tolerate any nebulizer medications and therefore continues to decline any nebulizer medications. Patient is on 2 to 4 L of oxygen per minute via nasal cannula and receives her oxygen through [facility].  Patient states that she is on steroids that are prescribed by her oncologist that she is currently receiving chemotherapy.  Patient states that she is wearing her CPAP machine with oxygen bled in, however feels like she is not getting enough air. Patient denies any morning headaches or excessive daytime sleepiness.  Upon reviewing patient's CPAP compliance report over the last 90 days patient's average daily use is 4 hours and 46 minutes with auto titrating pressures of 6 to 16 cm of water with an average pressure of 9.5 cm of water with a apnea-hypopnea index of 1.6.  Patient states that she is currently taking Ativan for anxiety and has been referred to Dr. Singer, psychiatrist and is scheduled to see him.  Patient states that she is also under the care of cardiologist, Dr. Banks.  Patient is under the care of Dr. Nicolas for multiple myeloma and states that she is currently receiving chemotherapy and is on prednisone.  Patient denies fever, chills, night  sweats, swollen glands in the head and neck, unintentional weight loss, hemoptysis, dysphagia, chest pain, palpitations, chest tightness, abdominal pain, nausea, vomiting, and diarrhea.  Patient also denies any myalgias, changes in sense of taste and/or smell, sore throat, any other coronavirus or flu-like symptoms.  Patient denies any leg swelling, orthopnea, paroxysmal nocturnal dyspnea.  Patient is able to perform activities of daily living.        Review of Systems     Family History   Problem Relation Age of Onset    Heart disease Mother     Lung cancer Mother     Cancer Mother     Stroke Father     Heart disease Father     Kidney cancer Father     Cancer Father     Stroke Other         UNCLE/AUNT    Colon cancer Neg Hx     Malig Hyperthermia Neg Hx         Social History     Socioeconomic History    Marital status:    Tobacco Use    Smoking status: Former     Current packs/day: 0.00     Average packs/day: 1 pack/day for 47.4 years (47.4 ttl pk-yrs)     Types: Cigarettes     Start date: 1975     Quit date: 6/3/2022     Years since quittin.8     Passive exposure: Past    Smokeless tobacco: Never   Vaping Use    Vaping status: Never Used   Substance and Sexual Activity    Alcohol use: Never    Drug use: Never    Sexual activity: Defer        Past Medical History:   Diagnosis Date    Anxiety     Asthma     Asthma 2021    Atherosclerosis of native coronary artery of native heart with angina pectoris 08/15/2023    FOLLOWED BY DR GONZALES/DINA PETTIT. DENIES CP BUT GETS SOA WITH EXERTION HAS COPD WEARS AT 2LPM N/C. DECREASED ACTIVITY D/T SOA    C. difficile diarrhea     DENIES ANY CURRENT ISSUES    Colon cancer 2017    Colon polyp     COPD (chronic obstructive pulmonary disease) 10/23/2017    COPD (chronic obstructive pulmonary disease) 10/23/2017    Depression     Disease of thyroid gland     Diverticulitis     Dysphagia     Essential hypertension 2021    Heartburn     Hernia 2019     History of chemotherapy     2017    Hx of psychiatric care     Hyperlipidemia     Impaired fasting glucose 08/14/2015    Lumbago     Lung nodule 02/17/2022    Major depressive disorder 10/27/2016    Malignant neoplasm of ascending colon 07/21/2017    Melena     Multiple myeloma     Nicotine dependence 05/10/2017    NICK (obstructive sleep apnea) 11/06/2023    Oxygen dependent     REPORTS USES 02 AT 2LPM VIA N/C. CAN GET VERY SOA WITH MINIMAL EXERTION    Renal insufficiency 07/28/2021    Seizures     PSEUDO SEIZURES LAST ONE AROUND NOV 2022    Shortness of breath     CAN GET VERY SOA WITH MINIMAL EXERTION    Sleep apnea     Tobacco use 07/28/2021    QUIT SMOKING JUNE 2022    Visit for screening mammogram 02/20/2020    NORMAL- REPEAT IN ONE YEAR    Vitamin D deficiency         Immunization History   Administered Date(s) Administered    Fluzone High Dose =>65 Years ([facility] ONLY) 09/24/2020, 09/24/2021    Fluzone High-Dose 65+yrs 09/24/2020, 09/24/2021, 09/20/2022, 10/10/2023    Fluzone Quad >6mos (Multi-dose) 09/14/2015    Pneumococcal Conjugate 13-Valent (PCV13) 09/21/2015    Pneumococcal Polysaccharide (PPSV23) 04/12/2022       Allergies   Allergen Reactions    Cephalexin Hives    Morphine Anaphylaxis    Penicillins Hives    Sulfa Antibiotics Hives          Current Outpatient Medications:     acyclovir (ZOVIRAX) 400 MG tablet, Take 1 tablet by mouth 2 (Two) Times a Day. Take one tablet by mouth twice daily., Disp: 60 tablet, Rfl: 11    albuterol sulfate  (90 Base) MCG/ACT inhaler, Inhale 2 puffs Every 4 (Four) Hours As Needed for Wheezing., Disp: 18 g, Rfl: 11    Budeson-Glycopyrrol-Formoterol (Breztri Aerosphere) 160-9-4.8 MCG/ACT aerosol inhaler, Inhale 2 puffs 2 (Two) Times a Day. Rinse mouth out after each use, Disp: 1 each, Rfl: 11    colestipol (COLESTID) 1 g tablet, Take 2 tablets by mouth 2 (Two) Times a Day., Disp: 120 tablet, Rfl: 5    Cyanocobalamin (Vitamin B 12) 500 MCG tablet, Take 2 tablets by  mouth Daily., Disp: , Rfl:     dapsone 25 MG tablet, TAKE 2 TABLETS BY MOUTH TWICE A DAY, Disp: 360 tablet, Rfl: 1    FLUoxetine (PROzac) 40 MG capsule, Take 1 capsule by mouth Daily., Disp: 90 capsule, Rfl: 1    lenalidomide (REVLIMID) 15 MG capsule, Take 1 capsule by mouth Daily. On Days 1-21, and off 7 days, on a 28 day cycle, Disp: 21 capsule, Rfl: 0    LORazepam (Ativan) 1 MG tablet, Take 1 tablet by mouth 2 (Two) Times a Day As Needed for Anxiety., Disp: 14 tablet, Rfl: 0    ondansetron (ZOFRAN) 8 MG tablet, Take 1 tablet by mouth 3 (Three) Times a Day As Needed for Nausea or Vomiting., Disp: 30 tablet, Rfl: 5    promethazine (PHENERGAN) 25 MG tablet, Take 1 tablet by mouth Every 6 (Six) Hours As Needed for Nausea or Vomiting., Disp: 30 tablet, Rfl: 1    vitamin D (ERGOCALCIFEROL) 1.25 MG (12475 UT) capsule capsule, Take 1 capsule by mouth 2 (Two) Times a Week., Disp: 26 capsule, Rfl: 1    dexAMETHasone (DECADRON) 4 MG tablet, Take 10 tablets on D 1,8,15,22 and take 1 tablet on D 2,3,16,17.  Take in the morning with food. (Patient not taking: Reported on 4/15/2024), Disp: 44 tablet, Rfl: 3    dexAMETHasone (DECADRON) 4 MG tablet, Take 10 tablets on D 1,8,15,22 and take 1 tablet on D 2,3.  Take in the morning with food. (Patient not taking: Reported on 4/15/2024), Disp: 42 tablet, Rfl: 5    HYDROcodone-acetaminophen (Norco) 5-325 MG per tablet, Take 1 tablet by mouth Every 6 (Six) Hours As Needed for Moderate Pain or Severe Pain. (Patient not taking: Reported on 2/15/2024), Disp: 6 tablet, Rfl: 0    levothyroxine (SYNTHROID, LEVOTHROID) 25 MCG tablet, Take 1 tablet by mouth Every Morning. (Patient not taking: Reported on 2/15/2024), Disp: 90 tablet, Rfl: 1    meloxicam (MOBIC) 15 MG tablet, TAKE 1 TABLET BY MOUTH EVERY DAY (Patient not taking: Reported on 2/21/2024), Disp: 90 tablet, Rfl: 1     Objective     Physical Exam  Vital Signs:   WDWN, Alert, NAD.    HEENT:  PERRL, EOMI.  OP, nares clear, no sinus  "tenderness  Neck:  Supple, no JVD, no thyromegaly.  Lymph: no axillary, cervical, supraclavicular lymphadenopathy noted bilaterally  Chest: Mildly decreased breath sounds throughout with some expiratory wheezes.  Normal work of breathing noted.  Patient is able speak full sentences without difficulty.  CV: RRR, no MGR, pulses 2+, equal.  Abd:  Soft, NT, ND, + BS, no HSM  EXT:  no clubbing, no cyanosis, no edema, no joint tenderness  Neuro:  A&Ox3, CN grossly intact, no focal deficits.  Skin: No rashes or lesions noted.    /52 (BP Location: Left arm, Patient Position: Sitting, Cuff Size: Large Adult)   Pulse 113   Temp 97.5 °F (36.4 °C)   Resp 20   Ht 153.5 cm (60.43\")   Wt 88.9 kg (195 lb 14.4 oz)   SpO2 90% Comment: 2 liters PD  BMI 37.71 kg/m²         Result Review :   I have reviewed my last office visit note.  I also reviewed CPAP compliance report.  See scanned report.    Procedures:           Assessment and Plan      Assessment:  1.  Mild COPD.  FEV1 of 79% predicted.  Alpha 1 antitrypsin with a normal genotype of M/M with a level of 231.  2.  Centrilobular emphysema on chest CT scan.  3.  History of colon cancer.  4.  Multiple myeloma.  Patient is under the care of Dr. Nicolas and is on chemotherapy and steroids per patient report.    5.  Chronic dyspnea.  6.  Cough.  7.  Wheezing.  8.  Multiple lung nodules. Stable on low dose chest CT scan dated from 3/1/2024.  9.  Severe obstructive sleep apnea.  Patient is on a CPAP machine.  10.  Chronic hypoxic respiratory failure: Patient is on supplemental oxygen.  11.  Hiatal hernia.  12.  Tobacco abuse of cigarettes in remission.  Patient is enrolled in lung cancer screening.          Plan:  1.  For coughing and wheezing did offer patient COVID and flu testing in the office today, however patient declines testing at this time.  Will order chest x-ray, CBC, CMP, IgE level, proBNP, and respiratory culture for further evaluation.  Patient is already on " prednisone prescribed by oncology.  Will wait for test return before prescribing any antibiotics.  2.  Did offer to switch patient over to straight nebulizer treatments, however patient states that she has tried those in the past and they made her too jittery.  Patient to continue Breztri as prescribed and rinse mouth out after each use.  3.  Continue albuterol inhaler as needed.  Patient declines any nebulizer treatments at this time.  4.  Continue oxygen to keep SPO2 at 89% and above.  5.  Follow-up with Dr. Nicolas as scheduled.  6.  Continue CPAP with oxygen bled in at night and with naps at current settings and clean mask and tubing daily.  Will order CPAP titration study as patient reports that she is concerned she is not receiving enough air as she may need a BiPAP machine.  7.  Follow-up with primary care provider, oncologist, cardiologist, and psychiatrist as scheduled.  8.  Vaccination status:  patient reports they are up-to-date with flu and pneumonia vaccines.  Patient declines COVID-19 vaccination.  Discussed with patient the benefits of vaccination including decreased risk of severe illness, hospitalization and death related to flu, pneumonia, and COVID-19.  Patient verbalized understanding and will consider getting vaccinated.  Patient is advised to continue to follow CDC recommendations such as social distancing, wearing a mask, and washing hands for least 20 seconds.  9.  Smoking status: Patient is a former cigarette smoker. Patient is enrolled in lung cancer screening and will be due for a repeat low dose chest CT scan in March 2025. Order placed prior to office visit today.  10.  Patient to call the office, 911, or go to the ER with new or worsening symptoms.  11.  Follow-up in 1 week, sooner if needed.          Follow Up   Return for 1 weeks.  Patient was given instructions and counseling regarding her condition or for health maintenance advice. Please see specific information pulled into the AVS  if appropriate.

## 2024-04-15 NOTE — PATIENT INSTRUCTIONS
Sleep Apnea  Sleep apnea affects breathing during sleep. It causes breathing to stop for 10 seconds or more, or to become shallow. People with sleep apnea usually snore loudly.  It can also increase the risk of:  Heart attack.  Stroke.  Being very overweight (obese).  Diabetes.  Heart failure.  Irregular heartbeat.  High blood pressure.  The goal of treatment is to help you breathe normally again.  What are the causes?    The most common cause of this condition is a collapsed or blocked airway.  There are three kinds of sleep apnea:  Obstructive sleep apnea. This is caused by a blocked or collapsed airway.  Central sleep apnea. This happens when the brain does not send the right signals to the muscles that control breathing.  Mixed sleep apnea. This is a combination of obstructive and central sleep apnea.  What increases the risk?  Being overweight.  Smoking.  Having a small airway.  Being older.  Being male.  Drinking alcohol.  Taking medicines to calm yourself (sedatives or tranquilizers).  Having family members with the condition.  Having a tongue or tonsils that are larger than normal.  What are the signs or symptoms?  Trouble staying asleep.  Loud snoring.  Headaches in the morning.  Waking up gasping.  Dry mouth or sore throat in the morning.  Being sleepy or tired during the day.  If you are sleepy or tired during the day, you may also:  Not be able to focus your mind (concentrate).  Forget things.  Get angry a lot and have mood swings.  Feel sad (depressed).  Have changes in your personality.  Have less interest in sex, if you are female.  Be unable to have an erection, if you are male.  How is this treated?    Sleeping on your side.  Using a medicine to get rid of mucus in your nose (decongestant).  Avoiding the use of alcohol, medicines to help you relax, or certain pain medicines (narcotics).  Losing weight, if needed.  Changing your diet.  Quitting smoking.  Using a machine to open your airway while you  sleep, such as:  An oral appliance. This is a mouthpiece that shifts your lower jaw forward.  A CPAP device. This device blows air through a mask when you breathe out (exhale).  An EPAP device. This has valves that you put in each nostril.  A BIPAP device. This device blows air through a mask when you breathe in (inhale) and breathe out.  Having surgery if other treatments do not work.  Follow these instructions at home:  Lifestyle  Make changes that your doctor recommends.  Eat a healthy diet.  Lose weight if needed.  Avoid alcohol, medicines to help you relax, and some pain medicines.  Do not smoke or use any products that contain nicotine or tobacco. If you need help quitting, ask your doctor.  General instructions  Take over-the-counter and prescription medicines only as told by your doctor.  If you were given a machine to use while you sleep, use it only as told by your doctor.  If you are having surgery, make sure to tell your doctor you have sleep apnea. You may need to bring your device with you.  Keep all follow-up visits.  Contact a doctor if:  The machine that you were given to use during sleep bothers you or does not seem to be working.  You do not get better.  You get worse.  Get help right away if:  Your chest hurts.  You have trouble breathing in enough air.  You have an uncomfortable feeling in your back, arms, or stomach.  You have trouble talking.  One side of your body feels weak.  A part of your face is hanging down.  These symptoms may be an emergency. Get help right away. Call your local emergency services (911 in the [place]  Do not wait to see if the symptoms will go away.  Do not drive yourself to the hospital.  Summary  This condition affects breathing during sleep.  The most common cause is a collapsed or blocked airway.  The goal of treatment is to help you breathe normally while you sleep.  This information is not intended to replace advice given to you by your health care provider. Make  "sure you discuss any questions you have with your health care provider.  Document Revised: 07/27/2022 Document Reviewed: 11/26/2021  Elsevier Patient Education © 2023 [employer]  CPAP and BIPAP Information  CPAP and BIPAP are methods that use air pressure to keep your airways open and to help you breathe well. CPAP and BIPAP use different amounts of pressure. Your health care provider will tell you whether CPAP or BIPAP would be more helpful for you.  CPAP stands for \"continuous positive airway pressure.\" With CPAP, the amount of pressure stays the same while you breathe in (inhale) and out (exhale).  BIPAP stands for \"bi-level positive airway pressure.\" With BIPAP, the amount of pressure will be higher when you inhale and lower when you exhale. This allows you to take larger breaths.  CPAP or BIPAP may be used in the hospital, or your health care provider may want you to use it at home. You may need to have a sleep study before your health care provider can order a machine for you to use at home.  What are the advantages?  CPAP or BIPAP can be helpful if you have:  Sleep apnea.  Chronic obstructive pulmonary disease (COPD).  Heart failure.  Medical conditions that cause muscle weakness, including muscular dystrophy or amyotrophic lateral sclerosis (ALS).  Other problems that cause breathing to be shallow, weak, abnormal, or difficult.  CPAP and BIPAP are most commonly used for obstructive sleep apnea (NICK) to keep the airways from collapsing when the muscles relax during sleep.  What are the risks?  Generally, this is a safe treatment. However, problems may occur, including:  Irritated skin or skin sores if the mask does not fit properly.  Dry or stuffy nose or nosebleeds.  Dry mouth.  Feeling gassy or bloated.  Sinus or lung infection if the equipment is not cleaned properly.  When should CPAP or BIPAP be used?  In most cases, the mask only needs to be worn during sleep. Generally, the mask needs to be worn " throughout the night and during any daytime naps. People with certain medical conditions may also need to wear the mask at other times, such as when they are awake. Follow instructions from your health care provider about when to use the machine.  What happens during CPAP or BIPAP?    Both CPAP and BIPAP are provided by a small machine with a flexible plastic tube that attaches to a plastic mask that you wear. Air is blown through the mask into your nose or mouth. The amount of pressure that is used to blow the air can be adjusted on the machine. Your health care provider will set the pressure setting and help you find the best mask for you.  Tips for using the mask  Because the mask needs to be snug, some people feel trapped or closed-in (claustrophobic) when first using the mask. If you feel this way, you may need to get used to the mask. One way to do this is to hold the mask loosely over your nose or mouth and then gradually apply the mask more snugly. You can also gradually increase the amount of time that you use the mask.  Masks are available in various types and sizes. If your mask does not fit well, talk with your health care provider about getting a different one. Some common types of masks include:  Full face masks, which fit over the mouth and nose.  Nasal masks, which fit over the nose.  Nasal pillow or prong masks, which fit into the nostrils.  If you are using a mask that fits over your nose and you tend to breathe through your mouth, a chin strap may be applied to help keep your mouth closed.  Use a skin barrier to protect your skin as told by your health care provider.  Some CPAP and BIPAP machines have alarms that may sound if the mask comes off or develops a leak.  If you have trouble with the mask, it is very important that you talk with your health care provider about finding a way to make the mask easier to tolerate. Do not stop using the mask. There could be a negative impact on your health if  you stop using the mask.  Tips for using the machine  Place your CPAP or BIPAP machine on a secure table or stand near an electrical outlet.  Know where the on/off switch is on the machine.  Follow instructions from your health care provider about how to set the pressure on your machine and when you should use it.  Do not eat or drink while the CPAP or BIPAP machine is on. Food or fluids could get pushed into your lungs by the pressure of the CPAP or BIPAP.  For home use, CPAP and BIPAP machines can be rented or purchased through home health care companies. Many different brands of machines are available. Renting a machine before purchasing may help you find out which particular machine works well for you. Your health insurance company may also decide which machine you may get.  Keep the CPAP or BIPAP machine and attachments clean. Ask your health care provider for specific instructions.  Check the humidifier if you have a dry stuffy nose or nosebleeds. Make sure it is working correctly.  Follow these instructions at home:  Take over-the-counter and prescription medicines only as told by your health care provider. Ask if you can take sinus medicine if your sinuses are blocked.  Do not use any products that contain nicotine or tobacco. These products include cigarettes, chewing tobacco, and vaping devices, such as e-cigarettes. If you need help quitting, ask your health care provider.  Keep all follow-up visits. This is important.  Contact a health care provider if:  You have redness or pressure sores on your head, face, mouth, or nose from the mask or head gear.  You have trouble using the CPAP or BIPAP machine.  You cannot tolerate wearing the CPAP or BIPAP mask.  Someone tells you that you snore even when wearing your CPAP or BIPAP.  Get help right away if:  You have trouble breathing.  You feel confused.  Summary  CPAP and BIPAP are methods that use air pressure to keep your airways open and to help you breathe  well.  If you have trouble with the mask, it is very important that you talk with your health care provider about finding a way to make the mask easier to tolerate. Do not stop using the mask. There could be a negative impact to your health if you stop using the mask.  Follow instructions from your health care provider about when to use the machine.  This information is not intended to replace advice given to you by your health care provider. Make sure you discuss any questions you have with your health care provider.  Document Revised: 07/27/2022 Document Reviewed: 11/26/2021  Elsevier Patient Education © 2023 [employer]

## 2024-04-20 NOTE — PROGRESS NOTES
Primary Care Provider  Rena Guerra PA     Referring Provider  No ref. provider found     Chief Complaint  COPD, Cough, Shortness of Breath, Wheezing, Follow-up (1 week follow up. ), and Asthma    Subjective          History of Presenting Illness  Patient is a 73-year-old female who presents for management of dyspnea who presents for a follow-up visit today.  Patient's  is present with the patient in the office today. Last office visit patient reported that she was having shortness of breath that was worse with exertion and lying down along with coughing and wheezing.  A chest x-ray, CBC, CMP, IgE level, proBNP, and respiratory culture were ordered for further evaluation.  Patient had labs and a chest x-ray completed on 4/18/2024.  Chest x-ray report states no acute cardiopulmonary disease.  CBC did show some peripheral eosinophilia.  ProBNP came back normal.  Patient states that since last visit her breathing is at baseline.  Patient states that she does get short of breath that is worse with exertion, moderate severity, and improved with rest. Patient states that she is taking Breztri every day as prescribed and uses albuterol inhaler as needed. Patient states that she is not able to tolerate any nebulizer medications and therefore continues to decline any nebulizer medications. Patient is on 2 to 4 L of oxygen per minute via nasal cannula and receives her oxygen through [facility].  Patient states that she is wearing her CPAP machine with oxygen bled in, however at times feels like she is not getting enough air.  A CPAP titration study was ordered last office visit.  Patient states that she has not scheduled to have this test until June 2024.  Patient is scheduled to see psychiatry for anxiety.  Patient is under the care of Dr. Nicolas for multiple myeloma and is on chemotherapy.  Patient is under the care of Dr. Celeste, general surgeon for hiatal hernia.  Patient states that she was told that she  will not be able to get surgery until she finishes chemotherapy.  Patient denies fever, chills, night sweats, swollen glands in the head and neck, unintentional weight loss, hemoptysis, purulent sputum production, dysphagia, chest pain, palpitations, chest tightness, abdominal pain, nausea, vomiting, and diarrhea.  Patient also denies any myalgias, changes in sense of taste and/or smell, sore throat, any other coronavirus or flu-like symptoms.  Patient denies any leg swelling, orthopnea, paroxysmal nocturnal dyspnea.  Patient is able to perform activities of daily living.        Review of Systems     Family History   Problem Relation Age of Onset    Heart disease Mother     Lung cancer Mother     Cancer Mother     Stroke Father     Heart disease Father     Kidney cancer Father     Cancer Father     Stroke Other         UNCLE/AUNT    Colon cancer Neg Hx     Malig Hyperthermia Neg Hx         Social History     Socioeconomic History    Marital status:    Tobacco Use    Smoking status: Former     Current packs/day: 0.00     Average packs/day: 1 pack/day for 47.4 years (47.4 ttl pk-yrs)     Types: Cigarettes     Start date: 1975     Quit date: 6/3/2022     Years since quittin.8     Passive exposure: Past    Smokeless tobacco: Never   Vaping Use    Vaping status: Never Used   Substance and Sexual Activity    Alcohol use: Never    Drug use: Never    Sexual activity: Defer        Past Medical History:   Diagnosis Date    Anxiety     Asthma     Asthma 2021    Atherosclerosis of native coronary artery of native heart with angina pectoris 08/15/2023    FOLLOWED BY DR GONZALES/DINA PETTIT. DENIES CP BUT GETS SOA WITH EXERTION HAS COPD WEARS AT 2LPM N/C. DECREASED ACTIVITY D/T SOA    C. difficile diarrhea     DENIES ANY CURRENT ISSUES    Colon cancer 2017    Colon polyp     COPD (chronic obstructive pulmonary disease) 10/23/2017    COPD (chronic obstructive pulmonary disease) 10/23/2017    Depression      Disease of thyroid gland     Diverticulitis     Dysphagia     Essential hypertension 07/28/2021    Heartburn     Hernia 2019    History of chemotherapy     2017    Hx of psychiatric care     Hyperlipidemia     Impaired fasting glucose 08/14/2015    Lumbago     Lung nodule 02/17/2022    Major depressive disorder 10/27/2016    Malignant neoplasm of ascending colon 07/21/2017    Melena     Multiple myeloma     Nicotine dependence 05/10/2017    NICK (obstructive sleep apnea) 11/06/2023    Oxygen dependent     REPORTS USES 02 AT 2LPM VIA N/C. CAN GET VERY SOA WITH MINIMAL EXERTION    Renal insufficiency 07/28/2021    Seizures     PSEUDO SEIZURES LAST ONE AROUND NOV 2022    Shortness of breath     CAN GET VERY SOA WITH MINIMAL EXERTION    Sleep apnea     Tobacco use 07/28/2021    QUIT SMOKING JUNE 2022    Visit for screening mammogram 02/20/2020    NORMAL- REPEAT IN ONE YEAR    Vitamin D deficiency         Immunization History   Administered Date(s) Administered    Fluzone High Dose =>65 Years ([facility] ONLY) 09/24/2020, 09/24/2021    Fluzone High-Dose 65+yrs 09/24/2020, 09/24/2021, 09/20/2022, 10/10/2023    Fluzone Quad >6mos (Multi-dose) 09/14/2015    Pneumococcal Conjugate 13-Valent (PCV13) 09/21/2015    Pneumococcal Polysaccharide (PPSV23) 04/12/2022       Allergies   Allergen Reactions    Cephalexin Hives    Morphine Anaphylaxis    Penicillins Hives    Sulfa Antibiotics Hives          Current Outpatient Medications:     acyclovir (ZOVIRAX) 400 MG tablet, Take 1 tablet by mouth 2 (Two) Times a Day. Take one tablet by mouth twice daily., Disp: 60 tablet, Rfl: 11    albuterol sulfate  (90 Base) MCG/ACT inhaler, Inhale 2 puffs Every 4 (Four) Hours As Needed for Wheezing., Disp: 18 g, Rfl: 11    Budeson-Glycopyrrol-Formoterol (Breztri Aerosphere) 160-9-4.8 MCG/ACT aerosol inhaler, Inhale 2 puffs 2 (Two) Times a Day. Rinse mouth out after each use, Disp: 1 each, Rfl: 11    colestipol (COLESTID) 1 g tablet, Take 2  tablets by mouth 2 (Two) Times a Day., Disp: 120 tablet, Rfl: 5    Cyanocobalamin (Vitamin B 12) 500 MCG tablet, Take 2 tablets by mouth Daily., Disp: , Rfl:     dapsone 25 MG tablet, TAKE 2 TABLETS BY MOUTH TWICE A DAY, Disp: 360 tablet, Rfl: 1    dexAMETHasone (DECADRON) 4 MG tablet, Take 10 tablets on D 1,8,15,22 and take 1 tablet on D 2,3,16,17.  Take in the morning with food., Disp: 44 tablet, Rfl: 3    FLUoxetine (PROzac) 40 MG capsule, Take 1 capsule by mouth Daily., Disp: 90 capsule, Rfl: 1    lenalidomide (REVLIMID) 15 MG capsule, Take 1 capsule by mouth Daily. On Days 1-21, and off 7 days, on a 28 day cycle, Disp: 21 capsule, Rfl: 0    LORazepam (Ativan) 1 MG tablet, Take 1 tablet by mouth 2 (Two) Times a Day As Needed for Anxiety., Disp: 14 tablet, Rfl: 0    ondansetron (ZOFRAN) 8 MG tablet, Take 1 tablet by mouth 3 (Three) Times a Day As Needed for Nausea or Vomiting., Disp: 30 tablet, Rfl: 5    promethazine (PHENERGAN) 25 MG tablet, Take 1 tablet by mouth Every 6 (Six) Hours As Needed for Nausea or Vomiting., Disp: 30 tablet, Rfl: 1    vitamin D (ERGOCALCIFEROL) 1.25 MG (26601 UT) capsule capsule, Take 1 capsule by mouth 2 (Two) Times a Week., Disp: 26 capsule, Rfl: 1    dexAMETHasone (DECADRON) 4 MG tablet, Take 10 tablets on D 1,8,15,22 and take 1 tablet on D 2,3.  Take in the morning with food. (Patient not taking: Reported on 4/22/2024), Disp: 42 tablet, Rfl: 5    HYDROcodone-acetaminophen (Norco) 5-325 MG per tablet, Take 1 tablet by mouth Every 6 (Six) Hours As Needed for Moderate Pain or Severe Pain. (Patient not taking: Reported on 2/15/2024), Disp: 6 tablet, Rfl: 0    levalbuterol (XOPENEX) 0.63 MG/3ML nebulizer solution, Take 1 ampule by nebulization 4 (Four) Times a Day As Needed for Wheezing., Disp: 120 mL, Rfl: 5    levothyroxine (SYNTHROID, LEVOTHROID) 25 MCG tablet, Take 1 tablet by mouth Every Morning. (Patient not taking: Reported on 2/15/2024), Disp: 90 tablet, Rfl: 1    meloxicam  "(MOBIC) 15 MG tablet, TAKE 1 TABLET BY MOUTH EVERY DAY (Patient not taking: Reported on 2/21/2024), Disp: 90 tablet, Rfl: 1     Objective     Physical Exam  Vital Signs:   WDWN, Alert, NAD.    HEENT:  PERRL, EOMI.  OP, nares clear, no sinus tenderness  Neck:  Supple, no JVD, no thyromegaly.  Lymph: no axillary, cervical, supraclavicular lymphadenopathy noted bilaterally  Chest: Mildly decreased breath sounds throughout. No wheezes, rales, or rhonchi appreciated.  Normal work of breathing noted.  Patient is able speak full sentences without difficulty.  Patient is on 2 L of oxygen per minute via nasal cannula.  CV: RRR, no MGR, pulses 2+, equal.  Abd:  Soft, NT, ND, + BS, no HSM  EXT:  no clubbing, no cyanosis, no edema, no joint tenderness  Neuro:  A&Ox3, CN grossly intact, no focal deficits.  Skin: No rashes or lesions noted.    BP (!) 120/36 (BP Location: Left arm, Patient Position: Sitting, Cuff Size: Large Adult)   Pulse 104   Temp 97.3 °F (36.3 °C)   Resp 20   Ht 153.5 cm (60.43\")   Wt 88 kg (193 lb 14.4 oz)   SpO2 93% Comment: 2 liters of oxygen PD  BMI 37.33 kg/m²         Result Review :   I have reviewed my last office visit note.  I also reviewed chest x-ray report and lab results dated from 4/15/2024.  See scanned reports.    Procedures:    CMP          2/28/2024    09:18 3/27/2024    09:19 4/15/2024    10:08   CMP   Glucose 202  185  160    BUN 13  15  9    Creatinine 0.81  0.79  0.75    EGFR 76.3  78.6  83.7    Sodium 141  142  140    Potassium 3.5  3.7  3.9    Chloride 111  111  108    Calcium 8.5  9.1  8.8    Total Protein 5.5  5.7  5.7    Albumin 3.7  3.8  3.7    Globulin 1.8  1.9  2.0    Total Bilirubin 0.5  0.5  0.6    Alkaline Phosphatase 61  80  71    AST (SGOT) 9  8  19    ALT (SGPT) 13  14  21    Albumin/Globulin Ratio 2.1  2.0  1.9    BUN/Creatinine Ratio 16.0  19.0  12.0    Anion Gap 6.4  10.9  15.5      CBC          3/27/2024    09:19 4/10/2024    09:06 4/15/2024    10:08   CBC   WBC " 10.09  11.73  8.79    RBC 3.24  3.21  3.42    Hemoglobin 11.0  10.9  11.5    Hematocrit 33.8  34.2  35.9    .3  106.5  105.0    MCH 34.0  34.0  33.6    MCHC 32.5  31.9  32.0    RDW 18.3  17.7  15.2    Platelets 508  388  299      CBC w/diff          3/27/2024    09:19 4/10/2024    09:06 4/15/2024    10:08   CBC w/Diff   WBC 10.09  11.73  8.79    RBC 3.24  3.21  3.42    Hemoglobin 11.0  10.9  11.5    Hematocrit 33.8  34.2  35.9    .3  106.5  105.0    MCH 34.0  34.0  33.6    MCHC 32.5  31.9  32.0    RDW 18.3  17.7  15.2    Platelets 508  388  299    Neutrophil Rel % 44.3  53.6  34.3    Immature Granulocyte Rel % 0.5  0.3  0.5    Lymphocyte Rel % 44.2  35.0  45.3    Monocyte Rel % 10.3  10.6  13.0    Eosinophil Rel % 0.0  0.3  6.1    Basophil Rel % 0.7  0.2  0.8      XR Chest 2 View    Result Date: 4/15/2024  Impression: 1.  No acute cardiopulmonary disease.   Electronically Signed By-Bryan Schultz MD On:4/15/2024 2:57 PM            Assessment and Plan      Assessment:  1.  Mild COPD.  FEV1 of 79% predicted.  Alpha 1 antitrypsin with a normal genotype of M/M with a level of 231.  2.  Centrilobular emphysema on chest CT scan.  3.  History of colon cancer.  4.  Multiple myeloma.  Patient is under the care of Dr. Nicolas and is on chemotherapy and steroids per patient report.    5.  Chronic dyspnea.  6.  Cough.  7.  Wheezing.  8.  Multiple lung nodules. Stable on low dose chest CT scan dated from 3/1/2024.  9.  Severe obstructive sleep apnea.  Patient is on a CPAP machine.  10.  Chronic hypoxic respiratory failure: Patient is on supplemental oxygen.  11.  Hiatal hernia.  12.  Tobacco abuse of cigarettes in remission.  Patient is enrolled in lung cancer screening.          Plan:  1.  Patient is still short of breath.  Did discuss with the patient that I could send her for bronchoscopy for further evaluation, however patient declines procedure at this time.    2.  CBC does show some peripheral eosinophilia.   Will order an GARRICK, ANCA panel, sed rate, and CRP for further evaluation.  IgE level is currently pending.    3. Did again offer to switch patient over to straight nebulizer treatments, however patient states that she has tried those in the past and they made her too jittery.  Patient to continue Breztri as prescribed and rinse mouth out after each use.  4.  Continue albuterol inhaler as needed.   5.  Patient is willing to try Xopenex nebulizer treatments as needed.  Xopenex nebulizer treatment sent to the pharmacy for patient to take as needed.  6.  Continue oxygen to keep SPO2 at 89% and above.  7.  Will order an updated pulmonary function test.  8.  Follow-up with Dr. Nicolas as scheduled.  9.  Patient states that she is wearing her CPAP machine with oxygen bled in, however at times feels like she is not getting enough air.  A CPAP titration study was ordered last office visit.  Patient states that she has not scheduled to have this test until June 2024.  Will have our referral coordinator to see if we can schedule patient CPAP titration study sooner.  Will request a copy of CPAP compliance report.  Patient to continue CPAP with oxygen bled in at night and with naps at current settings and clean mask and tubing daily.     10.  Follow-up with primary care provider, general surgeon, oncologist, cardiologist, and psychiatrist as scheduled.  11.  Vaccination status:  patient reports they are up-to-date with flu and pneumonia vaccines.  Patient declines COVID-19 vaccination.  Discussed with patient the benefits of vaccination including decreased risk of severe illness, hospitalization and death related to flu, pneumonia, and COVID-19.  Patient verbalized understanding and will consider getting vaccinated.  Patient is advised to continue to follow CDC recommendations such as social distancing, wearing a mask, and washing hands for least 20 seconds.  12.  Smoking status: Patient is a former cigarette smoker. Patient is  enrolled in lung cancer screening and will be due for a repeat low dose chest CT scan in March 2025. Order placed prior to office visit today.  13.  Patient to call the office, 911, or go to the ER with new or worsening symptoms.  14.  Follow-up as scheduled, sooner if needed.                Follow Up   No follow-ups on file.  Patient was given instructions and counseling regarding her condition or for health maintenance advice. Please see specific information pulled into the AVS if appropriate.

## 2024-04-20 NOTE — PATIENT INSTRUCTIONS
Chronic Obstructive Pulmonary Disease Exacerbation    Chronic obstructive pulmonary disease (COPD) is a long-term (chronic) condition that affects the lungs. COPD is a general term that can be used to describe many different lung problems that cause lung inflammation and limit airflow, including chronic bronchitis and emphysema. COPD exacerbations are episodes when breathing symptoms flare up, become much worse, and require extra treatment.  COPD exacerbations are usually caused by infections. Without treatment, COPD exacerbations can be severe and even life threatening. Frequent COPD exacerbations can cause further damage to the lungs.  What are the causes?  This condition may be caused by:  Respiratory infections, including viral and bacterial infections.  Exposure to smoke.  Exposure to air pollution, chemical fumes, or dust.  Things that can cause an allergic reaction (allergens).  Not taking your usual COPD medicines as directed.  Underlying medical problems, such as congestive heart failure or infections not involving the lungs.  In many cases, the cause of this condition is not known.  What increases the risk?  The following factors may make you more likely to develop this condition:  Smoking cigarettes.  Being an older adult.  Having frequent prior COPD exacerbations.  What are the signs or symptoms?  Symptoms of this condition include:  Increased coughing.  Increased production of mucus from your lungs.  Increased wheezing and shortness of breath.  Rapid or labored breathing.  Chest tightness.  Less energy than usual.  Sleep disruption from symptoms.  Confusion  Increased sleepiness.  Often, these symptoms happen or get worse even with the use of medicines.  How is this diagnosed?  This condition is diagnosed based on:  Your medical history.  A physical exam.  You may also have tests, including:  A chest X-ray.  Blood tests.  Lung (pulmonary) function tests.  How is this treated?  Treatment for this  condition depends on the severity and cause of the symptoms. You may need to be admitted to a hospital for treatment. Some of the treatments commonly used to treat COPD exacerbations are:  Antibiotic medicines. These may be used for severe exacerbations caused by a lung infection, such as pneumonia.  Bronchodilators. These are inhaled medicines that expand the air passages and allow increased airflow. They may make your breathing more comfortable.  Steroid medicines. These act to reduce inflammation in the airways. They may be given with an inhaler, taken by mouth, or given through an IV tube inserted into one of your veins.  Supplemental oxygen therapy.  Airway clearing techniques, such as noninvasive ventilation (NIV) and positive expiratory pressure (PEP). These provide respiratory support through a mask or other noninvasive device. An example of this would be using a continuous positive airway pressure (CPAP) machine to improve delivery of oxygen into your lungs.  Follow these instructions at home:  Medicines  Take over-the-counter and prescription medicines only as told by your health care provider.  It is important to use correct technique with inhaled medicines.  If you were prescribed an antibiotic medicine or oral steroid, take it as told by your health care provider. Do not stop taking the medicine even if you start to feel better.  Lifestyle  Do not use any products that contain nicotine or tobacco. These products include cigarettes, chewing tobacco, and vaping devices, such as e-cigarettes. If you need help quitting, ask your health care provider.  Eat a healthy diet.  Exercise regularly.  Get enough sleep. Most adults need 7 or more hours per night.  Avoid exposure to all substances that irritate the airway, especially tobacco smoke.  Regularly wash your hands with soap and water for at least 20 seconds. If soap and water are not available, use hand . This may help prevent you from getting  infections.  During flu season, avoid enclosed spaces that are crowded with people.  General instructions  Drink enough fluid to keep your urine pale yellow, unless you have a medical condition that requires fluid restriction.  Use a cool mist vaporizer. This humidifies the air and makes it easier for you to clear your chest when you cough.  If you have a home nebulizer and oxygen, continue to use them as told by your health care provider.  Keep all follow-up visits. This is important.  How is this prevented?  Stay up-to-date on pneumococcal and flu (influenza) vaccines. A flu shot is recommended every year to help prevent exacerbations.  Quitting smoking is very important in preventing COPD from getting worse and in preventing exacerbations from happening as often.  Follow all instructions for pulmonary rehabilitation after a recent exacerbation. This can help prevent future exacerbations.  Work with your health care provider to develop and follow an action plan. This tells you what steps to take when you experience certain symptoms.  Contact a health care provider if:  You have a worsening of your regular COPD symptoms.  Get help right away if:  You have worsening shortness of breath, even when resting.  You have trouble talking.  You have severe chest pain.  You cough up blood.  You have a fever.  You have weakness, vomit repeatedly, or faint.  You feel confused.  You are not able to sleep because of your symptoms.  You have trouble doing daily activities.  These symptoms may represent a serious problem that is an emergency. Do not wait to see if the symptoms will go away. Get medical help right away. Call your local emergency services (911 in the [place] Do not drive yourself to the hospital.  Summary  COPD exacerbations are episodes when breathing symptoms become much worse and require extra treatment above your normal treatment.  Exacerbations can be severe and even life threatening. Frequent COPD exacerbations  can cause further damage to your lungs.  COPD exacerbations are usually triggered by infections such as the flu, colds, and even pneumonia.  Treatment for this condition depends on the severity and cause of the symptoms. You may need to be admitted to a hospital for treatment.  Quitting smoking is very important to prevent COPD from getting worse and to prevent exacerbations from happening as often.  This information is not intended to replace advice given to you by your health care provider. Make sure you discuss any questions you have with your health care provider.  Document Revised: 10/25/2021 Document Reviewed: 10/26/2021  Elsevier Patient Education © 2024 [employer]

## 2024-04-22 ENCOUNTER — TELEPHONE (OUTPATIENT)
Dept: PULMONOLOGY | Facility: CLINIC | Age: 74
End: 2024-04-22

## 2024-04-22 ENCOUNTER — OFFICE VISIT (OUTPATIENT)
Dept: PULMONOLOGY | Facility: CLINIC | Age: 74
End: 2024-04-22
Payer: MEDICARE

## 2024-04-22 VITALS
RESPIRATION RATE: 20 BRPM | HEIGHT: 60 IN | SYSTOLIC BLOOD PRESSURE: 120 MMHG | TEMPERATURE: 97.3 F | WEIGHT: 193.9 LBS | OXYGEN SATURATION: 93 % | BODY MASS INDEX: 38.07 KG/M2 | DIASTOLIC BLOOD PRESSURE: 36 MMHG | HEART RATE: 104 BPM

## 2024-04-22 DIAGNOSIS — G47.33 OSA (OBSTRUCTIVE SLEEP APNEA): ICD-10-CM

## 2024-04-22 DIAGNOSIS — K44.9 HIATAL HERNIA: ICD-10-CM

## 2024-04-22 DIAGNOSIS — C90.00 MULTIPLE MYELOMA NOT HAVING ACHIEVED REMISSION: Primary | ICD-10-CM

## 2024-04-22 DIAGNOSIS — R91.8 MULTIPLE LUNG NODULES: ICD-10-CM

## 2024-04-22 DIAGNOSIS — J96.11 CHRONIC RESPIRATORY FAILURE WITH HYPOXIA: ICD-10-CM

## 2024-04-22 DIAGNOSIS — R06.2 WHEEZING: ICD-10-CM

## 2024-04-22 DIAGNOSIS — R05.9 COUGH, UNSPECIFIED TYPE: ICD-10-CM

## 2024-04-22 DIAGNOSIS — J44.9 CHRONIC OBSTRUCTIVE PULMONARY DISEASE, UNSPECIFIED COPD TYPE: ICD-10-CM

## 2024-04-22 DIAGNOSIS — F17.201 TOBACCO ABUSE, IN REMISSION: ICD-10-CM

## 2024-04-22 PROCEDURE — 3074F SYST BP LT 130 MM HG: CPT | Performed by: NURSE PRACTITIONER

## 2024-04-22 PROCEDURE — 1159F MED LIST DOCD IN RCRD: CPT | Performed by: NURSE PRACTITIONER

## 2024-04-22 PROCEDURE — 3078F DIAST BP <80 MM HG: CPT | Performed by: NURSE PRACTITIONER

## 2024-04-22 PROCEDURE — 99214 OFFICE O/P EST MOD 30 MIN: CPT | Performed by: NURSE PRACTITIONER

## 2024-04-22 PROCEDURE — 1160F RVW MEDS BY RX/DR IN RCRD: CPT | Performed by: NURSE PRACTITIONER

## 2024-04-22 RX ORDER — LEVALBUTEROL INHALATION SOLUTION 0.63 MG/3ML
3 SOLUTION RESPIRATORY (INHALATION) 4 TIMES DAILY PRN
Qty: 120 ML | Refills: 5 | Status: SHIPPED | OUTPATIENT
Start: 2024-04-22 | End: 2024-04-22 | Stop reason: SDUPTHER

## 2024-04-22 RX ORDER — LEVALBUTEROL INHALATION SOLUTION 0.63 MG/3ML
3 SOLUTION RESPIRATORY (INHALATION) 4 TIMES DAILY PRN
Qty: 120 ML | Refills: 5 | Status: SHIPPED | OUTPATIENT
Start: 2024-04-22

## 2024-04-23 ENCOUNTER — PATIENT MESSAGE (OUTPATIENT)
Dept: FAMILY MEDICINE CLINIC | Facility: CLINIC | Age: 74
End: 2024-04-23
Payer: MEDICARE

## 2024-04-23 LAB — IGE SERPL-ACNC: <2 IU/ML (ref 6–495)

## 2024-04-24 ENCOUNTER — OFFICE VISIT (OUTPATIENT)
Dept: ONCOLOGY | Facility: HOSPITAL | Age: 74
End: 2024-04-24
Payer: MEDICARE

## 2024-04-24 ENCOUNTER — HOSPITAL ENCOUNTER (OUTPATIENT)
Dept: ONCOLOGY | Facility: HOSPITAL | Age: 74
Discharge: HOME | End: 2024-04-24
Admitting: INTERNAL MEDICINE
Payer: MEDICARE

## 2024-04-24 ENCOUNTER — SPECIALTY PHARMACY (OUTPATIENT)
Dept: PHARMACY | Facility: HOSPITAL | Age: 74
End: 2024-04-24
Payer: MEDICARE

## 2024-04-24 VITALS
DIASTOLIC BLOOD PRESSURE: 54 MMHG | HEIGHT: 60 IN | BODY MASS INDEX: 39 KG/M2 | WEIGHT: 198.63 LBS | HEART RATE: 84 BPM | TEMPERATURE: 98.8 F | RESPIRATION RATE: 20 BRPM | OXYGEN SATURATION: 97 % | SYSTOLIC BLOOD PRESSURE: 140 MMHG

## 2024-04-24 VITALS
TEMPERATURE: 98.8 F | HEIGHT: 60 IN | HEART RATE: 84 BPM | OXYGEN SATURATION: 97 % | SYSTOLIC BLOOD PRESSURE: 140 MMHG | DIASTOLIC BLOOD PRESSURE: 54 MMHG | BODY MASS INDEX: 39 KG/M2 | RESPIRATION RATE: 20 BRPM | WEIGHT: 198.63 LBS

## 2024-04-24 DIAGNOSIS — C90.00 MULTIPLE MYELOMA, REMISSION STATUS UNSPECIFIED: Primary | ICD-10-CM

## 2024-04-24 DIAGNOSIS — J44.9 CHRONIC OBSTRUCTIVE PULMONARY DISEASE, UNSPECIFIED COPD TYPE: ICD-10-CM

## 2024-04-24 DIAGNOSIS — K44.9 HIATAL HERNIA: ICD-10-CM

## 2024-04-24 DIAGNOSIS — C90.00 MULTIPLE MYELOMA, REMISSION STATUS UNSPECIFIED: ICD-10-CM

## 2024-04-24 DIAGNOSIS — R06.2 WHEEZING: ICD-10-CM

## 2024-04-24 DIAGNOSIS — R91.8 MULTIPLE LUNG NODULES: ICD-10-CM

## 2024-04-24 DIAGNOSIS — J96.11 CHRONIC RESPIRATORY FAILURE WITH HYPOXIA: ICD-10-CM

## 2024-04-24 DIAGNOSIS — C90.00 MULTIPLE MYELOMA NOT HAVING ACHIEVED REMISSION: ICD-10-CM

## 2024-04-24 DIAGNOSIS — G47.33 OSA (OBSTRUCTIVE SLEEP APNEA): ICD-10-CM

## 2024-04-24 DIAGNOSIS — R05.9 COUGH, UNSPECIFIED TYPE: ICD-10-CM

## 2024-04-24 DIAGNOSIS — Z12.2 ENCOUNTER FOR SCREENING FOR LUNG CANCER: Primary | ICD-10-CM

## 2024-04-24 DIAGNOSIS — F17.201 TOBACCO ABUSE, IN REMISSION: ICD-10-CM

## 2024-04-24 LAB
ALBUMIN SERPL-MCNC: 3.8 G/DL (ref 3.5–5.2)
ALBUMIN/GLOB SERPL: 2.4 G/DL
ALP SERPL-CCNC: 63 U/L (ref 39–117)
ALT SERPL W P-5'-P-CCNC: 16 U/L (ref 1–33)
ANION GAP SERPL CALCULATED.3IONS-SCNC: 8.5 MMOL/L (ref 5–15)
AST SERPL-CCNC: 10 U/L (ref 1–32)
BASOPHILS # BLD AUTO: 0.04 10*3/MM3 (ref 0–0.2)
BASOPHILS NFR BLD AUTO: 0.5 % (ref 0–1.5)
BILIRUB SERPL-MCNC: 0.6 MG/DL (ref 0–1.2)
BUN SERPL-MCNC: 15 MG/DL (ref 8–23)
BUN/CREAT SERPL: 19 (ref 7–25)
CALCIUM SPEC-SCNC: 8.5 MG/DL (ref 8.6–10.5)
CHLORIDE SERPL-SCNC: 113 MMOL/L (ref 98–107)
CO2 SERPL-SCNC: 19.5 MMOL/L (ref 22–29)
CREAT SERPL-MCNC: 0.79 MG/DL (ref 0.57–1)
CRP SERPL-MCNC: <0.3 MG/DL (ref 0–0.5)
DEPRECATED RDW RBC AUTO: 68.6 FL (ref 37–54)
EGFRCR SERPLBLD CKD-EPI 2021: 78.6 ML/MIN/1.73
EOSINOPHIL # BLD AUTO: 0 10*3/MM3 (ref 0–0.4)
EOSINOPHIL NFR BLD AUTO: 0 % (ref 0.3–6.2)
ERYTHROCYTE [DISTWIDTH] IN BLOOD BY AUTOMATED COUNT: 17 % (ref 12.3–15.4)
ERYTHROCYTE [SEDIMENTATION RATE] IN BLOOD: <1 MM/HR (ref 0–30)
GLOBULIN UR ELPH-MCNC: 1.6 GM/DL
GLUCOSE SERPL-MCNC: 198 MG/DL (ref 65–99)
HCT VFR BLD AUTO: 32.3 % (ref 34–46.6)
HGB BLD-MCNC: 10.4 G/DL (ref 12–15.9)
IMM GRANULOCYTES # BLD AUTO: 0.03 10*3/MM3 (ref 0–0.05)
IMM GRANULOCYTES NFR BLD AUTO: 0.4 % (ref 0–0.5)
LYMPHOCYTES # BLD AUTO: 2.59 10*3/MM3 (ref 0.7–3.1)
LYMPHOCYTES NFR BLD AUTO: 32.2 % (ref 19.6–45.3)
MCH RBC QN AUTO: 34.4 PG (ref 26.6–33)
MCHC RBC AUTO-ENTMCNC: 32.2 G/DL (ref 31.5–35.7)
MCV RBC AUTO: 107 FL (ref 79–97)
MONOCYTES # BLD AUTO: 0.87 10*3/MM3 (ref 0.1–0.9)
MONOCYTES NFR BLD AUTO: 10.8 % (ref 5–12)
NEUTROPHILS NFR BLD AUTO: 4.51 10*3/MM3 (ref 1.7–7)
NEUTROPHILS NFR BLD AUTO: 56.1 % (ref 42.7–76)
PLATELET # BLD AUTO: 331 10*3/MM3 (ref 140–450)
PMV BLD AUTO: 9.3 FL (ref 6–12)
POTASSIUM SERPL-SCNC: 3.7 MMOL/L (ref 3.5–5.2)
PROT SERPL-MCNC: 5.4 G/DL (ref 6–8.5)
RBC # BLD AUTO: 3.02 10*6/MM3 (ref 3.77–5.28)
SODIUM SERPL-SCNC: 141 MMOL/L (ref 136–145)
T-UPTAKE NFR SERPL: 1.08 TBI (ref 0.8–1.3)
T4 SERPL-MCNC: 8.44 MCG/DL (ref 4.5–11.7)
TSH SERPL DL<=0.05 MIU/L-ACNC: 0.79 UIU/ML (ref 0.27–4.2)
WBC NRBC COR # BLD AUTO: 8.04 10*3/MM3 (ref 3.4–10.8)

## 2024-04-24 PROCEDURE — 86140 C-REACTIVE PROTEIN: CPT | Performed by: NURSE PRACTITIONER

## 2024-04-24 PROCEDURE — 83516 IMMUNOASSAY NONANTIBODY: CPT | Performed by: NURSE PRACTITIONER

## 2024-04-24 PROCEDURE — A9270 NON-COVERED ITEM OR SERVICE: HCPCS | Performed by: INTERNAL MEDICINE

## 2024-04-24 PROCEDURE — 85652 RBC SED RATE AUTOMATED: CPT | Performed by: NURSE PRACTITIONER

## 2024-04-24 PROCEDURE — 86037 ANCA TITER EACH ANTIBODY: CPT | Performed by: NURSE PRACTITIONER

## 2024-04-24 PROCEDURE — 80053 COMPREHEN METABOLIC PANEL: CPT | Performed by: INTERNAL MEDICINE

## 2024-04-24 PROCEDURE — 25010000002 DIPHENHYDRAMINE PER 50 MG: Performed by: INTERNAL MEDICINE

## 2024-04-24 PROCEDURE — 86038 ANTINUCLEAR ANTIBODIES: CPT | Performed by: NURSE PRACTITIONER

## 2024-04-24 PROCEDURE — 63710000001 ACETAMINOPHEN EXTRA STRENGTH 500 MG TABLET: Performed by: INTERNAL MEDICINE

## 2024-04-24 PROCEDURE — 25010000002 METHYLPREDNISOLONE PER 125 MG: Performed by: INTERNAL MEDICINE

## 2024-04-24 PROCEDURE — 96401 CHEMO ANTI-NEOPL SQ/IM: CPT

## 2024-04-24 PROCEDURE — 96374 THER/PROPH/DIAG INJ IV PUSH: CPT

## 2024-04-24 PROCEDURE — 84479 ASSAY OF THYROID (T3 OR T4): CPT | Performed by: INTERNAL MEDICINE

## 2024-04-24 PROCEDURE — 25010000002 HEPARIN LOCK FLUSH PER 10 UNITS: Performed by: INTERNAL MEDICINE

## 2024-04-24 PROCEDURE — 96375 TX/PRO/DX INJ NEW DRUG ADDON: CPT

## 2024-04-24 PROCEDURE — 85025 COMPLETE CBC W/AUTO DIFF WBC: CPT | Performed by: INTERNAL MEDICINE

## 2024-04-24 PROCEDURE — 84443 ASSAY THYROID STIM HORMONE: CPT | Performed by: INTERNAL MEDICINE

## 2024-04-24 PROCEDURE — 84436 ASSAY OF TOTAL THYROXINE: CPT | Performed by: INTERNAL MEDICINE

## 2024-04-24 PROCEDURE — 25810000003 SODIUM CHLORIDE 0.9 % SOLUTION: Performed by: INTERNAL MEDICINE

## 2024-04-24 PROCEDURE — 25010000002 DARATUMUMAB-HYALURONIDASE-FIHJ 1800-30000 MG-UT/15ML SOLUTION: Performed by: INTERNAL MEDICINE

## 2024-04-24 RX ORDER — ACETAMINOPHEN 500 MG
1000 TABLET ORAL ONCE
Status: CANCELLED | OUTPATIENT
Start: 2024-04-24

## 2024-04-24 RX ORDER — SODIUM CHLORIDE 0.9 % (FLUSH) 0.9 %
20 SYRINGE (ML) INJECTION AS NEEDED
OUTPATIENT
Start: 2024-04-24

## 2024-04-24 RX ORDER — FAMOTIDINE 10 MG/ML
20 INJECTION, SOLUTION INTRAVENOUS AS NEEDED
OUTPATIENT
Start: 2024-04-24

## 2024-04-24 RX ORDER — SODIUM CHLORIDE 0.9 % (FLUSH) 0.9 %
20 SYRINGE (ML) INJECTION AS NEEDED
Status: DISCONTINUED | OUTPATIENT
Start: 2024-04-24 | End: 2024-04-25 | Stop reason: HOSPADM

## 2024-04-24 RX ORDER — SODIUM CHLORIDE 9 MG/ML
20 INJECTION, SOLUTION INTRAVENOUS ONCE
Status: COMPLETED | OUTPATIENT
Start: 2024-04-24 | End: 2024-04-24

## 2024-04-24 RX ORDER — SODIUM CHLORIDE 9 MG/ML
20 INJECTION, SOLUTION INTRAVENOUS ONCE
Status: CANCELLED | OUTPATIENT
Start: 2024-04-24

## 2024-04-24 RX ORDER — DIPHENHYDRAMINE HYDROCHLORIDE 50 MG/ML
50 INJECTION INTRAMUSCULAR; INTRAVENOUS AS NEEDED
OUTPATIENT
Start: 2024-04-24

## 2024-04-24 RX ORDER — METHYLPREDNISOLONE SODIUM SUCCINATE 125 MG/2ML
60 INJECTION, POWDER, LYOPHILIZED, FOR SOLUTION INTRAMUSCULAR; INTRAVENOUS ONCE
Status: CANCELLED | OUTPATIENT
Start: 2024-04-24

## 2024-04-24 RX ORDER — METHYLPREDNISOLONE SODIUM SUCCINATE 125 MG/2ML
60 INJECTION, POWDER, LYOPHILIZED, FOR SOLUTION INTRAMUSCULAR; INTRAVENOUS ONCE
Status: COMPLETED | OUTPATIENT
Start: 2024-04-24 | End: 2024-04-24

## 2024-04-24 RX ORDER — HEPARIN SODIUM (PORCINE) LOCK FLUSH IV SOLN 100 UNIT/ML 100 UNIT/ML
500 SOLUTION INTRAVENOUS AS NEEDED
Status: DISCONTINUED | OUTPATIENT
Start: 2024-04-24 | End: 2024-04-25 | Stop reason: HOSPADM

## 2024-04-24 RX ORDER — ACETAMINOPHEN 500 MG
1000 TABLET ORAL ONCE
Status: COMPLETED | OUTPATIENT
Start: 2024-04-24 | End: 2024-04-24

## 2024-04-24 RX ORDER — MEPERIDINE HYDROCHLORIDE 25 MG/ML
25 INJECTION INTRAMUSCULAR; INTRAVENOUS; SUBCUTANEOUS
OUTPATIENT
Start: 2024-04-24

## 2024-04-24 RX ORDER — HEPARIN SODIUM (PORCINE) LOCK FLUSH IV SOLN 100 UNIT/ML 100 UNIT/ML
500 SOLUTION INTRAVENOUS AS NEEDED
OUTPATIENT
Start: 2024-04-24

## 2024-04-24 RX ADMIN — HEPARIN 500 UNITS: 100 SYRINGE at 12:43

## 2024-04-24 RX ADMIN — Medication 20 ML: at 12:43

## 2024-04-24 RX ADMIN — DIPHENHYDRAMINE HYDROCHLORIDE 25 MG: 50 INJECTION, SOLUTION INTRAMUSCULAR; INTRAVENOUS at 11:23

## 2024-04-24 RX ADMIN — DARATUMUMAB AND HYALURONIDASE-FIHJ (HUMAN RECOMBINANT) 1800 MG: 1800; 30000 INJECTION SUBCUTANEOUS at 12:47

## 2024-04-24 RX ADMIN — METHYLPREDNISOLONE SODIUM SUCCINATE 60 MG: 125 INJECTION INTRAMUSCULAR; INTRAVENOUS at 11:21

## 2024-04-24 RX ADMIN — ACETAMINOPHEN 1000 MG: 500 TABLET ORAL at 11:20

## 2024-04-24 RX ADMIN — SODIUM CHLORIDE 20 ML/HR: 9 INJECTION, SOLUTION INTRAVENOUS at 11:10

## 2024-04-24 NOTE — PROGRESS NOTES
Specialty Pharmacy Patient Management Program  Oncology 6-Month Clinical Assessment       Anca Samson is a 73 y.o. female with Multiple Myeloma. Today's encounter was conducted in person, face-to-face.  to assess adherence and side effects.    Regimen: Lenalidomide 15mg po 21 days, on 7 off/ Darzalex Faspro/Dexamethasone    Reason for Outreach: Routine medication check-in .            Quality of Life Assessment  Quality of Life Improvement Scale: 5-No change     Goals Addressed Today        Specialty Pharmacy General Goal      Clinical goal/therapeutic target: disease control, per the recent oncology clinic notes and labs.               Adherence Questions  Linked Medication(s) Assessed: Lenalidomide  On average, how many doses/injections does the patient miss per month?: 0  What are the identified reasons for non-adherence or missed doses? : no problems identified  What is the estimated medication adherence level?: %  Based on the patient/caregiver response and refill history, does this patient require an MTP to track adherence improvements?: no    Assessments:  Medication Assessment  Medication Tolerability: Patient denies side effects. .  Therapeutically Appropriate: Yes  Administration: Patient is taking every day at the same time .   Patient can self administer medications: yes  Medication Follow-Up Plan: annually  Drug-drug interactions  Completed medication reconciliation today to assess for drug interactions. Patient denies starting or stopping any medications.    Assessed medication list for interactions, no significant drug interactions noted.   Advised patient to call the clinic if any new medications are started so we can assess for drug-drug interactions.  Vaccines are coordinated by the patient's oncologist and primary care provider.  Adherence:  Missed doses: Patient reports missing 0 doses in the last 30 days..  Reasons for missed doses: n/a  Medication availability/affordability: Patient  has had no issues obtaining medication from pharmacy.    All questions addressed and patient had no additional concerns. Patient has pharmacy contact information      Madisyn Saldaña PharmD, [facility]  Oncology Clinical Pharmacist  4/24/2024  12:01 EDT

## 2024-04-24 NOTE — PROGRESS NOTES
Chief Complaint  Chemotherapy      Rena Guerra PA    Subjective          Anca Samson presents to [facility] for ongoing treatment of her multiple myeloma.  She is on daratumumab, lenalidomide, and dexamethasone.  Tolerating the regimen well.  She denies new masses, adenopathy, unusual aches or pains.  Her biggest complaint is ongoing issues with breathing.  She is oxygen dependent.  She sees pulmonology routinely.    Oncology/Hematology History Overview Note   Smoldering Myeloma    Initially diagnosed in 2016 with bone marrow biopsy demonstrating 30% CD56 positive monoclonal plasma cell population.  45 XX normal female chromosome pattern  FISH panel negative for myeloma associated mutations including 1q21, 9q34,11q13,14q32,15q24, 17q13  No anemia, renal insufficiency, hypercalcemia.  On observation since that time    Low grade adenocarcinoma of ascending colon      5/16/2017 right hemicolectomy which  revealed a low-grade 7.6 cm adenocarcinoma of the cecum. All margins were  negative. Lymphovascular invasion and perineural invasion were not identified.     26 lymph nodes were removed and unfortunately 1 was involved with metastatic deposit. pT3 pN1a colorectal cancer.        Clinical Staging      Smoldering Myeloma; Colon ([id number])            Treatments      Chemotherapy      11/2017 completed 6mo adjuvant Xeloda        6/2022 Stopped Smoking     Malignant neoplasm of ascending colon   7/21/2017 Initial Diagnosis    Colon cancer (CMS/HCC)     Multiple myeloma   7/28/2021 Initial Diagnosis    Multiple myeloma     11/7/2023 -  Chemotherapy    OP MULTIPLE MYELOMA Daratumumab / Lenalidomide / Dexamethasone         Review of Systems   Constitutional:  Positive for fatigue. Negative for appetite change, diaphoresis, fever, unexpected weight gain and unexpected weight loss.   HENT:  Negative for hearing loss, mouth sores, sore throat, swollen glands, trouble  swallowing and voice change.    Eyes:  Negative for blurred vision.   Respiratory:  Negative for cough, shortness of breath and wheezing.    Cardiovascular:  Negative for chest pain and palpitations.   Gastrointestinal:  Negative for abdominal pain, blood in stool, constipation, diarrhea, nausea and vomiting.   Endocrine: Negative for cold intolerance and heat intolerance.   Genitourinary:  Negative for difficulty urinating, dysuria, frequency, hematuria and urinary incontinence.   Musculoskeletal:  Negative for arthralgias, back pain and myalgias.   Skin:  Negative for rash, skin lesions and wound.   Neurological:  Negative for dizziness, seizures, weakness, numbness and headache.   Hematological:  Does not bruise/bleed easily.   Psychiatric/Behavioral:  Negative for depressed mood. The patient is not nervous/anxious.      Current Outpatient Medications on File Prior to Visit   Medication Sig Dispense Refill    acyclovir (ZOVIRAX) 400 MG tablet Take 1 tablet by mouth 2 (Two) Times a Day. Take one tablet by mouth twice daily. 60 tablet 11    albuterol sulfate  (90 Base) MCG/ACT inhaler Inhale 2 puffs Every 4 (Four) Hours As Needed for Wheezing. 18 g 11    Budeson-Glycopyrrol-Formoterol (Breztri Aerosphere) 160-9-4.8 MCG/ACT aerosol inhaler Inhale 2 puffs 2 (Two) Times a Day. Rinse mouth out after each use 1 each 11    colestipol (COLESTID) 1 g tablet Take 2 tablets by mouth 2 (Two) Times a Day. 120 tablet 5    Cyanocobalamin (Vitamin B 12) 500 MCG tablet Take 2 tablets by mouth Daily.      dapsone 25 MG tablet TAKE 2 TABLETS BY MOUTH TWICE A  tablet 1    dexAMETHasone (DECADRON) 4 MG tablet Take 10 tablets on D 1,8,15,22 and take 1 tablet on D 2,3,16,17.  Take in the morning with food. 44 tablet 3    FLUoxetine (PROzac) 40 MG capsule Take 1 capsule by mouth Daily. 90 capsule 1    lenalidomide (REVLIMID) 15 MG capsule Take 1 capsule by mouth Daily. On Days 1-21, and off 7 days, on a 28 day cycle 21  capsule 0    levalbuterol (XOPENEX) 0.63 MG/3ML nebulizer solution Take 1 ampule by nebulization 4 (Four) Times a Day As Needed for Wheezing. 120 mL 5    LORazepam (Ativan) 1 MG tablet Take 1 tablet by mouth 2 (Two) Times a Day As Needed for Anxiety. 14 tablet 0    meloxicam (MOBIC) 15 MG tablet TAKE 1 TABLET BY MOUTH EVERY DAY 90 tablet 1    ondansetron (ZOFRAN) 8 MG tablet Take 1 tablet by mouth 3 (Three) Times a Day As Needed for Nausea or Vomiting. 30 tablet 5    promethazine (PHENERGAN) 25 MG tablet Take 1 tablet by mouth Every 6 (Six) Hours As Needed for Nausea or Vomiting. 30 tablet 1    vitamin D (ERGOCALCIFEROL) 1.25 MG (35085 UT) capsule capsule Take 1 capsule by mouth 2 (Two) Times a Week. 26 capsule 1    dexAMETHasone (DECADRON) 4 MG tablet Take 10 tablets on D 1,8,15,22 and take 1 tablet on D 2,3.  Take in the morning with food. (Patient not taking: Reported on 4/22/2024) 42 tablet 5    HYDROcodone-acetaminophen (Norco) 5-325 MG per tablet Take 1 tablet by mouth Every 6 (Six) Hours As Needed for Moderate Pain or Severe Pain. (Patient not taking: Reported on 2/15/2024) 6 tablet 0    levothyroxine (SYNTHROID, LEVOTHROID) 25 MCG tablet Take 1 tablet by mouth Every Morning. (Patient not taking: Reported on 2/15/2024) 90 tablet 1     Current Facility-Administered Medications on File Prior to Visit   Medication Dose Route Frequency Provider Last Rate Last Admin    [DISCONTINUED] heparin injection 500 Units  500 Units Intravenous PRN West Nicolas MD   500 Units at 04/24/24 1243    [DISCONTINUED] sodium chloride 0.9 % flush 20 mL  20 mL Intravenous PRN West Nicolas MD   20 mL at 04/24/24 1243       Allergies   Allergen Reactions    Cephalexin Hives    Morphine Anaphylaxis    Penicillins Hives    Sulfa Antibiotics Hives     Past Medical History:   Diagnosis Date    Anxiety     Asthma     Asthma 07/28/2021    Atherosclerosis of native coronary artery of native heart with angina pectoris 08/15/2023     FOLLOWED BY DR GONZALES/DINA PETTIT. DENIES CP BUT GETS SOA WITH EXERTION HAS COPD WEARS AT 2LPM N/C. DECREASED ACTIVITY D/T SOA    C. difficile diarrhea     DENIES ANY CURRENT ISSUES    Colon cancer 07/21/2017    Colon polyp     COPD (chronic obstructive pulmonary disease) 10/23/2017    COPD (chronic obstructive pulmonary disease) 10/23/2017    Depression     Disease of thyroid gland     Diverticulitis     Dysphagia     Essential hypertension 07/28/2021    Heartburn     Hernia 2019    History of chemotherapy     2017    Hx of psychiatric care     Hyperlipidemia     Impaired fasting glucose 08/14/2015    Lumbago     Lung nodule 02/17/2022    Major depressive disorder 10/27/2016    Malignant neoplasm of ascending colon 07/21/2017    Melena     Multiple myeloma     Nicotine dependence 05/10/2017    NICK (obstructive sleep apnea) 11/06/2023    Oxygen dependent     REPORTS USES 02 AT 2LPM VIA N/C. CAN GET VERY SOA WITH MINIMAL EXERTION    Renal insufficiency 07/28/2021    Seizures     PSEUDO SEIZURES LAST ONE AROUND NOV 2022    Shortness of breath     CAN GET VERY SOA WITH MINIMAL EXERTION    Sleep apnea     Tobacco use 07/28/2021    QUIT SMOKING JUNE 2022    Visit for screening mammogram 02/20/2020    NORMAL- REPEAT IN ONE YEAR    Vitamin D deficiency      Past Surgical History:   Procedure Laterality Date    APPENDECTOMY      BONE MARROW BIOPSY  2016    COLON SURGERY  2017    COLONOSCOPY  2018,2017,2019    [facility]- DR LE:DIVERTICULOSIS AND ERYTHEMA OF MUCOSA    COLONOSCOPY N/A 12/20/2022    Procedure: COLONOSCOPY WITH ELEVIEW INJECTION, POLYPECTOMY, HOT SNARE, CLIP APPLICATION X3, BIOPSIES;  Surgeon: Jarvis Borges MD;  Location: [facility];  Service: Gastroenterology;  Laterality: N/A;  COLON POLYP, DIVERTICULOSIS, ANASTOMOSIS RIGHT COLON    COLONOSCOPY N/A 11/9/2023    Procedure: COLONOSCOPY;  Surgeon: Jarvis Borges MD;  Location: [facility];  Service: Gastroenterology;   Laterality: N/A;  DIVERTICULOSIS, ANASTOMOSIS IN ASCENDING COLON    ENDOSCOPY  2018    FECAL DISIMPACTION  2019    TRANSPLANT     HEMICOLECTOMY Right 2017    HYSTERECTOMY  1982,1984    KNEE ARTHROSCOPY Left 2022    Procedure: KNEE ARTHROSCOPY WITH PARTIAL MEDIAL MENISCECTOMY,  CHONDROPLASTY;  Surgeon: Nicholas Tipton MD;  Location[place] [facility];  Service: Orthopedics;  Laterality: Left;    MINI-LAPAROTOMY  2017    PORTACATH PLACEMENT N/A     pt states that her port does not work    UPPER GASTROINTESTINAL ENDOSCOPY      VENOUS ACCESS DEVICE (PORT) INSERTION N/A 10/31/2023    Procedure: Port-a-catheter removal and port-a-catheter placement;  Surgeon: Alcon Delgado MD;  Location: [facility] [place];  Service: General;  Laterality: N/A;     Social History     Socioeconomic History    Marital status:    Tobacco Use    Smoking status: Former     Current packs/day: 0.00     Average packs/day: 1 pack/day for 47.4 years (47.4 ttl pk-yrs)     Types: Cigarettes     Start date: 1975     Quit date: 6/3/2022     Years since quittin.8     Passive exposure: Past    Smokeless tobacco: Never   Vaping Use    Vaping status: Never Used   Substance and Sexual Activity    Alcohol use: Never    Drug use: Never    Sexual activity: Defer     Family History   Problem Relation Age of Onset    Heart disease Mother     Lung cancer Mother     Cancer Mother     Stroke Father     Heart disease Father     Kidney cancer Father     Cancer Father     Stroke Other         UNCLE/AUNT    Colon cancer Neg Hx     Malig Hyperthermia Neg Hx        Objective   Physical Exam  Vitals reviewed. Exam conducted with a chaperone present.   Constitutional:       General: She is not in acute distress.     Appearance: Normal appearance.   HENT:      Nose:      Comments: Nasal cannula in place  Cardiovascular:      Rate and Rhythm: Normal rate and regular rhythm.      Heart sounds: Normal heart sounds. No murmur heard.     No gallop.  "  Pulmonary:      Effort: Pulmonary effort is normal.      Breath sounds: Normal breath sounds.   Abdominal:      General: Abdomen is flat. Bowel sounds are normal.      Palpations: Abdomen is soft.   Musculoskeletal:      Right lower leg: No edema.      Left lower leg: No edema.   Neurological:      Mental Status: She is alert and oriented to person, place, and time.   Psychiatric:         Mood and Affect: Mood normal.         Behavior: Behavior normal.         Vitals:    04/24/24 1042   BP: 140/54   Pulse: 84   Resp: 20   Temp: 98.8 °F (37.1 °C)   SpO2: 97%  Comment: 3L of O2   Weight: 90.1 kg (198 lb 10.2 oz)   Height: 153.5 cm (60.43\")   PainSc: 0-No pain     ECOG score: 2         PHQ-9 Total Score:                    Result Review :   The following data was reviewed by: West iNcolas MD on 04/24/2024:  Lab Results   Component Value Date    HGB 10.4 (L) 04/24/2024    HCT 32.3 (L) 04/24/2024    .0 (H) 04/24/2024     04/24/2024    WBC 8.04 04/24/2024    NEUTROABS 4.51 04/24/2024    LYMPHSABS 2.59 04/24/2024    MONOSABS 0.87 04/24/2024    EOSABS 0.00 04/24/2024    BASOSABS 0.04 04/24/2024     Lab Results   Component Value Date    GLUCOSE 198 (H) 04/24/2024    BUN 15 04/24/2024    CREATININE 0.79 04/24/2024     04/24/2024    K 3.7 04/24/2024     (H) 04/24/2024    CO2 19.5 (L) 04/24/2024    CALCIUM 8.5 (L) 04/24/2024    PROTEINTOT 5.4 (L) 04/24/2024    ALBUMIN 3.8 04/24/2024    BILITOT 0.6 04/24/2024    ALKPHOS 63 04/24/2024    AST 10 04/24/2024    ALT 16 04/24/2024     Lab Results   Component Value Date    FREET4 1.55 12/06/2023    TSH 0.792 04/24/2024     No results found for: \"IRON\", \"LABIRON\", \"TRANSFERRIN\", \"TIBC\"  Lab Results   Component Value Date     03/07/2023    YVFQLRWD77 612 04/15/2024    FOLATE 8.95 03/27/2024     No results found for: \"PSA\", \"CEA\", \"AFP\", \"\", \"\"    Data reviewed : Radiologic studies low dose CT chest reviewed pulmonology note reviewed.   "   Assessment and Plan    Diagnoses and all orders for this visit:    1. Multiple myeloma, remission status unspecified (Primary)  Assessment & Plan:  Patient is on active therapy with daratumumab, lenalidomide, dexamethasone.  Tolerating well.  M spike is declining nicely.  Lab work today is adequate for treatment.  Proceed with cycle 7 as planned.  I will see her back for cycle 8-day 1 with lab work prior to monitor for toxicities.    Orders:  -     CBC and Differential; Future  -     Comprehensive metabolic panel; Future  -     Thyroid Panel With TSH; Future    2. Chronic obstructive pulmonary disease, unspecified COPD type  Assessment & Plan:  Oxygen dependent.  Pulmonology note reviewed.  Low-dose CT scan reviewed demonstrating stable pulmonary nodules.      Other orders  -     Cancel: sodium chloride 0.9 % infusion  -     Cancel: acetaminophen (TYLENOL) tablet 1,000 mg  -     Cancel: methylPREDNISolone sodium succinate (SOLU-Medrol) injection 60 mg  -     Cancel: diphenhydrAMINE (BENADRYL) IVPB 25 mg  -     Cancel: daratumumab-hyaluronidase-fihj (DARZALEX FASPRO) 1800-81314 MG-UT/15ML injection 1,800 mg  -     Hydrocortisone Sod Suc (PF) (Solu-CORTEF) injection 100 mg  -     diphenhydrAMINE (BENADRYL) injection 50 mg  -     famotidine (PEPCID) injection 20 mg  -     meperidine (DEMEROL) injection 25 mg            Patient Follow Up: Cycle 8-day 1    Patient was given instructions and counseling regarding her condition or for health maintenance advice. Please see specific information pulled into the AVS if appropriate.     West Nicolas MD    4/25/2024

## 2024-04-24 NOTE — TELEPHONE ENCOUNTER
From: Anca Samson  To: Rena Gurera  Sent: 4/23/2024 10:33 AM EDT  Subject: Robi Samson    Did you set up an appointment for Robi ?   Short Stay Endoscopy History and Physical    PCP - Ken Webster MD    Procedure - Colonoscopy  ASA - II  Mallampati - per anesthesia  History of Anesthesia problems - no  Family history Anesthesia problems - no     HPI:  This is a 59 y.o. female here for evaluation of : Surveillance colonoscopy    Pt here today for surveillance colonoscopy.  Her most recent exam was in , at which time 2 subcentimeter polyps removed.  Since patient denies lower GI complaints of diarrhea, constipation, overt blood in stool, or change in bowel habits.  Denies unexplained weight loss.        ROS:  Constitutional: No fevers, chills, No weight loss  ENT: No allergies  CV: No chest pain  Pulm: No shortness of breath  GI: see HPI  Derm: No rash    Medical History:  has a past medical history of Diabetes mellitus type II, GERD (gastroesophageal reflux disease), Hypertension, Obesity, and Type II or unspecified type diabetes mellitus without mention of complication, not stated as uncontrolled.    Surgical History:  has a past surgical history that includes Tubal ligation ();  section; Oophorectomy; Hysterectomy (); and Ankle surgery (Right).    Family History: family history includes Breast cancer in her sister; Cancer in her mother; Colon cancer (age of onset: 49) in her sister; Diabetes in her sister; Heart attack in her father; Heart disease in her sister, sister, and sister; Heart failure in her sister; Hypertension in her father; Prostate cancer in her father.. Otherwise no colon cancer, inflammatory bowel disease, or GI malignancies.    Social History:  reports that she has never smoked. She has never used smokeless tobacco. She reports current alcohol use. She reports that she does not use drugs.    Review of patient's allergies indicates:   Allergen Reactions    Adhesive Other (See Comments)     Redness        Medications:   Medications Prior to Admission   Medication Sig Dispense Refill Last Dose     "amLODIPine (NORVASC) 5 MG tablet TAKE 1 TABLET BY MOUTH EVERY DAY 90 tablet 3 9/21/2020 at Unknown time    ascorbic acid, vitamin C, (VITAMIN C) 500 MG tablet Take 500 mg by mouth once daily.   Past Week at Unknown time    fluticasone propionate (FLONASE) 50 mcg/actuation nasal spray 1 SPRAY (50 MCG TOTAL) BY EACH NOSTRIL ROUTE ONCE DAILY. 16 mL 0 Past Month at Unknown time    irbesartan-hydrochlorothiazide (AVALIDE) 150-12.5 mg per tablet TAKE 1 TABLET BY MOUTH EVERY DAY 90 tablet 3 9/21/2020 at Unknown time    lancets (SOFT TOUCH LANCETS) Misc 1 lancet by Misc.(Non-Drug; Combo Route) route once daily. 100 each 6 9/21/2020 at Unknown time    metFORMIN (GLUCOPHAGE) 1000 MG tablet TAKE 1 TABLET BY MOUTH TWICE A DAY WITH MEALS 180 tablet 3 Past Week at Unknown time    polyethylene glycol (GOLYTELY,NULYTELY) 236-22.74-6.74 -5.86 gram suspension Take 4,000 mLs (4 L total) by mouth As instructed. 4000 mL 0 9/21/2020 at Unknown time    potassium chloride (KLOR-CON) 8 MEQ TbSR Take 8 mEq by mouth 2 (two) times daily.   Past Month at Unknown time    pravastatin (PRAVACHOL) 40 MG tablet TAKE 1 TABLET BY MOUTH EVERY DAY 90 tablet 3 Past Week at Unknown time    VICTOZA 2-DALTON 0.6 mg/0.1 mL (18 mg/3 mL) PnIj INJECT 1.2 MG INTO THE SKIN ONCE DAILY. 18 Syringe 3 Past Week at Unknown time    vitamin D (VITAMIN D3) 1000 units Tab Take 1,000 Units by mouth once daily.   Past Week at Unknown time    blood sugar diagnostic Strp 1 strip by Misc.(Non-Drug; Combo Route) route once daily. 100 strip 6     blood-glucose meter (ACCU-CHEK ALEXANDER PLUS METER) Misc 1 strip by Misc.(Non-Drug; Combo Route) route once daily. 1 each 0     pen needle, diabetic 31 gauge x 1/4" Ndle 1 PEN BY MISC.(NON-DRUG COMBO ROUTE) ROUTE DAILY. 100 each 3          Objective Findings:    Vital Signs: see nursing notes  Physical Exam:  General Appearance: Well appearing in no acute distress  Eyes:    No scleral icterus  ENT: Neck supple  Lungs: CTA " anteriorly  Heart:  S1, S2 normal, no murmurs heard  Abdomen: Soft, non tender, non distended with positive bowel sounds. No hepatosplenomegaly, ascites, or mass  Extremities: no edema  Skin: No rash      Labs:  Lab Results   Component Value Date    WBC 5.30 04/05/2018    HGB 12.2 04/05/2018    HCT 36.3 (L) 04/05/2018     04/05/2018    CHOL 130 08/07/2020    TRIG 69 08/07/2020    HDL 50 08/07/2020    ALT 24 08/07/2020    AST 27 08/07/2020     08/07/2020    K 3.9 08/07/2020     08/07/2020    CREATININE 0.7 08/07/2020    BUN 11 08/07/2020    CO2 26 08/07/2020    TSH 1.433 04/05/2019    INR 0.9 04/13/2005    HGBA1C 6.9 (H) 08/07/2020       I have explained the risks and benefits of endoscopy procedures to the patient including but not limited to bleeding, perforation, infection, and death.    Landen Oquendo MD

## 2024-04-25 LAB
ANA SER QL: NEGATIVE
C-ANCA TITR SER IF: NORMAL TITER
MYELOPEROXIDASE AB SER IA-ACNC: <0.2 UNITS (ref 0–0.9)
P-ANCA ATYPICAL TITR SER IF: NORMAL TITER
P-ANCA TITR SER IF: NORMAL TITER
PROTEINASE3 AB SER IA-ACNC: <0.2 UNITS (ref 0–0.9)

## 2024-04-25 NOTE — ASSESSMENT & PLAN NOTE
Oxygen dependent.  Pulmonology note reviewed.  Low-dose CT scan reviewed demonstrating stable pulmonary nodules.

## 2024-04-25 NOTE — ASSESSMENT & PLAN NOTE
Patient is on active therapy with daratumumab, lenalidomide, dexamethasone.  Tolerating well.  M spike is declining nicely.  Lab work today is adequate for treatment.  Proceed with cycle 7 as planned.  I will see her back for cycle 8-day 1 with lab work prior to monitor for toxicities.

## 2024-04-26 ENCOUNTER — SPECIALTY PHARMACY (OUTPATIENT)
Dept: PHARMACY | Facility: HOSPITAL | Age: 74
End: 2024-04-26
Payer: MEDICARE

## 2024-04-29 ENCOUNTER — SPECIALTY PHARMACY (OUTPATIENT)
Dept: PHARMACY | Facility: HOSPITAL | Age: 74
End: 2024-04-29
Payer: MEDICARE

## 2024-04-29 DIAGNOSIS — C90.00 MULTIPLE MYELOMA, REMISSION STATUS UNSPECIFIED: ICD-10-CM

## 2024-04-29 RX ORDER — LENALIDOMIDE 15 MG/1
15 CAPSULE ORAL DAILY
Qty: 21 CAPSULE | Refills: 0 | Status: SHIPPED | OUTPATIENT
Start: 2024-04-29

## 2024-04-29 NOTE — PROGRESS NOTES
Re: Refills of Oral Specialty Medication - Revlimid (lenalidomide)    Drug-Drug Interactions: The current medication list was reviewed and there are no relevant drug-drug interactions with the specialty medication.  Medication Allergies: The patient has no relevant allergies as it relates to their oral specialty medication  Review of Labs/Dose Adjustments: NO DOSE CHANGE - I reviewed the most recent note and labs and the patient will continue without any dose changes.  I sent refills as described below.    Drug: Revlimid (lenalidomide)  Strength: 15 mg  Directions: Take 1 capsule by mouth daily ON days 1-21, OFF for 7 days of each 28 day cycle  Quantity: 21  Refills: 0  REMS Auth # [id number] Exp 5/29/24   Pharmacy prescription sent to: [facility]      Walker BrysonD, [facility]  Oncology Clinical Pharmacist  4/29/2024  07:59 EDT

## 2024-05-01 ENCOUNTER — SPECIALTY PHARMACY (OUTPATIENT)
Dept: PHARMACY | Facility: HOSPITAL | Age: 74
End: 2024-05-01
Payer: MEDICARE

## 2024-05-02 ENCOUNTER — HOSPITAL ENCOUNTER (OUTPATIENT)
Dept: MRI IMAGING | Facility: HOSPITAL | Age: 74
Discharge: HOME | End: 2024-05-02
Admitting: PHYSICIAN ASSISTANT
Payer: MEDICARE

## 2024-05-02 DIAGNOSIS — R26.81 GAIT INSTABILITY: ICD-10-CM

## 2024-05-02 DIAGNOSIS — G25.2 COARSE TREMORS: ICD-10-CM

## 2024-05-02 PROCEDURE — 70553 MRI BRAIN STEM W/O & W/DYE: CPT

## 2024-05-02 PROCEDURE — 0 GADOBENATE DIMEGLUMINE 529 MG/ML SOLUTION: Performed by: PHYSICIAN ASSISTANT

## 2024-05-02 PROCEDURE — A9577 INJ MULTIHANCE: HCPCS | Performed by: PHYSICIAN ASSISTANT

## 2024-05-02 RX ADMIN — GADOBENATE DIMEGLUMINE 19 ML: 529 INJECTION, SOLUTION INTRAVENOUS at 10:59

## 2024-05-07 ENCOUNTER — HOSPITAL ENCOUNTER (OUTPATIENT)
Dept: SLEEP MEDICINE | Facility: HOSPITAL | Age: 74
Discharge: HOME | End: 2024-05-07
Admitting: NURSE PRACTITIONER
Payer: MEDICARE

## 2024-05-07 DIAGNOSIS — G47.33 OSA (OBSTRUCTIVE SLEEP APNEA): ICD-10-CM

## 2024-05-07 PROCEDURE — 95811 POLYSOM 6/>YRS CPAP 4/> PARM: CPT

## 2024-05-10 DIAGNOSIS — R93.89 ABNORMAL MRI: Primary | ICD-10-CM

## 2024-05-10 DIAGNOSIS — J34.9 SINUS DISEASE: ICD-10-CM

## 2024-05-10 DIAGNOSIS — H74.93 MASTOID DISORDER, BILATERAL: ICD-10-CM

## 2024-05-10 LAB
CYTO UR: NORMAL
DIFF PNL MAR: NORMAL
LAB AP ASPIRATE SMEAR: NORMAL
LAB AP CASE REPORT: NORMAL
LAB AP CLINICAL INFORMATION: NORMAL
LAB AP CLOT SECTION: NORMAL
LAB AP CORE BIOPSY: NORMAL
LAB AP CYTOGENETICS REPORT,ADDENDUM: NORMAL
LAB AP DIAGNOSIS COMMENT: NORMAL
LAB AP SPECIAL STAINS: NORMAL
PATH REPORT.FINAL DX SPEC: NORMAL
PATH REPORT.GROSS SPEC: NORMAL

## 2024-05-14 ENCOUNTER — TELEPHONE (OUTPATIENT)
Dept: ONCOLOGY | Facility: HOSPITAL | Age: 74
End: 2024-05-14

## 2024-05-14 NOTE — TELEPHONE ENCOUNTER
"  Caller: Anca Samson \"Sarah\"    Relationship: Self    Best call back number: [phone]    What is the best time to reach you: ANYTIME    Who are you requesting to speak with (clinical staff, provider,  specific staff member): LOC - PHARMACY    What was the call regarding: PATIENT HAS A QUESTION ABOUT HER REVLIMID - SHE RECVD NAME BRAND THIS MONTH AND LAST MONTH AND THOUGHT SHE WAS SUPPOSED TO GET THE GENERIC.    PLEASE CALL TO ADVISE.   "

## 2024-05-15 ENCOUNTER — OFFICE VISIT (OUTPATIENT)
Dept: PULMONOLOGY | Facility: CLINIC | Age: 74
End: 2024-05-15
Payer: MEDICARE

## 2024-05-15 VITALS
RESPIRATION RATE: 16 BRPM | HEART RATE: 87 BPM | TEMPERATURE: 97.1 F | DIASTOLIC BLOOD PRESSURE: 44 MMHG | BODY MASS INDEX: 38.21 KG/M2 | WEIGHT: 194.6 LBS | SYSTOLIC BLOOD PRESSURE: 110 MMHG | OXYGEN SATURATION: 94 % | HEIGHT: 60 IN

## 2024-05-15 DIAGNOSIS — G47.33 OSA (OBSTRUCTIVE SLEEP APNEA): ICD-10-CM

## 2024-05-15 DIAGNOSIS — J98.11 ATELECTASIS: ICD-10-CM

## 2024-05-15 DIAGNOSIS — J96.11 CHRONIC RESPIRATORY FAILURE WITH HYPOXIA: ICD-10-CM

## 2024-05-15 DIAGNOSIS — J43.2 CENTRILOBULAR EMPHYSEMA: Primary | ICD-10-CM

## 2024-05-15 RX ORDER — LEVALBUTEROL INHALATION SOLUTION 0.63 MG/3ML
3 SOLUTION RESPIRATORY (INHALATION) 4 TIMES DAILY PRN
Qty: 120 ML | Refills: 5 | Status: SHIPPED | OUTPATIENT
Start: 2024-05-15

## 2024-05-15 RX ORDER — BUDESONIDE, GLYCOPYRROLATE, AND FORMOTEROL FUMARATE 160; 9; 4.8 UG/1; UG/1; UG/1
2 AEROSOL, METERED RESPIRATORY (INHALATION) 2 TIMES DAILY
Qty: 1 EACH | Refills: 11 | Status: SHIPPED | OUTPATIENT
Start: 2024-05-15 | End: 2024-05-16 | Stop reason: CLARIF

## 2024-05-15 NOTE — PROGRESS NOTES
Primary Care Provider  Rena Guerra PA     Referring Provider  No ref. provider found     Chief Complaint  COPD, Asthma, Shortness of Breath, Follow-up (Pt here for 3 month follow up), and Sleep Apnea    Subjective          History of Presenting Illness  Patient is a 73-year-old female with history of smoking in past, chronic hypoxic respiratory failure, obstructive sleep apnea, severe COPD with very low diffusion capacity.  She is here for follow-up.  Since her last office visit, she has not been able to be very active.  She has been using Breztri twice a day.  Uses rescue inhaler 3-4 times a week.  She is short of breath with minimal activities, has not been able to do much activities at home.  She does has chest tightness at times has no significant cough or wheezing.  No fever or chills.  No nausea or vomiting.  I reviewed her previous pulmonary function test with her today.  I also reviewed her CPAP compliance data.  She has been using it for 2 hours and 29 minutes.  She does feel like she has to gasp for her breath at times when she is using the machine and takes off the mask at night.  Whenever she uses her machine her apnea-hypopnea index is 8.9.  Patient is under the care of Dr. Nicolas for multiple myeloma and is on chemotherapy.  She is currently undergoing monthly chemotherapy through Dr. Nicolas.  Patient is under the care of Dr. Celeste, general surgeon for hiatal hernia.  Patient states that she was told that she will not be able to get surgery until she finishes chemotherapy.  Patient denies fever, chills, night sweats, swollen glands in the head and neck, unintentional weight loss, hemoptysis, purulent sputum production, dysphagia, chest pain, palpitations, chest tightness, abdominal pain, nausea, vomiting, and diarrhea.  Patient also denies any myalgias, changes in sense of taste and/or smell, sore throat, any other coronavirus or flu-like symptoms.  Patient denies any leg swelling,  orthopnea, paroxysmal nocturnal dyspnea.  Patient is able to perform activities of daily living.        Review of Systems     Family History   Problem Relation Age of Onset    Heart disease Mother     Lung cancer Mother     Cancer Mother     Stroke Father     Heart disease Father     Kidney cancer Father     Cancer Father     Stroke Other         UNCLE/AUNT    Colon cancer Neg Hx     Malig Hyperthermia Neg Hx         Social History     Socioeconomic History    Marital status:    Tobacco Use    Smoking status: Former     Current packs/day: 0.00     Average packs/day: 1 pack/day for 47.4 years (47.4 ttl pk-yrs)     Types: Cigarettes     Start date: 1975     Quit date: 6/3/2022     Years since quittin.9     Passive exposure: Past    Smokeless tobacco: Never   Vaping Use    Vaping status: Never Used   Substance and Sexual Activity    Alcohol use: Never    Drug use: Never    Sexual activity: Defer        Past Medical History:   Diagnosis Date    Anxiety     Asthma     Asthma 2021    Atherosclerosis of native coronary artery of native heart with angina pectoris 08/15/2023    FOLLOWED BY DR GONZALES/DINA PETTIT. DENIES CP BUT GETS SOA WITH EXERTION HAS COPD WEARS AT 2LPM N/C. DECREASED ACTIVITY D/T SOA    C. difficile diarrhea     DENIES ANY CURRENT ISSUES    Colon cancer 2017    Colon polyp     COPD (chronic obstructive pulmonary disease) 10/23/2017    COPD (chronic obstructive pulmonary disease) 10/23/2017    Depression     Disease of thyroid gland     Diverticulitis     Dysphagia     Essential hypertension 2021    Heartburn     Hernia 2019    History of chemotherapy     2017    Hx of psychiatric care     Hyperlipidemia     Impaired fasting glucose 2015    Lumbago     Lung nodule 2022    Major depressive disorder 10/27/2016    Malignant neoplasm of ascending colon 2017    Melena     Multiple myeloma     Nicotine dependence 05/10/2017    NICK (obstructive sleep apnea)  11/06/2023    Oxygen dependent     REPORTS USES 02 AT 2LPM VIA N/C. CAN GET VERY SOA WITH MINIMAL EXERTION    Renal insufficiency 07/28/2021    Seizures     PSEUDO SEIZURES LAST ONE AROUND NOV 2022    Shortness of breath     CAN GET VERY SOA WITH MINIMAL EXERTION    Sleep apnea     Tobacco use 07/28/2021    QUIT SMOKING JUNE 2022    Visit for screening mammogram 02/20/2020    NORMAL- REPEAT IN ONE YEAR    Vitamin D deficiency         Immunization History   Administered Date(s) Administered    Fluzone High Dose =>65 Years ([employer] ONLY) 09/24/2020, 09/24/2021    Fluzone High-Dose 65+yrs 09/24/2020, 09/24/2021, 09/20/2022, 10/10/2023    Fluzone Quad >6mos (Multi-dose) 09/14/2015    Pneumococcal Conjugate 13-Valent (PCV13) 09/21/2015    Pneumococcal Polysaccharide (PPSV23) 04/12/2022       Allergies   Allergen Reactions    Cephalexin Hives    Morphine Anaphylaxis    Penicillins Hives    Sulfa Antibiotics Hives          Current Outpatient Medications:     acyclovir (ZOVIRAX) 400 MG tablet, Take 1 tablet by mouth 2 (Two) Times a Day. Take one tablet by mouth twice daily., Disp: 60 tablet, Rfl: 11    albuterol sulfate  (90 Base) MCG/ACT inhaler, Inhale 2 puffs Every 4 (Four) Hours As Needed for Wheezing., Disp: 18 g, Rfl: 11    Budeson-Glycopyrrol-Formoterol (Breztri Aerosphere) 160-9-4.8 MCG/ACT aerosol inhaler, Inhale 2 puffs 2 (Two) Times a Day. Rinse mouth out after each use, Disp: 1 each, Rfl: 11    colestipol (COLESTID) 1 g tablet, Take 2 tablets by mouth 2 (Two) Times a Day., Disp: 120 tablet, Rfl: 5    Cyanocobalamin (Vitamin B 12) 500 MCG tablet, Take 2 tablets by mouth Daily., Disp: , Rfl:     dapsone 25 MG tablet, TAKE 2 TABLETS BY MOUTH TWICE A DAY, Disp: 360 tablet, Rfl: 1    dexAMETHasone (DECADRON) 4 MG tablet, Take 10 tablets on D 1,8,15,22 and take 1 tablet on D 2,3,16,17.  Take in the morning with food., Disp: 44 tablet, Rfl: 3    dexAMETHasone (DECADRON) 4 MG tablet, Take 10 tablets on D  1,8,15,22 and take 1 tablet on D 2,3.  Take in the morning with food., Disp: 42 tablet, Rfl: 5    FLUoxetine (PROzac) 40 MG capsule, Take 1 capsule by mouth Daily., Disp: 90 capsule, Rfl: 1    lenalidomide (REVLIMID) 15 MG capsule, Take 1 capsule by mouth Daily. On Days 1-21, and off 7 days, on a 28 day cycle, Disp: 21 capsule, Rfl: 0    levalbuterol (XOPENEX) 0.63 MG/3ML nebulizer solution, Take 1 ampule by nebulization 4 (Four) Times a Day As Needed for Wheezing., Disp: 120 mL, Rfl: 5    LORazepam (Ativan) 1 MG tablet, Take 1 tablet by mouth 2 (Two) Times a Day As Needed for Anxiety., Disp: 14 tablet, Rfl: 0    ondansetron (ZOFRAN) 8 MG tablet, Take 1 tablet by mouth 3 (Three) Times a Day As Needed for Nausea or Vomiting., Disp: 30 tablet, Rfl: 5    promethazine (PHENERGAN) 25 MG tablet, Take 1 tablet by mouth Every 6 (Six) Hours As Needed for Nausea or Vomiting., Disp: 30 tablet, Rfl: 1    vitamin D (ERGOCALCIFEROL) 1.25 MG (90674 UT) capsule capsule, Take 1 capsule by mouth 2 (Two) Times a Week., Disp: 26 capsule, Rfl: 1     Objective     Physical Exam  Vital Signs:   Obese female, in mild distress, on nasal oxygen, has conversational dyspnea  HEENT:  PERRL, EOMI.  OP, nares clear, no sinus tenderness  Neck:  Supple, no JVD, no thyromegaly.  Lymph: no axillary, cervical, supraclavicular lymphadenopathy noted bilaterally  Chest: Mildly decreased breath sounds throughout. No wheezes, rales, or rhonchi appreciated.  Normal work of breathing noted.  Patient is able speak full sentences without difficulty.  Patient is on 2 L of oxygen per minute via nasal cannula.  CV: RRR, no MGR, pulses 2+, equal.  Abd:  Soft, NT, ND, + BS, no HSM  EXT:  no clubbing, no cyanosis, no edema, no joint tenderness  Neuro:  A&Ox3, CN grossly intact, no focal deficits, generalized weakness  Skin: No rashes or lesions noted.    /44 (BP Location: Left arm, Patient Position: Sitting, Cuff Size: Large Adult)   Pulse 87   Temp 97.1 °F  "(36.2 °C) (Temporal)   Resp 16   Ht 153.5 cm (60.43\")   Wt 88.3 kg (194 lb 9.6 oz)   SpO2 94% Comment: nasal cannula/ 2 liters/ pulse dose  BMI 37.47 kg/m²         Result Review :   I have reviewed my last office visit note.  I also reviewed chest x-ray report and lab results dated from 4/15/2024.  See scanned reports.    Procedures:    CMP          3/27/2024    09:19 4/15/2024    10:08 4/24/2024    09:25   CMP   Glucose 185  160  198    BUN 15  9  15    Creatinine 0.79  0.75  0.79    EGFR 78.6  83.7  78.6    Sodium 142  140  141    Potassium 3.7  3.9  3.7    Chloride 111  108  113    Calcium 9.1  8.8  8.5    Total Protein 5.7  5.7  5.4    Albumin 3.8  3.7  3.8    Globulin 1.9  2.0  1.6    Total Bilirubin 0.5  0.6  0.6    Alkaline Phosphatase 80  71  63    AST (SGOT) 8  19  10    ALT (SGPT) 14  21  16    Albumin/Globulin Ratio 2.0  1.9  2.4    BUN/Creatinine Ratio 19.0  12.0  19.0    Anion Gap 10.9  15.5  8.5      CBC          4/10/2024    09:06 4/15/2024    10:08 4/24/2024    09:25   CBC   WBC 11.73  8.79  8.04    RBC 3.21  3.42  3.02    Hemoglobin 10.9  11.5  10.4    Hematocrit 34.2  35.9  32.3    .5  105.0  107.0    MCH 34.0  33.6  34.4    MCHC 31.9  32.0  32.2    RDW 17.7  15.2  17.0    Platelets 388  299  331      CBC w/diff          3/27/2024    09:19 4/10/2024    09:06 4/15/2024    10:08   CBC w/Diff   WBC 10.09  11.73  8.79    RBC 3.24  3.21  3.42    Hemoglobin 11.0  10.9  11.5    Hematocrit 33.8  34.2  35.9    .3  106.5  105.0    MCH 34.0  34.0  33.6    MCHC 32.5  31.9  32.0    RDW 18.3  17.7  15.2    Platelets 508  388  299    Neutrophil Rel % 44.3  53.6  34.3    Immature Granulocyte Rel % 0.5  0.3  0.5    Lymphocyte Rel % 44.2  35.0  45.3    Monocyte Rel % 10.3  10.6  13.0    Eosinophil Rel % 0.0  0.3  6.1    Basophil Rel % 0.7  0.2  0.8      XR Chest 2 View    Result Date: 4/15/2024  Impression: 1.  No acute cardiopulmonary disease.   Electronically Signed By-Bryan Schultz MD " On:4/15/2024 2:57 PM            Assessment and Plan      Assessment:  1.  Mild COPD.  FEV1 of 79% predicted.  Very decreased diffusion capacity.  Alpha 1 antitrypsin with a normal genotype of M/M with a level of 231.  2.  Centrilobular emphysema on chest CT scan.  3.  History of colon cancer.  4.  Multiple myeloma.  Patient is under the care of Dr. Nicolas and is on chemotherapy and steroids per patient report.    5.  Chronic dyspnea.  6.  Cough.  7.  Wheezing.  8.  Multiple lung nodules. Stable on low dose chest CT scan dated from 3/1/2024.  9.  Severe obstructive sleep apnea.  Patient is on a CPAP machine.  10.  Chronic hypoxic respiratory failure: Patient is on supplemental oxygen.  11.  Hiatal hernia.  12.  Tobacco abuse of cigarettes in remission.  Patient is enrolled in lung cancer screening.          Plan:    COPD: Continue Breztri inhaler twice daily.  Advised nebulizer replacement, she wants to continue Breztri for now.  Continue with Xopenex nebulizer 2-4 times a day.  Given her significantly diminished diffusion capacity, I recommended pulmonary rehab.  Patient is willing to go for it.  I have referred for pulmonary rehab today.      Lung nodules: Up to 7 mm in size.  Will need yearly low-dose lung cancer screening CT scan of the chest.  She had low-dose CT scan of the chest on 3/1/2024.    Obstructive sleep apnea: She needs to be more compliant in wearing to her CPAP use.  I reviewed her CPAP uses data with her today.  Her average daily use is 2 hours and 29 minutes, apnea-hypopnea index is at 2.9 on 8.  Average daily pressure is in between 7.8-9.4.  It is AutoPap 6 to 16 cm of water.  Sleep hygiene was discussed in detail.  Weight loss counseling was done.    Hiatal hernia per Dr. Delgado.  Multiple myeloma: On chemotherapy monthly per Dr. Nicolas.    We will address vaccination next office visit.      Follow Up   Return in about 6 months (around 11/15/2024).  Patient was given instructions and counseling  regarding her condition or for health maintenance advice. Please see specific information pulled into the AVS if appropriate.

## 2024-05-16 RX ORDER — REVEFENACIN 175 UG/3ML
175 SOLUTION RESPIRATORY (INHALATION)
Start: 2024-05-16

## 2024-05-16 RX ORDER — BUDESONIDE 0.5 MG/2ML
0.5 INHALANT ORAL
Start: 2024-05-16

## 2024-05-16 RX ORDER — ARFORMOTEROL TARTRATE 15 UG/2ML
15 SOLUTION RESPIRATORY (INHALATION)
Start: 2024-05-16

## 2024-05-16 NOTE — TELEPHONE ENCOUNTER
Pt called stated she wanted Brovana, Budesonide, Yupelri instead of Breztri per Dr. Lee.   Pt has been called and informed neb meds have been sent to [facility].   Pt has been advised when she receives neb meds to stop taking Breztri

## 2024-05-20 ENCOUNTER — PATIENT MESSAGE (OUTPATIENT)
Dept: FAMILY MEDICINE CLINIC | Facility: CLINIC | Age: 74
End: 2024-05-20
Payer: MEDICARE

## 2024-05-20 DIAGNOSIS — F33.1 MODERATE EPISODE OF RECURRENT MAJOR DEPRESSIVE DISORDER: Chronic | ICD-10-CM

## 2024-05-20 DIAGNOSIS — F33.1 MODERATE EPISODE OF RECURRENT MAJOR DEPRESSIVE DISORDER: ICD-10-CM

## 2024-05-20 RX ORDER — FLUOXETINE HYDROCHLORIDE 20 MG/1
40 CAPSULE ORAL DAILY
Qty: 60 CAPSULE | Refills: 0 | OUTPATIENT
Start: 2024-05-20

## 2024-05-21 RX ORDER — FLUOXETINE HYDROCHLORIDE 40 MG/1
40 CAPSULE ORAL DAILY
Qty: 90 CAPSULE | Refills: 1 | Status: SHIPPED | OUTPATIENT
Start: 2024-05-21

## 2024-05-21 NOTE — TELEPHONE ENCOUNTER
From: Anca Samson  To: Rena Guerra  Sent: 5/20/2024 4:47 PM EDT  Subject: Fluoxetine    Ms. Barker. Need you too send a prescription in for Fluoxetine please. I don't see Dr. ESTES until June 20th and will run out before the appointment date.    Thanks,  Sarah Samson

## 2024-05-22 ENCOUNTER — OFFICE VISIT (OUTPATIENT)
Dept: ONCOLOGY | Facility: HOSPITAL | Age: 74
End: 2024-05-22
Payer: MEDICARE

## 2024-05-22 ENCOUNTER — HOSPITAL ENCOUNTER (OUTPATIENT)
Dept: ONCOLOGY | Facility: HOSPITAL | Age: 74
Discharge: HOME | End: 2024-05-22
Payer: MEDICARE

## 2024-05-22 VITALS
OXYGEN SATURATION: 95 % | DIASTOLIC BLOOD PRESSURE: 59 MMHG | TEMPERATURE: 97.7 F | RESPIRATION RATE: 18 BRPM | SYSTOLIC BLOOD PRESSURE: 129 MMHG | BODY MASS INDEX: 37.9 KG/M2 | WEIGHT: 196.87 LBS | HEART RATE: 86 BPM

## 2024-05-22 VITALS
DIASTOLIC BLOOD PRESSURE: 59 MMHG | WEIGHT: 196.87 LBS | SYSTOLIC BLOOD PRESSURE: 129 MMHG | TEMPERATURE: 97.7 F | HEART RATE: 86 BPM | RESPIRATION RATE: 18 BRPM | OXYGEN SATURATION: 95 % | BODY MASS INDEX: 37.9 KG/M2

## 2024-05-22 DIAGNOSIS — Z12.2 ENCOUNTER FOR SCREENING FOR LUNG CANCER: Primary | ICD-10-CM

## 2024-05-22 DIAGNOSIS — C90.00 MULTIPLE MYELOMA, REMISSION STATUS UNSPECIFIED: ICD-10-CM

## 2024-05-22 DIAGNOSIS — C90.00 MULTIPLE MYELOMA, REMISSION STATUS UNSPECIFIED: Primary | ICD-10-CM

## 2024-05-22 DIAGNOSIS — F41.9 ANXIETY: ICD-10-CM

## 2024-05-22 LAB
ALBUMIN SERPL-MCNC: 4 G/DL (ref 3.5–5.2)
ALBUMIN/GLOB SERPL: 2.2 G/DL
ALP SERPL-CCNC: 61 U/L (ref 39–117)
ALT SERPL W P-5'-P-CCNC: 15 U/L (ref 1–33)
ANION GAP SERPL CALCULATED.3IONS-SCNC: 9.9 MMOL/L (ref 5–15)
AST SERPL-CCNC: 10 U/L (ref 1–32)
BASOPHILS # BLD AUTO: 0.01 10*3/MM3 (ref 0–0.2)
BASOPHILS NFR BLD AUTO: 0.2 % (ref 0–1.5)
BILIRUB SERPL-MCNC: 0.6 MG/DL (ref 0–1.2)
BUN SERPL-MCNC: 16 MG/DL (ref 8–23)
BUN/CREAT SERPL: 19.8 (ref 7–25)
CALCIUM SPEC-SCNC: 9 MG/DL (ref 8.6–10.5)
CHLORIDE SERPL-SCNC: 109 MMOL/L (ref 98–107)
CO2 SERPL-SCNC: 20.1 MMOL/L (ref 22–29)
CREAT SERPL-MCNC: 0.81 MG/DL (ref 0.57–1)
DEPRECATED RDW RBC AUTO: 65.9 FL (ref 37–54)
EGFRCR SERPLBLD CKD-EPI 2021: 76.3 ML/MIN/1.73
EOSINOPHIL # BLD AUTO: 0 10*3/MM3 (ref 0–0.4)
EOSINOPHIL NFR BLD AUTO: 0 % (ref 0.3–6.2)
ERYTHROCYTE [DISTWIDTH] IN BLOOD BY AUTOMATED COUNT: 16.7 % (ref 12.3–15.4)
GLOBULIN UR ELPH-MCNC: 1.8 GM/DL
GLUCOSE SERPL-MCNC: 213 MG/DL (ref 65–99)
HCT VFR BLD AUTO: 34.6 % (ref 34–46.6)
HGB BLD-MCNC: 11 G/DL (ref 12–15.9)
IMM GRANULOCYTES # BLD AUTO: 0.01 10*3/MM3 (ref 0–0.05)
IMM GRANULOCYTES NFR BLD AUTO: 0.2 % (ref 0–0.5)
LYMPHOCYTES # BLD AUTO: 1.73 10*3/MM3 (ref 0.7–3.1)
LYMPHOCYTES NFR BLD AUTO: 31.6 % (ref 19.6–45.3)
MCH RBC QN AUTO: 33.6 PG (ref 26.6–33)
MCHC RBC AUTO-ENTMCNC: 31.8 G/DL (ref 31.5–35.7)
MCV RBC AUTO: 105.8 FL (ref 79–97)
MONOCYTES # BLD AUTO: 0.71 10*3/MM3 (ref 0.1–0.9)
MONOCYTES NFR BLD AUTO: 13 % (ref 5–12)
NEUTROPHILS NFR BLD AUTO: 3.01 10*3/MM3 (ref 1.7–7)
NEUTROPHILS NFR BLD AUTO: 55 % (ref 42.7–76)
PLATELET # BLD AUTO: 291 10*3/MM3 (ref 140–450)
PMV BLD AUTO: 9.7 FL (ref 6–12)
POTASSIUM SERPL-SCNC: 3.7 MMOL/L (ref 3.5–5.2)
PROT SERPL-MCNC: 5.8 G/DL (ref 6–8.5)
RBC # BLD AUTO: 3.27 10*6/MM3 (ref 3.77–5.28)
SODIUM SERPL-SCNC: 139 MMOL/L (ref 136–145)
WBC NRBC COR # BLD AUTO: 5.47 10*3/MM3 (ref 3.4–10.8)

## 2024-05-22 PROCEDURE — 25010000002 DIPHENHYDRAMINE PER 50 MG: Performed by: INTERNAL MEDICINE

## 2024-05-22 PROCEDURE — 63710000001 ACETAMINOPHEN EXTRA STRENGTH 500 MG TABLET: Performed by: INTERNAL MEDICINE

## 2024-05-22 PROCEDURE — 85025 COMPLETE CBC W/AUTO DIFF WBC: CPT | Performed by: INTERNAL MEDICINE

## 2024-05-22 PROCEDURE — 96375 TX/PRO/DX INJ NEW DRUG ADDON: CPT

## 2024-05-22 PROCEDURE — 25010000002 HEPARIN LOCK FLUSH PER 10 UNITS: Performed by: INTERNAL MEDICINE

## 2024-05-22 PROCEDURE — A9270 NON-COVERED ITEM OR SERVICE: HCPCS | Performed by: INTERNAL MEDICINE

## 2024-05-22 PROCEDURE — 96401 CHEMO ANTI-NEOPL SQ/IM: CPT

## 2024-05-22 PROCEDURE — 25010000002 METHYLPREDNISOLONE PER 125 MG: Performed by: INTERNAL MEDICINE

## 2024-05-22 PROCEDURE — 25010000002 DARATUMUMAB-HYALURONIDASE-FIHJ 1800-30000 MG-UT/15ML SOLUTION: Performed by: INTERNAL MEDICINE

## 2024-05-22 PROCEDURE — 80053 COMPREHEN METABOLIC PANEL: CPT | Performed by: INTERNAL MEDICINE

## 2024-05-22 PROCEDURE — 25810000003 SODIUM CHLORIDE 0.9 % SOLUTION: Performed by: INTERNAL MEDICINE

## 2024-05-22 RX ORDER — SODIUM CHLORIDE 9 MG/ML
20 INJECTION, SOLUTION INTRAVENOUS ONCE
Status: COMPLETED | OUTPATIENT
Start: 2024-05-22 | End: 2024-05-22

## 2024-05-22 RX ORDER — LORAZEPAM 1 MG/1
1 TABLET ORAL 2 TIMES DAILY PRN
Qty: 14 TABLET | Refills: 0 | Status: SHIPPED | OUTPATIENT
Start: 2024-05-22

## 2024-05-22 RX ORDER — ACETAMINOPHEN 500 MG
1000 TABLET ORAL ONCE
Status: COMPLETED | OUTPATIENT
Start: 2024-05-22 | End: 2024-05-22

## 2024-05-22 RX ORDER — DIPHENHYDRAMINE HYDROCHLORIDE 50 MG/ML
50 INJECTION INTRAMUSCULAR; INTRAVENOUS AS NEEDED
Status: CANCELLED | OUTPATIENT
Start: 2024-05-22

## 2024-05-22 RX ORDER — METHYLPREDNISOLONE SODIUM SUCCINATE 125 MG/2ML
60 INJECTION, POWDER, LYOPHILIZED, FOR SOLUTION INTRAMUSCULAR; INTRAVENOUS ONCE
Status: CANCELLED | OUTPATIENT
Start: 2024-05-22

## 2024-05-22 RX ORDER — SODIUM CHLORIDE 9 MG/ML
20 INJECTION, SOLUTION INTRAVENOUS ONCE
Status: CANCELLED | OUTPATIENT
Start: 2024-05-22

## 2024-05-22 RX ORDER — FAMOTIDINE 10 MG/ML
20 INJECTION, SOLUTION INTRAVENOUS AS NEEDED
Status: CANCELLED | OUTPATIENT
Start: 2024-05-22

## 2024-05-22 RX ORDER — DIPHENHYDRAMINE HYDROCHLORIDE 50 MG/ML
50 INJECTION INTRAMUSCULAR; INTRAVENOUS AS NEEDED
Status: DISCONTINUED | OUTPATIENT
Start: 2024-05-22 | End: 2024-05-23 | Stop reason: HOSPADM

## 2024-05-22 RX ORDER — SODIUM CHLORIDE 0.9 % (FLUSH) 0.9 %
20 SYRINGE (ML) INJECTION AS NEEDED
OUTPATIENT
Start: 2024-05-22

## 2024-05-22 RX ORDER — MEPERIDINE HYDROCHLORIDE 25 MG/ML
25 INJECTION INTRAMUSCULAR; INTRAVENOUS; SUBCUTANEOUS
Status: DISCONTINUED | OUTPATIENT
Start: 2024-05-22 | End: 2024-05-23 | Stop reason: HOSPADM

## 2024-05-22 RX ORDER — ACETAMINOPHEN 500 MG
1000 TABLET ORAL ONCE
Status: CANCELLED | OUTPATIENT
Start: 2024-05-22

## 2024-05-22 RX ORDER — MEPERIDINE HYDROCHLORIDE 25 MG/ML
25 INJECTION INTRAMUSCULAR; INTRAVENOUS; SUBCUTANEOUS
Status: CANCELLED | OUTPATIENT
Start: 2024-05-22

## 2024-05-22 RX ORDER — SODIUM CHLORIDE 0.9 % (FLUSH) 0.9 %
20 SYRINGE (ML) INJECTION AS NEEDED
Status: DISCONTINUED | OUTPATIENT
Start: 2024-05-22 | End: 2024-05-23 | Stop reason: HOSPADM

## 2024-05-22 RX ORDER — METHYLPREDNISOLONE SODIUM SUCCINATE 125 MG/2ML
60 INJECTION, POWDER, LYOPHILIZED, FOR SOLUTION INTRAMUSCULAR; INTRAVENOUS ONCE
Status: COMPLETED | OUTPATIENT
Start: 2024-05-22 | End: 2024-05-22

## 2024-05-22 RX ORDER — FAMOTIDINE 10 MG/ML
20 INJECTION, SOLUTION INTRAVENOUS AS NEEDED
Status: DISCONTINUED | OUTPATIENT
Start: 2024-05-22 | End: 2024-05-23 | Stop reason: HOSPADM

## 2024-05-22 RX ORDER — HEPARIN SODIUM (PORCINE) LOCK FLUSH IV SOLN 100 UNIT/ML 100 UNIT/ML
500 SOLUTION INTRAVENOUS AS NEEDED
OUTPATIENT
Start: 2024-05-22

## 2024-05-22 RX ORDER — HEPARIN SODIUM (PORCINE) LOCK FLUSH IV SOLN 100 UNIT/ML 100 UNIT/ML
500 SOLUTION INTRAVENOUS AS NEEDED
Status: DISCONTINUED | OUTPATIENT
Start: 2024-05-22 | End: 2024-05-23 | Stop reason: HOSPADM

## 2024-05-22 RX ADMIN — ACETAMINOPHEN 1000 MG: 500 TABLET ORAL at 11:42

## 2024-05-22 RX ADMIN — SODIUM CHLORIDE 20 ML/HR: 9 INJECTION, SOLUTION INTRAVENOUS at 11:40

## 2024-05-22 RX ADMIN — HEPARIN 500 UNITS: 100 SYRINGE at 12:47

## 2024-05-22 RX ADMIN — Medication 20 ML: at 12:47

## 2024-05-22 RX ADMIN — DARATUMUMAB AND HYALURONIDASE-FIHJ (HUMAN RECOMBINANT) 1800 MG: 1800; 30000 INJECTION SUBCUTANEOUS at 12:51

## 2024-05-22 RX ADMIN — DIPHENHYDRAMINE HYDROCHLORIDE 25 MG: 50 INJECTION, SOLUTION INTRAMUSCULAR; INTRAVENOUS at 11:42

## 2024-05-22 RX ADMIN — METHYLPREDNISOLONE SODIUM SUCCINATE 60 MG: 125 INJECTION INTRAMUSCULAR; INTRAVENOUS at 11:45

## 2024-05-22 NOTE — ASSESSMENT & PLAN NOTE
Patient reports continued anxiety.  She does have a behavioral health appointment in the near future.  I have refilled a small number of Ativan to use as needed until she sees them.  Amandeep reviewed and no discrepancies.

## 2024-05-22 NOTE — PROGRESS NOTES
Chief Complaint  Multiple myeloma, remission status unspecified    Rena Guerra,*  Rena Guerra, PA    Subjective          Anca Samson presents to [facility] for continued treatment of her myeloma.  She is on daratumumab, lenalidomide, dexamethasone.  She is tolerating her treatments well.  She denies new masses, adenopathy.  She reports good appetite and her weight is maintained.  Her energy level is low but adequate for her ADLs.    Oncology/Hematology History Overview Note   Smoldering Myeloma    Initially diagnosed in 2016 with bone marrow biopsy demonstrating 30% CD56 positive monoclonal plasma cell population.  45 XX normal female chromosome pattern  FISH panel negative for myeloma associated mutations including 1q21, 9q34,11q13,14q32,15q24, 17q13  No anemia, renal insufficiency, hypercalcemia.  On observation since that time    Low grade adenocarcinoma of ascending colon      5/16/2017 right hemicolectomy which  revealed a low-grade 7.6 cm adenocarcinoma of the cecum. All margins were  negative. Lymphovascular invasion and perineural invasion were not identified.     26 lymph nodes were removed and unfortunately 1 was involved with metastatic deposit. pT3 pN1a colorectal cancer.        Clinical Staging      Smoldering Myeloma; Colon ([id number])            Treatments      Chemotherapy      11/2017 completed 6mo adjuvant Xeloda        6/2022 Stopped Smoking     Malignant neoplasm of ascending colon   7/21/2017 Initial Diagnosis    Colon cancer (CMS/HCC)     Multiple myeloma   7/28/2021 Initial Diagnosis    Multiple myeloma     11/7/2023 -  Chemotherapy    OP MULTIPLE MYELOMA Daratumumab / Lenalidomide / Dexamethasone         Review of Systems   Constitutional:  Positive for fatigue. Negative for appetite change, diaphoresis, fever, unexpected weight gain and unexpected weight loss.   HENT:  Negative for hearing loss, sore throat and voice change.     Eyes:  Negative for blurred vision, double vision, pain, redness and visual disturbance.   Respiratory:  Positive for shortness of breath. Negative for cough and wheezing.    Cardiovascular:  Negative for chest pain, palpitations and leg swelling.   Endocrine: Negative for cold intolerance, heat intolerance, polydipsia and polyuria.   Genitourinary:  Negative for decreased urine volume, difficulty urinating, frequency and urinary incontinence.   Musculoskeletal:  Negative for arthralgias, back pain, joint swelling and myalgias.   Skin:  Negative for color change, rash, skin lesions and wound.   Neurological:  Negative for dizziness, seizures, numbness and headache.   Hematological:  Negative for adenopathy. Does not bruise/bleed easily.   Psychiatric/Behavioral:  Negative for depressed mood. The patient is not nervous/anxious.      Current Outpatient Medications on File Prior to Visit   Medication Sig Dispense Refill    acyclovir (ZOVIRAX) 400 MG tablet Take 1 tablet by mouth 2 (Two) Times a Day. Take one tablet by mouth twice daily. 60 tablet 11    arformoterol (Brovana) 15 MCG/2ML nebulizer solution Take 2 mL by nebulization 2 (Two) Times a Day.      budesonide (Pulmicort) 0.5 MG/2ML nebulizer solution Take 2 mL by nebulization Daily.      colestipol (COLESTID) 1 g tablet Take 2 tablets by mouth 2 (Two) Times a Day. 120 tablet 5    Cyanocobalamin (Vitamin B 12) 500 MCG tablet Take 2 tablets by mouth Daily.      dapsone 25 MG tablet TAKE 2 TABLETS BY MOUTH TWICE A  tablet 1    dexAMETHasone (DECADRON) 4 MG tablet Take 10 tablets on D 1,8,15,22 and take 1 tablet on D 2,3,16,17.  Take in the morning with food. 44 tablet 3    dexAMETHasone (DECADRON) 4 MG tablet Take 10 tablets on D 1,8,15,22 and take 1 tablet on D 2,3.  Take in the morning with food. 42 tablet 5    FLUoxetine (PROzac) 40 MG capsule Take 1 capsule by mouth Daily. 90 capsule 1    lenalidomide (REVLIMID) 15 MG capsule Take 1 capsule by mouth  Daily. On Days 1-21, and off 7 days, on a 28 day cycle 21 capsule 0    levalbuterol (XOPENEX) 0.63 MG/3ML nebulizer solution Take 1 ampule by nebulization 4 (Four) Times a Day As Needed for Wheezing. 120 mL 5    revefenacin (Yupelri) 175 MCG/3ML nebulizer solution Take 3 mL by nebulization Daily.      vitamin D (ERGOCALCIFEROL) 1.25 MG (37511 UT) capsule capsule Take 1 capsule by mouth 2 (Two) Times a Week. 26 capsule 1    [DISCONTINUED] LORazepam (Ativan) 1 MG tablet Take 1 tablet by mouth 2 (Two) Times a Day As Needed for Anxiety. 14 tablet 0    albuterol sulfate  (90 Base) MCG/ACT inhaler Inhale 2 puffs Every 4 (Four) Hours As Needed for Wheezing. (Patient not taking: Reported on 5/22/2024) 18 g 11    ondansetron (ZOFRAN) 8 MG tablet Take 1 tablet by mouth 3 (Three) Times a Day As Needed for Nausea or Vomiting. (Patient not taking: Reported on 5/22/2024) 30 tablet 5    promethazine (PHENERGAN) 25 MG tablet Take 1 tablet by mouth Every 6 (Six) Hours As Needed for Nausea or Vomiting. (Patient not taking: Reported on 5/22/2024) 30 tablet 1     Current Facility-Administered Medications on File Prior to Visit   Medication Dose Route Frequency Provider Last Rate Last Admin    [COMPLETED] acetaminophen (TYLENOL) tablet 1,000 mg  1,000 mg Oral Once West Nicolas MD   1,000 mg at 05/22/24 1142    [COMPLETED] daratumumab-hyaluronidase-fihj (DARZALEX FASPRO) 1800-56607 MG-UT/15ML injection 1,800 mg  1,800 mg Subcutaneous Once West Nicolas MD   1,800 mg at 05/22/24 1251    diphenhydrAMINE (BENADRYL) injection 50 mg  50 mg Intravenous PRN West Nicolas MD        [COMPLETED] diphenhydrAMINE (BENADRYL) IVPB 25 mg  25 mg Intravenous Once West Nicolas MD   Stopped at 05/22/24 1156    famotidine (PEPCID) injection 20 mg  20 mg Intravenous PRN West Nicolas MD        heparin injection 500 Units  500 Units Intravenous PRN West Nicolas MD   500 Units at 05/22/24 1247    Hydrocortisone Sod Suc (PF)  (Solu-CORTEF) injection 100 mg  100 mg Intravenous PRN West Nicolas MD        meperidine (DEMEROL) injection 25 mg  25 mg Intravenous Q20 Min PRN West Nicolas MD        [COMPLETED] methylPREDNISolone sodium succinate (SOLU-Medrol) injection 60 mg  60 mg Intravenous Once West Nicolas MD   60 mg at 05/22/24 1145    sodium chloride 0.9 % flush 20 mL  20 mL Intravenous PRN West Nicolas MD   20 mL at 05/22/24 1247    [COMPLETED] sodium chloride 0.9 % infusion  20 mL/hr Intravenous Once West Nicolas MD   Stopped at 05/22/24 1238       Allergies   Allergen Reactions    Cephalexin Hives    Morphine Anaphylaxis    Penicillins Hives    Sulfa Antibiotics Hives     Past Medical History:   Diagnosis Date    Anxiety     Asthma     Asthma 07/28/2021    Atherosclerosis of native coronary artery of native heart with angina pectoris 08/15/2023    FOLLOWED BY DR GONZALES/DINA PETTIT. DENIES CP BUT GETS SOA WITH EXERTION HAS COPD WEARS AT 2LPM N/C. DECREASED ACTIVITY D/T SOA    C. difficile diarrhea     DENIES ANY CURRENT ISSUES    Colon cancer 07/21/2017    Colon polyp     COPD (chronic obstructive pulmonary disease) 10/23/2017    COPD (chronic obstructive pulmonary disease) 10/23/2017    Depression     Disease of thyroid gland     Diverticulitis     Dysphagia     Essential hypertension 07/28/2021    Heartburn     Hernia 2019    History of chemotherapy     2017    Hx of psychiatric care     Hyperlipidemia     Impaired fasting glucose 08/14/2015    Lumbago     Lung nodule 02/17/2022    Major depressive disorder 10/27/2016    Malignant neoplasm of ascending colon 07/21/2017    Melena     Multiple myeloma     Nicotine dependence 05/10/2017    NICK (obstructive sleep apnea) 11/06/2023    Oxygen dependent     REPORTS USES 02 AT 2LPM VIA N/C. CAN GET VERY SOA WITH MINIMAL EXERTION    Renal insufficiency 07/28/2021    Seizures     PSEUDO SEIZURES LAST ONE AROUND NOV 2022    Shortness of breath     CAN GET VERY SOA WITH  MINIMAL EXERTION    Sleep apnea     Tobacco use 2021    QUIT SMOKING 2022    Visit for screening mammogram 2020    NORMAL- REPEAT IN ONE YEAR    Vitamin D deficiency      Past Surgical History:   Procedure Laterality Date    APPENDECTOMY      BONE MARROW BIOPSY  2016    COLON SURGERY  2017    COLONOSCOPY  2018,,    [facility]- DR LE:DIVERTICULOSIS AND ERYTHEMA OF MUCOSA    COLONOSCOPY N/A 2022    Procedure: COLONOSCOPY WITH ELEVIEW INJECTION, POLYPECTOMY, HOT SNARE, CLIP APPLICATION X3, BIOPSIES;  Surgeon: Jarvis Borges MD;  Location: [facility] ENDOSCOPY;  Service: Gastroenterology;  Laterality: N/A;  COLON POLYP, DIVERTICULOSIS, ANASTOMOSIS RIGHT COLON    COLONOSCOPY N/A 2023    Procedure: COLONOSCOPY;  Surgeon: Jarvis Borges MD;  Location: [facility] ENDOSCOPY;  Service: Gastroenterology;  Laterality: N/A;  DIVERTICULOSIS, ANASTOMOSIS IN ASCENDING COLON    ENDOSCOPY  2018    FECAL DISIMPACTION  2019    TRANSPLANT     HEMICOLECTOMY Right 2017    HYSTERECTOMY  ,    KNEE ARTHROSCOPY Left 2022    Procedure: KNEE ARTHROSCOPY WITH PARTIAL MEDIAL MENISCECTOMY,  CHONDROPLASTY;  Surgeon: Nicholas Tipton MD;  Location: [facility] OR [facility];  Service: Orthopedics;  Laterality: Left;    MINI-LAPAROTOMY  2017    PORTACATH PLACEMENT N/A     pt states that her port does not work    UPPER GASTROINTESTINAL ENDOSCOPY  2018    VENOUS ACCESS DEVICE (PORT) INSERTION N/A 10/31/2023    Procedure: Port-a-catheter removal and port-a-catheter placement;  Surgeon: Alcon Delgado MD;  Location: [facility] OR [facility];  Service: General;  Laterality: N/A;     Social History     Socioeconomic History    Marital status:    Tobacco Use    Smoking status: Former     Current packs/day: 0.00     Average packs/day: 1 pack/day for 47.4 years (47.4 ttl pk-yrs)     Types: Cigarettes     Start date: 1975     Quit date: 6/3/2022     Years since quittin.9     Passive  exposure: Past    Smokeless tobacco: Never   Vaping Use    Vaping status: Never Used   Substance and Sexual Activity    Alcohol use: Never    Drug use: Never    Sexual activity: Defer     Family History   Problem Relation Age of Onset    Heart disease Mother     Lung cancer Mother     Cancer Mother     Stroke Father     Heart disease Father     Kidney cancer Father     Cancer Father     Stroke Other         UNCLE/AUNT    Colon cancer Neg Hx     Malig Hyperthermia Neg Hx        Objective   Physical Exam  Vitals reviewed. Exam conducted with a chaperone present.   Cardiovascular:      Rate and Rhythm: Normal rate and regular rhythm.      Heart sounds: Normal heart sounds. No murmur heard.     No gallop.   Pulmonary:      Effort: Pulmonary effort is normal.      Breath sounds: Normal breath sounds.      Comments: Port-A-Cath  Abdominal:      General: Abdomen is flat. Bowel sounds are normal.      Palpations: Abdomen is soft.   Musculoskeletal:      Right lower leg: No edema.      Left lower leg: No edema.   Lymphadenopathy:      Cervical: No cervical adenopathy.   Psychiatric:         Mood and Affect: Mood normal.         Behavior: Behavior normal.         Vitals:    05/22/24 1013   BP: 129/59   Pulse: 86   Resp: 18   Temp: 97.7 °F (36.5 °C)   TempSrc: Temporal   SpO2: 95%   Weight: 89.3 kg (196 lb 13.9 oz)   PainSc: 0-No pain     ECOG score: 2         PHQ-9 Total Score:                    Result Review :   The following data was reviewed by: West Nicolas MD on 05/22/2024:  Lab Results   Component Value Date    HGB 11.0 (L) 05/22/2024    HCT 34.6 05/22/2024    .8 (H) 05/22/2024     05/22/2024    WBC 5.47 05/22/2024    NEUTROABS 3.01 05/22/2024    LYMPHSABS 1.73 05/22/2024    MONOSABS 0.71 05/22/2024    EOSABS 0.00 05/22/2024    BASOSABS 0.01 05/22/2024     Lab Results   Component Value Date    GLUCOSE 213 (H) 05/22/2024    BUN 16 05/22/2024    CREATININE 0.81 05/22/2024     05/22/2024    K 3.7  "05/22/2024     (H) 05/22/2024    CO2 20.1 (L) 05/22/2024    CALCIUM 9.0 05/22/2024    PROTEINTOT 5.8 (L) 05/22/2024    ALBUMIN 4.0 05/22/2024    BILITOT 0.6 05/22/2024    ALKPHOS 61 05/22/2024    AST 10 05/22/2024    ALT 15 05/22/2024     Lab Results   Component Value Date    FREET4 1.55 12/06/2023    TSH 0.792 04/24/2024     No results found for: \"IRON\", \"LABIRON\", \"TRANSFERRIN\", \"TIBC\"  Lab Results   Component Value Date     03/07/2023    PEXQPMJS10 612 04/15/2024    FOLATE 8.95 03/27/2024     No results found for: \"PSA\", \"CEA\", \"AFP\", \"\", \"\"          Assessment and Plan    Diagnoses and all orders for this visit:    1. Multiple myeloma, remission status unspecified (Primary)  Assessment & Plan:  Patient is on therapy with daratumumab, lenalidomide, dexamethasone.  Tolerating well.  She has had excellent response thus far.  Lab work today demonstrates mild anemia with hemoglobin 11 g/dL.  Proceed with next cycle as planned.  I will see her back for cycle 9-day 1 with lab work prior to monitor for toxicities.    Orders:  -     CBC and Differential; Future  -     Comprehensive metabolic panel; Future  -     CARL,PE and FLC, Serum; Future  -     Protein Electrophoresis, Random Urine - Urine, Clean Catch; Future    2. Anxiety  Assessment & Plan:  Patient reports continued anxiety.  She does have a behavioral health appointment in the near future.  I have refilled a small number of Ativan to use as needed until she sees them.  Amandeep reviewed and no discrepancies.    Orders:  -     LORazepam (Ativan) 1 MG tablet; Take 1 tablet by mouth 2 (Two) Times a Day As Needed for Anxiety.  Dispense: 14 tablet; Refill: 0    Other orders  -     Cancel: sodium chloride 0.9 % infusion  -     Cancel: acetaminophen (TYLENOL) tablet 1,000 mg  -     Cancel: methylPREDNISolone sodium succinate (SOLU-Medrol) injection 60 mg  -     Cancel: diphenhydrAMINE (BENADRYL) IVPB 25 mg  -     Cancel: " daratumumab-hyaluronidase-fihj (DARZALEX FASPRO) 1800-53157 MG-UT/15ML injection 1,800 mg  -     Cancel: Hydrocortisone Sod Suc (PF) (Solu-CORTEF) injection 100 mg  -     Cancel: diphenhydrAMINE (BENADRYL) injection 50 mg  -     Cancel: famotidine (PEPCID) injection 20 mg  -     Cancel: meperidine (DEMEROL) injection 25 mg            Patient Follow Up: cycle 9, day 1    Patient was given instructions and counseling regarding her condition or for health maintenance advice. Please see specific information pulled into the AVS if appropriate.     West Nicolas MD    5/22/2024

## 2024-05-22 NOTE — ASSESSMENT & PLAN NOTE
Patient is on therapy with daratumumab, lenalidomide, dexamethasone.  Tolerating well.  She has had excellent response thus far.  Lab work today demonstrates mild anemia with hemoglobin 11 g/dL.  Proceed with next cycle as planned.  I will see her back for cycle 9-day 1 with lab work prior to monitor for toxicities.

## 2024-05-29 ENCOUNTER — SPECIALTY PHARMACY (OUTPATIENT)
Dept: PHARMACY | Facility: HOSPITAL | Age: 74
End: 2024-05-29
Payer: MEDICARE

## 2024-06-03 ENCOUNTER — SPECIALTY PHARMACY (OUTPATIENT)
Dept: PHARMACY | Facility: HOSPITAL | Age: 74
End: 2024-06-03
Payer: MEDICARE

## 2024-06-03 ENCOUNTER — TELEPHONE (OUTPATIENT)
Dept: PULMONOLOGY | Facility: CLINIC | Age: 74
End: 2024-06-03
Payer: MEDICARE

## 2024-06-03 DIAGNOSIS — C90.00 MULTIPLE MYELOMA, REMISSION STATUS UNSPECIFIED: ICD-10-CM

## 2024-06-03 RX ORDER — LENALIDOMIDE 15 MG/1
15 CAPSULE ORAL DAILY
Qty: 21 CAPSULE | Refills: 0 | Status: SHIPPED | OUTPATIENT
Start: 2024-06-03

## 2024-06-03 NOTE — TELEPHONE ENCOUNTER
Rock preformed a care check on pt and did an over night ox on cpap on 2lpm.  She dropped to 79% and was under 88% for a total of 4 hours.  Would you like to increase her oxygen at night?

## 2024-06-03 NOTE — PROGRESS NOTES
Re: Refills of Oral Specialty Medication - Revlimid (lenalidomide)    Drug-Drug Interactions: The current medication list was reviewed and there are no relevant drug-drug interactions with the specialty medication.  Medication Allergies: The patient has no relevant allergies as it relates to their oral specialty medication  Review of Labs/Dose Adjustments: NO DOSE CHANGE - I reviewed the most recent note and labs and the patient will continue without any dose changes.  I sent refills as described below.    Drug: Revlimid (lenalidomide)  Strength: 15 mg  Directions: Take 1 capsule by mouth daily ON days 1-21, OFF for 7 days of each 28 day cycle  Quantity: 21  Refills: 0  REMS Auth # [id number] Exp 7/3/2     Pharmacy prescription sent to: [facility]      Walker BrysonD, [facility]  Oncology Clinical Pharmacist  6/3/2024  07:15 EDT

## 2024-06-05 DIAGNOSIS — F33.1 MODERATE EPISODE OF RECURRENT MAJOR DEPRESSIVE DISORDER: ICD-10-CM

## 2024-06-05 RX ORDER — FLUOXETINE HYDROCHLORIDE 20 MG/1
40 CAPSULE ORAL DAILY
Qty: 60 CAPSULE | Refills: 0 | OUTPATIENT
Start: 2024-06-05

## 2024-06-06 ENCOUNTER — HOSPITAL ENCOUNTER (OUTPATIENT)
Dept: RESPIRATORY THERAPY | Facility: HOSPITAL | Age: 74
Discharge: HOME | End: 2024-06-06
Payer: MEDICARE

## 2024-06-06 DIAGNOSIS — R06.2 WHEEZING: ICD-10-CM

## 2024-06-06 DIAGNOSIS — K44.9 HIATAL HERNIA: ICD-10-CM

## 2024-06-06 DIAGNOSIS — J96.11 CHRONIC RESPIRATORY FAILURE WITH HYPOXIA: ICD-10-CM

## 2024-06-06 DIAGNOSIS — R91.8 MULTIPLE LUNG NODULES: ICD-10-CM

## 2024-06-06 DIAGNOSIS — C90.00 MULTIPLE MYELOMA NOT HAVING ACHIEVED REMISSION: ICD-10-CM

## 2024-06-06 DIAGNOSIS — G47.33 OSA (OBSTRUCTIVE SLEEP APNEA): ICD-10-CM

## 2024-06-06 DIAGNOSIS — J44.9 CHRONIC OBSTRUCTIVE PULMONARY DISEASE, UNSPECIFIED COPD TYPE: ICD-10-CM

## 2024-06-06 DIAGNOSIS — R05.9 COUGH, UNSPECIFIED TYPE: ICD-10-CM

## 2024-06-06 DIAGNOSIS — F17.201 TOBACCO ABUSE, IN REMISSION: ICD-10-CM

## 2024-06-06 PROCEDURE — 94060 EVALUATION OF WHEEZING: CPT

## 2024-06-06 PROCEDURE — 94729 DIFFUSING CAPACITY: CPT

## 2024-06-06 PROCEDURE — 94726 PLETHYSMOGRAPHY LUNG VOLUMES: CPT

## 2024-06-06 RX ORDER — ALBUTEROL SULFATE 2.5 MG/3ML
2.5 SOLUTION RESPIRATORY (INHALATION) ONCE
Status: COMPLETED | OUTPATIENT
Start: 2024-06-06 | End: 2024-06-06

## 2024-06-06 RX ADMIN — ALBUTEROL SULFATE 2.5 MG: 2.5 SOLUTION RESPIRATORY (INHALATION) at 09:29

## 2024-06-11 ENCOUNTER — LAB (OUTPATIENT)
Dept: ONCOLOGY | Facility: HOSPITAL | Age: 74
End: 2024-06-11
Payer: MEDICARE

## 2024-06-11 DIAGNOSIS — C90.00 MULTIPLE MYELOMA, REMISSION STATUS UNSPECIFIED: ICD-10-CM

## 2024-06-11 DIAGNOSIS — Z12.2 ENCOUNTER FOR SCREENING FOR LUNG CANCER: Primary | ICD-10-CM

## 2024-06-11 PROCEDURE — 83521 IG LIGHT CHAINS FREE EACH: CPT

## 2024-06-11 PROCEDURE — 84155 ASSAY OF PROTEIN SERUM: CPT

## 2024-06-11 PROCEDURE — 84165 PROTEIN E-PHORESIS SERUM: CPT

## 2024-06-11 PROCEDURE — 86334 IMMUNOFIX E-PHORESIS SERUM: CPT

## 2024-06-11 PROCEDURE — 25010000002 HEPARIN LOCK FLUSH PER 10 UNITS: Performed by: INTERNAL MEDICINE

## 2024-06-11 PROCEDURE — 82784 ASSAY IGA/IGD/IGG/IGM EACH: CPT

## 2024-06-11 PROCEDURE — 84156 ASSAY OF PROTEIN URINE: CPT

## 2024-06-11 PROCEDURE — 84166 PROTEIN E-PHORESIS/URINE/CSF: CPT

## 2024-06-11 PROCEDURE — 36591 DRAW BLOOD OFF VENOUS DEVICE: CPT

## 2024-06-11 RX ORDER — HEPARIN SODIUM (PORCINE) LOCK FLUSH IV SOLN 100 UNIT/ML 100 UNIT/ML
500 SOLUTION INTRAVENOUS AS NEEDED
OUTPATIENT
Start: 2024-06-11

## 2024-06-11 RX ORDER — SODIUM CHLORIDE 0.9 % (FLUSH) 0.9 %
20 SYRINGE (ML) INJECTION AS NEEDED
OUTPATIENT
Start: 2024-06-11

## 2024-06-11 RX ORDER — SODIUM CHLORIDE 0.9 % (FLUSH) 0.9 %
20 SYRINGE (ML) INJECTION AS NEEDED
Status: DISCONTINUED | OUTPATIENT
Start: 2024-06-11 | End: 2024-06-11 | Stop reason: HOSPADM

## 2024-06-11 RX ORDER — HEPARIN SODIUM (PORCINE) LOCK FLUSH IV SOLN 100 UNIT/ML 100 UNIT/ML
500 SOLUTION INTRAVENOUS AS NEEDED
Status: DISCONTINUED | OUTPATIENT
Start: 2024-06-11 | End: 2024-06-11 | Stop reason: HOSPADM

## 2024-06-11 RX ADMIN — Medication 20 ML: at 09:25

## 2024-06-11 RX ADMIN — HEPARIN 500 UNITS: 100 SYRINGE at 09:25

## 2024-06-12 LAB
ALBUMIN SERPL ELPH-MCNC: 3.2 G/DL (ref 2.9–4.4)
ALBUMIN/GLOB SERPL: 1.7 {RATIO} (ref 0.7–1.7)
ALPHA1 GLOB SERPL ELPH-MCNC: 0.2 G/DL (ref 0–0.4)
ALPHA2 GLOB SERPL ELPH-MCNC: 0.5 G/DL (ref 0.4–1)
B-GLOBULIN SERPL ELPH-MCNC: 0.8 G/DL (ref 0.7–1.3)
GAMMA GLOB SERPL ELPH-MCNC: 0.5 G/DL (ref 0.4–1.8)
GLOBULIN SER-MCNC: 2 G/DL (ref 2.2–3.9)
IGA SERPL-MCNC: 15 MG/DL (ref 64–422)
IGG SERPL-MCNC: 587 MG/DL (ref 586–1602)
IGM SERPL-MCNC: 17 MG/DL (ref 26–217)
INTERPRETATION SERPL IEP-IMP: ABNORMAL
KAPPA LC FREE SER-MCNC: 11.1 MG/L (ref 3.3–19.4)
KAPPA LC FREE/LAMBDA FREE SER: 3.36 {RATIO} (ref 0.26–1.65)
LABORATORY COMMENT REPORT: ABNORMAL
LAMBDA LC FREE SERPL-MCNC: 3.3 MG/L (ref 5.7–26.3)
M PROTEIN SERPL ELPH-MCNC: 0.4 G/DL
PROT SERPL-MCNC: 5.2 G/DL (ref 6–8.5)

## 2024-06-13 LAB
ALBUMIN MFR UR ELPH: 63.7 %
ALPHA1 GLOB MFR UR ELPH: 1.6 %
ALPHA2 GLOB MFR UR ELPH: 9.4 %
B-GLOBULIN MFR UR ELPH: 18.6 %
GAMMA GLOB MFR UR ELPH: 6.7 %
LABORATORY COMMENT REPORT: ABNORMAL
M PROTEIN MFR UR ELPH: 3.5 %
PROT UR-MCNC: 50.1 MG/DL

## 2024-06-14 DIAGNOSIS — Z98.890 S/P LEFT KNEE ARTHROSCOPY: Primary | ICD-10-CM

## 2024-06-14 DIAGNOSIS — R19.7 DIARRHEA, UNSPECIFIED TYPE: ICD-10-CM

## 2024-06-14 RX ORDER — MONTELUKAST SODIUM 4 MG/1
2 TABLET, CHEWABLE ORAL 2 TIMES DAILY
Qty: 120 TABLET | Refills: 5 | Status: SHIPPED | OUTPATIENT
Start: 2024-06-14

## 2024-06-14 NOTE — TELEPHONE ENCOUNTER
Medication Requested colestipol (COLESTID) 1 g tablet    Last Refill 3/20/2023    Last OV 10/11/2022    Next OV 08/29/2024    Medication pended for approval and correct pharmacy verified yes

## 2024-06-16 NOTE — PROGRESS NOTES
The ABCs of the Annual Wellness Visit  Subsequent Medicare Wellness Visit    Subjective    Anca Samson is a 73 y.o. female who presents for a Subsequent Medicare Wellness Visit.    The following portions of the patient's history were reviewed and   updated as appropriate: allergies, current medications, past family history, past medical history, past social history, past surgical history, and problem list.    Compared to one year ago, the patient feels her physical   health is worse.    Compared to one year ago, the patient feels her mental   health is worse.    Recent Hospitalizations:  She was not admitted to the hospital during the last year.       Current Medical Providers:  Patient Care Team:  Rena Guerra PA as PCP - General (Family Medicine)  West Nicolas MD as Consulting Physician (Hematology and Oncology)  Alcon Delgado MD as Consulting Physician (General Surgery)  Jarvis Borges MD as Consulting Physician (Gastroenterology)  Erendira Clayton APRN as Nurse Practitioner (Gastroenterology)  Felton Lee MD as Consulting Physician (Pulmonary Disease)  Justo Casper MD as Consulting Physician (Otolaryngology)    Outpatient Medications Prior to Visit   Medication Sig Dispense Refill    acyclovir (ZOVIRAX) 400 MG tablet Take 1 tablet by mouth 2 (Two) Times a Day. Take one tablet by mouth twice daily. 60 tablet 11    albuterol sulfate  (90 Base) MCG/ACT inhaler Inhale 2 puffs Every 4 (Four) Hours As Needed for Wheezing. 18 g 11    arformoterol (Brovana) 15 MCG/2ML nebulizer solution Take 2 mL by nebulization 2 (Two) Times a Day.      budesonide (Pulmicort) 0.5 MG/2ML nebulizer solution Take 2 mL by nebulization Daily.      colestipol (COLESTID) 1 g tablet Take 2 tablets by mouth 2 (Two) Times a Day. 120 tablet 5    Cyanocobalamin (Vitamin B 12) 500 MCG tablet Take 2 tablets by mouth Daily.      dapsone 25 MG tablet TAKE 2 TABLETS BY MOUTH TWICE A  tablet 1     dexAMETHasone (DECADRON) 4 MG tablet Take 10 tablets on D 1,8,15,22 and take 1 tablet on D 2,3,16,17.  Take in the morning with food. 44 tablet 3    dexAMETHasone (DECADRON) 4 MG tablet Take 10 tablets on D 1,8,15,22 and take 1 tablet on D 2,3.  Take in the morning with food. 42 tablet 5    FLUoxetine (PROzac) 40 MG capsule Take 1 capsule by mouth Daily. 90 capsule 1    lenalidomide (REVLIMID) 15 MG capsule Take 1 capsule by mouth Daily. On Days 1-21, and off 7 days, on a 28 day cycle 21 capsule 0    levalbuterol (XOPENEX) 0.63 MG/3ML nebulizer solution Take 1 ampule by nebulization 4 (Four) Times a Day As Needed for Wheezing. 120 mL 5    LORazepam (Ativan) 1 MG tablet Take 1 tablet by mouth 2 (Two) Times a Day As Needed for Anxiety. 14 tablet 0    revefenacin (Yupelri) 175 MCG/3ML nebulizer solution Take 3 mL by nebulization Daily.      vitamin D (ERGOCALCIFEROL) 1.25 MG (32685 UT) capsule capsule Take 1 capsule by mouth 2 (Two) Times a Week. 26 capsule 1    ondansetron (ZOFRAN) 8 MG tablet Take 1 tablet by mouth 3 (Three) Times a Day As Needed for Nausea or Vomiting. 30 tablet 5    promethazine (PHENERGAN) 25 MG tablet Take 1 tablet by mouth Every 6 (Six) Hours As Needed for Nausea or Vomiting. 30 tablet 1     No facility-administered medications prior to visit.       No opioid medication identified on active medication list. I have reviewed chart for other potential  high risk medication/s and harmful drug interactions in the elderly.        Aspirin is not on active medication list.  Aspirin use is not indicated based on review of current medical condition/s. Risk of harm outweighs potential benefits.  .    Patient Active Problem List   Diagnosis    Anxiety    Asthma    Malignant neoplasm of ascending colon    COPD (chronic obstructive pulmonary disease)    Diverticulitis    Dysphagia    Heartburn    Hyperlipidemia    Essential hypertension    Impaired fasting glucose    Low back pain    Major depressive disorder  "   Multiple myeloma    Renal insufficiency    Seizures    Vitamin D deficiency    Tobacco abuse    Class 2 severe obesity due to excess calories with serious comorbidity and body mass index (BMI) of 39.0 to 39.9 in adult    Chondromalacia of left knee    Diarrhea    S/P Left knee partial medial menisectomy and chondroplasty     Dyspnea    Multiple lung nodules    Aftercare following surgery of left knee thorascopic partial medial meniscectomy, chondroplasty, 1/3/2022    Hypothyroidism    Atherosclerosis of native coronary artery of native heart with angina pectoris    Chronic respiratory failure with hypoxia    Excessive daytime sleepiness    Snoring    Encounter for screening for malignant neoplasm of colon    History of colon polyps    NICK (obstructive sleep apnea)    Encounter for screening for lung cancer    Dehydration    Tobacco abuse, in remission    Hiatal hernia    Fatigue    Cough    Wheezing    Atelectasis     Advance Care Planning   Advance Care Planning     Advance Directive is not on file.  ACP discussion was held with the patient during this visit. Patient has an advance directive (not in EMR), copy requested.     Objective    Vitals:    24 0902   BP: 117/60   BP Location: Left arm   Patient Position: Sitting   Cuff Size: Large Adult   Pulse: 84   Resp: 16   SpO2: 94%  Comment: 2 LPM-NC   Weight: 88.2 kg (194 lb 6.4 oz)   Height: 153.5 cm (60.43\")     Estimated body mass index is 37.43 kg/m² as calculated from the following:    Height as of this encounter: 153.5 cm (60.43\").    Weight as of this encounter: 88.2 kg (194 lb 6.4 oz).    Does the patient have evidence of cognitive impairment? No          HEALTH RISK ASSESSMENT    Smoking Status:  Social History     Tobacco Use   Smoking Status Former    Current packs/day: 0.00    Average packs/day: 1 pack/day for 47.4 years (47.4 ttl pk-yrs)    Types: Cigarettes    Start date: 1975    Quit date: 6/3/2022    Years since quittin.0    Passive " exposure: Past   Smokeless Tobacco Never     Alcohol Consumption:  Social History     Substance and Sexual Activity   Alcohol Use Never     Fall Risk Screen:    RAMILAADI Fall Risk Assessment was completed, and patient is at HIGH risk for falls. Assessment completed on:2024    Depression Screenin/19/2024     9:44 AM   PHQ-2/PHQ-9 Depression Screening   Little Interest or Pleasure in Doing Things 0-->not at all   Feeling Down, Depressed or Hopeless 0-->not at all   PHQ-9: Brief Depression Severity Measure Score 0       Health Habits and Functional and Cognitive Screenin/10/2024     7:49 AM   Functional & Cognitive Status   Do you have difficulty preparing food and eating? No   Do you have difficulty bathing yourself, getting dressed or grooming yourself? Yes   Do you have difficulty using the toilet? No   Do you have difficulty moving around from place to place? No   Do you have trouble with steps or getting out of a bed or a chair? Yes   Current Diet Other   Dental Exam Up to date   Eye Exam Up to date   Exercise (times per week) Other   Current Exercises Include No Regular Exercise   Do you need help using the phone?  No   Are you deaf or do you have serious difficulty hearing?  No   Do you need help to go to places out of walking distance? Yes   Do you need help shopping? Yes   Do you need help preparing meals?  No   Do you need help with housework?  Yes   Do you need help with laundry? Yes   Do you need help taking your medications? No   Do you need help managing money? No   Do you ever drive or ride in a car without wearing a seat belt? No   Have you felt unusual stress, anger or loneliness in the last month? No   Who do you live with? Spouse   If you need help, do you have trouble finding someone available to you? No   Have you been bothered in the last four weeks by sexual problems? No   Do you have difficulty concentrating, remembering or making decisions? No       Age-appropriate  Screening Schedule:  Refer to the list below for future screening recommendations based on patient's age, sex and/or medical conditions. Orders for these recommended tests are listed in the plan section. The patient has been provided with a written plan.    Health Maintenance   Topic Date Due    TDAP/TD VACCINES (1 - Tdap) Never done    ZOSTER VACCINE (1 of 2) Never done    DXA SCAN  09/16/2023    LIPID PANEL  03/29/2024    COVID-19 Vaccine (1) 08/17/2024 (Originally 9/17/1955)    RSV Vaccine - Adults (1 - 1-dose 60+ series) 06/17/2025 (Originally 9/17/2009)    INFLUENZA VACCINE  08/01/2024    BMI FOLLOWUP  01/03/2025    LUNG CANCER SCREENING  03/01/2025    MAMMOGRAM  03/22/2025    ANNUAL WELLNESS VISIT  06/17/2025    COLONOSCOPY  11/09/2025    HEPATITIS C SCREENING  Completed    Pneumococcal Vaccine 65+  Completed                  CMS Preventative Services Quick Reference  Risk Factors Identified During Encounter  None Identified  The above risks/problems have been discussed with the patient.  Pertinent information has been shared with the patient in the After Visit Summary.  An After Visit Summary and PPPS were made available to the patient.    Follow Up:   Next Medicare Wellness visit to be scheduled in 1 year.       Additional E&M Note during same encounter follows:  Patient has multiple medical problems which are significant and separately identifiable that require additional work above and beyond the Medicare Wellness Visit.      Chief Complaint  Medicare Wellness-subsequent, Follow-up, and Anxiety    Subjective        HPI  Anca Samson is also being seen today for anxiety. Patient is currently on Prozac. Patient is scheduled with pyschiatry on 6.20.24.  Patient is doing better with the tautness of air episodes that she was having most likely related to uncontrolled anxiety.    Update from pulmonology visit: Patient is off CPAP, on BiPAP with 3 L of oxygen at night and 2 L of oxygen during the  "day.    Patient had an abnormal MRI on 5/2/24: Prominent bilateral mastoid effusions left greater than right and sinus disease, patient has follow-up with ENT.    Patient has been off levothyroxine for about 5 months; thyroid is normal; will continue to monitor.    Patient is due labs; I will order and she can have done at her next appointment with oncology.       Objective   Vital Signs:  /60 (BP Location: Left arm, Patient Position: Sitting, Cuff Size: Large Adult)   Pulse 84   Resp 16   Ht 153.5 cm (60.43\")   Wt 88.2 kg (194 lb 6.4 oz)   SpO2 94% Comment: 2 LPM-NC  BMI 37.43 kg/m²     Physical Exam  Vitals and nursing note reviewed.   Constitutional:       Appearance: Normal appearance. She is obese.      Comments: In wheelchair   HENT:      Head: Normocephalic and atraumatic.      Right Ear: Tympanic membrane and ear canal normal.      Left Ear: Tympanic membrane and ear canal normal.      Nose: Congestion present.      Comments: Maxillary pressure to palpation  Neck:      Vascular: No carotid bruit.      Comments: Thyroid : gland size normal, nontender, no nodules or masses present on palpation   Cardiovascular:      Rate and Rhythm: Normal rate and regular rhythm.      Pulses: Normal pulses.      Heart sounds: Normal heart sounds.   Pulmonary:      Effort: Pulmonary effort is normal.      Breath sounds: Normal breath sounds.   Musculoskeletal:      Cervical back: Neck supple. No tenderness.      Right lower leg: No edema.      Left lower leg: No edema.   Lymphadenopathy:      Cervical: No cervical adenopathy.   Neurological:      Mental Status: She is alert.   Psychiatric:         Mood and Affect: Mood normal.         Behavior: Behavior normal.                         Assessment and Plan   Diagnoses and all orders for this visit:    1. Subacute sinusitis, unspecified location (Primary)  Comments:  Acute problem: Treat with Doxy 100 Mg twice daily for 10 days.  Continue with follow-up with " ENT  Orders:  -     doxycycline (VIBRAMYCIN) 100 MG capsule; Take 1 capsule by mouth 2 (Two) Times a Day.  Dispense: 20 capsule; Refill: 0    2. Vitamin D deficiency  Comments:  Unsure of stability; continue with vitamin D [id number] twice weekly and recheck labs in 3mths.  Orders:  -     vitamin D (ERGOCALCIFEROL) 1.25 MG (87369 UT) capsule capsule; Take 1 capsule by mouth 2 (Two) Times a Week.  Dispense: 26 capsule; Refill: 1  -     Vitamin D,25-Hydroxy; Future    3. Elevated glucose  -     Hemoglobin A1c; Future    4. Screening for lipid disorders  -     Lipid panel; Future    5. Medicare annual wellness visit, subsequent    6. Postmenopause  -     DEXA Bone Density Axial; Future    7. Screening for osteoporosis  -     DEXA Bone Density Axial; Future    8. Anxiety  Comments:  Improved: Continue with Prozac 40 Mg daily, continue with patient appointment with psychiatry.             Follow Up   No follow-ups on file.  Patient was given instructions and counseling regarding her condition or for health maintenance advice. Please see specific information pulled into the AVS if appropriate.

## 2024-06-17 ENCOUNTER — OFFICE VISIT (OUTPATIENT)
Dept: FAMILY MEDICINE CLINIC | Facility: CLINIC | Age: 74
End: 2024-06-17
Payer: MEDICARE

## 2024-06-17 VITALS
HEIGHT: 60 IN | BODY MASS INDEX: 38.17 KG/M2 | HEART RATE: 84 BPM | SYSTOLIC BLOOD PRESSURE: 117 MMHG | RESPIRATION RATE: 16 BRPM | DIASTOLIC BLOOD PRESSURE: 60 MMHG | OXYGEN SATURATION: 94 % | WEIGHT: 194.4 LBS

## 2024-06-17 DIAGNOSIS — F41.9 ANXIETY: ICD-10-CM

## 2024-06-17 DIAGNOSIS — J01.90 SUBACUTE SINUSITIS, UNSPECIFIED LOCATION: Primary | ICD-10-CM

## 2024-06-17 DIAGNOSIS — Z13.820 SCREENING FOR OSTEOPOROSIS: ICD-10-CM

## 2024-06-17 DIAGNOSIS — Z13.220 SCREENING FOR LIPID DISORDERS: ICD-10-CM

## 2024-06-17 DIAGNOSIS — Z78.0 POSTMENOPAUSE: ICD-10-CM

## 2024-06-17 DIAGNOSIS — Z00.00 MEDICARE ANNUAL WELLNESS VISIT, SUBSEQUENT: ICD-10-CM

## 2024-06-17 DIAGNOSIS — R73.09 ELEVATED GLUCOSE: ICD-10-CM

## 2024-06-17 DIAGNOSIS — E55.9 VITAMIN D DEFICIENCY: Chronic | ICD-10-CM

## 2024-06-17 PROCEDURE — 3078F DIAST BP <80 MM HG: CPT | Performed by: PHYSICIAN ASSISTANT

## 2024-06-17 PROCEDURE — 1126F AMNT PAIN NOTED NONE PRSNT: CPT | Performed by: PHYSICIAN ASSISTANT

## 2024-06-17 PROCEDURE — G0439 PPPS, SUBSEQ VISIT: HCPCS | Performed by: PHYSICIAN ASSISTANT

## 2024-06-17 PROCEDURE — 3074F SYST BP LT 130 MM HG: CPT | Performed by: PHYSICIAN ASSISTANT

## 2024-06-17 PROCEDURE — 99214 OFFICE O/P EST MOD 30 MIN: CPT | Performed by: PHYSICIAN ASSISTANT

## 2024-06-17 RX ORDER — DOXYCYCLINE HYCLATE 100 MG/1
100 CAPSULE ORAL 2 TIMES DAILY
Qty: 20 CAPSULE | Refills: 0 | Status: SHIPPED | OUTPATIENT
Start: 2024-06-17

## 2024-06-17 RX ORDER — ERGOCALCIFEROL 1.25 MG/1
50000 CAPSULE ORAL 2 TIMES WEEKLY
Qty: 26 CAPSULE | Refills: 1 | Status: SHIPPED | OUTPATIENT
Start: 2024-06-17

## 2024-06-19 ENCOUNTER — OFFICE VISIT (OUTPATIENT)
Dept: ONCOLOGY | Facility: HOSPITAL | Age: 74
End: 2024-06-19
Payer: MEDICARE

## 2024-06-19 ENCOUNTER — HOSPITAL ENCOUNTER (OUTPATIENT)
Dept: ONCOLOGY | Facility: HOSPITAL | Age: 74
Discharge: HOME | End: 2024-06-19
Payer: MEDICARE

## 2024-06-19 VITALS
OXYGEN SATURATION: 96 % | BODY MASS INDEX: 39.13 KG/M2 | HEIGHT: 60 IN | HEART RATE: 91 BPM | DIASTOLIC BLOOD PRESSURE: 52 MMHG | SYSTOLIC BLOOD PRESSURE: 117 MMHG | RESPIRATION RATE: 18 BRPM | TEMPERATURE: 97.4 F | WEIGHT: 199.3 LBS

## 2024-06-19 VITALS
DIASTOLIC BLOOD PRESSURE: 52 MMHG | OXYGEN SATURATION: 96 % | HEART RATE: 91 BPM | WEIGHT: 199 LBS | BODY MASS INDEX: 38.31 KG/M2 | TEMPERATURE: 97.4 F | RESPIRATION RATE: 18 BRPM | SYSTOLIC BLOOD PRESSURE: 117 MMHG

## 2024-06-19 DIAGNOSIS — R73.09 ELEVATED GLUCOSE: ICD-10-CM

## 2024-06-19 DIAGNOSIS — E55.9 VITAMIN D DEFICIENCY: Chronic | ICD-10-CM

## 2024-06-19 DIAGNOSIS — Z13.220 SCREENING FOR LIPID DISORDERS: ICD-10-CM

## 2024-06-19 DIAGNOSIS — C90.00 MULTIPLE MYELOMA, REMISSION STATUS UNSPECIFIED: Primary | ICD-10-CM

## 2024-06-19 DIAGNOSIS — C90.02 MULTIPLE MYELOMA IN RELAPSE: ICD-10-CM

## 2024-06-19 DIAGNOSIS — Z12.2 ENCOUNTER FOR SCREENING FOR LUNG CANCER: ICD-10-CM

## 2024-06-19 LAB
ALBUMIN SERPL-MCNC: 3.8 G/DL (ref 3.5–5.2)
ALBUMIN/GLOB SERPL: 2.5 G/DL
ALP SERPL-CCNC: 56 U/L (ref 39–117)
ALT SERPL W P-5'-P-CCNC: 13 U/L (ref 1–33)
ANION GAP SERPL CALCULATED.3IONS-SCNC: 10.1 MMOL/L (ref 5–15)
AST SERPL-CCNC: 9 U/L (ref 1–32)
BASOPHILS # BLD AUTO: 0.03 10*3/MM3 (ref 0–0.2)
BASOPHILS NFR BLD AUTO: 0.3 % (ref 0–1.5)
BILIRUB SERPL-MCNC: 0.6 MG/DL (ref 0–1.2)
BUN SERPL-MCNC: 17 MG/DL (ref 8–23)
BUN/CREAT SERPL: 19.8 (ref 7–25)
CALCIUM SPEC-SCNC: 9.1 MG/DL (ref 8.6–10.5)
CHLORIDE SERPL-SCNC: 112 MMOL/L (ref 98–107)
CO2 SERPL-SCNC: 20.9 MMOL/L (ref 22–29)
CREAT SERPL-MCNC: 0.86 MG/DL (ref 0.57–1)
DEPRECATED RDW RBC AUTO: 72.8 FL (ref 37–54)
EGFRCR SERPLBLD CKD-EPI 2021: 71 ML/MIN/1.73
EOSINOPHIL # BLD AUTO: 0 10*3/MM3 (ref 0–0.4)
EOSINOPHIL NFR BLD AUTO: 0 % (ref 0.3–6.2)
ERYTHROCYTE [DISTWIDTH] IN BLOOD BY AUTOMATED COUNT: 18.4 % (ref 12.3–15.4)
GLOBULIN UR ELPH-MCNC: 1.5 GM/DL
GLUCOSE SERPL-MCNC: 160 MG/DL (ref 65–99)
HCT VFR BLD AUTO: 32.8 % (ref 34–46.6)
HGB BLD-MCNC: 10.6 G/DL (ref 12–15.9)
IMM GRANULOCYTES # BLD AUTO: 0.05 10*3/MM3 (ref 0–0.05)
IMM GRANULOCYTES NFR BLD AUTO: 0.5 % (ref 0–0.5)
LYMPHOCYTES # BLD AUTO: 3.14 10*3/MM3 (ref 0.7–3.1)
LYMPHOCYTES NFR BLD AUTO: 29.2 % (ref 19.6–45.3)
MCH RBC QN AUTO: 34.3 PG (ref 26.6–33)
MCHC RBC AUTO-ENTMCNC: 32.3 G/DL (ref 31.5–35.7)
MCV RBC AUTO: 106.1 FL (ref 79–97)
MONOCYTES # BLD AUTO: 1.06 10*3/MM3 (ref 0.1–0.9)
MONOCYTES NFR BLD AUTO: 9.9 % (ref 5–12)
NEUTROPHILS NFR BLD AUTO: 6.48 10*3/MM3 (ref 1.7–7)
NEUTROPHILS NFR BLD AUTO: 60.1 % (ref 42.7–76)
PLATELET # BLD AUTO: 377 10*3/MM3 (ref 140–450)
PMV BLD AUTO: 9.4 FL (ref 6–12)
POTASSIUM SERPL-SCNC: 3.9 MMOL/L (ref 3.5–5.2)
PROT SERPL-MCNC: 5.3 G/DL (ref 6–8.5)
RBC # BLD AUTO: 3.09 10*6/MM3 (ref 3.77–5.28)
SODIUM SERPL-SCNC: 143 MMOL/L (ref 136–145)
T-UPTAKE NFR SERPL: 1.11 TBI (ref 0.8–1.3)
T4 SERPL-MCNC: 7.48 MCG/DL (ref 4.5–11.7)
TSH SERPL DL<=0.05 MIU/L-ACNC: 1.11 UIU/ML (ref 0.27–4.2)
WBC NRBC COR # BLD AUTO: 10.76 10*3/MM3 (ref 3.4–10.8)

## 2024-06-19 PROCEDURE — 84436 ASSAY OF TOTAL THYROXINE: CPT | Performed by: INTERNAL MEDICINE

## 2024-06-19 PROCEDURE — 63710000001 ACETAMINOPHEN EXTRA STRENGTH 500 MG TABLET: Performed by: INTERNAL MEDICINE

## 2024-06-19 PROCEDURE — 86334 IMMUNOFIX E-PHORESIS SERUM: CPT | Performed by: INTERNAL MEDICINE

## 2024-06-19 PROCEDURE — 85025 COMPLETE CBC W/AUTO DIFF WBC: CPT | Performed by: INTERNAL MEDICINE

## 2024-06-19 PROCEDURE — 25010000002 METHYLPREDNISOLONE PER 125 MG: Performed by: INTERNAL MEDICINE

## 2024-06-19 PROCEDURE — 25810000003 SODIUM CHLORIDE 0.9 % SOLUTION: Performed by: INTERNAL MEDICINE

## 2024-06-19 PROCEDURE — 80053 COMPREHEN METABOLIC PANEL: CPT | Performed by: INTERNAL MEDICINE

## 2024-06-19 PROCEDURE — 25010000002 DARATUMUMAB-HYALURONIDASE-FIHJ 1800-30000 MG-UT/15ML SOLUTION: Performed by: INTERNAL MEDICINE

## 2024-06-19 PROCEDURE — A9270 NON-COVERED ITEM OR SERVICE: HCPCS | Performed by: INTERNAL MEDICINE

## 2024-06-19 PROCEDURE — 25010000002 HEPARIN LOCK FLUSH PER 10 UNITS: Performed by: INTERNAL MEDICINE

## 2024-06-19 PROCEDURE — 96375 TX/PRO/DX INJ NEW DRUG ADDON: CPT

## 2024-06-19 PROCEDURE — 82784 ASSAY IGA/IGD/IGG/IGM EACH: CPT | Performed by: INTERNAL MEDICINE

## 2024-06-19 PROCEDURE — 96374 THER/PROPH/DIAG INJ IV PUSH: CPT

## 2024-06-19 PROCEDURE — 84443 ASSAY THYROID STIM HORMONE: CPT | Performed by: INTERNAL MEDICINE

## 2024-06-19 PROCEDURE — 25010000002 DIPHENHYDRAMINE PER 50 MG: Performed by: INTERNAL MEDICINE

## 2024-06-19 PROCEDURE — 96401 CHEMO ANTI-NEOPL SQ/IM: CPT

## 2024-06-19 PROCEDURE — 84479 ASSAY OF THYROID (T3 OR T4): CPT | Performed by: INTERNAL MEDICINE

## 2024-06-19 RX ORDER — METHYLPREDNISOLONE SODIUM SUCCINATE 125 MG/2ML
60 INJECTION, POWDER, LYOPHILIZED, FOR SOLUTION INTRAMUSCULAR; INTRAVENOUS ONCE
Status: CANCELLED | OUTPATIENT
Start: 2024-06-19

## 2024-06-19 RX ORDER — SODIUM CHLORIDE 9 MG/ML
20 INJECTION, SOLUTION INTRAVENOUS ONCE
Status: COMPLETED | OUTPATIENT
Start: 2024-06-19 | End: 2024-06-19

## 2024-06-19 RX ORDER — ACETAMINOPHEN 500 MG
1000 TABLET ORAL ONCE
Status: CANCELLED | OUTPATIENT
Start: 2024-06-19

## 2024-06-19 RX ORDER — SODIUM CHLORIDE 0.9 % (FLUSH) 0.9 %
20 SYRINGE (ML) INJECTION AS NEEDED
Status: DISCONTINUED | OUTPATIENT
Start: 2024-06-19 | End: 2024-06-20 | Stop reason: HOSPADM

## 2024-06-19 RX ORDER — SODIUM CHLORIDE 0.9 % (FLUSH) 0.9 %
20 SYRINGE (ML) INJECTION AS NEEDED
OUTPATIENT
Start: 2024-06-19

## 2024-06-19 RX ORDER — MEPERIDINE HYDROCHLORIDE 25 MG/ML
25 INJECTION INTRAMUSCULAR; INTRAVENOUS; SUBCUTANEOUS
Status: CANCELLED | OUTPATIENT
Start: 2024-06-19

## 2024-06-19 RX ORDER — ACETAMINOPHEN 500 MG
1000 TABLET ORAL ONCE
Status: COMPLETED | OUTPATIENT
Start: 2024-06-19 | End: 2024-06-19

## 2024-06-19 RX ORDER — HEPARIN SODIUM (PORCINE) LOCK FLUSH IV SOLN 100 UNIT/ML 100 UNIT/ML
500 SOLUTION INTRAVENOUS AS NEEDED
Status: DISCONTINUED | OUTPATIENT
Start: 2024-06-19 | End: 2024-06-20 | Stop reason: HOSPADM

## 2024-06-19 RX ORDER — METHYLPREDNISOLONE SODIUM SUCCINATE 125 MG/2ML
60 INJECTION, POWDER, LYOPHILIZED, FOR SOLUTION INTRAMUSCULAR; INTRAVENOUS ONCE
Status: COMPLETED | OUTPATIENT
Start: 2024-06-19 | End: 2024-06-19

## 2024-06-19 RX ORDER — SODIUM CHLORIDE 9 MG/ML
20 INJECTION, SOLUTION INTRAVENOUS ONCE
Status: CANCELLED | OUTPATIENT
Start: 2024-06-19

## 2024-06-19 RX ORDER — DIPHENHYDRAMINE HYDROCHLORIDE 50 MG/ML
50 INJECTION INTRAMUSCULAR; INTRAVENOUS AS NEEDED
Status: CANCELLED | OUTPATIENT
Start: 2024-06-19

## 2024-06-19 RX ORDER — HEPARIN SODIUM (PORCINE) LOCK FLUSH IV SOLN 100 UNIT/ML 100 UNIT/ML
500 SOLUTION INTRAVENOUS AS NEEDED
OUTPATIENT
Start: 2024-06-19

## 2024-06-19 RX ORDER — FAMOTIDINE 10 MG/ML
20 INJECTION, SOLUTION INTRAVENOUS AS NEEDED
Status: CANCELLED | OUTPATIENT
Start: 2024-06-19

## 2024-06-19 RX ADMIN — HEPARIN 500 UNITS: 100 SYRINGE at 12:11

## 2024-06-19 RX ADMIN — Medication 20 ML: at 12:11

## 2024-06-19 RX ADMIN — METHYLPREDNISOLONE SODIUM SUCCINATE 60 MG: 125 INJECTION INTRAMUSCULAR; INTRAVENOUS at 11:02

## 2024-06-19 RX ADMIN — DIPHENHYDRAMINE HYDROCHLORIDE 25 MG: 50 INJECTION, SOLUTION INTRAMUSCULAR; INTRAVENOUS at 11:05

## 2024-06-19 RX ADMIN — ACETAMINOPHEN 1000 MG: 500 TABLET ORAL at 11:01

## 2024-06-19 RX ADMIN — SODIUM CHLORIDE 20 ML/HR: 9 INJECTION, SOLUTION INTRAVENOUS at 11:02

## 2024-06-19 RX ADMIN — DARATUMUMAB AND HYALURONIDASE-FIHJ (HUMAN RECOMBINANT) 1800 MG: 1800; 30000 INJECTION SUBCUTANEOUS at 12:32

## 2024-06-19 NOTE — PROGRESS NOTES
"Rochelle Samson is a 73 y.o. female who presents today for initial evaluation     Referring Provider:  Rena Guerra PA  [address]  [address]  [place] [address]    Chief Complaint:  anxiety, depression    History of Present Illness:     06/20/2024: INITIAL VISIT Chart review:     Amandeep: Ativan short course in November, February, May.  Care Everywhere: a few non behavioral health notes    Psychotropic medication chart review:  Present:  Prozac 40 mg a day    Previously:  Prozac 20 mg a day  Zoloft 25 mg a day    EKG: February 2024: Rate 97, sinus, probable left atrial enlargement, QTc 461  Procedures: Sleep study in May 2024: NICK, BiPAP settings noted  Head imaging: May 2024: MRI of the brain shows white matter changes compatible with small vessel ischemic disease  Labs: June 2024: Abnormal CMP was chloride 112, CO2 20.9, glucose 160, total protein 5.3.  Reassuring thyroid studies, abnormal CBC with hemoglobin 10.6, other abnormalities.  Initial Chart Review Notes: Referred by primary care in April for anxiety.  History of significant shortness of breath due to comorbidities.  Patient requested to meet with a psychiatrist.      Patient Psychotherapy Notes:  Patient goals:  Misc:  On chemo for multiple myeloma since 11/23      Chart Review By Dates:      VISITS/APPOINTMENTS (BELOW):    \"Sarah\" \"Robi\" her       06/20/2024: In person.  Interview:  His/Her Story: \"[place]ray [place] for 2 weeks, that was 21 years ago.\"  P18, G10  I don't know why it happened. I don't think it was SI.  I've been on prozac 4-5 years  \"I'm awful nervous\"  I can't get up and do anything  I can't drive  I can't work  I can't walk  If I walk 20 feet I'm so out of breath  Has had a tremor since started nebulizer a year ago. Quit it for a few months, tremor stopped, restarted, tremor restarted  Perfectionistic tendencies  Sleeps well on bipap  Depression/Mood:  Depressed mood, anhedonia, hopelessness or " guilt, poor energy, poor concentration.  Seasonal pattern:  Severity: Moderate  Duration: 21 years  Anxiety:  Uncontrolled worrying, muscle tension, fatigue, poor concentration, feeling on edge or restless, irritability.  Severity: Moderate  Duration: 21 years  Panic attacks: y  Psych ROS: Positive for depression, anxiety.  Negative for psychosis and padmaja.  ADHD: def  PTSD: def  No SI HI AVH.  Medication compliant: y    Access to Firearms: yes, not locked away    PHQ-9 Depression Screening  PHQ-9 Total Score: (P) 18    Little interest or pleasure in doing things? (P) 2-->more than half the days   Feeling down, depressed, or hopeless? (P) 1-->several days   Trouble falling or staying asleep, or sleeping too much? (P) 3-->nearly every day   Feeling tired or having little energy? (P) 3-->nearly every day   Poor appetite or overeating? (P) 3-->nearly every day   Feeling bad about yourself - or that you are a failure or have let yourself or your family down? (P) 1-->several days   Trouble concentrating on things, such as reading the newspaper or watching television? (P) 2-->more than half the days   Moving or speaking so slowly that other people could have noticed? Or the opposite - being so fidgety or restless that you have been moving around a lot more than usual? (P) 3-->nearly every day   Thoughts that you would be better off dead, or of hurting yourself in some way? (P) 0-->not at all   PHQ-9 Total Score (P) 18     CHAI-7  Feeling nervous, anxious or on edge: (P) Nearly every day  Not being able to stop or control worrying: (P) Not at all  Worrying too much about different things: (P) More than half the days  Trouble Relaxing: (P) More than half the days  Being so restless that it is hard to sit still: (P) Not at all  Feeling afraid as if something awful might happen: (P) Not at all  Becoming easily annoyed or irritable: (P) Nearly every day  CHAI 7 Total Score: (P) 10  If you checked any problems, how difficult have  these problems made it for you to do your work, take care of things at home, or get along with other people: (P) Somewhat difficult    Past Surgical History:  Past Surgical History:   Procedure Laterality Date    APPENDECTOMY      BONE MARROW BIOPSY  2016    COLON SURGERY  2017    COLONOSCOPY  2018,2017,2019    [facility]- DR LE:DIVERTICULOSIS AND ERYTHEMA OF [place]A    COLONOSCOPY N/A 12/20/2022    Procedure: COLONOSCOPY WITH ELEVIEW INJECTION, POLYPECTOMY, HOT SNARE, CLIP APPLICATION X3, BIOPSIES;  Surgeon: Jarvis Borges MD;  Location: [facility] ENDOSCOPY;  Service: Gastroenterology;  Laterality: N/A;  COLON POLYP, DIVERTICULOSIS, ANASTOMOSIS RIGHT COLON    COLONOSCOPY N/A 11/09/2023    Procedure: COLONOSCOPY;  Surgeon: Jarvis Borges MD;  Location: [facility] ENDOSCOPY;  Service: Gastroenterology;  Laterality: N/A;  DIVERTICULOSIS, ANASTOMOSIS IN ASCENDING COLON    ENDOSCOPY  2018    EYE SURGERY      FECAL DISIMPACTION  06/2019    TRANSPLANT     HEMICOLECTOMY Right 05/2017    HYSTERECTOMY  1982,1984    KNEE ARTHROSCOPY Left 01/03/2022    Procedure: KNEE ARTHROSCOPY WITH PARTIAL MEDIAL MENISCECTOMY,  CHONDROPLASTY;  Surgeon: Nicholas Tipton MD;  Location: [facility] OR [facility];  Service: Orthopedics;  Laterality: Left;    MINI-LAPAROTOMY  2017    PORTACATH PLACEMENT N/A     pt states that her port does not work    TONSILLECTOMY      UPPER GASTROINTESTINAL ENDOSCOPY  2018    VENOUS ACCESS DEVICE (PORT) INSERTION N/A 10/31/2023    Procedure: Port-a-catheter removal and port-a-catheter placement;  Surgeon: Alcon Delgado MD;  Location: [facility] OR [facility];  Service: General;  Laterality: N/A;       Problem List:  Patient Active Problem List   Diagnosis    Anxiety    Asthma    Malignant neoplasm of ascending colon    COPD (chronic obstructive pulmonary disease)    Diverticulitis    Dysphagia    Heartburn    Hyperlipidemia    Essential hypertension    Impaired fasting glucose    Low back pain    Major  depressive disorder    Multiple myeloma    Renal insufficiency    Seizures    Vitamin D deficiency    Tobacco abuse    Class 2 severe obesity due to excess calories with serious comorbidity and body mass index (BMI) of 39.0 to 39.9 in adult    Chondromalacia of left knee    Diarrhea    S/P Left knee partial medial menisectomy and chondroplasty     Dyspnea    Multiple lung nodules    Aftercare following surgery of left knee thorascopic partial medial meniscectomy, chondroplasty, 1/3/2022    Hypothyroidism    Atherosclerosis of native coronary artery of native heart with angina pectoris    Chronic respiratory failure with hypoxia    Excessive daytime sleepiness    Snoring    Encounter for screening for malignant neoplasm of colon    History of colon polyps    NICK (obstructive sleep apnea)    Encounter for screening for lung cancer    Dehydration    Tobacco abuse, in remission    Hiatal hernia    Fatigue    Cough    Wheezing    Atelectasis       Allergy:   Allergies   Allergen Reactions    Cephalexin Hives    Morphine Anaphylaxis    Penicillins Hives    Sulfa Antibiotics Hives        Discontinued Medications:  Medications Discontinued During This Encounter   Medication Reason    busPIRone (BUSPAR) 10 MG tablet Reorder       Current Medications:   Current Outpatient Medications   Medication Sig Dispense Refill    acyclovir (ZOVIRAX) 400 MG tablet Take 1 tablet by mouth 2 (Two) Times a Day. Take one tablet by mouth twice daily. 60 tablet 11    albuterol sulfate  (90 Base) MCG/ACT inhaler Inhale 2 puffs Every 4 (Four) Hours As Needed for Wheezing. 18 g 11    arformoterol (Brovana) 15 MCG/2ML nebulizer solution Take 2 mL by nebulization 2 (Two) Times a Day.      budesonide (Pulmicort) 0.5 MG/2ML nebulizer solution Take 2 mL by nebulization Daily.      busPIRone (BUSPAR) 10 MG tablet Take 1 tablet by mouth 2 (Two) Times a Day. 60 tablet 2    colestipol (COLESTID) 1 g tablet Take 2 tablets by mouth 2 (Two) Times a  Day. 120 tablet 5    Cyanocobalamin (Vitamin B 12) 500 MCG tablet Take 2 tablets by mouth Daily.      dapsone 25 MG tablet TAKE 2 TABLETS BY MOUTH TWICE A  tablet 1    dexAMETHasone (DECADRON) 4 MG tablet Take 10 tablets on D 1,8,15,22 and take 1 tablet on D 2,3,16,17.  Take in the morning with food. 44 tablet 3    dexAMETHasone (DECADRON) 4 MG tablet Take 10 tablets on D 1,8,15,22 and take 1 tablet on D 2,3.  Take in the morning with food. 42 tablet 5    doxycycline (VIBRAMYCIN) 100 MG capsule Take 1 capsule by mouth 2 (Two) Times a Day. 20 capsule 0    FLUoxetine (PROzac) 40 MG capsule Take 1 capsule by mouth Daily. 90 capsule 1    lenalidomide (REVLIMID) 15 MG capsule Take 1 capsule by mouth Daily. On Days 1-21, and off 7 days, on a 28 day cycle 21 capsule 0    levalbuterol (XOPENEX) 0.63 MG/3ML nebulizer solution Take 1 ampule by nebulization 4 (Four) Times a Day As Needed for Wheezing. 120 mL 5    LORazepam (Ativan) 1 MG tablet Take 1 tablet by mouth 2 (Two) Times a Day As Needed for Anxiety. 14 tablet 0    ondansetron (ZOFRAN) 8 MG tablet Take 1 tablet by mouth 3 (Three) Times a Day As Needed for Nausea or Vomiting. 30 tablet 5    promethazine (PHENERGAN) 25 MG tablet Take 1 tablet by mouth Every 6 (Six) Hours As Needed for Nausea or Vomiting. 30 tablet 1    revefenacin (Yupelri) 175 MCG/3ML nebulizer solution Take 3 mL by nebulization Daily.      vitamin D (ERGOCALCIFEROL) 1.25 MG (50330 UT) capsule capsule Take 1 capsule by mouth 2 (Two) Times a Week. 26 capsule 1     No current facility-administered medications for this visit.       Past Medical History:  Past Medical History:   Diagnosis Date    Anxiety     Asthma     Asthma 07/28/2021    Atherosclerosis of native coronary artery of native heart with angina pectoris 08/15/2023    FOLLOWED BY DR GONZALES/DINA PETTIT. DENIES CP BUT GETS SOA WITH EXERTION HAS COPD WEARS AT 2LPM N/C. DECREASED ACTIVITY D/T SOA    C. difficile diarrhea     DENIES ANY CURRENT  ISSUES    Colon cancer 2017    Colon polyp     COPD (chronic obstructive pulmonary disease) 10/23/2017    COPD (chronic obstructive pulmonary disease) 10/23/2017    Depression     Disease of thyroid gland     Diverticulitis     Dysphagia     Essential hypertension 2021    Head injury     Heartburn     Hernia 2019    History of chemotherapy     2017    Hx of psychiatric care     Hyperlipidemia     Impaired fasting glucose 2015    Lumbago     Lung nodule 2022    Major depressive disorder 10/27/2016    Malignant neoplasm of ascending colon 2017    Melena     Multiple myeloma     Nicotine dependence 05/10/2017    NICK (obstructive sleep apnea) 2023    Oxygen dependent     REPORTS USES 02 AT 2LPM VIA N/C. CAN GET VERY SOA WITH MINIMAL EXERTION    Renal insufficiency 2021    Seizures     PSEUDO SEIZURES LAST ONE AROUND 2022    Shortness of breath     CAN GET VERY SOA WITH MINIMAL EXERTION    Sleep apnea     Tobacco use 2021    QUIT SMOKING 2022    Visit for screening mammogram 2020    NORMAL- REPEAT IN ONE YEAR    Vitamin D deficiency        Past Psychiatric History:  Began Treatment: decades  Diagnoses: MDD, CHAI  Psychiatrist: Vitor for 20 years  Therapist:Denies  Admission History: LT 21 years ago for 2 weeks for depression (?)  Medication Trials:    Zoloft  pristiq    Self Harm: Denies  Suicide Attempts:Denies   Psychosis, Anxiety, Depression: denies    Substance Abuse History:   Types:Denies all, including illicit, quit smoking 2 years ago  Withdrawal Symptoms:Denies  Longest Period Sober:Not Applicable   AA: Not applicable     Social History:  Martial Status:  Employed:No  Kids:Yes  House:Lives in a house   History: Denies    Social History     Socioeconomic History    Marital status:    Tobacco Use    Smoking status: Former     Current packs/day: 0.00     Average packs/day: 1 pack/day for 47.4 years (47.4 ttl pk-yrs)      "Types: Cigarettes     Start date: 1975     Quit date: 6/3/2022     Years since quittin.0     Passive exposure: Past    Smokeless tobacco: Never   Vaping Use    Vaping status: Never Used   Substance and Sexual Activity    Alcohol use: Never    Drug use: Never    Sexual activity: Not Currently     Partners: Male     Birth control/protection: Hysterectomy       Family History:   Suicide Attempts: Denies  Suicide Completions:Denies      Family History   Problem Relation Age of Onset    Heart disease Mother     Lung cancer Mother     Cancer Mother     Stroke Father     Heart disease Father     Kidney cancer Father     Cancer Father     Stroke Other         UNCLE/AUNT    Colon cancer Neg Hx     Malig Hyperthermia Neg Hx        Developmental History:       Childhood: Denies Abuse  High School:Completed  College: 2.5 years    Mental Status Exam  Appearance  : groomed, good eye contact, normal street clothes  Behavior  : pleasant and cooperative  Motor  : bilateral tremor  Speech  :normal rhythm, rate, volume, tone, not hyperverbal, not pressured, normal prosidy  Mood  : \"I'm anxious\"  Affect  : anxious, mood congruent, good variability  Thought Content  : negative suicidal ideations, negative homicidal ideations, negative obsessions  Perceptions  : negative auditory hallucinations, negative visual hallucinations  Thought Process  : linear  Insight/Judgement  : Fair/fair  Cognition  : grossly intact  Attention   : intact      Review of Systems:  Review of Systems   Constitutional:  Positive for fatigue.   HENT:  Negative for drooling.    Eyes:  Positive for visual disturbance.   Respiratory:  Positive for cough and shortness of breath.    Cardiovascular:  Positive for chest pain. Negative for palpitations and leg swelling.   Gastrointestinal:  Positive for nausea and vomiting.   Endocrine: Negative for cold intolerance and heat intolerance.   Genitourinary:  Negative for difficulty urinating.   Musculoskeletal:  " "Negative for joint swelling.   Allergic/Immunologic: Positive for immunocompromised state.   Neurological:  Positive for dizziness. Negative for seizures, speech difficulty and numbness.       Physical Exam:  Physical Exam    Vital Signs:   /53   Pulse 86   Ht 162.6 cm (64\")   Wt 88.5 kg (195 lb)   BMI 33.47 kg/m²      Lab Results:   Hospital Outpatient Visit on 06/19/2024   Component Date Value Ref Range Status    Glucose 06/19/2024 160 (H)  65 - 99 mg/dL Final    BUN 06/19/2024 17  8 - 23 mg/dL Final    Creatinine 06/19/2024 0.86  0.57 - 1.00 mg/dL Final    Sodium 06/19/2024 143  136 - 145 mmol/L Final    Potassium 06/19/2024 3.9  3.5 - 5.2 mmol/L Final    Chloride 06/19/2024 112 (H)  98 - 107 mmol/L Final    CO2 06/19/2024 20.9 (L)  22.0 - 29.0 mmol/L Final    Calcium 06/19/2024 9.1  8.6 - 10.5 mg/dL Final    Total Protein 06/19/2024 5.3 (L)  6.0 - 8.5 g/dL Final    Albumin 06/19/2024 3.8  3.5 - 5.2 g/dL Final    ALT (SGPT) 06/19/2024 13  1 - 33 U/L Final    AST (SGOT) 06/19/2024 9  1 - 32 U/L Final    Alkaline Phosphatase 06/19/2024 56  39 - 117 U/L Final    Total Bilirubin 06/19/2024 0.6  0.0 - 1.2 mg/dL Final    Globulin 06/19/2024 1.5  gm/dL Final    A/G Ratio 06/19/2024 2.5  g/dL Final    BUN/Creatinine Ratio 06/19/2024 19.8  7.0 - 25.0 Final    Anion Gap 06/19/2024 10.1  5.0 - 15.0 mmol/L Final    eGFR 06/19/2024 71.0  >60.0 mL/min/1.73 Final    TSH 06/19/2024 1.110  0.270 - 4.200 uIU/mL Final    T Uptake 06/19/2024 1.11  0.80 - 1.30 TBI Final    T4, Total 06/19/2024 7.48  4.50 - 11.70 mcg/dL Final    T4 results may be falsely increased if patient taking Biotin.    WBC 06/19/2024 10.76  3.40 - 10.80 10*3/mm3 Final    RBC 06/19/2024 3.09 (L)  3.77 - 5.28 10*6/mm3 Final    Hemoglobin 06/19/2024 10.6 (L)  12.0 - 15.9 g/dL Final    Hematocrit 06/19/2024 32.8 (L)  34.0 - 46.6 % Final    MCV 06/19/2024 106.1 (H)  79.0 - 97.0 fL Final    MCH 06/19/2024 34.3 (H)  26.6 - 33.0 pg Final    MCHC 06/19/2024 " 32.3  31.5 - 35.7 g/dL Final    RDW 06/19/2024 18.4 (H)  12.3 - 15.4 % Final    RDW-SD 06/19/2024 72.8 (H)  37.0 - 54.0 fl Final    MPV 06/19/2024 9.4  6.0 - 12.0 fL Final    Platelets 06/19/2024 377  140 - 450 10*3/mm3 Final    Neutrophil % 06/19/2024 60.1  42.7 - 76.0 % Final    Lymphocyte % 06/19/2024 29.2  19.6 - 45.3 % Final    Monocyte % 06/19/2024 9.9  5.0 - 12.0 % Final    Eosinophil % 06/19/2024 0.0 (L)  0.3 - 6.2 % Final    Basophil % 06/19/2024 0.3  0.0 - 1.5 % Final    Immature Grans % 06/19/2024 0.5  0.0 - 0.5 % Final    Neutrophils, Absolute 06/19/2024 6.48  1.70 - 7.00 10*3/mm3 Final    Lymphocytes, Absolute 06/19/2024 3.14 (H)  0.70 - 3.10 10*3/mm3 Final    Monocytes, Absolute 06/19/2024 1.06 (H)  0.10 - 0.90 10*3/mm3 Final    Eosinophils, Absolute 06/19/2024 0.00  0.00 - 0.40 10*3/mm3 Final    Basophils, Absolute 06/19/2024 0.03  0.00 - 0.20 10*3/mm3 Final    Immature Grans, Absolute 06/19/2024 0.05  0.00 - 0.05 10*3/mm3 Final   Lab on 06/11/2024   Component Date Value Ref Range Status    IgG 06/11/2024 587  586 - 1602 mg/dL Final    IgA 06/11/2024 15 (L)  64 - 422 mg/dL Final    Result confirmed on concentration.    IgM 06/11/2024 17 (L)  26 - 217 mg/dL Final    Result confirmed on concentration.    Total Protein 06/11/2024 5.2 (L)  6.0 - 8.5 g/dL Final    Albumin 06/11/2024 3.2  2.9 - 4.4 g/dL Final    Alpha-1-Globulin 06/11/2024 0.2  0.0 - 0.4 g/dL Final    Alpha-2-Globulin 06/11/2024 0.5  0.4 - 1.0 g/dL Final    Beta Globulin 06/11/2024 0.8  0.7 - 1.3 g/dL Final    Gamma Globulin 06/11/2024 0.5  0.4 - 1.8 g/dL Final    M-Sahil 06/11/2024 0.4 (H)  Not Observed g/dL Final    Globulin 06/11/2024 2.0 (L)  2.2 - 3.9 g/dL Final    A/G Ratio 06/11/2024 1.7  0.7 - 1.7 Final    Immunofixation Reflex, Serum 06/11/2024 Comment (A)   Final    Immunofixation shows IgG monoclonal protein with kappa light chain  specificity.   PLEASE NOTE:   Samples from patients receiving DARZALEX(R) (daratumumab) or    "SARCLISA(R[facility]) treatment can appear as an   \"IgG kappa\" and mask a complete response (CR). If this patient   is receiving these therapies, this ACRL assay interference   can be removed by ordering test number [id number]-\"Immunofixation,   Daratumumab-Specific, Serum\" or [id number]-\"Immunofixation,   Isatuximab-Specific, Serum\" and submitting a new sample for   testing or by calling the lab to add this test to the current   sample.    Please note 06/11/2024 Comment   Final    Protein electrophoresis scan will follow via computer, mail, or   delivery.    Free Light Chain, Kappa 06/11/2024 11.1  3.3 - 19.4 mg/L Final    Free Lambda Light Chains 06/11/2024 3.3 (L)  5.7 - 26.3 mg/L Final    Kappa/Lambda Ratio 06/11/2024 3.36 (H)  0.26 - 1.65 Final    Total Protein, Urine 06/11/2024 50.1  Not Estab. mg/dL Final    Albumin, U 06/11/2024 63.7  % Final    Alpha-1-Globulin, U 06/11/2024 1.6  % Final    Alpha-2-Globulin, U 06/11/2024 9.4  % Final    Beta Globulin, U 06/11/2024 18.6  % Final    Gamma Globulin, Urine 06/11/2024 6.7  % Final    M-Sahil 06/11/2024 3.5 (H)  Not Observed % Final    Please note 06/11/2024 Comment   Final    Protein electrophoresis scan will follow via computer, mail, or   delivery.   Hospital Outpatient Visit on 05/22/2024   Component Date Value Ref Range Status    Glucose 05/22/2024 213 (H)  65 - 99 mg/dL Final    BUN 05/22/2024 16  8 - 23 mg/dL Final    Creatinine 05/22/2024 0.81  0.57 - 1.00 mg/dL Final    Sodium 05/22/2024 139  136 - 145 mmol/L Final    Potassium 05/22/2024 3.7  3.5 - 5.2 mmol/L Final    Chloride 05/22/2024 109 (H)  98 - 107 mmol/L Final    CO2 05/22/2024 20.1 (L)  22.0 - 29.0 mmol/L Final    Calcium 05/22/2024 9.0  8.6 - 10.5 mg/dL Final    Total Protein 05/22/2024 5.8 (L)  6.0 - 8.5 g/dL Final    Albumin 05/22/2024 4.0  3.5 - 5.2 g/dL Final    ALT (SGPT) 05/22/2024 15  1 - 33 U/L Final    AST (SGOT) 05/22/2024 10  1 - 32 U/L Final    Alkaline Phosphatase " 05/22/2024 61  39 - 117 U/L Final    Total Bilirubin 05/22/2024 0.6  0.0 - 1.2 mg/dL Final    Globulin 05/22/2024 1.8  gm/dL Final    A/G Ratio 05/22/2024 2.2  g/dL Final    BUN/Creatinine Ratio 05/22/2024 19.8  7.0 - 25.0 Final    Anion Gap 05/22/2024 9.9  5.0 - 15.0 mmol/L Final    eGFR 05/22/2024 76.3  >60.0 mL/min/1.73 Final    WBC 05/22/2024 5.47  3.40 - 10.80 10*3/mm3 Final    RBC 05/22/2024 3.27 (L)  3.77 - 5.28 10*6/mm3 Final    Hemoglobin 05/22/2024 11.0 (L)  12.0 - 15.9 g/dL Final    Hematocrit 05/22/2024 34.6  34.0 - 46.6 % Final    MCV 05/22/2024 105.8 (H)  79.0 - 97.0 fL Final    MCH 05/22/2024 33.6 (H)  26.6 - 33.0 pg Final    MCHC 05/22/2024 31.8  31.5 - 35.7 g/dL Final    RDW 05/22/2024 16.7 (H)  12.3 - 15.4 % Final    RDW-SD 05/22/2024 65.9 (H)  37.0 - 54.0 fl Final    MPV 05/22/2024 9.7  6.0 - 12.0 fL Final    Platelets 05/22/2024 291  140 - 450 10*3/mm3 Final    Neutrophil % 05/22/2024 55.0  42.7 - 76.0 % Final    Lymphocyte % 05/22/2024 31.6  19.6 - 45.3 % Final    Monocyte % 05/22/2024 13.0 (H)  5.0 - 12.0 % Final    Eosinophil % 05/22/2024 0.0 (L)  0.3 - 6.2 % Final    Basophil % 05/22/2024 0.2  0.0 - 1.5 % Final    Immature Grans % 05/22/2024 0.2  0.0 - 0.5 % Final    Neutrophils, Absolute 05/22/2024 3.01  1.70 - 7.00 10*3/mm3 Final    Lymphocytes, Absolute 05/22/2024 1.73  0.70 - 3.10 10*3/mm3 Final    Monocytes, Absolute 05/22/2024 0.71  0.10 - 0.90 10*3/mm3 Final    Eosinophils, Absolute 05/22/2024 0.00  0.00 - 0.40 10*3/mm3 Final    Basophils, Absolute 05/22/2024 0.01  0.00 - 0.20 10*3/mm3 Final    Immature Grans, Absolute 05/22/2024 0.01  0.00 - 0.05 10*3/mm3 Final   Hospital Outpatient Visit on 04/24/2024   Component Date Value Ref Range Status    Glucose 04/24/2024 198 (H)  65 - 99 mg/dL Final    BUN 04/24/2024 15  8 - 23 mg/dL Final    Creatinine 04/24/2024 0.79  0.57 - 1.00 mg/dL Final    Sodium 04/24/2024 141  136 - 145 mmol/L Final    Potassium 04/24/2024 3.7  3.5 - 5.2 mmol/L Final     Chloride 04/24/2024 113 (H)  98 - 107 mmol/L Final    CO2 04/24/2024 19.5 (L)  22.0 - 29.0 mmol/L Final    Calcium 04/24/2024 8.5 (L)  8.6 - 10.5 mg/dL Final    Total Protein 04/24/2024 5.4 (L)  6.0 - 8.5 g/dL Final    Albumin 04/24/2024 3.8  3.5 - 5.2 g/dL Final    ALT (SGPT) 04/24/2024 16  1 - 33 U/L Final    AST (SGOT) 04/24/2024 10  1 - 32 U/L Final    Alkaline Phosphatase 04/24/2024 63  39 - 117 U/L Final    Total Bilirubin 04/24/2024 0.6  0.0 - 1.2 mg/dL Final    Globulin 04/24/2024 1.6  gm/dL Final    A/G Ratio 04/24/2024 2.4  g/dL Final    BUN/Creatinine Ratio 04/24/2024 19.0  7.0 - 25.0 Final    Anion Gap 04/24/2024 8.5  5.0 - 15.0 mmol/L Final    eGFR 04/24/2024 78.6  >60.0 mL/min/1.73 Final    TSH 04/24/2024 0.792  0.270 - 4.200 uIU/mL Final    T Uptake 04/24/2024 1.08  0.80 - 1.30 TBI Final    T4, Total 04/24/2024 8.44  4.50 - 11.70 mcg/dL Final    T4 results may be falsely increased if patient taking Biotin.    WBC 04/24/2024 8.04  3.40 - 10.80 10*3/mm3 Final    RBC 04/24/2024 3.02 (L)  3.77 - 5.28 10*6/mm3 Final    Hemoglobin 04/24/2024 10.4 (L)  12.0 - 15.9 g/dL Final    Hematocrit 04/24/2024 32.3 (L)  34.0 - 46.6 % Final    MCV 04/24/2024 107.0 (H)  79.0 - 97.0 fL Final    MCH 04/24/2024 34.4 (H)  26.6 - 33.0 pg Final    MCHC 04/24/2024 32.2  31.5 - 35.7 g/dL Final    RDW 04/24/2024 17.0 (H)  12.3 - 15.4 % Final    RDW-SD 04/24/2024 68.6 (H)  37.0 - 54.0 fl Final    MPV 04/24/2024 9.3  6.0 - 12.0 fL Final    Platelets 04/24/2024 331  140 - 450 10*3/mm3 Final    Neutrophil % 04/24/2024 56.1  42.7 - 76.0 % Final    Lymphocyte % 04/24/2024 32.2  19.6 - 45.3 % Final    Monocyte % 04/24/2024 10.8  5.0 - 12.0 % Final    Eosinophil % 04/24/2024 0.0 (L)  0.3 - 6.2 % Final    Basophil % 04/24/2024 0.5  0.0 - 1.5 % Final    Immature Grans % 04/24/2024 0.4  0.0 - 0.5 % Final    Neutrophils, Absolute 04/24/2024 4.51  1.70 - 7.00 10*3/mm3 Final    Lymphocytes, Absolute 04/24/2024 2.59  0.70 - 3.10 10*3/mm3  Final    Monocytes, Absolute 04/24/2024 0.87  0.10 - 0.90 10*3/mm3 Final    Eosinophils, Absolute 04/24/2024 0.00  0.00 - 0.40 10*3/mm3 Final    Basophils, Absolute 04/24/2024 0.04  0.00 - 0.20 10*3/mm3 Final    Immature Grans, Absolute 04/24/2024 0.03  0.00 - 0.05 10*3/mm3 Final    GARRICK Direct 04/24/2024 Negative  Negative Final    Sed Rate 04/24/2024 <1  0 - 30 mm/hr Final    C-Reactive Protein 04/24/2024 <0.30  0.00 - 0.50 mg/dL Final    ANTI-MPO ANTIBODIES 04/24/2024 <0.2  0.0 - 0.9 units Final    ANTI-PR3 ANTIBODIES 04/24/2024 <0.2  0.0 - 0.9 units Final    C-ANCA 04/24/2024 <1:20  Neg:<1:20 titer Final    P-ANCA 04/24/2024 <1:20  Neg:<1:20 titer Final    The presence of positive fluorescence exhibiting P-ANCA or C-ANCA  patterns alone is not specific for the diagnosis of Wegener's  Granulomatosis (WG) or microscopic polyangiitis. Decisions about  treatment should not be based solely on ANCA IFA results.  The  International ANCA Group Consensus recommends follow up testing of  positive sera with both RI-3 and MPO-ANCA enzyme immunoassays. As  many as 5% serum samples are positive only by EIA.  Ref. AM J Clin Pathol 1999;111:507-513.    Atypical pANCA 04/24/2024 <1:20  Neg:<1:20 titer Final    The atypical pANCA pattern has been observed in a significant  percentage of patients with ulcerative colitis, primary sclerosing  cholangitis and autoimmune hepatitis.   Lab on 04/15/2024   Component Date Value Ref Range Status    Vitamin B-12 04/15/2024 612  211 - 946 pg/mL Final    Glucose 04/15/2024 160 (H)  65 - 99 mg/dL Final    BUN 04/15/2024 9  8 - 23 mg/dL Final    Creatinine 04/15/2024 0.75  0.57 - 1.00 mg/dL Final    Sodium 04/15/2024 140  136 - 145 mmol/L Final    Potassium 04/15/2024 3.9  3.5 - 5.2 mmol/L Final    Chloride 04/15/2024 108 (H)  98 - 107 mmol/L Final    CO2 04/15/2024 16.5 (L)  22.0 - 29.0 mmol/L Final    Calcium 04/15/2024 8.8  8.6 - 10.5 mg/dL Final    Total Protein 04/15/2024 5.7 (L)  6.0 - 8.5  g/dL Final    Albumin 04/15/2024 3.7  3.5 - 5.2 g/dL Final    ALT (SGPT) 04/15/2024 21  1 - 33 U/L Final    AST (SGOT) 04/15/2024 19  1 - 32 U/L Final    Alkaline Phosphatase 04/15/2024 71  39 - 117 U/L Final    Total Bilirubin 04/15/2024 0.6  0.0 - 1.2 mg/dL Final    Globulin 04/15/2024 2.0  gm/dL Final    A/G Ratio 04/15/2024 1.9  g/dL Final    BUN/Creatinine Ratio 04/15/2024 12.0  7.0 - 25.0 Final    Anion Gap 04/15/2024 15.5 (H)  5.0 - 15.0 mmol/L Final    eGFR 04/15/2024 83.7  >60.0 mL/min/1.73 Final    proBNP 04/15/2024 121.0  0.0 - 900.0 pg/mL Final    IgE 04/15/2024 <2 (L)  6 - 495 IU/mL Final    WBC 04/15/2024 8.79  3.40 - 10.80 10*3/mm3 Final    RBC 04/15/2024 3.42 (L)  3.77 - 5.28 10*6/mm3 Final    Hemoglobin 04/15/2024 11.5 (L)  12.0 - 15.9 g/dL Final    Hematocrit 04/15/2024 35.9  34.0 - 46.6 % Final    MCV 04/15/2024 105.0 (H)  79.0 - 97.0 fL Final    MCH 04/15/2024 33.6 (H)  26.6 - 33.0 pg Final    MCHC 04/15/2024 32.0  31.5 - 35.7 g/dL Final    RDW 04/15/2024 15.2  12.3 - 15.4 % Final    RDW-SD 04/15/2024 58.7 (H)  37.0 - 54.0 fl Final    MPV 04/15/2024 10.3  6.0 - 12.0 fL Final    Platelets 04/15/2024 299  140 - 450 10*3/mm3 Final    Neutrophil % 04/15/2024 34.3 (L)  42.7 - 76.0 % Final    Lymphocyte % 04/15/2024 45.3  19.6 - 45.3 % Final    Monocyte % 04/15/2024 13.0 (H)  5.0 - 12.0 % Final    Eosinophil % 04/15/2024 6.1  0.3 - 6.2 % Final    Basophil % 04/15/2024 0.8  0.0 - 1.5 % Final    Immature Grans % 04/15/2024 0.5  0.0 - 0.5 % Final    Neutrophils, Absolute 04/15/2024 3.02  1.70 - 7.00 10*3/mm3 Final    Lymphocytes, Absolute 04/15/2024 3.98 (H)  0.70 - 3.10 10*3/mm3 Final    Monocytes, Absolute 04/15/2024 1.14 (H)  0.10 - 0.90 10*3/mm3 Final    Eosinophils, Absolute 04/15/2024 0.54 (H)  0.00 - 0.40 10*3/mm3 Final    Basophils, Absolute 04/15/2024 0.07  0.00 - 0.20 10*3/mm3 Final    Immature Grans, Absolute 04/15/2024 0.04  0.00 - 0.05 10*3/mm3 Final    nRBC 04/15/2024 0.0  0.0 - 0.2 /100 WBC  Final   Hospital Outpatient Visit on 04/10/2024   Component Date Value Ref Range Status    WBC 04/10/2024 11.73 (H)  3.40 - 10.80 10*3/mm3 Final    RBC 04/10/2024 3.21 (L)  3.77 - 5.28 10*6/mm3 Final    Hemoglobin 04/10/2024 10.9 (L)  12.0 - 15.9 g/dL Final    Hematocrit 04/10/2024 34.2  34.0 - 46.6 % Final    MCV 04/10/2024 106.5 (H)  79.0 - 97.0 fL Final    MCH 04/10/2024 34.0 (H)  26.6 - 33.0 pg Final    MCHC 04/10/2024 31.9  31.5 - 35.7 g/dL Final    RDW 04/10/2024 17.7 (H)  12.3 - 15.4 % Final    RDW-SD 04/10/2024 70.5 (H)  37.0 - 54.0 fl Final    MPV 04/10/2024 10.1  6.0 - 12.0 fL Final    Platelets 04/10/2024 388  140 - 450 10*3/mm3 Final    Neutrophil % 04/10/2024 53.6  42.7 - 76.0 % Final    Lymphocyte % 04/10/2024 35.0  19.6 - 45.3 % Final    Monocyte % 04/10/2024 10.6  5.0 - 12.0 % Final    Eosinophil % 04/10/2024 0.3  0.3 - 6.2 % Final    Basophil % 04/10/2024 0.2  0.0 - 1.5 % Final    Immature Grans % 04/10/2024 0.3  0.0 - 0.5 % Final    Neutrophils, Absolute 04/10/2024 6.29  1.70 - 7.00 10*3/mm3 Final    Lymphocytes, Absolute 04/10/2024 4.11 (H)  0.70 - 3.10 10*3/mm3 Final    Monocytes, Absolute 04/10/2024 1.24 (H)  0.10 - 0.90 10*3/mm3 Final    Eosinophils, Absolute 04/10/2024 0.03  0.00 - 0.40 10*3/mm3 Final    Basophils, Absolute 04/10/2024 0.02  0.00 - 0.20 10*3/mm3 Final    Immature Grans, Absolute 04/10/2024 0.04  0.00 - 0.05 10*3/mm3 Final   Hospital Outpatient Visit on 03/27/2024   Component Date Value Ref Range Status    Glucose 03/27/2024 185 (H)  65 - 99 mg/dL Final    BUN 03/27/2024 15  8 - 23 mg/dL Final    Creatinine 03/27/2024 0.79  0.57 - 1.00 mg/dL Final    Sodium 03/27/2024 142  136 - 145 mmol/L Final    Potassium 03/27/2024 3.7  3.5 - 5.2 mmol/L Final    Chloride 03/27/2024 111 (H)  98 - 107 mmol/L Final    CO2 03/27/2024 20.1 (L)  22.0 - 29.0 mmol/L Final    Calcium 03/27/2024 9.1  8.6 - 10.5 mg/dL Final    Total Protein 03/27/2024 5.7 (L)  6.0 - 8.5 g/dL Final    Albumin 03/27/2024  3.8  3.5 - 5.2 g/dL Final    ALT (SGPT) 03/27/2024 14  1 - 33 U/L Final    AST (SGOT) 03/27/2024 8  1 - 32 U/L Final    Alkaline Phosphatase 03/27/2024 80  39 - 117 U/L Final    Total Bilirubin 03/27/2024 0.5  0.0 - 1.2 mg/dL Final    Globulin 03/27/2024 1.9  gm/dL Final    A/G Ratio 03/27/2024 2.0  g/dL Final    BUN/Creatinine Ratio 03/27/2024 19.0  7.0 - 25.0 Final    Anion Gap 03/27/2024 10.9  5.0 - 15.0 mmol/L Final    eGFR 03/27/2024 78.6  >60.0 mL/min/1.73 Final    WBC 03/27/2024 10.09  3.40 - 10.80 10*3/mm3 Final    RBC 03/27/2024 3.24 (L)  3.77 - 5.28 10*6/mm3 Final    Hemoglobin 03/27/2024 11.0 (L)  12.0 - 15.9 g/dL Final    Hematocrit 03/27/2024 33.8 (L)  34.0 - 46.6 % Final    MCV 03/27/2024 104.3 (H)  79.0 - 97.0 fL Final    MCH 03/27/2024 34.0 (H)  26.6 - 33.0 pg Final    MCHC 03/27/2024 32.5  31.5 - 35.7 g/dL Final    RDW 03/27/2024 18.3 (H)  12.3 - 15.4 % Final    RDW-SD 03/27/2024 70.4 (H)  37.0 - 54.0 fl Final    MPV 03/27/2024 9.1  6.0 - 12.0 fL Final    Platelets 03/27/2024 508 (H)  140 - 450 10*3/mm3 Final    Neutrophil % 03/27/2024 44.3  42.7 - 76.0 % Final    Lymphocyte % 03/27/2024 44.2  19.6 - 45.3 % Final    Monocyte % 03/27/2024 10.3  5.0 - 12.0 % Final    Eosinophil % 03/27/2024 0.0 (L)  0.3 - 6.2 % Final    Basophil % 03/27/2024 0.7  0.0 - 1.5 % Final    Immature Grans % 03/27/2024 0.5  0.0 - 0.5 % Final    Neutrophils, Absolute 03/27/2024 4.47  1.70 - 7.00 10*3/mm3 Final    Lymphocytes, Absolute 03/27/2024 4.46 (H)  0.70 - 3.10 10*3/mm3 Final    Monocytes, Absolute 03/27/2024 1.04 (H)  0.10 - 0.90 10*3/mm3 Final    Eosinophils, Absolute 03/27/2024 0.00  0.00 - 0.40 10*3/mm3 Final    Basophils, Absolute 03/27/2024 0.07  0.00 - 0.20 10*3/mm3 Final    Immature Grans, Absolute 03/27/2024 0.05  0.00 - 0.05 10*3/mm3 Final    Vitamin B-12 03/27/2024 166 (L)  211 - 946 pg/mL Final    Folate 03/27/2024 8.95  4.78 - 24.20 ng/mL Final   Hospital Outpatient Visit on 03/13/2024   Component Date Value  Ref Range Status    CARL and PE, Serum 03/13/2024 Comment (A)   Final    Immunofixation shows IgG monoclonal protein with kappa light chain  specificity.  PLEASE NOTE:       This sample has been treated to eliminate IgG Kappa interference       from monoclonal therapy by DARZALEX(R) (daratumumab). Any       remaining IgG Kappa, if present, is due to the patient's own       immune response.    IgG 03/13/2024 825  586 - 1602 mg/dL Final    IgA 03/13/2024 5 (L)  64 - 422 mg/dL Final    Result confirmed on concentration.    IgM 03/13/2024 16 (L)  26 - 217 mg/dL Final    Result confirmed on concentration.    WBC 03/13/2024 7.90  3.40 - 10.80 10*3/mm3 Final    RBC 03/13/2024 3.38 (L)  3.77 - 5.28 10*6/mm3 Final    Hemoglobin 03/13/2024 11.3 (L)  12.0 - 15.9 g/dL Final    Hematocrit 03/13/2024 34.9  34.0 - 46.6 % Final    MCV 03/13/2024 103.3 (H)  79.0 - 97.0 fL Final    MCH 03/13/2024 33.4 (H)  26.6 - 33.0 pg Final    MCHC 03/13/2024 32.4  31.5 - 35.7 g/dL Final    RDW 03/13/2024 16.8 (H)  12.3 - 15.4 % Final    RDW-SD 03/13/2024 64.5 (H)  37.0 - 54.0 fl Final    MPV 03/13/2024 9.9  6.0 - 12.0 fL Final    Platelets 03/13/2024 224  140 - 450 10*3/mm3 Final    Neutrophil % 03/13/2024 48.6  42.7 - 76.0 % Final    Lymphocyte % 03/13/2024 40.0  19.6 - 45.3 % Final    Monocyte % 03/13/2024 10.8  5.0 - 12.0 % Final    Eosinophil % 03/13/2024 0.0 (L)  0.3 - 6.2 % Final    Basophil % 03/13/2024 0.3  0.0 - 1.5 % Final    Immature Grans % 03/13/2024 0.3  0.0 - 0.5 % Final    Neutrophils, Absolute 03/13/2024 3.85  1.70 - 7.00 10*3/mm3 Final    Lymphocytes, Absolute 03/13/2024 3.16 (H)  0.70 - 3.10 10*3/mm3 Final    Monocytes, Absolute 03/13/2024 0.85  0.10 - 0.90 10*3/mm3 Final    Eosinophils, Absolute 03/13/2024 0.00  0.00 - 0.40 10*3/mm3 Final    Basophils, Absolute 03/13/2024 0.02  0.00 - 0.20 10*3/mm3 Final    Immature Grans, Absolute 03/13/2024 0.02  0.00 - 0.05 10*3/mm3 Final   Hospital Outpatient Visit on 02/28/2024   Component  Date Value Ref Range Status    Glucose 02/28/2024 202 (H)  65 - 99 mg/dL Final    BUN 02/28/2024 13  8 - 23 mg/dL Final    Creatinine 02/28/2024 0.81  0.57 - 1.00 mg/dL Final    Sodium 02/28/2024 141  136 - 145 mmol/L Final    Potassium 02/28/2024 3.5  3.5 - 5.2 mmol/L Final    Chloride 02/28/2024 111 (H)  98 - 107 mmol/L Final    CO2 02/28/2024 23.6  22.0 - 29.0 mmol/L Final    Calcium 02/28/2024 8.5 (L)  8.6 - 10.5 mg/dL Final    Total Protein 02/28/2024 5.5 (L)  6.0 - 8.5 g/dL Final    Albumin 02/28/2024 3.7  3.5 - 5.2 g/dL Final    ALT (SGPT) 02/28/2024 13  1 - 33 U/L Final    AST (SGOT) 02/28/2024 9  1 - 32 U/L Final    Alkaline Phosphatase 02/28/2024 61  39 - 117 U/L Final    Total Bilirubin 02/28/2024 0.5  0.0 - 1.2 mg/dL Final    Globulin 02/28/2024 1.8  gm/dL Final    A/G Ratio 02/28/2024 2.1  g/dL Final    BUN/Creatinine Ratio 02/28/2024 16.0  7.0 - 25.0 Final    Anion Gap 02/28/2024 6.4  5.0 - 15.0 mmol/L Final    eGFR 02/28/2024 76.3  >60.0 mL/min/1.73 Final    TSH 02/28/2024 1.550  0.270 - 4.200 uIU/mL Final    T Uptake 02/28/2024 1.13  0.80 - 1.30 TBI Final    T4, Total 02/28/2024 8.37  4.50 - 11.70 mcg/dL Final    T4 results may be falsely increased if patient taking Biotin.    WBC 02/28/2024 8.34  3.40 - 10.80 10*3/mm3 Final    RBC 02/28/2024 3.17 (L)  3.77 - 5.28 10*6/mm3 Final    Hemoglobin 02/28/2024 10.7 (L)  12.0 - 15.9 g/dL Final    Hematocrit 02/28/2024 33.4 (L)  34.0 - 46.6 % Final    MCV 02/28/2024 105.4 (H)  79.0 - 97.0 fL Final    MCH 02/28/2024 33.8 (H)  26.6 - 33.0 pg Final    MCHC 02/28/2024 32.0  31.5 - 35.7 g/dL Final    RDW 02/28/2024 18.0 (H)  12.3 - 15.4 % Final    RDW-SD 02/28/2024 69.6 (H)  37.0 - 54.0 fl Final    MPV 02/28/2024 9.1  6.0 - 12.0 fL Final    Platelets 02/28/2024 309  140 - 450 10*3/mm3 Final    Neutrophil % 02/28/2024 37.3 (L)  42.7 - 76.0 % Final    Lymphocyte % 02/28/2024 50.7 (H)  19.6 - 45.3 % Final    Monocyte % 02/28/2024 10.9  5.0 - 12.0 % Final     Eosinophil % 02/28/2024 0.0 (L)  0.3 - 6.2 % Final    Basophil % 02/28/2024 0.7  0.0 - 1.5 % Final    Immature Grans % 02/28/2024 0.4  0.0 - 0.5 % Final    Neutrophils, Absolute 02/28/2024 3.11  1.70 - 7.00 10*3/mm3 Final    Lymphocytes, Absolute 02/28/2024 4.23 (H)  0.70 - 3.10 10*3/mm3 Final    Monocytes, Absolute 02/28/2024 0.91 (H)  0.10 - 0.90 10*3/mm3 Final    Eosinophils, Absolute 02/28/2024 0.00  0.00 - 0.40 10*3/mm3 Final    Basophils, Absolute 02/28/2024 0.06  0.00 - 0.20 10*3/mm3 Final    Immature Grans, Absolute 02/28/2024 0.03  0.00 - 0.05 10*3/mm3 Final   Office Visit on 02/21/2024   Component Date Value Ref Range Status    Amphetamine Screen, Urine 02/21/2024 Negative  Negative Final    AMP INTERNAL CONTROL 02/21/2024 Passed  Passed Final    Barbiturates Screen, Urine 02/21/2024 Negative  Negative Final    BARBITURATE INTERNAL CONTROL 02/21/2024 Passed  Passed Final    Buprenorphine, Screen, Urine 02/21/2024 Negative  Negative Final    BUPRENORPHINE INTERNAL CONTROL 02/21/2024 Passed  Passed Final    Benzodiazepine Screen, Urine 02/21/2024 Negative  Negative Final    BENZODIAZEPINE INTERNAL CONTROL 02/21/2024 Passed  Passed Final    Cocaine Screen, Urine 02/21/2024 Negative  Negative Final    COCAINE INTERNAL CONTROL 02/21/2024 Passed  Passed Final    MDMA (ECSTASY) 02/21/2024 Negative  Negative Final    MDMA (ECSTASY) INTERNAL CONTROL 02/21/2024 Passed  Passed Final    Methamphetamine, Ur 02/21/2024 Negative  Negative Final    METHAMPHETAMINE INTERNAL CONTROL 02/21/2024 Passed  Passed Final    Methadone Screen, Urine 02/21/2024 Negative  Negative Final    METHADONE INTERNAL CONTROL 02/21/2024 Passed  Passed Final    Opiate Screen 02/21/2024 Negative  Negative Final    OPIATES INTERNAL CONTROL 02/21/2024 Passed  Passed Final    Oxycodone Screen, Urine 02/21/2024 Negative  Negative Final    OXYCODONE INTERNAL CONTROL 02/21/2024 Passed  Passed Final    Phencyclidine (PCP), Urine 02/21/2024 Negative   Negative Final    PHENCYCLIDINE INTERNAL CONTROL 02/21/2024 Passed  Passed Final    THC, Screen, Urine 02/21/2024 Negative  Negative Final    THC INTERNAL CONTROL 02/21/2024 Passed  Passed Final    Lot Number 02/21/2024 [id number]   Final    Expiration Date 02/21/2024 09/03/2025   Final   There may be more visits with results that are not included.       EKG Results:  No orders to display       Imaging Results:  MRI Brain With & Without Contrast    Result Date: 5/3/2024   1. White matter changes most compatible with small vessel ischemic disease in this age group.  2. No abnormal enhancement on postcontrast imaging within the brain parenchyma.  3. Prominent bilateral mastoid effusions left greater than right. Please correlate for mastoiditis  3. Mild paranasal sinus disease     Electronically Signed By-Raman Tran On:5/3/2024 9:22 AM      XR Chest 2 View    Result Date: 4/15/2024  Impression: 1.  No acute cardiopulmonary disease.   Electronically Signed By-Bryan Schultz MD On:4/15/2024 2:57 PM      CT Chest Low Dose Cancer Screening WO    Result Date: 3/1/2024    1. No evidence of lung cancer. 2. Stable bilateral pulmonary nodules, likely benign. 3. Mild bibasilar atelectasis. 4. Moderate emphysema.  LUNG RADS:                           2 (benign appearance, less than 1% chance of malignancy)     YENIFER HANNA MD       Electronically Signed and Approved By: YENIFER HANNA MD on 3/01/2024 at 11:02                 Assessment & Plan   Diagnoses and all orders for this visit:    1. Generalized anxiety disorder (Primary)  -     busPIRone (BUSPAR) 10 MG tablet; Take 1 tablet by mouth 2 (Two) Times a Day.  Dispense: 60 tablet; Refill: 2    2. Major depressive disorder, recurrent episode, moderate  -     busPIRone (BUSPAR) 10 MG tablet; Take 1 tablet by mouth 2 (Two) Times a Day.  Dispense: 60 tablet; Refill: 2    3. Insomnia due to mental condition  -     busPIRone (BUSPAR) 10 MG tablet; Take 1 tablet by mouth 2  (Two) Times a Day.  Dispense: 60 tablet; Refill: 2    4. Seizures    Other orders  -     Discontinue: busPIRone (BUSPAR) 10 MG tablet; Take 1 tablet by mouth 2 (Two) Times a Day.  Dispense: 60 tablet; Refill: 2        Visit Diagnoses:    ICD-10-CM ICD-9-CM   1. Generalized anxiety disorder  F41.1 300.02   2. Major depressive disorder, recurrent episode, moderate  F33.1 296.32   3. Insomnia due to mental condition  F51.05 300.9     327.02   4. Seizures  R56.9 780.39     6/20/24: Pseudoseizures per pt (?). Start buspar together with prozac for anxiety, depression. Tremor nebulizer related, not due to prozac.    PLAN:  Safety: No acute safety concerns  Therapy: None  Risk Assessment: Risk of self-harm acutely is moderate.  Risk factors include anxiety disorder, mood disorder, access to firearms, and recent psychosocial stressors (pandemic). Protective factors include no family history, no present SI, no history of suicide attempts or self-harm in the past, minimal AODA, healthcare seeking, future orientation, willingness to engage in care.  Risk of self-harm chronically is also moderate, but could be further elevated in the event of treatment noncompliance and/or AODA.  Meds:  START buspar 10 mg qday x7d, then bid if tolerates. Risks, benefits, alternatives discussed with patient including nausea, GI upset, mild sedation, falls risk.  Use care when operating vehicle, vessel, or machine. After discussion of these risks and benefits, the patient voiced understanding and agreed to proceed.  CONTINUE prozac 40 mg qam. Risks, benefits, alternatives discussed with patient including GI upset, nausea vomiting diarrhea, theoretical decrease of seizure threshold predisposing the patient to a slightly higher seizure risk, headaches, sexual dysfunction, serotonin syndrome, bleeding risk, increased suicidality in patients 24 years and younger, switching to padmaja/hypomania.  After discussion of these risks and benefits, the patient  voiced understanding and agreed to proceed.  Labs: none    Patient screened positive for depression based on a PHQ-9 score of 0 on 6/19/2024. Follow-up recommendations include: Prescribed antidepressant medication treatment and Suicide Risk Assessment performed.           TREATMENT PLAN/GOALS: Continue supportive psychotherapy efforts and medications as indicated. Treatment and medication options discussed during today's visit. Patient acknowledged and verbally consented to continue with current treatment plan and was educated on the importance of compliance with treatment and follow-up appointments.    MEDICATION ISSUES:  SINGH reviewed as expected.  Discussed medication options and treatment plan of prescribed medication as well as the risks, benefits, and side effects including potential falls, possible impaired driving and metabolic adversities among others. Patient is agreeable to call the office with any worsening of symptoms or onset of side effects. Patient is agreeable to call 911 or go to the nearest ER should he/she begin having SI/HI. No medication side effects or related complaints today.     MEDS ORDERED DURING VISIT:  New Medications Ordered This Visit   Medications    busPIRone (BUSPAR) 10 MG tablet     Sig: Take 1 tablet by mouth 2 (Two) Times a Day.     Dispense:  60 tablet     Refill:  2       Return in about 4 weeks (around 7/18/2024).         This document has been electronically signed by Kalia Singer MD  June 20, 2024 09:34 EDT    Dictated Utilizing Dragon Dictation: Part of this note may be an electronic transcription/translation of spoken language to printed text using the Dragon Dictation System.

## 2024-06-19 NOTE — PROGRESS NOTES
Chief Complaint  Multiple myeloma, remission status unspecified    Rena Guerra,*  Rena Guerra, PA    Subjective          Anca Samson presents to [facility] for ongoing treatment of her myeloma.  She is on daratumumab, lenalidomide, dexamethasone.  Tolerating her regimen well.  She has new masses, adenopathy, unusual aches or pains.  She reports adequate appetite.  Her energy level is low but adequate for her ADLs.  Her activity is limited by her emphysema.    Oncology/Hematology History Overview Note   Smoldering Myeloma    Initially diagnosed in 2016 with bone marrow biopsy demonstrating 30% CD56 positive monoclonal plasma cell population.  45 XX normal female chromosome pattern  FISH panel negative for myeloma associated mutations including 1q21, 9q34,11q13,14q32,15q24, 17q13  No anemia, renal insufficiency, hypercalcemia.  On observation since that time    Low grade adenocarcinoma of ascending colon      5/16/2017 right hemicolectomy which  revealed a low-grade 7.6 cm adenocarcinoma of the cecum. All margins were  negative. Lymphovascular invasion and perineural invasion were not identified.     26 lymph nodes were removed and unfortunately 1 was involved with metastatic deposit. pT3 pN1a colorectal cancer.        Clinical Staging      Smoldering Myeloma; Colon ([id number])            Treatments      Chemotherapy      11/2017 completed 6mo adjuvant Xeloda        6/2022 Stopped Smoking     Malignant neoplasm of ascending colon   7/21/2017 Initial Diagnosis    Colon cancer (CMS/HCC)     Multiple myeloma   7/28/2021 Initial Diagnosis    Multiple myeloma     11/7/2023 -  Chemotherapy    OP MULTIPLE MYELOMA Daratumumab / Lenalidomide / Dexamethasone           Current Outpatient Medications on File Prior to Visit   Medication Sig Dispense Refill    acyclovir (ZOVIRAX) 400 MG tablet Take 1 tablet by mouth 2 (Two) Times a Day. Take one tablet by mouth  twice daily. 60 tablet 11    albuterol sulfate  (90 Base) MCG/ACT inhaler Inhale 2 puffs Every 4 (Four) Hours As Needed for Wheezing. 18 g 11    arformoterol (Brovana) 15 MCG/2ML nebulizer solution Take 2 mL by nebulization 2 (Two) Times a Day.      budesonide (Pulmicort) 0.5 MG/2ML nebulizer solution Take 2 mL by nebulization Daily.      colestipol (COLESTID) 1 g tablet Take 2 tablets by mouth 2 (Two) Times a Day. 120 tablet 5    Cyanocobalamin (Vitamin B 12) 500 MCG tablet Take 2 tablets by mouth Daily.      dapsone 25 MG tablet TAKE 2 TABLETS BY MOUTH TWICE A  tablet 1    dexAMETHasone (DECADRON) 4 MG tablet Take 10 tablets on D 1,8,15,22 and take 1 tablet on D 2,3,16,17.  Take in the morning with food. 44 tablet 3    dexAMETHasone (DECADRON) 4 MG tablet Take 10 tablets on D 1,8,15,22 and take 1 tablet on D 2,3.  Take in the morning with food. 42 tablet 5    doxycycline (VIBRAMYCIN) 100 MG capsule Take 1 capsule by mouth 2 (Two) Times a Day. 20 capsule 0    FLUoxetine (PROzac) 40 MG capsule Take 1 capsule by mouth Daily. 90 capsule 1    lenalidomide (REVLIMID) 15 MG capsule Take 1 capsule by mouth Daily. On Days 1-21, and off 7 days, on a 28 day cycle 21 capsule 0    levalbuterol (XOPENEX) 0.63 MG/3ML nebulizer solution Take 1 ampule by nebulization 4 (Four) Times a Day As Needed for Wheezing. 120 mL 5    LORazepam (Ativan) 1 MG tablet Take 1 tablet by mouth 2 (Two) Times a Day As Needed for Anxiety. 14 tablet 0    ondansetron (ZOFRAN) 8 MG tablet Take 1 tablet by mouth 3 (Three) Times a Day As Needed for Nausea or Vomiting. 30 tablet 5    promethazine (PHENERGAN) 25 MG tablet Take 1 tablet by mouth Every 6 (Six) Hours As Needed for Nausea or Vomiting. 30 tablet 1    revefenacin (Yupelri) 175 MCG/3ML nebulizer solution Take 3 mL by nebulization Daily.      vitamin D (ERGOCALCIFEROL) 1.25 MG (42758 UT) capsule capsule Take 1 capsule by mouth 2 (Two) Times a Week. 26 capsule 1     Current  Facility-Administered Medications on File Prior to Visit   Medication Dose Route Frequency Provider Last Rate Last Admin    [DISCONTINUED] heparin injection 500 Units  500 Units Intravenous PRN West Nicolas MD   500 Units at 06/19/24 1211    [DISCONTINUED] sodium chloride 0.9 % flush 20 mL  20 mL Intravenous PRN West Nicolas MD   20 mL at 06/19/24 1211       Allergies   Allergen Reactions    Cephalexin Hives    Morphine Anaphylaxis    Penicillins Hives    Sulfa Antibiotics Hives     Past Medical History:   Diagnosis Date    Anxiety     Asthma     Asthma 07/28/2021    Atherosclerosis of native coronary artery of native heart with angina pectoris 08/15/2023    FOLLOWED BY DR GONZALES/DINA PETTIT. DENIES CP BUT GETS SOA WITH EXERTION HAS COPD WEARS AT 2LPM N/C. DECREASED ACTIVITY D/T SOA    C. difficile diarrhea     DENIES ANY CURRENT ISSUES    Colon cancer 07/21/2017    Colon polyp     COPD (chronic obstructive pulmonary disease) 10/23/2017    COPD (chronic obstructive pulmonary disease) 10/23/2017    Depression     Disease of thyroid gland     Diverticulitis     Dysphagia     Essential hypertension 07/28/2021    Head injury     Heartburn     Hernia 2019    History of chemotherapy     2017    Hx of psychiatric care     Hyperlipidemia     Impaired fasting glucose 08/14/2015    Lumbago     Lung nodule 02/17/2022    Major depressive disorder 10/27/2016    Malignant neoplasm of ascending colon 07/21/2017    Melena     Multiple myeloma     Nicotine dependence 05/10/2017    NICK (obstructive sleep apnea) 11/06/2023    Oxygen dependent     REPORTS USES 02 AT 2LPM VIA N/C. CAN GET VERY SOA WITH MINIMAL EXERTION    Renal insufficiency 07/28/2021    Seizures     PSEUDO SEIZURES LAST ONE AROUND NOV 2022    Shortness of breath     CAN GET VERY SOA WITH MINIMAL EXERTION    Sleep apnea     Tobacco use 07/28/2021    QUIT SMOKING JUNE 2022    Visit for screening mammogram 02/20/2020    NORMAL- REPEAT IN ONE YEAR    Vitamin D  deficiency      Past Surgical History:   Procedure Laterality Date    APPENDECTOMY      BONE MARROW BIOPSY  2016    COLON SURGERY  2017    COLONOSCOPY  2018,2017,2019    [facility] DR LE:DIVERTICULOSIS AND ERYTHEMA OF MUCOSA    COLONOSCOPY N/A 2022    Procedure: COLONOSCOPY WITH ELEVIEW INJECTION, POLYPECTOMY, HOT SNARE, CLIP APPLICATION X3, BIOPSIES;  Surgeon: Jarvis Borges MD;  Location: [facility] ENDOSCOPY;  Service: Gastroenterology;  Laterality: N/A;  COLON POLYP, DIVERTICULOSIS, ANASTOMOSIS RIGHT COLON    COLONOSCOPY N/A 2023    Procedure: COLONOSCOPY;  Surgeon: Jarvis Borges MD;  Location: [facility] ENDOSCOPY;  Service: Gastroenterology;  Laterality: N/A;  DIVERTICULOSIS, ANASTOMOSIS IN ASCENDING COLON    ENDOSCOPY  2018    EYE SURGERY      FECAL DISIMPACTION  2019    TRANSPLANT     HEMICOLECTOMY Right 2017    HYSTERECTOMY  ,    KNEE ARTHROSCOPY Left 2022    Procedure: KNEE ARTHROSCOPY WITH PARTIAL MEDIAL MENISCECTOMY,  CHONDROPLASTY;  Surgeon: Nicholas Tipton MD;  Location: [facility] OR [facility];  Service: Orthopedics;  Laterality: Left;    MINI-LAPAROTOMY  2017    PORTACATH PLACEMENT N/A     pt states that her port does not work    TONSILLECTOMY      UPPER GASTROINTESTINAL ENDOSCOPY  2018    VENOUS ACCESS DEVICE (PORT) INSERTION N/A 10/31/2023    Procedure: Port-a-catheter removal and port-a-catheter placement;  Surgeon: Alcon Delgado MD;  Location: [facility] OR [facility];  Service: General;  Laterality: N/A;     Social History     Socioeconomic History    Marital status:    Tobacco Use    Smoking status: Former     Current packs/day: 0.00     Average packs/day: 1 pack/day for 47.4 years (47.4 ttl pk-yrs)     Types: Cigarettes     Start date: 1975     Quit date: 6/3/2022     Years since quittin.0     Passive exposure: Past    Smokeless tobacco: Never   Vaping Use    Vaping status: Never Used   Substance and Sexual Activity    Alcohol use: Never     Drug use: Never    Sexual activity: Not Currently     Partners: Male     Birth control/protection: Hysterectomy     Family History   Problem Relation Age of Onset    Heart disease Mother     Lung cancer Mother     Cancer Mother     Stroke Father     Heart disease Father     Kidney cancer Father     Cancer Father     Stroke Other         UNCLE/AUNT    Colon cancer Neg Hx     Malig Hyperthermia Neg Hx        Objective   Physical Exam  Vitals reviewed. Exam conducted with a chaperone present.   Cardiovascular:      Rate and Rhythm: Normal rate and regular rhythm.      Heart sounds: Normal heart sounds. No murmur heard.     No gallop.   Pulmonary:      Effort: Pulmonary effort is normal.      Breath sounds: Normal breath sounds.      Comments: Port-A-Cath  Abdominal:      General: Bowel sounds are normal.   Lymphadenopathy:      Cervical: No cervical adenopathy.   Psychiatric:         Mood and Affect: Mood normal.         Behavior: Behavior normal.         Vitals:    06/19/24 0943   BP: 117/52   Pulse: 91   Resp: 18   Temp: 97.4 °F (36.3 °C)   SpO2: 96%   Weight: 90.3 kg (199 lb)   PainSc: 0-No pain     ECOG score: 0         PHQ-9 Total Score:                    Result Review :   The following data was reviewed by: West Nicolas MD on 06/19/2024:  Lab Results   Component Value Date    HGB 10.6 (L) 06/19/2024    HCT 32.8 (L) 06/19/2024    .1 (H) 06/19/2024     06/19/2024    WBC 10.76 06/19/2024    NEUTROABS 6.48 06/19/2024    LYMPHSABS 3.14 (H) 06/19/2024    MONOSABS 1.06 (H) 06/19/2024    EOSABS 0.00 06/19/2024    BASOSABS 0.03 06/19/2024     Lab Results   Component Value Date    GLUCOSE 160 (H) 06/19/2024    BUN 17 06/19/2024    CREATININE 0.86 06/19/2024     06/19/2024    K 3.9 06/19/2024     (H) 06/19/2024    CO2 20.9 (L) 06/19/2024    CALCIUM 9.1 06/19/2024    PROTEINTOT 5.3 (L) 06/19/2024    ALBUMIN 3.8 06/19/2024    BILITOT 0.6 06/19/2024    ALKPHOS 56 06/19/2024    AST 9  "06/19/2024    ALT 13 06/19/2024     Lab Results   Component Value Date    FREET4 1.55 12/06/2023    TSH 1.110 06/19/2024     No results found for: \"IRON\", \"LABIRON\", \"TRANSFERRIN\", \"TIBC\"  Lab Results   Component Value Date     03/07/2023    EBCXSTVL94 612 04/15/2024    FOLATE 8.95 03/27/2024     No results found for: \"PSA\", \"CEA\", \"AFP\", \"\", \"\"  SPEP 0.4 g/dL  UPEP 3.5% M spike          Assessment and Plan    Diagnoses and all orders for this visit:    1. Multiple myeloma, remission status unspecified (Primary)  Assessment & Plan:  Patient is on active therapy with daratumumab, lenalidomide, dexamethasone.  Tolerating well.  Her M spike continues to decrease.  The other lab work is stable.  Proceed with cycle 9 as planned.  I will see her back for cycle 10-day 1 with lab work prior to monitor for toxicities.    Orders:  -     CBC and Differential; Future  -     Comprehensive metabolic panel; Future  -     Thyroid Panel With TSH; Future    2. Multiple myeloma in relapse  Assessment & Plan:  Patient is on active therapy with daratumumab, lenalidomide, dexamethasone.  Tolerating well.  Her M spike continues to decrease.  The other lab work is stable.  Proceed with cycle 9 as planned.  I will see her back for cycle 10-day 1 with lab work prior to monitor for toxicities.    Orders:  -     CARL, Jessica-Specific, Serum; Future    Other orders  -     Cancel: sodium chloride 0.9 % infusion  -     Cancel: acetaminophen (TYLENOL) tablet 1,000 mg  -     Cancel: methylPREDNISolone sodium succinate (SOLU-Medrol) injection 60 mg  -     Cancel: diphenhydrAMINE (BENADRYL) IVPB 25 mg  -     Cancel: daratumumab-hyaluronidase-fihj (DARZALEX FASPRO) 1800-12395 MG-UT/15ML injection 1,800 mg  -     Cancel: Hydrocortisone Sod Suc (PF) (Solu-CORTEF) injection 100 mg  -     Cancel: diphenhydrAMINE (BENADRYL) injection 50 mg  -     Cancel: famotidine (PEPCID) injection 20 mg  -     Cancel: meperidine (DEMEROL) injection 25 " mg            Patient Follow Up: Cycle 10-day 1    Patient was given instructions and counseling regarding her condition or for health maintenance advice. Please see specific information pulled into the AVS if appropriate.     West Nicolas MD    6/20/2024

## 2024-06-19 NOTE — PATIENT INSTRUCTIONS
1.  Please return to clinic at your next scheduled visit.  Contact the clinic ([phone]) at least 24 hours prior in the event you need to cancel.  2.  Do no harm to yourself or others.    3.  Avoid alcohol and drugs.    4.  Take all medications as prescribed.  Please contact the clinic with any concerns. If you are in need of medication refills, please call the clinic at [phone].    5. Should you want to get in touch with your provider, Dr. Kalia Singer, please utilize [facility] or contact the office ([phone]), and staff will be able to page Dr. Singer directly.  6.  In the event you have personal crisis, contact the following crisis numbers: Suicide Prevention Hotline 1-303.448.3623; [facility] Helpline 7-414-698-EMMY; [facility] [phone]; text HELLO to 860927; or 520.

## 2024-06-20 ENCOUNTER — OFFICE VISIT (OUTPATIENT)
Dept: PSYCHIATRY | Facility: CLINIC | Age: 74
End: 2024-06-20
Payer: MEDICARE

## 2024-06-20 VITALS
HEART RATE: 86 BPM | BODY MASS INDEX: 33.29 KG/M2 | DIASTOLIC BLOOD PRESSURE: 53 MMHG | HEIGHT: 64 IN | SYSTOLIC BLOOD PRESSURE: 120 MMHG | WEIGHT: 195 LBS

## 2024-06-20 DIAGNOSIS — F41.1 GENERALIZED ANXIETY DISORDER: Primary | ICD-10-CM

## 2024-06-20 DIAGNOSIS — R56.9 SEIZURES: ICD-10-CM

## 2024-06-20 DIAGNOSIS — F51.05 INSOMNIA DUE TO MENTAL CONDITION: ICD-10-CM

## 2024-06-20 DIAGNOSIS — F33.1 MAJOR DEPRESSIVE DISORDER, RECURRENT EPISODE, MODERATE: ICD-10-CM

## 2024-06-20 RX ORDER — BUSPIRONE HYDROCHLORIDE 10 MG/1
10 TABLET ORAL 2 TIMES DAILY
Qty: 60 TABLET | Refills: 2 | Status: SHIPPED | OUTPATIENT
Start: 2024-06-20

## 2024-06-20 RX ORDER — BUSPIRONE HYDROCHLORIDE 10 MG/1
10 TABLET ORAL 2 TIMES DAILY
Qty: 60 TABLET | Refills: 2 | Status: SHIPPED | OUTPATIENT
Start: 2024-06-20 | End: 2024-06-20 | Stop reason: SDUPTHER

## 2024-06-20 NOTE — TREATMENT PLAN
Multi-Disciplinary Problems (from Behavioral Health Treatment Plan)      Active Problems       Problem: Anxiety  Start Date: 06/20/24      Problem Details: The patient self-scales this problem as a 5 with 10 being the worst.          Goal Priority Start Date Expected End Date End Date    Patient will develop and implement behavioral and cognitive strategies to reduce anxiety and irrational fears. -- 06/20/24 -- --    Goal Details: Progress toward goal:  Not appropriate to rate progress toward goal since this is the initial treatment plan.          Goal Intervention Frequency Start Date End Date    Help patient explore past emotional issues in relation to present anxiety. Q Month 06/20/24 --    Intervention Details: Duration of treatment until until remission of symptoms.          Goal Intervention Frequency Start Date End Date    Help patient develop an awareness of their cognitive and physical responses to anxiety. Q Month 06/20/24 --    Intervention Details: Duration of treatment until until remission of symptoms.                  Problem: Depression  Start Date: 06/20/24      Problem Details: The patient self-scales this problem as a 7 with 10 being the worst.          Goal Priority Start Date Expected End Date End Date    Patient will demonstrate the ability to initiate new constructive life skills outside of sessions on a consistent basis. -- 06/20/24 -- --    Goal Details: Progress toward goal:  Not appropriate to rate progress toward goal since this is the initial treatment plan.          Goal Intervention Frequency Start Date End Date    Assist patient in setting attainable activities of daily living goals. PRN 06/20/24 --      Goal Intervention Frequency Start Date End Date    Provide education about depression Q Month 06/20/24 --    Intervention Details: Duration of treatment until until remission of symptoms.          Goal Intervention Frequency Start Date End Date    Assist patient in developing healthy  coping strategies. Q Month 06/20/24 --    Intervention Details: Duration of treatment until until remission of symptoms.                          Reviewed By       Kalia Singer MD 06/20/24 7879                     I have discussed and reviewed this treatment plan with the patient.

## 2024-06-20 NOTE — ASSESSMENT & PLAN NOTE
Patient is on active therapy with daratumumab, lenalidomide, dexamethasone.  Tolerating well.  Her M spike continues to decrease.  The other lab work is stable.  Proceed with cycle 9 as planned.  I will see her back for cycle 10-day 1 with lab work prior to monitor for toxicities.

## 2024-06-21 ENCOUNTER — SPECIALTY PHARMACY (OUTPATIENT)
Facility: HOSPITAL | Age: 74
End: 2024-06-21
Payer: MEDICARE

## 2024-06-26 LAB
IGA SERPL-MCNC: 14 MG/DL (ref 64–422)
IGG SERPL-MCNC: 542 MG/DL (ref 586–1602)
IGM SERPL-MCNC: 18 MG/DL (ref 26–217)
PROT PATTERN SERPL IFE-IMP: ABNORMAL

## 2024-07-01 ENCOUNTER — HOSPITAL ENCOUNTER (OUTPATIENT)
Dept: BONE DENSITY | Facility: HOSPITAL | Age: 74
Discharge: HOME | End: 2024-07-01
Admitting: PHYSICIAN ASSISTANT
Payer: MEDICARE

## 2024-07-01 DIAGNOSIS — Z13.820 SCREENING FOR OSTEOPOROSIS: ICD-10-CM

## 2024-07-01 DIAGNOSIS — Z78.0 POSTMENOPAUSE: ICD-10-CM

## 2024-07-01 PROCEDURE — 77080 DXA BONE DENSITY AXIAL: CPT

## 2024-07-02 ENCOUNTER — SPECIALTY PHARMACY (OUTPATIENT)
Dept: PHARMACY | Facility: HOSPITAL | Age: 74
End: 2024-07-02
Payer: MEDICARE

## 2024-07-02 DIAGNOSIS — C90.00 MULTIPLE MYELOMA, REMISSION STATUS UNSPECIFIED: ICD-10-CM

## 2024-07-02 RX ORDER — LENALIDOMIDE 15 MG/1
15 CAPSULE ORAL DAILY
Qty: 21 CAPSULE | Refills: 0 | Status: SHIPPED | OUTPATIENT
Start: 2024-07-02

## 2024-07-02 NOTE — PROGRESS NOTES
Re: Refills of Oral Specialty Medication - Revlimid (lenalidomide)    Drug-Drug Interactions: The current medication list was reviewed and there are no relevant drug-drug interactions with the specialty medication.  Medication Allergies: The patient has no relevant allergies as it relates to their oral specialty medication  Review of Labs/Dose Adjustments: NO DOSE CHANGE - I reviewed the most recent note and labs and the patient will continue without any dose changes.  I sent refills as described below.    Drug: Revlimid (lenalidomide)  Strength: 15 mg  Directions: Take 1 capsule by mouth daily ON days 1-21, OFF for 7 days of each 28 day cycle  Quantity: 21  Refills: 0    [facility]    Carmen Sorto PharmD, BCOP  Clinical Oncology Pharmacy Specialist  Phone: [phone]    7/2/2024  11:37 EDT

## 2024-07-05 DIAGNOSIS — M81.0 AGE-RELATED OSTEOPOROSIS WITHOUT CURRENT PATHOLOGICAL FRACTURE: Primary | ICD-10-CM

## 2024-07-15 DIAGNOSIS — F33.1 MODERATE EPISODE OF RECURRENT MAJOR DEPRESSIVE DISORDER: ICD-10-CM

## 2024-07-15 RX ORDER — FLUOXETINE HYDROCHLORIDE 20 MG/1
40 CAPSULE ORAL DAILY
Qty: 60 CAPSULE | Refills: 0 | OUTPATIENT
Start: 2024-07-15

## 2024-07-17 ENCOUNTER — HOSPITAL ENCOUNTER (OUTPATIENT)
Dept: ONCOLOGY | Facility: HOSPITAL | Age: 74
Discharge: HOME | End: 2024-07-17
Payer: MEDICARE

## 2024-07-17 ENCOUNTER — OFFICE VISIT (OUTPATIENT)
Dept: ONCOLOGY | Facility: HOSPITAL | Age: 74
End: 2024-07-17
Payer: MEDICARE

## 2024-07-17 VITALS
RESPIRATION RATE: 20 BRPM | TEMPERATURE: 97.2 F | SYSTOLIC BLOOD PRESSURE: 135 MMHG | WEIGHT: 194 LBS | DIASTOLIC BLOOD PRESSURE: 64 MMHG | HEIGHT: 60 IN | BODY MASS INDEX: 38.09 KG/M2 | HEART RATE: 92 BPM | OXYGEN SATURATION: 98 %

## 2024-07-17 VITALS
HEIGHT: 60 IN | OXYGEN SATURATION: 98 % | DIASTOLIC BLOOD PRESSURE: 64 MMHG | WEIGHT: 194.45 LBS | SYSTOLIC BLOOD PRESSURE: 135 MMHG | BODY MASS INDEX: 38.18 KG/M2 | RESPIRATION RATE: 20 BRPM | HEART RATE: 92 BPM | TEMPERATURE: 97.2 F

## 2024-07-17 DIAGNOSIS — Z12.2 ENCOUNTER FOR SCREENING FOR LUNG CANCER: ICD-10-CM

## 2024-07-17 DIAGNOSIS — C90.00 MULTIPLE MYELOMA NOT HAVING ACHIEVED REMISSION: Primary | ICD-10-CM

## 2024-07-17 DIAGNOSIS — C90.00 MULTIPLE MYELOMA, REMISSION STATUS UNSPECIFIED: Primary | ICD-10-CM

## 2024-07-17 DIAGNOSIS — R11.0 NAUSEA: ICD-10-CM

## 2024-07-17 LAB
25(OH)D3 SERPL-MCNC: 49.5 NG/ML (ref 30–100)
ALBUMIN SERPL-MCNC: 3.7 G/DL (ref 3.5–5.2)
ALBUMIN/GLOB SERPL: 2.6 G/DL
ALP SERPL-CCNC: 89 U/L (ref 39–117)
ALT SERPL W P-5'-P-CCNC: 23 U/L (ref 1–33)
ANION GAP SERPL CALCULATED.3IONS-SCNC: 8.5 MMOL/L (ref 5–15)
AST SERPL-CCNC: 10 U/L (ref 1–32)
BASOPHILS # BLD AUTO: 0.02 10*3/MM3 (ref 0–0.2)
BASOPHILS NFR BLD AUTO: 0.2 % (ref 0–1.5)
BILIRUB SERPL-MCNC: 0.5 MG/DL (ref 0–1.2)
BUN SERPL-MCNC: 21 MG/DL (ref 8–23)
BUN/CREAT SERPL: 24.4 (ref 7–25)
CALCIUM SPEC-SCNC: 9.5 MG/DL (ref 8.6–10.5)
CHLORIDE SERPL-SCNC: 113 MMOL/L (ref 98–107)
CHOLEST SERPL-MCNC: 97 MG/DL (ref 0–200)
CO2 SERPL-SCNC: 19.5 MMOL/L (ref 22–29)
CREAT SERPL-MCNC: 0.86 MG/DL (ref 0.57–1)
DEPRECATED RDW RBC AUTO: 63 FL (ref 37–54)
EGFRCR SERPLBLD CKD-EPI 2021: 71 ML/MIN/1.73
EOSINOPHIL # BLD AUTO: 0 10*3/MM3 (ref 0–0.4)
EOSINOPHIL NFR BLD AUTO: 0 % (ref 0.3–6.2)
ERYTHROCYTE [DISTWIDTH] IN BLOOD BY AUTOMATED COUNT: 16 % (ref 12.3–15.4)
GLOBULIN UR ELPH-MCNC: 1.4 GM/DL
GLUCOSE SERPL-MCNC: 124 MG/DL (ref 65–99)
HBA1C MFR BLD: 4.4 % (ref 4.8–5.6)
HCT VFR BLD AUTO: 36.4 % (ref 34–46.6)
HDLC SERPL-MCNC: 42 MG/DL (ref 40–60)
HGB BLD-MCNC: 11.8 G/DL (ref 12–15.9)
IMM GRANULOCYTES # BLD AUTO: 0.03 10*3/MM3 (ref 0–0.05)
IMM GRANULOCYTES NFR BLD AUTO: 0.3 % (ref 0–0.5)
LDLC SERPL CALC-MCNC: 26 MG/DL (ref 0–100)
LDLC/HDLC SERPL: 0.47 {RATIO}
LYMPHOCYTES # BLD AUTO: 4.12 10*3/MM3 (ref 0.7–3.1)
LYMPHOCYTES NFR BLD AUTO: 43.5 % (ref 19.6–45.3)
MCH RBC QN AUTO: 34.2 PG (ref 26.6–33)
MCHC RBC AUTO-ENTMCNC: 32.4 G/DL (ref 31.5–35.7)
MCV RBC AUTO: 105.5 FL (ref 79–97)
MONOCYTES # BLD AUTO: 1.24 10*3/MM3 (ref 0.1–0.9)
MONOCYTES NFR BLD AUTO: 13.1 % (ref 5–12)
NEUTROPHILS NFR BLD AUTO: 4.07 10*3/MM3 (ref 1.7–7)
NEUTROPHILS NFR BLD AUTO: 42.9 % (ref 42.7–76)
PLATELET # BLD AUTO: 320 10*3/MM3 (ref 140–450)
PMV BLD AUTO: 9.3 FL (ref 6–12)
POTASSIUM SERPL-SCNC: 4 MMOL/L (ref 3.5–5.2)
PROT SERPL-MCNC: 5.1 G/DL (ref 6–8.5)
RBC # BLD AUTO: 3.45 10*6/MM3 (ref 3.77–5.28)
SODIUM SERPL-SCNC: 141 MMOL/L (ref 136–145)
TRIGL SERPL-MCNC: 176 MG/DL (ref 0–150)
VLDLC SERPL-MCNC: 29 MG/DL (ref 5–40)
WBC NRBC COR # BLD AUTO: 9.48 10*3/MM3 (ref 3.4–10.8)

## 2024-07-17 PROCEDURE — 85025 COMPLETE CBC W/AUTO DIFF WBC: CPT | Performed by: INTERNAL MEDICINE

## 2024-07-17 PROCEDURE — 96375 TX/PRO/DX INJ NEW DRUG ADDON: CPT

## 2024-07-17 PROCEDURE — 80053 COMPREHEN METABOLIC PANEL: CPT | Performed by: INTERNAL MEDICINE

## 2024-07-17 PROCEDURE — 96374 THER/PROPH/DIAG INJ IV PUSH: CPT

## 2024-07-17 PROCEDURE — 25010000002 METHYLPREDNISOLONE PER 125 MG: Performed by: INTERNAL MEDICINE

## 2024-07-17 PROCEDURE — 25010000002 DARATUMUMAB-HYALURONIDASE-FIHJ 1800-30000 MG-UT/15ML SOLUTION: Performed by: INTERNAL MEDICINE

## 2024-07-17 PROCEDURE — 63710000001 ACETAMINOPHEN EXTRA STRENGTH 500 MG TABLET: Performed by: INTERNAL MEDICINE

## 2024-07-17 PROCEDURE — 96401 CHEMO ANTI-NEOPL SQ/IM: CPT

## 2024-07-17 PROCEDURE — 82306 VITAMIN D 25 HYDROXY: CPT | Performed by: PHYSICIAN ASSISTANT

## 2024-07-17 PROCEDURE — 25810000003 SODIUM CHLORIDE 0.9 % SOLUTION: Performed by: INTERNAL MEDICINE

## 2024-07-17 PROCEDURE — A9270 NON-COVERED ITEM OR SERVICE: HCPCS | Performed by: INTERNAL MEDICINE

## 2024-07-17 PROCEDURE — 25010000002 DIPHENHYDRAMINE PER 50 MG: Performed by: INTERNAL MEDICINE

## 2024-07-17 PROCEDURE — 25010000002 HEPARIN LOCK FLUSH PER 10 UNITS: Performed by: INTERNAL MEDICINE

## 2024-07-17 PROCEDURE — 83036 HEMOGLOBIN GLYCOSYLATED A1C: CPT | Performed by: PHYSICIAN ASSISTANT

## 2024-07-17 PROCEDURE — 80061 LIPID PANEL: CPT | Performed by: PHYSICIAN ASSISTANT

## 2024-07-17 RX ORDER — HEPARIN SODIUM (PORCINE) LOCK FLUSH IV SOLN 100 UNIT/ML 100 UNIT/ML
500 SOLUTION INTRAVENOUS AS NEEDED
OUTPATIENT
Start: 2024-07-17

## 2024-07-17 RX ORDER — FAMOTIDINE 10 MG/ML
20 INJECTION, SOLUTION INTRAVENOUS AS NEEDED
Status: CANCELLED | OUTPATIENT
Start: 2024-07-17

## 2024-07-17 RX ORDER — SODIUM CHLORIDE 0.9 % (FLUSH) 0.9 %
20 SYRINGE (ML) INJECTION AS NEEDED
OUTPATIENT
Start: 2024-07-17

## 2024-07-17 RX ORDER — METHYLPREDNISOLONE SODIUM SUCCINATE 125 MG/2ML
60 INJECTION, POWDER, LYOPHILIZED, FOR SOLUTION INTRAMUSCULAR; INTRAVENOUS ONCE
Status: COMPLETED | OUTPATIENT
Start: 2024-07-17 | End: 2024-07-17

## 2024-07-17 RX ORDER — SODIUM CHLORIDE 0.9 % (FLUSH) 0.9 %
20 SYRINGE (ML) INJECTION AS NEEDED
Status: DISCONTINUED | OUTPATIENT
Start: 2024-07-17 | End: 2024-07-18 | Stop reason: HOSPADM

## 2024-07-17 RX ORDER — DIPHENHYDRAMINE HYDROCHLORIDE 50 MG/ML
50 INJECTION INTRAMUSCULAR; INTRAVENOUS AS NEEDED
Status: CANCELLED | OUTPATIENT
Start: 2024-07-17

## 2024-07-17 RX ORDER — PREDNISOLONE ACETATE 10 MG/ML
SUSPENSION/ DROPS OPHTHALMIC
COMMUNITY
Start: 2024-07-09

## 2024-07-17 RX ORDER — ACETAMINOPHEN 500 MG
1000 TABLET ORAL ONCE
Status: CANCELLED | OUTPATIENT
Start: 2024-07-17

## 2024-07-17 RX ORDER — ACETAMINOPHEN 500 MG
1000 TABLET ORAL ONCE
Status: COMPLETED | OUTPATIENT
Start: 2024-07-17 | End: 2024-07-17

## 2024-07-17 RX ORDER — METHYLPREDNISOLONE SODIUM SUCCINATE 125 MG/2ML
60 INJECTION, POWDER, LYOPHILIZED, FOR SOLUTION INTRAMUSCULAR; INTRAVENOUS ONCE
Status: CANCELLED | OUTPATIENT
Start: 2024-07-17

## 2024-07-17 RX ORDER — SODIUM CHLORIDE 9 MG/ML
20 INJECTION, SOLUTION INTRAVENOUS ONCE
Status: CANCELLED | OUTPATIENT
Start: 2024-07-17

## 2024-07-17 RX ORDER — HEPARIN SODIUM (PORCINE) LOCK FLUSH IV SOLN 100 UNIT/ML 100 UNIT/ML
500 SOLUTION INTRAVENOUS AS NEEDED
Status: DISCONTINUED | OUTPATIENT
Start: 2024-07-17 | End: 2024-07-18 | Stop reason: HOSPADM

## 2024-07-17 RX ORDER — PROMETHAZINE HYDROCHLORIDE 25 MG/1
25 TABLET ORAL EVERY 6 HOURS PRN
Qty: 30 TABLET | Refills: 1 | Status: SHIPPED | OUTPATIENT
Start: 2024-07-17

## 2024-07-17 RX ORDER — SODIUM CHLORIDE 9 MG/ML
20 INJECTION, SOLUTION INTRAVENOUS ONCE
Status: COMPLETED | OUTPATIENT
Start: 2024-07-17 | End: 2024-07-17

## 2024-07-17 RX ADMIN — SODIUM CHLORIDE 20 ML/HR: 9 INJECTION, SOLUTION INTRAVENOUS at 10:54

## 2024-07-17 RX ADMIN — HEPARIN 500 UNITS: 100 SYRINGE at 12:25

## 2024-07-17 RX ADMIN — DIPHENHYDRAMINE HYDROCHLORIDE 25 MG: 50 INJECTION, SOLUTION INTRAMUSCULAR; INTRAVENOUS at 11:00

## 2024-07-17 RX ADMIN — ACETAMINOPHEN 1000 MG: 500 TABLET ORAL at 10:57

## 2024-07-17 RX ADMIN — DARATUMUMAB AND HYALURONIDASE-FIHJ (HUMAN RECOMBINANT) 1800 MG: 1800; 30000 INJECTION SUBCUTANEOUS at 12:16

## 2024-07-17 RX ADMIN — METHYLPREDNISOLONE SODIUM SUCCINATE 60 MG: 125 INJECTION INTRAMUSCULAR; INTRAVENOUS at 10:58

## 2024-07-17 RX ADMIN — Medication 20 ML: at 12:25

## 2024-07-17 NOTE — PROGRESS NOTES
Chief Complaint  Multiple myeloma, remission status unspecified    Rena Guerra,*  Rena Guerra, PA    Subjective          Anca Samson presents to [facility] for ongoing treatment of multiple myeloma.  She is on daratumumab, lenalidomide, dexamethasone.  She is tolerating her regimen well.  She denies new masses, adenopathy, unusual aches or pains.  Her activity is limited by her COPD and oxygen dependence.  She reports some mild nausea and requests a refill of Phenergan.    Oncology/Hematology History Overview Note   Smoldering Myeloma    Initially diagnosed in 2016 with bone marrow biopsy demonstrating 30% CD56 positive monoclonal plasma cell population.  45 XX normal female chromosome pattern  FISH panel negative for myeloma associated mutations including 1q21, 9q34,11q13,14q32,15q24, 17q13  No anemia, renal insufficiency, hypercalcemia.  On observation since that time    Low grade adenocarcinoma of ascending colon      5/16/2017 right hemicolectomy which  revealed a low-grade 7.6 cm adenocarcinoma of the cecum. All margins were  negative. Lymphovascular invasion and perineural invasion were not identified.     26 lymph nodes were removed and unfortunately 1 was involved with metastatic deposit. pT3 pN1a colorectal cancer.        Clinical Staging      Smoldering Myeloma; Colon ([id number])            Treatments      Chemotherapy      11/2017 completed 6mo adjuvant Xeloda        6/2022 Stopped Smoking     Malignant neoplasm of ascending colon   7/21/2017 Initial Diagnosis    Colon cancer (CMS/HCC)     Multiple myeloma   7/28/2021 Initial Diagnosis    Multiple myeloma     11/7/2023 -  Chemotherapy    OP MULTIPLE MYELOMA Daratumumab / Lenalidomide / Dexamethasone               Allergies   Allergen Reactions    Cephalexin Hives    Morphine Anaphylaxis    Penicillins Hives    Sulfa Antibiotics Hives     Past Medical History:   Diagnosis Date    Anxiety      Asthma     Asthma 07/28/2021    Atherosclerosis of native coronary artery of native heart with angina pectoris 08/15/2023    FOLLOWED BY DR GONZALES/DINA PETTIT. DENIES CP BUT GETS SOA WITH EXERTION HAS COPD WEARS AT 2LPM N/C. DECREASED ACTIVITY D/T SOA    C. difficile diarrhea     DENIES ANY CURRENT ISSUES    Colon cancer 07/21/2017    Colon polyp     COPD (chronic obstructive pulmonary disease) 10/23/2017    COPD (chronic obstructive pulmonary disease) 10/23/2017    Depression     Disease of thyroid gland     Diverticulitis     Dysphagia     Essential hypertension 07/28/2021    Head injury     Heartburn     Hernia 2019    History of chemotherapy     2017    Hx of psychiatric care     Hyperlipidemia     Impaired fasting glucose 08/14/2015    Lumbago     Lung nodule 02/17/2022    Major depressive disorder 10/27/2016    Malignant neoplasm of ascending colon 07/21/2017    Melena     Multiple myeloma     Nicotine dependence 05/10/2017    NICK (obstructive sleep apnea) 11/06/2023    Oxygen dependent     REPORTS USES 02 AT 2LPM VIA N/C. CAN GET VERY SOA WITH MINIMAL EXERTION    Renal insufficiency 07/28/2021    Seizures     PSEUDO SEIZURES LAST ONE AROUND NOV 2022    Shortness of breath     CAN GET VERY SOA WITH MINIMAL EXERTION    Sleep apnea     Tobacco use 07/28/2021    QUIT SMOKING JUNE 2022    Visit for screening mammogram 02/20/2020    NORMAL- REPEAT IN ONE YEAR    Vitamin D deficiency      Past Surgical History:   Procedure Laterality Date    APPENDECTOMY      BONE MARROW BIOPSY  2016    COLON SURGERY  2017    COLONOSCOPY  2018,2017,2019    [facility]- DR LE:DIVERTICULOSIS AND ERYTHEMA OF MUCOSA    COLONOSCOPY N/A 12/20/2022    Procedure: COLONOSCOPY WITH ELEVIEW INJECTION, POLYPECTOMY, HOT SNARE, CLIP APPLICATION X3, BIOPSIES;  Surgeon: Jarvis Borges MD;  Location: [facility] ENDOSCOPY;  Service: Gastroenterology;  Laterality: N/A;  COLON POLYP, DIVERTICULOSIS, ANASTOMOSIS RIGHT COLON     COLONOSCOPY N/A 2023    Procedure: COLONOSCOPY;  Surgeon: Jarvis Borges MD;  Location: [facility] ENDOSCOPY;  Service: Gastroenterology;  Laterality: N/A;  DIVERTICULOSIS, ANASTOMOSIS IN ASCENDING COLON    ENDOSCOPY  2018    EYE SURGERY      FECAL DISIMPACTION  2019    TRANSPLANT     HEMICOLECTOMY Right 2017    HYSTERECTOMY  1982,    KNEE ARTHROSCOPY Left 2022    Procedure: KNEE ARTHROSCOPY WITH PARTIAL MEDIAL MENISCECTOMY,  CHONDROPLASTY;  Surgeon: Nicholas Tipton MD;  Location: [facility] OR [facility];  Service: Orthopedics;  Laterality: Left;    MINI-LAPAROTOMY  2017    PORTACATH PLACEMENT N/A     pt states that her port does not work    TONSILLECTOMY      UPPER GASTROINTESTINAL ENDOSCOPY  2018    VENOUS ACCESS DEVICE (PORT) INSERTION N/A 10/31/2023    Procedure: Port-a-catheter removal and port-a-catheter placement;  Surgeon: Alcon Delgado MD;  Location: [facility] OR [facility];  Service: General;  Laterality: N/A;     Social History     Socioeconomic History    Marital status:    Tobacco Use    Smoking status: Former     Current packs/day: 0.00     Average packs/day: 1 pack/day for 47.4 years (47.4 ttl pk-yrs)     Types: Cigarettes     Start date: 1975     Quit date: 6/3/2022     Years since quittin.1     Passive exposure: Past    Smokeless tobacco: Never   Vaping Use    Vaping status: Never Used   Substance and Sexual Activity    Alcohol use: Never    Drug use: Never    Sexual activity: Not Currently     Partners: Male     Birth control/protection: Hysterectomy     Family History   Problem Relation Age of Onset    Heart disease Mother     Lung cancer Mother     Cancer Mother     Stroke Father     Heart disease Father     Kidney cancer Father     Cancer Father     Stroke Other         UNCLE/AUNT    Colon cancer Neg Hx     Malig Hyperthermia Neg Hx        Objective   Physical Exam  Vitals reviewed. Exam conducted with a chaperone present.   Cardiovascular:      Rate and Rhythm:  "Normal rate and regular rhythm.      Heart sounds: Normal heart sounds. No murmur heard.     No gallop.   Pulmonary:      Effort: Pulmonary effort is normal.      Breath sounds: Normal breath sounds.      Comments: Port-A-Cath  Abdominal:      General: Bowel sounds are normal.   Lymphadenopathy:      Cervical: No cervical adenopathy.   Psychiatric:         Mood and Affect: Mood normal.         Behavior: Behavior normal.         Vitals:    07/17/24 0957   BP: 135/64   Pulse: 92   Resp: 20   Temp: 97.2 °F (36.2 °C)   TempSrc: Temporal   SpO2: 98%   Weight: 88 kg (194 lb)   Height: 153.3 cm (60.35\")   PainSc: 0-No pain   PainLoc: Throat     ECOG score: 1         PHQ-9 Total Score:                    Result Review :   The following data was reviewed by: West Nicolas MD on 07/17/2024:  Lab Results   Component Value Date    HGB 11.8 (L) 07/17/2024    HCT 36.4 07/17/2024    .5 (H) 07/17/2024     07/17/2024    WBC 9.48 07/17/2024    NEUTROABS 4.07 07/17/2024    LYMPHSABS 4.12 (H) 07/17/2024    MONOSABS 1.24 (H) 07/17/2024    EOSABS 0.00 07/17/2024    BASOSABS 0.02 07/17/2024     Lab Results   Component Value Date    GLUCOSE 124 (H) 07/17/2024    BUN 21 07/17/2024    CREATININE 0.86 07/17/2024     07/17/2024    K 4.0 07/17/2024     (H) 07/17/2024    CO2 19.5 (L) 07/17/2024    CALCIUM 9.5 07/17/2024    PROTEINTOT 5.1 (L) 07/17/2024    ALBUMIN 3.7 07/17/2024    BILITOT 0.5 07/17/2024    ALKPHOS 89 07/17/2024    AST 10 07/17/2024    ALT 23 07/17/2024     Lab Results   Component Value Date    FREET4 1.55 12/06/2023    TSH 1.110 06/19/2024     No results found for: \"IRON\", \"LABIRON\", \"TRANSFERRIN\", \"TIBC\"  Lab Results   Component Value Date     03/07/2023    LTIPSZUR52 612 04/15/2024    FOLATE 8.95 03/27/2024   SPEP and CARL shows a faint monoclonal spike which is too small to quantify  No results found for: \"PSA\", \"CEA\", \"AFP\", \"\", \"\"          Assessment and Plan    Diagnoses and " all orders for this visit:    1. Multiple myeloma not having achieved remission (Primary)  Assessment & Plan:  Patient is on active therapy with daratumumab, lenalidomide, dexamethasone.  Tolerating her treatment well.  She notes occasional nausea and Phenergan refill will be provided today.  Her protein studies continue to demonstrate a faint monoclonal protein but too small to quantify.  Excellent response to therapy thus far.  Her laboratory is adequate for treatment.  Proceed with cycle 10 as planned.  I will see her back for cycle 11-day 1 with lab work prior to monitor for toxicities.      2. Nausea  Comments:  Acute sx; treat with phenergan 25mg as needed.  Orders:  -     promethazine (PHENERGAN) 25 MG tablet; Take 1 tablet by mouth Every 6 (Six) Hours As Needed for Nausea or Vomiting.  Dispense: 30 tablet; Refill: 1    Other orders  -     Cancel: sodium chloride 0.9 % infusion  -     Cancel: acetaminophen (TYLENOL) tablet 1,000 mg  -     Cancel: methylPREDNISolone sodium succinate (SOLU-Medrol) injection 60 mg  -     Cancel: diphenhydrAMINE (BENADRYL) IVPB 25 mg  -     Cancel: daratumumab-hyaluronidase-fihj (DARZALEX FASPRO) 1800-41206 MG-UT/15ML injection 1,800 mg  -     Cancel: Hydrocortisone Sod Suc (PF) (Solu-CORTEF) injection 100 mg  -     Cancel: diphenhydrAMINE (BENADRYL) injection 50 mg  -     Cancel: famotidine (PEPCID) injection 20 mg            Patient Follow Up: Cycle 11-day 1    Patient was given instructions and counseling regarding her condition or for health maintenance advice. Please see specific information pulled into the AVS if appropriate.     West Nicolas MD    7/18/2024

## 2024-07-21 ENCOUNTER — SPECIALTY PHARMACY (OUTPATIENT)
Facility: HOSPITAL | Age: 74
End: 2024-07-21
Payer: MEDICARE

## 2024-07-21 NOTE — PATIENT INSTRUCTIONS
1.  Please return to clinic at your next scheduled visit.  Contact the clinic ([phone]) at least 24 hours prior in the event you need to cancel.  2.  Do no harm to yourself or others.    3.  Avoid alcohol and drugs.    4.  Take all medications as prescribed.  Please contact the clinic with any concerns. If you are in need of medication refills, please call the clinic at [phone].    5. Should you want to get in touch with your provider, Dr. Kalia Singer, please utilize [employer] or contact the office ([phone]), and staff will be able to page Dr. Singer directly.  6.  In the event you have personal crisis, contact the following crisis numbers: Suicide Prevention Hotline 1-187.187.2882; [facility] Helpline 2-781-731-EMMY; [facility] Emergency Room [phone]; text HELLO to 611258; or 935.

## 2024-07-21 NOTE — PROGRESS NOTES
"Rochelle Samson is a 73 y.o. female who presents today for initial evaluation     Referring Provider:  No referring provider defined for this encounter.    Chief Complaint:  anxiety, depression    History of Present Illness:     2024: INITIAL VISIT Chart review:     Amandeep: Ativan short course in November, February, May.  Care Everywhere: a few non behavioral health notes    Psychotropic medication chart review:  Present:  Prozac 40 mg a day    Previously:  Prozac 20 mg a day  Zoloft 25 mg a day    EKG: 2024: Rate 97, sinus, probable left atrial enlargement, QTc 461  Procedures: Sleep study in May 2024: NICK, BiPAP settings noted  Head imaging: May 2024: MRI of the brain shows white matter changes compatible with small vessel ischemic disease  Labs: 2024: Abnormal CMP was chloride 112, CO2 20.9, glucose 160, total protein 5.3.  Reassuring thyroid studies, abnormal CBC with hemoglobin 10.6, other abnormalities.  Initial Chart Review Notes: Referred by primary care in April for anxiety.  History of significant shortness of breath due to comorbidities.  Patient requested to meet with a psychiatrist.      Patient Psychotherapy Notes:  Patient goals:  Misc:  On chemo for multiple myeloma since       Chart Review By Dates:  2024: onc, dexa shows osteoporosis. High trigs, low A1c 4.4, abnl cmp, cbc. Cr 0.86.  Plannin24: Pseudoseizures per pt (?). Start buspar together with prozac for anxiety, depression. Tremor does not seem related, not due to prozac.    VISITS/APPOINTMENTS (BELOW):    \"Sarah\" \"Robi\" her son      2024: In person interview:  \"Pretty good.\"  I think it's helping. I'm not as irritable.  I didn't come in a wheelchair this time. I'm using a walker.  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 Was 8-10 hours a week.  I still can't walk, but the anxiety about it is much better  MDD: " "improved  CHAI: improved  Panic attacks: none  Energy: improving, but baseline low  Concentration: improving  Insomnia:   Eating/Weight: 194 lbs  Refills: y  Substances: def  Therapy: n  Medication compliant: y  SE: n  No SI HI AVH.      06/20/2024: In person.  Interview:  His/Her Story: \"[place] for 2 weeks, that was 21 years ago.\"  P18, G10  I don't know why it happened. I don't think it was SI.  I've been on prozac 4-5 years  \"I'm awful nervous\"  I can't get up and do anything  I can't drive  I can't work  I can't walk  If I walk 20 feet I'm so out of breath  Has had a tremor since started nebulizer a year ago. Quit it for a few months, tremor stopped, restarted, tremor restarted  Perfectionistic tendencies  Sleeps well on bipap  Depression/Mood:  Depressed mood, anhedonia, hopelessness or guilt, poor energy, poor concentration.  Seasonal pattern:  Severity: Moderate  Duration: 21 years  Anxiety:  Uncontrolled worrying, muscle tension, fatigue, poor concentration, feeling on edge or restless, irritability.  Severity: Moderate  Duration: 21 years  Panic attacks: y  Psych ROS: Positive for depression, anxiety.  Negative for psychosis and padmaja.  ADHD: def  PTSD: def  No SI HI AVH.  Medication compliant: y    Access to Firearms: yes, not locked away    PHQ-9 Depression Screening  PHQ-9 Total Score:      Little interest or pleasure in doing things?     Feeling down, depressed, or hopeless?     Trouble falling or staying asleep, or sleeping too much?     Feeling tired or having little energy?     Poor appetite or overeating?     Feeling bad about yourself - or that you are a failure or have let yourself or your family down?     Trouble concentrating on things, such as reading the newspaper or watching television?     Moving or speaking so slowly that other people could have noticed? Or the opposite - being so fidgety or restless that you have been moving around a lot more than usual?     Thoughts that you would " be better off dead, or of hurting yourself in some way?     PHQ-9 Total Score       CHAI-7       Past Surgical History:  Past Surgical History:   Procedure Laterality Date    APPENDECTOMY      BONE MARROW BIOPSY  2016    COLON SURGERY  2017    COLONOSCOPY  2018,2017,2019    Formerly [facility] DR LE:DIVERTICULOSIS AND ERYTHEMA OF MUCOSA    COLONOSCOPY N/A 12/20/2022    Procedure: COLONOSCOPY WITH ELEVIEW INJECTION, POLYPECTOMY, HOT SNARE, CLIP APPLICATION X3, BIOPSIES;  Surgeon: Jarvis Borges MD;  Location: [facility] ENDOSCOPY;  Service: Gastroenterology;  Laterality: N/A;  COLON POLYP, DIVERTICULOSIS, ANASTOMOSIS RIGHT COLON    COLONOSCOPY N/A 11/09/2023    Procedure: COLONOSCOPY;  Surgeon: Jarvis Borges MD;  Location: [facility] ENDOSCOPY;  Service: Gastroenterology;  Laterality: N/A;  DIVERTICULOSIS, ANASTOMOSIS IN ASCENDING COLON    ENDOSCOPY  2018    EYE SURGERY      FECAL DISIMPACTION  06/2019    TRANSPLANT     HEMICOLECTOMY Right 05/2017    HYSTERECTOMY  1982,1984    KNEE ARTHROSCOPY Left 01/03/2022    Procedure: KNEE ARTHROSCOPY WITH PARTIAL MEDIAL MENISCECTOMY,  CHONDROPLASTY;  Surgeon: Nicholas Tipton MD;  Location: [facility] OR [facility];  Service: Orthopedics;  Laterality: Left;    MINI-LAPAROTOMY  2017    PORTACATH PLACEMENT N/A     pt states that her port does not work    TONSILLECTOMY      UPPER GASTROINTESTINAL ENDOSCOPY  2018    VENOUS ACCESS DEVICE (PORT) INSERTION N/A 10/31/2023    Procedure: Port-a-catheter removal and port-a-catheter placement;  Surgeon: Alcon Delgado MD;  Location: [facility] OR [facility];  Service: General;  Laterality: N/A;       Problem List:  Patient Active Problem List   Diagnosis    Anxiety    Asthma    Malignant neoplasm of ascending colon    COPD (chronic obstructive pulmonary disease)    Diverticulitis    Dysphagia    Heartburn    Hyperlipidemia    Essential hypertension    Impaired fasting glucose    Low back pain    Major depressive disorder    Multiple myeloma     Renal insufficiency    Seizures    Vitamin D deficiency    Tobacco abuse    Class 2 severe obesity due to excess calories with serious comorbidity and body mass index (BMI) of 39.0 to 39.9 in adult    Chondromalacia of left knee    Diarrhea    S/P Left knee partial medial menisectomy and chondroplasty     Dyspnea    Multiple lung nodules    Aftercare following surgery of left knee thorascopic partial medial meniscectomy, chondroplasty, 1/3/2022    Hypothyroidism    Atherosclerosis of native coronary artery of native heart with angina pectoris    Chronic respiratory failure with hypoxia    Excessive daytime sleepiness    Snoring    Encounter for screening for malignant neoplasm of colon    History of colon polyps    NICK (obstructive sleep apnea)    Encounter for screening for lung cancer    Dehydration    Tobacco abuse, in remission    Hiatal hernia    Fatigue    Cough    Wheezing    Atelectasis       Allergy:   Allergies   Allergen Reactions    Cephalexin Hives    Morphine Anaphylaxis    Penicillins Hives    Sulfa Antibiotics Hives        Discontinued Medications:  Medications Discontinued During This Encounter   Medication Reason    busPIRone (BUSPAR) 10 MG tablet Reorder         Current Medications:   Current Outpatient Medications   Medication Sig Dispense Refill    acyclovir (ZOVIRAX) 400 MG tablet Take 1 tablet by mouth 2 (Two) Times a Day. Take one tablet by mouth twice daily. 60 tablet 11    albuterol sulfate  (90 Base) MCG/ACT inhaler Inhale 2 puffs Every 4 (Four) Hours As Needed for Wheezing. 18 g 11    arformoterol (Brovana) 15 MCG/2ML nebulizer solution Take 2 mL by nebulization 2 (Two) Times a Day.      budesonide (Pulmicort) 0.5 MG/2ML nebulizer solution Take 2 mL by nebulization Daily.      busPIRone (BUSPAR) 10 MG tablet Take 1 tablet by mouth 2 (Two) Times a Day. 180 tablet 1    colestipol (COLESTID) 1 g tablet Take 2 tablets by mouth 2 (Two) Times a Day. 120 tablet 5    Cyanocobalamin  (Vitamin B 12) 500 MCG tablet Take 2 tablets by mouth Daily. On hold for now      dapsone 25 MG tablet TAKE 2 TABLETS BY MOUTH TWICE A  tablet 1    dexAMETHasone (DECADRON) 4 MG tablet Take 10 tablets on D 1,8,15,22 and take 1 tablet on D 2,3,16,17.  Take in the morning with food. 44 tablet 3    dexAMETHasone (DECADRON) 4 MG tablet Take 10 tablets on D 1,8,15,22 and take 1 tablet on D 2,3.  Take in the morning with food. 42 tablet 5    doxycycline (VIBRAMYCIN) 100 MG capsule Take 1 capsule by mouth 2 (Two) Times a Day. 20 capsule 0    FLUoxetine (PROzac) 40 MG capsule Take 1 capsule by mouth Daily. 90 capsule 1    lenalidomide (REVLIMID) 15 MG capsule Take 1 capsule by mouth Daily. On Days 1-21, and off 7 days, on a 28 day cycle 21 capsule 0    levalbuterol (XOPENEX) 0.63 MG/3ML nebulizer solution Take 1 ampule by nebulization 4 (Four) Times a Day As Needed for Wheezing. 120 mL 5    LORazepam (Ativan) 1 MG tablet Take 1 tablet by mouth 2 (Two) Times a Day As Needed for Anxiety. 14 tablet 0    ondansetron (ZOFRAN) 8 MG tablet Take 1 tablet by mouth 3 (Three) Times a Day As Needed for Nausea or Vomiting. 30 tablet 5    prednisoLONE acetate (PRED FORTE) 1 % ophthalmic suspension INSTILL 1 DROP INTO BOTH EYES 4 TIMES A DAY FOR 7 DAYS THEN TWICE A DAY FOR 7 DAYS THEN STOP      promethazine (PHENERGAN) 25 MG tablet Take 1 tablet by mouth Every 6 (Six) Hours As Needed for Nausea or Vomiting. 30 tablet 1    revefenacin (Yupelri) 175 MCG/3ML nebulizer solution Take 3 mL by nebulization Daily.      vitamin D (ERGOCALCIFEROL) 1.25 MG (77195 UT) capsule capsule Take 1 capsule by mouth 2 (Two) Times a Week. 26 capsule 1     No current facility-administered medications for this visit.       Past Medical History:  Past Medical History:   Diagnosis Date    Anxiety     Asthma     Asthma 07/28/2021    Atherosclerosis of native coronary artery of native heart with angina pectoris 08/15/2023    FOLLOWED BY DR GONZALES/DINA PETTIT.  DENIES CP BUT GETS SOA WITH EXERTION HAS COPD WEARS AT 2LPM N/C. DECREASED ACTIVITY D/T SOA    C. difficile diarrhea     DENIES ANY CURRENT ISSUES    Colon cancer 2017    Colon polyp     COPD (chronic obstructive pulmonary disease) 10/23/2017    COPD (chronic obstructive pulmonary disease) 10/23/2017    Depression     Disease of thyroid gland     Diverticulitis     Dysphagia     Essential hypertension 2021    Head injury     Heartburn     Hernia 2019    History of chemotherapy         Hx of psychiatric care     Hyperlipidemia     Impaired fasting glucose 2015    Lumbago     Lung nodule 2022    Major depressive disorder 10/27/2016    Malignant neoplasm of ascending colon 2017    Melena     Multiple myeloma     Nicotine dependence 05/10/2017    NICK (obstructive sleep apnea) 2023    Oxygen dependent     REPORTS USES 02 AT 2LPM VIA N/C. CAN GET VERY SOA WITH MINIMAL EXERTION    Renal insufficiency 2021    Seizures     PSEUDO SEIZURES LAST ONE AROUND 2022    Shortness of breath     CAN GET VERY SOA WITH MINIMAL EXERTION    Sleep apnea     Tobacco use 2021    QUIT SMOKING 2022    Visit for screening mammogram 2020    NORMAL- REPEAT IN ONE YEAR    Vitamin D deficiency        Past Psychiatric History:  Began Treatment: decades  Diagnoses: MDD, CHAI  Psychiatrist: Vitor for 20 years  Therapist:Denies  Admission History: LT 21 years ago for 2 weeks for depression (?)  Medication Trials:    Zoloft  pristiq    Self Harm: Denies  Suicide Attempts:Denies   Psychosis, Anxiety, Depression: denies    Substance Abuse History:   Types:Denies all, including illicit, quit smoking 2 years ago  Withdrawal Symptoms:Denies  Longest Period Sober:Not Applicable   AA: Not applicable     Social History:  Martial Status:  Employed:No  Kids:Yes  House:Lives in a house   History: Denies    Social History     Socioeconomic History    Marital status:   "  Tobacco Use    Smoking status: Former     Current packs/day: 0.00     Average packs/day: 1 pack/day for 47.4 years (47.4 ttl pk-yrs)     Types: Cigarettes     Start date: 1975     Quit date: 6/3/2022     Years since quittin.1     Passive exposure: Past    Smokeless tobacco: Never   Vaping Use    Vaping status: Never Used   Substance and Sexual Activity    Alcohol use: Never    Drug use: Never    Sexual activity: Not Currently     Partners: Male     Birth control/protection: Hysterectomy       Family History:   Suicide Attempts: Denies  Suicide Completions:Denies      Family History   Problem Relation Age of Onset    Heart disease Mother     Lung cancer Mother     Cancer Mother     Stroke Father     Heart disease Father     Kidney cancer Father     Cancer Father     Stroke Other         UNCLE/AUNT    Colon cancer Neg Hx     Malig Hyperthermia Neg Hx        Developmental History:       Childhood: Denies Abuse  High School:Completed  College: 2.5 years    Mental Status Exam  Appearance  : groomed, good eye contact, normal street clothes  Behavior  : pleasant and cooperative  Motor  : bilateral tremor, wheelchair before, but on her feet today  Speech  :normal rhythm, rate, volume, tone, not hyperverbal, not pressured, normal prosidy  Mood  : \"I'm better\"  Affect  : was anxious, now euthymic, mood congruent, good variability  Thought Content  : negative suicidal ideations, negative homicidal ideations, negative obsessions  Perceptions  : negative auditory hallucinations, negative visual hallucinations  Thought Process  : linear  Insight/Judgement  : Fair/fair  Cognition  : grossly intact  Attention   : intact      Review of Systems:  Review of Systems   Constitutional:  Positive for fatigue.   HENT:  Negative for drooling.    Eyes:  Positive for visual disturbance.   Respiratory:  Positive for cough and shortness of breath.    Cardiovascular:  Positive for chest pain. Negative for palpitations and leg swelling. " "  Gastrointestinal:  Positive for nausea and vomiting.   Endocrine: Negative for cold intolerance and heat intolerance.   Genitourinary:  Negative for difficulty urinating.   Musculoskeletal:  Negative for joint swelling.   Allergic/Immunologic: Positive for immunocompromised state.   Neurological:  Positive for dizziness. Negative for seizures, speech difficulty and numbness.   Psychiatric/Behavioral:  Negative for confusion.        Physical Exam:  Physical Exam    Vital Signs:   /75   Pulse 98   Ht 153.3 cm (60.35\")   Wt 88.4 kg (194 lb 12.8 oz)   SpO2 96%   BMI 37.60 kg/m²      Lab Results:   Hospital Outpatient Visit on 07/17/2024   Component Date Value Ref Range Status    Glucose 07/17/2024 124 (H)  65 - 99 mg/dL Final    BUN 07/17/2024 21  8 - 23 mg/dL Final    Creatinine 07/17/2024 0.86  0.57 - 1.00 mg/dL Final    Sodium 07/17/2024 141  136 - 145 mmol/L Final    Potassium 07/17/2024 4.0  3.5 - 5.2 mmol/L Final    Chloride 07/17/2024 113 (H)  98 - 107 mmol/L Final    CO2 07/17/2024 19.5 (L)  22.0 - 29.0 mmol/L Final    Calcium 07/17/2024 9.5  8.6 - 10.5 mg/dL Final    Total Protein 07/17/2024 5.1 (L)  6.0 - 8.5 g/dL Final    Albumin 07/17/2024 3.7  3.5 - 5.2 g/dL Final    ALT (SGPT) 07/17/2024 23  1 - 33 U/L Final    AST (SGOT) 07/17/2024 10  1 - 32 U/L Final    Alkaline Phosphatase 07/17/2024 89  39 - 117 U/L Final    Total Bilirubin 07/17/2024 0.5  0.0 - 1.2 mg/dL Final    Globulin 07/17/2024 1.4  gm/dL Final    A/G Ratio 07/17/2024 2.6  g/dL Final    BUN/Creatinine Ratio 07/17/2024 24.4  7.0 - 25.0 Final    Anion Gap 07/17/2024 8.5  5.0 - 15.0 mmol/L Final    eGFR 07/17/2024 71.0  >60.0 mL/min/1.73 Final    WBC 07/17/2024 9.48  3.40 - 10.80 10*3/mm3 Final    RBC 07/17/2024 3.45 (L)  3.77 - 5.28 10*6/mm3 Final    Hemoglobin 07/17/2024 11.8 (L)  12.0 - 15.9 g/dL Final    Hematocrit 07/17/2024 36.4  34.0 - 46.6 % Final    MCV 07/17/2024 105.5 (H)  79.0 - 97.0 fL Final    MCH 07/17/2024 34.2 (H)  " 26.6 - 33.0 pg Final    MCHC 07/17/2024 32.4  31.5 - 35.7 g/dL Final    RDW 07/17/2024 16.0 (H)  12.3 - 15.4 % Final    RDW-SD 07/17/2024 63.0 (H)  37.0 - 54.0 fl Final    MPV 07/17/2024 9.3  6.0 - 12.0 fL Final    Platelets 07/17/2024 320  140 - 450 10*3/mm3 Final    Neutrophil % 07/17/2024 42.9  42.7 - 76.0 % Final    Lymphocyte % 07/17/2024 43.5  19.6 - 45.3 % Final    Monocyte % 07/17/2024 13.1 (H)  5.0 - 12.0 % Final    Eosinophil % 07/17/2024 0.0 (L)  0.3 - 6.2 % Final    Basophil % 07/17/2024 0.2  0.0 - 1.5 % Final    Immature Grans % 07/17/2024 0.3  0.0 - 0.5 % Final    Neutrophils, Absolute 07/17/2024 4.07  1.70 - 7.00 10*3/mm3 Final    Lymphocytes, Absolute 07/17/2024 4.12 (H)  0.70 - 3.10 10*3/mm3 Final    Monocytes, Absolute 07/17/2024 1.24 (H)  0.10 - 0.90 10*3/mm3 Final    Eosinophils, Absolute 07/17/2024 0.00  0.00 - 0.40 10*3/mm3 Final    Basophils, Absolute 07/17/2024 0.02  0.00 - 0.20 10*3/mm3 Final    Immature Grans, Absolute 07/17/2024 0.03  0.00 - 0.05 10*3/mm3 Final   Hospital Outpatient Visit on 06/19/2024   Component Date Value Ref Range Status    Glucose 06/19/2024 160 (H)  65 - 99 mg/dL Final    BUN 06/19/2024 17  8 - 23 mg/dL Final    Creatinine 06/19/2024 0.86  0.57 - 1.00 mg/dL Final    Sodium 06/19/2024 143  136 - 145 mmol/L Final    Potassium 06/19/2024 3.9  3.5 - 5.2 mmol/L Final    Chloride 06/19/2024 112 (H)  98 - 107 mmol/L Final    CO2 06/19/2024 20.9 (L)  22.0 - 29.0 mmol/L Final    Calcium 06/19/2024 9.1  8.6 - 10.5 mg/dL Final    Total Protein 06/19/2024 5.3 (L)  6.0 - 8.5 g/dL Final    Albumin 06/19/2024 3.8  3.5 - 5.2 g/dL Final    ALT (SGPT) 06/19/2024 13  1 - 33 U/L Final    AST (SGOT) 06/19/2024 9  1 - 32 U/L Final    Alkaline Phosphatase 06/19/2024 56  39 - 117 U/L Final    Total Bilirubin 06/19/2024 0.6  0.0 - 1.2 mg/dL Final    Globulin 06/19/2024 1.5  gm/dL Final    A/G Ratio 06/19/2024 2.5  g/dL Final    BUN/Creatinine Ratio 06/19/2024 19.8  7.0 - 25.0 Final    Anion  Gap 06/19/2024 10.1  5.0 - 15.0 mmol/L Final    eGFR 06/19/2024 71.0  >60.0 mL/min/1.73 Final    TSH 06/19/2024 1.110  0.270 - 4.200 uIU/mL Final    T Uptake 06/19/2024 1.11  0.80 - 1.30 TBI Final    T4, Total 06/19/2024 7.48  4.50 - 11.70 mcg/dL Final    T4 results may be falsely increased if patient taking Biotin.    WBC 06/19/2024 10.76  3.40 - 10.80 10*3/mm3 Final    RBC 06/19/2024 3.09 (L)  3.77 - 5.28 10*6/mm3 Final    Hemoglobin 06/19/2024 10.6 (L)  12.0 - 15.9 g/dL Final    Hematocrit 06/19/2024 32.8 (L)  34.0 - 46.6 % Final    MCV 06/19/2024 106.1 (H)  79.0 - 97.0 fL Final    MCH 06/19/2024 34.3 (H)  26.6 - 33.0 pg Final    MCHC 06/19/2024 32.3  31.5 - 35.7 g/dL Final    RDW 06/19/2024 18.4 (H)  12.3 - 15.4 % Final    RDW-SD 06/19/2024 72.8 (H)  37.0 - 54.0 fl Final    MPV 06/19/2024 9.4  6.0 - 12.0 fL Final    Platelets 06/19/2024 377  140 - 450 10*3/mm3 Final    Neutrophil % 06/19/2024 60.1  42.7 - 76.0 % Final    Lymphocyte % 06/19/2024 29.2  19.6 - 45.3 % Final    Monocyte % 06/19/2024 9.9  5.0 - 12.0 % Final    Eosinophil % 06/19/2024 0.0 (L)  0.3 - 6.2 % Final    Basophil % 06/19/2024 0.3  0.0 - 1.5 % Final    Immature Grans % 06/19/2024 0.5  0.0 - 0.5 % Final    Neutrophils, Absolute 06/19/2024 6.48  1.70 - 7.00 10*3/mm3 Final    Lymphocytes, Absolute 06/19/2024 3.14 (H)  0.70 - 3.10 10*3/mm3 Final    Monocytes, Absolute 06/19/2024 1.06 (H)  0.10 - 0.90 10*3/mm3 Final    Eosinophils, Absolute 06/19/2024 0.00  0.00 - 0.40 10*3/mm3 Final    Basophils, Absolute 06/19/2024 0.03  0.00 - 0.20 10*3/mm3 Final    Immature Grans, Absolute 06/19/2024 0.05  0.00 - 0.05 10*3/mm3 Final    Total Cholesterol 07/17/2024 97  0 - 200 mg/dL Final    Triglycerides 07/17/2024 176 (H)  0 - 150 mg/dL Final    HDL Cholesterol 07/17/2024 42  40 - 60 mg/dL Final    LDL Cholesterol  07/17/2024 26  0 - 100 mg/dL Final    VLDL Cholesterol 07/17/2024 29  5 - 40 mg/dL Final    LDL/HDL Ratio 07/17/2024 0.47   Final    Hemoglobin A1C  "07/17/2024 4.40 (L)  4.80 - 5.60 % Final    25 Hydroxy, Vitamin D 07/17/2024 49.5  30.0 - 100.0 ng/ml Final    CARL and PE, Serum 06/19/2024 Comment (A)   Final    Immunofixation shows IgG monoclonal protein with kappa light chain  specificity.  PLEASE NOTE:       This sample has been treated to eliminate IgG Kappa interference       from monoclonal therapy by DARZALEX(R) (daratumumab). Any       remaining IgG Kappa, if present, is due to the patient's own       immune response.    IgG 06/19/2024 542 (L)  586 - 1602 mg/dL Final    IgA 06/19/2024 14 (L)  64 - 422 mg/dL Final    Result confirmed on concentration.    IgM 06/19/2024 18 (L)  26 - 217 mg/dL Final    Result confirmed on concentration.   Lab on 06/11/2024   Component Date Value Ref Range Status    IgG 06/11/2024 587  586 - 1602 mg/dL Final    IgA 06/11/2024 15 (L)  64 - 422 mg/dL Final    Result confirmed on concentration.    IgM 06/11/2024 17 (L)  26 - 217 mg/dL Final    Result confirmed on concentration.    Total Protein 06/11/2024 5.2 (L)  6.0 - 8.5 g/dL Final    Albumin 06/11/2024 3.2  2.9 - 4.4 g/dL Final    Alpha-1-Globulin 06/11/2024 0.2  0.0 - 0.4 g/dL Final    Alpha-2-Globulin 06/11/2024 0.5  0.4 - 1.0 g/dL Final    Beta Globulin 06/11/2024 0.8  0.7 - 1.3 g/dL Final    Gamma Globulin 06/11/2024 0.5  0.4 - 1.8 g/dL Final    M-Sahil 06/11/2024 0.4 (H)  Not Observed g/dL Final    Globulin 06/11/2024 2.0 (L)  2.2 - 3.9 g/dL Final    A/G Ratio 06/11/2024 1.7  0.7 - 1.7 Final    Immunofixation Reflex, Serum 06/11/2024 Comment (A)   Final    Immunofixation shows IgG monoclonal protein with kappa light chain  specificity.   PLEASE NOTE:   Samples from patients receiving DARZALEX(R) (daratumumab) or   SARCLISA(R)(isatuximab-irfc) treatment can appear as an   \"IgG kappa\" and mask a complete response (CR). If this patient   is receiving these therapies, this CARL assay interference   can be removed by ordering test number [id number]-\"Immunofixation,   " "Daratumumab-Specific, Serum\" or [id number]-\"Immunofixation,   Isatuximab-Specific, Serum\" and submitting a new sample for   testing or by calling the lab to add this test to the current   sample.    Please note 06/11/2024 Comment   Final    Protein electrophoresis scan will follow via computer, mail, or   delivery.    Free Light Chain, Kappa 06/11/2024 11.1  3.3 - 19.4 mg/L Final    Free Lambda Light Chains 06/11/2024 3.3 (L)  5.7 - 26.3 mg/L Final    Kappa/Lambda Ratio 06/11/2024 3.36 (H)  0.26 - 1.65 Final    Total Protein, Urine 06/11/2024 50.1  Not Estab. mg/dL Final    Albumin, U 06/11/2024 63.7  % Final    Alpha-1-Globulin, U 06/11/2024 1.6  % Final    Alpha-2-Globulin, U 06/11/2024 9.4  % Final    Beta Globulin, U 06/11/2024 18.6  % Final    Gamma Globulin, Urine 06/11/2024 6.7  % Final    M-Sahil 06/11/2024 3.5 (H)  Not Observed % Final    Please note 06/11/2024 Comment   Final    Protein electrophoresis scan will follow via computer, mail, or   delivery.   Hospital Outpatient Visit on 05/22/2024   Component Date Value Ref Range Status    Glucose 05/22/2024 213 (H)  65 - 99 mg/dL Final    BUN 05/22/2024 16  8 - 23 mg/dL Final    Creatinine 05/22/2024 0.81  0.57 - 1.00 mg/dL Final    Sodium 05/22/2024 139  136 - 145 mmol/L Final    Potassium 05/22/2024 3.7  3.5 - 5.2 mmol/L Final    Chloride 05/22/2024 109 (H)  98 - 107 mmol/L Final    CO2 05/22/2024 20.1 (L)  22.0 - 29.0 mmol/L Final    Calcium 05/22/2024 9.0  8.6 - 10.5 mg/dL Final    Total Protein 05/22/2024 5.8 (L)  6.0 - 8.5 g/dL Final    Albumin 05/22/2024 4.0  3.5 - 5.2 g/dL Final    ALT (SGPT) 05/22/2024 15  1 - 33 U/L Final    AST (SGOT) 05/22/2024 10  1 - 32 U/L Final    Alkaline Phosphatase 05/22/2024 61  39 - 117 U/L Final    Total Bilirubin 05/22/2024 0.6  0.0 - 1.2 mg/dL Final    Globulin 05/22/2024 1.8  gm/dL Final    A/G Ratio 05/22/2024 2.2  g/dL Final    BUN/Creatinine Ratio 05/22/2024 19.8  7.0 - 25.0 Final    Anion Gap 05/22/2024 " 9.9  5.0 - 15.0 mmol/L Final    eGFR 05/22/2024 76.3  >60.0 mL/min/1.73 Final    WBC 05/22/2024 5.47  3.40 - 10.80 10*3/mm3 Final    RBC 05/22/2024 3.27 (L)  3.77 - 5.28 10*6/mm3 Final    Hemoglobin 05/22/2024 11.0 (L)  12.0 - 15.9 g/dL Final    Hematocrit 05/22/2024 34.6  34.0 - 46.6 % Final    MCV 05/22/2024 105.8 (H)  79.0 - 97.0 fL Final    MCH 05/22/2024 33.6 (H)  26.6 - 33.0 pg Final    MCHC 05/22/2024 31.8  31.5 - 35.7 g/dL Final    RDW 05/22/2024 16.7 (H)  12.3 - 15.4 % Final    RDW-SD 05/22/2024 65.9 (H)  37.0 - 54.0 fl Final    MPV 05/22/2024 9.7  6.0 - 12.0 fL Final    Platelets 05/22/2024 291  140 - 450 10*3/mm3 Final    Neutrophil % 05/22/2024 55.0  42.7 - 76.0 % Final    Lymphocyte % 05/22/2024 31.6  19.6 - 45.3 % Final    Monocyte % 05/22/2024 13.0 (H)  5.0 - 12.0 % Final    Eosinophil % 05/22/2024 0.0 (L)  0.3 - 6.2 % Final    Basophil % 05/22/2024 0.2  0.0 - 1.5 % Final    Immature Grans % 05/22/2024 0.2  0.0 - 0.5 % Final    Neutrophils, Absolute 05/22/2024 3.01  1.70 - 7.00 10*3/mm3 Final    Lymphocytes, Absolute 05/22/2024 1.73  0.70 - 3.10 10*3/mm3 Final    Monocytes, Absolute 05/22/2024 0.71  0.10 - 0.90 10*3/mm3 Final    Eosinophils, Absolute 05/22/2024 0.00  0.00 - 0.40 10*3/mm3 Final    Basophils, Absolute 05/22/2024 0.01  0.00 - 0.20 10*3/mm3 Final    Immature Grans, Absolute 05/22/2024 0.01  0.00 - 0.05 10*3/mm3 Final   Hospital Outpatient Visit on 04/24/2024   Component Date Value Ref Range Status    Glucose 04/24/2024 198 (H)  65 - 99 mg/dL Final    BUN 04/24/2024 15  8 - 23 mg/dL Final    Creatinine 04/24/2024 0.79  0.57 - 1.00 mg/dL Final    Sodium 04/24/2024 141  136 - 145 mmol/L Final    Potassium 04/24/2024 3.7  3.5 - 5.2 mmol/L Final    Chloride 04/24/2024 113 (H)  98 - 107 mmol/L Final    CO2 04/24/2024 19.5 (L)  22.0 - 29.0 mmol/L Final    Calcium 04/24/2024 8.5 (L)  8.6 - 10.5 mg/dL Final    Total Protein 04/24/2024 5.4 (L)  6.0 - 8.5 g/dL Final    Albumin 04/24/2024 3.8  3.5 -  5.2 g/dL Final    ALT (SGPT) 04/24/2024 16  1 - 33 U/L Final    AST (SGOT) 04/24/2024 10  1 - 32 U/L Final    Alkaline Phosphatase 04/24/2024 63  39 - 117 U/L Final    Total Bilirubin 04/24/2024 0.6  0.0 - 1.2 mg/dL Final    Globulin 04/24/2024 1.6  gm/dL Final    A/G Ratio 04/24/2024 2.4  g/dL Final    BUN/Creatinine Ratio 04/24/2024 19.0  7.0 - 25.0 Final    Anion Gap 04/24/2024 8.5  5.0 - 15.0 mmol/L Final    eGFR 04/24/2024 78.6  >60.0 mL/min/1.73 Final    TSH 04/24/2024 0.792  0.270 - 4.200 uIU/mL Final    T Uptake 04/24/2024 1.08  0.80 - 1.30 TBI Final    T4, Total 04/24/2024 8.44  4.50 - 11.70 mcg/dL Final    T4 results may be falsely increased if patient taking Biotin.    WBC 04/24/2024 8.04  3.40 - 10.80 10*3/mm3 Final    RBC 04/24/2024 3.02 (L)  3.77 - 5.28 10*6/mm3 Final    Hemoglobin 04/24/2024 10.4 (L)  12.0 - 15.9 g/dL Final    Hematocrit 04/24/2024 32.3 (L)  34.0 - 46.6 % Final    MCV 04/24/2024 107.0 (H)  79.0 - 97.0 fL Final    MCH 04/24/2024 34.4 (H)  26.6 - 33.0 pg Final    MCHC 04/24/2024 32.2  31.5 - 35.7 g/dL Final    RDW 04/24/2024 17.0 (H)  12.3 - 15.4 % Final    RDW-SD 04/24/2024 68.6 (H)  37.0 - 54.0 fl Final    MPV 04/24/2024 9.3  6.0 - 12.0 fL Final    Platelets 04/24/2024 331  140 - 450 10*3/mm3 Final    Neutrophil % 04/24/2024 56.1  42.7 - 76.0 % Final    Lymphocyte % 04/24/2024 32.2  19.6 - 45.3 % Final    Monocyte % 04/24/2024 10.8  5.0 - 12.0 % Final    Eosinophil % 04/24/2024 0.0 (L)  0.3 - 6.2 % Final    Basophil % 04/24/2024 0.5  0.0 - 1.5 % Final    Immature Grans % 04/24/2024 0.4  0.0 - 0.5 % Final    Neutrophils, Absolute 04/24/2024 4.51  1.70 - 7.00 10*3/mm3 Final    Lymphocytes, Absolute 04/24/2024 2.59  0.70 - 3.10 10*3/mm3 Final    Monocytes, Absolute 04/24/2024 0.87  0.10 - 0.90 10*3/mm3 Final    Eosinophils, Absolute 04/24/2024 0.00  0.00 - 0.40 10*3/mm3 Final    Basophils, Absolute 04/24/2024 0.04  0.00 - 0.20 10*3/mm3 Final    Immature Grans, Absolute 04/24/2024 0.03   0.00 - 0.05 10*3/mm3 Final    GARRICK Direct 04/24/2024 Negative  Negative Final    Sed Rate 04/24/2024 <1  0 - 30 mm/hr Final    C-Reactive Protein 04/24/2024 <0.30  0.00 - 0.50 mg/dL Final    ANTI-MPO ANTIBODIES 04/24/2024 <0.2  0.0 - 0.9 units Final    ANTI-PR3 ANTIBODIES 04/24/2024 <0.2  0.0 - 0.9 units Final    C-ANCA 04/24/2024 <1:20  Neg:<1:20 titer Final    P-ANCA 04/24/2024 <1:20  Neg:<1:20 titer Final    The presence of positive fluorescence exhibiting P-ANCA or C-ANCA  patterns alone is not specific for the diagnosis of Wegener's  Granulomatosis (WG) or microscopic polyangiitis. Decisions about  treatment should not be based solely on ANCA IFA results.  The  International ANCA Group Consensus recommends follow up testing of  positive sera with both DC-3 and MPO-ANCA enzyme immunoassays. As  many as 5% serum samples are positive only by EIA.  Ref. AM J Clin Pathol 1999;111:507-513.    Atypical pANCA 04/24/2024 <1:20  Neg:<1:20 titer Final    The atypical pANCA pattern has been observed in a significant  percentage of patients with ulcerative colitis, primary sclerosing  cholangitis and autoimmune hepatitis.   Lab on 04/15/2024   Component Date Value Ref Range Status    Vitamin B-12 04/15/2024 612  211 - 946 pg/mL Final    Glucose 04/15/2024 160 (H)  65 - 99 mg/dL Final    BUN 04/15/2024 9  8 - 23 mg/dL Final    Creatinine 04/15/2024 0.75  0.57 - 1.00 mg/dL Final    Sodium 04/15/2024 140  136 - 145 mmol/L Final    Potassium 04/15/2024 3.9  3.5 - 5.2 mmol/L Final    Chloride 04/15/2024 108 (H)  98 - 107 mmol/L Final    CO2 04/15/2024 16.5 (L)  22.0 - 29.0 mmol/L Final    Calcium 04/15/2024 8.8  8.6 - 10.5 mg/dL Final    Total Protein 04/15/2024 5.7 (L)  6.0 - 8.5 g/dL Final    Albumin 04/15/2024 3.7  3.5 - 5.2 g/dL Final    ALT (SGPT) 04/15/2024 21  1 - 33 U/L Final    AST (SGOT) 04/15/2024 19  1 - 32 U/L Final    Alkaline Phosphatase 04/15/2024 71  39 - 117 U/L Final    Total Bilirubin 04/15/2024 0.6  0.0 - 1.2  mg/dL Final    Globulin 04/15/2024 2.0  gm/dL Final    A/G Ratio 04/15/2024 1.9  g/dL Final    BUN/Creatinine Ratio 04/15/2024 12.0  7.0 - 25.0 Final    Anion Gap 04/15/2024 15.5 (H)  5.0 - 15.0 mmol/L Final    eGFR 04/15/2024 83.7  >60.0 mL/min/1.73 Final    proBNP 04/15/2024 121.0  0.0 - 900.0 pg/mL Final    IgE 04/15/2024 <2 (L)  6 - 495 IU/mL Final    WBC 04/15/2024 8.79  3.40 - 10.80 10*3/mm3 Final    RBC 04/15/2024 3.42 (L)  3.77 - 5.28 10*6/mm3 Final    Hemoglobin 04/15/2024 11.5 (L)  12.0 - 15.9 g/dL Final    Hematocrit 04/15/2024 35.9  34.0 - 46.6 % Final    MCV 04/15/2024 105.0 (H)  79.0 - 97.0 fL Final    MCH 04/15/2024 33.6 (H)  26.6 - 33.0 pg Final    MCHC 04/15/2024 32.0  31.5 - 35.7 g/dL Final    RDW 04/15/2024 15.2  12.3 - 15.4 % Final    RDW-SD 04/15/2024 58.7 (H)  37.0 - 54.0 fl Final    MPV 04/15/2024 10.3  6.0 - 12.0 fL Final    Platelets 04/15/2024 299  140 - 450 10*3/mm3 Final    Neutrophil % 04/15/2024 34.3 (L)  42.7 - 76.0 % Final    Lymphocyte % 04/15/2024 45.3  19.6 - 45.3 % Final    Monocyte % 04/15/2024 13.0 (H)  5.0 - 12.0 % Final    Eosinophil % 04/15/2024 6.1  0.3 - 6.2 % Final    Basophil % 04/15/2024 0.8  0.0 - 1.5 % Final    Immature Grans % 04/15/2024 0.5  0.0 - 0.5 % Final    Neutrophils, Absolute 04/15/2024 3.02  1.70 - 7.00 10*3/mm3 Final    Lymphocytes, Absolute 04/15/2024 3.98 (H)  0.70 - 3.10 10*3/mm3 Final    Monocytes, Absolute 04/15/2024 1.14 (H)  0.10 - 0.90 10*3/mm3 Final    Eosinophils, Absolute 04/15/2024 0.54 (H)  0.00 - 0.40 10*3/mm3 Final    Basophils, Absolute 04/15/2024 0.07  0.00 - 0.20 10*3/mm3 Final    Immature Grans, Absolute 04/15/2024 0.04  0.00 - 0.05 10*3/mm3 Final    nRBC 04/15/2024 0.0  0.0 - 0.2 /100 WBC Final   Hospital Outpatient Visit on 04/10/2024   Component Date Value Ref Range Status    WBC 04/10/2024 11.73 (H)  3.40 - 10.80 10*3/mm3 Final    RBC 04/10/2024 3.21 (L)  3.77 - 5.28 10*6/mm3 Final    Hemoglobin 04/10/2024 10.9 (L)  12.0 - 15.9 g/dL  Final    Hematocrit 04/10/2024 34.2  34.0 - 46.6 % Final    MCV 04/10/2024 106.5 (H)  79.0 - 97.0 fL Final    MCH 04/10/2024 34.0 (H)  26.6 - 33.0 pg Final    MCHC 04/10/2024 31.9  31.5 - 35.7 g/dL Final    RDW 04/10/2024 17.7 (H)  12.3 - 15.4 % Final    RDW-SD 04/10/2024 70.5 (H)  37.0 - 54.0 fl Final    MPV 04/10/2024 10.1  6.0 - 12.0 fL Final    Platelets 04/10/2024 388  140 - 450 10*3/mm3 Final    Neutrophil % 04/10/2024 53.6  42.7 - 76.0 % Final    Lymphocyte % 04/10/2024 35.0  19.6 - 45.3 % Final    Monocyte % 04/10/2024 10.6  5.0 - 12.0 % Final    Eosinophil % 04/10/2024 0.3  0.3 - 6.2 % Final    Basophil % 04/10/2024 0.2  0.0 - 1.5 % Final    Immature Grans % 04/10/2024 0.3  0.0 - 0.5 % Final    Neutrophils, Absolute 04/10/2024 6.29  1.70 - 7.00 10*3/mm3 Final    Lymphocytes, Absolute 04/10/2024 4.11 (H)  0.70 - 3.10 10*3/mm3 Final    Monocytes, Absolute 04/10/2024 1.24 (H)  0.10 - 0.90 10*3/mm3 Final    Eosinophils, Absolute 04/10/2024 0.03  0.00 - 0.40 10*3/mm3 Final    Basophils, Absolute 04/10/2024 0.02  0.00 - 0.20 10*3/mm3 Final    Immature Grans, Absolute 04/10/2024 0.04  0.00 - 0.05 10*3/mm3 Final   Hospital Outpatient Visit on 03/27/2024   Component Date Value Ref Range Status    Glucose 03/27/2024 185 (H)  65 - 99 mg/dL Final    BUN 03/27/2024 15  8 - 23 mg/dL Final    Creatinine 03/27/2024 0.79  0.57 - 1.00 mg/dL Final    Sodium 03/27/2024 142  136 - 145 mmol/L Final    Potassium 03/27/2024 3.7  3.5 - 5.2 mmol/L Final    Chloride 03/27/2024 111 (H)  98 - 107 mmol/L Final    CO2 03/27/2024 20.1 (L)  22.0 - 29.0 mmol/L Final    Calcium 03/27/2024 9.1  8.6 - 10.5 mg/dL Final    Total Protein 03/27/2024 5.7 (L)  6.0 - 8.5 g/dL Final    Albumin 03/27/2024 3.8  3.5 - 5.2 g/dL Final    ALT (SGPT) 03/27/2024 14  1 - 33 U/L Final    AST (SGOT) 03/27/2024 8  1 - 32 U/L Final    Alkaline Phosphatase 03/27/2024 80  39 - 117 U/L Final    Total Bilirubin 03/27/2024 0.5  0.0 - 1.2 mg/dL Final    Globulin  03/27/2024 1.9  gm/dL Final    A/G Ratio 03/27/2024 2.0  g/dL Final    BUN/Creatinine Ratio 03/27/2024 19.0  7.0 - 25.0 Final    Anion Gap 03/27/2024 10.9  5.0 - 15.0 mmol/L Final    eGFR 03/27/2024 78.6  >60.0 mL/min/1.73 Final    WBC 03/27/2024 10.09  3.40 - 10.80 10*3/mm3 Final    RBC 03/27/2024 3.24 (L)  3.77 - 5.28 10*6/mm3 Final    Hemoglobin 03/27/2024 11.0 (L)  12.0 - 15.9 g/dL Final    Hematocrit 03/27/2024 33.8 (L)  34.0 - 46.6 % Final    MCV 03/27/2024 104.3 (H)  79.0 - 97.0 fL Final    MCH 03/27/2024 34.0 (H)  26.6 - 33.0 pg Final    MCHC 03/27/2024 32.5  31.5 - 35.7 g/dL Final    RDW 03/27/2024 18.3 (H)  12.3 - 15.4 % Final    RDW-SD 03/27/2024 70.4 (H)  37.0 - 54.0 fl Final    MPV 03/27/2024 9.1  6.0 - 12.0 fL Final    Platelets 03/27/2024 508 (H)  140 - 450 10*3/mm3 Final    Neutrophil % 03/27/2024 44.3  42.7 - 76.0 % Final    Lymphocyte % 03/27/2024 44.2  19.6 - 45.3 % Final    Monocyte % 03/27/2024 10.3  5.0 - 12.0 % Final    Eosinophil % 03/27/2024 0.0 (L)  0.3 - 6.2 % Final    Basophil % 03/27/2024 0.7  0.0 - 1.5 % Final    Immature Grans % 03/27/2024 0.5  0.0 - 0.5 % Final    Neutrophils, Absolute 03/27/2024 4.47  1.70 - 7.00 10*3/mm3 Final    Lymphocytes, Absolute 03/27/2024 4.46 (H)  0.70 - 3.10 10*3/mm3 Final    Monocytes, Absolute 03/27/2024 1.04 (H)  0.10 - 0.90 10*3/mm3 Final    Eosinophils, Absolute 03/27/2024 0.00  0.00 - 0.40 10*3/mm3 Final    Basophils, Absolute 03/27/2024 0.07  0.00 - 0.20 10*3/mm3 Final    Immature Grans, Absolute 03/27/2024 0.05  0.00 - 0.05 10*3/mm3 Final    Vitamin B-12 03/27/2024 166 (L)  211 - 946 pg/mL Final    Folate 03/27/2024 8.95  4.78 - 24.20 ng/mL Final   Hospital Outpatient Visit on 03/13/2024   Component Date Value Ref Range Status    CARL and PE, Serum 03/13/2024 Comment (A)   Final    Immunofixation shows IgG monoclonal protein with kappa light chain  specificity.  PLEASE NOTE:       This sample has been treated to eliminate IgG Kappa interference        from monoclonal therapy by DARZALEX(R) (daratumumab). Any       remaining IgG Kappa, if present, is due to the patient's own       immune response.    IgG 03/13/2024 825  586 - 1602 mg/dL Final    IgA 03/13/2024 5 (L)  64 - 422 mg/dL Final    Result confirmed on concentration.    IgM 03/13/2024 16 (L)  26 - 217 mg/dL Final    Result confirmed on concentration.    WBC 03/13/2024 7.90  3.40 - 10.80 10*3/mm3 Final    RBC 03/13/2024 3.38 (L)  3.77 - 5.28 10*6/mm3 Final    Hemoglobin 03/13/2024 11.3 (L)  12.0 - 15.9 g/dL Final    Hematocrit 03/13/2024 34.9  34.0 - 46.6 % Final    MCV 03/13/2024 103.3 (H)  79.0 - 97.0 fL Final    MCH 03/13/2024 33.4 (H)  26.6 - 33.0 pg Final    MCHC 03/13/2024 32.4  31.5 - 35.7 g/dL Final    RDW 03/13/2024 16.8 (H)  12.3 - 15.4 % Final    RDW-SD 03/13/2024 64.5 (H)  37.0 - 54.0 fl Final    MPV 03/13/2024 9.9  6.0 - 12.0 fL Final    Platelets 03/13/2024 224  140 - 450 10*3/mm3 Final    Neutrophil % 03/13/2024 48.6  42.7 - 76.0 % Final    Lymphocyte % 03/13/2024 40.0  19.6 - 45.3 % Final    Monocyte % 03/13/2024 10.8  5.0 - 12.0 % Final    Eosinophil % 03/13/2024 0.0 (L)  0.3 - 6.2 % Final    Basophil % 03/13/2024 0.3  0.0 - 1.5 % Final    Immature Grans % 03/13/2024 0.3  0.0 - 0.5 % Final    Neutrophils, Absolute 03/13/2024 3.85  1.70 - 7.00 10*3/mm3 Final    Lymphocytes, Absolute 03/13/2024 3.16 (H)  0.70 - 3.10 10*3/mm3 Final    Monocytes, Absolute 03/13/2024 0.85  0.10 - 0.90 10*3/mm3 Final    Eosinophils, Absolute 03/13/2024 0.00  0.00 - 0.40 10*3/mm3 Final    Basophils, Absolute 03/13/2024 0.02  0.00 - 0.20 10*3/mm3 Final    Immature Grans, Absolute 03/13/2024 0.02  0.00 - 0.05 10*3/mm3 Final   Hospital Outpatient Visit on 02/28/2024   Component Date Value Ref Range Status    Glucose 02/28/2024 202 (H)  65 - 99 mg/dL Final    BUN 02/28/2024 13  8 - 23 mg/dL Final    Creatinine 02/28/2024 0.81  0.57 - 1.00 mg/dL Final    Sodium 02/28/2024 141  136 - 145 mmol/L Final    Potassium  02/28/2024 3.5  3.5 - 5.2 mmol/L Final    Chloride 02/28/2024 111 (H)  98 - 107 mmol/L Final    CO2 02/28/2024 23.6  22.0 - 29.0 mmol/L Final    Calcium 02/28/2024 8.5 (L)  8.6 - 10.5 mg/dL Final    Total Protein 02/28/2024 5.5 (L)  6.0 - 8.5 g/dL Final    Albumin 02/28/2024 3.7  3.5 - 5.2 g/dL Final    ALT (SGPT) 02/28/2024 13  1 - 33 U/L Final    AST (SGOT) 02/28/2024 9  1 - 32 U/L Final    Alkaline Phosphatase 02/28/2024 61  39 - 117 U/L Final    Total Bilirubin 02/28/2024 0.5  0.0 - 1.2 mg/dL Final    Globulin 02/28/2024 1.8  gm/dL Final    A/G Ratio 02/28/2024 2.1  g/dL Final    BUN/Creatinine Ratio 02/28/2024 16.0  7.0 - 25.0 Final    Anion Gap 02/28/2024 6.4  5.0 - 15.0 mmol/L Final    eGFR 02/28/2024 76.3  >60.0 mL/min/1.73 Final    TSH 02/28/2024 1.550  0.270 - 4.200 uIU/mL Final    T Uptake 02/28/2024 1.13  0.80 - 1.30 TBI Final    T4, Total 02/28/2024 8.37  4.50 - 11.70 mcg/dL Final    T4 results may be falsely increased if patient taking Biotin.    WBC 02/28/2024 8.34  3.40 - 10.80 10*3/mm3 Final    RBC 02/28/2024 3.17 (L)  3.77 - 5.28 10*6/mm3 Final    Hemoglobin 02/28/2024 10.7 (L)  12.0 - 15.9 g/dL Final    Hematocrit 02/28/2024 33.4 (L)  34.0 - 46.6 % Final    MCV 02/28/2024 105.4 (H)  79.0 - 97.0 fL Final    MCH 02/28/2024 33.8 (H)  26.6 - 33.0 pg Final    MCHC 02/28/2024 32.0  31.5 - 35.7 g/dL Final    RDW 02/28/2024 18.0 (H)  12.3 - 15.4 % Final    RDW-SD 02/28/2024 69.6 (H)  37.0 - 54.0 fl Final    MPV 02/28/2024 9.1  6.0 - 12.0 fL Final    Platelets 02/28/2024 309  140 - 450 10*3/mm3 Final    Neutrophil % 02/28/2024 37.3 (L)  42.7 - 76.0 % Final    Lymphocyte % 02/28/2024 50.7 (H)  19.6 - 45.3 % Final    Monocyte % 02/28/2024 10.9  5.0 - 12.0 % Final    Eosinophil % 02/28/2024 0.0 (L)  0.3 - 6.2 % Final    Basophil % 02/28/2024 0.7  0.0 - 1.5 % Final    Immature Grans % 02/28/2024 0.4  0.0 - 0.5 % Final    Neutrophils, Absolute 02/28/2024 3.11  1.70 - 7.00 10*3/mm3 Final    Lymphocytes, Absolute  02/28/2024 4.23 (H)  0.70 - 3.10 10*3/mm3 Final    Monocytes, Absolute 02/28/2024 0.91 (H)  0.10 - 0.90 10*3/mm3 Final    Eosinophils, Absolute 02/28/2024 0.00  0.00 - 0.40 10*3/mm3 Final    Basophils, Absolute 02/28/2024 0.06  0.00 - 0.20 10*3/mm3 Final    Immature Grans, Absolute 02/28/2024 0.03  0.00 - 0.05 10*3/mm3 Final   There may be more visits with results that are not included.       EKG Results:  No orders to display       Imaging Results:  MRI Brain With & Without Contrast    Result Date: 5/3/2024   1. White matter changes most compatible with small vessel ischemic disease in this age group.  2. No abnormal enhancement on postcontrast imaging within the brain parenchyma.  3. Prominent bilateral mastoid effusions left greater than right. Please correlate for mastoiditis  3. Mild paranasal sinus disease     Electronically Signed By-Raman Tran On:5/3/2024 9:22 AM      XR Chest 2 View    Result Date: 4/15/2024  Impression: 1.  No acute cardiopulmonary disease.   Electronically Signed By-Bryan Schultz MD On:4/15/2024 2:57 PM      CT Chest Low Dose Cancer Screening WO    Result Date: 3/1/2024    1. No evidence of lung cancer. 2. Stable bilateral pulmonary nodules, likely benign. 3. Mild bibasilar atelectasis. 4. Moderate emphysema.  LUNG RADS:                           2 (benign appearance, less than 1% chance of malignancy)     YENIFER HANNA MD       Electronically Signed and Approved By: YENIFER HANNA MD on 3/01/2024 at 11:02                 Assessment & Plan   Diagnoses and all orders for this visit:    1. Generalized anxiety disorder (Primary)  -     busPIRone (BUSPAR) 10 MG tablet; Take 1 tablet by mouth 2 (Two) Times a Day.  Dispense: 180 tablet; Refill: 1    2. Major depressive disorder, recurrent episode, moderate  -     busPIRone (BUSPAR) 10 MG tablet; Take 1 tablet by mouth 2 (Two) Times a Day.  Dispense: 180 tablet; Refill: 1    3. Insomnia due to mental condition  -     busPIRone  (BUSPAR) 10 MG tablet; Take 1 tablet by mouth 2 (Two) Times a Day.  Dispense: 180 tablet; Refill: 1    4. Seizures        Visit Diagnoses:    ICD-10-CM ICD-9-CM   1. Generalized anxiety disorder  F41.1 300.02   2. Major depressive disorder, recurrent episode, moderate  F33.1 296.32   3. Insomnia due to mental condition  F51.05 300.9     327.02   4. Seizures  R56.9 780.39     07/22/2024: Much improved. No changes for now. 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    Allowed patient to freely discuss and process issues, such as:  Anxiety and depression regarding relationships.  Anxiety with not working.  ... using Rogerian psychotherapeutic techniques including unconditional positive regard, reflective listening, and demonstrating clear empathy, with the goal of ameliorating symptoms and maintaining, restoring, or improving self-esteem, adaptive skills, and ego or psychological functions (Eliane et al. 1991).  Time (minutes) spent providing supportive psychotherapy: 16  (This time is exclusive to the therapy session and separate from the time spent on activities used to meet the criteria for the E/M service (history, exam, medical decision-making).)  Start: 9:38  Stop: 9:54  Functional status: mild impairment  Treatment plan: Medication management and supportive psychotherapy  Prognosis: good  Progress: improving  4w    6/20/24: Pseudoseizures per pt (?). Start buspar together with prozac for anxiety, depression. Tremor nebulizer related, not due to prozac.    PLAN:  Safety: No acute safety concerns  Therapy: None  Risk Assessment: Risk of self-harm acutely is moderate.  Risk factors include anxiety disorder, mood disorder, access to firearms, and recent psychosocial stressors (pandemic). Protective factors include no family history, no present SI, no history of suicide attempts or self-harm in the past, minimal AODA, healthcare seeking, future  orientation, willingness to engage in care.  Risk of self-harm chronically is also moderate, but could be further elevated in the event of treatment noncompliance and/or AODA.  Meds:  CONTINUE buspar 10 mg bid. Risks, benefits, alternatives discussed with patient including nausea, GI upset, mild sedation, falls risk.  Use care when operating vehicle, vessel, or machine. After discussion of these risks and benefits, the patient voiced understanding and agreed to proceed.  CONTINUE prozac 40 mg qam. Risks, benefits, alternatives discussed with patient including GI upset, nausea vomiting diarrhea, theoretical decrease of seizure threshold predisposing the patient to a slightly higher seizure risk, headaches, sexual dysfunction, serotonin syndrome, bleeding risk, increased suicidality in patients 24 years and younger, switching to padmaja/hypomania.  After discussion of these risks and benefits, the patient voiced understanding and agreed to proceed.  Labs: none    Patient screened positive for depression based on a PHQ-9 score of 0 on 7/17/2024. Follow-up recommendations include: Prescribed antidepressant medication treatment and Suicide Risk Assessment performed.           TREATMENT PLAN/GOALS: Continue supportive psychotherapy efforts and medications as indicated. Treatment and medication options discussed during today's visit. Patient acknowledged and verbally consented to continue with current treatment plan and was educated on the importance of compliance with treatment and follow-up appointments.    MEDICATION ISSUES:  SINGH reviewed as expected.  Discussed medication options and treatment plan of prescribed medication as well as the risks, benefits, and side effects including potential falls, possible impaired driving and metabolic adversities among others. Patient is agreeable to call the office with any worsening of symptoms or onset of side effects. Patient is agreeable to call 911 or go to the nearest ER  should he/she begin having SI/HI. No medication side effects or related complaints today.     MEDS ORDERED DURING VISIT:  New Medications Ordered This Visit   Medications    busPIRone (BUSPAR) 10 MG tablet     Sig: Take 1 tablet by mouth 2 (Two) Times a Day.     Dispense:  180 tablet     Refill:  1     90 day supply replaces 30 day       Return in about 4 weeks (around 8/19/2024).         This document has been electronically signed by Kalia Singer MD  July 22, 2024 09:55 EDT    Dictated Utilizing Dragon Dictation: Part of this note may be an electronic transcription/translation of spoken language to printed text using the Dragon Dictation System.

## 2024-07-22 ENCOUNTER — OFFICE VISIT (OUTPATIENT)
Dept: PSYCHIATRY | Facility: CLINIC | Age: 74
End: 2024-07-22
Payer: MEDICARE

## 2024-07-22 VITALS
OXYGEN SATURATION: 96 % | BODY MASS INDEX: 38.24 KG/M2 | DIASTOLIC BLOOD PRESSURE: 75 MMHG | HEART RATE: 98 BPM | WEIGHT: 194.8 LBS | SYSTOLIC BLOOD PRESSURE: 137 MMHG | HEIGHT: 60 IN

## 2024-07-22 DIAGNOSIS — F33.1 MAJOR DEPRESSIVE DISORDER, RECURRENT EPISODE, MODERATE: ICD-10-CM

## 2024-07-22 DIAGNOSIS — F51.05 INSOMNIA DUE TO MENTAL CONDITION: ICD-10-CM

## 2024-07-22 DIAGNOSIS — R56.9 SEIZURES: ICD-10-CM

## 2024-07-22 DIAGNOSIS — F41.1 GENERALIZED ANXIETY DISORDER: Primary | ICD-10-CM

## 2024-07-22 PROCEDURE — 1160F RVW MEDS BY RX/DR IN RCRD: CPT | Performed by: STUDENT IN AN ORGANIZED HEALTH CARE EDUCATION/TRAINING PROGRAM

## 2024-07-22 PROCEDURE — 1159F MED LIST DOCD IN RCRD: CPT | Performed by: STUDENT IN AN ORGANIZED HEALTH CARE EDUCATION/TRAINING PROGRAM

## 2024-07-22 PROCEDURE — 99214 OFFICE O/P EST MOD 30 MIN: CPT | Performed by: STUDENT IN AN ORGANIZED HEALTH CARE EDUCATION/TRAINING PROGRAM

## 2024-07-22 PROCEDURE — 3075F SYST BP GE 130 - 139MM HG: CPT | Performed by: STUDENT IN AN ORGANIZED HEALTH CARE EDUCATION/TRAINING PROGRAM

## 2024-07-22 PROCEDURE — 90833 PSYTX W PT W E/M 30 MIN: CPT | Performed by: STUDENT IN AN ORGANIZED HEALTH CARE EDUCATION/TRAINING PROGRAM

## 2024-07-22 PROCEDURE — 3078F DIAST BP <80 MM HG: CPT | Performed by: STUDENT IN AN ORGANIZED HEALTH CARE EDUCATION/TRAINING PROGRAM

## 2024-07-22 RX ORDER — BUSPIRONE HYDROCHLORIDE 10 MG/1
10 TABLET ORAL 2 TIMES DAILY
Qty: 180 TABLET | Refills: 1 | Status: SHIPPED | OUTPATIENT
Start: 2024-07-22

## 2024-07-23 ENCOUNTER — TELEPHONE (OUTPATIENT)
Dept: FAMILY MEDICINE CLINIC | Facility: CLINIC | Age: 74
End: 2024-07-23

## 2024-07-23 NOTE — PATIENT INSTRUCTIONS
Sleep Apnea  Sleep apnea affects breathing during sleep. It causes breathing to stop for 10 seconds or more, or to become shallow. People with sleep apnea usually snore loudly.  It can also increase the risk of:  Heart attack.  Stroke.  Being very overweight (obese).  Diabetes.  Heart failure.  Irregular heartbeat.  High blood pressure.  The goal of treatment is to help you breathe normally again.  What are the causes?    The most common cause of this condition is a collapsed or blocked airway.  There are three kinds of sleep apnea:  Obstructive sleep apnea. This is caused by a blocked or collapsed airway.  Central sleep apnea. This happens when the brain does not send the right signals to the muscles that control breathing.  Mixed sleep apnea. This is a combination of obstructive and central sleep apnea.  What increases the risk?  Being overweight.  Smoking.  Having a small airway.  Being older.  Being male.  Drinking alcohol.  Taking medicines to calm yourself (sedatives or tranquilizers).  Having family members with the condition.  Having a tongue or tonsils that are larger than normal.  What are the signs or symptoms?  Trouble staying asleep.  Loud snoring.  Headaches in the morning.  Waking up gasping.  Dry mouth or sore throat in the morning.  Being sleepy or tired during the day.  If you are sleepy or tired during the day, you may also:  Not be able to focus your mind (concentrate).  Forget things.  Get angry a lot and have mood swings.  Feel sad (depressed).  Have changes in your personality.  Have less interest in sex, if you are female.  Be unable to have an erection, if you are male.  How is this treated?    Sleeping on your side.  Using a medicine to get rid of mucus in your nose (decongestant).  Avoiding the use of alcohol, medicines to help you relax, or certain pain medicines (narcotics).  Losing weight, if needed.  Changing your diet.  Quitting smoking.  Using a machine to open your airway while you  sleep, such as:  An oral appliance. This is a mouthpiece that shifts your lower jaw forward.  A CPAP device. This device blows air through a mask when you breathe out (exhale).  An EPAP device. This has valves that you put in each nostril.  A BIPAP device. This device blows air through a mask when you breathe in (inhale) and breathe out.  Having surgery if other treatments do not work.  Follow these instructions at home:  Lifestyle  Make changes that your doctor recommends.  Eat a healthy diet.  Lose weight if needed.  Avoid alcohol, medicines to help you relax, and some pain medicines.  Do not smoke or use any products that contain nicotine or tobacco. If you need help quitting, ask your doctor.  General instructions  Take over-the-counter and prescription medicines only as told by your doctor.  If you were given a machine to use while you sleep, use it only as told by your doctor.  If you are having surgery, make sure to tell your doctor you have sleep apnea. You may need to bring your device with you.  Keep all follow-up visits.  Contact a doctor if:  The machine that you were given to use during sleep bothers you or does not seem to be working.  You do not get better.  You get worse.  Get help right away if:  Your chest hurts.  You have trouble breathing in enough air.  You have an uncomfortable feeling in your back, arms, or stomach.  You have trouble talking.  One side of your body feels weak.  A part of your face is hanging down.  These symptoms may be an emergency. Get help right away. Call your local emergency services (911 in the [place]  Do not wait to see if the symptoms will go away.  Do not drive yourself to the hospital.  Summary  This condition affects breathing during sleep.  The most common cause is a collapsed or blocked airway.  The goal of treatment is to help you breathe normally while you sleep.  This information is not intended to replace advice given to you by your health care provider. Make  "sure you discuss any questions you have with your health care provider.  Document Revised: 07/27/2022 Document Reviewed: 11/26/2021  Elsevier Patient Education © 2024 [employer]  CPAP and BIPAP Information  CPAP and BIPAP are methods that use air pressure to keep your airways open and to help you breathe well. CPAP and BIPAP use different amounts of pressure. Your health care provider will tell you whether CPAP or BIPAP would be more helpful for you.  CPAP stands for \"continuous positive airway pressure.\" With CPAP, the amount of pressure stays the same while you breathe in (inhale) and out (exhale).  BIPAP stands for \"bi-level positive airway pressure.\" With BIPAP, the amount of pressure will be higher when you inhale and lower when you exhale. This allows you to take larger breaths.  CPAP or BIPAP may be used in the hospital, or your health care provider may want you to use it at home. You may need to have a sleep study before your health care provider can order a machine for you to use at home.  What are the advantages?  CPAP or BIPAP can be helpful if you have:  Sleep apnea.  Chronic obstructive pulmonary disease (COPD).  Heart failure.  Medical conditions that cause muscle weakness, including muscular dystrophy or amyotrophic lateral sclerosis (ALS).  Other problems that cause breathing to be shallow, weak, abnormal, or difficult.  CPAP and BIPAP are most commonly used for obstructive sleep apnea (NICK) to keep the airways from collapsing when the muscles relax during sleep.  What are the risks?  Generally, this is a safe treatment. However, problems may occur, including:  Irritated skin or skin sores if the mask does not fit properly.  Dry or stuffy nose or nosebleeds.  Dry mouth.  Feeling gassy or bloated.  Sinus or lung infection if the equipment is not cleaned properly.  When should CPAP or BIPAP be used?  In most cases, the mask only needs to be worn during sleep. Generally, the mask needs to be worn " throughout the night and during any daytime naps. People with certain medical conditions may also need to wear the mask at other times, such as when they are awake. Follow instructions from your health care provider about when to use the machine.  What happens during CPAP or BIPAP?    Both CPAP and BIPAP are provided by a small machine with a flexible plastic tube that attaches to a plastic mask that you wear. Air is blown through the mask into your nose or mouth. The amount of pressure that is used to blow the air can be adjusted on the machine. Your health care provider will set the pressure setting and help you find the best mask for you.  Tips for using the mask  Because the mask needs to be snug, some people feel trapped or closed-in (claustrophobic) when first using the mask. If you feel this way, you may need to get used to the mask. One way to do this is to hold the mask loosely over your nose or mouth and then gradually apply the mask more snugly. You can also gradually increase the amount of time that you use the mask.  Masks are available in various types and sizes. If your mask does not fit well, talk with your health care provider about getting a different one. Some common types of masks include:  Full face masks, which fit over the mouth and nose.  Nasal masks, which fit over the nose.  Nasal pillow or prong masks, which fit into the nostrils.  If you are using a mask that fits over your nose and you tend to breathe through your mouth, a chin strap may be applied to help keep your mouth closed.  Use a skin barrier to protect your skin as told by your health care provider.  Some CPAP and BIPAP machines have alarms that may sound if the mask comes off or develops a leak.  If you have trouble with the mask, it is very important that you talk with your health care provider about finding a way to make the mask easier to tolerate. Do not stop using the mask. There could be a negative impact on your health if  you stop using the mask.  Tips for using the machine  Place your CPAP or BIPAP machine on a secure table or stand near an electrical outlet.  Know where the on/off switch is on the machine.  Follow instructions from your health care provider about how to set the pressure on your machine and when you should use it.  Do not eat or drink while the CPAP or BIPAP machine is on. Food or fluids could get pushed into your lungs by the pressure of the CPAP or BIPAP.  For home use, CPAP and BIPAP machines can be rented or purchased through home health care companies. Many different brands of machines are available. Renting a machine before purchasing may help you find out which particular machine works well for you. Your health insurance company may also decide which machine you may get.  Keep the CPAP or BIPAP machine and attachments clean. Ask your health care provider for specific instructions.  Check the humidifier if you have a dry stuffy nose or nosebleeds. Make sure it is working correctly.  Follow these instructions at home:  Take over-the-counter and prescription medicines only as told by your health care provider. Ask if you can take sinus medicine if your sinuses are blocked.  Do not use any products that contain nicotine or tobacco. These products include cigarettes, chewing tobacco, and vaping devices, such as e-cigarettes. If you need help quitting, ask your health care provider.  Keep all follow-up visits. This is important.  Contact a health care provider if:  You have redness or pressure sores on your head, face, mouth, or nose from the mask or head gear.  You have trouble using the CPAP or BIPAP machine.  You cannot tolerate wearing the CPAP or BIPAP mask.  Someone tells you that you snore even when wearing your CPAP or BIPAP.  Get help right away if:  You have trouble breathing.  You feel confused.  Summary  CPAP and BIPAP are methods that use air pressure to keep your airways open and to help you breathe  well.  If you have trouble with the mask, it is very important that you talk with your health care provider about finding a way to make the mask easier to tolerate. Do not stop using the mask. There could be a negative impact to your health if you stop using the mask.  Follow instructions from your health care provider about when to use the machine.  This information is not intended to replace advice given to you by your health care provider. Make sure you discuss any questions you have with your health care provider.  Document Revised: 07/27/2022 Document Reviewed: 11/26/2021  Elsevier Patient Education © 2023 Elsevier Inc.

## 2024-07-23 NOTE — PROGRESS NOTES
Primary Care Provider  Rena Guerra PA     Referring Provider  No ref. provider found     Chief Complaint  Cough, Shortness of Breath, Wheezing, COPD, Follow-up, and Asthma    Subjective          History of Presenting Illness  Patient is a 73-year-old female who presents for management of dyspnea who presents for a follow-up visit today.  Patient's  is present with the patient the office today.  Patient states that she did have a hospitalization back on 7/27/2024 for gallstones and will be having her gallbladder taken out on 8/8/2024.  Patient states that she is feeling better since hospitalization. Patient states that since last visit her breathing is at baseline.  Patient states that she does get short of breath that is worse with exertion, moderate severity, and improved with rest. Patient states that she is taking Breztri every day as prescribed and uses albuterol inhaler as needed. Patient states that she is not able to tolerate any nebulizer medications and therefore continues to decline any nebulizer medications. Patient is on 2 to 4 L of oxygen per minute via nasal cannula and receives her oxygen through [facility].  Patient states that she is scheduled to start pulmonary rehab on 8/12/2024.  Patient states that she is now on a BiPAP machine with oxygen bled in.  Patient denies any morning headaches or excessive daytime sleepiness.  Upon reviewing patient's BiPAP compliance report over the last 60 days patient's average daily use is 8 hours and 10 minutes with an IPAP pressure of 15 cm of water with an EPAP pressure of 8 cm of water with apnea-hypopnea index of 1.4.  Patient is under the care of Dr. Nicolas for multiple myeloma and is on chemotherapy.  Patient is currently undergoing monthly chemotherapy through Dr. Nicolas.  Patient is under the care of Dr. Celeste, general surgeon for hiatal hernia.  Patient denies fever, chills, night sweats, swollen glands in the head and neck, unintentional  weight loss, hemoptysis, purulent sputum production, dysphagia, chest pain, palpitations, chest tightness, abdominal pain, nausea, vomiting, and diarrhea.  Patient also denies any myalgias, changes in sense of taste and/or smell, sore throat, any other coronavirus or flu-like symptoms.  Patient denies any leg swelling, orthopnea, paroxysmal nocturnal dyspnea.  Patient is able to perform activities of daily living.        Review of Systems     Family History   Problem Relation Age of Onset    Heart disease Mother     Lung cancer Mother     Cancer Mother     Stroke Father     Heart disease Father     Kidney cancer Father     Cancer Father     Stroke Other         UNCLE/AUNT    Colon cancer Neg Hx     Malig Hyperthermia Neg Hx         Social History     Socioeconomic History    Marital status:    Tobacco Use    Smoking status: Former     Current packs/day: 0.00     Average packs/day: 1 pack/day for 47.4 years (47.4 ttl pk-yrs)     Types: Cigarettes     Start date: 1975     Quit date: 6/3/2022     Years since quittin.1     Passive exposure: Past    Smokeless tobacco: Never   Vaping Use    Vaping status: Never Used   Substance and Sexual Activity    Alcohol use: Never    Drug use: Never    Sexual activity: Not Currently     Partners: Male     Birth control/protection: Hysterectomy        Past Medical History:   Diagnosis Date    Anxiety     Asthma     Asthma 2021    Atherosclerosis of native coronary artery of native heart with angina pectoris 08/15/2023    FOLLOWED BY DR GONZALES/DINA PETTIT. DENIES CP BUT GETS SOA WITH EXERTION HAS COPD WEARS AT 2LPM N/C. DECREASED ACTIVITY D/T SOA    C. difficile diarrhea     DENIES ANY CURRENT ISSUES    Colon cancer 2017    Colon polyp     COPD (chronic obstructive pulmonary disease) 10/23/2017    COPD (chronic obstructive pulmonary disease) 10/23/2017    Depression     Disease of thyroid gland     Diverticulitis     Dysphagia     Essential hypertension  07/28/2021    Head injury     Heartburn     Hernia 2019    History of chemotherapy     2017    Hx of psychiatric care     Hyperlipidemia     Impaired fasting glucose 08/14/2015    Lumbago     Lung nodule 02/17/2022    Major depressive disorder 10/27/2016    Malignant neoplasm of ascending colon 07/21/2017    Melena     Multiple myeloma     Nicotine dependence 05/10/2017    NICK (obstructive sleep apnea) 11/06/2023    Oxygen dependent     REPORTS USES 02 AT 2LPM VIA N/C. CAN GET VERY SOA WITH MINIMAL EXERTION    Renal insufficiency 07/28/2021    Seizures     PSEUDO SEIZURES LAST ONE AROUND NOV 2022    Shortness of breath     CAN GET VERY SOA WITH MINIMAL EXERTION    Sleep apnea     Tobacco use 07/28/2021    QUIT SMOKING JUNE 2022    Visit for screening mammogram 02/20/2020    NORMAL- REPEAT IN ONE YEAR    Vitamin D deficiency         Immunization History   Administered Date(s) Administered    Fluzone High Dose =>65 Years ([facility] ONLY) 09/24/2020, 09/24/2021    Fluzone High-Dose 65+yrs 09/24/2020, 09/24/2021, 09/20/2022, 10/10/2023    Fluzone Quad >6mos (Multi-dose) 09/14/2015    Pneumococcal Conjugate 13-Valent (PCV13) 09/21/2015    Pneumococcal Polysaccharide (PPSV23) 04/12/2022       Allergies   Allergen Reactions    Cephalexin Hives    Morphine Anaphylaxis    Penicillins Hives    Sulfa Antibiotics Hives          Current Outpatient Medications:     acyclovir (ZOVIRAX) 400 MG tablet, Take 1 tablet by mouth 2 (Two) Times a Day. Take one tablet by mouth twice daily., Disp: 60 tablet, Rfl: 11    albuterol sulfate  (90 Base) MCG/ACT inhaler, Inhale 2 puffs Every 4 (Four) Hours As Needed for Wheezing., Disp: 18 g, Rfl: 11    arformoterol (BROVANA) 15 MCG/2ML nebulizer solution, USE 1 VIAL  IN  NEBULIZER TWICE  DAILY - MORNING & EVENING, Disp: 360 mL, Rfl: 11    aspirin 81 MG EC tablet, Take 1 tablet by mouth Daily., Disp: , Rfl:     budesonide (PULMICORT) 0.5 MG/2ML nebulizer solution, USE 1 VIAL  IN  NEBULIZER  TWICE  DAILY - RINSE MOUTH OUT AFTER TREATMENT, Disp: 60 each, Rfl: 11    busPIRone (BUSPAR) 10 MG tablet, Take 1 tablet by mouth 2 (Two) Times a Day., Disp: 180 tablet, Rfl: 1    colestipol (COLESTID) 1 g tablet, Take 2 tablets by mouth 2 (Two) Times a Day., Disp: 120 tablet, Rfl: 5    Cyanocobalamin (Vitamin B 12) 500 MCG tablet, Take 2 tablets by mouth Daily. On hold for now, Disp: , Rfl:     dapsone 25 MG tablet, TAKE 2 TABLETS BY MOUTH TWICE A DAY, Disp: 360 tablet, Rfl: 1    dexAMETHasone (DECADRON) 4 MG tablet, Take 10 tablets on D 1,8,15,22 and take 1 tablet on D 2,3,16,17.  Take in the morning with food., Disp: 44 tablet, Rfl: 3    dexAMETHasone (DECADRON) 4 MG tablet, Take 10 tablets on D 1,8,15,22 and take 1 tablet on D 2,3.  Take in the morning with food., Disp: 42 tablet, Rfl: 5    doxycycline (VIBRAMYCIN) 100 MG capsule, Take 1 capsule by mouth 2 (Two) Times a Day., Disp: 20 capsule, Rfl: 0    FLUoxetine (PROzac) 40 MG capsule, Take 1 capsule by mouth Daily., Disp: 90 capsule, Rfl: 1    lenalidomide (REVLIMID) 15 MG capsule, Take 1 capsule by mouth Daily. On Days 1-21, and off 7 days, on a 28 day cycle, Disp: 21 capsule, Rfl: 0    levalbuterol (XOPENEX) 0.63 MG/3ML nebulizer solution, Take 1 ampule by nebulization 4 (Four) Times a Day As Needed for Wheezing., Disp: 120 mL, Rfl: 5    LORazepam (Ativan) 1 MG tablet, Take 1 tablet by mouth 2 (Two) Times a Day As Needed for Anxiety., Disp: 14 tablet, Rfl: 0    ondansetron (ZOFRAN) 8 MG tablet, Take 1 tablet by mouth 3 (Three) Times a Day As Needed for Nausea or Vomiting., Disp: 30 tablet, Rfl: 5    prednisoLONE acetate (PRED FORTE) 1 % ophthalmic suspension, Administer 1 drop to both eyes 4 (Four) Times a Day., Disp: , Rfl:     promethazine (PHENERGAN) 25 MG tablet, Take 1 tablet by mouth Every 6 (Six) Hours As Needed for Nausea or Vomiting., Disp: 30 tablet, Rfl: 1    vitamin D (ERGOCALCIFEROL) 1.25 MG (54363 UT) capsule capsule, Take 1 capsule by mouth 2  "(Two) Times a Week., Disp: 26 capsule, Rfl: 1    Yupelri 175 MCG/3ML nebulizer solution, USE 1 VIAL IN NEBULIZER DAILY, Disp: 90 mL, Rfl: 11     Objective     Physical Exam  Vital Signs:   WDWN, Alert, NAD.    HEENT:  PERRL, EOMI.  OP, nares clear, no sinus tenderness  Neck:  Supple, no JVD, no thyromegaly.  Lymph: no axillary, cervical, supraclavicular lymphadenopathy noted bilaterally  Chest:  Mildly decreased breath sounds throughout. No wheezes, rales, or rhonchi appreciated. Normal work of breathing noted. Patient is able speak full sentences without difficulty. Patient is on 2 L of oxygen per minute via nasal cannula.   CV: RRR, no MGR, pulses 2+, equal.  Abd:  Soft, NT, ND, + BS, no HSM  EXT:  no clubbing, no cyanosis, no edema, no joint tenderness  Neuro:  A&Ox3, CN grossly intact, no focal deficits.  Skin: No rashes or lesions noted.    /79 (BP Location: Left arm, Patient Position: Sitting, Cuff Size: Large Adult)   Pulse 109   Temp 98.2 °F (36.8 °C) (Oral)   Resp 20   Ht 152.4 cm (60\")   Wt 85.5 kg (188 lb 6.4 oz)   SpO2 95% Comment: 2 liters of oxygen PD  BMI 36.79 kg/m²         Result Review :   I have reviewed Dr. Lee's last office visit note.  I also reviewed BiPAP compliance report.  See scanned report.    Procedures:              Assessment and Plan      Assessment:  1.  Mild COPD.  FEV1 of 79% predicted.  Alpha 1 antitrypsin with a normal genotype of M/M with a level of 231.  2.  Centrilobular emphysema on chest CT scan.  3.  History of colon cancer.  4.  Multiple myeloma.  Patient is under the care of Dr. Nicolas and is on chemotherapy and steroids per patient report.    5.  Chronic dyspnea.  6.  Multiple lung nodules. Stable on low dose chest CT scan dated from 3/1/2024.  7.  Severe obstructive sleep apnea.  Patient is on a BiPAP machine.  8.  Chronic hypoxic respiratory failure: Patient is on supplemental oxygen.  9.  Hiatal hernia.  10.  Tobacco abuse of cigarettes in remission.  " Patient is enrolled in lung cancer screening.        Plan:  1.  Continue Brovana, Pulmicort, Yupelri every day as prescribed and rinse mouth out after each use.  2.  Continue albuterol inhaler and and Xopenex nebulizer treatments as needed.  3.  BiPAP compliance report shows good compliance.  Continue BiPAP at current settings at night and with naps and clean mask and tubing daily.    4.  Continue albuterol inhaler and Xopenex nebulizer treatments as needed.   5.  Start pulmonary rehab as scheduled.  6.  Continue oxygen to keep SPO2 at 89% and above.  7.  Follow-up with primary care provider, general surgeon, oncologist, cardiologist, and psychiatrist as scheduled.  8.  Vaccination status:  patient reports they are up-to-date with flu and pneumonia vaccines.  Patient declines COVID-19 vaccination.  Discussed with patient the benefits of vaccination including decreased risk of severe illness, hospitalization and death related to flu, pneumonia, and COVID-19.  Patient verbalized understanding and will consider getting vaccinated.  Patient is advised to continue to follow CDC recommendations such as social distancing, wearing a mask, and washing hands for least 20 seconds.  9.  Smoking status: Patient is a former cigarette smoker. Patient is enrolled in lung cancer screening and will be due for a repeat low dose chest CT scan in March 2025. Order placed prior to office visit today.  10.  Patient to call the office, 911, or go to the ER with new or worsening symptoms.  11.  Follow-up in 3 months, sooner if needed.           Follow Up   Return in about 3 months (around 11/5/2024).  Patient was given instructions and counseling regarding her condition or for health maintenance advice. Please see specific information pulled into the AVS if appropriate.

## 2024-07-23 NOTE — TELEPHONE ENCOUNTER
"  Caller: Anca Samson \"Sarah\"    Relationship to patient: Self    Best call back number: [phone]    Patient is needing: PATIENT CALLED REQUESTING TO SPEAK WITH CLINICAL STAFF. PATIENT STATES THE RHEUMATOLOGIST REACHED OUT TO HER FOR SCHEDULING A NEW PATIENT APPT AND THE PATIENT WAS INFORMED THIS SPECIALIST WAS LOCATED IN [place]. PATIENT STATES SHE IS UNABLE TO DRIVE TO [place] AND WOULD LIKE TO HAVE HER REFERRAL SUBMITTED TO A PROVIDER IN [facility] OR [place].       "

## 2024-07-23 NOTE — TELEPHONE ENCOUNTER
It looks like per Rena's referral communication, patient needs to be scheduled with [facility]. Please update and refax referral.

## 2024-07-26 ENCOUNTER — SPECIALTY PHARMACY (OUTPATIENT)
Dept: ONCOLOGY | Facility: HOSPITAL | Age: 74
End: 2024-07-26
Payer: MEDICARE

## 2024-07-26 ENCOUNTER — APPOINTMENT (OUTPATIENT)
Dept: GENERAL RADIOLOGY | Facility: HOSPITAL | Age: 74
DRG: 444 | End: 2024-07-26
Payer: MEDICARE

## 2024-07-26 DIAGNOSIS — C90.00 MULTIPLE MYELOMA, REMISSION STATUS UNSPECIFIED: ICD-10-CM

## 2024-07-26 PROCEDURE — 93005 ELECTROCARDIOGRAM TRACING: CPT

## 2024-07-26 PROCEDURE — 93005 ELECTROCARDIOGRAM TRACING: CPT | Performed by: EMERGENCY MEDICINE

## 2024-07-26 PROCEDURE — 99285 EMERGENCY DEPT VISIT HI MDM: CPT

## 2024-07-26 PROCEDURE — 71045 X-RAY EXAM CHEST 1 VIEW: CPT

## 2024-07-26 RX ORDER — LENALIDOMIDE 15 MG/1
15 CAPSULE ORAL DAILY
Qty: 21 CAPSULE | Refills: 0 | Status: SHIPPED | OUTPATIENT
Start: 2024-07-26

## 2024-07-26 RX ORDER — SODIUM CHLORIDE 0.9 % (FLUSH) 0.9 %
10 SYRINGE (ML) INJECTION AS NEEDED
Status: DISCONTINUED | OUTPATIENT
Start: 2024-07-26 | End: 2024-07-29 | Stop reason: HOSPADM

## 2024-07-26 NOTE — PROGRESS NOTES
Re: Refills of Oral Specialty Medication - Revlimid (lenalidomide)    Drug-Drug Interactions: The current medication list was reviewed and there are no relevant drug-drug interactions with the specialty medication.  Medication Allergies: The patient has no relevant allergies as it relates to their oral specialty medication  Review of Labs/Dose Adjustments: NO DOSE CHANGE - I reviewed the most recent note and labs and the patient will continue without any dose changes.  I sent refills as described below.    Drug: Revlimid (lenalidomide)  Strength: 15 mg  Directions: Take 1 capsule by mouth daily ON days 1-21, OFF for 7 days of each 28 day cycle  Quantity: 21  Refills: 0    [facility]: [facility]    Carmen Sorto PharmD, BCOP  Clinical Oncology Pharmacy Specialist  Phone: [phone]    7/26/2024  17:01 EDT

## 2024-07-27 ENCOUNTER — APPOINTMENT (OUTPATIENT)
Dept: GENERAL RADIOLOGY | Facility: HOSPITAL | Age: 74
DRG: 444 | End: 2024-07-27
Payer: MEDICARE

## 2024-07-27 ENCOUNTER — APPOINTMENT (OUTPATIENT)
Dept: MRI IMAGING | Facility: HOSPITAL | Age: 74
DRG: 444 | End: 2024-07-27
Payer: MEDICARE

## 2024-07-27 ENCOUNTER — ANESTHESIA (OUTPATIENT)
Dept: PERIOP | Facility: HOSPITAL | Age: 74
End: 2024-07-27
Payer: MEDICARE

## 2024-07-27 ENCOUNTER — HOSPITAL ENCOUNTER (INPATIENT)
Facility: HOSPITAL | Age: 74
LOS: 2 days | Discharge: HOME | DRG: 444 | End: 2024-07-29
Attending: EMERGENCY MEDICINE | Admitting: STUDENT IN AN ORGANIZED HEALTH CARE EDUCATION/TRAINING PROGRAM
Payer: MEDICARE

## 2024-07-27 ENCOUNTER — APPOINTMENT (OUTPATIENT)
Dept: CT IMAGING | Facility: HOSPITAL | Age: 74
DRG: 444 | End: 2024-07-27
Payer: MEDICARE

## 2024-07-27 ENCOUNTER — ANESTHESIA EVENT (OUTPATIENT)
Dept: PERIOP | Facility: HOSPITAL | Age: 74
End: 2024-07-27
Payer: MEDICARE

## 2024-07-27 DIAGNOSIS — K85.10 ACUTE GALLSTONE PANCREATITIS: ICD-10-CM

## 2024-07-27 DIAGNOSIS — R26.2 DIFFICULTY IN WALKING: ICD-10-CM

## 2024-07-27 DIAGNOSIS — K80.42 CHOLEDOCHOLITHIASIS WITH ACUTE CHOLECYSTITIS: Primary | ICD-10-CM

## 2024-07-27 LAB
ALBUMIN SERPL-MCNC: 3.4 G/DL (ref 3.5–5.2)
ALBUMIN SERPL-MCNC: 3.9 G/DL (ref 3.5–5.2)
ALBUMIN/GLOB SERPL: 1.9 G/DL
ALBUMIN/GLOB SERPL: 2.1 G/DL
ALP SERPL-CCNC: 100 U/L (ref 39–117)
ALP SERPL-CCNC: 108 U/L (ref 39–117)
ALT SERPL W P-5'-P-CCNC: 152 U/L (ref 1–33)
ALT SERPL W P-5'-P-CCNC: 156 U/L (ref 1–33)
ANION GAP SERPL CALCULATED.3IONS-SCNC: 13 MMOL/L (ref 5–15)
ANION GAP SERPL CALCULATED.3IONS-SCNC: 15.6 MMOL/L (ref 5–15)
AST SERPL-CCNC: 153 U/L (ref 1–32)
AST SERPL-CCNC: 198 U/L (ref 1–32)
BASOPHILS # BLD AUTO: 0.11 10*3/MM3 (ref 0–0.2)
BASOPHILS NFR BLD AUTO: 1.4 % (ref 0–1.5)
BILIRUB SERPL-MCNC: 3 MG/DL (ref 0–1.2)
BILIRUB SERPL-MCNC: 3.4 MG/DL (ref 0–1.2)
BUN SERPL-MCNC: 11 MG/DL (ref 8–23)
BUN SERPL-MCNC: 11 MG/DL (ref 8–23)
BUN/CREAT SERPL: 11.6 (ref 7–25)
BUN/CREAT SERPL: 11.8 (ref 7–25)
CALCIUM SPEC-SCNC: 8.8 MG/DL (ref 8.6–10.5)
CALCIUM SPEC-SCNC: 9.2 MG/DL (ref 8.6–10.5)
CHLORIDE SERPL-SCNC: 105 MMOL/L (ref 98–107)
CHLORIDE SERPL-SCNC: 106 MMOL/L (ref 98–107)
CO2 SERPL-SCNC: 20.4 MMOL/L (ref 22–29)
CO2 SERPL-SCNC: 21 MMOL/L (ref 22–29)
CREAT SERPL-MCNC: 0.93 MG/DL (ref 0.57–1)
CREAT SERPL-MCNC: 0.95 MG/DL (ref 0.57–1)
DEPRECATED RDW RBC AUTO: 56.8 FL (ref 37–54)
DEPRECATED RDW RBC AUTO: 57 FL (ref 37–54)
EGFRCR SERPLBLD CKD-EPI 2021: 63 ML/MIN/1.73
EGFRCR SERPLBLD CKD-EPI 2021: 64.6 ML/MIN/1.73
EOSINOPHIL # BLD AUTO: 0.09 10*3/MM3 (ref 0–0.4)
EOSINOPHIL NFR BLD AUTO: 1.1 % (ref 0.3–6.2)
ERYTHROCYTE [DISTWIDTH] IN BLOOD BY AUTOMATED COUNT: 15 % (ref 12.3–15.4)
ERYTHROCYTE [DISTWIDTH] IN BLOOD BY AUTOMATED COUNT: 15.1 % (ref 12.3–15.4)
ETHANOL BLD-MCNC: <10 MG/DL (ref 0–10)
ETHANOL UR QL: <0.01 %
GLOBULIN UR ELPH-MCNC: 1.6 GM/DL
GLOBULIN UR ELPH-MCNC: 2.1 GM/DL
GLUCOSE BLDC GLUCOMTR-MCNC: 122 MG/DL (ref 70–99)
GLUCOSE BLDC GLUCOMTR-MCNC: 158 MG/DL (ref 70–99)
GLUCOSE SERPL-MCNC: 132 MG/DL (ref 65–99)
GLUCOSE SERPL-MCNC: 137 MG/DL (ref 65–99)
HCT VFR BLD AUTO: 36.6 % (ref 34–46.6)
HCT VFR BLD AUTO: 41.1 % (ref 34–46.6)
HGB BLD-MCNC: 11.9 G/DL (ref 12–15.9)
HGB BLD-MCNC: 13.4 G/DL (ref 12–15.9)
HOLD SPECIMEN: NORMAL
HOLD SPECIMEN: NORMAL
IMM GRANULOCYTES # BLD AUTO: 0.01 10*3/MM3 (ref 0–0.05)
IMM GRANULOCYTES NFR BLD AUTO: 0.1 % (ref 0–0.5)
LIPASE SERPL-CCNC: >3000 U/L (ref 13–60)
LYMPHOCYTES # BLD AUTO: 3.36 10*3/MM3 (ref 0.7–3.1)
LYMPHOCYTES NFR BLD AUTO: 41.7 % (ref 19.6–45.3)
MAGNESIUM SERPL-MCNC: 1.8 MG/DL (ref 1.6–2.4)
MAGNESIUM SERPL-MCNC: 1.9 MG/DL (ref 1.6–2.4)
MCH RBC QN AUTO: 33.3 PG (ref 26.6–33)
MCH RBC QN AUTO: 33.3 PG (ref 26.6–33)
MCHC RBC AUTO-ENTMCNC: 32.5 G/DL (ref 31.5–35.7)
MCHC RBC AUTO-ENTMCNC: 32.6 G/DL (ref 31.5–35.7)
MCV RBC AUTO: 102.2 FL (ref 79–97)
MCV RBC AUTO: 102.5 FL (ref 79–97)
MONOCYTES # BLD AUTO: 0.68 10*3/MM3 (ref 0.1–0.9)
MONOCYTES NFR BLD AUTO: 8.4 % (ref 5–12)
NEUTROPHILS NFR BLD AUTO: 3.81 10*3/MM3 (ref 1.7–7)
NEUTROPHILS NFR BLD AUTO: 47.3 % (ref 42.7–76)
NRBC BLD AUTO-RTO: 0 /100 WBC (ref 0–0.2)
NT-PROBNP SERPL-MCNC: 163 PG/ML (ref 0–900)
PHOSPHATE SERPL-MCNC: 2.5 MG/DL (ref 2.5–4.5)
PLATELET # BLD AUTO: 190 10*3/MM3 (ref 140–450)
PLATELET # BLD AUTO: 215 10*3/MM3 (ref 140–450)
PMV BLD AUTO: 10 FL (ref 6–12)
PMV BLD AUTO: 9.9 FL (ref 6–12)
POTASSIUM SERPL-SCNC: 4 MMOL/L (ref 3.5–5.2)
POTASSIUM SERPL-SCNC: 4.1 MMOL/L (ref 3.5–5.2)
PROT SERPL-MCNC: 5 G/DL (ref 6–8.5)
PROT SERPL-MCNC: 6 G/DL (ref 6–8.5)
RBC # BLD AUTO: 3.57 10*6/MM3 (ref 3.77–5.28)
RBC # BLD AUTO: 4.02 10*6/MM3 (ref 3.77–5.28)
SODIUM SERPL-SCNC: 140 MMOL/L (ref 136–145)
SODIUM SERPL-SCNC: 141 MMOL/L (ref 136–145)
TROPONIN T SERPL HS-MCNC: 12 NG/L
WBC NRBC COR # BLD AUTO: 6.83 10*3/MM3 (ref 3.4–10.8)
WBC NRBC COR # BLD AUTO: 8.06 10*3/MM3 (ref 3.4–10.8)
WHOLE BLOOD HOLD COAG: NORMAL
WHOLE BLOOD HOLD SPECIMEN: NORMAL

## 2024-07-27 PROCEDURE — 0F798DZ DILATION OF COMMON BILE DUCT WITH INTRALUMINAL DEVICE, VIA NATURAL OR ARTIFICIAL OPENING ENDOSCOPIC: ICD-10-PCS | Performed by: INTERNAL MEDICINE

## 2024-07-27 PROCEDURE — 99222 1ST HOSP IP/OBS MODERATE 55: CPT | Performed by: INTERNAL MEDICINE

## 2024-07-27 PROCEDURE — 83735 ASSAY OF MAGNESIUM: CPT | Performed by: INTERNAL MEDICINE

## 2024-07-27 PROCEDURE — 43264 ERCP REMOVE DUCT CALCULI: CPT | Performed by: INTERNAL MEDICINE

## 2024-07-27 PROCEDURE — 83880 ASSAY OF NATRIURETIC PEPTIDE: CPT | Performed by: EMERGENCY MEDICINE

## 2024-07-27 PROCEDURE — 25510000001 IOPAMIDOL PER 1 ML: Performed by: EMERGENCY MEDICINE

## 2024-07-27 PROCEDURE — 74181 MRI ABDOMEN W/O CONTRAST: CPT

## 2024-07-27 PROCEDURE — 25010000002 MEROPENEM PER 100 MG: Performed by: STUDENT IN AN ORGANIZED HEALTH CARE EDUCATION/TRAINING PROGRAM

## 2024-07-27 PROCEDURE — 82948 REAGENT STRIP/BLOOD GLUCOSE: CPT | Performed by: PHYSICIAN ASSISTANT

## 2024-07-27 PROCEDURE — 74177 CT ABD & PELVIS W/CONTRAST: CPT

## 2024-07-27 PROCEDURE — 99223 1ST HOSP IP/OBS HIGH 75: CPT | Performed by: STUDENT IN AN ORGANIZED HEALTH CARE EDUCATION/TRAINING PROGRAM

## 2024-07-27 PROCEDURE — 25010000002 ONDANSETRON PER 1 MG: Performed by: NURSE ANESTHETIST, CERTIFIED REGISTERED

## 2024-07-27 PROCEDURE — 94799 UNLISTED PULMONARY SVC/PX: CPT

## 2024-07-27 PROCEDURE — 25010000002 MEROPENEM PER 100 MG: Performed by: EMERGENCY MEDICINE

## 2024-07-27 PROCEDURE — 43273 ENDOSCOPIC PANCREATOSCOPY: CPT | Performed by: INTERNAL MEDICINE

## 2024-07-27 PROCEDURE — 80053 COMPREHEN METABOLIC PANEL: CPT | Performed by: EMERGENCY MEDICINE

## 2024-07-27 PROCEDURE — 94760 N-INVAS EAR/PLS OXIMETRY 1: CPT

## 2024-07-27 PROCEDURE — 25010000002 MEROPENEM PER 100 MG: Performed by: INTERNAL MEDICINE

## 2024-07-27 PROCEDURE — 94640 AIRWAY INHALATION TREATMENT: CPT

## 2024-07-27 PROCEDURE — 84100 ASSAY OF PHOSPHORUS: CPT | Performed by: STUDENT IN AN ORGANIZED HEALTH CARE EDUCATION/TRAINING PROGRAM

## 2024-07-27 PROCEDURE — 25010000002 HYDROMORPHONE 1 MG/ML SOLUTION: Performed by: INTERNAL MEDICINE

## 2024-07-27 PROCEDURE — 83735 ASSAY OF MAGNESIUM: CPT | Performed by: STUDENT IN AN ORGANIZED HEALTH CARE EDUCATION/TRAINING PROGRAM

## 2024-07-27 PROCEDURE — 83690 ASSAY OF LIPASE: CPT | Performed by: EMERGENCY MEDICINE

## 2024-07-27 PROCEDURE — 43274 ERCP DUCT STENT PLACEMENT: CPT | Performed by: INTERNAL MEDICINE

## 2024-07-27 PROCEDURE — 25010000002 PROPOFOL 10 MG/ML EMULSION: Performed by: NURSE ANESTHETIST, CERTIFIED REGISTERED

## 2024-07-27 PROCEDURE — 85025 COMPLETE CBC W/AUTO DIFF WBC: CPT | Performed by: EMERGENCY MEDICINE

## 2024-07-27 PROCEDURE — 36415 COLL VENOUS BLD VENIPUNCTURE: CPT

## 2024-07-27 PROCEDURE — C1769 GUIDE WIRE: HCPCS | Performed by: INTERNAL MEDICINE

## 2024-07-27 PROCEDURE — 0FC88ZZ EXTIRPATION OF MATTER FROM CYSTIC DUCT, VIA NATURAL OR ARTIFICIAL OPENING ENDOSCOPIC: ICD-10-PCS | Performed by: INTERNAL MEDICINE

## 2024-07-27 PROCEDURE — 25810000003 LACTATED RINGERS PER 1000 ML: Performed by: STUDENT IN AN ORGANIZED HEALTH CARE EDUCATION/TRAINING PROGRAM

## 2024-07-27 PROCEDURE — 25810000003 LACTATED RINGERS PER 1000 ML: Performed by: INTERNAL MEDICINE

## 2024-07-27 PROCEDURE — 63710000001 REVEFENACIN 175 MCG/3ML SOLUTION: Performed by: STUDENT IN AN ORGANIZED HEALTH CARE EDUCATION/TRAINING PROGRAM

## 2024-07-27 PROCEDURE — 87040 BLOOD CULTURE FOR BACTERIA: CPT | Performed by: EMERGENCY MEDICINE

## 2024-07-27 PROCEDURE — 82948 REAGENT STRIP/BLOOD GLUCOSE: CPT

## 2024-07-27 PROCEDURE — 25010000002 DEXAMETHASONE PER 1 MG: Performed by: NURSE ANESTHETIST, CERTIFIED REGISTERED

## 2024-07-27 PROCEDURE — 76000 FLUOROSCOPY <1 HR PHYS/QHP: CPT

## 2024-07-27 PROCEDURE — 82948 REAGENT STRIP/BLOOD GLUCOSE: CPT | Performed by: INTERNAL MEDICINE

## 2024-07-27 PROCEDURE — 84484 ASSAY OF TROPONIN QUANT: CPT | Performed by: EMERGENCY MEDICINE

## 2024-07-27 PROCEDURE — 80053 COMPREHEN METABOLIC PANEL: CPT | Performed by: INTERNAL MEDICINE

## 2024-07-27 PROCEDURE — 25010000002 SUGAMMADEX 200 MG/2ML SOLUTION: Performed by: NURSE ANESTHETIST, CERTIFIED REGISTERED

## 2024-07-27 PROCEDURE — C2617 STENT, NON-COR, TEM W/O DEL: HCPCS | Performed by: INTERNAL MEDICINE

## 2024-07-27 PROCEDURE — 25810000003 LACTATED RINGERS SOLUTION: Performed by: EMERGENCY MEDICINE

## 2024-07-27 PROCEDURE — 25510000001 IOPAMIDOL 61 % SOLUTION: Performed by: INTERNAL MEDICINE

## 2024-07-27 PROCEDURE — C1889 IMPLANT/INSERT DEVICE, NOC: HCPCS | Performed by: INTERNAL MEDICINE

## 2024-07-27 PROCEDURE — 82077 ASSAY SPEC XCP UR&BREATH IA: CPT | Performed by: EMERGENCY MEDICINE

## 2024-07-27 PROCEDURE — 85027 COMPLETE CBC AUTOMATED: CPT | Performed by: INTERNAL MEDICINE

## 2024-07-27 PROCEDURE — 25010000002 ONDANSETRON PER 1 MG: Performed by: EMERGENCY MEDICINE

## 2024-07-27 DEVICE — BILIARY STENT
Type: IMPLANTABLE DEVICE | Site: BILE DUCT | Status: FUNCTIONAL
Brand: ADVANIX™ BILIARY

## 2024-07-27 RX ORDER — INDOMETHACIN 100 MG
100 SUPPOSITORY, RECTAL RECTAL ONCE
Status: COMPLETED | OUTPATIENT
Start: 2024-07-27 | End: 2024-07-27

## 2024-07-27 RX ORDER — SODIUM CHLORIDE, SODIUM LACTATE, POTASSIUM CHLORIDE, CALCIUM CHLORIDE 600; 310; 30; 20 MG/100ML; MG/100ML; MG/100ML; MG/100ML
150 INJECTION, SOLUTION INTRAVENOUS CONTINUOUS
Status: DISCONTINUED | OUTPATIENT
Start: 2024-07-27 | End: 2024-07-28

## 2024-07-27 RX ORDER — SODIUM CHLORIDE 0.9 % (FLUSH) 0.9 %
10 SYRINGE (ML) INJECTION AS NEEDED
Status: DISCONTINUED | OUTPATIENT
Start: 2024-07-27 | End: 2024-07-29 | Stop reason: HOSPADM

## 2024-07-27 RX ORDER — SODIUM CHLORIDE, SODIUM LACTATE, POTASSIUM CHLORIDE, CALCIUM CHLORIDE 600; 310; 30; 20 MG/100ML; MG/100ML; MG/100ML; MG/100ML
250 INJECTION, SOLUTION INTRAVENOUS CONTINUOUS
Status: ACTIVE | OUTPATIENT
Start: 2024-07-27 | End: 2024-07-27

## 2024-07-27 RX ORDER — BUDESONIDE 0.5 MG/2ML
0.5 INHALANT ORAL
Status: DISCONTINUED | OUTPATIENT
Start: 2024-07-27 | End: 2024-07-29 | Stop reason: HOSPADM

## 2024-07-27 RX ORDER — ASPIRIN 81 MG/1
81 TABLET ORAL DAILY
COMMUNITY

## 2024-07-27 RX ORDER — IOPAMIDOL 612 MG/ML
INJECTION, SOLUTION INTRATHECAL AS NEEDED
Status: DISCONTINUED | OUTPATIENT
Start: 2024-07-27 | End: 2024-07-27 | Stop reason: HOSPADM

## 2024-07-27 RX ORDER — ONDANSETRON 2 MG/ML
4 INJECTION INTRAMUSCULAR; INTRAVENOUS ONCE AS NEEDED
Status: DISCONTINUED | OUTPATIENT
Start: 2024-07-27 | End: 2024-07-27

## 2024-07-27 RX ORDER — ONDANSETRON 2 MG/ML
4 INJECTION INTRAMUSCULAR; INTRAVENOUS ONCE
Status: COMPLETED | OUTPATIENT
Start: 2024-07-27 | End: 2024-07-27

## 2024-07-27 RX ORDER — FAMOTIDINE 10 MG/ML
20 INJECTION, SOLUTION INTRAVENOUS ONCE
Status: COMPLETED | OUTPATIENT
Start: 2024-07-27 | End: 2024-07-27

## 2024-07-27 RX ORDER — INDOMETHACIN 100 MG
100 SUPPOSITORY, RECTAL RECTAL ONCE
Status: DISCONTINUED | OUTPATIENT
Start: 2024-07-27 | End: 2024-07-27 | Stop reason: HOSPADM

## 2024-07-27 RX ORDER — LIDOCAINE HYDROCHLORIDE 20 MG/ML
INJECTION, SOLUTION EPIDURAL; INFILTRATION; INTRACAUDAL; PERINEURAL AS NEEDED
Status: DISCONTINUED | OUTPATIENT
Start: 2024-07-27 | End: 2024-07-27 | Stop reason: SURG

## 2024-07-27 RX ORDER — PHENYLEPHRINE HCL IN 0.9% NACL 1 MG/10 ML
SYRINGE (ML) INTRAVENOUS AS NEEDED
Status: DISCONTINUED | OUTPATIENT
Start: 2024-07-27 | End: 2024-07-27 | Stop reason: SURG

## 2024-07-27 RX ORDER — IBUPROFEN 600 MG/1
1 TABLET ORAL
Status: DISCONTINUED | OUTPATIENT
Start: 2024-07-27 | End: 2024-07-29 | Stop reason: HOSPADM

## 2024-07-27 RX ORDER — KETAMINE HCL IN NACL, ISO-OSM 100MG/10ML
SYRINGE (ML) INJECTION AS NEEDED
Status: DISCONTINUED | OUTPATIENT
Start: 2024-07-27 | End: 2024-07-27 | Stop reason: SURG

## 2024-07-27 RX ORDER — NITROGLYCERIN 0.4 MG/1
0.4 TABLET SUBLINGUAL
Status: DISCONTINUED | OUTPATIENT
Start: 2024-07-27 | End: 2024-07-29 | Stop reason: HOSPADM

## 2024-07-27 RX ORDER — SODIUM CHLORIDE, SODIUM LACTATE, POTASSIUM CHLORIDE, CALCIUM CHLORIDE 600; 310; 30; 20 MG/100ML; MG/100ML; MG/100ML; MG/100ML
50 INJECTION, SOLUTION INTRAVENOUS CONTINUOUS PRN
Status: DISCONTINUED | OUTPATIENT
Start: 2024-07-27 | End: 2024-07-29 | Stop reason: HOSPADM

## 2024-07-27 RX ORDER — ONDANSETRON 2 MG/ML
4 INJECTION INTRAMUSCULAR; INTRAVENOUS EVERY 6 HOURS PRN
Status: DISCONTINUED | OUTPATIENT
Start: 2024-07-27 | End: 2024-07-29 | Stop reason: HOSPADM

## 2024-07-27 RX ORDER — DEXTROSE MONOHYDRATE 25 G/50ML
25 INJECTION, SOLUTION INTRAVENOUS
Status: DISCONTINUED | OUTPATIENT
Start: 2024-07-27 | End: 2024-07-29 | Stop reason: HOSPADM

## 2024-07-27 RX ORDER — ALBUTEROL SULFATE 2.5 MG/3ML
2.5 SOLUTION RESPIRATORY (INHALATION) EVERY 6 HOURS PRN
Status: DISCONTINUED | OUTPATIENT
Start: 2024-07-27 | End: 2024-07-29 | Stop reason: HOSPADM

## 2024-07-27 RX ORDER — PROPOFOL 10 MG/ML
VIAL (ML) INTRAVENOUS AS NEEDED
Status: DISCONTINUED | OUTPATIENT
Start: 2024-07-27 | End: 2024-07-27 | Stop reason: SURG

## 2024-07-27 RX ORDER — SODIUM CHLORIDE 9 MG/ML
40 INJECTION, SOLUTION INTRAVENOUS AS NEEDED
Status: DISCONTINUED | OUTPATIENT
Start: 2024-07-27 | End: 2024-07-29 | Stop reason: HOSPADM

## 2024-07-27 RX ORDER — SODIUM CHLORIDE, SODIUM LACTATE, POTASSIUM CHLORIDE, CALCIUM CHLORIDE 600; 310; 30; 20 MG/100ML; MG/100ML; MG/100ML; MG/100ML
150 INJECTION, SOLUTION INTRAVENOUS CONTINUOUS
Status: DISCONTINUED | OUTPATIENT
Start: 2024-07-27 | End: 2024-07-27

## 2024-07-27 RX ORDER — ROCURONIUM BROMIDE 10 MG/ML
INJECTION, SOLUTION INTRAVENOUS AS NEEDED
Status: DISCONTINUED | OUTPATIENT
Start: 2024-07-27 | End: 2024-07-27 | Stop reason: SURG

## 2024-07-27 RX ORDER — PROMETHAZINE HYDROCHLORIDE 12.5 MG/1
25 TABLET ORAL ONCE AS NEEDED
Status: DISCONTINUED | OUTPATIENT
Start: 2024-07-27 | End: 2024-07-27

## 2024-07-27 RX ORDER — CALCIUM CARBONATE 500 MG/1
2 TABLET, CHEWABLE ORAL ONCE
Status: COMPLETED | OUTPATIENT
Start: 2024-07-27 | End: 2024-07-27

## 2024-07-27 RX ORDER — SODIUM CHLORIDE 0.9 % (FLUSH) 0.9 %
10 SYRINGE (ML) INJECTION EVERY 12 HOURS SCHEDULED
Status: DISCONTINUED | OUTPATIENT
Start: 2024-07-27 | End: 2024-07-29 | Stop reason: HOSPADM

## 2024-07-27 RX ORDER — SODIUM CHLORIDE, SODIUM LACTATE, POTASSIUM CHLORIDE, CALCIUM CHLORIDE 600; 310; 30; 20 MG/100ML; MG/100ML; MG/100ML; MG/100ML
250 INJECTION, SOLUTION INTRAVENOUS CONTINUOUS
Status: DISCONTINUED | OUTPATIENT
Start: 2024-07-27 | End: 2024-07-27

## 2024-07-27 RX ORDER — SUCCINYLCHOLINE/SOD CL,ISO/PF 100 MG/5ML
SYRINGE (ML) INTRAVENOUS AS NEEDED
Status: DISCONTINUED | OUTPATIENT
Start: 2024-07-27 | End: 2024-07-27 | Stop reason: SURG

## 2024-07-27 RX ORDER — ONDANSETRON 2 MG/ML
INJECTION INTRAMUSCULAR; INTRAVENOUS AS NEEDED
Status: DISCONTINUED | OUTPATIENT
Start: 2024-07-27 | End: 2024-07-27 | Stop reason: SURG

## 2024-07-27 RX ORDER — PROMETHAZINE HYDROCHLORIDE 25 MG/1
25 SUPPOSITORY RECTAL ONCE AS NEEDED
Status: DISCONTINUED | OUTPATIENT
Start: 2024-07-27 | End: 2024-07-27

## 2024-07-27 RX ORDER — DEXAMETHASONE SODIUM PHOSPHATE 4 MG/ML
INJECTION, SOLUTION INTRA-ARTICULAR; INTRALESIONAL; INTRAMUSCULAR; INTRAVENOUS; SOFT TISSUE AS NEEDED
Status: DISCONTINUED | OUTPATIENT
Start: 2024-07-27 | End: 2024-07-27 | Stop reason: SURG

## 2024-07-27 RX ORDER — ARFORMOTEROL TARTRATE 15 UG/2ML
15 SOLUTION RESPIRATORY (INHALATION)
Status: DISCONTINUED | OUTPATIENT
Start: 2024-07-27 | End: 2024-07-29 | Stop reason: HOSPADM

## 2024-07-27 RX ORDER — NICOTINE POLACRILEX 4 MG
15 LOZENGE BUCCAL
Status: DISCONTINUED | OUTPATIENT
Start: 2024-07-27 | End: 2024-07-29 | Stop reason: HOSPADM

## 2024-07-27 RX ORDER — LORAZEPAM 2 MG/ML
0.25 INJECTION INTRAMUSCULAR 2 TIMES DAILY PRN
Status: DISCONTINUED | OUTPATIENT
Start: 2024-07-27 | End: 2024-07-29 | Stop reason: HOSPADM

## 2024-07-27 RX ADMIN — MEROPENEM 1000 MG: 1 INJECTION, POWDER, FOR SOLUTION INTRAVENOUS at 03:41

## 2024-07-27 RX ADMIN — SODIUM CHLORIDE, POTASSIUM CHLORIDE, SODIUM LACTATE AND CALCIUM CHLORIDE 150 ML/HR: 600; 310; 30; 20 INJECTION, SOLUTION INTRAVENOUS at 22:57

## 2024-07-27 RX ADMIN — Medication 10 ML: at 20:38

## 2024-07-27 RX ADMIN — ROCURONIUM BROMIDE 50 MG: 10 INJECTION, SOLUTION INTRAVENOUS at 13:52

## 2024-07-27 RX ADMIN — REVEFENACIN 175 MCG: 175 SOLUTION RESPIRATORY (INHALATION) at 10:03

## 2024-07-27 RX ADMIN — SODIUM CHLORIDE, POTASSIUM CHLORIDE, SODIUM LACTATE AND CALCIUM CHLORIDE 2000 ML: 600; 310; 30; 20 INJECTION, SOLUTION INTRAVENOUS at 03:41

## 2024-07-27 RX ADMIN — DEXAMETHASONE SODIUM PHOSPHATE 4 MG: 4 INJECTION, SOLUTION INTRAMUSCULAR; INTRAVENOUS at 13:29

## 2024-07-27 RX ADMIN — Medication 5 MG: at 13:20

## 2024-07-27 RX ADMIN — ARFORMOTEROL TARTRATE 15 MCG: 15 SOLUTION RESPIRATORY (INHALATION) at 19:27

## 2024-07-27 RX ADMIN — ONDANSETRON HYDROCHLORIDE 4 MG: 2 SOLUTION INTRAMUSCULAR; INTRAVENOUS at 14:08

## 2024-07-27 RX ADMIN — HYDROMORPHONE HYDROCHLORIDE 0.25 MG: 1 INJECTION, SOLUTION INTRAMUSCULAR; INTRAVENOUS; SUBCUTANEOUS at 12:29

## 2024-07-27 RX ADMIN — SODIUM CHLORIDE, POTASSIUM CHLORIDE, SODIUM LACTATE AND CALCIUM CHLORIDE 250 ML/HR: 600; 310; 30; 20 INJECTION, SOLUTION INTRAVENOUS at 05:40

## 2024-07-27 RX ADMIN — MEROPENEM 500 MG: 500 INJECTION, POWDER, FOR SOLUTION INTRAVENOUS at 16:21

## 2024-07-27 RX ADMIN — PROPOFOL 150 MG: 10 INJECTION, EMULSION INTRAVENOUS at 13:27

## 2024-07-27 RX ADMIN — BUDESONIDE 0.5 MG: 0.5 SUSPENSION RESPIRATORY (INHALATION) at 19:27

## 2024-07-27 RX ADMIN — Medication 10 ML: at 09:13

## 2024-07-27 RX ADMIN — Medication 100 MG: at 13:28

## 2024-07-27 RX ADMIN — CALCIUM CARBONATE 2 TABLET: 500 TABLET, CHEWABLE ORAL at 01:28

## 2024-07-27 RX ADMIN — PROPOFOL 50 MG: 10 INJECTION, EMULSION INTRAVENOUS at 13:28

## 2024-07-27 RX ADMIN — ARFORMOTEROL TARTRATE 15 MCG: 15 SOLUTION RESPIRATORY (INHALATION) at 10:03

## 2024-07-27 RX ADMIN — LIDOCAINE HYDROCHLORIDE 100 MG: 20 INJECTION, SOLUTION INTRAVENOUS at 13:25

## 2024-07-27 RX ADMIN — SODIUM CHLORIDE, POTASSIUM CHLORIDE, SODIUM LACTATE AND CALCIUM CHLORIDE 150 ML/HR: 600; 310; 30; 20 INJECTION, SOLUTION INTRAVENOUS at 16:21

## 2024-07-27 RX ADMIN — MEROPENEM 500 MG: 500 INJECTION, POWDER, FOR SOLUTION INTRAVENOUS at 10:55

## 2024-07-27 RX ADMIN — ONDANSETRON 4 MG: 2 INJECTION INTRAMUSCULAR; INTRAVENOUS at 01:28

## 2024-07-27 RX ADMIN — Medication 100 MG: at 13:44

## 2024-07-27 RX ADMIN — Medication 200 MCG: at 13:43

## 2024-07-27 RX ADMIN — SUGAMMADEX 200 MG: 100 INJECTION, SOLUTION INTRAVENOUS at 14:06

## 2024-07-27 RX ADMIN — IOPAMIDOL 100 ML: 755 INJECTION, SOLUTION INTRAVENOUS at 02:15

## 2024-07-27 RX ADMIN — MEROPENEM 500 MG: 500 INJECTION, POWDER, FOR SOLUTION INTRAVENOUS at 22:19

## 2024-07-27 RX ADMIN — Medication 5 MG: at 13:34

## 2024-07-27 RX ADMIN — SODIUM CHLORIDE, POTASSIUM CHLORIDE, SODIUM LACTATE AND CALCIUM CHLORIDE 50 ML/HR: 600; 310; 30; 20 INJECTION, SOLUTION INTRAVENOUS at 12:26

## 2024-07-27 RX ADMIN — BUDESONIDE 0.5 MG: 0.5 SUSPENSION RESPIRATORY (INHALATION) at 10:03

## 2024-07-27 RX ADMIN — FAMOTIDINE 20 MG: 10 INJECTION INTRAVENOUS at 01:28

## 2024-07-27 RX ADMIN — Medication 20 MG: at 13:27

## 2024-07-27 NOTE — H&P
[facility]  Date: 2024   Patient Name: nAca Samson  : 1950  MRN: [id number]  Primary Care Physician:  Rena Guerra PA  Date of admission: 2024    Subjective   Subjective     Chief Complaint: Epigastric and substernal chest pain    HPI:    Anca Samson is a 73 y.o. female with a past medical history of multiple myeloma currently on daratumumab, lenalidomide, COPD, hypertension, depression presented to the ED for evaluation of chest pain, vomiting.  Patient woke up from the sleep around 2 PM with epigastric and substernal chest discomfort.  Had multiple episodes of vomiting since then.  Associated with nausea.  Denies any fevers, chills, diarrhea.  Last bowel movement yesterday.  Denies alcohol use.  No similar episodes in the past.    In the ED, vital signs temperature 97.4, pulse 76, respiratory 18, blood pressure 124/88 on 2 L of nasal cannula saturating around 100%.  Normal troponin, normal proBNP, bicarb 20.4, AST//156, total bili 3, normal ALP, lipase greater than 3000, normal WBC, hemoglobin, platelets.  Chest x-ray showed no acute abnormality.  CT abdomen pelvis with contrast showed acute cholecystitis, biliary ductal dilatation.  No acute pancreatitis suggested.  Received IV fluids and meropenem in the ED.  Case has been discussed with ED physician with gastroenterologist on-call Dr. Kennedy, agreed to consult.  Patient has been admitted for further evaluation and management of acute cholecystitis, choledocholithiasis, gallstone pancreatitis      Personal History     Past Medical History:   Diagnosis Date    Anxiety     Asthma     Asthma 2021    Atherosclerosis of native coronary artery of native heart with angina pectoris 08/15/2023    FOLLOWED BY DR GONZALES/DINA PETTIT. DENIES CP BUT GETS SOA WITH EXERTION HAS COPD WEARS AT 2LPM N/C. DECREASED ACTIVITY D/T SOA    C. difficile diarrhea     DENIES ANY CURRENT ISSUES     Colon cancer 07/21/2017    Colon polyp     COPD (chronic obstructive pulmonary disease) 10/23/2017    COPD (chronic obstructive pulmonary disease) 10/23/2017    Depression     Disease of thyroid gland     Diverticulitis     Dysphagia     Essential hypertension 07/28/2021    Head injury     Heartburn     Hernia 2019    History of chemotherapy     2017    Hx of psychiatric care     Hyperlipidemia     Impaired fasting glucose 08/14/2015    Lumbago     Lung nodule 02/17/2022    Major depressive disorder 10/27/2016    Malignant neoplasm of ascending colon 07/21/2017    Melena     Multiple myeloma     Nicotine dependence 05/10/2017    NICK (obstructive sleep apnea) 11/06/2023    Oxygen dependent     REPORTS USES 02 AT 2LPM VIA N/C. CAN GET VERY SOA WITH MINIMAL EXERTION    Renal insufficiency 07/28/2021    Seizures     PSEUDO SEIZURES LAST ONE AROUND NOV 2022    Shortness of breath     CAN GET VERY SOA WITH MINIMAL EXERTION    Sleep apnea     Tobacco use 07/28/2021    QUIT SMOKING JUNE 2022    Visit for screening mammogram 02/20/2020    NORMAL- REPEAT IN ONE YEAR    Vitamin D deficiency          Past Surgical History:   Procedure Laterality Date    APPENDECTOMY      BONE MARROW BIOPSY  2016    COLON SURGERY  2017    COLONOSCOPY  2018,2017,2019    Merged with [facility]- DR LE:DIVERTICULOSIS AND ERYTHEMA OF MUCOSA    COLONOSCOPY N/A 12/20/2022    Procedure: COLONOSCOPY WITH ELEVIEW INJECTION, POLYPECTOMY, HOT SNARE, CLIP APPLICATION X3, BIOPSIES;  Surgeon: Jarvis Borges MD;  Location: [facility];  Service: Gastroenterology;  Laterality: N/A;  COLON POLYP, DIVERTICULOSIS, ANASTOMOSIS RIGHT COLON    COLONOSCOPY N/A 11/09/2023    Procedure: COLONOSCOPY;  Surgeon: Jarvis Borges MD;  Location: [facility] ENDOSCOPY;  Service: Gastroenterology;  Laterality: N/A;  DIVERTICULOSIS, ANASTOMOSIS IN ASCENDING COLON    ENDOSCOPY  2018    EYE SURGERY      FECAL DISIMPACTION  06/2019    TRANSPLANT     HEMICOLECTOMY  Right 2017    HYSTERECTOMY  ,    KNEE ARTHROSCOPY Left 2022    Procedure: KNEE ARTHROSCOPY WITH PARTIAL MEDIAL MENISCECTOMY,  CHONDROPLASTY;  Surgeon: Nicholas Tipton MD;  Location: [facility] OR [facility];  Service: Orthopedics;  Laterality: Left;    MINI-LAPAROTOMY  2017    PORTACATH PLACEMENT N/A     pt states that her port does not work    TONSILLECTOMY      UPPER GASTROINTESTINAL ENDOSCOPY  2018    VENOUS ACCESS DEVICE (PORT) INSERTION N/A 10/31/2023    Procedure: Port-a-catheter removal and port-a-catheter placement;  Surgeon: Alcon Delgado MD;  Location: [facility] OR [facility];  Service: General;  Laterality: N/A;         Family History   Problem Relation Age of Onset    Heart disease Mother     Lung cancer Mother     Cancer Mother     Stroke Father     Heart disease Father     Kidney cancer Father     Cancer Father     Stroke Other         UNCLE/AUNT    Colon cancer Neg Hx     Malig Hyperthermia Neg Hx          Social History     Socioeconomic History    Marital status:    Tobacco Use    Smoking status: Former     Current packs/day: 0.00     Average packs/day: 1 pack/day for 47.4 years (47.4 ttl pk-yrs)     Types: Cigarettes     Start date: 1975     Quit date: 6/3/2022     Years since quittin.1     Passive exposure: Past    Smokeless tobacco: Never   Vaping Use    Vaping status: Never Used   Substance and Sexual Activity    Alcohol use: Never    Drug use: Never    Sexual activity: Not Currently     Partners: Male     Birth control/protection: Hysterectomy         Home Medications:  FLUoxetine, LORazepam, Vitamin B 12, acyclovir, albuterol sulfate HFA, arformoterol, budesonide, busPIRone, colestipol, dapsone, dexAMETHasone, doxycycline, lenalidomide, levalbuterol, ondansetron, prednisoLONE acetate, promethazine, revefenacin, and vitamin D    Allergies:  Allergies   Allergen Reactions    Cephalexin Hives    Morphine Anaphylaxis    Penicillins Hives    Sulfa Antibiotics Hives       Review  of Systems   All other systems reviewed and negative except as mentioned above in the HPI    Objective   Objective     Vitals:   Temp:  [97.4 °F (36.3 °C)] 97.4 °F (36.3 °C)  Heart Rate:  [74-76] 74  Resp:  [18] 18  BP: (124)/(88) 124/88  Flow (L/min):  [2] 2    Physical Exam    Constitutional: Awake, alert, no acute distress   Eyes: Pupils equal, sclerae anicteric, no conjunctival injection   HENT: NCAT, mucous membranes moist   Respiratory: Clear to auscultation bilaterally, nonlabored respirations    Cardiovascular: RRR, no murmurs, rubs, or gallops, palpable pedal pulses bilaterally   Gastrointestinal: Positive bowel sounds, soft, tenderness in the epigastric region, nondistended   Musculoskeletal: No bilateral ankle edema, no clubbing or cyanosis to extremities   Neurologic: Oriented x 3, speech clear   Skin: No rashes     Result Review    Result Review:  I have personally reviewed the results from the time of this admission to 7/27/2024 04:14 EDT and agree with these findings:  [x]  Laboratory  []  Microbiology  [x]  Radiology  [x]  EKG/Telemetry   []  Cardiology/Vascular   []  Pathology  []  Old records  []  Other:        Imaging Results (Last 24 Hours)       Procedure Component Value Units Date/Time    CT Abdomen Pelvis With Contrast [[id number]] Collected: 07/27/24 0214     Updated: 07/27/24 0233    Narrative:      CT ABDOMEN PELVIS W CONTRAST-     Date of exam: 7/27/2024, 2:01 A.M.     Indications: Epigastric abdominal pain; elevated total bilirubin levels;  eval. for choledocholithiasis.     Comparisons: 10/23/2023; 3/7/2023; 3/7/2022; 8/23/2021.     TECHNIQUE:   Axial CT images were obtained of the abdomen and pelvis following the  uneventful intravenous administration of . Reconstructed coronal and  sagittal images were also obtained. Automated exposure control and  iterative construction methods were used.     FINDINGS:  CT findings suggest gallstones with acute cholecystitis. Gallbladder  wall  thickening and pericholecystic fluid are present. Biliary ductal  dilatation is seen. The maximum diameter of the proximal common bile  duct is about 1.1 cm. Obstructing distal biliary ductal stones are  possible. The gallbladder measures approximately 11.8 x 4 x 4.5 cm in  oblique transverse, oblique craniocaudal, and anterior-posterior (AP)  extent, respectively, as seen on image 65 of series 202 and image 118 of  series 201. There is intrahepatic biliary ductal dilatation. Probably no  hepatomegaly or splenomegaly. No definite acute pancreatitis. There are  findings suggestive of chronic pancreatitis. A surgical clip is seen  along the right lateral aspect of the inferior second portion of the  duodenum.     Additionally, no significant interval change is appreciated in the large  complex supra-umbilical ventral hernia, which contains colon and small  bowel, including a portion of colon with an anastomosis. It measures  approximately 12 x 10.2 x 4.3 cm in craniocaudal, transverse, and  anteroposterior (AP) extent, respectively. Again, it is suspected that  the patient has undergone right-sided colectomy and hysterectomy.  Extensive atherosclerotic changes are seen without aneurysmal  dilatation. No acute intraperitoneal or retroperitoneal hemorrhage.  Chronic calcified granulomatous disease involves the spleen. There are  small renal cysts. Colonic diverticula are present without acute  diverticulitis. No mechanical bowel obstruction. No pneumoperitoneum or  pneumatosis. There may be mild subsegmental atelectasis and/or fibrosis  in the lung bases, especially the lower lobes. No acute infiltrate is  suggested. There is a small hiatal hernia. Coronary artery  calcifications are suggested. No definite pleural effusion. No other  acute findings are appreciated. The other incidental nonemergent  findings are as described in prior [facility] imaging reports.        Impression:      The study is ABNORMAL. Gallstones are  present. New acute cholecystitis  is suggested by CT. There is biliary ductal dilatation. No acute  pancreatitis is suggested. Please see above comments for further detail.        Please note that portions of this note were completed with a voice  recognition program.                    Electronically Signed By-Sherwin Kenney MD On:7/27/2024 2:31 AM       XR Chest 1 View [[id number]] Collected: 07/26/24 2307     Updated: 07/26/24 2310    Narrative:      XR CHEST 1 VW    Date of Exam: 7/26/2024 11:01 PM EDT    Indication: SOA Triage Protocol    Comparison: 4/15/2024    Findings:  There is a right IJ port. Heart size and pulmonary vasculature are within normal limits. Lungs clear. Costophrenic angle sharp      Impression:      Impression:  No active cardiopulmonary disease      Electronically Signed: Adolph Drummond    7/26/2024 11:08 PM EDT    Workstation ID: [id number]             lactated ringers, 2,000 mL, Intravenous, Once         Assessment & Plan   Assessment / Plan     Assessment/Plan:   Acute cholecystitis  Choledocholithiasis  Gall stone pancreatitis  Transaminitis  Hyperbilirubinemia  COPD, not in exacerbation  Hypertension  Multiple myeloma on daratumumab and lenalidomide  Depression  NICK on BiPAP  Chronic hypoxia on 2 L home oxygen    Plan  Admit to inpatient, telemetry  MRCP  Gastroenterology consult in the a.m. Dr. Kennedy  IV fluids at 250cc/hr for next 6 hours and reassess volume status for further IV fluids   Continue meropenem  N.p.o.  Brovana, Pulmicort, revefenacin  Bronchodilator protocol  Albuterol every 6 hours as needed  Nightly BiPAP  Resume other appropriate home medications once reconciled  Hypoglycemia protocol    VTE Prophylaxis:  Mechanical VTE prophylaxis orders are signed & held.      CODE STATUS:    Level Of Support Discussed With: Patient  Code Status (Patient has no pulse and is not breathing): CPR (Attempt to Resuscitate)  Medical Interventions (Patient has pulse or is breathing): Full  Support      Admission Status:  I believe this patient meets inpatient status.    Part of this note may be an electronic transcription/translation of spoken language to printed text using the Dragon Dictation System    Dread Griffin MD

## 2024-07-27 NOTE — ANESTHESIA PREPROCEDURE EVALUATION
Anesthesia Evaluation     Patient summary reviewed and Nursing notes reviewed   NPO Solid Status: > 8 hours  NPO Liquid Status: > 2 hours           Airway   Mallampati: II  TM distance: >3 FB  Neck ROM: full  No difficulty expected and Large neck circumference  Dental    (+) upper dentures        Pulmonary    (+) COPD mild, asthma,home oxygen (2L all the time per pt), shortness of breath, sleep apnea (2L O2 at night) on CPAP, decreased breath sounds    ROS comment: PFT 8/25/23  Impression:  Mild obstruction seen on spirometry.  FEV1 79% predicted.  No significant response to bronchodilator.  Lung volumes with evidence of air trapping.  Significant reduction in DLCO.  Cardiovascular   Exercise tolerance: poor (<4 METS)    ECG reviewed  Rhythm: regular  Rate: normal    (+) hypertension, CAD, angina with exertion, PRAKASH, hyperlipidemia    ROS comment: Echo 9/6/23  Normal left ventricular systolic function.  No significant valve abnormalities noted.    Stress test 9/22/23  ·  Left ventricular ejection fraction is normal (Calculated EF = 55%).  ·  Myocardial perfusion imaging indicates a normal myocardial perfusion study with no evidence of ischemia.  ·  Findings consistent with a normal ECG stress test.       Neuro/Psych  (+) seizures, psychiatric history Anxiety and Depression  GI/Hepatic/Renal/Endo    (+) obesity, morbid obesity, hiatal hernia, renal disease- CRI, thyroid problem hypothyroidism    Musculoskeletal     Abdominal    Substance History      OB/GYN          Other   arthritis,   history of cancer    ROS/Med Hx Other: HEART RATE=72  bpm  RR Ppqfvtol=993  ms  OH Bvkaucsi=234  ms  P Horizontal Axis=  deg  P Front Axis=2  deg  QRSD Interval=83  ms  QT Uuetmhmd=503  ms  GIrM=884  ms  QRS Axis=53  deg  T Wave Axis=51  deg  - NORMAL ECG -  Sinus rhythm  Date and Time of Study:2024-07-26 22:51:02          ECHO 09/06/23:   Left Ventricle Left ventricular systolic function is normal. Calculated left ventricular EF =  61%     Normal left ventricular cavity size and wall thickness noted. All left ventricular wall segments contract normally.  Right Ventricle Normal right ventricular cavity size, wall thickness, systolic function and septal motion noted.  Left Atrium Normal left atrial size and volume noted.  Right Atrium Normal right atrial cavity size noted.  Aortic Valve No aortic valve regurgitation or stenosis is present. The aortic valve is abnormal in structure. The aortic valve exhibits sclerosis.  Mitral Valve The mitral valve is structurally normal with no regurgitation or significant stenosis present.  Tricuspid Valve The tricuspid valve is structurally normal with no significant regurgitation or significant stenosis present. Estimated right ventricular systolic pressure from tricuspid regurgitation is normal (<35 mmHg).  Pulmonic Valve The pulmonic valve is structurally normal with no regurgitation or significant stenosis present.  Greater Vessels. No dilation of the aortic root is present.  Pericardium The pericardium is normal. There is no evidence of pericardial effusion. .    EKG 11/01/22: SR 82                        Anesthesia Plan    ASA 4     general   total IV anesthesia  (Total IV Anesthesia    Patient understands anesthesia not responsible for dental damage.  )  intravenous induction     Anesthetic plan, risks, benefits, and alternatives have been provided, discussed and informed consent has been obtained with: patient.    Plan discussed with CRNA.      CODE STATUS:    Level Of Support Discussed With: Patient  Code Status (Patient has no pulse and is not breathing): CPR (Attempt to Resuscitate)  Medical Interventions (Patient has pulse or is breathing): Full Support

## 2024-07-27 NOTE — PLAN OF CARE
Goal Outcome Evaluation:  Plan of Care Reviewed With: patient        Progress: improving  Outcome Evaluation: Bipap set up 12/6 per doctor's order.  states that patient wears bipap at home.

## 2024-07-27 NOTE — ANESTHESIA POSTPROCEDURE EVALUATION
Patient: Anca Samson    Procedure Summary       Date: 07/27/24 Room / Location: [facility] OR 06 / [facility] MAIN OR    Anesthesia Start: 1317 Anesthesia Stop: 1422    Procedure: ENDOSCOPIC RETROGRADE CHOLANGIOPANCREATOGRAPHY WITH SPHINCTEROTOMY, BALLOON SWEEP, STENT PLACEMENT Diagnosis:       Choledocholithiasis with acute cholecystitis      (Choledocholithiasis with acute cholecystitis [K80.42])    Surgeons: Ajay Kennedy MD Provider: Soraya Cagle CRNA    Anesthesia Type: general ASA Status: 4            Anesthesia Type: general    Vitals  Vitals Value Taken Time   /66 07/27/24 1505   Temp 36.8 °C (98.2 °F) 07/27/24 1505   Pulse 86 07/27/24 1512   Resp 16 07/27/24 1505   SpO2 93 % 07/27/24 1512   Vitals shown include unfiled device data.        Post Anesthesia Care and Evaluation    Patient location during evaluation: bedside  Patient participation: complete - patient participated  Level of consciousness: awake  Pain management: adequate    Airway patency: patent  PONV Status: none  Cardiovascular status: acceptable and stable  Respiratory status: acceptable  Hydration status: acceptable

## 2024-07-27 NOTE — ANESTHESIA PREPROCEDURE EVALUATION
Anesthesia Evaluation     NPO Solid Status: > 8 hours  NPO Liquid Status: > 8 hours           Airway   Mallampati: II  TM distance: >3 FB  Neck ROM: limited  No difficulty expected and Large neck circumference  Dental    (+) upper dentures        Pulmonary    (+) COPD, asthma,home oxygen (2L all the time per pt), shortness of breath, sleep apnea (2L O2 at night, bipap 8/15) on CPAP, decreased breath sounds    ROS comment: PFT 8/25/23  Impression:  Mild obstruction seen on spirometry.  FEV1 79% predicted.  No significant response to bronchodilator.  Lung volumes with evidence of air trapping.  Significant reduction in DLCO.  Cardiovascular   Exercise tolerance: poor (<4 METS)    ECG reviewed  Rhythm: regular  Rate: normal    (+) hypertension, CAD, angina with exertion, PRAKASH, hyperlipidemia    ROS comment: Echo 9/6/23  Normal left ventricular systolic function.  No significant valve abnormalities noted.    Stress test 9/22/23  ·  Left ventricular ejection fraction is normal (Calculated EF = 55%).  ·  Myocardial perfusion imaging indicates a normal myocardial perfusion study with no evidence of ischemia.  ·  Findings consistent with a normal ECG stress test.       Neuro/Psych  (+) seizures (7/26/24 x2 non-epilleptical per pt), psychiatric history Anxiety and Depression  GI/Hepatic/Renal/Endo    (+) obesity, hiatal hernia, renal disease- CRI, thyroid problem hypothyroidism    Musculoskeletal     (+) arthralgias, back pain  Abdominal    Substance History      OB/GYN          Other   arthritis,   history of cancer    ROS/Med Hx Other: HEART RATE=72  bpm  RR Lpoepmjz=072  ms  MA Nyqkvfqd=253  ms  P Horizontal Axis=  deg  P Front Axis=2  deg  QRSD Interval=83  ms  QT Kkgjcyhd=658  ms  HVdT=879  ms  QRS Axis=53  deg  T Wave Axis=51  deg  - NORMAL ECG -  Sinus rhythm  Date and Time of Study:2024-07-26 22:51:02          ECHO 09/06/23:   Left Ventricle Left ventricular systolic function is normal. Calculated left ventricular EF  = 61%     Normal left ventricular cavity size and wall thickness noted. All left ventricular wall segments contract normally.  Right Ventricle Normal right ventricular cavity size, wall thickness, systolic function and septal motion noted.  Left Atrium Normal left atrial size and volume noted.  Right Atrium Normal right atrial cavity size noted.  Aortic Valve No aortic valve regurgitation or stenosis is present. The aortic valve is abnormal in structure. The aortic valve exhibits sclerosis.  Mitral Valve The mitral valve is structurally normal with no regurgitation or significant stenosis present.  Tricuspid Valve The tricuspid valve is structurally normal with no significant regurgitation or significant stenosis present. Estimated right ventricular systolic pressure from tricuspid regurgitation is normal (<35 mmHg).  Pulmonic Valve The pulmonic valve is structurally normal with no regurgitation or significant stenosis present.  Greater Vessels. No dilation of the aortic root is present.  Pericardium The pericardium is normal. There is no evidence of pericardial effusion. .    EKG 11/01/22: SR 82                        Anesthesia Plan    ASA 4     general   total IV anesthesia  (Total IV Anesthesia    Patient understands anesthesia not responsible for dental damage.  )  intravenous induction     Anesthetic plan, risks, benefits, and alternatives have been provided, discussed and informed consent has been obtained with: patient.    Plan discussed with CRNA.      CODE STATUS:    Level Of Support Discussed With: Patient  Code Status (Patient has no pulse and is not breathing): CPR (Attempt to Resuscitate)  Medical Interventions (Patient has pulse or is breathing): Full Support

## 2024-07-27 NOTE — ED PROVIDER NOTES
Time: 10:48 PM EDT  Date of encounter:  7/26/2024  Independent Historian/Clinical History and Information was obtained by:   Patient and Family    History is limited by: N/A    Chief Complaint: Chest pain, vomiting     History of Present Illness:  Patient is a 73 y.o. year old female who presents to the emergency department for evaluation chest pain that started today.  Patient states she awoke from a nap at 1400 today with constant epigastric to substernal chest discomfort.  She states she has vomited 5-7 times.  She denies fever or recent cough.  She does states she ate a dehydrated meal of chicken noodle soup this morning before laying down for her nap.  She denies any current abdominal pain.      HPI    Patient Care Team  Primary Care Provider: Rena Guerra PA    Past Medical History:     Allergies   Allergen Reactions    Cephalexin Hives    Morphine Anaphylaxis    Penicillins Hives    Sulfa Antibiotics Hives     Past Medical History:   Diagnosis Date    Anxiety     Asthma     Asthma 07/28/2021    Atherosclerosis of native coronary artery of native heart with angina pectoris 08/15/2023    FOLLOWED BY DR GONZALES/DINA PETTIT. DENIES CP BUT GETS SOA WITH EXERTION HAS COPD WEARS AT 2LPM N/C. DECREASED ACTIVITY D/T SOA    C. difficile diarrhea     DENIES ANY CURRENT ISSUES    Colon cancer 07/21/2017    Colon polyp     COPD (chronic obstructive pulmonary disease) 10/23/2017    COPD (chronic obstructive pulmonary disease) 10/23/2017    Depression     Disease of thyroid gland     Diverticulitis     Dysphagia     Essential hypertension 07/28/2021    Head injury     Heartburn     Hernia 2019    History of chemotherapy     2017    Hx of psychiatric care     Hyperlipidemia     Impaired fasting glucose 08/14/2015    Lumbago     Lung nodule 02/17/2022    Major depressive disorder 10/27/2016    Malignant neoplasm of ascending colon 07/21/2017    Melena     Multiple myeloma     Nicotine dependence 05/10/2017    NICK  (obstructive sleep apnea) 11/06/2023    Oxygen dependent     REPORTS USES 02 AT 2LPM VIA N/C. CAN GET VERY SOA WITH MINIMAL EXERTION    Renal insufficiency 07/28/2021    Seizures     PSEUDO SEIZURES LAST ONE AROUND NOV 2022    Shortness of breath     CAN GET VERY SOA WITH MINIMAL EXERTION    Sleep apnea     Tobacco use 07/28/2021    QUIT SMOKING JUNE 2022    Visit for screening mammogram 02/20/2020    NORMAL- REPEAT IN ONE YEAR    Vitamin D deficiency      Past Surgical History:   Procedure Laterality Date    APPENDECTOMY      BONE MARROW BIOPSY  2016    COLON SURGERY  2017    COLONOSCOPY  2018,2017,2019    [facility]- DR LE:DIVERTICULOSIS AND ERYTHEMA OF MUCOSA    COLONOSCOPY N/A 12/20/2022    Procedure: COLONOSCOPY WITH ELEVIEW INJECTION, POLYPECTOMY, HOT SNARE, CLIP APPLICATION X3, BIOPSIES;  Surgeon: Jarvis Borges MD;  Location: [facility] ENDOSCOPY;  Service: Gastroenterology;  Laterality: N/A;  COLON POLYP, DIVERTICULOSIS, ANASTOMOSIS RIGHT COLON    COLONOSCOPY N/A 11/09/2023    Procedure: COLONOSCOPY;  Surgeon: Jarvis Borges MD;  Location: [facility] ENDOSCOPY;  Service: Gastroenterology;  Laterality: N/A;  DIVERTICULOSIS, ANASTOMOSIS IN ASCENDING COLON    ENDOSCOPY  2018    EYE SURGERY      FECAL DISIMPACTION  06/2019    TRANSPLANT     HEMICOLECTOMY Right 05/2017    HYSTERECTOMY  1982,1984    KNEE ARTHROSCOPY Left 01/03/2022    Procedure: KNEE ARTHROSCOPY WITH PARTIAL MEDIAL MENISCECTOMY,  CHONDROPLASTY;  Surgeon: Nicholas Tipton MD;  Location: [facility] OR [facility];  Service: Orthopedics;  Laterality: Left;    MINI-LAPAROTOMY  2017    PORTACATH PLACEMENT N/A     pt states that her port does not work    TONSILLECTOMY      UPPER GASTROINTESTINAL ENDOSCOPY  2018    VENOUS ACCESS DEVICE (PORT) INSERTION N/A 10/31/2023    Procedure: Port-a-catheter removal and port-a-catheter placement;  Surgeon: Alcon Delgado MD;  Location: [facility];  Service: General;  Laterality: N/A;     Family  History   Problem Relation Age of Onset    Heart disease Mother     Lung cancer Mother     Cancer Mother     Stroke Father     Heart disease Father     Kidney cancer Father     Cancer Father     Stroke Other         UNCLE/AUNT    Colon cancer Neg Hx     Malig Hyperthermia Neg Hx        Home Medications:  Prior to Admission medications    Medication Sig Start Date End Date Taking? Authorizing Provider   acyclovir (ZOVIRAX) 400 MG tablet Take 1 tablet by mouth 2 (Two) Times a Day. Take one tablet by mouth twice daily. 10/26/23   West Nicolas MD   albuterol sulfate  (90 Base) MCG/ACT inhaler Inhale 2 puffs Every 4 (Four) Hours As Needed for Wheezing. 2/15/24   Radha Mena APRN   arformoterol (Brovana) 15 MCG/2ML nebulizer solution Take 2 mL by nebulization 2 (Two) Times a Day. 5/16/24   Felton Lee MD   budesonide (Pulmicort) 0.5 MG/2ML nebulizer solution Take 2 mL by nebulization Daily. 5/16/24   Felton Lee MD   busPIRone (BUSPAR) 10 MG tablet Take 1 tablet by mouth 2 (Two) Times a Day. 7/22/24   Kalia Singer MD   colestipol (COLESTID) 1 g tablet Take 2 tablets by mouth 2 (Two) Times a Day. 6/14/24   Erendira Clayton APRN   Cyanocobalamin (Vitamin B 12) 500 MCG tablet Take 2 tablets by mouth Daily. On hold for now    Provider, MD Herson   dapsone 25 MG tablet TAKE 2 TABLETS BY MOUTH TWICE A DAY 2/23/24   West Nicolas MD   dexAMETHasone (DECADRON) 4 MG tablet Take 10 tablets on D 1,8,15,22 and take 1 tablet on D 2,3,16,17.  Take in the morning with food. 12/27/23   West Nicolas MD   dexAMETHasone (DECADRON) 4 MG tablet Take 10 tablets on D 1,8,15,22 and take 1 tablet on D 2,3.  Take in the morning with food. 4/10/24   West Nicolas MD   doxycycline (VIBRAMYCIN) 100 MG capsule Take 1 capsule by mouth 2 (Two) Times a Day. 6/17/24   Rena Guerra PA   FLUoxetine (PROzac) 40 MG capsule Take 1 capsule by mouth Daily. 5/21/24   Rena Guerra PA    lenalidomide (REVLIMID) 15 MG capsule Take 1 capsule by mouth Daily. On Days 1-21, and off 7 days, on a 28 day cycle 24   West Nicolas MD   levalbuterol (XOPENEX) 0.63 MG/3ML nebulizer solution Take 1 ampule by nebulization 4 (Four) Times a Day As Needed for Wheezing. 5/15/24   Felton Lee MD   LORazepam (Ativan) 1 MG tablet Take 1 tablet by mouth 2 (Two) Times a Day As Needed for Anxiety. 24   West Nicolas MD   ondansetron (ZOFRAN) 8 MG tablet Take 1 tablet by mouth 3 (Three) Times a Day As Needed for Nausea or Vomiting. 10/26/23   West Nicolas MD   prednisoLONE acetate (PRED FORTE) 1 % ophthalmic suspension INSTILL 1 DROP INTO BOTH EYES 4 TIMES A DAY FOR 7 DAYS THEN TWICE A DAY FOR 7 DAYS THEN STOP 24   ProviderHerson MD   promethazine (PHENERGAN) 25 MG tablet Take 1 tablet by mouth Every 6 (Six) Hours As Needed for Nausea or Vomiting. 24   West Nicolas MD   revefenacin (Yupelri) 175 MCG/3ML nebulizer solution Take 3 mL by nebulization Daily. 24   Felton Lee MD   vitamin D (ERGOCALCIFEROL) 1.25 MG (08764 UT) capsule capsule Take 1 capsule by mouth 2 (Two) Times a Week. 24   Rena Guerra PA   lenalidomide (REVLIMID) 15 MG capsule Take 1 capsule by mouth Daily. On Days 1-21, and off 7 days, on a 28 day cycle 24  West Nicolas MD        Social History:   Social History     Tobacco Use    Smoking status: Former     Current packs/day: 0.00     Average packs/day: 1 pack/day for 47.4 years (47.4 ttl pk-yrs)     Types: Cigarettes     Start date: 1975     Quit date: 6/3/2022     Years since quittin.1     Passive exposure: Past    Smokeless tobacco: Never   Vaping Use    Vaping status: Never Used   Substance Use Topics    Alcohol use: Never    Drug use: Never         Review of Systems:  Review of Systems   Constitutional:  Negative for chills and fever.   HENT:  Negative for congestion, rhinorrhea and sore throat.    Eyes:   "Negative for photophobia.   Respiratory:  Positive for shortness of breath. Negative for apnea, cough and chest tightness.    Cardiovascular:  Positive for chest pain. Negative for palpitations.   Gastrointestinal:  Positive for abdominal pain, nausea and vomiting. Negative for diarrhea.   Endocrine: Negative.    Genitourinary:  Negative for difficulty urinating and dysuria.   Musculoskeletal:  Negative for back pain, joint swelling and myalgias.   Skin:  Negative for color change and wound.   Allergic/Immunologic: Negative.    Neurological:  Positive for seizures (Pseudoseizures). Negative for headaches.   Psychiatric/Behavioral: Negative.     All other systems reviewed and are negative.       Physical Exam:  /88   Pulse 74   Temp 97.4 °F (36.3 °C) (Oral)   Resp 18   Ht 152.4 cm (60\")   Wt 90.5 kg (199 lb 8.3 oz)   SpO2 100%   BMI 38.97 kg/m²     Physical Exam  Vitals and nursing note reviewed.   Constitutional:       General: She is awake.      Appearance: Normal appearance.   HENT:      Head: Normocephalic and atraumatic.      Nose: Nose normal.      Mouth/Throat:      Mouth: Mucous membranes are moist.   Eyes:      Extraocular Movements: Extraocular movements intact.      Pupils: Pupils are equal, round, and reactive to light.   Cardiovascular:      Rate and Rhythm: Normal rate and regular rhythm.      Heart sounds: Normal heart sounds.   Pulmonary:      Effort: Pulmonary effort is normal. No respiratory distress.      Breath sounds: Normal breath sounds. No wheezing, rhonchi or rales.   Abdominal:      General: Bowel sounds are normal. There is no distension.      Palpations: Abdomen is soft.      Tenderness: There is no abdominal tenderness. There is no guarding or rebound.      Comments: No rigidity   Musculoskeletal:         General: No tenderness. Normal range of motion.      Cervical back: Normal range of motion and neck supple.   Skin:     General: Skin is warm and dry.      Coloration: Skin " is not jaundiced.   Neurological:      General: No focal deficit present.      Mental Status: She is alert and oriented to person, place, and time. Mental status is at baseline.   Psychiatric:         Mood and Affect: Mood is anxious.         Behavior: Behavior normal.                  Procedures:  Procedures      Medical Decision Making:      Comorbidities that affect care:    Anxiety, depression, asthma/COPD, seizures, C. difficile    External Notes reviewed:    Previous Clinic Note: Psychiatry office visit 7/22/2024.  Description: Generalized anxiety disorder, major depressive disorder      The following orders were placed and all results were independently analyzed by me:  Orders Placed This Encounter   Procedures    Blood Culture - Blood,    Blood Culture - Blood,    XR Chest 1 View    CT Abdomen Pelvis With Contrast    MRI abdomen wo contrast mrcp    [place] Draw    Comprehensive Metabolic Panel    BNP    Single High Sensitivity Troponin T    CBC Auto Differential    Ethanol    Lipase    NPO Diet NPO Type: Strict NPO    Undress & Gown    Continuous Pulse Oximetry    Vital Signs    Gastroenterology (on-call MD unless specified)    Hospitalist (on-call MD unless specified)    Oxygen Therapy- Nasal Cannula; Titrate 1-6 LPM Per SpO2; 90 - 95%    ECG 12 Lead ED Triage Standing Order; SOA    Insert Peripheral IV    Inpatient Admission    CBC & Differential    Green Top (Gel)    Lavender Top    Gold Top - SST    Light Blue Top       Medications Given in the Emergency Department:  Medications   sodium chloride 0.9 % flush 10 mL (has no administration in time range)   lactated ringers bolus 2,000 mL (has no administration in time range)   meropenem (MERREM) 1,000 mg in sodium chloride 0.9 % 100 mL IVPB-VTB (has no administration in time range)   lactated ringers infusion (has no administration in time range)   famotidine (PEPCID) injection 20 mg (20 mg Intravenous Given 7/27/24 0128)   ondansetron (ZOFRAN) injection 4  "mg (4 mg Intravenous Given 7/27/24 0128)   calcium carbonate (TUMS) chewable tablet 500 mg (200 mg elemental) (2 tablets Oral Given 7/27/24 0128)   iopamidol (ISOVUE-370) 76 % injection 100 mL (100 mL Intravenous Given 7/27/24 0215)        ED Course:    ED Course as of 07/27/24 0310   Fri Jul 26, 2024 2249 --- PROVIDER IN TRIAGE NOTE ---    The patient was evaluated by me, Alberto Gavin in triage. Orders were placed and the patient is currently awaiting disposition.    [AJ]   Sat Jul 27, 2024   0058 I have personally interpreted the EKG today and it shows no evidence of any acute ischemia or heart arrhythmia. [RP]   0245 Diagnosis discussed with patient.  We discussed her listed allergies and she states it has been years and cannot recall, however feels certain she did not have a life-threatening reaction.  When asked what antibiotic she feels like is the safest she says \"just go with penicillin\". [RP]      ED Course User Index  [AJ] Alberto Gavin PA-C  [RP] Bar Baldwin MD       Labs:    Lab Results (last 24 hours)       Procedure Component Value Units Date/Time    CBC & Differential [[id number]]  (Abnormal) Collected: 07/27/24 0106    Specimen: Blood Updated: 07/27/24 0118    Narrative:      The following orders were created for panel order CBC & Differential.  Procedure                               Abnormality         Status                     ---------                               -----------         ------                     CBC Auto Differential[[id number]]        Abnormal            Final result                 Please view results for these tests on the individual orders.    Comprehensive Metabolic Panel [[id number]]  (Abnormal) Collected: 07/27/24 0106    Specimen: Blood Updated: 07/27/24 0138     Glucose 132 mg/dL      BUN 11 mg/dL      Creatinine 0.95 mg/dL      Sodium 141 mmol/L      Potassium 4.1 mmol/L      Chloride 105 mmol/L      CO2 20.4 mmol/L      Calcium 9.2 mg/dL      Total " Protein 6.0 g/dL      Albumin 3.9 g/dL      ALT (SGPT) 156 U/L      AST (SGOT) 198 U/L      Alkaline Phosphatase 100 U/L      Total Bilirubin 3.0 mg/dL      Globulin 2.1 gm/dL      A/G Ratio 1.9 g/dL      BUN/Creatinine Ratio 11.6     Anion Gap 15.6 mmol/L      eGFR 63.0 mL/min/1.73     Narrative:      GFR Normal >60  Chronic Kidney Disease <60  Kidney Failure <15    The GFR formula is only valid for adults with stable renal function between ages 18 and 70.    BNP [[id number]]  (Normal) Collected: 07/27/24 0106    Specimen: Blood Updated: 07/27/24 0138     proBNP 163.0 pg/mL     Narrative:      This assay is used as an aid in the diagnosis of individuals suspected of having heart failure. It can be used as an aid in the diagnosis of acute decompensated heart failure (ADHF) in patients presenting with signs and symptoms of ADHF to the emergency department (ED). In addition, NT-proBNP of <300 pg/mL indicates ADHF is not likely.    Age Range Result Interpretation  NT-proBNP Concentration (pg/mL:      <50             Positive            >450                   Gray                 300-450                    Negative             <300    50-75           Positive            >900                  Gray                300-900                  Negative            <300      >75             Positive            >1800                  Gray                300-1800                  Negative            <300    Single High Sensitivity Troponin T [[id number]]  (Normal) Collected: 07/27/24 0106    Specimen: Blood Updated: 07/27/24 0138     HS Troponin T 12 ng/L     Narrative:      High Sensitive Troponin T Reference Range:  <14.0 ng/L- Negative Female for AMI  <22.0 ng/L- Negative Male for AMI  >=14 - Abnormal Female indicating possible myocardial injury.  >=22 - Abnormal Male indicating possible myocardial injury.   Clinicians would have to utilize clinical acumen, EKG, Troponin, and serial changes to determine if it is an Acute  Myocardial Infarction or myocardial injury due to an underlying chronic condition.         CBC Auto Differential [[id number]]  (Abnormal) Collected: 07/27/24 0106    Specimen: Blood Updated: 07/27/24 0118     WBC 8.06 10*3/mm3      RBC 4.02 10*6/mm3      Hemoglobin 13.4 g/dL      Hematocrit 41.1 %      .2 fL      MCH 33.3 pg      MCHC 32.6 g/dL      RDW 15.0 %      RDW-SD 57.0 fl      MPV 9.9 fL      Platelets 215 10*3/mm3      Neutrophil % 47.3 %      Lymphocyte % 41.7 %      Monocyte % 8.4 %      Eosinophil % 1.1 %      Basophil % 1.4 %      Immature Grans % 0.1 %      Neutrophils, Absolute 3.81 10*3/mm3      Lymphocytes, Absolute 3.36 10*3/mm3      Monocytes, Absolute 0.68 10*3/mm3      Eosinophils, Absolute 0.09 10*3/mm3      Basophils, Absolute 0.11 10*3/mm3      Immature Grans, Absolute 0.01 10*3/mm3      nRBC 0.0 /100 WBC     Ethanol [[id number]] Collected: 07/27/24 0106    Specimen: Blood Updated: 07/27/24 0203     Ethanol <10 mg/dL      Ethanol % <0.010 %     Narrative:      Ethanol (Plasma)  <10 Essentially Negative    Toxic Concentrations           mg/dL    Flushing, slowing of reflexes    Impaired visual activity         Depression of CNS              >100  Possible Coma                  >300       Lipase [[id number]]  (Abnormal) Collected: 07/27/24 0106    Specimen: Blood Updated: 07/27/24 0210     Lipase >3,000 U/L              Imaging:    CT Abdomen Pelvis With Contrast    Result Date: 7/27/2024  CT ABDOMEN PELVIS W CONTRAST-  Date of exam: 7/27/2024, 2:01 A.M.  Indications: Epigastric abdominal pain; elevated total bilirubin levels; eval. for choledocholithiasis.  Comparisons: 10/23/2023; 3/7/2023; 3/7/2022; 8/23/2021.  TECHNIQUE: Axial CT images were obtained of the abdomen and pelvis following the uneventful intravenous administration of . Reconstructed coronal and sagittal images were also obtained. Automated exposure control and iterative construction methods were used.  FINDINGS:  CT findings suggest gallstones with acute cholecystitis. Gallbladder wall thickening and pericholecystic fluid are present. Biliary ductal dilatation is seen. The maximum diameter of the proximal common bile duct is about 1.1 cm. Obstructing distal biliary ductal stones are possible. The gallbladder measures approximately 11.8 x 4 x 4.5 cm in oblique transverse, oblique craniocaudal, and anterior-posterior (AP) extent, respectively, as seen on image 65 of series 202 and image 118 of series 201. There is intrahepatic biliary ductal dilatation. Probably no hepatomegaly or splenomegaly. No definite acute pancreatitis. There are findings suggestive of chronic pancreatitis. A surgical clip is seen along the right lateral aspect of the inferior second portion of the duodenum.  Additionally, no significant interval change is appreciated in the large complex supra-umbilical ventral hernia, which contains colon and small bowel, including a portion of colon with an anastomosis. It measures approximately 12 x 10.2 x 4.3 cm in craniocaudal, transverse, and anteroposterior (AP) extent, respectively. Again, it is suspected that the patient has undergone right-sided colectomy and hysterectomy. Extensive atherosclerotic changes are seen without aneurysmal dilatation. No acute intraperitoneal or retroperitoneal hemorrhage. Chronic calcified granulomatous disease involves the spleen. There are small renal cysts. Colonic diverticula are present without acute diverticulitis. No mechanical bowel obstruction. No pneumoperitoneum or pneumatosis. There may be mild subsegmental atelectasis and/or fibrosis in the lung bases, especially the lower lobes. No acute infiltrate is suggested. There is a small hiatal hernia. Coronary artery calcifications are suggested. No definite pleural effusion. No other acute findings are appreciated. The other incidental nonemergent findings are as described in prior [facility] imaging reports.      The study is  ABNORMAL. Gallstones are present. New acute cholecystitis is suggested by CT. There is biliary ductal dilatation. No acute pancreatitis is suggested. Please see above comments for further detail.   Please note that portions of this note were completed with a voice recognition program.       Electronically Signed By-Sherwin Kenney MD On:7/27/2024 2:31 AM      XR Chest 1 View    Result Date: 7/26/2024  XR CHEST 1 VW Date of Exam: 7/26/2024 11:01 PM EDT Indication: SOA Triage Protocol Comparison: 4/15/2024 Findings: There is a right IJ port. Heart size and pulmonary vasculature are within normal limits. Lungs clear. Costophrenic angle sharp     Impression: No active cardiopulmonary disease Electronically Signed: Adolph Drummond  7/26/2024 11:08 PM EDT  Workstation ID: [id number]       Differential Diagnosis and Discussion:    Abdominal Pain: Based on the patient's signs and symptoms, I considered abdominal aortic aneurysm, small bowel obstruction, pancreatitis, acute cholecystitis, acute appendecitis, peptic ulcer disease, gastritis, colitis, endocrine disorders, irritable bowel syndrome and other differential diagnosis an etiology of the patient's abdominal pain.    All labs were reviewed and interpreted by me.  EKG was interpreted by me.  CT scan radiology impression was interpreted by me.    MDM     Amount and/or Complexity of Data Reviewed  Decide to obtain previous medical records or to obtain history from someone other than the patient: yes                 Patient Care Considerations:    SEPSIS was considered but is NOT present in the emergency department as SIRS criteria is not present.      Consultants/Shared Management Plan:    Hospitalist: I have discussed the case with Dr. Griffin who agrees to accept the patient for admission.  Consultant: I have discussed the case with Dr. Kennedy who agrees to consult on the patient.    Social Determinants of Health:    Patient is independent, reliable, and has access to care.        Disposition and Care Coordination:    Admit:   Through independent evaluation of the patient's history, physical, and imperical data, the patient meets criteria for inpatient admission to the hospital.        Final diagnoses:   Choledocholithiasis with acute cholecystitis   Acute gallstone pancreatitis        ED Disposition       ED Disposition   Decision to Admit    Condition   --    Comment   Level of Care: Telemetry [5]   Diagnosis: Acute gallstone pancreatitis [[id number]]   Certification: I Certify That Inpatient Hospital Services Are Medically Necessary For Greater Than 2 Midnights                 This medical record created using voice recognition software.             Bar Baldwin MD  07/27/24 0315

## 2024-07-27 NOTE — PLAN OF CARE
Goal Outcome Evaluation:  Plan of Care Reviewed With: patient, spouse        Progress: no change     Pt arrived to room 211-1 from the ED with diagnosis of gallstone pancreatitis. Pt to have surgical consult in the morning. PT is NPO at this time. Alert and oriented x4. 2L O2/nc. Shortness of breath with minimal exertion. Denies pain on assessment. Call light in reach and vss. Spouse at bedside

## 2024-07-27 NOTE — PROGRESS NOTES
[facility]   Hospitalist Progress Note  Date: 2024  Patient Name: Anca Samson  : 1950  MRN: [id number]  Date of admission: 2024  Room/Bed: 211/1      Subjective   Subjective     Chief Complaint:   Abdominal pain    Summary:  Anca Samson is a 73 y.o. female with a medical history of multiple myeloma currently on daratumumab, lenalidomide, COPD, hypertension, depression presented to the ED for evaluation of chest pain, vomiting.  Patient woke up from the sleep around 2 PM with epigastric and substernal chest discomfort.  Had multiple episodes of vomiting since then. In the ED, vital signs temperature 97.4, pulse 76, respiratory 18, blood pressure 124/88 on 2 L of nasal cannula saturating around 100%.  Normal troponin, normal proBNP, bicarb 20.4, AST//156, total bili 3, normal ALP, lipase greater than 3000, normal WBC, hemoglobin, platelets.  Chest x-ray showed no acute abnormality.  CT abdomen pelvis with contrast showed acute cholecystitis, biliary ductal dilatation.  No acute pancreatitis suggested.  Received IV fluids and meropenem in the ED, due to allergies.  Gastroenterology was consulted in the emergency department.    Interval Followup:   MRCP confirms cholecystitis, choledocholithiasis.  Patient undergoing ERCP later today with gastroenterology.  Seen on rounds prior to procedure, patient resting comfortably in bed    Objective   Objective     Vitals:   Temp:  [97.4 °F (36.3 °C)-99 °F (37.2 °C)] 98.2 °F (36.8 °C)  Heart Rate:  [] 86  Resp:  [15-21] 16  BP: (112-169)/() 147/66  Flow (L/min):  [2] 2    Physical Exam               Constitutional: Awake, alert, no acute distress              Eyes: Pupils equal, sclerae anicteric, no conjunctival injection              HENT: NCAT, mucous membranes moist              Respiratory: Clear to auscultation bilaterally, nonlabored respirations               Cardiovascular: RRR, no murmurs, rubs, or gallops, palpable  pedal pulses bilaterally              Gastrointestinal: Positive bowel sounds, soft, tenderness in the epigastric region, nondistended.  No rebound and no guarding              Musculoskeletal: No bilateral ankle edema, no clubbing or cyanosis to extremities              Neurologic: Oriented x 3, speech clear              Skin: No rashes     Result Review    Result Review:  I have personally reviewed these results:  [x]  Laboratory      Lab 07/27/24  0803 07/27/24  0106   WBC 6.83 8.06   HEMOGLOBIN 11.9* 13.4   HEMATOCRIT 36.6 41.1   PLATELETS 190 215   NEUTROS ABS  --  3.81   IMMATURE GRANS (ABS)  --  0.01   LYMPHS ABS  --  3.36*   MONOS ABS  --  0.68   EOS ABS  --  0.09   .5* 102.2*         Lab 07/27/24  0803 07/27/24  0106   SODIUM 140 141   POTASSIUM 4.0 4.1   CHLORIDE 106 105   CO2 21.0* 20.4*   ANION GAP 13.0 15.6*   BUN 11 11   CREATININE 0.93 0.95   EGFR 64.6 63.0   GLUCOSE 137* 132*   CALCIUM 8.8 9.2   MAGNESIUM 1.9 1.8   PHOSPHORUS  --  2.5         Lab 07/27/24  0803 07/27/24  0106   TOTAL PROTEIN 5.0* 6.0   ALBUMIN 3.4* 3.9   GLOBULIN 1.6 2.1   ALT (SGPT) 152* 156*   AST (SGOT) 153* 198*   BILIRUBIN 3.4* 3.0*   ALK PHOS 108 100   LIPASE  --  >3,000*         Lab 07/27/24  0106   PROBNP 163.0   HSTROP T 12                 Brief Urine Lab Results       None          [x]  Microbiology   Microbiology Results (last 10 days)       ** No results found for the last 240 hours. **          [x]  Radiology  MRI abdomen wo contrast mrcp    Result Date: 7/27/2024  Impression: Cholecystitis. Choledocholithiasis. There is biliary ductal dilation extending to the level of the ampulla. There is a 3 mm stone in the mid/distal common bile duct, however no definite evidence of stone at the level of the ampulla. No evidence of discrete stone in the cystic duct. Gentle tapering of the common bile duct at the ampulla with no evidence of suspicious mass. Recommend gastroenterology referral with consideration for ERCP. No  MRI evidence of pancreatitis. Normal pancreatic duct. Electronically Signed: Arsenio Garcia MD  7/27/2024 9:49 AM EDT  Workstation ID: [id number]    CT Abdomen Pelvis With Contrast    Result Date: 7/27/2024  The study is ABNORMAL. Gallstones are present. New acute cholecystitis is suggested by CT. There is biliary ductal dilatation. No acute pancreatitis is suggested. Please see above comments for further detail.   Please note that portions of this note were completed with a voice recognition program.       Electronically Signed By-Sherwin Kenney MD On:7/27/2024 2:31 AM      XR Chest 1 View    Result Date: 7/26/2024  Impression: No active cardiopulmonary disease Electronically Signed: Adolph Drummond  7/26/2024 11:08 PM EDT  Workstation ID: [id number]   []  EKG/Telemetry   []  Cardiology/Vascular   []  Pathology  []  Old records  []  Other:    Assessment & Plan   Assessment / Plan     Assessment:  Acute cholecystitis  Choledocholithiasis  Gallstone pancreatitis  Transaminitis  Hyperbilirubinemia  COPD, not in exacerbation  Hypertension  Multiple myeloma on daratumumab and lenalidomide  Depression  NICK on BiPAP  Chronic hypoxia on 2 L home oxygen     Plan  Patient remains admitted to hospital for further care and management  Gastroenterologist, appreciate assistance  Patient underwent ERCP with gastroenterology on 7/27/2024  Will have general surgery evaluate patient  Patient continues on IV antibiotics, meropenem chosen due to allergies  Patient with no fever, no white count.  Can likely stop antibiotics soon  Patient started on clear liquid diet per GI  Patient's lipase noted to be greater than 3000, imaging does not show acute pancreatitis.  Will continue on aggressive IV fluids at this time  Discussed case with general surgery for consideration of cholecystectomy, appreciate assistance  Continue to trend LFTs    Discussed with RN.  Discussed with GI, discussed with general surgery    VTE Prophylaxis:  Mechanical VTE  prophylaxis orders are present.        CODE STATUS:   Level Of Support Discussed With: Patient  Code Status (Patient has no pulse and is not breathing): CPR (Attempt to Resuscitate)  Medical Interventions (Patient has pulse or is breathing): Full Support      Electronically signed by Bandar Caraballo MD, 7/27/2024, 15:32 EDT.

## 2024-07-27 NOTE — CONSULTS
Chief Complaint  Shortness of Breath    History of Present Illness  Anca Samson is a 73 y.o. female history of anxiety, asthma, coronary disease, sleep apnea, colon polyps and colon cancer, COPD, diverticulitis and diverticulosis, hypertension, multiple myeloma who presents to [facility] on referral from Bandar Caraballo MD for a gastroenterology evaluation of epigastric right upper quadrant pain.  Patient came to emergency room complaining of severe achy pain right upper quadrant epigastric region which radiating to the back and constant no plastering relieving factors.  Patient still in pain the pain is about 6-8/10 intensity.  He also have some nausea but no vomiting no fever shakes or chills.  Her evaluation included MRCP, CT scan, labs she seems to have acute cholecystitis along with dilatation of common bile duct and bile duct stones.  LFTs are abnormal.        Labs Result Review Imaging    Past Medical History:   Diagnosis Date    Anxiety     Asthma     Asthma 07/28/2021    Atherosclerosis of native coronary artery of native heart with angina pectoris 08/15/2023    FOLLOWED BY DR GONZALES/DINA PETTIT. DENIES CP BUT GETS SOA WITH EXERTION HAS COPD WEARS AT 2LPM N/C. DECREASED ACTIVITY D/T SOA    C. difficile diarrhea     DENIES ANY CURRENT ISSUES    Colon cancer 07/21/2017    Colon polyp     COPD (chronic obstructive pulmonary disease) 10/23/2017    COPD (chronic obstructive pulmonary disease) 10/23/2017    Depression     Disease of thyroid gland     Diverticulitis     Dysphagia     Essential hypertension 07/28/2021    Head injury     Heartburn     Hernia 2019    History of chemotherapy     2017    Hx of psychiatric care     Hyperlipidemia     Impaired fasting glucose 08/14/2015    Lumbago     Lung nodule 02/17/2022    Major depressive disorder 10/27/2016    Malignant neoplasm of ascending colon 07/21/2017    Melena     Multiple myeloma     Nicotine dependence 05/10/2017    NICK  (obstructive sleep apnea) 11/06/2023    Oxygen dependent     REPORTS USES 02 AT 2LPM VIA N/C. CAN GET VERY SOA WITH MINIMAL EXERTION    Renal insufficiency 07/28/2021    Seizures     PSEUDO SEIZURES LAST ONE AROUND NOV 2022    Shortness of breath     CAN GET VERY SOA WITH MINIMAL EXERTION    Sleep apnea     Tobacco use 07/28/2021    QUIT SMOKING JUNE 2022    Visit for screening mammogram 02/20/2020    NORMAL- REPEAT IN ONE YEAR    Vitamin D deficiency        Past Surgical History:   Procedure Laterality Date    APPENDECTOMY      BONE MARROW BIOPSY  2016    COLON SURGERY  2017    COLONOSCOPY  2018,2017,2019    [facility]- DR LE:DIVERTICULOSIS AND ERYTHEMA OF MUCOSA    COLONOSCOPY N/A 12/20/2022    Procedure: COLONOSCOPY WITH ELEVIEW INJECTION, POLYPECTOMY, HOT SNARE, CLIP APPLICATION X3, BIOPSIES;  Surgeon: Jarvis Borges MD;  Location: [facility] ENDOSCOPY;  Service: Gastroenterology;  Laterality: N/A;  COLON POLYP, DIVERTICULOSIS, ANASTOMOSIS RIGHT COLON    COLONOSCOPY N/A 11/09/2023    Procedure: COLONOSCOPY;  Surgeon: Jarvis Borges MD;  Location: [facility] ENDOSCOPY;  Service: Gastroenterology;  Laterality: N/A;  DIVERTICULOSIS, ANASTOMOSIS IN ASCENDING COLON    ENDOSCOPY  2018    EYE SURGERY      FECAL DISIMPACTION  06/2019    TRANSPLANT     HEMICOLECTOMY Right 05/2017    HYSTERECTOMY  1982,1984    KNEE ARTHROSCOPY Left 01/03/2022    Procedure: KNEE ARTHROSCOPY WITH PARTIAL MEDIAL MENISCECTOMY,  CHONDROPLASTY;  Surgeon: Nicholas Tipton MD;  Location: [facility] OR [facility] [place];  Service: Orthopedics;  Laterality: Left;    MINI-LAPAROTOMY  2017    PORTACATH PLACEMENT N/A     pt states that her port does not work    TONSILLECTOMY      UPPER GASTROINTESTINAL ENDOSCOPY  2018    VENOUS ACCESS DEVICE (PORT) INSERTION N/A 10/31/2023    Procedure: Port-a-catheter removal and port-a-catheter placement;  Surgeon: lAcon Delgado MD;  Location: [facility] OR [facility] [place];  Service: General;  Laterality: N/A;          Current Facility-Administered Medications:     albuterol (PROVENTIL) nebulizer solution 0.083% 2.5 mg/3mL, 2.5 mg, Nebulization, Q6H PRN, Dread Griffin MD    arformoterol (BROVANA) nebulizer solution 15 mcg, 15 mcg, Nebulization, BID - RT, Dread Griffin MD, 15 mcg at 07/27/24 1003    budesonide (PULMICORT) nebulizer solution 0.5 mg, 0.5 mg, Nebulization, BID - RT, Dread Griffin MD, 0.5 mg at 07/27/24 1003    dextrose (D50W) (25 g/50 mL) IV injection 25 g, 25 g, Intravenous, Q15 Min PRN, Dread Griffin MD    dextrose (GLUTOSE) oral gel 15 g, 15 g, Oral, Q15 Min PRN, Dread Griffin MD    glucagon (GLUCAGEN) injection 1 mg, 1 mg, Intramuscular, Q15 Min PRN, Dread Griffin MD    lactated ringers infusion, 250 mL/hr, Intravenous, Continuous, Dread Griffin MD, Last Rate: 250 mL/hr at 07/27/24 0540, 250 mL/hr at 07/27/24 0540    lactated ringers infusion, 50 mL/hr, Intravenous, Continuous PRN, Ajay Kennedy MD    meropenem (MERREM) 500 mg in sodium chloride 0.9 % 100 mL IVPB-VTB, 500 mg, Intravenous, Q6H, Dread Griffin MD    nitroglycerin (NITROSTAT) SL tablet 0.4 mg, 0.4 mg, Sublingual, Q5 Min PRN, Dread Griffin MD    ondansetron (ZOFRAN) injection 4 mg, 4 mg, Intravenous, Q6H PRN, Bandar Caraballo MD    Pharmacy to Dose meropenem (MERREM), , Does not apply, Continuous PRN, Dread Griffin MD    revefenacin (YUPELRI) nebulizer solution 175 mcg, 175 mcg, Nebulization, Daily - RT, Dread Griffin MD, 175 mcg at 07/27/24 1003    sodium chloride 0.9 % flush 10 mL, 10 mL, Intravenous, PRN, Dread Griffin MD    sodium chloride 0.9 % flush 10 mL, 10 mL, Intravenous, Q12H, Dread Griffin MD, 10 mL at 07/27/24 0913    sodium chloride 0.9 % flush 10 mL, 10 mL, Intravenous, PRN, Dread Griffin MD    sodium chloride 0.9 % infusion 40 mL, 40 mL, Intravenous, PRN, Dread Griffin MD     Allergies   Allergen Reactions    Cephalexin Hives     "Morphine Anaphylaxis    Penicillins Hives    Sulfa Antibiotics Hives       Family History   Problem Relation Age of Onset    Heart disease Mother     Lung cancer Mother     Cancer Mother     Stroke Father     Heart disease Father     Kidney cancer Father     Cancer Father     Stroke Other         UNCLE/AUNT    Colon cancer Neg Hx     Malig Hyperthermia Neg Hx         Social History     Social History Narrative    Not on file     Social is neck smoking, alcohol use, drug  Immunization:  Immunization History   Administered Date(s) Administered    Fluzone High Dose =>65 Years ([employer] ONLY) 09/24/2020, 09/24/2021    Fluzone High-Dose 65+yrs 09/24/2020, 09/24/2021, 09/20/2022, 10/10/2023    Fluzone Quad >6mos (Multi-dose) 09/14/2015    Pneumococcal Conjugate 13-Valent (PCV13) 09/21/2015    Pneumococcal Polysaccharide (PPSV23) 04/12/2022        Objective     Review of Systems 10 system review is negative except as mentioned HPI    Vital Signs:   /98 (BP Location: Left arm, Patient Position: Sitting)   Pulse 77   Temp 98.1 °F (36.7 °C) (Oral)   Resp 20   Ht 152.4 cm (60\")   Wt 88.2 kg (194 lb 7.1 oz)   SpO2 95%   BMI 37.98 kg/m²       Physical Exam  Constitutional:       General: She is awake. She is not in acute distress.     Appearance: Normal appearance. She is well-developed and well-groomed.   HENT:      Head: Normocephalic and atraumatic.      Mouth/Throat:      Mouth: Mucous membranes are moist.      Comments: Bony dental hygiene is good  Eyes:      General: Lids are normal.      Conjunctiva/sclera: Conjunctivae normal.      Pupils: Pupils are equal, round, and reactive to light.   Neck:      Thyroid: No thyroid mass.      Trachea: Trachea normal.   Cardiovascular:      Rate and Rhythm: Normal rate and regular rhythm.      Heart sounds: Normal heart sounds.   Pulmonary:      Effort: Pulmonary effort is normal.      Breath sounds: Normal breath sounds and air entry.   Abdominal:      General: Abdomen is " flat. Bowel sounds are normal. There is no distension.      Palpations: Abdomen is soft. There is no mass.      Tenderness: There is no abdominal tenderness. There is no guarding.   Musculoskeletal:      Cervical back: Neck supple.      Right lower leg: No edema.      Left lower leg: No edema.   Skin:     General: Skin is warm and moist.      Coloration: Skin is not cyanotic, jaundiced or pale.      Findings: No rash.      Nails: There is no clubbing.   Neurological:      Mental Status: She is alert and oriented to person, place, and time.   Psychiatric:         Attention and Perception: Attention normal.         Mood and Affect: Mood and affect normal.         Speech: Speech normal.         Labs:  Results from last 7 days   Lab Units 07/27/24  0803 07/27/24  0106   WBC 10*3/mm3 6.83 8.06   HEMOGLOBIN g/dL 11.9* 13.4   HEMATOCRIT % 36.6 41.1   PLATELETS 10*3/mm3 190 215      Results from last 7 days   Lab Units 07/27/24  0803 07/27/24  0106   SODIUM mmol/L 140 141   POTASSIUM mmol/L 4.0 4.1   CHLORIDE mmol/L 106 105   CO2 mmol/L 21.0* 20.4*   BUN mg/dL 11 11   CREATININE mg/dL 0.93 0.95   CALCIUM mg/dL 8.8 9.2   BILIRUBIN mg/dL 3.4* 3.0*   ALK PHOS U/L 108 100   ALT (SGPT) U/L 152* 156*   AST (SGOT) U/L 153* 198*   GLUCOSE mg/dL 137* 132*             Assessment & Plan:  Principal Problem:    Acute gallstone pancreatitis  Choledocholithiasis with obstruction and with acute cholecystitis    Plan to proceed with ERCP.  Risk benefits, nature, indication of procedure been discussed with patient.  Risk including 5 to 7% risk of pancreatitis, bleeding, infection, need for surgery, permanent damage, long hospital stay, possibility of death and organ failure have been thoroughly discussed with the patient with the help of diagrams.  Patient  was also present during this conversation.  They understood asked questions which were fully answered and request to proceed with ERCP here at [facility] by  me.    Patient was given instructions and counseling regarding her condition or for health maintenance advice. Please see specific information pulled into the AVS if appropriate.        Signed:  Martinez Kennedy MD  07/27/24  10:20 EDT

## 2024-07-27 NOTE — PLAN OF CARE
Goal Outcome Evaluation:           Progress: improving  Outcome Evaluation: Patient with ERCP today, VSS post-op. IV abx. given. IVF infusing. Treated for c/o pain x1 per MAR.

## 2024-07-28 LAB
ALBUMIN SERPL-MCNC: 2.7 G/DL (ref 3.5–5.2)
ALBUMIN/GLOB SERPL: 1.4 G/DL
ALP SERPL-CCNC: 114 U/L (ref 39–117)
ALT SERPL W P-5'-P-CCNC: 102 U/L (ref 1–33)
ANION GAP SERPL CALCULATED.3IONS-SCNC: 12.3 MMOL/L (ref 5–15)
AST SERPL-CCNC: 54 U/L (ref 1–32)
BILIRUB SERPL-MCNC: 0.8 MG/DL (ref 0–1.2)
BUN SERPL-MCNC: 10 MG/DL (ref 8–23)
BUN/CREAT SERPL: 11.9 (ref 7–25)
CALCIUM SPEC-SCNC: 8.3 MG/DL (ref 8.6–10.5)
CHLORIDE SERPL-SCNC: 108 MMOL/L (ref 98–107)
CO2 SERPL-SCNC: 18.7 MMOL/L (ref 22–29)
CREAT SERPL-MCNC: 0.84 MG/DL (ref 0.57–1)
DEPRECATED RDW RBC AUTO: 63.1 FL (ref 37–54)
EGFRCR SERPLBLD CKD-EPI 2021: 73 ML/MIN/1.73
ERYTHROCYTE [DISTWIDTH] IN BLOOD BY AUTOMATED COUNT: 15.5 % (ref 12.3–15.4)
GLOBULIN UR ELPH-MCNC: 1.9 GM/DL
GLUCOSE BLDC GLUCOMTR-MCNC: 112 MG/DL (ref 70–99)
GLUCOSE BLDC GLUCOMTR-MCNC: 137 MG/DL (ref 70–99)
GLUCOSE BLDC GLUCOMTR-MCNC: 147 MG/DL (ref 70–99)
GLUCOSE SERPL-MCNC: 121 MG/DL (ref 65–99)
HCT VFR BLD AUTO: 34.7 % (ref 34–46.6)
HGB BLD-MCNC: 10.5 G/DL (ref 12–15.9)
MAGNESIUM SERPL-MCNC: 1.8 MG/DL (ref 1.6–2.4)
MCH RBC QN AUTO: 33.1 PG (ref 26.6–33)
MCHC RBC AUTO-ENTMCNC: 30.3 G/DL (ref 31.5–35.7)
MCV RBC AUTO: 109.5 FL (ref 79–97)
PLATELET # BLD AUTO: 174 10*3/MM3 (ref 140–450)
PMV BLD AUTO: 10 FL (ref 6–12)
POTASSIUM SERPL-SCNC: 4 MMOL/L (ref 3.5–5.2)
PROT SERPL-MCNC: 4.6 G/DL (ref 6–8.5)
RBC # BLD AUTO: 3.17 10*6/MM3 (ref 3.77–5.28)
SODIUM SERPL-SCNC: 139 MMOL/L (ref 136–145)
WBC NRBC COR # BLD AUTO: 6.62 10*3/MM3 (ref 3.4–10.8)

## 2024-07-28 PROCEDURE — 82948 REAGENT STRIP/BLOOD GLUCOSE: CPT

## 2024-07-28 PROCEDURE — 63710000001 REVEFENACIN 175 MCG/3ML SOLUTION: Performed by: INTERNAL MEDICINE

## 2024-07-28 PROCEDURE — 94799 UNLISTED PULMONARY SVC/PX: CPT

## 2024-07-28 PROCEDURE — 85027 COMPLETE CBC AUTOMATED: CPT | Performed by: INTERNAL MEDICINE

## 2024-07-28 PROCEDURE — 83735 ASSAY OF MAGNESIUM: CPT | Performed by: INTERNAL MEDICINE

## 2024-07-28 PROCEDURE — 94760 N-INVAS EAR/PLS OXIMETRY 1: CPT

## 2024-07-28 PROCEDURE — 25010000002 MEROPENEM PER 100 MG: Performed by: INTERNAL MEDICINE

## 2024-07-28 PROCEDURE — 25810000003 LACTATED RINGERS PER 1000 ML: Performed by: INTERNAL MEDICINE

## 2024-07-28 PROCEDURE — 99232 SBSQ HOSP IP/OBS MODERATE 35: CPT | Performed by: INTERNAL MEDICINE

## 2024-07-28 PROCEDURE — 25010000002 HYDROMORPHONE 1 MG/ML SOLUTION: Performed by: INTERNAL MEDICINE

## 2024-07-28 PROCEDURE — 82948 REAGENT STRIP/BLOOD GLUCOSE: CPT | Performed by: PHYSICIAN ASSISTANT

## 2024-07-28 PROCEDURE — 99221 1ST HOSP IP/OBS SF/LOW 40: CPT | Performed by: SURGERY

## 2024-07-28 PROCEDURE — 94660 CPAP INITIATION&MGMT: CPT

## 2024-07-28 PROCEDURE — 94664 DEMO&/EVAL PT USE INHALER: CPT

## 2024-07-28 PROCEDURE — 80053 COMPREHEN METABOLIC PANEL: CPT | Performed by: INTERNAL MEDICINE

## 2024-07-28 RX ORDER — FLUOXETINE HYDROCHLORIDE 20 MG/1
40 CAPSULE ORAL DAILY
Status: DISCONTINUED | OUTPATIENT
Start: 2024-07-29 | End: 2024-07-29 | Stop reason: HOSPADM

## 2024-07-28 RX ORDER — BUSPIRONE HYDROCHLORIDE 10 MG/1
10 TABLET ORAL 2 TIMES DAILY
Status: DISCONTINUED | OUTPATIENT
Start: 2024-07-28 | End: 2024-07-29 | Stop reason: HOSPADM

## 2024-07-28 RX ORDER — SODIUM CHLORIDE, SODIUM LACTATE, POTASSIUM CHLORIDE, CALCIUM CHLORIDE 600; 310; 30; 20 MG/100ML; MG/100ML; MG/100ML; MG/100ML
75 INJECTION, SOLUTION INTRAVENOUS CONTINUOUS
Status: DISCONTINUED | OUTPATIENT
Start: 2024-07-28 | End: 2024-07-29

## 2024-07-28 RX ORDER — LORAZEPAM 0.5 MG/1
1 TABLET ORAL 2 TIMES DAILY PRN
Status: DISCONTINUED | OUTPATIENT
Start: 2024-07-28 | End: 2024-07-29 | Stop reason: HOSPADM

## 2024-07-28 RX ORDER — ASPIRIN 81 MG/1
81 TABLET ORAL DAILY
Status: DISCONTINUED | OUTPATIENT
Start: 2024-07-29 | End: 2024-07-29 | Stop reason: HOSPADM

## 2024-07-28 RX ADMIN — REVEFENACIN 175 MCG: 175 SOLUTION RESPIRATORY (INHALATION) at 09:54

## 2024-07-28 RX ADMIN — MEROPENEM 500 MG: 500 INJECTION, POWDER, FOR SOLUTION INTRAVENOUS at 15:20

## 2024-07-28 RX ADMIN — BUSPIRONE HYDROCHLORIDE 10 MG: 10 TABLET ORAL at 20:00

## 2024-07-28 RX ADMIN — SODIUM CHLORIDE, POTASSIUM CHLORIDE, SODIUM LACTATE AND CALCIUM CHLORIDE 75 ML/HR: 600; 310; 30; 20 INJECTION, SOLUTION INTRAVENOUS at 15:20

## 2024-07-28 RX ADMIN — BUDESONIDE 0.5 MG: 0.5 SUSPENSION RESPIRATORY (INHALATION) at 09:54

## 2024-07-28 RX ADMIN — MEROPENEM 500 MG: 500 INJECTION, POWDER, FOR SOLUTION INTRAVENOUS at 21:53

## 2024-07-28 RX ADMIN — BUDESONIDE 0.5 MG: 0.5 SUSPENSION RESPIRATORY (INHALATION) at 18:41

## 2024-07-28 RX ADMIN — ARFORMOTEROL TARTRATE 15 MCG: 15 SOLUTION RESPIRATORY (INHALATION) at 18:41

## 2024-07-28 RX ADMIN — MEROPENEM 500 MG: 500 INJECTION, POWDER, FOR SOLUTION INTRAVENOUS at 09:10

## 2024-07-28 RX ADMIN — HYDROMORPHONE HYDROCHLORIDE 0.25 MG: 1 INJECTION, SOLUTION INTRAMUSCULAR; INTRAVENOUS; SUBCUTANEOUS at 21:56

## 2024-07-28 RX ADMIN — ARFORMOTEROL TARTRATE 15 MCG: 15 SOLUTION RESPIRATORY (INHALATION) at 09:54

## 2024-07-28 RX ADMIN — MEROPENEM 500 MG: 500 INJECTION, POWDER, FOR SOLUTION INTRAVENOUS at 04:06

## 2024-07-28 RX ADMIN — HYDROMORPHONE HYDROCHLORIDE 0.25 MG: 1 INJECTION, SOLUTION INTRAMUSCULAR; INTRAVENOUS; SUBCUTANEOUS at 13:33

## 2024-07-28 NOTE — PROGRESS NOTES
RT EQUIPMENT DEVICE RELATED - SKIN ASSESSMENT    RT Medical Equipment/Device:     NIV Mask:  Under-the-nose   size:  b    Skin Assessment:      Cheek:  Intact  Chin:  Intact  Nose:  Intact  Lips:  Intact    Device Skin Pressure Protection:  Pressure points protected    Nurse Notification:  Marisela Giordano, RRT

## 2024-07-28 NOTE — PLAN OF CARE
Goal Outcome Evaluation:           Progress: improving  Outcome Evaluation: Pt slept most of the night assisted x1 to bsc.  reported that she was having no pain and feeling better at this time. Expects to go home today.  IV fluids still infusing and abt. no adverse effects from antibiotic noted.

## 2024-07-28 NOTE — PLAN OF CARE
Goal Outcome Evaluation:  Plan of Care Reviewed With: patient        Progress: no change  Outcome Evaluation: Patient wore bipap for about 4 hours last night. Unit is on standby in the room. Currently on 2L.

## 2024-07-28 NOTE — H&P (VIEW-ONLY)
[facility]   Consult    Patient Name: Anca Samson  : 1950  MRN: [id number]  Primary Care Physician:  Rena Guerra PA  Date of admission: 2024    Subjective   Subjective     Chief Complaint: Upper abdominal pain    HPI: Anca Samson is a 73 y.o. female who came to the hospital yesterday with acute pancreatitis and obstructing common bile duct stones.  She was taken to the operating room yesterday and underwent a successful ERCP with stone removal and stent placement.  She is feeling better today but is still having some epigastric abdominal pain from her pancreatitis.    Review of Systems   Respiratory:  Negative for shortness of breath.    Cardiovascular:  Negative for chest pain.   Gastrointestinal:  Positive for abdominal pain.       Personal History     Past Medical History:   Diagnosis Date    Anxiety     Asthma     Asthma 2021    Atherosclerosis of native coronary artery of native heart with angina pectoris 08/15/2023    FOLLOWED BY DR GONZALES/DINA PETTIT. DENIES CP BUT GETS SOA WITH EXERTION HAS COPD WEARS AT 2LPM N/C. DECREASED ACTIVITY D/T SOA    C. difficile diarrhea     DENIES ANY CURRENT ISSUES    Colon cancer 2017    Colon polyp     COPD (chronic obstructive pulmonary disease) 10/23/2017    COPD (chronic obstructive pulmonary disease) 10/23/2017    Depression     Disease of thyroid gland     Diverticulitis     Dysphagia     Essential hypertension 2021    Head injury     Heartburn     Hernia 2019    History of chemotherapy     2017    Hx of psychiatric care     Hyperlipidemia     Impaired fasting glucose 2015    Lumbago     Lung nodule 2022    Major depressive disorder 10/27/2016    Malignant neoplasm of ascending colon 2017    Melena     Multiple myeloma     Nicotine dependence 05/10/2017    NICK (obstructive sleep apnea) 2023    Oxygen dependent     REPORTS USES 02 AT 2LPM VIA N/C. CAN GET VERY SOA WITH MINIMAL EXERTION    Renal  insufficiency 07/28/2021    Seizures     PSEUDO SEIZURES LAST ONE AROUND NOV 2022    Shortness of breath     CAN GET VERY SOA WITH MINIMAL EXERTION    Sleep apnea     Tobacco use 07/28/2021    QUIT SMOKING JUNE 2022    Visit for screening mammogram 02/20/2020    NORMAL- REPEAT IN ONE YEAR    Vitamin D deficiency        Past Surgical History:   Procedure Laterality Date    APPENDECTOMY      BONE MARROW BIOPSY  2016    COLON SURGERY  2017    COLONOSCOPY  2018,2017,2019    [facility]- DR LE:DIVERTICULOSIS AND ERYTHEMA OF MUCOSA    COLONOSCOPY N/A 12/20/2022    Procedure: COLONOSCOPY WITH ELEVIEW INJECTION, POLYPECTOMY, HOT SNARE, CLIP APPLICATION X3, BIOPSIES;  Surgeon: Jarvis Borges MD;  Location: [facility] ENDOSCOPY;  Service: Gastroenterology;  Laterality: N/A;  COLON POLYP, DIVERTICULOSIS, ANASTOMOSIS RIGHT COLON    COLONOSCOPY N/A 11/09/2023    Procedure: COLONOSCOPY;  Surgeon: Jarvis Borges MD;  Location: [facility] ENDOSCOPY;  Service: Gastroenterology;  Laterality: N/A;  DIVERTICULOSIS, ANASTOMOSIS IN ASCENDING COLON    ENDOSCOPY  2018    EYE SURGERY      FECAL DISIMPACTION  06/2019    TRANSPLANT     HEMICOLECTOMY Right 05/2017    HYSTERECTOMY  1982,1984    KNEE ARTHROSCOPY Left 01/03/2022    Procedure: KNEE ARTHROSCOPY WITH PARTIAL MEDIAL MENISCECTOMY,  CHONDROPLASTY;  Surgeon: Nicholas Tipton MD;  Location: [facility] OR [facility];  Service: Orthopedics;  Laterality: Left;    MINI-LAPAROTOMY  2017    PORTACATH PLACEMENT N/A     pt states that her port does not work    TONSILLECTOMY      UPPER GASTROINTESTINAL ENDOSCOPY  2018    VENOUS ACCESS DEVICE (PORT) INSERTION N/A 10/31/2023    Procedure: Port-a-catheter removal and port-a-catheter placement;  Surgeon: Alcon Delgado MD;  Location: [facility] OR [facility];  Service: General;  Laterality: N/A;       Family History: family history includes Cancer in her father and mother; Heart disease in her father and mother; Kidney cancer in her father; Lung  cancer in her mother; Stroke in her father and another family member. Otherwise pertinent FHx was reviewed and not pertinent to current issue.    Social History:  reports that she quit smoking about 2 years ago. Her smoking use included cigarettes. She started smoking about 49 years ago. She has a 47.4 pack-year smoking history. She has been exposed to tobacco smoke. She has never used smokeless tobacco. She reports that she does not drink alcohol and does not use drugs.    Home Medications:  FLUoxetine, LORazepam, Vitamin B 12, acyclovir, albuterol sulfate HFA, arformoterol, aspirin, budesonide, busPIRone, colestipol, dapsone, dexAMETHasone, doxycycline, lenalidomide, levalbuterol, ondansetron, prednisoLONE acetate, promethazine, revefenacin, and vitamin D    Allergies:  Allergies   Allergen Reactions    Cephalexin Hives    Morphine Anaphylaxis    Penicillins Hives    Sulfa Antibiotics Hives       Objective    Objective     Vitals:   Temp:  [97.7 °F (36.5 °C)-99 °F (37.2 °C)] 97.7 °F (36.5 °C)  Heart Rate:  [] 81  Resp:  [15-20] 18  BP: ()/() 118/48  Flow (L/min):  [2] 2    Physical Exam  Constitutional:       Appearance: She is obese.   Cardiovascular:      Rate and Rhythm: Normal rate.   Pulmonary:      Effort: Pulmonary effort is normal.   Abdominal:      Tenderness: There is abdominal tenderness.      Hernia: Hernia is present in the ventral area.   Musculoskeletal:         General: Normal range of motion.      Cervical back: Normal range of motion.   Skin:     General: Skin is warm.   Neurological:      General: No focal deficit present.      Mental Status: She is alert.   Psychiatric:         Mood and Affect: Mood normal.         Result Review    Result Review:  I have personally reviewed the results from the time of this admission to 7/28/2024 10:20 EDT and agree with these findings:  [x]  Laboratory  []  Microbiology  []  Radiology  []  EKG/Telemetry   []  Cardiology/Vascular   []   Pathology  []  Old records  []  Other:  Most notable findings include: Total bilirubin 0.8 white blood cell count 6.6.    Assessment & Plan   Assessment / Plan     Brief Patient Summary:  Anca Samson is a 73 y.o. female who presented with acute gallstone pancreatitis as well as obstructing common bile duct stones.  She is doing better following ERCP with stent placement.    Active Hospital Problems:  Active Hospital Problems    Diagnosis     **Acute gallstone pancreatitis     Choledocholithiasis with acute cholecystitis        Plan: I think it would be fine for her to be discharged to home.  I will arrange follow-up and we will go ahead and schedule her for a robotic cholecystectomy.  She has a midline incisional hernia and I suspect that we will need to get her through her gallbladder surgery and then we can come back at a later date and address the hernia.    VTE Prophylaxis:  Mechanical VTE prophylaxis orders are present.        CODE STATUS:    Level Of Support Discussed With: Patient  Code Status (Patient has no pulse and is not breathing): CPR (Attempt to Resuscitate)  Medical Interventions (Patient has pulse or is breathing): Full Support      Admission Status:  I believe this patient meets inpatient status.    Electronically signed by Eduardo Ingram MD, 07/28/24, 10:20 AM EDT.

## 2024-07-28 NOTE — CONSULTS
[facility]   Consult    Patient Name: Anca Samson  : 1950  MRN: [id number]  Primary Care Physician:  Rena Guerra PA  Date of admission: 2024    Subjective   Subjective     Chief Complaint: Upper abdominal pain    HPI: Anca Samson is a 73 y.o. female who came to the hospital yesterday with acute pancreatitis and obstructing common bile duct stones.  She was taken to the operating room yesterday and underwent a successful ERCP with stone removal and stent placement.  She is feeling better today but is still having some epigastric abdominal pain from her pancreatitis.    Review of Systems   Respiratory:  Negative for shortness of breath.    Cardiovascular:  Negative for chest pain.   Gastrointestinal:  Positive for abdominal pain.       Personal History     Past Medical History:   Diagnosis Date    Anxiety     Asthma     Asthma 2021    Atherosclerosis of native coronary artery of native heart with angina pectoris 08/15/2023    FOLLOWED BY DR GONZALES/DINA PETTIT. DENIES CP BUT GETS SOA WITH EXERTION HAS COPD WEARS AT 2LPM N/C. DECREASED ACTIVITY D/T SOA    C. difficile diarrhea     DENIES ANY CURRENT ISSUES    Colon cancer 2017    Colon polyp     COPD (chronic obstructive pulmonary disease) 10/23/2017    COPD (chronic obstructive pulmonary disease) 10/23/2017    Depression     Disease of thyroid gland     Diverticulitis     Dysphagia     Essential hypertension 2021    Head injury     Heartburn     Hernia 2019    History of chemotherapy     2017    Hx of psychiatric care     Hyperlipidemia     Impaired fasting glucose 2015    Lumbago     Lung nodule 2022    Major depressive disorder 10/27/2016    Malignant neoplasm of ascending colon 2017    Melena     Multiple myeloma     Nicotine dependence 05/10/2017    NICK (obstructive sleep apnea) 2023    Oxygen dependent     REPORTS USES 02 AT 2LPM VIA N/C. CAN GET VERY SOA WITH MINIMAL EXERTION    Renal  insufficiency 07/28/2021    Seizures     PSEUDO SEIZURES LAST ONE AROUND NOV 2022    Shortness of breath     CAN GET VERY SOA WITH MINIMAL EXERTION    Sleep apnea     Tobacco use 07/28/2021    QUIT SMOKING JUNE 2022    Visit for screening mammogram 02/20/2020    NORMAL- REPEAT IN ONE YEAR    Vitamin D deficiency        Past Surgical History:   Procedure Laterality Date    APPENDECTOMY      BONE MARROW BIOPSY  2016    COLON SURGERY  2017    COLONOSCOPY  2018,2017,2019    [facility]- DR LE:DIVERTICULOSIS AND ERYTHEMA OF MUCOSA    COLONOSCOPY N/A 12/20/2022    Procedure: COLONOSCOPY WITH ELEVIEW INJECTION, POLYPECTOMY, HOT SNARE, CLIP APPLICATION X3, BIOPSIES;  Surgeon: Jarvis Borges MD;  Location: [facility] ENDOSCOPY;  Service: Gastroenterology;  Laterality: N/A;  COLON POLYP, DIVERTICULOSIS, ANASTOMOSIS RIGHT COLON    COLONOSCOPY N/A 11/09/2023    Procedure: COLONOSCOPY;  Surgeon: Jarvis Borges MD;  Location: [facility] ENDOSCOPY;  Service: Gastroenterology;  Laterality: N/A;  DIVERTICULOSIS, ANASTOMOSIS IN ASCENDING COLON    ENDOSCOPY  2018    EYE SURGERY      FECAL DISIMPACTION  06/2019    TRANSPLANT     HEMICOLECTOMY Right 05/2017    HYSTERECTOMY  1982,1984    KNEE ARTHROSCOPY Left 01/03/2022    Procedure: KNEE ARTHROSCOPY WITH PARTIAL MEDIAL MENISCECTOMY,  CHONDROPLASTY;  Surgeon: Nicholas Tipton MD;  [place]: [facility] [place] [facility];  Service: Orthopedics;  Laterality: Left;    MINI-LAPAROTOMY  2017    PORTACATH PLACEMENT N/A     pt states that her port does not work    TONSILLECTOMY      UPPER GASTROINTESTINAL ENDOSCOPY  2018    VENOUS ACCESS DEVICE (PORT) INSERTION N/A 10/31/2023    Procedure: Port-a-catheter removal and port-a-catheter placement;  Surgeon: Alcon Delgado MD;  Location: [facility] [place] [facility];  Service: General;  Laterality: N/A;       Family History: family history includes Cancer in her father and mother; Heart disease in her father and mother; Kidney cancer in her father; Lung  cancer in her mother; Stroke in her father and another family member. Otherwise pertinent FHx was reviewed and not pertinent to current issue.    Social History:  reports that she quit smoking about 2 years ago. Her smoking use included cigarettes. She started smoking about 49 years ago. She has a 47.4 pack-year smoking history. She has been exposed to tobacco smoke. She has never used smokeless tobacco. She reports that she does not drink alcohol and does not use drugs.    Home Medications:  FLUoxetine, LORazepam, Vitamin B 12, acyclovir, albuterol sulfate HFA, arformoterol, aspirin, budesonide, busPIRone, colestipol, dapsone, dexAMETHasone, doxycycline, lenalidomide, levalbuterol, ondansetron, prednisoLONE acetate, promethazine, revefenacin, and vitamin D    Allergies:  Allergies   Allergen Reactions    Cephalexin Hives    Morphine Anaphylaxis    Penicillins Hives    Sulfa Antibiotics Hives       Objective    Objective     Vitals:   Temp:  [97.7 °F (36.5 °C)-99 °F (37.2 °C)] 97.7 °F (36.5 °C)  Heart Rate:  [] 81  Resp:  [15-20] 18  BP: ()/() 118/48  Flow (L/min):  [2] 2    Physical Exam  Constitutional:       Appearance: She is obese.   Cardiovascular:      Rate and Rhythm: Normal rate.   Pulmonary:      Effort: Pulmonary effort is normal.   Abdominal:      Tenderness: There is abdominal tenderness.      Hernia: Hernia is present in the ventral area.   Musculoskeletal:         General: Normal range of motion.      Cervical back: Normal range of motion.   Skin:     General: Skin is warm.   Neurological:      General: No focal deficit present.      Mental Status: She is alert.   Psychiatric:         Mood and Affect: Mood normal.         Result Review    Result Review:  I have personally reviewed the results from the time of this admission to 7/28/2024 10:20 EDT and agree with these findings:  [x]  Laboratory  []  Microbiology  []  Radiology  []  EKG/Telemetry   []  Cardiology/Vascular   []   Pathology  []  Old records  []  Other:  Most notable findings include: Total bilirubin 0.8 white blood cell count 6.6.    Assessment & Plan   Assessment / Plan     Brief Patient Summary:  Anca Samson is a 73 y.o. female who presented with acute gallstone pancreatitis as well as obstructing common bile duct stones.  She is doing better following ERCP with stent placement.    Active Hospital Problems:  Active Hospital Problems    Diagnosis     **Acute gallstone pancreatitis     Choledocholithiasis with acute cholecystitis        Plan: I think it would be fine for her to be discharged to home.  I will arrange follow-up and we will go ahead and schedule her for a robotic cholecystectomy.  She has a midline incisional hernia and I suspect that we will need to get her through her gallbladder surgery and then we can come back at a later date and address the hernia.    VTE Prophylaxis:  Mechanical VTE prophylaxis orders are present.        CODE STATUS:    Level Of Support Discussed With: Patient  Code Status (Patient has no pulse and is not breathing): CPR (Attempt to Resuscitate)  Medical Interventions (Patient has pulse or is breathing): Full Support      Admission Status:  I believe this patient meets inpatient status.    Electronically signed by Eduardo Ingram MD, 07/28/24, 10:20 AM EDT.

## 2024-07-28 NOTE — PLAN OF CARE
Goal Outcome Evaluation:   Patient agreed to wear bipap but only wore for a short period of time before she wanted to be placed back on a cannula.

## 2024-07-28 NOTE — PLAN OF CARE
Goal Outcome Evaluation:           Progress: no change  Outcome Evaluation: Diet advanced this shift, patient with increased pain and SOA after intake. - Pain medication administered per MAR. MD mari. VSS.

## 2024-07-28 NOTE — PROGRESS NOTES
[facility]   Hospitalist Progress Note  Date: 2024  Patient Name: Anca Samson  : 1950  MRN: [id number]  Date of admission: 2024  Room/Bed: 211/      Subjective   Subjective     Chief Complaint:   Abdominal pain    Summary:  Anca Samson is a 73 y.o. female with a medical history of multiple myeloma currently on daratumumab, lenalidomide, COPD, hypertension, depression presented to the ED for evaluation of chest pain, vomiting.  Patient woke up from the sleep around 2 PM with epigastric and substernal chest discomfort.  Had multiple episodes of vomiting since then. In the ED, vital signs temperature 97.4, pulse 76, respiratory 18, blood pressure 124/88 on 2 L of nasal cannula saturating around 100%.  Normal troponin, normal proBNP, bicarb 20.4, AST//156, total bili 3, normal ALP, lipase greater than 3000, normal WBC, hemoglobin, platelets.  Chest x-ray showed no acute abnormality.  CT abdomen pelvis with contrast showed acute cholecystitis, biliary ductal dilatation.  No acute pancreatitis suggested.  Received IV fluids and meropenem in the ED, due to allergies.  Gastroenterology was consulted in the emergency department. MRCP confirms cholecystitis, choledocholithiasis.  Patient underwent ERCP, choledocholithiasis found, complete removal accomplished and biliary sphincterotomy and balloon extraction performed.  Biliary tree swept, 1 temporary stent placed in common bile duct.  Interval Followup:   Patient met with general surgery today, recommendations for outpatient follow-up for cholecystectomy.  Patient was doing well on clear liquid diet, advanced on diet.  Patient with significant abdominal pain following lunch therefore will monitor again overnight given patient's elevated lipase on arrival, will continue IV fluids at this time    Objective   Objective     Vitals:   Temp:  [97.7 °F (36.5 °C)-99 °F (37.2 °C)] 99 °F (37.2 °C)  Heart Rate:  [] 90  Resp:  [15-20]  20  BP: ()/() 101/52  Flow (L/min):  [2] 2    Physical Exam               Constitutional: Awake, alert, no acute distress              Eyes: Pupils equal, sclerae anicteric, no conjunctival injection              HENT: NCAT, mucous membranes moist              Respiratory: Clear to auscultation bilaterally, nonlabored respirations               Cardiovascular: RRR, no murmurs, rubs, or gallops, palpable pedal pulses bilaterally              Gastrointestinal: Positive bowel sounds, soft, tenderness in the epigastric region, nondistended.  No rebound and no guarding              Musculoskeletal: No bilateral ankle edema, no clubbing or cyanosis to extremities              Neurologic: Oriented x 3, speech clear              Skin: No rashes     Result Review    Result Review:  I have personally reviewed these results:  [x]  Laboratory      Lab 07/28/24 0457 07/27/24  0803 07/27/24  0106   WBC 6.62 6.83 8.06   HEMOGLOBIN 10.5* 11.9* 13.4   HEMATOCRIT 34.7 36.6 41.1   PLATELETS 174 190 215   NEUTROS ABS  --   --  3.81   IMMATURE GRANS (ABS)  --   --  0.01   LYMPHS ABS  --   --  3.36*   MONOS ABS  --   --  0.68   EOS ABS  --   --  0.09   .5* 102.5* 102.2*         Lab 07/28/24 0457 07/27/24  0803 07/27/24  0106   SODIUM 139 140 141   POTASSIUM 4.0 4.0 4.1   CHLORIDE 108* 106 105   CO2 18.7* 21.0* 20.4*   ANION GAP 12.3 13.0 15.6*   BUN 10 11 11   CREATININE 0.84 0.93 0.95   EGFR 73.0 64.6 63.0   GLUCOSE 121* 137* 132*   CALCIUM 8.3* 8.8 9.2   MAGNESIUM 1.8 1.9 1.8   PHOSPHORUS  --   --  2.5         Lab 07/28/24 0457 07/27/24  0803 07/27/24  0106   TOTAL PROTEIN 4.6* 5.0* 6.0   ALBUMIN 2.7* 3.4* 3.9   GLOBULIN 1.9 1.6 2.1   ALT (SGPT) 102* 152* 156*   AST (SGOT) 54* 153* 198*   BILIRUBIN 0.8 3.4* 3.0*   ALK PHOS 114 108 100   LIPASE  --   --  >3,000*         Lab 07/27/24  0106   PROBNP 163.0   HSTROP T 12                 Brief Urine Lab Results       None          [x]  Microbiology   Microbiology  Results (last 10 days)       Procedure Component Value - Date/Time    Blood Culture - Blood, Arm, Right [[id number]]  (Normal) Collected: 07/27/24 0333    Lab Status: Preliminary result Specimen: Blood from Arm, Right Updated: 07/28/24 0400     Blood Culture No growth at 24 hours    Blood Culture - Blood, Arm, Left [[id number]]  (Normal) Collected: 07/27/24 0333    Lab Status: Preliminary result Specimen: Blood from Arm, Left Updated: 07/28/24 0400     Blood Culture No growth at 24 hours          [x]  Radiology  MRI abdomen wo contrast mrcp    Result Date: 7/27/2024  Impression: Cholecystitis. Choledocholithiasis. There is biliary ductal dilation extending to the level of the ampulla. There is a 3 mm stone in the mid/distal common bile duct, however no definite evidence of stone at the level of the ampulla. No evidence of discrete stone in the cystic duct. Gentle tapering of the common bile duct at the ampulla with no evidence of suspicious mass. Recommend gastroenterology referral with consideration for ERCP. No MRI evidence of pancreatitis. Normal pancreatic duct. Electronically Signed: Arsenio Garcia MD  7/27/2024 9:49 AM EDT  Workstation ID: [id number]    CT Abdomen Pelvis With Contrast    Result Date: 7/27/2024  The study is ABNORMAL. Gallstones are present. New acute cholecystitis is suggested by CT. There is biliary ductal dilatation. No acute pancreatitis is suggested. Please see above comments for further detail.   Please note that portions of this note were completed with a voice recognition program.       Electronically Signed By-Sherwin Kenney MD On:7/27/2024 2:31 AM      XR Chest 1 View    Result Date: 7/26/2024  Impression: No active cardiopulmonary disease Electronically Signed: Adolph Drummond  7/26/2024 11:08 PM EDT  Workstation ID: [id number]   []  EKG/Telemetry   []  Cardiology/Vascular   []  Pathology  []  Old records  []  Other:    Assessment & Plan   Assessment / Plan     Assessment:  Acute  cholecystitis  Choledocholithiasis  Gallstone pancreatitis  Transaminitis  Hyperbilirubinemia  COPD, not in exacerbation  Hypertension  Multiple myeloma on daratumumab and lenalidomide  Depression/anxiety  NICK on BiPAP  Chronic hypoxia on 2 L home oxygen     Plan  Patient remains admitted to hospital for further care and management  Gastroenterologist, appreciate assistance  Patient underwent ERCP with gastroenterology on 7/27/2024  General surgery able to evaluate patient, outpatient follow-up for cholecystectomy  Patient continues on IV antibiotics, meropenem chosen due to allergies  Advanced on diet, following patient with significant abdominal pain.  Will start IV fluids back  Patient's lipase noted to be greater than 3000, imaging does not show acute pancreatitis.  Continue diet as tolerated  Continue to trend LFTs, improving    Discussed with RN.  Discussed with GI, discussed with general surgery    VTE Prophylaxis:  Mechanical VTE prophylaxis orders are present.        CODE STATUS:   Level Of Support Discussed With: Patient  Code Status (Patient has no pulse and is not breathing): CPR (Attempt to Resuscitate)  Medical Interventions (Patient has pulse or is breathing): Full Support      Electronically signed by Bandar Caraballo MD, 7/28/2024, 14:06 EDT.

## 2024-07-29 ENCOUNTER — TELEPHONE (OUTPATIENT)
Dept: GASTROENTEROLOGY | Facility: CLINIC | Age: 74
End: 2024-07-29
Payer: MEDICARE

## 2024-07-29 ENCOUNTER — PREP FOR SURGERY (OUTPATIENT)
Dept: OTHER | Facility: HOSPITAL | Age: 74
End: 2024-07-29
Payer: MEDICARE

## 2024-07-29 ENCOUNTER — READMISSION MANAGEMENT (OUTPATIENT)
Dept: CALL CENTER | Facility: HOSPITAL | Age: 74
End: 2024-07-29
Payer: MEDICARE

## 2024-07-29 VITALS
RESPIRATION RATE: 20 BRPM | SYSTOLIC BLOOD PRESSURE: 129 MMHG | TEMPERATURE: 97.7 F | BODY MASS INDEX: 38.18 KG/M2 | HEIGHT: 60 IN | OXYGEN SATURATION: 94 % | WEIGHT: 194.45 LBS | HEART RATE: 104 BPM | DIASTOLIC BLOOD PRESSURE: 54 MMHG

## 2024-07-29 DIAGNOSIS — K80.42 CHOLEDOCHOLITHIASIS WITH ACUTE CHOLECYSTITIS: Primary | ICD-10-CM

## 2024-07-29 LAB
ALBUMIN SERPL-MCNC: 2.9 G/DL (ref 3.5–5.2)
ALBUMIN/GLOB SERPL: 1.4 G/DL
ALP SERPL-CCNC: 107 U/L (ref 39–117)
ALT SERPL W P-5'-P-CCNC: 71 U/L (ref 1–33)
ANION GAP SERPL CALCULATED.3IONS-SCNC: 13 MMOL/L (ref 5–15)
AST SERPL-CCNC: 19 U/L (ref 1–32)
BILIRUB SERPL-MCNC: 0.5 MG/DL (ref 0–1.2)
BUN SERPL-MCNC: 9 MG/DL (ref 8–23)
BUN/CREAT SERPL: 10.7 (ref 7–25)
CALCIUM SPEC-SCNC: 8.2 MG/DL (ref 8.6–10.5)
CHLORIDE SERPL-SCNC: 106 MMOL/L (ref 98–107)
CO2 SERPL-SCNC: 21 MMOL/L (ref 22–29)
CREAT SERPL-MCNC: 0.84 MG/DL (ref 0.57–1)
DEPRECATED RDW RBC AUTO: 61.6 FL (ref 37–54)
EGFRCR SERPLBLD CKD-EPI 2021: 73 ML/MIN/1.73
ERYTHROCYTE [DISTWIDTH] IN BLOOD BY AUTOMATED COUNT: 15.8 % (ref 12.3–15.4)
GLOBULIN UR ELPH-MCNC: 2.1 GM/DL
GLUCOSE BLDC GLUCOMTR-MCNC: 115 MG/DL (ref 70–99)
GLUCOSE BLDC GLUCOMTR-MCNC: 117 MG/DL (ref 70–99)
GLUCOSE SERPL-MCNC: 110 MG/DL (ref 65–99)
HCT VFR BLD AUTO: 35.5 % (ref 34–46.6)
HGB BLD-MCNC: 11.1 G/DL (ref 12–15.9)
MAGNESIUM SERPL-MCNC: 1.8 MG/DL (ref 1.6–2.4)
MCH RBC QN AUTO: 33.1 PG (ref 26.6–33)
MCHC RBC AUTO-ENTMCNC: 31.3 G/DL (ref 31.5–35.7)
MCV RBC AUTO: 106 FL (ref 79–97)
PLATELET # BLD AUTO: 197 10*3/MM3 (ref 140–450)
PMV BLD AUTO: 9.9 FL (ref 6–12)
POTASSIUM SERPL-SCNC: 3.8 MMOL/L (ref 3.5–5.2)
PROT SERPL-MCNC: 5 G/DL (ref 6–8.5)
RBC # BLD AUTO: 3.35 10*6/MM3 (ref 3.77–5.28)
SODIUM SERPL-SCNC: 140 MMOL/L (ref 136–145)
WBC NRBC COR # BLD AUTO: 8.9 10*3/MM3 (ref 3.4–10.8)

## 2024-07-29 PROCEDURE — 82948 REAGENT STRIP/BLOOD GLUCOSE: CPT | Performed by: PHYSICIAN ASSISTANT

## 2024-07-29 PROCEDURE — 99239 HOSP IP/OBS DSCHRG MGMT >30: CPT | Performed by: INTERNAL MEDICINE

## 2024-07-29 PROCEDURE — 25810000003 LACTATED RINGERS PER 1000 ML: Performed by: INTERNAL MEDICINE

## 2024-07-29 PROCEDURE — 94799 UNLISTED PULMONARY SVC/PX: CPT

## 2024-07-29 PROCEDURE — 25010000002 MEROPENEM PER 100 MG: Performed by: INTERNAL MEDICINE

## 2024-07-29 PROCEDURE — 83735 ASSAY OF MAGNESIUM: CPT | Performed by: INTERNAL MEDICINE

## 2024-07-29 PROCEDURE — 80053 COMPREHEN METABOLIC PANEL: CPT | Performed by: INTERNAL MEDICINE

## 2024-07-29 PROCEDURE — 85027 COMPLETE CBC AUTOMATED: CPT | Performed by: INTERNAL MEDICINE

## 2024-07-29 PROCEDURE — 97161 PT EVAL LOW COMPLEX 20 MIN: CPT

## 2024-07-29 PROCEDURE — 94664 DEMO&/EVAL PT USE INHALER: CPT

## 2024-07-29 PROCEDURE — 63710000001 REVEFENACIN 175 MCG/3ML SOLUTION: Performed by: INTERNAL MEDICINE

## 2024-07-29 RX ORDER — ONDANSETRON 2 MG/ML
4 INJECTION INTRAMUSCULAR; INTRAVENOUS EVERY 6 HOURS PRN
OUTPATIENT
Start: 2024-07-29

## 2024-07-29 RX ORDER — BUDESONIDE 0.5 MG/2ML
INHALANT ORAL
Qty: 60 EACH | Refills: 11 | Status: SHIPPED | OUTPATIENT
Start: 2024-07-29

## 2024-07-29 RX ORDER — SODIUM CHLORIDE 0.9 % (FLUSH) 0.9 %
10 SYRINGE (ML) INJECTION EVERY 12 HOURS SCHEDULED
OUTPATIENT
Start: 2024-07-29

## 2024-07-29 RX ORDER — SODIUM CHLORIDE 9 MG/ML
40 INJECTION, SOLUTION INTRAVENOUS AS NEEDED
OUTPATIENT
Start: 2024-07-29

## 2024-07-29 RX ORDER — SODIUM CHLORIDE, SODIUM LACTATE, POTASSIUM CHLORIDE, CALCIUM CHLORIDE 600; 310; 30; 20 MG/100ML; MG/100ML; MG/100ML; MG/100ML
70 INJECTION, SOLUTION INTRAVENOUS CONTINUOUS
OUTPATIENT
Start: 2024-07-29

## 2024-07-29 RX ORDER — INDOCYANINE GREEN AND WATER 25 MG
1.25 KIT INJECTION ONCE
OUTPATIENT
Start: 2024-07-29 | End: 2024-07-29

## 2024-07-29 RX ORDER — SODIUM CHLORIDE 0.9 % (FLUSH) 0.9 %
10 SYRINGE (ML) INJECTION AS NEEDED
OUTPATIENT
Start: 2024-07-29

## 2024-07-29 RX ORDER — REVEFENACIN 175 UG/3ML
SOLUTION RESPIRATORY (INHALATION)
Qty: 90 ML | Refills: 11 | Status: SHIPPED | OUTPATIENT
Start: 2024-07-29

## 2024-07-29 RX ORDER — ARFORMOTEROL TARTRATE 15 UG/2ML
SOLUTION RESPIRATORY (INHALATION)
Qty: 360 ML | Refills: 11 | Status: SHIPPED | OUTPATIENT
Start: 2024-07-29

## 2024-07-29 RX ADMIN — MEROPENEM 500 MG: 500 INJECTION, POWDER, FOR SOLUTION INTRAVENOUS at 03:55

## 2024-07-29 RX ADMIN — Medication 10 ML: at 08:15

## 2024-07-29 RX ADMIN — MEROPENEM 500 MG: 500 INJECTION, POWDER, FOR SOLUTION INTRAVENOUS at 09:31

## 2024-07-29 RX ADMIN — FLUOXETINE HYDROCHLORIDE 40 MG: 20 CAPSULE ORAL at 08:15

## 2024-07-29 RX ADMIN — BUDESONIDE 0.5 MG: 0.5 SUSPENSION RESPIRATORY (INHALATION) at 09:19

## 2024-07-29 RX ADMIN — SODIUM CHLORIDE, POTASSIUM CHLORIDE, SODIUM LACTATE AND CALCIUM CHLORIDE 75 ML/HR: 600; 310; 30; 20 INJECTION, SOLUTION INTRAVENOUS at 03:58

## 2024-07-29 RX ADMIN — BUSPIRONE HYDROCHLORIDE 10 MG: 10 TABLET ORAL at 08:15

## 2024-07-29 RX ADMIN — REVEFENACIN 175 MCG: 175 SOLUTION RESPIRATORY (INHALATION) at 09:19

## 2024-07-29 RX ADMIN — ASPIRIN 81 MG: 81 TABLET, COATED ORAL at 08:15

## 2024-07-29 RX ADMIN — ARFORMOTEROL TARTRATE 15 MCG: 15 SOLUTION RESPIRATORY (INHALATION) at 09:19

## 2024-07-29 NOTE — OUTREACH NOTE
Prep Survey      Flowsheet Row Responses   [facility] patient discharged from? [facility]   Is LACE score < 7 ? No   Eligibility [facility]   Date of Admission 07/27/24   Date of Discharge 07/29/24   Discharge Disposition Home or Self Care   Discharge diagnosis Acute gallstone pancreatitis   Does the patient have one of the following disease processes/diagnoses(primary or secondary)? Other   Prep survey completed? Yes            Aruna PERES - Registered Nurse

## 2024-07-29 NOTE — PROGRESS NOTES
RT EQUIPMENT DEVICE RELATED - SKIN ASSESSMENT    RT Medical Equipment/Device:     NIV Mask:  Under-the-nose   size:       Skin Assessment:      Cheek:  Intact  Chin:  Intact  Neck:  Intact  Nose:  Intact    Device Skin Pressure Protection:  Pressure points protected    Nurse Notification:  Marisela Nevarez, RRT

## 2024-07-29 NOTE — DISCHARGE SUMMARY
[facility]         HOSPITALIST  DISCHARGE SUMMARY    Patient Name: Anca Samson  : 1950  MRN: [id number]    Date of Admission: 2024  Date of Discharge:  2024  Primary Care Physician: Rena Guerra PA    Consults:  Gastroenterology  General surgery    Active and Resolved Hospital Problems:  Acute cholecystitis  Choledocholithiasis  Gallstone pancreatitis  Transaminitis  Hyperbilirubinemia  COPD, not in exacerbation  Hypertension  Multiple myeloma on daratumumab and lenalidomide  Depression/anxiety  NICK on BiPAP  Chronic hypoxia on 2 L home oxygen    Hospital Course     Hospital Course:  Anca Samson is a 73 y.o. female with a medical history of multiple myeloma currently on daratumumab, lenalidomide, COPD, hypertension, depression presented to the ED for evaluation of chest pain, vomiting.  Patient woke up from the sleep around 2 PM with epigastric and substernal chest discomfort.  Had multiple episodes of vomiting since then. In the ED, vital signs temperature 97.4, pulse 76, respiratory 18, blood pressure 124/88 on 2 L of nasal cannula saturating around 100%. AST//156, total bili 3, normal ALP, lipase greater than 3000, normal WBC.  CT abdomen pelvis with contrast showed acute cholecystitis, biliary ductal dilatation.  No acute pancreatitis suggested.  Received IV fluids and meropenem in the ED, due to allergies.  Gastroenterology was consulted in the emergency department. MRCP confirms cholecystitis, choledocholithiasis.  Patient underwent ERCP, choledocholithiasis found, complete removal accomplished and biliary sphincterotomy and balloon extraction performed.  Biliary tree swept, 1 temporary stent placed in common bile duct.  General surgery was consulted for consideration of cholecystectomy.  Given patient's elevated lipase decision was made to have patient follow-up as an outpatient for cholecystectomy.  Patient was monitored in the hospital until  able to tolerate oral diet.  Patient will follow-up with gastroenterology as an outpatient for stent removal.  Will follow-up with general surgery for consideration of cholecystectomy.  Patient seen on date of discharge, clinically and hemodynamically stable.  Patient provided concerning signs and symptoms prompting immediate medical attention, patient understanding and agreeable       DISCHARGE Follow Up Recommendations for labs and diagnostics:   Follow-up primary care physician  Follow-up with general surgery  Follow-up with gastroenterology      Day of Discharge     Vital Signs:  Temp:  [97.7 °F (36.5 °C)-99.1 °F (37.3 °C)] 97.7 °F (36.5 °C)  Heart Rate:  [] 104  Resp:  [16-20] 20  BP: ()/(42-77) 129/54  Flow (L/min):  [2-3] 2  Physical Exam:               Constitutional: Awake, alert, no acute distress              Eyes: Pupils equal, sclerae anicteric, no conjunctival injection              HENT: NCAT, mucous membranes moist              Respiratory: Clear to auscultation bilaterally, nonlabored respirations               Cardiovascular: RRR, no murmurs, rubs, or gallops, palpable pedal pulses bilaterally              Gastrointestinal: Positive bowel sounds, soft, minimal tenderness in the epigastric region, nondistended.  No rebound and no guarding              Musculoskeletal: No bilateral ankle edema, no clubbing or cyanosis to extremities              Neurologic: Oriented x 3, speech clear              Skin: No rashes     Discharge Details        Discharge Medications        Continue These Medications        Instructions Start Date   acyclovir 400 MG tablet  Commonly known as: ZOVIRAX   400 mg, Oral, 2 Times Daily, Take one tablet by mouth twice daily.      albuterol sulfate  (90 Base) MCG/ACT inhaler  Commonly known as: PROVENTIL HFA;VENTOLIN HFA;PROAIR HFA   2 puffs, Inhalation, Every 4 Hours PRN      arformoterol 15 MCG/2ML nebulizer solution  Commonly known as: Brovana   15 mcg,  Nebulization, 2 Times Daily - RT      aspirin 81 MG EC tablet   81 mg, Oral, Daily      budesonide 0.5 MG/2ML nebulizer solution  Commonly known as: Pulmicort   0.5 mg, Nebulization, Daily - RT      busPIRone 10 MG tablet  Commonly known as: BUSPAR   10 mg, Oral, 2 Times Daily      colestipol 1 g tablet  Commonly known as: COLESTID   2 g, Oral, 2 Times Daily      dapsone 25 MG tablet   50 mg, Oral, 2 Times Daily      dexAMETHasone 4 MG tablet  Commonly known as: DECADRON   Take 10 tablets on D 1,8,15,22 and take 1 tablet on D 2,3,16,17.  Take in the morning with food.      dexAMETHasone 4 MG tablet  Commonly known as: DECADRON   Take 10 tablets on D 1,8,15,22 and take 1 tablet on D 2,3.  Take in the morning with food.      doxycycline 100 MG capsule  Commonly known as: VIBRAMYCIN   100 mg, Oral, 2 Times Daily      FLUoxetine 40 MG capsule  Commonly known as: PROzac   40 mg, Oral, Daily      lenalidomide 15 MG capsule  Commonly known as: REVLIMID   15 mg, Oral, Daily, On Days 1-21, and off 7 days, on a 28 day cycle      levalbuterol 0.63 MG/3ML nebulizer solution  Commonly known as: XOPENEX   3 mL, Nebulization, 4 Times Daily PRN      LORazepam 1 MG tablet  Commonly known as: Ativan   1 mg, Oral, 2 Times Daily PRN      ondansetron 8 MG tablet  Commonly known as: ZOFRAN   8 mg, Oral, 3 Times Daily PRN      prednisoLONE acetate 1 % ophthalmic suspension  Commonly known as: PRED FORTE   Administer 1 drop to both eyes 4 (Four) Times a Day.      promethazine 25 MG tablet  Commonly known as: PHENERGAN   25 mg, Oral, Every 6 Hours PRN      Vitamin B 12 500 MCG tablet   1,000 mcg, Oral, Daily, On hold for now       vitamin D 1.25 MG (38570 UT) capsule capsule  Commonly known as: ERGOCALCIFEROL   50,000 Units, Oral, 2 Times Weekly      Yupelri 175 MCG/3ML nebulizer solution  Generic drug: revefenacin   175 mcg, Nebulization, Daily - RT               Allergies   Allergen Reactions    Cephalexin Hives    Morphine Anaphylaxis     Penicillins Hives    Sulfa Antibiotics Hives       Discharge Disposition:  Home or Self Care    Diet:  Hospital:  Diet Order   Procedures    Diet: Regular/House; Fluid Consistency: Thin (IDDSI 0)       Discharge Activity:   Activity Instructions       Activity as Tolerated              CODE STATUS:  Code Status and Medical Interventions: CPR (Attempt to Resuscitate); Full Support   Ordered at: 07/27/24 0414     Level Of Support Discussed With:    Patient     Code Status (Patient has no pulse and is not breathing):    CPR (Attempt to Resuscitate)     Medical Interventions (Patient has pulse or is breathing):    Full Support         Future Appointments   Date Time Provider Department Center   8/5/2024 10:45 AM Radha Mena APRN [facility] PCC ETW [facility]   8/12/2024 10:00 AM [facility] PULM REHAB - INITAL EVAL [facility] CAR [facility]   8/14/2024 10:15 AM CHAIR 16 [facility] OP INFU [facility] OPIF [facility]   8/14/2024 10:45 AM West Nicolas MD [facility] ONC E521 [facility]   8/19/2024 11:20 AM Kalia Singer MD [facility] BH ETWN [facility]   8/29/2024  9:30 AM Kaelyn Fitch APRN [facility] GE ETW [facility]   9/11/2024 10:15 AM CHAIR 16 [facility] OP INFU [facility] OPIF [facility]   9/11/2024 10:45 AM West Nicolas MD [facility] ONC E521 [facility]   11/11/2024 10:00 AM Justo Casper MD [facility] ENT ETWN [facility]   3/3/2025  9:00 AM [facility] CT 2 [facility]       Additional Instructions for the Follow-ups that You Need to Schedule       Discharge Follow-up with PCP   As directed       Currently Documented PCP:    Rena Guerra PA    PCP Phone Number:    [phone]     Follow Up Details: In less than one week        Discharge Follow-up with Specified Provider: Dr. Ingram, general surgery; 1 Week   As directed      To: Dr. Ingram, general surgery   Follow Up: 1 Week        Discharge Follow-up with Specified Provider: Dr. Brent Biswas; 2 Weeks   As directed      To: Dr. Brent Biswas   Follow Up: 2 Weeks                Pertinent  and/or Most Recent Results     PROCEDURES:    ERCP performed on 7/27/2024    Impression:   Choledocholithiasis was found, complete removal was accomplished by biliary sphincterotomy and balloon extraction.  Biliary sphincterotomy was performed.  Biliary tree was swept.  1 temporary stent was placed in the common bile duct.  Repeat ERCP in 4 weeks to remove stent    LAB RESULTS:      Lab 07/29/24  0456 07/28/24 0457 07/27/24  0803 07/27/24  0106   WBC 8.90 6.62 6.83 8.06   HEMOGLOBIN 11.1* 10.5* 11.9* 13.4   HEMATOCRIT 35.5 34.7 36.6 41.1   PLATELETS 197 174 190 215   NEUTROS ABS  --   --   --  3.81   IMMATURE GRANS (ABS)  --   --   --  0.01   LYMPHS ABS  --   --   --  3.36*   MONOS ABS  --   --   --  0.68   EOS ABS  --   --   --  0.09   .0* 109.5* 102.5* 102.2*         Lab 07/29/24 0456 07/28/24 0457 07/27/24  0803 07/27/24  0106   SODIUM 140 139 140 141   POTASSIUM 3.8 4.0 4.0 4.1   CHLORIDE 106 108* 106 105   CO2 21.0* 18.7* 21.0* 20.4*   ANION GAP 13.0 12.3 13.0 15.6*   BUN 9 10 11 11   CREATININE 0.84 0.84 0.93 0.95   EGFR 73.0 73.0 64.6 63.0   GLUCOSE 110* 121* 137* 132*   CALCIUM 8.2* 8.3* 8.8 9.2   MAGNESIUM 1.8 1.8 1.9 1.8   PHOSPHORUS  --   --   --  2.5         Lab 07/29/24 0456 07/28/24 0457 07/27/24  0803 07/27/24  0106   TOTAL PROTEIN 5.0* 4.6* 5.0* 6.0   ALBUMIN 2.9* 2.7* 3.4* 3.9   GLOBULIN 2.1 1.9 1.6 2.1   ALT (SGPT) 71* 102* 152* 156*   AST (SGOT) 19 54* 153* 198*   BILIRUBIN 0.5 0.8 3.4* 3.0*   ALK PHOS 107 114 108 100   LIPASE  --   --   --  >3,000*         Lab 07/27/24  0106   PROBNP 163.0   HSTROP T 12                 Brief Urine Lab Results       None          Microbiology Results (last 10 days)       Procedure Component Value - Date/Time    Blood Culture - Blood, Arm, Right [[id number]]  (Normal) Collected: 07/27/24 0333    Lab Status: Preliminary result Specimen: Blood from Arm, Right Updated: 07/29/24 0400     Blood Culture No growth at 2 days    Blood Culture - Blood, Arm, Left [[id number]]  (Normal) Collected: 07/27/24  0333    Lab Status: Preliminary result Specimen: Blood from Arm, Left Updated: 07/29/24 0400     Blood Culture No growth at 2 days            MRI abdomen wo contrast mrcp    Result Date: 7/27/2024  Impression: Impression: Cholecystitis. Choledocholithiasis. There is biliary ductal dilation extending to the level of the ampulla. There is a 3 mm stone in the mid/distal common bile duct, however no definite evidence of stone at the level of the ampulla. No evidence of discrete stone in the cystic duct. Gentle tapering of the common bile duct at the ampulla with no evidence of suspicious mass. Recommend gastroenterology referral with consideration for ERCP. No MRI evidence of pancreatitis. Normal pancreatic duct. Electronically Signed: Arsenio Garcia MD  7/27/2024 9:49 AM EDT  Workstation ID: [id number]    CT Abdomen Pelvis With Contrast    Result Date: 7/27/2024  Impression: The study is ABNORMAL. Gallstones are present. New acute cholecystitis is suggested by CT. There is biliary ductal dilatation. No acute pancreatitis is suggested. Please see above comments for further detail.   Please note that portions of this note were completed with a voice recognition program.       Electronically Signed By-Sherwin Kenney MD On:7/27/2024 2:31 AM      XR Chest 1 View    Result Date: 7/26/2024  Impression: Impression: No active cardiopulmonary disease Electronically Signed: Adolph Drummond  7/26/2024 11:08 PM EDT  Workstation ID: [id number]    DEXA Bone Density Axial    Result Date: 7/2/2024  Impression: Impression: Osteoporosis Report dictated by: Brianna Mack  I have personally reviewed this case and agree with the findings above: Electronically Signed: Carlos Chappell MD  7/2/2024 11:30 AM EDT  Workstation ID: [id number]             Results for orders placed during the hospital encounter of 09/06/23    Adult Transthoracic Echo Complete w/ Color, Spectral and Contrast if necessary per protocol    Interpretation Summary  Normal left  ventricular systolic function.  No significant valve abnormalities noted.      Labs Pending at Discharge:  Pending Labs       Order Current Status    Blood Culture - Blood, Arm, Left Preliminary result    Blood Culture - Blood, Arm, Right Preliminary result              Time spent on Discharge including face to face service:  36 minutes    Electronically signed by Bandar Caraballo MD, 07/29/24, 12:54 PM EDT.

## 2024-07-29 NOTE — SIGNIFICANT NOTE
07/29/24 1300   Plan   Plan Pt will discharge home today.   Final Discharge Disposition Code 01 - home or self-care

## 2024-07-29 NOTE — PLAN OF CARE
Goal Outcome Evaluation:  Plan of Care Reviewed With: patient        Progress: improving  Outcome Evaluation: dc home

## 2024-07-29 NOTE — PLAN OF CARE
Goal Outcome Evaluation:  Patient states wears BIPAP at home but is unable to wear the V-60 unit at this time.  Pt. On 2L nasal cannula with SP02 92%.

## 2024-07-29 NOTE — TELEPHONE ENCOUNTER
".        Hub staff attempted to follow warm transfer process and was unsuccessful     Caller: Anca Samson \"Sarah\"    Relationship to patient: Self    Best call back number: [phone]     Patient is needing: THE PT NEEDS TO SCHEDULE A HOSPITAL FOLLOW UP. DISCHARGED ON 07/29/24. DX:ACUTE GALLSTONE.         "

## 2024-07-29 NOTE — THERAPY EVALUATION
Acute Care - Physical Therapy Initial Evaluation   [facility]     Patient Name: Anca Samson  : 1950  MRN: [id number]  Today's Date: 2024    Admit date: 2024     Referring Physician: Bandar Caraballo MD     Surgery Date:2024   Procedure(s) (LRB):  ENDOSCOPIC RETROGRADE CHOLANGIOPANCREATOGRAPHY WITH SPHINCTEROTOMY, BALLOON SWEEP, STENT PLACEMENT (N/A)           Visit Dx:     ICD-10-CM ICD-9-CM   1. Choledocholithiasis with acute cholecystitis  K80.42 574.30   2. Acute gallstone pancreatitis  K85.10 577.0     574.20   3. Difficulty in walking  R26.2 719.7     Patient Active Problem List   Diagnosis    Anxiety    Asthma    Malignant neoplasm of ascending colon    COPD (chronic obstructive pulmonary disease)    Diverticulitis    Dysphagia    Heartburn    Hyperlipidemia    Essential hypertension    Impaired fasting glucose    Low back pain    Major depressive disorder    Multiple myeloma    Renal insufficiency    Seizures    Vitamin D deficiency    Tobacco abuse    Class 2 severe obesity due to excess calories with serious comorbidity and body mass index (BMI) of 39.0 to 39.9 in adult    Chondromalacia of left knee    Diarrhea    S/P Left knee partial medial menisectomy and chondroplasty     Dyspnea    Multiple lung nodules    Aftercare following surgery of left knee thorascopic partial medial meniscectomy, chondroplasty, 1/3/2022    Hypothyroidism    Atherosclerosis of native coronary artery of native heart with angina pectoris    Chronic respiratory failure with hypoxia    Excessive daytime sleepiness    Snoring    Encounter for screening for malignant neoplasm of colon    History of colon polyps    NICK (obstructive sleep apnea)    Encounter for screening for lung cancer    Dehydration    Tobacco abuse, in remission    Hiatal hernia    Fatigue    Cough    Wheezing    Atelectasis    Acute gallstone pancreatitis    Choledocholithiasis with acute cholecystitis     Past Medical History:   Diagnosis  Date    Anxiety     Asthma     Asthma 07/28/2021    Atherosclerosis of native coronary artery of native heart with angina pectoris 08/15/2023    FOLLOWED BY DR GONZALES/DINA PETTIT. DENIES CP BUT GETS SOA WITH EXERTION HAS COPD WEARS AT 2LPM N/C. DECREASED ACTIVITY D/T SOA    C. difficile diarrhea     DENIES ANY CURRENT ISSUES    Colon cancer 07/21/2017    Colon polyp     COPD (chronic obstructive pulmonary disease) 10/23/2017    COPD (chronic obstructive pulmonary disease) 10/23/2017    Depression     Disease of thyroid gland     Diverticulitis     Dysphagia     Essential hypertension 07/28/2021    Head injury     Heartburn     Hernia 2019    History of chemotherapy     2017    Hx of psychiatric care     Hyperlipidemia     Impaired fasting glucose 08/14/2015    Lumbago     Lung nodule 02/17/2022    Major depressive disorder 10/27/2016    Malignant neoplasm of ascending colon 07/21/2017    Melena     Multiple myeloma     Nicotine dependence 05/10/2017    NICK (obstructive sleep apnea) 11/06/2023    Oxygen dependent     REPORTS USES 02 AT 2LPM VIA N/C. CAN GET VERY SOA WITH MINIMAL EXERTION    Renal insufficiency 07/28/2021    Seizures     PSEUDO SEIZURES LAST ONE AROUND NOV 2022    Shortness of breath     CAN GET VERY SOA WITH MINIMAL EXERTION    Sleep apnea     Tobacco use 07/28/2021    QUIT SMOKING JUNE 2022    Visit for screening mammogram 02/20/2020    NORMAL- REPEAT IN ONE YEAR    Vitamin D deficiency      Past Surgical History:   Procedure Laterality Date    APPENDECTOMY      BONE MARROW BIOPSY  2016    COLON SURGERY  2017    COLONOSCOPY  2018,2017,2019    [facility]- DR LE:DIVERTICULOSIS AND ERYTHEMA OF [place]UCOSA    COLONOSCOPY N/A 12/20/2022    Procedure: COLONOSCOPY WITH ELEVIEW INJECTION, POLYPECTOMY, HOT SNARE, CLIP APPLICATION X3, BIOPSIES;  Surgeon: Jarvis Borges MD;  Location: [facility] ENDOSCOPY;  Service: Gastroenterology;  Laterality: N/A;  COLON POLYP, DIVERTICULOSIS, ANASTOMOSIS RIGHT  COLON    COLONOSCOPY N/A 11/09/2023    Procedure: COLONOSCOPY;  Surgeon: Jarvis Borges MD;  Location: [facility] ENDOSCOPY;  Service: Gastroenterology;  Laterality: N/A;  DIVERTICULOSIS, ANASTOMOSIS IN ASCENDING COLON    ENDOSCOPY  2018    ERCP N/A 7/27/2024    Procedure: ENDOSCOPIC RETROGRADE CHOLANGIOPANCREATOGRAPHY WITH SPHINCTEROTOMY, BALLOON SWEEP, STENT PLACEMENT;  Surgeon: Ajay Kennedy MD;  Location: [facility] MAIN OR;  Service: Gastroenterology;  Laterality: N/A;  BILE DUCT STONE    EYE SURGERY      FECAL DISIMPACTION  06/2019    TRANSPLANT     HEMICOLECTOMY Right 05/2017    HYSTERECTOMY  1982,1984    KNEE ARTHROSCOPY Left 01/03/2022    Procedure: KNEE ARTHROSCOPY WITH PARTIAL MEDIAL MENISCECTOMY,  CHONDROPLASTY;  Surgeon: Nicholas Tipton MD;  Location: [facility] OR [facility];  Service: Orthopedics;  Laterality: Left;    MINI-LAPAROTOMY  2017    PORTACATH PLACEMENT N/A     pt states that her port does not work    TONSILLECTOMY      UPPER GASTROINTESTINAL ENDOSCOPY  2018    VENOUS ACCESS DEVICE (PORT) INSERTION N/A 10/31/2023    Procedure: Port-a-catheter removal and port-a-catheter placement;  Surgeon: Alcon Delgado MD;  Location: [facility] OR [facility];  Service: General;  Laterality: N/A;     PT Assessment (Last 12 Hours)       PT Evaluation and Treatment       Row Name 07/29/24 1000          Physical Therapy Time and Intention    Subjective Information no complaints  -SANTANA     Document Type evaluation  -SANTANA     Mode of Treatment individual therapy;physical therapy  -SANTANA     Patient Effort good  -SANTANA       Row Name 07/29/24 1000          General Information    Patient Observations alert;cooperative;agree to therapy  -SANTANA     Prior Level of Function independent:;all household mobility;community mobility  -SANTANA     Existing Precautions/Restrictions no known precautions/restrictions  -SANTANA     Barriers to Rehab none identified  -SANTANA       Row Name 07/29/24 1000          Living Environment    Current Living Arrangements  home  -SANTANA     People in Home spouse  -SANTANA       Row Name 07/29/24 1000          Cognition    Orientation Status (Cognition) oriented x 3  -SANTANA       Row Name 07/29/24 1000          Range of Motion (ROM)    Range of Motion ROM is L  -SANTANA       Row Name 07/29/24 1000          Strength (Manual Muscle Testing)    Strength (Manual Muscle Testing) strength is [facility]  -SANTANA       Row Name 07/29/24 1000          Bed Mobility    Bed Mobility bed mobility (all) activities;supine-sit  -SANTANA     All Activities, [place] (Bed Mobility) independent  -SANTANA     Supine-Sit [place] (Bed Mobility) independent  -SANTANA       Row Name 07/29/24 1000          Transfers    Transfers sit-stand transfer  -SANTANA       Row Name 07/29/24 1000          Sit-Stand Transfer    Sit-Stand [place] (Transfers) independent  -SANTANA       Row Name 07/29/24 1000          Gait/Stairs (Locomotion)    Gait/Stairs Locomotion gait/ambulation independence  -     [place] Level (Gait) independent  -     Distance in Feet (Gait) 300  -SANTANA       Row Name 07/29/24 1000          Balance    Balance Assessment standing dynamic balance  -SANTANA     Dynamic Standing Balance independent  -SANTANA       Row Name 07/29/24 1000          Plan of Care Review    Plan of Care Reviewed With patient  -     Outcome Evaluation Pt did not demonstrate any safety or mobility deficits during the initial evaluation.  Pt is safe to continue ambulating within their room independently until their discharge from the hospital.  Pt will be discharged from PT services at this time.  -SANTANA       Row Name 07/29/24 1000          Therapy Assessment/Plan (PT)    Criteria for Skilled Interventions Met (PT) no problems identified which require skilled intervention  -SANTANA     Therapy Frequency (PT) evaluation only  -SANTANA       Row Name 07/29/24 1000          PT Evaluation Complexity    History, PT Evaluation Complexity no personal factors and/or comorbidities  -SANTANA     Examination of Body Systems (PT Eval  Complexity) total of 4 or more elements  -SANTANA     Clinical Presentation (PT Evaluation Complexity) stable  -SANTANA     Clinical Decision Making (PT Evaluation Complexity) low complexity  -SANTANA     Overall Complexity (PT Evaluation Complexity) low complexity  -SANTANA       Row Name 07/29/24 1000          Therapy Plan Review/Discharge Plan (PT)    Therapy Plan Review (PT) evaluation/treatment results reviewed;participants voiced agreement with care plan;participants included;patient  -SANTANA       Row Name 07/29/24 1000          Physical Therapy Goals    Problem Specific Goal Selection (PT) problem specific goal 1, PT  -SANTANA       Row Name 07/29/24 1000          Problem Specific Goal 1 (PT)    Problem Specific Goal 1 (PT) Complete PT evaluation  -SANTANA     Time Frame (Problem Specific Goal 1, PT) 1 day  -SANTANA     Progress/Outcome (Problem Specific Goal 1, PT) goal met  -SANTANA               User Key  (r) = Recorded By, (t) = Taken By, (c) = Cosigned By      Initials Name Provider Type    Lamine Anderson, PT Physical Therapist                      PT Recommendation and Plan  Anticipated Discharge Disposition (PT): home  Therapy Frequency (PT): evaluation only  Plan of Care Reviewed With: patient  Outcome Evaluation: Pt did not demonstrate any safety or mobility deficits during the initial evaluation.  Pt is safe to continue ambulating within their room independently until their discharge from the hospital.  Pt will be discharged from PT services at this time.   Outcome Measures       Row Name 07/29/24 1000             How much help from another person do you currently need...    Turning from your back to your side while in flat bed without using bedrails? 4  -SANTANA      Moving from lying on back to sitting on the side of a flat bed without bedrails? 4  -SANTANA      Moving to and from a bed to a chair (including a wheelchair)? 4  -SANTANA      Standing up from a chair using your arms (e.g., wheelchair, bedside chair)? 4  -SANTANA      Climbing 3-5 steps with  a railing? 4  -SANTANA      To walk in hospital room? 4  -SANTANA      AM-PAC 6 Clicks Score (PT) 24  -SANTANA      Highest Level of Mobility Goal 8 --> Walked 250 feet or more  -SANTANA         Functional Assessment    Outcome Measure Options AM-PAC 6 Clicks Basic Mobility (PT)  -SANTANA                User Key  (r) = Recorded By, (t) = Taken By, (c) = Cosigned By      Initials Name Provider Type    Lamine Anderson, PT Physical Therapist                     Time Calculation:    PT Charges       Row Name 07/29/24 1051             Time Calculation    PT Received On 07/29/24  -SANTANA         Untimed Charges    PT Eval/Re-eval Minutes 30  -SANTANA         Total Minutes    Untimed Charges Total Minutes 30  -SANTANA       Total Minutes 30  -SANTANA                User Key  (r) = Recorded By, (t) = Taken By, (c) = Cosigned By      Initials Name Provider Type    Lamine Anderson, PT Physical Therapist                      PT G-Codes  Outcome Measure Options: AM-PAC 6 Clicks Basic Mobility (PT)  AM-PAC 6 Clicks Score (PT): 24    Lamine Aguilar, PT  7/29/2024

## 2024-07-30 ENCOUNTER — TRANSITIONAL CARE MANAGEMENT TELEPHONE ENCOUNTER (OUTPATIENT)
Dept: CALL CENTER | Facility: HOSPITAL | Age: 74
End: 2024-07-30
Payer: MEDICARE

## 2024-07-30 NOTE — OUTREACH NOTE
Call Center TCM Note      Flowsheet Row Responses   [facility] patient discharged from? [facility]   Does the patient have one of the following disease processes/diagnoses(primary or secondary)? Other   TCM attempt successful? Yes   Call start time 1351   Call end time 1355   Discharge diagnosis Acute gallstone pancreatitis, ENDOSCOPIC RETROGRADE CHOLANGIOPANCREATOGRAPHY WITH SPHINCTEROTOMY, BALLOON SWEEP, STENT PLACEMENT  BILE DUCT STONE   Person spoke with today (if not patient) and relationship Patient   Meds reviewed with patient/caregiver? Yes  [rsumed usual home meds.]   Does the patient have all medications ordered at discharge? N/A  [No new meds ordered at discharge.]   Is the patient taking all medications as directed (includes completed medication regime)? Yes   Medication comments Patient requesting PCP to order her some pain medication. Message routed to office.   Comments PCP Rena AGUERO. Hospital follow up in place for 8/9  11am with PCP.   Does the patient have an appointment with their PCP within 7-14 days of discharge? Yes   Has home health visited the patient within 72 hours of discharge? N/A   Psychosocial issues? No   Did the patient receive a copy of their discharge instructions? Yes   Nursing interventions Reviewed instructions with patient   What is the patient's perception of their health status since discharge? Same  [Patient reports still has pain, not as severe as before. She reports that she is tolerating small amount of food, that eating does make her hurt. Tolerating fluids.]   Is the patient/caregiver able to teach back signs and symptoms related to disease process for when to call PCP? Yes   Is the patient/caregiver able to teach back signs and symptoms related to disease process for when to call 911? Yes   Is the patient/caregiver able to teach back the hierarchy of who to call/visit for symptoms/problems? PCP, Specialist, Home health nurse, Urgent Care, ED, 911 Yes   If  the patient is a current smoker, are they able to teach back resources for cessation? Not a smoker   TCM call completed? Yes   Wrap up additional comments Appt with Dr Ingram, surgeon, 8/5  145pm   Call end time 1355   Would this patient benefit from a Referral to [facility] Social Work? No   Is the patient interested in additional calls from an ambulatory ? No            Brianna Bello RN    7/30/2024, 13:59 EDT

## 2024-07-31 LAB
GLUCOSE BLDC GLUCOMTR-MCNC: 123 MG/DL (ref 70–99)
GLUCOSE BLDC GLUCOMTR-MCNC: 151 MG/DL (ref 70–99)
GLUCOSE BLDC GLUCOMTR-MCNC: 188 MG/DL (ref 70–99)

## 2024-08-01 LAB
BACTERIA SPEC AEROBE CULT: NORMAL
BACTERIA SPEC AEROBE CULT: NORMAL

## 2024-08-04 ENCOUNTER — TELEPHONE (OUTPATIENT)
Dept: PULMONOLOGY | Facility: CLINIC | Age: 74
End: 2024-08-04
Payer: MEDICARE

## 2024-08-04 LAB
QT INTERVAL: 431 MS
QTC INTERVAL: 474 MS

## 2024-08-05 ENCOUNTER — OFFICE VISIT (OUTPATIENT)
Dept: PULMONOLOGY | Facility: CLINIC | Age: 74
End: 2024-08-05
Payer: MEDICARE

## 2024-08-05 ENCOUNTER — TELEPHONE (OUTPATIENT)
Dept: GASTROENTEROLOGY | Facility: CLINIC | Age: 74
End: 2024-08-05
Payer: MEDICARE

## 2024-08-05 ENCOUNTER — TELEPHONE (OUTPATIENT)
Dept: PULMONOLOGY | Facility: CLINIC | Age: 74
End: 2024-08-05

## 2024-08-05 ENCOUNTER — PREP FOR SURGERY (OUTPATIENT)
Dept: OTHER | Facility: HOSPITAL | Age: 74
End: 2024-08-05
Payer: MEDICARE

## 2024-08-05 VITALS
RESPIRATION RATE: 20 BRPM | OXYGEN SATURATION: 95 % | SYSTOLIC BLOOD PRESSURE: 131 MMHG | TEMPERATURE: 98.2 F | BODY MASS INDEX: 36.99 KG/M2 | WEIGHT: 188.4 LBS | DIASTOLIC BLOOD PRESSURE: 79 MMHG | HEIGHT: 60 IN | HEART RATE: 109 BPM

## 2024-08-05 DIAGNOSIS — J44.9 CHRONIC OBSTRUCTIVE PULMONARY DISEASE, UNSPECIFIED COPD TYPE: ICD-10-CM

## 2024-08-05 DIAGNOSIS — C90.00 MULTIPLE MYELOMA NOT HAVING ACHIEVED REMISSION: ICD-10-CM

## 2024-08-05 DIAGNOSIS — F17.201 TOBACCO ABUSE, IN REMISSION: Primary | ICD-10-CM

## 2024-08-05 DIAGNOSIS — G47.33 OSA (OBSTRUCTIVE SLEEP APNEA): ICD-10-CM

## 2024-08-05 DIAGNOSIS — J43.2 CENTRILOBULAR EMPHYSEMA: ICD-10-CM

## 2024-08-05 DIAGNOSIS — R06.09 CHRONIC DYSPNEA: ICD-10-CM

## 2024-08-05 DIAGNOSIS — J96.11 CHRONIC RESPIRATORY FAILURE WITH HYPOXIA: ICD-10-CM

## 2024-08-05 DIAGNOSIS — Z46.89 ENCOUNTER FOR REMOVAL OF BILIARY STENT: Primary | ICD-10-CM

## 2024-08-05 PROCEDURE — 99214 OFFICE O/P EST MOD 30 MIN: CPT | Performed by: NURSE PRACTITIONER

## 2024-08-05 PROCEDURE — 1159F MED LIST DOCD IN RCRD: CPT | Performed by: NURSE PRACTITIONER

## 2024-08-05 PROCEDURE — 3075F SYST BP GE 130 - 139MM HG: CPT | Performed by: NURSE PRACTITIONER

## 2024-08-05 PROCEDURE — 3078F DIAST BP <80 MM HG: CPT | Performed by: NURSE PRACTITIONER

## 2024-08-05 PROCEDURE — 1160F RVW MEDS BY RX/DR IN RCRD: CPT | Performed by: NURSE PRACTITIONER

## 2024-08-05 RX ORDER — SODIUM CHLORIDE, SODIUM LACTATE, POTASSIUM CHLORIDE, CALCIUM CHLORIDE 600; 310; 30; 20 MG/100ML; MG/100ML; MG/100ML; MG/100ML
30 INJECTION, SOLUTION INTRAVENOUS CONTINUOUS PRN
OUTPATIENT
Start: 2024-08-05

## 2024-08-05 NOTE — TELEPHONE ENCOUNTER
Attempted to contact patient, left voicemail message to return call. Provided direct contact information.    Patient is scheduled with Dr. Ingram for GB removal on 08.08.24.    Follow up appointment with LINETTE Lafleur on 08.29.24 at 0930.    CR entered for second sign.  Pulmonary clearance needed  Review clearances with patient

## 2024-08-05 NOTE — TELEPHONE ENCOUNTER
Patient had ERCP performed on 7/27/2024 with Dr. Kennedy  Findings included-  Choledocholithiasis was found.  Complete removal was accomplished by biliary sphincterotomy and balloon extraction.  A biliary sphincterotomy was performed.  The biliary tree was swept.  1 temporary stent was placed into the common bile duct.    Repeat ERCP in 4 weeks to remove stent    Patient also has follow-up with LINETTE Lafleur on 8/29/2024

## 2024-08-05 NOTE — TELEPHONE ENCOUNTER
Patient returned call.  Patient is agreeable to repeat EGD 4 weeks after GB removal.    Verified clearances needed:  Pulmonary  Cardiac - Dr. Banks

## 2024-08-06 ENCOUNTER — TELEPHONE (OUTPATIENT)
Dept: GASTROENTEROLOGY | Facility: CLINIC | Age: 74
End: 2024-08-06
Payer: MEDICARE

## 2024-08-06 PROBLEM — Z46.89 ENCOUNTER FOR REMOVAL OF BILIARY STENT: Status: ACTIVE | Noted: 2024-08-05

## 2024-08-06 NOTE — TELEPHONE ENCOUNTER
2024    Dear Dr. Lee,      Patient Name: Anca Samson  : 1950      This patient is waiting to have an ERCP which I will perform at [facility] on 24. Please respond to this request noting your recommendations regarding clearance from a Pulmonary  standpoint.  You may contact our office at [phone] Option 2 with any questions. I appreciate your prompt response in this matter. Please return this form to our office as soon as possible to [phone].    ____ I approve my patient from a Pulmonary  standpoint    ____ I do NOT approve my patient from a Pulmonary  standpoint at this time    Please inform our office if the patient requires additional follow-up from your office prior to scheduled procedure date.      Please specify clearance expiration date:____________________________________      Approving physician name (please print): _____________________________________________      Approving physician signature: ________________________________ Date:________________  Sincerely,  [facility] - Gastroenterology   Dr. Ajay Kennedy      Please fax approval or denial to our office as soon as possible.

## 2024-08-06 NOTE — TELEPHONE ENCOUNTER
Spoke with patient and informed of scheduled procedure:    ERCP with Dr. Kennedy on Thursday, 09.05.24 with arrival time of 0630.  Reminded of NPO after midnight and need for a  post-procedure.  Advised we will reach out to Pulmonology and Cardiology for clearance.  Patient verbalized understanding.    Patient agreeable to move office visit with LINETTE Lafleur until after repeat ERCP.  Confirmed office visit now on Thursday, 10.24.24 at 0945.    [employer] message sent per patient preference.    Cardiac clearance requested from Dr. Banks via TE dated 08.06.24 and routed to Shirin COYLE RN and Mariama REVELES MA. (Patient reports no cardiac issues in the last year)    Pulmonary clearance requested from Dr. Lee via TE dated 08.06.24 and routed to Ailyn THORNTON RN and Mariama REVELES MA.

## 2024-08-06 NOTE — TELEPHONE ENCOUNTER
2024    Dear Dr. Banks,      Patient Name: Anca Samson  : 1950      This patient is waiting to have an ERCP which I will perform at [facility] on 24. Please respond to this request noting your recommendations regarding clearance from a Cardiac  standpoint.  You may contact our office at [phone] Option 2 with any questions. I appreciate your prompt response in this matter. Please return this form to our office as soon as possible to [phone].    ____ I approve my patient from a Cardiac  standpoint    ____ I do NOT approve my patient from a Cardiac  standpoint at this time    Please inform our office if the patient requires additional follow-up from your office prior to scheduled procedure date.      Please specify clearance expiration date:____________________________________      Approving physician name (please print): _____________________________________________      Approving physician signature: ________________________________ Date:________________  Sincerely,  [facility] - Gastroenterology   Dr. Ajay Kennedy    Please fax approval or denial to our office as soon as possible.

## 2024-08-07 ENCOUNTER — READMISSION MANAGEMENT (OUTPATIENT)
Dept: CALL CENTER | Facility: HOSPITAL | Age: 74
End: 2024-08-07
Payer: MEDICARE

## 2024-08-07 NOTE — PRE-PROCEDURE INSTRUCTIONS
IMPORTANT INSTRUCTIONS - PRE-ADMISSION TESTING  DO NOT EAT OR CHEW anything after midnight the night before your procedure.    You may have SIPS NO RED LESS THEN 8 OZ CLEAR liquids up to __2____ hours prior to ARRIVAL time.  Take the following medications the morning of your procedure with JUST A SIP OF WATER:  __ACYCLOVIR, ALBUTEROL INHALER IF NEEDED AND BRING WITH YOU, ASA, USE PULMICORT NEB, BUSPIRONE, DAPSONE, DOXYCYCLINE, FLUOXETINE, USE XOPENEX NEB IF NEEDED, ZOFRAN IF NEEDED, USE PRE FORTE EYE DROP, PROMETHAZINE IF NEEDED, USE YUPLRI NEBULIZER _____________________________________________________________________________________________________________________________________________________________________________________    DO NOT BRING your medications to the hospital with you, UNLESS something has changed since your PRE-Admission Testing appointment.  Hold all vitamins, supplements, and NSAIDS (Non- steroidal anti-inflammatory meds) for one week prior to surgery (you MAY take Tylenol or Acetaminophen).  If you are diabetic, check your blood sugar the morning of your procedure. If it is less than 70 or if you are feeling symptomatic, call the following number for further instructions: [phone]  Use your inhalers/nebulizers as usual, the morning of your procedure. BRING YOUR INHALERS with you.   Bring your CPAP or BIPAP to hospital, ONLY IF YOU WILL BE SPENDING THE NIGHT.   Make sure you have a ride home and have someone who will stay with you the day of your procedure after you go home.  If you have any questions, please call your Pre-Admission Testing Nurse, ___DAWN_____________ at [phone]____________.   Per anesthesia request, do not smoke for 24 hours before your procedure or as instructed by your surgeon.    WILL CALL ON 8/7/24        NORMALLY BETWEEN 1 AND 4 PM TO GIVE OFFICIAL ARRIVAL TIME FOR DAY OF SURGERY. SPOKE WITH [facility] AND THEY ADVISE PT TO BE HERE AT 0745  BATHING INSTRUCTIONS  GIVEN. NO JEWELRY OF ANY TYPE OR NAIL POLISH UPPER OR LOWER EXT   COME TO ENTRANCE C [facility] MAIN DOOR DAY OF PROCEDURE  CASH,  OR CARD FOR MEDS TO BED IF INDICATED AT DISCHARGE

## 2024-08-07 NOTE — OUTREACH NOTE
Medical Week 2 Survey      Flowsheet Row Responses   [facility] [place] [facility]? [facility]   Does the patient have one of the following disease processes/diagnoses(primary or secondary)? Other   Week 2 attempt successful? Yes   Call start time 1845   Discharge diagnosis Acute gallstone pancreatitis, ENDOSCOPIC RETROGRADE CHOLANGIOPANCREATOGRAPHY WITH SPHINCTEROTOMY, BALLOON SWEEP, STENT PLACEMENT  BILE DUCT STONE   Person spoke with today (if not patient) and relationship Patient   Meds reviewed with patient/caregiver? Yes   Does the patient have all medications ordered at discharge? Yes   Is the patient taking all medications as directed (includes completed medication regime)? Yes   Does the patient have a primary care provider?  Yes   Has home health visited the patient within 72 hours of discharge? N/A   Psychosocial issues? No   Did the patient receive a copy of their discharge instructions? Yes   Nursing interventions Reviewed instructions with patient   What is the patient's perception of their health status since discharge? Improving   Is the patient/caregiver able to teach back signs and symptoms related to disease process for when to call PCP? Yes   Is the patient/caregiver able to teach back signs and symptoms related to disease process for when to call 911? Yes   Is the patient/caregiver able to teach back the hierarchy of who to call/visit for symptoms/problems? PCP, Specialist, Home health nurse, Urgent Care, ED, 911 Yes   Week 2 Call Completed? Yes   Graduated Yes   Wrap up additional comments Patient reports doing well. No concers or questions noted. Patient has outpatient surgery tomorrow            Aruna PERES - Registered Nurse

## 2024-08-08 ENCOUNTER — HOSPITAL ENCOUNTER (OUTPATIENT)
Facility: HOSPITAL | Age: 74
Setting detail: HOSPITAL OUTPATIENT SURGERY
Discharge: HOME | End: 2024-08-08
Attending: SURGERY | Admitting: SURGERY
Payer: MEDICARE

## 2024-08-08 ENCOUNTER — ANESTHESIA (OUTPATIENT)
Dept: PERIOP | Facility: HOSPITAL | Age: 74
End: 2024-08-08
Payer: MEDICARE

## 2024-08-08 ENCOUNTER — ANESTHESIA EVENT (OUTPATIENT)
Dept: PERIOP | Facility: HOSPITAL | Age: 74
End: 2024-08-08
Payer: MEDICARE

## 2024-08-08 VITALS
DIASTOLIC BLOOD PRESSURE: 62 MMHG | TEMPERATURE: 97.9 F | OXYGEN SATURATION: 95 % | BODY MASS INDEX: 36.71 KG/M2 | RESPIRATION RATE: 20 BRPM | WEIGHT: 194.45 LBS | SYSTOLIC BLOOD PRESSURE: 136 MMHG | HEIGHT: 61 IN | HEART RATE: 83 BPM

## 2024-08-08 DIAGNOSIS — K85.10 ACUTE GALLSTONE PANCREATITIS: Primary | ICD-10-CM

## 2024-08-08 DIAGNOSIS — K80.42 CHOLEDOCHOLITHIASIS WITH ACUTE CHOLECYSTITIS: ICD-10-CM

## 2024-08-08 LAB
BASOPHILS # BLD AUTO: 0.09 10*3/MM3 (ref 0–0.2)
BASOPHILS NFR BLD AUTO: 1.4 % (ref 0–1.5)
DEPRECATED RDW RBC AUTO: 59 FL (ref 37–54)
EOSINOPHIL # BLD AUTO: 0.42 10*3/MM3 (ref 0–0.4)
EOSINOPHIL NFR BLD AUTO: 6.4 % (ref 0.3–6.2)
ERYTHROCYTE [DISTWIDTH] IN BLOOD BY AUTOMATED COUNT: 15.9 % (ref 12.3–15.4)
HCT VFR BLD AUTO: 34.7 % (ref 34–46.6)
HGB BLD-MCNC: 11.3 G/DL (ref 12–15.9)
IMM GRANULOCYTES # BLD AUTO: 0.02 10*3/MM3 (ref 0–0.05)
IMM GRANULOCYTES NFR BLD AUTO: 0.3 % (ref 0–0.5)
LYMPHOCYTES # BLD AUTO: 3.74 10*3/MM3 (ref 0.7–3.1)
LYMPHOCYTES NFR BLD AUTO: 56.6 % (ref 19.6–45.3)
MCH RBC QN AUTO: 33 PG (ref 26.6–33)
MCHC RBC AUTO-ENTMCNC: 32.6 G/DL (ref 31.5–35.7)
MCV RBC AUTO: 101.5 FL (ref 79–97)
MONOCYTES # BLD AUTO: 0.66 10*3/MM3 (ref 0.1–0.9)
MONOCYTES NFR BLD AUTO: 10 % (ref 5–12)
NEUTROPHILS NFR BLD AUTO: 1.68 10*3/MM3 (ref 1.7–7)
NEUTROPHILS NFR BLD AUTO: 25.3 % (ref 42.7–76)
NRBC BLD AUTO-RTO: 0 /100 WBC (ref 0–0.2)
PLATELET # BLD AUTO: 291 10*3/MM3 (ref 140–450)
PMV BLD AUTO: 9.8 FL (ref 6–12)
RBC # BLD AUTO: 3.42 10*6/MM3 (ref 3.77–5.28)
WBC NRBC COR # BLD AUTO: 6.61 10*3/MM3 (ref 3.4–10.8)

## 2024-08-08 PROCEDURE — 25010000002 SUGAMMADEX 200 MG/2ML SOLUTION: Performed by: NURSE ANESTHETIST, CERTIFIED REGISTERED

## 2024-08-08 PROCEDURE — S2900 ROBOTIC SURGICAL SYSTEM: HCPCS | Performed by: SURGERY

## 2024-08-08 PROCEDURE — 25010000002 PROPOFOL 10 MG/ML EMULSION: Performed by: NURSE ANESTHETIST, CERTIFIED REGISTERED

## 2024-08-08 PROCEDURE — 47562 LAPAROSCOPIC CHOLECYSTECTOMY: CPT | Performed by: SURGERY

## 2024-08-08 PROCEDURE — 25010000002 INDOCYANINE GREEN 25 MG RECONSTITUTED SOLUTION: Performed by: SURGERY

## 2024-08-08 PROCEDURE — 25010000002 ONDANSETRON PER 1 MG: Performed by: NURSE ANESTHETIST, CERTIFIED REGISTERED

## 2024-08-08 PROCEDURE — 25010000002 MEPERIDINE PER 100 MG: Performed by: NURSE ANESTHETIST, CERTIFIED REGISTERED

## 2024-08-08 PROCEDURE — 88304 TISSUE EXAM BY PATHOLOGIST: CPT | Performed by: SURGERY

## 2024-08-08 PROCEDURE — 25010000002 DEXAMETHASONE PER 1 MG: Performed by: NURSE ANESTHETIST, CERTIFIED REGISTERED

## 2024-08-08 PROCEDURE — 25010000002 FENTANYL CITRATE (PF) 50 MCG/ML SOLUTION: Performed by: NURSE ANESTHETIST, CERTIFIED REGISTERED

## 2024-08-08 PROCEDURE — 25010000002 BUPIVACAINE (PF) 0.25 % SOLUTION: Performed by: SURGERY

## 2024-08-08 PROCEDURE — 25010000002 MIDAZOLAM PER 1MG: Performed by: ANESTHESIOLOGY

## 2024-08-08 PROCEDURE — 85025 COMPLETE CBC W/AUTO DIFF WBC: CPT | Performed by: ANESTHESIOLOGY

## 2024-08-08 PROCEDURE — 25810000003 LACTATED RINGERS PER 1000 ML: Performed by: ANESTHESIOLOGY

## 2024-08-08 DEVICE — MEDIUM-LARGE LIGATION CLIPS 6 CLIPS/CART
Type: IMPLANTABLE DEVICE | Site: ABDOMEN | Status: FUNCTIONAL
Brand: VAS-Q-CLIP

## 2024-08-08 RX ORDER — INDOCYANINE GREEN AND WATER 25 MG
1.25 KIT INJECTION ONCE
Status: COMPLETED | OUTPATIENT
Start: 2024-08-08 | End: 2024-08-08

## 2024-08-08 RX ORDER — LIDOCAINE HYDROCHLORIDE 20 MG/ML
INJECTION, SOLUTION EPIDURAL; INFILTRATION; INTRACAUDAL; PERINEURAL AS NEEDED
Status: DISCONTINUED | OUTPATIENT
Start: 2024-08-08 | End: 2024-08-08 | Stop reason: SURG

## 2024-08-08 RX ORDER — HEPARIN SODIUM (PORCINE) LOCK FLUSH IV SOLN 100 UNIT/ML 100 UNIT/ML
5 SOLUTION INTRAVENOUS AS NEEDED
Status: DISCONTINUED | OUTPATIENT
Start: 2024-08-08 | End: 2024-08-08 | Stop reason: HOSPADM

## 2024-08-08 RX ORDER — SODIUM CHLORIDE 9 MG/ML
40 INJECTION, SOLUTION INTRAVENOUS AS NEEDED
Status: DISCONTINUED | OUTPATIENT
Start: 2024-08-08 | End: 2024-08-08 | Stop reason: HOSPADM

## 2024-08-08 RX ORDER — PROPOFOL 10 MG/ML
VIAL (ML) INTRAVENOUS AS NEEDED
Status: DISCONTINUED | OUTPATIENT
Start: 2024-08-08 | End: 2024-08-08 | Stop reason: SURG

## 2024-08-08 RX ORDER — IBUPROFEN 600 MG/1
600 TABLET ORAL EVERY 6 HOURS PRN
Status: DISCONTINUED | OUTPATIENT
Start: 2024-08-08 | End: 2024-08-08 | Stop reason: HOSPADM

## 2024-08-08 RX ORDER — SODIUM CHLORIDE 0.9 % (FLUSH) 0.9 %
10 SYRINGE (ML) INJECTION AS NEEDED
Status: DISCONTINUED | OUTPATIENT
Start: 2024-08-08 | End: 2024-08-08 | Stop reason: HOSPADM

## 2024-08-08 RX ORDER — SODIUM CHLORIDE, SODIUM LACTATE, POTASSIUM CHLORIDE, CALCIUM CHLORIDE 600; 310; 30; 20 MG/100ML; MG/100ML; MG/100ML; MG/100ML
9 INJECTION, SOLUTION INTRAVENOUS CONTINUOUS PRN
Status: DISCONTINUED | OUTPATIENT
Start: 2024-08-08 | End: 2024-08-08 | Stop reason: HOSPADM

## 2024-08-08 RX ORDER — MIDAZOLAM HYDROCHLORIDE 2 MG/2ML
1 INJECTION, SOLUTION INTRAMUSCULAR; INTRAVENOUS ONCE
Status: COMPLETED | OUTPATIENT
Start: 2024-08-08 | End: 2024-08-08

## 2024-08-08 RX ORDER — ONDANSETRON 2 MG/ML
INJECTION INTRAMUSCULAR; INTRAVENOUS AS NEEDED
Status: DISCONTINUED | OUTPATIENT
Start: 2024-08-08 | End: 2024-08-08 | Stop reason: SURG

## 2024-08-08 RX ORDER — ONDANSETRON 2 MG/ML
4 INJECTION INTRAMUSCULAR; INTRAVENOUS ONCE AS NEEDED
Status: DISCONTINUED | OUTPATIENT
Start: 2024-08-08 | End: 2024-08-08 | Stop reason: HOSPADM

## 2024-08-08 RX ORDER — BUPIVACAINE HYDROCHLORIDE 2.5 MG/ML
INJECTION, SOLUTION EPIDURAL; INFILTRATION; INTRACAUDAL AS NEEDED
Status: DISCONTINUED | OUTPATIENT
Start: 2024-08-08 | End: 2024-08-08 | Stop reason: HOSPADM

## 2024-08-08 RX ORDER — HYDROCODONE BITARTRATE AND ACETAMINOPHEN 5; 325 MG/1; MG/1
1 TABLET ORAL ONCE AS NEEDED
Status: DISCONTINUED | OUTPATIENT
Start: 2024-08-08 | End: 2024-08-08 | Stop reason: HOSPADM

## 2024-08-08 RX ORDER — MIDAZOLAM HYDROCHLORIDE 2 MG/2ML
2 INJECTION, SOLUTION INTRAMUSCULAR; INTRAVENOUS ONCE
Status: DISCONTINUED | OUTPATIENT
Start: 2024-08-08 | End: 2024-08-08

## 2024-08-08 RX ORDER — HYDROCODONE BITARTRATE AND ACETAMINOPHEN 5; 325 MG/1; MG/1
1 TABLET ORAL EVERY 6 HOURS PRN
Qty: 10 TABLET | Refills: 0 | Status: SHIPPED | OUTPATIENT
Start: 2024-08-08

## 2024-08-08 RX ORDER — ACETAMINOPHEN 500 MG
1000 TABLET ORAL ONCE
Status: COMPLETED | OUTPATIENT
Start: 2024-08-08 | End: 2024-08-08

## 2024-08-08 RX ORDER — MEPERIDINE HYDROCHLORIDE 25 MG/ML
12.5 INJECTION INTRAMUSCULAR; INTRAVENOUS; SUBCUTANEOUS
Status: DISCONTINUED | OUTPATIENT
Start: 2024-08-08 | End: 2024-08-08 | Stop reason: HOSPADM

## 2024-08-08 RX ORDER — ONDANSETRON 2 MG/ML
4 INJECTION INTRAMUSCULAR; INTRAVENOUS EVERY 6 HOURS PRN
Status: DISCONTINUED | OUTPATIENT
Start: 2024-08-08 | End: 2024-08-08 | Stop reason: HOSPADM

## 2024-08-08 RX ORDER — ONDANSETRON 4 MG/1
4 TABLET, ORALLY DISINTEGRATING ORAL ONCE AS NEEDED
Status: DISCONTINUED | OUTPATIENT
Start: 2024-08-08 | End: 2024-08-08 | Stop reason: HOSPADM

## 2024-08-08 RX ORDER — PROMETHAZINE HYDROCHLORIDE 25 MG/1
25 SUPPOSITORY RECTAL ONCE AS NEEDED
Status: DISCONTINUED | OUTPATIENT
Start: 2024-08-08 | End: 2024-08-08 | Stop reason: HOSPADM

## 2024-08-08 RX ORDER — PROMETHAZINE HYDROCHLORIDE 12.5 MG/1
25 TABLET ORAL ONCE AS NEEDED
Status: DISCONTINUED | OUTPATIENT
Start: 2024-08-08 | End: 2024-08-08 | Stop reason: HOSPADM

## 2024-08-08 RX ORDER — SODIUM CHLORIDE 0.9 % (FLUSH) 0.9 %
10 SYRINGE (ML) INJECTION EVERY 12 HOURS SCHEDULED
Status: DISCONTINUED | OUTPATIENT
Start: 2024-08-08 | End: 2024-08-08 | Stop reason: HOSPADM

## 2024-08-08 RX ORDER — SODIUM CHLORIDE, SODIUM LACTATE, POTASSIUM CHLORIDE, CALCIUM CHLORIDE 600; 310; 30; 20 MG/100ML; MG/100ML; MG/100ML; MG/100ML
70 INJECTION, SOLUTION INTRAVENOUS CONTINUOUS
Status: DISCONTINUED | OUTPATIENT
Start: 2024-08-08 | End: 2024-08-08 | Stop reason: HOSPADM

## 2024-08-08 RX ORDER — SODIUM CHLORIDE 0.9 % (FLUSH) 0.9 %
20 SYRINGE (ML) INJECTION AS NEEDED
Status: DISCONTINUED | OUTPATIENT
Start: 2024-08-08 | End: 2024-08-08 | Stop reason: HOSPADM

## 2024-08-08 RX ORDER — FENTANYL CITRATE 50 UG/ML
INJECTION, SOLUTION INTRAMUSCULAR; INTRAVENOUS AS NEEDED
Status: DISCONTINUED | OUTPATIENT
Start: 2024-08-08 | End: 2024-08-08 | Stop reason: SURG

## 2024-08-08 RX ORDER — OXYCODONE HYDROCHLORIDE 5 MG/1
5 TABLET ORAL
Status: DISCONTINUED | OUTPATIENT
Start: 2024-08-08 | End: 2024-08-08

## 2024-08-08 RX ORDER — ROCURONIUM BROMIDE 10 MG/ML
INJECTION, SOLUTION INTRAVENOUS AS NEEDED
Status: DISCONTINUED | OUTPATIENT
Start: 2024-08-08 | End: 2024-08-08 | Stop reason: SURG

## 2024-08-08 RX ORDER — DEXAMETHASONE SODIUM PHOSPHATE 4 MG/ML
INJECTION, SOLUTION INTRA-ARTICULAR; INTRALESIONAL; INTRAMUSCULAR; INTRAVENOUS; SOFT TISSUE AS NEEDED
Status: DISCONTINUED | OUTPATIENT
Start: 2024-08-08 | End: 2024-08-08 | Stop reason: SURG

## 2024-08-08 RX ADMIN — FENTANYL CITRATE 25 MCG: 50 INJECTION, SOLUTION INTRAMUSCULAR; INTRAVENOUS at 10:12

## 2024-08-08 RX ADMIN — FENTANYL CITRATE 25 MCG: 50 INJECTION, SOLUTION INTRAMUSCULAR; INTRAVENOUS at 10:31

## 2024-08-08 RX ADMIN — DEXAMETHASONE SODIUM PHOSPHATE 4 MG: 4 INJECTION, SOLUTION INTRAMUSCULAR; INTRAVENOUS at 10:06

## 2024-08-08 RX ADMIN — SODIUM CHLORIDE, POTASSIUM CHLORIDE, SODIUM LACTATE AND CALCIUM CHLORIDE 9 ML/HR: 600; 310; 30; 20 INJECTION, SOLUTION INTRAVENOUS at 09:14

## 2024-08-08 RX ADMIN — ONDANSETRON HYDROCHLORIDE 4 MG: 2 SOLUTION INTRAMUSCULAR; INTRAVENOUS at 10:14

## 2024-08-08 RX ADMIN — ACETAMINOPHEN 1000 MG: 500 TABLET ORAL at 09:14

## 2024-08-08 RX ADMIN — MIDAZOLAM HYDROCHLORIDE 1 MG: 1 INJECTION, SOLUTION INTRAMUSCULAR; INTRAVENOUS at 09:18

## 2024-08-08 RX ADMIN — MEPERIDINE HYDROCHLORIDE 12.5 MG: 25 INJECTION INTRAMUSCULAR; INTRAVENOUS; SUBCUTANEOUS at 11:44

## 2024-08-08 RX ADMIN — PROPOFOL 80 MG: 10 INJECTION, EMULSION INTRAVENOUS at 09:59

## 2024-08-08 RX ADMIN — ROCURONIUM BROMIDE 10 MG: 10 INJECTION, SOLUTION INTRAVENOUS at 10:38

## 2024-08-08 RX ADMIN — HYDROCODONE BITARTRATE AND ACETAMINOPHEN 1 TABLET: 5; 325 TABLET ORAL at 12:01

## 2024-08-08 RX ADMIN — SUGAMMADEX 200 MG: 100 INJECTION, SOLUTION INTRAVENOUS at 11:17

## 2024-08-08 RX ADMIN — FENTANYL CITRATE 50 MCG: 50 INJECTION, SOLUTION INTRAMUSCULAR; INTRAVENOUS at 09:57

## 2024-08-08 RX ADMIN — PROPOFOL 20 MG: 10 INJECTION, EMULSION INTRAVENOUS at 10:12

## 2024-08-08 RX ADMIN — INDOCYANINE GREEN AND WATER 1.25 MG: KIT at 09:15

## 2024-08-08 RX ADMIN — LIDOCAINE HYDROCHLORIDE 100 MG: 20 INJECTION, SOLUTION INTRAVENOUS at 09:57

## 2024-08-08 RX ADMIN — ROCURONIUM BROMIDE 40 MG: 10 INJECTION, SOLUTION INTRAVENOUS at 09:59

## 2024-08-08 NOTE — INTERVAL H&P NOTE
H&P reviewed. The patient was examined and there are no changes to the H&P.  Risk benefits and alternatives of robotic cholecystectomy were explained and she agrees to proceed.

## 2024-08-08 NOTE — TELEPHONE ENCOUNTER
Ok to proceed without Cardiac Clearance per Dr. Kennedy.  See TE dated 08.06.24.    Left message with patient informing of this (BRITTANY on file).

## 2024-08-08 NOTE — DISCHARGE INSTRUCTIONS
DISCHARGE INSTRUCTIONS LAPAROSCOPIC CHOLECYSTECTOMY/APPENDECTOMY  (GALL BLADDER)      For your surgery you had:  General anesthesia (you may have a sore throat for the first 24 hours)  IV sedation  Local anesthesia  Monitored anesthesia care    You may experience dizziness, drowsiness, or light-headedness for several hours following surgery.  Do not stay alone tonight.  Limit your activity for 24 hours.  Resume your diet slowly.  Follow whatever special dietary instructions you may have been given by your doctor.  You should not drive, operate machinery, drink alcohol, or sign legally binding documents for 24 hours or while you are taking pain medication.  Last dose of pain medication was given at:  Tylenol at 0914am   Hydrocodone in recovery at 1200  May start next dose of pain med after 400pm today  .    NOTIFY YOUR DOCTOR IF YOU EXPERIENCE ANY OF THE FOLLOWING:  Temperature greater than 101 degrees Fahrenheit  Shaking Chills  Redness or excessive drainage from incision  Nausea, vomiting and/or pain that is not controlled by prescribed medications  Increase in bleeding or bleeding that is excessive  Unable to urinate in 6 hours after surgery  If unable to reach your doctor, please go to the closest Emergency Room You may remove Band-Aid/dressing  48 hrs  .  You may shower in 48hrs  .  Apply an ice pack 24-48 hours.  You may experience gas discomfort 24-48 hours after discharge, especially in chest and shoulders.  Changing position frequently may alleviate this discomfort.  If you have excessive pain, swelling, redness, drainage or other problems, notify your physician.  If unable to urinate in 6 to 8 hours after surgery or urinating frequently in small amounts, notify your doctor or go to the nearest Emergency Room.  Medications per physician instructions as indicated on Discharge Medication Information Sheet.  You should see Dr. Ingram  for follow-up care  on as scheduled /as needed  .  Phone number:  [phone]     SPECIAL INSTRUCTIONS:     Otc stool softners as needed while taking pain meds                    I have read and received the above instructions.     Patient/Responsible Party's Signature Date/Time     RN Signature Date/Time

## 2024-08-08 NOTE — ANESTHESIA PREPROCEDURE EVALUATION
Anesthesia Evaluation     Patient summary reviewed and Nursing notes reviewed   no history of anesthetic complications:   NPO Solid Status: > 8 hours  NPO Liquid Status: > 2 hours           Airway   Mallampati: II  TM distance: >3 FB  Neck ROM: full  Dental - normal exam     Pulmonary - normal exam   (+) a smoker Former, COPD, asthma,home oxygen, shortness of breath, sleep apnea on CPAP  Cardiovascular - normal exam  Exercise tolerance: poor (<4 METS)    (+) hypertension, CAD, angina, hyperlipidemia      Neuro/Psych  (+) seizures, psychiatric history Anxiety and Depression  GI/Hepatic/Renal/Endo    (+) obesity, hiatal hernia, GERD, renal disease- CRI, thyroid problem     Musculoskeletal     Abdominal   (+) obese   Substance History - negative use     OB/GYN negative ob/gyn ROS         Other   arthritis,   history of cancer    ROS/Med Hx Other: <4METS,CHRONICSOA,DEC.MOBILITY.HX CAD,COPD,SLEEP APNEA,MULIMYELOMA.CARDS OV 8/18/23 REL.UNLESS SYMPTOMS.STRESS 9/6/23 NORM. STUDY,ECHO9/6/23 EF 61%,AV SCLEROSIS,NO VALVE STENOSIS.ED/HOSP PANCREATITIS,ACUTE NEGIN,ELEVATED LIPASE.NO STATUS CHANGE PER PT.KT               Anesthesia Plan    ASA 4     general     (Discussed possibility of Post op intubation if extubation criteria not met.  Patient stated she accepted risk and understood.)  intravenous induction     Anesthetic plan, risks, benefits, and alternatives have been provided, discussed and informed consent has been obtained with: patient.    Plan discussed with CRNA.    CODE STATUS:

## 2024-08-08 NOTE — OP NOTE
CHOLECYSTECTOMY LAPAROSCOPIC WITH DAVINCI ROBOT  Procedure Report    Patient Name:  Anca Samson  YOB: 1950    Date of Surgery:  8/8/2024     Indications: The patient is a 73-year-old female who recently had an episode of gallstone pancreatitis and choledocholithiasis.  She is status post ERCP with stent placement and the decision was made to proceed with a robotic cholecystectomy.    Pre-op Diagnosis: Gallstone pancreatitis and choledocholithiasis    Post-Op Diagnosis: Same with ventral hernia    Procedure/CPT® Codes:    Robotic cholecystectomy    Staff:  Surgeon(s):  Eduardo Ingram MD    Assistant: Vanesa Rae CRNFA    Anesthesia: General    Estimated Blood Loss: minimal    Implants:    Implant Name Type Inv. Item Serial No.  Lot No. LRB No. Used Action   CARTRDG CLIP LIGAT VASQCLIP [id number] MD/LG STRL - [id number] Implant CARTRDG CLIP LIGAT VASQCLIP [id number] MD/LG STRL  [employer] [id number] N/A 1 Implanted       Specimen:          Specimens       ID Source Type Tests Collected By Collected At Frozen?    A Gallbladder Tissue TISSUE PATHOLOGY EXAM   Eduardo Ingram MD 8/8/24 1107     Description: Gallbladder and contents    This specimen was not marked as sent.                Findings: Extensive intra-abdominal adhesions.  Upper midline 5 to 6 cm ventral hernia noted.    Complications: None    Description of Procedure: The patient was taken to the operating room and placed on the table in supine position.  After induction of general anesthesia, the abdomen was prepped and draped sterilely.  The abdomen was insufflated with a Veress needle and a left upper quadrant 5 mm port was then placed in the peritoneal cavity using a Visiport.  She was noted to have extensive omental adhesions up to the anterior abdominal wall in the midline.  We placed additional 5 mm left flank port and then began to take these adhesions down using a LigaSure.  During this process we  identified a 5 to 6 cm hernia in the upper midline abdomen.  We reduced some bowel out of this and continued to take all of her adhesions down until we had freed off the central portion of the anterior abdominal wall.  Two 8 mm da Radha robotic trocars and a 12 mm robotic robotic trocar were then placed in the central abdomen under direct vision.  I placed 1 additional right lower quadrant 5 mm port as well as an assist port.  We then identified the gallbladder.  The dome was grasped by the assistant and retracted cephalad.  There were adhesions up to the gallbladder and these were carefully taken down with cautery and eventually I was able to identify the infundibulum and retracted laterally.  We then dissected out the gallbladder cystic duct junction as well as the cystic artery.  The cystic artery was clipped twice proximally with Hem-o-sonido clips and then divided distally with cautery.  I then clipped the cystic duct twice proximally once distally and then divided this structure with the cutting current of the cautery.  We then dissected the gallbladder free from the gallbladder fossa and placed in an Endopouch bag.  The gallbladder was then removed.  At this point I elected not to repair the hernia with intentions of coming back at a later time and addressing that at a second procedure.  The robotic instruments were then removed from the abdomen and the robotic arms were undocked from the trocars.  The 12 mm port site was closed with a Constantine-Leticia closure device and a 0 Mersilene tie.  After desufflated the abdomen all the other ports were removed.  All skin incisions were closed with 4-0 Vicryl subcuticular sutures.  Sterile dressings were applied and she was taken postanesthesia recovery room in stable condition.    Assistant: Vanesa Rae CRNFA  was responsible for performing the following activities: Retraction, Suturing, Closing, Placing Dressing, and Held/Positioned Camera and their skilled  assistance was necessary for the success of this case.    Eduardo Ingram MD     Date: 8/8/2024  Time: 11:17 EDT

## 2024-08-08 NOTE — ANESTHESIA POSTPROCEDURE EVALUATION
Patient: Anca Samson    Procedure Summary       Date: 08/08/24 Room / Location: [facility] OR  / [facility]    Anesthesia Start: 0950 Anesthesia Stop: 1130    Procedure: CHOLECYSTECTOMY LAPAROSCOPIC WITH DAVINCI ROBOT (Abdomen) Diagnosis:       Choledocholithiasis with acute cholecystitis      (Choledocholithiasis with acute cholecystitis [K80.42])    Surgeons: Eduardo Ingram MD Provider: Tyler Abraham MD    Anesthesia Type: general ASA Status: 4            Anesthesia Type: general    Vitals  Vitals Value Taken Time   /63 08/08/24 1225   Temp 35.9 °C (96.7 °F) 08/08/24 1126   Pulse 80 08/08/24 1230   Resp 14 08/08/24 1200   SpO2 95 % 08/08/24 1230           Post Anesthesia Care and Evaluation    Patient location during evaluation: bedside  Patient participation: complete - patient participated  Level of consciousness: awake  Pain management: adequate    Airway patency: patent  PONV Status: none  Cardiovascular status: acceptable and stable  Respiratory status: acceptable  Hydration status: acceptable

## 2024-08-13 ENCOUNTER — TELEPHONE (OUTPATIENT)
Dept: PULMONOLOGY | Facility: CLINIC | Age: 74
End: 2024-08-13
Payer: MEDICARE

## 2024-08-13 NOTE — TELEPHONE ENCOUNTER
Spoke with Kaila Vela @ [facility].  Advised patient has had gallbladder surgery in the last two weeks and has two more procedures coming up over the next month or so.  Kaila has recommended patient complete procedures and potentially work with a physical therapy program to improve strength and mobility then reconsider pulmonary rehab.  Information forwarded to referral coordinator to adjust referral accordingly.

## 2024-08-14 ENCOUNTER — OFFICE VISIT (OUTPATIENT)
Dept: ONCOLOGY | Facility: HOSPITAL | Age: 74
End: 2024-08-14
Payer: MEDICARE

## 2024-08-14 ENCOUNTER — HOSPITAL ENCOUNTER (OUTPATIENT)
Dept: ONCOLOGY | Facility: HOSPITAL | Age: 74
Discharge: HOME | End: 2024-08-14
Payer: MEDICARE

## 2024-08-14 VITALS
BODY MASS INDEX: 37.54 KG/M2 | TEMPERATURE: 98.6 F | WEIGHT: 195 LBS | RESPIRATION RATE: 20 BRPM | OXYGEN SATURATION: 98 % | HEART RATE: 83 BPM | SYSTOLIC BLOOD PRESSURE: 145 MMHG | DIASTOLIC BLOOD PRESSURE: 72 MMHG

## 2024-08-14 VITALS
HEIGHT: 60 IN | BODY MASS INDEX: 38.35 KG/M2 | TEMPERATURE: 98.6 F | WEIGHT: 195.33 LBS | DIASTOLIC BLOOD PRESSURE: 72 MMHG | RESPIRATION RATE: 20 BRPM | SYSTOLIC BLOOD PRESSURE: 145 MMHG | OXYGEN SATURATION: 98 % | HEART RATE: 83 BPM

## 2024-08-14 DIAGNOSIS — C90.00 MULTIPLE MYELOMA, REMISSION STATUS UNSPECIFIED: Primary | ICD-10-CM

## 2024-08-14 DIAGNOSIS — Z12.2 ENCOUNTER FOR SCREENING FOR LUNG CANCER: ICD-10-CM

## 2024-08-14 LAB
ALBUMIN SERPL-MCNC: 3.3 G/DL (ref 3.5–5.2)
ALBUMIN/GLOB SERPL: 1.8 G/DL
ALP SERPL-CCNC: 71 U/L (ref 39–117)
ALT SERPL W P-5'-P-CCNC: 10 U/L (ref 1–33)
ANION GAP SERPL CALCULATED.3IONS-SCNC: 7.4 MMOL/L (ref 5–15)
AST SERPL-CCNC: 7 U/L (ref 1–32)
BASOPHILS # BLD AUTO: 0.02 10*3/MM3 (ref 0–0.2)
BASOPHILS NFR BLD AUTO: 0.2 % (ref 0–1.5)
BILIRUB SERPL-MCNC: 0.5 MG/DL (ref 0–1.2)
BUN SERPL-MCNC: 12 MG/DL (ref 8–23)
BUN/CREAT SERPL: 15.8 (ref 7–25)
CALCIUM SPEC-SCNC: 8.9 MG/DL (ref 8.6–10.5)
CHLORIDE SERPL-SCNC: 112 MMOL/L (ref 98–107)
CO2 SERPL-SCNC: 20.6 MMOL/L (ref 22–29)
CREAT SERPL-MCNC: 0.76 MG/DL (ref 0.57–1)
DEPRECATED RDW RBC AUTO: 61.4 FL (ref 37–54)
EGFRCR SERPLBLD CKD-EPI 2021: 82.3 ML/MIN/1.73
EOSINOPHIL # BLD AUTO: 0 10*3/MM3 (ref 0–0.4)
EOSINOPHIL NFR BLD AUTO: 0 % (ref 0.3–6.2)
ERYTHROCYTE [DISTWIDTH] IN BLOOD BY AUTOMATED COUNT: 16.5 % (ref 12.3–15.4)
GLOBULIN UR ELPH-MCNC: 1.8 GM/DL
GLUCOSE SERPL-MCNC: 152 MG/DL (ref 65–99)
HCT VFR BLD AUTO: 33.1 % (ref 34–46.6)
HGB BLD-MCNC: 10.7 G/DL (ref 12–15.9)
IMM GRANULOCYTES # BLD AUTO: 0.02 10*3/MM3 (ref 0–0.05)
IMM GRANULOCYTES NFR BLD AUTO: 0.2 % (ref 0–0.5)
LYMPHOCYTES # BLD AUTO: 3.21 10*3/MM3 (ref 0.7–3.1)
LYMPHOCYTES NFR BLD AUTO: 32.9 % (ref 19.6–45.3)
MCH RBC QN AUTO: 33 PG (ref 26.6–33)
MCHC RBC AUTO-ENTMCNC: 32.3 G/DL (ref 31.5–35.7)
MCV RBC AUTO: 102.2 FL (ref 79–97)
MONOCYTES # BLD AUTO: 1.4 10*3/MM3 (ref 0.1–0.9)
MONOCYTES NFR BLD AUTO: 14.4 % (ref 5–12)
NEUTROPHILS NFR BLD AUTO: 5.1 10*3/MM3 (ref 1.7–7)
NEUTROPHILS NFR BLD AUTO: 52.3 % (ref 42.7–76)
PLATELET # BLD AUTO: 327 10*3/MM3 (ref 140–450)
PMV BLD AUTO: 9.4 FL (ref 6–12)
POTASSIUM SERPL-SCNC: 3.7 MMOL/L (ref 3.5–5.2)
PROT SERPL-MCNC: 5.1 G/DL (ref 6–8.5)
RBC # BLD AUTO: 3.24 10*6/MM3 (ref 3.77–5.28)
SODIUM SERPL-SCNC: 140 MMOL/L (ref 136–145)
T-UPTAKE NFR SERPL: 1.11 TBI (ref 0.8–1.3)
T4 SERPL-MCNC: 6.27 MCG/DL (ref 4.5–11.7)
TSH SERPL DL<=0.05 MIU/L-ACNC: 1.11 UIU/ML (ref 0.27–4.2)
WBC NRBC COR # BLD AUTO: 9.75 10*3/MM3 (ref 3.4–10.8)

## 2024-08-14 PROCEDURE — 3078F DIAST BP <80 MM HG: CPT | Performed by: INTERNAL MEDICINE

## 2024-08-14 PROCEDURE — 80053 COMPREHEN METABOLIC PANEL: CPT | Performed by: INTERNAL MEDICINE

## 2024-08-14 PROCEDURE — 25010000002 HEPARIN LOCK FLUSH PER 10 UNITS: Performed by: INTERNAL MEDICINE

## 2024-08-14 PROCEDURE — 1160F RVW MEDS BY RX/DR IN RCRD: CPT | Performed by: INTERNAL MEDICINE

## 2024-08-14 PROCEDURE — 85025 COMPLETE CBC W/AUTO DIFF WBC: CPT | Performed by: INTERNAL MEDICINE

## 2024-08-14 PROCEDURE — 1126F AMNT PAIN NOTED NONE PRSNT: CPT | Performed by: INTERNAL MEDICINE

## 2024-08-14 PROCEDURE — 84443 ASSAY THYROID STIM HORMONE: CPT | Performed by: INTERNAL MEDICINE

## 2024-08-14 PROCEDURE — 84479 ASSAY OF THYROID (T3 OR T4): CPT | Performed by: INTERNAL MEDICINE

## 2024-08-14 PROCEDURE — 99213 OFFICE O/P EST LOW 20 MIN: CPT | Performed by: INTERNAL MEDICINE

## 2024-08-14 PROCEDURE — 84436 ASSAY OF TOTAL THYROXINE: CPT | Performed by: INTERNAL MEDICINE

## 2024-08-14 PROCEDURE — 3077F SYST BP >= 140 MM HG: CPT | Performed by: INTERNAL MEDICINE

## 2024-08-14 PROCEDURE — 1159F MED LIST DOCD IN RCRD: CPT | Performed by: INTERNAL MEDICINE

## 2024-08-14 PROCEDURE — 36591 DRAW BLOOD OFF VENOUS DEVICE: CPT

## 2024-08-14 PROCEDURE — G2211 COMPLEX E/M VISIT ADD ON: HCPCS | Performed by: INTERNAL MEDICINE

## 2024-08-14 RX ORDER — HEPARIN SODIUM (PORCINE) LOCK FLUSH IV SOLN 100 UNIT/ML 100 UNIT/ML
500 SOLUTION INTRAVENOUS AS NEEDED
Status: DISCONTINUED | OUTPATIENT
Start: 2024-08-14 | End: 2024-08-15 | Stop reason: HOSPADM

## 2024-08-14 RX ORDER — HEPARIN SODIUM (PORCINE) LOCK FLUSH IV SOLN 100 UNIT/ML 100 UNIT/ML
500 SOLUTION INTRAVENOUS AS NEEDED
OUTPATIENT
Start: 2024-08-14

## 2024-08-14 RX ORDER — SODIUM CHLORIDE 0.9 % (FLUSH) 0.9 %
20 SYRINGE (ML) INJECTION AS NEEDED
OUTPATIENT
Start: 2024-08-14

## 2024-08-14 RX ORDER — SODIUM CHLORIDE 0.9 % (FLUSH) 0.9 %
20 SYRINGE (ML) INJECTION AS NEEDED
Status: DISCONTINUED | OUTPATIENT
Start: 2024-08-14 | End: 2024-08-15 | Stop reason: HOSPADM

## 2024-08-14 RX ADMIN — Medication 20 ML: at 10:58

## 2024-08-14 RX ADMIN — HEPARIN 500 UNITS: 100 SYRINGE at 10:58

## 2024-08-14 NOTE — PROGRESS NOTES
Chief Complaint  No chief complaint on file.    Rena Guerra,*  Rena Guerra, PA    Subjective          Anca Samson presents to [facility] for consideration of ongoing treatment for her myeloma.  She is on daratumumab, lenalidomide, dexamethasone.  She has been tolerating her treatment well had good response thus far.  She had acute cholecystitis last week requiring cholecystectomy.  She is recuperating well.    Oncology/Hematology History Overview Note   Smoldering Myeloma    Initially diagnosed in 2016 with bone marrow biopsy demonstrating 30% CD56 positive monoclonal plasma cell population.  45 XX normal female chromosome pattern  FISH panel negative for myeloma associated mutations including 1q21, 9q34,11q13,14q32,15q24, 17q13  No anemia, renal insufficiency, hypercalcemia.  On observation since that time    Low grade adenocarcinoma of ascending colon      5/16/2017 right hemicolectomy which  revealed a low-grade 7.6 cm adenocarcinoma of the cecum. All margins were  negative. Lymphovascular invasion and perineural invasion were not identified.     26 lymph nodes were removed and unfortunately 1 was involved with metastatic deposit. pT3 pN1a colorectal cancer.        Clinical Staging      Smoldering Myeloma; Colon ([id number])            Treatments      Chemotherapy      11/2017 completed 6mo adjuvant Xeloda        6/2022 Stopped Smoking     Malignant neoplasm of ascending colon   7/21/2017 Initial Diagnosis    Colon cancer (CMS/HCC)     Multiple myeloma   7/28/2021 Initial Diagnosis    Multiple myeloma     11/7/2023 -  Chemotherapy    OP MULTIPLE MYELOMA Daratumumab / Lenalidomide / Dexamethasone           Current Outpatient Medications on File Prior to Visit   Medication Sig Dispense Refill    acyclovir (ZOVIRAX) 400 MG tablet Take 1 tablet by mouth 2 (Two) Times a Day. Take one tablet by mouth twice daily. 60 tablet 11    albuterol sulfate   (90 Base) MCG/ACT inhaler Inhale 2 puffs Every 4 (Four) Hours As Needed for Wheezing. 18 g 11    arformoterol (BROVANA) 15 MCG/2ML nebulizer solution USE 1 VIAL  IN  NEBULIZER TWICE  DAILY - MORNING & EVENING 360 mL 11    aspirin 81 MG EC tablet Take 1 tablet by mouth Daily.      budesonide (PULMICORT) 0.5 MG/2ML nebulizer solution USE 1 VIAL  IN  NEBULIZER TWICE  DAILY - RINSE MOUTH OUT AFTER TREATMENT 60 each 11    busPIRone (BUSPAR) 10 MG tablet Take 1 tablet by mouth 2 (Two) Times a Day. 180 tablet 1    colestipol (COLESTID) 1 g tablet Take 2 tablets by mouth 2 (Two) Times a Day. 120 tablet 5    dapsone 25 MG tablet TAKE 2 TABLETS BY MOUTH TWICE A  tablet 1    dexAMETHasone (DECADRON) 4 MG tablet Take 10 tablets on D 1,8,15,22 and take 1 tablet on D 2,3.  Take in the morning with food. 42 tablet 5    doxycycline (VIBRAMYCIN) 100 MG capsule Take 1 capsule by mouth 2 (Two) Times a Day. 20 capsule 0    FLUoxetine (PROzac) 40 MG capsule Take 1 capsule by mouth Daily. 90 capsule 1    HYDROcodone-acetaminophen (NORCO) 5-325 MG per tablet Take 1 tablet by mouth Every 6 (Six) Hours As Needed for Moderate Pain (Pain). 10 tablet 0    lenalidomide (REVLIMID) 15 MG capsule Take 1 capsule by mouth Daily. On Days 1-21, and off 7 days, on a 28 day cycle 21 capsule 0    levalbuterol (XOPENEX) 0.63 MG/3ML nebulizer solution Take 1 ampule by nebulization 4 (Four) Times a Day As Needed for Wheezing. 120 mL 5    ondansetron (ZOFRAN) 8 MG tablet Take 1 tablet by mouth 3 (Three) Times a Day As Needed for Nausea or Vomiting. 30 tablet 5    prednisoLONE acetate (PRED FORTE) 1 % ophthalmic suspension Administer 1 drop to both eyes 4 (Four) Times a Day.      promethazine (PHENERGAN) 25 MG tablet Take 1 tablet by mouth Every 6 (Six) Hours As Needed for Nausea or Vomiting. 30 tablet 1    vitamin D (ERGOCALCIFEROL) 1.25 MG (81786 UT) capsule capsule Take 1 capsule by mouth 2 (Two) Times a Week. 26 capsule 1    Yupelri 175 MCG/3ML  nebulizer solution USE 1 VIAL IN NEBULIZER DAILY 90 mL 11     Current Facility-Administered Medications on File Prior to Visit   Medication Dose Route Frequency Provider Last Rate Last Admin    [DISCONTINUED] heparin injection 500 Units  500 Units Intravenous PRN West Nicolas MD   500 Units at 08/14/24 1058    [DISCONTINUED] sodium chloride 0.9 % flush 20 mL  20 mL Intravenous PRN West Nicolas MD   20 mL at 08/14/24 1058       Allergies   Allergen Reactions    Morphine Anaphylaxis    Cephalexin Hives    Penicillins Hives    Sulfa Antibiotics Hives     Past Medical History:   Diagnosis Date    Anxiety     Asthma 07/28/2021    Atherosclerosis of native coronary artery of native heart with angina pectoris 08/15/2023    FOLLOWED BY DR GONZALES/DINA PETTIT. DENIES CP BUT GETS SOA WITH EXERTION HAS COPD WEARS AT 2LPM N/C. DECREASED ACTIVITY D/T SOA.    C. difficile diarrhea     DENIES ANY CURRENT ISSUES    Colon cancer 07/21/2017    Colon polyp     COPD (chronic obstructive pulmonary disease) 10/23/2017    Depression     Disease of thyroid gland     Diverticulitis     Dysphagia     GERD (gastroesophageal reflux disease)     Head injury     CONCUSSION AT AGE 4    Hernia 2019    History of chemotherapy     2017    Hx of psychiatric care     Impaired fasting glucose 08/14/2015    Lumbago     Lung nodule 02/17/2022    Major depressive disorder 10/27/2016    Malignant neoplasm of ascending colon 07/21/2017    Melena     Multiple myeloma     Nicotine dependence 05/10/2017    NICK (obstructive sleep apnea) 11/06/2023    Oxygen dependent     REPORTS USES 02 AT 2LPM VIA N/C DURING DAY AND 3LPM AT NIGHT . CAN GET VERY SOA WITH MINIMAL EXERTION    Renal insufficiency 07/28/2021    Seizures     PSEUDO SEIZURES LAST ONE AROUND JULY 2024    Shortness of breath     CAN GET VERY SOA WITH MINIMAL EXERTION    Tobacco use 07/28/2021    QUIT SMOKING JUNE 2022    Visit for screening mammogram 02/20/2020    NORMAL- REPEAT IN ONE YEAR     Vitamin D deficiency      Past Surgical History:   Procedure Laterality Date    APPENDECTOMY      BONE MARROW BIOPSY  2016    CHOLECYSTECTOMY N/A 08/08/2024    Procedure: CHOLECYSTECTOMY LAPAROSCOPIC WITH DAVINCI ROBOT;  Surgeon: Eduardo Ingram MD;  Location: [facility];  Service: Robotics - DaVinci;  Laterality: N/A;    COLON SURGERY  2017    COLONOSCOPY  2018,2017,2019    [facility]- DR LE:DIVERTICULOSIS AND ERYTHEMA OF MUCOSA    COLONOSCOPY N/A 12/20/2022    Procedure: COLONOSCOPY WITH ELEVIEW INJECTION, POLYPECTOMY, HOT SNARE, CLIP APPLICATION X3, BIOPSIES;  Surgeon: Jarvis Borges MD;  Location: [facility] ENDOSCOPY;  Service: Gastroenterology;  Laterality: N/A;  COLON POLYP, DIVERTICULOSIS, ANASTOMOSIS RIGHT COLON    COLONOSCOPY N/A 11/09/2023    Procedure: COLONOSCOPY;  Surgeon: Jarvis Borges MD;  Location: [facility] ENDOSCOPY;  Service: Gastroenterology;  Laterality: N/A;  DIVERTICULOSIS, ANASTOMOSIS IN ASCENDING COLON    ENDOSCOPY  2018    ERCP N/A 07/27/2024    Procedure: ENDOSCOPIC RETROGRADE CHOLANGIOPANCREATOGRAPHY WITH SPHINCTEROTOMY, BALLOON SWEEP, STENT PLACEMENT;  Surgeon: Ajay Kennedy MD;  Location: [facility] MAIN OR;  Service: Gastroenterology;  Laterality: N/A;  BILE DUCT STONE    EYE SURGERY      FECAL DISIMPACTION  06/2019    TRANSPLANT     GALLBLADDER SURGERY      HEMICOLECTOMY Right 05/2017    HYSTERECTOMY  1982,1984    KNEE ARTHROSCOPY Left 01/03/2022    Procedure: KNEE ARTHROSCOPY WITH PARTIAL MEDIAL MENISCECTOMY,  CHONDROPLASTY;  Surgeon: Nicholas Tipton MD;  Location: [facility] OR [facility] [place];  Service: Orthopedics;  Laterality: Left;    MINI-LAPAROTOMY  2017    PORTACATH PLACEMENT N/A     pt states that her port does not work    TONSILLECTOMY      UPPER GASTROINTESTINAL ENDOSCOPY  2018    VENOUS ACCESS DEVICE (PORT) INSERTION N/A 10/31/2023    Procedure: Port-a-catheter removal and port-a-catheter placement;  Surgeon: Alcon Delgado MD;  Location: [facility] OR  [facility];  Service: General;  Laterality: N/A;     Social History     Socioeconomic History    Marital status:    Tobacco Use    Smoking status: Former     Current packs/day: 0.00     Average packs/day: 1 pack/day for 47.4 years (47.4 ttl pk-yrs)     Types: Cigarettes     Start date: 1975     Quit date: 6/3/2022     Years since quittin.2     Passive exposure: Past    Smokeless tobacco: Never   Vaping Use    Vaping status: Never Used   Substance and Sexual Activity    Alcohol use: Never    Drug use: Never    Sexual activity: Defer     Family History   Problem Relation Age of Onset    Heart disease Mother     Lung cancer Mother     Cancer Mother     Stroke Father     Heart disease Father     Kidney cancer Father     Cancer Father     Stroke Other         UNCLE/AUNT    Colon cancer Neg Hx     Malig Hyperthermia Neg Hx        Objective   Physical Exam  Vitals reviewed. Exam conducted with a chaperone present.   HENT:      Nose:      Comments: Nasal cannula in place  Cardiovascular:      Rate and Rhythm: Normal rate and regular rhythm.      Heart sounds: Normal heart sounds. No murmur heard.     No gallop.   Pulmonary:      Effort: Pulmonary effort is normal.      Breath sounds: Normal breath sounds.      Comments: Port-A-Cath  Abdominal:      General: Abdomen is flat. Bowel sounds are normal.      Comments: Healing laparoscopic surgical incisions   Lymphadenopathy:      Cervical: No cervical adenopathy.   Psychiatric:         Mood and Affect: Mood normal.         Behavior: Behavior normal.         Vitals:    24 1035   BP: 145/72   Pulse: 83   Resp: 20   Temp: 98.6 °F (37 °C)   TempSrc: Temporal   SpO2: 98%   Weight: 88.5 kg (195 lb)   PainSc: 0-No pain     ECOG score: 0         PHQ-9 Total Score:                    Result Review :   The following data was reviewed by: West Nicolas MD on 2024:  Lab Results   Component Value Date    HGB 10.7 (L) 2024    HCT 33.1 (L) 2024    .2  "(H) 08/14/2024     08/14/2024    WBC 9.75 08/14/2024    NEUTROABS 5.10 08/14/2024    LYMPHSABS 3.21 (H) 08/14/2024    MONOSABS 1.40 (H) 08/14/2024    EOSABS 0.00 08/14/2024    BASOSABS 0.02 08/14/2024     Lab Results   Component Value Date    GLUCOSE 152 (H) 08/14/2024    BUN 12 08/14/2024    CREATININE 0.76 08/14/2024     08/14/2024    K 3.7 08/14/2024     (H) 08/14/2024    CO2 20.6 (L) 08/14/2024    CALCIUM 8.9 08/14/2024    PROTEINTOT 5.1 (L) 08/14/2024    ALBUMIN 3.3 (L) 08/14/2024    BILITOT 0.5 08/14/2024    ALKPHOS 71 08/14/2024    AST 7 08/14/2024    ALT 10 08/14/2024     Lab Results   Component Value Date    MG 1.8 07/29/2024    PHOS 2.5 07/27/2024    FREET4 1.55 12/06/2023    TSH 1.110 08/14/2024     No results found for: \"IRON\", \"LABIRON\", \"TRANSFERRIN\", \"TIBC\"  Lab Results   Component Value Date     03/07/2023    [id number] 612 04/15/2024    FOLATE 8.95 03/27/2024     No results found for: \"PSA\", \"CEA\", \"AFP\", \"\", \"\"    Data reviewed : Inpatient records reviewed.  Operative report from cholecystectomy reviewed.  Pathology report reviewed demonstrating acute cholecystitis.     Assessment and Plan    Diagnoses and all orders for this visit:    1. Multiple myeloma, remission status unspecified (Primary)  Assessment & Plan:  Patient is on active therapy with daratumumab, lenalidomide, dexamethasone.  X response to therapy thus far.  She is due for next cycle however she just had cholecystectomy last week.  I will defer the cycle to allow for healing from her surgery.  I will see her back in 1 month to resume with lab work prior.              Patient Follow Up: 1 month    Patient was given instructions and counseling regarding her condition or for health maintenance advice. Please see specific information pulled into the AVS if appropriate.     West Nicolas MD    8/15/2024            "

## 2024-08-15 ENCOUNTER — SPECIALTY PHARMACY (OUTPATIENT)
Facility: HOSPITAL | Age: 74
End: 2024-08-15
Payer: MEDICARE

## 2024-08-15 NOTE — ASSESSMENT & PLAN NOTE
Patient is on active therapy with daratumumab, lenalidomide, dexamethasone.  X response to therapy thus far.  She is due for next cycle however she just had cholecystectomy last week.  I will defer the cycle to allow for healing from her surgery.  I will see her back in 1 month to resume with lab work prior.

## 2024-08-19 ENCOUNTER — OFFICE VISIT (OUTPATIENT)
Dept: PSYCHIATRY | Facility: CLINIC | Age: 74
End: 2024-08-19
Payer: MEDICARE

## 2024-08-19 VITALS
SYSTOLIC BLOOD PRESSURE: 130 MMHG | HEART RATE: 92 BPM | WEIGHT: 188 LBS | HEIGHT: 60 IN | BODY MASS INDEX: 36.91 KG/M2 | DIASTOLIC BLOOD PRESSURE: 52 MMHG

## 2024-08-19 DIAGNOSIS — F51.05 INSOMNIA DUE TO MENTAL CONDITION: ICD-10-CM

## 2024-08-19 DIAGNOSIS — R56.9 SEIZURES: ICD-10-CM

## 2024-08-19 DIAGNOSIS — F33.1 MAJOR DEPRESSIVE DISORDER, RECURRENT EPISODE, MODERATE: ICD-10-CM

## 2024-08-19 DIAGNOSIS — F41.1 GENERALIZED ANXIETY DISORDER: Primary | ICD-10-CM

## 2024-08-19 PROCEDURE — 1159F MED LIST DOCD IN RCRD: CPT | Performed by: STUDENT IN AN ORGANIZED HEALTH CARE EDUCATION/TRAINING PROGRAM

## 2024-08-19 PROCEDURE — 1160F RVW MEDS BY RX/DR IN RCRD: CPT | Performed by: STUDENT IN AN ORGANIZED HEALTH CARE EDUCATION/TRAINING PROGRAM

## 2024-08-19 PROCEDURE — 3075F SYST BP GE 130 - 139MM HG: CPT | Performed by: STUDENT IN AN ORGANIZED HEALTH CARE EDUCATION/TRAINING PROGRAM

## 2024-08-19 PROCEDURE — 99214 OFFICE O/P EST MOD 30 MIN: CPT | Performed by: STUDENT IN AN ORGANIZED HEALTH CARE EDUCATION/TRAINING PROGRAM

## 2024-08-19 PROCEDURE — 90833 PSYTX W PT W E/M 30 MIN: CPT | Performed by: STUDENT IN AN ORGANIZED HEALTH CARE EDUCATION/TRAINING PROGRAM

## 2024-08-19 PROCEDURE — 3078F DIAST BP <80 MM HG: CPT | Performed by: STUDENT IN AN ORGANIZED HEALTH CARE EDUCATION/TRAINING PROGRAM

## 2024-08-19 RX ORDER — BUSPIRONE HYDROCHLORIDE 15 MG/1
15 TABLET ORAL 2 TIMES DAILY
Qty: 180 TABLET | Refills: 1 | Status: SHIPPED | OUTPATIENT
Start: 2024-08-19

## 2024-08-19 NOTE — PATIENT INSTRUCTIONS
1.  Please return to clinic at your next scheduled visit.  Contact the clinic ([phone]) at least 24 hours prior in the event you need to cancel.  2.  Do no harm to yourself or others.    3.  Avoid alcohol and drugs.    4.  Take all medications as prescribed.  Please contact the clinic with any concerns. If you are in need of medication refills, please call the clinic at [phone].    5. Should you want to get in touch with your provider, Dr. Kalia Singer, please utilize [employer] or contact the office ([phone]), and staff will be able to page Dr. Singer directly.  6.  In the event you have personal crisis, contact the following crisis numbers: Suicide Prevention Hotline 1-385.990.8289; [facility] Helpline 0-756-915-EMMY; [facility] Emergency Room [phone]; text HELLO to [phone]; or 317.

## 2024-08-19 NOTE — PROGRESS NOTES
"Subjective   Anca Samson is a 73 y.o. female who presents today for initial evaluation     Referring Provider:  No referring provider defined for this encounter.    Chief Complaint:  anxiety, depression    History of Present Illness:     2024: INITIAL VISIT Chart review:     Amandeep: Ativan short course in November, February, May.  Care Everywhere: a few non behavioral health notes    Psychotropic medication chart review:  Present:  Prozac 40 mg a day    Previously:  Prozac 20 mg a day  Zoloft 25 mg a day    EKG: 2024: Rate 97, sinus, probable left atrial enlargement, QTc 461  Procedures: Sleep study in May 2024: NICK, BiPAP settings noted  Head imaging: May 2024: MRI of the brain shows white matter changes compatible with small vessel ischemic disease  Labs: 2024: Abnormal CMP was chloride 112, CO2 20.9, glucose 160, total protein 5.3.  Reassuring thyroid studies, abnormal CBC with hemoglobin 10.6, other abnormalities.  Initial Chart Review Notes: Referred by primary care in April for anxiety.  History of significant shortness of breath due to comorbidities.  Patient requested to meet with a psychiatrist.      Patient Psychotherapy Notes:  Patient goals:  Misc:  On chemo for multiple myeloma since       Chart Review By Dates:  2024: admitted for lap anayeli, pulm, infusion with onc, onc, abnl cmp and cbc, but cr 0.76.  24: onc, dexa shows osteoporosis. High trigs, low A1c 4.4, abnl cmp, cbc. Cr 0.86.    Plannin2024: Much improved. No changes for now. 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  24: Pseudoseizures per pt (?). Start buspar together with prozac for anxiety, depression. Tremor does not seem related, not due to prozac.    VISITS/APPOINTMENTS (BELOW):    \"Sarah\" \"Robi\" her jusband of 46 years      2024: In person interview:  \"They admitted me for surgery.\"  I had my GB out  They took " "me off my chemo because of the surgeries  Still having pains in my chest.  Still has a hernia to repair, then will restart chemo.  In a wheelchair again, was in a walker last time  Not working right now  MDD: depressed mood  CHAI: more worrying, on edge  Panic attacks: stable  Energy: down  Concentration: down  Insomnia: too much, hypersomnia  Eating/Weight: 188, 194 lbs  Refills: y  Substances: denies  Therapy: none  Medication compliant: y  SE: n  No SI HI AVH.      7/22/24: In person interview:  \"Pretty good.\"  I think it's helping. I'm not as irritable.  I didn't come in a wheelchair this time. I'm using a walker.  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 Was 8-10 hours a week.  I still can't walk, but the anxiety about it is much better  MDD: improved  CHAI: improved  Panic attacks: none  Energy: improving, but baseline low  Concentration: improving  Insomnia:   Eating/Weight: 194 lbs  Refills: y  Substances: def  Therapy: n  Medication compliant: y  SE: n  No SI HI AVH.      06/20/2024: In person.  Interview:  His/Her Story: \"[place] for 2 weeks, that was 21 years ago.\"  P18, G10  I don't know why it happened. I don't think it was SI.  I've been on prozac 4-5 years  \"I'm awful nervous\"  I can't get up and do anything  I can't drive  I can't work  I can't walk  If I walk 20 feet I'm so out of breath  Has had a tremor since started nebulizer a year ago. Quit it for a few months, tremor stopped, restarted, tremor restarted  Perfectionistic tendencies  Sleeps well on bipap  Depression/Mood:  Depressed mood, anhedonia, hopelessness or guilt, poor energy, poor concentration.  Seasonal pattern:  Severity: Moderate  Duration: 21 years  Anxiety:  Uncontrolled worrying, muscle tension, fatigue, poor concentration, feeling on edge or restless, irritability.  Severity: Moderate  Duration: 21 years  Panic attacks: y  Psych ROS: Positive for " depression, anxiety.  Negative for psychosis and padmaja.  ADHD: def  PTSD: def  No SI HI AVH.  Medication compliant: y    Access to Firearms: yes, not locked away    PHQ-9 Depression Screening  PHQ-9 Total Score:      Little interest or pleasure in doing things?     Feeling down, depressed, or hopeless?     Trouble falling or staying asleep, or sleeping too much?     Feeling tired or having little energy?     Poor appetite or overeating?     Feeling bad about yourself - or that you are a failure or have let yourself or your family down?     Trouble concentrating on things, such as reading the newspaper or watching television?     Moving or speaking so slowly that other people could have noticed? Or the opposite - being so fidgety or restless that you have been moving around a lot more than usual?     Thoughts that you would be better off dead, or of hurting yourself in some way?     PHQ-9 Total Score       CHAI-7       Past Surgical History:  Past Surgical History:   Procedure Laterality Date    APPENDECTOMY      BONE MARROW BIOPSY  2016    CHOLECYSTECTOMY N/A 08/08/2024    Procedure: CHOLECYSTECTOMY LAPAROSCOPIC WITH DAVINCI ROBOT;  Surgeon: Eduardo Ingram MD;  [place] [facility] [place] [facility] [place]  Service: Robotics - DaVinci;  Laterality: N/A;    COLON SURGERY  2017    COLONOSCOPY  2018,2017,2019    [facility]- DR LE:DIVERTICULOSIS AND ERYTHEMA OF MUCOSA    COLONOSCOPY N/A 12/20/2022    Procedure: COLONOSCOPY WITH ELEVIEW INJECTION, POLYPECTOMY, HOT SNARE, CLIP APPLICATION X3, BIOPSIES;  Surgeon: Jarvis Borges MD;  Location: [facility] ENDOSCOPY;  Service: Gastroenterology;  Laterality: N/A;  COLON POLYP, DIVERTICULOSIS, ANASTOMOSIS RIGHT COLON    COLONOSCOPY N/A 11/09/2023    Procedure: COLONOSCOPY;  Surgeon: Jarvis Borges MD;  Location: [facility] ENDOSCOPY;  Service: Gastroenterology;  Laterality: N/A;  DIVERTICULOSIS, ANASTOMOSIS IN ASCENDING COLON    ENDOSCOPY  2018    ERCP N/A 07/27/2024     Procedure: ENDOSCOPIC RETROGRADE CHOLANGIOPANCREATOGRAPHY WITH SPHINCTEROTOMY, BALLOON SWEEP, STENT PLACEMENT;  Surgeon: Ajay Kennedy MD;  Location: [facility];  Service: Gastroenterology;  Laterality: N/A;  BILE DUCT STONE    EYE SURGERY      FECAL DISIMPACTION  06/2019    TRANSPLANT     GALLBLADDER SURGERY      HEMICOLECTOMY Right 05/2017    HYSTERECTOMY  1982,1984    KNEE ARTHROSCOPY Left 01/03/2022    Procedure: KNEE ARTHROSCOPY WITH PARTIAL MEDIAL MENISCECTOMY,  CHONDROPLASTY;  Surgeon: Nicholas Tipton MD;  Location: [facility];  Service: Orthopedics;  Laterality: Left;    MINI-LAPAROTOMY  2017    PORTACATH PLACEMENT N/A     pt states that her port does not work    TONSILLECTOMY      UPPER GASTROINTESTINAL ENDOSCOPY  2018    VENOUS ACCESS DEVICE (PORT) INSERTION N/A 10/31/2023    Procedure: Port-a-catheter removal and port-a-catheter placement;  Surgeon: Alcon Delgado MD;  Location: [facility];  Service: General;  Laterality: N/A;       Problem List:  Patient Active Problem List   Diagnosis    Anxiety    Asthma    Malignant neoplasm of ascending colon    COPD (chronic obstructive pulmonary disease)    Diverticulitis    Dysphagia    Heartburn    Hyperlipidemia    Essential hypertension    Impaired fasting glucose    Low back pain    Major depressive disorder    Multiple myeloma    Renal insufficiency    Seizures    Vitamin D deficiency    Tobacco abuse    Class 2 severe obesity due to excess calories with serious comorbidity and body mass index (BMI) of 39.0 to 39.9 in adult    Chondromalacia of left knee    Diarrhea    S/P Left knee partial medial menisectomy and chondroplasty     Chronic dyspnea    Multiple lung nodules    Aftercare following surgery of left knee thorascopic partial medial meniscectomy, chondroplasty, 1/3/2022    Hypothyroidism    Atherosclerosis of native coronary artery of native heart with angina pectoris    Chronic respiratory failure with hypoxia    Excessive  daytime sleepiness    Snoring    Encounter for screening for malignant neoplasm of colon    History of colon polyps    NICK (obstructive sleep apnea)    Encounter for screening for lung cancer    Dehydration    Tobacco abuse, in remission    Hiatal hernia    Fatigue    Cough    Wheezing    Atelectasis    Acute gallstone pancreatitis    Choledocholithiasis with acute cholecystitis    Encounter for removal of biliary stent       Allergy:   Allergies   Allergen Reactions    Morphine Anaphylaxis    Cephalexin Hives    Penicillins Hives    Sulfa Antibiotics Hives        Discontinued Medications:  Medications Discontinued During This Encounter   Medication Reason    busPIRone (BUSPAR) 10 MG tablet Reorder           Current Medications:   Current Outpatient Medications   Medication Sig Dispense Refill    acyclovir (ZOVIRAX) 400 MG tablet Take 1 tablet by mouth 2 (Two) Times a Day. Take one tablet by mouth twice daily. 60 tablet 11    albuterol sulfate  (90 Base) MCG/ACT inhaler Inhale 2 puffs Every 4 (Four) Hours As Needed for Wheezing. 18 g 11    arformoterol (BROVANA) 15 MCG/2ML nebulizer solution USE 1 VIAL  IN  NEBULIZER TWICE  DAILY - MORNING & EVENING 360 mL 11    aspirin 81 MG EC tablet Take 1 tablet by mouth Daily.      budesonide (PULMICORT) 0.5 MG/2ML nebulizer solution USE 1 VIAL  IN  NEBULIZER TWICE  DAILY - RINSE MOUTH OUT AFTER TREATMENT 60 each 11    busPIRone (BUSPAR) 15 MG tablet Take 1 tablet by mouth 2 (Two) Times a Day. 180 tablet 1    colestipol (COLESTID) 1 g tablet Take 2 tablets by mouth 2 (Two) Times a Day. 120 tablet 5    dapsone 25 MG tablet TAKE 2 TABLETS BY MOUTH TWICE A  tablet 1    dexAMETHasone (DECADRON) 4 MG tablet Take 10 tablets on D 1,8,15,22 and take 1 tablet on D 2,3.  Take in the morning with food. 42 tablet 5    doxycycline (VIBRAMYCIN) 100 MG capsule Take 1 capsule by mouth 2 (Two) Times a Day. 20 capsule 0    FLUoxetine (PROzac) 40 MG capsule Take 1 capsule by mouth  Daily. 90 capsule 1    HYDROcodone-acetaminophen (NORCO) 5-325 MG per tablet Take 1 tablet by mouth Every 6 (Six) Hours As Needed for Moderate Pain (Pain). 10 tablet 0    lenalidomide (REVLIMID) 15 MG capsule Take 1 capsule by mouth Daily. On Days 1-21, and off 7 days, on a 28 day cycle 21 capsule 0    levalbuterol (XOPENEX) 0.63 MG/3ML nebulizer solution Take 1 ampule by nebulization 4 (Four) Times a Day As Needed for Wheezing. 120 mL 5    ondansetron (ZOFRAN) 8 MG tablet Take 1 tablet by mouth 3 (Three) Times a Day As Needed for Nausea or Vomiting. 30 tablet 5    prednisoLONE acetate (PRED FORTE) 1 % ophthalmic suspension Administer 1 drop to both eyes 4 (Four) Times a Day.      promethazine (PHENERGAN) 25 MG tablet Take 1 tablet by mouth Every 6 (Six) Hours As Needed for Nausea or Vomiting. 30 tablet 1    vitamin D (ERGOCALCIFEROL) 1.25 MG (80058 UT) capsule capsule Take 1 capsule by mouth 2 (Two) Times a Week. 26 capsule 1    Yupelri 175 MCG/3ML nebulizer solution USE 1 VIAL IN NEBULIZER DAILY 90 mL 11     No current facility-administered medications for this visit.       Past Medical History:  Past Medical History:   Diagnosis Date    Anxiety     Asthma 07/28/2021    Atherosclerosis of native coronary artery of native heart with angina pectoris 08/15/2023    FOLLOWED BY DR GONZALES/DINA PETTIT. DENIES CP BUT GETS SOA WITH EXERTION HAS COPD WEARS AT 2LPM N/C. DECREASED ACTIVITY D/T SOA.    C. difficile diarrhea     DENIES ANY CURRENT ISSUES    Colon cancer 07/21/2017    Colon polyp     COPD (chronic obstructive pulmonary disease) 10/23/2017    Depression     Disease of thyroid gland     Diverticulitis     Dysphagia     GERD (gastroesophageal reflux disease)     Head injury     CONCUSSION AT AGE 4    Hernia 2019    History of chemotherapy     2017    Hx of psychiatric care     Impaired fasting glucose 08/14/2015    Lumbago     Lung nodule 02/17/2022    Major depressive disorder 10/27/2016    Malignant neoplasm of  ascending colon 2017    Melena     Multiple myeloma     Nicotine dependence 05/10/2017    NICK (obstructive sleep apnea) 2023    Oxygen dependent     REPORTS USES 02 AT 2LPM VIA N/C DURING DAY AND 3LPM AT NIGHT . CAN GET VERY SOA WITH MINIMAL EXERTION    Panic disorder     Renal insufficiency 2021    Seizures     PSEUDO SEIZURES LAST ONE AROUND 2024    Shortness of breath     CAN GET VERY SOA WITH MINIMAL EXERTION    Tobacco use 2021    QUIT SMOKING 2022    Visit for screening mammogram 2020    NORMAL- REPEAT IN ONE YEAR    Vitamin D deficiency        Past Psychiatric History:  Began Treatment: decades  Diagnoses: MDD, CHAI  Psychiatrist: Vitor for 20 years  Therapist:Denies  Admission History: LT 21 years ago for 2 weeks for depression (?)  Medication Trials:    Zoloft  pristiq    Self Harm: Denies  Suicide Attempts:Denies   Psychosis, Anxiety, Depression: denies    Substance Abuse History:   Types:Denies all, including illicit, quit smoking 2 years ago  Withdrawal Symptoms:Denies  Longest Period Sober:Not Applicable   AA: Not applicable     Social History:  Martial Status:  Employed:No  Kids:Yes  House:Lives in a house   History: Denies    Social History     Socioeconomic History    Marital status:    Tobacco Use    Smoking status: Former     Current packs/day: 0.00     Average packs/day: 1 pack/day for 47.4 years (47.4 ttl pk-yrs)     Types: Cigarettes     Start date: 1975     Quit date: 6/3/2022     Years since quittin.2     Passive exposure: Past    Smokeless tobacco: Never   Vaping Use    Vaping status: Never Used   Substance and Sexual Activity    Alcohol use: Never    Drug use: Never    Sexual activity: Not Currently     Partners: Male     Birth control/protection: Hysterectomy       Family History:   Suicide Attempts: Denies  Suicide Completions:Denies      Family History   Problem Relation Age of Onset    Heart disease Mother      "Lung cancer Mother     Cancer Mother     Stroke Father     Heart disease Father     Kidney cancer Father     Cancer Father     Stroke Other         UNCLE/AUNT    Colon cancer Neg Hx     Malig Hyperthermia Neg Hx        Developmental History:       Childhood: Denies Abuse  High School:Completed  College: 2.5 years    Mental Status Exam  Appearance  : groomed, good eye contact, normal street clothes  Behavior  : pleasant and cooperative  Motor  : bilateral tremor, wheelchair before, but on her feet today  Speech  :normal rhythm, rate, volume, tone, not hyperverbal, not pressured, normal prosidy  Mood  : \"Not too good\"  Affect  : mild anxiety, mood congruent, good variability  Thought Content  : negative suicidal ideations, negative homicidal ideations, negative obsessions  Perceptions  : negative auditory hallucinations, negative visual hallucinations  Thought Process  : linear  Insight/Judgement  : Fair/fair  Cognition  : grossly intact  Attention   : intact      Review of Systems:  Review of Systems   Constitutional:  Positive for fatigue.   HENT:  Negative for drooling.    Eyes:  Positive for visual disturbance.   Respiratory:  Positive for cough and shortness of breath.    Cardiovascular:  Positive for chest pain. Negative for palpitations and leg swelling.   Gastrointestinal:  Positive for nausea and vomiting.   Endocrine: Negative for cold intolerance and heat intolerance.   Genitourinary:  Negative for difficulty urinating.   Musculoskeletal:  Negative for joint swelling.   Allergic/Immunologic: Positive for immunocompromised state.   Neurological:  Positive for dizziness. Negative for seizures, speech difficulty and numbness.   Psychiatric/Behavioral:  Negative for confusion.        Physical Exam:  Physical Exam    Vital Signs:   /52   Pulse 92   Ht 152.4 cm (60\")   Wt 85.3 kg (188 lb)   BMI 36.72 kg/m²      Lab Results:   Hospital Outpatient Visit on 08/14/2024   Component Date Value Ref Range " Status    Glucose 08/14/2024 152 (H)  65 - 99 mg/dL Final    BUN 08/14/2024 12  8 - 23 mg/dL Final    Creatinine 08/14/2024 0.76  0.57 - 1.00 mg/dL Final    Sodium 08/14/2024 140  136 - 145 mmol/L Final    Potassium 08/14/2024 3.7  3.5 - 5.2 mmol/L Final    Chloride 08/14/2024 112 (H)  98 - 107 mmol/L Final    CO2 08/14/2024 20.6 (L)  22.0 - 29.0 mmol/L Final    Calcium 08/14/2024 8.9  8.6 - 10.5 mg/dL Final    Total Protein 08/14/2024 5.1 (L)  6.0 - 8.5 g/dL Final    Albumin 08/14/2024 3.3 (L)  3.5 - 5.2 g/dL Final    ALT (SGPT) 08/14/2024 10  1 - 33 U/L Final    AST (SGOT) 08/14/2024 7  1 - 32 U/L Final    Alkaline Phosphatase 08/14/2024 71  39 - 117 U/L Final    Total Bilirubin 08/14/2024 0.5  0.0 - 1.2 mg/dL Final    Globulin 08/14/2024 1.8  gm/dL Final    A/G Ratio 08/14/2024 1.8  g/dL Final    BUN/Creatinine Ratio 08/14/2024 15.8  7.0 - 25.0 Final    Anion Gap 08/14/2024 7.4  5.0 - 15.0 mmol/L Final    eGFR 08/14/2024 82.3  >60.0 mL/min/1.73 Final    TSH 08/14/2024 1.110  0.270 - 4.200 uIU/mL Final    T Uptake 08/14/2024 1.11  0.80 - 1.30 TBI Final    T4, Total 08/14/2024 6.27  4.50 - 11.70 mcg/dL Final    T4 results may be falsely increased if patient taking Biotin.    WBC 08/14/2024 9.75  3.40 - 10.80 10*3/mm3 Final    RBC 08/14/2024 3.24 (L)  3.77 - 5.28 10*6/mm3 Final    Hemoglobin 08/14/2024 10.7 (L)  12.0 - 15.9 g/dL Final    Hematocrit 08/14/2024 33.1 (L)  34.0 - 46.6 % Final    MCV 08/14/2024 102.2 (H)  79.0 - 97.0 fL Final    MCH 08/14/2024 33.0  26.6 - 33.0 pg Final    MCHC 08/14/2024 32.3  31.5 - 35.7 g/dL Final    RDW 08/14/2024 16.5 (H)  12.3 - 15.4 % Final    RDW-SD 08/14/2024 61.4 (H)  37.0 - 54.0 fl Final    MPV 08/14/2024 9.4  6.0 - 12.0 fL Final    Platelets 08/14/2024 327  140 - 450 10*3/mm3 Final    Neutrophil % 08/14/2024 52.3  42.7 - 76.0 % Final    Lymphocyte % 08/14/2024 32.9  19.6 - 45.3 % Final    Monocyte % 08/14/2024 14.4 (H)  5.0 - 12.0 % Final    Eosinophil % 08/14/2024 0.0 (L)   0.3 - 6.2 % Final    Basophil % 08/14/2024 0.2  0.0 - 1.5 % Final    Immature Grans % 08/14/2024 0.2  0.0 - 0.5 % Final    Neutrophils, Absolute 08/14/2024 5.10  1.70 - 7.00 10*3/mm3 Final    Lymphocytes, Absolute 08/14/2024 3.21 (H)  0.70 - 3.10 10*3/mm3 Final    Monocytes, Absolute 08/14/2024 1.40 (H)  0.10 - 0.90 10*3/mm3 Final    Eosinophils, Absolute 08/14/2024 0.00  0.00 - 0.40 10*3/mm3 Final    Basophils, Absolute 08/14/2024 0.02  0.00 - 0.20 10*3/mm3 Final    Immature Grans, Absolute 08/14/2024 0.02  0.00 - 0.05 10*3/mm3 Final   Admission on 08/08/2024, Discharged on 08/08/2024   Component Date Value Ref Range Status    WBC 08/08/2024 6.61  3.40 - 10.80 10*3/mm3 Final    RBC 08/08/2024 3.42 (L)  3.77 - 5.28 10*6/mm3 Final    Hemoglobin 08/08/2024 11.3 (L)  12.0 - 15.9 g/dL Final    Hematocrit 08/08/2024 34.7  34.0 - 46.6 % Final    MCV 08/08/2024 101.5 (H)  79.0 - 97.0 fL Final    MCH 08/08/2024 33.0  26.6 - 33.0 pg Final    MCHC 08/08/2024 32.6  31.5 - 35.7 g/dL Final    RDW 08/08/2024 15.9 (H)  12.3 - 15.4 % Final    RDW-SD 08/08/2024 59.0 (H)  37.0 - 54.0 fl Final    MPV 08/08/2024 9.8  6.0 - 12.0 fL Final    Platelets 08/08/2024 291  140 - 450 10*3/mm3 Final    Neutrophil % 08/08/2024 25.3 (L)  42.7 - 76.0 % Final    Lymphocyte % 08/08/2024 56.6 (H)  19.6 - 45.3 % Final    Monocyte % 08/08/2024 10.0  5.0 - 12.0 % Final    Eosinophil % 08/08/2024 6.4 (H)  0.3 - 6.2 % Final    Basophil % 08/08/2024 1.4  0.0 - 1.5 % Final    Immature Grans % 08/08/2024 0.3  0.0 - 0.5 % Final    Neutrophils, Absolute 08/08/2024 1.68 (L)  1.70 - 7.00 10*3/mm3 Final    Lymphocytes, Absolute 08/08/2024 3.74 (H)  0.70 - 3.10 10*3/mm3 Final    Monocytes, Absolute 08/08/2024 0.66  0.10 - 0.90 10*3/mm3 Final    Eosinophils, Absolute 08/08/2024 0.42 (H)  0.00 - 0.40 10*3/mm3 Final    Basophils, Absolute 08/08/2024 0.09  0.00 - 0.20 10*3/mm3 Final    Immature Grans, Absolute 08/08/2024 0.02  0.00 - 0.05 10*3/mm3 Final    nRBC  08/08/2024 0.0  0.0 - 0.2 /100 WBC Final    Case Report 08/08/2024    Final                    Value:Surgical Pathology Report                         Case: [id number]                                  Authorizing Provider:  Eduardo Ingram MD       Collected:           08/08/2024 11:07 AM          Ordering Location:     [facility]  Received:            08/09/2024 08:10 AM                                 OR                                                                           Pathologist:           Cayetano Arenas MD                                                            Specimen:    Gallbladder, Gallbladder and contents                                                      Clinical Information 08/08/2024    Final                    Value:This result contains rich text formatting which cannot be displayed here.    Final Diagnosis 08/08/2024    Final                    Value:This result contains rich text formatting which cannot be displayed here.    Gross Description 08/08/2024    Final                    Value:This result contains rich text formatting which cannot be displayed here.    Microscopic Description 08/08/2024    Final                    Value:This result contains rich text formatting which cannot be displayed here.   Admission on 07/27/2024, Discharged on 07/29/2024   Component Date Value Ref Range Status    QT Interval 07/26/2024 431  ms Final    QTC Interval 07/26/2024 474  ms Final    Glucose 07/27/2024 132 (H)  65 - 99 mg/dL Final    BUN 07/27/2024 11  8 - 23 mg/dL Final    Creatinine 07/27/2024 0.95  0.57 - 1.00 mg/dL Final    Sodium 07/27/2024 141  136 - 145 mmol/L Final    Potassium 07/27/2024 4.1  3.5 - 5.2 mmol/L Final    Chloride 07/27/2024 105  98 - 107 mmol/L Final    CO2 07/27/2024 20.4 (L)  22.0 - 29.0 mmol/L Final    Calcium 07/27/2024 9.2  8.6 - 10.5 mg/dL Final    Total Protein 07/27/2024 6.0  6.0 - 8.5 g/dL Final    Albumin 07/27/2024 3.9  3.5 - 5.2 g/dL Final     ALT (SGPT) 07/27/2024 156 (H)  1 - 33 U/L Final    AST (SGOT) 07/27/2024 198 (H)  1 - 32 U/L Final    Alkaline Phosphatase 07/27/2024 100  39 - 117 U/L Final    Total Bilirubin 07/27/2024 3.0 (H)  0.0 - 1.2 mg/dL Final    Globulin 07/27/2024 2.1  gm/dL Final    A/G Ratio 07/27/2024 1.9  g/dL Final    BUN/Creatinine Ratio 07/27/2024 11.6  7.0 - 25.0 Final    Anion Gap 07/27/2024 15.6 (H)  5.0 - 15.0 mmol/L Final    eGFR 07/27/2024 63.0  >60.0 mL/min/1.73 Final    proBNP 07/27/2024 163.0  0.0 - 900.0 pg/mL Final    HS Troponin T 07/27/2024 12  <14 ng/L Final    Extra Tube 07/27/2024 Hold for add-ons.   Final    Auto resulted.    Extra Tube 07/27/2024 hold for add-on   Final    Auto resulted    Extra Tube 07/27/2024 Hold for add-ons.   Final    Auto resulted.    Extra Tube 07/27/2024 Hold for add-ons.   Final    Auto resulted    WBC 07/27/2024 8.06  3.40 - 10.80 10*3/mm3 Final    RBC 07/27/2024 4.02  3.77 - 5.28 10*6/mm3 Final    Hemoglobin 07/27/2024 13.4  12.0 - 15.9 g/dL Final    Hematocrit 07/27/2024 41.1  34.0 - 46.6 % Final    MCV 07/27/2024 102.2 (H)  79.0 - 97.0 fL Final    MCH 07/27/2024 33.3 (H)  26.6 - 33.0 pg Final    MCHC 07/27/2024 32.6  31.5 - 35.7 g/dL Final    RDW 07/27/2024 15.0  12.3 - 15.4 % Final    RDW-SD 07/27/2024 57.0 (H)  37.0 - 54.0 fl Final    MPV 07/27/2024 9.9  6.0 - 12.0 fL Final    Platelets 07/27/2024 215  140 - 450 10*3/mm3 Final    Neutrophil % 07/27/2024 47.3  42.7 - 76.0 % Final    Lymphocyte % 07/27/2024 41.7  19.6 - 45.3 % Final    Monocyte % 07/27/2024 8.4  5.0 - 12.0 % Final    Eosinophil % 07/27/2024 1.1  0.3 - 6.2 % Final    Basophil % 07/27/2024 1.4  0.0 - 1.5 % Final    Immature Grans % 07/27/2024 0.1  0.0 - 0.5 % Final    Neutrophils, Absolute 07/27/2024 3.81  1.70 - 7.00 10*3/mm3 Final    Lymphocytes, Absolute 07/27/2024 3.36 (H)  0.70 - 3.10 10*3/mm3 Final    Monocytes, Absolute 07/27/2024 0.68  0.10 - 0.90 10*3/mm3 Final    Eosinophils, Absolute 07/27/2024 0.09  0.00 -  0.40 10*3/mm3 Final    Basophils, Absolute 07/27/2024 0.11  0.00 - 0.20 10*3/mm3 Final    Immature Grans, Absolute 07/27/2024 0.01  0.00 - 0.05 10*3/mm3 Final    nRBC 07/27/2024 0.0  0.0 - 0.2 /100 WBC Final    Ethanol 07/27/2024 <10  0 - 10 mg/dL Final    Ethanol % 07/27/2024 <0.010  % Final    Lipase 07/27/2024 >3,000 (H)  13 - 60 U/L Final    Blood Culture 07/27/2024 No growth at 5 days   Final    Blood Culture 07/27/2024 No growth at 5 days   Final    Phosphorus 07/27/2024 2.5  2.5 - 4.5 mg/dL Final    Magnesium 07/27/2024 1.8  1.6 - 2.4 mg/dL Final    Glucose 07/27/2024 158 (H)  70 - 99 mg/dL Final    Serial Number: [id number]:  [id number]    WBC 07/27/2024 6.83  3.40 - 10.80 10*3/mm3 Final    RBC 07/27/2024 3.57 (L)  3.77 - 5.28 10*6/mm3 Final    Hemoglobin 07/27/2024 11.9 (L)  12.0 - 15.9 g/dL Final    Hematocrit 07/27/2024 36.6  34.0 - 46.6 % Final    MCV 07/27/2024 102.5 (H)  79.0 - 97.0 fL Final    MCH 07/27/2024 33.3 (H)  26.6 - 33.0 pg Final    MCHC 07/27/2024 32.5  31.5 - 35.7 g/dL Final    RDW 07/27/2024 15.1  12.3 - 15.4 % Final    RDW-SD 07/27/2024 56.8 (H)  37.0 - 54.0 fl Final    MPV 07/27/2024 10.0  6.0 - 12.0 fL Final    Platelets 07/27/2024 190  140 - 450 10*3/mm3 Final    Glucose 07/27/2024 137 (H)  65 - 99 mg/dL Final    BUN 07/27/2024 11  8 - 23 mg/dL Final    Creatinine 07/27/2024 0.93  0.57 - 1.00 mg/dL Final    Sodium 07/27/2024 140  136 - 145 mmol/L Final    Potassium 07/27/2024 4.0  3.5 - 5.2 mmol/L Final    Chloride 07/27/2024 106  98 - 107 mmol/L Final    CO2 07/27/2024 21.0 (L)  22.0 - 29.0 mmol/L Final    Calcium 07/27/2024 8.8  8.6 - 10.5 mg/dL Final    Total Protein 07/27/2024 5.0 (L)  6.0 - 8.5 g/dL Final    Albumin 07/27/2024 3.4 (L)  3.5 - 5.2 g/dL Final    ALT (SGPT) 07/27/2024 152 (H)  1 - 33 U/L Final    AST (SGOT) 07/27/2024 153 (H)  1 - 32 U/L Final    Alkaline Phosphatase 07/27/2024 108  39 - 117 U/L Final    Total Bilirubin 07/27/2024 3.4 (H)  0.0 - 1.2 mg/dL Final     Globulin 07/27/2024 1.6  gm/dL Final    A/G Ratio 07/27/2024 2.1  g/dL Final    BUN/Creatinine Ratio 07/27/2024 11.8  7.0 - 25.0 Final    Anion Gap 07/27/2024 13.0  5.0 - 15.0 mmol/L Final    eGFR 07/27/2024 64.6  >60.0 mL/min/1.73 Final    Magnesium 07/27/2024 1.9  1.6 - 2.4 mg/dL Final    Glucose 07/27/2024 122 (H)  70 - 99 mg/dL Final    Serial Number: [id number]:  [id number]    Glucose 07/28/2024 147 (H)  70 - 99 mg/dL Final    Serial Number: [id number]:  [id number]    WBC 07/28/2024 6.62  3.40 - 10.80 10*3/mm3 Final    RBC 07/28/2024 3.17 (L)  3.77 - 5.28 10*6/mm3 Final    Hemoglobin 07/28/2024 10.5 (L)  12.0 - 15.9 g/dL Final    Hematocrit 07/28/2024 34.7  34.0 - 46.6 % Final    MCV 07/28/2024 109.5 (H)  79.0 - 97.0 fL Final    MCH 07/28/2024 33.1 (H)  26.6 - 33.0 pg Final    MCHC 07/28/2024 30.3 (L)  31.5 - 35.7 g/dL Final    RDW 07/28/2024 15.5 (H)  12.3 - 15.4 % Final    RDW-SD 07/28/2024 63.1 (H)  37.0 - 54.0 fl Final    MPV 07/28/2024 10.0  6.0 - 12.0 fL Final    Platelets 07/28/2024 174  140 - 450 10*3/mm3 Final    Glucose 07/28/2024 121 (H)  65 - 99 mg/dL Final    BUN 07/28/2024 10  8 - 23 mg/dL Final    Creatinine 07/28/2024 0.84  0.57 - 1.00 mg/dL Final    Sodium 07/28/2024 139  136 - 145 mmol/L Final    Potassium 07/28/2024 4.0  3.5 - 5.2 mmol/L Final    Slight hemolysis detected by analyzer. Result may be falsely elevated.    Chloride 07/28/2024 108 (H)  98 - 107 mmol/L Final    CO2 07/28/2024 18.7 (L)  22.0 - 29.0 mmol/L Final    Calcium 07/28/2024 8.3 (L)  8.6 - 10.5 mg/dL Final    Total Protein 07/28/2024 4.6 (L)  6.0 - 8.5 g/dL Final    Albumin 07/28/2024 2.7 (L)  3.5 - 5.2 g/dL Final    ALT (SGPT) 07/28/2024 102 (H)  1 - 33 U/L Final    AST (SGOT) 07/28/2024 54 (H)  1 - 32 U/L Final    Alkaline Phosphatase 07/28/2024 114  39 - 117 U/L Final    Total Bilirubin 07/28/2024 0.8  0.0 - 1.2 mg/dL Final    Globulin 07/28/2024 1.9  gm/dL Final    A/G Ratio 07/28/2024 1.4  g/dL Final     BUN/Creatinine Ratio 07/28/2024 11.9  7.0 - 25.0 Final    Anion Gap 07/28/2024 12.3  5.0 - 15.0 mmol/L Final    eGFR 07/28/2024 73.0  >60.0 mL/min/1.73 Final    Magnesium 07/28/2024 1.8  1.6 - 2.4 mg/dL Final    Glucose 07/29/2024 115 (H)  70 - 99 mg/dL Final    Serial Number: [id number]:  [id number]    Glucose 07/28/2024 112 (H)  70 - 99 mg/dL Final    Serial Number: [id number]:  [id number]    Glucose 07/29/2024 117 (H)  70 - 99 mg/dL Final    Serial Number: [id number]:  [id number]    Glucose 07/28/2024 137 (H)  70 - 99 mg/dL Final    Serial Number: [id number]:  [id number]    WBC 07/29/2024 8.90  3.40 - 10.80 10*3/mm3 Final    RBC 07/29/2024 3.35 (L)  3.77 - 5.28 10*6/mm3 Final    Hemoglobin 07/29/2024 11.1 (L)  12.0 - 15.9 g/dL Final    Hematocrit 07/29/2024 35.5  34.0 - 46.6 % Final    MCV 07/29/2024 106.0 (H)  79.0 - 97.0 fL Final    MCH 07/29/2024 33.1 (H)  26.6 - 33.0 pg Final    MCHC 07/29/2024 31.3 (L)  31.5 - 35.7 g/dL Final    RDW 07/29/2024 15.8 (H)  12.3 - 15.4 % Final    RDW-SD 07/29/2024 61.6 (H)  37.0 - 54.0 fl Final    MPV 07/29/2024 9.9  6.0 - 12.0 fL Final    Platelets 07/29/2024 197  140 - 450 10*3/mm3 Final    Glucose 07/29/2024 110 (H)  65 - 99 mg/dL Final    BUN 07/29/2024 9  8 - 23 mg/dL Final    Creatinine 07/29/2024 0.84  0.57 - 1.00 mg/dL Final    Sodium 07/29/2024 140  136 - 145 mmol/L Final    Potassium 07/29/2024 3.8  3.5 - 5.2 mmol/L Final    Chloride 07/29/2024 106  98 - 107 mmol/L Final    CO2 07/29/2024 21.0 (L)  22.0 - 29.0 mmol/L Final    Calcium 07/29/2024 8.2 (L)  8.6 - 10.5 mg/dL Final    Total Protein 07/29/2024 5.0 (L)  6.0 - 8.5 g/dL Final    Albumin 07/29/2024 2.9 (L)  3.5 - 5.2 g/dL Final    ALT (SGPT) 07/29/2024 71 (H)  1 - 33 U/L Final    AST (SGOT) 07/29/2024 19  1 - 32 U/L Final    Alkaline Phosphatase 07/29/2024 107  39 - 117 U/L Final    Total Bilirubin 07/29/2024 0.5  0.0 - 1.2 mg/dL Final    Globulin 07/29/2024 2.1  gm/dL Final    A/G Ratio  07/29/2024 1.4  g/dL Final    BUN/Creatinine Ratio 07/29/2024 10.7  7.0 - 25.0 Final    Anion Gap 07/29/2024 13.0  5.0 - 15.0 mmol/L Final    eGFR 07/29/2024 73.0  >60.0 mL/min/1.73 Final    Magnesium 07/29/2024 1.8  1.6 - 2.4 mg/dL Final    Glucose 07/27/2024 123 (H)  70 - 99 mg/dL Final    Serial Number: [id number]:  [id number]    Glucose 07/28/2024 188 (H)  70 - 99 mg/dL Final    Serial Number: [id number]:  [id number]    Glucose 07/28/2024 151 (H)  70 - 99 mg/dL Final    Serial Number: [id number]:  [id number]   Hospital Outpatient Visit on 07/17/2024   Component Date Value Ref Range Status    Glucose 07/17/2024 124 (H)  65 - 99 mg/dL Final    BUN 07/17/2024 21  8 - 23 mg/dL Final    Creatinine 07/17/2024 0.86  0.57 - 1.00 mg/dL Final    Sodium 07/17/2024 141  136 - 145 mmol/L Final    Potassium 07/17/2024 4.0  3.5 - 5.2 mmol/L Final    Chloride 07/17/2024 113 (H)  98 - 107 mmol/L Final    CO2 07/17/2024 19.5 (L)  22.0 - 29.0 mmol/L Final    Calcium 07/17/2024 9.5  8.6 - 10.5 mg/dL Final    Total Protein 07/17/2024 5.1 (L)  6.0 - 8.5 g/dL Final    Albumin 07/17/2024 3.7  3.5 - 5.2 g/dL Final    ALT (SGPT) 07/17/2024 23  1 - 33 U/L Final    AST (SGOT) 07/17/2024 10  1 - 32 U/L Final    Alkaline Phosphatase 07/17/2024 89  39 - 117 U/L Final    Total Bilirubin 07/17/2024 0.5  0.0 - 1.2 mg/dL Final    Globulin 07/17/2024 1.4  gm/dL Final    A/G Ratio 07/17/2024 2.6  g/dL Final    BUN/Creatinine Ratio 07/17/2024 24.4  7.0 - 25.0 Final    Anion Gap 07/17/2024 8.5  5.0 - 15.0 mmol/L Final    eGFR 07/17/2024 71.0  >60.0 mL/min/1.73 Final    WBC 07/17/2024 9.48  3.40 - 10.80 10*3/mm3 Final    RBC 07/17/2024 3.45 (L)  3.77 - 5.28 10*6/mm3 Final    Hemoglobin 07/17/2024 11.8 (L)  12.0 - 15.9 g/dL Final    Hematocrit 07/17/2024 36.4  34.0 - 46.6 % Final    MCV 07/17/2024 105.5 (H)  79.0 - 97.0 fL Final    MCH 07/17/2024 34.2 (H)  26.6 - 33.0 pg Final    MCHC 07/17/2024 32.4  31.5 - 35.7 g/dL Final    RDW  07/17/2024 16.0 (H)  12.3 - 15.4 % Final    RDW-SD 07/17/2024 63.0 (H)  37.0 - 54.0 fl Final    MPV 07/17/2024 9.3  6.0 - 12.0 fL Final    Platelets 07/17/2024 320  140 - 450 10*3/mm3 Final    Neutrophil % 07/17/2024 42.9  42.7 - 76.0 % Final    Lymphocyte % 07/17/2024 43.5  19.6 - 45.3 % Final    Monocyte % 07/17/2024 13.1 (H)  5.0 - 12.0 % Final    Eosinophil % 07/17/2024 0.0 (L)  0.3 - 6.2 % Final    Basophil % 07/17/2024 0.2  0.0 - 1.5 % Final    Immature Grans % 07/17/2024 0.3  0.0 - 0.5 % Final    Neutrophils, Absolute 07/17/2024 4.07  1.70 - 7.00 10*3/mm3 Final    Lymphocytes, Absolute 07/17/2024 4.12 (H)  0.70 - 3.10 10*3/mm3 Final    Monocytes, Absolute 07/17/2024 1.24 (H)  0.10 - 0.90 10*3/mm3 Final    Eosinophils, Absolute 07/17/2024 0.00  0.00 - 0.40 10*3/mm3 Final    Basophils, Absolute 07/17/2024 0.02  0.00 - 0.20 10*3/mm3 Final    Immature Grans, Absolute 07/17/2024 0.03  0.00 - 0.05 10*3/mm3 Final   Hospital Outpatient Visit on 06/19/2024   Component Date Value Ref Range Status    Glucose 06/19/2024 160 (H)  65 - 99 mg/dL Final    BUN 06/19/2024 17  8 - 23 mg/dL Final    Creatinine 06/19/2024 0.86  0.57 - 1.00 mg/dL Final    Sodium 06/19/2024 143  136 - 145 mmol/L Final    Potassium 06/19/2024 3.9  3.5 - 5.2 mmol/L Final    Chloride 06/19/2024 112 (H)  98 - 107 mmol/L Final    CO2 06/19/2024 20.9 (L)  22.0 - 29.0 mmol/L Final    Calcium 06/19/2024 9.1  8.6 - 10.5 mg/dL Final    Total Protein 06/19/2024 5.3 (L)  6.0 - 8.5 g/dL Final    Albumin 06/19/2024 3.8  3.5 - 5.2 g/dL Final    ALT (SGPT) 06/19/2024 13  1 - 33 U/L Final    AST (SGOT) 06/19/2024 9  1 - 32 U/L Final    Alkaline Phosphatase 06/19/2024 56  39 - 117 U/L Final    Total Bilirubin 06/19/2024 0.6  0.0 - 1.2 mg/dL Final    Globulin 06/19/2024 1.5  gm/dL Final    A/G Ratio 06/19/2024 2.5  g/dL Final    BUN/Creatinine Ratio 06/19/2024 19.8  7.0 - 25.0 Final    Anion Gap 06/19/2024 10.1  5.0 - 15.0 mmol/L Final    eGFR 06/19/2024 71.0  >60.0  mL/min/1.73 Final    TSH 06/19/2024 1.110  0.270 - 4.200 uIU/mL Final    T Uptake 06/19/2024 1.11  0.80 - 1.30 TBI Final    T4, Total 06/19/2024 7.48  4.50 - 11.70 mcg/dL Final    T4 results may be falsely increased if patient taking Biotin.    WBC 06/19/2024 10.76  3.40 - 10.80 10*3/mm3 Final    RBC 06/19/2024 3.09 (L)  3.77 - 5.28 10*6/mm3 Final    Hemoglobin 06/19/2024 10.6 (L)  12.0 - 15.9 g/dL Final    Hematocrit 06/19/2024 32.8 (L)  34.0 - 46.6 % Final    MCV 06/19/2024 106.1 (H)  79.0 - 97.0 fL Final    MCH 06/19/2024 34.3 (H)  26.6 - 33.0 pg Final    MCHC 06/19/2024 32.3  31.5 - 35.7 g/dL Final    RDW 06/19/2024 18.4 (H)  12.3 - 15.4 % Final    RDW-SD 06/19/2024 72.8 (H)  37.0 - 54.0 fl Final    MPV 06/19/2024 9.4  6.0 - 12.0 fL Final    Platelets 06/19/2024 377  140 - 450 10*3/mm3 Final    Neutrophil % 06/19/2024 60.1  42.7 - 76.0 % Final    Lymphocyte % 06/19/2024 29.2  19.6 - 45.3 % Final    Monocyte % 06/19/2024 9.9  5.0 - 12.0 % Final    Eosinophil % 06/19/2024 0.0 (L)  0.3 - 6.2 % Final    Basophil % 06/19/2024 0.3  0.0 - 1.5 % Final    Immature Grans % 06/19/2024 0.5  0.0 - 0.5 % Final    Neutrophils, Absolute 06/19/2024 6.48  1.70 - 7.00 10*3/mm3 Final    Lymphocytes, Absolute 06/19/2024 3.14 (H)  0.70 - 3.10 10*3/mm3 Final    Monocytes, Absolute 06/19/2024 1.06 (H)  0.10 - 0.90 10*3/mm3 Final    Eosinophils, Absolute 06/19/2024 0.00  0.00 - 0.40 10*3/mm3 Final    Basophils, Absolute 06/19/2024 0.03  0.00 - 0.20 10*3/mm3 Final    Immature Grans, Absolute 06/19/2024 0.05  0.00 - 0.05 10*3/mm3 Final    Total Cholesterol 07/17/2024 97  0 - 200 mg/dL Final    Triglycerides 07/17/2024 176 (H)  0 - 150 mg/dL Final    HDL Cholesterol 07/17/2024 42  40 - 60 mg/dL Final    LDL Cholesterol  07/17/2024 26  0 - 100 mg/dL Final    VLDL Cholesterol 07/17/2024 29  5 - 40 mg/dL Final    LDL/HDL Ratio 07/17/2024 0.47   Final    Hemoglobin A1C 07/17/2024 4.40 (L)  4.80 - 5.60 % Final    25 Hydroxy, Vitamin D  "07/17/2024 49.5  30.0 - 100.0 ng/ml Final    CARL and PE, Serum 06/19/2024 Comment (A)   Final    Immunofixation shows IgG monoclonal protein with kappa light chain  specificity.  PLEASE NOTE:       This sample has been treated to eliminate IgG Kappa interference       from monoclonal therapy by DARZALEX(R) (daratumumab). Any       remaining IgG Kappa, if present, is due to the patient's own       immune response.    IgG 06/19/2024 542 (L)  586 - 1602 mg/dL Final    IgA 06/19/2024 14 (L)  64 - 422 mg/dL Final    Result confirmed on concentration.    IgM 06/19/2024 18 (L)  26 - 217 mg/dL Final    Result confirmed on concentration.   Lab on 06/11/2024   Component Date Value Ref Range Status    IgG 06/11/2024 587  586 - 1602 mg/dL Final    IgA 06/11/2024 15 (L)  64 - 422 mg/dL Final    Result confirmed on concentration.    IgM 06/11/2024 17 (L)  26 - 217 mg/dL Final    Result confirmed on concentration.    Total Protein 06/11/2024 5.2 (L)  6.0 - 8.5 g/dL Final    Albumin 06/11/2024 3.2  2.9 - 4.4 g/dL Final    Alpha-1-Globulin 06/11/2024 0.2  0.0 - 0.4 g/dL Final    Alpha-2-Globulin 06/11/2024 0.5  0.4 - 1.0 g/dL Final    Beta Globulin 06/11/2024 0.8  0.7 - 1.3 g/dL Final    Gamma Globulin 06/11/2024 0.5  0.4 - 1.8 g/dL Final    M-Sahil 06/11/2024 0.4 (H)  Not Observed g/dL Final    Globulin 06/11/2024 2.0 (L)  2.2 - 3.9 g/dL Final    A/G Ratio 06/11/2024 1.7  0.7 - 1.7 Final    Immunofixation Reflex, Serum 06/11/2024 Comment (A)   Final    Immunofixation shows IgG monoclonal protein with kappa light chain  specificity.   PLEASE NOTE:   Samples from patients receiving DARZALEX(R) (daratumumab) or   SARCLISA(R)(isatuximab-irfc) treatment can appear as an   \"IgG kappa\" and mask a complete response (CR). If this patient   is receiving these therapies, this CARL assay interference   can be removed by ordering test number [id number]-\"Immunofixation,   Daratumumab-Specific, Serum\" or [id number]-\"Immunofixation,   Isatuximab-Specific, " "Serum\" and submitting a new sample for   testing or by calling the lab to add this test to the current   sample.    Please note 06/11/2024 Comment   Final    Protein electrophoresis scan will follow via computer, mail, or   delivery.    Free Light Chain, Kappa 06/11/2024 11.1  3.3 - 19.4 mg/L Final    Free Lambda Light Chains 06/11/2024 3.3 (L)  5.7 - 26.3 mg/L Final    Kappa/Lambda Ratio 06/11/2024 3.36 (H)  0.26 - 1.65 Final    Total Protein, Urine 06/11/2024 50.1  Not Estab. mg/dL Final    Albumin, U 06/11/2024 63.7  % Final    Alpha-1-Globulin, U 06/11/2024 1.6  % Final    Alpha-2-Globulin, U 06/11/2024 9.4  % Final    Beta Globulin, U 06/11/2024 18.6  % Final    Gamma Globulin, Urine 06/11/2024 6.7  % Final    M-Sahil 06/11/2024 3.5 (H)  Not Observed % Final    Please note 06/11/2024 Comment   Final    Protein electrophoresis scan will follow via computer, mail, or   delivery.   Hospital Outpatient Visit on 05/22/2024   Component Date Value Ref Range Status    Glucose 05/22/2024 213 (H)  65 - 99 mg/dL Final    BUN 05/22/2024 16  8 - 23 mg/dL Final    Creatinine 05/22/2024 0.81  0.57 - 1.00 mg/dL Final    Sodium 05/22/2024 139  136 - 145 mmol/L Final    Potassium 05/22/2024 3.7  3.5 - 5.2 mmol/L Final    Chloride 05/22/2024 109 (H)  98 - 107 mmol/L Final    CO2 05/22/2024 20.1 (L)  22.0 - 29.0 mmol/L Final    Calcium 05/22/2024 9.0  8.6 - 10.5 mg/dL Final    Total Protein 05/22/2024 5.8 (L)  6.0 - 8.5 g/dL Final    Albumin 05/22/2024 4.0  3.5 - 5.2 g/dL Final    ALT (SGPT) 05/22/2024 15  1 - 33 U/L Final    AST (SGOT) 05/22/2024 10  1 - 32 U/L Final    Alkaline Phosphatase 05/22/2024 61  39 - 117 U/L Final    Total Bilirubin 05/22/2024 0.6  0.0 - 1.2 mg/dL Final    Globulin 05/22/2024 1.8  gm/dL Final    A/G Ratio 05/22/2024 2.2  g/dL Final    BUN/Creatinine Ratio 05/22/2024 19.8  7.0 - 25.0 Final    Anion Gap 05/22/2024 9.9  5.0 - 15.0 mmol/L Final    eGFR 05/22/2024 76.3  >60.0 mL/min/1.73 Final "    WBC 05/22/2024 5.47  3.40 - 10.80 10*3/mm3 Final    RBC 05/22/2024 3.27 (L)  3.77 - 5.28 10*6/mm3 Final    Hemoglobin 05/22/2024 11.0 (L)  12.0 - 15.9 g/dL Final    Hematocrit 05/22/2024 34.6  34.0 - 46.6 % Final    MCV 05/22/2024 105.8 (H)  79.0 - 97.0 fL Final    MCH 05/22/2024 33.6 (H)  26.6 - 33.0 pg Final    MCHC 05/22/2024 31.8  31.5 - 35.7 g/dL Final    RDW 05/22/2024 16.7 (H)  12.3 - 15.4 % Final    RDW-SD 05/22/2024 65.9 (H)  37.0 - 54.0 fl Final    MPV 05/22/2024 9.7  6.0 - 12.0 fL Final    Platelets 05/22/2024 291  140 - 450 10*3/mm3 Final    Neutrophil % 05/22/2024 55.0  42.7 - 76.0 % Final    Lymphocyte % 05/22/2024 31.6  19.6 - 45.3 % Final    Monocyte % 05/22/2024 13.0 (H)  5.0 - 12.0 % Final    Eosinophil % 05/22/2024 0.0 (L)  0.3 - 6.2 % Final    Basophil % 05/22/2024 0.2  0.0 - 1.5 % Final    Immature Grans % 05/22/2024 0.2  0.0 - 0.5 % Final    Neutrophils, Absolute 05/22/2024 3.01  1.70 - 7.00 10*3/mm3 Final    Lymphocytes, Absolute 05/22/2024 1.73  0.70 - 3.10 10*3/mm3 Final    Monocytes, Absolute 05/22/2024 0.71  0.10 - 0.90 10*3/mm3 Final    Eosinophils, Absolute 05/22/2024 0.00  0.00 - 0.40 10*3/mm3 Final    Basophils, Absolute 05/22/2024 0.01  0.00 - 0.20 10*3/mm3 Final    Immature Grans, Absolute 05/22/2024 0.01  0.00 - 0.05 10*3/mm3 Final   Hospital Outpatient Visit on 04/24/2024   Component Date Value Ref Range Status    Glucose 04/24/2024 198 (H)  65 - 99 mg/dL Final    BUN 04/24/2024 15  8 - 23 mg/dL Final    Creatinine 04/24/2024 0.79  0.57 - 1.00 mg/dL Final    Sodium 04/24/2024 141  136 - 145 mmol/L Final    Potassium 04/24/2024 3.7  3.5 - 5.2 mmol/L Final    Chloride 04/24/2024 113 (H)  98 - 107 mmol/L Final    CO2 04/24/2024 19.5 (L)  22.0 - 29.0 mmol/L Final    Calcium 04/24/2024 8.5 (L)  8.6 - 10.5 mg/dL Final    Total Protein 04/24/2024 5.4 (L)  6.0 - 8.5 g/dL Final    Albumin 04/24/2024 3.8  3.5 - 5.2 g/dL Final    ALT (SGPT) 04/24/2024 16  1 - 33 U/L Final    AST (SGOT)  04/24/2024 10  1 - 32 U/L Final    Alkaline Phosphatase 04/24/2024 63  39 - 117 U/L Final    Total Bilirubin 04/24/2024 0.6  0.0 - 1.2 mg/dL Final    Globulin 04/24/2024 1.6  gm/dL Final    A/G Ratio 04/24/2024 2.4  g/dL Final    BUN/Creatinine Ratio 04/24/2024 19.0  7.0 - 25.0 Final    Anion Gap 04/24/2024 8.5  5.0 - 15.0 mmol/L Final    eGFR 04/24/2024 78.6  >60.0 mL/min/1.73 Final    TSH 04/24/2024 0.792  0.270 - 4.200 uIU/mL Final    T Uptake 04/24/2024 1.08  0.80 - 1.30 TBI Final    T4, Total 04/24/2024 8.44  4.50 - 11.70 mcg/dL Final    T4 results may be falsely increased if patient taking Biotin.    WBC 04/24/2024 8.04  3.40 - 10.80 10*3/mm3 Final    RBC 04/24/2024 3.02 (L)  3.77 - 5.28 10*6/mm3 Final    Hemoglobin 04/24/2024 10.4 (L)  12.0 - 15.9 g/dL Final    Hematocrit 04/24/2024 32.3 (L)  34.0 - 46.6 % Final    MCV 04/24/2024 107.0 (H)  79.0 - 97.0 fL Final    MCH 04/24/2024 34.4 (H)  26.6 - 33.0 pg Final    MCHC 04/24/2024 32.2  31.5 - 35.7 g/dL Final    RDW 04/24/2024 17.0 (H)  12.3 - 15.4 % Final    RDW-SD 04/24/2024 68.6 (H)  37.0 - 54.0 fl Final    MPV 04/24/2024 9.3  6.0 - 12.0 fL Final    Platelets 04/24/2024 331  140 - 450 10*3/mm3 Final    Neutrophil % 04/24/2024 56.1  42.7 - 76.0 % Final    Lymphocyte % 04/24/2024 32.2  19.6 - 45.3 % Final    Monocyte % 04/24/2024 10.8  5.0 - 12.0 % Final    Eosinophil % 04/24/2024 0.0 (L)  0.3 - 6.2 % Final    Basophil % 04/24/2024 0.5  0.0 - 1.5 % Final    Immature Grans % 04/24/2024 0.4  0.0 - 0.5 % Final    Neutrophils, Absolute 04/24/2024 4.51  1.70 - 7.00 10*3/mm3 Final    Lymphocytes, Absolute 04/24/2024 2.59  0.70 - 3.10 10*3/mm3 Final    Monocytes, Absolute 04/24/2024 0.87  0.10 - 0.90 10*3/mm3 Final    Eosinophils, Absolute 04/24/2024 0.00  0.00 - 0.40 10*3/mm3 Final    Basophils, Absolute 04/24/2024 0.04  0.00 - 0.20 10*3/mm3 Final    Immature Grans, Absolute 04/24/2024 0.03  0.00 - 0.05 10*3/mm3 Final    GARRICK Direct 04/24/2024 Negative  Negative Final     Sed Rate 04/24/2024 <1  0 - 30 mm/hr Final    C-Reactive Protein 04/24/2024 <0.30  0.00 - 0.50 mg/dL Final    ANTI-MPO ANTIBODIES 04/24/2024 <0.2  0.0 - 0.9 units Final    ANTI-PR3 ANTIBODIES 04/24/2024 <0.2  0.0 - 0.9 units Final    C-ANCA 04/24/2024 <1:20  Neg:<1:20 titer Final    P-ANCA 04/24/2024 <1:20  Neg:<1:20 titer Final    The presence of positive fluorescence exhibiting P-ANCA or C-ANCA  patterns alone is not specific for the diagnosis of Wegener's  Granulomatosis (WG) or microscopic polyangiitis. Decisions about  treatment should not be based solely on ANCA IFA results.  The  International ANCA Group Consensus recommends follow up testing of  positive sera with both AR-3 and MPO-ANCA enzyme immunoassays. As  many as 5% serum samples are positive only by EIA.  Ref. AM J Clin Pathol 1999;111:507-513.    Atypical pANCA 04/24/2024 <1:20  Neg:<1:20 titer Final    The atypical pANCA pattern has been observed in a significant  percentage of patients with ulcerative colitis, primary sclerosing  cholangitis and autoimmune hepatitis.   Lab on 04/15/2024   Component Date Value Ref Range Status    Vitamin B-12 04/15/2024 612  211 - 946 pg/mL Final    Glucose 04/15/2024 160 (H)  65 - 99 mg/dL Final    BUN 04/15/2024 9  8 - 23 mg/dL Final    Creatinine 04/15/2024 0.75  0.57 - 1.00 mg/dL Final    Sodium 04/15/2024 140  136 - 145 mmol/L Final    Potassium 04/15/2024 3.9  3.5 - 5.2 mmol/L Final    Chloride 04/15/2024 108 (H)  98 - 107 mmol/L Final    CO2 04/15/2024 16.5 (L)  22.0 - 29.0 mmol/L Final    Calcium 04/15/2024 8.8  8.6 - 10.5 mg/dL Final    Total Protein 04/15/2024 5.7 (L)  6.0 - 8.5 g/dL Final    Albumin 04/15/2024 3.7  3.5 - 5.2 g/dL Final    ALT (SGPT) 04/15/2024 21  1 - 33 U/L Final    AST (SGOT) 04/15/2024 19  1 - 32 U/L Final    Alkaline Phosphatase 04/15/2024 71  39 - 117 U/L Final    Total Bilirubin 04/15/2024 0.6  0.0 - 1.2 mg/dL Final    Globulin 04/15/2024 2.0  gm/dL Final    A/G Ratio 04/15/2024  1.9  g/dL Final    BUN/Creatinine Ratio 04/15/2024 12.0  7.0 - 25.0 Final    Anion Gap 04/15/2024 15.5 (H)  5.0 - 15.0 mmol/L Final    eGFR 04/15/2024 83.7  >60.0 mL/min/1.73 Final    proBNP 04/15/2024 121.0  0.0 - 900.0 pg/mL Final    IgE 04/15/2024 <2 (L)  6 - 495 IU/mL Final    WBC 04/15/2024 8.79  3.40 - 10.80 10*3/mm3 Final    RBC 04/15/2024 3.42 (L)  3.77 - 5.28 10*6/mm3 Final    Hemoglobin 04/15/2024 11.5 (L)  12.0 - 15.9 g/dL Final    Hematocrit 04/15/2024 35.9  34.0 - 46.6 % Final    MCV 04/15/2024 105.0 (H)  79.0 - 97.0 fL Final    MCH 04/15/2024 33.6 (H)  26.6 - 33.0 pg Final    MCHC 04/15/2024 32.0  31.5 - 35.7 g/dL Final    RDW 04/15/2024 15.2  12.3 - 15.4 % Final    RDW-SD 04/15/2024 58.7 (H)  37.0 - 54.0 fl Final    MPV 04/15/2024 10.3  6.0 - 12.0 fL Final    Platelets 04/15/2024 299  140 - 450 10*3/mm3 Final    Neutrophil % 04/15/2024 34.3 (L)  42.7 - 76.0 % Final    Lymphocyte % 04/15/2024 45.3  19.6 - 45.3 % Final    Monocyte % 04/15/2024 13.0 (H)  5.0 - 12.0 % Final    Eosinophil % 04/15/2024 6.1  0.3 - 6.2 % Final    Basophil % 04/15/2024 0.8  0.0 - 1.5 % Final    Immature Grans % 04/15/2024 0.5  0.0 - 0.5 % Final    Neutrophils, Absolute 04/15/2024 3.02  1.70 - 7.00 10*3/mm3 Final    Lymphocytes, Absolute 04/15/2024 3.98 (H)  0.70 - 3.10 10*3/mm3 Final    Monocytes, Absolute 04/15/2024 1.14 (H)  0.10 - 0.90 10*3/mm3 Final    Eosinophils, Absolute 04/15/2024 0.54 (H)  0.00 - 0.40 10*3/mm3 Final    Basophils, Absolute 04/15/2024 0.07  0.00 - 0.20 10*3/mm3 Final    Immature Grans, Absolute 04/15/2024 0.04  0.00 - 0.05 10*3/mm3 Final    nRBC 04/15/2024 0.0  0.0 - 0.2 /100 WBC Final   Hospital Outpatient Visit on 04/10/2024   Component Date Value Ref Range Status    WBC 04/10/2024 11.73 (H)  3.40 - 10.80 10*3/mm3 Final    RBC 04/10/2024 3.21 (L)  3.77 - 5.28 10*6/mm3 Final    Hemoglobin 04/10/2024 10.9 (L)  12.0 - 15.9 g/dL Final    Hematocrit 04/10/2024 34.2  34.0 - 46.6 % Final    MCV 04/10/2024  106.5 (H)  79.0 - 97.0 fL Final    MCH 04/10/2024 34.0 (H)  26.6 - 33.0 pg Final    MCHC 04/10/2024 31.9  31.5 - 35.7 g/dL Final    RDW 04/10/2024 17.7 (H)  12.3 - 15.4 % Final    RDW-SD 04/10/2024 70.5 (H)  37.0 - 54.0 fl Final    MPV 04/10/2024 10.1  6.0 - 12.0 fL Final    Platelets 04/10/2024 388  140 - 450 10*3/mm3 Final    Neutrophil % 04/10/2024 53.6  42.7 - 76.0 % Final    Lymphocyte % 04/10/2024 35.0  19.6 - 45.3 % Final    Monocyte % 04/10/2024 10.6  5.0 - 12.0 % Final    Eosinophil % 04/10/2024 0.3  0.3 - 6.2 % Final    Basophil % 04/10/2024 0.2  0.0 - 1.5 % Final    Immature Grans % 04/10/2024 0.3  0.0 - 0.5 % Final    Neutrophils, Absolute 04/10/2024 6.29  1.70 - 7.00 10*3/mm3 Final    Lymphocytes, Absolute 04/10/2024 4.11 (H)  0.70 - 3.10 10*3/mm3 Final    Monocytes, Absolute 04/10/2024 1.24 (H)  0.10 - 0.90 10*3/mm3 Final    Eosinophils, Absolute 04/10/2024 0.03  0.00 - 0.40 10*3/mm3 Final    Basophils, Absolute 04/10/2024 0.02  0.00 - 0.20 10*3/mm3 Final    Immature Grans, Absolute 04/10/2024 0.04  0.00 - 0.05 10*3/mm3 Final   There may be more visits with results that are not included.       EKG Results:  No orders to display       Imaging Results:  MRI Brain With & Without Contrast    Result Date: 5/3/2024   1. White matter changes most compatible with small vessel ischemic disease in this age group.  2. No abnormal enhancement on postcontrast imaging within the brain parenchyma.  3. Prominent bilateral mastoid effusions left greater than right. Please correlate for mastoiditis  3. Mild paranasal sinus disease     Electronically Signed By-Raman Tran On:5/3/2024 9:22 AM      XR Chest 2 View    Result Date: 4/15/2024  Impression: 1.  No acute cardiopulmonary disease.   Electronically Signed By-Bryan Schultz MD On:4/15/2024 2:57 PM      CT Chest Low Dose Cancer Screening WO    Result Date: 3/1/2024    1. No evidence of lung cancer. 2. Stable bilateral pulmonary nodules, likely benign. 3. Mild  bibasilar atelectasis. 4. Moderate emphysema.  LUNG RADS:                           2 (benign appearance, less than 1% chance of malignancy)     YENIFER HANNA MD       Electronically Signed and Approved By: YENIFER HANNA MD on 3/01/2024 at 11:02                 Assessment & Plan   Diagnoses and all orders for this visit:    1. Generalized anxiety disorder (Primary)  -     busPIRone (BUSPAR) 15 MG tablet; Take 1 tablet by mouth 2 (Two) Times a Day.  Dispense: 180 tablet; Refill: 1    2. Major depressive disorder, recurrent episode, moderate  -     busPIRone (BUSPAR) 15 MG tablet; Take 1 tablet by mouth 2 (Two) Times a Day.  Dispense: 180 tablet; Refill: 1    3. Insomnia due to mental condition  -     busPIRone (BUSPAR) 15 MG tablet; Take 1 tablet by mouth 2 (Two) Times a Day.  Dispense: 180 tablet; Refill: 1    4. Seizures        Visit Diagnoses:    ICD-10-CM ICD-9-CM   1. Generalized anxiety disorder  F41.1 300.02   2. Major depressive disorder, recurrent episode, moderate  F33.1 296.32   3. Insomnia due to mental condition  F51.05 300.9     327.02   4. Seizures  R56.9 780.39     08/19/2024: MDD, CHAI, increase buspar.    Allowed patient to freely discuss and process issues, such as:  Anxiety and depression regarding medical conditions.  Anxiety and depression regarding relationships.  ... using Rogerian psychotherapeutic techniques including unconditional positive regard, reflective listening, and demonstrating clear empathy, with the goal of ameliorating symptoms and maintaining, restoring, or improving self-esteem, adaptive skills, and ego or psychological functions (Eliane et al. 1991).  Time (minutes) spent providing supportive psychotherapy: 16  (This time is exclusive to the therapy session and separate from the time spent on activities used to meet the criteria for the E/M service (history, exam, medical decision-making).)  Start: 11:26  Stop: 11:42  Functional status: mild impairment  Treatment plan:  Medication management and supportive psychotherapy  Prognosis: good  Progress: improving  4w    07/22/2024: Much improved. No changes for now. 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    6/20/24: Pseudoseizures per pt (?). Start buspar together with prozac for anxiety, depression. Tremor nebulizer related, not due to prozac.    PLAN:  Safety: No acute safety concerns  Therapy: None  Risk Assessment: Risk of self-harm acutely is moderate.  Risk factors include anxiety disorder, mood disorder, access to firearms, and recent psychosocial stressors (pandemic). Protective factors include no family history, no present SI, no history of suicide attempts or self-harm in the past, minimal AODA, healthcare seeking, future orientation, willingness to engage in care.  Risk of self-harm chronically is also moderate, but could be further elevated in the event of treatment noncompliance and/or AODA.  Meds:  INCREASE buspar 10 to 15 mg bid. Risks, benefits, alternatives discussed with patient including nausea, GI upset, mild sedation, falls risk.  Use care when operating vehicle, vessel, or machine. After discussion of these risks and benefits, the patient voiced understanding and agreed to proceed.  CONTINUE prozac 40 mg qam. Risks, benefits, alternatives discussed with patient including GI upset, nausea vomiting diarrhea, theoretical decrease of seizure threshold predisposing the patient to a slightly higher seizure risk, headaches, sexual dysfunction, serotonin syndrome, bleeding risk, increased suicidality in patients 24 years and younger, switching to padmaja/hypomania.  After discussion of these risks and benefits, the patient voiced understanding and agreed to proceed.  Labs: none    Patient screened positive for depression based on a PHQ-9 score of 0 on 8/14/2024. Follow-up recommendations include: Prescribed antidepressant medication treatment and Suicide Risk  Assessment performed.           TREATMENT PLAN/GOALS: Continue supportive psychotherapy efforts and medications as indicated. Treatment and medication options discussed during today's visit. Patient acknowledged and verbally consented to continue with current treatment plan and was educated on the importance of compliance with treatment and follow-up appointments.    MEDICATION ISSUES:  SINGH reviewed as expected.  Discussed medication options and treatment plan of prescribed medication as well as the risks, benefits, and side effects including potential falls, possible impaired driving and metabolic adversities among others. Patient is agreeable to call the office with any worsening of symptoms or onset of side effects. Patient is agreeable to call 911 or go to the nearest ER should he/she begin having SI/HI. No medication side effects or related complaints today.     MEDS ORDERED DURING VISIT:  New Medications Ordered This Visit   Medications    busPIRone (BUSPAR) 15 MG tablet     Sig: Take 1 tablet by mouth 2 (Two) Times a Day.     Dispense:  180 tablet     Refill:  1     Replaces 10 mg dose.       Return in about 4 weeks (around 9/16/2024).         This document has been electronically signed by Kalia Singer MD  August 19, 2024 12:06 EDT    Dictated Utilizing Dragon Dictation: Part of this note may be an electronic transcription/translation of spoken language to printed text using the Dragon Dictation System.

## 2024-08-23 ENCOUNTER — OFFICE VISIT (OUTPATIENT)
Dept: SURGERY | Facility: CLINIC | Age: 74
End: 2024-08-23
Payer: MEDICARE

## 2024-08-23 VITALS — HEIGHT: 60 IN | BODY MASS INDEX: 36.91 KG/M2 | RESPIRATION RATE: 16 BRPM | WEIGHT: 188 LBS

## 2024-08-23 DIAGNOSIS — K85.10 ACUTE GALLSTONE PANCREATITIS: Primary | ICD-10-CM

## 2024-08-23 DIAGNOSIS — K43.2 INCISIONAL HERNIA, WITHOUT OBSTRUCTION OR GANGRENE: ICD-10-CM

## 2024-08-23 NOTE — LETTER
August 23, 2024     LACI Tipton  [address]  [address]  [place] [address]    Patient: Anca Samson   YOB: 1950   Date of Visit: 8/23/2024     Dear [facility]ACI Tipton:       Thank you for referring Anca Samson to me for evaluation. Below are the relevant portions of my assessment and plan of care.    If you have questions, please do not hesitate to call me. I look forward to following Anca along with you.         Sincerely,        Eduardo Ingram MD        CC: No Recipients    Eduardo Ingram MD  08/23/24 0948  Sign when Signing Visit  Chief Complaint  Post-op Follow-up (Gallbladder removal on 8-8-24)    Subjective         Anca Samson presents to [facility] GENER[facility] SUR[facility]ERY  History of Present Illness    Anca Samson is a 73 y.o. female  who presents today for a postoperative visit.     Patient is here for a follow-up after a robotic cholecystectomy.  She was also noted to have an incisional hernia but we did not repair it at that time.  She is doing pretty well from the gallbladder surgery.    Past History:  Medical History: has a past medical history of Anxiety, Asthma (07/28/2021), Atherosclerosis of native coronary artery of native heart with angina pectoris (08/15/2023), C. difficile diarrhea, Cholelithiasis (7-), Colon cancer (07/21/2017), Colon polyp, COPD (chronic obstructive pulmonary disease) (10/23/2017), Depression, Disease of thyroid gland, Diverticulitis, Diverticulitis of colon, Dysphagia, GERD (gastroesophageal reflux disease), Head injury, Hernia (2019), History of chemotherapy, psychiatric care, Impaired fasting glucose (08/14/2015), Lumbago, Lung nodule (02/17/2022), Major depressive disorder (10/27/2016), Malignant neoplasm of ascending colon (07/21/2017), Melena, Multiple myeloma, Nicotine dependence (05/10/2017), NICK (obstructive sleep apnea) (11/06/2023), Oxygen dependent, Pancreatitis (7/26/2024), Panic  disorder, Renal insufficiency (07/28/2021), Seizures, Shortness of breath, Tobacco use (07/28/2021), Visit for screening mammogram (02/20/2020), and Vitamin D deficiency.   Surgical History: has a past surgical history that includes Bone marrow biopsy (2016); Colonoscopy (2018,2017,2019); Esophagogastroduodenoscopy (2018); Fecal disimpaction (06/2019); Hysterectomy (1982,1984); Mini-laparotomy (2017); Portacath placement (N/A); Colon surgery (2017); Hemicolectomy (Right, 05/2017); Upper gastrointestinal endoscopy (2018); Knee Arthroscopy (Left, 01/03/2022); Appendectomy; Colonoscopy (N/A, 12/20/2022); Venous Access Device (Port) (N/A, 10/31/2023); Colonoscopy (N/A, 11/09/2023); Eye surgery; Tonsillectomy; ERCP (N/A, 07/27/2024); Cholecystectomy (N/A, 08/08/2024); and Gallbladder surgery.   Family History: family history includes Cancer in her father and mother; Heart disease in her father and mother; Kidney cancer in her father; Lung cancer in her mother; Stroke in her father and another family member.   Social History: reports that she quit smoking about 2 years ago. Her smoking use included cigarettes. She started smoking about 49 years ago. She has a 47.4 pack-year smoking history. She has been exposed to tobacco smoke. She has never used smokeless tobacco. She reports that she does not drink alcohol and does not use drugs.  Allergies: Morphine, Cephalexin, Penicillins, and Sulfa antibiotics       Current Outpatient Medications:   •  acyclovir (ZOVIRAX) 400 MG tablet, Take 1 tablet by mouth 2 (Two) Times a Day. Take one tablet by mouth twice daily., Disp: 60 tablet, Rfl: 11  •  albuterol sulfate  (90 Base) MCG/ACT inhaler, Inhale 2 puffs Every 4 (Four) Hours As Needed for Wheezing., Disp: 18 g, Rfl: 11  •  arformoterol (BROVANA) 15 MCG/2ML nebulizer solution, USE 1 VIAL  IN  NEBULIZER TWICE  DAILY - MORNING & EVENING, Disp: 360 mL, Rfl: 11  •  aspirin 81 MG EC tablet, Take 1 tablet by mouth Daily., Disp: ,  Rfl:   •  budesonide (PULMICORT) 0.5 MG/2ML nebulizer solution, USE 1 VIAL  IN  NEBULIZER TWICE  DAILY - RINSE MOUTH OUT AFTER TREATMENT, Disp: 60 each, Rfl: 11  •  busPIRone (BUSPAR) 15 MG tablet, Take 1 tablet by mouth 2 (Two) Times a Day., Disp: 180 tablet, Rfl: 1  •  colestipol (COLESTID) 1 g tablet, Take 2 tablets by mouth 2 (Two) Times a Day., Disp: 120 tablet, Rfl: 5  •  dapsone 25 MG tablet, TAKE 2 TABLETS BY MOUTH TWICE A DAY, Disp: 360 tablet, Rfl: 1  •  doxycycline (VIBRAMYCIN) 100 MG capsule, Take 1 capsule by mouth 2 (Two) Times a Day., Disp: 20 capsule, Rfl: 0  •  FLUoxetine (PROzac) 40 MG capsule, Take 1 capsule by mouth Daily., Disp: 90 capsule, Rfl: 1  •  levalbuterol (XOPENEX) 0.63 MG/3ML nebulizer solution, Take 1 ampule by nebulization 4 (Four) Times a Day As Needed for Wheezing., Disp: 120 mL, Rfl: 5  •  ondansetron (ZOFRAN) 8 MG tablet, Take 1 tablet by mouth 3 (Three) Times a Day As Needed for Nausea or Vomiting., Disp: 30 tablet, Rfl: 5  •  prednisoLONE acetate (PRED FORTE) 1 % ophthalmic suspension, Administer 1 drop to both eyes 4 (Four) Times a Day., Disp: , Rfl:   •  promethazine (PHENERGAN) 25 MG tablet, Take 1 tablet by mouth Every 6 (Six) Hours As Needed for Nausea or Vomiting., Disp: 30 tablet, Rfl: 1  •  vitamin D (ERGOCALCIFEROL) 1.25 MG (96279 UT) capsule capsule, Take 1 capsule by mouth 2 (Two) Times a Week., Disp: 26 capsule, Rfl: 1  •  Yupelri 175 MCG/3ML nebulizer solution, USE 1 VIAL IN NEBULIZER DAILY, Disp: 90 mL, Rfl: 11  •  dexAMETHasone (DECADRON) 4 MG tablet, Take 10 tablets on D 1,8,15,22 and take 1 tablet on D 2,3.  Take in the morning with food. (Patient not taking: Reported on 8/23/2024), Disp: 42 tablet, Rfl: 5  •  HYDROcodone-acetaminophen (NORCO) 5-325 MG per tablet, Take 1 tablet by mouth Every 6 (Six) Hours As Needed for Moderate Pain (Pain). (Patient not taking: Reported on 8/23/2024), Disp: 10 tablet, Rfl: 0  •  lenalidomide (REVLIMID) 15 MG capsule, Take 1  "capsule by mouth Daily. On Days 1-21, and off 7 days, on a 28 day cycle (Patient not taking: Reported on 8/23/2024), Disp: 21 capsule, Rfl: 0       Physical Exam  Her incisions are healing up fine.  Her abdomen is soft and she appears well.  Objective    Vital Signs:   Resp 16   Ht 152.4 cm (60\")   Wt 85.3 kg (188 lb)   BMI 36.72 kg/m²              Assessment and Plan    Diagnoses and all orders for this visit:    1. Acute gallstone pancreatitis (Primary)    2. Incisional hernia, without obstruction or gangrene    Overall she seems to be doing well.  I will see her back in 2 weeks.  I have told her that we will need to wait a little bit but we will address her hernia at some point here in the next couple of months.      "

## 2024-08-23 NOTE — PROGRESS NOTES
Chief Complaint  Post-op Follow-up (Gallbladder removal on 8-8-24)    Subjective          Anca Samson presents to [facility] GENERAL SURGERY  History of Present Illness    Anca Samson is a 73 y.o. female  who presents today for a postoperative visit.     Patient is here for a follow-up after a robotic cholecystectomy.  She was also noted to have an incisional hernia but we did not repair it at that time.  She is doing pretty well from the gallbladder surgery.    Past History:  Medical History: has a past medical history of Anxiety, Asthma (07/28/2021), Atherosclerosis of native coronary artery of native heart with angina pectoris (08/15/2023), C. difficile diarrhea, Cholelithiasis (7-), Colon cancer (07/21/2017), Colon polyp, COPD (chronic obstructive pulmonary disease) (10/23/2017), Depression, Disease of thyroid gland, Diverticulitis, Diverticulitis of colon, Dysphagia, GERD (gastroesophageal reflux disease), Head injury, Hernia (2019), History of chemotherapy, psychiatric care, Impaired fasting glucose (08/14/2015), Lumbago, Lung nodule (02/17/2022), Major depressive disorder (10/27/2016), Malignant neoplasm of ascending colon (07/21/2017), Melena, Multiple myeloma, Nicotine dependence (05/10/2017), NICK (obstructive sleep apnea) (11/06/2023), Oxygen dependent, Pancreatitis (7/26/2024), Panic disorder, Renal insufficiency (07/28/2021), Seizures, Shortness of breath, Tobacco use (07/28/2021), Visit for screening mammogram (02/20/2020), and Vitamin D deficiency.   Surgical History: has a past surgical history that includes Bone marrow biopsy (2016); Colonoscopy (2018,2017,2019); Esophagogastroduodenoscopy (2018); Fecal disimpaction (06/2019); Hysterectomy (1982,1984); Mini-laparotomy (2017); Portacath placement (N/A); Colon surgery (2017); Hemicolectomy (Right, 05/2017); Upper gastrointestinal endoscopy (2018); Knee Arthroscopy (Left, 01/03/2022); Appendectomy; Colonoscopy (N/A,  12/20/2022); Venous Access Device (Port) (N/A, 10/31/2023); Colonoscopy (N/A, 11/09/2023); Eye surgery; Tonsillectomy; ERCP (N/A, 07/27/2024); Cholecystectomy (N/A, 08/08/2024); and Gallbladder surgery.   Family History: family history includes Cancer in her father and mother; Heart disease in her father and mother; Kidney cancer in her father; Lung cancer in her mother; Stroke in her father and another family member.   Social History: reports that she quit smoking about 2 years ago. Her smoking use included cigarettes. She started smoking about 49 years ago. She has a 47.4 pack-year smoking history. She has been exposed to tobacco smoke. She has never used smokeless tobacco. She reports that she does not drink alcohol and does not use drugs.  Allergies: Morphine, Cephalexin, Penicillins, and Sulfa antibiotics       Current Outpatient Medications:     acyclovir (ZOVIRAX) 400 MG tablet, Take 1 tablet by mouth 2 (Two) Times a Day. Take one tablet by mouth twice daily., Disp: 60 tablet, Rfl: 11    albuterol sulfate  (90 Base) MCG/ACT inhaler, Inhale 2 puffs Every 4 (Four) Hours As Needed for Wheezing., Disp: 18 g, Rfl: 11    arformoterol (BROVANA) 15 MCG/2ML nebulizer solution, USE 1 VIAL  IN  NEBULIZER TWICE  DAILY - MORNING & EVENING, Disp: 360 mL, Rfl: 11    aspirin 81 MG EC tablet, Take 1 tablet by mouth Daily., Disp: , Rfl:     budesonide (PULMICORT) 0.5 MG/2ML nebulizer solution, USE 1 VIAL  IN  NEBULIZER TWICE  DAILY - RINSE MOUTH OUT AFTER TREATMENT, Disp: 60 each, Rfl: 11    busPIRone (BUSPAR) 15 MG tablet, Take 1 tablet by mouth 2 (Two) Times a Day., Disp: 180 tablet, Rfl: 1    colestipol (COLESTID) 1 g tablet, Take 2 tablets by mouth 2 (Two) Times a Day., Disp: 120 tablet, Rfl: 5    dapsone 25 MG tablet, TAKE 2 TABLETS BY MOUTH TWICE A DAY, Disp: 360 tablet, Rfl: 1    doxycycline (VIBRAMYCIN) 100 MG capsule, Take 1 capsule by mouth 2 (Two) Times a Day., Disp: 20 capsule, Rfl: 0    FLUoxetine (PROzac)  "40 MG capsule, Take 1 capsule by mouth Daily., Disp: 90 capsule, Rfl: 1    levalbuterol (XOPENEX) 0.63 MG/3ML nebulizer solution, Take 1 ampule by nebulization 4 (Four) Times a Day As Needed for Wheezing., Disp: 120 mL, Rfl: 5    ondansetron (ZOFRAN) 8 MG tablet, Take 1 tablet by mouth 3 (Three) Times a Day As Needed for Nausea or Vomiting., Disp: 30 tablet, Rfl: 5    prednisoLONE acetate (PRED FORTE) 1 % ophthalmic suspension, Administer 1 drop to both eyes 4 (Four) Times a Day., Disp: , Rfl:     promethazine (PHENERGAN) 25 MG tablet, Take 1 tablet by mouth Every 6 (Six) Hours As Needed for Nausea or Vomiting., Disp: 30 tablet, Rfl: 1    vitamin D (ERGOCALCIFEROL) 1.25 MG (43799 UT) capsule capsule, Take 1 capsule by mouth 2 (Two) Times a Week., Disp: 26 capsule, Rfl: 1    Yupelri 175 MCG/3ML nebulizer solution, USE 1 VIAL IN NEBULIZER DAILY, Disp: 90 mL, Rfl: 11    dexAMETHasone (DECADRON) 4 MG tablet, Take 10 tablets on D 1,8,15,22 and take 1 tablet on D 2,3.  Take in the morning with food. (Patient not taking: Reported on 8/23/2024), Disp: 42 tablet, Rfl: 5    HYDROcodone-acetaminophen (NORCO) 5-325 MG per tablet, Take 1 tablet by mouth Every 6 (Six) Hours As Needed for Moderate Pain (Pain). (Patient not taking: Reported on 8/23/2024), Disp: 10 tablet, Rfl: 0    lenalidomide (REVLIMID) 15 MG capsule, Take 1 capsule by mouth Daily. On Days 1-21, and off 7 days, on a 28 day cycle (Patient not taking: Reported on 8/23/2024), Disp: 21 capsule, Rfl: 0       Physical Exam  Her incisions are healing up fine.  Her abdomen is soft and she appears well.  Objective     Vital Signs:   Resp 16   Ht 152.4 cm (60\")   Wt 85.3 kg (188 lb)   BMI 36.72 kg/m²              Assessment and Plan    Diagnoses and all orders for this visit:    1. Acute gallstone pancreatitis (Primary)    2. Incisional hernia, without obstruction or gangrene    Overall she seems to be doing well.  I will see her back in 2 weeks.  I have told her that " we will need to wait a little bit but we will address her hernia at some point here in the next couple of months.

## 2024-08-24 DIAGNOSIS — F33.1 MODERATE EPISODE OF RECURRENT MAJOR DEPRESSIVE DISORDER: ICD-10-CM

## 2024-08-28 NOTE — PRE-PROCEDURE INSTRUCTIONS
"Instructed on date and arrival time of 0630. Instructed that arrival time is not procedure time but allows time to prepare for procedure. Come to entrance \"C\".  Must have  over age 18 to drive home.  May have two visitors; however, children under 12 must stay in waiting room.  Discussed diet/NPO.  May take medications as usual except for blood thinners, diabetic medications, or weight loss medications.  Verbalized understanding of instructions given.  Instructed to call for questions or concerns.  Pulmonary clearance noted in chart.  "

## 2024-09-02 DIAGNOSIS — E03.9 HYPOTHYROIDISM, UNSPECIFIED TYPE: ICD-10-CM

## 2024-09-02 NOTE — TELEPHONE ENCOUNTER
Per chart, patient is no longer taking Levothyroxine. Please check with patient to ensure this is correct.

## 2024-09-04 ENCOUNTER — ANESTHESIA EVENT (OUTPATIENT)
Dept: GASTROENTEROLOGY | Facility: HOSPITAL | Age: 74
End: 2024-09-04
Payer: MEDICARE

## 2024-09-04 NOTE — ANESTHESIA PREPROCEDURE EVALUATION
" Anesthesia Evaluation     Patient summary reviewed and Nursing notes reviewed   NPO Solid Status: > 8 hours  NPO Liquid Status: > 8 hours           Airway   Mallampati: II  TM distance: >3 FB  Neck ROM: limited  No difficulty expected and Large neck circumference  Dental    (+) upper dentures        Pulmonary    (+) COPD severe, asthma,home oxygen (2L all the time per pt), shortness of breath, sleep apnea on CPAP, decreased breath sounds    ROS comment: PFT 8/25/23  Impression:  Mild obstruction seen on spirometry.  FEV1 79% predicted.  No significant response to bronchodilator.  Lung volumes with evidence of air trapping.  Significant reduction in DLCO.  Cardiovascular   Exercise tolerance: poor (<4 METS)    ECG reviewed  Rhythm: regular  Rate: normal    (+) hypertension well controlled, CAD, angina with exertion, PRAKASH, hyperlipidemia    ROS comment: Echo 9/6/23  Normal left ventricular systolic function.  No significant valve abnormalities noted.    Stress test 9/22/23  ·  Left ventricular ejection fraction is normal (Calculated EF = 55%).  ·  Myocardial perfusion imaging indicates a normal myocardial perfusion study with no evidence of ischemia.  ·  Findings consistent with a normal ECG stress test.       Neuro/Psych  (+) seizures (\"pseudoseizures\" - last one was > 1 yr ago) well controlled, psychiatric history Anxiety and Depression  GI/Hepatic/Renal/Endo    (+) obesity, morbid obesity, hiatal hernia, GERD well controlled, renal disease- CRI, thyroid problem hypothyroidism    Musculoskeletal     (+) arthralgias, back pain  Abdominal    Substance History      OB/GYN          Other   arthritis,   history of cancer    ROS/Med Hx Other: Stent removal       Phys Exam Other: 96% on 2 lpm via nc               Anesthesia Plan    ASA 4     general   total IV anesthesia  (Total IV Anesthesia    Patient understands anesthesia not responsible for dental damage.  )  intravenous induction     Anesthetic plan, risks, benefits, " and alternatives have been provided, discussed and informed consent has been obtained with: patient.  Pre-procedure education provided  Plan discussed with CRNA.      CODE STATUS:

## 2024-09-05 ENCOUNTER — APPOINTMENT (OUTPATIENT)
Dept: GENERAL RADIOLOGY | Facility: HOSPITAL | Age: 74
End: 2024-09-05
Payer: MEDICARE

## 2024-09-05 ENCOUNTER — HOSPITAL ENCOUNTER (OUTPATIENT)
Facility: HOSPITAL | Age: 74
Setting detail: HOSPITAL OUTPATIENT SURGERY
Discharge: HOME | End: 2024-09-05
Attending: INTERNAL MEDICINE | Admitting: INTERNAL MEDICINE
Payer: MEDICARE

## 2024-09-05 ENCOUNTER — ANESTHESIA (OUTPATIENT)
Dept: GASTROENTEROLOGY | Facility: HOSPITAL | Age: 74
End: 2024-09-05
Payer: MEDICARE

## 2024-09-05 ENCOUNTER — TELEPHONE (OUTPATIENT)
Dept: ONCOLOGY | Facility: HOSPITAL | Age: 74
End: 2024-09-05
Payer: MEDICARE

## 2024-09-05 VITALS
DIASTOLIC BLOOD PRESSURE: 70 MMHG | TEMPERATURE: 97.9 F | OXYGEN SATURATION: 99 % | BODY MASS INDEX: 37.18 KG/M2 | RESPIRATION RATE: 18 BRPM | HEART RATE: 86 BPM | SYSTOLIC BLOOD PRESSURE: 131 MMHG | WEIGHT: 189.38 LBS | HEIGHT: 60 IN

## 2024-09-05 DIAGNOSIS — Z46.89 ENCOUNTER FOR REMOVAL OF BILIARY STENT: ICD-10-CM

## 2024-09-05 PROCEDURE — 25010000002 PROPOFOL 10 MG/ML EMULSION: Performed by: NURSE ANESTHETIST, CERTIFIED REGISTERED

## 2024-09-05 PROCEDURE — 76000 FLUOROSCOPY <1 HR PHYS/QHP: CPT

## 2024-09-05 PROCEDURE — 43275 ERCP REMOVE FORGN BODY DUCT: CPT | Performed by: INTERNAL MEDICINE

## 2024-09-05 PROCEDURE — 25010000002 DEXAMETHASONE PER 1 MG: Performed by: NURSE ANESTHETIST, CERTIFIED REGISTERED

## 2024-09-05 PROCEDURE — C1726 CATH, BAL DIL, NON-VASCULAR: HCPCS | Performed by: INTERNAL MEDICINE

## 2024-09-05 PROCEDURE — C1769 GUIDE WIRE: HCPCS | Performed by: INTERNAL MEDICINE

## 2024-09-05 PROCEDURE — 43273 ENDOSCOPIC PANCREATOSCOPY: CPT | Performed by: INTERNAL MEDICINE

## 2024-09-05 PROCEDURE — 43264 ERCP REMOVE DUCT CALCULI: CPT | Performed by: INTERNAL MEDICINE

## 2024-09-05 PROCEDURE — 43262 ENDO CHOLANGIOPANCREATOGRAPH: CPT | Performed by: INTERNAL MEDICINE

## 2024-09-05 PROCEDURE — 25010000002 ONDANSETRON PER 1 MG: Performed by: NURSE ANESTHETIST, CERTIFIED REGISTERED

## 2024-09-05 PROCEDURE — 25010000002 LEVOFLOXACIN PER 250 MG: Performed by: INTERNAL MEDICINE

## 2024-09-05 PROCEDURE — 25010000002 PROPOFOL 200 MG/20ML EMULSION: Performed by: NURSE ANESTHETIST, CERTIFIED REGISTERED

## 2024-09-05 PROCEDURE — 25810000003 LACTATED RINGERS PER 1000 ML: Performed by: NURSE PRACTITIONER

## 2024-09-05 RX ORDER — SODIUM CHLORIDE, SODIUM LACTATE, POTASSIUM CHLORIDE, CALCIUM CHLORIDE 600; 310; 30; 20 MG/100ML; MG/100ML; MG/100ML; MG/100ML
30 INJECTION, SOLUTION INTRAVENOUS CONTINUOUS
Status: DISCONTINUED | OUTPATIENT
Start: 2024-09-05 | End: 2024-09-05 | Stop reason: HOSPADM

## 2024-09-05 RX ORDER — OXYCODONE HYDROCHLORIDE 5 MG/1
5 TABLET ORAL
Status: DISCONTINUED | OUTPATIENT
Start: 2024-09-05 | End: 2024-09-05 | Stop reason: HOSPADM

## 2024-09-05 RX ORDER — IPRATROPIUM BROMIDE AND ALBUTEROL SULFATE 2.5; .5 MG/3ML; MG/3ML
3 SOLUTION RESPIRATORY (INHALATION) ONCE
Status: COMPLETED | OUTPATIENT
Start: 2024-09-05 | End: 2024-09-05

## 2024-09-05 RX ORDER — LEVOFLOXACIN 5 MG/ML
500 INJECTION, SOLUTION INTRAVENOUS ONCE
Status: COMPLETED | OUTPATIENT
Start: 2024-09-05 | End: 2024-09-05

## 2024-09-05 RX ORDER — SODIUM CHLORIDE, SODIUM LACTATE, POTASSIUM CHLORIDE, CALCIUM CHLORIDE 600; 310; 30; 20 MG/100ML; MG/100ML; MG/100ML; MG/100ML
30 INJECTION, SOLUTION INTRAVENOUS CONTINUOUS PRN
Status: DISCONTINUED | OUTPATIENT
Start: 2024-09-05 | End: 2024-09-05 | Stop reason: HOSPADM

## 2024-09-05 RX ORDER — DEXAMETHASONE SODIUM PHOSPHATE 4 MG/ML
INJECTION, SOLUTION INTRA-ARTICULAR; INTRALESIONAL; INTRAMUSCULAR; INTRAVENOUS; SOFT TISSUE AS NEEDED
Status: DISCONTINUED | OUTPATIENT
Start: 2024-09-05 | End: 2024-09-05 | Stop reason: SURG

## 2024-09-05 RX ORDER — LIDOCAINE HYDROCHLORIDE 20 MG/ML
INJECTION, SOLUTION EPIDURAL; INFILTRATION; INTRACAUDAL; PERINEURAL AS NEEDED
Status: DISCONTINUED | OUTPATIENT
Start: 2024-09-05 | End: 2024-09-05 | Stop reason: SURG

## 2024-09-05 RX ORDER — ONDANSETRON 2 MG/ML
INJECTION INTRAMUSCULAR; INTRAVENOUS AS NEEDED
Status: DISCONTINUED | OUTPATIENT
Start: 2024-09-05 | End: 2024-09-05 | Stop reason: SURG

## 2024-09-05 RX ORDER — ONDANSETRON 2 MG/ML
4 INJECTION INTRAMUSCULAR; INTRAVENOUS ONCE AS NEEDED
Status: DISCONTINUED | OUTPATIENT
Start: 2024-09-05 | End: 2024-09-05 | Stop reason: HOSPADM

## 2024-09-05 RX ORDER — PROMETHAZINE HYDROCHLORIDE 25 MG/1
25 SUPPOSITORY RECTAL ONCE AS NEEDED
Status: DISCONTINUED | OUTPATIENT
Start: 2024-09-05 | End: 2024-09-05 | Stop reason: HOSPADM

## 2024-09-05 RX ORDER — SUCCINYLCHOLINE/SOD CL,ISO/PF 100 MG/5ML
SYRINGE (ML) INTRAVENOUS AS NEEDED
Status: DISCONTINUED | OUTPATIENT
Start: 2024-09-05 | End: 2024-09-05 | Stop reason: SURG

## 2024-09-05 RX ORDER — MEPERIDINE HYDROCHLORIDE 25 MG/ML
12.5 INJECTION INTRAMUSCULAR; INTRAVENOUS; SUBCUTANEOUS
Status: DISCONTINUED | OUTPATIENT
Start: 2024-09-05 | End: 2024-09-05 | Stop reason: HOSPADM

## 2024-09-05 RX ORDER — PROMETHAZINE HYDROCHLORIDE 25 MG/1
25 TABLET ORAL ONCE AS NEEDED
Status: DISCONTINUED | OUTPATIENT
Start: 2024-09-05 | End: 2024-09-05 | Stop reason: HOSPADM

## 2024-09-05 RX ORDER — PROPOFOL 10 MG/ML
INJECTION, EMULSION INTRAVENOUS AS NEEDED
Status: DISCONTINUED | OUTPATIENT
Start: 2024-09-05 | End: 2024-09-05 | Stop reason: SURG

## 2024-09-05 RX ADMIN — LEVOFLOXACIN 500 MG: 500 INJECTION, SOLUTION INTRAVENOUS at 09:52

## 2024-09-05 RX ADMIN — PROPOFOL 150 MG: 10 INJECTION, EMULSION INTRAVENOUS at 08:22

## 2024-09-05 RX ADMIN — ONDANSETRON 4 MG: 2 INJECTION INTRAMUSCULAR; INTRAVENOUS at 08:22

## 2024-09-05 RX ADMIN — DEXAMETHASONE SODIUM PHOSPHATE 4 MG: 4 INJECTION, SOLUTION INTRAMUSCULAR; INTRAVENOUS at 08:22

## 2024-09-05 RX ADMIN — SODIUM CHLORIDE, POTASSIUM CHLORIDE, SODIUM LACTATE AND CALCIUM CHLORIDE 30 ML/HR: 600; 310; 30; 20 INJECTION, SOLUTION INTRAVENOUS at 07:26

## 2024-09-05 RX ADMIN — IPRATROPIUM BROMIDE AND ALBUTEROL SULFATE 3 ML: .5; 3 SOLUTION RESPIRATORY (INHALATION) at 10:09

## 2024-09-05 RX ADMIN — DEXAMETHASONE SODIUM PHOSPHATE 20 MG: 4 INJECTION, SOLUTION INTRAMUSCULAR; INTRAVENOUS at 09:06

## 2024-09-05 RX ADMIN — LIDOCAINE HYDROCHLORIDE 100 MG: 20 INJECTION, SOLUTION EPIDURAL; INFILTRATION; INTRACAUDAL; PERINEURAL at 08:22

## 2024-09-05 RX ADMIN — PROPOFOL 50 MG: 10 INJECTION, EMULSION INTRAVENOUS at 09:01

## 2024-09-05 RX ADMIN — PROPOFOL 150 MCG/KG/MIN: 10 INJECTION, EMULSION INTRAVENOUS at 08:25

## 2024-09-05 RX ADMIN — ONDANSETRON 4 MG: 2 INJECTION INTRAMUSCULAR; INTRAVENOUS at 09:38

## 2024-09-05 RX ADMIN — Medication 100 MG: at 08:22

## 2024-09-05 NOTE — TELEPHONE ENCOUNTER
"  Caller: Anca Samson \"Sarah\"    Relationship: Self    Best call back number: [phone]    What is the best time to reach you: ANYTIME    Who are you requesting to speak with (clinical staff, provider,  specific staff member): CLINICAL      What was the call regarding:     WANTING TO CHECK WITH DR JACKSON IF HE WANTS HER TO COME IN OR NOT FOR INFUSION AND FOLLOW UP 09/11    BECAUSE JUST HAD SURGERY TODAY TO GET STENT TAKEN OUT . AND LAST MONTH HAD GULL BLADDER SURGERY AND DR JACKSON HAD TAKEN OFF CHEMO FOR A MONTH         Is it okay if the provider responds through [employer]: YES    "

## 2024-09-05 NOTE — TELEPHONE ENCOUNTER
Spoke to spouse informed him that she would be good for treatment next week if she is feeling up to it. Spouse v/u.

## 2024-09-05 NOTE — ANESTHESIA POSTPROCEDURE EVALUATION
Patient: Anca Samson    Procedure Summary       Date: 09/05/24 Room / Location: [facility] ENDOSCOPY 4 / [facility] ENDOSCOPY    Anesthesia Start: 0818 Anesthesia Stop: 0923    Procedure: ENDOSCOPIC RETROGRADE CHOLANGIOPANCREATOGRAPHY WITH SPINCHTEROTOMY. BALLOON DILATATION 8-10. BALLOON SWEEP 12-15. STENT REMOVAL Diagnosis:       Encounter for removal of biliary stent      (Encounter for removal of biliary stent [Z46.89])    Surgeons: Ajay Kennedy MD Provider: Jayna Koch CRNA    Anesthesia Type: general ASA Status: 4            Anesthesia Type: general    Vitals  Vitals Value Taken Time   /75 09/05/24 1025   Temp 36.2 °C (97.1 °F) 09/05/24 0925   Pulse 83 09/05/24 1035   Resp 11 09/05/24 1025   SpO2 100 % 09/05/24 1035   Vitals shown include unfiled device data.        Post Anesthesia Care and Evaluation    Patient location during evaluation: bedside  Patient participation: complete - patient participated  Level of consciousness: awake  Pain management: adequate    Airway patency: patent  Anesthetic complications: No anesthetic complications  PONV Status: controlled  Cardiovascular status: acceptable and stable  Respiratory status: acceptable

## 2024-09-05 NOTE — H&P
Pre Procedure History & Physical    Chief Complaint:   History of bile duct stones, status post ERCP stent placement    Subjective     HPI:   History of bile duct stones and stent    Past Medical History:   Past Medical History:   Diagnosis Date    Anxiety     Asthma 07/28/2021    Atherosclerosis of native coronary artery of native heart with angina pectoris 08/15/2023    FOLLOWED BY DR GONZALES/DINA PETTIT. DENIES CP BUT GETS SOA WITH EXERTION HAS COPD WEARS AT 2LPM N/C. DECREASED ACTIVITY D/T SOA.    C. difficile diarrhea     DENIES ANY CURRENT ISSUES    Cholelithiasis 7-    Colon cancer 07/21/2017    Colon polyp     COPD (chronic obstructive pulmonary disease) 10/23/2017    Depression     Disease of thyroid gland     Diverticulitis     Diverticulitis of colon     Dysphagia     GERD (gastroesophageal reflux disease)     Head injury     CONCUSSION AT AGE 4    Hernia 2019    History of chemotherapy     2017    Hx of psychiatric care     Impaired fasting glucose 08/14/2015    Lumbago     Lung nodule 02/17/2022    Major depressive disorder 10/27/2016    Malignant neoplasm of ascending colon 07/21/2017    Melena     Multiple myeloma     Nicotine dependence 05/10/2017    NICK (obstructive sleep apnea) 11/06/2023    Oxygen dependent     REPORTS USES 02 AT 2LPM VIA N/C DURING DAY AND 3LPM AT NIGHT . CAN GET VERY SOA WITH MINIMAL EXERTION    Pancreatitis 7/26/2024    Panic disorder     Renal insufficiency 07/28/2021    Seizures     PSEUDO SEIZURES LAST ONE AROUND JULY 2024    Shortness of breath     CAN GET VERY SOA WITH MINIMAL EXERTION    Tobacco use 07/28/2021    QUIT SMOKING JUNE 2022    Visit for screening mammogram 02/20/2020    NORMAL- REPEAT IN ONE YEAR    Vitamin D deficiency        Past Surgical History:  Past Surgical History:   Procedure Laterality Date    APPENDECTOMY      BONE MARROW BIOPSY  2016    CHOLECYSTECTOMY N/A 08/08/2024    Procedure: CHOLECYSTECTOMY LAPAROSCOPIC WITH DAVINCI ROBOT;  Surgeon:  Eduardo Ingram MD;  [place] [facility] [place] [facility] [place]  Service: Robotics - DaVinci;  Laterality: N/A;    COLON SURGERY  2017    COLONOSCOPY  2018,2017,2019    [facility] DR LE:DIVERTICULOSIS AND ERYTHEMA OF MUCOSA    COLONOSCOPY N/A 12/20/2022    Procedure: COLONOSCOPY WITH ELEVIEW INJECTION, POLYPECTOMY, HOT SNARE, CLIP APPLICATION X3, BIOPSIES;  Surgeon: Jarvis Borges MD;  Location: [facility] ENDOSCOPY;  Service: Gastroenterology;  Laterality: N/A;  COLON POLYP, DIVERTICULOSIS, ANASTOMOSIS RIGHT COLON    COLONOSCOPY N/A 11/09/2023    Procedure: COLONOSCOPY;  Surgeon: Jarvis Borges MD;  Location: [facility] ENDOSCOPY;  Service: Gastroenterology;  Laterality: N/A;  DIVERTICULOSIS, ANASTOMOSIS IN ASCENDING COLON    ENDOSCOPY  2018    ERCP N/A 07/27/2024    Procedure: ENDOSCOPIC RETROGRADE CHOLANGIOPANCREATOGRAPHY WITH SPHINCTEROTOMY, BALLOON SWEEP, STENT PLACEMENT;  Surgeon: Ajay Kennedy MD;  Location: [facility] MAIN OR;  Service: Gastroenterology;  Laterality: N/A;  BILE DUCT STONE    EYE SURGERY      FECAL DISIMPACTION  06/2019    TRANSPLANT     GALLBLADDER SURGERY      HEMICOLECTOMY Right 05/2017    HYSTERECTOMY  1982,1984    KNEE ARTHROSCOPY Left 01/03/2022    Procedure: KNEE ARTHROSCOPY WITH PARTIAL MEDIAL MENISCECTOMY,  CHONDROPLASTY;  Surgeon: Nicholas Tipton MD;  [place] [facility] [place] [facility] [place]  Service: Orthopedics;  Laterality: Left;    MINI-LAPAROTOMY  2017    PORTACATH PLACEMENT N/A     pt states that her port does not work    TONSILLECTOMY      UPPER GASTROINTESTINAL ENDOSCOPY  2018    VENOUS ACCESS DEVICE (PORT) INSERTION N/A 10/31/2023    Procedure: Port-a-catheter removal and port-a-catheter placement;  Surgeon: Alcon Delgado MD;  [place] [facility] [place] [facility] [place]  Service: General;  Laterality: N/A;       Family History:  Family History   Problem Relation Age of Onset    Heart disease Mother     Lung cancer Mother     Cancer Mother     Stroke Father     Heart disease Father     Kidney  cancer Father     Cancer Father     Stroke Other         UNCLE/AUNT    Colon cancer Neg Hx     Malig Hyperthermia Neg Hx        Social History:   reports that she quit smoking about 2 years ago. Her smoking use included cigarettes. She started smoking about 49 years ago. She has a 47.4 pack-year smoking history. She has been exposed to tobacco smoke. She has never used smokeless tobacco. She reports that she does not drink alcohol and does not use drugs.    Medications:   Medications Prior to Admission   Medication Sig Dispense Refill Last Dose    acyclovir (ZOVIRAX) 400 MG tablet Take 1 tablet by mouth 2 (Two) Times a Day. Take one tablet by mouth twice daily. 60 tablet 11 9/4/2024    albuterol sulfate  (90 Base) MCG/ACT inhaler Inhale 2 puffs Every 4 (Four) Hours As Needed for Wheezing. 18 g 11 9/4/2024    arformoterol (BROVANA) 15 MCG/2ML nebulizer solution USE 1 VIAL  IN  NEBULIZER TWICE  DAILY - MORNING & EVENING 360 mL 11 9/5/2024    aspirin 81 MG EC tablet Take 1 tablet by mouth Daily.   Past Week    budesonide (PULMICORT) 0.5 MG/2ML nebulizer solution USE 1 VIAL  IN  NEBULIZER TWICE  DAILY - RINSE MOUTH OUT AFTER TREATMENT 60 each 11 9/5/2024    busPIRone (BUSPAR) 15 MG tablet Take 1 tablet by mouth 2 (Two) Times a Day. 180 tablet 1 9/4/2024    colestipol (COLESTID) 1 g tablet Take 2 tablets by mouth 2 (Two) Times a Day. 120 tablet 5 9/4/2024    dapsone 25 MG tablet TAKE 2 TABLETS BY MOUTH TWICE A  tablet 1 9/4/2024    dexAMETHasone (DECADRON) 4 MG tablet Take 10 tablets on D 1,8,15,22 and take 1 tablet on D 2,3.  Take in the morning with food. 42 tablet 5 Past Month    FLUoxetine (PROzac) 40 MG capsule Take 1 capsule by mouth Daily. 90 capsule 1 9/4/2024    lenalidomide (REVLIMID) 15 MG capsule Take 1 capsule by mouth Daily. On Days 1-21, and off 7 days, on a 28 day cycle 21 capsule 0 Past Month    levalbuterol (XOPENEX) 0.63 MG/3ML nebulizer solution Take 1 ampule by nebulization 4 (Four)  "Times a Day As Needed for Wheezing. 120 mL 5 9/4/2024    ondansetron (ZOFRAN) 8 MG tablet Take 1 tablet by mouth 3 (Three) Times a Day As Needed for Nausea or Vomiting. 30 tablet 5 9/4/2024    promethazine (PHENERGAN) 25 MG tablet Take 1 tablet by mouth Every 6 (Six) Hours As Needed for Nausea or Vomiting. 30 tablet 1 9/4/2024    vitamin D (ERGOCALCIFEROL) 1.25 MG (28253 UT) capsule capsule Take 1 capsule by mouth 2 (Two) Times a Week. 26 capsule 1 9/4/2024    Yupelri 175 MCG/3ML nebulizer solution USE 1 VIAL IN NEBULIZER DAILY 90 mL 11 9/4/2024    HYDROcodone-acetaminophen (NORCO) 5-325 MG per tablet Take 1 tablet by mouth Every 6 (Six) Hours As Needed for Moderate Pain (Pain). (Patient not taking: Reported on 8/23/2024) 10 tablet 0        Allergies:  Morphine, Cephalexin, Penicillins, and Sulfa antibiotics        Objective     Blood pressure 119/68, pulse 84, temperature 97.2 °F (36.2 °C), temperature source Temporal, resp. rate 20, height 152.4 cm (60\"), weight 85.9 kg (189 lb 6 oz), SpO2 96%, not currently breastfeeding.    Physical Exam   Constitutional: Pt is oriented to person, place, and time and well-developed, well-nourished, and in no distress.   Mouth/Throat: Oropharynx is clear and moist.   Neck: Normal range of motion.   Cardiovascular: Normal rate, regular rhythm and normal heart sounds.    Pulmonary/Chest: Effort normal and breath sounds normal.   Abdominal: Soft. Nontender  Skin: Skin is warm and dry.   Psychiatric: Mood, memory, affect and judgment normal.     Assessment & Plan     Diagnosis:  Bile duct stone and stent    Anticipated Surgical Procedure:  ERCP    The risks, benefits, and alternatives of this procedure have been discussed with the patient or the responsible party- the patient understands and agrees to proceed.            "

## 2024-09-06 RX ORDER — LEVOTHYROXINE SODIUM 25 UG/1
25 TABLET ORAL EVERY MORNING
Qty: 90 TABLET | Refills: 1 | OUTPATIENT
Start: 2024-09-06

## 2024-09-06 NOTE — TELEPHONE ENCOUNTER
Pt called pt and confirmed she does not take this and has not for a very long time.     Also while on the phone pt asked to make an appt. States she had a bile stent removed.   Per dc summary : Follow-Ups: Follow up with Rena Guerra PA (Family Medicine); Call my office in 2 weeks for results if not heard from my office already. . Follow up with PCP in 2-3 weeks.     Appt made for 09/23/24 at 11 in a hospital f/u slot.

## 2024-09-06 NOTE — TELEPHONE ENCOUNTER
Spoke with , Robi, who said pt was asleep.   Asked Robi if he knew if pt is currently taking Levothyroxine- he was unsure and that he would have the pt call the office when she gets up.

## 2024-09-09 ENCOUNTER — SPECIALTY PHARMACY (OUTPATIENT)
Dept: PHARMACY | Facility: HOSPITAL | Age: 74
End: 2024-09-09
Payer: MEDICARE

## 2024-09-09 DIAGNOSIS — C90.00 MULTIPLE MYELOMA, REMISSION STATUS UNSPECIFIED: ICD-10-CM

## 2024-09-09 RX ORDER — LENALIDOMIDE 15 MG/1
15 CAPSULE ORAL DAILY
Qty: 21 CAPSULE | Refills: 0 | Status: CANCELLED | OUTPATIENT
Start: 2024-09-09

## 2024-09-09 RX ORDER — LENALIDOMIDE 15 MG/1
15 CAPSULE ORAL DAILY
Qty: 21 CAPSULE | Refills: 0 | Status: SHIPPED | OUTPATIENT
Start: 2024-09-09 | End: 2024-09-09 | Stop reason: SDUPTHER

## 2024-09-09 RX ORDER — LENALIDOMIDE 15 MG/1
15 CAPSULE ORAL DAILY
Qty: 21 CAPSULE | Refills: 0 | Status: SHIPPED | OUTPATIENT
Start: 2024-09-09

## 2024-09-09 NOTE — PROGRESS NOTES
Re: Refills of Oral Specialty Medication - Revlimid (lenalidomide)    Drug-Drug Interactions: The current medication list was reviewed and there are no relevant drug-drug interactions with the specialty medication.  Medication Allergies: The patient has no relevant allergies as it relates to their oral specialty medication  Review of Labs/Dose Adjustments: NO DOSE CHANGE - I reviewed the most recent note and labs and the patient will continue without any dose changes.  I sent refills as described below.    Drug: Revlimid (lenalidomide)  Strength: 15 mg  Directions: Take 1 capsule by mouth daily ON days 1-21, OFF for 7 days of each 28 day cycle  Quantity: 21  Refills: 0  REMS Auth # [id number] Exp 10/9/24     Pharmacy prescription sent to: \[facility]"" [facility]      Madisyn Saldaña PharmD, [facility]  Oncology Clinical Pharmacist  9/9/2024  12:12 EDT

## 2024-09-09 NOTE — TELEPHONE ENCOUNTER
Caller: PADMINI    Relationship: [facility]     Best call back number: [phone]    Requested Prescriptions:   Requested Prescriptions     Pending Prescriptions Disp Refills    lenalidomide (REVLIMID) 15 MG capsule 21 capsule 0     Sig: Take 1 capsule by mouth Daily. On Days 1-21, and off 7 days, on a 28 day cycle        Pharmacy where request should be sent: [facility], [facility] ([place]) [address] - [phone] [facility] [phone] FX     Last office visit with prescribing clinician: 8/14/2024   Last telemedicine visit with prescribing clinician: Visit date not found   Next office visit with prescribing clinician: 9/11/2024     Additional details provided by patient:     PATIENT DUE TO START ON 12TH , HAS SENT A FEW REQUEST BUT HAS NOT HAD RESPONSE YET    PATIENT IS OUT OF THE MEDICATION     Does the patient have less than a 3 day supply:  [x] Yes  [] No    Would you like a call back once the refill request has been completed: [] Yes [x] No    If the office needs to give you a call back, can they leave a voicemail: [] Yes [x] No

## 2024-09-11 ENCOUNTER — OFFICE VISIT (OUTPATIENT)
Dept: ONCOLOGY | Facility: HOSPITAL | Age: 74
End: 2024-09-11
Payer: MEDICARE

## 2024-09-11 ENCOUNTER — HOSPITAL ENCOUNTER (OUTPATIENT)
Dept: ONCOLOGY | Facility: HOSPITAL | Age: 74
Discharge: HOME | End: 2024-09-11
Payer: MEDICARE

## 2024-09-11 VITALS
WEIGHT: 193 LBS | DIASTOLIC BLOOD PRESSURE: 59 MMHG | HEART RATE: 93 BPM | SYSTOLIC BLOOD PRESSURE: 146 MMHG | BODY MASS INDEX: 37.69 KG/M2 | TEMPERATURE: 98.2 F | OXYGEN SATURATION: 97 % | RESPIRATION RATE: 18 BRPM

## 2024-09-11 VITALS
OXYGEN SATURATION: 97 % | DIASTOLIC BLOOD PRESSURE: 59 MMHG | HEART RATE: 93 BPM | RESPIRATION RATE: 18 BRPM | BODY MASS INDEX: 37.85 KG/M2 | TEMPERATURE: 98.2 F | SYSTOLIC BLOOD PRESSURE: 146 MMHG | WEIGHT: 193.78 LBS

## 2024-09-11 DIAGNOSIS — Z12.2 ENCOUNTER FOR SCREENING FOR LUNG CANCER: ICD-10-CM

## 2024-09-11 DIAGNOSIS — C90.00 MULTIPLE MYELOMA, REMISSION STATUS UNSPECIFIED: Primary | ICD-10-CM

## 2024-09-11 LAB
ALBUMIN SERPL-MCNC: 4.1 G/DL (ref 3.5–5.2)
ALBUMIN/GLOB SERPL: 2 G/DL
ALP SERPL-CCNC: 83 U/L (ref 39–117)
ALT SERPL W P-5'-P-CCNC: 13 U/L (ref 1–33)
ANION GAP SERPL CALCULATED.3IONS-SCNC: 9.7 MMOL/L (ref 5–15)
AST SERPL-CCNC: 9 U/L (ref 1–32)
BASOPHILS # BLD AUTO: 0 10*3/MM3 (ref 0–0.2)
BASOPHILS NFR BLD AUTO: 0 % (ref 0–1.5)
BILIRUB SERPL-MCNC: 0.2 MG/DL (ref 0–1.2)
BUN SERPL-MCNC: 20 MG/DL (ref 8–23)
BUN/CREAT SERPL: 20 (ref 7–25)
CALCIUM SPEC-SCNC: 9.6 MG/DL (ref 8.6–10.5)
CHLORIDE SERPL-SCNC: 107 MMOL/L (ref 98–107)
CO2 SERPL-SCNC: 22.3 MMOL/L (ref 22–29)
CREAT SERPL-MCNC: 1 MG/DL (ref 0.57–1)
DEPRECATED RDW RBC AUTO: 59.1 FL (ref 37–54)
EGFRCR SERPLBLD CKD-EPI 2021: 59.2 ML/MIN/1.73
EOSINOPHIL # BLD AUTO: 0 10*3/MM3 (ref 0–0.4)
EOSINOPHIL NFR BLD AUTO: 0 % (ref 0.3–6.2)
ERYTHROCYTE [DISTWIDTH] IN BLOOD BY AUTOMATED COUNT: 15.7 % (ref 12.3–15.4)
GLOBULIN UR ELPH-MCNC: 2.1 GM/DL
GLUCOSE SERPL-MCNC: 219 MG/DL (ref 65–99)
HCT VFR BLD AUTO: 39.5 % (ref 34–46.6)
HGB BLD-MCNC: 13.1 G/DL (ref 12–15.9)
IMM GRANULOCYTES # BLD AUTO: 0.1 10*3/MM3 (ref 0–0.05)
IMM GRANULOCYTES NFR BLD AUTO: 0.5 % (ref 0–0.5)
LYMPHOCYTES # BLD AUTO: 4.43 10*3/MM3 (ref 0.7–3.1)
LYMPHOCYTES NFR BLD AUTO: 23.9 % (ref 19.6–45.3)
MCH RBC QN AUTO: 33.2 PG (ref 26.6–33)
MCHC RBC AUTO-ENTMCNC: 33.2 G/DL (ref 31.5–35.7)
MCV RBC AUTO: 100.3 FL (ref 79–97)
MONOCYTES # BLD AUTO: 0.71 10*3/MM3 (ref 0.1–0.9)
MONOCYTES NFR BLD AUTO: 3.8 % (ref 5–12)
NEUTROPHILS NFR BLD AUTO: 13.29 10*3/MM3 (ref 1.7–7)
NEUTROPHILS NFR BLD AUTO: 71.8 % (ref 42.7–76)
PLATELET # BLD AUTO: 291 10*3/MM3 (ref 140–450)
PMV BLD AUTO: 9.3 FL (ref 6–12)
POTASSIUM SERPL-SCNC: 4 MMOL/L (ref 3.5–5.2)
PROT SERPL-MCNC: 6.2 G/DL (ref 6–8.5)
RBC # BLD AUTO: 3.94 10*6/MM3 (ref 3.77–5.28)
SODIUM SERPL-SCNC: 139 MMOL/L (ref 136–145)
T-UPTAKE NFR SERPL: 1.08 TBI (ref 0.8–1.3)
T4 SERPL-MCNC: 8.53 MCG/DL (ref 4.5–11.7)
TSH SERPL DL<=0.05 MIU/L-ACNC: 0.68 UIU/ML (ref 0.27–4.2)
WBC NRBC COR # BLD AUTO: 18.53 10*3/MM3 (ref 3.4–10.8)

## 2024-09-11 PROCEDURE — 84436 ASSAY OF TOTAL THYROXINE: CPT | Performed by: INTERNAL MEDICINE

## 2024-09-11 PROCEDURE — 25010000002 DIPHENHYDRAMINE PER 50 MG: Performed by: INTERNAL MEDICINE

## 2024-09-11 PROCEDURE — 80053 COMPREHEN METABOLIC PANEL: CPT | Performed by: INTERNAL MEDICINE

## 2024-09-11 PROCEDURE — 25010000002 METHYLPREDNISOLONE PER 125 MG: Performed by: INTERNAL MEDICINE

## 2024-09-11 PROCEDURE — 1160F RVW MEDS BY RX/DR IN RCRD: CPT | Performed by: INTERNAL MEDICINE

## 2024-09-11 PROCEDURE — 96374 THER/PROPH/DIAG INJ IV PUSH: CPT

## 2024-09-11 PROCEDURE — 3078F DIAST BP <80 MM HG: CPT | Performed by: INTERNAL MEDICINE

## 2024-09-11 PROCEDURE — 96401 CHEMO ANTI-NEOPL SQ/IM: CPT

## 2024-09-11 PROCEDURE — 63710000001 ACETAMINOPHEN EXTRA STRENGTH 500 MG TABLET: Performed by: INTERNAL MEDICINE

## 2024-09-11 PROCEDURE — 85025 COMPLETE CBC W/AUTO DIFF WBC: CPT | Performed by: INTERNAL MEDICINE

## 2024-09-11 PROCEDURE — 25010000002 DARATUMUMAB-HYALURONIDASE-FIHJ 1800-30000 MG-UT/15ML SOLUTION: Performed by: INTERNAL MEDICINE

## 2024-09-11 PROCEDURE — 99214 OFFICE O/P EST MOD 30 MIN: CPT | Performed by: INTERNAL MEDICINE

## 2024-09-11 PROCEDURE — 1126F AMNT PAIN NOTED NONE PRSNT: CPT | Performed by: INTERNAL MEDICINE

## 2024-09-11 PROCEDURE — 3077F SYST BP >= 140 MM HG: CPT | Performed by: INTERNAL MEDICINE

## 2024-09-11 PROCEDURE — 25010000002 HEPARIN LOCK FLUSH PER 10 UNITS: Performed by: INTERNAL MEDICINE

## 2024-09-11 PROCEDURE — 84479 ASSAY OF THYROID (T3 OR T4): CPT | Performed by: INTERNAL MEDICINE

## 2024-09-11 PROCEDURE — 96375 TX/PRO/DX INJ NEW DRUG ADDON: CPT

## 2024-09-11 PROCEDURE — 1159F MED LIST DOCD IN RCRD: CPT | Performed by: INTERNAL MEDICINE

## 2024-09-11 PROCEDURE — G2211 COMPLEX E/M VISIT ADD ON: HCPCS | Performed by: INTERNAL MEDICINE

## 2024-09-11 PROCEDURE — A9270 NON-COVERED ITEM OR SERVICE: HCPCS | Performed by: INTERNAL MEDICINE

## 2024-09-11 PROCEDURE — 25810000003 SODIUM CHLORIDE 0.9 % SOLUTION: Performed by: INTERNAL MEDICINE

## 2024-09-11 PROCEDURE — 84443 ASSAY THYROID STIM HORMONE: CPT | Performed by: INTERNAL MEDICINE

## 2024-09-11 RX ORDER — SODIUM CHLORIDE 9 MG/ML
20 INJECTION, SOLUTION INTRAVENOUS ONCE
Status: CANCELLED | OUTPATIENT
Start: 2024-09-11

## 2024-09-11 RX ORDER — ACETAMINOPHEN 500 MG
1000 TABLET ORAL ONCE
Status: CANCELLED | OUTPATIENT
Start: 2024-09-11

## 2024-09-11 RX ORDER — DIPHENHYDRAMINE HYDROCHLORIDE 50 MG/ML
50 INJECTION INTRAMUSCULAR; INTRAVENOUS AS NEEDED
Status: CANCELLED | OUTPATIENT
Start: 2024-09-11

## 2024-09-11 RX ORDER — METHYLPREDNISOLONE SODIUM SUCCINATE 125 MG/2ML
60 INJECTION, POWDER, LYOPHILIZED, FOR SOLUTION INTRAMUSCULAR; INTRAVENOUS ONCE
Status: COMPLETED | OUTPATIENT
Start: 2024-09-11 | End: 2024-09-11

## 2024-09-11 RX ORDER — FAMOTIDINE 10 MG/ML
20 INJECTION, SOLUTION INTRAVENOUS AS NEEDED
Status: DISCONTINUED | OUTPATIENT
Start: 2024-09-11 | End: 2024-09-12 | Stop reason: HOSPADM

## 2024-09-11 RX ORDER — HEPARIN SODIUM (PORCINE) LOCK FLUSH IV SOLN 100 UNIT/ML 100 UNIT/ML
500 SOLUTION INTRAVENOUS AS NEEDED
Status: DISCONTINUED | OUTPATIENT
Start: 2024-09-11 | End: 2024-09-12 | Stop reason: HOSPADM

## 2024-09-11 RX ORDER — FAMOTIDINE 10 MG/ML
20 INJECTION, SOLUTION INTRAVENOUS AS NEEDED
Status: CANCELLED | OUTPATIENT
Start: 2024-09-11

## 2024-09-11 RX ORDER — SODIUM CHLORIDE 9 MG/ML
20 INJECTION, SOLUTION INTRAVENOUS ONCE
Status: COMPLETED | OUTPATIENT
Start: 2024-09-11 | End: 2024-09-11

## 2024-09-11 RX ORDER — HEPARIN SODIUM (PORCINE) LOCK FLUSH IV SOLN 100 UNIT/ML 100 UNIT/ML
500 SOLUTION INTRAVENOUS AS NEEDED
OUTPATIENT
Start: 2024-09-11

## 2024-09-11 RX ORDER — DIPHENHYDRAMINE HYDROCHLORIDE 50 MG/ML
50 INJECTION INTRAMUSCULAR; INTRAVENOUS AS NEEDED
Status: DISCONTINUED | OUTPATIENT
Start: 2024-09-11 | End: 2024-09-12 | Stop reason: HOSPADM

## 2024-09-11 RX ORDER — SODIUM CHLORIDE 0.9 % (FLUSH) 0.9 %
20 SYRINGE (ML) INJECTION AS NEEDED
OUTPATIENT
Start: 2024-09-11

## 2024-09-11 RX ORDER — SODIUM CHLORIDE 0.9 % (FLUSH) 0.9 %
20 SYRINGE (ML) INJECTION AS NEEDED
Status: DISCONTINUED | OUTPATIENT
Start: 2024-09-11 | End: 2024-09-12 | Stop reason: HOSPADM

## 2024-09-11 RX ORDER — ACETAMINOPHEN 500 MG
1000 TABLET ORAL ONCE
Status: COMPLETED | OUTPATIENT
Start: 2024-09-11 | End: 2024-09-11

## 2024-09-11 RX ORDER — METHYLPREDNISOLONE SODIUM SUCCINATE 125 MG/2ML
60 INJECTION, POWDER, LYOPHILIZED, FOR SOLUTION INTRAMUSCULAR; INTRAVENOUS ONCE
Status: CANCELLED | OUTPATIENT
Start: 2024-09-11

## 2024-09-11 RX ADMIN — METHYLPREDNISOLONE SODIUM SUCCINATE 60 MG: 125 INJECTION INTRAMUSCULAR; INTRAVENOUS at 12:08

## 2024-09-11 RX ADMIN — Medication 20 ML: at 13:22

## 2024-09-11 RX ADMIN — HEPARIN 500 UNITS: 100 SYRINGE at 13:22

## 2024-09-11 RX ADMIN — DARATUMUMAB AND HYALURONIDASE-FIHJ (HUMAN RECOMBINANT) 1800 MG: 1800; 30000 INJECTION SUBCUTANEOUS at 13:15

## 2024-09-11 RX ADMIN — ACETAMINOPHEN 1000 MG: 500 TABLET ORAL at 12:08

## 2024-09-11 RX ADMIN — DIPHENHYDRAMINE HYDROCHLORIDE 25 MG: 50 INJECTION, SOLUTION INTRAMUSCULAR; INTRAVENOUS at 12:11

## 2024-09-11 RX ADMIN — SODIUM CHLORIDE 20 ML/HR: 9 INJECTION, SOLUTION INTRAVENOUS at 12:11

## 2024-09-12 PROBLEM — Z79.899 ENCOUNTER FOR LONG-TERM (CURRENT) USE OF MEDICATIONS: Status: ACTIVE | Noted: 2024-09-12

## 2024-09-12 NOTE — ASSESSMENT & PLAN NOTE
Patient is on active therapy with daratumumab, lenalidomide, dexamethasone.  Her treatment has been on hold briefly for recent cholecystectomy.  She is now recuperated from the surgery.  I will resume therapy.  Proceed with cycle 11 as planned.  I will see her back for cycle 12-day 1 with lab work prior to monitor for toxicities.

## 2024-09-12 NOTE — PROGRESS NOTES
Chief Complaint  Multiple myeloma, remission status unspecified    Rena Guerra,*  Rena Guerra, PA    Subjective          Anca Samson presents to [facility] for ongoing treatment of multiple myeloma.  She is on daratumumab, lenalidomide, dexamethasone.  Her treatment has been on hold due to recent cholecystectomy.  She states she is well-healed from the surgery.  She is anxious to resume treatment.    Oncology/Hematology History Overview Note   Smoldering Myeloma    Initially diagnosed in 2016 with bone marrow biopsy demonstrating 30% CD56 positive monoclonal plasma cell population.  45 XX normal female chromosome pattern  FISH panel negative for myeloma associated mutations including 1q21, 9q34,11q13,14q32,15q24, 17q13  No anemia, renal insufficiency, hypercalcemia.  On observation since that time    Low grade adenocarcinoma of ascending colon      5/16/2017 right hemicolectomy which  revealed a low-grade 7.6 cm adenocarcinoma of the cecum. All margins were  negative. Lymphovascular invasion and perineural invasion were not identified.     26 lymph nodes were removed and unfortunately 1 was involved with metastatic deposit. pT3 pN1a colorectal cancer.        Clinical Staging      Smoldering Myeloma; Colon ([id number])            Treatments      Chemotherapy      11/2017 completed 6mo adjuvant Xeloda        6/2022 Stopped Smoking     Malignant neoplasm of ascending colon   7/21/2017 Initial Diagnosis    Colon cancer (CMS/HCC)     Multiple myeloma   7/28/2021 Initial Diagnosis    Multiple myeloma     11/7/2023 -  Chemotherapy    OP MULTIPLE MYELOMA Daratumumab / Lenalidomide / Dexamethasone         Review of Systems  Current Outpatient Medications on File Prior to Visit   Medication Sig Dispense Refill    acyclovir (ZOVIRAX) 400 MG tablet Take 1 tablet by mouth 2 (Two) Times a Day. Take one tablet by mouth twice daily. 60 tablet 11    albuterol sulfate   (90 Base) MCG/ACT inhaler Inhale 2 puffs Every 4 (Four) Hours As Needed for Wheezing. 18 g 11    arformoterol (BROVANA) 15 MCG/2ML nebulizer solution USE 1 VIAL  IN  NEBULIZER TWICE  DAILY - MORNING & EVENING 360 mL 11    aspirin 81 MG EC tablet Take 1 tablet by mouth Daily.      budesonide (PULMICORT) 0.5 MG/2ML nebulizer solution USE 1 VIAL  IN  NEBULIZER TWICE  DAILY - RINSE MOUTH OUT AFTER TREATMENT 60 each 11    busPIRone (BUSPAR) 15 MG tablet Take 1 tablet by mouth 2 (Two) Times a Day. 180 tablet 1    colestipol (COLESTID) 1 g tablet Take 2 tablets by mouth 2 (Two) Times a Day. 120 tablet 5    dapsone 25 MG tablet TAKE 2 TABLETS BY MOUTH TWICE A  tablet 1    dexAMETHasone (DECADRON) 4 MG tablet Take 10 tablets on D 1,8,15,22 and take 1 tablet on D 2,3.  Take in the morning with food. 42 tablet 5    FLUoxetine (PROzac) 40 MG capsule Take 1 capsule by mouth Daily. 90 capsule 1    HYDROcodone-acetaminophen (NORCO) 5-325 MG per tablet Take 1 tablet by mouth Every 6 (Six) Hours As Needed for Moderate Pain (Pain). 10 tablet 0    lenalidomide (REVLIMID) 15 MG capsule Take 1 capsule by mouth Daily. On Days 1-21, and off 7 days, on a 28 day cycle 21 capsule 0    levalbuterol (XOPENEX) 0.63 MG/3ML nebulizer solution Take 1 ampule by nebulization 4 (Four) Times a Day As Needed for Wheezing. 120 mL 5    ondansetron (ZOFRAN) 8 MG tablet Take 1 tablet by mouth 3 (Three) Times a Day As Needed for Nausea or Vomiting. 30 tablet 5    promethazine (PHENERGAN) 25 MG tablet Take 1 tablet by mouth Every 6 (Six) Hours As Needed for Nausea or Vomiting. 30 tablet 1    vitamin D (ERGOCALCIFEROL) 1.25 MG (79902 UT) capsule capsule Take 1 capsule by mouth 2 (Two) Times a Week. 26 capsule 1    Yupelri 175 MCG/3ML nebulizer solution USE 1 VIAL IN NEBULIZER DAILY 90 mL 11     Current Facility-Administered Medications on File Prior to Visit   Medication Dose Route Frequency Provider Last Rate Last Admin    [DISCONTINUED]  diphenhydrAMINE (BENADRYL) injection 50 mg  50 mg Intravenous PRN West Nicolas MD        [DISCONTINUED] famotidine (PEPCID) injection 20 mg  20 mg Intravenous PRN West Nicolas MD        [DISCONTINUED] heparin injection 500 Units  500 Units Intravenous PRN West Nicolas MD   500 Units at 09/11/24 1322    [DISCONTINUED] Hydrocortisone Sod Suc (PF) (Solu-CORTEF) injection 100 mg  100 mg Intravenous PRN West Nicolas MD        [DISCONTINUED] sodium chloride 0.9 % flush 20 mL  20 mL Intravenous PRN West Nicolas MD   20 mL at 09/11/24 1322       Allergies   Allergen Reactions    Morphine Anaphylaxis    Cephalexin Hives    Penicillins Hives    Sulfa Antibiotics Hives     Past Medical History:   Diagnosis Date    Anxiety     Asthma 07/28/2021    Atherosclerosis of native coronary artery of native heart with angina pectoris 08/15/2023    FOLLOWED BY DR GONZALES/DINA PETTIT. DENIES CP BUT GETS SOA WITH EXERTION HAS COPD WEARS AT 2LPM N/C. DECREASED ACTIVITY D/T SOA.    C. difficile diarrhea     DENIES ANY CURRENT ISSUES    Cholelithiasis 7-    Colon cancer 07/21/2017    Colon polyp     COPD (chronic obstructive pulmonary disease) 10/23/2017    Depression     Disease of thyroid gland     Diverticulitis     Diverticulitis of colon     Dysphagia     GERD (gastroesophageal reflux disease)     Head injury     CONCUSSION AT AGE 4    Hernia 2019    History of chemotherapy     2017    Hx of psychiatric care     Impaired fasting glucose 08/14/2015    Lumbago     Lung nodule 02/17/2022    Major depressive disorder 10/27/2016    Malignant neoplasm of ascending colon 07/21/2017    Melena     Multiple myeloma     Nicotine dependence 05/10/2017    NICK (obstructive sleep apnea) 11/06/2023    Oxygen dependent     REPORTS USES 02 AT 2LPM VIA N/C DURING DAY AND 3LPM AT NIGHT . CAN GET VERY SOA WITH MINIMAL EXERTION    Pancreatitis 7/26/2024    Panic disorder     Renal insufficiency 07/28/2021    Seizures     PSEUDO  SEIZURES LAST ONE AROUND JULY 2024    Shortness of breath     CAN GET VERY SOA WITH MINIMAL EXERTION    Tobacco use 07/28/2021    QUIT SMOKING JUNE 2022    Visit for screening mammogram 02/20/2020    NORMAL- REPEAT IN ONE YEAR    Vitamin D deficiency      Past Surgical History:   Procedure Laterality Date    APPENDECTOMY      BONE MARROW BIOPSY  2016    CHOLECYSTECTOMY N/A 08/08/2024    Procedure: CHOLECYSTECTOMY LAPAROSCOPIC WITH DAVINCI ROBOT;  Surgeon: Eduardo Ingram MD;  [place] [facility] [place] [facility] [place];  Service: Robotics - DaVinci;  Laterality: N/A;    COLON SURGERY  2017    COLONOSCOPY  2018,2017,2019    [facility] DR LE:DIVERTICULOSIS AND ERYTHEMA OF MUCOSA    COLONOSCOPY N/A 12/20/2022    Procedure: COLONOSCOPY WITH ELEVIEW INJECTION, POLYPECTOMY, HOT SNARE, CLIP APPLICATION X3, BIOPSIES;  Surgeon: Jarvis Borges MD;  Location: [facility] ENDOSCOPY;  Service: Gastroenterology;  Laterality: N/A;  COLON POLYP, DIVERTICULOSIS, ANASTOMOSIS RIGHT COLON    COLONOSCOPY N/A 11/09/2023    Procedure: COLONOSCOPY;  Surgeon: Jarvis Borges MD;  Location: [facility] ENDOSCOPY;  Service: Gastroenterology;  Laterality: N/A;  DIVERTICULOSIS, ANASTOMOSIS IN ASCENDING COLON    ENDOSCOPY  2018    ERCP N/A 07/27/2024    Procedure: ENDOSCOPIC RETROGRADE CHOLANGIOPANCREATOGRAPHY WITH SPHINCTEROTOMY, BALLOON SWEEP, STENT PLACEMENT;  Surgeon: Ajay Kennedy MD;  Location: [facility] MAIN OR;  Service: Gastroenterology;  Laterality: N/A;  BILE DUCT STONE    ERCP N/A 9/5/2024    Procedure: ENDOSCOPIC RETROGRADE CHOLANGIOPANCREATOGRAPHY WITH SPINCHTEROTOMY. BALLOON DILATATION 8-10. BALLOON SWEEP 12-15. STENT REMOVAL;  Surgeon: Ajay Kennedy MD;  Location: [facility] ENDOSCOPY;  Service: Gastroenterology;  Laterality: N/A;  BILE DUCT STONES    EYE SURGERY      FECAL DISIMPACTION  06/2019    TRANSPLANT     GALLBLADDER SURGERY      HEMICOLECTOMY Right 05/2017    HYSTERECTOMY  1982,1984    KNEE ARTHROSCOPY Left  2022    Procedure: KNEE ARTHROSCOPY WITH PARTIAL MEDIAL MENISCECTOMY,  CHONDROPLASTY;  Surgeon: Nicholas Tipton MD;  [place] [facility] [place] [facility] [place]  Service: Orthopedics;  Laterality: Left;    MINI-LAPAROTOMY  2017    PORTACATH PLACEMENT N/A     pt states that her port does not work    TONSILLECTOMY      UPPER GASTROINTESTINAL ENDOSCOPY  2018    VENOUS ACCESS DEVICE (PORT) INSERTION N/A 10/31/2023    Procedure: Port-a-catheter removal and port-a-catheter placement;  Surgeon: Alcon Delgado MD;  [place] [facility] [place] [facility] [place];  Service: General;  Laterality: N/A;     Social History     Socioeconomic History    Marital status:    Tobacco Use    Smoking status: Former     Current packs/day: 0.00     Average packs/day: 1 pack/day for 47.4 years (47.4 ttl pk-yrs)     Types: Cigarettes     Start date: 1975     Quit date: 6/3/2022     Years since quittin.2     Passive exposure: Past    Smokeless tobacco: Never   Vaping Use    Vaping status: Never Used   Substance and Sexual Activity    Alcohol use: Never    Drug use: Never    Sexual activity: Not Currently     Partners: Male     Birth control/protection: Hysterectomy     Family History   Problem Relation Age of Onset    Heart disease Mother     Lung cancer Mother     Cancer Mother     Stroke Father     Heart disease Father     Kidney cancer Father     Cancer Father     Stroke Other         UNCLE/AUNT    Colon cancer Neg Hx     Malig Hyperthermia Neg Hx        Objective   Physical Exam  Vitals reviewed. Exam conducted with a chaperone present.   Cardiovascular:      Rate and Rhythm: Normal rate and regular rhythm.      Heart sounds: Normal heart sounds. No murmur heard.     No gallop.   Pulmonary:      Effort: Pulmonary effort is normal.      Breath sounds: Normal breath sounds.      Comments: Port-A-Cath  Abdominal:      General: Bowel sounds are normal.   Lymphadenopathy:      Cervical: No cervical adenopathy.   Psychiatric:         Mood and Affect: Mood  "normal.         Behavior: Behavior normal.         Vitals:    09/11/24 1027   BP: 146/59   Pulse: 93   Resp: 18   Temp: 98.2 °F (36.8 °C)   TempSrc: Temporal   SpO2: 97%   Weight: 87.5 kg (193 lb)   PainSc: 0-No pain     ECOG score: 1         PHQ-9 Total Score:                    Result Review :   The following data was reviewed by: West Nicolas MD on 09/11/2024:  Lab Results   Component Value Date    HGB 13.1 09/11/2024    HCT 39.5 09/11/2024    .3 (H) 09/11/2024     09/11/2024    WBC 18.53 (H) 09/11/2024    NEUTROABS 13.29 (H) 09/11/2024    LYMPHSABS 4.43 (H) 09/11/2024    MONOSABS 0.71 09/11/2024    EOSABS 0.00 09/11/2024    BASOSABS 0.00 09/11/2024     Lab Results   Component Value Date    GLUCOSE 219 (H) 09/11/2024    BUN 20 09/11/2024    CREATININE 1.00 09/11/2024     09/11/2024    K 4.0 09/11/2024     09/11/2024    CO2 22.3 09/11/2024    CALCIUM 9.6 09/11/2024    PROTEINTOT 6.2 09/11/2024    ALBUMIN 4.1 09/11/2024    BILITOT 0.2 09/11/2024    ALKPHOS 83 09/11/2024    AST 9 09/11/2024    ALT 13 09/11/2024     Lab Results   Component Value Date    MG 1.8 07/29/2024    PHOS 2.5 07/27/2024    FREET4 1.55 12/06/2023    TSH 0.684 09/11/2024     No results found for: \"IRON\", \"LABIRON\", \"TRANSFERRIN\", \"TIBC\"  Lab Results   Component Value Date     03/07/2023    QTNOSGGR62 612 04/15/2024    FOLATE 8.95 03/27/2024     No results found for: \"PSA\", \"CEA\", \"AFP\", \"\", \"\"          Assessment and Plan    Diagnoses and all orders for this visit:    1. Multiple myeloma, remission status unspecified (Primary)  Assessment & Plan:  Patient is on active therapy with daratumumab, lenalidomide, dexamethasone.  Her treatment has been on hold briefly for recent cholecystectomy.  She is now recuperated from the surgery.  I will resume therapy.  Proceed with cycle 11 as planned.  I will see her back for cycle 12-day 1 with lab work prior to monitor for toxicities.    Orders:  -     CBC and " Differential; Future  -     Comprehensive metabolic panel; Future  -     Thyroid Panel With TSH; Future    Other orders  -     Cancel: sodium chloride 0.9 % infusion  -     Cancel: acetaminophen (TYLENOL) tablet 1,000 mg  -     Cancel: methylPREDNISolone sodium succinate (SOLU-Medrol) injection 60 mg  -     Cancel: diphenhydrAMINE (BENADRYL) IVPB 25 mg  -     Cancel: daratumumab-hyaluronidase-fihj (DARZALEX FASPRO) 1800-33096 MG-UT/15ML injection 1,800 mg  -     Cancel: Hydrocortisone Sod Suc (PF) (Solu-CORTEF) injection 100 mg  -     Cancel: diphenhydrAMINE (BENADRYL) injection 50 mg  -     Cancel: famotidine (PEPCID) injection 20 mg            Patient Follow Up: Cycle 12-day 1    Patient was given instructions and counseling regarding her condition or for health maintenance advice. Please see specific information pulled into the AVS if appropriate.     West Nicolas MD    9/12/2024

## 2024-09-13 ENCOUNTER — TELEPHONE (OUTPATIENT)
Dept: GASTROENTEROLOGY | Facility: CLINIC | Age: 74
End: 2024-09-13
Payer: MEDICARE

## 2024-09-13 ENCOUNTER — OFFICE VISIT (OUTPATIENT)
Dept: SURGERY | Facility: CLINIC | Age: 74
End: 2024-09-13
Payer: MEDICARE

## 2024-09-13 VITALS — RESPIRATION RATE: 20 BRPM | BODY MASS INDEX: 37.69 KG/M2 | WEIGHT: 192 LBS | HEIGHT: 60 IN

## 2024-09-13 DIAGNOSIS — K43.2 INCISIONAL HERNIA, WITHOUT OBSTRUCTION OR GANGRENE: Primary | ICD-10-CM

## 2024-09-13 DIAGNOSIS — R19.7 DIARRHEA, UNSPECIFIED TYPE: ICD-10-CM

## 2024-09-13 RX ORDER — ONDANSETRON 2 MG/ML
4 INJECTION INTRAMUSCULAR; INTRAVENOUS EVERY 6 HOURS PRN
OUTPATIENT
Start: 2024-09-13

## 2024-09-13 RX ORDER — SODIUM CHLORIDE 0.9 % (FLUSH) 0.9 %
10 SYRINGE (ML) INJECTION EVERY 12 HOURS SCHEDULED
OUTPATIENT
Start: 2024-09-13

## 2024-09-13 RX ORDER — SODIUM CHLORIDE 9 MG/ML
40 INJECTION, SOLUTION INTRAVENOUS AS NEEDED
OUTPATIENT
Start: 2024-09-13

## 2024-09-13 RX ORDER — SODIUM CHLORIDE, SODIUM LACTATE, POTASSIUM CHLORIDE, CALCIUM CHLORIDE 600; 310; 30; 20 MG/100ML; MG/100ML; MG/100ML; MG/100ML
70 INJECTION, SOLUTION INTRAVENOUS CONTINUOUS
OUTPATIENT
Start: 2024-09-13

## 2024-09-13 RX ORDER — MONTELUKAST SODIUM 4 MG/1
2 TABLET, CHEWABLE ORAL 2 TIMES DAILY
Qty: 360 TABLET | Refills: 1 | Status: SHIPPED | OUTPATIENT
Start: 2024-09-13

## 2024-09-13 RX ORDER — SODIUM CHLORIDE 0.9 % (FLUSH) 0.9 %
10 SYRINGE (ML) INJECTION AS NEEDED
OUTPATIENT
Start: 2024-09-13

## 2024-09-13 NOTE — PROGRESS NOTES
Inpatient History and Physical Surgical Orders    Preadmission Location:   Preadmission Time:  Facility:  Surgery Date:  Surgery Time:  Preadmission Test date:     Chief Complaint  Outpatient History and Physical / Surgical Orders    Primary Care Provider: Rena Guerra PA    Referring Provider: No ref. provider found    Subjective      Patient Name: Anca Samson : 1950    HPI  The patient is a 73-year-old female with a known upper abdominal incisional hernia.  We recently performed a cholecystectomy for her for gallstone pancreatitis and noted that hernia at the time.  She came back today and would like to go ahead and get scheduled to repair this when possible.    Past History:  Medical History: has a past medical history of Anxiety, Asthma (2021), Atherosclerosis of native coronary artery of native heart with angina pectoris (08/15/2023), C. difficile diarrhea, Cholelithiasis (2024), Colon cancer (2017), Colon polyp, COPD (chronic obstructive pulmonary disease) (10/23/2017), Depression, Disease of thyroid gland, Diverticulitis, Diverticulitis of colon, Dysphagia, GERD (gastroesophageal reflux disease), Head injury, Hernia (), History of chemotherapy, psychiatric care, Impaired fasting glucose (2015), Lumbago, Lung nodule (2022), Major depressive disorder (10/27/2016), Malignant neoplasm of ascending colon (2017), Melena, Multiple myeloma, Nicotine dependence (05/10/2017), NICK (obstructive sleep apnea) (2023), Oxygen dependent, Pancreatitis (2024), Panic disorder, Renal insufficiency (2021), Seizures, Shortness of breath, Tobacco use (2021), Visit for screening mammogram (2020), and Vitamin D deficiency.   Surgical History: has a past surgical history that includes Bone marrow biopsy (); Colonoscopy (,,); Esophagogastroduodenoscopy (); Fecal disimpaction (2019); Hysterectomy (,); Mini-laparotomy  (2017); Portacath placement (N/A); Colon surgery (2017); Hemicolectomy (Right, 05/2017); Upper gastrointestinal endoscopy (2018); Knee Arthroscopy (Left, 01/03/2022); Appendectomy; Colonoscopy (N/A, 12/20/2022); Venous Access Device (Port) (N/A, 10/31/2023); Colonoscopy (N/A, 11/09/2023); Eye surgery; Tonsillectomy; ERCP (N/A, 07/27/2024); Cholecystectomy (N/A, 08/08/2024); Gallbladder surgery; and ERCP (N/A, 9/5/2024).   Family History: family history includes Cancer in her father and mother; Heart disease in her father and mother; Kidney cancer in her father; Lung cancer in her mother; Stroke in her father and another family member.   Social History: reports that she quit smoking about 2 years ago. Her smoking use included cigarettes. She started smoking about 49 years ago. She has a 47.4 pack-year smoking history. She has been exposed to tobacco smoke. She has never used smokeless tobacco. She reports that she does not drink alcohol and does not use drugs.  Allergies: Morphine, Cephalexin, Penicillins, and Sulfa antibiotics       Current Outpatient Medications:     acyclovir (ZOVIRAX) 400 MG tablet, Take 1 tablet by mouth 2 (Two) Times a Day. Take one tablet by mouth twice daily., Disp: 60 tablet, Rfl: 11    albuterol sulfate  (90 Base) MCG/ACT inhaler, Inhale 2 puffs Every 4 (Four) Hours As Needed for Wheezing., Disp: 18 g, Rfl: 11    arformoterol (BROVANA) 15 MCG/2ML nebulizer solution, USE 1 VIAL  IN  NEBULIZER TWICE  DAILY - MORNING & EVENING, Disp: 360 mL, Rfl: 11    aspirin 81 MG EC tablet, Take 1 tablet by mouth Daily., Disp: , Rfl:     budesonide (PULMICORT) 0.5 MG/2ML nebulizer solution, USE 1 VIAL  IN  NEBULIZER TWICE  DAILY - RINSE MOUTH OUT AFTER TREATMENT, Disp: 60 each, Rfl: 11    busPIRone (BUSPAR) 15 MG tablet, Take 1 tablet by mouth 2 (Two) Times a Day., Disp: 180 tablet, Rfl: 1    colestipol (COLESTID) 1 g tablet, Take 2 tablets by mouth 2 (Two) Times a Day., Disp: 120 tablet, Rfl: 5     "dapsone 25 MG tablet, TAKE 2 TABLETS BY MOUTH TWICE A DAY, Disp: 360 tablet, Rfl: 1    dexAMETHasone (DECADRON) 4 MG tablet, Take 10 tablets on D 1,8,15,22 and take 1 tablet on D 2,3.  Take in the morning with food., Disp: 42 tablet, Rfl: 5    FLUoxetine (PROzac) 40 MG capsule, Take 1 capsule by mouth Daily., Disp: 90 capsule, Rfl: 1    HYDROcodone-acetaminophen (NORCO) 5-325 MG per tablet, Take 1 tablet by mouth Every 6 (Six) Hours As Needed for Moderate Pain (Pain)., Disp: 10 tablet, Rfl: 0    lenalidomide (REVLIMID) 15 MG capsule, Take 1 capsule by mouth Daily. On Days 1-21, and off 7 days, on a 28 day cycle, Disp: 21 capsule, Rfl: 0    levalbuterol (XOPENEX) 0.63 MG/3ML nebulizer solution, Take 1 ampule by nebulization 4 (Four) Times a Day As Needed for Wheezing., Disp: 120 mL, Rfl: 5    ondansetron (ZOFRAN) 8 MG tablet, Take 1 tablet by mouth 3 (Three) Times a Day As Needed for Nausea or Vomiting., Disp: 30 tablet, Rfl: 5    promethazine (PHENERGAN) 25 MG tablet, Take 1 tablet by mouth Every 6 (Six) Hours As Needed for Nausea or Vomiting., Disp: 30 tablet, Rfl: 1    vitamin D (ERGOCALCIFEROL) 1.25 MG (41873 UT) capsule capsule, Take 1 capsule by mouth 2 (Two) Times a Week., Disp: 26 capsule, Rfl: 1    Yupelri 175 MCG/3ML nebulizer solution, USE 1 VIAL IN NEBULIZER DAILY, Disp: 90 mL, Rfl: 11       Objective   Vital Signs:   Resp 20   Ht 152.4 cm (60\")   Wt 87.1 kg (192 lb)   BMI 37.50 kg/m²       Physical Exam  Vitals and nursing note reviewed.   Constitutional:       Appearance: Normal appearance. The patient is well-developed.   Cardiovascular:      Rate and Rhythm: Normal rate and regular rhythm.   Pulmonary:      Effort: Pulmonary effort is normal.      Breath sounds: Normal air entry.   Abdominal:      General: Bowel sounds are normal.      Palpations: Abdomen is soft.  Upper abdominal incisional hernia noted.     Skin:     General: Skin is warm and dry.   Neurological:      Mental Status: The patient " is alert and oriented to person, place, and time.      Motor: Motor function is intact.   Psychiatric:         Mood and Affect: Mood normal.       Result Review :               Assessment and Plan   Diagnoses and all orders for this visit:    1. Incisional hernia, without obstruction or gangrene (Primary)  -     Case Request; Standing  -     Follow Anesthesia Guidelines / Protocol; Standing  -     Verify NPO Status; Standing  -     Obtain Informed Consent; Standing  -     Verify / Perform Chlorhexidine Skin Prep; Standing  -     Insert Peripheral IV; Standing  -     Saline Lock & Maintain IV Access; Standing  -     sodium chloride 0.9 % flush 10 mL  -     sodium chloride 0.9 % flush 10 mL  -     sodium chloride 0.9 % infusion 40 mL  -     lactated ringers infusion  -     Place Sequential Compression Device; Standing  -     Maintain Sequential Compression Device; Standing  -     ondansetron (ZOFRAN) injection 4 mg  -     clindamycin (CLEOCIN) 900 mg in dextrose (D5W) 5 % 100 mL IVPB  -     Case Request    We will schedule her for a robotic incisional hernia repair.  I have described the procedure to her as well as the risk and benefits and she is agreeable to proceeding.    I  Eduardo Ingram MD  09/13/2024

## 2024-09-13 NOTE — H&P (VIEW-ONLY)
Inpatient History and Physical Surgical Orders    Preadmission Location:   Preadmission Time:  Facility:  Surgery Date:  Surgery Time:  Preadmission Test date:     Chief Complaint  Outpatient History and Physical / Surgical Orders    Primary Care Provider: Rena Guerra PA    Referring Provider: No ref. provider found    Subjective      Patient Name: Anca Samson : 1950    HPI  The patient is a 73-year-old female with a known upper abdominal incisional hernia.  We recently performed a cholecystectomy for her for gallstone pancreatitis and noted that hernia at the time.  She came back today and would like to go ahead and get scheduled to repair this when possible.    Past History:  Medical History: has a past medical history of Anxiety, Asthma (2021), Atherosclerosis of native coronary artery of native heart with angina pectoris (08/15/2023), C. difficile diarrhea, Cholelithiasis (2024), Colon cancer (2017), Colon polyp, COPD (chronic obstructive pulmonary disease) (10/23/2017), Depression, Disease of thyroid gland, Diverticulitis, Diverticulitis of colon, Dysphagia, GERD (gastroesophageal reflux disease), Head injury, Hernia (), History of chemotherapy, psychiatric care, Impaired fasting glucose (2015), Lumbago, Lung nodule (2022), Major depressive disorder (10/27/2016), Malignant neoplasm of ascending colon (2017), Melena, Multiple myeloma, Nicotine dependence (05/10/2017), NICK (obstructive sleep apnea) (2023), Oxygen dependent, Pancreatitis (2024), Panic disorder, Renal insufficiency (2021), Seizures, Shortness of breath, Tobacco use (2021), Visit for screening mammogram (2020), and Vitamin D deficiency.   Surgical History: has a past surgical history that includes Bone marrow biopsy (); Colonoscopy (,,); Esophagogastroduodenoscopy (); Fecal disimpaction (2019); Hysterectomy (,); Mini-laparotomy  (2017); Portacath placement (N/A); Colon surgery (2017); Hemicolectomy (Right, 05/2017); Upper gastrointestinal endoscopy (2018); Knee Arthroscopy (Left, 01/03/2022); Appendectomy; Colonoscopy (N/A, 12/20/2022); Venous Access Device (Port) (N/A, 10/31/2023); Colonoscopy (N/A, 11/09/2023); Eye surgery; Tonsillectomy; ERCP (N/A, 07/27/2024); Cholecystectomy (N/A, 08/08/2024); Gallbladder surgery; and ERCP (N/A, 9/5/2024).   Family History: family history includes Cancer in her father and mother; Heart disease in her father and mother; Kidney cancer in her father; Lung cancer in her mother; Stroke in her father and another family member.   Social History: reports that she quit smoking about 2 years ago. Her smoking use included cigarettes. She started smoking about 49 years ago. She has a 47.4 pack-year smoking history. She has been exposed to tobacco smoke. She has never used smokeless tobacco. She reports that she does not drink alcohol and does not use drugs.  Allergies: Morphine, Cephalexin, Penicillins, and Sulfa antibiotics       Current Outpatient Medications:     acyclovir (ZOVIRAX) 400 MG tablet, Take 1 tablet by mouth 2 (Two) Times a Day. Take one tablet by mouth twice daily., Disp: 60 tablet, Rfl: 11    albuterol sulfate  (90 Base) MCG/ACT inhaler, Inhale 2 puffs Every 4 (Four) Hours As Needed for Wheezing., Disp: 18 g, Rfl: 11    arformoterol (BROVANA) 15 MCG/2ML nebulizer solution, USE 1 VIAL  IN  NEBULIZER TWICE  DAILY - MORNING & EVENING, Disp: 360 mL, Rfl: 11    aspirin 81 MG EC tablet, Take 1 tablet by mouth Daily., Disp: , Rfl:     budesonide (PULMICORT) 0.5 MG/2ML nebulizer solution, USE 1 VIAL  IN  NEBULIZER TWICE  DAILY - RINSE MOUTH OUT AFTER TREATMENT, Disp: 60 each, Rfl: 11    busPIRone (BUSPAR) 15 MG tablet, Take 1 tablet by mouth 2 (Two) Times a Day., Disp: 180 tablet, Rfl: 1    colestipol (COLESTID) 1 g tablet, Take 2 tablets by mouth 2 (Two) Times a Day., Disp: 120 tablet, Rfl: 5     "dapsone 25 MG tablet, TAKE 2 TABLETS BY MOUTH TWICE A DAY, Disp: 360 tablet, Rfl: 1    dexAMETHasone (DECADRON) 4 MG tablet, Take 10 tablets on D 1,8,15,22 and take 1 tablet on D 2,3.  Take in the morning with food., Disp: 42 tablet, Rfl: 5    FLUoxetine (PROzac) 40 MG capsule, Take 1 capsule by mouth Daily., Disp: 90 capsule, Rfl: 1    HYDROcodone-acetaminophen (NORCO) 5-325 MG per tablet, Take 1 tablet by mouth Every 6 (Six) Hours As Needed for Moderate Pain (Pain)., Disp: 10 tablet, Rfl: 0    lenalidomide (REVLIMID) 15 MG capsule, Take 1 capsule by mouth Daily. On Days 1-21, and off 7 days, on a 28 day cycle, Disp: 21 capsule, Rfl: 0    levalbuterol (XOPENEX) 0.63 MG/3ML nebulizer solution, Take 1 ampule by nebulization 4 (Four) Times a Day As Needed for Wheezing., Disp: 120 mL, Rfl: 5    ondansetron (ZOFRAN) 8 MG tablet, Take 1 tablet by mouth 3 (Three) Times a Day As Needed for Nausea or Vomiting., Disp: 30 tablet, Rfl: 5    promethazine (PHENERGAN) 25 MG tablet, Take 1 tablet by mouth Every 6 (Six) Hours As Needed for Nausea or Vomiting., Disp: 30 tablet, Rfl: 1    vitamin D (ERGOCALCIFEROL) 1.25 MG (95837 UT) capsule capsule, Take 1 capsule by mouth 2 (Two) Times a Week., Disp: 26 capsule, Rfl: 1    Yupelri 175 MCG/3ML nebulizer solution, USE 1 VIAL IN NEBULIZER DAILY, Disp: 90 mL, Rfl: 11       Objective   Vital Signs:   Resp 20   Ht 152.4 cm (60\")   Wt 87.1 kg (192 lb)   BMI 37.50 kg/m²       Physical Exam  Vitals and nursing note reviewed.   Constitutional:       Appearance: Normal appearance. The patient is well-developed.   Cardiovascular:      Rate and Rhythm: Normal rate and regular rhythm.   Pulmonary:      Effort: Pulmonary effort is normal.      Breath sounds: Normal air entry.   Abdominal:      General: Bowel sounds are normal.      Palpations: Abdomen is soft.  Upper abdominal incisional hernia noted.     Skin:     General: Skin is warm and dry.   Neurological:      Mental Status: The patient " is alert and oriented to person, place, and time.      Motor: Motor function is intact.   Psychiatric:         Mood and Affect: Mood normal.       Result Review :               Assessment and Plan   Diagnoses and all orders for this visit:    1. Incisional hernia, without obstruction or gangrene (Primary)  -     Case Request; Standing  -     Follow Anesthesia Guidelines / Protocol; Standing  -     Verify NPO Status; Standing  -     Obtain Informed Consent; Standing  -     Verify / Perform Chlorhexidine Skin Prep; Standing  -     Insert Peripheral IV; Standing  -     Saline Lock & Maintain IV Access; Standing  -     sodium chloride 0.9 % flush 10 mL  -     sodium chloride 0.9 % flush 10 mL  -     sodium chloride 0.9 % infusion 40 mL  -     lactated ringers infusion  -     Place Sequential Compression Device; Standing  -     Maintain Sequential Compression Device; Standing  -     ondansetron (ZOFRAN) injection 4 mg  -     clindamycin (CLEOCIN) 900 mg in dextrose (D5W) 5 % 100 mL IVPB  -     Case Request    We will schedule her for a robotic incisional hernia repair.  I have described the procedure to her as well as the risk and benefits and she is agreeable to proceeding.    I  Eduardo Ingram MD  09/13/2024

## 2024-09-13 NOTE — LETTER
2024     [facility]  [address]  [address]  [place] [address]    Patient: Anca Samson   YOB: 1950   Date of Visit: 2024     Dear [facility]:       Thank you for referring Anca Samson to me for evaluation. Below are the relevant portions of my assessment and plan of care.    If you have questions, please do not hesitate to call me. I look forward to following Anca along with you.         Sincerely,        Eduarod Ingram MD        CC: No Recipients    Eduardo Ingram MD  [id number] [phone]  Sign when Signing Visit  Inpatient History and Physical Surgical Orders    Preadmission Location:   Preadmission Time:  Facility:  Surgery Date:  Surgery Time:  Preadmission Test date:     Chief Complaint  Outpatient History and Physical / Surgical Orders    Primary Care Provider: Rena Guerra PA    Referring Provider: No ref. provider found    Subjective     Patient Name: Anca Samson : 1950    HPI  The patient is a 73-year-old female with a known upper abdominal incisional hernia.  We recently performed a cholecystectomy for her for gallstone pancreatitis and noted that hernia at the time.  She came back today and would like to go ahead and get scheduled to repair this when possible.    Past History:  Medical History: has a past medical history of Anxiety, Asthma (2021), Atherosclerosis of native coronary artery of native heart with angina pectoris (08/15/2023), C. difficile diarrhea, Cholelithiasis (2024), Colon cancer (2017), Colon polyp, COPD (chronic obstructive pulmonary disease) (10/23/2017), Depression, Disease of thyroid gland, Diverticulitis, Diverticulitis of colon, Dysphagia, GERD (gastroesophageal reflux disease), Head injury, Hernia (), History of chemotherapy, psychiatric care, Impaired fasting glucose (2015), Lumbago, Lung nodule (2022), Major depressive disorder (10/27/2016),  Malignant neoplasm of ascending colon (07/21/2017), Melena, Multiple myeloma, Nicotine dependence (05/10/2017), NICK (obstructive sleep apnea) (11/06/2023), Oxygen dependent, Pancreatitis (7/26/2024), Panic disorder, Renal insufficiency (07/28/2021), Seizures, Shortness of breath, Tobacco use (07/28/2021), Visit for screening mammogram (02/20/2020), and Vitamin D deficiency.   Surgical History: has a past surgical history that includes Bone marrow biopsy (2016); Colonoscopy (2018,2017,2019); Esophagogastroduodenoscopy (2018); Fecal disimpaction (06/2019); Hysterectomy (1982,1984); Mini-laparotomy (2017); Portacath placement (N/A); Colon surgery (2017); Hemicolectomy (Right, 05/2017); Upper gastrointestinal endoscopy (2018); Knee Arthroscopy (Left, 01/03/2022); Appendectomy; Colonoscopy (N/A, 12/20/2022); Venous Access Device (Port) (N/A, 10/31/2023); Colonoscopy (N/A, 11/09/2023); Eye surgery; Tonsillectomy; ERCP (N/A, 07/27/2024); Cholecystectomy (N/A, 08/08/2024); Gallbladder surgery; and ERCP (N/A, 9/5/2024).   Family History: family history includes Cancer in her father and mother; Heart disease in her father and mother; Kidney cancer in her father; Lung cancer in her mother; Stroke in her father and another family member.   Social History: reports that she quit smoking about 2 years ago. Her smoking use included cigarettes. She started smoking about 49 years ago. She has a 47.4 pack-year smoking history. She has been exposed to tobacco smoke. She has never used smokeless tobacco. She reports that she does not drink alcohol and does not use drugs.  Allergies: Morphine, Cephalexin, Penicillins, and Sulfa antibiotics       Current Outpatient Medications:   •  acyclovir (ZOVIRAX) 400 MG tablet, Take 1 tablet by mouth 2 (Two) Times a Day. Take one tablet by mouth twice daily., Disp: 60 tablet, Rfl: 11  •  albuterol sulfate  (90 Base) MCG/ACT inhaler, Inhale 2 puffs Every 4 (Four) Hours As Needed for Wheezing.,  Disp: 18 g, Rfl: 11  •  arformoterol (BROVANA) 15 MCG/2ML nebulizer solution, USE 1 VIAL  IN  NEBULIZER TWICE  DAILY - MORNING & EVENING, Disp: 360 mL, Rfl: 11  •  aspirin 81 MG EC tablet, Take 1 tablet by mouth Daily., Disp: , Rfl:   •  budesonide (PULMICORT) 0.5 MG/2ML nebulizer solution, USE 1 VIAL  IN  NEBULIZER TWICE  DAILY - RINSE MOUTH OUT AFTER TREATMENT, Disp: 60 each, Rfl: 11  •  busPIRone (BUSPAR) 15 MG tablet, Take 1 tablet by mouth 2 (Two) Times a Day., Disp: 180 tablet, Rfl: 1  •  colestipol (COLESTID) 1 g tablet, Take 2 tablets by mouth 2 (Two) Times a Day., Disp: 120 tablet, Rfl: 5  •  dapsone 25 MG tablet, TAKE 2 TABLETS BY MOUTH TWICE A DAY, Disp: 360 tablet, Rfl: 1  •  dexAMETHasone (DECADRON) 4 MG tablet, Take 10 tablets on D 1,8,15,22 and take 1 tablet on D 2,3.  Take in the morning with food., Disp: 42 tablet, Rfl: 5  •  FLUoxetine (PROzac) 40 MG capsule, Take 1 capsule by mouth Daily., Disp: 90 capsule, Rfl: 1  •  HYDROcodone-acetaminophen (NORCO) 5-325 MG per tablet, Take 1 tablet by mouth Every 6 (Six) Hours As Needed for Moderate Pain (Pain)., Disp: 10 tablet, Rfl: 0  •  lenalidomide (REVLIMID) 15 MG capsule, Take 1 capsule by mouth Daily. On Days 1-21, and off 7 days, on a 28 day cycle, Disp: 21 capsule, Rfl: 0  •  levalbuterol (XOPENEX) 0.63 MG/3ML nebulizer solution, Take 1 ampule by nebulization 4 (Four) Times a Day As Needed for Wheezing., Disp: 120 mL, Rfl: 5  •  ondansetron (ZOFRAN) 8 MG tablet, Take 1 tablet by mouth 3 (Three) Times a Day As Needed for Nausea or Vomiting., Disp: 30 tablet, Rfl: 5  •  promethazine (PHENERGAN) 25 MG tablet, Take 1 tablet by mouth Every 6 (Six) Hours As Needed for Nausea or Vomiting., Disp: 30 tablet, Rfl: 1  •  vitamin D (ERGOCALCIFEROL) 1.25 MG (78512 UT) capsule capsule, Take 1 capsule by mouth 2 (Two) Times a Week., Disp: 26 capsule, Rfl: 1  •  Yupelri 175 MCG/3ML nebulizer solution, USE 1 VIAL IN NEBULIZER DAILY, Disp: 90 mL, Rfl: 11  "      Objective  Vital Signs:   Resp 20   Ht 152.4 cm (60\")   Wt 87.1 kg (192 lb)   BMI 37.50 kg/m²       Physical Exam  Vitals and nursing note reviewed.   Constitutional:       Appearance: Normal appearance. The patient is well-developed.   Cardiovascular:      Rate and Rhythm: Normal rate and regular rhythm.   Pulmonary:      Effort: Pulmonary effort is normal.      Breath sounds: Normal air entry.   Abdominal:      General: Bowel sounds are normal.      Palpations: Abdomen is soft.  Upper abdominal incisional hernia noted.     Skin:     General: Skin is warm and dry.   Neurological:      Mental Status: The patient is alert and oriented to person, place, and time.      Motor: Motor function is intact.   Psychiatric:         Mood and Affect: Mood normal.       Result Review:               Assessment and Plan   Diagnoses and all orders for this visit:    1. Incisional hernia, without obstruction or gangrene (Primary)  -     Case Request; Standing  -     Follow Anesthesia Guidelines / Protocol; Standing  -     Verify NPO Status; Standing  -     Obtain Informed Consent; Standing  -     Verify / Perform Chlorhexidine Skin Prep; Standing  -     Insert Peripheral IV; Standing  -     Saline Lock & Maintain IV Access; Standing  -     sodium chloride 0.9 % flush 10 mL  -     sodium chloride 0.9 % flush 10 mL  -     sodium chloride 0.9 % infusion 40 mL  -     lactated ringers infusion  -     Place Sequential Compression Device; Standing  -     Maintain Sequential Compression Device; Standing  -     ondansetron (ZOFRAN) injection 4 mg  -     clindamycin (CLEOCIN) 900 mg in dextrose (D5W) 5 % 100 mL IVPB  -     Case Request    We will schedule her for a robotic incisional hernia repair.  I have described the procedure to her as well as the risk and benefits and she is agreeable to proceeding.    I  Eduardo Ingram MD  09/13/2024    "

## 2024-09-13 NOTE — TELEPHONE ENCOUNTER
Patient had ERCP with Dr. Kennedy on 9/5/2024  Findings included-  Choledocholithiasis was found.  1 stent was removed from the common bile duct.    Patient needs to avoid aspirin and NSAIDs for 1 week    Patient has follow-up with LINETTE Lafleur  On 10/24/2024

## 2024-09-16 ENCOUNTER — SPECIALTY PHARMACY (OUTPATIENT)
Dept: PHARMACY | Facility: HOSPITAL | Age: 74
End: 2024-09-16
Payer: MEDICARE

## 2024-09-19 ENCOUNTER — OFFICE VISIT (OUTPATIENT)
Dept: PSYCHIATRY | Facility: CLINIC | Age: 74
End: 2024-09-19
Payer: MEDICARE

## 2024-09-19 VITALS
BODY MASS INDEX: 38.09 KG/M2 | WEIGHT: 194 LBS | HEART RATE: 99 BPM | DIASTOLIC BLOOD PRESSURE: 66 MMHG | SYSTOLIC BLOOD PRESSURE: 131 MMHG | HEIGHT: 60 IN

## 2024-09-19 DIAGNOSIS — F43.21 COMPLICATED GRIEF: ICD-10-CM

## 2024-09-19 DIAGNOSIS — F33.1 MODERATE EPISODE OF RECURRENT MAJOR DEPRESSIVE DISORDER: Chronic | ICD-10-CM

## 2024-09-19 DIAGNOSIS — R56.9 SEIZURES: ICD-10-CM

## 2024-09-19 DIAGNOSIS — F51.05 INSOMNIA DUE TO MENTAL CONDITION: ICD-10-CM

## 2024-09-19 DIAGNOSIS — F41.1 GENERALIZED ANXIETY DISORDER: Primary | ICD-10-CM

## 2024-09-19 RX ORDER — FLUOXETINE 40 MG/1
40 CAPSULE ORAL DAILY
Qty: 90 CAPSULE | Refills: 1 | Status: SHIPPED | OUTPATIENT
Start: 2024-09-19

## 2024-09-23 ENCOUNTER — OFFICE VISIT (OUTPATIENT)
Dept: FAMILY MEDICINE CLINIC | Facility: CLINIC | Age: 74
End: 2024-09-23
Payer: MEDICARE

## 2024-09-23 VITALS
SYSTOLIC BLOOD PRESSURE: 109 MMHG | DIASTOLIC BLOOD PRESSURE: 73 MMHG | OXYGEN SATURATION: 97 % | BODY MASS INDEX: 37.89 KG/M2 | TEMPERATURE: 97.3 F | WEIGHT: 193 LBS | HEIGHT: 60 IN | HEART RATE: 91 BPM

## 2024-09-23 DIAGNOSIS — N30.00 ACUTE CYSTITIS WITHOUT HEMATURIA: ICD-10-CM

## 2024-09-23 DIAGNOSIS — R35.0 URINE FREQUENCY: ICD-10-CM

## 2024-09-23 DIAGNOSIS — Z23 FLU VACCINE NEED: Primary | ICD-10-CM

## 2024-09-23 DIAGNOSIS — K46.9 ABDOMINAL HERNIA WITHOUT OBSTRUCTION AND WITHOUT GANGRENE, RECURRENCE NOT SPECIFIED, UNSPECIFIED HERNIA TYPE: ICD-10-CM

## 2024-09-23 LAB
BILIRUB BLD-MCNC: ABNORMAL MG/DL
CLARITY, POC: ABNORMAL
COLOR UR: ABNORMAL
EXPIRATION DATE: ABNORMAL
GLUCOSE UR STRIP-MCNC: NEGATIVE MG/DL
KETONES UR QL: NEGATIVE
LEUKOCYTE EST, POC: ABNORMAL
Lab: ABNORMAL
NITRITE UR-MCNC: NEGATIVE MG/ML
PH UR: 5.5 [PH] (ref 5–8)
PROT UR STRIP-MCNC: NEGATIVE MG/DL
RBC # UR STRIP: NEGATIVE /UL
SP GR UR: 1.03 (ref 1–1.03)
UROBILINOGEN UR QL: ABNORMAL

## 2024-09-23 PROCEDURE — 1126F AMNT PAIN NOTED NONE PRSNT: CPT | Performed by: PHYSICIAN ASSISTANT

## 2024-09-23 PROCEDURE — 1159F MED LIST DOCD IN RCRD: CPT | Performed by: PHYSICIAN ASSISTANT

## 2024-09-23 PROCEDURE — 1160F RVW MEDS BY RX/DR IN RCRD: CPT | Performed by: PHYSICIAN ASSISTANT

## 2024-09-23 PROCEDURE — 90662 IIV NO PRSV INCREASED AG IM: CPT | Performed by: PHYSICIAN ASSISTANT

## 2024-09-23 PROCEDURE — 99213 OFFICE O/P EST LOW 20 MIN: CPT | Performed by: PHYSICIAN ASSISTANT

## 2024-09-23 PROCEDURE — 87086 URINE CULTURE/COLONY COUNT: CPT | Performed by: PHYSICIAN ASSISTANT

## 2024-09-23 PROCEDURE — 3078F DIAST BP <80 MM HG: CPT | Performed by: PHYSICIAN ASSISTANT

## 2024-09-23 PROCEDURE — 81003 URINALYSIS AUTO W/O SCOPE: CPT | Performed by: PHYSICIAN ASSISTANT

## 2024-09-23 PROCEDURE — G0008 ADMIN INFLUENZA VIRUS VAC: HCPCS | Performed by: PHYSICIAN ASSISTANT

## 2024-09-23 PROCEDURE — 3074F SYST BP LT 130 MM HG: CPT | Performed by: PHYSICIAN ASSISTANT

## 2024-09-23 RX ORDER — NITROFURANTOIN 25; 75 MG/1; MG/1
100 CAPSULE ORAL 2 TIMES DAILY
Qty: 14 CAPSULE | Refills: 0 | Status: SHIPPED | OUTPATIENT
Start: 2024-09-23

## 2024-09-25 LAB — BACTERIA SPEC AEROBE CULT: NORMAL

## 2024-09-25 NOTE — PROGRESS NOTES
Chief Complaint   Follow-up (Medication refill - colestipol. )    History of Present Illness       Anca Samson is a 74 y.o. female who presents to [facility] for follow-up with a history of multiple myeloma currently on treatment, history of colon cancer in 2017 with right hemicolectomy and chemotherapy, COPD, history of gallstones and gallstone pancreatitis since last office visit.  Patient was seen in the emergency department in July with elevated liver enzymes elevated bilirubin and elevated lipase with chest pain and vomiting she was found to have acute cholecystitis with leary duct dilation.  Patient underwent ERCP with removal of gallstone general surgeon was consulted and the patient had an outpatient cholecystectomy.  Overall patient is doing much better.  She has a scheduled hernia repair of coming this week.  Patient continues on her colestipol is doing really well with his medication.  Patient denies fever, nausea, vomiting, weight loss, night sweats, melena, hematochezia, hematemesis.    Most recent labs- 9/11/24    ERCP- Brent 9/5/24    ERCP- Brent- 7/27/24    MRI abdomen wo contrast mrcp- 7/27/24  Cholecystitis. Choledocholithiasis. There is biliary ductal dilation extending to the level of the ampulla. There is a 3 mm stone in the mid/distal common bile duct, however no definite evidence of stone at the level of the ampulla. No evidence of   discrete stone in the cystic duct. Gentle tapering of the common bile duct at the ampulla with no evidence of suspicious mass. Recommend gastroenterology referral with consideration for ERCP.  No MRI evidence of pancreatitis. Normal pancreatic duct.       CT Abdomen Pelvis With Contrast- 7/27/24  The study is ABNORMAL. Gallstones are present. New acute cholecystitis  is suggested by CT. There is biliary ductal dilatation. No acute  pancreatitis is suggested. Please see above comments for further detail.  Please note that  "portions of this note were completed with a voice  recognition program.       Colonoscopy: Review of the patient's most recent colonoscopy performed by Dr. Borges on 11/9/23 prior to side-to-side ileocolonic anastomosis in the ascending colon this was patent and characterized by healthy mucosa.  Small mouth diverticula in the sigmoid colon.  Repeat colonoscopy in 2 years.    Results       Result Review :   The following data was reviewed by: LINETTE Lafleur on 09/30/2024:    CMP          7/29/2024    04:56 8/14/2024    10:14 9/11/2024    09:52   CMP   Glucose 110  152  219    BUN 9  12  20    Creatinine 0.84  0.76  1.00    EGFR 73.0  82.3  59.2    Sodium 140  140  139    Potassium 3.8  3.7  4.0    Chloride 106  112  107    Calcium 8.2  8.9  9.6    Total Protein 5.0  5.1  6.2    Albumin 2.9  3.3  4.1    Globulin 2.1  1.8  2.1    Total Bilirubin 0.5  0.5  0.2    Alkaline Phosphatase 107  71  83    AST (SGOT) 19  7  9    ALT (SGPT) 71  10  13    Albumin/Globulin Ratio 1.4  1.8  2.0    BUN/Creatinine Ratio 10.7  15.8  20.0    Anion Gap 13.0  7.4  9.7      CBC          8/8/2024    08:18 8/14/2024    10:14 9/11/2024    09:52   CBC   WBC 6.61  9.75  18.53    RBC 3.42  3.24  3.94    Hemoglobin 11.3  10.7  13.1    Hematocrit 34.7  33.1  39.5    .5  102.2  100.3    MCH 33.0  33.0  33.2    MCHC 32.6  32.3  33.2    RDW 15.9  16.5  15.7    Platelets 291  327  291        Iron Profile No results found for: \"IRON\", \"TIBC\", \"LABIRON\", \"TRANSFERRIN\"  Ferritin No results found for: \"FERRITIN\"            Past Medical History       Past Medical History:   Diagnosis Date    Anxiety     Asthma 07/28/2021    Atherosclerosis of native coronary artery of native heart with angina pectoris 08/15/2023    FOLLOWED BY DR GONZALES/DINA PETTIT. DENIES CP BUT GETS SOA WITH EXERTION HAS COPD WEARS AT 2LPM N/C. DECREASED ACTIVITY D/T SOA.    C. difficile diarrhea     DENIES ANY CURRENT ISSUES    Cholelithiasis 7-    Colon cancer " 07/21/2017    Colon polyp     COPD (chronic obstructive pulmonary disease) 10/23/2017    Depression     Disease of thyroid gland     Diverticulitis     Diverticulitis of colon     Dysphagia     GERD (gastroesophageal reflux disease)     Head injury     CONCUSSION AT AGE 4    Hernia 2019    History of chemotherapy     2017    Hx of psychiatric care     Impaired fasting glucose 08/14/2015    Lumbago     Lung nodule 02/17/2022    Major depressive disorder 10/27/2016    Malignant neoplasm of ascending colon 07/21/2017    Melena     Multiple myeloma     Nicotine dependence 05/10/2017    NICK (obstructive sleep apnea) 11/06/2023    Oxygen dependent     REPORTS USES 02 AT 2LPM VIA N/C DURING DAY AND 3LPM AT NIGHT . CAN GET VERY SOA WITH MINIMAL EXERTION    Pancreatitis 7/26/2024    Panic disorder     Renal insufficiency 07/28/2021    Seizures     PSEUDO SEIZURES LAST ONE AROUND JULY 2024    Shortness of breath     CAN GET VERY SOA WITH MINIMAL EXERTION    Tobacco use 07/28/2021    QUIT SMOKING JUNE 2022    Visit for screening mammogram 02/20/2020    NORMAL- REPEAT IN ONE YEAR    Vitamin D deficiency        Past Surgical History:   Procedure Laterality Date    APPENDECTOMY      BONE MARROW BIOPSY  2016    CHOLECYSTECTOMY N/A 08/08/2024    Procedure: CHOLECYSTECTOMY LAPAROSCOPIC WITH DAVINCI ROBOT;  Surgeon: Eduardo Ingram MD;  [place] [facility] [place] [facility] [place]  Service: Robotics - DaVinci;  Laterality: N/A;    COLON SURGERY  2017    COLONOSCOPY  2018,2017,2019    [facility]- DR LE:DIVERTICULOSIS AND ERYTHEMA OF MUCOSA    COLONOSCOPY N/A 12/20/2022    Procedure: COLONOSCOPY WITH ELEVIEW INJECTION, POLYPECTOMY, HOT SNARE, CLIP APPLICATION X3, BIOPSIES;  Surgeon: Jarvis Borges MD;  Location: [facility] ENDOSCOPY;  Service: Gastroenterology;  Laterality: N/A;  COLON POLYP, DIVERTICULOSIS, ANASTOMOSIS RIGHT COLON    COLONOSCOPY N/A 11/09/2023    Procedure: COLONOSCOPY;  Surgeon: Jarvis Borges MD;  Location:   [facility] ENDOSCOPY;  Service: Gastroenterology;  Laterality: N/A;  DIVERTICULOSIS, ANASTOMOSIS IN ASCENDING COLON    ENDOSCOPY  2018    ERCP N/A 07/27/2024    Procedure: ENDOSCOPIC RETROGRADE CHOLANGIOPANCREATOGRAPHY WITH SPHINCTEROTOMY, BALLOON SWEEP, STENT PLACEMENT;  Surgeon: Ajay Kennedy MD;  Location: [facility] MAIN OR;  Service: Gastroenterology;  Laterality: N/A;  BILE DUCT STONE    ERCP N/A 9/5/2024    Procedure: ENDOSCOPIC RETROGRADE CHOLANGIOPANCREATOGRAPHY WITH SPINCHTEROTOMY. BALLOON DILATATION 8-10. BALLOON SWEEP 12-15. STENT REMOVAL;  Surgeon: Ajay Kennedy MD;  Location: [facility] ENDOSCOPY;  Service: Gastroenterology;  Laterality: N/A;  BILE DUCT STONES    EYE SURGERY      FECAL DISIMPACTION  06/2019    TRANSPLANT     GALLBLADDER SURGERY      HEMICOLECTOMY Right 05/2017    HYSTERECTOMY  1982,1984    KNEE ARTHROSCOPY Left 01/03/2022    Procedure: KNEE ARTHROSCOPY WITH PARTIAL MEDIAL MENISCECTOMY,  CHONDROPLASTY;  Surgeon: Nicholas Tipton MD;  Location: [facility] [place] [facility] [place];  Service: Orthopedics;  Laterality: Left;    MINI-LAPAROTOMY  2017    PORTACATH PLACEMENT N/A     pt states that her port does not work    TONSILLECTOMY      UPPER GASTROINTESTINAL ENDOSCOPY  2018    VENOUS ACCESS DEVICE (PORT) INSERTION N/A 10/31/2023    Procedure: Port-a-catheter removal and port-a-catheter placement;  Surgeon: Alcon Delgado MD;  Location: [facility] OR [facility] [place];  Service: General;  Laterality: N/A;         Current Outpatient Medications:     acyclovir (ZOVIRAX) 400 MG tablet, Take 1 tablet by mouth 2 (Two) Times a Day. Take one tablet by mouth twice daily., Disp: 60 tablet, Rfl: 11    albuterol sulfate  (90 Base) MCG/ACT inhaler, Inhale 2 puffs Every 4 (Four) Hours As Needed for Wheezing., Disp: 18 g, Rfl: 11    arformoterol (BROVANA) 15 MCG/2ML nebulizer solution, USE 1 VIAL  IN  NEBULIZER TWICE  DAILY - MORNING & EVENING, Disp: 360 mL, Rfl: 11    aspirin 81 MG EC tablet, Take 1 tablet by mouth Daily., Disp:  , Rfl:     budesonide (PULMICORT) 0.5 MG/2ML nebulizer solution, USE 1 VIAL  IN  NEBULIZER TWICE  DAILY - RINSE MOUTH OUT AFTER TREATMENT, Disp: 60 each, Rfl: 11    busPIRone (BUSPAR) 15 MG tablet, Take 1 tablet by mouth 2 (Two) Times a Day., Disp: 180 tablet, Rfl: 1    colestipol (COLESTID) 1 g tablet, TAKE 2 TABLETS BY MOUTH TWICE A DAY, Disp: 360 tablet, Rfl: 1    dapsone 25 MG tablet, TAKE 2 TABLETS BY MOUTH TWICE A DAY, Disp: 360 tablet, Rfl: 1    dexAMETHasone (DECADRON) 4 MG tablet, Take 10 tablets on D 1,8,15,22 and take 1 tablet on D 2,3.  Take in the morning with food., Disp: 42 tablet, Rfl: 5    FLUoxetine (PROzac) 40 MG capsule, Take 1 capsule by mouth Daily., Disp: 90 capsule, Rfl: 1    HYDROcodone-acetaminophen (NORCO) 5-325 MG per tablet, Take 1 tablet by mouth Every 6 (Six) Hours As Needed for Moderate Pain (Pain)., Disp: 10 tablet, Rfl: 0    lenalidomide (REVLIMID) 15 MG capsule, Take 1 capsule by mouth Daily. On Days 1-21, and off 7 days, on a 28 day cycle, Disp: 21 capsule, Rfl: 0    levalbuterol (XOPENEX) 0.63 MG/3ML nebulizer solution, Take 1 ampule by nebulization 4 (Four) Times a Day As Needed for Wheezing., Disp: 120 mL, Rfl: 5    nitrofurantoin, macrocrystal-monohydrate, (Macrobid) 100 MG capsule, Take 1 capsule by mouth 2 (Two) Times a Day., Disp: 14 capsule, Rfl: 0    ondansetron (ZOFRAN) 8 MG tablet, Take 1 tablet by mouth 3 (Three) Times a Day As Needed for Nausea or Vomiting., Disp: 30 tablet, Rfl: 5    promethazine (PHENERGAN) 25 MG tablet, Take 1 tablet by mouth Every 6 (Six) Hours As Needed for Nausea or Vomiting., Disp: 30 tablet, Rfl: 1    vitamin D (ERGOCALCIFEROL) 1.25 MG (87723 UT) capsule capsule, Take 1 capsule by mouth 2 (Two) Times a Week., Disp: 26 capsule, Rfl: 1    Yupelri 175 MCG/3ML nebulizer solution, USE 1 VIAL IN NEBULIZER DAILY, Disp: 90 mL, Rfl: 11     Allergies   Allergen Reactions    Morphine Anaphylaxis    Cephalexin Hives    Penicillins Hives    Sulfa  "Antibiotics Hives       Family History   Problem Relation Age of Onset    Heart disease Mother     Lung cancer Mother     Cancer Mother     Stroke Father     Heart disease Father     Kidney cancer Father     Cancer Father     Stroke Other         UNCLE/AUNT    Colon cancer Neg Hx     Malig Hyperthermia Neg Hx         Social History     Social History Narrative    Not on file       Objective     Vital Signs:   /64 (BP Location: Right arm, Patient Position: Sitting, Cuff Size: Adult)   Pulse 82   Ht 152.4 cm (60\")   Wt 86.8 kg (191 lb 4.8 oz)   SpO2 100%   BMI 37.36 kg/m²       Physical Exam  Constitutional:       Appearance: Normal appearance.   Pulmonary:      Effort: Pulmonary effort is normal.   Neurological:      General: No focal deficit present.      Mental Status: She is alert and oriented to person, place, and time.   Psychiatric:         Mood and Affect: Mood normal.         Behavior: Behavior normal.           Assessment & Plan          Assessment and Plan    Diagnoses and all orders for this visit:    1. Multiple myeloma, remission status unspecified (Primary)    2. Diarrhea, unspecified type    3. Malignant neoplasm of ascending colon      74-year-old female presenting the office today for follow-up with a history of multiple myeloma currently on treatment, history of colon cancer in 2017 with right hemicolectomy and chemotherapy, COPD, history of gallstones and gallstone pancreatitis since last office visit.  Patient has plenty of refills of colestipol at this time.  Patient will undergo hernia repair this week.  Patient will follow-up in 1 year.  Will plan to schedule colonoscopy at that time.  Patient agreeable to this plan will call with any questions or concerns.        Follow Up       Follow Up   Return in about 1 year (around 9/30/2025).  Patient was given instructions and counseling regarding her condition or for health maintenance advice. Please see specific information pulled into the " AVS if appropriate.

## 2024-09-30 ENCOUNTER — OFFICE VISIT (OUTPATIENT)
Dept: GASTROENTEROLOGY | Facility: CLINIC | Age: 74
End: 2024-09-30
Payer: MEDICARE

## 2024-09-30 VITALS
HEIGHT: 60 IN | SYSTOLIC BLOOD PRESSURE: 131 MMHG | DIASTOLIC BLOOD PRESSURE: 64 MMHG | OXYGEN SATURATION: 100 % | BODY MASS INDEX: 37.56 KG/M2 | WEIGHT: 191.3 LBS | HEART RATE: 82 BPM

## 2024-09-30 DIAGNOSIS — C18.2 MALIGNANT NEOPLASM OF ASCENDING COLON: ICD-10-CM

## 2024-09-30 DIAGNOSIS — R19.7 DIARRHEA, UNSPECIFIED TYPE: ICD-10-CM

## 2024-09-30 DIAGNOSIS — C90.00 MULTIPLE MYELOMA, REMISSION STATUS UNSPECIFIED: Primary | ICD-10-CM

## 2024-09-30 RX ORDER — MONTELUKAST SODIUM 4 MG/1
2 TABLET, CHEWABLE ORAL 2 TIMES DAILY
Qty: 360 TABLET | Refills: 1 | Status: CANCELLED | OUTPATIENT
Start: 2024-09-30

## 2024-10-02 ENCOUNTER — ANESTHESIA EVENT (OUTPATIENT)
Dept: PERIOP | Facility: HOSPITAL | Age: 74
End: 2024-10-02
Payer: MEDICARE

## 2024-10-02 ENCOUNTER — SPECIALTY PHARMACY (OUTPATIENT)
Dept: PHARMACY | Facility: HOSPITAL | Age: 74
End: 2024-10-02
Payer: MEDICARE

## 2024-10-02 DIAGNOSIS — C90.00 MULTIPLE MYELOMA, REMISSION STATUS UNSPECIFIED: ICD-10-CM

## 2024-10-02 RX ORDER — LENALIDOMIDE 15 MG/1
15 CAPSULE ORAL DAILY
Qty: 12 CAPSULE | Refills: 0 | Status: SHIPPED | OUTPATIENT
Start: 2024-10-02

## 2024-10-02 NOTE — PROGRESS NOTES
Re: Refills of Oral Specialty Medication - Revlimid (lenalidomide)    Drug-Drug Interactions: The current medication list was reviewed and there are no relevant drug-drug interactions with the specialty medication.  Medication Allergies: The patient has no relevant allergies as it relates to their oral specialty medication  Review of Labs/Dose Adjustments: NO DOSE CHANGE - I reviewed the most recent note and labs and the patient will continue without any dose changes.  I sent refills as described below.    Drug: Revlimid (lenalidomide)  Strength: 15 mg  Directions: Take 1 capsule by mouth daily ON days 1-21, OFF for 7 days of each 28 day cycle  Quantity: 12  Refills: 0  REMS Auth # [id number] Exp 10/9/24     Pharmacy prescription sent to: [facility]      Madisyn Saldaña PharmD, [facility]  Oncology Clinical Pharmacist  10/2/2024  07:38 EDT

## 2024-10-03 ENCOUNTER — READMISSION MANAGEMENT (OUTPATIENT)
Dept: CALL CENTER | Facility: HOSPITAL | Age: 74
End: 2024-10-03
Payer: MEDICARE

## 2024-10-03 ENCOUNTER — ANESTHESIA (OUTPATIENT)
Dept: PERIOP | Facility: HOSPITAL | Age: 74
End: 2024-10-03
Payer: MEDICARE

## 2024-10-03 ENCOUNTER — HOSPITAL ENCOUNTER (INPATIENT)
Facility: HOSPITAL | Age: 74
LOS: 1 days | Discharge: HOME | End: 2024-10-03
Attending: SURGERY | Admitting: SURGERY
Payer: MEDICARE

## 2024-10-03 VITALS
BODY MASS INDEX: 36.75 KG/M2 | HEIGHT: 60 IN | SYSTOLIC BLOOD PRESSURE: 109 MMHG | DIASTOLIC BLOOD PRESSURE: 53 MMHG | HEART RATE: 91 BPM | OXYGEN SATURATION: 96 % | RESPIRATION RATE: 16 BRPM | WEIGHT: 187.17 LBS | TEMPERATURE: 97.3 F

## 2024-10-03 DIAGNOSIS — K43.0 INCARCERATED INCISIONAL HERNIA: Primary | ICD-10-CM

## 2024-10-03 DIAGNOSIS — K43.2 INCISIONAL HERNIA, WITHOUT OBSTRUCTION OR GANGRENE: ICD-10-CM

## 2024-10-03 PROCEDURE — 25010000002 HYDROMORPHONE 1 MG/ML SOLUTION: Performed by: ANESTHESIOLOGY

## 2024-10-03 PROCEDURE — 25010000002 KETOROLAC TROMETHAMINE PER 15 MG

## 2024-10-03 PROCEDURE — 25010000002 ESMOLOL 100 MG/10ML SOLUTION

## 2024-10-03 PROCEDURE — 25010000002 FENTANYL CITRATE (PF) 50 MCG/ML SOLUTION

## 2024-10-03 PROCEDURE — 8E0W4CZ ROBOTIC ASSISTED PROCEDURE OF TRUNK REGION, PERCUTANEOUS ENDOSCOPIC APPROACH: ICD-10-PCS | Performed by: SURGERY

## 2024-10-03 PROCEDURE — 25810000003 LACTATED RINGERS PER 1000 ML: Performed by: ANESTHESIOLOGY

## 2024-10-03 PROCEDURE — 25010000002 DEXAMETHASONE PER 1 MG

## 2024-10-03 PROCEDURE — 25010000002 ONDANSETRON PER 1 MG: Performed by: SURGERY

## 2024-10-03 PROCEDURE — 25010000002 ONDANSETRON PER 1 MG

## 2024-10-03 PROCEDURE — 0WQF4ZZ REPAIR ABDOMINAL WALL, PERCUTANEOUS ENDOSCOPIC APPROACH: ICD-10-PCS | Performed by: SURGERY

## 2024-10-03 PROCEDURE — 25010000002 LIDOCAINE PF 2% 2 % SOLUTION

## 2024-10-03 PROCEDURE — S2900 ROBOTIC SURGICAL SYSTEM: HCPCS | Performed by: SURGERY

## 2024-10-03 PROCEDURE — 49594 RPR AA HRN 1ST 3-10 NCR/STRN: CPT | Performed by: SURGERY

## 2024-10-03 PROCEDURE — 25010000002 PROPOFOL 10 MG/ML EMULSION

## 2024-10-03 PROCEDURE — 25010000002 SUGAMMADEX 200 MG/2ML SOLUTION

## 2024-10-03 PROCEDURE — 25010000002 BUPIVACAINE (PF) 0.25 % SOLUTION: Performed by: SURGERY

## 2024-10-03 PROCEDURE — C1781 MESH (IMPLANTABLE): HCPCS | Performed by: SURGERY

## 2024-10-03 PROCEDURE — 25010000002 CLINDAMYCIN 900 MG/50ML SOLUTION: Performed by: SURGERY

## 2024-10-03 DEVICE — ABSORBABLE WOUND CLOSURE DEVICE
Type: IMPLANTABLE DEVICE | Site: ABDOMEN | Status: FUNCTIONAL
Brand: V-LOC 180

## 2024-10-03 DEVICE — ABSORBABLE WOUND CLOSURE DEVICE
Type: IMPLANTABLE DEVICE | Site: ABDOMEN | Status: FUNCTIONAL
Brand: V-LOC 90

## 2024-10-03 DEVICE — VENTRALIGHT ST MESH WITH ECHO PS POSITIONING SYSTEM, 6" X 8" (15.2 X 20.3 CM), ELLIPSE
Type: IMPLANTABLE DEVICE | Site: ABDOMEN | Status: FUNCTIONAL
Brand: VENTRALIGHT

## 2024-10-03 RX ORDER — HYDROCODONE BITARTRATE AND ACETAMINOPHEN 5; 325 MG/1; MG/1
1 TABLET ORAL EVERY 6 HOURS PRN
Qty: 10 TABLET | Refills: 0 | Status: SHIPPED | OUTPATIENT
Start: 2024-10-03

## 2024-10-03 RX ORDER — CLINDAMYCIN PHOSPHATE 900 MG/50ML
900 INJECTION, SOLUTION INTRAVENOUS ONCE
Status: COMPLETED | OUTPATIENT
Start: 2024-10-03 | End: 2024-10-03

## 2024-10-03 RX ORDER — FENTANYL CITRATE 50 UG/ML
25 INJECTION, SOLUTION INTRAMUSCULAR; INTRAVENOUS
Status: DISCONTINUED | OUTPATIENT
Start: 2024-10-03 | End: 2024-10-03 | Stop reason: HOSPADM

## 2024-10-03 RX ORDER — SODIUM CHLORIDE 0.9 % (FLUSH) 0.9 %
10 SYRINGE (ML) INJECTION EVERY 12 HOURS SCHEDULED
Status: DISCONTINUED | OUTPATIENT
Start: 2024-10-03 | End: 2024-10-03 | Stop reason: HOSPADM

## 2024-10-03 RX ORDER — HYDROCODONE BITARTRATE AND ACETAMINOPHEN 5; 325 MG/1; MG/1
1 TABLET ORAL ONCE AS NEEDED
Status: DISCONTINUED | OUTPATIENT
Start: 2024-10-03 | End: 2024-10-03 | Stop reason: HOSPADM

## 2024-10-03 RX ORDER — BUPIVACAINE HYDROCHLORIDE 2.5 MG/ML
INJECTION, SOLUTION EPIDURAL; INFILTRATION; INTRACAUDAL AS NEEDED
Status: DISCONTINUED | OUTPATIENT
Start: 2024-10-03 | End: 2024-10-03 | Stop reason: HOSPADM

## 2024-10-03 RX ORDER — PROMETHAZINE HYDROCHLORIDE 25 MG/1
25 SUPPOSITORY RECTAL ONCE AS NEEDED
Status: DISCONTINUED | OUTPATIENT
Start: 2024-10-03 | End: 2024-10-03 | Stop reason: HOSPADM

## 2024-10-03 RX ORDER — SODIUM CHLORIDE 0.9 % (FLUSH) 0.9 %
10 SYRINGE (ML) INJECTION AS NEEDED
Status: DISCONTINUED | OUTPATIENT
Start: 2024-10-03 | End: 2024-10-03 | Stop reason: HOSPADM

## 2024-10-03 RX ORDER — KETOROLAC TROMETHAMINE 15 MG/ML
INJECTION, SOLUTION INTRAMUSCULAR; INTRAVENOUS AS NEEDED
Status: DISCONTINUED | OUTPATIENT
Start: 2024-10-03 | End: 2024-10-03 | Stop reason: SURG

## 2024-10-03 RX ORDER — ROCURONIUM BROMIDE 10 MG/ML
INJECTION, SOLUTION INTRAVENOUS AS NEEDED
Status: DISCONTINUED | OUTPATIENT
Start: 2024-10-03 | End: 2024-10-03 | Stop reason: SURG

## 2024-10-03 RX ORDER — PHENYLEPHRINE HCL IN 0.9% NACL 1 MG/10 ML
SYRINGE (ML) INTRAVENOUS AS NEEDED
Status: DISCONTINUED | OUTPATIENT
Start: 2024-10-03 | End: 2024-10-03 | Stop reason: SURG

## 2024-10-03 RX ORDER — SODIUM CHLORIDE, SODIUM LACTATE, POTASSIUM CHLORIDE, CALCIUM CHLORIDE 600; 310; 30; 20 MG/100ML; MG/100ML; MG/100ML; MG/100ML
70 INJECTION, SOLUTION INTRAVENOUS CONTINUOUS
Status: DISCONTINUED | OUTPATIENT
Start: 2024-10-03 | End: 2024-10-03 | Stop reason: HOSPADM

## 2024-10-03 RX ORDER — ONDANSETRON 2 MG/ML
4 INJECTION INTRAMUSCULAR; INTRAVENOUS ONCE AS NEEDED
Status: DISCONTINUED | OUTPATIENT
Start: 2024-10-03 | End: 2024-10-03 | Stop reason: HOSPADM

## 2024-10-03 RX ORDER — ALBUTEROL SULFATE 90 UG/1
INHALANT RESPIRATORY (INHALATION) AS NEEDED
Status: DISCONTINUED | OUTPATIENT
Start: 2024-10-03 | End: 2024-10-03 | Stop reason: SURG

## 2024-10-03 RX ORDER — DEXMEDETOMIDINE HYDROCHLORIDE 100 UG/ML
INJECTION, SOLUTION INTRAVENOUS AS NEEDED
Status: DISCONTINUED | OUTPATIENT
Start: 2024-10-03 | End: 2024-10-03 | Stop reason: SURG

## 2024-10-03 RX ORDER — PROPOFOL 10 MG/ML
VIAL (ML) INTRAVENOUS AS NEEDED
Status: DISCONTINUED | OUTPATIENT
Start: 2024-10-03 | End: 2024-10-03 | Stop reason: SURG

## 2024-10-03 RX ORDER — ESMOLOL HYDROCHLORIDE 10 MG/ML
INJECTION INTRAVENOUS AS NEEDED
Status: DISCONTINUED | OUTPATIENT
Start: 2024-10-03 | End: 2024-10-03 | Stop reason: SURG

## 2024-10-03 RX ORDER — SODIUM CHLORIDE 9 MG/ML
40 INJECTION, SOLUTION INTRAVENOUS AS NEEDED
Status: DISCONTINUED | OUTPATIENT
Start: 2024-10-03 | End: 2024-10-03 | Stop reason: HOSPADM

## 2024-10-03 RX ORDER — FENTANYL CITRATE 50 UG/ML
INJECTION, SOLUTION INTRAMUSCULAR; INTRAVENOUS AS NEEDED
Status: DISCONTINUED | OUTPATIENT
Start: 2024-10-03 | End: 2024-10-03 | Stop reason: SURG

## 2024-10-03 RX ORDER — ONDANSETRON 4 MG/1
4 TABLET, ORALLY DISINTEGRATING ORAL ONCE AS NEEDED
Status: DISCONTINUED | OUTPATIENT
Start: 2024-10-03 | End: 2024-10-03 | Stop reason: HOSPADM

## 2024-10-03 RX ORDER — DEXAMETHASONE SODIUM PHOSPHATE 4 MG/ML
INJECTION, SOLUTION INTRA-ARTICULAR; INTRALESIONAL; INTRAMUSCULAR; INTRAVENOUS; SOFT TISSUE AS NEEDED
Status: DISCONTINUED | OUTPATIENT
Start: 2024-10-03 | End: 2024-10-03 | Stop reason: SURG

## 2024-10-03 RX ORDER — ONDANSETRON 2 MG/ML
4 INJECTION INTRAMUSCULAR; INTRAVENOUS EVERY 6 HOURS PRN
Status: DISCONTINUED | OUTPATIENT
Start: 2024-10-03 | End: 2024-10-03 | Stop reason: HOSPADM

## 2024-10-03 RX ORDER — LIDOCAINE HYDROCHLORIDE 20 MG/ML
INJECTION, SOLUTION EPIDURAL; INFILTRATION; INTRACAUDAL; PERINEURAL AS NEEDED
Status: DISCONTINUED | OUTPATIENT
Start: 2024-10-03 | End: 2024-10-03 | Stop reason: SURG

## 2024-10-03 RX ORDER — ONDANSETRON 2 MG/ML
4 INJECTION INTRAMUSCULAR; INTRAVENOUS ONCE AS NEEDED
Status: COMPLETED | OUTPATIENT
Start: 2024-10-03 | End: 2024-10-03

## 2024-10-03 RX ORDER — PROMETHAZINE HYDROCHLORIDE 12.5 MG/1
25 TABLET ORAL ONCE AS NEEDED
Status: DISCONTINUED | OUTPATIENT
Start: 2024-10-03 | End: 2024-10-03 | Stop reason: HOSPADM

## 2024-10-03 RX ORDER — IBUPROFEN 600 MG/1
600 TABLET, FILM COATED ORAL EVERY 6 HOURS PRN
Status: DISCONTINUED | OUTPATIENT
Start: 2024-10-03 | End: 2024-10-03 | Stop reason: HOSPADM

## 2024-10-03 RX ORDER — KETOROLAC TROMETHAMINE 15 MG/ML
15 INJECTION, SOLUTION INTRAMUSCULAR; INTRAVENOUS EVERY 6 HOURS PRN
Status: CANCELLED | OUTPATIENT
Start: 2024-10-03 | End: 2024-10-06

## 2024-10-03 RX ORDER — ACETAMINOPHEN 500 MG
1000 TABLET ORAL ONCE
Status: COMPLETED | OUTPATIENT
Start: 2024-10-03 | End: 2024-10-03

## 2024-10-03 RX ORDER — ONDANSETRON 2 MG/ML
INJECTION INTRAMUSCULAR; INTRAVENOUS AS NEEDED
Status: DISCONTINUED | OUTPATIENT
Start: 2024-10-03 | End: 2024-10-03 | Stop reason: SURG

## 2024-10-03 RX ORDER — OXYCODONE HYDROCHLORIDE 5 MG/1
5 TABLET ORAL
Status: DISCONTINUED | OUTPATIENT
Start: 2024-10-03 | End: 2024-10-03 | Stop reason: HOSPADM

## 2024-10-03 RX ORDER — SODIUM CHLORIDE, SODIUM LACTATE, POTASSIUM CHLORIDE, CALCIUM CHLORIDE 600; 310; 30; 20 MG/100ML; MG/100ML; MG/100ML; MG/100ML
9 INJECTION, SOLUTION INTRAVENOUS CONTINUOUS PRN
Status: DISCONTINUED | OUTPATIENT
Start: 2024-10-03 | End: 2024-10-03 | Stop reason: HOSPADM

## 2024-10-03 RX ADMIN — Medication 100 MCG: at 09:24

## 2024-10-03 RX ADMIN — FENTANYL CITRATE 25 MCG: 50 INJECTION, SOLUTION INTRAMUSCULAR; INTRAVENOUS at 09:40

## 2024-10-03 RX ADMIN — ONDANSETRON HYDROCHLORIDE 4 MG: 2 SOLUTION INTRAMUSCULAR; INTRAVENOUS at 10:20

## 2024-10-03 RX ADMIN — KETOROLAC TROMETHAMINE 15 MG: 15 INJECTION, SOLUTION INTRAMUSCULAR; INTRAVENOUS at 10:20

## 2024-10-03 RX ADMIN — FENTANYL CITRATE 50 MCG: 50 INJECTION, SOLUTION INTRAMUSCULAR; INTRAVENOUS at 09:12

## 2024-10-03 RX ADMIN — SUGAMMADEX 200 MG: 100 INJECTION, SOLUTION INTRAVENOUS at 10:31

## 2024-10-03 RX ADMIN — ESMOLOL HYDROCHLORIDE 5 MG: 100 INJECTION, SOLUTION INTRAVENOUS at 10:10

## 2024-10-03 RX ADMIN — ROCURONIUM BROMIDE 100 MG: 10 INJECTION, SOLUTION INTRAVENOUS at 09:12

## 2024-10-03 RX ADMIN — ROCURONIUM BROMIDE 20 MG: 10 INJECTION, SOLUTION INTRAVENOUS at 10:05

## 2024-10-03 RX ADMIN — CLINDAMYCIN PHOSPHATE 900 MG: 900 INJECTION, SOLUTION INTRAVENOUS at 09:18

## 2024-10-03 RX ADMIN — DEXMEDETOMIDINE HYDROCHLORIDE 5 MCG: 100 INJECTION, SOLUTION, CONCENTRATE INTRAVENOUS at 10:21

## 2024-10-03 RX ADMIN — Medication 100 MCG: at 09:19

## 2024-10-03 RX ADMIN — SODIUM CHLORIDE, POTASSIUM CHLORIDE, SODIUM LACTATE AND CALCIUM CHLORIDE: 600; 310; 30; 20 INJECTION, SOLUTION INTRAVENOUS at 09:07

## 2024-10-03 RX ADMIN — HYDROMORPHONE HYDROCHLORIDE 0.5 MG: 1 INJECTION, SOLUTION INTRAMUSCULAR; INTRAVENOUS; SUBCUTANEOUS at 11:49

## 2024-10-03 RX ADMIN — ESMOLOL HYDROCHLORIDE 10 MG: 100 INJECTION, SOLUTION INTRAVENOUS at 09:47

## 2024-10-03 RX ADMIN — FENTANYL CITRATE 25 MCG: 50 INJECTION, SOLUTION INTRAMUSCULAR; INTRAVENOUS at 09:34

## 2024-10-03 RX ADMIN — ACETAMINOPHEN 1000 MG: 500 TABLET ORAL at 08:27

## 2024-10-03 RX ADMIN — DEXMEDETOMIDINE HYDROCHLORIDE 10 MCG: 100 INJECTION, SOLUTION, CONCENTRATE INTRAVENOUS at 09:41

## 2024-10-03 RX ADMIN — ONDANSETRON 4 MG: 2 INJECTION INTRAMUSCULAR; INTRAVENOUS at 11:27

## 2024-10-03 RX ADMIN — FENTANYL CITRATE 25 MCG: 50 INJECTION, SOLUTION INTRAMUSCULAR; INTRAVENOUS at 11:13

## 2024-10-03 RX ADMIN — DEXAMETHASONE SODIUM PHOSPHATE 4 MG: 4 INJECTION, SOLUTION INTRAMUSCULAR; INTRAVENOUS at 09:18

## 2024-10-03 RX ADMIN — LIDOCAINE HYDROCHLORIDE 80 MG: 20 INJECTION, SOLUTION INTRAVENOUS at 09:12

## 2024-10-03 RX ADMIN — PROPOFOL 180 MG: 10 INJECTION, EMULSION INTRAVENOUS at 09:12

## 2024-10-03 RX ADMIN — DEXMEDETOMIDINE HYDROCHLORIDE 5 MCG: 100 INJECTION, SOLUTION, CONCENTRATE INTRAVENOUS at 09:36

## 2024-10-03 RX ADMIN — Medication 100 MCG: at 09:30

## 2024-10-03 RX ADMIN — ALBUTEROL SULFATE 2 PUFF: 90 AEROSOL, METERED RESPIRATORY (INHALATION) at 09:15

## 2024-10-03 RX ADMIN — FENTANYL CITRATE 25 MCG: 50 INJECTION, SOLUTION INTRAMUSCULAR; INTRAVENOUS at 09:29

## 2024-10-03 NOTE — ANESTHESIA PREPROCEDURE EVALUATION
" Anesthesia Evaluation     Patient summary reviewed and Nursing notes reviewed   no history of anesthetic complications:   NPO Solid Status: > 8 hours  NPO Liquid Status: > 2 hours           Airway   Mallampati: III  TM distance: >3 FB  Neck ROM: limited  No difficulty expected and Large neck circumference  Dental    (+) upper dentures        Pulmonary    (+) COPD severe, asthma,home oxygen (2L during the day; 3L at night), shortness of breath, sleep apnea (Bipap w 3L oxygen) on CPAP, decreased breath sounds    ROS comment: PFT 8/25/23  Impression:  Mild obstruction seen on spirometry.  FEV1 79% predicted.  No significant response to bronchodilator.  Lung volumes with evidence of air trapping.  Significant reduction in DLCO.  Cardiovascular   Exercise tolerance: good (4-7 METS)    ECG reviewed  Rhythm: regular  Rate: normal    (+) hypertension well controlled, CAD, angina with exertion, PRAKASH, hyperlipidemia    ROS comment: Echo 9/6/23  Normal left ventricular systolic function.  No significant valve abnormalities noted.    Stress test 9/22/23  ·  Left ventricular ejection fraction is normal (Calculated EF = 55%).  ·  Myocardial perfusion imaging indicates a normal myocardial perfusion study with no evidence of ischemia.  ·  Findings consistent with a normal ECG stress test.       Neuro/Psych  (+) seizures (\"pseudoseizures\" - last one was > 1 yr ago) well controlled, psychiatric history Anxiety and Depression  GI/Hepatic/Renal/Endo    (+) obesity, morbid obesity, hiatal hernia, GERD well controlled, renal disease- CRI, thyroid problem hypothyroidism    Musculoskeletal     (+) arthralgias, back pain  Abdominal   (+) obese   Substance History - negative use     OB/GYN negative ob/gyn ROS         Other   arthritis,   history of cancer    ROS/Med Hx Other: PAT Nursing Notes unavailable.       Phys Exam Other: 96% on 2 lpm via nc                   Anesthesia Plan    ASA 4     general     (Patient understands anesthesia not " responsible for dental damage.  )  intravenous induction     Anesthetic plan, risks, benefits, and alternatives have been provided, discussed and informed consent has been obtained with: patient.  Pre-procedure education provided  Use of blood products discussed with patient .    Plan discussed with CRNA.        CODE STATUS:

## 2024-10-03 NOTE — DISCHARGE INSTRUCTIONS
DISCHARGE INSTRUCTIONS LAPAROSCOPIC CHOLECYSTECTOMY/APPENDECTOMY  (GALL BLADDER)      For your surgery you had:  General anesthesia (you may have a sore throat for the first 24 hours)  IV sedation  Local anesthesia  Monitored anesthesia care  You received a medicated patch for nausea prevention today (behind your ear). It is recommended that you remove it 24-48 hours post-operatively. It must be removed within 72 hours.  You received an anesthesia medication today that can cause hormonal forms of birth control to be ineffective. You should use a different form of birth control (to prevent pregnancy) for 7 days.   You may experience dizziness, drowsiness, or light-headedness for several hours following surgery.  Do not stay alone tonight.  Limit your activity for 24 hours.  Resume your diet slowly.  Follow whatever special dietary instructions you may have been given by your doctor.  You should not drive, operate machinery, drink alcohol, or sign legally binding documents for 24 hours or while you are taking pain medication.  Last dose of pain medication was given at:   .    NOTIFY YOUR DOCTOR IF YOU EXPERIENCE ANY OF THE FOLLOWING:  Temperature greater than 101 degrees Fahrenheit  Shaking Chills  Redness or excessive drainage from incision  Nausea, vomiting and/or pain that is not controlled by prescribed medications  Increase in bleeding or bleeding that is excessive  Unable to urinate in 6 hours after surgery  If unable to reach your doctor, please go to the closest Emergency Room You may remove Band-Aid/dressing  in 48 hours on Saturday .  You may shower or bathe  Saturday .  Apply an ice pack 24-48 hours.  You may experience gas discomfort 24-48 hours after discharge, especially in chest and shoulders.  Changing position frequently may alleviate this discomfort.  If you have excessive pain, swelling, redness, drainage or other problems, notify your physician.  If unable to urinate in 6 to 8 hours after  surgery or urinating frequently in small amounts, notify your doctor or go to the nearest Emergency Room.  Medications per physician instructions as indicated on Discharge Medication Information Sheet.  You should see   for follow-up care  on  .  Phone number:      SPECIAL INSTRUCTIONS:                        I have read and received the above instructions.     Patient/Responsible Party's Signature Date/Time     RN Signature Date/Time

## 2024-10-03 NOTE — ANESTHESIA POSTPROCEDURE EVALUATION
Patient: Anca Samson    Procedure Summary       Date: 10/03/24 Room / Location: [facility] OR  / [facility]    Anesthesia Start: 0907 Anesthesia Stop: 1044    Procedure: VENTRAL / INCISIONAL HERNIA REPAIR LAPAROSCOPIC WITH DAVINCI ROBOT (Abdomen) Diagnosis:       Incisional hernia, without obstruction or gangrene      (Incisional hernia, without obstruction or gangrene [K43.2])    Surgeons: Eduardo Ingram MD Provider: Silvia Davis MD    Anesthesia Type: general ASA Status: 4            Anesthesia Type: general    Vitals  Vitals Value Taken Time   /41 10/03/24 1112   Temp     Pulse 93 10/03/24 1116   Resp     SpO2 95 % 10/03/24 1116   Vitals shown include unfiled device data.        Post Anesthesia Care and Evaluation    Patient location during evaluation: bedside  Patient participation: complete - patient participated  Level of consciousness: awake  Pain management: adequate    Airway patency: patent  PONV Status: none  Cardiovascular status: acceptable and stable  Respiratory status: acceptable  Hydration status: acceptable

## 2024-10-04 ENCOUNTER — APPOINTMENT (OUTPATIENT)
Dept: CT IMAGING | Facility: HOSPITAL | Age: 74
End: 2024-10-04
Payer: MEDICARE

## 2024-10-04 ENCOUNTER — TRANSITIONAL CARE MANAGEMENT TELEPHONE ENCOUNTER (OUTPATIENT)
Dept: CALL CENTER | Facility: HOSPITAL | Age: 74
End: 2024-10-04
Payer: MEDICARE

## 2024-10-04 ENCOUNTER — HOSPITAL ENCOUNTER (EMERGENCY)
Facility: HOSPITAL | Age: 74
Discharge: HOME | End: 2024-10-04
Attending: EMERGENCY MEDICINE
Payer: MEDICARE

## 2024-10-04 VITALS
RESPIRATION RATE: 14 BRPM | SYSTOLIC BLOOD PRESSURE: 153 MMHG | OXYGEN SATURATION: 93 % | DIASTOLIC BLOOD PRESSURE: 61 MMHG | BODY MASS INDEX: 38.95 KG/M2 | WEIGHT: 198.41 LBS | HEART RATE: 88 BPM | HEIGHT: 60 IN | TEMPERATURE: 99.1 F

## 2024-10-04 DIAGNOSIS — G89.18 POSTOPERATIVE PAIN: Primary | ICD-10-CM

## 2024-10-04 DIAGNOSIS — K57.92 DIVERTICULITIS: ICD-10-CM

## 2024-10-04 DIAGNOSIS — K43.2 INCISIONAL HERNIA, WITHOUT OBSTRUCTION OR GANGRENE: Primary | ICD-10-CM

## 2024-10-04 LAB
ALBUMIN SERPL-MCNC: 3.1 G/DL (ref 3.5–5.2)
ALBUMIN/GLOB SERPL: 1.2 G/DL
ALP SERPL-CCNC: 77 U/L (ref 39–117)
ALT SERPL W P-5'-P-CCNC: 9 U/L (ref 1–33)
ANION GAP SERPL CALCULATED.3IONS-SCNC: 13.4 MMOL/L (ref 5–15)
AST SERPL-CCNC: 8 U/L (ref 1–32)
BASOPHILS # BLD AUTO: 0.12 10*3/MM3 (ref 0–0.2)
BASOPHILS NFR BLD AUTO: 1 % (ref 0–1.5)
BILIRUB SERPL-MCNC: 0.7 MG/DL (ref 0–1.2)
BILIRUB UR QL STRIP: NEGATIVE
BUN SERPL-MCNC: 7 MG/DL (ref 8–23)
BUN/CREAT SERPL: 9.5 (ref 7–25)
CALCIUM SPEC-SCNC: 8.4 MG/DL (ref 8.6–10.5)
CHLORIDE SERPL-SCNC: 105 MMOL/L (ref 98–107)
CLARITY UR: CLEAR
CO2 SERPL-SCNC: 19.6 MMOL/L (ref 22–29)
COLOR UR: YELLOW
CREAT SERPL-MCNC: 0.74 MG/DL (ref 0.57–1)
D-LACTATE SERPL-SCNC: 1.3 MMOL/L (ref 0.5–2)
DEPRECATED RDW RBC AUTO: 57.4 FL (ref 37–54)
EGFRCR SERPLBLD CKD-EPI 2021: 84.5 ML/MIN/1.73
EOSINOPHIL # BLD AUTO: 0.16 10*3/MM3 (ref 0–0.4)
EOSINOPHIL NFR BLD AUTO: 1.4 % (ref 0.3–6.2)
ERYTHROCYTE [DISTWIDTH] IN BLOOD BY AUTOMATED COUNT: 15.7 % (ref 12.3–15.4)
GLOBULIN UR ELPH-MCNC: 2.5 GM/DL
GLUCOSE SERPL-MCNC: 147 MG/DL (ref 65–99)
GLUCOSE UR STRIP-MCNC: NEGATIVE MG/DL
HCT VFR BLD AUTO: 35.4 % (ref 34–46.6)
HGB BLD-MCNC: 11.6 G/DL (ref 12–15.9)
HGB UR QL STRIP.AUTO: NEGATIVE
HOLD SPECIMEN: NORMAL
HOLD SPECIMEN: NORMAL
IMM GRANULOCYTES # BLD AUTO: 0.04 10*3/MM3 (ref 0–0.05)
IMM GRANULOCYTES NFR BLD AUTO: 0.3 % (ref 0–0.5)
KETONES UR QL STRIP: ABNORMAL
LEUKOCYTE ESTERASE UR QL STRIP.AUTO: NEGATIVE
LIPASE SERPL-CCNC: 19 U/L (ref 13–60)
LYMPHOCYTES # BLD AUTO: 4.17 10*3/MM3 (ref 0.7–3.1)
LYMPHOCYTES NFR BLD AUTO: 35.5 % (ref 19.6–45.3)
MCH RBC QN AUTO: 32.2 PG (ref 26.6–33)
MCHC RBC AUTO-ENTMCNC: 32.8 G/DL (ref 31.5–35.7)
MCV RBC AUTO: 98.3 FL (ref 79–97)
MONOCYTES # BLD AUTO: 1.53 10*3/MM3 (ref 0.1–0.9)
MONOCYTES NFR BLD AUTO: 13 % (ref 5–12)
NEUTROPHILS NFR BLD AUTO: 48.8 % (ref 42.7–76)
NEUTROPHILS NFR BLD AUTO: 5.71 10*3/MM3 (ref 1.7–7)
NITRITE UR QL STRIP: NEGATIVE
NRBC BLD AUTO-RTO: 0 /100 WBC (ref 0–0.2)
PH UR STRIP.AUTO: 5.5 [PH] (ref 5–8)
PLATELET # BLD AUTO: 334 10*3/MM3 (ref 140–450)
PMV BLD AUTO: 9 FL (ref 6–12)
POTASSIUM SERPL-SCNC: 3.5 MMOL/L (ref 3.5–5.2)
PROT SERPL-MCNC: 5.6 G/DL (ref 6–8.5)
PROT UR QL STRIP: ABNORMAL
RBC # BLD AUTO: 3.6 10*6/MM3 (ref 3.77–5.28)
SODIUM SERPL-SCNC: 138 MMOL/L (ref 136–145)
SP GR UR STRIP: 1.02 (ref 1–1.03)
UROBILINOGEN UR QL STRIP: ABNORMAL
WBC NRBC COR # BLD AUTO: 11.73 10*3/MM3 (ref 3.4–10.8)
WHOLE BLOOD HOLD COAG: NORMAL
WHOLE BLOOD HOLD SPECIMEN: NORMAL

## 2024-10-04 PROCEDURE — 81003 URINALYSIS AUTO W/O SCOPE: CPT | Performed by: EMERGENCY MEDICINE

## 2024-10-04 PROCEDURE — 83605 ASSAY OF LACTIC ACID: CPT | Performed by: EMERGENCY MEDICINE

## 2024-10-04 PROCEDURE — 96374 THER/PROPH/DIAG INJ IV PUSH: CPT

## 2024-10-04 PROCEDURE — 85025 COMPLETE CBC W/AUTO DIFF WBC: CPT | Performed by: EMERGENCY MEDICINE

## 2024-10-04 PROCEDURE — 74177 CT ABD & PELVIS W/CONTRAST: CPT

## 2024-10-04 PROCEDURE — 25510000001 IOPAMIDOL PER 1 ML: Performed by: EMERGENCY MEDICINE

## 2024-10-04 PROCEDURE — 80053 COMPREHEN METABOLIC PANEL: CPT | Performed by: EMERGENCY MEDICINE

## 2024-10-04 PROCEDURE — 99285 EMERGENCY DEPT VISIT HI MDM: CPT

## 2024-10-04 PROCEDURE — 83690 ASSAY OF LIPASE: CPT | Performed by: EMERGENCY MEDICINE

## 2024-10-04 PROCEDURE — 25010000002 HYDROMORPHONE 1 MG/ML SOLUTION: Performed by: EMERGENCY MEDICINE

## 2024-10-04 PROCEDURE — 96375 TX/PRO/DX INJ NEW DRUG ADDON: CPT

## 2024-10-04 PROCEDURE — 36415 COLL VENOUS BLD VENIPUNCTURE: CPT

## 2024-10-04 PROCEDURE — 25010000002 ONDANSETRON PER 1 MG: Performed by: EMERGENCY MEDICINE

## 2024-10-04 PROCEDURE — 96376 TX/PRO/DX INJ SAME DRUG ADON: CPT

## 2024-10-04 RX ORDER — METRONIDAZOLE 500 MG/1
500 TABLET ORAL 3 TIMES DAILY
Qty: 30 TABLET | Refills: 0 | Status: SHIPPED | OUTPATIENT
Start: 2024-10-04

## 2024-10-04 RX ORDER — CIPROFLOXACIN 500 MG/1
500 TABLET, FILM COATED ORAL 2 TIMES DAILY
Qty: 20 TABLET | Refills: 0 | Status: SHIPPED | OUTPATIENT
Start: 2024-10-04

## 2024-10-04 RX ORDER — SODIUM CHLORIDE 0.9 % (FLUSH) 0.9 %
10 SYRINGE (ML) INJECTION AS NEEDED
Status: DISCONTINUED | OUTPATIENT
Start: 2024-10-04 | End: 2024-10-05 | Stop reason: HOSPADM

## 2024-10-04 RX ORDER — HYDROCODONE BITARTRATE AND ACETAMINOPHEN 10; 325 MG/1; MG/1
1 TABLET ORAL EVERY 6 HOURS PRN
Qty: 10 TABLET | Refills: 0 | Status: SHIPPED | OUTPATIENT
Start: 2024-10-04

## 2024-10-04 RX ORDER — IOPAMIDOL 755 MG/ML
100 INJECTION, SOLUTION INTRAVASCULAR
Status: COMPLETED | OUTPATIENT
Start: 2024-10-04 | End: 2024-10-04

## 2024-10-04 RX ORDER — CIPROFLOXACIN 250 MG/1
500 TABLET, FILM COATED ORAL ONCE
Status: COMPLETED | OUTPATIENT
Start: 2024-10-04 | End: 2024-10-04

## 2024-10-04 RX ORDER — METRONIDAZOLE 500 MG/1
500 TABLET ORAL ONCE
Status: COMPLETED | OUTPATIENT
Start: 2024-10-04 | End: 2024-10-04

## 2024-10-04 RX ORDER — ONDANSETRON 2 MG/ML
4 INJECTION INTRAMUSCULAR; INTRAVENOUS ONCE
Status: COMPLETED | OUTPATIENT
Start: 2024-10-04 | End: 2024-10-04

## 2024-10-04 RX ADMIN — IOPAMIDOL 100 ML: 755 INJECTION, SOLUTION INTRAVENOUS at 20:41

## 2024-10-04 RX ADMIN — Medication 10 ML: at 19:01

## 2024-10-04 RX ADMIN — HYDROMORPHONE HYDROCHLORIDE 1 MG: 1 INJECTION, SOLUTION INTRAMUSCULAR; INTRAVENOUS; SUBCUTANEOUS at 19:01

## 2024-10-04 RX ADMIN — METRONIDAZOLE 500 MG: 500 TABLET ORAL at 22:19

## 2024-10-04 RX ADMIN — HYDROMORPHONE HYDROCHLORIDE 0.5 MG: 1 INJECTION, SOLUTION INTRAMUSCULAR; INTRAVENOUS; SUBCUTANEOUS at 22:20

## 2024-10-04 RX ADMIN — ONDANSETRON 4 MG: 2 INJECTION INTRAMUSCULAR; INTRAVENOUS at 19:00

## 2024-10-04 RX ADMIN — CIPROFLOXACIN 500 MG: 250 TABLET, FILM COATED ORAL at 22:19

## 2024-10-04 NOTE — ED PROVIDER NOTES
Time: 6:23 PM EDT  Date of encounter:  10/4/2024  Independent Historian/Clinical History and Information was obtained by:   Patient    History is limited by: N/A    Chief Complaint: Abdominal pain      History of Present Illness:  Patient is a 74 y.o. year old female who presents to the emergency department for evaluation of abdominal pain.  Patient reports she had hernia repair surgery yesterday and had gallbladder surgery approximately 1 month ago.  She states her oral pain medicine has not been under control.    Patient Care Team  Primary Care Provider: Rena Guerra PA    Past Medical History:     Allergies   Allergen Reactions    Morphine Anaphylaxis    Cephalexin Hives    Penicillins Hives     Beta lactam allergy details  Antibiotic reaction: hives  Age at reaction: child  Dose to reaction time: unknown  Reason for antibiotic: unknown  Epinephrine required for reaction?: unknown  Tolerated antibiotics: other (none per pt)        Sulfa Antibiotics Hives     Past Medical History:   Diagnosis Date    Anxiety     Asthma 07/28/2021    Atherosclerosis of native coronary artery of native heart with angina pectoris 08/15/2023    FOLLOWED BY DR GONZALES/DINA PETTIT. DENIES CP BUT GETS SOA WITH EXERTION HAS COPD WEARS AT 2LPM N/C. DECREASED ACTIVITY D/T SOA.    C. difficile diarrhea     DENIES ANY CURRENT ISSUES    Cholelithiasis 7-    Colon cancer 07/21/2017    Colon polyp     COPD (chronic obstructive pulmonary disease) 10/23/2017    Depression     Disease of thyroid gland     Diverticulitis     Diverticulitis of colon     Dysphagia     Emphysema/COPD     GERD (gastroesophageal reflux disease)     Head injury     CONCUSSION AT AGE 4    Hernia 2019    History of chemotherapy     2017    Hx of psychiatric care     Impaired fasting glucose 08/14/2015    Lumbago     Lung nodule 02/17/2022    Major depressive disorder 10/27/2016    Malignant neoplasm of ascending colon 07/21/2017    Melena     Multiple myeloma      Nicotine dependence 05/10/2017    NICK (obstructive sleep apnea) 11/06/2023    Oxygen dependent     REPORTS USES 02 AT 2LPM VIA N/C DURING DAY AND 3LPM AT NIGHT . CAN GET VERY SOA WITH MINIMAL EXERTION    Pancreatitis 7/26/2024    Panic disorder     Renal insufficiency 07/28/2021    Seizures     PSEUDO SEIZURES LAST ONE AROUND JULY 2024    Shortness of breath     CAN GET VERY SOA WITH MINIMAL EXERTION    Tobacco use 07/28/2021    QUIT SMOKING JUNE 2022    Visit for screening mammogram 02/20/2020    NORMAL- REPEAT IN ONE YEAR    Vitamin D deficiency      Past Surgical History:   Procedure Laterality Date    APPENDECTOMY      BONE MARROW BIOPSY  2016    CHOLECYSTECTOMY N/A 08/08/2024    Procedure: CHOLECYSTECTOMY LAPAROSCOPIC WITH DAVINCI ROBOT;  Surgeon: Eduardo Ingram MD;  Location: [facility] OR [facility] [place];  Service: Robotics - DaVinci;  Laterality: N/A;    COLON SURGERY  2017    COLONOSCOPY  2018,2017,2019    [facility]- DR LE:DIVERTICULOSIS AND ERYTHEMA OF MUCOSA    COLONOSCOPY N/A 12/20/2022    Procedure: COLONOSCOPY WITH ELEVIEW INJECTION, POLYPECTOMY, HOT SNARE, CLIP APPLICATION X3, BIOPSIES;  Surgeon: Jarvis Borges MD;  Location: [facility] ENDOSCOPY;  Service: Gastroenterology;  Laterality: N/A;  COLON POLYP, DIVERTICULOSIS, ANASTOMOSIS RIGHT COLON    COLONOSCOPY N/A 11/09/2023    Procedure: COLONOSCOPY;  Surgeon: Jarvis Borges MD;  Location: [facility] ENDOSCOPY;  Service: Gastroenterology;  Laterality: N/A;  DIVERTICULOSIS, ANASTOMOSIS IN ASCENDING COLON    ENDOSCOPY  2018    ERCP N/A 07/27/2024    Procedure: ENDOSCOPIC RETROGRADE CHOLANGIOPANCREATOGRAPHY WITH SPHINCTEROTOMY, BALLOON SWEEP, STENT PLACEMENT;  Surgeon: Ajay Kennedy MD;  Location: [facility] MAIN OR;  Service: Gastroenterology;  Laterality: N/A;  BILE DUCT STONE    ERCP N/A 9/5/2024    Procedure: ENDOSCOPIC RETROGRADE CHOLANGIOPANCREATOGRAPHY WITH SPINCHTEROTOMY. BALLOON DILATATION 8-10. BALLOON SWEEP 12-15. STENT  REMOVAL;  Surgeon: Ajay Kennedy MD;  Location: [facility] ENDOSCOPY;  Service: Gastroenterology;  Laterality: N/A;  BILE DUCT STONES    EYE SURGERY      FECAL DISIMPACTION  06/2019    TRANSPLANT     GALLBLADDER SURGERY      HEMICOLECTOMY Right 05/2017    HYSTERECTOMY  1982,1984    KNEE ARTHROSCOPY Left 01/03/2022    Procedure: KNEE ARTHROSCOPY WITH PARTIAL MEDIAL MENISCECTOMY,  CHONDROPLASTY;  Surgeon: Nicholas Tipton MD;  Location: [facility] OR [facility] [place];  Service: Orthopedics;  Laterality: Left;    MINI-LAPAROTOMY  2017    PORTACATH PLACEMENT N/A     pt states that her port does not work    TONSILLECTOMY      UPPER GASTROINTESTINAL ENDOSCOPY  2018    VENOUS ACCESS DEVICE (PORT) INSERTION N/A 10/31/2023    Procedure: Port-a-catheter removal and port-a-catheter placement;  Surgeon: Alcon Delgado MD;  Location: [facility] OR [facility] [place];  Service: General;  Laterality: N/A;    VENTRAL HERNIA REPAIR N/A 10/3/2024    Procedure: VENTRAL / INCISIONAL HERNIA REPAIR LAPAROSCOPIC WITH DAVINCI ROBOT;  Surgeon: Eduardo Ingram MD;  Location: [facility] OR [facility] [place];  Service: Robotics - DaVinci;  Laterality: N/A;     Family History   Problem Relation Age of Onset    Heart disease Mother     Lung cancer Mother     Cancer Mother     Stroke Father     Heart disease Father     Kidney cancer Father     Cancer Father     Stroke Other         UNCLE/AUNT    Colon cancer Neg Hx     Malig Hyperthermia Neg Hx        Home Medications:  Prior to Admission medications    Medication Sig Start Date End Date Taking? Authorizing Provider   acyclovir (ZOVIRAX) 400 MG tablet Take 1 tablet by mouth 2 (Two) Times a Day. Take one tablet by mouth twice daily. 10/26/23   West Nicolas MD   albuterol sulfate  (90 Base) MCG/ACT inhaler Inhale 2 puffs Every 4 (Four) Hours As Needed for Wheezing. 2/15/24   Radha Mena APRN   arformoterol (BROVANA) 15 MCG/2ML nebulizer solution USE 1 VIAL  IN  NEBULIZER TWICE  DAILY - MORNING & EVENING 7/29/24   Jesus  Felton VAZQUEZ MD   aspirin 81 MG EC tablet Take 1 tablet by mouth Daily.    ProviderHerson MD   budesonide (PULMICORT) 0.5 MG/2ML nebulizer solution USE 1 VIAL  IN  NEBULIZER TWICE  DAILY - RINSE MOUTH OUT AFTER TREATMENT 7/29/24   Felton Lee MD   busPIRone (BUSPAR) 15 MG tablet Take 1 tablet by mouth 2 (Two) Times a Day. 8/19/24   Kalia Singer MD   colestipol (COLESTID) 1 g tablet TAKE 2 TABLETS BY MOUTH TWICE A DAY 9/13/24   Kaelyn Fitch APRN   dapsone 25 MG tablet TAKE 2 TABLETS BY MOUTH TWICE A DAY 2/23/24   West Nicolas MD   dexAMETHasone (DECADRON) 4 MG tablet Take 10 tablets on D 1,8,15,22 and take 1 tablet on D 2,3.  Take in the morning with food. 4/10/24   West Nicolas MD   FLUoxetine (PROzac) 40 MG capsule Take 1 capsule by mouth Daily. 9/19/24   Kalia Singer MD   HYDROcodone-acetaminophen (NORCO)  MG per tablet Take 1 tablet by mouth Every 6 (Six) Hours As Needed for Moderate Pain. 10/4/24   Eduardo Ingram MD   HYDROcodone-acetaminophen (NORCO) 5-325 MG per tablet Take 1 tablet by mouth Every 6 (Six) Hours As Needed for Moderate Pain (Pain). 8/8/24   Eduardo Ingram MD   HYDROcodone-acetaminophen (NORCO) 5-325 MG per tablet Take 1 tablet by mouth Every 6 (Six) Hours As Needed for Moderate Pain (Pain). 10/3/24   Eduardo Ingram MD   lenalidomide (REVLIMID) 15 MG capsule Take 1 capsule by mouth Daily. On Days 1-21, and off 7 days, on a 28 day cycle 10/2/24   West Nicolas MD   levalbuterol (XOPENEX) 0.63 MG/3ML nebulizer solution Take 1 ampule by nebulization 4 (Four) Times a Day As Needed for Wheezing. 5/15/24   Felton Lee MD   nitrofurantoin, macrocrystal-monohydrate, (Macrobid) 100 MG capsule Take 1 capsule by mouth 2 (Two) Times a Day. 9/23/24   Rena Guerra PA   ondansetron (ZOFRAN) 8 MG tablet Take 1 tablet by mouth 3 (Three) Times a Day As Needed for Nausea or Vomiting. 10/26/23   West Nicolas MD   promethazine (PHENERGAN) 25 MG tablet  "Take 1 tablet by mouth Every 6 (Six) Hours As Needed for Nausea or Vomiting. 24   West Nicolas MD   vitamin D (ERGOCALCIFEROL) 1.25 MG (65808 UT) capsule capsule Take 1 capsule by mouth 2 (Two) Times a Week. 24   Rena Guerra PA Yupelri 175 MCG/3ML nebulizer solution USE 1 VIAL IN NEBULIZER DAILY 24   Felton Lee MD        Social History:   Social History     Tobacco Use    Smoking status: Former     Current packs/day: 0.00     Average packs/day: 1 pack/day for 47.4 years (47.4 ttl pk-yrs)     Types: Cigarettes     Start date: 1975     Quit date: 6/3/2022     Years since quittin.3     Passive exposure: Past    Smokeless tobacco: Never   Vaping Use    Vaping status: Never Used   Substance Use Topics    Alcohol use: Never    Drug use: Never         Review of Systems:  Review of Systems   Gastrointestinal:  Positive for abdominal pain.        Physical Exam:  /61   Pulse 88   Temp 99.1 °F (37.3 °C) (Oral)   Resp 14   Ht 152.4 cm (60\")   Wt 90 kg (198 lb 6.6 oz)   SpO2 93%   BMI 38.75 kg/m²     Physical Exam  Vitals and nursing note reviewed.   Constitutional:       General: She is in acute distress.   Cardiovascular:      Rate and Rhythm: Normal rate and regular rhythm.      Heart sounds: Normal heart sounds.   Pulmonary:      Effort: Pulmonary effort is normal. No respiratory distress.      Breath sounds: Normal breath sounds.   Abdominal:      General: Abdomen is flat.      Palpations: Abdomen is soft.      Tenderness: There is no abdominal tenderness.   Musculoskeletal:         General: Normal range of motion.      Cervical back: Normal range of motion and neck supple.   Skin:     General: Skin is warm and dry.   Neurological:      Mental Status: She is alert and oriented to person, place, and time. Mental status is at baseline.                  Procedures:  Procedures      Medical Decision Making:      Comorbidities that affect care:    History of " diverticulitis, postop day 1 robotic incisional hernia repair    External Notes reviewed:    Previous Operation Note: Op note from yesterday, robotic incisional hernia repair done by Dr. Ingram no complications of procedure      The following orders were placed and all results were independently analyzed by me:  Orders Placed This Encounter   Procedures    CT Abdomen Pelvis With Contrast    [place] Draw    Comprehensive Metabolic Panel    Lipase    Urinalysis With Microscopic If Indicated (No Culture) - Urine, Clean Catch    Lactic Acid, Plasma    CBC Auto Differential    CBC & Differential    Green Top (Gel)    Lavender Top    Gold Top - SST    Light Blue Top       Medications Given in the Emergency Department:  Medications   HYDROmorphone (DILAUDID) injection 1 mg (1 mg Intravenous Given 10/4/24 1901)   ondansetron (ZOFRAN) injection 4 mg (4 mg Intravenous Given 10/4/24 1900)   iopamidol (ISOVUE-370) 76 % injection 100 mL (100 mL Intravenous Given 10/4/24 2041)   ciprofloxacin (CIPRO) tablet 500 mg (500 mg Oral Given 10/4/24 2219)   metroNIDAZOLE (FLAGYL) tablet 500 mg (500 mg Oral Given 10/4/24 2219)   HYDROmorphone (DILAUDID) injection 0.5 mg (0.5 mg Intravenous Given 10/4/24 2220)        ED Course:         Labs:    Lab Results (last 24 hours)       ** No results found for the last 24 hours. **             Imaging:    No Radiology Exams Resulted Within Past 24 Hours      Differential Diagnosis and Discussion:    Abdominal Pain: Based on the patient's signs and symptoms, I considered abdominal aortic aneurysm, small bowel obstruction, pancreatitis, acute cholecystitis, acute appendecitis, peptic ulcer disease, gastritis, colitis, endocrine disorders, irritable bowel syndrome and other differential diagnosis an etiology of the patient's abdominal pain.    All labs were reviewed and interpreted by me.  CT scan radiology impression was interpreted by me.    MDM  Patient's pain was improved with pain medication.   Her CT evaluation shows mild diverticulitis.  There does not appear to be any complications from her recent surgery.  Patient was discussed with her surgeon Dr. Ingram who agrees that the patient can be discharged home after he reviewed her CT and lab work.  Patient will be placed on antibiotic for the diverticulitis she will continue her postoperative instructions.            Patient Care Considerations:          Consultants/Shared Management Plan:    Patient discussed with Dr. Ingram, general surgery    Social Determinants of Health:    Patient is independent, reliable, and has access to care.       Disposition and Care Coordination:    Discharged: The patient is suitable and stable for discharge with no need for consideration of admission.    I have explained the patient´s condition, diagnoses and treatment plan based on the information available to me at this time. I have answered questions and addressed any concerns. The patient has a good  understanding of the patient´s diagnosis, condition, and treatment plan as can be expected at this point. The vital signs have been stable. The patient´s condition is stable and appropriate for discharge from the emergency department.      The patient will pursue further outpatient evaluation with the primary care physician or other designated or consulting physician as outlined in the discharge instructions. They are agreeable to this plan of care and follow-up instructions have been explained in detail. The patient has received these instructions in written format and has expressed an understanding of the discharge instructions. The patient is aware that any significant change in condition or worsening of symptoms should prompt an immediate return to this or the closest emergency department or call to 911.  I have explained discharge medications and the need for follow up with the patient/caretakers. This was also printed in the discharge instructions. Patient was  discharged with the following medications and follow up:      Medication List        New Prescriptions      ciprofloxacin 500 MG tablet  Commonly known as: CIPRO  Take 1 tablet by mouth 2 (Two) Times a Day.     metroNIDAZOLE 500 MG tablet  Commonly known as: FLAGYL  Take 1 tablet by mouth 3 (Three) Times a Day.               Where to Get Your Medications        These medications were sent to [facility]/pharmacy #[id number] - [place], [place] - [address] - [phone]  - [phone]   [address] [place] [address]      Hours: 24-hours Phone: [phone]   ciprofloxacin 500 MG tablet  metroNIDAZOLE 500 MG tablet      Eduardo Ingram MD  [address]  [place] [address]  [phone]    Schedule an appointment as soon as possible for a visit          Final diagnoses:   Postoperative pain   Diverticulitis        ED Disposition       ED Disposition   Discharge    Condition   Stable    Comment   --               This medical record created using voice recognition software.             Edison Michael DO  10/06/24 [id number]

## 2024-10-04 NOTE — OUTREACH NOTE
Call Center TCM Note      Flowsheet Row Responses   [facility] patient discharged from? [facility]   Does the patient have one of the following disease processes/diagnoses(primary or secondary)? General Surgery   TCM attempt successful? Yes   Call start time 1124   Call end time 1134   Discharge diagnosis Incisional hernia, without obstruction or gangrene   Meds reviewed with patient/caregiver? Yes   Is the patient having any side effects they believe may be caused by any medication additions or changes? No   Does the patient have all medications related to this admission filled (includes all antibiotics, pain medications, etc.) Yes   Prescription comments Plans to  newly prescribed medications today (10/4/24)   Is the patient taking all medications as directed (includes completed medication regime)? Yes   Comments Post op apt on 10/18/24   Does the patient have an appointment with their PCP within 7-14 days of discharge? No   Nursing Interventions Patient declined scheduling/rescheduling appointment at this time   Has home health visited the patient within 72 hours of discharge? N/A   DME comments Pt is wearing 2L O2-pulse ox reads 98%   Psychosocial issues? No   Did the patient receive a copy of their discharge instructions? Yes   Nursing interventions Reviewed instructions with patient   What is the patient's perception of their health status since discharge? Same  [Stomache/pain/low appetite,  surgeon's office is aware-meds were prescribed -pt was given instructions by the surgeon to call tomorrow if symptoms do not improve,  nurse instructed pt to seek medical attention if pain continues]   Nursing interventions Nurse provided patient education   Is the patient /caregiver able to teach back basic post-op care? Drive as instructed by MD in discharge instructions, Take showers only when approved by MD-sponge bathe until then, No tub bath, swimming, or hot tub until instructed by MD, Keep incision areas  clean,dry and protected, Lifting as instructed by MD in discharge instructions, Practice 'cough and deep breath'   Is the patient/caregiver able to teach back signs and symptoms of incisional infection? Increased redness, swelling or pain at the incisonal site, Increased drainage or bleeding, Fever, Pus or odor from incision, Incisional warmth  [pain]   Is the patient/caregiver able to teach back steps to recovery at home? Rest and rebuild strength, gradually increase activity, Set small, achievable goals for return to baseline health, Practice good oral hygiene, Eat a well-balance diet, Make a list of questions for surgeon's appointment   If the patient is a current smoker, are they able to teach back resources for cessation? Not a smoker   Is the patient/caregiver able to teach back the hierarchy of who to call/visit for symptoms/problems? PCP, Specialist, Home health nurse, Urgent Care, ED, 911 Yes   TCM call completed? Yes   Call end time [phone]            Adriana Levi RN    10/4/2024, 11:35 EDT

## 2024-10-04 NOTE — OP NOTE
VENTRAL / INCISIONAL HERNIA REPAIR LAPAROSCOPIC WITH DAVINCI ROBOT  Procedure Report    Patient Name:  Anca Samson  YOB: 1950    Date of Surgery:  10/3/2024     Indications: The patient is a 74-year-old female who presented with a known upper abdominal incisional hernia.  The decision was made to proceed with a robotic incisional hernia repair.    Pre-op Diagnosis: Incisional hernia    Post-Op Diagnosis: Same    Procedure/CPT® Codes:    Robotic incisional hernia repair    Staff:  Surgeon(s):  Eduardo Ingram MD    Assistant: Vanesa Rae CRNFA    Anesthesia: General    Estimated Blood Loss: 30 mL    Implants:    Implant Name Type Inv. Item Serial No.  Lot No. LRB No. Used Action   DEV CLS WND VLOC/180 JONNY ABS 1/2CIR SZ2/0 23CM 37MM GRN - [id number] Implant DEV CLS WND VLOC/180 JONNY ABS 1/2CIR SZ2/0 23CM 37MM GRN  COVIDIEN [id number] N/A 2 Implanted   DEV CLS WND VLOC/90 JONNY ABS 1/2CIR SZ3/0 26MM 15CM EDWIGE - [id number] Implant DEV CLS WND VLOC/90 JONNY ABS 1/2CIR SZ3/0 26MM 15CM EDWIGE  COVIDIEN [id number] N/A 1 Implanted   MESH VENTRALIGHT ST ECHO PS POSTN 6X8 - [id number] Implant MESH VENTRALIGHT ST ECHO PS POSTN 6X8  DAVOL  (DIV OF [employer]) [id number] N/A 1 Implanted   DEV CLS WND VLOC/180 JONNY ABS 1/2CIR SZ2/0 23CM 37MM GRN - [id number] Implant DEV CLS WND VLOC/180 JONNY ABS 1/2CIR SZ2/0 23CM 37MM GRN  COVIDIEN [id number] N/A 1 Implanted       Specimen:          None        Findings: 8 cm x 6 cm ventral hernia defect with chronically incarcerated colon    Complications: None    Description of Procedure: The patient was taken the operating room and placed on the table in supine position.  After induction of general anesthesia, the abdomen was prepped and draped sterilely.  The abdomen was insufflated with a Veress needle and a 12 mm left upper quadrant port was placed in the peritoneal cavity using a Visiport.  Three 8 mm da Radha robotic trocars were then placed in the abdomen  under direct vision.  A da Radha robot was then brought to the table and the robotic arms were docked to the trocars.  The camera and instruments were then placed.  We began taking adhesions down from the anterior abdominal wall and eventually identified the upper abdominal ventral hernia defect.  There was chronically incarcerated omentum and colon in the hernia defect and we began to take the adhesions down and eventually were able to reduce the colon and omentum out of the defect.  There appeared to be a very small serosal tear on the colon and I repaired that with a running 3-0 absorbable V-Loc suture.  We then continued to take adhesions down going inferiorly off the anterior abdominal wall to facilitate good mesh positioning.  The defect was noted to be about 8 cm in longitudinal orientation and about 6 cm transversely.  We took a 15 x 20 cm piece of ventral light echo mesh and placed in the peritoneal cavity and then pulled up to the anterior abdominal wall below the hernia defect using a suture passer.  The mesh was then sewn in place with running 2-0 absorbable V-Loc sutures.  Once all the sutures were placed we appear to have secure fixation of the mesh to the anterior abdominal wall and had good coverage from the margins of the hernia defect of at least 4 to 5 cm in all directions.  We had good hemostasis.  At this point the robotic arms were undocked from the trocars and the instruments were removed from the abdomen.  The 12 mm port site was closed with a Constantine-Leticia closure device and a 0 Mersilene time.  The abdomen was desufflated and the other ports were removed.  All skin incisions were closed with 4-0 Vicryl subcuticular sutures.  Sterile dressings were applied and she was taken the postanesthesia recovery room in stable condition.    Assistant: Vanesa Rae CRNFA  was responsible for performing the following activities: Retraction, Suction, Suturing, Placing Dressing, and Held/Positioned  Camera and their skilled assistance was necessary for the success of this case.    Eduardo Ingram MD     Date: 10/4/2024  Time: 09:24 EDT

## 2024-10-04 NOTE — OUTREACH NOTE
Prep Survey      Flowsheet Row Responses   [facility] patient discharged from? [facility]   Is LACE score < 7 ? No   Eligibility [facility]   Date of Admission 10/03/24   Date of Discharge 10/03/24   Discharge Disposition Home or Self Care   Discharge diagnosis Incisional hernia, without obstruction or gangrene   Does the patient have one of the following disease processes/diagnoses(primary or secondary)? General Surgery   Does the patient have Home health ordered? No   Is there a DME ordered? No   Medication alerts for this patient see avs   Prep survey completed? Yes            Aissatou RAPP - Registered Nurse

## 2024-10-07 ENCOUNTER — TELEPHONE (OUTPATIENT)
Dept: ONCOLOGY | Facility: HOSPITAL | Age: 74
End: 2024-10-07

## 2024-10-07 NOTE — TELEPHONE ENCOUNTER
Pharmacy Name:  [facility]    Pharmacy representative name: JOSEAZ    Pharmacy representative phone number: [phone]  [phone]    What medication are you calling in regards to: REVLIMID    What question does the pharmacy have: REQUESTING A PRESCRIPTION FAXED OVER FOR 12 CAPSULES   PT HAS 9    Who is the provider that prescribed the medication: DR JACKSON

## 2024-10-14 ENCOUNTER — READMISSION MANAGEMENT (OUTPATIENT)
Dept: CALL CENTER | Facility: HOSPITAL | Age: 74
End: 2024-10-14
Payer: MEDICARE

## 2024-10-14 DIAGNOSIS — C90.00 MULTIPLE MYELOMA, REMISSION STATUS UNSPECIFIED: ICD-10-CM

## 2024-10-14 RX ORDER — DAPSONE 25 MG/1
50 TABLET ORAL 2 TIMES DAILY
Qty: 360 TABLET | Refills: 1 | Status: SHIPPED | OUTPATIENT
Start: 2024-10-14

## 2024-10-14 RX ORDER — ACYCLOVIR 400 MG/1
400 TABLET ORAL 2 TIMES DAILY
Qty: 180 TABLET | Refills: 3 | Status: SHIPPED | OUTPATIENT
Start: 2024-10-14

## 2024-10-14 NOTE — OUTREACH NOTE
General Surgery Week 2 Survey      Flowsheet Row Responses   [facility] patient discharged from? [facility]   Does the patient have one of the following disease processes/diagnoses(primary or secondary)? General Surgery   Week 2 attempt successful? Yes   Call start time 1232   Call end time 1244   Discharge diagnosis Incisional hernia, without obstruction or gangrene   Meds reviewed with patient/caregiver? Yes   Is the patient having any side effects they believe may be caused by any medication additions or changes? No   Is the patient taking all medications as directed (includes completed medication regime)? Yes   Does the patient have a follow up appointment scheduled with their surgeon? Yes  [10/18/24]   Has the patient kept scheduled appointments due by today? N/A   Has home health visited the patient within 72 hours of discharge? N/A   DME comments continuous oxygen, wears 2L   Psychosocial issues? No   What is the patient's perception of their health status since discharge? Same  [pt c/o abd pain, decreased appetite-eats small portions]   Nursing interventions Nurse provided patient education   Is the patient /caregiver able to teach back basic post-op care? Keep incision areas clean,dry and protected, Lifting as instructed by MD in discharge instructions, No tub bath, swimming, or hot tub until instructed by MD   Is the patient/caregiver able to teach back signs and symptoms of incisional infection? Increased redness, swelling or pain at the incisonal site, Increased drainage or bleeding   Is the patient/caregiver able to teach back steps to recovery at home? Set small, achievable goals for return to baseline health, Rest and rebuild strength, gradually increase activity   Is the patient/caregiver able to teach back the hierarchy of who to call/visit for symptoms/problems? PCP, Specialist, Home health nurse, Urgent Care, ED, 911 Yes   Week 2 call completed? Yes   Is the patient interested in additional  calls from an ambulatory ? No   Would this patient benefit from a Referral to [facility] Social Work? No   Call end time 3095            Silvia ARROYO - Registered Nurse

## 2024-10-17 ENCOUNTER — SPECIALTY PHARMACY (OUTPATIENT)
Dept: PHARMACY | Facility: HOSPITAL | Age: 74
End: 2024-10-17
Payer: MEDICARE

## 2024-10-18 ENCOUNTER — OFFICE VISIT (OUTPATIENT)
Dept: SURGERY | Facility: CLINIC | Age: 74
End: 2024-10-18
Payer: MEDICARE

## 2024-10-18 VITALS — WEIGHT: 190 LBS | HEIGHT: 60 IN | RESPIRATION RATE: 16 BRPM | BODY MASS INDEX: 37.3 KG/M2

## 2024-10-18 DIAGNOSIS — K57.92 DIVERTICULITIS: ICD-10-CM

## 2024-10-18 DIAGNOSIS — K43.2 INCISIONAL HERNIA, WITHOUT OBSTRUCTION OR GANGRENE: Primary | ICD-10-CM

## 2024-10-18 NOTE — LETTER
October 18, 2024     [facility]  [address]  [address]  [place] [address]    Patient: Anca Samson   YOB: 1950   Date of Visit: 10/18/2024     Dear [facility]:       Thank you for referring Anca Samson to me for evaluation. Below are the relevant portions of my assessment and plan of care.    If you have questions, please do not hesitate to call me. I look forward to following Anca along with you.         Sincerely,        Eduardo Ingram MD        CC: No Recipients    Eduardo Ingram MD  10/18/24 0930  Sign when Signing Visit  Chief Complaint  Post-op Hernia    Subjective         Anca Samson presents to [facility] GENERAL SURGERY  History of Present Illness    Anca Samson is a 74 y.o. female  who presents today for a postoperative visit.     Patient is here for a follow-up after recent surgery for an incisional hernia.  We did a robotic repair.  She was back in the emergency room a few days later and was noted to have some mild sigmoid diverticulitis but no complications related to the hernia repair.  She was treated with some oral antibiotics and she is doing quite a bit better now.    Past History:  Medical History: has a past medical history of Anxiety, Asthma (07/28/2021), Atherosclerosis of native coronary artery of native heart with angina pectoris (08/15/2023), C. difficile diarrhea, Cholelithiasis (7-), Colon cancer (07/21/2017), Colon polyp, COPD (chronic obstructive pulmonary disease) (10/23/2017), Depression, Disease of thyroid gland, Diverticulitis, Diverticulitis of colon, Dysphagia, Emphysema/COPD, GERD (gastroesophageal reflux disease), Head injury, Hernia (2019), History of chemotherapy, psychiatric care, Impaired fasting glucose (08/14/2015), Lumbago, Lung nodule (02/17/2022), Major depressive disorder (10/27/2016), Malignant neoplasm of ascending colon (07/21/2017), Melena, Multiple myeloma, Nicotine  dependence (05/10/2017), NICK (obstructive sleep apnea) (11/06/2023), Oxygen dependent, Pancreatitis (7/26/2024), Panic disorder, Renal insufficiency (07/28/2021), Seizures, Shortness of breath, Tobacco use (07/28/2021), Visit for screening mammogram (02/20/2020), and Vitamin D deficiency.   Surgical History: has a past surgical history that includes Bone marrow biopsy (2016); Colonoscopy (2018,2017,2019); Esophagogastroduodenoscopy (2018); Fecal disimpaction (06/2019); Hysterectomy (1982,1984); Mini-laparotomy (2017); Portacath placement (N/A); Colon surgery (2017); Hemicolectomy (Right, 05/2017); Upper gastrointestinal endoscopy (2018); Knee Arthroscopy (Left, 01/03/2022); Appendectomy; Colonoscopy (N/A, 12/20/2022); Venous Access Device (Port) (N/A, 10/31/2023); Colonoscopy (N/A, 11/09/2023); Eye surgery; Tonsillectomy; ERCP (N/A, 07/27/2024); Cholecystectomy (N/A, 08/08/2024); Gallbladder surgery; ERCP (N/A, 9/5/2024); and Ventral hernia repair (N/A, 10/3/2024).   Family History: family history includes Cancer in her father and mother; Heart disease in her father and mother; Kidney cancer in her father; Lung cancer in her mother; Stroke in her father and another family member.   Social History: reports that she quit smoking about 2 years ago. Her smoking use included cigarettes. She started smoking about 49 years ago. She has a 47.4 pack-year smoking history. She has been exposed to tobacco smoke. She has never used smokeless tobacco. She reports that she does not drink alcohol and does not use drugs.  Allergies: Morphine, Cephalexin, Penicillins, and Sulfa antibiotics       Current Outpatient Medications:   •  acyclovir (ZOVIRAX) 400 MG tablet, TAKE 1 TABLET BY MOUTH 2 (TWO) TIMES A DAY. TAKE ONE TABLET BY MOUTH TWICE DAILY., Disp: 180 tablet, Rfl: 3  •  albuterol sulfate  (90 Base) MCG/ACT inhaler, Inhale 2 puffs Every 4 (Four) Hours As Needed for Wheezing., Disp: 18 g, Rfl: 11  •  arformoterol (BROVANA)  15 MCG/2ML nebulizer solution, USE 1 VIAL  IN  NEBULIZER TWICE  DAILY - MORNING & EVENING, Disp: 360 mL, Rfl: 11  •  aspirin 81 MG EC tablet, Take 1 tablet by mouth Daily., Disp: , Rfl:   •  budesonide (PULMICORT) 0.5 MG/2ML nebulizer solution, USE 1 VIAL  IN  NEBULIZER TWICE  DAILY - RINSE MOUTH OUT AFTER TREATMENT, Disp: 60 each, Rfl: 11  •  busPIRone (BUSPAR) 15 MG tablet, Take 1 tablet by mouth 2 (Two) Times a Day., Disp: 180 tablet, Rfl: 1  •  Calcium Carb-Cholecalciferol (calcium 500 mg vitamin D 5 mcg, 200 UT,) 500-5 MG-MCG tablet per tablet, TAKE 1 TABLET WITH MEALS ORALLY TWICE A DAY 60 DAYS, Disp: , Rfl:   •  ciprofloxacin (CIPRO) 500 MG tablet, Take 1 tablet by mouth 2 (Two) Times a Day., Disp: 20 tablet, Rfl: 0  •  colestipol (COLESTID) 1 g tablet, TAKE 2 TABLETS BY MOUTH TWICE A DAY, Disp: 360 tablet, Rfl: 1  •  dapsone 25 MG tablet, TAKE 2 TABLETS BY MOUTH TWICE A DAY, Disp: 360 tablet, Rfl: 1  •  dexAMETHasone (DECADRON) 4 MG tablet, Take 10 tablets on D 1,8,15,22 and take 1 tablet on D 2,3.  Take in the morning with food., Disp: 42 tablet, Rfl: 5  •  FLUoxetine (PROzac) 40 MG capsule, Take 1 capsule by mouth Daily., Disp: 90 capsule, Rfl: 1  •  HYDROcodone-acetaminophen (NORCO)  MG per tablet, Take 1 tablet by mouth Every 6 (Six) Hours As Needed for Moderate Pain., Disp: 10 tablet, Rfl: 0  •  HYDROcodone-acetaminophen (NORCO) 5-325 MG per tablet, Take 1 tablet by mouth Every 6 (Six) Hours As Needed for Moderate Pain (Pain)., Disp: 10 tablet, Rfl: 0  •  HYDROcodone-acetaminophen (NORCO) 5-325 MG per tablet, Take 1 tablet by mouth Every 6 (Six) Hours As Needed for Moderate Pain (Pain)., Disp: 10 tablet, Rfl: 0  •  lenalidomide (REVLIMID) 15 MG capsule, Take 1 capsule by mouth Daily. On Days 1-21, and off 7 days, on a 28 day cycle, Disp: 12 capsule, Rfl: 0  •  levalbuterol (XOPENEX) 0.63 MG/3ML nebulizer solution, Take 1 ampule by nebulization 4 (Four) Times a Day As Needed for Wheezing., Disp:  "120 mL, Rfl: 5  •  metroNIDAZOLE (FLAGYL) 500 MG tablet, Take 1 tablet by mouth 3 (Three) Times a Day., Disp: 30 tablet, Rfl: 0  •  nitrofurantoin, macrocrystal-monohydrate, (Macrobid) 100 MG capsule, Take 1 capsule by mouth 2 (Two) Times a Day., Disp: 14 capsule, Rfl: 0  •  ondansetron (ZOFRAN) 8 MG tablet, Take 1 tablet by mouth 3 (Three) Times a Day As Needed for Nausea or Vomiting., Disp: 30 tablet, Rfl: 5  •  promethazine (PHENERGAN) 25 MG tablet, Take 1 tablet by mouth Every 6 (Six) Hours As Needed for Nausea or Vomiting., Disp: 30 tablet, Rfl: 1  •  vitamin D (ERGOCALCIFEROL) 1.25 MG (19597 UT) capsule capsule, Take 1 capsule by mouth 2 (Two) Times a Week., Disp: 26 capsule, Rfl: 1  •  Yupelri 175 MCG/3ML nebulizer solution, USE 1 VIAL IN NEBULIZER DAILY, Disp: 90 mL, Rfl: 11       Physical Exam  She looks good.  Her abdomen is soft.  All of her incisions look fine and her hernia  repair feels intact.  Objective    Vital Signs:   Resp 16   Ht 152.4 cm (60\")   Wt 86.2 kg (190 lb)   BMI 37.11 kg/m²              Assessment and Plan    Diagnoses and all orders for this visit:    1. Incisional hernia, without obstruction or gangrene (Primary)    2. Diverticulitis    I will see her back in 4 weeks.  I have asked her to call me in the meantime if she were to have any further questions or concerns.      "

## 2024-10-18 NOTE — PROGRESS NOTES
Chief Complaint  Post-op Hernia    Subjective          Anca Samson presents to [facility] GENERAL SURGERY  History of Present Illness    Anca Samson is a 74 y.o. female  who presents today for a postoperative visit.     Patient is here for a follow-up after recent surgery for an incisional hernia.  We did a robotic repair.  She was back in the emergency room a few days later and was noted to have some mild sigmoid diverticulitis but no complications related to the hernia repair.  She was treated with some oral antibiotics and she is doing quite a bit better now.    Past History:  Medical History: has a past medical history of Anxiety, Asthma (07/28/2021), Atherosclerosis of native coronary artery of native heart with angina pectoris (08/15/2023), C. difficile diarrhea, Cholelithiasis (7-), Colon cancer (07/21/2017), Colon polyp, COPD (chronic obstructive pulmonary disease) (10/23/2017), Depression, Disease of thyroid gland, Diverticulitis, Diverticulitis of colon, Dysphagia, Emphysema/COPD, GERD (gastroesophageal reflux disease), Head injury, Hernia (2019), History of chemotherapy, psychiatric care, Impaired fasting glucose (08/14/2015), Lumbago, Lung nodule (02/17/2022), Major depressive disorder (10/27/2016), Malignant neoplasm of ascending colon (07/21/2017), Melena, Multiple myeloma, Nicotine dependence (05/10/2017), NICK (obstructive sleep apnea) (11/06/2023), Oxygen dependent, Pancreatitis (7/26/2024), Panic disorder, Renal insufficiency (07/28/2021), Seizures, Shortness of breath, Tobacco use (07/28/2021), Visit for screening mammogram (02/20/2020), and Vitamin D deficiency.   Surgical History: has a past surgical history that includes Bone marrow biopsy (2016); Colonoscopy (2018,2017,2019); Esophagogastroduodenoscopy (2018); Fecal disimpaction (06/2019); Hysterectomy (1982,1984); Mini-laparotomy (2017); Portacath placement (N/A); Colon surgery (2017); Hemicolectomy (Right,  05/2017); Upper gastrointestinal endoscopy (2018); Knee Arthroscopy (Left, 01/03/2022); Appendectomy; Colonoscopy (N/A, 12/20/2022); Venous Access Device (Port) (N/A, 10/31/2023); Colonoscopy (N/A, 11/09/2023); Eye surgery; Tonsillectomy; ERCP (N/A, 07/27/2024); Cholecystectomy (N/A, 08/08/2024); Gallbladder surgery; ERCP (N/A, 9/5/2024); and Ventral hernia repair (N/A, 10/3/2024).   Family History: family history includes Cancer in her father and mother; Heart disease in her father and mother; Kidney cancer in her father; Lung cancer in her mother; Stroke in her father and another family member.   Social History: reports that she quit smoking about 2 years ago. Her smoking use included cigarettes. She started smoking about 49 years ago. She has a 47.4 pack-year smoking history. She has been exposed to tobacco smoke. She has never used smokeless tobacco. She reports that she does not drink alcohol and does not use drugs.  Allergies: Morphine, Cephalexin, Penicillins, and Sulfa antibiotics       Current Outpatient Medications:     acyclovir (ZOVIRAX) 400 MG tablet, TAKE 1 TABLET BY MOUTH 2 (TWO) TIMES A DAY. TAKE ONE TABLET BY MOUTH TWICE DAILY., Disp: 180 tablet, Rfl: 3    albuterol sulfate  (90 Base) MCG/ACT inhaler, Inhale 2 puffs Every 4 (Four) Hours As Needed for Wheezing., Disp: 18 g, Rfl: 11    arformoterol (BROVANA) 15 MCG/2ML nebulizer solution, USE 1 VIAL  IN  NEBULIZER TWICE  DAILY - MORNING & EVENING, Disp: 360 mL, Rfl: 11    aspirin 81 MG EC tablet, Take 1 tablet by mouth Daily., Disp: , Rfl:     budesonide (PULMICORT) 0.5 MG/2ML nebulizer solution, USE 1 VIAL  IN  NEBULIZER TWICE  DAILY - RINSE MOUTH OUT AFTER TREATMENT, Disp: 60 each, Rfl: 11    busPIRone (BUSPAR) 15 MG tablet, Take 1 tablet by mouth 2 (Two) Times a Day., Disp: 180 tablet, Rfl: 1    Calcium Carb-Cholecalciferol (calcium 500 mg vitamin D 5 mcg, 200 UT,) 500-5 MG-MCG tablet per tablet, TAKE 1 TABLET WITH MEALS ORALLY TWICE A DAY  60 DAYS, Disp: , Rfl:     ciprofloxacin (CIPRO) 500 MG tablet, Take 1 tablet by mouth 2 (Two) Times a Day., Disp: 20 tablet, Rfl: 0    colestipol (COLESTID) 1 g tablet, TAKE 2 TABLETS BY MOUTH TWICE A DAY, Disp: 360 tablet, Rfl: 1    dapsone 25 MG tablet, TAKE 2 TABLETS BY MOUTH TWICE A DAY, Disp: 360 tablet, Rfl: 1    dexAMETHasone (DECADRON) 4 MG tablet, Take 10 tablets on D 1,8,15,22 and take 1 tablet on D 2,3.  Take in the morning with food., Disp: 42 tablet, Rfl: 5    FLUoxetine (PROzac) 40 MG capsule, Take 1 capsule by mouth Daily., Disp: 90 capsule, Rfl: 1    HYDROcodone-acetaminophen (NORCO)  MG per tablet, Take 1 tablet by mouth Every 6 (Six) Hours As Needed for Moderate Pain., Disp: 10 tablet, Rfl: 0    HYDROcodone-acetaminophen (NORCO) 5-325 MG per tablet, Take 1 tablet by mouth Every 6 (Six) Hours As Needed for Moderate Pain (Pain)., Disp: 10 tablet, Rfl: 0    HYDROcodone-acetaminophen (NORCO) 5-325 MG per tablet, Take 1 tablet by mouth Every 6 (Six) Hours As Needed for Moderate Pain (Pain)., Disp: 10 tablet, Rfl: 0    lenalidomide (REVLIMID) 15 MG capsule, Take 1 capsule by mouth Daily. On Days 1-21, and off 7 days, on a 28 day cycle, Disp: 12 capsule, Rfl: 0    levalbuterol (XOPENEX) 0.63 MG/3ML nebulizer solution, Take 1 ampule by nebulization 4 (Four) Times a Day As Needed for Wheezing., Disp: 120 mL, Rfl: 5    metroNIDAZOLE (FLAGYL) 500 MG tablet, Take 1 tablet by mouth 3 (Three) Times a Day., Disp: 30 tablet, Rfl: 0    nitrofurantoin, macrocrystal-monohydrate, (Macrobid) 100 MG capsule, Take 1 capsule by mouth 2 (Two) Times a Day., Disp: 14 capsule, Rfl: 0    ondansetron (ZOFRAN) 8 MG tablet, Take 1 tablet by mouth 3 (Three) Times a Day As Needed for Nausea or Vomiting., Disp: 30 tablet, Rfl: 5    promethazine (PHENERGAN) 25 MG tablet, Take 1 tablet by mouth Every 6 (Six) Hours As Needed for Nausea or Vomiting., Disp: 30 tablet, Rfl: 1    vitamin D (ERGOCALCIFEROL) 1.25 MG (74035 UT) capsule  "capsule, Take 1 capsule by mouth 2 (Two) Times a Week., Disp: 26 capsule, Rfl: 1    Yupelri 175 MCG/3ML nebulizer solution, USE 1 VIAL IN NEBULIZER DAILY, Disp: 90 mL, Rfl: 11       Physical Exam  She looks good.  Her abdomen is soft.  All of her incisions look fine and her hernia  repair feels intact.  Objective     Vital Signs:   Resp 16   Ht 152.4 cm (60\")   Wt 86.2 kg (190 lb)   BMI 37.11 kg/m²              Assessment and Plan    Diagnoses and all orders for this visit:    1. Incisional hernia, without obstruction or gangrene (Primary)    2. Diverticulitis    I will see her back in 4 weeks.  I have asked her to call me in the meantime if she were to have any further questions or concerns.      "

## 2024-10-21 ENCOUNTER — SPECIALTY PHARMACY (OUTPATIENT)
Dept: PHARMACY | Facility: HOSPITAL | Age: 74
End: 2024-10-21
Payer: MEDICARE

## 2024-10-23 ENCOUNTER — READMISSION MANAGEMENT (OUTPATIENT)
Dept: CALL CENTER | Facility: HOSPITAL | Age: 74
End: 2024-10-23
Payer: MEDICARE

## 2024-10-23 NOTE — OUTREACH NOTE
General Surgery Week 3 Survey      Flowsheet Row Responses   [facility] patient discharged from? [facility]   Does the patient have one of the following disease processes/diagnoses(primary or secondary)? General Surgery   Week 3 attempt successful? Yes   Call start time 1620   Call end time 1623   Meds reviewed with patient/caregiver? Yes   Does the patient have a follow up appointment scheduled with their surgeon? Yes   Has the patient kept scheduled appointments due by today? Yes   What is the patient's perception of their health status since discharge? Improving   Week 3 call completed? Yes   Graduated Yes   Wrap up additional comments Pt reports much improvement this week. Pt is able to engage in normal routine. Pt has had pcp f/u.   Call end time 1623            Aruna MARSHALL - Registered Nurse

## 2024-10-24 NOTE — PROGRESS NOTES
Primary Care Provider  Rena Guerra PA     Referring Provider  No ref. provider found     Chief Complaint  Follow-up (3 month), COPD, Shortness of Breath (oxygen), Sleep Apnea, and Emphysema    Subjective          History of Presenting Illness  Patient is a 74-year-old female who presents for management of dyspnea who presents for a follow-up visit today.  Patient's  is present with the patient in the office today.  Patient states that since last visit her breathing is at baseline. Patient states that she does get short of breath that is worse with exertion, moderate in severity, and improved with rest. Patient states that she is taking Breztri every day as prescribed and uses albuterol inhaler as needed. Patient states that she is not able to tolerate any nebulizer medications and therefore continues to decline any nebulizer medications. Patient is on 2 to 3 L of oxygen per minute via nasal cannula and receives her oxygen through [facility]. Patient states that is using her BiPAP machine with oxygen bled in. Patient denies any morning headaches or excessive daytime sleepiness.  Patient is under the care of Dr. Nicolas for multiple myeloma and is on chemotherapy.  Patient is currently undergoing monthly chemotherapy through Dr. Nicolas.  Patient is under the care of Dr. Delgado, general surgeon for hiatal hernia.  Patient denies fever, chills, night sweats, swollen glands in the head and neck, unintentional weight loss, hemoptysis, purulent sputum production, dysphagia, chest pain, palpitations, chest tightness, abdominal pain, nausea, vomiting, and diarrhea.  Patient also denies any myalgias, changes in sense of taste and/or smell, sore throat, any other coronavirus or flu-like symptoms.  Patient denies any leg swelling, orthopnea, paroxysmal nocturnal dyspnea.  Patient is able to perform activities of daily living.        Review of Systems     Family History   Problem Relation Age of Onset    Heart  disease Mother     Lung cancer Mother     Cancer Mother     Stroke Father     Heart disease Father     Kidney cancer Father     Cancer Father     Stroke Other         UNCLE/AUNT    Colon cancer Neg Hx     Malig Hyperthermia Neg Hx         Social History     Socioeconomic History    Marital status:    Tobacco Use    Smoking status: Former     Current packs/day: 0.00     Average packs/day: 1 pack/day for 47.4 years (47.4 ttl pk-yrs)     Types: Cigarettes     Start date: 1975     Quit date: 6/3/2022     Years since quittin.4     Passive exposure: Past    Smokeless tobacco: Never   Vaping Use    Vaping status: Never Used   Substance and Sexual Activity    Alcohol use: Never    Drug use: Never    Sexual activity: Not Currently     Partners: Male     Birth control/protection: Hysterectomy        Past Medical History:   Diagnosis Date    Anxiety     Asthma 2021    Atherosclerosis of native coronary artery of native heart with angina pectoris 08/15/2023    FOLLOWED BY DR GONZALES/DINA PETTIT. DENIES CP BUT GETS SOA WITH EXERTION HAS COPD WEARS AT 2LPM N/C. DECREASED ACTIVITY D/T SOA.    C. difficile diarrhea     DENIES ANY CURRENT ISSUES    Cholelithiasis 2024    Colon cancer 2017    Colon polyp     COPD (chronic obstructive pulmonary disease) 10/23/2017    Depression     Disease of thyroid gland     Diverticulitis     Diverticulitis of colon     Dysphagia     Emphysema of lung     Emphysema/COPD     GERD (gastroesophageal reflux disease)     Head injury     CONCUSSION AT AGE 4    Hernia 2019    History of chemotherapy     2017    Hx of psychiatric care     Impaired fasting glucose 2015    Lumbago     Lung nodule 2022    Major depressive disorder 10/27/2016    Malignant neoplasm of ascending colon 2017    Melena     Multiple myeloma     Nicotine dependence 05/10/2017    NICK (obstructive sleep apnea) 2023    Oxygen dependent     REPORTS USES 02 AT 2LPM VIA N/C DURING DAY  AND 3LPM AT NIGHT . CAN GET VERY SOA WITH MINIMAL EXERTION    Pancreatitis 7/26/2024    Panic disorder     Primary central sleep apnea     Renal insufficiency 07/28/2021    Seizures     PSEUDO SEIZURES LAST ONE AROUND JULY 2024    Shortness of breath     CAN GET VERY SOA WITH MINIMAL EXERTION    Tobacco use 07/28/2021    QUIT SMOKING JUNE 2022    Visit for screening mammogram 02/20/2020    NORMAL- REPEAT IN ONE YEAR    Vitamin D deficiency         Immunization History   Administered Date(s) Administered    Fluzone High-Dose 65+YRS 09/24/2020, 09/24/2021, 09/23/2024    Fluzone High-Dose 65+yrs 09/24/2020, 09/24/2021, 09/20/2022, 10/10/2023    Fluzone Quad >6mos (Multi-dose) 09/14/2015    Pneumococcal Conjugate 13-Valent (PCV13) 09/21/2015    Pneumococcal Polysaccharide (PPSV23) 04/12/2022       Allergies   Allergen Reactions    Morphine Anaphylaxis    Cephalexin Hives    Penicillins Hives     Beta lactam allergy details  Antibiotic reaction: hives  Age at reaction: child  Dose to reaction time: unknown  Reason for antibiotic: unknown  Epinephrine required for reaction?: unknown  Tolerated antibiotics: other (none per pt)        Sulfa Antibiotics Hives          Current Outpatient Medications:     albuterol sulfate  (90 Base) MCG/ACT inhaler, Inhale 2 puffs Every 4 (Four) Hours As Needed for Wheezing., Disp: 18 g, Rfl: 11    arformoterol (BROVANA) 15 MCG/2ML nebulizer solution, Take 2 mL by nebulization 2 (Two) Times a Day for 30 days., Disp: 360 mL, Rfl: 5    aspirin 81 MG EC tablet, Take 1 tablet by mouth Daily., Disp: , Rfl:     budesonide (PULMICORT) 0.5 MG/2ML nebulizer solution, Take 2 mL by nebulization 2 (Two) Times a Day for 30 days. Rinse mouth out after each use, Disp: 120 mL, Rfl: 5    busPIRone (BUSPAR) 15 MG tablet, Take 1 tablet by mouth 2 (Two) Times a Day., Disp: 180 tablet, Rfl: 1    colestipol (COLESTID) 1 g tablet, TAKE 2 TABLETS BY MOUTH TWICE A DAY, Disp: 360 tablet, Rfl: 1    dapsone 25 MG  tablet, TAKE 2 TABLETS BY MOUTH TWICE A DAY, Disp: 360 tablet, Rfl: 1    dexAMETHasone (DECADRON) 4 MG tablet, Take 10 tablets on D 1,8,15,22 and take 1 tablet on D 2,3.  Take in the morning with food., Disp: 42 tablet, Rfl: 5    FLUoxetine (PROzac) 40 MG capsule, Take 1 capsule by mouth Daily., Disp: 90 capsule, Rfl: 1    lenalidomide (REVLIMID) 15 MG capsule, Take 1 capsule by mouth Daily. On Days 1-21, and off 7 days, on a 28 day cycle, Disp: 12 capsule, Rfl: 0    levalbuterol (XOPENEX) 0.63 MG/3ML nebulizer solution, Take 1 ampule by nebulization 4 (Four) Times a Day As Needed for Wheezing., Disp: 120 mL, Rfl: 5    revefenacin (Yupelri) 175 MCG/3ML nebulizer solution, Take 3 mL by nebulization Daily for 30 days., Disp: 90 mL, Rfl: 5    vitamin D (ERGOCALCIFEROL) 1.25 MG (57102 UT) capsule capsule, Take 1 capsule by mouth 2 (Two) Times a Week., Disp: 26 capsule, Rfl: 1    acyclovir (ZOVIRAX) 400 MG tablet, TAKE 1 TABLET BY MOUTH 2 (TWO) TIMES A DAY. TAKE ONE TABLET BY MOUTH TWICE DAILY. (Patient not taking: Reported on 11/5/2024), Disp: 180 tablet, Rfl: 3    Calcium Carb-Cholecalciferol (calcium 500 mg vitamin D 5 mcg, 200 UT,) 500-5 MG-MCG tablet per tablet, TAKE 1 TABLET WITH MEALS ORALLY TWICE A DAY 60 DAYS (Patient not taking: Reported on 11/5/2024), Disp: , Rfl:     nitrofurantoin, macrocrystal-monohydrate, (Macrobid) 100 MG capsule, Take 1 capsule by mouth 2 (Two) Times a Day. (Patient not taking: Reported on 11/5/2024), Disp: 14 capsule, Rfl: 0    ondansetron (ZOFRAN) 8 MG tablet, Take 1 tablet by mouth 3 (Three) Times a Day As Needed for Nausea or Vomiting. (Patient not taking: Reported on 11/5/2024), Disp: 30 tablet, Rfl: 5    promethazine (PHENERGAN) 25 MG tablet, Take 1 tablet by mouth Every 6 (Six) Hours As Needed for Nausea or Vomiting. (Patient not taking: Reported on 11/5/2024), Disp: 30 tablet, Rfl: 1     Objective     Physical Exam  Vital Signs:   WDWN, Alert, NAD.    HEENT:  PERRL, EOMI.  OP,  "nares clear, no sinus tenderness  Neck:  Supple, no JVD, no thyromegaly.  Lymph: no axillary, cervical, supraclavicular lymphadenopathy noted bilaterally  Chest:  Mildly decreased breath sounds throughout. No wheezes, rales, or rhonchi appreciated. Normal work of breathing noted. Patient is able speak full sentences without difficulty. Patient is on 2 L of oxygen per minute via nasal cannula.   CV: RRR, no MGR, pulses 2+, equal.  Abd:  Soft, NT, ND, + BS, no HSM  EXT:  no clubbing, no cyanosis, no edema, no joint tenderness  Neuro:  A&Ox3, CN grossly intact, no focal deficits.  Skin: No rashes or lesions noted.    /58 (BP Location: Left arm, Patient Position: Sitting, Cuff Size: Adult)   Pulse 78   Temp 98 °F (36.7 °C) (Tympanic)   Resp 22   Ht 152.4 cm (60\")   Wt 84.2 kg (185 lb 9.6 oz)   SpO2 95% Comment: 2 liters pd  BMI 36.25 kg/m²         Result Review :   I have reviewed my last office visit note.    Procedures:              Assessment and Plan      Assessment:  1.  Mild COPD.  FEV1 of 79% predicted.  Alpha 1 antitrypsin with a normal genotype of M/M with a level of 231.  2.  Centrilobular emphysema on chest CT scan.  3.  History of colon cancer.  4.  Multiple myeloma.  Patient is under the care of Dr. Nicolas and is on chemotherapy and steroids per patient report.    5.  Chronic dyspnea.  6.  Multiple lung nodules. Stable on low dose chest CT scan dated from 3/1/2024.  7.  Severe obstructive sleep apnea.  Patient is on a BiPAP machine.  8.  Chronic hypoxic respiratory failure: Patient is on supplemental oxygen.  9.  Hiatal hernia, post robotic hernia repair.  10.  Tobacco abuse of cigarettes in remission.  Patient is enrolled in lung cancer screening.        Plan:  1.  Continue Brovana, Pulmicort, Yupelri every day as prescribed and rinse mouth out after each use.  2.  Continue albuterol inhaler and and Xopenex nebulizer treatments as needed.  3.  Continue BiPAP at current settings at night and " with naps and clean mask and tubing daily.    4.  Continue albuterol inhaler and Xopenex nebulizer treatments as needed.   5.  Start pulmonary rehab as scheduled.  6.  Continue oxygen to keep SPO2 at 89% and above.  7.  Follow-up with primary care provider, general surgeon, oncologist, cardiologist, and psychiatrist as scheduled.  8.  Vaccination status:  patient reports they are up-to-date with flu and pneumonia vaccines.  Patient declines COVID-19 vaccination.  Discussed with patient the benefits of vaccination including decreased risk of severe illness, hospitalization and death related to flu, pneumonia, and COVID-19.  Patient verbalized understanding.  Patient is advised to continue to follow CDC recommendations such as social distancing, wearing a mask, and washing hands for least 20 seconds.  9.  Smoking status: Patient is a former cigarette smoker. Patient is enrolled in lung cancer screening.  Patient is scheduled to have low-dose chest CT scan on 3/3/2025 at 9:00 AM.  10.  Patient to call the office, 911, or go to the ER with new or worsening symptoms.  11.  Follow-up in March 2025, sooner if needed.          Follow Up   Return for March 2025 with Dr. Lee.  Patient was given instructions and counseling regarding her condition or for health maintenance advice. Please see specific information pulled into the AVS if appropriate.

## 2024-10-30 ENCOUNTER — OFFICE VISIT (OUTPATIENT)
Dept: ONCOLOGY | Facility: HOSPITAL | Age: 74
End: 2024-10-30
Payer: MEDICARE

## 2024-10-30 ENCOUNTER — SPECIALTY PHARMACY (OUTPATIENT)
Dept: PHARMACY | Facility: HOSPITAL | Age: 74
End: 2024-10-30
Payer: MEDICARE

## 2024-10-30 ENCOUNTER — HOSPITAL ENCOUNTER (OUTPATIENT)
Dept: ONCOLOGY | Facility: HOSPITAL | Age: 74
Discharge: HOME | End: 2024-10-30
Payer: MEDICARE

## 2024-10-30 VITALS
HEART RATE: 102 BPM | SYSTOLIC BLOOD PRESSURE: 121 MMHG | WEIGHT: 186.29 LBS | DIASTOLIC BLOOD PRESSURE: 72 MMHG | TEMPERATURE: 97.9 F | OXYGEN SATURATION: 97 % | BODY MASS INDEX: 36.38 KG/M2 | RESPIRATION RATE: 20 BRPM

## 2024-10-30 VITALS
DIASTOLIC BLOOD PRESSURE: 72 MMHG | BODY MASS INDEX: 36.57 KG/M2 | OXYGEN SATURATION: 97 % | WEIGHT: 186.29 LBS | TEMPERATURE: 97.9 F | HEIGHT: 60 IN | HEART RATE: 102 BPM | SYSTOLIC BLOOD PRESSURE: 121 MMHG | RESPIRATION RATE: 20 BRPM

## 2024-10-30 DIAGNOSIS — C90.00 MULTIPLE MYELOMA, REMISSION STATUS UNSPECIFIED: Primary | ICD-10-CM

## 2024-10-30 DIAGNOSIS — C90.02 MULTIPLE MYELOMA IN RELAPSE: ICD-10-CM

## 2024-10-30 DIAGNOSIS — Z12.2 ENCOUNTER FOR SCREENING FOR LUNG CANCER: ICD-10-CM

## 2024-10-30 DIAGNOSIS — C90.00 MULTIPLE MYELOMA, REMISSION STATUS UNSPECIFIED: ICD-10-CM

## 2024-10-30 DIAGNOSIS — Z79.899 ENCOUNTER FOR LONG-TERM (CURRENT) USE OF MEDICATIONS: ICD-10-CM

## 2024-10-30 DIAGNOSIS — Z79.899 ENCOUNTER FOR LONG-TERM (CURRENT) USE OF MEDICATIONS: Primary | ICD-10-CM

## 2024-10-30 LAB
ALBUMIN SERPL-MCNC: 4.1 G/DL (ref 3.5–5.2)
ALBUMIN/GLOB SERPL: 1.9 G/DL
ALP SERPL-CCNC: 90 U/L (ref 39–117)
ALT SERPL W P-5'-P-CCNC: 11 U/L (ref 1–33)
ANION GAP SERPL CALCULATED.3IONS-SCNC: 10.2 MMOL/L (ref 5–15)
AST SERPL-CCNC: 12 U/L (ref 1–32)
BASOPHILS # BLD AUTO: 0.03 10*3/MM3 (ref 0–0.2)
BASOPHILS NFR BLD AUTO: 0.2 % (ref 0–1.5)
BILIRUB SERPL-MCNC: 0.3 MG/DL (ref 0–1.2)
BUN SERPL-MCNC: 9 MG/DL (ref 8–23)
BUN/CREAT SERPL: 12.5 (ref 7–25)
CALCIUM SPEC-SCNC: 9.4 MG/DL (ref 8.6–10.5)
CHLORIDE SERPL-SCNC: 105 MMOL/L (ref 98–107)
CLUMPED PLATELETS: PRESENT
CO2 SERPL-SCNC: 22.8 MMOL/L (ref 22–29)
CREAT SERPL-MCNC: 0.72 MG/DL (ref 0.57–1)
DEPRECATED RDW RBC AUTO: 53.8 FL (ref 37–54)
EGFRCR SERPLBLD CKD-EPI 2021: 87.3 ML/MIN/1.73
EOSINOPHIL # BLD AUTO: 0.24 10*3/MM3 (ref 0–0.4)
EOSINOPHIL NFR BLD AUTO: 1.5 % (ref 0.3–6.2)
ERYTHROCYTE [DISTWIDTH] IN BLOOD BY AUTOMATED COUNT: 15 % (ref 12.3–15.4)
GLOBULIN UR ELPH-MCNC: 2.2 GM/DL
GLUCOSE SERPL-MCNC: 122 MG/DL (ref 65–99)
HCT VFR BLD AUTO: 44.6 % (ref 34–46.6)
HGB BLD-MCNC: 14.4 G/DL (ref 12–15.9)
IMM GRANULOCYTES # BLD AUTO: 0.04 10*3/MM3 (ref 0–0.05)
IMM GRANULOCYTES NFR BLD AUTO: 0.2 % (ref 0–0.5)
LYMPHOCYTES # BLD AUTO: 8.82 10*3/MM3 (ref 0.7–3.1)
LYMPHOCYTES # BLD MANUAL: 7.25 10*3/MM3 (ref 0.7–3.1)
LYMPHOCYTES NFR BLD AUTO: 54.7 % (ref 19.6–45.3)
LYMPHOCYTES NFR BLD MANUAL: 1 % (ref 5–12)
MCH RBC QN AUTO: 31.3 PG (ref 26.6–33)
MCHC RBC AUTO-ENTMCNC: 32.3 G/DL (ref 31.5–35.7)
MCV RBC AUTO: 97 FL (ref 79–97)
MONOCYTES # BLD AUTO: 0.84 10*3/MM3 (ref 0.1–0.9)
MONOCYTES # BLD: 0.16 10*3/MM3 (ref 0.1–0.9)
MONOCYTES NFR BLD AUTO: 5.2 % (ref 5–12)
MYELOCYTES NFR BLD MANUAL: 1 % (ref 0–0)
NEUTROPHILS # BLD AUTO: 8.54 10*3/MM3 (ref 1.7–7)
NEUTROPHILS NFR BLD AUTO: 38.2 % (ref 42.7–76)
NEUTROPHILS NFR BLD AUTO: 6.15 10*3/MM3 (ref 1.7–7)
NEUTROPHILS NFR BLD MANUAL: 52 % (ref 42.7–76)
NEUTS BAND NFR BLD MANUAL: 1 % (ref 0–5)
PLATELET # BLD AUTO: 246 10*3/MM3 (ref 140–450)
PMV BLD AUTO: 9.3 FL (ref 6–12)
POTASSIUM SERPL-SCNC: 4.1 MMOL/L (ref 3.5–5.2)
PROT SERPL-MCNC: 6.3 G/DL (ref 6–8.5)
RBC # BLD AUTO: 4.6 10*6/MM3 (ref 3.77–5.28)
RBC MORPH BLD: NORMAL
SMALL PLATELETS BLD QL SMEAR: ADEQUATE
SODIUM SERPL-SCNC: 138 MMOL/L (ref 136–145)
VARIANT LYMPHS NFR BLD MANUAL: 45 % (ref 19.6–45.3)
WBC MORPH BLD: NORMAL
WBC NRBC COR # BLD AUTO: 16.12 10*3/MM3 (ref 3.4–10.8)

## 2024-10-30 PROCEDURE — 84165 PROTEIN E-PHORESIS SERUM: CPT | Performed by: INTERNAL MEDICINE

## 2024-10-30 PROCEDURE — 85007 BL SMEAR W/DIFF WBC COUNT: CPT | Performed by: INTERNAL MEDICINE

## 2024-10-30 PROCEDURE — 96401 CHEMO ANTI-NEOPL SQ/IM: CPT

## 2024-10-30 PROCEDURE — A9270 NON-COVERED ITEM OR SERVICE: HCPCS | Performed by: INTERNAL MEDICINE

## 2024-10-30 PROCEDURE — 82784 ASSAY IGA/IGD/IGG/IGM EACH: CPT | Performed by: INTERNAL MEDICINE

## 2024-10-30 PROCEDURE — 25010000002 METHYLPREDNISOLONE PER 125 MG: Performed by: INTERNAL MEDICINE

## 2024-10-30 PROCEDURE — 25010000002 DIPHENHYDRAMINE PER 50 MG: Performed by: INTERNAL MEDICINE

## 2024-10-30 PROCEDURE — 96374 THER/PROPH/DIAG INJ IV PUSH: CPT

## 2024-10-30 PROCEDURE — 25010000002 HEPARIN LOCK FLUSH PER 10 UNITS: Performed by: INTERNAL MEDICINE

## 2024-10-30 PROCEDURE — 63710000001 ACETAMINOPHEN EXTRA STRENGTH 500 MG TABLET: Performed by: INTERNAL MEDICINE

## 2024-10-30 PROCEDURE — 96375 TX/PRO/DX INJ NEW DRUG ADDON: CPT

## 2024-10-30 PROCEDURE — 83521 IG LIGHT CHAINS FREE EACH: CPT | Performed by: INTERNAL MEDICINE

## 2024-10-30 PROCEDURE — 85025 COMPLETE CBC W/AUTO DIFF WBC: CPT | Performed by: INTERNAL MEDICINE

## 2024-10-30 PROCEDURE — 25010000002 DARATUMUMAB-HYALURONIDASE-FIHJ 1800-30000 MG-UT/15ML SOLUTION: Performed by: INTERNAL MEDICINE

## 2024-10-30 PROCEDURE — 80053 COMPREHEN METABOLIC PANEL: CPT | Performed by: INTERNAL MEDICINE

## 2024-10-30 PROCEDURE — 86334 IMMUNOFIX E-PHORESIS SERUM: CPT | Performed by: INTERNAL MEDICINE

## 2024-10-30 RX ORDER — METHYLPREDNISOLONE SODIUM SUCCINATE 125 MG/2ML
60 INJECTION, POWDER, LYOPHILIZED, FOR SOLUTION INTRAMUSCULAR; INTRAVENOUS ONCE
Status: CANCELLED | OUTPATIENT
Start: 2024-10-30

## 2024-10-30 RX ORDER — ACETAMINOPHEN 500 MG
1000 TABLET ORAL ONCE
Status: COMPLETED | OUTPATIENT
Start: 2024-10-30 | End: 2024-10-30

## 2024-10-30 RX ORDER — HEPARIN SODIUM (PORCINE) LOCK FLUSH IV SOLN 100 UNIT/ML 100 UNIT/ML
500 SOLUTION INTRAVENOUS AS NEEDED
OUTPATIENT
Start: 2024-10-30

## 2024-10-30 RX ORDER — FAMOTIDINE 10 MG/ML
20 INJECTION, SOLUTION INTRAVENOUS AS NEEDED
Status: DISCONTINUED | OUTPATIENT
Start: 2024-10-30 | End: 2024-10-31 | Stop reason: HOSPADM

## 2024-10-30 RX ORDER — ACETAMINOPHEN 500 MG
1000 TABLET ORAL ONCE
Status: CANCELLED | OUTPATIENT
Start: 2024-10-30

## 2024-10-30 RX ORDER — SODIUM CHLORIDE 9 MG/ML
20 INJECTION, SOLUTION INTRAVENOUS ONCE
Status: CANCELLED | OUTPATIENT
Start: 2024-10-30

## 2024-10-30 RX ORDER — HEPARIN SODIUM (PORCINE) LOCK FLUSH IV SOLN 100 UNIT/ML 100 UNIT/ML
500 SOLUTION INTRAVENOUS AS NEEDED
Status: DISCONTINUED | OUTPATIENT
Start: 2024-10-30 | End: 2024-10-31 | Stop reason: HOSPADM

## 2024-10-30 RX ORDER — SODIUM CHLORIDE 0.9 % (FLUSH) 0.9 %
20 SYRINGE (ML) INJECTION AS NEEDED
OUTPATIENT
Start: 2024-10-30

## 2024-10-30 RX ORDER — DIPHENHYDRAMINE HYDROCHLORIDE 50 MG/ML
50 INJECTION INTRAMUSCULAR; INTRAVENOUS AS NEEDED
Status: CANCELLED | OUTPATIENT
Start: 2024-10-30

## 2024-10-30 RX ORDER — DIPHENHYDRAMINE HYDROCHLORIDE 50 MG/ML
25 INJECTION INTRAMUSCULAR; INTRAVENOUS ONCE
Status: COMPLETED | OUTPATIENT
Start: 2024-10-30 | End: 2024-10-30

## 2024-10-30 RX ORDER — DIPHENHYDRAMINE HYDROCHLORIDE 50 MG/ML
50 INJECTION INTRAMUSCULAR; INTRAVENOUS AS NEEDED
Status: DISCONTINUED | OUTPATIENT
Start: 2024-10-30 | End: 2024-10-31 | Stop reason: HOSPADM

## 2024-10-30 RX ORDER — FAMOTIDINE 10 MG/ML
20 INJECTION, SOLUTION INTRAVENOUS AS NEEDED
Status: CANCELLED | OUTPATIENT
Start: 2024-10-30

## 2024-10-30 RX ORDER — METHYLPREDNISOLONE SODIUM SUCCINATE 125 MG/2ML
60 INJECTION, POWDER, LYOPHILIZED, FOR SOLUTION INTRAMUSCULAR; INTRAVENOUS ONCE
Status: COMPLETED | OUTPATIENT
Start: 2024-10-30 | End: 2024-10-30

## 2024-10-30 RX ORDER — SODIUM CHLORIDE 9 MG/ML
20 INJECTION, SOLUTION INTRAVENOUS ONCE
Status: COMPLETED | OUTPATIENT
Start: 2024-10-30 | End: 2024-10-30

## 2024-10-30 RX ORDER — SODIUM CHLORIDE 0.9 % (FLUSH) 0.9 %
20 SYRINGE (ML) INJECTION AS NEEDED
Status: DISCONTINUED | OUTPATIENT
Start: 2024-10-30 | End: 2024-10-31 | Stop reason: HOSPADM

## 2024-10-30 RX ADMIN — SODIUM CHLORIDE 20 ML/HR: 900 INJECTION, SOLUTION INTRAVENOUS at 11:46

## 2024-10-30 RX ADMIN — DIPHENHYDRAMINE HYDROCHLORIDE 25 MG: 50 INJECTION INTRAMUSCULAR; INTRAVENOUS at 11:50

## 2024-10-30 RX ADMIN — DARATUMUMAB AND HYALURONIDASE-FIHJ (HUMAN RECOMBINANT) 1800 MG: 1800; 30000 INJECTION SUBCUTANEOUS at 12:54

## 2024-10-30 RX ADMIN — Medication 20 ML: at 12:59

## 2024-10-30 RX ADMIN — HEPARIN 500 UNITS: 100 SYRINGE at 12:59

## 2024-10-30 RX ADMIN — ACETAMINOPHEN 1000 MG: 500 TABLET ORAL at 11:46

## 2024-10-30 RX ADMIN — METHYLPREDNISOLONE SODIUM SUCCINATE 60 MG: 125 INJECTION INTRAMUSCULAR; INTRAVENOUS at 11:48

## 2024-10-30 NOTE — PROGRESS NOTES
Chief Complaint  FOLLOW UP 1  (Colon cancer)    Rena Guerra,*  Rena Guerra, PA    Subjective          Anca Samson presents to [facility] for ongoing treatment of her myeloma.  She is on daratumumab, lenalidomide, dexamethasone and zoledronic acid.  Tolerating well.  She is feeling stronger.  She is now using a cane instead of a wheelchair.  She does report poor dentition and anticipates dental work in the near future.  She notes good appetite.  She denies new masses or adenopathy.    Oncology/Hematology History Overview Note   Smoldering Myeloma    Initially diagnosed in 2016 with bone marrow biopsy demonstrating 30% CD56 positive monoclonal plasma cell population.  45 XX normal female chromosome pattern  FISH panel negative for myeloma associated mutations including 1q21, 9q34,11q13,14q32,15q24, 17q13  No anemia, renal insufficiency, hypercalcemia.  On observation since that time    Low grade adenocarcinoma of ascending colon      5/16/2017 right hemicolectomy which  revealed a low-grade 7.6 cm adenocarcinoma of the cecum. All margins were  negative. Lymphovascular invasion and perineural invasion were not identified.     26 lymph nodes were removed and unfortunately 1 was involved with metastatic deposit. pT3 pN1a colorectal cancer.        Clinical Staging      Smoldering Myeloma; Colon ([id number])            Treatments      Chemotherapy      11/2017 completed 6mo adjuvant Xeloda        6/2022 Stopped Smoking     Malignant neoplasm of ascending colon   7/21/2017 Initial Diagnosis    Colon cancer (CMS/HCC)     Multiple myeloma   7/28/2021 Initial Diagnosis    Multiple myeloma     11/7/2023 -  Chemotherapy    OP MULTIPLE MYELOMA Daratumumab / Lenalidomide / Dexamethasone     1/10/2025 -  Chemotherapy    OP SUPPORTIVE Zoledronic Acid Q28D         Review of Systems   Respiratory:  Positive for shortness of breath.    All other systems reviewed and are  negative.    Current Outpatient Medications on File Prior to Visit   Medication Sig Dispense Refill    acyclovir (ZOVIRAX) 400 MG tablet TAKE 1 TABLET BY MOUTH 2 (TWO) TIMES A DAY. TAKE ONE TABLET BY MOUTH TWICE DAILY. 180 tablet 3    albuterol sulfate  (90 Base) MCG/ACT inhaler Inhale 2 puffs Every 4 (Four) Hours As Needed for Wheezing. 18 g 11    arformoterol (BROVANA) 15 MCG/2ML nebulizer solution USE 1 VIAL  IN  NEBULIZER TWICE  DAILY - MORNING & EVENING 360 mL 11    aspirin 81 MG EC tablet Take 1 tablet by mouth Daily.      budesonide (PULMICORT) 0.5 MG/2ML nebulizer solution USE 1 VIAL  IN  NEBULIZER TWICE  DAILY - RINSE MOUTH OUT AFTER TREATMENT 60 each 11    busPIRone (BUSPAR) 15 MG tablet Take 1 tablet by mouth 2 (Two) Times a Day. 180 tablet 1    Calcium Carb-Cholecalciferol (calcium 500 mg vitamin D 5 mcg, 200 UT,) 500-5 MG-MCG tablet per tablet TAKE 1 TABLET WITH MEALS ORALLY TWICE A DAY 60 DAYS      colestipol (COLESTID) 1 g tablet TAKE 2 TABLETS BY MOUTH TWICE A  tablet 1    dapsone 25 MG tablet TAKE 2 TABLETS BY MOUTH TWICE A  tablet 1    dexAMETHasone (DECADRON) 4 MG tablet Take 10 tablets on D 1,8,15,22 and take 1 tablet on D 2,3.  Take in the morning with food. 42 tablet 5    FLUoxetine (PROzac) 40 MG capsule Take 1 capsule by mouth Daily. 90 capsule 1    lenalidomide (REVLIMID) 15 MG capsule Take 1 capsule by mouth Daily. On Days 1-21, and off 7 days, on a 28 day cycle 12 capsule 0    levalbuterol (XOPENEX) 0.63 MG/3ML nebulizer solution Take 1 ampule by nebulization 4 (Four) Times a Day As Needed for Wheezing. 120 mL 5    vitamin D (ERGOCALCIFEROL) 1.25 MG (94560 UT) capsule capsule Take 1 capsule by mouth 2 (Two) Times a Week. 26 capsule 1    Yupelri 175 MCG/3ML nebulizer solution USE 1 VIAL IN NEBULIZER DAILY 90 mL 11    ciprofloxacin (CIPRO) 500 MG tablet Take 1 tablet by mouth 2 (Two) Times a Day. (Patient not taking: Reported on 10/30/2024) 20 tablet 0     HYDROcodone-acetaminophen (NORCO)  MG per tablet Take 1 tablet by mouth Every 6 (Six) Hours As Needed for Moderate Pain. 10 tablet 0    HYDROcodone-acetaminophen (NORCO) 5-325 MG per tablet Take 1 tablet by mouth Every 6 (Six) Hours As Needed for Moderate Pain (Pain). 10 tablet 0    HYDROcodone-acetaminophen (NORCO) 5-325 MG per tablet Take 1 tablet by mouth Every 6 (Six) Hours As Needed for Moderate Pain (Pain). 10 tablet 0    metroNIDAZOLE (FLAGYL) 500 MG tablet Take 1 tablet by mouth 3 (Three) Times a Day. 30 tablet 0    nitrofurantoin, macrocrystal-monohydrate, (Macrobid) 100 MG capsule Take 1 capsule by mouth 2 (Two) Times a Day. (Patient not taking: Reported on 10/30/2024) 14 capsule 0    ondansetron (ZOFRAN) 8 MG tablet Take 1 tablet by mouth 3 (Three) Times a Day As Needed for Nausea or Vomiting. (Patient not taking: Reported on 10/30/2024) 30 tablet 5    promethazine (PHENERGAN) 25 MG tablet Take 1 tablet by mouth Every 6 (Six) Hours As Needed for Nausea or Vomiting. (Patient not taking: Reported on 10/30/2024) 30 tablet 1     Current Facility-Administered Medications on File Prior to Visit   Medication Dose Route Frequency Provider Last Rate Last Admin    [COMPLETED] acetaminophen (TYLENOL) tablet 1,000 mg  1,000 mg Oral Once West Nicolas MD   1,000 mg at 10/30/24 1146    [COMPLETED] daratum[facility] (DARZALEX FASPRO) 1800-52531 MG-UT/15ML injection 1,800 mg  1,800 mg Subcutaneous Once West Nicolas MD   1,800 mg at 10/30/24 1254    [COMPLETED] diphenhydrAMINE (BENADRYL) injection 25 mg  25 mg Intravenous Once West Nicolas MD   25 mg at 10/30/24 1150    diphenhydrAMINE (BENADRYL) injection 50 mg  50 mg Intravenous PRN West Nicolas MD        famotidine (PEPCID) injection 20 mg  20 mg Intravenous PRN West Nicolas MD        heparin injection 500 Units  500 Units Intravenous PRN West Nicolas MD   500 Units at 10/30/24 1259    Hydrocortisone Sod Suc (PF)  (Solu-CORTEF) injection 100 mg  100 mg Intravenous PRN West Nicolas MD        [COMPLETED] methylPREDNISolone sodium succinate (SOLU-Medrol) injection 60 mg  60 mg Intravenous Once West Nicolas MD   60 mg at 10/30/24 1148    sodium chloride 0.9 % flush 20 mL  20 mL Intravenous PRN West Nicolas MD   20 mL at 10/30/24 1259    [COMPLETED] sodium chloride 0.9 % infusion  20 mL/hr Intravenous Once West Nicolas MD   Stopped at 10/30/24 1205    [DISCONTINUED] diphenhydrAMINE (BENADRYL) IVPB 25 mg  25 mg Intravenous Once West Nicolas MD           Allergies   Allergen Reactions    Morphine Anaphylaxis    Cephalexin Hives    Penicillins Hives     Beta lactam allergy details  Antibiotic reaction: hives  Age at reaction: child  Dose to reaction time: unknown  Reason for antibiotic: unknown  Epinephrine required for reaction?: unknown  Tolerated antibiotics: other (none per pt)        Sulfa Antibiotics Hives     Past Medical History:   Diagnosis Date    Anxiety     Asthma 07/28/2021    Atherosclerosis of native coronary artery of native heart with angina pectoris 08/15/2023    FOLLOWED BY DR GONZALES/DINA PETTIT. DENIES CP BUT GETS SOA WITH EXERTION HAS COPD WEARS AT 2LPM N/C. DECREASED ACTIVITY D/T SOA.    C. difficile diarrhea     DENIES ANY CURRENT ISSUES    Cholelithiasis 7-    Colon cancer 07/21/2017    Colon polyp     COPD (chronic obstructive pulmonary disease) 10/23/2017    Depression     Disease of thyroid gland     Diverticulitis     Diverticulitis of colon     Dysphagia     Emphysema/COPD     GERD (gastroesophageal reflux disease)     Head injury     CONCUSSION AT AGE 4    Hernia 2019    History of chemotherapy     2017    Hx of psychiatric care     Impaired fasting glucose 08/14/2015    Lumbago     Lung nodule 02/17/2022    Major depressive disorder 10/27/2016    Malignant neoplasm of ascending colon 07/21/2017    Melena     Multiple myeloma     Nicotine dependence 05/10/2017    NICK  (obstructive sleep apnea) 11/06/2023    Oxygen dependent     REPORTS USES 02 AT 2LPM VIA N/C DURING DAY AND 3LPM AT NIGHT . CAN GET VERY SOA WITH MINIMAL EXERTION    Pancreatitis 7/26/2024    Panic disorder     Renal insufficiency 07/28/2021    Seizures     PSEUDO SEIZURES LAST ONE AROUND JULY 2024    Shortness of breath     CAN GET VERY SOA WITH MINIMAL EXERTION    Tobacco use 07/28/2021    QUIT SMOKING JUNE 2022    Visit for screening mammogram 02/20/2020    NORMAL- REPEAT IN ONE YEAR    Vitamin D deficiency      Past Surgical History:   Procedure Laterality Date    APPENDECTOMY      BONE MARROW BIOPSY  2016    CHOLECYSTECTOMY N/A 08/08/2024    Procedure: CHOLECYSTECTOMY LAPAROSCOPIC WITH DAVINCI ROBOT;  Surgeon: Eduardo Ingram MD;  Location: [facility] OR [facility] [place];  Service: Robotics - DaVinci;  Laterality: N/A;    COLON SURGERY  2017    COLONOSCOPY  2018,2017,2019    [facility]- DR LE:DIVERTICULOSIS AND ERYTHEMA OF MUCOSA    COLONOSCOPY N/A 12/20/2022    Procedure: COLONOSCOPY WITH ELEVIEW INJECTION, POLYPECTOMY, HOT SNARE, CLIP APPLICATION X3, BIOPSIES;  Surgeon: Jarvis Borges MD;  Location: [facility] ENDOSCOPY;  Service: Gastroenterology;  Laterality: N/A;  COLON POLYP, DIVERTICULOSIS, ANASTOMOSIS RIGHT COLON    COLONOSCOPY N/A 11/09/2023    Procedure: COLONOSCOPY;  Surgeon: Jarvis Borges MD;  Location: [facility] ENDOSCOPY;  Service: Gastroenterology;  Laterality: N/A;  DIVERTICULOSIS, ANASTOMOSIS IN ASCENDING COLON    ENDOSCOPY  2018    ERCP N/A 07/27/2024    Procedure: ENDOSCOPIC RETROGRADE CHOLANGIOPANCREATOGRAPHY WITH SPHINCTEROTOMY, BALLOON SWEEP, STENT PLACEMENT;  Surgeon: Ajay Kennedy MD;  Location: [facility] MAIN OR;  Service: Gastroenterology;  Laterality: N/A;  BILE DUCT STONE    ERCP N/A 9/5/2024    Procedure: ENDOSCOPIC RETROGRADE CHOLANGIOPANCREATOGRAPHY WITH SPINCHTEROTOMY. BALLOON DILATATION 8-10. BALLOON SWEEP 12-15. STENT REMOVAL;  Surgeon: Ajay Kennedy MD;   Location: [facility] ENDOSCOPY;  Service: Gastroenterology;  Laterality: N/A;  BILE DUCT STONES    EYE SURGERY      FECAL DISIMPACTION  2019    TRANSPLANT     GALLBLADDER SURGERY      HEMICOLECTOMY Right 2017    HYSTERECTOMY  1982,    KNEE ARTHROSCOPY Left 2022    Procedure: KNEE ARTHROSCOPY WITH PARTIAL MEDIAL MENISCECTOMY,  CHONDROPLASTY;  Surgeon: Nicholas Tipton MD;  Location: [facility] OR [facility] [place];  Service: Orthopedics;  Laterality: Left;    MINI-LAPAROTOMY  2017    PORTACATH PLACEMENT N/A     pt states that her port does not work    TONSILLECTOMY      UPPER GASTROINTESTINAL ENDOSCOPY  2018    VENOUS ACCESS DEVICE (PORT) INSERTION N/A 10/31/2023    Procedure: Port-a-catheter removal and port-a-catheter placement;  Surgeon: Alcon Delgado MD;  Location: [facility] OR [facility] [place];  Service: General;  Laterality: N/A;    VENTRAL HERNIA REPAIR N/A 10/3/2024    Procedure: VENTRAL / INCISIONAL HERNIA REPAIR LAPAROSCOPIC WITH DAVINCI ROBOT;  Surgeon: Eduardo Ingram MD;  Location: [facility] OR [facility] [place];  Service: Robotics - DaVinci;  Laterality: N/A;     Social History     Socioeconomic History    Marital status:    Tobacco Use    Smoking status: Former     Current packs/day: 0.00     Average packs/day: 1 pack/day for 47.4 years (47.4 ttl pk-yrs)     Types: Cigarettes     Start date: 1975     Quit date: 6/3/2022     Years since quittin.4     Passive exposure: Past    Smokeless tobacco: Never   Vaping Use    Vaping status: Never Used   Substance and Sexual Activity    Alcohol use: Never    Drug use: Never    Sexual activity: Not Currently     Partners: Male     Birth control/protection: Hysterectomy     Family History   Problem Relation Age of Onset    Heart disease Mother     Lung cancer Mother     Cancer Mother     Stroke Father     Heart disease Father     Kidney cancer Father     Cancer Father     Stroke Other         UNCLE/AUNT    Colon cancer Neg Hx     Malig Hyperthermia Neg Hx        Objective  "  Physical Exam  Vitals reviewed. Exam conducted with a chaperone present.   Cardiovascular:      Rate and Rhythm: Normal rate and regular rhythm.      Heart sounds: Normal heart sounds. No murmur heard.     No gallop.   Pulmonary:      Effort: Pulmonary effort is normal.      Breath sounds: Normal breath sounds.   Abdominal:      General: Bowel sounds are normal.   Musculoskeletal:      Right lower leg: No edema.      Left lower leg: No edema.   Lymphadenopathy:      Cervical: No cervical adenopathy.   Psychiatric:         Mood and Affect: Mood normal.         Behavior: Behavior normal.         Vitals:    10/30/24 1035   BP: 121/72   Pulse: 102   Resp: 20   Temp: 97.9 °F (36.6 °C)   TempSrc: Temporal   SpO2: 97%  Comment: 2 LITERS NC   Weight: 84.5 kg (186 lb 4.6 oz)   Height: 152.4 cm (60\")   PainSc: 0-No pain     ECOG score: 0         PHQ-9 Total Score:                    Result Review :   The following data was reviewed by: West Nicolas MD on 10/30/2024:  Lab Results   Component Value Date    HGB 14.4 10/30/2024    HCT 44.6 10/30/2024    MCV 97.0 10/30/2024     10/30/2024    WBC 16.12 (H) 10/30/2024    NEUTROABS 6.15 10/30/2024    NEUTROABS 8.54 (H) 10/30/2024    LYMPHSABS 8.82 (H) 10/30/2024    MONOSABS 0.84 10/30/2024    EOSABS 0.24 10/30/2024    BASOSABS 0.03 10/30/2024     Lab Results   Component Value Date    GLUCOSE 122 (H) 10/30/2024    BUN 9 10/30/2024    CREATININE 0.72 10/30/2024     10/30/2024    K 4.1 10/30/2024     10/30/2024    CO2 22.8 10/30/2024    CALCIUM 9.4 10/30/2024    PROTEINTOT 6.3 10/30/2024    ALBUMIN 4.1 10/30/2024    BILITOT 0.3 10/30/2024    ALKPHOS 90 10/30/2024    AST 12 10/30/2024    ALT 11 10/30/2024     Lab Results   Component Value Date    MG 1.8 07/29/2024    PHOS 2.5 07/27/2024    FREET4 1.55 12/06/2023    TSH 0.684 09/11/2024     No results found for: \"IRON\", \"LABIRON\", \"TRANSFERRIN\", \"TIBC\"  Lab Results   Component Value Date     03/07/2023    " "[id number] 04/15/2024    FOLATE 8.95 03/27/2024     No results found for: \"PSA\", \"CEA\", \"AFP\", \"\", \"\"          Assessment and Plan    Diagnoses and all orders for this visit:    1. Multiple myeloma, remission status unspecified (Primary)  Assessment & Plan:  Patient is on active therapy with daratumumab, lenalidomide, dexamethasone and zoledronic acid.  Tolerating well.  Excellent response to therapy thus far.  Myeloma protein studies will be obtained today.  She anticipates dental work in the near future.  I will hold zoledronic acid until she is cleared by the dentist.  Proceed with cycle 12 as planned.  I will see her back for cycle 13-day 1 with lab work prior to monitor for toxicities.  She will be due for yearly whole-body PET scan prior to that visit    Orders:  -     CBC and Differential; Future  -     Comprehensive metabolic panel; Future  -     CARL, Jessica-Specific, Serum; Future  -     Protein Electrophoresis, Total; Future  -     Immunoglobulin Free LT Chains Blood; Future  -     NM PET/CT Whole Body; Future    2. Encounter for long-term (current) use of medications  -     CBC and Differential; Future  -     Comprehensive metabolic panel; Future    Other orders  -     Cancel: sodium chloride 0.9 % infusion  -     Cancel: acetaminophen (TYLENOL) tablet 1,000 mg  -     Cancel: methylPREDNISolone sodium succinate (SOLU-Medrol) injection 60 mg  -     Cancel: diphenhydrAMINE (BENADRYL) IVPB 25 mg  -     Cancel: daratumumab-hyaluronidase-fihj (DARZALEX FASPRO) 1800-00528 MG-UT/15ML injection 1,800 mg  -     Cancel: Hydrocortisone Sod Suc (PF) (Solu-CORTEF) injection 100 mg  -     Cancel: diphenhydrAMINE (BENADRYL) injection 50 mg  -     Cancel: famotidine (PEPCID) injection 20 mg            Patient Follow Up: Cycle 13-day 1    Patient was given instructions and counseling regarding her condition or for health maintenance advice. Please see specific information pulled into the AVS if appropriate. "     West Nicolas MD    10/30/2024

## 2024-10-30 NOTE — ASSESSMENT & PLAN NOTE
Patient is on active therapy with daratumumab, lenalidomide, dexamethasone and zoledronic acid.  Tolerating well.  Excellent response to therapy thus far.  Myeloma protein studies will be obtained today.  She anticipates dental work in the near future.  I will hold zoledronic acid until she is cleared by the dentist.  Proceed with cycle 12 as planned.  I will see her back for cycle 13-day 1 with lab work prior to monitor for toxicities.  She will be due for yearly whole-body PET scan prior to that visit

## 2024-10-31 ENCOUNTER — TELEPHONE (OUTPATIENT)
Dept: ONCOLOGY | Facility: HOSPITAL | Age: 74
End: 2024-10-31
Payer: MEDICARE

## 2024-10-31 LAB
ALBUMIN SERPL ELPH-MCNC: 3.6 G/DL (ref 2.9–4.4)
ALBUMIN/GLOB SERPL: 1.3 {RATIO} (ref 0.7–1.7)
ALPHA1 GLOB SERPL ELPH-MCNC: 0.2 G/DL (ref 0–0.4)
ALPHA2 GLOB SERPL ELPH-MCNC: 0.8 G/DL (ref 0.4–1)
B-GLOBULIN SERPL ELPH-MCNC: 1 G/DL (ref 0.7–1.3)
GAMMA GLOB SERPL ELPH-MCNC: 0.7 G/DL (ref 0.4–1.8)
GLOBULIN SER CALC-MCNC: 2.7 G/DL (ref 2.2–3.9)
KAPPA LC FREE SER-MCNC: 8.9 MG/L (ref 3.3–19.4)
KAPPA LC FREE/LAMBDA FREE SER: 4.45 {RATIO} (ref 0.26–1.65)
LABORATORY COMMENT REPORT: ABNORMAL
LAMBDA LC FREE SERPL-MCNC: 2 MG/L (ref 5.7–26.3)
M PROTEIN SERPL ELPH-MCNC: 0.5 G/DL
PROT SERPL-MCNC: 6.3 G/DL (ref 6–8.5)

## 2024-11-05 ENCOUNTER — OFFICE VISIT (OUTPATIENT)
Dept: SURGERY | Facility: CLINIC | Age: 74
End: 2024-11-05
Payer: MEDICARE

## 2024-11-05 ENCOUNTER — OFFICE VISIT (OUTPATIENT)
Dept: PULMONOLOGY | Facility: CLINIC | Age: 74
End: 2024-11-05
Payer: MEDICARE

## 2024-11-05 ENCOUNTER — LAB (OUTPATIENT)
Facility: HOSPITAL | Age: 74
End: 2024-11-05
Payer: MEDICARE

## 2024-11-05 VITALS
BODY MASS INDEX: 36.44 KG/M2 | TEMPERATURE: 98 F | HEART RATE: 78 BPM | SYSTOLIC BLOOD PRESSURE: 103 MMHG | WEIGHT: 185.6 LBS | RESPIRATION RATE: 22 BRPM | DIASTOLIC BLOOD PRESSURE: 58 MMHG | OXYGEN SATURATION: 95 % | HEIGHT: 60 IN

## 2024-11-05 VITALS — BODY MASS INDEX: 36.52 KG/M2 | RESPIRATION RATE: 22 BRPM | WEIGHT: 186 LBS | HEIGHT: 60 IN

## 2024-11-05 DIAGNOSIS — G47.33 OSA (OBSTRUCTIVE SLEEP APNEA): ICD-10-CM

## 2024-11-05 DIAGNOSIS — J44.9 CHRONIC OBSTRUCTIVE PULMONARY DISEASE, UNSPECIFIED COPD TYPE: ICD-10-CM

## 2024-11-05 DIAGNOSIS — K57.92 DIVERTICULITIS: ICD-10-CM

## 2024-11-05 DIAGNOSIS — C90.00 MULTIPLE MYELOMA NOT HAVING ACHIEVED REMISSION: ICD-10-CM

## 2024-11-05 DIAGNOSIS — J96.11 CHRONIC RESPIRATORY FAILURE WITH HYPOXIA: ICD-10-CM

## 2024-11-05 DIAGNOSIS — K44.9 HIATAL HERNIA: ICD-10-CM

## 2024-11-05 DIAGNOSIS — R06.09 CHRONIC DYSPNEA: ICD-10-CM

## 2024-11-05 DIAGNOSIS — F17.201 TOBACCO ABUSE, IN REMISSION: Primary | ICD-10-CM

## 2024-11-05 DIAGNOSIS — K43.2 INCISIONAL HERNIA, WITHOUT OBSTRUCTION OR GANGRENE: Primary | ICD-10-CM

## 2024-11-05 DIAGNOSIS — J43.2 CENTRILOBULAR EMPHYSEMA: ICD-10-CM

## 2024-11-05 DIAGNOSIS — J98.11 ATELECTASIS: ICD-10-CM

## 2024-11-05 LAB
ANION GAP SERPL CALCULATED.3IONS-SCNC: 16.1 MMOL/L (ref 5–15)
ANISOCYTOSIS BLD QL: ABNORMAL
BASOPHILS # BLD MANUAL: 0 10*3/MM3 (ref 0–0.2)
BASOPHILS NFR BLD MANUAL: 0 % (ref 0–1.5)
BUN SERPL-MCNC: 14 MG/DL (ref 8–23)
BUN/CREAT SERPL: 13.1 (ref 7–25)
CALCIUM SPEC-SCNC: 9.7 MG/DL (ref 8.6–10.5)
CHLORIDE SERPL-SCNC: 109 MMOL/L (ref 98–107)
CO2 SERPL-SCNC: 17.9 MMOL/L (ref 22–29)
CREAT SERPL-MCNC: 1.07 MG/DL (ref 0.57–1)
DEPRECATED RDW RBC AUTO: 50.3 FL (ref 37–54)
EGFRCR SERPLBLD CKD-EPI 2021: 54.3 ML/MIN/1.73
EOSINOPHIL # BLD MANUAL: 0.43 10*3/MM3 (ref 0–0.4)
EOSINOPHIL NFR BLD MANUAL: 3.2 % (ref 0.3–6.2)
ERYTHROCYTE [DISTWIDTH] IN BLOOD BY AUTOMATED COUNT: 14.1 % (ref 12.3–15.4)
GLUCOSE SERPL-MCNC: 152 MG/DL (ref 65–99)
HCT VFR BLD AUTO: 45.3 % (ref 34–46.6)
HGB BLD-MCNC: 14.3 G/DL (ref 12–15.9)
LYMPHOCYTES # BLD MANUAL: 5.2 10*3/MM3 (ref 0.7–3.1)
LYMPHOCYTES NFR BLD MANUAL: 5.3 % (ref 5–12)
MACROCYTES BLD QL SMEAR: ABNORMAL
MCH RBC QN AUTO: 30.6 PG (ref 26.6–33)
MCHC RBC AUTO-ENTMCNC: 31.6 G/DL (ref 31.5–35.7)
MCV RBC AUTO: 97 FL (ref 79–97)
MONOCYTES # BLD: 0.71 10*3/MM3 (ref 0.1–0.9)
MYELOCYTES NFR BLD MANUAL: 1.1 % (ref 0–0)
NEUTROPHILS # BLD AUTO: 6.88 10*3/MM3 (ref 1.7–7)
NEUTROPHILS NFR BLD MANUAL: 51.6 % (ref 42.7–76)
PLAT MORPH BLD: NORMAL
PLATELET # BLD AUTO: 230 10*3/MM3 (ref 140–450)
PMV BLD AUTO: 10.9 FL (ref 6–12)
POIKILOCYTOSIS BLD QL SMEAR: ABNORMAL
POTASSIUM SERPL-SCNC: 4.4 MMOL/L (ref 3.5–5.2)
RBC # BLD AUTO: 4.67 10*6/MM3 (ref 3.77–5.28)
SODIUM SERPL-SCNC: 143 MMOL/L (ref 136–145)
VARIANT LYMPHS NFR BLD MANUAL: 1.1 % (ref 0–5)
VARIANT LYMPHS NFR BLD MANUAL: 37.9 % (ref 19.6–45.3)
WBC MORPH BLD: NORMAL
WBC NRBC COR # BLD AUTO: 13.34 10*3/MM3 (ref 3.4–10.8)

## 2024-11-05 PROCEDURE — 85025 COMPLETE CBC W/AUTO DIFF WBC: CPT

## 2024-11-05 PROCEDURE — 99024 POSTOP FOLLOW-UP VISIT: CPT | Performed by: SURGERY

## 2024-11-05 PROCEDURE — 1159F MED LIST DOCD IN RCRD: CPT | Performed by: SURGERY

## 2024-11-05 PROCEDURE — 85007 BL SMEAR W/DIFF WBC COUNT: CPT

## 2024-11-05 PROCEDURE — 80048 BASIC METABOLIC PNL TOTAL CA: CPT

## 2024-11-05 PROCEDURE — 1160F RVW MEDS BY RX/DR IN RCRD: CPT | Performed by: SURGERY

## 2024-11-05 PROCEDURE — 36415 COLL VENOUS BLD VENIPUNCTURE: CPT

## 2024-11-05 RX ORDER — ALBUTEROL SULFATE 90 UG/1
2 INHALANT RESPIRATORY (INHALATION) EVERY 4 HOURS PRN
Qty: 18 G | Refills: 11 | Status: SHIPPED | OUTPATIENT
Start: 2024-11-05

## 2024-11-05 RX ORDER — LEVALBUTEROL INHALATION SOLUTION 0.63 MG/3ML
3 SOLUTION RESPIRATORY (INHALATION) 4 TIMES DAILY PRN
Qty: 120 ML | Refills: 5 | Status: SHIPPED | OUTPATIENT
Start: 2024-11-05

## 2024-11-05 RX ORDER — LEVOFLOXACIN 750 MG/1
750 TABLET, FILM COATED ORAL DAILY
Qty: 7 TABLET | Refills: 0 | Status: SHIPPED | OUTPATIENT
Start: 2024-11-05 | End: 2024-11-12

## 2024-11-05 RX ORDER — METRONIDAZOLE 500 MG/1
500 TABLET ORAL 3 TIMES DAILY
Qty: 21 TABLET | Refills: 0 | Status: SHIPPED | OUTPATIENT
Start: 2024-11-05 | End: 2024-11-12

## 2024-11-05 RX ORDER — BUDESONIDE 0.5 MG/2ML
0.5 INHALANT ORAL 2 TIMES DAILY
Qty: 120 ML | Refills: 5 | Status: SHIPPED | OUTPATIENT
Start: 2024-11-05 | End: 2024-12-05

## 2024-11-05 RX ORDER — REVEFENACIN 175 UG/3ML
175 SOLUTION RESPIRATORY (INHALATION)
Qty: 90 ML | Refills: 5 | Status: SHIPPED | OUTPATIENT
Start: 2024-11-05 | End: 2024-12-05

## 2024-11-05 RX ORDER — ARFORMOTEROL TARTRATE 15 UG/2ML
15 SOLUTION RESPIRATORY (INHALATION)
Qty: 360 ML | Refills: 5 | Status: SHIPPED | OUTPATIENT
Start: 2024-11-05 | End: 2024-12-05

## 2024-11-05 NOTE — PROGRESS NOTES
Chief Complaint  Abdominal Pain    Subjective          Anca Samson presents to [facility] GENERAL SURGERY  History of Present Illness    Anca Samson is a 74 y.o. female  who presents today for a postoperative visit.     Patient is here for a follow-up after recent surgery for a chronically incarcerated incisional hernia.  Following surgery she was having quite a bit of pain and she went to the emergency room and had a CT scan that suggested sigmoid diverticulitis with no obvious complication from the hernia repair.  She is continuing to have some intermittent discomfort on that left side of the abdomen.  At times it is fairly uncomfortable and that at other times she will have no pain at all.  She is eating.    Past History:  Medical History: has a past medical history of Anxiety, Asthma (07/28/2021), Atherosclerosis of native coronary artery of native heart with angina pectoris (08/15/2023), C. difficile diarrhea, Cholelithiasis (7-), Colon cancer (07/21/2017), Colon polyp, COPD (chronic obstructive pulmonary disease) (10/23/2017), Depression, Disease of thyroid gland, Diverticulitis, Diverticulitis of colon, Dysphagia, Emphysema of lung, Emphysema/COPD, GERD (gastroesophageal reflux disease), Head injury, Hernia (2019), History of chemotherapy, psychiatric care, Impaired fasting glucose (08/14/2015), Lumbago, Lung nodule (02/17/2022), Major depressive disorder (10/27/2016), Malignant neoplasm of ascending colon (07/21/2017), Melena, Multiple myeloma, Nicotine dependence (05/10/2017), NICK (obstructive sleep apnea) (11/06/2023), Oxygen dependent, Pancreatitis (7/26/2024), Panic disorder, Primary central sleep apnea, Renal insufficiency (07/28/2021), Seizures, Shortness of breath, Tobacco use (07/28/2021), Visit for screening mammogram (02/20/2020), and Vitamin D deficiency.   Surgical History: has a past surgical history that includes Bone marrow biopsy (2016); Colonoscopy  (2018,2017,2019); Esophagogastroduodenoscopy (2018); Fecal disimpaction (06/2019); Hysterectomy (1982,1984); Mini-laparotomy (2017); Portacath placement (N/A); Colon surgery (2017); Hemicolectomy (Right, 05/2017); Upper gastrointestinal endoscopy (2018); Knee Arthroscopy (Left, 01/03/2022); Appendectomy; Colonoscopy (N/A, 12/20/2022); Venous Access Device (Port) (N/A, 10/31/2023); Colonoscopy (N/A, 11/09/2023); Eye surgery; Tonsillectomy; ERCP (N/A, 07/27/2024); Cholecystectomy (N/A, 08/08/2024); Gallbladder surgery; ERCP (N/A, 9/5/2024); and Ventral hernia repair (N/A, 10/3/2024).   Family History: family history includes Cancer in her father and mother; Heart disease in her father and mother; Kidney cancer in her father; Lung cancer in her mother; Stroke in her father and another family member.   Social History: reports that she quit smoking about 2 years ago. Her smoking use included cigarettes. She started smoking about 49 years ago. She has a 47.4 pack-year smoking history. She has been exposed to tobacco smoke. She has never used smokeless tobacco. She reports that she does not drink alcohol and does not use drugs.  Allergies: Morphine, Cephalexin, Penicillins, and Sulfa antibiotics       Current Outpatient Medications:     acyclovir (ZOVIRAX) 400 MG tablet, TAKE 1 TABLET BY MOUTH 2 (TWO) TIMES A DAY. TAKE ONE TABLET BY MOUTH TWICE DAILY., Disp: 180 tablet, Rfl: 3    albuterol sulfate  (90 Base) MCG/ACT inhaler, Inhale 2 puffs Every 4 (Four) Hours As Needed for Wheezing., Disp: 18 g, Rfl: 11    arformoterol (BROVANA) 15 MCG/2ML nebulizer solution, Take 2 mL by nebulization 2 (Two) Times a Day for 30 days., Disp: 360 mL, Rfl: 5    aspirin 81 MG EC tablet, Take 1 tablet by mouth Daily., Disp: , Rfl:     budesonide (PULMICORT) 0.5 MG/2ML nebulizer solution, Take 2 mL by nebulization 2 (Two) Times a Day for 30 days. Rinse mouth out after each use, Disp: 120 mL, Rfl: 5    busPIRone (BUSPAR) 15 MG tablet,  "Take 1 tablet by mouth 2 (Two) Times a Day., Disp: 180 tablet, Rfl: 1    Calcium Carb-Cholecalciferol (calcium 500 mg vitamin D 5 mcg, 200 UT,) 500-5 MG-MCG tablet per tablet, , Disp: , Rfl:     colestipol (COLESTID) 1 g tablet, TAKE 2 TABLETS BY MOUTH TWICE A DAY, Disp: 360 tablet, Rfl: 1    dapsone 25 MG tablet, TAKE 2 TABLETS BY MOUTH TWICE A DAY, Disp: 360 tablet, Rfl: 1    dexAMETHasone (DECADRON) 4 MG tablet, Take 10 tablets on D 1,8,15,22 and take 1 tablet on D 2,3.  Take in the morning with food., Disp: 42 tablet, Rfl: 5    FLUoxetine (PROzac) 40 MG capsule, Take 1 capsule by mouth Daily., Disp: 90 capsule, Rfl: 1    lenalidomide (REVLIMID) 15 MG capsule, Take 1 capsule by mouth Daily. On Days 1-21, and off 7 days, on a 28 day cycle, Disp: 12 capsule, Rfl: 0    levalbuterol (XOPENEX) 0.63 MG/3ML nebulizer solution, Take 1 ampule by nebulization 4 (Four) Times a Day As Needed for Wheezing., Disp: 120 mL, Rfl: 5    nitrofurantoin, macrocrystal-monohydrate, (Macrobid) 100 MG capsule, Take 1 capsule by mouth 2 (Two) Times a Day., Disp: 14 capsule, Rfl: 0    ondansetron (ZOFRAN) 8 MG tablet, Take 1 tablet by mouth 3 (Three) Times a Day As Needed for Nausea or Vomiting., Disp: 30 tablet, Rfl: 5    promethazine (PHENERGAN) 25 MG tablet, Take 1 tablet by mouth Every 6 (Six) Hours As Needed for Nausea or Vomiting., Disp: 30 tablet, Rfl: 1    revefenacin (Yupelri) 175 MCG/3ML nebulizer solution, Take 3 mL by nebulization Daily for 30 days., Disp: 90 mL, Rfl: 5    vitamin D (ERGOCALCIFEROL) 1.25 MG (91464 UT) capsule capsule, Take 1 capsule by mouth 2 (Two) Times a Week., Disp: 26 capsule, Rfl: 1       Physical Exam  She does not look acutely ill.  Her abdomen is soft and is not really very tender at this point.  Objective     Vital Signs:   Resp 22   Ht 152.4 cm (60\")   Wt 84.4 kg (186 lb)   BMI 36.33 kg/m²              Assessment and Plan    Diagnoses and all orders for this visit:    1. Incisional hernia, without " obstruction or gangrene (Primary)    2. Diverticulitis    She continues to have episodes of significant abdominal pain.  She was prescribed antibiotic when she was in the emergency room but only took it for a couple of days.  We are going to represcribe some antibiotics for her and we will going to have her take that for 7 days.  We are going to go ahead and get some lab work to include a chemistry and a CBC.  We will repeat a CT scan of the abdomen and pelvis and make sure that she is not developed a more complicated case of diverticulitis.

## 2024-11-06 ENCOUNTER — HOSPITAL ENCOUNTER (OUTPATIENT)
Dept: CT IMAGING | Facility: HOSPITAL | Age: 74
Discharge: HOME | End: 2024-11-06
Admitting: SURGERY
Payer: MEDICARE

## 2024-11-06 DIAGNOSIS — K57.92 DIVERTICULITIS: ICD-10-CM

## 2024-11-06 PROCEDURE — 25510000001 IOPAMIDOL PER 1 ML: Performed by: SURGERY

## 2024-11-06 PROCEDURE — 74177 CT ABD & PELVIS W/CONTRAST: CPT

## 2024-11-06 RX ORDER — IOPAMIDOL 755 MG/ML
100 INJECTION, SOLUTION INTRAVASCULAR
Status: COMPLETED | OUTPATIENT
Start: 2024-11-06 | End: 2024-11-06

## 2024-11-06 RX ADMIN — IOPAMIDOL 100 ML: 755 INJECTION, SOLUTION INTRAVENOUS at 09:51

## 2024-11-11 LAB
IGA SERPL-MCNC: 14 MG/DL (ref 64–422)
IGG SERPL-MCNC: 701 MG/DL (ref 586–1602)
IGM SERPL-MCNC: 24 MG/DL (ref 26–217)
PROT PATTERN SERPL IFE-IMP: ABNORMAL

## 2024-11-12 ENCOUNTER — OFFICE VISIT (OUTPATIENT)
Dept: SURGERY | Facility: CLINIC | Age: 74
End: 2024-11-12
Payer: MEDICARE

## 2024-11-12 VITALS — HEIGHT: 60 IN | WEIGHT: 187.39 LBS | RESPIRATION RATE: 20 BRPM | BODY MASS INDEX: 36.79 KG/M2

## 2024-11-12 DIAGNOSIS — K43.2 INCISIONAL HERNIA, WITHOUT OBSTRUCTION OR GANGRENE: Primary | ICD-10-CM

## 2024-11-12 NOTE — PROGRESS NOTES
Chief Complaint  Post-op Hernia and Diverticulitis    Subjective          Anca Samson presents to [facility] GENERAL SURGERY  History of Present Illness    Anca Samson is a 74 y.o. female  who presents today for a postoperative visit.     Patient is here for a follow-up after a robotic incisional hernia repair.  We ended up repeating a CT scan because of some left-sided abdominal pain complaints and fortunately that looked fine with no obvious acute inflammatory process identified.  Her hernia repair looked fine.    Past History:  Medical History: has a past medical history of Anxiety, Asthma (07/28/2021), Atherosclerosis of native coronary artery of native heart with angina pectoris (08/15/2023), C. difficile diarrhea, Cholelithiasis (7-), Colon cancer (07/21/2017), Colon polyp, COPD (chronic obstructive pulmonary disease) (10/23/2017), Depression, Disease of thyroid gland, Diverticulitis, Diverticulitis of colon, Dysphagia, Emphysema of lung, Emphysema/COPD, GERD (gastroesophageal reflux disease), Head injury, Hernia (2019), History of chemotherapy, psychiatric care, Impaired fasting glucose (08/14/2015), Lumbago, Lung nodule (02/17/2022), Major depressive disorder (10/27/2016), Malignant neoplasm of ascending colon (07/21/2017), Melena, Multiple myeloma, Nicotine dependence (05/10/2017), NICK (obstructive sleep apnea) (11/06/2023), Oxygen dependent, Pancreatitis (7/26/2024), Panic disorder, Primary central sleep apnea, Renal insufficiency (07/28/2021), Seizures, Shortness of breath, Tobacco use (07/28/2021), Visit for screening mammogram (02/20/2020), and Vitamin D deficiency.   Surgical History: has a past surgical history that includes Bone marrow biopsy (2016); Colonoscopy (2018,2017,2019); Esophagogastroduodenoscopy (2018); Fecal disimpaction (06/2019); Hysterectomy (1982,1984); Mini-laparotomy (2017); Portacath placement (N/A); Colon surgery (2017); Hemicolectomy (Right,  05/2017); Upper gastrointestinal endoscopy (2018); Knee Arthroscopy (Left, 01/03/2022); Appendectomy; Colonoscopy (N/A, 12/20/2022); Venous Access Device (Port) (N/A, 10/31/2023); Colonoscopy (N/A, 11/09/2023); Eye surgery; Tonsillectomy; ERCP (N/A, 07/27/2024); Cholecystectomy (N/A, 08/08/2024); Gallbladder surgery; ERCP (N/A, 9/5/2024); and Ventral hernia repair (N/A, 10/3/2024).   Family History: family history includes Cancer in her father and mother; Heart disease in her father and mother; Kidney cancer in her father; Lung cancer in her mother; Stroke in her father and another family member.   Social History: reports that she quit smoking about 2 years ago. Her smoking use included cigarettes. She started smoking about 49 years ago. She has a 47.4 pack-year smoking history. She has been exposed to tobacco smoke. She has never used smokeless tobacco. She reports that she does not drink alcohol and does not use drugs.  Allergies: Morphine, Cephalexin, Penicillins, and Sulfa antibiotics       Current Outpatient Medications:     acyclovir (ZOVIRAX) 400 MG tablet, TAKE 1 TABLET BY MOUTH 2 (TWO) TIMES A DAY. TAKE ONE TABLET BY MOUTH TWICE DAILY., Disp: 180 tablet, Rfl: 3    albuterol sulfate  (90 Base) MCG/ACT inhaler, Inhale 2 puffs Every 4 (Four) Hours As Needed for Wheezing., Disp: 18 g, Rfl: 11    arformoterol (BROVANA) 15 MCG/2ML nebulizer solution, Take 2 mL by nebulization 2 (Two) Times a Day for 30 days., Disp: 360 mL, Rfl: 5    aspirin 81 MG EC tablet, Take 1 tablet by mouth Daily., Disp: , Rfl:     budesonide (PULMICORT) 0.5 MG/2ML nebulizer solution, Take 2 mL by nebulization 2 (Two) Times a Day for 30 days. Rinse mouth out after each use, Disp: 120 mL, Rfl: 5    busPIRone (BUSPAR) 15 MG tablet, Take 1 tablet by mouth 2 (Two) Times a Day., Disp: 180 tablet, Rfl: 1    Calcium Carb-Cholecalciferol (calcium 500 mg vitamin D 5 mcg, 200 UT,) 500-5 MG-MCG tablet per tablet, , Disp: , Rfl:     colestipol  "(COLESTID) 1 g tablet, TAKE 2 TABLETS BY MOUTH TWICE A DAY, Disp: 360 tablet, Rfl: 1    dapsone 25 MG tablet, TAKE 2 TABLETS BY MOUTH TWICE A DAY, Disp: 360 tablet, Rfl: 1    dexAMETHasone (DECADRON) 4 MG tablet, Take 10 tablets on D 1,8,15,22 and take 1 tablet on D 2,3.  Take in the morning with food., Disp: 42 tablet, Rfl: 5    FLUoxetine (PROzac) 40 MG capsule, Take 1 capsule by mouth Daily., Disp: 90 capsule, Rfl: 1    lenalidomide (REVLIMID) 15 MG capsule, Take 1 capsule by mouth Daily. On Days 1-21, and off 7 days, on a 28 day cycle, Disp: 12 capsule, Rfl: 0    levalbuterol (XOPENEX) 0.63 MG/3ML nebulizer solution, Take 1 ampule by nebulization 4 (Four) Times a Day As Needed for Wheezing., Disp: 120 mL, Rfl: 5    levoFLOXacin (Levaquin) 750 MG tablet, Take 1 tablet by mouth Daily for 7 days., Disp: 7 tablet, Rfl: 0    metroNIDAZOLE (Flagyl) 500 MG tablet, Take 1 tablet by mouth 3 (Three) Times a Day for 7 days., Disp: 21 tablet, Rfl: 0    nitrofurantoin, macrocrystal-monohydrate, (Macrobid) 100 MG capsule, Take 1 capsule by mouth 2 (Two) Times a Day., Disp: 14 capsule, Rfl: 0    ondansetron (ZOFRAN) 8 MG tablet, Take 1 tablet by mouth 3 (Three) Times a Day As Needed for Nausea or Vomiting., Disp: 30 tablet, Rfl: 5    promethazine (PHENERGAN) 25 MG tablet, Take 1 tablet by mouth Every 6 (Six) Hours As Needed for Nausea or Vomiting., Disp: 30 tablet, Rfl: 1    revefenacin (Yupelri) 175 MCG/3ML nebulizer solution, Take 3 mL by nebulization Daily for 30 days., Disp: 90 mL, Rfl: 5    vitamin D (ERGOCALCIFEROL) 1.25 MG (54165 UT) capsule capsule, Take 1 capsule by mouth 2 (Two) Times a Week., Disp: 26 capsule, Rfl: 1       Physical Exam  Her abdomen looks good today.  All of her incisions of healed up fine.  Her abdomen is soft.  Objective     Vital Signs:   Resp 20   Ht 152.4 cm (60\")   Wt 85 kg (187 lb 6.3 oz)   BMI 36.60 kg/m²              Assessment and Plan    Diagnoses and all orders for this visit:    1. " Incisional hernia, without obstruction or gangrene (Primary)    At this point we seem to be doing better.  I will see her back on an as-needed basis.  I have asked her to call me in the meantime if she were to have any further questions or concerns.

## 2024-11-13 DIAGNOSIS — C90.00 MULTIPLE MYELOMA, REMISSION STATUS UNSPECIFIED: ICD-10-CM

## 2024-11-15 ENCOUNTER — SPECIALTY PHARMACY (OUTPATIENT)
Dept: PHARMACY | Facility: HOSPITAL | Age: 74
End: 2024-11-15
Payer: MEDICARE

## 2024-11-15 DIAGNOSIS — J98.11 ATELECTASIS: ICD-10-CM

## 2024-11-15 DIAGNOSIS — G47.33 OSA (OBSTRUCTIVE SLEEP APNEA): ICD-10-CM

## 2024-11-15 DIAGNOSIS — J43.2 CENTRILOBULAR EMPHYSEMA: ICD-10-CM

## 2024-11-15 DIAGNOSIS — J96.11 CHRONIC RESPIRATORY FAILURE WITH HYPOXIA: ICD-10-CM

## 2024-11-15 DIAGNOSIS — C90.00 MULTIPLE MYELOMA, REMISSION STATUS UNSPECIFIED: ICD-10-CM

## 2024-11-15 RX ORDER — LENALIDOMIDE 15 MG/1
15 CAPSULE ORAL DAILY
Qty: 21 CAPSULE | Refills: 0 | Status: SHIPPED | OUTPATIENT
Start: 2024-11-15

## 2024-11-15 NOTE — PROGRESS NOTES
Re: Refills of Oral Specialty Medication - Revlimid (lenalidomide)    Drug-Drug Interactions: The current medication list was reviewed and there are no relevant drug-drug interactions with the specialty medication.  Medication Allergies: The patient has no relevant allergies as it relates to their oral specialty medication  Review of Labs/Dose Adjustments: NO DOSE CHANGE - I reviewed the most recent note and labs and the patient will continue without any dose changes.  I sent refills as described below.    Drug: Revlimid (lenalidomide)  Strength: 15  mg  Directions: Take 1 capsule by mouth daily ON days 1-21, OFF for 7 days of each 28 day cycle  Quantity: 21  Refills: 0  REMS Auth # [id number] Exp 12/15/24     Pharmacy prescription sent to: [facility]      Walker BrysonD, [facility]  Oncology Clinical Pharmacist  11/15/2024  07:56 EST

## 2024-11-18 RX ORDER — ONDANSETRON 8 MG/1
TABLET, FILM COATED ORAL
Qty: 30 TABLET | Refills: 5 | Status: SHIPPED | OUTPATIENT
Start: 2024-11-18

## 2024-11-21 ENCOUNTER — HOSPITAL ENCOUNTER (OUTPATIENT)
Dept: PET IMAGING | Facility: HOSPITAL | Age: 74
Discharge: HOME | End: 2024-11-21
Payer: MEDICARE

## 2024-11-21 DIAGNOSIS — C90.00 MULTIPLE MYELOMA, REMISSION STATUS UNSPECIFIED: ICD-10-CM

## 2024-11-21 PROCEDURE — 34310000005 FLUDEOXYGLUCOSE F18 SOLUTION: Performed by: INTERNAL MEDICINE

## 2024-11-21 PROCEDURE — A9552 F18 FDG: HCPCS | Performed by: INTERNAL MEDICINE

## 2024-11-21 PROCEDURE — 78816 PET IMAGE W/CT FULL BODY: CPT

## 2024-11-21 RX ADMIN — FLUDEOXYGLUCOSE F 18 1 DOSE: 200 INJECTION, SOLUTION INTRAVENOUS at 07:55

## 2024-11-27 ENCOUNTER — HOSPITAL ENCOUNTER (OUTPATIENT)
Dept: ONCOLOGY | Facility: HOSPITAL | Age: 74
Discharge: HOME | End: 2024-11-27
Payer: MEDICARE

## 2024-11-27 ENCOUNTER — OFFICE VISIT (OUTPATIENT)
Dept: ONCOLOGY | Facility: HOSPITAL | Age: 74
End: 2024-11-27
Payer: MEDICARE

## 2024-11-27 VITALS
RESPIRATION RATE: 18 BRPM | HEART RATE: 81 BPM | TEMPERATURE: 96.9 F | OXYGEN SATURATION: 95 % | BODY MASS INDEX: 37.69 KG/M2 | WEIGHT: 193 LBS | DIASTOLIC BLOOD PRESSURE: 83 MMHG | SYSTOLIC BLOOD PRESSURE: 113 MMHG

## 2024-11-27 VITALS
DIASTOLIC BLOOD PRESSURE: 83 MMHG | RESPIRATION RATE: 18 BRPM | SYSTOLIC BLOOD PRESSURE: 113 MMHG | HEART RATE: 81 BPM | WEIGHT: 193.2 LBS | BODY MASS INDEX: 37.73 KG/M2 | OXYGEN SATURATION: 95 % | TEMPERATURE: 96.9 F

## 2024-11-27 DIAGNOSIS — C90.00 MULTIPLE MYELOMA, REMISSION STATUS UNSPECIFIED: ICD-10-CM

## 2024-11-27 DIAGNOSIS — Z12.2 ENCOUNTER FOR SCREENING FOR LUNG CANCER: ICD-10-CM

## 2024-11-27 DIAGNOSIS — Z79.899 ENCOUNTER FOR LONG-TERM (CURRENT) USE OF MEDICATIONS: Primary | ICD-10-CM

## 2024-11-27 DIAGNOSIS — C90.00 MULTIPLE MYELOMA, REMISSION STATUS UNSPECIFIED: Primary | ICD-10-CM

## 2024-11-27 DIAGNOSIS — Z79.899 ENCOUNTER FOR LONG-TERM (CURRENT) USE OF MEDICATIONS: ICD-10-CM

## 2024-11-27 LAB
ALBUMIN SERPL-MCNC: 4 G/DL (ref 3.5–5.2)
ALBUMIN/GLOB SERPL: 1.9 G/DL
ALP SERPL-CCNC: 66 U/L (ref 39–117)
ALT SERPL W P-5'-P-CCNC: 8 U/L (ref 1–33)
ANION GAP SERPL CALCULATED.3IONS-SCNC: 13.7 MMOL/L (ref 5–15)
AST SERPL-CCNC: 10 U/L (ref 1–32)
BASOPHILS # BLD AUTO: 0.07 10*3/MM3 (ref 0–0.2)
BASOPHILS NFR BLD AUTO: 0.7 % (ref 0–1.5)
BILIRUB SERPL-MCNC: 0.7 MG/DL (ref 0–1.2)
BUN SERPL-MCNC: 15 MG/DL (ref 8–23)
BUN/CREAT SERPL: 20.8 (ref 7–25)
CALCIUM SPEC-SCNC: 9.3 MG/DL (ref 8.6–10.5)
CHLORIDE SERPL-SCNC: 109 MMOL/L (ref 98–107)
CO2 SERPL-SCNC: 18.3 MMOL/L (ref 22–29)
CREAT SERPL-MCNC: 0.72 MG/DL (ref 0.57–1)
DEPRECATED RDW RBC AUTO: 65.3 FL (ref 37–54)
EGFRCR SERPLBLD CKD-EPI 2021: 87.3 ML/MIN/1.73
EOSINOPHIL # BLD AUTO: 0.51 10*3/MM3 (ref 0–0.4)
EOSINOPHIL NFR BLD AUTO: 5.2 % (ref 0.3–6.2)
ERYTHROCYTE [DISTWIDTH] IN BLOOD BY AUTOMATED COUNT: 18.6 % (ref 12.3–15.4)
GLOBULIN UR ELPH-MCNC: 2.1 GM/DL
GLUCOSE SERPL-MCNC: 142 MG/DL (ref 65–99)
HCT VFR BLD AUTO: 35.4 % (ref 34–46.6)
HGB BLD-MCNC: 11.3 G/DL (ref 12–15.9)
IMM GRANULOCYTES # BLD AUTO: 0.08 10*3/MM3 (ref 0–0.05)
IMM GRANULOCYTES NFR BLD AUTO: 0.8 % (ref 0–0.5)
LYMPHOCYTES # BLD AUTO: 4.48 10*3/MM3 (ref 0.7–3.1)
LYMPHOCYTES NFR BLD AUTO: 46 % (ref 19.6–45.3)
MCH RBC QN AUTO: 30.9 PG (ref 26.6–33)
MCHC RBC AUTO-ENTMCNC: 31.9 G/DL (ref 31.5–35.7)
MCV RBC AUTO: 96.7 FL (ref 79–97)
MONOCYTES # BLD AUTO: 1.38 10*3/MM3 (ref 0.1–0.9)
MONOCYTES NFR BLD AUTO: 14.2 % (ref 5–12)
NEUTROPHILS NFR BLD AUTO: 3.21 10*3/MM3 (ref 1.7–7)
NEUTROPHILS NFR BLD AUTO: 33.1 % (ref 42.7–76)
NRBC BLD AUTO-RTO: 0.2 /100 WBC (ref 0–0.2)
PLATELET # BLD AUTO: 361 10*3/MM3 (ref 140–450)
PMV BLD AUTO: 9.2 FL (ref 6–12)
POTASSIUM SERPL-SCNC: 3.7 MMOL/L (ref 3.5–5.2)
PROT SERPL-MCNC: 6.1 G/DL (ref 6–8.5)
RBC # BLD AUTO: 3.66 10*6/MM3 (ref 3.77–5.28)
SODIUM SERPL-SCNC: 141 MMOL/L (ref 136–145)
WBC NRBC COR # BLD AUTO: 9.73 10*3/MM3 (ref 3.4–10.8)

## 2024-11-27 PROCEDURE — 25010000002 DIPHENHYDRAMINE PER 50 MG: Performed by: INTERNAL MEDICINE

## 2024-11-27 PROCEDURE — 85025 COMPLETE CBC W/AUTO DIFF WBC: CPT | Performed by: INTERNAL MEDICINE

## 2024-11-27 PROCEDURE — 96375 TX/PRO/DX INJ NEW DRUG ADDON: CPT

## 2024-11-27 PROCEDURE — 25010000002 METHYLPREDNISOLONE PER 125 MG: Performed by: INTERNAL MEDICINE

## 2024-11-27 PROCEDURE — 96401 CHEMO ANTI-NEOPL SQ/IM: CPT

## 2024-11-27 PROCEDURE — 80053 COMPREHEN METABOLIC PANEL: CPT | Performed by: INTERNAL MEDICINE

## 2024-11-27 PROCEDURE — 63710000001 ACETAMINOPHEN EXTRA STRENGTH 500 MG TABLET: Performed by: INTERNAL MEDICINE

## 2024-11-27 PROCEDURE — A9270 NON-COVERED ITEM OR SERVICE: HCPCS | Performed by: INTERNAL MEDICINE

## 2024-11-27 PROCEDURE — 25010000002 HEPARIN LOCK FLUSH PER 10 UNITS: Performed by: INTERNAL MEDICINE

## 2024-11-27 PROCEDURE — 96374 THER/PROPH/DIAG INJ IV PUSH: CPT

## 2024-11-27 PROCEDURE — 25010000002 DARATUMUMAB-HYALURONIDASE-FIHJ 1800-30000 MG-UT/15ML SOLUTION: Performed by: INTERNAL MEDICINE

## 2024-11-27 RX ORDER — SODIUM CHLORIDE 0.9 % (FLUSH) 0.9 %
20 SYRINGE (ML) INJECTION AS NEEDED
OUTPATIENT
Start: 2024-11-27

## 2024-11-27 RX ORDER — HEPARIN SODIUM (PORCINE) LOCK FLUSH IV SOLN 100 UNIT/ML 100 UNIT/ML
500 SOLUTION INTRAVENOUS AS NEEDED
OUTPATIENT
Start: 2024-11-27

## 2024-11-27 RX ORDER — FAMOTIDINE 10 MG/ML
20 INJECTION, SOLUTION INTRAVENOUS AS NEEDED
Status: CANCELLED | OUTPATIENT
Start: 2024-11-27

## 2024-11-27 RX ORDER — HEPARIN SODIUM (PORCINE) LOCK FLUSH IV SOLN 100 UNIT/ML 100 UNIT/ML
500 SOLUTION INTRAVENOUS AS NEEDED
Status: DISCONTINUED | OUTPATIENT
Start: 2024-11-27 | End: 2024-11-28 | Stop reason: HOSPADM

## 2024-11-27 RX ORDER — ACETAMINOPHEN 500 MG
1000 TABLET ORAL ONCE
Status: CANCELLED | OUTPATIENT
Start: 2024-11-27

## 2024-11-27 RX ORDER — SODIUM CHLORIDE 9 MG/ML
20 INJECTION, SOLUTION INTRAVENOUS ONCE
Status: CANCELLED | OUTPATIENT
Start: 2024-11-27

## 2024-11-27 RX ORDER — METHYLPREDNISOLONE SODIUM SUCCINATE 125 MG/2ML
60 INJECTION INTRAMUSCULAR; INTRAVENOUS ONCE
Status: CANCELLED | OUTPATIENT
Start: 2024-11-27

## 2024-11-27 RX ORDER — ACETAMINOPHEN 500 MG
1000 TABLET ORAL ONCE
Status: COMPLETED | OUTPATIENT
Start: 2024-11-27 | End: 2024-11-27

## 2024-11-27 RX ORDER — SODIUM CHLORIDE 0.9 % (FLUSH) 0.9 %
20 SYRINGE (ML) INJECTION AS NEEDED
Status: DISCONTINUED | OUTPATIENT
Start: 2024-11-27 | End: 2024-11-28 | Stop reason: HOSPADM

## 2024-11-27 RX ORDER — METHYLPREDNISOLONE SODIUM SUCCINATE 125 MG/2ML
60 INJECTION INTRAMUSCULAR; INTRAVENOUS ONCE
Status: COMPLETED | OUTPATIENT
Start: 2024-11-27 | End: 2024-11-27

## 2024-11-27 RX ORDER — HYDROCORTISONE SODIUM SUCCINATE 100 MG/2ML
100 INJECTION INTRAMUSCULAR; INTRAVENOUS AS NEEDED
Status: CANCELLED | OUTPATIENT
Start: 2024-11-27

## 2024-11-27 RX ORDER — SODIUM CHLORIDE 9 MG/ML
20 INJECTION, SOLUTION INTRAVENOUS ONCE
Status: COMPLETED | OUTPATIENT
Start: 2024-11-27 | End: 2024-11-27

## 2024-11-27 RX ORDER — DIPHENHYDRAMINE HYDROCHLORIDE 50 MG/ML
50 INJECTION INTRAMUSCULAR; INTRAVENOUS AS NEEDED
Status: CANCELLED | OUTPATIENT
Start: 2024-11-27

## 2024-11-27 RX ADMIN — ACETAMINOPHEN 1000 MG: 500 TABLET ORAL at 10:07

## 2024-11-27 RX ADMIN — DARATUMUMAB AND HYALURONIDASE-FIHJ (HUMAN RECOMBINANT) 1800 MG: 1800; 30000 INJECTION SUBCUTANEOUS at 11:31

## 2024-11-27 RX ADMIN — DIPHENHYDRAMINE HYDROCHLORIDE 25 MG: 50 INJECTION, SOLUTION INTRAMUSCULAR; INTRAVENOUS at 10:12

## 2024-11-27 RX ADMIN — Medication 20 ML: at 11:37

## 2024-11-27 RX ADMIN — METHYLPREDNISOLONE SODIUM SUCCINATE 60 MG: 125 INJECTION INTRAMUSCULAR; INTRAVENOUS at 10:08

## 2024-11-27 RX ADMIN — HEPARIN 500 UNITS: 100 SYRINGE at 11:37

## 2024-11-27 RX ADMIN — SODIUM CHLORIDE 20 ML/HR: 900 INJECTION, SOLUTION INTRAVENOUS at 10:07

## 2024-11-27 NOTE — ASSESSMENT & PLAN NOTE
Patient is on active therapy with daratumumab, lenalidomide, dexamethasone.  PET scan shows 1 area of activity which is stable in the iliac region.  There is specific CARL shows a faint IgG kappa.  Her other lab work looks good.  Excellent response to therapy thus far.  Zoledronic acid is on hold due to anticipated dental work in December-resume after the mouth has healed.  Proceed with cycle 13 as planned.  RTC 4 weeks for cycle 14, RTC 8 weeks for OV, cycle 15 with lab work prior to monitor for toxicities.

## 2024-11-27 NOTE — PROGRESS NOTES
Chief Complaint  Multiple myeloma, remission status unspecified    Rena Guerra,*  Rena Guerra, PA    Subjective          Anca Samson presents to [facility] HEMATOLOGY & ONCOLOGY for ongoing treatment of her myeloma.  She is on daratumumab, lenalidomide, dexamethasone and zoledronic acid for bony involvement.  The zoledronic acid is currently on hold for upcoming dental work planned for December.  She is otherwise tolerating the regimen well.  She denies any masses, adenopathy, usual aches or pains.  She notes adequate appetite and energy level.    Oncology/Hematology History Overview Note   Smoldering Myeloma    Initially diagnosed in 2016 with bone marrow biopsy demonstrating 30% CD56 positive monoclonal plasma cell population.  45 XX normal female chromosome pattern  FISH panel negative for myeloma associated mutations including 1q21, 9q34,11q13,14q32,15q24, 17q13  No anemia, renal insufficiency, hypercalcemia.  On observation since that time    Low grade adenocarcinoma of ascending colon      5/16/2017 right hemicolectomy which  revealed a low-grade 7.6 cm adenocarcinoma of the cecum. All margins were  negative. Lymphovascular invasion and perineural invasion were not identified.     26 lymph nodes were removed and unfortunately 1 was involved with metastatic deposit. pT3 pN1a colorectal cancer.        Clinical Staging      Smoldering Myeloma; Colon ([id number])            Treatments      Chemotherapy      11/2017 completed 6mo adjuvant Xeloda        6/2022 Stopped Smoking     Malignant neoplasm of ascending colon   7/21/2017 Initial Diagnosis    Colon cancer (CMS/HCC)     Multiple myeloma   7/28/2021 Initial Diagnosis    Multiple myeloma     11/7/2023 -  Chemotherapy    OP MULTIPLE MYELOMA Daratumumab / Lenalidomide / Dexamethasone     1/22/2025 -  Chemotherapy    OP SUPPORTIVE Zoledronic Acid Q28D           Current Outpatient Medications on File Prior to Visit    Medication Sig Dispense Refill    acyclovir (ZOVIRAX) 400 MG tablet TAKE 1 TABLET BY MOUTH 2 (TWO) TIMES A DAY. TAKE ONE TABLET BY MOUTH TWICE DAILY. 180 tablet 3    albuterol sulfate  (90 Base) MCG/ACT inhaler Inhale 2 puffs Every 4 (Four) Hours As Needed for Wheezing. 18 g 11    arformoterol (BROVANA) 15 MCG/2ML nebulizer solution Take 2 mL by nebulization 2 (Two) Times a Day for 30 days. 360 mL 5    aspirin 81 MG EC tablet Take 1 tablet by mouth Daily.      budesonide (PULMICORT) 0.5 MG/2ML nebulizer solution Take 2 mL by nebulization 2 (Two) Times a Day for 30 days. Rinse mouth out after each use 120 mL 5    busPIRone (BUSPAR) 15 MG tablet Take 1 tablet by mouth 2 (Two) Times a Day. 180 tablet 1    Calcium Carb-Cholecalciferol (calcium 500 mg vitamin D 5 mcg, 200 UT,) 500-5 MG-MCG tablet per tablet       colestipol (COLESTID) 1 g tablet TAKE 2 TABLETS BY MOUTH TWICE A  tablet 1    dapsone 25 MG tablet TAKE 2 TABLETS BY MOUTH TWICE A  tablet 1    dexAMETHasone (DECADRON) 4 MG tablet Take 10 tablets on D 1,8,15,22 and take 1 tablet on D 2,3.  Take in the morning with food. 42 tablet 5    FLUoxetine (PROzac) 40 MG capsule Take 1 capsule by mouth Daily. 90 capsule 1    lenalidomide (REVLIMID) 15 MG capsule Take 1 capsule by mouth Daily. On Days 1-21, and off 7 days, on a 28 day cycle 21 capsule 0    levalbuterol (XOPENEX) 0.63 MG/3ML nebulizer solution Take 1 ampule by nebulization 4 (Four) Times a Day As Needed for Wheezing. 120 mL 5    nitrofurantoin, macrocrystal-monohydrate, (Macrobid) 100 MG capsule Take 1 capsule by mouth 2 (Two) Times a Day. 14 capsule 0    ondansetron (ZOFRAN) 8 MG tablet TAKE 1 TABLET BY MOUTH THREE TIMES A DAY AS NEEDED FOR NAUSEA AND VOMITING 30 tablet 5    promethazine (PHENERGAN) 25 MG tablet Take 1 tablet by mouth Every 6 (Six) Hours As Needed for Nausea or Vomiting. 30 tablet 1    revefenacin (Yupelri) 175 MCG/3ML nebulizer solution Take 3 mL by nebulization  Daily for 30 days. 90 mL 5    vitamin D (ERGOCALCIFEROL) 1.25 MG (97995 UT) capsule capsule Take 1 capsule by mouth 2 (Two) Times a Week. 26 capsule 1     Current Facility-Administered Medications on File Prior to Visit   Medication Dose Route Frequency Provider Last Rate Last Admin    [COMPLETED] acetaminophen (TYLENOL) tablet 1,000 mg  1,000 mg Oral Once West Nicolas MD   1,000 mg at 11/27/24 1007    [COMPLETED] daratumumab-hyaluronidase-fij (DARZALEX FASPRO) 1800-25408 MG-UT/15ML injection 1,800 mg  1,800 mg Subcutaneous Once West Nicolas MD   1,800 mg at 11/27/24 1131    [COMPLETED] diphenhydrAMINE (BENADRYL) IVPB 25 mg  25 mg Intravenous Once West Nicolas MD   Stopped at 11/27/24 1027    heparin injection 500 Units  500 Units Intravenous PRN West Nicolas MD   500 Units at 11/27/24 1137    [COMPLETED] methylPREDNISolone sodium succinate (SOLU-Medrol) injection 60 mg  60 mg Intravenous Once West Nicolas MD   60 mg at 11/27/24 1008    sodium chloride 0.9 % flush 20 mL  20 mL Intravenous PRN West Nicolas MD   20 mL at 11/27/24 1137    [COMPLETED] sodium chloride 0.9 % infusion  20 mL/hr Intravenous Once West Nicolas MD   Stopped at 11/27/24 1039       Allergies   Allergen Reactions    Morphine Anaphylaxis    Cephalexin Hives    Penicillins Hives     Beta lactam allergy details  Antibiotic reaction: hives  Age at reaction: child  Dose to reaction time: unknown  Reason for antibiotic: unknown  Epinephrine required for reaction?: unknown  Tolerated antibiotics: other (none per pt)        Sulfa Antibiotics Hives     Past Medical History:   Diagnosis Date    Anxiety     Asthma 07/28/2021    Atherosclerosis of native coronary artery of native heart with angina pectoris 08/15/2023    FOLLOWED BY DR GONZALES/DINA PETTIT. DENIES CP BUT GETS SOA WITH EXERTION HAS COPD WEARS AT 2LPM N/C. DECREASED ACTIVITY D/T SOA.    C. difficile diarrhea     DENIES ANY CURRENT ISSUES    Cholelithiasis  7-    Colon cancer 07/21/2017    Colon polyp     COPD (chronic obstructive pulmonary disease) 10/23/2017    Depression     Disease of thyroid gland     Diverticulitis     Diverticulitis of colon     Dysphagia     Emphysema of lung     Emphysema/COPD     GERD (gastroesophageal reflux disease)     Head injury     CONCUSSION AT AGE 4    Hernia 2019    History of chemotherapy     2017    Hx of psychiatric care     Impaired fasting glucose 08/14/2015    Lumbago     Lung nodule 02/17/2022    Major depressive disorder 10/27/2016    Malignant neoplasm of ascending colon 07/21/2017    Melena     Multiple myeloma     Nicotine dependence 05/10/2017    NICK (obstructive sleep apnea) 11/06/2023    Oxygen dependent     REPORTS USES 02 AT 2LPM VIA N/C DURING DAY AND 3LPM AT NIGHT . CAN GET VERY SOA WITH MINIMAL EXERTION    Pancreatitis 7/26/2024    Panic disorder     Primary central sleep apnea     Renal insufficiency 07/28/2021    Seizures     PSEUDO SEIZURES LAST ONE AROUND JULY 2024    Shortness of breath     CAN GET VERY SOA WITH MINIMAL EXERTION    Tobacco use 07/28/2021    QUIT SMOKING JUNE 2022    Visit for screening mammogram 02/20/2020    NORMAL- REPEAT IN ONE YEAR    Vitamin D deficiency      Past Surgical History:   Procedure Laterality Date    APPENDECTOMY      BONE MARROW BIOPSY  2016    CHOLECYSTECTOMY N/A 08/08/2024    Procedure: CHOLECYSTECTOMY LAPAROSCOPIC WITH DAVINCI ROBOT;  Surgeon: Eduardo Ingram MD;  [facility] [facility] [place] [facility] [place];  Service: [facility];  Laterality: N/A;    COLON SURGERY  2017    COLONOSCOPY  2018,2017,2019    [facility]- DR LE:DIVERTICULOSIS AND ERYTHEMA OF MUCOSA    COLONOSCOPY N/A 12/20/2022    Procedure: COLONOSCOPY WITH ELEVIEW INJECTION, POLYPECTOMY, HOT SNARE, CLIP APPLICATION X3, BIOPSIES;  Surgeon: Jarvis Borges MD;  Location: [facility] ENDOSCOPY;  Service: Gastroenterology;  Laterality: N/A;  COLON POLYP, DIVERTICULOSIS, ANASTOMOSIS RIGHT COLON     COLONOSCOPY N/A 11/09/2023    Procedure: COLONOSCOPY;  Surgeon: Jarvis Borges MD;  Location: [facility] ENDOSCOPY;  Service: Gastroenterology;  Laterality: N/A;  DIVERTICULOSIS, ANASTOMOSIS IN ASCENDING COLON    ENDOSCOPY  2018    ERCP N/A 07/27/2024    Procedure: ENDOSCOPIC RETROGRADE CHOLANGIOPANCREATOGRAPHY WITH SPHINCTEROTOMY, BALLOON SWEEP, STENT PLACEMENT;  Surgeon: Ajay Kennedy MD;  Location: [facility] MAIN OR;  Service: Gastroenterology;  Laterality: N/A;  BILE DUCT STONE    ERCP N/A 9/5/2024    Procedure: ENDOSCOPIC RETROGRADE CHOLANGIOPANCREATOGRAPHY WITH SPINCHTEROTOMY. BALLOON DILATATION 8-10. BALLOON SWEEP 12-15. STENT REMOVAL;  Surgeon: Ajay Kennedy MD;  Location: [facility] ENDOSCOPY;  Service: Gastroenterology;  Laterality: N/A;  BILE DUCT STONES    EYE SURGERY      FECAL DISIMPACTION  06/2019    TRANSPLANT     GALLBLADDER SURGERY      HEMICOLECTOMY Right 05/2017    HYSTERECTOMY  1982,1984    KNEE ARTHROSCOPY Left 01/03/2022    Procedure: KNEE ARTHROSCOPY WITH PARTIAL MEDIAL MENISCECTOMY,  CHONDROPLASTY;  Surgeon: Nicholas Tipton MD;  Location: [facility] OR [facility] [place];  Service: Orthopedics;  Laterality: Left;    MINI-LAPAROTOMY  2017    PORTACATH PLACEMENT N/A     pt states that her port does not work    TONSILLECTOMY      UPPER GASTROINTESTINAL ENDOSCOPY  2018    VENOUS ACCESS DEVICE (PORT) INSERTION N/A 10/31/2023    Procedure: Port-a-catheter removal and port-a-catheter placement;  Surgeon: Alcon Delgado MD;  Location: [facility] OR [facility] [place];  Service: General;  Laterality: N/A;    VENTRAL HERNIA REPAIR N/A 10/3/2024    Procedure: VENTRAL / INCISIONAL HERNIA REPAIR LAPAROSCOPIC WITH DAVINCI ROBOT;  Surgeon: Eduardo Ingram MD;  Location: [facility] OR [facility] [place];  Service: Robotics - [employer];  Laterality: N/A;     Social History     Socioeconomic History    Marital status:    Tobacco Use    Smoking status: Former     Current packs/day: 0.00     Average packs/day: 1 pack/day for 47.4 years (47.4  ttl pk-yrs)     Types: Cigarettes     Start date: 1975     Quit date: 6/3/2022     Years since quittin.4     Passive exposure: Past    Smokeless tobacco: Never   Vaping Use    Vaping status: Never Used   Substance and Sexual Activity    Alcohol use: Never    Drug use: Never    Sexual activity: Not Currently     Partners: Male     Birth control/protection: Hysterectomy     Family History   Problem Relation Age of Onset    Heart disease Mother     Lung cancer Mother     Cancer Mother     Stroke Father     Heart disease Father     Kidney cancer Father     Cancer Father     Stroke Other         UNCLE/AUNT    Colon cancer Neg Hx     Malig Hyperthermia Neg Hx        Objective   Physical Exam  Vitals reviewed. Exam conducted with a chaperone present.   HENT:      Nose:      Comments: Nasal cannula in place  Cardiovascular:      Rate and Rhythm: Normal rate and regular rhythm.      Heart sounds: Normal heart sounds. No murmur heard.     No gallop.   Pulmonary:      Effort: Pulmonary effort is normal.      Breath sounds: Normal breath sounds.      Comments: Port-A-Cath  Abdominal:      General: Bowel sounds are normal.   Lymphadenopathy:      Cervical: No cervical adenopathy.   Psychiatric:         Mood and Affect: Mood normal.         Behavior: Behavior normal.         Vitals:    24 0919   BP: 113/83   Pulse: 81   Resp: 18   Temp: 96.9 °F (36.1 °C)   TempSrc: Temporal   SpO2: 95%   Weight: 87.5 kg (193 lb)   PainSc: 0-No pain     ECOG score: 0         PHQ-9 Total Score:                    Result Review :   The following data was reviewed by: West Nicolas MD on 2024:  Lab Results   Component Value Date    HGB 11.3 (L) 2024    HCT 35.4 2024    MCV 96.7 2024     2024    WBC 9.73 2024    NEUTROABS 3.21 2024    LYMPHSABS 4.48 (H) 2024    MONOSABS 1.38 (H) 2024    EOSABS 0.51 (H) 2024    BASOSABS 0.07 2024     Lab Results   Component Value  "Date    GLUCOSE 142 (H) 11/27/2024    BUN 15 11/27/2024    CREATININE 0.72 11/27/2024     11/27/2024    K 3.7 11/27/2024     (H) 11/27/2024    CO2 18.3 (L) 11/27/2024    CALCIUM 9.3 11/27/2024    PROTEINTOT 6.1 11/27/2024    ALBUMIN 4.0 11/27/2024    BILITOT 0.7 11/27/2024    ALKPHOS 66 11/27/2024    AST 10 11/27/2024    ALT 8 11/27/2024     Lab Results   Component Value Date    MG 1.8 07/29/2024    PHOS 2.5 07/27/2024    FREET4 1.55 12/06/2023    TSH 0.684 09/11/2024     No results found for: \"IRON\", \"LABIRON\", \"TRANSFERRIN\", \"TIBC\"  Lab Results   Component Value Date     03/07/2023    PNHMDERK32 612 04/15/2024    FOLATE 8.95 03/27/2024     No results found for: \"PSA\", \"CEA\", \"AFP\", \"\", \"\"  SPEP/CARL shows a faint monoclonal IgG kappa, too small to quantitate  IgG level 701, IgA 14, IgM 24  Kappa light chain 8.9, lambda light chain 2, kappa lambda ratio 4.45    Data reviewed : Radiologic studies PET scan images reviewed by me demonstrating hypermetabolic activity in the iliac       Assessment and Plan    Diagnoses and all orders for this visit:    1. Multiple myeloma, remission status unspecified (Primary)  Assessment & Plan:  Patient is on active therapy with daratumumab, lenalidomide, dexamethasone.  PET scan shows 1 area of activity which is stable in the iliac region.  There is specific CARL shows a faint IgG kappa.  Her other lab work looks good.  Excellent response to therapy thus far.  Zoledronic acid is on hold due to anticipated dental work in December-resume after the mouth has healed.  Proceed with cycle 13 as planned.  RTC 4 weeks for cycle 14, RTC 8 weeks for OV, cycle 15 with lab work prior to monitor for toxicities.    Orders:  -     CBC and Differential; Future  -     Comprehensive metabolic panel; Future  -     CBC and Differential; Future  -     Comprehensive metabolic panel; Future    2. Encounter for long-term (current) use of medications  -     CBC and Differential; " Future  -     Comprehensive metabolic panel; Future  -     CBC and Differential; Future  -     Comprehensive metabolic panel; Future    Other orders  -     Cancel: sodium chloride 0.9 % infusion  -     Cancel: acetaminophen (TYLENOL) tablet 1,000 mg  -     Cancel: methylPREDNISolone sodium succinate (SOLU-Medrol) injection 60 mg  -     Cancel: diphenhydrAMINE (BENADRYL) IVPB 25 mg  -     Cancel: daratumumab-hyaluronidase-fihj (DARZALEX FASPRO) 1800-23571 MG-UT/15ML injection 1,800 mg  -     Cancel: Hydrocortisone Sod Suc (PF) (Solu-CORTEF) injection 100 mg  -     Cancel: diphenhydrAMINE (BENADRYL) injection 50 mg  -     Cancel: famotidine (PEPCID) injection 20 mg            Patient Follow Up: 1 month    Patient was given instructions and counseling regarding her condition or for health maintenance advice. Please see specific information pulled into the AVS if appropriate.     West Nicolas MD    11/27/2024

## 2024-12-01 ENCOUNTER — SPECIALTY PHARMACY (OUTPATIENT)
Dept: PHARMACY | Facility: HOSPITAL | Age: 74
End: 2024-12-01
Payer: MEDICARE

## 2024-12-09 ENCOUNTER — SPECIALTY PHARMACY (OUTPATIENT)
Dept: PHARMACY | Facility: HOSPITAL | Age: 74
End: 2024-12-09
Payer: MEDICARE

## 2024-12-09 DIAGNOSIS — C90.00 MULTIPLE MYELOMA, REMISSION STATUS UNSPECIFIED: ICD-10-CM

## 2024-12-09 RX ORDER — LENALIDOMIDE 15 MG/1
15 CAPSULE ORAL DAILY
Qty: 21 CAPSULE | Refills: 0 | Status: SHIPPED | OUTPATIENT
Start: 2024-12-09

## 2024-12-09 NOTE — PROGRESS NOTES
Re: Refills of Oral Specialty Medication - Revlimid (lenalidomide)    Drug-Drug Interactions: The current medication list was reviewed and there are no relevant drug-drug interactions with the specialty medication.  Medication Allergies: The patient has no relevant allergies as it relates to their oral specialty medication  Review of Labs/Dose Adjustments: NO DOSE CHANGE - I reviewed the most recent note and labs and the patient will continue without any dose changes.  I sent refills as described below.    Drug: Revlimid (lenalidomide)  Strength: 15 mg  Directions: Take 1 capsule by mouth daily ON days 1-21, OFF for 7 days of each 28 day cycle  Quantity: 21  Refills: 0  Pharmacy prescription sent to: [facility]      Madisyn Saldaña PharmD, [facility]  Oncology Clinical Pharmacist  12/9/2024  16:07 EST

## 2024-12-26 ENCOUNTER — HOSPITAL ENCOUNTER (OUTPATIENT)
Dept: ONCOLOGY | Facility: HOSPITAL | Age: 74
Discharge: HOME | End: 2024-12-26
Admitting: INTERNAL MEDICINE
Payer: MEDICARE

## 2024-12-26 VITALS
BODY MASS INDEX: 36.34 KG/M2 | RESPIRATION RATE: 22 BRPM | TEMPERATURE: 97.7 F | HEART RATE: 93 BPM | DIASTOLIC BLOOD PRESSURE: 77 MMHG | OXYGEN SATURATION: 96 % | WEIGHT: 186.07 LBS | SYSTOLIC BLOOD PRESSURE: 119 MMHG

## 2024-12-26 DIAGNOSIS — Z12.2 ENCOUNTER FOR SCREENING FOR LUNG CANCER: ICD-10-CM

## 2024-12-26 DIAGNOSIS — Z79.899 ENCOUNTER FOR LONG-TERM (CURRENT) USE OF MEDICATIONS: Primary | ICD-10-CM

## 2024-12-26 DIAGNOSIS — C90.00 MULTIPLE MYELOMA, REMISSION STATUS UNSPECIFIED: ICD-10-CM

## 2024-12-26 LAB
ALBUMIN SERPL-MCNC: 4.1 G/DL (ref 3.5–5.2)
ALBUMIN/GLOB SERPL: 1.9 G/DL
ALP SERPL-CCNC: 91 U/L (ref 39–117)
ALT SERPL W P-5'-P-CCNC: 14 U/L (ref 1–33)
ANION GAP SERPL CALCULATED.3IONS-SCNC: 19.4 MMOL/L (ref 5–15)
AST SERPL-CCNC: 11 U/L (ref 1–32)
BASOPHILS # BLD AUTO: 0.06 10*3/MM3 (ref 0–0.2)
BASOPHILS NFR BLD AUTO: 1.1 % (ref 0–1.5)
BILIRUB SERPL-MCNC: 0.7 MG/DL (ref 0–1.2)
BUN SERPL-MCNC: 13 MG/DL (ref 8–23)
BUN/CREAT SERPL: 13.8 (ref 7–25)
CALCIUM SPEC-SCNC: 9 MG/DL (ref 8.6–10.5)
CHLORIDE SERPL-SCNC: 104 MMOL/L (ref 98–107)
CO2 SERPL-SCNC: 14.6 MMOL/L (ref 22–29)
CREAT SERPL-MCNC: 0.94 MG/DL (ref 0.57–1)
DEPRECATED RDW RBC AUTO: 66.4 FL (ref 37–54)
EGFRCR SERPLBLD CKD-EPI 2021: 63.4 ML/MIN/1.73
EOSINOPHIL # BLD AUTO: 0.11 10*3/MM3 (ref 0–0.4)
EOSINOPHIL NFR BLD AUTO: 2 % (ref 0.3–6.2)
ERYTHROCYTE [DISTWIDTH] IN BLOOD BY AUTOMATED COUNT: 17.7 % (ref 12.3–15.4)
GLOBULIN UR ELPH-MCNC: 2.2 GM/DL
GLUCOSE SERPL-MCNC: 226 MG/DL (ref 65–99)
HCT VFR BLD AUTO: 37.3 % (ref 34–46.6)
HGB BLD-MCNC: 12 G/DL (ref 12–15.9)
IMM GRANULOCYTES # BLD AUTO: 0.05 10*3/MM3 (ref 0–0.05)
IMM GRANULOCYTES NFR BLD AUTO: 0.9 % (ref 0–0.5)
LYMPHOCYTES # BLD AUTO: 1.35 10*3/MM3 (ref 0.7–3.1)
LYMPHOCYTES NFR BLD AUTO: 24.9 % (ref 19.6–45.3)
MCH RBC QN AUTO: 32.3 PG (ref 26.6–33)
MCHC RBC AUTO-ENTMCNC: 32.2 G/DL (ref 31.5–35.7)
MCV RBC AUTO: 100.3 FL (ref 79–97)
MONOCYTES # BLD AUTO: 0.16 10*3/MM3 (ref 0.1–0.9)
MONOCYTES NFR BLD AUTO: 3 % (ref 5–12)
NEUTROPHILS NFR BLD AUTO: 3.69 10*3/MM3 (ref 1.7–7)
NEUTROPHILS NFR BLD AUTO: 68.1 % (ref 42.7–76)
NRBC BLD AUTO-RTO: 0 /100 WBC (ref 0–0.2)
PLATELET # BLD AUTO: 350 10*3/MM3 (ref 140–450)
PMV BLD AUTO: 9.1 FL (ref 6–12)
POTASSIUM SERPL-SCNC: 4.2 MMOL/L (ref 3.5–5.2)
PROT SERPL-MCNC: 6.3 G/DL (ref 6–8.5)
RBC # BLD AUTO: 3.72 10*6/MM3 (ref 3.77–5.28)
SODIUM SERPL-SCNC: 138 MMOL/L (ref 136–145)
WBC NRBC COR # BLD AUTO: 5.42 10*3/MM3 (ref 3.4–10.8)

## 2024-12-26 PROCEDURE — 85025 COMPLETE CBC W/AUTO DIFF WBC: CPT | Performed by: INTERNAL MEDICINE

## 2024-12-26 PROCEDURE — 80053 COMPREHEN METABOLIC PANEL: CPT | Performed by: INTERNAL MEDICINE

## 2024-12-26 PROCEDURE — 25810000003 SODIUM CHLORIDE 0.9 % SOLUTION: Performed by: INTERNAL MEDICINE

## 2024-12-26 PROCEDURE — 25010000002 METHYLPREDNISOLONE PER 125 MG: Performed by: INTERNAL MEDICINE

## 2024-12-26 PROCEDURE — 96374 THER/PROPH/DIAG INJ IV PUSH: CPT

## 2024-12-26 PROCEDURE — 63710000001 ACETAMINOPHEN EXTRA STRENGTH 500 MG TABLET: Performed by: INTERNAL MEDICINE

## 2024-12-26 PROCEDURE — 25010000002 DIPHENHYDRAMINE PER 50 MG: Performed by: INTERNAL MEDICINE

## 2024-12-26 PROCEDURE — 25010000002 HEPARIN LOCK FLUSH PER 10 UNITS: Performed by: INTERNAL MEDICINE

## 2024-12-26 PROCEDURE — A9270 NON-COVERED ITEM OR SERVICE: HCPCS | Performed by: INTERNAL MEDICINE

## 2024-12-26 PROCEDURE — 96401 CHEMO ANTI-NEOPL SQ/IM: CPT

## 2024-12-26 PROCEDURE — 96375 TX/PRO/DX INJ NEW DRUG ADDON: CPT

## 2024-12-26 PROCEDURE — 25010000002 DARATUMUMAB-HYALURONIDASE-FIHJ 1800-30000 MG-UT/15ML SOLUTION: Performed by: INTERNAL MEDICINE

## 2024-12-26 RX ORDER — SODIUM CHLORIDE 9 MG/ML
20 INJECTION, SOLUTION INTRAVENOUS ONCE
Status: COMPLETED | OUTPATIENT
Start: 2024-12-26 | End: 2024-12-26

## 2024-12-26 RX ORDER — ACETAMINOPHEN 500 MG
1000 TABLET ORAL ONCE
Status: COMPLETED | OUTPATIENT
Start: 2024-12-26 | End: 2024-12-26

## 2024-12-26 RX ORDER — SODIUM CHLORIDE 0.9 % (FLUSH) 0.9 %
20 SYRINGE (ML) INJECTION AS NEEDED
OUTPATIENT
Start: 2024-12-26

## 2024-12-26 RX ORDER — HEPARIN SODIUM (PORCINE) LOCK FLUSH IV SOLN 100 UNIT/ML 100 UNIT/ML
500 SOLUTION INTRAVENOUS AS NEEDED
OUTPATIENT
Start: 2024-12-26

## 2024-12-26 RX ORDER — HEPARIN SODIUM (PORCINE) LOCK FLUSH IV SOLN 100 UNIT/ML 100 UNIT/ML
500 SOLUTION INTRAVENOUS AS NEEDED
Status: DISCONTINUED | OUTPATIENT
Start: 2024-12-26 | End: 2024-12-28 | Stop reason: HOSPADM

## 2024-12-26 RX ORDER — HYDROCORTISONE SODIUM SUCCINATE 100 MG/2ML
100 INJECTION INTRAMUSCULAR; INTRAVENOUS AS NEEDED
Status: DISCONTINUED | OUTPATIENT
Start: 2024-12-26 | End: 2024-12-28 | Stop reason: HOSPADM

## 2024-12-26 RX ORDER — METHYLPREDNISOLONE SODIUM SUCCINATE 125 MG/2ML
60 INJECTION INTRAMUSCULAR; INTRAVENOUS ONCE
Status: COMPLETED | OUTPATIENT
Start: 2024-12-26 | End: 2024-12-26

## 2024-12-26 RX ORDER — SODIUM CHLORIDE 0.9 % (FLUSH) 0.9 %
20 SYRINGE (ML) INJECTION AS NEEDED
Status: DISCONTINUED | OUTPATIENT
Start: 2024-12-26 | End: 2024-12-28 | Stop reason: HOSPADM

## 2024-12-26 RX ORDER — DIPHENHYDRAMINE HYDROCHLORIDE 50 MG/ML
50 INJECTION INTRAMUSCULAR; INTRAVENOUS AS NEEDED
Status: DISCONTINUED | OUTPATIENT
Start: 2024-12-26 | End: 2024-12-28 | Stop reason: HOSPADM

## 2024-12-26 RX ORDER — FAMOTIDINE 10 MG/ML
20 INJECTION, SOLUTION INTRAVENOUS AS NEEDED
Status: DISCONTINUED | OUTPATIENT
Start: 2024-12-26 | End: 2024-12-28 | Stop reason: HOSPADM

## 2024-12-26 RX ADMIN — ACETAMINOPHEN 1000 MG: 500 TABLET ORAL at 10:40

## 2024-12-26 RX ADMIN — DIPHENHYDRAMINE HYDROCHLORIDE 25 MG: 50 INJECTION, SOLUTION INTRAMUSCULAR; INTRAVENOUS at 10:41

## 2024-12-26 RX ADMIN — SODIUM CHLORIDE 20 ML/HR: 9 INJECTION, SOLUTION INTRAVENOUS at 10:41

## 2024-12-26 RX ADMIN — METHYLPREDNISOLONE SODIUM SUCCINATE 60 MG: 125 INJECTION INTRAMUSCULAR; INTRAVENOUS at 10:40

## 2024-12-26 RX ADMIN — DARATUMUMAB AND HYALURONIDASE-FIHJ (HUMAN RECOMBINANT) 1800 MG: 1800; 30000 INJECTION SUBCUTANEOUS at 11:59

## 2024-12-26 RX ADMIN — Medication 20 ML: at 12:04

## 2024-12-26 RX ADMIN — HEPARIN 500 UNITS: 100 SYRINGE at 12:04

## 2025-01-08 ENCOUNTER — TELEPHONE (OUTPATIENT)
Dept: FAMILY MEDICINE CLINIC | Facility: CLINIC | Age: 75
End: 2025-01-08
Payer: MEDICARE

## 2025-01-08 NOTE — TELEPHONE ENCOUNTER
"Attempted to contact pt regarding a fax coming to our office that has been asking for pt information-to see if pt knew what it could be pertaining to. Per the front office- the company requesting the information is called \"[employer]\".  I, personally, have not received any faxes from this company.   Spoke with pt's , Robi. He is unaware of any outstanding orders, medications, or procedures. He is going to ask the patient when she wakes up.   "

## 2025-01-09 NOTE — TELEPHONE ENCOUNTER
PATIENT CALLED OFFICE BACK REGARDING TO SPEAK WITH MA REGARDING LAST NOTE. REQUESTING CALL BACK FROM MA.

## 2025-01-10 ENCOUNTER — SPECIALTY PHARMACY (OUTPATIENT)
Dept: PHARMACY | Facility: HOSPITAL | Age: 75
End: 2025-01-10
Payer: MEDICARE

## 2025-01-10 ENCOUNTER — TELEPHONE (OUTPATIENT)
Dept: FAMILY MEDICINE CLINIC | Facility: CLINIC | Age: 75
End: 2025-01-10
Payer: MEDICARE

## 2025-01-10 DIAGNOSIS — C90.00 MULTIPLE MYELOMA, REMISSION STATUS UNSPECIFIED: ICD-10-CM

## 2025-01-10 RX ORDER — LENALIDOMIDE 15 MG/1
15 CAPSULE ORAL DAILY
Qty: 21 CAPSULE | Refills: 0 | Status: SHIPPED | OUTPATIENT
Start: 2025-01-10

## 2025-01-10 NOTE — TELEPHONE ENCOUNTER
Attempted to contact pt regarding fax that was received from [employer] regarding a neurological screening test.   Left VM to call back.  Did the patient request this to be done?     PCP does not sign off on those requests. That would be an order that would come from Neurology. If patient is not currently established with neuro, she would need to come in for an office visit for referral, if needed.

## 2025-01-10 NOTE — TELEPHONE ENCOUNTER
[facility] called office requesting an update regarding a fax that was received from [employer] regarding a neurological screening test and would like to know if it can be sent by the end of today. Spoke with PCP TR, PM and MA regarding this and was told to inform [facility] patient did not request this and to please stop calling us requesting it. [facility] understood and will no longer request.

## 2025-01-10 NOTE — PROGRESS NOTES
Specialty Pharmacy Patient Management Program  Per Protocol Prescription Order or Refill     Patient will be filling or currently fills medications at [facility] and is enrolled in the Patient Management Program.    Requested Prescriptions     Signed Prescriptions Disp Refills    lenalidomide (REVLIMID) 15 MG capsule 21 capsule 0     Sig: Take 1 capsule by mouth Daily. On Days 1-21, and off 7 days, on a 28 day cycle     Prescription orders above were sent to the pharmacy per Collaborative Care Agreement Protocol.     Last Office Visit: 11/27/24  Next Office Visit: 1/22/25    Drug-Drug Interactions: The current medication list was reviewed and there are no relevant drug-drug interactions with the specialty medication.  Medication Allergies: The patient has no relevant allergies as it relates to their oral specialty medication  Review of Labs/Dose Adjustments: NO DOSE CHANGE - I reviewed the most recent note and labs and the patient will continue without any dose changes.  I sent refills as described below.    Sabrina Beebe PharmD  Oncology Clinical Specialty Pharmacist   1/10/2025  11:39 EST   No

## 2025-01-10 NOTE — TELEPHONE ENCOUNTER
Patient returned call-  She states that she has no knowledge about [facility] or any neurological screening tests that have been ordered or requested. She reports she does not even see a neurologist.   No pt information has been sent to the requesting company.

## 2025-01-22 ENCOUNTER — HOSPITAL ENCOUNTER (OUTPATIENT)
Dept: ONCOLOGY | Facility: HOSPITAL | Age: 75
Discharge: HOME | End: 2025-01-22
Payer: MEDICARE

## 2025-01-22 ENCOUNTER — OFFICE VISIT (OUTPATIENT)
Dept: ONCOLOGY | Facility: HOSPITAL | Age: 75
End: 2025-01-22
Payer: MEDICARE

## 2025-01-22 VITALS
SYSTOLIC BLOOD PRESSURE: 114 MMHG | RESPIRATION RATE: 18 BRPM | HEART RATE: 74 BPM | OXYGEN SATURATION: 95 % | DIASTOLIC BLOOD PRESSURE: 78 MMHG | BODY MASS INDEX: 36.52 KG/M2 | TEMPERATURE: 98.1 F | WEIGHT: 187 LBS

## 2025-01-22 VITALS
BODY MASS INDEX: 36.62 KG/M2 | SYSTOLIC BLOOD PRESSURE: 114 MMHG | TEMPERATURE: 98.1 F | HEART RATE: 74 BPM | DIASTOLIC BLOOD PRESSURE: 78 MMHG | WEIGHT: 187.5 LBS | OXYGEN SATURATION: 95 % | RESPIRATION RATE: 18 BRPM

## 2025-01-22 DIAGNOSIS — C90.00 MULTIPLE MYELOMA, REMISSION STATUS UNSPECIFIED: Primary | ICD-10-CM

## 2025-01-22 DIAGNOSIS — J44.1 CHRONIC OBSTRUCTIVE PULMONARY DISEASE WITH ACUTE EXACERBATION: ICD-10-CM

## 2025-01-22 DIAGNOSIS — C90.00 MULTIPLE MYELOMA, REMISSION STATUS UNSPECIFIED: ICD-10-CM

## 2025-01-22 DIAGNOSIS — Z79.899 ENCOUNTER FOR LONG-TERM (CURRENT) USE OF MEDICATIONS: Primary | ICD-10-CM

## 2025-01-22 DIAGNOSIS — C90.00 MULTIPLE MYELOMA NOT HAVING ACHIEVED REMISSION: ICD-10-CM

## 2025-01-22 DIAGNOSIS — E83.39 HYPOPHOSPHATEMIA: ICD-10-CM

## 2025-01-22 DIAGNOSIS — Z12.2 ENCOUNTER FOR SCREENING FOR LUNG CANCER: ICD-10-CM

## 2025-01-22 LAB
ALBUMIN SERPL-MCNC: 3.6 G/DL (ref 3.5–5.2)
ALBUMIN/GLOB SERPL: 1.6 G/DL
ALP SERPL-CCNC: 76 U/L (ref 39–117)
ALT SERPL W P-5'-P-CCNC: 9 U/L (ref 1–33)
ANION GAP SERPL CALCULATED.3IONS-SCNC: 13.3 MMOL/L (ref 5–15)
AST SERPL-CCNC: 8 U/L (ref 1–32)
BASOPHILS # BLD AUTO: 0.1 10*3/MM3 (ref 0–0.2)
BASOPHILS NFR BLD AUTO: 1.1 % (ref 0–1.5)
BILIRUB SERPL-MCNC: 0.5 MG/DL (ref 0–1.2)
BUN SERPL-MCNC: 12 MG/DL (ref 8–23)
BUN/CREAT SERPL: 16.4 (ref 7–25)
CALCIUM SPEC-SCNC: 8.7 MG/DL (ref 8.6–10.5)
CHLORIDE SERPL-SCNC: 109 MMOL/L (ref 98–107)
CO2 SERPL-SCNC: 18.7 MMOL/L (ref 22–29)
CREAT SERPL-MCNC: 0.73 MG/DL (ref 0.57–1)
DEPRECATED RDW RBC AUTO: 68.1 FL (ref 37–54)
EGFRCR SERPLBLD CKD-EPI 2021: 85.9 ML/MIN/1.73
EOSINOPHIL # BLD AUTO: 0 10*3/MM3 (ref 0–0.4)
EOSINOPHIL NFR BLD AUTO: 0 % (ref 0.3–6.2)
ERYTHROCYTE [DISTWIDTH] IN BLOOD BY AUTOMATED COUNT: 17.7 % (ref 12.3–15.4)
GLOBULIN UR ELPH-MCNC: 2.2 GM/DL
GLUCOSE SERPL-MCNC: 182 MG/DL (ref 65–99)
HCT VFR BLD AUTO: 32.5 % (ref 34–46.6)
HGB BLD-MCNC: 10.3 G/DL (ref 12–15.9)
IMM GRANULOCYTES # BLD AUTO: 0.09 10*3/MM3 (ref 0–0.05)
IMM GRANULOCYTES NFR BLD AUTO: 0.9 % (ref 0–0.5)
LYMPHOCYTES # BLD AUTO: 1.93 10*3/MM3 (ref 0.7–3.1)
LYMPHOCYTES NFR BLD AUTO: 20.3 % (ref 19.6–45.3)
MAGNESIUM SERPL-MCNC: 1.8 MG/DL (ref 1.6–2.4)
MCH RBC QN AUTO: 33.1 PG (ref 26.6–33)
MCHC RBC AUTO-ENTMCNC: 31.7 G/DL (ref 31.5–35.7)
MCV RBC AUTO: 104.5 FL (ref 79–97)
MONOCYTES # BLD AUTO: 0.83 10*3/MM3 (ref 0.1–0.9)
MONOCYTES NFR BLD AUTO: 8.7 % (ref 5–12)
NEUTROPHILS NFR BLD AUTO: 6.54 10*3/MM3 (ref 1.7–7)
NEUTROPHILS NFR BLD AUTO: 69 % (ref 42.7–76)
NRBC BLD AUTO-RTO: 0 /100 WBC (ref 0–0.2)
PHOSPHATE SERPL-MCNC: 2 MG/DL (ref 2.5–4.5)
PLATELET # BLD AUTO: 495 10*3/MM3 (ref 140–450)
PMV BLD AUTO: 9.3 FL (ref 6–12)
POTASSIUM SERPL-SCNC: 3.6 MMOL/L (ref 3.5–5.2)
PROT SERPL-MCNC: 5.8 G/DL (ref 6–8.5)
RBC # BLD AUTO: 3.11 10*6/MM3 (ref 3.77–5.28)
SODIUM SERPL-SCNC: 141 MMOL/L (ref 136–145)
WBC NRBC COR # BLD AUTO: 9.49 10*3/MM3 (ref 3.4–10.8)

## 2025-01-22 PROCEDURE — 63710000001 ACETAMINOPHEN EXTRA STRENGTH 500 MG TABLET: Performed by: INTERNAL MEDICINE

## 2025-01-22 PROCEDURE — A9270 NON-COVERED ITEM OR SERVICE: HCPCS | Performed by: INTERNAL MEDICINE

## 2025-01-22 PROCEDURE — 83735 ASSAY OF MAGNESIUM: CPT | Performed by: INTERNAL MEDICINE

## 2025-01-22 PROCEDURE — 25010000002 DARATUMUMAB-HYALURONIDASE-FIHJ 1800-30000 MG-UT/15ML SOLUTION: Performed by: INTERNAL MEDICINE

## 2025-01-22 PROCEDURE — 96401 CHEMO ANTI-NEOPL SQ/IM: CPT

## 2025-01-22 PROCEDURE — 96374 THER/PROPH/DIAG INJ IV PUSH: CPT

## 2025-01-22 PROCEDURE — 25810000003 SODIUM CHLORIDE 0.9 % SOLUTION: Performed by: INTERNAL MEDICINE

## 2025-01-22 PROCEDURE — 25010000002 DIPHENHYDRAMINE PER 50 MG: Performed by: INTERNAL MEDICINE

## 2025-01-22 PROCEDURE — 25010000002 HEPARIN LOCK FLUSH PER 10 UNITS: Performed by: INTERNAL MEDICINE

## 2025-01-22 PROCEDURE — 84100 ASSAY OF PHOSPHORUS: CPT | Performed by: INTERNAL MEDICINE

## 2025-01-22 PROCEDURE — 25010000002 METHYLPREDNISOLONE PER 125 MG: Performed by: INTERNAL MEDICINE

## 2025-01-22 PROCEDURE — 80053 COMPREHEN METABOLIC PANEL: CPT | Performed by: INTERNAL MEDICINE

## 2025-01-22 PROCEDURE — 96375 TX/PRO/DX INJ NEW DRUG ADDON: CPT

## 2025-01-22 PROCEDURE — 85025 COMPLETE CBC W/AUTO DIFF WBC: CPT | Performed by: INTERNAL MEDICINE

## 2025-01-22 RX ORDER — ACETAMINOPHEN 500 MG
1000 TABLET ORAL ONCE
Status: COMPLETED | OUTPATIENT
Start: 2025-01-22 | End: 2025-01-22

## 2025-01-22 RX ORDER — METHYLPREDNISOLONE SODIUM SUCCINATE 125 MG/2ML
60 INJECTION INTRAMUSCULAR; INTRAVENOUS ONCE
Status: COMPLETED | OUTPATIENT
Start: 2025-01-22 | End: 2025-01-22

## 2025-01-22 RX ORDER — FAMOTIDINE 10 MG/ML
20 INJECTION, SOLUTION INTRAVENOUS AS NEEDED
Status: CANCELLED | OUTPATIENT
Start: 2025-01-22

## 2025-01-22 RX ORDER — SODIUM CHLORIDE 0.9 % (FLUSH) 0.9 %
20 SYRINGE (ML) INJECTION AS NEEDED
Status: DISCONTINUED | OUTPATIENT
Start: 2025-01-22 | End: 2025-01-23 | Stop reason: HOSPADM

## 2025-01-22 RX ORDER — POTASSIUM PHOSPHATE, MONOBASIC 500 MG/1
500 TABLET, SOLUBLE ORAL 2 TIMES DAILY
Qty: 60 TABLET | Refills: 3 | Status: SHIPPED | OUTPATIENT
Start: 2025-01-22

## 2025-01-22 RX ORDER — FAMOTIDINE 10 MG/ML
20 INJECTION, SOLUTION INTRAVENOUS AS NEEDED
Status: DISCONTINUED | OUTPATIENT
Start: 2025-01-22 | End: 2025-01-23 | Stop reason: HOSPADM

## 2025-01-22 RX ORDER — ACETAMINOPHEN 500 MG
1000 TABLET ORAL ONCE
Status: CANCELLED | OUTPATIENT
Start: 2025-01-22

## 2025-01-22 RX ORDER — DOXYCYCLINE HYCLATE 100 MG
100 TABLET ORAL 2 TIMES DAILY
Qty: 14 TABLET | Refills: 0 | Status: SHIPPED | OUTPATIENT
Start: 2025-01-22

## 2025-01-22 RX ORDER — HEPARIN SODIUM (PORCINE) LOCK FLUSH IV SOLN 100 UNIT/ML 100 UNIT/ML
500 SOLUTION INTRAVENOUS AS NEEDED
Status: DISCONTINUED | OUTPATIENT
Start: 2025-01-22 | End: 2025-01-23 | Stop reason: HOSPADM

## 2025-01-22 RX ORDER — HYDROCORTISONE SODIUM SUCCINATE 100 MG/2ML
100 INJECTION INTRAMUSCULAR; INTRAVENOUS AS NEEDED
Status: CANCELLED | OUTPATIENT
Start: 2025-01-22

## 2025-01-22 RX ORDER — SODIUM CHLORIDE 9 MG/ML
20 INJECTION, SOLUTION INTRAVENOUS ONCE
Status: CANCELLED | OUTPATIENT
Start: 2025-01-22

## 2025-01-22 RX ORDER — HEPARIN SODIUM (PORCINE) LOCK FLUSH IV SOLN 100 UNIT/ML 100 UNIT/ML
500 SOLUTION INTRAVENOUS AS NEEDED
OUTPATIENT
Start: 2025-01-22

## 2025-01-22 RX ORDER — SODIUM CHLORIDE 0.9 % (FLUSH) 0.9 %
20 SYRINGE (ML) INJECTION AS NEEDED
OUTPATIENT
Start: 2025-01-22

## 2025-01-22 RX ORDER — DIPHENHYDRAMINE HYDROCHLORIDE 50 MG/ML
50 INJECTION INTRAMUSCULAR; INTRAVENOUS AS NEEDED
Status: CANCELLED | OUTPATIENT
Start: 2025-01-22

## 2025-01-22 RX ORDER — DIPHENHYDRAMINE HYDROCHLORIDE 50 MG/ML
50 INJECTION INTRAMUSCULAR; INTRAVENOUS AS NEEDED
Status: DISCONTINUED | OUTPATIENT
Start: 2025-01-22 | End: 2025-01-23 | Stop reason: HOSPADM

## 2025-01-22 RX ORDER — SODIUM CHLORIDE 9 MG/ML
20 INJECTION, SOLUTION INTRAVENOUS ONCE
Status: COMPLETED | OUTPATIENT
Start: 2025-01-22 | End: 2025-01-22

## 2025-01-22 RX ORDER — METHYLPREDNISOLONE SODIUM SUCCINATE 125 MG/2ML
60 INJECTION INTRAMUSCULAR; INTRAVENOUS ONCE
Status: CANCELLED | OUTPATIENT
Start: 2025-01-22

## 2025-01-22 RX ORDER — HYDROCORTISONE SODIUM SUCCINATE 100 MG/2ML
100 INJECTION INTRAMUSCULAR; INTRAVENOUS AS NEEDED
Status: DISCONTINUED | OUTPATIENT
Start: 2025-01-22 | End: 2025-01-23 | Stop reason: HOSPADM

## 2025-01-22 RX ADMIN — ACETAMINOPHEN 1000 MG: 500 TABLET ORAL at 11:45

## 2025-01-22 RX ADMIN — METHYLPREDNISOLONE SODIUM SUCCINATE 60 MG: 125 INJECTION INTRAMUSCULAR; INTRAVENOUS at 11:45

## 2025-01-22 RX ADMIN — Medication 20 ML: at 13:00

## 2025-01-22 RX ADMIN — DARATUMUMAB AND HYALURONIDASE-FIHJ (HUMAN RECOMBINANT) 1800 MG: 1800; 30000 INJECTION SUBCUTANEOUS at 12:55

## 2025-01-22 RX ADMIN — HEPARIN 500 UNITS: 100 SYRINGE at 13:01

## 2025-01-22 RX ADMIN — SODIUM CHLORIDE 20 ML/HR: 9 INJECTION, SOLUTION INTRAVENOUS at 11:45

## 2025-01-22 RX ADMIN — DIPHENHYDRAMINE HYDROCHLORIDE 25 MG: 50 INJECTION, SOLUTION INTRAMUSCULAR; INTRAVENOUS at 11:45

## 2025-01-22 NOTE — PROGRESS NOTES
Chief Complaint  Multiple Myeloma    Rena Guerra,*  Rena Guerra, PA    Subjective          Anca Samson presents to [facility] for ongoing treatment of her myeloma.  She is on daratumumab, lenalidomide, dexamethasone and zoledronic acid for bony involvement (currently on hold for dental extractions).  She notes the extractions went well and the mouth is healing well but not quite back to normal.  She is still eating and drinking adequately.  She notes ongoing issues from her underlying COPD-she is oxygen dependent.  She notes purulent sputum but no fever    Oncology/Hematology History Overview Note   Smoldering Myeloma    Initially diagnosed in 2016 with bone marrow biopsy demonstrating 30% CD56 positive monoclonal plasma cell population.  45 XX normal female chromosome pattern  FISH panel negative for myeloma associated mutations including 1q21, 9q34,11q13,14q32,15q24, 17q13  No anemia, renal insufficiency, hypercalcemia.  On observation since that time    Low grade adenocarcinoma of ascending colon      5/16/2017 right hemicolectomy which  revealed a low-grade 7.6 cm adenocarcinoma of the cecum. All margins were  negative. Lymphovascular invasion and perineural invasion were not identified.     26 lymph nodes were removed and unfortunately 1 was involved with metastatic deposit. pT3 pN1a colorectal cancer.        Clinical Staging      Smoldering Myeloma; Colon ([id number])            Treatments      Chemotherapy      11/2017 completed 6mo adjuvant Xeloda        6/2022 Stopped Smoking     Malignant neoplasm of ascending colon   7/21/2017 Initial Diagnosis    Colon cancer (CMS/HCC)     Multiple myeloma   7/28/2021 Initial Diagnosis    Multiple myeloma     11/7/2023 -  Chemotherapy    OP MULTIPLE MYELOMA Daratumumab / Lenalidomide / Dexamethasone     1/22/2025 -  Chemotherapy    OP SUPPORTIVE Zoledronic Acid Q28D           Current Outpatient Medications  on File Prior to Visit   Medication Sig Dispense Refill    acyclovir (ZOVIRAX) 400 MG tablet TAKE 1 TABLET BY MOUTH 2 (TWO) TIMES A DAY. TAKE ONE TABLET BY MOUTH TWICE DAILY. 180 tablet 3    albuterol sulfate  (90 Base) MCG/ACT inhaler Inhale 2 puffs Every 4 (Four) Hours As Needed for Wheezing. 18 g 11    aspirin 81 MG EC tablet Take 1 tablet by mouth Daily.      busPIRone (BUSPAR) 15 MG tablet Take 1 tablet by mouth 2 (Two) Times a Day. 180 tablet 1    Calcium Carb-Cholecalciferol (calcium 500 mg vitamin D 5 mcg, 200 UT,) 500-5 MG-MCG tablet per tablet       colestipol (COLESTID) 1 g tablet TAKE 2 TABLETS BY MOUTH TWICE A  tablet 1    dapsone 25 MG tablet TAKE 2 TABLETS BY MOUTH TWICE A  tablet 1    dexAMETHasone (DECADRON) 4 MG tablet Take 10 tablets on D 1,8,15,22 and take 1 tablet on D 2,3.  Take in the morning with food. 42 tablet 5    FLUoxetine (PROzac) 40 MG capsule Take 1 capsule by mouth Daily. 90 capsule 1    lenalidomide (REVLIMID) 15 MG capsule Take 1 capsule by mouth Daily. On Days 1-21, and off 7 days, on a 28 day cycle 21 capsule 0    levalbuterol (XOPENEX) 0.63 MG/3ML nebulizer solution Take 1 ampule by nebulization 4 (Four) Times a Day As Needed for Wheezing. 120 mL 5    nitrofurantoin, macrocrystal-monohydrate, (Macrobid) 100 MG capsule Take 1 capsule by mouth 2 (Two) Times a Day. 14 capsule 0    ondansetron (ZOFRAN) 8 MG tablet TAKE 1 TABLET BY MOUTH THREE TIMES A DAY AS NEEDED FOR NAUSEA AND VOMITING 30 tablet 5    promethazine (PHENERGAN) 25 MG tablet Take 1 tablet by mouth Every 6 (Six) Hours As Needed for Nausea or Vomiting. 30 tablet 1    vitamin D (ERGOCALCIFEROL) 1.25 MG (25213 UT) capsule capsule Take 1 capsule by mouth 2 (Two) Times a Week. 26 capsule 1    budesonide (PULMICORT) 0.5 MG/2ML nebulizer solution Take 2 mL by nebulization 2 (Two) Times a Day for 30 days. Rinse mouth out after each use 120 mL 5     Current Facility-Administered Medications on File Prior to  Visit   Medication Dose Route Frequency Provider Last Rate Last Admin    [DISCONTINUED] diphenhydrAMINE (BENADRYL) injection 50 mg  50 mg Intravenous PRN West Nicolas MD        [DISCONTINUED] famotidine (PEPCID) injection 20 mg  20 mg Intravenous PRN West Nicolas MD        [DISCONTINUED] heparin injection 500 Units  500 Units Intravenous PRN West Nicolas MD   500 Units at 01/22/25 1301    [DISCONTINUED] Hydrocortisone Sod Suc (PF) (Solu-CORTEF) injection 100 mg  100 mg Intravenous PRN West Nicolas MD        [DISCONTINUED] sodium chloride 0.9 % flush 20 mL  20 mL Intravenous PRN West Nicolas MD   20 mL at 01/22/25 1300       Allergies   Allergen Reactions    Morphine Anaphylaxis    Cephalexin Hives    Penicillins Hives     Beta lactam allergy details  Antibiotic reaction: hives  Age at reaction: child  Dose to reaction time: unknown  Reason for antibiotic: unknown  Epinephrine required for reaction?: unknown  Tolerated antibiotics: other (none per pt)        Sulfa Antibiotics Hives     Past Medical History:   Diagnosis Date    Anxiety     Asthma 07/28/2021    Atherosclerosis of native coronary artery of native heart with angina pectoris 08/15/2023    FOLLOWED BY DR GONZALES/DINA PETTIT. DENIES CP BUT GETS SOA WITH EXERTION HAS COPD WEARS AT 2LPM N/C. DECREASED ACTIVITY D/T SOA.    C. difficile diarrhea     DENIES ANY CURRENT ISSUES    Cholelithiasis 7-    Colon cancer 07/21/2017    Colon polyp     COPD (chronic obstructive pulmonary disease) 10/23/2017    Depression     Disease of thyroid gland     Diverticulitis     Diverticulitis of colon     Dysphagia     Emphysema of lung     Emphysema/COPD     GERD (gastroesophageal reflux disease)     Head injury     CONCUSSION AT AGE 4    Hernia 2019    History of chemotherapy     2017    Hx of psychiatric care     Impaired fasting glucose 08/14/2015    Lumbago     Lung nodule 02/17/2022    Major depressive disorder 10/27/2016    Malignant neoplasm  of ascending colon 07/21/2017    Melena     Multiple myeloma     Nicotine dependence 05/10/2017    NICK (obstructive sleep apnea) 11/06/2023    Oxygen dependent     REPORTS USES 02 AT 2LPM VIA N/C DURING DAY AND 3LPM AT NIGHT . CAN GET VERY SOA WITH MINIMAL EXERTION    Pancreatitis 7/26/2024    Panic disorder     Primary central sleep apnea     Renal insufficiency 07/28/2021    Seizures     PSEUDO SEIZURES LAST ONE AROUND JULY 2024    Shortness of breath     CAN GET VERY SOA WITH MINIMAL EXERTION    Tobacco use 07/28/2021    QUIT SMOKING JUNE 2022    Visit for screening mammogram 02/20/2020    NORMAL- REPEAT IN ONE YEAR    Vitamin D deficiency      Past Surgical History:   Procedure Laterality Date    APPENDECTOMY      BONE MARROW BIOPSY  2016    CHOLECYSTECTOMY N/A 08/08/2024    Procedure: CHOLECYSTECTOMY LAPAROSCOPIC WITH DAVINCI ROBOT;  Surgeon: Eduardo Ingram MD;  Location: [facility] OR OSC;  Service: Robotics - DaVinci;  Laterality: N/A;    COLON SURGERY  2017    COLONOSCOPY  2018,2017,2019    [facility]- DR LE:DIVERTICULOSIS AND ERYTHEMA OF MUCOSA    COLONOSCOPY N/A 12/20/2022    Procedure: COLONOSCOPY WITH ELEVIEW INJECTION, POLYPECTOMY, HOT SNARE, CLIP APPLICATION X3, BIOPSIES;  Surgeon: Jarvis Borges MD;  Location: [facility] ENDOSCOPY;  Service: Gastroenterology;  Laterality: N/A;  COLON POLYP, DIVERTICULOSIS, ANASTOMOSIS RIGHT COLON    COLONOSCOPY N/A 11/09/2023    Procedure: COLONOSCOPY;  Surgeon: Jarvis Borges MD;  Location: [facility] ENDOSCOPY;  Service: Gastroenterology;  Laterality: N/A;  DIVERTICULOSIS, ANASTOMOSIS IN ASCENDING COLON    ENDOSCOPY  2018    ERCP N/A 07/27/2024    Procedure: ENDOSCOPIC RETROGRADE CHOLANGIOPANCREATOGRAPHY WITH SPHINCTEROTOMY, BALLOON SWEEP, STENT PLACEMENT;  Surgeon: Ajay Kennedy MD;  Location: [facility] MAIN OR;  Service: Gastroenterology;  Laterality: N/A;  BILE DUCT STONE    ERCP N/A 9/5/2024    Procedure: ENDOSCOPIC RETROGRADE  CHOLANGIOPANCREATOGRAPHY WITH SPINCHTEROTOMY. BALLOON DILATATION 8-10. BALLOON SWEEP 12-15. STENT REMOVAL;  Surgeon: Ajay Kennedy MD;  Location: [facility] ENDOSCOPY;  Service: Gastroenterology;  Laterality: N/A;  BILE DUCT STONES    EYE SURGERY      FECAL DISIMPACTION  2019    TRANSPLANT     GALLBLADDER SURGERY      HEMICOLECTOMY Right 2017    HYSTERECTOMY  1982,1984    KNEE ARTHROSCOPY Left 2022    Procedure: KNEE ARTHROSCOPY WITH PARTIAL MEDIAL MENISCECTOMY,  CHONDROPLASTY;  Surgeon: Nicholas Tipton MD;  Location: [facility] OR [facility];  Service: Orthopedics;  Laterality: Left;    MINI-LAPAROTOMY  2017    PORTACATH PLACEMENT N/A     pt states that her port does not work    TONSILLECTOMY      UPPER GASTROINTESTINAL ENDOSCOPY  2018    VENOUS ACCESS DEVICE (PORT) INSERTION N/A 10/31/2023    Procedure: Port-a-catheter removal and port-a-catheter placement;  Surgeon: Alcon Delgado MD;  Location: [facility] OR [facility];  Service: General;  Laterality: N/A;    VENTRAL HERNIA REPAIR N/A 10/3/2024    Procedure: VENTRAL / INCISIONAL HERNIA REPAIR LAPAROSCOPIC WITH DAVINCI ROBOT;  Surgeon: Eduardo Ingram MD;  Location: [facility] OR [facility];  Service: Robotics - DaVinci;  Laterality: N/A;     Social History     Socioeconomic History    Marital status:    Tobacco Use    Smoking status: Former     Current packs/day: 0.00     Average packs/day: 1 pack/day for 47.4 years (47.4 ttl pk-yrs)     Types: Cigarettes     Start date: 1975     Quit date: 6/3/2022     Years since quittin.6     Passive exposure: Past    Smokeless tobacco: Never   Vaping Use    Vaping status: Never Used   Substance and Sexual Activity    Alcohol use: Never    Drug use: Never    Sexual activity: Not Currently     Partners: Male     Birth control/protection: Hysterectomy     Family History   Problem Relation Age of Onset    Heart disease Mother     Lung cancer Mother     Cancer Mother     Stroke Father     Heart disease Father     Kidney  "cancer Father     Cancer Father     Stroke Other         UNCLE/AUNT    Colon cancer Neg Hx     Malig Hyperthermia Neg Hx        Objective   Physical Exam  Vitals reviewed. Exam conducted with a chaperone present.   Cardiovascular:      Rate and Rhythm: Normal rate and regular rhythm.      Heart sounds: Normal heart sounds. No murmur heard.     No gallop.   Pulmonary:      Effort: Pulmonary effort is normal.      Breath sounds: Normal breath sounds.      Comments: Port-A-Cath  Abdominal:      General: Bowel sounds are normal.   Lymphadenopathy:      Cervical: No cervical adenopathy.   Psychiatric:         Mood and Affect: Mood normal.         Behavior: Behavior normal.         Vitals:    01/22/25 1107   BP: 114/78   Pulse: 74   Resp: 18   Temp: 98.1 °F (36.7 °C)   TempSrc: Temporal   SpO2: 95%   Weight: 84.8 kg (187 lb)   PainSc: 0-No pain     ECOG score: 0         PHQ-9 Total Score:                    Result Review :   The following data was reviewed by: West Nicolas MD on 01/22/2025:  Lab Results   Component Value Date    HGB 10.3 (L) 01/22/2025    HCT 32.5 (L) 01/22/2025    .5 (H) 01/22/2025     (H) 01/22/2025    WBC 9.49 01/22/2025    NEUTROABS 6.54 01/22/2025    LYMPHSABS 1.93 01/22/2025    MONOSABS 0.83 01/22/2025    EOSABS 0.00 01/22/2025    BASOSABS 0.10 01/22/2025     Lab Results   Component Value Date    GLUCOSE 182 (H) 01/22/2025    BUN 12 01/22/2025    CREATININE 0.73 01/22/2025     01/22/2025    K 3.6 01/22/2025     (H) 01/22/2025    CO2 18.7 (L) 01/22/2025    CALCIUM 8.7 01/22/2025    PROTEINTOT 5.8 (L) 01/22/2025    ALBUMIN 3.6 01/22/2025    BILITOT 0.5 01/22/2025    ALKPHOS 76 01/22/2025    AST 8 01/22/2025    ALT 9 01/22/2025     Lab Results   Component Value Date    MG 1.8 01/22/2025    PHOS 2.0 (L) 01/22/2025    FREET4 1.55 12/06/2023    TSH 0.684 09/11/2024     No results found for: \"IRON\", \"LABIRON\", \"TRANSFERRIN\", \"TIBC\"  Lab Results   Component Value Date    LDH " "173 03/07/2023    NBIKSJMQ30 612 04/15/2024    FOLATE 8.95 03/27/2024     No results found for: \"PSA\", \"CEA\", \"AFP\", \"\", \"\"          Assessment and Plan    Diagnoses and all orders for this visit:    1. Multiple myeloma, remission status unspecified (Primary)  Assessment & Plan:  Patient is on active therapy with daratumumab, lenalidomide, dexamethasone and zoledronic acid.  Continue to hold zoledronic acid until her dental extractions are completely healed.  She is tolerating her active therapy well.  Excellent response thus far.  Proceed with cycle 15 as planned.  I will see her back for cycle 16-day 1 with lab work prior to monitor for toxicities as well as her myeloma lab work including daratumumab specific SPEP, CARL, free light chain assay, UPEP.    Orders:  -     Protein Electrophoresis, Random Urine - Urine, Clean Catch; Future  -     CARL, Jessica-Specific, Serum; Future  -     Protein Elec + Interp, Serum; Future    2. Chronic obstructive pulmonary disease with acute exacerbation  Assessment & Plan:  Patient reports increased purulent sputum production but no fever.  I will treat with doxycycline.  Continue inhalers and nebulizers as prescribed along with supplemental oxygen.  Follow-up with PCP or pulmonology if no improvement.    Orders:  -     doxycycline (VIBRAMYICN) 100 MG tablet; Take 1 tablet by mouth 2 (Two) Times a Day.  Dispense: 14 tablet; Refill: 0    3. Hypophosphatemia  Assessment & Plan:  Phosphorus levels are low.  Begin phosphorus replacement.  Recheck level next visit.    Orders:  -     potassium phosphate, monobasic, (K-Phos) 500 MG tablet; Take 1 tablet by mouth 2 (Two) Times a Day.  Dispense: 60 tablet; Refill: 3    Other orders  -     Cancel: sodium chloride 0.9 % infusion  -     Cancel: acetaminophen (TYLENOL) tablet 1,000 mg  -     Cancel: methylPREDNISolone sodium succinate (SOLU-Medrol) injection 60 mg  -     Cancel: diphenhydrAMINE (BENADRYL) IVPB 25 mg  -     Cancel: " daratumumab-hyaluronidase-fihj (DARZALEX FASPRO) 1800-49204 MG-UT/15ML injection 1,800 mg  -     Cancel: Hydrocortisone Sod Suc (PF) (Solu-CORTEF) injection 100 mg  -     Cancel: diphenhydrAMINE (BENADRYL) injection 50 mg  -     Cancel: famotidine (PEPCID) injection 20 mg            Patient Follow Up: Cycle 16-day 1    Patient was given instructions and counseling regarding her condition or for health maintenance advice. Please see specific information pulled into the AVS if appropriate.     West Nicolas MD    1/23/2025

## 2025-01-23 ENCOUNTER — SPECIALTY PHARMACY (OUTPATIENT)
Dept: PHARMACY | Facility: HOSPITAL | Age: 75
End: 2025-01-23
Payer: MEDICARE

## 2025-01-23 PROBLEM — E83.39 HYPOPHOSPHATEMIA: Status: ACTIVE | Noted: 2025-01-23

## 2025-01-23 NOTE — ASSESSMENT & PLAN NOTE
Patient is on active therapy with daratumumab, lenalidomide, dexamethasone and zoledronic acid.  Continue to hold zoledronic acid until her dental extractions are completely healed.  She is tolerating her active therapy well.  Excellent response thus far.  Proceed with cycle 15 as planned.  I will see her back for cycle 16-day 1 with lab work prior to monitor for toxicities as well as her myeloma lab work including daratumumab specific SPEP, CARL, free light chain assay, UPEP.

## 2025-01-23 NOTE — PROGRESS NOTES
Specialty Note: Chart Review    Anca Samsno is a 74 y.o. female with IgG Multiple Myeloma who presented for lab check and Oncology provider appointment. [facility] reviewed labs and providers note to assess continued therapy appropriateness of  Lenalidomide    Regimen: Lenalidomide 15mg - Take one tablet by mouth daily on days 1-21 and then take 7 days off on 28 days + Darzalex SQ     Oncology Interval History:  Other Cancer Hx:   05/16/2017: R hemicolectomy - low-grade 7.6 cm adenocarcinoma of the cecum, margins were  negative  1/26 LN + for metastatic deposit pT3 pN1a colorectal cancer, no other disease on imaging  now s/p colectomy with colostomy  & s/p 6 mo of adjuvant xeloda (complete 11/2017)      1/27/16: Serum protein electrophoresis - markedly elevated IgG at 2941, decreased IgA at 46, IgM 87, and markedly increased monoclonal spike at 2.2 g/dL. Interestingly, 3/19/15: serum protein electrophoresis - IgG of 2695      3/02/16: BMBx which showed a 30% plasma cell population consistent with plasma cell dyscrasia, no lytic lesions - continued on active survelliance for several years  10/26/23: PET indicating bony involvement, plan for DRD made  8/2024: Patient underwent cholecystectomy   1/22/25: Patient proceeded with next cycle of therapy.    Treatment Hx:  11/7/23: C1 DRD  12/6/23: C2 DRD  1/3/24: C3 DRD   1/31/24: C4 DRD  2/28/24: C5 DRD  3/27/24: C6 DRD   4/24/24: C7 DRD  5/22/24: C8 DRD  6/19/24: C9 DRD  7/17/24: C10 DRD   9/11/24: C11 DRD - held d/t cholecystectomy (early Aug 2024)  10/30/24: C12 DRD  11/27/24: C13 DRD   12/26/24: C14 DRD  1/22/25: C15 DRD     Lab Results   Component Value Date     01/22/2025    K 3.6 01/22/2025    CO2 18.7 (L) 01/22/2025     (H) 01/22/2025    BUN 12 01/22/2025    GLUCOSE 182 (H) 01/22/2025    CALCIUM 8.7 01/22/2025    CREATININE 0.73 01/22/2025    EGFRIFNONA 53 (L) 08/25/2021    BCR 16.4 01/22/2025    ANIONGAP 13.3 01/22/2025      Lab Results   Component Value Date    WBC 9.49 01/22/2025    RBC 3.11 (L) 01/22/2025    HGB 10.3 (L) 01/22/2025    HCT 32.5 (L) 01/22/2025     (H) 01/22/2025    NEUTROABS 6.54 01/22/2025    LYMPHSABS 1.93 01/22/2025    MONOSABS 0.83 01/22/2025    EOSABS 0.00 01/22/2025    BASOSABS 0.10 01/22/2025     Lab Results   Component Value Date    MSPIKE 0.5 (H) 10/30/2024    MSPIKE 0.4 (H) 06/11/2024    IGLFLC 2.0 (L) 10/30/2024    IGLFLC 3.3 (L) 06/11/2024    FREEKAPPAL 8.9 10/30/2024    FREEKAPPAL 11.1 06/11/2024    KAPPALAMB 4.45 (H) 10/30/2024    KAPPALAMB 3.36 (H) 06/11/2024     PLAN  Continue with lenalidomide as planned. Next Specialty Assessment due 4/24/25.    Sabrina Beebe PharmD  Oncology Clinical Specialty Pharmacist   1/23/2025  12:40 EST

## 2025-01-23 NOTE — ASSESSMENT & PLAN NOTE
Patient reports increased purulent sputum production but no fever.  I will treat with doxycycline.  Continue inhalers and nebulizers as prescribed along with supplemental oxygen.  Follow-up with PCP or pulmonology if no improvement.

## 2025-01-27 ENCOUNTER — OFFICE VISIT (OUTPATIENT)
Dept: FAMILY MEDICINE CLINIC | Facility: CLINIC | Age: 75
End: 2025-01-27
Payer: MEDICARE

## 2025-01-27 ENCOUNTER — HOSPITAL ENCOUNTER (OUTPATIENT)
Dept: GENERAL RADIOLOGY | Facility: HOSPITAL | Age: 75
Discharge: HOME | End: 2025-01-27
Admitting: PHYSICIAN ASSISTANT
Payer: MEDICARE

## 2025-01-27 VITALS
RESPIRATION RATE: 22 BRPM | SYSTOLIC BLOOD PRESSURE: 132 MMHG | WEIGHT: 181.4 LBS | DIASTOLIC BLOOD PRESSURE: 61 MMHG | HEART RATE: 88 BPM | OXYGEN SATURATION: 96 % | BODY MASS INDEX: 35.43 KG/M2

## 2025-01-27 DIAGNOSIS — R05.1 ACUTE COUGH: ICD-10-CM

## 2025-01-27 DIAGNOSIS — R05.8 PRODUCTIVE COUGH: Primary | ICD-10-CM

## 2025-01-27 DIAGNOSIS — R22.32 ARM MASS, LEFT: ICD-10-CM

## 2025-01-27 LAB
EXPIRATION DATE: NORMAL
EXPIRATION DATE: NORMAL
FLUAV AG UPPER RESP QL IA.RAPID: NOT DETECTED
FLUBV AG UPPER RESP QL IA.RAPID: NOT DETECTED
INTERNAL CONTROL: NORMAL
INTERNAL CONTROL: NORMAL
Lab: NORMAL
Lab: NORMAL
RSV AG SPEC QL: NOT DETECTED
SARS-COV-2 AG UPPER RESP QL IA.RAPID: NOT DETECTED

## 2025-01-27 PROCEDURE — 87428 SARSCOV & INF VIR A&B AG IA: CPT | Performed by: PHYSICIAN ASSISTANT

## 2025-01-27 PROCEDURE — 3075F SYST BP GE 130 - 139MM HG: CPT | Performed by: PHYSICIAN ASSISTANT

## 2025-01-27 PROCEDURE — 99214 OFFICE O/P EST MOD 30 MIN: CPT | Performed by: PHYSICIAN ASSISTANT

## 2025-01-27 PROCEDURE — 87807 RSV ASSAY W/OPTIC: CPT | Performed by: PHYSICIAN ASSISTANT

## 2025-01-27 PROCEDURE — 3078F DIAST BP <80 MM HG: CPT | Performed by: PHYSICIAN ASSISTANT

## 2025-01-27 PROCEDURE — 1126F AMNT PAIN NOTED NONE PRSNT: CPT | Performed by: PHYSICIAN ASSISTANT

## 2025-01-27 PROCEDURE — 71046 X-RAY EXAM CHEST 2 VIEWS: CPT

## 2025-01-27 NOTE — PROGRESS NOTES
Chief Complaint  Chief Complaint   Patient presents with    Cough       Subjective          Anca Samson presents to [facility]  Cough  This is a recurrent problem. The current episode started 1 to 4 weeks ago. The problem has been coming and going. The problem occurs hourly. The cough is Productive of clear sputum. Associated symptoms include headaches, myalgias, nasal congestion, postnasal drip, rhinorrhea, shortness of breath and wheezing. Pertinent negatives include no chest pain, chills, ear congestion, ear pain, fever, heartburn, hemoptysis, rash, sore throat, sweats or weight loss. Nothing aggravates the symptoms. Risk factors for lung disease include animal exposure. Treatments tried: Patient was prescribed doxycycline by Dr. Samson; has 1 dose left. The treatment provided no relief.       Patient also complains of left arm pain.  States pain is intermittent.  Upon examination patient does have a small mass on the lateral aspect of upper arm.  Patient states that mass is new    Medical History: has a past medical history of Anxiety, Asthma (07/28/2021), Atherosclerosis of native coronary artery of native heart with angina pectoris (08/15/2023), C. difficile diarrhea, Cholelithiasis (7-), Colon cancer (07/21/2017), Colon polyp, COPD (chronic obstructive pulmonary disease) (10/23/2017), Depression, Disease of thyroid gland, Diverticulitis, Diverticulitis of colon, Dysphagia, Emphysema of lung, Emphysema/COPD, GERD (gastroesophageal reflux disease), Head injury, Hernia (2019), History of chemotherapy, psychiatric care, Impaired fasting glucose (08/14/2015), Lumbago, Lung nodule (02/17/2022), Major depressive disorder (10/27/2016), Malignant neoplasm of ascending colon (07/21/2017), Melena, Multiple myeloma, Nicotine dependence (05/10/2017), NICK (obstructive sleep apnea) (11/06/2023), Oxygen dependent, Pancreatitis (7/26/2024), Panic disorder, Primary central sleep  apnea, Renal insufficiency (07/28/2021), Seizures, Shortness of breath, Tobacco use (07/28/2021), Visit for screening mammogram (02/20/2020), and Vitamin D deficiency.   Surgical History: has a past surgical history that includes Bone marrow biopsy (2016); Colonoscopy (2018,2017,2019); Esophagogastroduodenoscopy (2018); Fecal disimpaction (06/2019); Hysterectomy (1982,1984); Mini-laparotomy (2017); Portacath placement (N/A); Colon surgery (2017); Hemicolectomy (Right, 05/2017); Upper gastrointestinal endoscopy (2018); Knee Arthroscopy (Left, 01/03/2022); Appendectomy; Colonoscopy (N/A, 12/20/2022); Venous Access Device (Port) (N/A, 10/31/2023); Colonoscopy (N/A, 11/09/2023); Eye surgery; Tonsillectomy; ERCP (N/A, 07/27/2024); Cholecystectomy (N/A, 08/08/2024); Gallbladder surgery; ERCP (N/A, 9/5/2024); and Ventral hernia repair (N/A, 10/3/2024).   Family History: family history includes Cancer in her father and mother; Heart disease in her father and mother; Kidney cancer in her father; Lung cancer in her mother; Stroke in her father and another family member.   Social History: reports that she quit smoking about 2 years ago. Her smoking use included cigarettes. She started smoking about 50 years ago. She has a 47.4 pack-year smoking history. She has been exposed to tobacco smoke. She has never used smokeless tobacco. She reports that she does not drink alcohol and does not use drugs.    Allergies: Morphine, Cephalexin, Penicillins, and Sulfa antibiotics    Health Maintenance Due   Topic Date Due    LUNG CANCER SCREENING  03/01/2025       Objective     Vitals:    01/27/25 0928   BP: 132/61   Pulse: 88   Resp: 22   SpO2: 96%   Weight: 82.3 kg (181 lb 6.4 oz)     Body mass index is 35.43 kg/m².     Wt Readings from Last 3 Encounters:   01/27/25 82.3 kg (181 lb 6.4 oz)   01/22/25 85 kg (187 lb 8 oz)   01/22/25 84.8 kg (187 lb)     BP Readings from Last 3 Encounters:   01/27/25 132/61   01/22/25 114/78   01/22/25 114/78        Patient Care Team:  Rena Guerra PA as PCP - General (Family Medicine)  West Nicolas MD as Consulting Physician (Hematology and Oncology)  Alcon Delgado MD as Consulting Physician (General Surgery)  Jarvis Borges MD as Consulting Physician (Gastroenterology)  Erendira Clayton APRN as Nurse Practitioner (Gastroenterology)  Felton Lee MD as Consulting Physician (Pulmonary Disease)  Justo Casper MD as Consulting Physician (Otolaryngology)  Eduardo Ingram MD as Consulting Physician (General Surgery)    Physical Exam  Vitals and nursing note reviewed.   Constitutional:       Appearance: Normal appearance.      Comments: In wheelchair on 02 via nasal cannula; no acute distress.   HENT:      Head: Normocephalic and atraumatic.   Neck:      Vascular: No carotid bruit.      Comments: Thyroid : gland size normal, nontender, no nodules or masses present on palpation   Cardiovascular:      Rate and Rhythm: Normal rate and regular rhythm.      Pulses: Normal pulses.      Heart sounds: Normal heart sounds.   Pulmonary:      Effort: Pulmonary effort is normal.      Breath sounds: Normal breath sounds.   Musculoskeletal:      Cervical back: Neck supple. No tenderness.      Comments: Mass about 2cm in left lateral upper arm.   Lymphadenopathy:      Cervical: No cervical adenopathy.   Neurological:      Mental Status: She is alert.   Psychiatric:         Mood and Affect: Mood normal.         Behavior: Behavior normal.           Result Review :   Office Visit on 01/27/2025   Component Date Value Ref Range Status    Respiratory Syncytial Virus 01/27/2025 not detected   Final    Internal Control 01/27/2025 Passed  Passed Final    Lot Number 01/27/2025 [id number]   Final    Expiration Date 01/27/2025 12/09/2025   Final    SARS Antigen 01/27/2025 Not Detected  Not Detected, Presumptive Negative Final    Influenza A Antigen NILESH 01/27/2025 Not Detected  Not Detected Final    Influenza B Antigen  NILESH 01/27/2025 Not Detected  Not Detected Final    Internal Control 01/27/2025 Passed  Passed Final    Lot Number 01/27/2025 [id number]   Final    Expiration Date 01/27/2025 10/23/2025   Final                Assessment and Plan      Diagnoses and all orders for this visit:    1. Productive cough (Primary)  -     POCT RSV  -     POCT SARS-CoV-2 Antigen NILESH + Flu    2. Acute cough  -     XR Chest PA & Lateral; Future    3. Arm mass, left  -     US Soft Tissue; Future    PLAN: in regards to subacute cough, finish antibiotic and check chest x-ray.  Patient can continue with Robitussin DM as needed.    In regards to new onset left arm pain intermittently and left arm mass, order ultrasound of soft tissue.  Further plan depends upon ultrasound results.    I currently manage patient's acute and chronic conditions, providing ongoing preventative services, and counseling, in addition to addressing behavioral health concerns and social needs.        Follow Up     No follow-ups on file.    Patient was given instructions and counseling regarding her condition or for health maintenance advice. Please see specific information pulled into the AVS if appropriate.

## 2025-02-10 ENCOUNTER — SPECIALTY PHARMACY (OUTPATIENT)
Dept: PHARMACY | Facility: HOSPITAL | Age: 75
End: 2025-02-10
Payer: MEDICARE

## 2025-02-10 DIAGNOSIS — C90.00 MULTIPLE MYELOMA, REMISSION STATUS UNSPECIFIED: ICD-10-CM

## 2025-02-10 RX ORDER — LENALIDOMIDE 15 MG/1
15 CAPSULE ORAL DAILY
Qty: 21 CAPSULE | Refills: 0 | Status: SHIPPED | OUTPATIENT
Start: 2025-02-10

## 2025-02-10 NOTE — PROGRESS NOTES
Specialty Pharmacy Patient Management Program  Per Protocol Prescription Order or Refill     Patient will be filling or currently fills medications at [facility] and is enrolled in the Patient Management Program.    Requested Prescriptions     Signed Prescriptions Disp Refills    lenalidomide (REVLIMID) 15 MG capsule 21 capsule 0     Sig: Take 1 capsule by mouth Daily. On Days 1-21, and off 7 days, on a 28 day cycle     Prescription orders above were sent to the pharmacy per Collaborative Care Agreement Protocol.     Last Office Visit: 1/22/25  Next Office Visit: 2/19/25    Drug-Drug Interactions: The current medication list was reviewed and there are no relevant drug-drug interactions with the specialty medication.  Medication Allergies: The patient has no relevant allergies as it relates to their oral specialty medication  Review of Labs/Dose Adjustments: NO DOSE CHANGE - I reviewed the most recent note and labs and the patient will continue without any dose changes.  I sent refills as described below.    Sabrina Beebe PharmD  Oncology Clinical Specialty Pharmacist   2/10/2025  08:10 EST

## 2025-02-11 ENCOUNTER — HOSPITAL ENCOUNTER (OUTPATIENT)
Dept: ULTRASOUND IMAGING | Facility: HOSPITAL | Age: 75
Discharge: HOME | End: 2025-02-11
Admitting: PHYSICIAN ASSISTANT
Payer: MEDICARE

## 2025-02-11 DIAGNOSIS — R22.32 ARM MASS, LEFT: ICD-10-CM

## 2025-02-11 PROCEDURE — 76999 ECHO EXAMINATION PROCEDURE: CPT

## 2025-02-12 ENCOUNTER — HOSPITAL ENCOUNTER (OUTPATIENT)
Dept: ONCOLOGY | Facility: HOSPITAL | Age: 75
Discharge: HOME | End: 2025-02-12
Admitting: INTERNAL MEDICINE
Payer: MEDICARE

## 2025-02-12 DIAGNOSIS — C90.00 MULTIPLE MYELOMA, REMISSION STATUS UNSPECIFIED: ICD-10-CM

## 2025-02-12 DIAGNOSIS — Z12.2 ENCOUNTER FOR SCREENING FOR LUNG CANCER: Primary | ICD-10-CM

## 2025-02-12 PROCEDURE — 25010000002 HEPARIN LOCK FLUSH PER 10 UNITS: Performed by: INTERNAL MEDICINE

## 2025-02-12 PROCEDURE — 84165 PROTEIN E-PHORESIS SERUM: CPT | Performed by: INTERNAL MEDICINE

## 2025-02-12 PROCEDURE — 36591 DRAW BLOOD OFF VENOUS DEVICE: CPT

## 2025-02-12 PROCEDURE — 86334 IMMUNOFIX E-PHORESIS SERUM: CPT | Performed by: INTERNAL MEDICINE

## 2025-02-12 PROCEDURE — 84155 ASSAY OF PROTEIN SERUM: CPT | Performed by: INTERNAL MEDICINE

## 2025-02-12 PROCEDURE — 82784 ASSAY IGA/IGD/IGG/IGM EACH: CPT | Performed by: INTERNAL MEDICINE

## 2025-02-12 RX ORDER — SODIUM CHLORIDE 0.9 % (FLUSH) 0.9 %
20 SYRINGE (ML) INJECTION AS NEEDED
OUTPATIENT
Start: 2025-02-12

## 2025-02-12 RX ORDER — HEPARIN SODIUM (PORCINE) LOCK FLUSH IV SOLN 100 UNIT/ML 100 UNIT/ML
500 SOLUTION INTRAVENOUS AS NEEDED
Status: DISCONTINUED | OUTPATIENT
Start: 2025-02-12 | End: 2025-02-13 | Stop reason: HOSPADM

## 2025-02-12 RX ORDER — HEPARIN SODIUM (PORCINE) LOCK FLUSH IV SOLN 100 UNIT/ML 100 UNIT/ML
500 SOLUTION INTRAVENOUS AS NEEDED
OUTPATIENT
Start: 2025-02-12

## 2025-02-12 RX ORDER — SODIUM CHLORIDE 0.9 % (FLUSH) 0.9 %
20 SYRINGE (ML) INJECTION AS NEEDED
Status: DISCONTINUED | OUTPATIENT
Start: 2025-02-12 | End: 2025-02-13 | Stop reason: HOSPADM

## 2025-02-12 RX ADMIN — HEPARIN 500 UNITS: 100 SYRINGE at 09:56

## 2025-02-12 RX ADMIN — Medication 20 ML: at 09:56

## 2025-02-13 DIAGNOSIS — R22.32 ARM MASS, LEFT: Primary | ICD-10-CM

## 2025-02-13 DIAGNOSIS — R93.89 ABNORMAL ULTRASOUND: ICD-10-CM

## 2025-02-13 LAB
ALBUMIN SERPL ELPH-MCNC: 2.9 G/DL (ref 2.9–4.4)
ALBUMIN/GLOB SERPL: 1.3 {RATIO} (ref 0.7–1.7)
ALPHA1 GLOB SERPL ELPH-MCNC: 0.2 G/DL (ref 0–0.4)
ALPHA2 GLOB SERPL ELPH-MCNC: 0.6 G/DL (ref 0.4–1)
B-GLOBULIN SERPL ELPH-MCNC: 0.9 G/DL (ref 0.7–1.3)
GAMMA GLOB SERPL ELPH-MCNC: 0.4 G/DL (ref 0.4–1.8)
GLOBULIN SER CALC-MCNC: 2.2 G/DL (ref 2.2–3.9)
LABORATORY COMMENT REPORT: ABNORMAL
M PROTEIN SERPL ELPH-MCNC: 0.3 G/DL
PROT PATTERN SERPL ELPH-IMP: ABNORMAL
PROT SERPL-MCNC: 5.1 G/DL (ref 6–8.5)

## 2025-02-13 NOTE — PROGRESS NOTES
"Subjective   Anca Samson is a 74 y.o. female who presents today for initial evaluation     Referring Provider:  No referring provider defined for this encounter.    Chief Complaint:  anxiety, depression    History of Present Illness:     2024: INITIAL VISIT Chart review:     Amandeep: Ativan short course in November, February, May.  Care Everywhere: a few non behavioral health notes    Psychotropic medication chart review:  Present:  Prozac 40 mg a day    Previously:  Prozac 20 mg a day  Zoloft 25 mg a day    EKG: 2024: Rate 97, sinus, probable left atrial enlargement, QTc 461  Procedures: Sleep study in May 2024: NICK, BiPAP settings noted  Head imaging: May 2024: MRI of the brain shows white matter changes compatible with small vessel ischemic disease  Labs: 2024: Abnormal CMP was chloride 112, CO2 20.9, glucose 160, total protein 5.3.  Reassuring thyroid studies, abnormal CBC with hemoglobin 10.6, other abnormalities.  Initial Chart Review Notes: Referred by primary care in April for anxiety.  History of significant shortness of breath due to comorbidities.  Patient requested to meet with a psychiatrist.      Chart Review By Dates:  2025: fam med, onc x6; abnl cmp cbc phos -- multiple abnl in each assay but cr 0.73.  2024: admitted for removal of biliary stent, ERCP, infusion for MM, onc, gen surg. Abnl gluc 212 on cmp, reassuring thyroid studies, abnl cbc  2024: admitted for lap anayeli, pulm, infusion with onc, onc, abnl cmp and cbc, but cr 0.76.  24: onc, dexa shows osteoporosis. High trigs, low A1c 4.4, abnl cmp, cbc. Cr 0.86.    Plannin2024: Improved, stable, no changes, situational anxiety and complicated grief.   2024: MDD, CHAI, increase buspar.  2024: Much improved. No changes for now. 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    VISITS/APPOINTMENTS (BELOW):    \"Sarah\" \"Robi\" " "her  of 46 years  Seizures  On chemo for multiple myeloma since 11/23 02/14/2025:   In person interview:  \"I've had 4 surgeries, all my teeth pulled, I have tumors in my arms so I need surgery.\"  Couldn't get BP today  Still having SOA  They just keep finding things  I want to be myself again, I want to get back to work    Grief, complicated: her mom and dad, 20 years ago  MDD: depressed mood  CHAI: more worrying, on edge  Panic attacks: stable  Energy: down chronically  Concentration: down  Insomnia: too much, hypersomnia  Eating/Weight: 194, 188, 194 lbs  Refills: y  Substances: denies  Therapy: none  Medication compliant: y  SE: n  No SI HI AVH.      09/19/2024:   In person interview:  \"I've got surgery in 2 weeks.\"  More SOA, past 2 days, doesn't need to go to ER or PCP, this happens  Anxious for the surgery  Not working  Grief: her mom and dad, 20 years ago  MDD: depressed mood -- now stable  CHAI: more worrying, on edge -- stable, some situational anxiety  Panic attacks: stable  Energy: down chronically  Concentration: down  Insomnia: too much, hypersomnia  Eating/Weight: 194, 188, 194 lbs  Refills: y  Substances: denies  Therapy: none  Medication compliant: y  SE: n  No SI HI AVH.      08/19/2024:   In person interview:  \"They admitted me for surgery.\"  I had my GB out  They took me off my chemo because of the surgeries  Still having pains in my chest.  Still has a hernia to repair, then will restart chemo.  In a wheelchair again, was in a walker last time  Not working right now  MDD: depressed mood  CHAI: more worrying, on edge  Panic attacks: stable  Energy: down  Concentration: down  Insomnia: too much, hypersomnia  Eating/Weight: 188, 194 lbs  Refills: y  Substances: denies  Therapy: none  Medication compliant: y  SE: n  No SI HI AVH.      7/22/24: In person interview:  \"Pretty good.\"  I think it's helping. I'm not as irritable.  I didn't come in a wheelchair this time. I'm using a walker.  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 Was 8-10 hours a week.  I still can't walk, but the anxiety about it is much better  MDD: improved  CHAI: improved  Panic attacks: none  Energy: improving, but baseline low  Concentration: improving  Insomnia:   Eating/Weight: 194 lbs  Refills: y  Substances: def  Therapy: n  Medication compliant: y  SE: n  No SI HI AVH.      06/20/2024: In person.  Interview:  His/Her Story: \"[facility] [place] for 2 weeks, that was 21 years ago.\"  P18, G10  I don't know why it happened. I don't think it was SI.  I've been on prozac 4-5 years  \"I'm awful nervous\"  I can't get up and do anything  I can't drive  I can't work  I can't walk  If I walk 20 feet I'm so out of breath  Has had a tremor since started nebulizer a year ago. Quit it for a few months, tremor stopped, restarted, tremor restarted  Perfectionistic tendencies  Sleeps well on bipap  Depression/Mood:  Depressed mood, anhedonia, hopelessness or guilt, poor energy, poor concentration.  Seasonal pattern:  Severity: Moderate  Duration: 21 years  Anxiety:  Uncontrolled worrying, muscle tension, fatigue, poor concentration, feeling on edge or restless, irritability.  Severity: Moderate  Duration: 21 years  Panic attacks: y  Psych ROS: Positive for depression, anxiety.  Negative for psychosis and padmaja.  ADHD: def  PTSD: def  No SI HI AVH.  Medication compliant: y    Access to Firearms: yes, not locked away    PHQ-9 Depression Screening  PHQ-9 Total Score:      Little interest or pleasure in doing things?     Feeling down, depressed, or hopeless?     Trouble falling or staying asleep, or sleeping too much?     Feeling tired or having little energy?     Poor appetite or overeating?     Feeling bad about yourself - or that you are a failure or have let yourself or your family down?     Trouble concentrating on things, such as reading the newspaper or watching television?   "   Moving or speaking so slowly that other people could have noticed? Or the opposite - being so fidgety or restless that you have been moving around a lot more than usual?     Thoughts that you would be better off dead, or of hurting yourself in some way?     PHQ-9 Total Score       CHAI-7       Past Surgical History:  Past Surgical History:   Procedure Laterality Date    APPENDECTOMY      BONE MARROW BIOPSY  2016    CHOLECYSTECTOMY N/A 08/08/2024    Procedure: CHOLECYSTECTOMY LAPAROSCOPIC WITH DAVINCI ROBOT;  Surgeon: Eduardo Ingram MD;  Location: [facility] OR [facility] [place];  Service: Robotics - DaVinci;  Laterality: N/A;    COLON SURGERY  2017    COLONOSCOPY  2018,2017,2019    [facility]- DR LE:DIVERTICULOSIS AND ERYTHEMA OF MUCOSA    COLONOSCOPY N/A 12/20/2022    Procedure: COLONOSCOPY WITH ELEVIEW INJECTION, POLYPECTOMY, HOT SNARE, CLIP APPLICATION X3, BIOPSIES;  Surgeon: Jarvis Borges MD;  Location: [facility] ENDOSCOPY;  Service: Gastroenterology;  Laterality: N/A;  COLON POLYP, DIVERTICULOSIS, ANASTOMOSIS RIGHT COLON    COLONOSCOPY N/A 11/09/2023    Procedure: COLONOSCOPY;  Surgeon: Jarvis Borges MD;  Location: [facility] ENDOSCOPY;  Service: Gastroenterology;  Laterality: N/A;  DIVERTICULOSIS, ANASTOMOSIS IN ASCENDING COLON    ENDOSCOPY  2018    ERCP N/A 07/27/2024    Procedure: ENDOSCOPIC RETROGRADE CHOLANGIOPANCREATOGRAPHY WITH SPHINCTEROTOMY, BALLOON SWEEP, STENT PLACEMENT;  Surgeon: Ajay Kennedy MD;  Location: [facility] MAIN OR;  Service: Gastroenterology;  Laterality: N/A;  BILE DUCT STONE    ERCP N/A 09/05/2024    Procedure: ENDOSCOPIC RETROGRADE CHOLANGIOPANCREATOGRAPHY WITH SPINCHTEROTOMY. BALLOON DILATATION 8-10. BALLOON SWEEP 12-15. STENT REMOVAL;  Surgeon: Ajay Kennedy MD;  Location: [facility] ENDOSCOPY;  Service: Gastroenterology;  Laterality: N/A;  BILE DUCT STONES    EYE SURGERY      FECAL DISIMPACTION  06/2019    TRANSPLANT     GALLBLADDER SURGERY      HEMICOLECTOMY Right  05/2017    HERNIA REPAIR  2024    HYSTERECTOMY  1982,1984    KNEE ARTHROSCOPY Left 01/03/2022    Procedure: KNEE ARTHROSCOPY WITH PARTIAL MEDIAL MENISCECTOMY,  CHONDROPLASTY;  Surgeon: Nicholas Tipton MD;  Location: [facility] OR [facility] [place];  Service: Orthopedics;  Laterality: Left;    MINI-LAPAROTOMY  2017    PORTACATH PLACEMENT N/A     pt states that her port does not work    TONSILLECTOMY      UPPER GASTROINTESTINAL ENDOSCOPY  2018    VENOUS ACCESS DEVICE (PORT) INSERTION N/A 10/31/2023    Procedure: Port-a-catheter removal and port-a-catheter placement;  Surgeon: Alcon Delgado MD;  Location: [facility] OR [facility] [place];  Service: General;  Laterality: N/A;    VENTRAL HERNIA REPAIR N/A 10/03/2024    Procedure: VENTRAL / INCISIONAL HERNIA REPAIR LAPAROSCOPIC WITH DAVINCI ROBOT;  Surgeon: Eduardo Ingram MD;  Location: [facility] OR [facility] [place];  Service: Robotics - DaVinci;  Laterality: N/A;       Problem List:  Patient Active Problem List   Diagnosis    Anxiety    Asthma    Malignant neoplasm of ascending colon    COPD (chronic obstructive pulmonary disease)    Diverticulitis    Dysphagia    Heartburn    Hyperlipidemia    Essential hypertension    Impaired fasting glucose    Low back pain    Major depressive disorder    Multiple myeloma    Renal insufficiency    Seizures    Vitamin D deficiency    Tobacco abuse    Class 2 severe obesity due to excess calories with serious comorbidity and body mass index (BMI) of 39.0 to 39.9 in adult    Chondromalacia of left knee    Diarrhea    S/P Left knee partial medial menisectomy and chondroplasty     Chronic dyspnea    Multiple lung nodules    Aftercare following surgery of left knee thorascopic partial medial meniscectomy, chondroplasty, 1/3/2022    Hypothyroidism    Atherosclerosis of native coronary artery of native heart with angina pectoris    Chronic respiratory failure with hypoxia    Excessive daytime sleepiness    Snoring    Encounter for screening for malignant neoplasm of colon    History  of colon polyps    NICK (obstructive sleep apnea)    Encounter for screening for lung cancer    Dehydration    Tobacco abuse, in remission    Hiatal hernia    Fatigue    Cough    Wheezing    Atelectasis    Acute gallstone pancreatitis    Choledocholithiasis with acute cholecystitis    Encounter for removal of biliary stent    Incisional hernia, without obstruction or gangrene    Encounter for long-term (current) use of medications    Incarcerated incisional hernia    Hypophosphatemia       Allergy:   Allergies   Allergen Reactions    Morphine Anaphylaxis    Cephalexin Hives    Penicillins Hives     Beta lactam allergy details  Antibiotic reaction: hives  Age at reaction: child  Dose to reaction time: unknown  Reason for antibiotic: unknown  Epinephrine required for reaction?: unknown  Tolerated antibiotics: other (none per pt)        Sulfa Antibiotics Hives        Discontinued Medications:  Medications Discontinued During This Encounter   Medication Reason    busPIRone (BUSPAR) 15 MG tablet Reorder    FLUoxetine (PROzac) 40 MG capsule Reorder               Current Medications:   Current Outpatient Medications   Medication Sig Dispense Refill    acyclovir (ZOVIRAX) 400 MG tablet TAKE 1 TABLET BY MOUTH 2 (TWO) TIMES A DAY. TAKE ONE TABLET BY MOUTH TWICE DAILY. 180 tablet 3    albuterol sulfate  (90 Base) MCG/ACT inhaler Inhale 2 puffs Every 4 (Four) Hours As Needed for Wheezing. 18 g 11    aspirin 81 MG EC tablet Take 1 tablet by mouth Daily.      budesonide (PULMICORT) 0.5 MG/2ML nebulizer solution Take 2 mL by nebulization 2 (Two) Times a Day for 30 days. Rinse mouth out after each use 120 mL 5    busPIRone (BUSPAR) 15 MG tablet Take 1 tablet by mouth 3 (Three) Times a Day. 270 tablet 1    Calcium Carb-Cholecalciferol (calcium 500 mg vitamin D 5 mcg, 200 UT,) 500-5 MG-MCG tablet per tablet       colestipol (COLESTID) 1 g tablet TAKE 2 TABLETS BY MOUTH TWICE A  tablet 1    dapsone 25 MG tablet TAKE 2  TABLETS BY MOUTH TWICE A  tablet 1    dexAMETHasone (DECADRON) 4 MG tablet Take 10 tablets on D 1,8,15,22 and take 1 tablet on D 2,3.  Take in the morning with food. 42 tablet 5    doxycycline (VIBRAMYICN) 100 MG tablet Take 1 tablet by mouth 2 (Two) Times a Day. 14 tablet 0    FLUoxetine (PROzac) 40 MG capsule Take 1 capsule by mouth Daily. 90 capsule 1    lenalidomide (REVLIMID) 15 MG capsule Take 1 capsule by mouth Daily. On Days 1-21, and off 7 days, on a 28 day cycle 21 capsule 0    levalbuterol (XOPENEX) 0.63 MG/3ML nebulizer solution Take 1 ampule by nebulization 4 (Four) Times a Day As Needed for Wheezing. 120 mL 5    ondansetron (ZOFRAN) 8 MG tablet TAKE 1 TABLET BY MOUTH THREE TIMES A DAY AS NEEDED FOR NAUSEA AND VOMITING 30 tablet 5    potassium phosphate, monobasic, (K-Phos) 500 MG tablet Take 1 tablet by mouth 2 (Two) Times a Day. 60 tablet 3    promethazine (PHENERGAN) 25 MG tablet Take 1 tablet by mouth Every 6 (Six) Hours As Needed for Nausea or Vomiting. 30 tablet 1    vitamin D (ERGOCALCIFEROL) 1.25 MG (41659 UT) capsule capsule Take 1 capsule by mouth 2 (Two) Times a Week. 26 capsule 1     No current facility-administered medications for this visit.       Past Medical History:  Past Medical History:   Diagnosis Date    Anxiety     Asthma 07/28/2021    Atherosclerosis of native coronary artery of native heart with angina pectoris 08/15/2023    FOLLOWED BY DR GONZALES/DINA PETTIT. DENIES CP BUT GETS SOA WITH EXERTION HAS COPD WEARS AT 2LPM N/C. DECREASED ACTIVITY D/T SOA.    C. difficile diarrhea     DENIES ANY CURRENT ISSUES    Cholelithiasis 7-    Colon cancer 07/21/2017    Colon polyp     COPD (chronic obstructive pulmonary disease) 10/23/2017    Depression     Disease of thyroid gland     Diverticulitis     Diverticulitis of colon     Dysphagia     Emphysema of lung     Emphysema/COPD     GERD (gastroesophageal reflux disease)     Head injury     CONCUSSION AT AGE 4    Hernia 2019     History of chemotherapy     2017    Hx of psychiatric care     Impaired fasting glucose 2015    Lumbago     Lung nodule 2022    Major depressive disorder 10/27/2016    Malignant neoplasm of ascending colon 2017    Melena     Multiple myeloma     Nicotine dependence 05/10/2017    NICK (obstructive sleep apnea) 2023    Oxygen dependent     REPORTS USES 02 AT 2LPM VIA N/C DURING DAY AND 3LPM AT NIGHT . CAN GET VERY SOA WITH MINIMAL EXERTION    Pancreatitis 2024    Panic disorder     Primary central sleep apnea     Renal insufficiency 2021    Seizures     PSEUDO SEIZURES LAST ONE AROUND 2024    Shortness of breath     CAN GET VERY SOA WITH MINIMAL EXERTION    Tobacco use 2021    QUIT SMOKING 2022    Visit for screening mammogram 2020    NORMAL- REPEAT IN ONE YEAR    Vitamin D deficiency        Past Psychiatric History:  Began Treatment: decades  Diagnoses: MDD, CHAI  Psychiatrist: Vitor for 20 years  Therapist:Denies  Admission History: LT 21 years ago for 2 weeks for depression (?)  Medication Trials:    Zoloft  pristiq    Self Harm: Denies  Suicide Attempts:Denies   Psychosis, Anxiety, Depression: denies    Substance Abuse History:   Types:Denies all, including illicit, quit smoking 2 years ago  Withdrawal Symptoms:Denies  Longest Period Sober:Not Applicable   AA: Not applicable     Social History:  Martial Status:  Employed:No  Kids:Yes  House:Lives in a house   History: Denies    Social History     Socioeconomic History    Marital status:    Tobacco Use    Smoking status: Former     Current packs/day: 0.00     Average packs/day: 1 pack/day for 47.4 years (47.4 ttl pk-yrs)     Types: Cigarettes     Start date: 1975     Quit date: 6/3/2022     Years since quittin.7     Passive exposure: Past    Smokeless tobacco: Never   Vaping Use    Vaping status: Never Used   Substance and Sexual Activity    Alcohol use: Never    Drug use:  "Never    Sexual activity: Not Currently     Partners: Male     Birth control/protection: Hysterectomy       Family History:   Suicide Attempts: Denies  Suicide Completions:Denies      Family History   Problem Relation Age of Onset    Heart disease Mother     Lung cancer Mother     Cancer Mother     Stroke Father     Heart disease Father     Kidney cancer Father     Cancer Father     Stroke Other         UNCLE/AUNT    Colon cancer Neg Hx     Malig Hyperthermia Neg Hx        Developmental History:       Childhood: Denies Abuse  High School:Completed  College: 2.5 years    Mental Status Exam  Appearance  : groomed, good eye contact, normal street clothes  Behavior  : pleasant and cooperative  Motor  : bilateral tremor, wheelchair before, but using rollator today  Speech  :normal rhythm, rate, volume, tone, not hyperverbal, not pressured, normal prosidy  Mood  : \"It's one thing after another\"  Affect  : mild depression, SOA, mood congruent, good variability  Thought Content  : negative suicidal ideations, negative homicidal ideations, negative obsessions  Perceptions  : negative auditory hallucinations, negative visual hallucinations  Thought Process  : linear  Insight/Judgement  : Fair/fair  Cognition  : grossly intact  Attention   : intact      Review of Systems:  Review of Systems   Constitutional:  Positive for fatigue.   HENT:  Negative for drooling.    Eyes:  Positive for visual disturbance.   Respiratory:  Positive for cough and shortness of breath.    Cardiovascular:  Negative for chest pain, palpitations and leg swelling.   Gastrointestinal:  Negative for nausea and vomiting.   Endocrine: Negative for cold intolerance and heat intolerance.   Genitourinary:  Negative for difficulty urinating.   Musculoskeletal:  Negative for joint swelling.   Allergic/Immunologic: Positive for immunocompromised state.   Neurological:  Positive for dizziness. Negative for seizures, speech difficulty and numbness. " "  Psychiatric/Behavioral:  Positive for confusion.        Physical Exam:  Physical Exam    Vital Signs:   Ht 154.9 cm (61\")   Wt 84.2 kg (185 lb 9.6 oz)   BMI 35.07 kg/m²      Lab Results:   Hospital Outpatient Visit on 02/12/2025   Component Date Value Ref Range Status    Total Protein 02/12/2025 5.1 (L)  6.0 - 8.5 g/dL Final    Albumin 02/12/2025 2.9  2.9 - 4.4 g/dL Final    Alpha-1-Globulin 02/12/2025 0.2  0.0 - 0.4 g/dL Final    Alpha-2-Globulin 02/12/2025 0.6  0.4 - 1.0 g/dL Final    Beta Globulin 02/12/2025 0.9  0.7 - 1.3 g/dL Final    Gamma Globulin 02/12/2025 0.4  0.4 - 1.8 g/dL Final    M-Sahil 02/12/2025 0.3 (H)  Not Observed g/dL Final    Globulin 02/12/2025 2.2  2.2 - 3.9 g/dL Final    A/G Ratio 02/12/2025 1.3  0.7 - 1.7 Final    Please note 02/12/2025 Comment   Final    Protein electrophoresis scan will follow via computer, mail, or   delivery.    SPE Interpretation 02/12/2025 Comment   Final    The SPE pattern demonstrates a single peak (M-spike) in the gamma  region which may represent monoclonal protein. This peak may also be  caused by circulating immune complexes, cryoglobulins, C-reactive  protein, fibrinogen or hemolysis.  If clinically indicated, the  presence of a monoclonal gammopathy may be confirmed by immuno-  fixation, as well as an evaluation of the urine for the presence of  Bence-Garrido protein.   Office Visit on 01/27/2025   Component Date Value Ref Range Status    Respiratory Syncytial Virus 01/27/2025 not detected   Final    Internal Control 01/27/2025 Passed  Passed Final    Lot Number 01/27/2025 [id number]   Final    Expiration Date 01/27/2025 12/09/2025   Final    SARS Antigen 01/27/2025 Not Detected  Not Detected, Presumptive Negative Final    Influenza A Antigen NILESH 01/27/2025 Not Detected  Not Detected Final    Influenza B Antigen NILESH 01/27/2025 Not Detected  Not Detected Final    Internal Control 01/27/2025 Passed  Passed Final    Lot Number 01/27/2025 [id number]   Final "    Expiration Date 01/27/2025 10/23/2025   Final   Hospital Outpatient Visit on 01/22/2025   Component Date Value Ref Range Status    Glucose 01/22/2025 182 (H)  65 - 99 mg/dL Final    BUN 01/22/2025 12  8 - 23 mg/dL Final    Creatinine 01/22/2025 0.73  0.57 - 1.00 mg/dL Final    Sodium 01/22/2025 141  136 - 145 mmol/L Final    Potassium 01/22/2025 3.6  3.5 - 5.2 mmol/L Final    Chloride 01/22/2025 109 (H)  98 - 107 mmol/L Final    CO2 01/22/2025 18.7 (L)  22.0 - 29.0 mmol/L Final    Calcium 01/22/2025 8.7  8.6 - 10.5 mg/dL Final    Total Protein 01/22/2025 5.8 (L)  6.0 - 8.5 g/dL Final    Albumin 01/22/2025 3.6  3.5 - 5.2 g/dL Final    ALT (SGPT) 01/22/2025 9  1 - 33 U/L Final    AST (SGOT) 01/22/2025 8  1 - 32 U/L Final    Alkaline Phosphatase 01/22/2025 76  39 - 117 U/L Final    Total Bilirubin 01/22/2025 0.5  0.0 - 1.2 mg/dL Final    Globulin 01/22/2025 2.2  gm/dL Final    A/G Ratio 01/22/2025 1.6  g/dL Final    BUN/Creatinine Ratio 01/22/2025 16.4  7.0 - 25.0 Final    Anion Gap 01/22/2025 13.3  5.0 - 15.0 mmol/L Final    eGFR 01/22/2025 85.9  >60.0 mL/min/1.73 Final    Magnesium 01/22/2025 1.8  1.6 - 2.4 mg/dL Final    Phosphorus 01/22/2025 2.0 (L)  2.5 - 4.5 mg/dL Final    WBC 01/22/2025 9.49  3.40 - 10.80 10*3/mm3 Final    RBC 01/22/2025 3.11 (L)  3.77 - 5.28 10*6/mm3 Final    Hemoglobin 01/22/2025 10.3 (L)  12.0 - 15.9 g/dL Final    Hematocrit 01/22/2025 32.5 (L)  34.0 - 46.6 % Final    MCV 01/22/2025 104.5 (H)  79.0 - 97.0 fL Final    MCH 01/22/2025 33.1 (H)  26.6 - 33.0 pg Final    MCHC 01/22/2025 31.7  31.5 - 35.7 g/dL Final    RDW 01/22/2025 17.7 (H)  12.3 - 15.4 % Final    RDW-SD 01/22/2025 68.1 (H)  37.0 - 54.0 fl Final    MPV 01/22/2025 9.3  6.0 - 12.0 fL Final    Platelets 01/22/2025 495 (H)  140 - 450 10*3/mm3 Final    Neutrophil % 01/22/2025 69.0  42.7 - 76.0 % Final    Lymphocyte % 01/22/2025 20.3  19.6 - 45.3 % Final    Monocyte % 01/22/2025 8.7  5.0 - 12.0 % Final    Eosinophil % 01/22/2025 0.0  (L)  0.3 - 6.2 % Final    Basophil % 01/22/2025 1.1  0.0 - 1.5 % Final    Immature Grans % 01/22/2025 0.9 (H)  0.0 - 0.5 % Final    Neutrophils, Absolute 01/22/2025 6.54  1.70 - 7.00 10*3/mm3 Final    Lymphocytes, Absolute 01/22/2025 1.93  0.70 - 3.10 10*3/mm3 Final    Monocytes, Absolute 01/22/2025 0.83  0.10 - 0.90 10*3/mm3 Final    Eosinophils, Absolute 01/22/2025 0.00  0.00 - 0.40 10*3/mm3 Final    Basophils, Absolute 01/22/2025 0.10  0.00 - 0.20 10*3/mm3 Final    Immature Grans, Absolute 01/22/2025 0.09 (H)  0.00 - 0.05 10*3/mm3 Final    nRBC 01/22/2025 0.0  0.0 - 0.2 /100 WBC Final   Hospital Outpatient Visit on 12/26/2024   Component Date Value Ref Range Status    Glucose 12/26/2024 226 (H)  65 - 99 mg/dL Final    BUN 12/26/2024 13  8 - 23 mg/dL Final    Creatinine 12/26/2024 0.94  0.57 - 1.00 mg/dL Final    Sodium 12/26/2024 138  136 - 145 mmol/L Final    Potassium 12/26/2024 4.2  3.5 - 5.2 mmol/L Final    Chloride 12/26/2024 104  98 - 107 mmol/L Final    CO2 12/26/2024 14.6 (L)  22.0 - 29.0 mmol/L Final    Calcium 12/26/2024 9.0  8.6 - 10.5 mg/dL Final    Total Protein 12/26/2024 6.3  6.0 - 8.5 g/dL Final    Albumin 12/26/2024 4.1  3.5 - 5.2 g/dL Final    ALT (SGPT) 12/26/2024 14  1 - 33 U/L Final    AST (SGOT) 12/26/2024 11  1 - 32 U/L Final    Alkaline Phosphatase 12/26/2024 91  39 - 117 U/L Final    Total Bilirubin 12/26/2024 0.7  0.0 - 1.2 mg/dL Final    Globulin 12/26/2024 2.2  gm/dL Final    A/G Ratio 12/26/2024 1.9  g/dL Final    BUN/Creatinine Ratio 12/26/2024 13.8  7.0 - 25.0 Final    Anion Gap 12/26/2024 19.4 (H)  5.0 - 15.0 mmol/L Final    eGFR 12/26/2024 63.4  >60.0 mL/min/1.73 Final    WBC 12/26/2024 5.42  3.40 - 10.80 10*3/mm3 Final    RBC 12/26/2024 3.72 (L)  3.77 - 5.28 10*6/mm3 Final    Hemoglobin 12/26/2024 12.0  12.0 - 15.9 g/dL Final    Hematocrit 12/26/2024 37.3  34.0 - 46.6 % Final    MCV 12/26/2024 100.3 (H)  79.0 - 97.0 fL Final    MCH 12/26/2024 32.3  26.6 - 33.0 pg Final    MCHC  12/26/2024 32.2  31.5 - 35.7 g/dL Final    RDW 12/26/2024 17.7 (H)  12.3 - 15.4 % Final    RDW-SD 12/26/2024 66.4 (H)  37.0 - 54.0 fl Final    MPV 12/26/2024 9.1  6.0 - 12.0 fL Final    Platelets 12/26/2024 350  140 - 450 10*3/mm3 Final    Neutrophil % 12/26/2024 68.1  42.7 - 76.0 % Final    Lymphocyte % 12/26/2024 24.9  19.6 - 45.3 % Final    Monocyte % 12/26/2024 3.0 (L)  5.0 - 12.0 % Final    Eosinophil % 12/26/2024 2.0  0.3 - 6.2 % Final    Basophil % 12/26/2024 1.1  0.0 - 1.5 % Final    Immature Grans % 12/26/2024 0.9 (H)  0.0 - 0.5 % Final    Neutrophils, Absolute 12/26/2024 3.69  1.70 - 7.00 10*3/mm3 Final    Lymphocytes, Absolute 12/26/2024 1.35  0.70 - 3.10 10*3/mm3 Final    Monocytes, Absolute 12/26/2024 0.16  0.10 - 0.90 10*3/mm3 Final    Eosinophils, Absolute 12/26/2024 0.11  0.00 - 0.40 10*3/mm3 Final    Basophils, Absolute 12/26/2024 0.06  0.00 - 0.20 10*3/mm3 Final    Immature Grans, Absolute 12/26/2024 0.05  0.00 - 0.05 10*3/mm3 Final    nRBC 12/26/2024 0.0  0.0 - 0.2 /100 WBC Final   Hospital Outpatient Visit on 11/27/2024   Component Date Value Ref Range Status    Glucose 11/27/2024 142 (H)  65 - 99 mg/dL Final    BUN 11/27/2024 15  8 - 23 mg/dL Final    Creatinine 11/27/2024 0.72  0.57 - 1.00 mg/dL Final    Sodium 11/27/2024 141  136 - 145 mmol/L Final    Potassium 11/27/2024 3.7  3.5 - 5.2 mmol/L Final    Chloride 11/27/2024 109 (H)  98 - 107 mmol/L Final    CO2 11/27/2024 18.3 (L)  22.0 - 29.0 mmol/L Final    Calcium 11/27/2024 9.3  8.6 - 10.5 mg/dL Final    Total Protein 11/27/2024 6.1  6.0 - 8.5 g/dL Final    Albumin 11/27/2024 4.0  3.5 - 5.2 g/dL Final    ALT (SGPT) 11/27/2024 8  1 - 33 U/L Final    AST (SGOT) 11/27/2024 10  1 - 32 U/L Final    Alkaline Phosphatase 11/27/2024 66  39 - 117 U/L Final    Total Bilirubin 11/27/2024 0.7  0.0 - 1.2 mg/dL Final    Globulin 11/27/2024 2.1  gm/dL Final    A/G Ratio 11/27/2024 1.9  g/dL Final    BUN/Creatinine Ratio 11/27/2024 20.8  7.0 - 25.0 Final     Anion Gap 11/27/2024 13.7  5.0 - 15.0 mmol/L Final    eGFR 11/27/2024 87.3  >60.0 mL/min/1.73 Final    WBC 11/27/2024 9.73  3.40 - 10.80 10*3/mm3 Final    RBC 11/27/2024 3.66 (L)  3.77 - 5.28 10*6/mm3 Final    Hemoglobin 11/27/2024 11.3 (L)  12.0 - 15.9 g/dL Final    Hematocrit 11/27/2024 35.4  34.0 - 46.6 % Final    MCV 11/27/2024 96.7  79.0 - 97.0 fL Final    MCH 11/27/2024 30.9  26.6 - 33.0 pg Final    MCHC 11/27/2024 31.9  31.5 - 35.7 g/dL Final    RDW 11/27/2024 18.6 (H)  12.3 - 15.4 % Final    RDW-SD 11/27/2024 65.3 (H)  37.0 - 54.0 fl Final    MPV 11/27/2024 9.2  6.0 - 12.0 fL Final    Platelets 11/27/2024 361  140 - 450 10*3/mm3 Final    Neutrophil % 11/27/2024 33.1 (L)  42.7 - 76.0 % Final    Lymphocyte % 11/27/2024 46.0 (H)  19.6 - 45.3 % Final    Monocyte % 11/27/2024 14.2 (H)  5.0 - 12.0 % Final    Eosinophil % 11/27/2024 5.2  0.3 - 6.2 % Final    Basophil % 11/27/2024 0.7  0.0 - 1.5 % Final    Immature Grans % 11/27/2024 0.8 (H)  0.0 - 0.5 % Final    Neutrophils, Absolute 11/27/2024 3.21  1.70 - 7.00 10*3/mm3 Final    Lymphocytes, Absolute 11/27/2024 4.48 (H)  0.70 - 3.10 10*3/mm3 Final    Monocytes, Absolute 11/27/2024 1.38 (H)  0.10 - 0.90 10*3/mm3 Final    Eosinophils, Absolute 11/27/2024 0.51 (H)  0.00 - 0.40 10*3/mm3 Final    Basophils, Absolute 11/27/2024 0.07  0.00 - 0.20 10*3/mm3 Final    Immature Grans, Absolute 11/27/2024 0.08 (H)  0.00 - 0.05 10*3/mm3 Final    nRBC 11/27/2024 0.2  0.0 - 0.2 /100 WBC Final   Lab on 11/05/2024   Component Date Value Ref Range Status    Glucose 11/05/2024 152 (H)  65 - 99 mg/dL Final    BUN 11/05/2024 14  8 - 23 mg/dL Final    Creatinine 11/05/2024 1.07 (H)  0.57 - 1.00 mg/dL Final    Sodium 11/05/2024 143  136 - 145 mmol/L Final    Potassium 11/05/2024 4.4  3.5 - 5.2 mmol/L Final    Specimen hemolyzed.  Result may be falsely elevated.    Chloride 11/05/2024 109 (H)  98 - 107 mmol/L Final    CO2 11/05/2024 17.9 (L)  22.0 - 29.0 mmol/L Final    Calcium 11/05/2024  9.7  8.6 - 10.5 mg/dL Final    BUN/Creatinine Ratio 11/05/2024 13.1  7.0 - 25.0 Final    Anion Gap 11/05/2024 16.1 (H)  5.0 - 15.0 mmol/L Final    eGFR 11/05/2024 54.3 (L)  >60.0 mL/min/1.73 Final    WBC 11/05/2024 13.34 (H)  3.40 - 10.80 10*3/mm3 Final    RBC 11/05/2024 4.67  3.77 - 5.28 10*6/mm3 Final    Hemoglobin 11/05/2024 14.3  12.0 - 15.9 g/dL Final    Hematocrit 11/05/2024 45.3  34.0 - 46.6 % Final    MCV 11/05/2024 97.0  79.0 - 97.0 fL Final    MCH 11/05/2024 30.6  26.6 - 33.0 pg Final    MCHC 11/05/2024 31.6  31.5 - 35.7 g/dL Final    RDW 11/05/2024 14.1  12.3 - 15.4 % Final    RDW-SD 11/05/2024 50.3  37.0 - 54.0 fl Final    MPV 11/05/2024 10.9  6.0 - 12.0 fL Final    Platelets 11/05/2024 230  140 - 450 10*3/mm3 Final    Neutrophil % 11/05/2024 51.6  42.7 - 76.0 % Final    Lymphocyte % 11/05/2024 37.9  19.6 - 45.3 % Final    Monocyte % 11/05/2024 5.3  5.0 - 12.0 % Final    Eosinophil % 11/05/2024 3.2  0.3 - 6.2 % Final    Basophil % 11/05/2024 0.0  0.0 - 1.5 % Final    Myelocyte % 11/05/2024 1.1 (H)  0.0 - 0.0 % Final    Atypical Lymphocyte % 11/05/2024 1.1  0.0 - 5.0 % Final    Neutrophils Absolute 11/05/2024 6.88  1.70 - 7.00 10*3/mm3 Final    Lymphocytes Absolute 11/05/2024 5.20 (H)  0.70 - 3.10 10*3/mm3 Final    Monocytes Absolute 11/05/2024 0.71  0.10 - 0.90 10*3/mm3 Final    Eosinophils Absolute 11/05/2024 0.43 (H)  0.00 - 0.40 10*3/mm3 Final    Basophils Absolute 11/05/2024 0.00  0.00 - 0.20 10*3/mm3 Final    Anisocytosis 11/05/2024 Slight/1+  None Seen Final    Macrocytes 11/05/2024 Slight/1+  None Seen Final    Poikilocytes 11/05/2024 Slight/1+  None Seen Final    WBC Morphology 11/05/2024 Normal  Normal Final    Platelet Morphology 11/05/2024 Normal  Normal Final   Hospital Outpatient Visit on 10/30/2024   Component Date Value Ref Range Status    Glucose 10/30/2024 122 (H)  65 - 99 mg/dL Final    BUN 10/30/2024 9  8 - 23 mg/dL Final    Creatinine 10/30/2024 0.72  0.57 - 1.00 mg/dL Final    Sodium  10/30/2024 138  136 - 145 mmol/L Final    Potassium 10/30/2024 4.1  3.5 - 5.2 mmol/L Final    Chloride 10/30/2024 105  98 - 107 mmol/L Final    CO2 10/30/2024 22.8  22.0 - 29.0 mmol/L Final    Calcium 10/30/2024 9.4  8.6 - 10.5 mg/dL Final    Total Protein 10/30/2024 6.3  6.0 - 8.5 g/dL Final    Albumin 10/30/2024 4.1  3.5 - 5.2 g/dL Final    ALT (SGPT) 10/30/2024 11  1 - 33 U/L Final    AST (SGOT) 10/30/2024 12  1 - 32 U/L Final    Alkaline Phosphatase 10/30/2024 90  39 - 117 U/L Final    Total Bilirubin 10/30/2024 0.3  0.0 - 1.2 mg/dL Final    Globulin 10/30/2024 2.2  gm/dL Final    A/G Ratio 10/30/2024 1.9  g/dL Final    BUN/Creatinine Ratio 10/30/2024 12.5  7.0 - 25.0 Final    Anion Gap 10/30/2024 10.2  5.0 - 15.0 mmol/L Final    eGFR 10/30/2024 87.3  >60.0 mL/min/1.73 Final    CARL and PE, Serum 10/30/2024 Comment (A)   Final    Immunofixation shows IgG monoclonal protein with kappa light chain  specificity.  PLEASE NOTE:       This sample has been treated to eliminate IgG Kappa interference       from monoclonal therapy by DARZALEX(R) (daratumumab). Any       remaining IgG Kappa, if present, is due to the patient's own       immune response.    IgG 10/30/2024 701  586 - 1602 mg/dL Final    IgA 10/30/2024 14 (L)  64 - 422 mg/dL Final    Result confirmed on concentration.    IgM 10/30/2024 24 (L)  26 - 217 mg/dL Final    Result confirmed on concentration.    Total Protein 10/30/2024 6.3  6.0 - 8.5 g/dL Final    Albumin 10/30/2024 3.6  2.9 - 4.4 g/dL Final    Alpha-1-Globulin 10/30/2024 0.2  0.0 - 0.4 g/dL Final    Alpha-2-Globulin 10/30/2024 0.8  0.4 - 1.0 g/dL Final    Beta Globulin 10/30/2024 1.0  0.7 - 1.3 g/dL Final    Gamma Globulin 10/30/2024 0.7  0.4 - 1.8 g/dL Final    M-Sahil 10/30/2024 0.5 (H)  Not Observed g/dL Final    Globulin 10/30/2024 2.7  2.2 - 3.9 g/dL Final    A/G Ratio 10/30/2024 1.3  0.7 - 1.7 Final    Please note 10/30/2024 Comment   Final    Protein electrophoresis scan will follow via  computer, mail, or   delivery.    Free Light Chain, [place] 10/30/2024 8.9  3.3 - 19.4 mg/L Final    Free Lambda Light Chains 10/30/2024 2.0 (L)  5.7 - 26.3 mg/L Final    Kappa/Lambda Ratio 10/30/2024 4.45 (H)  0.26 - 1.65 Final    WBC 10/30/2024 16.12 (H)  3.40 - 10.80 10*3/mm3 Final    RBC 10/30/2024 4.60  3.77 - 5.28 10*6/mm3 Final    Hemoglobin 10/30/2024 14.4  12.0 - 15.9 g/dL Final    Hematocrit 10/30/2024 44.6  34.0 - 46.6 % Final    MCV 10/30/2024 97.0  79.0 - 97.0 fL Final    MCH 10/30/2024 31.3  26.6 - 33.0 pg Final    MCHC 10/30/2024 32.3  31.5 - 35.7 g/dL Final    RDW 10/30/2024 15.0  12.3 - 15.4 % Final    RDW-SD 10/30/2024 53.8  37.0 - 54.0 fl Final    MPV 10/30/2024 9.3  6.0 - 12.0 fL Final    Platelets 10/30/2024 246  140 - 450 10*3/mm3 Final    Neutrophil % 10/30/2024 38.2 (L)  42.7 - 76.0 % Final    Lymphocyte % 10/30/2024 54.7 (H)  19.6 - 45.3 % Final    Monocyte % 10/30/2024 5.2  5.0 - 12.0 % Final    Eosinophil % 10/30/2024 1.5  0.3 - 6.2 % Final    Basophil % 10/30/2024 0.2  0.0 - 1.5 % Final    Immature Grans % 10/30/2024 0.2  0.0 - 0.5 % Final    Neutrophils, Absolute 10/30/2024 6.15  1.70 - 7.00 10*3/mm3 Final    Lymphocytes, Absolute 10/30/2024 8.82 (H)  0.70 - 3.10 10*3/mm3 Final    Monocytes, Absolute 10/30/2024 0.84  0.10 - 0.90 10*3/mm3 Final    Eosinophils, Absolute 10/30/2024 0.24  0.00 - 0.40 10*3/mm3 Final    Basophils, Absolute 10/30/2024 0.03  0.00 - 0.20 10*3/mm3 Final    Immature Grans, Absolute 10/30/2024 0.04  0.00 - 0.05 10*3/mm3 Final    Neutrophil % 10/30/2024 52.0  42.7 - 76.0 % Final    Lymphocyte % 10/30/2024 45.0  19.6 - 45.3 % Final    Monocyte % 10/30/2024 1.0 (L)  5.0 - 12.0 % Final    Bands %  10/30/2024 1.0  0.0 - 5.0 % Final    Myelocyte % 10/30/2024 1.0 (H)  0.0 - 0.0 % Final    Neutrophils Absolute 10/30/2024 8.54 (H)  1.70 - 7.00 10*3/mm3 Final    Lymphocytes Absolute 10/30/2024 7.25 (H)  0.70 - 3.10 10*3/mm3 Final    Monocytes Absolute 10/30/2024 0.16   0.10 - 0.90 10*3/mm3 Final    RBC Morphology 10/30/2024 Normal  Normal Final    WBC Morphology 10/30/2024 Normal  Normal Final    Platelet Estimate 10/30/2024 Adequate  Normal Final    Clumped Platelets 10/30/2024 Present  None Seen Final   Admission on 10/04/2024, Discharged on 10/04/2024   Component Date Value Ref Range Status    Glucose 10/04/2024 147 (H)  65 - 99 mg/dL Final    BUN 10/04/2024 7 (L)  8 - 23 mg/dL Final    Creatinine 10/04/2024 0.74  0.57 - 1.00 mg/dL Final    Sodium 10/04/2024 138  136 - 145 mmol/L Final    Potassium 10/04/2024 3.5  3.5 - 5.2 mmol/L Final    Chloride 10/04/2024 105  98 - 107 mmol/L Final    CO2 10/04/2024 19.6 (L)  22.0 - 29.0 mmol/L Final    Calcium 10/04/2024 8.4 (L)  8.6 - 10.5 mg/dL Final    Total Protein 10/04/2024 5.6 (L)  6.0 - 8.5 g/dL Final    Albumin 10/04/2024 3.1 (L)  3.5 - 5.2 g/dL Final    ALT (SGPT) 10/04/2024 9  1 - 33 U/L Final    AST (SGOT) 10/04/2024 8  1 - 32 U/L Final    Alkaline Phosphatase 10/04/2024 77  39 - 117 U/L Final    Total Bilirubin 10/04/2024 0.7  0.0 - 1.2 mg/dL Final    Globulin 10/04/2024 2.5  gm/dL Final    A/G Ratio 10/04/2024 1.2  g/dL Final    BUN/Creatinine Ratio 10/04/2024 9.5  7.0 - 25.0 Final    Anion Gap 10/04/2024 13.4  5.0 - 15.0 mmol/L Final    eGFR 10/04/2024 84.5  >60.0 mL/min/1.73 Final    Lipase 10/04/2024 19  13 - 60 U/L Final    Color, UA 10/04/2024 Yellow  Yellow, Straw Final    Appearance, UA 10/04/2024 Clear  Clear Final    pH, UA 10/04/2024 5.5  5.0 - 8.0 Final    Specific Gravity, UA 10/04/2024 1.024  1.005 - 1.030 Final    Glucose, UA 10/04/2024 Negative  Negative Final    Ketones, UA 10/04/2024 Trace (A)  Negative Final    Bilirubin, UA 10/04/2024 Negative  Negative Final    Blood, UA 10/04/2024 Negative  Negative Final    Protein, UA 10/04/2024 Trace (A)  Negative Final    Leuk Esterase, UA 10/04/2024 Negative  Negative Final    Nitrite, UA 10/04/2024 Negative  Negative Final    Urobilinogen, UA 10/04/2024 0.2 E.U./dL   0.2 - 1.0 E.U./dL Final    Lactate 10/04/2024 1.3  0.5 - 2.0 mmol/L Final    Extra Tube 10/04/2024 Hold for add-ons.   Final    Auto resulted.    Extra Tube 10/04/2024 hold for add-on   Final    Auto resulted    Extra Tube 10/04/2024 Hold for add-ons.   Final    Auto resulted.    Extra Tube 10/04/2024 Hold for add-ons.   Final    Auto resulted    WBC 10/04/2024 11.73 (H)  3.40 - 10.80 10*3/mm3 Final    RBC 10/04/2024 3.60 (L)  3.77 - 5.28 10*6/mm3 Final    Hemoglobin 10/04/2024 11.6 (L)  12.0 - 15.9 g/dL Final    Hematocrit 10/04/2024 35.4  34.0 - 46.6 % Final    MCV 10/04/2024 98.3 (H)  79.0 - 97.0 fL Final    MCH 10/04/2024 32.2  26.6 - 33.0 pg Final    MCHC 10/04/2024 32.8  31.5 - 35.7 g/dL Final    RDW 10/04/2024 15.7 (H)  12.3 - 15.4 % Final    RDW-SD 10/04/2024 57.4 (H)  37.0 - 54.0 fl Final    MPV 10/04/2024 9.0  6.0 - 12.0 fL Final    Platelets 10/04/2024 334  140 - 450 10*3/mm3 Final    Neutrophil % 10/04/2024 48.8  42.7 - 76.0 % Final    Lymphocyte % 10/04/2024 35.5  19.6 - 45.3 % Final    Monocyte % 10/04/2024 13.0 (H)  5.0 - 12.0 % Final    Eosinophil % 10/04/2024 1.4  0.3 - 6.2 % Final    Basophil % 10/04/2024 1.0  0.0 - 1.5 % Final    Immature Grans % 10/04/2024 0.3  0.0 - 0.5 % Final    Neutrophils, Absolute 10/04/2024 5.71  1.70 - 7.00 10*3/mm3 Final    Lymphocytes, Absolute 10/04/2024 4.17 (H)  0.70 - 3.10 10*3/mm3 Final    Monocytes, Absolute 10/04/2024 1.53 (H)  0.10 - 0.90 10*3/mm3 Final    Eosinophils, Absolute 10/04/2024 0.16  0.00 - 0.40 10*3/mm3 Final    Basophils, Absolute 10/04/2024 0.12  0.00 - 0.20 10*3/mm3 Final    Immature Grans, Absolute 10/04/2024 0.04  0.00 - 0.05 10*3/mm3 Final    nRBC 10/04/2024 0.0  0.0 - 0.2 /100 WBC Final   Office Visit on 09/23/2024   Component Date Value Ref Range Status    Color 09/23/2024 Dark Yellow  Yellow, Straw, Dark Yellow, Adriana Final    Clarity, UA 09/23/2024 Turbid (A)  Clear Final    Specific Gravity  09/23/2024 1.030  1.005 - 1.030 Final    pH,  Urine 09/23/2024 5.5  5.0 - 8.0 Final    Leukocytes 09/23/2024 Small (1+) (A)  Negative Final    Nitrite, UA 09/23/2024 Negative  Negative Final    Protein, POC 09/23/2024 Negative  Negative mg/dL Final    Glucose, UA 09/23/2024 Negative  Negative mg/dL Final    Ketones, UA 09/23/2024 Negative  Negative Final    Urobilinogen, UA 09/23/2024 0.2 E.U./dL  Normal, 0.2 E.U./dL Final    Bilirubin 09/23/2024 Small (1+) (A)  Negative Final    Blood, UA 09/23/2024 Negative  Negative Final    Lot Number 09/23/2024 [id number]   Final    Expiration Date 09/23/2024 02/26/2025   Final    Urine Culture 09/23/2024 50,000 CFU/mL Mixed Glo Isolated   Final   Hospital Outpatient Visit on 09/11/2024   Component Date Value Ref Range Status    Glucose 09/11/2024 219 (H)  65 - 99 mg/dL Final    BUN 09/11/2024 20  8 - 23 mg/dL Final    Creatinine 09/11/2024 1.00  0.57 - 1.00 mg/dL Final    Sodium 09/11/2024 139  136 - 145 mmol/L Final    Potassium 09/11/2024 4.0  3.5 - 5.2 mmol/L Final    Chloride 09/11/2024 107  98 - 107 mmol/L Final    CO2 09/11/2024 22.3  22.0 - 29.0 mmol/L Final    Calcium 09/11/2024 9.6  8.6 - 10.5 mg/dL Final    Total Protein 09/11/2024 6.2  6.0 - 8.5 g/dL Final    Albumin 09/11/2024 4.1  3.5 - 5.2 g/dL Final    ALT (SGPT) 09/11/2024 13  1 - 33 U/L Final    AST (SGOT) 09/11/2024 9  1 - 32 U/L Final    Alkaline Phosphatase 09/11/2024 83  39 - 117 U/L Final    Total Bilirubin 09/11/2024 0.2  0.0 - 1.2 mg/dL Final    Globulin 09/11/2024 2.1  gm/dL Final    A/G Ratio 09/11/2024 2.0  g/dL Final    BUN/Creatinine Ratio 09/11/2024 20.0  7.0 - 25.0 Final    Anion Gap 09/11/2024 9.7  5.0 - 15.0 mmol/L Final    eGFR 09/11/2024 59.2 (L)  >60.0 mL/min/1.73 Final    TSH 09/11/2024 0.684  0.270 - 4.200 uIU/mL Final    T Uptake 09/11/2024 1.08  0.80 - 1.30 TBI Final    T4, Total 09/11/2024 8.53  4.50 - 11.70 mcg/dL Final    T4 results may be falsely increased if patient taking Biotin.    WBC 09/11/2024 18.53 (H)  3.40 - 10.80  10*3/mm3 Final    RBC 09/11/2024 3.94  3.77 - 5.28 10*6/mm3 Final    Hemoglobin 09/11/2024 13.1  12.0 - 15.9 g/dL Final    Hematocrit 09/11/2024 39.5  34.0 - 46.6 % Final    MCV 09/11/2024 100.3 (H)  79.0 - 97.0 fL Final    MCH 09/11/2024 33.2 (H)  26.6 - 33.0 pg Final    MCHC 09/11/2024 33.2  31.5 - 35.7 g/dL Final    RDW 09/11/2024 15.7 (H)  12.3 - 15.4 % Final    RDW-SD 09/11/2024 59.1 (H)  37.0 - 54.0 fl Final    MPV 09/11/2024 9.3  6.0 - 12.0 fL Final    Platelets 09/11/2024 291  140 - 450 10*3/mm3 Final    Neutrophil % 09/11/2024 71.8  42.7 - 76.0 % Final    Lymphocyte % 09/11/2024 23.9  19.6 - 45.3 % Final    Monocyte % 09/11/2024 3.8 (L)  5.0 - 12.0 % Final    Eosinophil % 09/11/2024 0.0 (L)  0.3 - 6.2 % Final    Basophil % 09/11/2024 0.0  0.0 - 1.5 % Final    Immature Grans % 09/11/2024 0.5  0.0 - 0.5 % Final    Neutrophils, Absolute 09/11/2024 13.29 (H)  1.70 - 7.00 10*3/mm3 Final    Lymphocytes, Absolute 09/11/2024 4.43 (H)  0.70 - 3.10 10*3/mm3 Final    Monocytes, Absolute 09/11/2024 0.71  0.10 - 0.90 10*3/mm3 Final    Eosinophils, Absolute 09/11/2024 0.00  0.00 - 0.40 10*3/mm3 Final    Basophils, Absolute 09/11/2024 0.00  0.00 - 0.20 10*3/mm3 Final    Immature Grans, Absolute 09/11/2024 0.10 (H)  0.00 - 0.05 10*3/mm3 Final   There may be more visits with results that are not included.       EKG Results:  No orders to display       Imaging Results:  MRI Brain With & Without Contrast    Result Date: 5/3/2024   1. White matter changes most compatible with small vessel ischemic disease in this age group.  2. No abnormal enhancement on postcontrast imaging within the brain parenchyma.  3. Prominent bilateral mastoid effusions left greater than right. Please correlate for mastoiditis  3. Mild paranasal sinus disease     Electronically Signed By-Raman Tran On:5/3/2024 9:22 AM      XR Chest 2 View    Result Date: 4/15/2024  Impression: 1.  No acute cardiopulmonary disease.   Electronically Signed  By-Bryan Schultz MD On:4/15/2024 2:57 PM      CT Chest Low Dose Cancer Screening WO    Result Date: 3/1/2024    1. No evidence of lung cancer. 2. Stable bilateral pulmonary nodules, likely benign. 3. Mild bibasilar atelectasis. 4. Moderate emphysema.  LUNG RADS:                           2 (benign appearance, less than 1% chance of malignancy)     YENIFER HANNA MD       Electronically Signed and Approved By: YENIFER HANNA MD on 3/01/2024 at 11:02                 Assessment & Plan   Diagnoses and all orders for this visit:    1. Generalized anxiety disorder (Primary)  -     busPIRone (BUSPAR) 15 MG tablet; Take 1 tablet by mouth 3 (Three) Times a Day.  Dispense: 270 tablet; Refill: 1  -     FLUoxetine (PROzac) 40 MG capsule; Take 1 capsule by mouth Daily.  Dispense: 90 capsule; Refill: 1    2. Moderate episode of recurrent major depressive disorder  -     busPIRone (BUSPAR) 15 MG tablet; Take 1 tablet by mouth 3 (Three) Times a Day.  Dispense: 270 tablet; Refill: 1  -     FLUoxetine (PROzac) 40 MG capsule; Take 1 capsule by mouth Daily.  Dispense: 90 capsule; Refill: 1    3. Insomnia due to mental condition  -     busPIRone (BUSPAR) 15 MG tablet; Take 1 tablet by mouth 3 (Three) Times a Day.  Dispense: 270 tablet; Refill: 1  -     FLUoxetine (PROzac) 40 MG capsule; Take 1 capsule by mouth Daily.  Dispense: 90 capsule; Refill: 1    4. Seizures    5. Complicated grief        Visit Diagnoses:    ICD-10-CM ICD-9-CM   1. Generalized anxiety disorder  F41.1 300.02   2. Moderate episode of recurrent major depressive disorder  F33.1 296.32   3. Insomnia due to mental condition  F51.05 300.9     327.02   4. Seizures  R56.9 780.39   5. Complicated grief  F43.21 309.0     02/14/2025: CHAI, increase buspar. Consider switching prozac to cymbalta (which can help with pain). Can't remember how pristiq affected her.    Acknowledged and normalized patient's thoughts, feelings, and concerns. Allowed patient to freely discuss and  process issues, such as:  Anxiety and depression regarding medical conditions.  Anxiety and depression regarding relationships.  ... using Rogerian psychotherapeutic techniques including unconditional positive regard, reflective listening, and demonstrating clear empathy, with the goal of ameliorating symptoms and maintaining, restoring, or improving self-esteem, adaptive skills, and ego or psychological functions (Eliane et al. 1991), the long-term goal of which is to develop a better, healthier perspective and help the patient bear their circumstances more easily.  Time (minutes) spent providing supportive psychotherapy: 16  (This time is exclusive to the therapy session and separate from the time spent on activities used to meet the criteria for the E/M service (history, exam, medical decision-making).)  Start: 9:35  Stop: 9:51  Functional status: mild impairment  Treatment plan: Medication management and supportive psychotherapy  Prognosis: good  Progress: CHAI  6w    09/19/2024: Improved, stable, no changes, situational anxiety and complicated grief.     08/19/2024: MDD, CHAI, increase buspar.    07/22/2024: Much improved. No changes for now. 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    6/20/24: Pseudoseizures per pt (?). Start buspar together with prozac for anxiety, depression. Tremor nebulizer related, not due to prozac.    PLAN:  Safety: No acute safety concerns  Therapy: None  Risk Assessment: Risk of self-harm acutely is moderate.  Risk factors include anxiety disorder, mood disorder, access to firearms, and recent psychosocial stressors (pandemic). Protective factors include no family history, no present SI, no history of suicide attempts or self-harm in the past, minimal AODA, healthcare seeking, future orientation, willingness to engage in care.  Risk of self-harm chronically is also moderate, but could be further elevated in the event of  treatment noncompliance and/or AODA.  Meds:  INCREASE buspar 15 mg bid to TID. Risks, benefits, alternatives discussed with patient including nausea, GI upset, mild sedation, falls risk.  Use care when operating vehicle, vessel, or machine. After discussion of these risks and benefits, the patient voiced understanding and agreed to proceed.  CONTINUE prozac 40 mg qam. Risks, benefits, alternatives discussed with patient including GI upset, nausea vomiting diarrhea, theoretical decrease of seizure threshold predisposing the patient to a slightly higher seizure risk, headaches, sexual dysfunction, serotonin syndrome, bleeding risk, increased suicidality in patients 24 years and younger, switching to padmaja/hypomania.  After discussion of these risks and benefits, the patient voiced understanding and agreed to proceed.  Labs: none    Patient screened positive for depression based on a PHQ-9 score of  on . Follow-up recommendations include: Prescribed antidepressant medication treatment and Suicide Risk Assessment performed.           TREATMENT PLAN/GOALS: Continue supportive psychotherapy efforts and medications as indicated. Treatment and medication options discussed during today's visit. Patient acknowledged and verbally consented to continue with current treatment plan and was educated on the importance of compliance with treatment and follow-up appointments.    MEDICATION ISSUES:  SINGH reviewed as expected.  Discussed medication options and treatment plan of prescribed medication as well as the risks, benefits, and side effects including potential falls, possible impaired driving and metabolic adversities among others. Patient is agreeable to call the office with any worsening of symptoms or onset of side effects. Patient is agreeable to call 911 or go to the nearest ER should he/she begin having SI/HI. No medication side effects or related complaints today.     MEDS ORDERED DURING VISIT:  New Medications Ordered  This Visit   Medications    busPIRone (BUSPAR) 15 MG tablet     Sig: Take 1 tablet by mouth 3 (Three) Times a Day.     Dispense:  270 tablet     Refill:  1     Replaces bid dosing. Thank you for the help. Please call with questions: [phone].    FLUoxetine (PROzac) 40 MG capsule     Sig: Take 1 capsule by mouth Daily.     Dispense:  90 capsule     Refill:  1     Refills. Thank you for the help. Please call with questions: [phone].       Return in about 6 weeks (around 3/28/2025).         This document has been electronically signed by Kalia Singer MD  February 14, 2025 09:57 EST    Dictated Utilizing Dragon Dictation: Part of this note may be an electronic transcription/translation of spoken language to printed text using the Dragon Dictation System.

## 2025-02-14 ENCOUNTER — OFFICE VISIT (OUTPATIENT)
Dept: PSYCHIATRY | Facility: CLINIC | Age: 75
End: 2025-02-14
Payer: MEDICARE

## 2025-02-14 VITALS — BODY MASS INDEX: 35.04 KG/M2 | HEIGHT: 61 IN | WEIGHT: 185.6 LBS

## 2025-02-14 DIAGNOSIS — F41.1 GENERALIZED ANXIETY DISORDER: Primary | ICD-10-CM

## 2025-02-14 DIAGNOSIS — R56.9 SEIZURES: ICD-10-CM

## 2025-02-14 DIAGNOSIS — F51.05 INSOMNIA DUE TO MENTAL CONDITION: ICD-10-CM

## 2025-02-14 DIAGNOSIS — F33.1 MODERATE EPISODE OF RECURRENT MAJOR DEPRESSIVE DISORDER: ICD-10-CM

## 2025-02-14 DIAGNOSIS — F43.21 COMPLICATED GRIEF: ICD-10-CM

## 2025-02-14 RX ORDER — FLUOXETINE 40 MG/1
40 CAPSULE ORAL DAILY
Qty: 90 CAPSULE | Refills: 1 | Status: SHIPPED | OUTPATIENT
Start: 2025-02-14

## 2025-02-14 RX ORDER — BUSPIRONE HYDROCHLORIDE 15 MG/1
15 TABLET ORAL 3 TIMES DAILY
Qty: 270 TABLET | Refills: 1 | Status: SHIPPED | OUTPATIENT
Start: 2025-02-14

## 2025-02-14 NOTE — TREATMENT PLAN
Anxiety:  4/10 progressing    Goals:  Patient will develop and implement behavioral and cognitive strategies to reduce anxiety and irrational fears, monthly, using Rogerian psychotherapy and CBT where appropriate.  Help patient explore past emotional issues in relation to present anxiety, monthly, until remission of symptoms, using Rogerian psychotherapy and CBT where appropriate.  Help patient develop an awareness of their cognitive and physical responses to anxiety, monthly, until remission of symptoms, using Rogerian psychotherapy and CBT where appropriate.          Depression:  3/10 progressing    Goals:  Patient will demonstrate the ability to initiate new constructive life skills outside of sessions on a consistent basis, monthly, using Rogerian psychotherapy and CBT where appropriate.  Assist patient in setting attainable activities of daily living goals, monthly, using Rogerian psychotherapy and CBT where appropriate.  Provide education about depression, monthly, using Rogerian psychotherapy and CBT where appropriate.  Assist patient in developing healthy coping strategies, monthly, using Rogerian psychotherapy and CBT where appropriate.    Rogerian psychotherapy and CBT will be used to help accomplish the above goals and manage depression and anxiety related to medical conditions, not working, family conflict, upcoming surgery, grieving her parents       I have discussed and reviewed this treatment plan with the patient.      Reviewed by Kalia Singer MD   02/14/2025

## 2025-02-15 DIAGNOSIS — F51.05 INSOMNIA DUE TO MENTAL CONDITION: ICD-10-CM

## 2025-02-15 DIAGNOSIS — F33.1 MODERATE EPISODE OF RECURRENT MAJOR DEPRESSIVE DISORDER: ICD-10-CM

## 2025-02-15 DIAGNOSIS — F41.1 GENERALIZED ANXIETY DISORDER: ICD-10-CM

## 2025-02-17 RX ORDER — FLUOXETINE 40 MG/1
40 CAPSULE ORAL DAILY
Qty: 90 CAPSULE | Refills: 1 | OUTPATIENT
Start: 2025-02-17

## 2025-02-17 NOTE — TELEPHONE ENCOUNTER
NEXT VISIT WITH PROVIDER   Appointment with Kalia Singer MD (03/28/2025)     LAST SEEN BY PROVIDER   Office Visit with Kalia Singer MD (02/14/2025)     LAST MED REFILL  FLUoxetine (PROzac) 40 MG capsule (02/14/2025)     TOO SOON FOR REFILL     PROVIDER PLEASE ADVISE

## 2025-02-19 ENCOUNTER — OFFICE VISIT (OUTPATIENT)
Dept: ONCOLOGY | Facility: HOSPITAL | Age: 75
End: 2025-02-19
Payer: MEDICARE

## 2025-02-19 ENCOUNTER — HOSPITAL ENCOUNTER (OUTPATIENT)
Dept: ONCOLOGY | Facility: HOSPITAL | Age: 75
Discharge: HOME | End: 2025-02-19
Payer: MEDICARE

## 2025-02-19 VITALS
SYSTOLIC BLOOD PRESSURE: 141 MMHG | WEIGHT: 185.85 LBS | RESPIRATION RATE: 18 BRPM | HEART RATE: 84 BPM | TEMPERATURE: 98.1 F | BODY MASS INDEX: 35.12 KG/M2 | OXYGEN SATURATION: 97 % | DIASTOLIC BLOOD PRESSURE: 79 MMHG

## 2025-02-19 VITALS
DIASTOLIC BLOOD PRESSURE: 79 MMHG | RESPIRATION RATE: 18 BRPM | OXYGEN SATURATION: 97 % | BODY MASS INDEX: 34.96 KG/M2 | SYSTOLIC BLOOD PRESSURE: 141 MMHG | WEIGHT: 185 LBS | TEMPERATURE: 98.1 F | HEART RATE: 84 BPM

## 2025-02-19 DIAGNOSIS — C90.00 MULTIPLE MYELOMA NOT HAVING ACHIEVED REMISSION: ICD-10-CM

## 2025-02-19 DIAGNOSIS — Z12.2 ENCOUNTER FOR SCREENING FOR LUNG CANCER: ICD-10-CM

## 2025-02-19 DIAGNOSIS — C90.00 MULTIPLE MYELOMA NOT HAVING ACHIEVED REMISSION: Primary | ICD-10-CM

## 2025-02-19 DIAGNOSIS — C90.00 MULTIPLE MYELOMA, REMISSION STATUS UNSPECIFIED: ICD-10-CM

## 2025-02-19 DIAGNOSIS — Z79.899 ENCOUNTER FOR LONG-TERM (CURRENT) USE OF MEDICATIONS: ICD-10-CM

## 2025-02-19 DIAGNOSIS — Z79.899 ENCOUNTER FOR LONG-TERM (CURRENT) USE OF MEDICATIONS: Primary | ICD-10-CM

## 2025-02-19 LAB
ALBUMIN SERPL-MCNC: 3.8 G/DL (ref 3.5–5.2)
ALBUMIN/GLOB SERPL: 1.8 G/DL
ALP SERPL-CCNC: 69 U/L (ref 39–117)
ALT SERPL W P-5'-P-CCNC: 11 U/L (ref 1–33)
ANION GAP SERPL CALCULATED.3IONS-SCNC: 13.6 MMOL/L (ref 5–15)
ANISOCYTOSIS BLD QL: NORMAL
AST SERPL-CCNC: 10 U/L (ref 1–32)
BASOPHILS # BLD AUTO: 0.04 10*3/MM3 (ref 0–0.2)
BASOPHILS NFR BLD AUTO: 0.5 % (ref 0–1.5)
BILIRUB SERPL-MCNC: 0.4 MG/DL (ref 0–1.2)
BUN SERPL-MCNC: 15 MG/DL (ref 8–23)
BUN/CREAT SERPL: 18.5 (ref 7–25)
CALCIUM SPEC-SCNC: 8.9 MG/DL (ref 8.6–10.5)
CHLORIDE SERPL-SCNC: 110 MMOL/L (ref 98–107)
CO2 SERPL-SCNC: 16.4 MMOL/L (ref 22–29)
CREAT SERPL-MCNC: 0.81 MG/DL (ref 0.57–1)
DEPRECATED RDW RBC AUTO: 65.6 FL (ref 37–54)
EGFRCR SERPLBLD CKD-EPI 2021: 75.8 ML/MIN/1.73
EOSINOPHIL # BLD AUTO: 0.01 10*3/MM3 (ref 0–0.4)
EOSINOPHIL NFR BLD AUTO: 0.1 % (ref 0.3–6.2)
ERYTHROCYTE [DISTWIDTH] IN BLOOD BY AUTOMATED COUNT: 16.8 % (ref 12.3–15.4)
GLOBULIN UR ELPH-MCNC: 2.1 GM/DL
GLUCOSE SERPL-MCNC: 149 MG/DL (ref 65–99)
HCT VFR BLD AUTO: 36.2 % (ref 34–46.6)
HGB BLD-MCNC: 11.6 G/DL (ref 12–15.9)
IMM GRANULOCYTES # BLD AUTO: 0.04 10*3/MM3 (ref 0–0.05)
IMM GRANULOCYTES NFR BLD AUTO: 0.5 % (ref 0–0.5)
LYMPHOCYTES # BLD AUTO: 2.57 10*3/MM3 (ref 0.7–3.1)
LYMPHOCYTES NFR BLD AUTO: 34 % (ref 19.6–45.3)
MACROCYTES BLD QL SMEAR: NORMAL
MAGNESIUM SERPL-MCNC: 1.7 MG/DL (ref 1.6–2.4)
MCH RBC QN AUTO: 33.9 PG (ref 26.6–33)
MCHC RBC AUTO-ENTMCNC: 32 G/DL (ref 31.5–35.7)
MCV RBC AUTO: 105.8 FL (ref 79–97)
MONOCYTES # BLD AUTO: 0.79 10*3/MM3 (ref 0.1–0.9)
MONOCYTES NFR BLD AUTO: 10.4 % (ref 5–12)
NEUTROPHILS NFR BLD AUTO: 4.11 10*3/MM3 (ref 1.7–7)
NEUTROPHILS NFR BLD AUTO: 54.5 % (ref 42.7–76)
NRBC BLD AUTO-RTO: 0 /100 WBC (ref 0–0.2)
PHOSPHATE SERPL-MCNC: 2.7 MG/DL (ref 2.5–4.5)
PLATELET # BLD AUTO: 264 10*3/MM3 (ref 140–450)
PMV BLD AUTO: 9.5 FL (ref 6–12)
POTASSIUM SERPL-SCNC: 3.9 MMOL/L (ref 3.5–5.2)
PROT SERPL-MCNC: 5.9 G/DL (ref 6–8.5)
RBC # BLD AUTO: 3.42 10*6/MM3 (ref 3.77–5.28)
SMALL PLATELETS BLD QL SMEAR: ADEQUATE
SODIUM SERPL-SCNC: 140 MMOL/L (ref 136–145)
WBC MORPH BLD: NORMAL
WBC NRBC COR # BLD AUTO: 7.56 10*3/MM3 (ref 3.4–10.8)

## 2025-02-19 PROCEDURE — 96374 THER/PROPH/DIAG INJ IV PUSH: CPT

## 2025-02-19 PROCEDURE — A9270 NON-COVERED ITEM OR SERVICE: HCPCS | Performed by: INTERNAL MEDICINE

## 2025-02-19 PROCEDURE — 96401 CHEMO ANTI-NEOPL SQ/IM: CPT

## 2025-02-19 PROCEDURE — 25010000002 METHYLPREDNISOLONE PER 125 MG: Performed by: INTERNAL MEDICINE

## 2025-02-19 PROCEDURE — 80053 COMPREHEN METABOLIC PANEL: CPT | Performed by: INTERNAL MEDICINE

## 2025-02-19 PROCEDURE — 25010000002 ZOLEDRONIC ACID 4 MG/100ML SOLUTION: Performed by: INTERNAL MEDICINE

## 2025-02-19 PROCEDURE — 25010000002 DIPHENHYDRAMINE PER 50 MG: Performed by: INTERNAL MEDICINE

## 2025-02-19 PROCEDURE — 84100 ASSAY OF PHOSPHORUS: CPT | Performed by: INTERNAL MEDICINE

## 2025-02-19 PROCEDURE — 83735 ASSAY OF MAGNESIUM: CPT | Performed by: INTERNAL MEDICINE

## 2025-02-19 PROCEDURE — 85025 COMPLETE CBC W/AUTO DIFF WBC: CPT | Performed by: INTERNAL MEDICINE

## 2025-02-19 PROCEDURE — 96375 TX/PRO/DX INJ NEW DRUG ADDON: CPT

## 2025-02-19 PROCEDURE — 25010000002 DARATUMUMAB-HYALURONIDASE-FIHJ 1800-30000 MG-UT/15ML SOLUTION: Performed by: INTERNAL MEDICINE

## 2025-02-19 PROCEDURE — 25810000003 SODIUM CHLORIDE 0.9 % SOLUTION: Performed by: INTERNAL MEDICINE

## 2025-02-19 PROCEDURE — 25010000002 HEPARIN LOCK FLUSH PER 10 UNITS: Performed by: INTERNAL MEDICINE

## 2025-02-19 PROCEDURE — 63710000001 ACETAMINOPHEN EXTRA STRENGTH 500 MG TABLET: Performed by: INTERNAL MEDICINE

## 2025-02-19 PROCEDURE — 85007 BL SMEAR W/DIFF WBC COUNT: CPT | Performed by: INTERNAL MEDICINE

## 2025-02-19 RX ORDER — DIPHENHYDRAMINE HYDROCHLORIDE 50 MG/ML
50 INJECTION INTRAMUSCULAR; INTRAVENOUS AS NEEDED
Status: DISCONTINUED | OUTPATIENT
Start: 2025-02-19 | End: 2025-02-20 | Stop reason: HOSPADM

## 2025-02-19 RX ORDER — HYDROCORTISONE SODIUM SUCCINATE 100 MG/2ML
100 INJECTION INTRAMUSCULAR; INTRAVENOUS AS NEEDED
Status: CANCELLED | OUTPATIENT
Start: 2025-02-19

## 2025-02-19 RX ORDER — SODIUM CHLORIDE 0.9 % (FLUSH) 0.9 %
20 SYRINGE (ML) INJECTION AS NEEDED
OUTPATIENT
Start: 2025-02-19

## 2025-02-19 RX ORDER — METHYLPREDNISOLONE SODIUM SUCCINATE 125 MG/2ML
60 INJECTION INTRAMUSCULAR; INTRAVENOUS ONCE
Status: COMPLETED | OUTPATIENT
Start: 2025-02-19 | End: 2025-02-19

## 2025-02-19 RX ORDER — SODIUM CHLORIDE 9 MG/ML
20 INJECTION, SOLUTION INTRAVENOUS ONCE
Status: CANCELLED | OUTPATIENT
Start: 2025-02-19

## 2025-02-19 RX ORDER — SODIUM CHLORIDE 0.9 % (FLUSH) 0.9 %
20 SYRINGE (ML) INJECTION AS NEEDED
Status: DISCONTINUED | OUTPATIENT
Start: 2025-02-19 | End: 2025-02-20 | Stop reason: HOSPADM

## 2025-02-19 RX ORDER — SODIUM CHLORIDE 9 MG/ML
20 INJECTION, SOLUTION INTRAVENOUS ONCE
Status: COMPLETED | OUTPATIENT
Start: 2025-02-19 | End: 2025-02-19

## 2025-02-19 RX ORDER — DIPHENHYDRAMINE HYDROCHLORIDE 50 MG/ML
50 INJECTION INTRAMUSCULAR; INTRAVENOUS AS NEEDED
Status: CANCELLED | OUTPATIENT
Start: 2025-02-19

## 2025-02-19 RX ORDER — FAMOTIDINE 10 MG/ML
20 INJECTION, SOLUTION INTRAVENOUS AS NEEDED
Status: DISCONTINUED | OUTPATIENT
Start: 2025-02-19 | End: 2025-02-20 | Stop reason: HOSPADM

## 2025-02-19 RX ORDER — ZOLEDRONIC ACID 0.04 MG/ML
4 INJECTION, SOLUTION INTRAVENOUS ONCE
Status: CANCELLED | OUTPATIENT
Start: 2025-02-19

## 2025-02-19 RX ORDER — METHYLPREDNISOLONE SODIUM SUCCINATE 125 MG/2ML
60 INJECTION INTRAMUSCULAR; INTRAVENOUS ONCE
Status: CANCELLED | OUTPATIENT
Start: 2025-02-19

## 2025-02-19 RX ORDER — HEPARIN SODIUM (PORCINE) LOCK FLUSH IV SOLN 100 UNIT/ML 100 UNIT/ML
500 SOLUTION INTRAVENOUS AS NEEDED
Status: DISCONTINUED | OUTPATIENT
Start: 2025-02-19 | End: 2025-02-20 | Stop reason: HOSPADM

## 2025-02-19 RX ORDER — HYDROCORTISONE SODIUM SUCCINATE 100 MG/2ML
100 INJECTION INTRAMUSCULAR; INTRAVENOUS AS NEEDED
Status: DISCONTINUED | OUTPATIENT
Start: 2025-02-19 | End: 2025-02-20 | Stop reason: HOSPADM

## 2025-02-19 RX ORDER — ACETAMINOPHEN 500 MG
1000 TABLET ORAL ONCE
Status: CANCELLED | OUTPATIENT
Start: 2025-02-19

## 2025-02-19 RX ORDER — SODIUM CHLORIDE 9 MG/ML
20 INJECTION, SOLUTION INTRAVENOUS ONCE
Status: DISCONTINUED | OUTPATIENT
Start: 2025-02-19 | End: 2025-02-20 | Stop reason: HOSPADM

## 2025-02-19 RX ORDER — HEPARIN SODIUM (PORCINE) LOCK FLUSH IV SOLN 100 UNIT/ML 100 UNIT/ML
500 SOLUTION INTRAVENOUS AS NEEDED
OUTPATIENT
Start: 2025-02-19

## 2025-02-19 RX ORDER — ZOLEDRONIC ACID 0.04 MG/ML
4 INJECTION, SOLUTION INTRAVENOUS ONCE
Status: COMPLETED | OUTPATIENT
Start: 2025-02-19 | End: 2025-02-19

## 2025-02-19 RX ORDER — FAMOTIDINE 10 MG/ML
20 INJECTION, SOLUTION INTRAVENOUS AS NEEDED
Status: CANCELLED | OUTPATIENT
Start: 2025-02-19

## 2025-02-19 RX ORDER — ACETAMINOPHEN 500 MG
1000 TABLET ORAL ONCE
Status: COMPLETED | OUTPATIENT
Start: 2025-02-19 | End: 2025-02-19

## 2025-02-19 RX ADMIN — DIPHENHYDRAMINE HYDROCHLORIDE 25 MG: 50 INJECTION INTRAMUSCULAR; INTRAVENOUS at 09:34

## 2025-02-19 RX ADMIN — Medication 20 ML: at 10:57

## 2025-02-19 RX ADMIN — METHYLPREDNISOLONE SODIUM SUCCINATE 60 MG: 125 INJECTION INTRAMUSCULAR; INTRAVENOUS at 09:32

## 2025-02-19 RX ADMIN — HEPARIN 500 UNITS: 100 SYRINGE at 10:57

## 2025-02-19 RX ADMIN — ZOLEDRONIC ACID 4 MG: 0.04 INJECTION, SOLUTION INTRAVENOUS at 09:56

## 2025-02-19 RX ADMIN — ACETAMINOPHEN 1000 MG: 500 TABLET ORAL at 09:32

## 2025-02-19 RX ADMIN — SODIUM CHLORIDE 20 ML/HR: 9 INJECTION, SOLUTION INTRAVENOUS at 09:32

## 2025-02-19 RX ADMIN — DARATUMUMAB AND HYALURONIDASE-FIHJ (HUMAN RECOMBINANT) 1800 MG: 1800; 30000 INJECTION SUBCUTANEOUS at 10:51

## 2025-02-19 NOTE — PROGRESS NOTES
Chief Complaint  Multiple myeloma, remission status unspecified    Rena Guerra,*  Rena Guerra, PA    Subjective          Anca Samson presents to [facility] for ongoing treatment for multiple myeloma.  She is on D VRD regimen along with zoledronic acid which has been on hold due to recent dental work.  She notes her mouth is now healed.  She denies new masses, adenopathy, unusual aches or pains.  Her activity is still limited by her oxygen dependent COPD.    Oncology/Hematology History Overview Note   Smoldering Myeloma    Initially diagnosed in 2016 with bone marrow biopsy demonstrating 30% CD56 positive monoclonal plasma cell population.  45 XX normal female chromosome pattern  FISH panel negative for myeloma associated mutations including 1q21, 9q34,11q13,14q32,15q24, 17q13  No anemia, renal insufficiency, hypercalcemia.  On observation since that time    Low grade adenocarcinoma of ascending colon      5/16/2017 right hemicolectomy which  revealed a low-grade 7.6 cm adenocarcinoma of the cecum. All margins were  negative. Lymphovascular invasion and perineural invasion were not identified.     26 lymph nodes were removed and unfortunately 1 was involved with metastatic deposit. pT3 pN1a colorectal cancer.        Clinical Staging      Smoldering Myeloma; Colon ([id number])            Treatments      Chemotherapy      11/2017 completed 6mo adjuvant Xeloda        6/2022 Stopped Smoking     Malignant neoplasm of ascending colon   7/21/2017 Initial Diagnosis    Colon cancer (CMS/HCC)     Multiple myeloma   7/28/2021 Initial Diagnosis    Multiple myeloma     11/7/2023 -  Chemotherapy    OP MULTIPLE MYELOMA Daratumumab / Lenalidomide / Dexamethasone     2/19/2025 -  Chemotherapy    OP SUPPORTIVE Zoledronic Acid Q28D           Current Outpatient Medications on File Prior to Visit   Medication Sig Dispense Refill    acyclovir (ZOVIRAX) 400 MG tablet TAKE 1  TABLET BY MOUTH 2 (TWO) TIMES A DAY. TAKE ONE TABLET BY MOUTH TWICE DAILY. 180 tablet 3    albuterol sulfate  (90 Base) MCG/ACT inhaler Inhale 2 puffs Every 4 (Four) Hours As Needed for Wheezing. 18 g 11    aspirin 81 MG EC tablet Take 1 tablet by mouth Daily.      busPIRone (BUSPAR) 15 MG tablet Take 1 tablet by mouth 3 (Three) Times a Day. 270 tablet 1    Calcium Carb-Cholecalciferol (calcium 500 mg vitamin D 5 mcg, 200 UT,) 500-5 MG-MCG tablet per tablet       colestipol (COLESTID) 1 g tablet TAKE 2 TABLETS BY MOUTH TWICE A  tablet 1    dapsone 25 MG tablet TAKE 2 TABLETS BY MOUTH TWICE A  tablet 1    dexAMETHasone (DECADRON) 4 MG tablet Take 10 tablets on D 1,8,15,22 and take 1 tablet on D 2,3.  Take in the morning with food. 42 tablet 5    doxycycline (VIBRAMYICN) 100 MG tablet Take 1 tablet by mouth 2 (Two) Times a Day. 14 tablet 0    FLUoxetine (PROzac) 40 MG capsule Take 1 capsule by mouth Daily. 90 capsule 1    lenalidomide (REVLIMID) 15 MG capsule Take 1 capsule by mouth Daily. On Days 1-21, and off 7 days, on a 28 day cycle 21 capsule 0    levalbuterol (XOPENEX) 0.63 MG/3ML nebulizer solution Take 1 ampule by nebulization 4 (Four) Times a Day As Needed for Wheezing. 120 mL 5    ondansetron (ZOFRAN) 8 MG tablet TAKE 1 TABLET BY MOUTH THREE TIMES A DAY AS NEEDED FOR NAUSEA AND VOMITING 30 tablet 5    potassium phosphate, monobasic, (K-Phos) 500 MG tablet Take 1 tablet by mouth 2 (Two) Times a Day. 60 tablet 3    promethazine (PHENERGAN) 25 MG tablet Take 1 tablet by mouth Every 6 (Six) Hours As Needed for Nausea or Vomiting. 30 tablet 1    vitamin D (ERGOCALCIFEROL) 1.25 MG (94287 UT) capsule capsule Take 1 capsule by mouth 2 (Two) Times a Week. 26 capsule 1    budesonide (PULMICORT) 0.5 MG/2ML nebulizer solution Take 2 mL by nebulization 2 (Two) Times a Day for 30 days. Rinse mouth out after each use 120 mL 5     Current Facility-Administered Medications on File Prior to Visit    Medication Dose Route Frequency Provider Last Rate Last Admin    [COMPLETED] acetaminophen (TYLENOL) tablet 1,000 mg  1,000 mg Oral Once West Nicolas MD   1,000 mg at 02/19/25 0932    [COMPLETED] daratumumab-hyaluronidase-fihj (DARZALEX FASPRO) 1800-39152 MG-UT/15ML injection 1,800 mg  1,800 mg Subcutaneous Once West Nicolas MD   1,800 mg at 02/19/25 1051    diphenhydrAMINE (BENADRYL) injection 50 mg  50 mg Intravenous PRN West Nicolas MD        [COMPLETED] diphenhydrAMINE (BENADRYL) IVPB 25 mg  25 mg Intravenous Once West Nicolas MD   Stopped at 02/19/25 0947    famotidine (PEPCID) injection 20 mg  20 mg Intravenous PRN West Nicolas MD        heparin injection 500 Units  500 Units Intravenous PRN West Nicolas MD   500 Units at 02/19/25 1057    Hydrocortisone Sod Suc (PF) (Solu-CORTEF) injection 100 mg  100 mg Intravenous PRN West Nicolas MD        [COMPLETED] methylPREDNISolone sodium succinate (SOLU-Medrol) injection 60 mg  60 mg Intravenous Once West Nicolas MD   60 mg at 02/19/25 0932    sodium chloride 0.9 % flush 20 mL  20 mL Intravenous PRN West Nicolas MD   20 mL at 02/19/25 1057    sodium chloride 0.9 % infusion  20 mL/hr Intravenous Once West Nicolas MD        [COMPLETED] sodium chloride 0.9 % infusion  20 mL/hr Intravenous Once West Nicolas MD   Stopped at 02/19/25 1050    [COMPLETED] zoledronic acid (ZOMETA) infusion 4 mg/100 mL (premix)  4 mg Intravenous Once West Nicolas MD   Stopped at 02/19/25 1011       Allergies   Allergen Reactions    Morphine Anaphylaxis    Cephalexin Hives    Penicillins Hives     Beta lactam allergy details  Antibiotic reaction: hives  Age at reaction: child  Dose to reaction time: unknown  Reason for antibiotic: unknown  Epinephrine required for reaction?: unknown  Tolerated antibiotics: other (none per pt)        Sulfa Antibiotics Hives     Past Medical History:   Diagnosis Date    Anxiety     Asthma 07/28/2021     Atherosclerosis of native coronary artery of native heart with angina pectoris 08/15/2023    FOLLOWED BY DR GONZALES/DINA PETTIT. DENIES CP BUT GETS SOA WITH EXERTION HAS COPD WEARS AT 2LPM N/C. DECREASED ACTIVITY D/T SOA.    C. difficile diarrhea     DENIES ANY CURRENT ISSUES    Cholelithiasis 7-    Colon cancer 07/21/2017    Colon polyp     COPD (chronic obstructive pulmonary disease) 10/23/2017    Depression     Disease of thyroid gland     Diverticulitis     Diverticulitis of colon     Dysphagia     Emphysema of lung     Emphysema/COPD     GERD (gastroesophageal reflux disease)     Head injury     CONCUSSION AT AGE 4    Hernia 2019    History of chemotherapy     2017    Hx of psychiatric care     Impaired fasting glucose 08/14/2015    Lumbago     Lung nodule 02/17/2022    Major depressive disorder 10/27/2016    Malignant neoplasm of ascending colon 07/21/2017    Melena     Multiple myeloma     Nicotine dependence 05/10/2017    NICK (obstructive sleep apnea) 11/06/2023    Oxygen dependent     REPORTS USES 02 AT 2LPM VIA N/C DURING DAY AND 3LPM AT NIGHT . CAN GET VERY SOA WITH MINIMAL EXERTION    Pancreatitis 7/26/2024    Panic disorder     Primary central sleep apnea     Renal insufficiency 07/28/2021    Seizures     PSEUDO SEIZURES LAST ONE AROUND JULY 2024    Shortness of breath     CAN GET VERY SOA WITH MINIMAL EXERTION    Tobacco use 07/28/2021    QUIT SMOKING JUNE 2022    Visit for screening mammogram 02/20/2020    NORMAL- REPEAT IN ONE YEAR    Vitamin D deficiency      Past Surgical History:   Procedure Laterality Date    APPENDECTOMY      BONE MARROW BIOPSY  2016    CHOLECYSTECTOMY N/A 08/08/2024    Procedure: CHOLECYSTECTOMY LAPAROSCOPIC WITH [employer]iK[employer]CI ROBOT;  Surgeon: Eduardo Ingram MD;  Location: [facility] OR [facility] [place];  Service: Robotics - DaVinci;  Laterality: N/A;    COLON SURGERY  2017    COLONOSCOPY  2018,2017,2019    [facility]- DR LE:DIVERTICULOSIS AND ERYTHEMA OF MUCOSA    COLONOSCOPY N/A  12/20/2022    Procedure: COLONOSCOPY WITH ELEVIEW INJECTION, POLYPECTOMY, HOT SNARE, CLIP APPLICATION X3, BIOPSIES;  Surgeon: Jarvis Borges MD;  Location: [facility] ENDOSCOPY;  Service: Gastroenterology;  Laterality: N/A;  COLON POLYP, DIVERTICULOSIS, ANASTOMOSIS RIGHT COLON    COLONOSCOPY N/A 11/09/2023    Procedure: COLONOSCOPY;  Surgeon: Jarvis Borges MD;  Location: [facility] ENDOSCOPY;  Service: Gastroenterology;  Laterality: N/A;  DIVERTICULOSIS, ANASTOMOSIS IN ASCENDING COLON    ENDOSCOPY  2018    ERCP N/A 07/27/2024    Procedure: ENDOSCOPIC RETROGRADE CHOLANGIOPANCREATOGRAPHY WITH SPHINCTEROTOMY, BALLOON SWEEP, STENT PLACEMENT;  Surgeon: Ajay Kennedy MD;  Location: [facility] MAIN OR;  Service: Gastroenterology;  Laterality: N/A;  BILE DUCT STONE    ERCP N/A 09/05/2024    Procedure: ENDOSCOPIC RETROGRADE CHOLANGIOPANCREATOGRAPHY WITH SPINCHTEROTOMY. BALLOON DILATATION 8-10. BALLOON SWEEP 12-15. STENT REMOVAL;  Surgeon: Ajay Kennedy MD;  Location: [facility] ENDOSCOPY;  Service: Gastroenterology;  Laterality: N/A;  BILE DUCT STONES    EYE SURGERY      FECAL DISIMPACTION  06/2019    TRANSPLANT     GALLBLADDER SURGERY      HEMICOLECTOMY Right 05/2017    HERNIA REPAIR  2024    HYSTERECTOMY  1982,1984    KNEE ARTHROSCOPY Left 01/03/2022    Procedure: KNEE ARTHROSCOPY WITH PARTIAL MEDIAL MENISCECTOMY,  CHONDROPLASTY;  Surgeon: Nicholas Tipton MD;  [place] [facility] [place] [facility] [place]  Service: Orthopedics;  Laterality: Left;    MINI-LAPAROTOMY  2017    PORTACATH PLACEMENT N/A     pt states that her port does not work    TONSILLECTOMY      UPPER GASTROINTESTINAL ENDOSCOPY  2018    VENOUS ACCESS DEVICE (PORT) INSERTION N/A 10/31/2023    Procedure: Port-a-catheter removal and port-a-catheter placement;  Surgeon: Alcon Delgado MD;  [place] [facility] [place] [facility]  Service: General;  Laterality: N/A;    VENTRAL HERNIA REPAIR N/A 10/03/2024    Procedure: VENTRAL / INCISIONAL HERNIA REPAIR LAPAROSCOPIC WITH  DAVINCI ROBOT;  Surgeon: Eduardo Ingram MD;  [place] [facility] [place] [facility] [place];  Service: Robotics - DaVinci;  Laterality: N/A;     Social History     Socioeconomic History    Marital status:    Tobacco Use    Smoking status: Former     Current packs/day: 0.00     Average packs/day: 1 pack/day for 47.4 years (47.4 ttl pk-yrs)     Types: Cigarettes     Start date: 1975     Quit date: 6/3/2022     Years since quittin.7     Passive exposure: Past    Smokeless tobacco: Never   Vaping Use    Vaping status: Never Used   Substance and Sexual Activity    Alcohol use: Never    Drug use: Never    Sexual activity: Not Currently     Partners: Male     Birth control/protection: Hysterectomy     Family History   Problem Relation Age of Onset    Heart disease Mother     Lung cancer Mother     Cancer Mother     Stroke Father     Heart disease Father     Kidney cancer Father     Cancer Father     Stroke Other         UNCLE/AUNT    Colon cancer Neg Hx     Malig Hyperthermia Neg Hx        Objective   Physical Exam  Vitals reviewed. Exam conducted with a chaperone present.   HENT:      Nose:      Comments: Nasal cannula in place  Cardiovascular:      Rate and Rhythm: Normal rate and regular rhythm.      Heart sounds: Normal heart sounds. No murmur heard.     No gallop.   Pulmonary:      Effort: Pulmonary effort is normal.      Breath sounds: Normal breath sounds.      Comments: Port-A-Cath  Abdominal:      General: Bowel sounds are normal.   Lymphadenopathy:      Cervical: No cervical adenopathy.   Psychiatric:         Mood and Affect: Mood normal.         Behavior: Behavior normal.         Vitals:    25 0846   BP: 141/79   Pulse: 84   Resp: 18   Temp: 98.1 °F (36.7 °C)   TempSrc: Temporal   SpO2: 97%   Weight: 83.9 kg (185 lb)   PainSc: 7      ECOG score: 0         PHQ-9 Total Score:                    Result Review :   The following data was reviewed by: West Nicolas MD on 2025:  Lab Results   Component Value  "Date    HGB 11.6 (L) 02/19/2025    HCT 36.2 02/19/2025    .8 (H) 02/19/2025     02/19/2025    WBC 7.56 02/19/2025    NEUTROABS 4.11 02/19/2025    LYMPHSABS 2.57 02/19/2025    MONOSABS 0.79 02/19/2025    EOSABS 0.01 02/19/2025    BASOSABS 0.04 02/19/2025     Lab Results   Component Value Date    GLUCOSE 149 (H) 02/19/2025    BUN 15 02/19/2025    CREATININE 0.81 02/19/2025     02/19/2025    K 3.9 02/19/2025     (H) 02/19/2025    CO2 16.4 (L) 02/19/2025    CALCIUM 8.9 02/19/2025    PROTEINTOT 5.9 (L) 02/19/2025    ALBUMIN 3.8 02/19/2025    BILITOT 0.4 02/19/2025    ALKPHOS 69 02/19/2025    AST 10 02/19/2025    ALT 11 02/19/2025     Lab Results   Component Value Date    MG 1.7 02/19/2025    PHOS 2.7 02/19/2025    FREET4 1.55 12/06/2023    TSH 0.684 09/11/2024     No results found for: \"IRON\", \"LABIRON\", \"TRANSFERRIN\", \"TIBC\"  Lab Results   Component Value Date     03/07/2023    PVGMOIPK33 612 04/15/2024    FOLATE 8.95 03/27/2024     No results found for: \"PSA\", \"CEA\", \"AFP\", \"\", \"\"  SPEP shows an M spike 0.3 g/dL.        Assessment and Plan    Diagnoses and all orders for this visit:    1. Multiple myeloma not having achieved remission (Primary)  Assessment & Plan:  Patient is on active therapy with the D VRD regimen.  Tolerating very well with excellent response and continued decrease in her M spike.  She is also on zoledronic acid which has been on hold for dental work but this is now healed.  I will resume her zoledronic acid today monthly.  Proceed with cycle 16-day 1 as planned.  I will see her back for cycle 17-day 1 with lab work prior to monitor for toxicities.    Orders:  -     Cancel: CBC and Differential; Future  -     Cancel: Comprehensive metabolic panel; Future    2. Encounter for long-term (current) use of medications  -     CBC and Differential; Future  -     Comprehensive metabolic panel; Future    Other orders  -     Cancel: sodium chloride 0.9 % infusion  -   "   Cancel: acetaminophen (TYLENOL) tablet 1,000 mg  -     Cancel: methylPREDNISolone sodium succinate (SOLU-Medrol) injection 60 mg  -     Cancel: diphenhydrAMINE (BENADRYL) IVPB 25 mg  -     Cancel: daratumumab-hyaluronidase-fihj (DARZALEX FASPRO) 1800-37079 MG-UT/15ML injection 1,800 mg  -     Cancel: Hydrocortisone Sod Suc (PF) (Solu-CORTEF) injection 100 mg  -     Cancel: diphenhydrAMINE (BENADRYL) injection 50 mg  -     Cancel: famotidine (PEPCID) injection 20 mg  -     Cancel: sodium chloride 0.9 % infusion  -     Cancel: zoledronic acid (ZOMETA) infusion 4 mg/100 mL (premix)            Patient Follow Up: Cycle 17-day 1    Patient was given instructions and counseling regarding her condition or for health maintenance advice. Please see specific information pulled into the AVS if appropriate.     West Nicolas MD    2/19/2025

## 2025-02-19 NOTE — ASSESSMENT & PLAN NOTE
Patient is on active therapy with the D VRD regimen.  Tolerating very well with excellent response and continued decrease in her M spike.  She is also on zoledronic acid which has been on hold for dental work but this is now healed.  I will resume her zoledronic acid today monthly.  Proceed with cycle 16-day 1 as planned.  I will see her back for cycle 17-day 1 with lab work prior to monitor for toxicities.   No

## 2025-02-21 ENCOUNTER — SPECIALTY PHARMACY (OUTPATIENT)
Dept: PHARMACY | Facility: HOSPITAL | Age: 75
End: 2025-02-21
Payer: MEDICARE

## 2025-02-21 NOTE — PROGRESS NOTES
Specialty Pharmacy Patient Management Program  Chart Review/Lab Monitoring     Anca Samson is a 74 y.o. female with IgG Multiple Myeloma who presented for lab check and Oncology provider appointment. [facility] reviewed labs and providers note to assess continued therapy appropriateness of Revlimid (lenalidomide)    Oncology Interval History:  Susan Leal  Diagnosis: 16: Serum protein electrophoresis - markedly elevated IgG at 2941, decreased IgA at 46, IgM 87, and markedly increased monoclonal spike at 2.2 g/dL. Interestingly, 3/19/15: serum protein electrophoresis - IgG of 2695   Biomarkers: IgG+    Current Tx: Lenalidomide 15mg - Take one tablet by mouth daily on days 1-21 and then take 7 days off on 28 days + Darzalex SQ + Zometa [id number] (had been held for dental work)  Most Recent Imagin24 CT stable disease  Previous Tx: Active surveillance (-10/2023)  Cholecystectomy (2024)     Other Cancer Hx  2017: R hemicolectomy - low-grade 7.6 cm adenocarcinoma of the cecum, margins were  negative   LN + for metastatic deposit pT3 pN1a colorectal cancer, no other disease on imaging  now s/p colectomy with colostomy  & s/p 6 mo of adjuvant xeloda (complete 2017)     Fills at: [facility]    25: Patient reported she is doing well overall. Plan to proceed as planned.     Labs:  Lab Results   Component Value Date     2025    K 3.9 2025    CO2 16.4 (L) 2025     (H) 2025    BUN 15 2025    GLUCOSE 149 (H) 2025    CALCIUM 8.9 2025    CREATININE 0.81 2025    EGFRIFNONA 53 (L) 2021    BCR 18.5 2025    ANIONGAP 13.6 2025    AST 10 2025    ALT 11 2025    BILITOT 0.4 2025    ALKPHOS 69 2025     Lab Results   Component Value Date    WBC 7.56 2025    RBC 3.42 (L) 2025    HGB 11.6 (L) 2025    HCT 36.2 2025     2025    NEUTROABS 4.11  02/19/2025    LYMPHSABS 2.57 02/19/2025    MONOSABS 0.79 02/19/2025    EOSABS 0.01 02/19/2025    BASOSABS 0.04 02/19/2025     Lab Results   Component Value Date    MSPIKE 0.3 (H) 02/12/2025    MSPIKE 0.5 (H) 10/30/2024    IGLFLC 2.0 (L) 10/30/2024    IGLFLC 3.3 (L) 06/11/2024    FREEKAPPAL 8.9 10/30/2024    FREEKAPPAL 11.1 06/11/2024    KAPPALAMB 4.45 (H) 10/30/2024    KAPPALAMB 3.36 (H) 06/11/2024     PLAN  Specialty pharmacy will continue to follow patient. Continue with current regimen as planned. Next Specialty Assessment due 4/24/25.    Walker MahajanD  Oncology Clinical Specialty Pharmacist   2/21/2025 10:44 EST

## 2025-02-23 LAB
IGA SERPL-MCNC: 40 MG/DL (ref 64–422)
IGG SERPL-MCNC: 432 MG/DL (ref 586–1602)
IGM SERPL-MCNC: 18 MG/DL (ref 26–217)
PROT PATTERN SERPL IFE-IMP: ABNORMAL

## 2025-03-03 ENCOUNTER — HOSPITAL ENCOUNTER (OUTPATIENT)
Dept: CT IMAGING | Facility: HOSPITAL | Age: 75
Discharge: HOME | End: 2025-03-03
Admitting: NURSE PRACTITIONER
Payer: MEDICARE

## 2025-03-03 DIAGNOSIS — F17.211 CIGARETTE NICOTINE DEPENDENCE IN REMISSION: ICD-10-CM

## 2025-03-03 PROCEDURE — 71271 CT THORAX LUNG CANCER SCR C-: CPT

## 2025-03-04 ENCOUNTER — SPECIALTY PHARMACY (OUTPATIENT)
Dept: PHARMACY | Facility: HOSPITAL | Age: 75
End: 2025-03-04
Payer: MEDICARE

## 2025-03-04 ENCOUNTER — PREP FOR SURGERY (OUTPATIENT)
Dept: OTHER | Facility: HOSPITAL | Age: 75
End: 2025-03-04
Payer: MEDICARE

## 2025-03-04 ENCOUNTER — OFFICE VISIT (OUTPATIENT)
Dept: SURGERY | Facility: CLINIC | Age: 75
End: 2025-03-04
Payer: MEDICARE

## 2025-03-04 VITALS — HEIGHT: 61 IN | BODY MASS INDEX: 35.12 KG/M2 | WEIGHT: 186 LBS | RESPIRATION RATE: 18 BRPM

## 2025-03-04 DIAGNOSIS — C90.00 MULTIPLE MYELOMA, REMISSION STATUS UNSPECIFIED: ICD-10-CM

## 2025-03-04 DIAGNOSIS — D17.22 LIPOMA OF LEFT UPPER EXTREMITY: Primary | ICD-10-CM

## 2025-03-04 DIAGNOSIS — D17.21 LIPOMA OF RIGHT UPPER EXTREMITY: ICD-10-CM

## 2025-03-04 RX ORDER — SODIUM CHLORIDE 0.9 % (FLUSH) 0.9 %
10 SYRINGE (ML) INJECTION AS NEEDED
OUTPATIENT
Start: 2025-03-04

## 2025-03-04 RX ORDER — SODIUM CHLORIDE 9 MG/ML
40 INJECTION, SOLUTION INTRAVENOUS AS NEEDED
OUTPATIENT
Start: 2025-03-04

## 2025-03-04 RX ORDER — LENALIDOMIDE 15 MG/1
15 CAPSULE ORAL DAILY
Qty: 21 CAPSULE | Refills: 0 | Status: SHIPPED | OUTPATIENT
Start: 2025-03-04

## 2025-03-04 RX ORDER — SODIUM CHLORIDE 0.9 % (FLUSH) 0.9 %
10 SYRINGE (ML) INJECTION EVERY 12 HOURS SCHEDULED
OUTPATIENT
Start: 2025-03-04

## 2025-03-04 RX ORDER — ONDANSETRON 2 MG/ML
4 INJECTION INTRAMUSCULAR; INTRAVENOUS EVERY 6 HOURS PRN
OUTPATIENT
Start: 2025-03-04

## 2025-03-04 NOTE — PROGRESS NOTES
Specialty Pharmacy Patient Management Program  Per Protocol Prescription Order or Refill     Patient will be filling or currently fills medications at [facility] and is enrolled in the Patient Management Program.    Requested Prescriptions     Signed Prescriptions Disp Refills    lenalidomide (REVLIMID) 15 MG capsule 21 capsule 0     Sig: Take 1 capsule by mouth Daily. On Days 1-21, and off 7 days, on a 28 day cycle     Prescription orders above were sent to the pharmacy per Collaborative Care Agreement Protocol.     Last Office Visit: 2/19/25  Next Office Visit: 3/19/25    Drug-Drug Interactions: The current medication list was reviewed and there are no relevant drug-drug interactions with the specialty medication.  Medication Allergies: The patient has no relevant allergies as it relates to their oral specialty medication  Review of Labs/Dose Adjustments: NO DOSE CHANGE - I reviewed the most recent note and labs and the patient will continue without any dose changes.  I sent refills as described below.    Sabrina Beebe PharmD  Oncology Clinical Specialty Pharmacist   3/4/2025  15:25 EST

## 2025-03-04 NOTE — PROGRESS NOTES
Inpatient History and Physical Surgical Orders    Preadmission Location:   Preadmission Time:  Facility:  Surgery Date:  Surgery Time:  Preadmission Test date:     Chief Complaint  Outpatient History and Physical / Surgical Orders    Primary Care Provider: Rena Guerra PA    Referring Provider: Rena Guerra,*    Subjective      Patient Name: Anca Samson : 1950    HPI  The patient is a 74-year-old female that we have taken care of in the past.  She has subcutaneous masses in both upper arms that apparently are uncomfortable.    Past History:  Medical History: has a past medical history of Anxiety, Asthma (2021), Atherosclerosis of native coronary artery of native heart with angina pectoris (08/15/2023), C. difficile diarrhea, Cholelithiasis (2024), Colon cancer (2017), Colon polyp, COPD (chronic obstructive pulmonary disease) (10/23/2017), Depression, Disease of thyroid gland, Diverticulitis, Diverticulitis of colon, Dysphagia, Emphysema of lung, Emphysema/COPD, GERD (gastroesophageal reflux disease), Head injury, Hernia (), History of chemotherapy, psychiatric care, Impaired fasting glucose (2015), Lumbago, Lung nodule (2022), Major depressive disorder (10/27/2016), Malignant neoplasm of ascending colon (2017), Melena, Multiple myeloma, Nicotine dependence (05/10/2017), NICK (obstructive sleep apnea) (2023), Oxygen dependent, Pancreatitis (2024), Panic disorder, Primary central sleep apnea, Renal insufficiency (2021), Seizures, Shortness of breath, Tobacco use (2021), Visit for screening mammogram (2020), and Vitamin D deficiency.   Surgical History: has a past surgical history that includes Bone marrow biopsy (); Colonoscopy (,,); Esophagogastroduodenoscopy (); Fecal disimpaction (2019); Hysterectomy (,); Mini-laparotomy (); Portacath placement (N/A); Colon surgery (2017);  Hemicolectomy (Right, 05/2017); Upper gastrointestinal endoscopy (2018); Knee Arthroscopy (Left, 01/03/2022); Appendectomy; Colonoscopy (N/A, 12/20/2022); Venous Access Device (Port) (N/A, 10/31/2023); Colonoscopy (N/A, 11/09/2023); Eye surgery; Tonsillectomy; ERCP (N/A, 07/27/2024); Cholecystectomy (N/A, 08/08/2024); Gallbladder surgery; ERCP (N/A, 09/05/2024); Ventral hernia repair (N/A, 10/03/2024); and Hernia repair (2024).   Family History: family history includes Cancer in her father and mother; Heart disease in her father and mother; Kidney cancer in her father; Lung cancer in her mother; Stroke in her father and another family member.   Social History: reports that she quit smoking about 2 years ago. Her smoking use included cigarettes. She started smoking about 50 years ago. She has a 47.4 pack-year smoking history. She has been exposed to tobacco smoke. She has never used smokeless tobacco. She reports that she does not drink alcohol and does not use drugs.  Allergies: Morphine, Cephalexin, Penicillins, and Sulfa antibiotics       Current Outpatient Medications:     acyclovir (ZOVIRAX) 400 MG tablet, TAKE 1 TABLET BY MOUTH 2 (TWO) TIMES A DAY. TAKE ONE TABLET BY MOUTH TWICE DAILY., Disp: 180 tablet, Rfl: 3    albuterol sulfate  (90 Base) MCG/ACT inhaler, Inhale 2 puffs Every 4 (Four) Hours As Needed for Wheezing., Disp: 18 g, Rfl: 11    aspirin 81 MG EC tablet, Take 1 tablet by mouth Daily., Disp: , Rfl:     busPIRone (BUSPAR) 15 MG tablet, Take 1 tablet by mouth 3 (Three) Times a Day., Disp: 270 tablet, Rfl: 1    Calcium Carb-Cholecalciferol (calcium 500 mg vitamin D 5 mcg, 200 UT,) 500-5 MG-MCG tablet per tablet, , Disp: , Rfl:     colestipol (COLESTID) 1 g tablet, TAKE 2 TABLETS BY MOUTH TWICE A DAY, Disp: 360 tablet, Rfl: 1    dapsone 25 MG tablet, TAKE 2 TABLETS BY MOUTH TWICE A DAY, Disp: 360 tablet, Rfl: 1    dexAMETHasone (DECADRON) 4 MG tablet, Take 10 tablets on D 1,8,15,22 and take 1  "tablet on D 2,3.  Take in the morning with food., Disp: 42 tablet, Rfl: 5    doxycycline (VIBRAMYICN) 100 MG tablet, Take 1 tablet by mouth 2 (Two) Times a Day., Disp: 14 tablet, Rfl: 0    FLUoxetine (PROzac) 40 MG capsule, Take 1 capsule by mouth Daily., Disp: 90 capsule, Rfl: 1    lenalidomide (REVLIMID) 15 MG capsule, Take 1 capsule by mouth Daily. On Days 1-21, and off 7 days, on a 28 day cycle, Disp: 21 capsule, Rfl: 0    levalbuterol (XOPENEX) 0.63 MG/3ML nebulizer solution, Take 1 ampule by nebulization 4 (Four) Times a Day As Needed for Wheezing., Disp: 120 mL, Rfl: 5    ondansetron (ZOFRAN) 8 MG tablet, TAKE 1 TABLET BY MOUTH THREE TIMES A DAY AS NEEDED FOR NAUSEA AND VOMITING, Disp: 30 tablet, Rfl: 5    potassium phosphate, monobasic, (K-Phos) 500 MG tablet, Take 1 tablet by mouth 2 (Two) Times a Day., Disp: 60 tablet, Rfl: 3    promethazine (PHENERGAN) 25 MG tablet, Take 1 tablet by mouth Every 6 (Six) Hours As Needed for Nausea or Vomiting., Disp: 30 tablet, Rfl: 1    vitamin D (ERGOCALCIFEROL) 1.25 MG (28496 UT) capsule capsule, Take 1 capsule by mouth 2 (Two) Times a Week., Disp: 26 capsule, Rfl: 1    budesonide (PULMICORT) 0.5 MG/2ML nebulizer solution, Take 2 mL by nebulization 2 (Two) Times a Day for 30 days. Rinse mouth out after each use, Disp: 120 mL, Rfl: 5       Objective   Vital Signs:   Resp 18   Ht 154.9 cm (60.98\")   Wt 84.4 kg (186 lb)   BMI 35.16 kg/m²       Physical Exam  Vitals and nursing note reviewed.   Constitutional:       Appearance: Normal appearance. The patient is well-developed.   Cardiovascular:      Rate and Rhythm: Normal rate and regular rhythm.   Pulmonary:      Effort: Pulmonary effort is normal.      Breath sounds: Normal air entry.   Abdominal:      General: Bowel sounds are normal.      Palpations: Abdomen is soft.      Skin:     General: Skin is warm and dry.   Neurological:      Mental Status: The patient is alert and oriented to person, place, and time.      " Motor: Motor function is intact.   Psychiatric:         Mood and Affect: Mood normal.   Extremity: 3 cm subcutaneous lipoma was noted in both lateral upper arms    Result Review :               Assessment and Plan   Diagnoses and all orders for this visit:    1. Lipoma of left upper extremity (Primary)    2. Lipoma of right upper extremity    We will schedule her for an excision of these upper arm lipomas.  I have described the procedure to her as well as the risk and benefits and she is agreeable to proceeding.    I  Eduardo Ingram MD  03/04/2025

## 2025-03-04 NOTE — H&P (VIEW-ONLY)
Inpatient History and Physical Surgical Orders    Preadmission Location:   Preadmission Time:  Facility:  Surgery Date:  Surgery Time:  Preadmission Test date:     Chief Complaint  Outpatient History and Physical / Surgical Orders    Primary Care Provider: Rena Guerra PA    Referring Provider: Rena Guerra,*    Subjective      Patient Name: Anca Samson : 1950    HPI  The patient is a 74-year-old female that we have taken care of in the past.  She has subcutaneous masses in both upper arms that apparently are uncomfortable.    Past History:  Medical History: has a past medical history of Anxiety, Asthma (2021), Atherosclerosis of native coronary artery of native heart with angina pectoris (08/15/2023), C. difficile diarrhea, Cholelithiasis (2024), Colon cancer (2017), Colon polyp, COPD (chronic obstructive pulmonary disease) (10/23/2017), Depression, Disease of thyroid gland, Diverticulitis, Diverticulitis of colon, Dysphagia, Emphysema of lung, Emphysema/COPD, GERD (gastroesophageal reflux disease), Head injury, Hernia (), History of chemotherapy, psychiatric care, Impaired fasting glucose (2015), Lumbago, Lung nodule (2022), Major depressive disorder (10/27/2016), Malignant neoplasm of ascending colon (2017), Melena, Multiple myeloma, Nicotine dependence (05/10/2017), NICK (obstructive sleep apnea) (2023), Oxygen dependent, Pancreatitis (2024), Panic disorder, Primary central sleep apnea, Renal insufficiency (2021), Seizures, Shortness of breath, Tobacco use (2021), Visit for screening mammogram (2020), and Vitamin D deficiency.   Surgical History: has a past surgical history that includes Bone marrow biopsy (); Colonoscopy (,,); Esophagogastroduodenoscopy (); Fecal disimpaction (2019); Hysterectomy (,); Mini-laparotomy (); Portacath placement (N/A); Colon surgery (2017);  Hemicolectomy (Right, 05/2017); Upper gastrointestinal endoscopy (2018); Knee Arthroscopy (Left, 01/03/2022); Appendectomy; Colonoscopy (N/A, 12/20/2022); Venous Access Device (Port) (N/A, 10/31/2023); Colonoscopy (N/A, 11/09/2023); Eye surgery; Tonsillectomy; ERCP (N/A, 07/27/2024); Cholecystectomy (N/A, 08/08/2024); Gallbladder surgery; ERCP (N/A, 09/05/2024); Ventral hernia repair (N/A, 10/03/2024); and Hernia repair (2024).   Family History: family history includes Cancer in her father and mother; Heart disease in her father and mother; Kidney cancer in her father; Lung cancer in her mother; Stroke in her father and another family member.   Social History: reports that she quit smoking about 2 years ago. Her smoking use included cigarettes. She started smoking about 50 years ago. She has a 47.4 pack-year smoking history. She has been exposed to tobacco smoke. She has never used smokeless tobacco. She reports that she does not drink alcohol and does not use drugs.  Allergies: Morphine, Cephalexin, Penicillins, and Sulfa antibiotics       Current Outpatient Medications:     acyclovir (ZOVIRAX) 400 MG tablet, TAKE 1 TABLET BY MOUTH 2 (TWO) TIMES A DAY. TAKE ONE TABLET BY MOUTH TWICE DAILY., Disp: 180 tablet, Rfl: 3    albuterol sulfate  (90 Base) MCG/ACT inhaler, Inhale 2 puffs Every 4 (Four) Hours As Needed for Wheezing., Disp: 18 g, Rfl: 11    aspirin 81 MG EC tablet, Take 1 tablet by mouth Daily., Disp: , Rfl:     busPIRone (BUSPAR) 15 MG tablet, Take 1 tablet by mouth 3 (Three) Times a Day., Disp: 270 tablet, Rfl: 1    Calcium Carb-Cholecalciferol (calcium 500 mg vitamin D 5 mcg, 200 UT,) 500-5 MG-MCG tablet per tablet, , Disp: , Rfl:     colestipol (COLESTID) 1 g tablet, TAKE 2 TABLETS BY MOUTH TWICE A DAY, Disp: 360 tablet, Rfl: 1    dapsone 25 MG tablet, TAKE 2 TABLETS BY MOUTH TWICE A DAY, Disp: 360 tablet, Rfl: 1    dexAMETHasone (DECADRON) 4 MG tablet, Take 10 tablets on D 1,8,15,22 and take 1  "tablet on D 2,3.  Take in the morning with food., Disp: 42 tablet, Rfl: 5    doxycycline (VIBRAMYICN) 100 MG tablet, Take 1 tablet by mouth 2 (Two) Times a Day., Disp: 14 tablet, Rfl: 0    FLUoxetine (PROzac) 40 MG capsule, Take 1 capsule by mouth Daily., Disp: 90 capsule, Rfl: 1    lenalidomide (REVLIMID) 15 MG capsule, Take 1 capsule by mouth Daily. On Days 1-21, and off 7 days, on a 28 day cycle, Disp: 21 capsule, Rfl: 0    levalbuterol (XOPENEX) 0.63 MG/3ML nebulizer solution, Take 1 ampule by nebulization 4 (Four) Times a Day As Needed for Wheezing., Disp: 120 mL, Rfl: 5    ondansetron (ZOFRAN) 8 MG tablet, TAKE 1 TABLET BY MOUTH THREE TIMES A DAY AS NEEDED FOR NAUSEA AND VOMITING, Disp: 30 tablet, Rfl: 5    potassium phosphate, monobasic, (K-Phos) 500 MG tablet, Take 1 tablet by mouth 2 (Two) Times a Day., Disp: 60 tablet, Rfl: 3    promethazine (PHENERGAN) 25 MG tablet, Take 1 tablet by mouth Every 6 (Six) Hours As Needed for Nausea or Vomiting., Disp: 30 tablet, Rfl: 1    vitamin D (ERGOCALCIFEROL) 1.25 MG (97597 UT) capsule capsule, Take 1 capsule by mouth 2 (Two) Times a Week., Disp: 26 capsule, Rfl: 1    budesonide (PULMICORT) 0.5 MG/2ML nebulizer solution, Take 2 mL by nebulization 2 (Two) Times a Day for 30 days. Rinse mouth out after each use, Disp: 120 mL, Rfl: 5       Objective   Vital Signs:   Resp 18   Ht 154.9 cm (60.98\")   Wt 84.4 kg (186 lb)   BMI 35.16 kg/m²       Physical Exam  Vitals and nursing note reviewed.   Constitutional:       Appearance: Normal appearance. The patient is well-developed.   Cardiovascular:      Rate and Rhythm: Normal rate and regular rhythm.   Pulmonary:      Effort: Pulmonary effort is normal.      Breath sounds: Normal air entry.   Abdominal:      General: Bowel sounds are normal.      Palpations: Abdomen is soft.      Skin:     General: Skin is warm and dry.   Neurological:      Mental Status: The patient is alert and oriented to person, place, and time.      " Motor: Motor function is intact.   Psychiatric:         Mood and Affect: Mood normal.   Extremity: 3 cm subcutaneous lipoma was noted in both lateral upper arms    Result Review :               Assessment and Plan   Diagnoses and all orders for this visit:    1. Lipoma of left upper extremity (Primary)    2. Lipoma of right upper extremity    We will schedule her for an excision of these upper arm lipomas.  I have described the procedure to her as well as the risk and benefits and she is agreeable to proceeding.    I  Eduardo Ingram MD  03/04/2025

## 2025-03-05 DIAGNOSIS — R91.8 MULTIPLE LUNG NODULES: Primary | ICD-10-CM

## 2025-03-05 DIAGNOSIS — F17.211 CIGARETTE NICOTINE DEPENDENCE IN REMISSION: ICD-10-CM

## 2025-03-05 RX ORDER — BUDESONIDE 0.5 MG/2ML
INHALANT ORAL
Qty: 120 EACH | Refills: 5 | Status: SHIPPED | OUTPATIENT
Start: 2025-03-05

## 2025-03-05 RX ORDER — REVEFENACIN 175 UG/3ML
SOLUTION RESPIRATORY (INHALATION)
Qty: 90 ML | Refills: 5 | Status: SHIPPED | OUTPATIENT
Start: 2025-03-05

## 2025-03-05 RX ORDER — ARFORMOTEROL TARTRATE 15 UG/2ML
SOLUTION RESPIRATORY (INHALATION)
Qty: 120 ML | Refills: 5 | Status: SHIPPED | OUTPATIENT
Start: 2025-03-05 | End: 2025-04-04

## 2025-03-13 ENCOUNTER — OFFICE VISIT (OUTPATIENT)
Dept: PULMONOLOGY | Facility: CLINIC | Age: 75
End: 2025-03-13
Payer: MEDICARE

## 2025-03-13 VITALS
WEIGHT: 181.2 LBS | SYSTOLIC BLOOD PRESSURE: 127 MMHG | BODY MASS INDEX: 34.21 KG/M2 | HEART RATE: 76 BPM | TEMPERATURE: 98 F | DIASTOLIC BLOOD PRESSURE: 78 MMHG | RESPIRATION RATE: 18 BRPM | HEIGHT: 61 IN | OXYGEN SATURATION: 97 %

## 2025-03-13 DIAGNOSIS — R06.83 SNORING: ICD-10-CM

## 2025-03-13 DIAGNOSIS — J96.11 CHRONIC RESPIRATORY FAILURE WITH HYPOXIA: ICD-10-CM

## 2025-03-13 DIAGNOSIS — R06.09 CHRONIC DYSPNEA: ICD-10-CM

## 2025-03-13 DIAGNOSIS — J43.2 CENTRILOBULAR EMPHYSEMA: ICD-10-CM

## 2025-03-13 DIAGNOSIS — F17.201 TOBACCO ABUSE, IN REMISSION: ICD-10-CM

## 2025-03-13 DIAGNOSIS — J98.11 ATELECTASIS: ICD-10-CM

## 2025-03-13 DIAGNOSIS — R05.9 COUGH, UNSPECIFIED TYPE: ICD-10-CM

## 2025-03-13 DIAGNOSIS — Z12.2 ENCOUNTER FOR SCREENING FOR LUNG CANCER: ICD-10-CM

## 2025-03-13 DIAGNOSIS — R06.2 WHEEZING: ICD-10-CM

## 2025-03-13 DIAGNOSIS — G47.33 OSA (OBSTRUCTIVE SLEEP APNEA): ICD-10-CM

## 2025-03-13 DIAGNOSIS — Z72.0 TOBACCO ABUSE: Primary | ICD-10-CM

## 2025-03-13 RX ORDER — LEVALBUTEROL INHALATION SOLUTION 0.63 MG/3ML
3 SOLUTION RESPIRATORY (INHALATION) 4 TIMES DAILY PRN
Qty: 120 ML | Refills: 5 | Status: SHIPPED | OUTPATIENT
Start: 2025-03-13

## 2025-03-13 RX ORDER — HYDROCODONE BITARTRATE AND ACETAMINOPHEN 5; 325 MG/1; MG/1
1 TABLET ORAL
COMMUNITY
Start: 2024-12-03

## 2025-03-13 NOTE — PROGRESS NOTES
Primary Care Provider  Rena Guerra PA     Referring Provider  No ref. provider found     Chief Complaint  Follow-up, Sleep Apnea, oxygen, and Shortness of Breath    Subjective          Anca Samson presents to [facility] PULMONARY & CRITICAL CARE MEDICINE  History of Present Illness  Anca Samson is a 74 y.o. female patient   History of Present Illness  The patient is a 74-year-old female who presents for evaluation of chronic hypoxic respiratory failure, emphysema, pulmonary nodule, and severe obstructive sleep apnea.    She has a history of colon cancer, multiple myeloma, chronic dyspnea, multiple lung nodules, severe obstructive sleep apnea, chronic hypoxic respiratory failure, and hiatal hernia. She reports no new health issues and maintains satisfactory oxygen levels at home. She has been on Brovana, Pulmicort, Yupelri, and albuterol as needed. She continues to use BiPAP during sleep and was supposed to start pulmonary rehab after her last office visit. She recently had a CT scan of the chest, which shows a stable 5 mm pulmonary nodule in the right middle lobe and upper lobe emphysema. She has not required the use of a rescue nebulizer and has a sufficient supply of medication for her nebulizer at home. She has experienced a weight loss of approximately 40 pounds. She continues her regimen of Brovana, Pulmicort, and Yupelri, which she finds beneficial. However, she has abstained from using the nebulizer for about a week due to a desire to discontinue its use. She expresses interest in transitioning to an inhaler, to be used once or twice daily. She acknowledges the need for oxygen when venturing outdoors due to a lack of strength. She was previously unable to participate in pulmonary rehab due to her reliance on a wheelchair but has since regained the ability to walk from her house to her car without assistance, or with the aid of a cane.    She uses her BiPAP machine  nightly, with the exception of 5 nights following surgical procedures. She notes a significant difference in her condition when she does not use the machine, citing an instance where she slept for 10 hours with the machine on.    She receives annual influenza vaccinations and has received a pneumonia vaccine, although she is uncertain of the type.    MEDICATIONS  Brovana, Pulmicort, Yupelri, albuterol    IMMUNIZATIONS  She takes the influenza vaccine every year. She has received a pneumonia vaccine.    Review of Systems     General:  No Fatigue, No Fever No weight loss or loss of appetite  HEENT: No dysphagia, No Visual Changes, no rhinorrhea  Respiratory:  + cough,+Dyspnea, intermittent mucoid phlegm, No Pleuritic Pain, no wheezing, no hemoptysis.  Cardiovascular: Denies chest pain, denies palpitations,+PRAKASH, No Chest Pressure  Gastrointestinal:  No Abdominal Pain, No Nausea, No Vomiting, No Diarrhea  Genitourinary:  No Dysuria, No Frequency, No Hesitancy  Musculoskeletal: No muscle pain or swelling  Endocrine:  No Heat Intolerance, No Cold Intolerance  Hematologic:  No Bleeding, No Bruising  Psychiatric:  No Anxiety, No Depression  Neurologic:  No Confusion, no Dysarthria, No Headaches  Skin:  No Rash, No Open Wounds    Family History   Problem Relation Age of Onset    Heart disease Mother     Lung cancer Mother     Cancer Mother     Stroke Father     Heart disease Father     Kidney cancer Father     Cancer Father     Stroke Other         UNCLE/AUNT    Neuropathy Brother     Colon cancer Neg Hx     Malig Hyperthermia Neg Hx         Social History     Socioeconomic History    Marital status:    Tobacco Use    Smoking status: Former     Current packs/day: 0.00     Average packs/day: 1 pack/day for 47.4 years (47.4 ttl pk-yrs)     Types: Cigarettes     Start date: 1975     Quit date: 6/3/2022     Years since quittin.7     Passive exposure: Past    Smokeless tobacco: Never   Vaping Use    Vaping status:  Never Used   Substance and Sexual Activity    Alcohol use: Never    Drug use: Never    Sexual activity: Not Currently     Partners: Male     Birth control/protection: Hysterectomy        Past Medical History:   Diagnosis Date    Anxiety     Asthma 07/28/2021    Atherosclerosis of native coronary artery of native heart with angina pectoris 08/15/2023    FOLLOWED BY DR GONZALES/DINA PETTIT. DENIES CP BUT GETS SOA WITH EXERTION HAS COPD WEARS AT 2LPM N/C. DECREASED ACTIVITY D/T SOA.    C. difficile diarrhea     DENIES ANY CURRENT ISSUES    Cholelithiasis 7-    Chronic diarrhea     Colon cancer 07/21/2017    Colon polyp     COPD (chronic obstructive pulmonary disease) 10/23/2017    Dental disease     Depression     Disease of thyroid gland     Diverticulitis     Diverticulitis of colon     Diverticulosis 7-    Dysphagia     Emphysema of lung     Emphysema/COPD     GERD (gastroesophageal reflux disease)     Head injury     CONCUSSION AT AGE 4    Hernia 2019    History of chemotherapy     2017    Hx of psychiatric care     Impaired fasting glucose 08/14/2015    Lumbago     Lung nodule 02/17/2022    Major depressive disorder 10/27/2016    Malignant neoplasm of ascending colon 07/21/2017    Melena     Multiple myeloma     Nicotine dependence 05/10/2017    Obesity     NICK (obstructive sleep apnea) 11/06/2023    Oxygen dependent     REPORTS USES 02 AT 2LPM VIA N/C DURING DAY AND 3LPM AT NIGHT . CAN GET VERY SOA WITH MINIMAL EXERTION    Pancreatitis 7/26/2024    Panic disorder     Primary central sleep apnea     Renal insufficiency 07/28/2021    Seizures     PSEUDO SEIZURES LAST ONE AROUND JULY 2024    Shortness of breath     CAN GET VERY SOA WITH MINIMAL EXERTION    Sinusitis     Small intestine cancer     Tinnitus     Tobacco use 07/28/2021    QUIT SMOKING JUNE 2022    Tremor     Visit for screening mammogram 02/20/2020    NORMAL- REPEAT IN ONE YEAR    Vitamin D deficiency         Immunization History    Administered Date(s) Administered    Fluzone High-Dose 65+YRS 09/24/2020, 09/24/2021, 09/23/2024    Fluzone High-Dose 65+yrs 09/24/2020, 09/24/2021, 09/20/2022, 10/10/2023    Fluzone Quad >6mos (Multi-dose) 09/14/2015    Pneumococcal Conjugate 13-Valent (PCV13) 09/21/2015    Pneumococcal Polysaccharide (PPSV23) 04/12/2022         Allergies   Allergen Reactions    Morphine Anaphylaxis    Cephalexin Hives    Penicillins Hives     Beta lactam allergy details  Antibiotic reaction: hives  Age at reaction: child  Dose to reaction time: unknown  Reason for antibiotic: unknown  Epinephrine required for reaction?: unknown  Tolerated antibiotics: other (none per pt)        Sulfa Antibiotics Hives          Current Outpatient Medications:     acyclovir (ZOVIRAX) 400 MG tablet, TAKE 1 TABLET BY MOUTH 2 (TWO) TIMES A DAY. TAKE ONE TABLET BY MOUTH TWICE DAILY., Disp: 180 tablet, Rfl: 3    albuterol sulfate  (90 Base) MCG/ACT inhaler, Inhale 2 puffs Every 4 (Four) Hours As Needed for Wheezing., Disp: 18 g, Rfl: 11    aspirin 81 MG EC tablet, Take 1 tablet by mouth Daily., Disp: , Rfl:     busPIRone (BUSPAR) 15 MG tablet, Take 1 tablet by mouth 3 (Three) Times a Day., Disp: 270 tablet, Rfl: 1    Calcium Carb-Cholecalciferol (calcium 500 mg vitamin D 5 mcg, 200 UT,) 500-5 MG-MCG tablet per tablet, , Disp: , Rfl:     colestipol (COLESTID) 1 g tablet, TAKE 2 TABLETS BY MOUTH TWICE A DAY, Disp: 360 tablet, Rfl: 1    dapsone 25 MG tablet, TAKE 2 TABLETS BY MOUTH TWICE A DAY, Disp: 360 tablet, Rfl: 1    dexAMETHasone (DECADRON) 4 MG tablet, Take 10 tablets on D 1,8,15,22 and take 1 tablet on D 2,3.  Take in the morning with food., Disp: 42 tablet, Rfl: 5    doxycycline (VIBRAMYICN) 100 MG tablet, Take 1 tablet by mouth 2 (Two) Times a Day., Disp: 14 tablet, Rfl: 0    FLUoxetine (PROzac) 40 MG capsule, Take 1 capsule by mouth Daily., Disp: 90 capsule, Rfl: 1    HYDROcodone-acetaminophen (NORCO) 5-325 MG per tablet, Take 1 tablet  "by mouth every 6 (six) to 8 (eight) hours as needed for Pain., Disp: , Rfl:     lenalidomide (REVLIMID) 15 MG capsule, Take 1 capsule by mouth Daily. On Days 1-21, and off 7 days, on a 28 day cycle, Disp: 21 capsule, Rfl: 0    levalbuterol (XOPENEX) 0.63 MG/3ML nebulizer solution, Take 1 ampule by nebulization 4 (Four) Times a Day As Needed for Wheezing., Disp: 120 mL, Rfl: 5    ondansetron (ZOFRAN) 8 MG tablet, TAKE 1 TABLET BY MOUTH THREE TIMES A DAY AS NEEDED FOR NAUSEA AND VOMITING, Disp: 30 tablet, Rfl: 5    potassium phosphate, monobasic, (K-Phos) 500 MG tablet, Take 1 tablet by mouth 2 (Two) Times a Day., Disp: 60 tablet, Rfl: 3    promethazine (PHENERGAN) 25 MG tablet, Take 1 tablet by mouth Every 6 (Six) Hours As Needed for Nausea or Vomiting., Disp: 30 tablet, Rfl: 1    vitamin D (ERGOCALCIFEROL) 1.25 MG (02534 UT) capsule capsule, Take 1 capsule by mouth 2 (Two) Times a Week., Disp: 26 capsule, Rfl: 1    Fluticasone-Umeclidin-Vilant (TRELEGY) 100-62.5-25 MCG/ACT inhaler, Inhale 1 puff Daily., Disp: 1 each, Rfl: 6     Objective   Physical Exam  Physical Exam      Vital Signs:   /78 (BP Location: Right arm, Patient Position: Sitting, Cuff Size: Adult)   Pulse 76   Temp 98 °F (36.7 °C) (Tympanic)   Resp 18   Ht 154.9 cm (60.98\")   Wt 82.2 kg (181 lb 3.2 oz)   SpO2 97% Comment: 2 liters pd  BMI 34.26 kg/m²       Vital Signs Reviewed  WDWN, Alert, in mild distress, has nasal oxygen, conversational dyspnea  HEENT:  PERRL, EOMI.  OP, nares clear, no sinus tenderness  Mallampatti classification : 1  Neck:  Supple, no JVD, no thyromegaly  Lymph: no axillary, cervical, supraclavicular lymphadenopathy noted bilaterally  Chest: Poor aeration, bilateral diminished breath sounds, no wheezing, crackles or rhonchi, resonant to percussion b/l  CV: RRR, no MGR, pulses 2+, equal.  Abd:  Soft, NT, ND, + BS, no HSM  EXT:  no clubbing, no cyanosis, No BLE edema  Neuro:  A&Ox3, CN grossly intact, no focal " deficits.  Skin: No rashes or lesions noted     Result Review :   The following data was reviewed by: Felton Lee MD on 03/13/2025:  Common labs          1/22/2025    10:13 2/12/2025    10:01 2/19/2025    08:31   Common Labs   Glucose 182   149    BUN 12   15    Creatinine 0.73   0.81    Sodium 141   140    Potassium 3.6   3.9    Chloride 109   110    Calcium 8.7   8.9    Albumin 3.6  2.9  3.8    Total Bilirubin 0.5   0.4    Alkaline Phosphatase 76   69    AST (SGOT) 8   10    ALT (SGPT) 9   11    WBC 9.49   7.56    Hemoglobin 10.3   11.6    Hematocrit 32.5   36.2    Platelets 495   264      CMP          1/22/2025    10:13 2/12/2025    10:01 2/19/2025    08:31   CMP   Glucose 182   149    BUN 12   15    Creatinine 0.73   0.81    EGFR 85.9   75.8    Sodium 141   140    Potassium 3.6   3.9    Chloride 109   110    Calcium 8.7   8.9    Total Protein 5.8  5.1  5.9    Albumin 3.6  2.9  3.8    Globulin 2.2  2.2  2.1    Total Bilirubin 0.5   0.4    Alkaline Phosphatase 76   69    AST (SGOT) 8   10    ALT (SGPT) 9   11    Albumin/Globulin Ratio 1.6  1.3  1.8    BUN/Creatinine Ratio 16.4   18.5    Anion Gap 13.3   13.6      CBC          12/26/2024    09:23 1/22/2025    10:13 2/19/2025    08:31   CBC   WBC 5.42  9.49  7.56    RBC 3.72  3.11  3.42    Hemoglobin 12.0  10.3  11.6    Hematocrit 37.3  32.5  36.2    .3  104.5  105.8    MCH 32.3  33.1  33.9    MCHC 32.2  31.7  32.0    RDW 17.7  17.7  16.8    Platelets 350  495  264      CBC w/diff          12/26/2024    09:23 1/22/2025    10:13 2/19/2025    08:31   CBC w/Diff   WBC 5.42  9.49  7.56    RBC 3.72  3.11  3.42    Hemoglobin 12.0  10.3  11.6    Hematocrit 37.3  32.5  36.2    .3  104.5  105.8    MCH 32.3  33.1  33.9    MCHC 32.2  31.7  32.0    RDW 17.7  17.7  16.8    Platelets 350  495  264    Neutrophil Rel % 68.1  69.0  54.5    Immature Granulocyte Rel % 0.9  0.9  0.5    Lymphocyte Rel % 24.9  20.3  34.0    Monocyte Rel % 3.0  8.7  10.4    Eosinophil  Rel % 2.0  0.0  0.1    Basophil Rel % 1.1  1.1  0.5      Data reviewed : Radiologic studies previous imaging reviewed.    Results  Imaging  CT scan of the chest shows a stable 5 mm pulmonary nodule in the right middle lobe and upper lobe emphysema.    Testing  Lung function test was not bad. Apnea-hypopnea index is 1.             Assessment and Plan    Diagnoses and all orders for this visit:    1. Tobacco abuse (Primary)    2. NICK (obstructive sleep apnea)  -     Fluticasone-Umeclidin-Vilant (TRELEGY) 100-62.5-25 MCG/ACT inhaler; Inhale 1 puff Daily.  Dispense: 1 each; Refill: 6  -     levalbuterol (XOPENEX) 0.63 MG/3ML nebulizer solution; Take 1 ampule by nebulization 4 (Four) Times a Day As Needed for Wheezing.  Dispense: 120 mL; Refill: 5    3. Chronic respiratory failure with hypoxia  -     Fluticasone-Umeclidin-Vilant (TRELEGY) 100-62.5-25 MCG/ACT inhaler; Inhale 1 puff Daily.  Dispense: 1 each; Refill: 6  -     levalbuterol (XOPENEX) 0.63 MG/3ML nebulizer solution; Take 1 ampule by nebulization 4 (Four) Times a Day As Needed for Wheezing.  Dispense: 120 mL; Refill: 5    4. Centrilobular emphysema  -     Fluticasone-Umeclidin-Vilant (TRELEGY) 100-62.5-25 MCG/ACT inhaler; Inhale 1 puff Daily.  Dispense: 1 each; Refill: 6  -     levalbuterol (XOPENEX) 0.63 MG/3ML nebulizer solution; Take 1 ampule by nebulization 4 (Four) Times a Day As Needed for Wheezing.  Dispense: 120 mL; Refill: 5    5. Atelectasis  -     Fluticasone-Umeclidin-Vilant (TRELEGY) 100-62.5-25 MCG/ACT inhaler; Inhale 1 puff Daily.  Dispense: 1 each; Refill: 6  -     levalbuterol (XOPENEX) 0.63 MG/3ML nebulizer solution; Take 1 ampule by nebulization 4 (Four) Times a Day As Needed for Wheezing.  Dispense: 120 mL; Refill: 5    6. Tobacco abuse, in remission    7. Chronic dyspnea    8. Snoring    9. Wheezing    10. Cough, unspecified type    11. Encounter for screening for lung cancer      Assessment & Plan  1. Chronic hypoxic respiratory  failure.  Her overall lung function has shown improvement. The CT scan of the chest shows a stable 5 mm pulmonary nodule in the right middle lobe and upper lobe emphysema. She is advised to monitor her oxygen levels during activities and to use supplemental oxygen if levels drop below 88. She is encouraged to maintain an active lifestyle and gradually increase her daily activity level. If her oxygen levels remain above 88, she may not require supplemental oxygen. However, if her oxygen levels drop during ambulation, she should pace herself and use supplemental oxygen. A prescription for Trelegy inhaler, to be used once daily, will be provided. She is instructed to rinse her mouth post-inhalation. The use of other nebulizers will be discontinued, except for the rescue nebulizer as needed. If she feels ready for pulmonary rehabilitation in the future, this option can be reconsidered.    2. Severe obstructive sleep apnea.  She continues to use her BiPAP machine nightly, with an average usage of 7 hours and 20 minutes on the days it is used. Her apnea-hypopnea index is 1 on the days she uses the machine, indicating effective management of her condition. She is advised to continue using the BiPAP machine as prescribed.    3. Pulmonary nodule.  The recent CT scan shows a stable 5 mm pulmonary nodule in the right middle lobe. A follow-up CT scan will be ordered in 6 months to monitor the nodule.    4. Emphysema.  The recent CT scan shows stable emphysema in the right middle and upper lobes. She is advised to continue her current treatment regimen, including the use of the Trelegy inhaler.    5. Health Maintenance.  She is advised to confirm that she has received the Prevnar 20 vaccine. She reports receiving her flu shot this year. She is also advised to ensure she has received the appropriate pneumonia vaccine.    Follow-up  The patient will follow up in 6 months.    Follow Up   Return in about 6 months (around  9/13/2025).  Patient was given instructions and counseling regarding her condition or for health maintenance advice. Please see specific information pulled into the AVS if appropriate.     Patient or patient representative verbalized consent for the use of Ambient Listening during the visit with  Felton Lee MD for chart documentation. 3/13/2025  13:56 EDT    Electronically signed by Felton Lee MD, 3/13/2025, 13:58 EDT.

## 2025-03-19 ENCOUNTER — ANESTHESIA EVENT (OUTPATIENT)
Dept: PERIOP | Facility: HOSPITAL | Age: 75
End: 2025-03-19
Payer: MEDICARE

## 2025-03-19 ENCOUNTER — OFFICE VISIT (OUTPATIENT)
Dept: ONCOLOGY | Facility: HOSPITAL | Age: 75
End: 2025-03-19
Payer: MEDICARE

## 2025-03-19 ENCOUNTER — SPECIALTY PHARMACY (OUTPATIENT)
Dept: PHARMACY | Facility: HOSPITAL | Age: 75
End: 2025-03-19
Payer: MEDICARE

## 2025-03-19 ENCOUNTER — HOSPITAL ENCOUNTER (OUTPATIENT)
Dept: ONCOLOGY | Facility: HOSPITAL | Age: 75
Discharge: HOME | End: 2025-03-19
Payer: MEDICARE

## 2025-03-19 VITALS
SYSTOLIC BLOOD PRESSURE: 123 MMHG | RESPIRATION RATE: 20 BRPM | OXYGEN SATURATION: 97 % | DIASTOLIC BLOOD PRESSURE: 50 MMHG | WEIGHT: 184.97 LBS | BODY MASS INDEX: 34.92 KG/M2 | TEMPERATURE: 97.3 F | HEART RATE: 75 BPM | HEIGHT: 61 IN

## 2025-03-19 VITALS
SYSTOLIC BLOOD PRESSURE: 123 MMHG | DIASTOLIC BLOOD PRESSURE: 50 MMHG | WEIGHT: 185 LBS | OXYGEN SATURATION: 97 % | HEIGHT: 61 IN | RESPIRATION RATE: 20 BRPM | TEMPERATURE: 97.3 F | BODY MASS INDEX: 34.93 KG/M2 | HEART RATE: 75 BPM

## 2025-03-19 DIAGNOSIS — Z12.2 ENCOUNTER FOR SCREENING FOR LUNG CANCER: ICD-10-CM

## 2025-03-19 DIAGNOSIS — C90.00 MULTIPLE MYELOMA NOT HAVING ACHIEVED REMISSION: Primary | ICD-10-CM

## 2025-03-19 DIAGNOSIS — Z79.899 ENCOUNTER FOR LONG-TERM (CURRENT) USE OF MEDICATIONS: ICD-10-CM

## 2025-03-19 DIAGNOSIS — E83.51 HYPOCALCEMIA: ICD-10-CM

## 2025-03-19 DIAGNOSIS — C90.00 MULTIPLE MYELOMA, REMISSION STATUS UNSPECIFIED: ICD-10-CM

## 2025-03-19 LAB
ALBUMIN SERPL-MCNC: 3.6 G/DL (ref 3.5–5.2)
ALBUMIN/GLOB SERPL: 1.8 G/DL
ALP SERPL-CCNC: 82 U/L (ref 39–117)
ALT SERPL W P-5'-P-CCNC: 13 U/L (ref 1–33)
ANION GAP SERPL CALCULATED.3IONS-SCNC: 8.9 MMOL/L (ref 5–15)
ANISOCYTOSIS BLD QL: NORMAL
AST SERPL-CCNC: 12 U/L (ref 1–32)
BASOPHILS # BLD AUTO: 0.18 10*3/MM3 (ref 0–0.2)
BASOPHILS NFR BLD AUTO: 2.7 % (ref 0–1.5)
BILIRUB SERPL-MCNC: 0.4 MG/DL (ref 0–1.2)
BUN SERPL-MCNC: 13 MG/DL (ref 8–23)
BUN/CREAT SERPL: 15.1 (ref 7–25)
CALCIUM SPEC-SCNC: 8.3 MG/DL (ref 8.6–10.5)
CHLORIDE SERPL-SCNC: 108 MMOL/L (ref 98–107)
CO2 SERPL-SCNC: 19.1 MMOL/L (ref 22–29)
CREAT SERPL-MCNC: 0.86 MG/DL (ref 0.57–1)
DEPRECATED RDW RBC AUTO: 62.3 FL (ref 37–54)
EGFRCR SERPLBLD CKD-EPI 2021: 70.6 ML/MIN/1.73
EOSINOPHIL # BLD AUTO: 0.11 10*3/MM3 (ref 0–0.4)
EOSINOPHIL NFR BLD AUTO: 1.7 % (ref 0.3–6.2)
ERYTHROCYTE [DISTWIDTH] IN BLOOD BY AUTOMATED COUNT: 16.1 % (ref 12.3–15.4)
GLOBULIN UR ELPH-MCNC: 2 GM/DL
GLUCOSE SERPL-MCNC: 162 MG/DL (ref 65–99)
HCT VFR BLD AUTO: 37.1 % (ref 34–46.6)
HGB BLD-MCNC: 11.7 G/DL (ref 12–15.9)
IMM GRANULOCYTES # BLD AUTO: 0.01 10*3/MM3 (ref 0–0.05)
IMM GRANULOCYTES NFR BLD AUTO: 0.2 % (ref 0–0.5)
LARGE PLATELETS: NORMAL
LYMPHOCYTES # BLD AUTO: 4.35 10*3/MM3 (ref 0.7–3.1)
LYMPHOCYTES NFR BLD AUTO: 66.2 % (ref 19.6–45.3)
MACROCYTES BLD QL SMEAR: NORMAL
MAGNESIUM SERPL-MCNC: 1.8 MG/DL (ref 1.6–2.4)
MCH RBC QN AUTO: 32.7 PG (ref 26.6–33)
MCHC RBC AUTO-ENTMCNC: 31.5 G/DL (ref 31.5–35.7)
MCV RBC AUTO: 103.6 FL (ref 79–97)
MONOCYTES # BLD AUTO: 0.6 10*3/MM3 (ref 0.1–0.9)
MONOCYTES NFR BLD AUTO: 9.1 % (ref 5–12)
NEUTROPHILS NFR BLD AUTO: 1.32 10*3/MM3 (ref 1.7–7)
NEUTROPHILS NFR BLD AUTO: 20.1 % (ref 42.7–76)
NRBC BLD AUTO-RTO: 0 /100 WBC (ref 0–0.2)
PHOSPHATE SERPL-MCNC: 3.2 MG/DL (ref 2.5–4.5)
PLATELET # BLD AUTO: 272 10*3/MM3 (ref 140–450)
PMV BLD AUTO: 9.3 FL (ref 6–12)
POIKILOCYTOSIS BLD QL SMEAR: NORMAL
POLYCHROMASIA BLD QL SMEAR: NORMAL
POTASSIUM SERPL-SCNC: 3.9 MMOL/L (ref 3.5–5.2)
PROT SERPL-MCNC: 5.6 G/DL (ref 6–8.5)
RBC # BLD AUTO: 3.58 10*6/MM3 (ref 3.77–5.28)
SMALL PLATELETS BLD QL SMEAR: ADEQUATE
SODIUM SERPL-SCNC: 136 MMOL/L (ref 136–145)
WBC MORPH BLD: NORMAL
WBC NRBC COR # BLD AUTO: 6.57 10*3/MM3 (ref 3.4–10.8)

## 2025-03-19 PROCEDURE — 25010000002 METHYLPREDNISOLONE PER 125 MG: Performed by: INTERNAL MEDICINE

## 2025-03-19 PROCEDURE — 85025 COMPLETE CBC W/AUTO DIFF WBC: CPT | Performed by: INTERNAL MEDICINE

## 2025-03-19 PROCEDURE — 84100 ASSAY OF PHOSPHORUS: CPT | Performed by: INTERNAL MEDICINE

## 2025-03-19 PROCEDURE — 96374 THER/PROPH/DIAG INJ IV PUSH: CPT

## 2025-03-19 PROCEDURE — 96401 CHEMO ANTI-NEOPL SQ/IM: CPT

## 2025-03-19 PROCEDURE — 25010000002 ZOLEDRONIC ACID 4 MG/100ML SOLUTION: Performed by: INTERNAL MEDICINE

## 2025-03-19 PROCEDURE — 96375 TX/PRO/DX INJ NEW DRUG ADDON: CPT

## 2025-03-19 PROCEDURE — 25810000003 SODIUM CHLORIDE 0.9 % SOLUTION: Performed by: INTERNAL MEDICINE

## 2025-03-19 PROCEDURE — 25010000002 HEPARIN LOCK FLUSH PER 10 UNITS: Performed by: INTERNAL MEDICINE

## 2025-03-19 PROCEDURE — 83735 ASSAY OF MAGNESIUM: CPT | Performed by: INTERNAL MEDICINE

## 2025-03-19 PROCEDURE — 25010000002 DARATUMUMAB-HYALURONIDASE-FIHJ 1800-30000 MG-UT/15ML SOLUTION: Performed by: INTERNAL MEDICINE

## 2025-03-19 PROCEDURE — 80053 COMPREHEN METABOLIC PANEL: CPT | Performed by: INTERNAL MEDICINE

## 2025-03-19 PROCEDURE — 25010000002 DIPHENHYDRAMINE PER 50 MG: Performed by: INTERNAL MEDICINE

## 2025-03-19 PROCEDURE — A9270 NON-COVERED ITEM OR SERVICE: HCPCS | Performed by: INTERNAL MEDICINE

## 2025-03-19 PROCEDURE — 63710000001 ACETAMINOPHEN EXTRA STRENGTH 500 MG TABLET: Performed by: INTERNAL MEDICINE

## 2025-03-19 PROCEDURE — 85007 BL SMEAR W/DIFF WBC COUNT: CPT | Performed by: INTERNAL MEDICINE

## 2025-03-19 RX ORDER — METHYLPREDNISOLONE SODIUM SUCCINATE 125 MG/2ML
60 INJECTION INTRAMUSCULAR; INTRAVENOUS ONCE
Status: CANCELLED | OUTPATIENT
Start: 2025-03-19

## 2025-03-19 RX ORDER — SODIUM CHLORIDE 0.9 % (FLUSH) 0.9 %
20 SYRINGE (ML) INJECTION AS NEEDED
Status: DISCONTINUED | OUTPATIENT
Start: 2025-03-19 | End: 2025-03-20 | Stop reason: HOSPADM

## 2025-03-19 RX ORDER — HEPARIN SODIUM (PORCINE) LOCK FLUSH IV SOLN 100 UNIT/ML 100 UNIT/ML
500 SOLUTION INTRAVENOUS AS NEEDED
OUTPATIENT
Start: 2025-03-19

## 2025-03-19 RX ORDER — ACETAMINOPHEN 500 MG
1000 TABLET ORAL ONCE
Status: COMPLETED | OUTPATIENT
Start: 2025-03-19 | End: 2025-03-19

## 2025-03-19 RX ORDER — SODIUM CHLORIDE 9 MG/ML
20 INJECTION, SOLUTION INTRAVENOUS ONCE
Status: DISCONTINUED | OUTPATIENT
Start: 2025-03-19 | End: 2025-03-20 | Stop reason: HOSPADM

## 2025-03-19 RX ORDER — ACETAMINOPHEN 500 MG
1000 TABLET ORAL ONCE
Status: CANCELLED | OUTPATIENT
Start: 2025-03-19

## 2025-03-19 RX ORDER — DIPHENHYDRAMINE HYDROCHLORIDE 50 MG/ML
50 INJECTION INTRAMUSCULAR; INTRAVENOUS AS NEEDED
Status: DISCONTINUED | OUTPATIENT
Start: 2025-03-19 | End: 2025-03-20 | Stop reason: HOSPADM

## 2025-03-19 RX ORDER — FAMOTIDINE 10 MG/ML
20 INJECTION, SOLUTION INTRAVENOUS AS NEEDED
Status: DISCONTINUED | OUTPATIENT
Start: 2025-03-19 | End: 2025-03-20 | Stop reason: HOSPADM

## 2025-03-19 RX ORDER — SODIUM CHLORIDE 0.9 % (FLUSH) 0.9 %
20 SYRINGE (ML) INJECTION AS NEEDED
OUTPATIENT
Start: 2025-03-19

## 2025-03-19 RX ORDER — HEPARIN SODIUM (PORCINE) LOCK FLUSH IV SOLN 100 UNIT/ML 100 UNIT/ML
500 SOLUTION INTRAVENOUS AS NEEDED
Status: DISCONTINUED | OUTPATIENT
Start: 2025-03-19 | End: 2025-03-20 | Stop reason: HOSPADM

## 2025-03-19 RX ORDER — SODIUM CHLORIDE 9 MG/ML
20 INJECTION, SOLUTION INTRAVENOUS ONCE
Status: CANCELLED | OUTPATIENT
Start: 2025-03-19

## 2025-03-19 RX ORDER — SODIUM CHLORIDE 9 MG/ML
20 INJECTION, SOLUTION INTRAVENOUS ONCE
Status: COMPLETED | OUTPATIENT
Start: 2025-03-19 | End: 2025-03-19

## 2025-03-19 RX ORDER — ZOLEDRONIC ACID 0.04 MG/ML
4 INJECTION, SOLUTION INTRAVENOUS ONCE
Status: CANCELLED | OUTPATIENT
Start: 2025-03-19

## 2025-03-19 RX ORDER — ZOLEDRONIC ACID 0.04 MG/ML
4 INJECTION, SOLUTION INTRAVENOUS ONCE
Status: COMPLETED | OUTPATIENT
Start: 2025-03-19 | End: 2025-03-19

## 2025-03-19 RX ORDER — FAMOTIDINE 10 MG/ML
20 INJECTION, SOLUTION INTRAVENOUS AS NEEDED
Status: CANCELLED | OUTPATIENT
Start: 2025-03-19

## 2025-03-19 RX ORDER — DIPHENHYDRAMINE HYDROCHLORIDE 50 MG/ML
50 INJECTION INTRAMUSCULAR; INTRAVENOUS AS NEEDED
Status: CANCELLED | OUTPATIENT
Start: 2025-03-19

## 2025-03-19 RX ORDER — METHYLPREDNISOLONE SODIUM SUCCINATE 125 MG/2ML
60 INJECTION INTRAMUSCULAR; INTRAVENOUS ONCE
Status: COMPLETED | OUTPATIENT
Start: 2025-03-19 | End: 2025-03-19

## 2025-03-19 RX ORDER — HYDROCORTISONE SODIUM SUCCINATE 100 MG/2ML
100 INJECTION INTRAMUSCULAR; INTRAVENOUS AS NEEDED
Status: CANCELLED | OUTPATIENT
Start: 2025-03-19

## 2025-03-19 RX ORDER — HYDROCORTISONE SODIUM SUCCINATE 100 MG/2ML
100 INJECTION INTRAMUSCULAR; INTRAVENOUS AS NEEDED
Status: DISCONTINUED | OUTPATIENT
Start: 2025-03-19 | End: 2025-03-20 | Stop reason: HOSPADM

## 2025-03-19 RX ADMIN — DARATUMUMAB AND HYALURONIDASE-FIHJ (HUMAN RECOMBINANT) 1800 MG: 1800; 30000 INJECTION SUBCUTANEOUS at 12:12

## 2025-03-19 RX ADMIN — HEPARIN 500 UNITS: 100 SYRINGE at 12:17

## 2025-03-19 RX ADMIN — SODIUM CHLORIDE 20 ML/HR: 0.9 INJECTION, SOLUTION INTRAVENOUS at 10:51

## 2025-03-19 RX ADMIN — ACETAMINOPHEN 1000 MG: 500 TABLET ORAL at 10:51

## 2025-03-19 RX ADMIN — ZOLEDRONIC ACID 4 MG: 0.04 INJECTION, SOLUTION INTRAVENOUS at 11:11

## 2025-03-19 RX ADMIN — DIPHENHYDRAMINE HYDROCHLORIDE 25 MG: 50 INJECTION INTRAMUSCULAR; INTRAVENOUS at 10:51

## 2025-03-19 RX ADMIN — METHYLPREDNISOLONE SODIUM SUCCINATE 60 MG: 125 INJECTION INTRAMUSCULAR; INTRAVENOUS at 10:48

## 2025-03-19 RX ADMIN — Medication 20 ML: at 12:17

## 2025-03-19 NOTE — PROGRESS NOTES
Specialty Pharmacy Patient Management Program  Oncology Reassessment     Anca Samson was referred by their provider to the Oncology Patient Management program offered by [facility] for MM (Multiple Myeloma). A follow-up outreach was conducted in person, face-to-face, including assessment of continued therapy appropriateness, medication adherence, and side effect incidence and management for Revlimid (lenalidomide).    Relevant Past Medical History and Comorbidities  Relevant medical history and concomitant health conditions were discussed with the patient. The patient's chart has been reviewed for relevant past medical history and comorbid health conditions and updated as necessary.   Past Medical History:   Diagnosis Date    Anxiety     Asthma 07/28/2021    Atherosclerosis of native coronary artery of native heart with angina pectoris 08/15/2023    FOLLOWED BY DR GONZALES/DINA PETTIT. DENIES CP BUT GETS SOA WITH EXERTION HAS COPD WEARS AT 2LPM N/C. DECREASED ACTIVITY D/T SOA.    C. difficile diarrhea     DENIES ANY CURRENT ISSUES    Cholelithiasis 7-    Chronic diarrhea     Colon cancer 07/21/2017    Colon polyp     COPD (chronic obstructive pulmonary disease) 10/23/2017    Dental disease     Depression     Disease of thyroid gland     Diverticulitis     Diverticulitis of colon     Diverticulosis 7-    Dysphagia     Emphysema of lung     Emphysema/COPD     GERD (gastroesophageal reflux disease)     Head injury     CONCUSSION AT AGE 4    Hernia 2019    History of chemotherapy     2017    Hx of psychiatric care     Impaired fasting glucose 08/14/2015    Lumbago     Lung nodule 02/17/2022    Major depressive disorder 10/27/2016    Malignant neoplasm of ascending colon 07/21/2017    Melena     Multiple myeloma     Nicotine dependence 05/10/2017    Obesity     NICK (obstructive sleep apnea) 11/06/2023    Oxygen dependent     REPORTS USES 02 AT 2LPM VIA N/C DURING DAY AND 3LPM AT  NIGHT . CAN GET VERY SOA WITH MINIMAL EXERTION    Pancreatitis 2024    Panic disorder     Primary central sleep apnea     Renal insufficiency 2021    Seizures     PSEUDO SEIZURES LAST ONE AROUND 2024    Shortness of breath     CAN GET VERY SOA WITH MINIMAL EXERTION    Sinusitis     Small intestine cancer     Tinnitus     Tobacco use 2021    QUIT SMOKING 2022    Tremor     Visit for screening mammogram 2020    NORMAL- REPEAT IN ONE YEAR    Vitamin D deficiency      Social History     Socioeconomic History    Marital status:    Tobacco Use    Smoking status: Former     Current packs/day: 0.00     Average packs/day: 1 pack/day for 47.4 years (47.4 ttl pk-yrs)     Types: Cigarettes     Start date: 1975     Quit date: 6/3/2022     Years since quittin.7     Passive exposure: Past    Smokeless tobacco: Never   Vaping Use    Vaping status: Never Used   Substance and Sexual Activity    Alcohol use: Never    Drug use: Never    Sexual activity: Not Currently     Partners: Male     Birth control/protection: Hysterectomy     Problem list reviewed by Sabrina Beebe PharmLARISA on 3/19/2025 at  9:56 AM    Allergies  Known allergies and reactions were discussed with the patient. The patient's chart has been reviewed for allergy information and updated as necessary.   Allergies   Allergen Reactions    Morphine Anaphylaxis    Cephalexin Hives    Penicillins Hives     Beta lactam allergy details  Antibiotic reaction: hives  Age at reaction: child  Dose to reaction time: unknown  Reason for antibiotic: unknown  Epinephrine required for reaction?: unknown  Tolerated antibiotics: other (none per pt)        Sulfa Antibiotics Hives     Allergies reviewed by Sabrina Beebe, PharmLARISA on 3/19/2025 at  9:54 AM    Relevant Laboratory Values  Lab Results   Component Value Date    GLUCOSE 149 (H) 2025    CALCIUM 8.9 2025     2025    K 3.9 2025    CO2 16.4 (L) 2025      (H) 02/19/2025    BUN 15 02/19/2025    CREATININE 0.81 02/19/2025    EGFRIFNONA 53 (L) 08/25/2021    BCR 18.5 02/19/2025    ANIONGAP 13.6 02/19/2025     Lab Results   Component Value Date    WBC 7.56 02/19/2025    RBC 3.42 (L) 02/19/2025    HGB 11.6 (L) 02/19/2025    HCT 36.2 02/19/2025    .8 (H) 02/19/2025    MCH 33.9 (H) 02/19/2025    MCHC 32.0 02/19/2025    RDW 16.8 (H) 02/19/2025    RDWSD 65.6 (H) 02/19/2025    MPV 9.5 02/19/2025     02/19/2025    NEUTRORELPCT 54.5 02/19/2025    LYMPHORELPCT 34.0 02/19/2025    MONORELPCT 10.4 02/19/2025    EOSRELPCT 0.1 (L) 02/19/2025    BASORELPCT 0.5 02/19/2025    AUTOIGPER 0.5 02/19/2025    NEUTROABS 4.11 02/19/2025    LYMPHSABS 2.57 02/19/2025    MONOSABS 0.79 02/19/2025    EOSABS 0.01 02/19/2025    BASOSABS 0.04 02/19/2025    AUTOIGNUM 0.04 02/19/2025    NRBC 0.0 02/19/2025      The above labs have been reviewed. No dose adjustments are needed for the oral specialty medication(s) based on the labs.    Current Medication List  This medication list has been reviewed with the patient and evaluated for any interactions or necessary modifications/recommendations, and updated to include all prescription medications, OTC medications, and supplements the patient is currently taking.  This list reflects what is contained in the patient's profile, which has also been marked as reviewed to communicate to other providers it is the most up to date version of the patient's current medication therapy.     Current Outpatient Medications:     acyclovir (ZOVIRAX) 400 MG tablet, TAKE 1 TABLET BY MOUTH 2 (TWO) TIMES A DAY. TAKE ONE TABLET BY MOUTH TWICE DAILY., Disp: 180 tablet, Rfl: 3    aspirin 81 MG EC tablet, Take 1 tablet by mouth Daily., Disp: , Rfl:     busPIRone (BUSPAR) 15 MG tablet, Take 1 tablet by mouth 3 (Three) Times a Day., Disp: 270 tablet, Rfl: 1    Calcium Carb-Cholecalciferol (calcium 500 mg vitamin D 5 mcg, 200 UT,) 500-5 MG-MCG tablet per tablet, ,  Disp: , Rfl:     colestipol (COLESTID) 1 g tablet, TAKE 2 TABLETS BY MOUTH TWICE A DAY, Disp: 360 tablet, Rfl: 1    dapsone 25 MG tablet, TAKE 2 TABLETS BY MOUTH TWICE A DAY, Disp: 360 tablet, Rfl: 1    dexAMETHasone (DECADRON) 4 MG tablet, Take 10 tablets on D 1,8,15,22 and take 1 tablet on D 2,3.  Take in the morning with food., Disp: 42 tablet, Rfl: 5    FLUoxetine (PROzac) 40 MG capsule, Take 1 capsule by mouth Daily., Disp: 90 capsule, Rfl: 1    Fluticasone-Umeclidin-Vilant (TRELEGY) 100-62.5-25 MCG/ACT inhaler, Inhale 1 puff Daily., Disp: 1 each, Rfl: 6    HYDROcodone-acetaminophen (NORCO) 5-325 MG per tablet, Take 1 tablet by mouth every 6 (six) to 8 (eight) hours as needed for Pain., Disp: , Rfl:     lenalidomide (REVLIMID) 15 MG capsule, Take 1 capsule by mouth Daily. On Days 1-21, and off 7 days, on a 28 day cycle, Disp: 21 capsule, Rfl: 0    levalbuterol (XOPENEX) 0.63 MG/3ML nebulizer solution, Take 1 ampule by nebulization 4 (Four) Times a Day As Needed for Wheezing., Disp: 120 mL, Rfl: 5    ondansetron (ZOFRAN) 8 MG tablet, TAKE 1 TABLET BY MOUTH THREE TIMES A DAY AS NEEDED FOR NAUSEA AND VOMITING (Patient taking differently: Take 1 tablet by mouth Every 8 (Eight) Hours As Needed.), Disp: 30 tablet, Rfl: 5    potassium phosphate, monobasic, (K-Phos) 500 MG tablet, Take 1 tablet by mouth 2 (Two) Times a Day., Disp: 60 tablet, Rfl: 3    promethazine (PHENERGAN) 25 MG tablet, Take 1 tablet by mouth Every 6 (Six) Hours As Needed for Nausea or Vomiting., Disp: 30 tablet, Rfl: 1    vitamin D (ERGOCALCIFEROL) 1.25 MG (74605 UT) capsule capsule, Take 1 capsule by mouth 2 (Two) Times a Week., Disp: 26 capsule, Rfl: 1  No current facility-administered medications for this visit.    Medicines reviewed by Sabrina Beebe, PharmD on 3/19/2025 at  9:56 AM    Drug Interactions  None     Adverse Drug Reactions  Medication tolerability: Tolerating with no to minimal ADRs  Medication plan: Continue therapy with normal  follow-up  Adverse Reactions Experienced: None   Plan for ADR Management: Not Required    Hospitalizations and Urgent Care Since Last Assessment  ED Visits, Admissions, or Hospitalizations: None  Urgent Office Visits: None    Adherence and Self-Administration  Adherence related to the patient's specialty therapy was discussed with the patient. The Adherence segment of this outreach has been reviewed and updated.     Adherence Questions  Linked Medication(s) Assessed: Lenalidomide (REVLIMID)  On average, how many doses/injections does the patient miss per month?: 0  What are the identified reasons for non-adherence or missed doses? : no problems identified  What is the estimated medication adherence level?: % (PDC was not tracking for patient until this year again - external fill but patient reports no missed doses.)  Based on the patient/caregiver response and refill history, does this patient require an MTP to track adherence improvements?: no    Approximate Number of Doses Missed Since Last Assessment: 0  Ongoing or New Barriers to Patient Adherence and/or Self-Administration: no ongoing or new barriers  Methods for Supporting Patient Adherence and/or Self-Administration: continue current method    Open Medication Therapy Problems  No medication therapy recommendations to display    Goals of Therapy  Goals related to the patient's specialty therapy were discussed with the patient. The Patient Goals segment of this outreach has been reviewed and updated.   Goals Addressed Today        Specialty Pharmacy General Goal      Clinical goal/therapeutic target: disease control, per the recent oncology clinic notes and labs.     Lab Results   Component Value Date    MSPIKE 0.3 (H) 02/12/2025    MSPIKE 0.5 (H) 10/30/2024    IGLFLC 2.0 (L) 10/30/2024    IGLFLC 3.3 (L) 06/11/2024    FREEKAPPAL 8.9 10/30/2024    FREEKAPPAL 11.1 06/11/2024    KAPPALAMB 4.45 (H) 10/30/2024    KAPPALAMB 3.36 (H) 06/11/2024     3/19/25:  Patient doing well overall without any reported adverse effects. Multiple myeloma labs downtrending. No changes.               Quality of Life Assessment   Quality of Life related to the patient's enrollment in the patient management program and services provided was discussed with the patient. The QOL segment of this outreach has been reviewed and updated.  Quality of Life Improvement Scale: 10-Significantly better  Quality of Life Score: 10    Reassessment Plan & Follow-Up  Pharmacist to continue to perform regular reassessments no more than 23 months from the previous assessment.  Care Coordinator to facilitate future refill outreaches, coordinate prescription delivery, and escalate clinical questions to pharmacist.     Additional Plans, Therapy Recommendations or Therapy Problems to Be Addressed: none at this time    Attestation  Therapeutic appropriateness: Appropriate   I attest the patient was actively involved in and has agreed to the above plan of care.  If the prescribed therapy is at any point deemed not appropriate based on the current or future assessments, a consultation will be initiated with the patient's specialty care provider to determine the best course of action. The revised plan of therapy will be documented along with any required assessments and/or additional patient education provided.     Sabrina Beebe, Prisca  Oncology Clinical Specialty Pharmacist   3/19/2025  09:59 EDT

## 2025-03-19 NOTE — PROGRESS NOTES
Chief Complaint   FOLLOW UP 1    Rena Guerra,*  Rena Guerra, LACI Evansman presents to [facility] HEMATOLOGY [facility] ONCOLOGY for     Oncology/Hematology History Overview Note   Smoldering Myeloma    Initially diagnosed in 2016 with bone marrow biopsy demonstrating 30% CD56 positive monoclonal plasma cell population.  45 XX normal female chromosome pattern  FISH panel negative for myeloma associated mutations including 1q21, 9q34,11q13,14q32,15q24, 17q13  No anemia, renal insufficiency, hypercalcemia.  On observation since that time    Low grade adenocarcinoma of ascending colon      5/16/2017 right hemicolectomy which  revealed a low-grade 7.6 cm adenocarcinoma of the cecum. All margins were  negative. Lymphovascular invasion and perineural invasion were not identified.     26 lymph nodes were removed and unfortunately 1 was involved with metastatic deposit. pT3 pN1a colorectal cancer.        Clinical Staging      Smoldering Myeloma; Colon ([id number])            Treatments      Chemotherapy      11/2017 completed 6mo adjuvant Xeloda        6/2022 Stopped Smoking     Malignant neoplasm of ascending colon   7/21/2017 Initial Diagnosis    Colon cancer (CMS/HCC)     Multiple myeloma   7/28/2021 Initial Diagnosis    Multiple myeloma     11/7/2023 -  Chemotherapy    OP MULTIPLE MYELOMA Daratumumab / Lenalidomide / Dexamethasone     2/19/2025 -  Chemotherapy    OP SUPPORTIVE Zoledronic Acid Q28D           Current Outpatient Medications on File Prior to Visit   Medication Sig Dispense Refill    acyclovir (ZOVIRAX) 400 MG tablet TAKE 1 TABLET BY MOUTH 2 (TWO) TIMES A DAY. TAKE ONE TABLET BY MOUTH TWICE DAILY. 180 tablet 3    aspirin 81 MG EC tablet Take 1 tablet by mouth Daily.      busPIRone (BUSPAR) 15 MG tablet Take 1 tablet by mouth 3 (Three) Times a Day. 270 tablet 1    colestipol (COLESTID) 1 g tablet TAKE 2 TABLETS BY MOUTH TWICE A  tablet 1     dapsone 25 MG tablet TAKE 2 TABLETS BY MOUTH TWICE A  tablet 1    dexAMETHasone (DECADRON) 4 MG tablet Take 10 tablets on D 1,8,15,22 and take 1 tablet on D 2,3.  Take in the morning with food. 42 tablet 5    FLUoxetine (PROzac) 40 MG capsule Take 1 capsule by mouth Daily. 90 capsule 1    Fluticasone-Umeclidin-Vilant (TRELEGY) 100-62.5-25 MCG/ACT inhaler Inhale 1 puff Daily. 1 each 6    lenalidomide (REVLIMID) 15 MG capsule Take 1 capsule by mouth Daily. On Days 1-21, and off 7 days, on a 28 day cycle (Patient taking differently: Take 1 capsule by mouth Every Night. On Days 1-21, and off 7 days, on a 28 day cycle) 21 capsule 0    ondansetron (ZOFRAN) 8 MG tablet TAKE 1 TABLET BY MOUTH THREE TIMES A DAY AS NEEDED FOR NAUSEA AND VOMITING (Patient taking differently: Take 1 tablet by mouth Every 8 (Eight) Hours As Needed.) 30 tablet 5    promethazine (PHENERGAN) 25 MG tablet Take 1 tablet by mouth Every 6 (Six) Hours As Needed for Nausea or Vomiting. 30 tablet 1    [DISCONTINUED] HYDROcodone-acetaminophen (NORCO) 5-325 MG per tablet Take 1 tablet by mouth every 6 (six) to 8 (eight) hours as needed for Pain.      traMADol (ULTRAM) 50 MG tablet Take 1 tablet by mouth Every 6 (Six) Hours As Needed for Moderate Pain. 8 tablet 0     Current Facility-Administered Medications on File Prior to Visit   Medication Dose Route Frequency Provider Last Rate Last Admin    [COMPLETED] acetaminophen (TYLENOL) tablet 1,000 mg  1,000 mg Oral Once Oziel Márquez MD   1,000 mg at 03/20/25 0916    [COMPLETED] clindamycin (CLEOCIN) 900 mg in dextrose 5% 50 mL IVPB (premix)  900 mg Intravenous Once Eduardo Ingram MD   900 mg at 03/20/25 1013    HYDROcodone-acetaminophen (NORCO) 5-325 MG per tablet 1 tablet  1 tablet Oral Once PRN Eduardo Ingram MD        HYDROmorphone (DILAUDID) injection 0.25 mg  0.25 mg Intravenous Q5 Min PRN Ulises Cast CRNA        HYDROmorphone (DILAUDID) injection 0.5 mg  0.5 mg Intravenous  Q5 Min PRN Ulises Cast CRNA        HYDROmorphone (DILAUDID) injection 0.5 mg  0.5 mg Intravenous Q30 Min PRN Eduardo Ingram MD        ibuprofen (ADVIL,MOTRIN) tablet 600 mg  600 mg Oral Q6H PRN Eduardo Ingram MD        [COMPLETED] ipratropium-albuterol (DUO-NEB) nebulizer solution 3 mL  3 mL Nebulization Once Oziel Márquez MD   3 mL at 03/20/25 0916    lactated ringers infusion  9 mL/hr Intravenous Continuous PRN Oziel Márquez MD 9 mL/hr at 03/20/25 1003 Currently Infusing at 03/20/25 1003    ondansetron (ZOFRAN) injection 4 mg  4 mg Intravenous Once PRN Ulises Cast CRNA        ondansetron (ZOFRAN) injection 4 mg  4 mg Intravenous Once PRN Eduardo Ingram MD        ondansetron (ZOFRAN) injection 4 mg  4 mg Intravenous Q6H PRN Eduardo Ingram MD        ondansetron ODT (ZOFRAN-ODT) disintegrating tablet 4 mg  4 mg Oral Once PRN Eduardo Ingram MD        oxyCODONE (ROXICODONE) immediate release tablet 5 mg  5 mg Oral Q15 Min PRN Ulises Cast CRNA        promethazine (PHENERGAN) suppository 25 mg  25 mg Rectal Once PRN Ulises Cast CRNA        Or    promethazine (PHENERGAN) tablet 25 mg  25 mg Oral Once PRN Ulises Cast CRNA        [DISCONTINUED] bupivacaine (PF) (MARCAINE) 0.25 % injection    PRN Eduardo Ingram MD   10 mL at 03/20/25 1050    [DISCONTINUED] diphenhydrAMINE (BENADRYL) injection 50 mg  50 mg Intravenous PRN West Nicolas MD        [DISCONTINUED] famotidine (PEPCID) injection 20 mg  20 mg Intravenous PRN West Nicolas MD        [DISCONTINUED] heparin injection 500 Units  500 Units Intravenous PRN West Nicolas MD   500 Units at 03/19/25 1217    [DISCONTINUED] Hydrocortisone Sod Suc (PF) (Solu-CORTEF) injection 100 mg  100 mg Intravenous PRN West Nicolas MD        [DISCONTINUED] sodium chloride (NS) irrigation solution    PRN Eduardo Ingram MD   1,000 mL at 03/20/25 1000    [DISCONTINUED]  sodium chloride 0.9 % flush 10 mL  10 mL Intravenous Q12H Eduardo Ingram MD        [DISCONTINUED] sodium chloride 0.9 % flush 10 mL  10 mL Intravenous PRN Eduardo Ingram MD        [DISCONTINUED] sodium chloride 0.9 % flush 20 mL  20 mL Intravenous PRN West Nicolas MD   20 mL at 03/19/25 1217    [DISCONTINUED] sodium chloride 0.9 % infusion 40 mL  40 mL Intravenous PRN Eduardo Ingram MD        [DISCONTINUED] sodium chloride 0.9 % infusion  20 mL/hr Intravenous Once West Nicolas MD           Allergies   Allergen Reactions    Morphine Anaphylaxis    Cephalexin Hives    Penicillins Hives     Beta lactam allergy details  Antibiotic reaction: hives  Age at reaction: child  Dose to reaction time: unknown  Reason for antibiotic: unknown  Epinephrine required for reaction?: unknown  Tolerated antibiotics: other (none per pt)        Sulfa Antibiotics Hives     Past Medical History:   Diagnosis Date    Anxiety     Asthma 07/28/2021    Atherosclerosis of native coronary artery of native heart with angina pectoris 08/15/2023    seen by angela 10/2023, follow up prn/no cp    C. difficile diarrhea     DENIES ANY CURRENT ISSUES    Cholelithiasis 7-    Chronic diarrhea     Colon cancer 07/21/2017    Colon polyp     COPD (chronic obstructive pulmonary disease) 10/23/2017    Dental disease     Depression     Disease of thyroid gland     Diverticulitis     Diverticulitis of colon     Diverticulosis 7-    Dysphagia     Emphysema of lung     Emphysema/COPD     GERD (gastroesophageal reflux disease)     Head injury     CONCUSSION AT AGE 4    Hernia 2019    History of chemotherapy     LAST TREATMENT 3/19/25    Hx of psychiatric care     Impaired fasting glucose 08/14/2015    Lumbago     Lung nodule 02/17/2022    Major depressive disorder 10/27/2016    Malignant neoplasm of ascending colon 07/21/2017    Melena     Multiple myeloma     CURRENTLY BEING TREATED    Nicotine dependence 05/10/2017    Obesity      NICK (obstructive sleep apnea) 11/06/2023    Oxygen dependent     PRN    Pancreatitis 7/26/2024    Panic disorder     Primary central sleep apnea     Renal insufficiency 07/28/2021    Seizures     PSEUDO SEIZURES LAST ONE AROUND JULY 2024    Shortness of breath     CAN GET VERY SOA WITH MINIMAL EXERTION    Sinusitis     Small intestine cancer     Tinnitus     Tobacco use 07/28/2021    QUIT SMOKING JUNE 2022    Tremor     Visit for screening mammogram 02/20/2020    NORMAL- REPEAT IN ONE YEAR    Vitamin D deficiency      Past Surgical History:   Procedure Laterality Date    APPENDECTOMY      BONE MARROW BIOPSY  2016    CHOLECYSTECTOMY N/A 08/08/2024    Procedure: CHOLECYSTECTOMY LAPAROSCOPIC WITH DAVINCI ROBOT;  Surgeon: Eduardo Ingram MD;  Location: [facility] OR [facility];  Service: Robotics - DaVinci;  Laterality: N/A;    COLON SURGERY  2017    COLONOSCOPY  2018,2017,2019    [facility]- [facility]:DIVERTICULOSIS AND ERYTHEMA OF MUCOSA    COLONOSCOPY N/A 12/20/2022    Procedure: COLONOSCOPY WITH ELEVIEW INJECTION, POLYPECTOMY, HOT SNARE, CLIP APPLICATION X3, BIOPSIES;  Surgeon: Jarvis Borges MD;  Location: [facility] ENDOSCOPY;  Service: Gastroenterology;  Laterality: N/A;  COLON POLYP, DIVERTICULOSIS, ANASTOMOSIS RIGHT COLON    COLONOSCOPY N/A 11/09/2023    Procedure: COLONOSCOPY;  Surgeon: Jarvis Borges MD;  Location: [facility] ENDOSCOPY;  Service: Gastroenterology;  Laterality: N/A;  DIVERTICULOSIS, ANASTOMOSIS IN ASCENDING COLON    ENDOSCOPY  2018    ERCP N/A 07/27/2024    Procedure: ENDOSCOPIC RETROGRADE CHOLANGIOPANCREATOGRAPHY WITH SPHINCTEROTOMY, BALLOON SWEEP, STENT PLACEMENT;  Surgeon: Ajay Kennedy MD;  Location: [facility] MAIN OR;  Service: Gastroenterology;  Laterality: N/A;  BILE DUCT STONE    ERCP N/A 09/05/2024    Procedure: ENDOSCOPIC RETROGRADE CHOLANGIOPANCREATOGRAPHY WITH SPINCHTEROTOMY. BALLOON DILATATION 8-10. BALLOON SWEEP 12-15. STENT REMOVAL;  Surgeon: Ajay Kennedy,  MD;  Location: [facility] ENDOSCOPY;  Service: Gastroenterology;  Laterality: N/A;  BILE DUCT STONES    EYE SURGERY      FECAL DISIMPACTION  2019    TRANSPLANT     GALLBLADDER SURGERY      HEMICOLECTOMY Right 2017    HERNIA REPAIR      HYSTERECTOMY  ,    KNEE ARTHROSCOPY Left 2022    Procedure: KNEE ARTHROSCOPY WITH PARTIAL MEDIAL MENISCECTOMY,  CHONDROPLASTY;  Surgeon: Nicholas Tipton MD;  Location: [facility] OR [facility] [place];  Service: Orthopedics;  Laterality: Left;    MINI-LAPAROTOMY  2017    PORTACATH PLACEMENT N/A     pt states that her port does not work    TONSILLECTOMY      TUBAL ABDOMINAL LIGATION      UPPER GASTROINTESTINAL ENDOSCOPY  2018    VENOUS ACCESS DEVICE (PORT) INSERTION N/A 10/31/2023    Procedure: Port-a-catheter removal and port-a-catheter placement;  Surgeon: lAcon Delgado MD;  Location: [facility] OR [facility] [place];  Service: General;  Laterality: N/A;    VENTRAL HERNIA REPAIR N/A 10/03/2024    Procedure: VENTRAL / INCISIONAL HERNIA REPAIR LAPAROSCOPIC WITH DAVINCI ROBOT;  Surgeon: Eduardo Ingram MD;  Location: [facility] OR [facility] [place];  Service: Robotics - DaVinci;  Laterality: N/A;     Social History     Socioeconomic History    Marital status:    Tobacco Use    Smoking status: Former     Current packs/day: 0.00     Average packs/day: 1 pack/day for 47.4 years (47.4 ttl pk-yrs)     Types: Cigarettes     Start date: 1975     Quit date: 6/3/2022     Years since quittin.7     Passive exposure: Past    Smokeless tobacco: Never   Vaping Use    Vaping status: Never Used   Substance and Sexual Activity    Alcohol use: Never    Drug use: Never    Sexual activity: Defer     Family History   Problem Relation Age of Onset    Heart disease Mother     Lung cancer Mother     Cancer Mother     Stroke Father     Heart disease Father     Kidney cancer Father     Cancer Father     Stroke Other         UNCLE/AUNT    Neuropathy Brother     Colon cancer Neg Hx     Malig Hyperthermia Neg Hx   "      Objective   Physical Exam  Vitals reviewed. Exam conducted with a chaperone present.   HENT:      Mouth/Throat:      Pharynx: Oropharynx is clear.      Comments: Edentulous  Cardiovascular:      Rate and Rhythm: Normal rate and regular rhythm.      Heart sounds: Normal heart sounds. No murmur heard.     No gallop.   Pulmonary:      Effort: Pulmonary effort is normal.      Breath sounds: Normal breath sounds.   Abdominal:      General: Bowel sounds are normal.   Lymphadenopathy:      Cervical: No cervical adenopathy.   Psychiatric:         Mood and Affect: Mood normal.         Behavior: Behavior normal.         Vitals:    03/19/25 0942   BP: 123/50   Pulse: 75   Resp: 20   Temp: 97.3 °F (36.3 °C)   TempSrc: Temporal   SpO2: 97%   Weight: 83.9 kg (184 lb 15.5 oz)   Height: 154.9 cm (60.98\")   PainSc: 3      ECOG score: 3         PHQ-9 Total Score:                    Result Review :   The following data was reviewed by: West Nicolas MD on 03/19/2025:  Lab Results   Component Value Date    HGB 11.7 (L) 03/19/2025    HCT 37.1 03/19/2025    .6 (H) 03/19/2025     03/19/2025    WBC 6.57 03/19/2025    NEUTROABS 1.32 (L) 03/19/2025    LYMPHSABS 4.35 (H) 03/19/2025    MONOSABS 0.60 03/19/2025    EOSABS 0.11 03/19/2025    BASOSABS 0.18 03/19/2025     Lab Results   Component Value Date    GLUCOSE 162 (H) 03/19/2025    BUN 13 03/19/2025    CREATININE 0.86 03/19/2025     03/19/2025    K 3.9 03/19/2025     (H) 03/19/2025    CO2 19.1 (L) 03/19/2025    CALCIUM 8.3 (L) 03/19/2025    PROTEINTOT 5.6 (L) 03/19/2025    ALBUMIN 3.6 03/19/2025    BILITOT 0.4 03/19/2025    ALKPHOS 82 03/19/2025    AST 12 03/19/2025    ALT 13 03/19/2025     Lab Results   Component Value Date    MG 1.8 03/19/2025    PHOS 3.2 03/19/2025    FREET4 1.55 12/06/2023    TSH 0.684 09/11/2024     No results found for: \"IRON\", \"LABIRON\", \"TRANSFERRIN\", \"TIBC\"  Lab Results   Component Value Date     03/07/2023    [id number] " "612 04/15/2024    FOLATE 8.95 03/27/2024     No results found for: \"PSA\", \"CEA\", \"AFP\", \"\", \"\"  SPEP shows M spike 0.3 g/dL decreased from previous.        Assessment and Plan    Diagnoses and all orders for this visit:    1. Multiple myeloma not having achieved remission (Primary)  Assessment & Plan:  Patient is on active treatment with the daratumumab, lenalidomide, dexamethasone and zoledronic acid for bony involvement.  Tolerating her treatments well.  Denies new masses, adenopathy, unusual aches or pains.  She had recent dental extractions with her mouth is completely healed.  She is eating and drinking well.  Lab work today is adequate for treatment.    Orders:  -     Cancel: CBC and Differential; Future  -     Cancel: Comprehensive metabolic panel; Future    2. Hypocalcemia  Assessment & Plan:  Begin Caltrate D twice daily.  Prescription sent to pharmacy.  Recheck level next visit      Other orders  -     Cancel: sodium chloride 0.9 % infusion  -     Cancel: zoledronic acid (ZOMETA) infusion 4 mg/100 mL (premix)  -     Cancel: sodium chloride 0.9 % infusion  -     Cancel: acetaminophen (TYLENOL) tablet 1,000 mg  -     Cancel: methylPREDNISolone sodium succinate (SOLU-Medrol) injection 60 mg  -     Cancel: diphenhydrAMINE (BENADRYL) IVPB 25 mg  -     Cancel: daratumumab-hyaluronidase-fihj (DARZALEX FASPRO) [id number] MG-UT/15ML injection 1,800 mg  -     Cancel: Hydrocortisone Sod Suc (PF) (Solu-CORTEF) injection 100 mg  -     Cancel: diphenhydrAMINE (BENADRYL) injection 50 mg  -     Cancel: famotidine (PEPCID) injection 20 mg  -     Calcium Carb-Cholecalciferol (calcium 500 mg vitamin D 5 mcg, 200 UT,) 500-5 MG-MCG tablet per tablet; Take 1 tablet by mouth 2 (Two) Times a Day.  Dispense: 180 tablet; Refill: 1            Patient Follow Up: Cycle 18-day 1    Patient was given instructions and counseling regarding her condition or for health maintenance advice. Please see specific information pulled " into the AVS if appropriate.     West Nicolas MD    3/20/2025

## 2025-03-19 NOTE — PROGRESS NOTES
Specialty Pharmacy Patient Management Program  Chart Review/Lab Monitoring     Anca Samson is a 74 y.o. female with IgG Multiple Myeloma who presented for lab check and Oncology provider appointment. [facility] reviewed labs and providers note to assess continued therapy appropriateness of Revlimid (lenalidomide)    Oncology Interval History:  Susan Nicolas  Diagnosis: 16: Serum protein electrophoresis - markedly elevated IgG at 2941, decreased IgA at 46, IgM 87, and markedly increased monoclonal spike at 2.2 g/dL. Interestingly, 3/19/15: serum protein electrophoresis - IgG of 2695   Biomarkers: IgG+    Current Tx: Lenalidomide 15mg - Take one tablet by mouth daily on days 1-21 and then take 7 days off on 28 days + Darzalex SQ + Zometa [id number] (had been held for dental work)  Most Recent Imagin24 CT stable disease  Previous Tx: Active surveillance (-10/2023)  Cholecystectomy (2024)     Other Cancer Hx  2017: R hemicolectomy - low-grade 7.6 cm adenocarcinoma of the cecum, margins were  negative   LN + for metastatic deposit pT3 pN1a colorectal cancer, no other disease on imaging  now s/p colectomy with colostomy  & s/p 6 mo of adjuvant xeloda (complete 2017)     Fills at: [facility]    3/19/25: Patient reported she is doing well overall. Plan to proceed as planned.     Labs:  Lab Results   Component Value Date     2025    K 3.9 2025    CO2 16.4 (L) 2025     (H) 2025    BUN 15 2025    GLUCOSE 149 (H) 2025    CALCIUM 8.9 2025    CREATININE 0.81 2025    EGFRIFNONA 53 (L) 2021    BCR 18.5 2025    ANIONGAP 13.6 2025    AST 10 2025    ALT 11 2025    BILITOT 0.4 2025    ALKPHOS 69 2025     Lab Results   Component Value Date    WBC 7.56 2025    RBC 3.42 (L) 2025    HGB 11.6 (L) 2025    HCT 36.2 2025     2025    NEUTROABS 4.11  02/19/2025    LYMPHSABS 2.57 02/19/2025    MONOSABS 0.79 02/19/2025    EOSABS 0.01 02/19/2025    BASOSABS 0.04 02/19/2025     Lab Results   Component Value Date    MSPIKE 0.3 (H) 02/12/2025    MSPIKE 0.5 (H) 10/30/2024    IGLFLC 2.0 (L) 10/30/2024    IGLFLC 3.3 (L) 06/11/2024    FREEKAPPAL 8.9 10/30/2024    FREEKAPPAL 11.1 06/11/2024    KAPPALAMB 4.45 (H) 10/30/2024    KAPPALAMB 3.36 (H) 06/11/2024     PLAN  Specialty pharmacy will continue to follow patient. Continue with current regimen as planned. Next Specialty Assessment due 3/19/27.    Walker MahajanD  Oncology Clinical Specialty Pharmacist   3/19/2025 10:00 EDT

## 2025-03-20 ENCOUNTER — ANESTHESIA (OUTPATIENT)
Dept: PERIOP | Facility: HOSPITAL | Age: 75
End: 2025-03-20
Payer: MEDICARE

## 2025-03-20 ENCOUNTER — HOSPITAL ENCOUNTER (OUTPATIENT)
Facility: HOSPITAL | Age: 75
Setting detail: HOSPITAL OUTPATIENT SURGERY
Discharge: HOME | End: 2025-03-20
Attending: SURGERY | Admitting: SURGERY
Payer: MEDICARE

## 2025-03-20 VITALS
RESPIRATION RATE: 16 BRPM | OXYGEN SATURATION: 93 % | WEIGHT: 187.39 LBS | HEART RATE: 90 BPM | SYSTOLIC BLOOD PRESSURE: 129 MMHG | TEMPERATURE: 97.2 F | BODY MASS INDEX: 35.38 KG/M2 | HEIGHT: 61 IN | DIASTOLIC BLOOD PRESSURE: 52 MMHG

## 2025-03-20 DIAGNOSIS — D17.22 LIPOMA OF LEFT UPPER EXTREMITY: ICD-10-CM

## 2025-03-20 PROBLEM — E83.51 HYPOCALCEMIA: Status: ACTIVE | Noted: 2025-03-20

## 2025-03-20 PROCEDURE — 25010000002 ONDANSETRON PER 1 MG: Performed by: NURSE ANESTHETIST, CERTIFIED REGISTERED

## 2025-03-20 PROCEDURE — 25010000002 LIDOCAINE PF 2% 2 % SOLUTION: Performed by: NURSE ANESTHETIST, CERTIFIED REGISTERED

## 2025-03-20 PROCEDURE — 25010000002 FENTANYL CITRATE (PF) 50 MCG/ML SOLUTION: Performed by: NURSE ANESTHETIST, CERTIFIED REGISTERED

## 2025-03-20 PROCEDURE — 25010000002 CLINDAMYCIN 900 MG/50ML SOLUTION: Performed by: SURGERY

## 2025-03-20 PROCEDURE — 25810000003 LACTATED RINGERS PER 1000 ML: Performed by: ANESTHESIOLOGY

## 2025-03-20 PROCEDURE — 24071 EXC ARM/ELBOW LES SC 3 CM/>: CPT | Performed by: SURGERY

## 2025-03-20 PROCEDURE — 25010000002 PROPOFOL 10 MG/ML EMULSION: Performed by: NURSE ANESTHETIST, CERTIFIED REGISTERED

## 2025-03-20 PROCEDURE — 24075 EXC ARM/ELBOW LES SC < 3 CM: CPT | Performed by: SURGERY

## 2025-03-20 PROCEDURE — 25010000002 BUPIVACAINE (PF) 0.25 % SOLUTION: Performed by: SURGERY

## 2025-03-20 PROCEDURE — 25010000002 DEXAMETHASONE PER 1 MG: Performed by: NURSE ANESTHETIST, CERTIFIED REGISTERED

## 2025-03-20 PROCEDURE — 88304 TISSUE EXAM BY PATHOLOGIST: CPT | Performed by: SURGERY

## 2025-03-20 RX ORDER — PHENYLEPHRINE HCL IN 0.9% NACL 1 MG/10 ML
SYRINGE (ML) INTRAVENOUS AS NEEDED
Status: DISCONTINUED | OUTPATIENT
Start: 2025-03-20 | End: 2025-03-20 | Stop reason: SURG

## 2025-03-20 RX ORDER — EPHEDRINE SULFATE 50 MG/ML
INJECTION INTRAVENOUS AS NEEDED
Status: DISCONTINUED | OUTPATIENT
Start: 2025-03-20 | End: 2025-03-20 | Stop reason: SURG

## 2025-03-20 RX ORDER — PROMETHAZINE HYDROCHLORIDE 25 MG/1
25 SUPPOSITORY RECTAL ONCE AS NEEDED
Status: DISCONTINUED | OUTPATIENT
Start: 2025-03-20 | End: 2025-03-20 | Stop reason: HOSPADM

## 2025-03-20 RX ORDER — CLINDAMYCIN PHOSPHATE 900 MG/50ML
900 INJECTION, SOLUTION INTRAVENOUS ONCE
Status: COMPLETED | OUTPATIENT
Start: 2025-03-20 | End: 2025-03-20

## 2025-03-20 RX ORDER — PROMETHAZINE HYDROCHLORIDE 25 MG/1
25 TABLET ORAL ONCE AS NEEDED
Status: DISCONTINUED | OUTPATIENT
Start: 2025-03-20 | End: 2025-03-20 | Stop reason: HOSPADM

## 2025-03-20 RX ORDER — MAGNESIUM HYDROXIDE 1200 MG/15ML
LIQUID ORAL AS NEEDED
Status: DISCONTINUED | OUTPATIENT
Start: 2025-03-20 | End: 2025-03-20 | Stop reason: HOSPADM

## 2025-03-20 RX ORDER — DEXAMETHASONE SODIUM PHOSPHATE 4 MG/ML
INJECTION, SOLUTION INTRA-ARTICULAR; INTRALESIONAL; INTRAMUSCULAR; INTRAVENOUS; SOFT TISSUE AS NEEDED
Status: DISCONTINUED | OUTPATIENT
Start: 2025-03-20 | End: 2025-03-20 | Stop reason: SURG

## 2025-03-20 RX ORDER — PROPOFOL 10 MG/ML
VIAL (ML) INTRAVENOUS AS NEEDED
Status: DISCONTINUED | OUTPATIENT
Start: 2025-03-20 | End: 2025-03-20 | Stop reason: SURG

## 2025-03-20 RX ORDER — FENTANYL CITRATE 50 UG/ML
INJECTION, SOLUTION INTRAMUSCULAR; INTRAVENOUS AS NEEDED
Status: DISCONTINUED | OUTPATIENT
Start: 2025-03-20 | End: 2025-03-20 | Stop reason: SURG

## 2025-03-20 RX ORDER — HYDROCODONE BITARTRATE AND ACETAMINOPHEN 5; 325 MG/1; MG/1
1 TABLET ORAL ONCE AS NEEDED
Refills: 0 | Status: DISCONTINUED | OUTPATIENT
Start: 2025-03-20 | End: 2025-03-20 | Stop reason: HOSPADM

## 2025-03-20 RX ORDER — ONDANSETRON 2 MG/ML
4 INJECTION INTRAMUSCULAR; INTRAVENOUS EVERY 6 HOURS PRN
Status: DISCONTINUED | OUTPATIENT
Start: 2025-03-20 | End: 2025-03-20 | Stop reason: HOSPADM

## 2025-03-20 RX ORDER — SODIUM CHLORIDE 0.9 % (FLUSH) 0.9 %
10 SYRINGE (ML) INJECTION AS NEEDED
Status: DISCONTINUED | OUTPATIENT
Start: 2025-03-20 | End: 2025-03-20 | Stop reason: HOSPADM

## 2025-03-20 RX ORDER — ONDANSETRON 2 MG/ML
INJECTION INTRAMUSCULAR; INTRAVENOUS AS NEEDED
Status: DISCONTINUED | OUTPATIENT
Start: 2025-03-20 | End: 2025-03-20 | Stop reason: SURG

## 2025-03-20 RX ORDER — IBUPROFEN 600 MG/1
600 TABLET, FILM COATED ORAL EVERY 6 HOURS PRN
Status: DISCONTINUED | OUTPATIENT
Start: 2025-03-20 | End: 2025-03-20 | Stop reason: HOSPADM

## 2025-03-20 RX ORDER — SODIUM CHLORIDE, SODIUM LACTATE, POTASSIUM CHLORIDE, CALCIUM CHLORIDE 600; 310; 30; 20 MG/100ML; MG/100ML; MG/100ML; MG/100ML
9 INJECTION, SOLUTION INTRAVENOUS CONTINUOUS PRN
Status: DISCONTINUED | OUTPATIENT
Start: 2025-03-20 | End: 2025-03-20 | Stop reason: HOSPADM

## 2025-03-20 RX ORDER — ONDANSETRON 4 MG/1
4 TABLET, ORALLY DISINTEGRATING ORAL ONCE AS NEEDED
Status: DISCONTINUED | OUTPATIENT
Start: 2025-03-20 | End: 2025-03-20 | Stop reason: HOSPADM

## 2025-03-20 RX ORDER — BUPIVACAINE HYDROCHLORIDE 2.5 MG/ML
INJECTION, SOLUTION EPIDURAL; INFILTRATION; INTRACAUDAL; PERINEURAL AS NEEDED
Status: DISCONTINUED | OUTPATIENT
Start: 2025-03-20 | End: 2025-03-20 | Stop reason: HOSPADM

## 2025-03-20 RX ORDER — ONDANSETRON 2 MG/ML
4 INJECTION INTRAMUSCULAR; INTRAVENOUS ONCE AS NEEDED
Status: DISCONTINUED | OUTPATIENT
Start: 2025-03-20 | End: 2025-03-20 | Stop reason: HOSPADM

## 2025-03-20 RX ORDER — OXYCODONE HYDROCHLORIDE 5 MG/1
5 TABLET ORAL
Status: DISCONTINUED | OUTPATIENT
Start: 2025-03-20 | End: 2025-03-20 | Stop reason: HOSPADM

## 2025-03-20 RX ORDER — TRAMADOL HYDROCHLORIDE 50 MG/1
50 TABLET ORAL EVERY 6 HOURS PRN
Qty: 8 TABLET | Refills: 0 | Status: SHIPPED | OUTPATIENT
Start: 2025-03-20

## 2025-03-20 RX ORDER — LIDOCAINE HYDROCHLORIDE 20 MG/ML
INJECTION, SOLUTION EPIDURAL; INFILTRATION; INTRACAUDAL; PERINEURAL AS NEEDED
Status: DISCONTINUED | OUTPATIENT
Start: 2025-03-20 | End: 2025-03-20 | Stop reason: SURG

## 2025-03-20 RX ORDER — IPRATROPIUM BROMIDE AND ALBUTEROL SULFATE 2.5; .5 MG/3ML; MG/3ML
3 SOLUTION RESPIRATORY (INHALATION) ONCE
Status: COMPLETED | OUTPATIENT
Start: 2025-03-20 | End: 2025-03-20

## 2025-03-20 RX ORDER — ACETAMINOPHEN 500 MG
1000 TABLET ORAL ONCE
Status: COMPLETED | OUTPATIENT
Start: 2025-03-20 | End: 2025-03-20

## 2025-03-20 RX ORDER — SODIUM CHLORIDE 9 MG/ML
40 INJECTION, SOLUTION INTRAVENOUS AS NEEDED
Status: DISCONTINUED | OUTPATIENT
Start: 2025-03-20 | End: 2025-03-20 | Stop reason: HOSPADM

## 2025-03-20 RX ORDER — SODIUM CHLORIDE 0.9 % (FLUSH) 0.9 %
10 SYRINGE (ML) INJECTION EVERY 12 HOURS SCHEDULED
Status: DISCONTINUED | OUTPATIENT
Start: 2025-03-20 | End: 2025-03-20 | Stop reason: HOSPADM

## 2025-03-20 RX ADMIN — DEXAMETHASONE SODIUM PHOSPHATE 4 MG: 4 INJECTION, SOLUTION INTRAMUSCULAR; INTRAVENOUS at 10:18

## 2025-03-20 RX ADMIN — Medication 100 MCG: at 10:25

## 2025-03-20 RX ADMIN — ACETAMINOPHEN 1000 MG: 500 TABLET ORAL at 09:16

## 2025-03-20 RX ADMIN — FENTANYL CITRATE 25 MCG: 50 INJECTION, SOLUTION INTRAMUSCULAR; INTRAVENOUS at 10:09

## 2025-03-20 RX ADMIN — PROPOFOL 80 MG: 10 INJECTION, EMULSION INTRAVENOUS at 10:11

## 2025-03-20 RX ADMIN — ONDANSETRON 4 MG: 2 INJECTION INTRAMUSCULAR; INTRAVENOUS at 10:28

## 2025-03-20 RX ADMIN — FENTANYL CITRATE 25 MCG: 50 INJECTION, SOLUTION INTRAMUSCULAR; INTRAVENOUS at 10:19

## 2025-03-20 RX ADMIN — LIDOCAINE HYDROCHLORIDE 80 MG: 20 INJECTION, SOLUTION INTRAVENOUS at 10:09

## 2025-03-20 RX ADMIN — FENTANYL CITRATE 25 MCG: 50 INJECTION, SOLUTION INTRAMUSCULAR; INTRAVENOUS at 10:39

## 2025-03-20 RX ADMIN — FENTANYL CITRATE 25 MCG: 50 INJECTION, SOLUTION INTRAMUSCULAR; INTRAVENOUS at 10:21

## 2025-03-20 RX ADMIN — CLINDAMYCIN PHOSPHATE 900 MG: 900 INJECTION, SOLUTION INTRAVENOUS at 10:13

## 2025-03-20 RX ADMIN — IPRATROPIUM BROMIDE AND ALBUTEROL SULFATE 3 ML: .5; 3 SOLUTION RESPIRATORY (INHALATION) at 09:16

## 2025-03-20 RX ADMIN — SODIUM CHLORIDE, SODIUM LACTATE, POTASSIUM CHLORIDE, AND CALCIUM CHLORIDE 9 ML/HR: .6; .31; .03; .02 INJECTION, SOLUTION INTRAVENOUS at 09:16

## 2025-03-20 RX ADMIN — Medication 100 MCG: at 10:23

## 2025-03-20 RX ADMIN — Medication 50 MCG: at 10:38

## 2025-03-20 RX ADMIN — EPHEDRINE SULFATE 20 MG: 50 INJECTION INTRAVENOUS at 10:27

## 2025-03-20 NOTE — ANESTHESIA POSTPROCEDURE EVALUATION
Patient: Anca Samson    Procedure Summary       Date: 03/20/25 Room / Location: [facility] OR 05 / [facility] MAIN OR    Anesthesia Start: 1003 Anesthesia Stop: 1105    Procedure: Excision of bilateral upper arm lipomas-removed subcutaneous fatty tumors from both upper arms (Bilateral: Arm Upper) Diagnosis:       Lipoma of left upper extremity      (Lipoma of left upper extremity [D17.22])    Surgeons: Eduardo Ingram MD Provider: Oziel Márquez MD    Anesthesia Type: general ASA Status: 4            Anesthesia Type: general    Vitals  Vitals Value Taken Time   /54 03/20/25 11:30   Temp 36.7 °C (98 °F) 03/20/25 11:30   Pulse 91 03/20/25 11:30   Resp 18 03/20/25 11:30   SpO2 95 % 03/20/25 11:30           Post Anesthesia Care and Evaluation    Patient location during evaluation: bedside  Patient participation: complete - patient participated  Level of consciousness: awake  Pain management: adequate    Airway patency: patent  PONV Status: none  Cardiovascular status: acceptable and stable  Respiratory status: acceptable  Hydration status: acceptable

## 2025-03-20 NOTE — ANESTHESIA PREPROCEDURE EVALUATION
Anesthesia Evaluation     Patient summary reviewed and Nursing notes reviewed   no history of anesthetic complications:   NPO Solid Status: > 8 hours  NPO Liquid Status: > 2 hours           Airway   Mallampati: II  TM distance: >3 FB  Neck ROM: full  No difficulty expected and Large neck circumference  Dental    (+) poor dentition and edentulous    Pulmonary     breath sounds clear to auscultation  (+) a smoker (3 years ago) Former, COPD, asthma,home oxygen (no longer on oxygen all the time, just with heavy exertion), sleep apnea, decreased breath sounds  Cardiovascular - normal exam  Exercise tolerance: poor (<4 METS)    ECG reviewed  Rhythm: regular  Rate: normal    (+) hypertension, hyperlipidemia      Neuro/Psych  (+) seizures (pseudoseizures)  GI/Hepatic/Renal/Endo    (+) obesity, GERD well controlled, thyroid problem hypothyroidism    Musculoskeletal     Abdominal    Substance History - negative use     OB/GYN negative ob/gyn ROS         Other   arthritis,   history of cancer (multiple myeloma)    ROS/Med Hx Other: PAT Nursing Notes unavailable.    EKG 7/6/24- SR    ECHO 4/6/23 nl EF    Stress 2023- no evidence of ischemia                Anesthesia Plan    ASA 4     general     (Patient understands anesthesia not responsible for dental damage.    Breathing treatment given)  intravenous induction     Anesthetic plan, risks, benefits, and alternatives have been provided, discussed and informed consent has been obtained with: patient.    Use of blood products discussed with patient .    Plan discussed with CRNA.    CODE STATUS:

## 2025-03-20 NOTE — ASSESSMENT & PLAN NOTE
Patient is on active treatment with the daratumumab, lenalidomide, dexamethasone and zoledronic acid for bony involvement.  Tolerating her treatments well.  Denies new masses, adenopathy, unusual aches or pains.  She had recent dental extractions with her mouth is completely healed.  She is eating and drinking well.  Lab work today is adequate for treatment.

## 2025-03-20 NOTE — DISCHARGE INSTRUCTIONS
DISCHARGE INSTRUCTIONS  SURGICAL / AMBULATORY  PROCEDURES      For your surgery you had:  General anesthesia (you may have a sore throat for the first 24 hours)  You may experience dizziness, drowsiness, or light-headedness for several hours following surgery/procedure.  Do not stay alone today or tonight.  Limit your activity for 24 hours.  Resume your diet slowly.  Follow whatever special dietary instructions you may have been given by your doctor.  You should not drive or operate machinery, drink alcohol, or sign legally binding documents for 24 hours or while you are taking pain medication.    NOTIFY YOUR DOCTOR IF YOU EXPERIENCE ANY OF THE FOLLOWING:  Temperature greater than 101 degrees Fahrenheit  Shaking Chills  Redness or excessive drainage from incision  Nausea, vomiting and/or pain that is not controlled by prescribed medications  Increase in bleeding or bleeding that is excessive  Unable to urinate in 6 hours after surgery  If unable to reach your doctor, please go to the closest Emergency Room  You may remove dressing in 48 hours  You may shower in 48 hours.  Apply an ice pack 24-48 hours.      SPECIAL INSTRUCTIONS:  Follow any verbal instructions given by Dr. Ingram.      Last dose of pain medication was given at:  Tylenol (1000mg) last at 9:16am. Do not exceed 4000mg of tylenol in a 24 hour period.  May take tramadol at anytime if needed.

## 2025-03-20 NOTE — OP NOTE
LIPOMA EXCISION  Procedure Report    Patient Name:  Anca Samson  YOB: 1950    Date of Surgery:  3/20/2025     Indications: The patient is a 74-year-old female who presents with bilateral upper abdominal subcutaneous lipomas.  The decision was made to proceed with excision of these lipomas.    Pre-op Diagnosis: Bilateral upper arm subcutaneous lipomas    Post-Op Diagnosis: Same    Procedure/CPT® Codes:    Excision of bilateral upper arm lipomas    Staff:  Surgeon(s):  Eduardo Ingram MD    Assistant: Nathaniel Laws    Anesthesia: Monitored Anesthesia Care    Estimated Blood Loss: minimal    Implants:    Nothing was implanted during the procedure    Specimen:          Specimens       ID Source Type Tests Collected By Collected At Frozen?    A Arm, Left Tissue TISSUE PATHOLOGY EXAM   Eduardo Ingram MD 3/20/25 1043 No    Description: LEFT UPPER ARM LIPOMA    This specimen was not marked as sent.    B Arm, Right Tissue TISSUE PATHOLOGY EXAM   Eduardo Ingram MD 3/20/25 1046 No    Description: RIGHT UPPER ARM LIPOMA    This specimen was not marked as sent.                Findings: 3 cm left arm lipoma, 2 cm right arm lipoma    Complications: None    Description of Procedure: The patient was taken to the operating room and placed on the table in supine position.  After administration of MAC anesthesia, both upper arms were prepped and draped sterilely.  We started on the left side first.  I anesthetized the skin overlying the subcutaneous lipoma of the left upper arm.  A longitudinal incision was made over the mass.  I dissected into the subcutaneous tissues and identified a benign-appearing lipoma.  It was excised with cautery and was noted to be about 3 cm in greatest diameter.  After achieving adequate hemostasis, the subcutaneous tissues were reapproximated with interrupted 3-0 Vicryl sutures and the skin was closed with a running 4-0 Vicryl subcuticular suture.  We then addressed the  lipoma in the right upper arm.  I anesthetized the skin in the right upper arm overlying the lipoma with quarter percent Marcaine.  I then made a longitudinal incision overlying this lipoma with a 15 blade scalpel.  I dissected into the subcutaneous tissues and identified a 2 cm lipoma.  The lipoma was excised and then sent for specimen.  We achieved adequate hemostasis with cautery.  The subcutaneous tissues were reapproximated with interrupted 3-0 Vicryl sutures and the skin was closed with a running 4-0 Vicryl subcuticular suture.  Sterile dressings were applied and she was taken the postanesthesia recovery room in stable condition.    Assistant: Nathaniel Laws  was responsible for performing the following activities: Retraction, Suction, Irrigation, and Placing Dressing and their skilled assistance was necessary for the success of this case.    Eduardo Ingram MD     Date: 3/20/2025  Time: 11:03 EDT

## 2025-03-28 ENCOUNTER — OFFICE VISIT (OUTPATIENT)
Dept: PSYCHIATRY | Facility: CLINIC | Age: 75
End: 2025-03-28
Payer: MEDICARE

## 2025-03-28 ENCOUNTER — SPECIALTY PHARMACY (OUTPATIENT)
Dept: PHARMACY | Facility: HOSPITAL | Age: 75
End: 2025-03-28
Payer: MEDICARE

## 2025-03-28 VITALS
SYSTOLIC BLOOD PRESSURE: 147 MMHG | WEIGHT: 186 LBS | HEIGHT: 61 IN | HEART RATE: 91 BPM | DIASTOLIC BLOOD PRESSURE: 72 MMHG | BODY MASS INDEX: 35.12 KG/M2

## 2025-03-28 DIAGNOSIS — F41.1 GENERALIZED ANXIETY DISORDER: Primary | ICD-10-CM

## 2025-03-28 DIAGNOSIS — F33.1 MODERATE EPISODE OF RECURRENT MAJOR DEPRESSIVE DISORDER: ICD-10-CM

## 2025-03-28 DIAGNOSIS — F43.21 COMPLICATED GRIEF: ICD-10-CM

## 2025-03-28 DIAGNOSIS — C90.00 MULTIPLE MYELOMA, REMISSION STATUS UNSPECIFIED: ICD-10-CM

## 2025-03-28 DIAGNOSIS — R56.9 SEIZURES: ICD-10-CM

## 2025-03-28 DIAGNOSIS — F51.05 INSOMNIA DUE TO MENTAL CONDITION: ICD-10-CM

## 2025-03-28 RX ORDER — LENALIDOMIDE 15 MG/1
15 CAPSULE ORAL DAILY
Qty: 21 CAPSULE | Refills: 0 | Status: SHIPPED | OUTPATIENT
Start: 2025-03-28

## 2025-03-28 NOTE — PATIENT INSTRUCTIONS
1.  Please return to clinic at your next scheduled visit.  Contact the clinic ([phone]) at least 24 hours prior in the event you need to cancel.  2.  Do no harm to yourself or others.    3.  Avoid alcohol and drugs.    4.  Take all medications as prescribed.  Please contact the clinic with any concerns. If you are in need of medication refills, please call the clinic at [phone].    5. Should you want to get in touch with your provider, Dr. Kalia Singer, please utilize [employer] or contact the office ([phone]), and staff will be able to page Dr. Singer directly.  6.  In the event you have personal crisis, contact the following crisis numbers: Suicide Prevention Hotline 1-218.336.3286; [facility] Helpline 9-892-443-TQFE; [facility] Emergency Room [phone]; text HELLO to 518178; or 256.     Light box/Happy Light/Light Therapy: Obtain a light box, 10,000 lux, put the box about 1 foot away from you, facing you at an angle (not directly), use it daily, right after waking up, for 1 hour daily. Don't stare directly at it. Read, eat, watch tv, etc. Use from September through March. Call your insurance company to see if they can reimburse you for the cost. Available at big box retailers. I used [employer] to get a [employer] one.

## 2025-03-28 NOTE — PROGRESS NOTES
Subjective   Anca Samson is a 74 y.o. female who presents today for initial evaluation     Referring Provider:  No referring provider defined for this encounter.    Chief Complaint:  anxiety, depression    History of Present Illness:     2024: INITIAL VISIT Chart review:     Amandeep: Ativan short course in November, February, May.  Care Everywhere: a few non behavioral health notes    Psychotropic medication chart review:  Present:  Prozac 40 mg a day    Previously:  Prozac 20 mg a day  Zoloft 25 mg a day    EKG: 2024: Rate 97, sinus, probable left atrial enlargement, QTc 461  Procedures: Sleep study in May 2024: NICK, BiPAP settings noted  Head imaging: May 2024: MRI of the brain shows white matter changes compatible with small vessel ischemic disease  Labs: 2024: Abnormal CMP was chloride 112, CO2 20.9, glucose 160, total protein 5.3.  Reassuring thyroid studies, abnormal CBC with hemoglobin 10.6, other abnormalities.  Initial Chart Review Notes: Referred by primary care in April for anxiety.  History of significant shortness of breath due to comorbidities.  Patient requested to meet with a psychiatrist.      Chart Review By Dates:  2025: admitted s/p excision of lipoma. Gen surg, onc x4, pulm, unknown; abnl cbc cmp; reassuring mg.  2025: fam med, onc x6; abnl cmp cbc phos -- multiple abnl in each assay but cr 0.73.  2024: admitted for removal of biliary stent, ERCP, infusion for MM, onc, gen surg. Abnl gluc 212 on cmp, reassuring thyroid studies, abnl cbc  2024: admitted for lap anayeli, pulm, infusion with onc, onc, abnl cmp and cbc, but cr 0.76.  24: onc, dexa shows osteoporosis. High trigs, low A1c 4.4, abnl cmp, cbc. Cr 0.86.    Plannin2025: CHAI, increase buspar. Consider switching prozac to cymbalta (which can help with pain). Can't remember how pristiq affected her.  2024: Improved, stable, no changes, situational anxiety and complicated  "grief.   08/19/2024: MDD, CHAI, increase buspar.    VISITS/APPOINTMENTS (BELOW):    \"Sarah\" \"Robi\" her  of 46 years  Seizures  On chemo for multiple myeloma since 11/23 (monthly)    03/28/2025:   In person interview:  \"No wheelchair. A cane.\"  \"I'm better. I'm not sure why.\"  SOA improved  LUCIA passed last month  Buspar: led to dizziness, but two a day is working well  Grief, complicated: her mom and dad, 20 years ago  Also LUCIA last month  MDD, seasonal: depressed mood -- improving  CHAI: more worrying, on edge -- improving  Panic attacks: stable  Energy: down chronically  Concentration: down  Insomnia: hypersomnia -- improving  Eating/Weight: 186, 194, 188, 194 lbs  Refills: y  Substances: denies  Therapy: none  Medication compliant: y  SE: n  No SI HI AVH.      02/14/2025:   In person interview:  \"I've had 4 surgeries, all my teeth pulled, I have tumors in my arms so I need surgery.\"  Couldn't get BP today  Still having SOA  They just keep finding things  I want to be myself again, I want to get back to work    Grief, complicated: her mom and dad, 20 years ago  MDD: depressed mood  CHAI: more worrying, on edge  Panic attacks: stable  Energy: down chronically  Concentration: down  Insomnia: too much, hypersomnia  Eating/Weight: 194, 188, 194 lbs  Refills: y  Substances: denies  Therapy: none  Medication compliant: y  SE: n  No SI HI AVH.      09/19/2024:   In person interview:  \"I've got surgery in 2 weeks.\"  More SOA, past 2 days, doesn't need to go to ER or PCP, this happens  Anxious for the surgery  Not working  Grief: her mom and dad, 20 years ago  MDD: depressed mood -- now stable  CHAI: more worrying, on edge -- stable, some situational anxiety  Panic attacks: stable  Energy: down chronically  Concentration: down  Insomnia: too much, hypersomnia  Eating/Weight: 194, 188, 194 lbs  Refills: y  Substances: denies  Therapy: none  Medication compliant: y  SE: n  No SI HI AVH.    ...      06/20/2024: In " "person.  Interview:  His/Her Story: \"Thor Grabill for 2 weeks, that was 21 years ago.\"  P18, G10  I don't know why it happened. I don't think it was SI.  I've been on prozac 4-5 years  \"I'm awful nervous\"  I can't get up and do anything  I can't drive  I can't work  I can't walk  If I walk 20 feet I'm so out of breath  Has had a tremor since started nebulizer a year ago. Quit it for a few months, tremor stopped, restarted, tremor restarted  Perfectionistic tendencies  Sleeps well on bipap  Depression/Mood:  Depressed mood, anhedonia, hopelessness or guilt, poor energy, poor concentration.  Seasonal pattern:  Severity: Moderate  Duration: 21 years  Anxiety:  Uncontrolled worrying, muscle tension, fatigue, poor concentration, feeling on edge or restless, irritability.  Severity: Moderate  Duration: 21 years  Panic attacks: y  Psych ROS: Positive for depression, anxiety.  Negative for psychosis and padmaja.  ADHD: def  PTSD: def  No SI HI AVH.  Medication compliant: y    Access to Firearms: yes, not locked away    PHQ-9 Depression Screening  PHQ-9 Total Score:      Little interest or pleasure in doing things?     Feeling down, depressed, or hopeless?     Trouble falling or staying asleep, or sleeping too much?     Feeling tired or having little energy?     Poor appetite or overeating?     Feeling bad about yourself - or that you are a failure or have let yourself or your family down?     Trouble concentrating on things, such as reading the newspaper or watching television?     Moving or speaking so slowly that other people could have noticed? Or the opposite - being so fidgety or restless that you have been moving around a lot more than usual?     Thoughts that you would be better off dead, or of hurting yourself in some way?     PHQ-9 Total Score       CHAI-7       Past Surgical History:  Past Surgical History:   Procedure Laterality Date    APPENDECTOMY      BONE MARROW BIOPSY  2016    CHOLECYSTECTOMY N/A 08/08/2024    " Procedure: CHOLECYSTECTOMY LAPAROSCOPIC WITH DAVINCI ROBOT;  Surgeon: Eduardo Ingram MD;  [place] [facility] [place] [facility] [place]  Service: Robotics - DaVinci;  Laterality: N/A;    COLON SURGERY  2017    COLONOSCOPY  2018,2017,2019    [facility] DR LE:DIVERTICULOSIS AND ERYTHEMA OF MUCOSA    COLONOSCOPY N/A 12/20/2022    Procedure: COLONOSCOPY WITH ELEVIEW INJECTION, POLYPECTOMY, HOT SNARE, CLIP APPLICATION X3, BIOPSIES;  Surgeon: Jarvis Borges MD;  Location: [facility] ENDOSCOPY;  Service: Gastroenterology;  Laterality: N/A;  COLON POLYP, DIVERTICULOSIS, ANASTOMOSIS RIGHT COLON    COLONOSCOPY N/A 11/09/2023    Procedure: COLONOSCOPY;  Surgeon: Jarvis Borges MD;  Location: [facility] ENDOSCOPY;  Service: Gastroenterology;  Laterality: N/A;  DIVERTICULOSIS, ANASTOMOSIS IN ASCENDING COLON    ENDOSCOPY  2018    ERCP N/A 07/27/2024    Procedure: ENDOSCOPIC RETROGRADE CHOLANGIOPANCREATOGRAPHY WITH SPHINCTEROTOMY, BALLOON SWEEP, STENT PLACEMENT;  Surgeon: Ajay Kennedy MD;  Location: [facility] MAIN OR;  Service: Gastroenterology;  Laterality: N/A;  BILE DUCT STONE    ERCP N/A 09/05/2024    Procedure: ENDOSCOPIC RETROGRADE CHOLANGIOPANCREATOGRAPHY WITH SPINCHTEROTOMY. BALLOON DILATATION 8-10. BALLOON SWEEP 12-15. STENT REMOVAL;  Surgeon: Ajay Kennedy MD;  Location: [facility] ENDOSCOPY;  Service: Gastroenterology;  Laterality: N/A;  BILE DUCT STONES    EYE SURGERY      FECAL DISIMPACTION  06/2019    TRANSPLANT     GALLBLADDER SURGERY      HEMICOLECTOMY Right 05/2017    HERNIA REPAIR  2024    HYSTERECTOMY  1982,1984    KNEE ARTHROSCOPY Left 01/03/2022    Procedure: KNEE ARTHROSCOPY WITH PARTIAL MEDIAL MENISCECTOMY,  CHONDROPLASTY;  Surgeon: Nicholas Tipton MD;  [place] [facility] [place] [facility] [place]  Service: Orthopedics;  Laterality: Left;    LIPOMA EXCISION Bilateral 3/20/2025    Procedure: Excision of bilateral upper arm lipomas-removed subcutaneous fatty tumors from both upper arms;  Surgeon: Eduardo Ingram,  MD;  Location: [facility] MAIN OR;  Service: General;  Laterality: Bilateral;    MINI-LAPAROTOMY  2017    PORTACATH PLACEMENT N/A     pt states that her port does not work    TONSILLECTOMY      TUBAL ABDOMINAL LIGATION      UPPER GASTROINTESTINAL ENDOSCOPY  2018    VENOUS ACCESS DEVICE (PORT) INSERTION N/A 10/31/2023    Procedure: Port-a-catheter removal and port-a-catheter placement;  Surgeon: Alcon Delgado MD;  [place] [facility] [place] [facility] [place]  Service: General;  Laterality: N/A;    VENTRAL HERNIA REPAIR N/A 10/03/2024    Procedure: VENTRAL / INCISIONAL HERNIA REPAIR LAPAROSCOPIC WITH DAVINCI ROBOT;  Surgeon: Eduardo Ingram MD;  [place] [facility] [place] [facility] [place]  Service: Robotics - DaVinci;  Laterality: N/A;       Problem List:  Patient Active Problem List   Diagnosis    Anxiety    Asthma    Malignant neoplasm of ascending colon    COPD (chronic obstructive pulmonary disease)    Diverticulitis    Dysphagia    Heartburn    Hyperlipidemia    Essential hypertension    Impaired fasting glucose    Low back pain    Major depressive disorder    Multiple myeloma    Renal insufficiency    Seizures    Vitamin D deficiency    Tobacco abuse    Class 2 severe obesity due to excess calories with serious comorbidity and body mass index (BMI) of 39.0 to 39.9 in adult    Chondromalacia of left knee    Diarrhea    S/P Left knee partial medial menisectomy and chondroplasty     Chronic dyspnea    Multiple lung nodules    Aftercare following surgery of left knee thorascopic partial medial meniscectomy, chondroplasty, 1/3/2022    Hypothyroidism    Atherosclerosis of native coronary artery of native heart with angina pectoris    Chronic respiratory failure with hypoxia    Excessive daytime sleepiness    Snoring    Encounter for screening for malignant neoplasm of colon    History of colon polyps    NICK (obstructive sleep apnea)    Encounter for screening for lung cancer    Dehydration    Tobacco abuse, in remission    Hiatal hernia    Fatigue     Cough    Wheezing    Atelectasis    Acute gallstone pancreatitis    Choledocholithiasis with acute cholecystitis    Encounter for removal of biliary stent    Incisional hernia, without obstruction or gangrene    Encounter for long-term (current) use of medications    Incarcerated incisional hernia    Hypophosphatemia    Lipoma of left upper extremity    Lipoma of right upper extremity    Hypocalcemia       Allergy:   Allergies   Allergen Reactions    Morphine Anaphylaxis    Cephalexin Hives    Penicillins Hives     Beta lactam allergy details  Antibiotic reaction: hives  Age at reaction: child  Dose to reaction time: unknown  Reason for antibiotic: unknown  Epinephrine required for reaction?: unknown  Tolerated antibiotics: other (none per pt)        Sulfa Antibiotics Hives        Discontinued Medications:  There are no discontinued medications.              Current Medications:   Current Outpatient Medications   Medication Sig Dispense Refill    acyclovir (ZOVIRAX) 400 MG tablet TAKE 1 TABLET BY MOUTH 2 (TWO) TIMES A DAY. TAKE ONE TABLET BY MOUTH TWICE DAILY. 180 tablet 3    aspirin 81 MG EC tablet Take 1 tablet by mouth Daily.      busPIRone (BUSPAR) 15 MG tablet Take 1 tablet by mouth 3 (Three) Times a Day. 270 tablet 1    Calcium Carb-Cholecalciferol (calcium 500 mg vitamin D 5 mcg, 200 UT,) 500-5 MG-MCG tablet per tablet Take 1 tablet by mouth 2 (Two) Times a Day. 180 tablet 1    colestipol (COLESTID) 1 g tablet TAKE 2 TABLETS BY MOUTH TWICE A  tablet 1    dapsone 25 MG tablet TAKE 2 TABLETS BY MOUTH TWICE A  tablet 1    dexAMETHasone (DECADRON) 4 MG tablet Take 10 tablets on D 1,8,15,22 and take 1 tablet on D 2,3.  Take in the morning with food. 42 tablet 5    FLUoxetine (PROzac) 40 MG capsule Take 1 capsule by mouth Daily. 90 capsule 1    Fluticasone-Umeclidin-Vilant (TRELEGY) 100-62.5-25 MCG/ACT inhaler Inhale 1 puff Daily. 1 each 6    lenalidomide (REVLIMID) 15 MG capsule Take 1 capsule by  mouth Daily. On Days 1-21, and off 7 days, on a 28 day cycle 21 capsule 0    ondansetron (ZOFRAN) 8 MG tablet TAKE 1 TABLET BY MOUTH THREE TIMES A DAY AS NEEDED FOR NAUSEA AND VOMITING (Patient taking differently: Take 1 tablet by mouth Every 8 (Eight) Hours As Needed.) 30 tablet 5    promethazine (PHENERGAN) 25 MG tablet Take 1 tablet by mouth Every 6 (Six) Hours As Needed for Nausea or Vomiting. 30 tablet 1    traMADol (ULTRAM) 50 MG tablet Take 1 tablet by mouth Every 6 (Six) Hours As Needed for Moderate Pain. 8 tablet 0     No current facility-administered medications for this visit.       Past Medical History:  Past Medical History:   Diagnosis Date    Anxiety     Asthma 07/28/2021    Atherosclerosis of native coronary artery of native heart with angina pectoris 08/15/2023    seen by angela 10/2023, follow up prn/no cp    C. difficile diarrhea     DENIES ANY CURRENT ISSUES    Cholelithiasis 7-    Chronic diarrhea     Colon cancer 07/21/2017    Colon polyp     COPD (chronic obstructive pulmonary disease) 10/23/2017    Dental disease     Depression     Disease of thyroid gland     Diverticulitis     Diverticulitis of colon     Diverticulosis 7-    Dysphagia     Emphysema of lung     Emphysema/COPD     GERD (gastroesophageal reflux disease)     Head injury     CONCUSSION AT AGE 4    Hernia 2019    History of chemotherapy     LAST TREATMENT 3/19/25    Hx of psychiatric care     Impaired fasting glucose 08/14/2015    Lumbago     Lung nodule 02/17/2022    Major depressive disorder 10/27/2016    Malignant neoplasm of ascending colon 07/21/2017    Melena     Multiple myeloma     CURRENTLY BEING TREATED    Nicotine dependence 05/10/2017    Obesity     NICK (obstructive sleep apnea) 11/06/2023    Oxygen dependent     PRN    Pancreatitis 7/26/2024    Panic disorder     Primary central sleep apnea     Renal insufficiency 07/28/2021    Seizures     PSEUDO SEIZURES LAST ONE AROUND JULY 2024    Shortness of  breath     CAN GET VERY SOA WITH MINIMAL EXERTION    Sinusitis     Small intestine cancer     Tinnitus     Tobacco use 2021    QUIT SMOKING 2022    Tremor     Visit for screening mammogram 2020    NORMAL- REPEAT IN ONE YEAR    Vitamin D deficiency        Past Psychiatric History:  Began Treatment: decades  Diagnoses: MDD, CHAI  Psychiatrist: Vitor for 20 years  Therapist:Denies  Admission History: LT 21 years ago for 2 weeks for depression (?)  Medication Trials:    Zoloft  pristiq    Self Harm: Denies  Suicide Attempts:Denies   Psychosis, Anxiety, Depression: denies    Substance Abuse History:   Types:Denies all, including illicit, quit smoking 2 years ago  Withdrawal Symptoms:Denies  Longest Period Sober:Not Applicable   AA: Not applicable     Social History:  Martial Status:  Employed:No  Kids:Yes  House:Lives in a house   History: Denies    Social History     Socioeconomic History    Marital status:    Tobacco Use    Smoking status: Former     Current packs/day: 0.00     Average packs/day: 1 pack/day for 47.4 years (47.4 ttl pk-yrs)     Types: Cigarettes     Start date: 1975     Quit date: 6/3/2022     Years since quittin.8     Passive exposure: Past    Smokeless tobacco: Never   Vaping Use    Vaping status: Never Used   Substance and Sexual Activity    Alcohol use: Never    Drug use: Never    Sexual activity: Not Currently     Partners: Male     Birth control/protection: None       Family History:   Suicide Attempts: Denies  Suicide Completions:Denies      Family History   Problem Relation Age of Onset    Heart disease Mother     Lung cancer Mother     Cancer Mother     Stroke Father     Heart disease Father     Kidney cancer Father     Cancer Father     Stroke Other         UNCLE/AUNT    Neuropathy Brother     Hypertension Brother     Colon cancer Neg Hx     Malig Hyperthermia Neg Hx        Developmental History:       Childhood: Denies Abuse  High  "School:Completed  College: 2.5 years    Mental Status Exam  Appearance  : groomed, good eye contact, normal street clothes  Behavior  : pleasant and cooperative  Motor  : bilateral tremor, wheelchair before, but using rollator today  Speech  :normal rhythm, rate, volume, tone, not hyperverbal, not pressured, normal prosidy  Mood  : \"I'm better\"  Affect  : euthymic, mild SOA, mood congruent, good variability  Thought Content  : negative suicidal ideations, negative homicidal ideations, negative obsessions  Perceptions  : negative auditory hallucinations, negative visual hallucinations  Thought Process  : linear  Insight/Judgement  : Fair/fair  Cognition  : grossly intact  Attention   : intact      Review of Systems:  Review of Systems   Constitutional:  Positive for fatigue.   HENT:  Negative for drooling.    Eyes:  Positive for visual disturbance.   Respiratory:  Positive for cough and shortness of breath.    Cardiovascular:  Negative for chest pain, palpitations and leg swelling.   Gastrointestinal:  Negative for nausea and vomiting.   Endocrine: Negative for cold intolerance and heat intolerance.   Genitourinary:  Negative for difficulty urinating.   Musculoskeletal:  Negative for joint swelling.   Allergic/Immunologic: Positive for immunocompromised state.   Neurological:  Positive for dizziness. Negative for seizures, speech difficulty and numbness.   Psychiatric/Behavioral:  Positive for confusion.        Physical Exam:  Physical Exam    Vital Signs:   /72   Pulse 91   Ht 154.9 cm (61\")   Wt 84.4 kg (186 lb)   BMI 35.14 kg/m²      Lab Results:   Admission on 03/20/2025, Discharged on 03/20/2025   Component Date Value Ref Range Status    Case Report 03/20/2025    Final                    Value:Surgical Pathology Report                         Case: [id number]                                  Authorizing Provider:  Eduardo Ingram MD       Collected:           03/20/2025 10:43 AM          Ordering " "Location:     [facility] MAIN Received:            03/20/2025 02:04 PM                                 OR                                                                           Pathologist:           Rosy Goodman MD                                                     Specimens:   1) - Arm, Left, LEFT UPPER ARM LIPOMA                                                               2) - Arm, Right, RIGHT UPPER ARM LIPOMA                                                    Clinical Information 03/20/2025    Final                    Value:Lipoma of left upper extremity      Final Diagnosis 03/20/2025    Final                    Value:1. Soft tissue, left upper arm lipoma, excision:   - Lipoma      2. Soft tissue, right upper arm lipoma, excision:   - Lipoma            Gross Description 03/20/2025    Final                    Value:1. Arm, Left.  The specimen is received in 1 formalin filled container labeled \" left upper arm lipoma\" and consists of a 3.2 x 2.5 x 1.7 cm unoriented fragment of adipose tissue.  No skin is present.  The specimen is inked blue, and sectioning reveals tan-yellow, lobulated, homogenous adipose tissue with no distinct lesions, lymph nodes, nodules, or calcifications.  Representative sections are submitted in 2 cassettes.    2. Arm, Right.  The specimen is received in 1 formalin filled container labeled \" right upper arm lipoma\" and consists of a 2.0 x 2.0 x 1.5 cm unoriented fragment of adipose tissue.  No skin is present.  The specimen is inked blue, and sectioning reveals tan-yellow, lobulated, homogenous adipose tissue with no distinct lesions, lymph nodes, nodules, or calcifications.  Representative sections are submitted in 1 cassette.   BARRY      Microscopic Description 03/20/2025    Final                    Value:Microscopic examination performed.     Hospital Outpatient Visit on 03/19/2025   Component Date Value Ref Range Status    Glucose 03/19/2025 162 (H)  65 - 99 " mg/dL Final    BUN 03/19/2025 13  8 - 23 mg/dL Final    Creatinine 03/19/2025 0.86  0.57 - 1.00 mg/dL Final    Sodium 03/19/2025 136  136 - 145 mmol/L Final    Potassium 03/19/2025 3.9  3.5 - 5.2 mmol/L Final    Chloride 03/19/2025 108 (H)  98 - 107 mmol/L Final    CO2 03/19/2025 19.1 (L)  22.0 - 29.0 mmol/L Final    Calcium 03/19/2025 8.3 (L)  8.6 - 10.5 mg/dL Final    Total Protein 03/19/2025 5.6 (L)  6.0 - 8.5 g/dL Final    Albumin 03/19/2025 3.6  3.5 - 5.2 g/dL Final    ALT (SGPT) 03/19/2025 13  1 - 33 U/L Final    AST (SGOT) 03/19/2025 12  1 - 32 U/L Final    Alkaline Phosphatase 03/19/2025 82  39 - 117 U/L Final    Total Bilirubin 03/19/2025 0.4  0.0 - 1.2 mg/dL Final    Globulin 03/19/2025 2.0  gm/dL Final    A/G Ratio 03/19/2025 1.8  g/dL Final    BUN/Creatinine Ratio 03/19/2025 15.1  7.0 - 25.0 Final    Anion Gap 03/19/2025 8.9  5.0 - 15.0 mmol/L Final    eGFR 03/19/2025 70.6  >60.0 mL/min/1.73 Final    Magnesium 03/19/2025 1.8  1.6 - 2.4 mg/dL Final    Phosphorus 03/19/2025 3.2  2.5 - 4.5 mg/dL Final    WBC 03/19/2025 6.57  3.40 - 10.80 10*3/mm3 Final    RBC 03/19/2025 3.58 (L)  3.77 - 5.28 10*6/mm3 Final    Hemoglobin 03/19/2025 11.7 (L)  12.0 - 15.9 g/dL Final    Hematocrit 03/19/2025 37.1  34.0 - 46.6 % Final    MCV 03/19/2025 103.6 (H)  79.0 - 97.0 fL Final    MCH 03/19/2025 32.7  26.6 - 33.0 pg Final    MCHC 03/19/2025 31.5  31.5 - 35.7 g/dL Final    RDW 03/19/2025 16.1 (H)  12.3 - 15.4 % Final    RDW-SD 03/19/2025 62.3 (H)  37.0 - 54.0 fl Final    MPV 03/19/2025 9.3  6.0 - 12.0 fL Final    Platelets 03/19/2025 272  140 - 450 10*3/mm3 Final    Neutrophil % 03/19/2025 20.1 (L)  42.7 - 76.0 % Final    Lymphocyte % 03/19/2025 66.2 (H)  19.6 - 45.3 % Final    Monocyte % 03/19/2025 9.1  5.0 - 12.0 % Final    Eosinophil % 03/19/2025 1.7  0.3 - 6.2 % Final    Basophil % 03/19/2025 2.7 (H)  0.0 - 1.5 % Final    Immature Grans % 03/19/2025 0.2  0.0 - 0.5 % Final    Neutrophils, Absolute 03/19/2025 1.32 (L)   1.70 - 7.00 10*3/mm3 Final    Lymphocytes, Absolute 03/19/2025 4.35 (H)  0.70 - 3.10 10*3/mm3 Final    Monocytes, Absolute 03/19/2025 0.60  0.10 - 0.90 10*3/mm3 Final    Eosinophils, Absolute 03/19/2025 0.11  0.00 - 0.40 10*3/mm3 Final    Basophils, Absolute 03/19/2025 0.18  0.00 - 0.20 10*3/mm3 Final    Immature Grans, Absolute 03/19/2025 0.01  0.00 - 0.05 10*3/mm3 Final    nRBC 03/19/2025 0.0  0.0 - 0.2 /100 WBC Final    Anisocytosis 03/19/2025 Slight/1+  None Seen Final    Macrocytes 03/19/2025 Slight/1+  None Seen Final    Poikilocytes 03/19/2025 Slight/1+  None Seen Final    Polychromasia 03/19/2025 Slight/1+  None Seen Final    WBC Morphology 03/19/2025 Normal  Normal Final    Platelet Estimate 03/19/2025 Adequate  Normal Final    Large Platelets 03/19/2025 Slight/1+  None Seen Final   Hospital Outpatient Visit on 02/19/2025   Component Date Value Ref Range Status    Glucose 02/19/2025 149 (H)  65 - 99 mg/dL Final    BUN 02/19/2025 15  8 - 23 mg/dL Final    Creatinine 02/19/2025 0.81  0.57 - 1.00 mg/dL Final    Sodium 02/19/2025 140  136 - 145 mmol/L Final    Potassium 02/19/2025 3.9  3.5 - 5.2 mmol/L Final    Chloride 02/19/2025 110 (H)  98 - 107 mmol/L Final    CO2 02/19/2025 16.4 (L)  22.0 - 29.0 mmol/L Final    Calcium 02/19/2025 8.9  8.6 - 10.5 mg/dL Final    Total Protein 02/19/2025 5.9 (L)  6.0 - 8.5 g/dL Final    Albumin 02/19/2025 3.8  3.5 - 5.2 g/dL Final    ALT (SGPT) 02/19/2025 11  1 - 33 U/L Final    AST (SGOT) 02/19/2025 10  1 - 32 U/L Final    Alkaline Phosphatase 02/19/2025 69  39 - 117 U/L Final    Total Bilirubin 02/19/2025 0.4  0.0 - 1.2 mg/dL Final    Globulin 02/19/2025 2.1  gm/dL Final    A/G Ratio 02/19/2025 1.8  g/dL Final    BUN/Creatinine Ratio 02/19/2025 18.5  7.0 - 25.0 Final    Anion Gap 02/19/2025 13.6  5.0 - 15.0 mmol/L Final    eGFR 02/19/2025 75.8  >60.0 mL/min/1.73 Final    WBC 02/19/2025 7.56  3.40 - 10.80 10*3/mm3 Final    RBC 02/19/2025 3.42 (L)  3.77 - 5.28 10*6/mm3 Final     Hemoglobin 02/19/2025 11.6 (L)  12.0 - 15.9 g/dL Final    Hematocrit 02/19/2025 36.2  34.0 - 46.6 % Final    MCV 02/19/2025 105.8 (H)  79.0 - 97.0 fL Final    MCH 02/19/2025 33.9 (H)  26.6 - 33.0 pg Final    MCHC 02/19/2025 32.0  31.5 - 35.7 g/dL Final    RDW 02/19/2025 16.8 (H)  12.3 - 15.4 % Final    RDW-SD 02/19/2025 65.6 (H)  37.0 - 54.0 fl Final    MPV 02/19/2025 9.5  6.0 - 12.0 fL Final    Platelets 02/19/2025 264  140 - 450 10*3/mm3 Final    Neutrophil % 02/19/2025 54.5  42.7 - 76.0 % Final    Lymphocyte % 02/19/2025 34.0  19.6 - 45.3 % Final    Monocyte % 02/19/2025 10.4  5.0 - 12.0 % Final    Eosinophil % 02/19/2025 0.1 (L)  0.3 - 6.2 % Final    Basophil % 02/19/2025 0.5  0.0 - 1.5 % Final    Immature Grans % 02/19/2025 0.5  0.0 - 0.5 % Final    Neutrophils, Absolute 02/19/2025 4.11  1.70 - 7.00 10*3/mm3 Final    Lymphocytes, Absolute 02/19/2025 2.57  0.70 - 3.10 10*3/mm3 Final    Monocytes, Absolute 02/19/2025 0.79  0.10 - 0.90 10*3/mm3 Final    Eosinophils, Absolute 02/19/2025 0.01  0.00 - 0.40 10*3/mm3 Final    Basophils, Absolute 02/19/2025 0.04  0.00 - 0.20 10*3/mm3 Final    Immature Grans, Absolute 02/19/2025 0.04  0.00 - 0.05 10*3/mm3 Final    nRBC 02/19/2025 0.0  0.0 - 0.2 /100 WBC Final    Anisocytosis 02/19/2025 Slight/1+  None Seen Final    Macrocytes 02/19/2025 Slight/1+  None Seen Final    WBC Morphology 02/19/2025 Normal  Normal Final    Platelet Estimate 02/19/2025 Adequate  Normal Final    Magnesium 02/19/2025 1.7  1.6 - 2.4 mg/dL Final    Phosphorus 02/19/2025 2.7  2.5 - 4.5 mg/dL Final   Hospital Outpatient Visit on 02/12/2025   Component Date Value Ref Range Status    CARL and PE, Serum 02/12/2025 Comment (A)   Final    Immunofixation shows IgG monoclonal protein with kappa light chain  specificity.  PLEASE NOTE:       This sample has been treated to eliminate IgG Kappa interference       from monoclonal therapy by DARZALEX(R) (daratumumab). Any       remaining IgG Kappa, if present, is  due to the patient's own       immune response.    IgG 02/12/2025 432 (L)  586 - 1602 mg/dL Final    IgA 02/12/2025 40 (L)  64 - 422 mg/dL Final    Result confirmed on concentration.    IgM 02/12/2025 18 (L)  26 - 217 mg/dL Final    Result confirmed on concentration.    Total Protein 02/12/2025 5.1 (L)  6.0 - 8.5 g/dL Final    Albumin 02/12/2025 2.9  2.9 - 4.4 g/dL Final    Alpha-1-Globulin 02/12/2025 0.2  0.0 - 0.4 g/dL Final    Alpha-2-Globulin 02/12/2025 0.6  0.4 - 1.0 g/dL Final    Beta Globulin 02/12/2025 0.9  0.7 - 1.3 g/dL Final    Gamma Globulin 02/12/2025 0.4  0.4 - 1.8 g/dL Final    M-Sahil 02/12/2025 0.3 (H)  Not Observed g/dL Final    Globulin 02/12/2025 2.2  2.2 - 3.9 g/dL Final    A/G Ratio 02/12/2025 1.3  0.7 - 1.7 Final    Please note 02/12/2025 Comment   Final    Protein electrophoresis scan will follow via computer, mail, or   delivery.    SPE Interpretation 02/12/2025 Comment   Final    The SPE pattern demonstrates a single peak (M-spike) in the gamma  region which may represent monoclonal protein. This peak may also be  caused by circulating immune complexes, cryoglobulins, C-reactive  protein, fibrinogen or hemolysis.  If clinically indicated, the  presence of a monoclonal gammopathy may be confirmed by immuno-  fixation, as well as an evaluation of the urine for the presence of  Bence-Garrido protein.   Office Visit on 01/27/2025   Component Date Value Ref Range Status    Respiratory Syncytial Virus 01/27/2025 not detected   Final    Internal Control 01/27/2025 Passed  Passed Final    Lot Number 01/27/2025 [id number]   Final    Expiration Date 01/27/2025 12/09/2025   Final    SARS Antigen 01/27/2025 Not Detected  Not Detected, Presumptive Negative Final    Influenza A Antigen NILESH 01/27/2025 Not Detected  Not Detected Final    Influenza B Antigen NILESH 01/27/2025 Not Detected  Not Detected Final    Internal Control 01/27/2025 Passed  Passed Final    Lot Number 01/27/2025 [id number]   Final     Expiration Date 01/27/2025 10/23/2025   Final   Hospital Outpatient Visit on 01/22/2025   Component Date Value Ref Range Status    Glucose 01/22/2025 182 (H)  65 - 99 mg/dL Final    BUN 01/22/2025 12  8 - 23 mg/dL Final    Creatinine 01/22/2025 0.73  0.57 - 1.00 mg/dL Final    Sodium 01/22/2025 141  136 - 145 mmol/L Final    Potassium 01/22/2025 3.6  3.5 - 5.2 mmol/L Final    Chloride 01/22/2025 109 (H)  98 - 107 mmol/L Final    CO2 01/22/2025 18.7 (L)  22.0 - 29.0 mmol/L Final    Calcium 01/22/2025 8.7  8.6 - 10.5 mg/dL Final    Total Protein 01/22/2025 5.8 (L)  6.0 - 8.5 g/dL Final    Albumin 01/22/2025 3.6  3.5 - 5.2 g/dL Final    ALT (SGPT) 01/22/2025 9  1 - 33 U/L Final    AST (SGOT) 01/22/2025 8  1 - 32 U/L Final    Alkaline Phosphatase 01/22/2025 76  39 - 117 U/L Final    Total Bilirubin 01/22/2025 0.5  0.0 - 1.2 mg/dL Final    Globulin 01/22/2025 2.2  gm/dL Final    A/G Ratio 01/22/2025 1.6  g/dL Final    BUN/Creatinine Ratio 01/22/2025 16.4  7.0 - 25.0 Final    Anion Gap 01/22/2025 13.3  5.0 - 15.0 mmol/L Final    eGFR 01/22/2025 85.9  >60.0 mL/min/1.73 Final    Magnesium 01/22/2025 1.8  1.6 - 2.4 mg/dL Final    Phosphorus 01/22/2025 2.0 (L)  2.5 - 4.5 mg/dL Final    WBC 01/22/2025 9.49  3.40 - 10.80 10*3/mm3 Final    RBC 01/22/2025 3.11 (L)  3.77 - 5.28 10*6/mm3 Final    Hemoglobin 01/22/2025 10.3 (L)  12.0 - 15.9 g/dL Final    Hematocrit 01/22/2025 32.5 (L)  34.0 - 46.6 % Final    MCV 01/22/2025 104.5 (H)  79.0 - 97.0 fL Final    MCH 01/22/2025 33.1 (H)  26.6 - 33.0 pg Final    MCHC 01/22/2025 31.7  31.5 - 35.7 g/dL Final    RDW 01/22/2025 17.7 (H)  12.3 - 15.4 % Final    RDW-SD 01/22/2025 68.1 (H)  37.0 - 54.0 fl Final    MPV 01/22/2025 9.3  6.0 - 12.0 fL Final    Platelets 01/22/2025 495 (H)  140 - 450 10*3/mm3 Final    Neutrophil % 01/22/2025 69.0  42.7 - 76.0 % Final    Lymphocyte % 01/22/2025 20.3  19.6 - 45.3 % Final    Monocyte % 01/22/2025 8.7  5.0 - 12.0 % Final    Eosinophil % 01/22/2025 0.0 (L)   0.3 - 6.2 % Final    Basophil % 01/22/2025 1.1  0.0 - 1.5 % Final    Immature Grans % 01/22/2025 0.9 (H)  0.0 - 0.5 % Final    Neutrophils, Absolute 01/22/2025 6.54  1.70 - 7.00 10*3/mm3 Final    Lymphocytes, Absolute 01/22/2025 1.93  0.70 - 3.10 10*3/mm3 Final    Monocytes, Absolute 01/22/2025 0.83  0.10 - 0.90 10*3/mm3 Final    Eosinophils, Absolute 01/22/2025 0.00  0.00 - 0.40 10*3/mm3 Final    Basophils, Absolute 01/22/2025 0.10  0.00 - 0.20 10*3/mm3 Final    Immature Grans, Absolute 01/22/2025 0.09 (H)  0.00 - 0.05 10*3/mm3 Final    nRBC 01/22/2025 0.0  0.0 - 0.2 /100 WBC Final   Hospital Outpatient Visit on 12/26/2024   Component Date Value Ref Range Status    Glucose 12/26/2024 226 (H)  65 - 99 mg/dL Final    BUN 12/26/2024 13  8 - 23 mg/dL Final    Creatinine 12/26/2024 0.94  0.57 - 1.00 mg/dL Final    Sodium 12/26/2024 138  136 - 145 mmol/L Final    Potassium 12/26/2024 4.2  3.5 - 5.2 mmol/L Final    Chloride 12/26/2024 104  98 - 107 mmol/L Final    CO2 12/26/2024 14.6 (L)  22.0 - 29.0 mmol/L Final    Calcium 12/26/2024 9.0  8.6 - 10.5 mg/dL Final    Total Protein 12/26/2024 6.3  6.0 - 8.5 g/dL Final    Albumin 12/26/2024 4.1  3.5 - 5.2 g/dL Final    ALT (SGPT) 12/26/2024 14  1 - 33 U/L Final    AST (SGOT) 12/26/2024 11  1 - 32 U/L Final    Alkaline Phosphatase 12/26/2024 91  39 - 117 U/L Final    Total Bilirubin 12/26/2024 0.7  0.0 - 1.2 mg/dL Final    Globulin 12/26/2024 2.2  gm/dL Final    A/G Ratio 12/26/2024 1.9  g/dL Final    BUN/Creatinine Ratio 12/26/2024 13.8  7.0 - 25.0 Final    Anion Gap 12/26/2024 19.4 (H)  5.0 - 15.0 mmol/L Final    eGFR 12/26/2024 63.4  >60.0 mL/min/1.73 Final    WBC 12/26/2024 5.42  3.40 - 10.80 10*3/mm3 Final    RBC 12/26/2024 3.72 (L)  3.77 - 5.28 10*6/mm3 Final    Hemoglobin 12/26/2024 12.0  12.0 - 15.9 g/dL Final    Hematocrit 12/26/2024 37.3  34.0 - 46.6 % Final    MCV 12/26/2024 100.3 (H)  79.0 - 97.0 fL Final    MCH 12/26/2024 32.3  26.6 - 33.0 pg Final    MCHC  12/26/2024 32.2  31.5 - 35.7 g/dL Final    RDW 12/26/2024 17.7 (H)  12.3 - 15.4 % Final    RDW-SD 12/26/2024 66.4 (H)  37.0 - 54.0 fl Final    MPV 12/26/2024 9.1  6.0 - 12.0 fL Final    Platelets 12/26/2024 350  140 - 450 10*3/mm3 Final    Neutrophil % 12/26/2024 68.1  42.7 - 76.0 % Final    Lymphocyte % 12/26/2024 24.9  19.6 - 45.3 % Final    Monocyte % 12/26/2024 3.0 (L)  5.0 - 12.0 % Final    Eosinophil % 12/26/2024 2.0  0.3 - 6.2 % Final    Basophil % 12/26/2024 1.1  0.0 - 1.5 % Final    Immature Grans % 12/26/2024 0.9 (H)  0.0 - 0.5 % Final    Neutrophils, Absolute 12/26/2024 3.69  1.70 - 7.00 10*3/mm3 Final    Lymphocytes, Absolute 12/26/2024 1.35  0.70 - 3.10 10*3/mm3 Final    Monocytes, Absolute 12/26/2024 0.16  0.10 - 0.90 10*3/mm3 Final    Eosinophils, Absolute 12/26/2024 0.11  0.00 - 0.40 10*3/mm3 Final    Basophils, Absolute 12/26/2024 0.06  0.00 - 0.20 10*3/mm3 Final    Immature Grans, Absolute 12/26/2024 0.05  0.00 - 0.05 10*3/mm3 Final    nRBC 12/26/2024 0.0  0.0 - 0.2 /100 WBC Final   Hospital Outpatient Visit on 11/27/2024   Component Date Value Ref Range Status    Glucose 11/27/2024 142 (H)  65 - 99 mg/dL Final    BUN 11/27/2024 15  8 - 23 mg/dL Final    Creatinine 11/27/2024 0.72  0.57 - 1.00 mg/dL Final    Sodium 11/27/2024 141  136 - 145 mmol/L Final    Potassium 11/27/2024 3.7  3.5 - 5.2 mmol/L Final    Chloride 11/27/2024 109 (H)  98 - 107 mmol/L Final    CO2 11/27/2024 18.3 (L)  22.0 - 29.0 mmol/L Final    Calcium 11/27/2024 9.3  8.6 - 10.5 mg/dL Final    Total Protein 11/27/2024 6.1  6.0 - 8.5 g/dL Final    Albumin 11/27/2024 4.0  3.5 - 5.2 g/dL Final    ALT (SGPT) 11/27/2024 8  1 - 33 U/L Final    AST (SGOT) 11/27/2024 10  1 - 32 U/L Final    Alkaline Phosphatase 11/27/2024 66  39 - 117 U/L Final    Total Bilirubin 11/27/2024 0.7  0.0 - 1.2 mg/dL Final    Globulin 11/27/2024 2.1  gm/dL Final    A/G Ratio 11/27/2024 1.9  g/dL Final    BUN/Creatinine Ratio 11/27/2024 20.8  7.0 - 25.0 Final     Anion Gap 11/27/2024 13.7  5.0 - 15.0 mmol/L Final    eGFR 11/27/2024 87.3  >60.0 mL/min/1.73 Final    WBC 11/27/2024 9.73  3.40 - 10.80 10*3/mm3 Final    RBC 11/27/2024 3.66 (L)  3.77 - 5.28 10*6/mm3 Final    Hemoglobin 11/27/2024 11.3 (L)  12.0 - 15.9 g/dL Final    Hematocrit 11/27/2024 35.4  34.0 - 46.6 % Final    MCV 11/27/2024 96.7  79.0 - 97.0 fL Final    MCH 11/27/2024 30.9  26.6 - 33.0 pg Final    MCHC 11/27/2024 31.9  31.5 - 35.7 g/dL Final    RDW 11/27/2024 18.6 (H)  12.3 - 15.4 % Final    RDW-SD 11/27/2024 65.3 (H)  37.0 - 54.0 fl Final    MPV 11/27/2024 9.2  6.0 - 12.0 fL Final    Platelets 11/27/2024 361  140 - 450 10*3/mm3 Final    Neutrophil % 11/27/2024 33.1 (L)  42.7 - 76.0 % Final    Lymphocyte % 11/27/2024 46.0 (H)  19.6 - 45.3 % Final    Monocyte % 11/27/2024 14.2 (H)  5.0 - 12.0 % Final    Eosinophil % 11/27/2024 5.2  0.3 - 6.2 % Final    Basophil % 11/27/2024 0.7  0.0 - 1.5 % Final    Immature Grans % 11/27/2024 0.8 (H)  0.0 - 0.5 % Final    Neutrophils, Absolute 11/27/2024 3.21  1.70 - 7.00 10*3/mm3 Final    Lymphocytes, Absolute 11/27/2024 4.48 (H)  0.70 - 3.10 10*3/mm3 Final    Monocytes, Absolute 11/27/2024 1.38 (H)  0.10 - 0.90 10*3/mm3 Final    Eosinophils, Absolute 11/27/2024 0.51 (H)  0.00 - 0.40 10*3/mm3 Final    Basophils, Absolute 11/27/2024 0.07  0.00 - 0.20 10*3/mm3 Final    Immature Grans, Absolute 11/27/2024 0.08 (H)  0.00 - 0.05 10*3/mm3 Final    nRBC 11/27/2024 0.2  0.0 - 0.2 /100 WBC Final   Lab on 11/05/2024   Component Date Value Ref Range Status    Glucose 11/05/2024 152 (H)  65 - 99 mg/dL Final    BUN 11/05/2024 14  8 - 23 mg/dL Final    Creatinine 11/05/2024 1.07 (H)  0.57 - 1.00 mg/dL Final    Sodium 11/05/2024 143  136 - 145 mmol/L Final    Potassium 11/05/2024 4.4  3.5 - 5.2 mmol/L Final    Specimen hemolyzed.  Result may be falsely elevated.    Chloride 11/05/2024 109 (H)  98 - 107 mmol/L Final    CO2 11/05/2024 17.9 (L)  22.0 - 29.0 mmol/L Final    Calcium 11/05/2024  9.7  8.6 - 10.5 mg/dL Final    BUN/Creatinine Ratio 11/05/2024 13.1  7.0 - 25.0 Final    Anion Gap 11/05/2024 16.1 (H)  5.0 - 15.0 mmol/L Final    eGFR 11/05/2024 54.3 (L)  >60.0 mL/min/1.73 Final    WBC 11/05/2024 13.34 (H)  3.40 - 10.80 10*3/mm3 Final    RBC 11/05/2024 4.67  3.77 - 5.28 10*6/mm3 Final    Hemoglobin 11/05/2024 14.3  12.0 - 15.9 g/dL Final    Hematocrit 11/05/2024 45.3  34.0 - 46.6 % Final    MCV 11/05/2024 97.0  79.0 - 97.0 fL Final    MCH 11/05/2024 30.6  26.6 - 33.0 pg Final    MCHC 11/05/2024 31.6  31.5 - 35.7 g/dL Final    RDW 11/05/2024 14.1  12.3 - 15.4 % Final    RDW-SD 11/05/2024 50.3  37.0 - 54.0 fl Final    MPV 11/05/2024 10.9  6.0 - 12.0 fL Final    Platelets 11/05/2024 230  140 - 450 10*3/mm3 Final    Neutrophil % 11/05/2024 51.6  42.7 - 76.0 % Final    Lymphocyte % 11/05/2024 37.9  19.6 - 45.3 % Final    Monocyte % 11/05/2024 5.3  5.0 - 12.0 % Final    Eosinophil % 11/05/2024 3.2  0.3 - 6.2 % Final    Basophil % 11/05/2024 0.0  0.0 - 1.5 % Final    Myelocyte % 11/05/2024 1.1 (H)  0.0 - 0.0 % Final    Atypical Lymphocyte % 11/05/2024 1.1  0.0 - 5.0 % Final    Neutrophils Absolute 11/05/2024 6.88  1.70 - 7.00 10*3/mm3 Final    Lymphocytes Absolute 11/05/2024 5.20 (H)  0.70 - 3.10 10*3/mm3 Final    Monocytes Absolute 11/05/2024 0.71  0.10 - 0.90 10*3/mm3 Final    Eosinophils Absolute 11/05/2024 0.43 (H)  0.00 - 0.40 10*3/mm3 Final    Basophils Absolute 11/05/2024 0.00  0.00 - 0.20 10*3/mm3 Final    Anisocytosis 11/05/2024 Slight/1+  None Seen Final    Macrocytes 11/05/2024 Slight/1+  None Seen Final    Poikilocytes 11/05/2024 Slight/1+  None Seen Final    WBC Morphology 11/05/2024 Normal  Normal Final    Platelet Morphology 11/05/2024 Normal  Normal Final   Hospital Outpatient Visit on 10/30/2024   Component Date Value Ref Range Status    Glucose 10/30/2024 122 (H)  65 - 99 mg/dL Final    BUN 10/30/2024 9  8 - 23 mg/dL Final    Creatinine 10/30/2024 0.72  0.57 - 1.00 mg/dL Final    Sodium  10/30/2024 138  136 - 145 mmol/L Final    Potassium 10/30/2024 4.1  3.5 - 5.2 mmol/L Final    Chloride 10/30/2024 105  98 - 107 mmol/L Final    CO2 10/30/2024 22.8  22.0 - 29.0 mmol/L Final    Calcium 10/30/2024 9.4  8.6 - 10.5 mg/dL Final    Total Protein 10/30/2024 6.3  6.0 - 8.5 g/dL Final    Albumin 10/30/2024 4.1  3.5 - 5.2 g/dL Final    ALT (SGPT) 10/30/2024 11  1 - 33 U/L Final    AST (SGOT) 10/30/2024 12  1 - 32 U/L Final    Alkaline Phosphatase 10/30/2024 90  39 - 117 U/L Final    Total Bilirubin 10/30/2024 0.3  0.0 - 1.2 mg/dL Final    Globulin 10/30/2024 2.2  gm/dL Final    A/G Ratio 10/30/2024 1.9  g/dL Final    BUN/Creatinine Ratio 10/30/2024 12.5  7.0 - 25.0 Final    Anion Gap 10/30/2024 10.2  5.0 - 15.0 mmol/L Final    eGFR 10/30/2024 87.3  >60.0 mL/min/1.73 Final    CARL and PE, Serum 10/30/2024 Comment (A)   Final    Immunofixation shows IgG monoclonal protein with kappa light chain  specificity.  PLEASE NOTE:       This sample has been treated to eliminate IgG Kappa interference       from monoclonal therapy by DARZALEX(R) (daratumumab). Any       remaining IgG Kappa, if present, is due to the patient's own       immune response.    IgG 10/30/2024 701  586 - 1602 mg/dL Final    IgA 10/30/2024 14 (L)  64 - 422 mg/dL Final    Result confirmed on concentration.    IgM 10/30/2024 24 (L)  26 - 217 mg/dL Final    Result confirmed on concentration.    Total Protein 10/30/2024 6.3  6.0 - 8.5 g/dL Final    Albumin 10/30/2024 3.6  2.9 - 4.4 g/dL Final    Alpha-1-Globulin 10/30/2024 0.2  0.0 - 0.4 g/dL Final    Alpha-2-Globulin 10/30/2024 0.8  0.4 - 1.0 g/dL Final    Beta Globulin 10/30/2024 1.0  0.7 - 1.3 g/dL Final    Gamma Globulin 10/30/2024 0.7  0.4 - 1.8 g/dL Final    M-Sahil 10/30/2024 0.5 (H)  Not Observed g/dL Final    Globulin 10/30/2024 2.7  2.2 - 3.9 g/dL Final    A/G Ratio 10/30/2024 1.3  0.7 - 1.7 Final    Please note 10/30/2024 Comment   Final    Protein electrophoresis scan will follow via  computer, mail, or   delivery.    Free Light Chain, [place] 10/30/2024 8.9  3.3 - 19.4 mg/L Final    Free Lambda Light Chains 10/30/2024 2.0 (L)  5.7 - 26.3 mg/L Final    Kappa/Lambda Ratio 10/30/2024 4.45 (H)  0.26 - 1.65 Final    WBC 10/30/2024 16.12 (H)  3.40 - 10.80 10*3/mm3 Final    RBC 10/30/2024 4.60  3.77 - 5.28 10*6/mm3 Final    Hemoglobin 10/30/2024 14.4  12.0 - 15.9 g/dL Final    Hematocrit 10/30/2024 44.6  34.0 - 46.6 % Final    MCV 10/30/2024 97.0  79.0 - 97.0 fL Final    MCH 10/30/2024 31.3  26.6 - 33.0 pg Final    MCHC 10/30/2024 32.3  31.5 - 35.7 g/dL Final    RDW 10/30/2024 15.0  12.3 - 15.4 % Final    RDW-SD 10/30/2024 53.8  37.0 - 54.0 fl Final    MPV 10/30/2024 9.3  6.0 - 12.0 fL Final    Platelets 10/30/2024 246  140 - 450 10*3/mm3 Final    Neutrophil % 10/30/2024 38.2 (L)  42.7 - 76.0 % Final    Lymphocyte % 10/30/2024 54.7 (H)  19.6 - 45.3 % Final    Monocyte % 10/30/2024 5.2  5.0 - 12.0 % Final    Eosinophil % 10/30/2024 1.5  0.3 - 6.2 % Final    Basophil % 10/30/2024 0.2  0.0 - 1.5 % Final    Immature Grans % 10/30/2024 0.2  0.0 - 0.5 % Final    Neutrophils, Absolute 10/30/2024 6.15  1.70 - 7.00 10*3/mm3 Final    Lymphocytes, Absolute 10/30/2024 8.82 (H)  0.70 - 3.10 10*3/mm3 Final    Monocytes, Absolute 10/30/2024 0.84  0.10 - 0.90 10*3/mm3 Final    Eosinophils, Absolute 10/30/2024 0.24  0.00 - 0.40 10*3/mm3 Final    Basophils, Absolute 10/30/2024 0.03  0.00 - 0.20 10*3/mm3 Final    Immature Grans, Absolute 10/30/2024 0.04  0.00 - 0.05 10*3/mm3 Final    Neutrophil % 10/30/2024 52.0  42.7 - 76.0 % Final    Lymphocyte % 10/30/2024 45.0  19.6 - 45.3 % Final    Monocyte % 10/30/2024 1.0 (L)  5.0 - 12.0 % Final    Bands %  10/30/2024 1.0  0.0 - 5.0 % Final    Myelocyte % 10/30/2024 1.0 (H)  0.0 - 0.0 % Final    Neutrophils Absolute 10/30/2024 8.54 (H)  1.70 - 7.00 10*3/mm3 Final    Lymphocytes Absolute 10/30/2024 7.25 (H)  0.70 - 3.10 10*3/mm3 Final    Monocytes Absolute 10/30/2024 0.16   0.10 - 0.90 10*3/mm3 Final    RBC Morphology 10/30/2024 Normal  Normal Final    WBC Morphology 10/30/2024 Normal  Normal Final    Platelet Estimate 10/30/2024 Adequate  Normal Final    Clumped Platelets 10/30/2024 Present  None Seen Final   There may be more visits with results that are not included.       EKG Results:  No orders to display       Imaging Results:  MRI Brain With & Without Contrast    Result Date: 5/3/2024   1. White matter changes most compatible with small vessel ischemic disease in this age group.  2. No abnormal enhancement on postcontrast imaging within the brain parenchyma.  3. Prominent bilateral mastoid effusions left greater than right. Please correlate for mastoiditis  3. Mild paranasal sinus disease     Electronically Signed By-Raman Tran On:5/3/2024 9:22 AM      XR Chest 2 View    Result Date: 4/15/2024  Impression: 1.  No acute cardiopulmonary disease.   Electronically Signed By-Bryan Schultz MD On:4/15/2024 2:57 PM      CT Chest Low Dose Cancer Screening WO    Result Date: 3/1/2024    1. No evidence of lung cancer. 2. Stable bilateral pulmonary nodules, likely benign. 3. Mild bibasilar atelectasis. 4. Moderate emphysema.  LUNG RADS:                           2 (benign appearance, less than 1% chance of malignancy)     YENIFER HANNA MD       Electronically Signed and Approved By: YENIFER HANNA MD on 3/01/2024 at 11:02                 Assessment & Plan   Diagnoses and all orders for this visit:    1. Generalized anxiety disorder (Primary)    2. Moderate episode of recurrent major depressive disorder    3. Insomnia due to mental condition    4. Seizures    5. Complicated grief        Visit Diagnoses:    ICD-10-CM ICD-9-CM   1. Generalized anxiety disorder  F41.1 300.02   2. Moderate episode of recurrent major depressive disorder  F33.1 296.32   3. Insomnia due to mental condition  F51.05 300.9     327.02   4. Seizures  R56.9 780.39   5. Complicated grief  F43.21 309.0      03/28/2025: Medical and MH improving, no changes. Buspar third dose too much (dizzy), reduce to BID.    Acknowledged and normalized patient's thoughts, feelings, and concerns. Allowed patient to freely discuss and process issues, such as:  Anxiety and depression regarding medical conditions.  Anxiety and depression regarding relationships.  ... using Rogerian psychotherapeutic techniques including unconditional positive regard, reflective listening, and demonstrating clear empathy, with the goal of ameliorating symptoms and maintaining, restoring, or improving self-esteem, adaptive skills, and ego or psychological functions (Eliane et al. 1991), the long-term goal of which is to develop a better, healthier perspective and help the patient bear their circumstances more easily.  Time (minutes) spent providing supportive psychotherapy: 16  (This time is exclusive to the therapy session and separate from the time spent on activities used to meet the criteria for the E/M service (history, exam, medical decision-making).)  Start: 10:16  Stop: 10:32  Functional status: mild impairment  Treatment plan: Medication management and supportive psychotherapy  Prognosis: good  Progress: CHAI -- improving  6w    02/14/2025: CHAI, increase buspar. Consider switching prozac to cymbalta (which can help with pain). Can't remember how pristiq affected her.    09/19/2024: Improved, stable, no changes, situational anxiety and complicated grief.     08/19/2024: MDD, CHAI, increase buspar.    07/22/2024: Much improved. No changes for now. 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    6/20/24: Pseudoseizures per pt (?). Start buspar together with prozac for anxiety, depression. Tremor nebulizer related, not due to prozac.    PLAN:  Safety: No acute safety concerns  Therapy: None  Risk Assessment: Risk of self-harm acutely is moderate.  Risk factors include anxiety disorder,  mood disorder, access to firearms, and recent psychosocial stressors (pandemic). Protective factors include no family history, no present SI, no history of suicide attempts or self-harm in the past, minimal AODA, healthcare seeking, future orientation, willingness to engage in care.  Risk of self-harm chronically is also moderate, but could be further elevated in the event of treatment noncompliance and/or AODA.  Meds:  CONTINUE buspar 15 mg BID. Risks, benefits, alternatives discussed with patient including nausea, GI upset, mild sedation, falls risk.  Use care when operating vehicle, vessel, or machine. After discussion of these risks and benefits, the patient voiced understanding and agreed to proceed.  CONTINUE prozac 40 mg qam. Risks, benefits, alternatives discussed with patient including GI upset, nausea vomiting diarrhea, theoretical decrease of seizure threshold predisposing the patient to a slightly higher seizure risk, headaches, sexual dysfunction, serotonin syndrome, bleeding risk, increased suicidality in patients 24 years and younger, switching to padmaja/hypomania.  After discussion of these risks and benefits, the patient voiced understanding and agreed to proceed.  Labs: none    Patient screened positive for depression based on a PHQ-9 score of  on . Follow-up recommendations include: Prescribed antidepressant medication treatment and Suicide Risk Assessment performed.           TREATMENT PLAN/GOALS: Continue supportive psychotherapy efforts and medications as indicated. Treatment and medication options discussed during today's visit. Patient acknowledged and verbally consented to continue with current treatment plan and was educated on the importance of compliance with treatment and follow-up appointments.    MEDICATION ISSUES:  SINGH reviewed as expected.  Discussed medication options and treatment plan of prescribed medication as well as the risks, benefits, and side effects including potential  falls, possible impaired driving and metabolic adversities among others. Patient is agreeable to call the office with any worsening of symptoms or onset of side effects. Patient is agreeable to call 911 or go to the nearest ER should he/she begin having SI/HI. No medication side effects or related complaints today.     MEDS ORDERED DURING VISIT:  No orders of the defined types were placed in this encounter.      Return in about 6 weeks (around 5/9/2025).         This document has been electronically signed by Kalia Singer MD  March 28, 2025 10:35 EDT    Dictated Utilizing Dragon Dictation: Part of this note may be an electronic transcription/translation of spoken language to printed text using the Dragon Dictation System.

## 2025-03-28 NOTE — PROGRESS NOTES
Specialty Pharmacy Patient Management Program  Per Protocol Prescription Order or Refill     Patient will be filling or currently fills medications at [facility] and is enrolled in the Patient Management Program.    Requested Prescriptions     Signed Prescriptions Disp Refills    lenalidomide (REVLIMID) 15 MG capsule 21 capsule 0     Sig: Take 1 capsule by mouth Daily. On Days 1-21, and off 7 days, on a 28 day cycle     Prescription orders above were sent to the pharmacy per Collaborative Care Agreement Protocol.     Last Office Visit: 3/19/25  Next Office Visit: 4/16/25    Drug-Drug Interactions: The current medication list was reviewed and there are no relevant drug-drug interactions with the specialty medication.  Medication Allergies: The patient has no relevant allergies as it relates to their oral specialty medication  Review of Labs/Dose Adjustments: NO DOSE CHANGE - I reviewed the most recent note and labs and the patient will continue without any dose changes.  I sent refills as described below.    Sabrina Beebe PharmD  Oncology Clinical Specialty Pharmacist   3/28/2025  08:56 EDT

## 2025-04-02 ENCOUNTER — OFFICE VISIT (OUTPATIENT)
Dept: FAMILY MEDICINE CLINIC | Facility: CLINIC | Age: 75
End: 2025-04-02
Payer: MEDICARE

## 2025-04-02 VITALS
SYSTOLIC BLOOD PRESSURE: 128 MMHG | HEIGHT: 61 IN | WEIGHT: 184 LBS | RESPIRATION RATE: 22 BRPM | HEART RATE: 90 BPM | OXYGEN SATURATION: 95 % | BODY MASS INDEX: 34.74 KG/M2 | DIASTOLIC BLOOD PRESSURE: 74 MMHG

## 2025-04-02 DIAGNOSIS — G89.29 CHRONIC PAIN OF RIGHT KNEE: Primary | Chronic | ICD-10-CM

## 2025-04-02 DIAGNOSIS — M25.561 CHRONIC PAIN OF RIGHT KNEE: Primary | Chronic | ICD-10-CM

## 2025-04-02 RX ORDER — ALBUTEROL SULFATE 90 UG/1
2 INHALANT RESPIRATORY (INHALATION)
COMMUNITY
Start: 2025-03-28

## 2025-04-02 NOTE — PROGRESS NOTES
Chief Complaint  Chief Complaint   Patient presents with    Knee Pain     Right        Subjective          Anca Samson presents to [facility] for right knee pain.    Knee Pain   The incident occurred more than 1 week ago. Incident location: no known injury or incident. There was no injury mechanism. The pain is present in the right knee (posterior and radiates up thigh). The quality of the pain is described as aching. The pain is at a severity of 7/10 (increases with movement). The pain is moderate. The pain has been Worsening since onset. Associated symptoms include an inability to bear weight. Pertinent negatives include no loss of motion, loss of sensation or numbness. Associated symptoms comments: Difficulty with ambulation. She reports no foreign bodies present. The symptoms are aggravated by movement and weight bearing. She has tried rest for the symptoms. The treatment provided no relief.     Pain in back of knee  Symptoms are: new.   Onset was 1 to 6 months.   Symptoms occur: constantly.  Symptoms include: joint pain and myalgias.   Pertinent negative symptoms include no abdominal pain, no anorexia, no change in stool, no chest pain, no chills, no congestion, no cough, no diaphoresis, no fatigue, no fever, no nausea, no neck pain, no numbness, no rash, no sore throat, no swollen glands, no dysuria, no vertigo, no visual change, no vomiting and no weakness.   Treatment and/or Medications comments include: Carter galeas       Medical History: has a past medical history of Anxiety, Asthma (07/28/2021), Atherosclerosis of native coronary artery of native heart with angina pectoris (08/15/2023), C. difficile diarrhea, Cholelithiasis (7-), Chronic diarrhea, Colon cancer (07/21/2017), Colon polyp, COPD (chronic obstructive pulmonary disease) (10/23/2017), Dental disease, Depression, Disease of thyroid gland, Diverticulitis, Diverticulitis of colon, Diverticulosis (7-),  Dysphagia, Emphysema of lung, Emphysema/COPD, GERD (gastroesophageal reflux disease), Head injury, Hernia (2019), History of chemotherapy, psychiatric care, Impaired fasting glucose (08/14/2015), Lumbago, Lung nodule (02/17/2022), Major depressive disorder (10/27/2016), Malignant neoplasm of ascending colon (07/21/2017), Melena, Multiple myeloma, Nicotine dependence (05/10/2017), Obesity, NICK (obstructive sleep apnea) (11/06/2023), Oxygen dependent, Pancreatitis (7/26/2024), Panic disorder, Primary central sleep apnea, Renal insufficiency (07/28/2021), Seizures, Shortness of breath, Sinusitis, Small intestine cancer, Tinnitus, Tobacco use (07/28/2021), Tremor, Visit for screening mammogram (02/20/2020), and Vitamin D deficiency.   Surgical History: has a past surgical history that includes Bone marrow biopsy (2016); Colonoscopy (2018,2017,2019); Esophagogastroduodenoscopy (2018); Fecal disimpaction (06/2019); Hysterectomy (1982,1984); Mini-laparotomy (2017); Portacath placement (N/A); Colon surgery (2017); Hemicolectomy (Right, 05/2017); Upper gastrointestinal endoscopy (2018); Knee Arthroscopy (Left, 01/03/2022); Appendectomy; Colonoscopy (N/A, 12/20/2022); Venous Access Device (Port) (N/A, 10/31/2023); Colonoscopy (N/A, 11/09/2023); Eye surgery; Tonsillectomy; ERCP (N/A, 07/27/2024); Cholecystectomy (N/A, 08/08/2024); Gallbladder surgery; ERCP (N/A, 09/05/2024); Ventral hernia repair (N/A, 10/03/2024); Hernia repair (2024); Tubal ligation; and Lipoma Excision (Bilateral, 3/20/2025).   Family History: family history includes Cancer in her father and mother; Heart disease in her father and mother; Hypertension in her brother; Kidney cancer in her father; Lung cancer in her mother; Neuropathy in her brother; Stroke in her father and another family member.   Social History: reports that she quit smoking about 2 years ago. Her smoking use included cigarettes. She started smoking about 50 years ago. She has a 47.4  "pack-year smoking history. She has been exposed to tobacco smoke. She has never used smokeless tobacco. She reports that she does not drink alcohol and does not use drugs.    Allergies: Morphine, Cephalexin, Penicillins, and Sulfa antibiotics    Health Maintenance Due   Topic Date Due    ANNUAL WELLNESS VISIT  06/17/2025       Objective     Vitals:    04/02/25 0914   BP: 128/74   Pulse: 90   Resp: 22   SpO2: 95%   Weight: 83.5 kg (184 lb)   Height: 154.9 cm (61\")     Body mass index is 34.77 kg/m².     Wt Readings from Last 3 Encounters:   04/02/25 83.5 kg (184 lb)   03/28/25 84.4 kg (186 lb)   03/20/25 85 kg (187 lb 6.3 oz)     BP Readings from Last 3 Encounters:   04/02/25 128/74   03/28/25 147/72   03/20/25 129/52       Patient Care Team:  Rena Guerra PA as PCP - General (Family Medicine)  West Nicolas MD as Consulting Physician (Hematology and Oncology)  Alcon Delgado MD as Consulting Physician (General Surgery)  Jarvis Borges MD as Consulting Physician (Gastroenterology)  Erendira Clayton APRN as Nurse Practitioner (Gastroenterology)  Felton Lee MD as Consulting Physician (Pulmonary Disease)  Justo Casper MD as Consulting Physician (Otolaryngology)  Eduardo Ingram MD as Consulting Physician (General Surgery)    Physical Exam  Vitals and nursing note reviewed.   Constitutional:       Appearance: Normal appearance. She is obese.      Comments: Presents in wheelchair; no oxygen.   HENT:      Head: Normocephalic and atraumatic.   Cardiovascular:      Rate and Rhythm: Normal rate and regular rhythm.      Pulses: Normal pulses.      Heart sounds: Normal heart sounds.   Pulmonary:      Effort: Pulmonary effort is normal.      Breath sounds: Normal breath sounds.   Musculoskeletal:      Cervical back: Neck supple.      Right knee: Swelling and crepitus present. Decreased range of motion. Tenderness (posterior knee; laterally) present.      Right lower leg: No edema.      Left " lower leg: No edema.   Neurological:      Mental Status: She is alert.   Psychiatric:         Mood and Affect: Mood normal.         Behavior: Behavior normal.           Result Review :              Assessment and Plan      Diagnoses and all orders for this visit:    1. Chronic pain of right knee (Primary)  Comments:  Chronic problem that is worsening; obtain MRI of right knee then can refer to ortho/[facility].  Orders:  -     MRI Knee Right Without Contrast; Future    I currently manage patient's acute and chronic conditions, providing ongoing preventative services, and counseling, in addition to addressing behavioral health concerns and social needs.          Follow Up     Return for Medicare Wellness, After labs/tests.    Patient was given instructions and counseling regarding her condition or for health maintenance advice. Please see specific information pulled into the AVS if appropriate.

## 2025-04-04 ENCOUNTER — OFFICE VISIT (OUTPATIENT)
Dept: SURGERY | Facility: CLINIC | Age: 75
End: 2025-04-04
Payer: MEDICARE

## 2025-04-04 VITALS — RESPIRATION RATE: 18 BRPM | HEIGHT: 61 IN | WEIGHT: 185 LBS | BODY MASS INDEX: 34.93 KG/M2

## 2025-04-04 DIAGNOSIS — D17.22 LIPOMA OF LEFT UPPER EXTREMITY: Primary | ICD-10-CM

## 2025-04-04 DIAGNOSIS — D17.21 LIPOMA OF RIGHT UPPER EXTREMITY: ICD-10-CM

## 2025-04-04 NOTE — LETTER
April 4, 2025     [facility]  [address]  [address]  [place] [address]    Patient: Anca Samson   YOB: 1950   Date of Visit: 4/4/2025     Dear [facility]:       Thank you for referring Anca Samson to me for evaluation. Below are the relevant portions of my assessment and plan of care.    If you have questions, please do not hesitate to call me. I look forward to following Anca along with you.         Sincerely,        Eduardo Ingram MD        CC: No Recipients    Eduardo Ingram MD  04/04/25 0906  Sign when Signing Visit  Chief Complaint  Lipoma and Post-op    Subjective         Anca Samson presents to [facility]  History of Present Illness    Anca Samson is a 74 y.o. female  who presents today for a postoperative visit.     Patient is here for a follow-up after excision of bilateral upper extremity subcutaneous lipomas.  She is doing well today.  Both of her pathology report came back consistent with benign lipomas.    Past History:  Medical History: has a past medical history of Anxiety, Asthma (07/28/2021), Atherosclerosis of native coronary artery of native heart with angina pectoris (08/15/2023), C. difficile diarrhea, Cholelithiasis (7-), Chronic diarrhea, Colon cancer (07/21/2017), Colon polyp, COPD (chronic obstructive pulmonary disease) (10/23/2017), Dental disease, Depression, Disease of thyroid gland, Diverticulitis, Diverticulitis of colon, Diverticulosis (7-), Dysphagia, Emphysema of lung, Emphysema/COPD, GERD (gastroesophageal reflux disease), Head injury, Hernia (2019), History of chemotherapy, psychiatric care, Impaired fasting glucose (08/14/2015), Lumbago, Lung nodule (02/17/2022), Major depressive disorder (10/27/2016), Malignant neoplasm of ascending colon (07/21/2017), Melena, Multiple myeloma, Nicotine dependence (05/10/2017), Obesity, NICK (obstructive sleep apnea)  (11/06/2023), Oxygen dependent, Pancreatitis (7/26/2024), Panic disorder, Primary central sleep apnea, Renal insufficiency (07/28/2021), Seizures, Shortness of breath, Sinusitis, Small intestine cancer, Tinnitus, Tobacco use (07/28/2021), Tremor, Visit for screening mammogram (02/20/2020), and Vitamin D deficiency.   Surgical History: has a past surgical history that includes Bone marrow biopsy (2016); Colonoscopy (2018,2017,2019); Esophagogastroduodenoscopy (2018); Fecal disimpaction (06/2019); Hysterectomy (1982,1984); Mini-laparotomy (2017); Portacath placement (N/A); Colon surgery (2017); Hemicolectomy (Right, 05/2017); Upper gastrointestinal endoscopy (2018); Knee Arthroscopy (Left, 01/03/2022); Appendectomy; Colonoscopy (N/A, 12/20/2022); Venous Access Device (Port) (N/A, 10/31/2023); Colonoscopy (N/A, 11/09/2023); Eye surgery; Tonsillectomy; ERCP (N/A, 07/27/2024); Cholecystectomy (N/A, 08/08/2024); Gallbladder surgery; ERCP (N/A, 09/05/2024); Ventral hernia repair (N/A, 10/03/2024); Hernia repair (2024); Tubal ligation; and Lipoma Excision (Bilateral, 3/20/2025).   Family History: family history includes Cancer in her father and mother; Heart disease in her father and mother; Hypertension in her brother; Kidney cancer in her father; Lung cancer in her mother; Neuropathy in her brother; Stroke in her father and another family member.   Social History: reports that she quit smoking about 2 years ago. Her smoking use included cigarettes. She started smoking about 50 years ago. She has a 47.4 pack-year smoking history. She has been exposed to tobacco smoke. She has never used smokeless tobacco. She reports that she does not drink alcohol and does not use drugs.  Allergies: Morphine, Cephalexin, Penicillins, and Sulfa antibiotics       Current Outpatient Medications:   •  acyclovir (ZOVIRAX) 400 MG tablet, TAKE 1 TABLET BY MOUTH 2 (TWO) TIMES A DAY. TAKE ONE TABLET BY MOUTH TWICE DAILY., Disp: 180 tablet, Rfl:  "3  •  albuterol sulfate  (90 Base) MCG/ACT inhaler, Take 2 puffs by mouth Every 4 (Four) to 6 (Six) Hours As Needed for Wheezing., Disp: , Rfl:   •  aspirin 81 MG EC tablet, Take 1 tablet by mouth Daily., Disp: , Rfl:   •  busPIRone (BUSPAR) 15 MG tablet, Take 1 tablet by mouth 3 (Three) Times a Day., Disp: 270 tablet, Rfl: 1  •  Calcium Carb-Cholecalciferol (calcium 500 mg vitamin D 5 mcg, 200 UT,) 500-5 MG-MCG tablet per tablet, Take 1 tablet by mouth 2 (Two) Times a Day., Disp: 180 tablet, Rfl: 1  •  colestipol (COLESTID) 1 g tablet, TAKE 2 TABLETS BY MOUTH TWICE A DAY, Disp: 360 tablet, Rfl: 1  •  dapsone 25 MG tablet, TAKE 2 TABLETS BY MOUTH TWICE A DAY, Disp: 360 tablet, Rfl: 1  •  dexAMETHasone (DECADRON) 4 MG tablet, Take 10 tablets on D 1,8,15,22 and take 1 tablet on D 2,3.  Take in the morning with food., Disp: 42 tablet, Rfl: 5  •  FLUoxetine (PROzac) 40 MG capsule, Take 1 capsule by mouth Daily., Disp: 90 capsule, Rfl: 1  •  Fluticasone-Umeclidin-Vilant (TRELEGY) 100-62.5-25 MCG/ACT inhaler, Inhale 1 puff Daily., Disp: 1 each, Rfl: 6  •  lenalidomide (REVLIMID) 15 MG capsule, Take 1 capsule by mouth Daily. On Days 1-21, and off 7 days, on a 28 day cycle, Disp: 21 capsule, Rfl: 0  •  ondansetron (ZOFRAN) 8 MG tablet, TAKE 1 TABLET BY MOUTH THREE TIMES A DAY AS NEEDED FOR NAUSEA AND VOMITING (Patient taking differently: Take 1 tablet by mouth Every 8 (Eight) Hours As Needed.), Disp: 30 tablet, Rfl: 5  •  promethazine (PHENERGAN) 25 MG tablet, Take 1 tablet by mouth Every 6 (Six) Hours As Needed for Nausea or Vomiting., Disp: 30 tablet, Rfl: 1       Physical Exam  She appears well.  Her incisions look good.  Objective    Vital Signs:   Resp 18   Ht 154.9 cm (60.98\")   Wt 83.9 kg (185 lb)   BMI 34.97 kg/m²              Assessment and Plan    Diagnoses and all orders for this visit:    1. Lipoma of left upper extremity (Primary)    2. Lipoma of right upper extremity    We will see her back on an " as-needed basis.  I have asked her to call me if she were to have any further questions or concerns.

## 2025-04-04 NOTE — PROGRESS NOTES
Chief Complaint  Lipoma and Post-op    Subjective          Anca Samson presents to [facility]  History of Present Illness    Anca Sasmon is a 74 y.o. female  who presents today for a postoperative visit.     Patient is here for a follow-up after excision of bilateral upper extremity subcutaneous lipomas.  She is doing well today.  Both of her pathology report came back consistent with benign lipomas.    Past History:  Medical History: has a past medical history of Anxiety, Asthma (07/28/2021), Atherosclerosis of native coronary artery of native heart with angina pectoris (08/15/2023), C. difficile diarrhea, Cholelithiasis (7-), Chronic diarrhea, Colon cancer (07/21/2017), Colon polyp, COPD (chronic obstructive pulmonary disease) (10/23/2017), Dental disease, Depression, Disease of thyroid gland, Diverticulitis, Diverticulitis of colon, Diverticulosis (7-), Dysphagia, Emphysema of lung, Emphysema/COPD, GERD (gastroesophageal reflux disease), Head injury, Hernia (2019), History of chemotherapy, psychiatric care, Impaired fasting glucose (08/14/2015), Lumbago, Lung nodule (02/17/2022), Major depressive disorder (10/27/2016), Malignant neoplasm of ascending colon (07/21/2017), Melena, Multiple myeloma, Nicotine dependence (05/10/2017), Obesity, NICK (obstructive sleep apnea) (11/06/2023), Oxygen dependent, Pancreatitis (7/26/2024), Panic disorder, Primary central sleep apnea, Renal insufficiency (07/28/2021), Seizures, Shortness of breath, Sinusitis, Small intestine cancer, Tinnitus, Tobacco use (07/28/2021), Tremor, Visit for screening mammogram (02/20/2020), and Vitamin D deficiency.   Surgical History: has a past surgical history that includes Bone marrow biopsy (2016); Colonoscopy (2018,2017,2019); Esophagogastroduodenoscopy (2018); Fecal disimpaction (06/2019); Hysterectomy (1982,1984); Mini-laparotomy (2017); Portacath placement (N/A); Colon surgery (2017);  Hemicolectomy (Right, 05/2017); Upper gastrointestinal endoscopy (2018); Knee Arthroscopy (Left, 01/03/2022); Appendectomy; Colonoscopy (N/A, 12/20/2022); Venous Access Device (Port) (N/A, 10/31/2023); Colonoscopy (N/A, 11/09/2023); Eye surgery; Tonsillectomy; ERCP (N/A, 07/27/2024); Cholecystectomy (N/A, 08/08/2024); Gallbladder surgery; ERCP (N/A, 09/05/2024); Ventral hernia repair (N/A, 10/03/2024); Hernia repair (2024); Tubal ligation; and Lipoma Excision (Bilateral, 3/20/2025).   Family History: family history includes Cancer in her father and mother; Heart disease in her father and mother; Hypertension in her brother; Kidney cancer in her father; Lung cancer in her mother; Neuropathy in her brother; Stroke in her father and another family member.   Social History: reports that she quit smoking about 2 years ago. Her smoking use included cigarettes. She started smoking about 50 years ago. She has a 47.4 pack-year smoking history. She has been exposed to tobacco smoke. She has never used smokeless tobacco. She reports that she does not drink alcohol and does not use drugs.  Allergies: Morphine, Cephalexin, Penicillins, and Sulfa antibiotics       Current Outpatient Medications:     acyclovir (ZOVIRAX) 400 MG tablet, TAKE 1 TABLET BY MOUTH 2 (TWO) TIMES A DAY. TAKE ONE TABLET BY MOUTH TWICE DAILY., Disp: 180 tablet, Rfl: 3    albuterol sulfate  (90 Base) MCG/ACT inhaler, Take 2 puffs by mouth Every 4 (Four) to 6 (Six) Hours As Needed for Wheezing., Disp: , Rfl:     aspirin 81 MG EC tablet, Take 1 tablet by mouth Daily., Disp: , Rfl:     busPIRone (BUSPAR) 15 MG tablet, Take 1 tablet by mouth 3 (Three) Times a Day., Disp: 270 tablet, Rfl: 1    Calcium Carb-Cholecalciferol (calcium 500 mg vitamin D 5 mcg, 200 UT,) 500-5 MG-MCG tablet per tablet, Take 1 tablet by mouth 2 (Two) Times a Day., Disp: 180 tablet, Rfl: 1    colestipol (COLESTID) 1 g tablet, TAKE 2 TABLETS BY MOUTH TWICE A DAY, Disp: 360 tablet,  "Rfl: 1    dapsone 25 MG tablet, TAKE 2 TABLETS BY MOUTH TWICE A DAY, Disp: 360 tablet, Rfl: 1    dexAMETHasone (DECADRON) 4 MG tablet, Take 10 tablets on D 1,8,15,22 and take 1 tablet on D 2,3.  Take in the morning with food., Disp: 42 tablet, Rfl: 5    FLUoxetine (PROzac) 40 MG capsule, Take 1 capsule by mouth Daily., Disp: 90 capsule, Rfl: 1    Fluticasone-Umeclidin-Vilant (TRELEGY) 100-62.5-25 MCG/ACT inhaler, Inhale 1 puff Daily., Disp: 1 each, Rfl: 6    lenalidomide (REVLIMID) 15 MG capsule, Take 1 capsule by mouth Daily. On Days 1-21, and off 7 days, on a 28 day cycle, Disp: 21 capsule, Rfl: 0    ondansetron (ZOFRAN) 8 MG tablet, TAKE 1 TABLET BY MOUTH THREE TIMES A DAY AS NEEDED FOR NAUSEA AND VOMITING (Patient taking differently: Take 1 tablet by mouth Every 8 (Eight) Hours As Needed.), Disp: 30 tablet, Rfl: 5    promethazine (PHENERGAN) 25 MG tablet, Take 1 tablet by mouth Every 6 (Six) Hours As Needed for Nausea or Vomiting., Disp: 30 tablet, Rfl: 1       Physical Exam  She appears well.  Her incisions look good.  Objective     Vital Signs:   Resp 18   Ht 154.9 cm (60.98\")   Wt 83.9 kg (185 lb)   BMI 34.97 kg/m²              Assessment and Plan    Diagnoses and all orders for this visit:    1. Lipoma of left upper extremity (Primary)    2. Lipoma of right upper extremity    We will see her back on an as-needed basis.  I have asked her to call me if she were to have any further questions or concerns.      "

## 2025-04-07 DIAGNOSIS — C90.00 MULTIPLE MYELOMA, REMISSION STATUS UNSPECIFIED: ICD-10-CM

## 2025-04-07 RX ORDER — LENALIDOMIDE 15 MG/1
15 CAPSULE ORAL DAILY
Qty: 21 CAPSULE | Refills: 0 | OUTPATIENT
Start: 2025-04-07

## 2025-04-07 NOTE — TELEPHONE ENCOUNTER
Sent prescription for Revlimid refill on 3/28. Duplicate request    Sabrina Beebe, PharmD  Oncology Clinical Specialty Pharmacist

## 2025-04-07 NOTE — TELEPHONE ENCOUNTER
Caller: HALIMA    Relationship: [facility] IN [facility]    Best call back number: [phone]    Requested Prescriptions:   Requested Prescriptions     Pending Prescriptions Disp Refills    lenalidomide (REVLIMID) 15 MG capsule 21 capsule 0     Sig: Take 1 capsule by mouth Daily. On Days 1-21, and off 7 days, on a 28 day cycle        Pharmacy where request should be sent: [facility]     Last office visit with prescribing clinician: 3/19/2025   Last telemedicine visit with prescribing clinician: Visit date not found   Next office visit with prescribing clinician: 4/16/2025     Additional details provided by patient: CALLER SAYS PT IS OUT, AND THIS NEEDS TO BE SENT FOR PATIENT FROM THE [facility] IN [facility].  PLEASE CALL IF MORE INFO IS NEEDED.    Does the patient have less than a 3 day supply:  [x] Yes  [] No    Would you like a call back once the refill request has been completed: [] Yes [x] No    If the office needs to give you a call back, can they leave a voicemail: [] Yes [x] No    Joan Mckinney Rep   04/07/25 12:50 EDT

## 2025-04-10 ENCOUNTER — HOSPITAL ENCOUNTER (OUTPATIENT)
Dept: MRI IMAGING | Facility: HOSPITAL | Age: 75
Discharge: HOME | End: 2025-04-10
Admitting: PHYSICIAN ASSISTANT
Payer: MEDICARE

## 2025-04-10 DIAGNOSIS — G89.29 CHRONIC PAIN OF RIGHT KNEE: Chronic | ICD-10-CM

## 2025-04-10 DIAGNOSIS — M25.561 CHRONIC PAIN OF RIGHT KNEE: Chronic | ICD-10-CM

## 2025-04-10 PROCEDURE — 73721 MRI JNT OF LWR EXTRE W/O DYE: CPT

## 2025-04-11 DIAGNOSIS — J96.11 CHRONIC RESPIRATORY FAILURE WITH HYPOXIA: ICD-10-CM

## 2025-04-11 DIAGNOSIS — J43.2 CENTRILOBULAR EMPHYSEMA: ICD-10-CM

## 2025-04-11 DIAGNOSIS — G47.33 OSA (OBSTRUCTIVE SLEEP APNEA): ICD-10-CM

## 2025-04-11 DIAGNOSIS — J98.11 ATELECTASIS: ICD-10-CM

## 2025-04-16 ENCOUNTER — OFFICE VISIT (OUTPATIENT)
Dept: ONCOLOGY | Facility: HOSPITAL | Age: 75
End: 2025-04-16
Payer: MEDICARE

## 2025-04-16 ENCOUNTER — SPECIALTY PHARMACY (OUTPATIENT)
Dept: PHARMACY | Facility: HOSPITAL | Age: 75
End: 2025-04-16
Payer: MEDICARE

## 2025-04-16 ENCOUNTER — HOSPITAL ENCOUNTER (OUTPATIENT)
Dept: ONCOLOGY | Facility: HOSPITAL | Age: 75
Discharge: HOME | End: 2025-04-16
Payer: MEDICARE

## 2025-04-16 VITALS
BODY MASS INDEX: 34.76 KG/M2 | RESPIRATION RATE: 20 BRPM | DIASTOLIC BLOOD PRESSURE: 65 MMHG | OXYGEN SATURATION: 94 % | SYSTOLIC BLOOD PRESSURE: 152 MMHG | HEIGHT: 61 IN | WEIGHT: 184.08 LBS | TEMPERATURE: 98 F | HEART RATE: 90 BPM

## 2025-04-16 DIAGNOSIS — Z12.2 ENCOUNTER FOR SCREENING FOR LUNG CANCER: ICD-10-CM

## 2025-04-16 DIAGNOSIS — C90.00 MULTIPLE MYELOMA NOT HAVING ACHIEVED REMISSION: ICD-10-CM

## 2025-04-16 DIAGNOSIS — Z79.899 ENCOUNTER FOR LONG-TERM (CURRENT) USE OF MEDICATIONS: ICD-10-CM

## 2025-04-16 DIAGNOSIS — C90.00 MULTIPLE MYELOMA NOT HAVING ACHIEVED REMISSION: Primary | ICD-10-CM

## 2025-04-16 DIAGNOSIS — C90.00 MULTIPLE MYELOMA, REMISSION STATUS UNSPECIFIED: ICD-10-CM

## 2025-04-16 DIAGNOSIS — Z12.2 ENCOUNTER FOR SCREENING FOR LUNG CANCER: Primary | ICD-10-CM

## 2025-04-16 DIAGNOSIS — Z79.899 ENCOUNTER FOR LONG-TERM (CURRENT) USE OF MEDICATIONS: Primary | ICD-10-CM

## 2025-04-16 LAB
ALBUMIN SERPL-MCNC: 4.2 G/DL (ref 3.5–5.2)
ALBUMIN/GLOB SERPL: 1.9 G/DL
ALP SERPL-CCNC: 82 U/L (ref 39–117)
ALT SERPL W P-5'-P-CCNC: 16 U/L (ref 1–33)
ANION GAP SERPL CALCULATED.3IONS-SCNC: 16 MMOL/L (ref 5–15)
AST SERPL-CCNC: 11 U/L (ref 1–32)
BASOPHILS # BLD AUTO: 0.06 10*3/MM3 (ref 0–0.2)
BASOPHILS NFR BLD AUTO: 1 % (ref 0–1.5)
BILIRUB SERPL-MCNC: 0.7 MG/DL (ref 0–1.2)
BUN SERPL-MCNC: 19 MG/DL (ref 8–23)
BUN/CREAT SERPL: 23.5 (ref 7–25)
CALCIUM SPEC-SCNC: 8.6 MG/DL (ref 8.6–10.5)
CHLORIDE SERPL-SCNC: 108 MMOL/L (ref 98–107)
CO2 SERPL-SCNC: 17 MMOL/L (ref 22–29)
CREAT SERPL-MCNC: 0.81 MG/DL (ref 0.57–1)
DEPRECATED RDW RBC AUTO: 65.4 FL (ref 37–54)
EGFRCR SERPLBLD CKD-EPI 2021: 75.8 ML/MIN/1.73
EOSINOPHIL # BLD AUTO: 0.05 10*3/MM3 (ref 0–0.4)
EOSINOPHIL NFR BLD AUTO: 0.8 % (ref 0.3–6.2)
ERYTHROCYTE [DISTWIDTH] IN BLOOD BY AUTOMATED COUNT: 16.9 % (ref 12.3–15.4)
GLOBULIN UR ELPH-MCNC: 2.2 GM/DL
GLUCOSE SERPL-MCNC: 184 MG/DL (ref 65–99)
HCT VFR BLD AUTO: 37.9 % (ref 34–46.6)
HGB BLD-MCNC: 12.6 G/DL (ref 12–15.9)
IMM GRANULOCYTES # BLD AUTO: 0.04 10*3/MM3 (ref 0–0.05)
IMM GRANULOCYTES NFR BLD AUTO: 0.6 % (ref 0–0.5)
LYMPHOCYTES # BLD AUTO: 1.48 10*3/MM3 (ref 0.7–3.1)
LYMPHOCYTES NFR BLD AUTO: 24 % (ref 19.6–45.3)
MAGNESIUM SERPL-MCNC: 1.8 MG/DL (ref 1.6–2.4)
MCH RBC QN AUTO: 34.7 PG (ref 26.6–33)
MCHC RBC AUTO-ENTMCNC: 33.2 G/DL (ref 31.5–35.7)
MCV RBC AUTO: 104.4 FL (ref 79–97)
MONOCYTES # BLD AUTO: 0.18 10*3/MM3 (ref 0.1–0.9)
MONOCYTES NFR BLD AUTO: 2.9 % (ref 5–12)
NEUTROPHILS NFR BLD AUTO: 4.36 10*3/MM3 (ref 1.7–7)
NEUTROPHILS NFR BLD AUTO: 70.7 % (ref 42.7–76)
NRBC BLD AUTO-RTO: 0 /100 WBC (ref 0–0.2)
PHOSPHATE SERPL-MCNC: 3 MG/DL (ref 2.5–4.5)
PLATELET # BLD AUTO: 342 10*3/MM3 (ref 140–450)
PMV BLD AUTO: 9.4 FL (ref 6–12)
POTASSIUM SERPL-SCNC: 3.9 MMOL/L (ref 3.5–5.2)
PROT SERPL-MCNC: 6.4 G/DL (ref 6–8.5)
RBC # BLD AUTO: 3.63 10*6/MM3 (ref 3.77–5.28)
SODIUM SERPL-SCNC: 141 MMOL/L (ref 136–145)
WBC NRBC COR # BLD AUTO: 6.17 10*3/MM3 (ref 3.4–10.8)

## 2025-04-16 PROCEDURE — 25810000003 SODIUM CHLORIDE 0.9 % SOLUTION: Performed by: INTERNAL MEDICINE

## 2025-04-16 PROCEDURE — 96401 CHEMO ANTI-NEOPL SQ/IM: CPT

## 2025-04-16 PROCEDURE — 84100 ASSAY OF PHOSPHORUS: CPT | Performed by: INTERNAL MEDICINE

## 2025-04-16 PROCEDURE — 63710000001 ACETAMINOPHEN EXTRA STRENGTH 500 MG TABLET: Performed by: INTERNAL MEDICINE

## 2025-04-16 PROCEDURE — 25010000002 METHYLPREDNISOLONE PER 125 MG: Performed by: INTERNAL MEDICINE

## 2025-04-16 PROCEDURE — 83735 ASSAY OF MAGNESIUM: CPT | Performed by: INTERNAL MEDICINE

## 2025-04-16 PROCEDURE — 25010000002 HEPARIN LOCK FLUSH PER 10 UNITS: Performed by: INTERNAL MEDICINE

## 2025-04-16 PROCEDURE — 96374 THER/PROPH/DIAG INJ IV PUSH: CPT

## 2025-04-16 PROCEDURE — 25010000002 ZOLEDRONIC ACID 4 MG/100ML SOLUTION: Performed by: INTERNAL MEDICINE

## 2025-04-16 PROCEDURE — A9270 NON-COVERED ITEM OR SERVICE: HCPCS | Performed by: INTERNAL MEDICINE

## 2025-04-16 PROCEDURE — 25010000002 DIPHENHYDRAMINE PER 50 MG: Performed by: INTERNAL MEDICINE

## 2025-04-16 PROCEDURE — 80053 COMPREHEN METABOLIC PANEL: CPT | Performed by: INTERNAL MEDICINE

## 2025-04-16 PROCEDURE — 85025 COMPLETE CBC W/AUTO DIFF WBC: CPT | Performed by: INTERNAL MEDICINE

## 2025-04-16 PROCEDURE — 25010000002 DARATUMUMAB-HYALURONIDASE-FIHJ 1800-30000 MG-UT/15ML SOLUTION: Performed by: INTERNAL MEDICINE

## 2025-04-16 PROCEDURE — 96375 TX/PRO/DX INJ NEW DRUG ADDON: CPT

## 2025-04-16 RX ORDER — SODIUM CHLORIDE 9 MG/ML
20 INJECTION, SOLUTION INTRAVENOUS ONCE
Status: CANCELLED | OUTPATIENT
Start: 2025-04-16

## 2025-04-16 RX ORDER — HYDROCORTISONE SODIUM SUCCINATE 100 MG/2ML
100 INJECTION INTRAMUSCULAR; INTRAVENOUS AS NEEDED
Status: DISCONTINUED | OUTPATIENT
Start: 2025-04-16 | End: 2025-04-17 | Stop reason: HOSPADM

## 2025-04-16 RX ORDER — SODIUM CHLORIDE 0.9 % (FLUSH) 0.9 %
20 SYRINGE (ML) INJECTION AS NEEDED
OUTPATIENT
Start: 2025-04-16

## 2025-04-16 RX ORDER — DIPHENHYDRAMINE HYDROCHLORIDE 50 MG/ML
50 INJECTION, SOLUTION INTRAMUSCULAR; INTRAVENOUS AS NEEDED
Status: DISCONTINUED | OUTPATIENT
Start: 2025-04-16 | End: 2025-04-17 | Stop reason: HOSPADM

## 2025-04-16 RX ORDER — HEPARIN SODIUM (PORCINE) LOCK FLUSH IV SOLN 100 UNIT/ML 100 UNIT/ML
500 SOLUTION INTRAVENOUS AS NEEDED
OUTPATIENT
Start: 2025-04-16

## 2025-04-16 RX ORDER — ZOLEDRONIC ACID 0.04 MG/ML
4 INJECTION, SOLUTION INTRAVENOUS ONCE
Status: COMPLETED | OUTPATIENT
Start: 2025-04-16 | End: 2025-04-16

## 2025-04-16 RX ORDER — ACETAMINOPHEN 500 MG
1000 TABLET ORAL ONCE
Status: CANCELLED | OUTPATIENT
Start: 2025-04-16

## 2025-04-16 RX ORDER — SODIUM CHLORIDE 9 MG/ML
20 INJECTION, SOLUTION INTRAVENOUS ONCE
Status: COMPLETED | OUTPATIENT
Start: 2025-04-16 | End: 2025-04-16

## 2025-04-16 RX ORDER — HYDROCORTISONE SODIUM SUCCINATE 100 MG/2ML
100 INJECTION INTRAMUSCULAR; INTRAVENOUS AS NEEDED
Status: CANCELLED | OUTPATIENT
Start: 2025-04-16

## 2025-04-16 RX ORDER — SODIUM CHLORIDE 0.9 % (FLUSH) 0.9 %
20 SYRINGE (ML) INJECTION AS NEEDED
Status: DISCONTINUED | OUTPATIENT
Start: 2025-04-16 | End: 2025-04-17 | Stop reason: HOSPADM

## 2025-04-16 RX ORDER — ZOLEDRONIC ACID 0.04 MG/ML
4 INJECTION, SOLUTION INTRAVENOUS ONCE
Status: CANCELLED | OUTPATIENT
Start: 2025-04-16

## 2025-04-16 RX ORDER — FAMOTIDINE 10 MG/ML
20 INJECTION, SOLUTION INTRAVENOUS AS NEEDED
Status: CANCELLED | OUTPATIENT
Start: 2025-04-16

## 2025-04-16 RX ORDER — METHYLPREDNISOLONE SODIUM SUCCINATE 125 MG/2ML
60 INJECTION INTRAMUSCULAR; INTRAVENOUS ONCE
Status: CANCELLED | OUTPATIENT
Start: 2025-04-16

## 2025-04-16 RX ORDER — METHYLPREDNISOLONE SODIUM SUCCINATE 125 MG/2ML
60 INJECTION INTRAMUSCULAR; INTRAVENOUS ONCE
Status: COMPLETED | OUTPATIENT
Start: 2025-04-16 | End: 2025-04-16

## 2025-04-16 RX ORDER — SODIUM CHLORIDE 9 MG/ML
20 INJECTION, SOLUTION INTRAVENOUS ONCE
Status: DISCONTINUED | OUTPATIENT
Start: 2025-04-16 | End: 2025-04-17 | Stop reason: HOSPADM

## 2025-04-16 RX ORDER — SODIUM CHLORIDE 0.9 % (FLUSH) 0.9 %
20 SYRINGE (ML) INJECTION AS NEEDED
Status: CANCELLED | OUTPATIENT
Start: 2025-04-16

## 2025-04-16 RX ORDER — ACETAMINOPHEN 500 MG
1000 TABLET ORAL ONCE
Status: COMPLETED | OUTPATIENT
Start: 2025-04-16 | End: 2025-04-16

## 2025-04-16 RX ORDER — HEPARIN SODIUM (PORCINE) LOCK FLUSH IV SOLN 100 UNIT/ML 100 UNIT/ML
500 SOLUTION INTRAVENOUS AS NEEDED
Status: DISCONTINUED | OUTPATIENT
Start: 2025-04-16 | End: 2025-04-17 | Stop reason: HOSPADM

## 2025-04-16 RX ORDER — HEPARIN SODIUM (PORCINE) LOCK FLUSH IV SOLN 100 UNIT/ML 100 UNIT/ML
500 SOLUTION INTRAVENOUS AS NEEDED
Status: CANCELLED | OUTPATIENT
Start: 2025-04-16

## 2025-04-16 RX ORDER — FAMOTIDINE 10 MG/ML
20 INJECTION, SOLUTION INTRAVENOUS AS NEEDED
Status: DISCONTINUED | OUTPATIENT
Start: 2025-04-16 | End: 2025-04-17 | Stop reason: HOSPADM

## 2025-04-16 RX ORDER — DIPHENHYDRAMINE HYDROCHLORIDE 50 MG/ML
50 INJECTION, SOLUTION INTRAMUSCULAR; INTRAVENOUS AS NEEDED
Status: CANCELLED | OUTPATIENT
Start: 2025-04-16

## 2025-04-16 RX ADMIN — DARATUMUMAB AND HYALURONIDASE-FIHJ (HUMAN RECOMBINANT) 1800 MG: 1800; 30000 INJECTION SUBCUTANEOUS at 10:18

## 2025-04-16 RX ADMIN — ACETAMINOPHEN 1000 MG: 500 TABLET ORAL at 09:13

## 2025-04-16 RX ADMIN — Medication 20 ML: at 07:43

## 2025-04-16 RX ADMIN — ZOLEDRONIC ACID 4 MG: 0.04 INJECTION, SOLUTION INTRAVENOUS at 09:37

## 2025-04-16 RX ADMIN — Medication 20 ML: at 10:23

## 2025-04-16 RX ADMIN — METHYLPREDNISOLONE SODIUM SUCCINATE 60 MG: 125 INJECTION INTRAMUSCULAR; INTRAVENOUS at 09:13

## 2025-04-16 RX ADMIN — SODIUM CHLORIDE 20 ML/HR: 9 INJECTION, SOLUTION INTRAVENOUS at 09:13

## 2025-04-16 RX ADMIN — HEPARIN 500 UNITS: 100 SYRINGE at 10:23

## 2025-04-16 RX ADMIN — SODIUM CHLORIDE 25 MG: 9 INJECTION, SOLUTION INTRAVENOUS at 09:13

## 2025-04-16 NOTE — PROGRESS NOTES
Specialty Pharmacy Patient Management Program  Chart Review/Lab Monitoring     Anca Samson is a 74 y.o. female with IgG Multiple Myeloma who presented for lab check and Oncology provider appointment. [facility] reviewed labs and providers note to assess continued therapy appropriateness of Revlimid (lenalidomide)    Oncology Interval History:  Susan Nicolas  Diagnosis: 16: Serum protein electrophoresis - markedly elevated IgG at 2941, decreased IgA at 46, IgM 87, and markedly increased monoclonal spike at 2.2 g/dL. 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 Plan to continue with current regimen.    Labs:  Lab Results   Component Value Date     2025    K 3.9 2025    CO2 17.0 (L) 2025     (H) 2025    BUN 19 2025    GLUCOSE 184 (H) 2025    CALCIUM 8.6 2025    CREATININE 0.81 2025    EGFRIFNONA 53 (L) 2021    BCR 23.5 2025    ANIONGAP 16.0 (H) 2025    AST 11 2025    ALT 16 2025    BILITOT 0.7 2025    ALKPHOS 82 2025     Lab Results   Component Value Date    WBC 6.17 2025    RBC 3.63 (L) 2025    HGB 12.6 2025    HCT 37.9  04/16/2025     04/16/2025    NEUTROABS 4.36 04/16/2025    LYMPHSABS 1.48 04/16/2025    MONOSABS 0.18 04/16/2025    EOSABS 0.05 04/16/2025    BASOSABS 0.06 04/16/2025     Lab Results   Component Value Date    MSPIKE 0.3 (H) 02/12/2025    MSPIKE 0.5 (H) 10/30/2024    IGLFLC 2.0 (L) 10/30/2024    IGLFLC 3.3 (L) 06/11/2024    FREEKAPPAL 8.9 10/30/2024    FREEKAPPAL 11.1 06/11/2024    KAPPALAMB 4.45 (H) 10/30/2024    KAPPALAMB 3.36 (H) 06/11/2024     PLAN  Specialty pharmacy will continue to follow patient. Continue with current regimen as planned. Next Specialty Assessment due 3/19/27.    Sabrina Beebe, PharmD  Oncology Clinical Specialty Pharmacist   4/16/2025 15:29 EDT

## 2025-04-16 NOTE — PROGRESS NOTES
Chief Complaint  Multiple myeloma, remission status unspecified    Rena Guerra,*  Rena Guerra, PA    Subjective          Anca Samson presents to [facility] for ongoing treatment of multiple myeloma.  She is on daratumumab, lenalidomide, dexamethasone and zoledronic acid.  Tolerating her regimen well.  She denies new masses, adenopathy, unusual aches or pains.  She notes good appetite.  Energy level is adequate for her ADLs.    Oncology/Hematology History Overview Note   Smoldering Myeloma    Initially diagnosed in 2016 with bone marrow biopsy demonstrating 30% CD56 positive monoclonal plasma cell population.  45 XX normal female chromosome pattern  FISH panel negative for myeloma associated mutations including 1q21, 9q34,11q13,14q32,15q24, 17q13  No anemia, renal insufficiency, hypercalcemia.  On observation since that time    Low grade adenocarcinoma of ascending colon      5/16/2017 right hemicolectomy which  revealed a low-grade 7.6 cm adenocarcinoma of the cecum. All margins were  negative. Lymphovascular invasion and perineural invasion were not identified.     26 lymph nodes were removed and unfortunately 1 was involved with metastatic deposit. pT3 pN1a colorectal cancer.        Clinical Staging      Smoldering Myeloma; Colon ([id number])            Treatments      Chemotherapy      11/2017 completed 6mo adjuvant Xeloda        6/2022 Stopped Smoking     Malignant neoplasm of ascending colon   7/21/2017 Initial Diagnosis    Colon cancer (CMS/HCC)     Multiple myeloma   7/28/2021 Initial Diagnosis    Multiple myeloma     11/7/2023 -  Chemotherapy    OP MULTIPLE MYELOMA Daratumumab / Lenalidomide / Dexamethasone     2/19/2025 -  Chemotherapy    OP SUPPORTIVE Zoledronic Acid Q28D           Current Outpatient Medications on File Prior to Visit   Medication Sig Dispense Refill    acyclovir (ZOVIRAX) 400 MG tablet TAKE 1 TABLET BY MOUTH 2 (TWO) TIMES A  DAY. TAKE ONE TABLET BY MOUTH TWICE DAILY. 180 tablet 3    albuterol sulfate  (90 Base) MCG/ACT inhaler Take 2 puffs by mouth Every 4 (Four) to 6 (Six) Hours As Needed for Wheezing.      aspirin 81 MG EC tablet Take 1 tablet by mouth Daily.      busPIRone (BUSPAR) 15 MG tablet Take 1 tablet by mouth 3 (Three) Times a Day. 270 tablet 1    Calcium Carb-Cholecalciferol (calcium 500 mg vitamin D 5 mcg, 200 UT,) 500-5 MG-MCG tablet per tablet Take 1 tablet by mouth 2 (Two) Times a Day. 180 tablet 1    colestipol (COLESTID) 1 g tablet TAKE 2 TABLETS BY MOUTH TWICE A  tablet 1    dapsone 25 MG tablet TAKE 2 TABLETS BY MOUTH TWICE A  tablet 1    dexAMETHasone (DECADRON) 4 MG tablet Take 10 tablets on D 1,8,15,22 and take 1 tablet on D 2,3.  Take in the morning with food. 42 tablet 5    FLUoxetine (PROzac) 40 MG capsule Take 1 capsule by mouth Daily. 90 capsule 1    Fluticasone-Umeclidin-Vilant (TRELEGY) 100-62.5-25 MCG/ACT inhaler Inhale 1 puff Daily. 1 each 11    lenalidomide (REVLIMID) 15 MG capsule Take 1 capsule by mouth Daily. On Days 1-21, and off 7 days, on a 28 day cycle 21 capsule 0    ondansetron (ZOFRAN) 8 MG tablet TAKE 1 TABLET BY MOUTH THREE TIMES A DAY AS NEEDED FOR NAUSEA AND VOMITING (Patient taking differently: Take 1 tablet by mouth Every 8 (Eight) Hours As Needed.) 30 tablet 5    promethazine (PHENERGAN) 25 MG tablet Take 1 tablet by mouth Every 6 (Six) Hours As Needed for Nausea or Vomiting. 30 tablet 1     Current Facility-Administered Medications on File Prior to Visit   Medication Dose Route Frequency Provider Last Rate Last Admin    acetaminophen (TYLENOL) tablet 1,000 mg  1,000 mg Oral Once West Nicolas MD        daratumumab-hyaluronidase-fihj (DARZALEX FASPRO) [id number] MG-UT/15ML injection 1,800 mg  1,800 mg Subcutaneous Once West Nicolas MD        diphenhydrAMINE (BENADRYL) injection 50 mg  50 mg Intravenous PRN West Nicolas MD        diphenhydrAMINE  (BENADRYL) IVPB 25 mg  25 mg Intravenous Once West Nicolas MD        famotidine (PEPCID) injection 20 mg  20 mg Intravenous PRN West Nicolas MD        heparin injection 500 Units  500 Units Intravenous PRN West Nicolas MD        Hydrocortisone Sod Suc (PF) (Solu-CORTEF) injection 100 mg  100 mg Intravenous PRN West Nicolas MD        methylPREDNISolone sodium succinate (SOLU-Medrol) injection 60 mg  60 mg Intravenous Once West Nicolas MD        sodium chloride 0.9 % flush 20 mL  20 mL Intravenous PRN West Nicolas MD        sodium chloride 0.9 % infusion  20 mL/hr Intravenous Once West Nicolas MD        sodium chloride 0.9 % infusion  20 mL/hr Intravenous Once West Nicolas MD        zoledronic acid (ZOMETA) infusion 4 mg/100 mL (premix)  4 mg Intravenous Once West Nicolas MD           Allergies   Allergen Reactions    Morphine Anaphylaxis    Cephalexin Hives    Penicillins Hives     Beta lactam allergy details  Antibiotic reaction: hives  Age at reaction: child  Dose to reaction time: unknown  Reason for antibiotic: unknown  Epinephrine required for reaction?: unknown  Tolerated antibiotics: other (none per pt)        Sulfa Antibiotics Hives     Past Medical History:   Diagnosis Date    Anxiety     Asthma 07/28/2021    Atherosclerosis of native coronary artery of native heart with angina pectoris 08/15/2023    seen by angela 10/2023, follow up prn/no cp    C. difficile diarrhea     DENIES ANY CURRENT ISSUES    Cholelithiasis 7-    Chronic diarrhea     Colon cancer 07/21/2017    Colon polyp     COPD (chronic obstructive pulmonary disease) 10/23/2017    Dental disease     Depression     Disease of thyroid gland     Diverticulitis     Diverticulitis of colon     Diverticulosis 7-    Dysphagia     Emphysema of lung     Emphysema/COPD     GERD (gastroesophageal reflux disease)     Head injury     CONCUSSION AT AGE 4    Hernia 2019    History of chemotherapy      LAST TREATMENT 3/19/25    Hx of psychiatric care     Impaired fasting glucose 08/14/2015    Lumbago     Lung nodule 02/17/2022    Major depressive disorder 10/27/2016    Malignant neoplasm of ascending colon 07/21/2017    Melena     Multiple myeloma     CURRENTLY BEING TREATED    Nicotine dependence 05/10/2017    Obesity     NICK (obstructive sleep apnea) 11/06/2023    Oxygen dependent     PRN    Pancreatitis 7/26/2024    Panic disorder     Primary central sleep apnea     Renal insufficiency 07/28/2021    Seizures     PSEUDO SEIZURES LAST ONE AROUND JULY 2024    Shortness of breath     CAN GET VERY SOA WITH MINIMAL EXERTION    Sinusitis     Small intestine cancer     Tinnitus     Tobacco use 07/28/2021    QUIT SMOKING JUNE 2022    Tremor     Visit for screening mammogram 02/20/2020    NORMAL- REPEAT IN ONE YEAR    Vitamin D deficiency      Past Surgical History:   Procedure Laterality Date    APPENDECTOMY      BONE MARROW BIOPSY  2016    CHOLECYSTECTOMY N/A 08/08/2024    Procedure: CHOLECYSTECTOMY LAPAROSCOPIC WITH DAVINCI ROBOT;  Surgeon: Eduardo Ingram MD;  [place] [facility] [place] [facility] [place];  Service: Robotics - DaVinci;  Laterality: N/A;    COLON SURGERY  2017    COLONOSCOPY  2018,2017,2019    [facility]- [facility]:DIVERTICULOSIS AND ERYTHEMA OF MUCOSA    COLONOSCOPY N/A 12/20/2022    Procedure: COLONOSCOPY WITH ELEVIEW INJECTION, POLYPECTOMY, HOT SNARE, CLIP APPLICATION X3, BIOPSIES;  Surgeon: Jarvis Borges MD;  Location: [facility] ENDOSCOPY;  Service: Gastroenterology;  Laterality: N/A;  COLON POLYP, DIVERTICULOSIS, ANASTOMOSIS RIGHT COLON    COLONOSCOPY N/A 11/09/2023    Procedure: COLONOSCOPY;  Surgeon: Jarvis Borges MD;  Location: [facility] ENDOSCOPY;  Service: Gastroenterology;  Laterality: N/A;  DIVERTICULOSIS, ANASTOMOSIS IN ASCENDING COLON    ENDOSCOPY  2018    ERCP N/A 07/27/2024    Procedure: ENDOSCOPIC RETROGRADE CHOLANGIOPANCREATOGRAPHY WITH SPHINCTEROTOMY, BALLOON SWEEP, STENT PLACEMENT;   Surgeon: Ajay Kennedy MD;  Location: [facility] MAIN OR;  Service: Gastroenterology;  Laterality: N/A;  BILE DUCT STONE    ERCP N/A 09/05/2024    Procedure: ENDOSCOPIC RETROGRADE CHOLANGIOPANCREATOGRAPHY WITH SPINCHTEROTOMY. BALLOON DILATATION 8-10. BALLOON SWEEP 12-15. STENT REMOVAL;  Surgeon: Ajay Kennedy MD;  Location: [facility] ENDOSCOPY;  Service: Gastroenterology;  Laterality: N/A;  BILE DUCT STONES    EYE SURGERY      FECAL DISIMPACTION  06/2019    TRANSPLANT     GALLBLADDER SURGERY      HEMICOLECTOMY Right 05/2017    HERNIA REPAIR  2024    HYSTERECTOMY  1982,1984    KNEE ARTHROSCOPY Left 01/03/2022    Procedure: KNEE ARTHROSCOPY WITH PARTIAL MEDIAL MENISCECTOMY,  CHONDROPLASTY;  Surgeon: Nicholas Tipton MD;  Location: [facility] OR [facility] [place];  Service: Orthopedics;  Laterality: Left;    LIPOMA EXCISION Bilateral 3/20/2025    Procedure: Excision of bilateral upper arm lipomas-removed subcutaneous fatty tumors from both upper arms;  Surgeon: Eduardo Ingram MD;  Location: [facility] MAIN OR;  Service: General;  Laterality: Bilateral;    MINI-LAPAROTOMY  2017    PORTACATH PLACEMENT N/A     pt states that her port does not work    TONSILLECTOMY      TUBAL ABDOMINAL LIGATION      UPPER GASTROINTESTINAL ENDOSCOPY  2018    VENOUS ACCESS DEVICE (PORT) INSERTION N/A 10/31/2023    Procedure: Port-a-catheter removal and port-a-catheter placement;  Surgeon: Alcon Delgado MD;  Location: [facility] OR [facility] [place];  Service: General;  Laterality: N/A;    VENTRAL HERNIA REPAIR N/A 10/03/2024    Procedure: VENTRAL / INCISIONAL HERNIA REPAIR LAPAROSCOPIC WITH DAVINCI ROBOT;  Surgeon: Eduardo Ingram MD;  Location: [facility] OR [facility] [place];  Service: Robotics - DaVinci;  Laterality: N/A;     Social History     Socioeconomic History    Marital status:    Tobacco Use    Smoking status: Former     Current packs/day: 0.00     Average packs/day: 1 pack/day for 47.4 years (47.4 ttl pk-yrs)     Types: Cigarettes     Start date: 1/1/1975      "Quit date: 6/3/2022     Years since quittin.8     Passive exposure: Past    Smokeless tobacco: Never   Vaping Use    Vaping status: Never Used   Substance and Sexual Activity    Alcohol use: Never    Drug use: Never    Sexual activity: Not Currently     Partners: Male     Birth control/protection: None     Family History   Problem Relation Age of Onset    Heart disease Mother     Lung cancer Mother     Cancer Mother     Stroke Father     Heart disease Father     Kidney cancer Father     Cancer Father     Stroke Other         UNCLE/AUNT    Neuropathy Brother     Hypertension Brother     Colon cancer Neg Hx     Malig Hyperthermia Neg Hx        Objective   Physical Exam  Vitals reviewed. Exam conducted with a chaperone present.   Cardiovascular:      Rate and Rhythm: Normal rate and regular rhythm.      Heart sounds: Normal heart sounds. No murmur heard.     No gallop.   Pulmonary:      Breath sounds: Normal breath sounds.   Abdominal:      General: Bowel sounds are normal.   Musculoskeletal:      Right lower leg: No edema.      Left lower leg: No edema.   Lymphadenopathy:      Cervical: No cervical adenopathy.   Psychiatric:         Mood and Affect: Mood normal.         Behavior: Behavior normal.         Vitals:    25 0809   BP: 152/65   Pulse: 90   Resp: 20   Temp: 98 °F (36.7 °C)   TempSrc: Temporal   SpO2: 94%   Weight: 83.5 kg (184 lb 1.4 oz)   Height: 154.9 cm (60.98\")   PainSc: 0-No pain     ECOG score: 3         PHQ-9 Total Score:                    Result Review :   The following data was reviewed by: West Nicolas MD on 2025:  Lab Results   Component Value Date    HGB 12.6 2025    HCT 37.9 2025    .4 (H) 2025     2025    WBC 6.17 2025    NEUTROABS 4.36 2025    LYMPHSABS 1.48 2025    MONOSABS 0.18 2025    EOSABS 0.05 2025    BASOSABS 0.06 2025     Lab Results   Component Value Date    GLUCOSE 184 (H) 2025    BUN " "19 04/16/2025    CREATININE 0.81 04/16/2025     04/16/2025    K 3.9 04/16/2025     (H) 04/16/2025    CO2 17.0 (L) 04/16/2025    CALCIUM 8.6 04/16/2025    PROTEINTOT 6.4 04/16/2025    ALBUMIN 4.2 04/16/2025    BILITOT 0.7 04/16/2025    ALKPHOS 82 04/16/2025    AST 11 04/16/2025    ALT 16 04/16/2025     Lab Results   Component Value Date    MG 1.8 04/16/2025    PHOS 3.0 04/16/2025    FREET4 1.55 12/06/2023    TSH 0.684 09/11/2024     No results found for: \"IRON\", \"LABIRON\", \"TRANSFERRIN\", \"TIBC\"  Lab Results   Component Value Date     03/07/2023    MGMSMGKY55 612 04/15/2024    FOLATE 8.95 03/27/2024     No results found for: \"PSA\", \"CEA\", \"AFP\", \"\", \"\"          Assessment and Plan    Diagnoses and all orders for this visit:    1. Multiple myeloma not having achieved remission (Primary)  Assessment & Plan:  Patient is on active treatment with daratumumab, lenalidomide, dexamethasone and zoledronic acid.  Tolerating regimen well.  She has had excellent response with continued decrease in her M spike.  Lab work today is adequate for treatment.  Proceed with cycle 18 as planned.  I will see her back for cycle 19-day 1 with lab work prior to monitor for toxicities including serum and urine protein electrophoresis to assess response to therapy.    Orders:  -     CARL, Jessica-Specific, Serum; Future  -     Immunofixation (CARL), Protein Electrophoresis (PE), and Quantitative Free Kappa and Lambda Light Chains (FLC), Serum; Future  -     CBC and Differential; Future  -     Comprehensive metabolic panel; Future  -     Protein Electrophoresis, 24 Hr Urine - Urine, Clean Catch; Future    Other orders  -     Cancel: sodium chloride 0.9 % infusion  -     Cancel: acetaminophen (TYLENOL) tablet 1,000 mg  -     Cancel: methylPREDNISolone sodium succinate (SOLU-Medrol) injection 60 mg  -     Cancel: diphenhydrAMINE (BENADRYL) IVPB 25 mg  -     Cancel: daratumumab-hyaluronidase-[facility] (DARZALEX FASPRO) [id number] " MG-UT/15ML injection 1,800 mg  -     Cancel: Hydrocortisone Sod Suc (PF) (Solu-CORTEF) injection 100 mg  -     Cancel: diphenhydrAMINE (BENADRYL) injection 50 mg  -     Cancel: famotidine (PEPCID) injection 20 mg  -     Cancel: sodium chloride 0.9 % infusion  -     Cancel: zoledronic acid (ZOMETA) infusion 4 mg/100 mL (premix)            Patient Follow Up: 1 month    Patient was given instructions and counseling regarding her condition or for health maintenance advice. Please see specific information pulled into the AVS if appropriate.     West Nicolas MD    4/16/2025

## 2025-04-24 ENCOUNTER — SPECIALTY PHARMACY (OUTPATIENT)
Dept: PHARMACY | Facility: HOSPITAL | Age: 75
End: 2025-04-24
Payer: MEDICARE

## 2025-04-24 DIAGNOSIS — C90.00 MULTIPLE MYELOMA, REMISSION STATUS UNSPECIFIED: ICD-10-CM

## 2025-04-24 RX ORDER — LENALIDOMIDE 15 MG/1
15 CAPSULE ORAL DAILY
Qty: 21 CAPSULE | Refills: 0 | Status: SHIPPED | OUTPATIENT
Start: 2025-04-24

## 2025-04-24 NOTE — PROGRESS NOTES
Specialty Pharmacy Patient Management Program  Per Protocol Prescription Order or Refill     Patient will be filling or currently fills medications at [facility] and is enrolled in the Patient Management Program.    Requested Prescriptions     Signed Prescriptions Disp Refills    lenalidomide (REVLIMID) 15 MG capsule 21 capsule 0     Sig: Take 1 capsule by mouth Daily. On Days 1-21, and off 7 days, on a 28 day cycle     Prescription orders above were sent to the pharmacy per Collaborative Care Agreement Protocol.     Last Office Visit: 4/16/25  Next Office Visit: 5/14/25    Drug-Drug Interactions: The current medication list was reviewed and there are no relevant drug-drug interactions with the specialty medication.  Medication Allergies: The patient has no relevant allergies as it relates to their oral specialty medication  Review of Labs/Dose Adjustments: NO DOSE CHANGE - I reviewed the most recent note and labs and the patient will continue without any dose changes.  I sent refills as described below.    Sabrina Beebe PharmD  Oncology Clinical Specialty Pharmacist   4/24/2025  10:49 EDT

## 2025-05-02 ENCOUNTER — OFFICE VISIT (OUTPATIENT)
Dept: ORTHOPEDIC SURGERY | Facility: CLINIC | Age: 75
End: 2025-05-02
Payer: MEDICARE

## 2025-05-02 VITALS
HEART RATE: 78 BPM | HEIGHT: 60 IN | BODY MASS INDEX: 36.12 KG/M2 | DIASTOLIC BLOOD PRESSURE: 78 MMHG | SYSTOLIC BLOOD PRESSURE: 152 MMHG | OXYGEN SATURATION: 93 % | WEIGHT: 184 LBS

## 2025-05-02 DIAGNOSIS — M17.11 PRIMARY OSTEOARTHRITIS OF RIGHT KNEE: Primary | ICD-10-CM

## 2025-05-02 RX ADMIN — LIDOCAINE HYDROCHLORIDE 5 ML: 10 INJECTION, SOLUTION INFILTRATION; PERINEURAL at 15:32

## 2025-05-02 RX ADMIN — TRIAMCINOLONE ACETONIDE 40 MG: 40 INJECTION, SUSPENSION INTRA-ARTICULAR; INTRAMUSCULAR at 15:32

## 2025-05-02 NOTE — PROGRESS NOTES
Chief Complaint  Initial Evaluation of the Right Knee       Subjective      Anca Samson presents to [facility] for an evaluation  of her right knee. Her right knee has been bothering her for about a month but denies any specific injury, trauma, falls or prior surgery to her right knee. She locates her pain to the medial  aspect of her knee. She has pain with prolonged walking and states her right knee julio c on her. She presents today in a wheel chair and is on continuous oxygen. She recently went to her family doctor where she had an MRI done on her right knee.     Allergies   Allergen Reactions    Morphine Anaphylaxis    Cephalexin Hives    Penicillins Hives     Beta lactam allergy details  Antibiotic reaction: hives  Age at reaction: child  Dose to reaction time: unknown  Reason for antibiotic: unknown  Epinephrine required for reaction?: unknown  Tolerated antibiotics: other (none per pt)        Sulfa Antibiotics Hives        Social History     Socioeconomic History    Marital status:    Tobacco Use    Smoking status: Former     Current packs/day: 0.00     Average packs/day: 1 pack/day for 47.4 years (47.4 ttl pk-yrs)     Types: Cigarettes     Start date: 1975     Quit date: 6/3/2022     Years since quittin.9     Passive exposure: Past    Smokeless tobacco: Never   Vaping Use    Vaping status: Never Used   Substance and Sexual Activity    Alcohol use: Never    Drug use: Never    Sexual activity: Not Currently     Partners: Male     Birth control/protection: None        I reviewed the patient's chief complaint, history of present illness, review of systems, past medical history, surgical history, family history, social history, medications, and allergy list.     Review of Systems     Constitutional: Denies fevers, chills, weight loss  Cardiovascular: Denies chest pain, shortness of breath  Skin: Denies rashes, acute skin changes  Neurologic: Denies headache, loss  "of consciousness  MSK: right knee pain       Vital Signs:   /78   Pulse 78   Ht 152.4 cm (60\")   Wt 83.5 kg (184 lb)   SpO2 93%   BMI 35.94 kg/m²            Ortho Exam    Physical Exam  General:Alert. No acute distress   Right lower extremity: full extension, flexion  to 125 degrees, stable to varus/valgus stress, stable to anterior/posterior drawer, mild pain with Hui's, tender to the medial joint line , mild swelling to the medial  knee, negative  Lachman's, non tender to the lateral joint line, calf soft, distal neurovascularly intact, positive  pulses, positive EHL, FHL, GS, and TA. Sensation intact to all 5 nerves of the foot.      Right knee injection: R knee  Date/Time: 5/2/2025 3:32 PM  Consent given by: patient  Site marked: site marked  Timeout: Immediately prior to procedure a time out was called to verify the correct patient, procedure, equipment, support staff and site/side marked as required   Supporting Documentation  Indications: pain   Procedure Details  Location: knee - R knee  Needle gauge: 21g.  Medications administered: 5 mL lidocaine 1 %; 40 mg triamcinolone acetonide 40 MG/ML  Patient tolerance: patient tolerated the procedure well with no immediate complications    This injection documentation was Scribed for Nicholas Tipton MD by Patrizia Forrest MA.  05/02/25   15:32 EDT    Imaging Results (Most Recent)       None             Result Review :       MRI Knee Right Without Contrast  Result Date: 4/13/2025  Narrative: MRI KNEE RIGHT  WO CONTRAST Date of Exam: 4/10/2025 5:47 PM EDT Indication: right knee pain, swelling, giving out, decreased mobility.  Comparison: None available. Technique:  Routine multiplanar/multisequence images of the right knee were obtained without contrast administration. Findings: There is a longitudinal oblique tear of the body and posterior horn of the medial meniscus (series 11 image 8). The medial supporting structures are normal. Mild chondral " thinning in the medial compartment without high-grade chondral loss. The lateral meniscus is normal. The lateral supporting structures are unremarkable. The articular cartilage of the lateral compartment is grossly preserved. The anterior cruciate ligament and posterior cruciate ligament are normal. The quadriceps tendon and patellar tendon are normal. No edema of the anterior knee fat pads. Mild chondral thinning in the patellofemoral compartment without high-grade chondral loss. Trace knee joint effusion. Small, thin Baker cyst (series 11 image 9). The posterior knee neurovasculature is unremarkable. Normal appearance of the muscles and subcutaneous tissues. No focal bony lesion. No fracture or bony contusion.     Impression: Impression: Longitudinal oblique tear of the body and posterior horn of the medial meniscus. Small, thin Baker cyst. Trace knee joint effusion. Electronically Signed: Carlos Thomas MD  4/13/2025 4:43 PM EDT  Workstation ID: [id number]             Assessment and Plan     Diagnoses and all orders for this visit:    1. Primary osteoarthritis of right knee (Primary)        The patient presents here today for an evaluation  of her right knee. MRI results was discussed and reviewed with the patient today in the office that was obtained by her PCP.     Discussed operative treatment options regarding a right knee arthroscopy with medial menisectomy and chondroplasty versus non operative treatment options regarding injections, medications and therapy.     Patient wishes to proceed with conservative treatment options.     Discussed the risks and benefits of a right knee Zilretta injection today in the office. Patient expressed understanding and wishes to proceed. Patient tolerted the injection well and without any complications.     Home exercises given today and will continue current medications for pain control.     Call or return if worsening symptoms.    Follow Up     6 - 8 weeks     Patient was  given instructions and counseling regarding her condition or for health maintenance advice. Please see specific information pulled into the AVS if appropriate.     Scribed for Nicholas Tipton MD by Cailin Hallman.  05/02/25   15:02 EDT    I have personally performed the services described in this document as scribed by the above individual and it is both accurate and complete. Nicholas Tipton MD 05/03/25

## 2025-05-03 RX ORDER — TRIAMCINOLONE ACETONIDE 40 MG/ML
40 INJECTION, SUSPENSION INTRA-ARTICULAR; INTRAMUSCULAR
Status: COMPLETED | OUTPATIENT
Start: 2025-05-02 | End: 2025-05-02

## 2025-05-03 RX ORDER — LIDOCAINE HYDROCHLORIDE 10 MG/ML
5 INJECTION, SOLUTION INFILTRATION; PERINEURAL
Status: COMPLETED | OUTPATIENT
Start: 2025-05-02 | End: 2025-05-02

## 2025-05-06 DIAGNOSIS — C90.00 MULTIPLE MYELOMA, REMISSION STATUS UNSPECIFIED: ICD-10-CM

## 2025-05-06 RX ORDER — LENALIDOMIDE 15 MG/1
CAPSULE ORAL
Refills: 0 | OUTPATIENT
Start: 2025-05-06

## 2025-05-06 NOTE — TELEPHONE ENCOUNTER
Refill sent on 4/24/25, not available for survey in [employer] program.     aSbrina Beebe, PharmD  Oncology Clinical Specialty Pharmacist

## 2025-05-07 ENCOUNTER — HOSPITAL ENCOUNTER (OUTPATIENT)
Dept: ONCOLOGY | Facility: HOSPITAL | Age: 75
Discharge: HOME | End: 2025-05-07
Admitting: INTERNAL MEDICINE
Payer: MEDICARE

## 2025-05-07 DIAGNOSIS — Z12.2 ENCOUNTER FOR SCREENING FOR LUNG CANCER: Primary | ICD-10-CM

## 2025-05-07 DIAGNOSIS — C90.00 MULTIPLE MYELOMA NOT HAVING ACHIEVED REMISSION: ICD-10-CM

## 2025-05-07 LAB — PROT ?TM UR-MCNC: 9.1 MG/DL

## 2025-05-07 PROCEDURE — 82784 ASSAY IGA/IGD/IGG/IGM EACH: CPT | Performed by: INTERNAL MEDICINE

## 2025-05-07 PROCEDURE — 86334 IMMUNOFIX E-PHORESIS SERUM: CPT | Performed by: INTERNAL MEDICINE

## 2025-05-07 PROCEDURE — 84156 ASSAY OF PROTEIN URINE: CPT | Performed by: INTERNAL MEDICINE

## 2025-05-07 PROCEDURE — 84165 PROTEIN E-PHORESIS SERUM: CPT | Performed by: INTERNAL MEDICINE

## 2025-05-07 PROCEDURE — 36591 DRAW BLOOD OFF VENOUS DEVICE: CPT

## 2025-05-07 PROCEDURE — 83521 IG LIGHT CHAINS FREE EACH: CPT | Performed by: INTERNAL MEDICINE

## 2025-05-07 PROCEDURE — 84155 ASSAY OF PROTEIN SERUM: CPT | Performed by: INTERNAL MEDICINE

## 2025-05-07 PROCEDURE — 25010000002 HEPARIN LOCK FLUSH PER 10 UNITS: Performed by: INTERNAL MEDICINE

## 2025-05-07 RX ORDER — SODIUM CHLORIDE 0.9 % (FLUSH) 0.9 %
20 SYRINGE (ML) INJECTION AS NEEDED
Status: DISCONTINUED | OUTPATIENT
Start: 2025-05-07 | End: 2025-05-08 | Stop reason: HOSPADM

## 2025-05-07 RX ORDER — HEPARIN SODIUM (PORCINE) LOCK FLUSH IV SOLN 100 UNIT/ML 100 UNIT/ML
500 SOLUTION INTRAVENOUS AS NEEDED
Status: DISCONTINUED | OUTPATIENT
Start: 2025-05-07 | End: 2025-05-08 | Stop reason: HOSPADM

## 2025-05-07 RX ORDER — HEPARIN SODIUM (PORCINE) LOCK FLUSH IV SOLN 100 UNIT/ML 100 UNIT/ML
500 SOLUTION INTRAVENOUS AS NEEDED
OUTPATIENT
Start: 2025-05-07

## 2025-05-07 RX ORDER — SODIUM CHLORIDE 0.9 % (FLUSH) 0.9 %
20 SYRINGE (ML) INJECTION AS NEEDED
OUTPATIENT
Start: 2025-05-07

## 2025-05-07 RX ADMIN — Medication 20 ML: at 10:19

## 2025-05-07 RX ADMIN — HEPARIN 500 UNITS: 100 SYRINGE at 10:20

## 2025-05-07 NOTE — ADDENDUM NOTE
Encounter addended by: Sapna Sasmon on: 5/7/2025 10:38 AM   Actions taken: Order list changed, Diagnosis association updated, Child order released for a procedure order, [place] created

## 2025-05-09 LAB
ALBUMIN SERPL ELPH-MCNC: 3.4 G/DL (ref 2.9–4.4)
ALBUMIN/GLOB SERPL: 1.6 {RATIO} (ref 0.7–1.7)
ALPHA1 GLOB SERPL ELPH-MCNC: 0.2 G/DL (ref 0–0.4)
ALPHA2 GLOB SERPL ELPH-MCNC: 0.6 G/DL (ref 0.4–1)
B-GLOBULIN SERPL ELPH-MCNC: 0.9 G/DL (ref 0.7–1.3)
GAMMA GLOB SERPL ELPH-MCNC: 0.4 G/DL (ref 0.4–1.8)
GLOBULIN SER-MCNC: 2.2 G/DL (ref 2.2–3.9)
IGA SERPL-MCNC: 43 MG/DL (ref 64–422)
IGG SERPL-MCNC: 410 MG/DL (ref 586–1602)
IGM SERPL-MCNC: 14 MG/DL (ref 26–217)
INTERPRETATION SERPL IEP-IMP: ABNORMAL
KAPPA LC FREE SER-MCNC: 8.3 MG/L (ref 3.3–19.4)
KAPPA LC FREE/LAMBDA FREE SER: 3.19 {RATIO} (ref 0.26–1.65)
LABORATORY COMMENT REPORT: ABNORMAL
LAMBDA LC FREE SERPL-MCNC: 2.6 MG/L (ref 5.7–26.3)
M PROTEIN SERPL ELPH-MCNC: 0.2 G/DL
PROT SERPL-MCNC: 5.6 G/DL (ref 6–8.5)

## 2025-05-09 NOTE — PROGRESS NOTES
Chief Complaint  Chief Complaint   Patient presents with    Knee Pain     Right        Subjective          Anca Samson presents to [facility] for knee pain.      Pain in knee  Symptoms are: new.   Onset was 1 to 6 months.   Symptoms occur: constantly.  Symptoms include: joint pain.   Treatment and/or Medications comments include: Ibuprofen,  ice pack, heating pad, icy hot, pain pads, ice pack   Additional information: Patient had MRI completed with the following results:  There is a longitudinal oblique tear of the body and posterior horn of the medial meniscus (series 11 image 8). The medial supporting structures are normal. Mild chondral thinning in the medial compartment without high-grade chondral loss.     The lateral meniscus is normal. The lateral supporting structures are unremarkable. The articular cartilage of the lateral compartment is grossly preserved.     The anterior cruciate ligament and posterior cruciate ligament are normal.     The quadriceps tendon and patellar tendon are normal. No edema of the anterior knee fat pads. Mild chondral thinning in the patellofemoral compartment without high-grade chondral loss.     Trace knee joint effusion. Small, thin Baker cyst (series 11 image 9). The posterior knee neurovasculature is unremarkable. Normal appearance of the muscles and subcutaneous tissues.     No focal bony lesion. No fracture or bony contusion.     IMPRESSION:  Impression:  Longitudinal oblique tear of the body and posterior horn of the medial meniscus.        Small, thin Baker cyst. Trace knee joint effusion.    Referred to ortho.    Patient referred to ortho; seen on 5.2.25; was given Zilretta injection without improvement; asked for medication for pain and they denied.  Patient has used tramadol in the past after lipoma surgery and did okay.      Medical History: has a past medical history of Anxiety, Asthma (07/28/2021), Atherosclerosis of native  coronary artery of native heart with angina pectoris (08/15/2023), C. difficile diarrhea, Cholelithiasis (7-), Chronic diarrhea, Colon cancer (07/21/2017), Colon polyp, COPD (chronic obstructive pulmonary disease) (10/23/2017), Dental disease, Depression, Disease of thyroid gland, Diverticulitis, Diverticulitis of colon, Diverticulosis (7-), Dysphagia, Emphysema of lung, Emphysema/COPD, GERD (gastroesophageal reflux disease), Head injury, Hernia (2019), History of chemotherapy, psychiatric care, Impaired fasting glucose (08/14/2015), Lumbago, Lung nodule (02/17/2022), Major depressive disorder (10/27/2016), Malignant neoplasm of ascending colon (07/21/2017), Melena, Multiple myeloma, Nicotine dependence (05/10/2017), Obesity, NICK (obstructive sleep apnea) (11/06/2023), Oxygen dependent, Pancreatitis (7/26/2024), Panic disorder, Primary central sleep apnea, Renal insufficiency (07/28/2021), Seizures, Shortness of breath, Sinusitis, Small intestine cancer, Tinnitus, Tobacco use (07/28/2021), Tremor, Visit for screening mammogram (02/20/2020), and Vitamin D deficiency.   Surgical History: has a past surgical history that includes Bone marrow biopsy (2016); Colonoscopy (2018,2017,2019); Esophagogastroduodenoscopy (2018); Fecal disimpaction (06/2019); Hysterectomy (1982,1984); Mini-laparotomy (2017); Portacath placement (N/A); Colon surgery (2017); Hemicolectomy (Right, 05/2017); Upper gastrointestinal endoscopy (2018); Knee Arthroscopy (Left, 01/03/2022); Appendectomy; Colonoscopy (N/A, 12/20/2022); Venous Access Device (Port) (N/A, 10/31/2023); Colonoscopy (N/A, 11/09/2023); Eye surgery; Tonsillectomy; ERCP (N/A, 07/27/2024); Cholecystectomy (N/A, 08/08/2024); Gallbladder surgery; ERCP (N/A, 09/05/2024); Ventral hernia repair (N/A, 10/03/2024); Hernia repair (2024); Tubal ligation; and Lipoma Excision (Bilateral, 3/20/2025).   Family History: family history includes Cancer in her father and mother; Heart  disease in her father and mother; Hypertension in her brother; Kidney cancer in her father; Lung cancer in her mother; Neuropathy in her brother; Stroke in her father and another family member.   Social History: reports that she quit smoking about 2 years ago. Her smoking use included cigarettes. She started smoking about 50 years ago. She has a 47.4 pack-year smoking history. She has been exposed to tobacco smoke. She has never used smokeless tobacco. She reports that she does not drink alcohol and does not use drugs.    Allergies: Morphine, Cephalexin, Penicillins, and Sulfa antibiotics    Health Maintenance Due   Topic Date Due    COVID-19 Vaccine (1) Never done    ANNUAL WELLNESS VISIT  06/17/2025       Objective     Vitals:    05/12/25 0930   BP: 133/83   BP Location: Left arm   Patient Position: Sitting   Cuff Size: Large Adult   Resp: 18   SpO2: 96%   Weight: 81.6 kg (179 lb 12.8 oz)     Body mass index is 35.11 kg/m².     Wt Readings from Last 3 Encounters:   05/14/25 83.5 kg (184 lb 1.4 oz)   05/12/25 81.6 kg (179 lb 12.8 oz)   05/02/25 83.5 kg (184 lb)     BP Readings from Last 3 Encounters:   05/14/25 158/70   05/12/25 133/83   05/02/25 152/78       Patient Care Team:  Rena Guerra PA as PCP - General (Family Medicine)  West Nicolas MD as Consulting Physician (Hematology and Oncology)  Alcon Delgado MD as Consulting Physician (General Surgery)  Jarvis Borges MD as Consulting Physician (Gastroenterology)  Erendira Clayton APRN as Nurse Practitioner (Gastroenterology)  Felton Lee MD as Consulting Physician (Pulmonary Disease)  Justo Casper MD as Consulting Physician (Otolaryngology)  Eduardo Ingram MD as Consulting Physician (General Surgery)    Physical Exam  Vitals and nursing note reviewed.   Constitutional:       Appearance: Normal appearance.   HENT:      Head: Normocephalic and atraumatic.   Neck:      Vascular: No carotid bruit.      Comments: Thyroid : gland  size normal, nontender, no nodules or masses present on palpation   Cardiovascular:      Rate and Rhythm: Normal rate and regular rhythm.      Pulses: Normal pulses.      Heart sounds: Normal heart sounds.   Pulmonary:      Effort: Pulmonary effort is normal.      Breath sounds: Normal breath sounds.   Musculoskeletal:      Cervical back: Neck supple. No tenderness.   Lymphadenopathy:      Cervical: No cervical adenopathy.   Neurological:      Mental Status: She is alert.   Psychiatric:         Mood and Affect: Mood normal.         Behavior: Behavior normal.           Result Review :   POC 12 Panel Urine Drug Screen (05/12/2025 10:43)            Assessment and Plan      Diagnoses and all orders for this visit:    1. Chronic pain of right knee (Primary)  -     traMADol (ULTRAM) 50 MG tablet; Take 1 tablet by mouth Every 8 (Eight) Hours As Needed for Moderate Pain (knee pain).  Dispense: 90 tablet; Refill: 1    2. Long term use of drug  -     POC 12 Panel Urine Drug Screen    PLAN: Chronic problem not improving: Patient is not ready for surgery yet. Continue to follow with Ortho. UDS and PDMP reviewed. Trial of tramadol as directed; may use with Tylenol; admin and side effects discussed.         Follow Up     Return in about 3 months (around 8/12/2025).    I currently manage patient's acute and chronic conditions, providing ongoing preventative services, and counseling, in addition to addressing behavioral health concerns and social needs.      Patient was given instructions and counseling regarding her condition or for health maintenance advice. Please see specific information pulled into the AVS if appropriate.

## 2025-05-12 ENCOUNTER — OFFICE VISIT (OUTPATIENT)
Dept: FAMILY MEDICINE CLINIC | Facility: CLINIC | Age: 75
End: 2025-05-12
Payer: MEDICARE

## 2025-05-12 VITALS
WEIGHT: 179.8 LBS | OXYGEN SATURATION: 96 % | BODY MASS INDEX: 35.11 KG/M2 | DIASTOLIC BLOOD PRESSURE: 83 MMHG | SYSTOLIC BLOOD PRESSURE: 133 MMHG | RESPIRATION RATE: 18 BRPM

## 2025-05-12 DIAGNOSIS — Z79.899 LONG TERM USE OF DRUG: ICD-10-CM

## 2025-05-12 DIAGNOSIS — M25.561 CHRONIC PAIN OF RIGHT KNEE: Primary | Chronic | ICD-10-CM

## 2025-05-12 DIAGNOSIS — G89.29 CHRONIC PAIN OF RIGHT KNEE: Primary | Chronic | ICD-10-CM

## 2025-05-12 LAB
AMPHET+METHAMPHET UR QL: NEGATIVE
AMPHETAMINE INTERNAL CONTROL: NORMAL
AMPHETAMINES UR QL: NEGATIVE
BARBITURATE INTERNAL CONTROL: NORMAL
BARBITURATES UR QL SCN: NEGATIVE
BENZODIAZ UR QL SCN: NEGATIVE
BENZODIAZEPINE INTERNAL CONTROL: NORMAL
BUPRENORPHINE INTERNAL CONTROL: NORMAL
BUPRENORPHINE SERPL-MCNC: NEGATIVE NG/ML
CANNABINOIDS SERPL QL: NEGATIVE
COCAINE INTERNAL CONTROL: NORMAL
COCAINE UR QL: NEGATIVE
EXPIRATION DATE: NORMAL
Lab: NORMAL
MDMA (ECSTASY) INTERNAL CONTROL: NORMAL
MDMA UR QL SCN: NEGATIVE
METHADONE INTERNAL CONTROL: NORMAL
METHADONE UR QL SCN: NEGATIVE
METHAMPHETAMINE INTERNAL CONTROL: NORMAL
MORPHINE INTERNAL CONTROL: NORMAL
MORPHINE/OPIATES SCREEN, URINE: NEGATIVE
OXYCODONE INTERNAL CONTROL: NORMAL
OXYCODONE UR QL SCN: NEGATIVE
PCP UR QL SCN: NEGATIVE
PHENCYCLIDINE INTERNAL CONTROL: NORMAL
THC INTERNAL CONTROL: NORMAL

## 2025-05-12 RX ORDER — TRAMADOL HYDROCHLORIDE 50 MG/1
50 TABLET ORAL EVERY 8 HOURS PRN
Qty: 90 TABLET | Refills: 1 | Status: SHIPPED | OUTPATIENT
Start: 2025-05-12

## 2025-05-14 ENCOUNTER — HOSPITAL ENCOUNTER (OUTPATIENT)
Dept: ONCOLOGY | Facility: HOSPITAL | Age: 75
Discharge: HOME | End: 2025-05-14
Payer: MEDICARE

## 2025-05-14 ENCOUNTER — OFFICE VISIT (OUTPATIENT)
Dept: ONCOLOGY | Facility: HOSPITAL | Age: 75
End: 2025-05-14
Payer: MEDICARE

## 2025-05-14 VITALS
DIASTOLIC BLOOD PRESSURE: 70 MMHG | OXYGEN SATURATION: 91 % | HEART RATE: 73 BPM | HEIGHT: 60 IN | WEIGHT: 184.08 LBS | RESPIRATION RATE: 18 BRPM | BODY MASS INDEX: 36.14 KG/M2 | TEMPERATURE: 97.8 F | SYSTOLIC BLOOD PRESSURE: 158 MMHG

## 2025-05-14 DIAGNOSIS — Z79.899 ENCOUNTER FOR LONG-TERM (CURRENT) USE OF MEDICATIONS: ICD-10-CM

## 2025-05-14 DIAGNOSIS — C90.00 MULTIPLE MYELOMA NOT HAVING ACHIEVED REMISSION: Primary | ICD-10-CM

## 2025-05-14 DIAGNOSIS — Z12.2 ENCOUNTER FOR SCREENING FOR LUNG CANCER: Primary | ICD-10-CM

## 2025-05-14 DIAGNOSIS — C90.00 MULTIPLE MYELOMA NOT HAVING ACHIEVED REMISSION: ICD-10-CM

## 2025-05-14 DIAGNOSIS — C90.00 MULTIPLE MYELOMA, REMISSION STATUS UNSPECIFIED: Primary | ICD-10-CM

## 2025-05-14 DIAGNOSIS — C90.00 MULTIPLE MYELOMA, REMISSION STATUS UNSPECIFIED: ICD-10-CM

## 2025-05-14 LAB
ALBUMIN SERPL-MCNC: 4.2 G/DL (ref 3.5–5.2)
ALBUMIN/GLOB SERPL: 2 G/DL
ALP SERPL-CCNC: 69 U/L (ref 39–117)
ALT SERPL W P-5'-P-CCNC: 15 U/L (ref 1–33)
ANION GAP SERPL CALCULATED.3IONS-SCNC: 10.9 MMOL/L (ref 5–15)
ANISOCYTOSIS BLD QL: NORMAL
AST SERPL-CCNC: 10 U/L (ref 1–32)
BASOPHILS # BLD AUTO: 0.06 10*3/MM3 (ref 0–0.2)
BASOPHILS NFR BLD AUTO: 0.7 % (ref 0–1.5)
BILIRUB SERPL-MCNC: 0.5 MG/DL (ref 0–1.2)
BUN SERPL-MCNC: 23 MG/DL (ref 8–23)
BUN/CREAT SERPL: 25 (ref 7–25)
CALCIUM SPEC-SCNC: 8.8 MG/DL (ref 8.6–10.5)
CHLORIDE SERPL-SCNC: 111 MMOL/L (ref 98–107)
CO2 SERPL-SCNC: 18.1 MMOL/L (ref 22–29)
CREAT SERPL-MCNC: 0.92 MG/DL (ref 0.57–1)
DEPRECATED RDW RBC AUTO: 66.6 FL (ref 37–54)
EGFRCR SERPLBLD CKD-EPI 2021: 65.1 ML/MIN/1.73
EOSINOPHIL # BLD AUTO: 0.09 10*3/MM3 (ref 0–0.4)
EOSINOPHIL NFR BLD AUTO: 1.1 % (ref 0.3–6.2)
ERYTHROCYTE [DISTWIDTH] IN BLOOD BY AUTOMATED COUNT: 16.5 % (ref 12.3–15.4)
GLOBULIN UR ELPH-MCNC: 2.1 GM/DL
GLUCOSE SERPL-MCNC: 125 MG/DL (ref 65–99)
HCT VFR BLD AUTO: 37.5 % (ref 34–46.6)
HGB BLD-MCNC: 12.2 G/DL (ref 12–15.9)
IMM GRANULOCYTES # BLD AUTO: 0.04 10*3/MM3 (ref 0–0.05)
IMM GRANULOCYTES NFR BLD AUTO: 0.5 % (ref 0–0.5)
LYMPHOCYTES # BLD AUTO: 3.85 10*3/MM3 (ref 0.7–3.1)
LYMPHOCYTES NFR BLD AUTO: 44.9 % (ref 19.6–45.3)
MACROCYTES BLD QL SMEAR: NORMAL
MAGNESIUM SERPL-MCNC: 1.9 MG/DL (ref 1.6–2.4)
MCH RBC QN AUTO: 35.3 PG (ref 26.6–33)
MCHC RBC AUTO-ENTMCNC: 32.5 G/DL (ref 31.5–35.7)
MCV RBC AUTO: 108.4 FL (ref 79–97)
MONOCYTES # BLD AUTO: 1.35 10*3/MM3 (ref 0.1–0.9)
MONOCYTES NFR BLD AUTO: 15.8 % (ref 5–12)
NEUTROPHILS NFR BLD AUTO: 3.18 10*3/MM3 (ref 1.7–7)
NEUTROPHILS NFR BLD AUTO: 37 % (ref 42.7–76)
NRBC BLD AUTO-RTO: 0 /100 WBC (ref 0–0.2)
PHOSPHATE SERPL-MCNC: 2.8 MG/DL (ref 2.5–4.5)
PLAT MORPH BLD: NORMAL
PLATELET # BLD AUTO: 278 10*3/MM3 (ref 140–450)
PMV BLD AUTO: 9.4 FL (ref 6–12)
POTASSIUM SERPL-SCNC: 4.4 MMOL/L (ref 3.5–5.2)
PROT SERPL-MCNC: 6.3 G/DL (ref 6–8.5)
RBC # BLD AUTO: 3.46 10*6/MM3 (ref 3.77–5.28)
SODIUM SERPL-SCNC: 140 MMOL/L (ref 136–145)
WBC MORPH BLD: NORMAL
WBC NRBC COR # BLD AUTO: 8.57 10*3/MM3 (ref 3.4–10.8)

## 2025-05-14 PROCEDURE — 25010000002 DIPHENHYDRAMINE PER 50 MG: Performed by: INTERNAL MEDICINE

## 2025-05-14 PROCEDURE — 63710000001 ACETAMINOPHEN EXTRA STRENGTH 500 MG TABLET: Performed by: INTERNAL MEDICINE

## 2025-05-14 PROCEDURE — 80053 COMPREHEN METABOLIC PANEL: CPT | Performed by: INTERNAL MEDICINE

## 2025-05-14 PROCEDURE — 85007 BL SMEAR W/DIFF WBC COUNT: CPT | Performed by: INTERNAL MEDICINE

## 2025-05-14 PROCEDURE — 96401 CHEMO ANTI-NEOPL SQ/IM: CPT

## 2025-05-14 PROCEDURE — 96375 TX/PRO/DX INJ NEW DRUG ADDON: CPT

## 2025-05-14 PROCEDURE — 96374 THER/PROPH/DIAG INJ IV PUSH: CPT

## 2025-05-14 PROCEDURE — 25010000002 ZOLEDRONIC ACID 4 MG/100ML SOLUTION: Performed by: INTERNAL MEDICINE

## 2025-05-14 PROCEDURE — A9270 NON-COVERED ITEM OR SERVICE: HCPCS | Performed by: INTERNAL MEDICINE

## 2025-05-14 PROCEDURE — 85025 COMPLETE CBC W/AUTO DIFF WBC: CPT | Performed by: INTERNAL MEDICINE

## 2025-05-14 PROCEDURE — 25810000003 SODIUM CHLORIDE 0.9 % SOLUTION: Performed by: INTERNAL MEDICINE

## 2025-05-14 PROCEDURE — 83735 ASSAY OF MAGNESIUM: CPT | Performed by: INTERNAL MEDICINE

## 2025-05-14 PROCEDURE — 25010000002 METHYLPREDNISOLONE PER 125 MG: Performed by: INTERNAL MEDICINE

## 2025-05-14 PROCEDURE — 84100 ASSAY OF PHOSPHORUS: CPT | Performed by: INTERNAL MEDICINE

## 2025-05-14 PROCEDURE — 25010000002 DARATUMUMAB-HYALURONIDASE-FIHJ 1800-30000 MG-UT/15ML SOLUTION: Performed by: INTERNAL MEDICINE

## 2025-05-14 RX ORDER — DIPHENHYDRAMINE HYDROCHLORIDE 50 MG/ML
50 INJECTION, SOLUTION INTRAMUSCULAR; INTRAVENOUS AS NEEDED
Status: DISCONTINUED | OUTPATIENT
Start: 2025-05-14 | End: 2025-05-15 | Stop reason: HOSPADM

## 2025-05-14 RX ORDER — SODIUM CHLORIDE 0.9 % (FLUSH) 0.9 %
20 SYRINGE (ML) INJECTION AS NEEDED
Status: DISCONTINUED | OUTPATIENT
Start: 2025-05-14 | End: 2025-05-15 | Stop reason: HOSPADM

## 2025-05-14 RX ORDER — METHYLPREDNISOLONE SODIUM SUCCINATE 125 MG/2ML
60 INJECTION INTRAMUSCULAR; INTRAVENOUS ONCE
Status: CANCELLED | OUTPATIENT
Start: 2025-05-14

## 2025-05-14 RX ORDER — DIPHENHYDRAMINE HYDROCHLORIDE 50 MG/ML
50 INJECTION, SOLUTION INTRAMUSCULAR; INTRAVENOUS AS NEEDED
Status: CANCELLED | OUTPATIENT
Start: 2025-05-14

## 2025-05-14 RX ORDER — SODIUM CHLORIDE 9 MG/ML
20 INJECTION, SOLUTION INTRAVENOUS ONCE
Status: CANCELLED | OUTPATIENT
Start: 2025-05-14

## 2025-05-14 RX ORDER — SODIUM CHLORIDE 9 MG/ML
20 INJECTION, SOLUTION INTRAVENOUS ONCE
Status: DISCONTINUED | OUTPATIENT
Start: 2025-05-14 | End: 2025-05-15 | Stop reason: HOSPADM

## 2025-05-14 RX ORDER — ACETAMINOPHEN 500 MG
1000 TABLET ORAL ONCE
Status: CANCELLED | OUTPATIENT
Start: 2025-05-14

## 2025-05-14 RX ORDER — ACETAMINOPHEN 500 MG
1000 TABLET ORAL ONCE
Status: COMPLETED | OUTPATIENT
Start: 2025-05-14 | End: 2025-05-14

## 2025-05-14 RX ORDER — FAMOTIDINE 10 MG/ML
20 INJECTION, SOLUTION INTRAVENOUS AS NEEDED
Status: CANCELLED | OUTPATIENT
Start: 2025-05-14

## 2025-05-14 RX ORDER — SODIUM CHLORIDE 0.9 % (FLUSH) 0.9 %
20 SYRINGE (ML) INJECTION AS NEEDED
OUTPATIENT
Start: 2025-05-14

## 2025-05-14 RX ORDER — METHYLPREDNISOLONE SODIUM SUCCINATE 125 MG/2ML
60 INJECTION INTRAMUSCULAR; INTRAVENOUS ONCE
Status: COMPLETED | OUTPATIENT
Start: 2025-05-14 | End: 2025-05-14

## 2025-05-14 RX ORDER — FAMOTIDINE 10 MG/ML
20 INJECTION, SOLUTION INTRAVENOUS AS NEEDED
Status: DISCONTINUED | OUTPATIENT
Start: 2025-05-14 | End: 2025-05-15 | Stop reason: HOSPADM

## 2025-05-14 RX ORDER — ZOLEDRONIC ACID 0.04 MG/ML
4 INJECTION, SOLUTION INTRAVENOUS ONCE
Status: COMPLETED | OUTPATIENT
Start: 2025-05-14 | End: 2025-05-14

## 2025-05-14 RX ORDER — HEPARIN SODIUM (PORCINE) LOCK FLUSH IV SOLN 100 UNIT/ML 100 UNIT/ML
500 SOLUTION INTRAVENOUS AS NEEDED
Status: DISCONTINUED | OUTPATIENT
Start: 2025-05-14 | End: 2025-05-15 | Stop reason: HOSPADM

## 2025-05-14 RX ORDER — SODIUM CHLORIDE 9 MG/ML
20 INJECTION, SOLUTION INTRAVENOUS ONCE
Status: COMPLETED | OUTPATIENT
Start: 2025-05-14 | End: 2025-05-14

## 2025-05-14 RX ORDER — HEPARIN SODIUM (PORCINE) LOCK FLUSH IV SOLN 100 UNIT/ML 100 UNIT/ML
500 SOLUTION INTRAVENOUS AS NEEDED
OUTPATIENT
Start: 2025-05-14

## 2025-05-14 RX ORDER — HYDROCORTISONE SODIUM SUCCINATE 100 MG/2ML
100 INJECTION INTRAMUSCULAR; INTRAVENOUS AS NEEDED
Status: DISCONTINUED | OUTPATIENT
Start: 2025-05-14 | End: 2025-05-15 | Stop reason: HOSPADM

## 2025-05-14 RX ORDER — ZOLEDRONIC ACID 0.04 MG/ML
4 INJECTION, SOLUTION INTRAVENOUS ONCE
Status: CANCELLED | OUTPATIENT
Start: 2025-05-14

## 2025-05-14 RX ORDER — HYDROCORTISONE SODIUM SUCCINATE 100 MG/2ML
100 INJECTION INTRAMUSCULAR; INTRAVENOUS AS NEEDED
Status: CANCELLED | OUTPATIENT
Start: 2025-05-14

## 2025-05-14 RX ADMIN — METHYLPREDNISOLONE SODIUM SUCCINATE 60 MG: 125 INJECTION INTRAMUSCULAR; INTRAVENOUS at 10:29

## 2025-05-14 RX ADMIN — SODIUM CHLORIDE 20 ML/HR: 9 INJECTION, SOLUTION INTRAVENOUS at 10:29

## 2025-05-14 RX ADMIN — SODIUM CHLORIDE 25 MG: 9 INJECTION, SOLUTION INTRAVENOUS at 10:32

## 2025-05-14 RX ADMIN — Medication 20 ML: at 09:12

## 2025-05-14 RX ADMIN — ACETAMINOPHEN 1000 MG: 500 TABLET ORAL at 10:29

## 2025-05-14 RX ADMIN — ZOLEDRONIC ACID 4 MG: 0.04 INJECTION, SOLUTION INTRAVENOUS at 10:50

## 2025-05-14 RX ADMIN — DARATUMUMAB AND HYALURONIDASE-FIHJ (HUMAN RECOMBINANT) 1800 MG: 1800; 30000 INJECTION SUBCUTANEOUS at 11:58

## 2025-05-14 NOTE — PROGRESS NOTES
Chief Complaint  Multiple Myeloma    Rena Guerra,*  Rena Guerra, PA    Subjective          Anca Samson presents to [facility] for ongoing treatment of her myeloma.  She is on daratumumab, lenalidomide, dexamethasone and zoledronic acid.  Tolerating her treatments well.  She denies any masses, neuropathy, unusual aches or pains.  She notes good appetite and energy level.  She denies mouth sores or jaw pain.     Oncology/Hematology History Overview Note   Smoldering Myeloma    Initially diagnosed in 2016 with bone marrow biopsy demonstrating 30% CD56 positive monoclonal plasma cell population.  45 XX normal female chromosome pattern  FISH panel negative for myeloma associated mutations including 1q21, 9q34,11q13,14q32,15q24, 17q13  No anemia, renal insufficiency, hypercalcemia.  On observation since that time    Low grade adenocarcinoma of ascending colon      5/16/2017 right hemicolectomy which  revealed a low-grade 7.6 cm adenocarcinoma of the cecum. All margins were  negative. Lymphovascular invasion and perineural invasion were not identified.     26 lymph nodes were removed and unfortunately 1 was involved with metastatic deposit. pT3 pN1a colorectal cancer.        Clinical Staging      Smoldering Myeloma; Colon ([id number])            Treatments      Chemotherapy      11/2017 completed 6mo adjuvant Xeloda        6/2022 Stopped Smoking     Malignant neoplasm of ascending colon   7/21/2017 Initial Diagnosis    Colon cancer (CMS/HCC)     Multiple myeloma   7/28/2021 Initial Diagnosis    Multiple myeloma     11/7/2023 -  Chemotherapy    OP MULTIPLE MYELOMA Daratumumab / Lenalidomide / Dexamethasone     2/19/2025 -  Chemotherapy    OP SUPPORTIVE Zoledronic Acid Q28D           Current Outpatient Medications on File Prior to Visit   Medication Sig Dispense Refill    acyclovir (ZOVIRAX) 400 MG tablet TAKE 1 TABLET BY MOUTH 2 (TWO) TIMES A DAY. TAKE ONE  TABLET BY MOUTH TWICE DAILY. 180 tablet 3    albuterol sulfate  (90 Base) MCG/ACT inhaler Take 2 puffs by mouth Every 4 (Four) to 6 (Six) Hours As Needed for Wheezing.      aspirin 81 MG EC tablet Take 1 tablet by mouth Daily.      busPIRone (BUSPAR) 15 MG tablet Take 1 tablet by mouth 3 (Three) Times a Day. 270 tablet 1    Calcium Carb-Cholecalciferol (calcium 500 mg vitamin D 5 mcg, 200 UT,) 500-5 MG-MCG tablet per tablet Take 1 tablet by mouth 2 (Two) Times a Day. 180 tablet 1    colestipol (COLESTID) 1 g tablet TAKE 2 TABLETS BY MOUTH TWICE A  tablet 1    dapsone 25 MG tablet TAKE 2 TABLETS BY MOUTH TWICE A  tablet 1    dexAMETHasone (DECADRON) 4 MG tablet Take 10 tablets on D 1,8,15,22 and take 1 tablet on D 2,3.  Take in the morning with food. 42 tablet 5    FLUoxetine (PROzac) 40 MG capsule Take 1 capsule by mouth Daily. 90 capsule 1    Fluticasone-Umeclidin-Vilant (TRELEGY) 100-62.5-25 MCG/ACT inhaler Inhale 1 puff Daily. 1 each 11    lenalidomide (REVLIMID) 15 MG capsule Take 1 capsule by mouth Daily. On Days 1-21, and off 7 days, on a 28 day cycle 21 capsule 0    ondansetron (ZOFRAN) 8 MG tablet TAKE 1 TABLET BY MOUTH THREE TIMES A DAY AS NEEDED FOR NAUSEA AND VOMITING (Patient taking differently: Take 1 tablet by mouth Every 8 (Eight) Hours As Needed.) 30 tablet 5    promethazine (PHENERGAN) 25 MG tablet Take 1 tablet by mouth Every 6 (Six) Hours As Needed for Nausea or Vomiting. 30 tablet 1    traMADol (ULTRAM) 50 MG tablet Take 1 tablet by mouth Every 8 (Eight) Hours As Needed for Moderate Pain (knee pain). 90 tablet 1     Current Facility-Administered Medications on File Prior to Visit   Medication Dose Route Frequency Provider Last Rate Last Admin    heparin injection 500 Units  500 Units Intravenous PRN West Nicolas MD        sodium chloride 0.9 % flush 20 mL  20 mL Intravenous PRN West Nciolas MD   20 mL at 05/14/25 0912       Allergies   Allergen Reactions    Morphine  Anaphylaxis    Cephalexin Hives    Penicillins Hives     Beta lactam allergy details  Antibiotic reaction: hives  Age at reaction: child  Dose to reaction time: unknown  Reason for antibiotic: unknown  Epinephrine required for reaction?: unknown  Tolerated antibiotics: other (none per pt)        Sulfa Antibiotics Hives     Past Medical History:   Diagnosis Date    Anxiety     Asthma 07/28/2021    Atherosclerosis of native coronary artery of native heart with angina pectoris 08/15/2023    seen by angela 10/2023, follow up prn/no cp    C. difficile diarrhea     DENIES ANY CURRENT ISSUES    Cholelithiasis 7-    Chronic diarrhea     Colon cancer 07/21/2017    Colon polyp     COPD (chronic obstructive pulmonary disease) 10/23/2017    Dental disease     Depression     Disease of thyroid gland     Diverticulitis     Diverticulitis of colon     Diverticulosis 7-    Dysphagia     Emphysema of lung     Emphysema/COPD     GERD (gastroesophageal reflux disease)     Head injury     CONCUSSION AT AGE 4    Hernia 2019    History of chemotherapy     LAST TREATMENT 3/19/25    Hx of psychiatric care     Impaired fasting glucose 08/14/2015    Lumbago     Lung nodule 02/17/2022    Major depressive disorder 10/27/2016    Malignant neoplasm of ascending colon 07/21/2017    Melena     Multiple myeloma     CURRENTLY BEING TREATED    Nicotine dependence 05/10/2017    Obesity     NICK (obstructive sleep apnea) 11/06/2023    Oxygen dependent     PRN    Pancreatitis 7/26/2024    Panic disorder     Primary central sleep apnea     Renal insufficiency 07/28/2021    Seizures     PSEUDO SEIZURES LAST ONE AROUND JULY 2024    Shortness of breath     CAN GET VERY SOA WITH MINIMAL EXERTION    Sinusitis     Small intestine cancer     Tinnitus     Tobacco use 07/28/2021    QUIT SMOKING JUNE 2022    Tremor     Visit for screening mammogram 02/20/2020    NORMAL- REPEAT IN ONE YEAR    Vitamin D deficiency      Past Surgical History:    Procedure Laterality Date    APPENDECTOMY      BONE MARROW BIOPSY  2016    CHOLECYSTECTOMY N/A 08/08/2024    Procedure: CHOLECYSTECTOMY LAPAROSCOPIC WITH DAVINCI ROBOT;  Surgeon: Eduardo Ingram MD;  Location: [facility] OR [facility]SC;  Service: Robotics - DaVinci;  Laterality: N/A;    COLON SURGERY  2017    COLONOSCOPY  2018,2017,2019    [facility]:DIVERTICULOSIS AND ERYTHEMA OF MUCOSA    COLONOSCOPY N/A 12/20/2022    Procedure: COLONOSCOPY WITH ELEVIEW INJECTION, POLYPECTOMY, HOT SNARE, CLIP APPLICATION X3, BIOPSIES;  Surgeon: Jarvis Borges MD;  Location: [facility] ENDOSCOPY;  Service: Gastroenterology;  Laterality: N/A;  COLON POLYP, DIVERTICULOSIS, ANASTOMOSIS RIGHT COLON    COLONOSCOPY N/A 11/09/2023    Procedure: COLONOSCOPY;  Surgeon: Jarvis Borges MD;  Location: [facility] ENDOSCOPY;  Service: Gastroenterology;  Laterality: N/A;  DIVERTICULOSIS, ANASTOMOSIS IN ASCENDING COLON    ENDOSCOPY  2018    ERCP N/A 07/27/2024    Procedure: ENDOSCOPIC RETROGRADE CHOLANGIOPANCREATOGRAPHY WITH SPHINCTEROTOMY, BALLOON SWEEP, STENT PLACEMENT;  Surgeon: Ajay Kennedy MD;  Location: [facility] MAIN OR;  Service: Gastroenterology;  Laterality: N/A;  BILE DUCT STONE    ERCP N/A 09/05/2024    Procedure: ENDOSCOPIC RETROGRADE CHOLANGIOPANCREATOGRAPHY WITH SPINCHTEROTOMY. BALLOON DILATATION 8-10. BALLOON SWEEP 12-15. STENT REMOVAL;  Surgeon: Ajay Kennedy MD;  Location: [facility] ENDOSCOPY;  Service: Gastroenterology;  Laterality: N/A;  BILE DUCT STONES    EYE SURGERY      FECAL DISIMPACTION  06/2019    TRANSPLANT     GALLBLADDER SURGERY      HEMICOLECTOMY Right 05/2017    HERNIA REPAIR  2024    HYSTERECTOMY  1982,1984    KNEE ARTHROSCOPY Left 01/03/2022    Procedure: KNEE ARTHROSCOPY WITH PARTIAL MEDIAL MENISCECTOMY,  CHONDROPLASTY;  Surgeon: Nicholas Tipton MD;  Location: [facility] OR [facility];  Service: Orthopedics;  Laterality: Left;    LIPOMA EXCISION Bilateral 3/20/2025    Procedure: Excision of  bilateral upper arm lipomas-removed subcutaneous fatty tumors from both upper arms;  Surgeon: Eduardo Ingram MD;  Location: [facility] OR;  Service: General;  Laterality: Bilateral;    MINI-LAPAROTOMY  2017    PORTACATH PLACEMENT N/A     pt states that her port does not work    TONSILLECTOMY      TUBAL ABDOMINAL LIGATION      UPPER GASTROINTESTINAL ENDOSCOPY  2018    VENOUS ACCESS DEVICE (PORT) INSERTION N/A 10/31/2023    Procedure: Port-a-catheter removal and port-a-catheter placement;  Surgeon: Alcon Delgado MD;  Location: [facility];  Service: General;  Laterality: N/A;    VENTRAL HERNIA REPAIR N/A 10/03/2024    Procedure: VENTRAL / INCISIONAL HERNIA REPAIR LAPAROSCOPIC WITH DAVINCI ROBOT;  Surgeon: Eduardo Ingram MD;  Location: [facility];  Service: Robotics - DaVinci;  Laterality: N/A;     Social History     Socioeconomic History    Marital status:    Tobacco Use    Smoking status: Former     Current packs/day: 0.00     Average packs/day: 1 pack/day for 47.4 years (47.4 ttl pk-yrs)     Types: Cigarettes     Start date: 1975     Quit date: 6/3/2022     Years since quittin.9     Passive exposure: Past    Smokeless tobacco: Never   Vaping Use    Vaping status: Never Used   Substance and Sexual Activity    Alcohol use: Never    Drug use: Never    Sexual activity: Not Currently     Partners: Male     Birth control/protection: None     Family History   Problem Relation Age of Onset    Heart disease Mother     Lung cancer Mother     Cancer Mother     Stroke Father     Heart disease Father     Kidney cancer Father     Cancer Father     Stroke Other         UNCLE/AUNT    Neuropathy Brother     Hypertension Brother     Colon cancer Neg Hx     Malig Hyperthermia Neg Hx        Objective   Physical Exam  Vitals reviewed. Exam conducted with a chaperone present.   HENT:      Nose:      Comments: Nasal cannula in place  Cardiovascular:      Rate and Rhythm: Normal rate and regular rhythm.       "Heart sounds: Normal heart sounds. No murmur heard.     No gallop.   Pulmonary:      Effort: Pulmonary effort is normal.      Breath sounds: Normal breath sounds.   Abdominal:      General: Bowel sounds are normal.   Lymphadenopathy:      Cervical: No cervical adenopathy.   Psychiatric:         Mood and Affect: Mood normal.         Behavior: Behavior normal.         Vitals:    05/14/25 0921   BP: 158/70   Pulse: 73   Resp: 18   Temp: 97.8 °F (36.6 °C)   TempSrc: Temporal   SpO2: 91%  Comment: 2 L o2   Weight: 83.5 kg (184 lb 1.4 oz)   Height: 152.4 cm (60\")   PainSc: 4    PainLoc: Knee  Comment: right     ECOG score: 2         PHQ-9 Total Score:                    Result Review :   The following data was reviewed by: West Nicolas MD on 05/14/2025:  Lab Results   Component Value Date    HGB 12.2 05/14/2025    HCT 37.5 05/14/2025    .4 (H) 05/14/2025     05/14/2025    WBC 8.57 05/14/2025    NEUTROABS 3.18 05/14/2025    LYMPHSABS 3.85 (H) 05/14/2025    MONOSABS 1.35 (H) 05/14/2025    EOSABS 0.09 05/14/2025    BASOSABS 0.06 05/14/2025     Lab Results   Component Value Date    GLUCOSE 125 (H) 05/14/2025    BUN 23 05/14/2025    CREATININE 0.92 05/14/2025     05/14/2025    K 4.4 05/14/2025     (H) 05/14/2025    CO2 18.1 (L) 05/14/2025    CALCIUM 8.8 05/14/2025    PROTEINTOT 6.3 05/14/2025    ALBUMIN 4.2 05/14/2025    BILITOT 0.5 05/14/2025    ALKPHOS 69 05/14/2025    AST 10 05/14/2025    ALT 15 05/14/2025     Lab Results   Component Value Date    MG 1.9 05/14/2025    PHOS 2.8 05/14/2025    FREET4 1.55 12/06/2023    TSH 0.684 09/11/2024     No results found for: \"IRON\", \"LABIRON\", \"TRANSFERRIN\", \"TIBC\"  Lab Results   Component Value Date     03/07/2023    RLZMHIDQ55 612 04/15/2024    FOLATE 8.95 03/27/2024     No results found for: \"PSA\", \"CEA\", \"AFP\", \"\", \"\"  SPEP shows M spike 0.2 g/dL        Assessment and Plan    Diagnoses and all orders for this visit:    1. Multiple " myeloma not having achieved remission (Primary)  Assessment & Plan:  Patient is on active therapy with daratumumab, lenalidomide, dexamethasone and zoledronic acid.  Tolerating well.  SPEP shows continued M spike but decreased compared to previous.  Other lab work looks good.  Proceed with cycle 19 as planned.  I will see her back in 1 month for OV, cycle 20 with lab work prior to monitor for toxicities.    Orders:  -     Magnesium; Future  -     Phosphorus; Future  -     Cancel: CBC and Differential; Future  -     Cancel: Comprehensive metabolic panel; Future    2. Encounter for long-term (current) use of medications  -     CBC and Differential; Future  -     Comprehensive metabolic panel; Future    Other orders  -     Cancel: sodium chloride 0.9 % infusion  -     Cancel: acetaminophen (TYLENOL) tablet 1,000 mg  -     Cancel: methylPREDNISolone sodium succinate (SOLU-Medrol) injection 60 mg  -     Cancel: diphenhydrAMINE (BENADRYL) IVPB 25 mg  -     Cancel: daratumumab-hyaluronidase-fihj (DARZALEX FASPRO) 1800-33155 MG-UT/15ML injection 1,800 mg  -     Cancel: Hydrocortisone Sod Suc (PF) (Solu-CORTEF) injection 100 mg  -     Cancel: diphenhydrAMINE (BENADRYL) injection 50 mg  -     Cancel: famotidine (PEPCID) injection 20 mg  -     Cancel: sodium chloride 0.9 % infusion  -     Cancel: zoledronic acid (ZOMETA) infusion 4 mg/100 mL (premix)            Patient Follow Up: Cycle 20-day 1      Patient was given instructions and counseling regarding her condition or for health maintenance advice. Please see specific information pulled into the AVS if appropriate.     West Nicolas MD    5/14/2025

## 2025-05-14 NOTE — ASSESSMENT & PLAN NOTE
Patient is on active therapy with daratumumab, lenalidomide, dexamethasone and zoledronic acid.  Tolerating well.  SPEP shows continued M spike but decreased compared to previous.  Other lab work looks good.  Proceed with cycle 19 as planned.  I will see her back in 1 month for OV, cycle 20 with lab work prior to monitor for toxicities.

## 2025-05-15 ENCOUNTER — SPECIALTY PHARMACY (OUTPATIENT)
Dept: PHARMACY | Facility: HOSPITAL | Age: 75
End: 2025-05-15
Payer: MEDICARE

## 2025-05-15 NOTE — PROGRESS NOTES
Specialty Pharmacy Patient Management Program  Chart Review/Lab Monitoring     Anca Samson is a 74 y.o. female with MM (Multiple Myeloma) who presented for lab check and Oncology provider appointment. [facility] reviewed labs and providers note to assess continued therapy appropriateness of Revlimid (lenalidomide)    Oncology Interval History:  Susan Nicolas  Diagnosis: 16: Serum protein electrophoresis - markedly elevated IgG at 2941, decreased IgA at 46, IgM 87, and markedly increased monoclonal spike at 2.2 g/dL. Interestingly, 3/19/15: serum protein electrophoresis - IgG of 2695   Biomarkers: IgG+    Current Tx: Lenalidomide 15mg - Take one tablet by mouth daily on days 1-21 and then take 7 days off on 28 days (23-now) + Darzalex SQ (23-now) + Zometa [id number] (had been held for dental work, rs'd 25-now)   Most Recent Imagin24 PET stable lytic lesion, no other hypermetabolic adenopathy  Previous Tx: Active surveillance (-10/2023)  Cholecystectomy (2024)     Other Cancer Hx  2017: R hemicolectomy - low-grade 7.6 cm adenocarcinoma of the cecum, margins were  negative   LN + for metastatic deposit pT3 pN1a colorectal cancer, no other disease on imaging  now s/p colectomy with colostomy  & s/p 6 mo of adjuvant xeloda (complete 2017)     Fills at: [facility]  Last seen in person by Specialty Pharmacy: 3/19/25    5/14/25: Anca Samson reported is doing well with overall. Labs reviewed and appropriate.     Labs:  Lab Results   Component Value Date     2025    K 4.4 2025    CO2 18.1 (L) 2025     (H) 2025    BUN 23 2025    GLUCOSE 125 (H) 2025    CALCIUM 8.8 2025    CREATININE 0.92 2025    EGFRIFNONA 53 (L) 2021    BCR 25.0 2025    ANIONGAP 10.9 2025    AST 10 2025    ALT 15 2025    BILITOT 0.5 2025     Lab Results   Component Value Date    WBC 8.57  05/14/2025    RBC 3.46 (L) 05/14/2025    HGB 12.2 05/14/2025    HCT 37.5 05/14/2025     05/14/2025    NEUTROABS 3.18 05/14/2025    LYMPHSABS 3.85 (H) 05/14/2025    MONOSABS 1.35 (H) 05/14/2025    EOSABS 0.09 05/14/2025    BASOSABS 0.06 05/14/2025     Lab Results   Component Value Date    MSPIKE 0.2 (H) 05/07/2025    MSPIKE 0.3 (H) 02/12/2025    IGLFLC 2.6 (L) 05/07/2025    IGLFLC 2.0 (L) 10/30/2024    FREEKAPPAL 8.3 05/07/2025    FREEKAPPAL 8.9 10/30/2024    KAPPALAMB 3.19 (H) 05/07/2025    KAPPALAMB 4.45 (H) 10/30/2024     PLAN  Specialty pharmacy will continue to follow patient. Continue with current regimen as planned. Next Specialty Assessment due 3/19/27.    Sabrina Beebe, PharmD  Oncology Clinical Specialty Pharmacist   5/15/2025 09:49 EDT

## 2025-05-18 LAB
IGA SERPL-MCNC: 41 MG/DL (ref 64–422)
IGG SERPL-MCNC: 396 MG/DL (ref 586–1602)
IGM SERPL-MCNC: 14 MG/DL (ref 26–217)
PROT PATTERN SERPL IFE-IMP: ABNORMAL

## 2025-05-19 ENCOUNTER — SPECIALTY PHARMACY (OUTPATIENT)
Dept: PHARMACY | Facility: HOSPITAL | Age: 75
End: 2025-05-19
Payer: MEDICARE

## 2025-05-19 DIAGNOSIS — C90.00 MULTIPLE MYELOMA, REMISSION STATUS UNSPECIFIED: ICD-10-CM

## 2025-05-19 RX ORDER — LENALIDOMIDE 15 MG/1
15 CAPSULE ORAL DAILY
Qty: 21 CAPSULE | Refills: 0 | Status: SHIPPED | OUTPATIENT
Start: 2025-05-19

## 2025-05-19 NOTE — PROGRESS NOTES
Specialty Pharmacy Patient Management Program  Per Protocol Prescription Order or Refill     Patient will be filling or currently fills medications at [facility] and is enrolled in the Patient Management Program.    Requested Prescriptions     Pending Prescriptions Disp Refills    lenalidomide (REVLIMID) 15 MG capsule 21 capsule 0     Sig: Take 1 capsule by mouth Daily. On Days 1-21, and off 7 days, on a 28 day cycle     Prescription orders above were sent to the pharmacy per Collaborative Care Agreement Protocol.     Last Office Visit: 5/14/25  Next Office Visit: 6/11/25    Drug-Drug Interactions: The current medication list was reviewed and there are no relevant drug-drug interactions with the specialty medication.  Medication Allergies: The patient has no relevant allergies as it relates to their oral specialty medication  Review of Labs/Dose Adjustments: NO DOSE CHANGE - I reviewed the most recent note and labs and the patient will continue without any dose changes.  I sent refills as described below.    Sabrina Beebe PharmD  Oncology Clinical Specialty Pharmacist   5/19/2025  07:52 EDT

## 2025-06-11 ENCOUNTER — HOSPITAL ENCOUNTER (OUTPATIENT)
Dept: ONCOLOGY | Facility: HOSPITAL | Age: 75
Discharge: HOME | End: 2025-06-11
Payer: MEDICARE

## 2025-06-11 ENCOUNTER — OFFICE VISIT (OUTPATIENT)
Dept: ONCOLOGY | Facility: HOSPITAL | Age: 75
End: 2025-06-11
Payer: MEDICARE

## 2025-06-11 VITALS
DIASTOLIC BLOOD PRESSURE: 92 MMHG | HEIGHT: 60 IN | SYSTOLIC BLOOD PRESSURE: 139 MMHG | TEMPERATURE: 98 F | OXYGEN SATURATION: 94 % | RESPIRATION RATE: 22 BRPM | HEART RATE: 81 BPM | BODY MASS INDEX: 36.12 KG/M2 | WEIGHT: 184 LBS

## 2025-06-11 DIAGNOSIS — C90.00 MULTIPLE MYELOMA, REMISSION STATUS UNSPECIFIED: Primary | ICD-10-CM

## 2025-06-11 DIAGNOSIS — C90.00 MULTIPLE MYELOMA NOT HAVING ACHIEVED REMISSION: Primary | ICD-10-CM

## 2025-06-11 DIAGNOSIS — Z79.899 ENCOUNTER FOR LONG-TERM (CURRENT) USE OF MEDICATIONS: ICD-10-CM

## 2025-06-11 DIAGNOSIS — C90.00 MULTIPLE MYELOMA, REMISSION STATUS UNSPECIFIED: ICD-10-CM

## 2025-06-11 DIAGNOSIS — C90.00 MULTIPLE MYELOMA NOT HAVING ACHIEVED REMISSION: ICD-10-CM

## 2025-06-11 DIAGNOSIS — Z12.2 ENCOUNTER FOR SCREENING FOR LUNG CANCER: ICD-10-CM

## 2025-06-11 LAB
ALBUMIN SERPL-MCNC: 3.8 G/DL (ref 3.5–5.2)
ALBUMIN/GLOB SERPL: 1.9 G/DL
ALP SERPL-CCNC: 67 U/L (ref 39–117)
ALT SERPL W P-5'-P-CCNC: 21 U/L (ref 1–33)
ANION GAP SERPL CALCULATED.3IONS-SCNC: 13.1 MMOL/L (ref 5–15)
AST SERPL-CCNC: 16 U/L (ref 1–32)
BASOPHILS # BLD AUTO: 0.13 10*3/MM3 (ref 0–0.2)
BASOPHILS NFR BLD AUTO: 2.9 % (ref 0–1.5)
BILIRUB SERPL-MCNC: 0.4 MG/DL (ref 0–1.2)
BUN SERPL-MCNC: 16.1 MG/DL (ref 8–23)
BUN/CREAT SERPL: 15.8 (ref 7–25)
CALCIUM SPEC-SCNC: 8.9 MG/DL (ref 8.6–10.5)
CHLORIDE SERPL-SCNC: 107 MMOL/L (ref 98–107)
CO2 SERPL-SCNC: 19.9 MMOL/L (ref 22–29)
CREAT SERPL-MCNC: 1.02 MG/DL (ref 0.57–1)
DEPRECATED RDW RBC AUTO: 58.7 FL (ref 37–54)
EGFRCR SERPLBLD CKD-EPI 2021: 57.5 ML/MIN/1.73
EOSINOPHIL # BLD AUTO: 0.06 10*3/MM3 (ref 0–0.4)
EOSINOPHIL NFR BLD AUTO: 1.3 % (ref 0.3–6.2)
ERYTHROCYTE [DISTWIDTH] IN BLOOD BY AUTOMATED COUNT: 15.3 % (ref 12.3–15.4)
GLOBULIN UR ELPH-MCNC: 2 GM/DL
GLUCOSE SERPL-MCNC: 161 MG/DL (ref 65–99)
HCT VFR BLD AUTO: 40.7 % (ref 34–46.6)
HGB BLD-MCNC: 13.2 G/DL (ref 12–15.9)
IMM GRANULOCYTES # BLD AUTO: 0.03 10*3/MM3 (ref 0–0.05)
IMM GRANULOCYTES NFR BLD AUTO: 0.7 % (ref 0–0.5)
LYMPHOCYTES # BLD AUTO: 1.27 10*3/MM3 (ref 0.7–3.1)
LYMPHOCYTES NFR BLD AUTO: 28.2 % (ref 19.6–45.3)
MAGNESIUM SERPL-MCNC: 1.8 MG/DL (ref 1.6–2.4)
MCH RBC QN AUTO: 33.8 PG (ref 26.6–33)
MCHC RBC AUTO-ENTMCNC: 32.4 G/DL (ref 31.5–35.7)
MCV RBC AUTO: 104.4 FL (ref 79–97)
MONOCYTES # BLD AUTO: 0.23 10*3/MM3 (ref 0.1–0.9)
MONOCYTES NFR BLD AUTO: 5.1 % (ref 5–12)
NEUTROPHILS NFR BLD AUTO: 2.79 10*3/MM3 (ref 1.7–7)
NEUTROPHILS NFR BLD AUTO: 61.8 % (ref 42.7–76)
NRBC BLD AUTO-RTO: 0 /100 WBC (ref 0–0.2)
PHOSPHATE SERPL-MCNC: 3.1 MG/DL (ref 2.5–4.5)
PLATELET # BLD AUTO: 233 10*3/MM3 (ref 140–450)
PMV BLD AUTO: 9.4 FL (ref 6–12)
POTASSIUM SERPL-SCNC: 4.2 MMOL/L (ref 3.5–5.2)
PROT SERPL-MCNC: 5.8 G/DL (ref 6–8.5)
RBC # BLD AUTO: 3.9 10*6/MM3 (ref 3.77–5.28)
SODIUM SERPL-SCNC: 140 MMOL/L (ref 136–145)
WBC NRBC COR # BLD AUTO: 4.51 10*3/MM3 (ref 3.4–10.8)

## 2025-06-11 PROCEDURE — 85025 COMPLETE CBC W/AUTO DIFF WBC: CPT | Performed by: INTERNAL MEDICINE

## 2025-06-11 PROCEDURE — 80053 COMPREHEN METABOLIC PANEL: CPT | Performed by: INTERNAL MEDICINE

## 2025-06-11 PROCEDURE — 83735 ASSAY OF MAGNESIUM: CPT | Performed by: INTERNAL MEDICINE

## 2025-06-11 PROCEDURE — 25010000002 DARATUMUMAB-HYALURONIDASE-FIHJ 1800-30000 MG-UT/15ML SOLUTION: Performed by: INTERNAL MEDICINE

## 2025-06-11 PROCEDURE — 25810000003 SODIUM CHLORIDE 0.9 % SOLUTION: Performed by: INTERNAL MEDICINE

## 2025-06-11 PROCEDURE — 96375 TX/PRO/DX INJ NEW DRUG ADDON: CPT

## 2025-06-11 PROCEDURE — 96365 THER/PROPH/DIAG IV INF INIT: CPT

## 2025-06-11 PROCEDURE — A9270 NON-COVERED ITEM OR SERVICE: HCPCS | Performed by: INTERNAL MEDICINE

## 2025-06-11 PROCEDURE — 25010000002 METHYLPREDNISOLONE PER 125 MG: Performed by: INTERNAL MEDICINE

## 2025-06-11 PROCEDURE — 63710000001 ACETAMINOPHEN EXTRA STRENGTH 500 MG TABLET: Performed by: INTERNAL MEDICINE

## 2025-06-11 PROCEDURE — 25010000002 DIPHENHYDRAMINE PER 50 MG: Performed by: INTERNAL MEDICINE

## 2025-06-11 PROCEDURE — 96401 CHEMO ANTI-NEOPL SQ/IM: CPT

## 2025-06-11 PROCEDURE — 96374 THER/PROPH/DIAG INJ IV PUSH: CPT

## 2025-06-11 PROCEDURE — 84100 ASSAY OF PHOSPHORUS: CPT | Performed by: INTERNAL MEDICINE

## 2025-06-11 PROCEDURE — 25010000002 ZOLEDRONIC ACID 4 MG/100ML SOLUTION: Performed by: INTERNAL MEDICINE

## 2025-06-11 PROCEDURE — 25010000002 HEPARIN LOCK FLUSH PER 10 UNITS: Performed by: INTERNAL MEDICINE

## 2025-06-11 RX ORDER — FAMOTIDINE 10 MG/ML
20 INJECTION, SOLUTION INTRAVENOUS AS NEEDED
Status: CANCELLED | OUTPATIENT
Start: 2025-06-11

## 2025-06-11 RX ORDER — HEPARIN SODIUM (PORCINE) LOCK FLUSH IV SOLN 100 UNIT/ML 100 UNIT/ML
500 SOLUTION INTRAVENOUS AS NEEDED
Status: DISCONTINUED | OUTPATIENT
Start: 2025-06-11 | End: 2025-06-12 | Stop reason: HOSPADM

## 2025-06-11 RX ORDER — ZOLEDRONIC ACID 0.04 MG/ML
4 INJECTION, SOLUTION INTRAVENOUS ONCE
Status: CANCELLED | OUTPATIENT
Start: 2025-06-11

## 2025-06-11 RX ORDER — ZOLEDRONIC ACID 0.04 MG/ML
4 INJECTION, SOLUTION INTRAVENOUS ONCE
Status: COMPLETED | OUTPATIENT
Start: 2025-06-11 | End: 2025-06-11

## 2025-06-11 RX ORDER — ACETAMINOPHEN 500 MG
1000 TABLET ORAL ONCE
Status: COMPLETED | OUTPATIENT
Start: 2025-06-11 | End: 2025-06-11

## 2025-06-11 RX ORDER — SODIUM CHLORIDE 9 MG/ML
20 INJECTION, SOLUTION INTRAVENOUS ONCE
Status: CANCELLED | OUTPATIENT
Start: 2025-06-11

## 2025-06-11 RX ORDER — DIPHENHYDRAMINE HYDROCHLORIDE 50 MG/ML
50 INJECTION, SOLUTION INTRAMUSCULAR; INTRAVENOUS AS NEEDED
Status: CANCELLED | OUTPATIENT
Start: 2025-06-11

## 2025-06-11 RX ORDER — HYDROCORTISONE SODIUM SUCCINATE 100 MG/2ML
100 INJECTION INTRAMUSCULAR; INTRAVENOUS AS NEEDED
Status: DISCONTINUED | OUTPATIENT
Start: 2025-06-11 | End: 2025-06-12 | Stop reason: HOSPADM

## 2025-06-11 RX ORDER — SODIUM CHLORIDE 9 MG/ML
20 INJECTION, SOLUTION INTRAVENOUS ONCE
Status: COMPLETED | OUTPATIENT
Start: 2025-06-11 | End: 2025-06-11

## 2025-06-11 RX ORDER — HYDROCORTISONE SODIUM SUCCINATE 100 MG/2ML
100 INJECTION INTRAMUSCULAR; INTRAVENOUS AS NEEDED
Status: CANCELLED | OUTPATIENT
Start: 2025-06-11

## 2025-06-11 RX ORDER — METHYLPREDNISOLONE SODIUM SUCCINATE 125 MG/2ML
60 INJECTION INTRAMUSCULAR; INTRAVENOUS ONCE
Status: COMPLETED | OUTPATIENT
Start: 2025-06-11 | End: 2025-06-11

## 2025-06-11 RX ORDER — SODIUM CHLORIDE 0.9 % (FLUSH) 0.9 %
20 SYRINGE (ML) INJECTION AS NEEDED
Status: DISCONTINUED | OUTPATIENT
Start: 2025-06-11 | End: 2025-06-12 | Stop reason: HOSPADM

## 2025-06-11 RX ORDER — HEPARIN SODIUM (PORCINE) LOCK FLUSH IV SOLN 100 UNIT/ML 100 UNIT/ML
500 SOLUTION INTRAVENOUS AS NEEDED
OUTPATIENT
Start: 2025-06-11

## 2025-06-11 RX ORDER — DIPHENHYDRAMINE HYDROCHLORIDE 50 MG/ML
50 INJECTION, SOLUTION INTRAMUSCULAR; INTRAVENOUS AS NEEDED
Status: DISCONTINUED | OUTPATIENT
Start: 2025-06-11 | End: 2025-06-12 | Stop reason: HOSPADM

## 2025-06-11 RX ORDER — FAMOTIDINE 10 MG/ML
20 INJECTION, SOLUTION INTRAVENOUS AS NEEDED
Status: DISCONTINUED | OUTPATIENT
Start: 2025-06-11 | End: 2025-06-12 | Stop reason: HOSPADM

## 2025-06-11 RX ORDER — ACETAMINOPHEN 500 MG
1000 TABLET ORAL ONCE
Status: CANCELLED | OUTPATIENT
Start: 2025-06-11

## 2025-06-11 RX ORDER — METHYLPREDNISOLONE SODIUM SUCCINATE 125 MG/2ML
60 INJECTION INTRAMUSCULAR; INTRAVENOUS ONCE
Status: CANCELLED | OUTPATIENT
Start: 2025-06-11

## 2025-06-11 RX ORDER — SODIUM CHLORIDE 0.9 % (FLUSH) 0.9 %
20 SYRINGE (ML) INJECTION AS NEEDED
OUTPATIENT
Start: 2025-06-11

## 2025-06-11 RX ADMIN — DARATUMUMAB AND HYALURONIDASE-FIHJ (HUMAN RECOMBINANT) 1800 MG: 1800; 30000 INJECTION SUBCUTANEOUS at 13:14

## 2025-06-11 RX ADMIN — SODIUM CHLORIDE 25 MG: 9 INJECTION, SOLUTION INTRAVENOUS at 11:53

## 2025-06-11 RX ADMIN — SODIUM CHLORIDE 20 ML/HR: 9 INJECTION, SOLUTION INTRAVENOUS at 11:00

## 2025-06-11 RX ADMIN — SODIUM CHLORIDE 20 ML/HR: 9 INJECTION, SOLUTION INTRAVENOUS at 11:49

## 2025-06-11 RX ADMIN — METHYLPREDNISOLONE SODIUM SUCCINATE 60 MG: 125 INJECTION INTRAMUSCULAR; INTRAVENOUS at 11:51

## 2025-06-11 RX ADMIN — HEPARIN 500 UNITS: 100 SYRINGE at 13:11

## 2025-06-11 RX ADMIN — Medication 20 ML: at 13:11

## 2025-06-11 RX ADMIN — ACETAMINOPHEN 1000 MG: 500 TABLET ORAL at 11:49

## 2025-06-11 RX ADMIN — Medication 20 ML: at 09:30

## 2025-06-11 RX ADMIN — ZOLEDRONIC ACID 4 MG: 0.04 INJECTION, SOLUTION INTRAVENOUS at 12:40

## 2025-06-11 NOTE — ASSESSMENT & PLAN NOTE
Patient is on active therapy with daratumumab, lenalidomide, dexamethasone and zoledronic acid.  Tolerating the regimen well.  M spike continues to decline, most recently 0.2 g/dL.  Other lab work looks excellent.  Proceed with cycle 20 as planned.  I will see her back for cycle 21-day 1 with lab work prior to monitor for toxicities.

## 2025-06-11 NOTE — PROGRESS NOTES
Chief Complaint  Multiple myeloma not having achieved remission    Rena Guerra,*  Rena Guerra, PA    Subjective          Anca Samson presents to [facility] for treatment of her multiple myeloma.  She is on active therapy with daratumumab, lenalidomide, dexamethasone and zoledronic acid.  Tolerating well.  She has masses, adenopathy, unusual aches or pains.  She notes adequate appetite and energy level.  She is more active.  She notes good bladder and bowel functions.    Oncology/Hematology History Overview Note   Smoldering Myeloma    Initially diagnosed in 2016 with bone marrow biopsy demonstrating 30% CD56 positive monoclonal plasma cell population.  45 XX normal female chromosome pattern  FISH panel negative for myeloma associated mutations including 1q21, 9q34,11q13,14q32,15q24, 17q13  No anemia, renal insufficiency, hypercalcemia.  On observation since that time    Low grade adenocarcinoma of ascending colon      5/16/2017 right hemicolectomy which  revealed a low-grade 7.6 cm adenocarcinoma of the cecum. All margins were  negative. Lymphovascular invasion and perineural invasion were not identified.     26 lymph nodes were removed and unfortunately 1 was involved with metastatic deposit. pT3 pN1a colorectal cancer.        Clinical Staging      Smoldering Myeloma; Colon ([id number])            Treatments      Chemotherapy      11/2017 completed 6mo adjuvant Xeloda        6/2022 Stopped Smoking     Malignant neoplasm of ascending colon   7/21/2017 Initial Diagnosis    Colon cancer (CMS/HCC)     Multiple myeloma   7/28/2021 Initial Diagnosis    Multiple myeloma     11/7/2023 -  Chemotherapy    OP MULTIPLE MYELOMA Daratumumab / Lenalidomide / Dexamethasone     2/19/2025 -  Chemotherapy    OP SUPPORTIVE Zoledronic Acid Q28D           Current Outpatient Medications on File Prior to Visit   Medication Sig Dispense Refill    acyclovir (ZOVIRAX) 400 MG  tablet TAKE 1 TABLET BY MOUTH 2 (TWO) TIMES A DAY. TAKE ONE TABLET BY MOUTH TWICE DAILY. 180 tablet 3    albuterol sulfate  (90 Base) MCG/ACT inhaler Take 2 puffs by mouth Every 4 (Four) to 6 (Six) Hours As Needed for Wheezing.      aspirin 81 MG EC tablet Take 1 tablet by mouth Daily.      busPIRone (BUSPAR) 15 MG tablet Take 1 tablet by mouth 3 (Three) Times a Day. 270 tablet 1    Calcium Carb-Cholecalciferol (calcium 500 mg vitamin D 5 mcg, 200 UT,) 500-5 MG-MCG tablet per tablet Take 1 tablet by mouth 2 (Two) Times a Day. 180 tablet 1    colestipol (COLESTID) 1 g tablet TAKE 2 TABLETS BY MOUTH TWICE A  tablet 1    dapsone 25 MG tablet TAKE 2 TABLETS BY MOUTH TWICE A  tablet 1    dexAMETHasone (DECADRON) 4 MG tablet Take 10 tablets on D 1,8,15,22 and take 1 tablet on D 2,3.  Take in the morning with food. 42 tablet 5    FLUoxetine (PROzac) 40 MG capsule Take 1 capsule by mouth Daily. 90 capsule 1    Fluticasone-Umeclidin-Vilant (TRELEGY) 100-62.5-25 MCG/ACT inhaler Inhale 1 puff Daily. 1 each 11    lenalidomide (REVLIMID) 15 MG capsule Take 1 capsule by mouth Daily. On Days 1-21, and off 7 days, on a 28 day cycle 21 capsule 0    ondansetron (ZOFRAN) 8 MG tablet TAKE 1 TABLET BY MOUTH THREE TIMES A DAY AS NEEDED FOR NAUSEA AND VOMITING (Patient taking differently: Take 1 tablet by mouth Every 8 (Eight) Hours As Needed.) 30 tablet 5    promethazine (PHENERGAN) 25 MG tablet Take 1 tablet by mouth Every 6 (Six) Hours As Needed for Nausea or Vomiting. 30 tablet 1    traMADol (ULTRAM) 50 MG tablet Take 1 tablet by mouth Every 8 (Eight) Hours As Needed for Moderate Pain (knee pain). 90 tablet 1     Current Facility-Administered Medications on File Prior to Visit   Medication Dose Route Frequency Provider Last Rate Last Admin    heparin injection 500 Units  500 Units Intravenous PRN West Nicolas MD   500 Units at 06/11/25 1311    sodium chloride 0.9 % flush 20 mL  20 mL Intravenous PRN Maria Isabel  West POTTER MD   20 mL at 06/11/25 1311       Allergies   Allergen Reactions    Morphine Anaphylaxis    Cephalexin Hives    Penicillins Hives     Beta lactam allergy details  Antibiotic reaction: hives  Age at reaction: child  Dose to reaction time: unknown  Reason for antibiotic: unknown  Epinephrine required for reaction?: unknown  Tolerated antibiotics: other (none per pt)        Sulfa Antibiotics Hives     Past Medical History:   Diagnosis Date    Anxiety     Asthma 07/28/2021    Atherosclerosis of native coronary artery of native heart with angina pectoris 08/15/2023    seen by angela 10/2023, follow up prn/no cp    C. difficile diarrhea     DENIES ANY CURRENT ISSUES    Cholelithiasis 7-    Chronic diarrhea     Colon cancer 07/21/2017    Colon polyp     COPD (chronic obstructive pulmonary disease) 10/23/2017    Dental disease     Depression     Disease of thyroid gland     Diverticulitis     Diverticulitis of colon     Diverticulosis 7-    Dysphagia     Emphysema of lung     Emphysema/COPD     GERD (gastroesophageal reflux disease)     Head injury     CONCUSSION AT AGE 4    Hernia 2019    History of chemotherapy     LAST TREATMENT 3/19/25    Hx of psychiatric care     Impaired fasting glucose 08/14/2015    Lumbago     Lung nodule 02/17/2022    Major depressive disorder 10/27/2016    Malignant neoplasm of ascending colon 07/21/2017    Melena     Multiple myeloma     CURRENTLY BEING TREATED    Nicotine dependence 05/10/2017    Obesity     NICK (obstructive sleep apnea) 11/06/2023    Oxygen dependent     PRN    Pancreatitis 7/26/2024    Panic disorder     Primary central sleep apnea     Renal insufficiency 07/28/2021    Seizures     PSEUDO SEIZURES LAST ONE AROUND JULY 2024    Shortness of breath     CAN GET VERY SOA WITH MINIMAL EXERTION    Sinusitis     Small intestine cancer     Tinnitus     Tobacco use 07/28/2021    QUIT SMOKING JUNE 2022    Tremor     Visit for screening mammogram 02/20/2020     NORMAL- REPEAT IN ONE YEAR    Vitamin D deficiency      Past Surgical History:   Procedure Laterality Date    APPENDECTOMY      BONE MARROW BIOPSY  2016    CHOLECYSTECTOMY N/A 08/08/2024    Procedure: CHOLECYSTECTOMY LAPAROSCOPIC WITH DAVINCI ROBOT;  Surgeon: Eduardo Ingram MD;  [place] [facility] [place] [facility] [place]  Service: Robotics - DaVinci;  Laterality: N/A;    COLON SURGERY  2017    COLONOSCOPY  2018,2017,2019    [facility]- [facility] LE:DIVERTICULOSIS AND ERYTHEMA OF MUCOSA    COLONOSCOPY N/A 12/20/2022    Procedure: COLONOSCOPY WITH ELEVIEW INJECTION, POLYPECTOMY, HOT SNARE, CLIP APPLICATION X3, BIOPSIES;  Surgeon: Jarvis Borges MD;  Location: [facility] ENDOSCOPY;  Service: Gastroenterology;  Laterality: N/A;  COLON POLYP, DIVERTICULOSIS, ANASTOMOSIS RIGHT COLON    COLONOSCOPY N/A 11/09/2023    Procedure: COLONOSCOPY;  Surgeon: Jarvis Borges MD;  Location: [facility] ENDOSCOPY;  Service: Gastroenterology;  Laterality: N/A;  DIVERTICULOSIS, ANASTOMOSIS IN ASCENDING COLON    ENDOSCOPY  2018    ERCP N/A 07/27/2024    Procedure: ENDOSCOPIC RETROGRADE CHOLANGIOPANCREATOGRAPHY WITH SPHINCTEROTOMY, BALLOON SWEEP, STENT PLACEMENT;  Surgeon: Ajay Kennedy MD;  Location: [facility] MAIN OR;  Service: Gastroenterology;  Laterality: N/A;  BILE DUCT STONE    ERCP N/A 09/05/2024    Procedure: ENDOSCOPIC RETROGRADE CHOLANGIOPANCREATOGRAPHY WITH SPINCHTEROTOMY. BALLOON DILATATION 8-10. BALLOON SWEEP 12-15. STENT REMOVAL;  Surgeon: Ajay Kennedy MD;  Location: [facility] ENDOSCOPY;  Service: Gastroenterology;  Laterality: N/A;  BILE DUCT STONES    EYE SURGERY      FECAL DISIMPACTION  06/2019    TRANSPLANT     GALLBLADDER SURGERY      HEMICOLECTOMY Right 05/2017    HERNIA REPAIR  2024    HYSTERECTOMY  1982,1984    KNEE ARTHROSCOPY Left 01/03/2022    Procedure: KNEE ARTHROSCOPY WITH PARTIAL MEDIAL MENISCECTOMY,  CHONDROPLASTY;  Surgeon: Nicholas Tipton MD;  [place] [facility] [place] [facility] [place]  Service: Orthopedics;   Laterality: Left;    LIPOMA EXCISION Bilateral 3/20/2025    Procedure: Excision of bilateral upper arm lipomas-removed subcutaneous fatty tumors from both upper arms;  Surgeon: Eduardo Ingram MD;  Location: [facility] MAIN OR;  Service: General;  Laterality: Bilateral;    MINI-LAPAROTOMY  2017    PORTACATH PLACEMENT N/A     pt states that her port does not work    TONSILLECTOMY      TUBAL ABDOMINAL LIGATION      UPPER GASTROINTESTINAL ENDOSCOPY  2018    VENOUS ACCESS DEVICE (PORT) INSERTION N/A 10/31/2023    Procedure: Port-a-catheter removal and port-a-catheter placement;  Surgeon: Alcon Delgado MD;  [place] [facility] [place] [facility] [place]  Service: General;  Laterality: N/A;    VENTRAL HERNIA REPAIR N/A 10/03/2024    Procedure: VENTRAL / INCISIONAL HERNIA REPAIR LAPAROSCOPIC WITH DAVINCI ROBOT;  Surgeon: Eduardo Ingram MD;  [place] [facility] [place] [facility] [place]  Service: Robotics - DaVinci;  Laterality: N/A;     Social History     Socioeconomic History    Marital status:    Tobacco Use    Smoking status: Former     Current packs/day: 0.00     Average packs/day: 1 pack/day for 47.4 years (47.4 ttl pk-yrs)     Types: Cigarettes     Start date: 1/1/1975     Quit date: 6/3/2022     Years since quitting: 3.0     Passive exposure: Past    Smokeless tobacco: Never   Vaping Use    Vaping status: Never Used   Substance and Sexual Activity    Alcohol use: Never    Drug use: Never    Sexual activity: Not Currently     Partners: Male     Birth control/protection: None     Family History   Problem Relation Age of Onset    Heart disease Mother     Lung cancer Mother     Cancer Mother     Stroke Father     Heart disease Father     Kidney cancer Father     Cancer Father     Stroke Other         UNCLE/AUNT    Neuropathy Brother     Hypertension Brother     Colon cancer Neg Hx     Malig Hyperthermia Neg Hx        Objective   Physical Exam  Vitals reviewed. Exam conducted with a chaperone present.   Cardiovascular:      Rate and Rhythm: Normal rate  "and regular rhythm.      Heart sounds: Normal heart sounds. No murmur heard.     No gallop.   Pulmonary:      Effort: Pulmonary effort is normal.      Breath sounds: Normal breath sounds.   Abdominal:      General: Bowel sounds are normal.   Lymphadenopathy:      Cervical: No cervical adenopathy.   Psychiatric:         Mood and Affect: Mood normal.         Behavior: Behavior normal.         Vitals:    06/11/25 1030   BP: 139/92   Pulse: 81   Resp: 22   Temp: 98 °F (36.7 °C)   TempSrc: Temporal   SpO2: 94%   Weight: 83.5 kg (184 lb)   Height: 152.4 cm (60\")   PainSc: 0-No pain     ECOG score: 2         PHQ-9 Total Score:                    Result Review :   The following data was reviewed by: West Nicolas MD on 06/11/2025:  Lab Results   Component Value Date    HGB 13.2 06/11/2025    HCT 40.7 06/11/2025    .4 (H) 06/11/2025     06/11/2025    WBC 4.51 06/11/2025    NEUTROABS 2.79 06/11/2025    LYMPHSABS 1.27 06/11/2025    MONOSABS 0.23 06/11/2025    EOSABS 0.06 06/11/2025    BASOSABS 0.13 06/11/2025     Lab Results   Component Value Date    GLUCOSE 161 (H) 06/11/2025    BUN 16.1 06/11/2025    CREATININE 1.02 (H) 06/11/2025     06/11/2025    K 4.2 06/11/2025     06/11/2025    CO2 19.9 (L) 06/11/2025    CALCIUM 8.9 06/11/2025    PROTEINTOT 5.8 (L) 06/11/2025    ALBUMIN 3.8 06/11/2025    BILITOT 0.4 06/11/2025    ALKPHOS 67 06/11/2025    AST 16 06/11/2025    ALT 21 06/11/2025     Lab Results   Component Value Date    MG 1.8 06/11/2025    PHOS 3.1 06/11/2025    FREET4 1.55 12/06/2023    TSH 0.684 09/11/2024     No results found for: \"IRON\", \"LABIRON\", \"TRANSFERRIN\", \"TIBC\"  Lab Results   Component Value Date     03/07/2023    [id number] 612 04/15/2024    FOLATE 8.95 03/27/2024     No results found for: \"PSA\", \"CEA\", \"AFP\", \"\", \"\"          Assessment and Plan    Diagnoses and all orders for this visit:    1. Multiple myeloma not having achieved remission (Primary)  Assessment " & Plan:  Patient is on active therapy with daratumumab, lenalidomide, dexamethasone and zoledronic acid.  Tolerating the regimen well.  M spike continues to decline, most recently 0.2 g/dL.  Other lab work looks excellent.  Proceed with cycle 20 as planned.  I will see her back for cycle 21-day 1 with lab work prior to monitor for toxicities.    Orders:  -     Protein Electrophoresis, 24 Hr Urine - Urine, Clean Catch; Future  -     CARL, Jessica-Specific, Serum; Future  -     CBC and Differential; Future  -     Comprehensive metabolic panel; Future    Other orders  -     Cancel: sodium chloride 0.9 % infusion  -     Cancel: acetaminophen (TYLENOL) tablet 1,000 mg  -     Cancel: methylPREDNISolone sodium succinate (SOLU-Medrol) injection 60 mg  -     Cancel: diphenhydrAMINE (BENADRYL) IVPB 25 mg  -     Cancel: daratumumab-hyaluronidase-fihj (DARZALEX FASPRO) 1800-07102 MG-UT/15ML injection 1,800 mg  -     Cancel: Hydrocortisone Sod Suc (PF) (Solu-CORTEF) injection 100 mg  -     Cancel: diphenhydrAMINE (BENADRYL) injection 50 mg  -     Cancel: famotidine (PEPCID) injection 20 mg  -     Cancel: sodium chloride 0.9 % infusion  -     Cancel: zoledronic acid (ZOMETA) infusion 4 mg/100 mL (premix)            Patient Follow Up: Cycle 21-day 1    Patient was given instructions and counseling regarding her condition or for health maintenance advice. Please see specific information pulled into the AVS if appropriate.     West Nicolas MD    6/11/2025

## 2025-06-12 ENCOUNTER — SPECIALTY PHARMACY (OUTPATIENT)
Dept: PHARMACY | Facility: HOSPITAL | Age: 75
End: 2025-06-12
Payer: MEDICARE

## 2025-06-12 NOTE — PROGRESS NOTES
Specialty Pharmacy Patient Management Program  Chart Review/Lab Monitoring     Anca Samson is a 74 y.o. female with MM (Multiple Myeloma) who presented for lab check and Oncology provider appointment. [facility] reviewed labs and providers note to assess continued therapy appropriateness of Revlimid (lenalidomide)    Oncology Interval History:  Susan Nicolas  Diagnosis: 16: Serum protein electrophoresis - markedly elevated IgG at 2941, decreased IgA at 46, IgM 87, and markedly increased monoclonal spike at 2.2 g/dL. Interestingly, 3/19/15: serum protein electrophoresis - IgG of 2695   Biomarkers: IgG+    Current Tx: Lenalidomide 15mg - Take one tablet by mouth daily on days 1-21 and then take 7 days off on 28 days (23-now) + Darzalex SQ (23-now) + Zometa [id number] (had been held for dental work, rs'd 25-now)   Most Recent Imagin24 PET stable lytic lesion, no other hypermetabolic adenopathy  Previous Tx: Active surveillance (-10/2023)  Cholecystectomy (2024)     Other Cancer Hx  2017: R hemicolectomy - low-grade 7.6 cm adenocarcinoma of the cecum, margins were  negative   LN + for metastatic deposit pT3 pN1a colorectal cancer, no other disease on imaging  now s/p colectomy with colostomy  & s/p 6 mo of adjuvant xeloda (complete 2017)     Fills at: [facility]  Last seen in person by Specialty Pharmacy: 3/19/25    12/6/23-1/3/24: Held Revlimid with Cycle 2 and dose reduced to 15mg.  -24: Held Revlimid  10/2/24: Held Revlimid due to surgery  25: Anca Samson reported is doing well with overall. Labs reviewed and appropriate.     Labs:  Lab Results   Component Value Date     2025    K 4.2 2025    CO2 19.9 (L) 2025     2025    BUN 16.1 2025    GLUCOSE 161 (H) 2025    CALCIUM 8.9 2025    CREATININE 1.02 (H) 2025    EGFRIFNONA 53 (L) 2021    BCR 15.8 2025     ANIONGAP 13.1 06/11/2025    AST 16 06/11/2025    ALT 21 06/11/2025    BILITOT 0.4 06/11/2025     Lab Results   Component Value Date    WBC 4.51 06/11/2025    RBC 3.90 06/11/2025    HGB 13.2 06/11/2025    HCT 40.7 06/11/2025     06/11/2025    NEUTROABS 2.79 06/11/2025    LYMPHSABS 1.27 06/11/2025    MONOSABS 0.23 06/11/2025    EOSABS 0.06 06/11/2025    BASOSABS 0.13 06/11/2025     Lab Results   Component Value Date    MSPIKE 0.2 (H) 05/07/2025    MSPIKE 0.3 (H) 02/12/2025    IGLFLC 2.6 (L) 05/07/2025    IGLFLC 2.0 (L) 10/30/2024    FREEKAPPAL 8.3 05/07/2025    FREEKAPPAL 8.9 10/30/2024    KAPPALAMB 3.19 (H) 05/07/2025    KAPPALAMB 4.45 (H) 10/30/2024     PLAN  Specialty pharmacy will continue to follow patient. Continue with current regimen as planned. Next Specialty Assessment due 3/19/27.    Walker MahajanD  Oncology Clinical Specialty Pharmacist   6/12/2025 08:58 EDT

## 2025-06-18 ENCOUNTER — OFFICE VISIT (OUTPATIENT)
Dept: OTOLARYNGOLOGY | Facility: CLINIC | Age: 75
End: 2025-06-18
Payer: MEDICARE

## 2025-06-18 ENCOUNTER — SPECIALTY PHARMACY (OUTPATIENT)
Dept: PHARMACY | Facility: HOSPITAL | Age: 75
End: 2025-06-18
Payer: MEDICARE

## 2025-06-18 VITALS
WEIGHT: 183 LBS | HEIGHT: 60 IN | TEMPERATURE: 97.5 F | DIASTOLIC BLOOD PRESSURE: 84 MMHG | BODY MASS INDEX: 35.93 KG/M2 | SYSTOLIC BLOOD PRESSURE: 122 MMHG | HEART RATE: 70 BPM

## 2025-06-18 DIAGNOSIS — C90.00 MULTIPLE MYELOMA, REMISSION STATUS UNSPECIFIED: ICD-10-CM

## 2025-06-18 DIAGNOSIS — J32.0 CHRONIC MAXILLARY SINUSITIS: Primary | ICD-10-CM

## 2025-06-18 RX ORDER — LENALIDOMIDE 15 MG/1
15 CAPSULE ORAL DAILY
Qty: 21 CAPSULE | Refills: 0 | Status: SHIPPED | OUTPATIENT
Start: 2025-06-18

## 2025-06-18 NOTE — PROGRESS NOTES
Patient Name: Anca Samson   Visit Date: 06/18/2025   Patient ID: [id number]  Provider: Justo Casper MD    Sex: female  Location: [facility] [place] [facility]   YOB: 1950  Location Address: [address] [place] [address], [address],?[place]?[id number]    Primary Care Provider Rena Guerra PA  Location Phone: [phone]    Referring Provider: Rena Guerra, *        Chief Complaint  Establish Care, abnormal MRI (May 2024 [facility]), sinus disease, and mastoid disorder    History of Present Illness  Anca Samson is a 74 y.o. female who presents to [facility] for Establish Care, abnormal MRI (May 2024 [facility]), sinus disease, and mastoid disorder.   Patient reports that she has been diagnosed with multiple myeloma back in 2016.  She has been treated with chemotherapy and still she is being treated.  Back in May 2024, due to unsteadiness with multiple myeloma and Ms. Rena Guerra went ahead and obtained MRI of brain with and without contrast.  MRI showed the following results.  1. White matter changes most compatible with small vessel ischemicdisease in this age group.2. No abnormal enhancement on postcontrast imaging within the brainparenchyma.3. Prominent bilateral mastoid effusions left greater than right. Pleasecorrelate for mastoiditis3. Mild paranasal sinus disease    Therefore in order to address these abnormal findings on the MRI scan patient was sent to ENT for further evaluation and management.  According the patient she has not had any ear issues and remotely as a child she might of but she certainly do not remember or have any records to suspect that she had any ear issues or surgeries.  Patient also does not have any symptoms of sinus problems.  Past Medical History:   Diagnosis Date    Anxiety     Asthma 07/28/2021    Atherosclerosis of native coronary artery of native heart with angina pectoris 08/15/2023    seen by angela 10/2023,  follow up prn/no cp    C. difficile diarrhea     DENIES ANY CURRENT ISSUES    Cholelithiasis 7-    Chronic diarrhea     Colon cancer 07/21/2017    Colon polyp     COPD (chronic obstructive pulmonary disease) 10/23/2017    Dental disease     Depression     Disease of thyroid gland     Diverticulitis     Diverticulitis of colon     Diverticulosis 7-    Dysphagia     Emphysema of lung     Emphysema/COPD     GERD (gastroesophageal reflux disease)     Head injury     CONCUSSION AT AGE 4    Hernia 2019    History of chemotherapy     LAST TREATMENT 3/19/25    Hx of psychiatric care     Impaired fasting glucose 08/14/2015    Lumbago     Lung nodule 02/17/2022    Major depressive disorder 10/27/2016    Malignant neoplasm of ascending colon 07/21/2017    Melena     Multiple myeloma     CURRENTLY BEING TREATED    Nicotine dependence 05/10/2017    Obesity     NICK (obstructive sleep apnea) 11/06/2023    Osteopenia     Oxygen dependent     PRN    Pancreatitis 7/26/2024    Panic disorder     Primary central sleep apnea     Renal insufficiency 07/28/2021    Seizures     PSEUDO SEIZURES LAST ONE AROUND JULY 2024    Shortness of breath     CAN GET VERY SOA WITH MINIMAL EXERTION    Sinusitis     Small intestine cancer     Tinnitus     Tobacco use 07/28/2021    QUIT SMOKING JUNE 2022    Tremor     Visit for screening mammogram 02/20/2020    NORMAL- REPEAT IN ONE YEAR    Visual impairment     Vitamin D deficiency        Past Surgical History:   Procedure Laterality Date    APPENDECTOMY      BONE MARROW BIOPSY  2016    CHOLECYSTECTOMY N/A 08/08/2024    Procedure: CHOLECYSTECTOMY LAPAROSCOPIC WITH DAVINCI ROBOT;  Surgeon: Eduardo Ingram MD;  Location: [facility] [place];  Service: Robotics - DaVinci;  Laterality: N/A;    COLON SURGERY  2017    COLONOSCOPY  2018,2017,2019    [facility]- [facility]:DIVERTICULOSIS AND ERYTHEMA OF MUCOSA    COLONOSCOPY N/A 12/20/2022    Procedure: COLONOSCOPY WITH ELEVIEW INJECTION,  POLYPECTOMY, HOT SNARE, CLIP APPLICATION X3, BIOPSIES;  Surgeon: Jarvis Borges MD;  Location: [facility] ENDOSCOPY;  Service: Gastroenterology;  Laterality: N/A;  COLON POLYP, DIVERTICULOSIS, ANASTOMOSIS RIGHT COLON    COLONOSCOPY N/A 11/09/2023    Procedure: COLONOSCOPY;  Surgeon: Jarvis Borges MD;  Location: [facility] ENDOSCOPY;  Service: Gastroenterology;  Laterality: N/A;  DIVERTICULOSIS, ANASTOMOSIS IN ASCENDING COLON    ENDOSCOPY  2018    ERCP N/A 07/27/2024    Procedure: ENDOSCOPIC RETROGRADE CHOLANGIOPANCREATOGRAPHY WITH SPHINCTEROTOMY, BALLOON SWEEP, STENT PLACEMENT;  Surgeon: Ajay Kennedy MD;  Location: [facility] MAIN OR;  Service: Gastroenterology;  Laterality: N/A;  BILE DUCT STONE    ERCP N/A 09/05/2024    Procedure: ENDOSCOPIC RETROGRADE CHOLANGIOPANCREATOGRAPHY WITH SPINCHTEROTOMY. BALLOON DILATATION 8-10. BALLOON SWEEP 12-15. STENT REMOVAL;  Surgeon: Ajay Kennedy MD;  Location: [facility] ENDOSCOPY;  Service: Gastroenterology;  Laterality: N/A;  BILE DUCT STONES    EYE SURGERY      FECAL DISIMPACTION  06/2019    TRANSPLANT     GALLBLADDER SURGERY      HEMICOLECTOMY Right 05/2017    HERNIA REPAIR  2024    HYSTERECTOMY  1982,1984    KNEE ARTHROSCOPY Left 01/03/2022    Procedure: KNEE ARTHROSCOPY WITH PARTIAL MEDIAL MENISCECTOMY,  CHONDROPLASTY;  Surgeon: Nicholas Tipton MD;  Location: [facility];  Service: Orthopedics;  Laterality: Left;    LIPOMA EXCISION Bilateral 3/20/2025    Procedure: Excision of bilateral upper arm lipomas-removed subcutaneous fatty tumors from both upper arms;  Surgeon: Eduardo Ingram MD;  Location: [facility] MAIN OR;  Service: General;  Laterality: Bilateral;    MINI-LAPAROTOMY  2017    PORTACATH PLACEMENT N/A     pt states that her port does not work    TONSILLECTOMY      TUBAL ABDOMINAL LIGATION      UPPER GASTROINTESTINAL ENDOSCOPY  2018    VENOUS ACCESS DEVICE (PORT) INSERTION N/A 10/31/2023    Procedure: Port-a-catheter removal and  port-a-catheter placement;  Surgeon: Alcon Delgado MD;  Location: [facility] [place] [place];  Service: General;  Laterality: N/A;    VENTRAL HERNIA REPAIR N/A 10/03/2024    Procedure: VENTRAL / INCISIONAL HERNIA REPAIR LAPAROSCOPIC WITH DAVINCI ROBOT;  Surgeon: Eduardo Ingram MD;  Location: [facility] OR [facility] [place] [place];  Service: Robotics - DaVinci;  Laterality: N/A;         Current Outpatient Medications:     acyclovir (ZOVIRAX) 400 MG tablet, TAKE 1 TABLET BY MOUTH 2 (TWO) TIMES A DAY. TAKE ONE TABLET BY MOUTH TWICE DAILY., Disp: 180 tablet, Rfl: 3    albuterol sulfate  (90 Base) MCG/ACT inhaler, Take 2 puffs by mouth Every 4 (Four) to 6 (Six) Hours As Needed for Wheezing., Disp: , Rfl:     aspirin 81 MG EC tablet, Take 1 tablet by mouth Daily., Disp: , Rfl:     busPIRone (BUSPAR) 15 MG tablet, Take 1 tablet by mouth 3 (Three) Times a Day., Disp: 270 tablet, Rfl: 1    Calcium Carb-Cholecalciferol (calcium 500 mg vitamin D 5 mcg, 200 UT,) 500-5 MG-MCG tablet per tablet, Take 1 tablet by mouth 2 (Two) Times a Day., Disp: 180 tablet, Rfl: 1    colestipol (COLESTID) 1 g tablet, TAKE 2 TABLETS BY MOUTH TWICE A DAY, Disp: 360 tablet, Rfl: 1    dapsone 25 MG tablet, TAKE 2 TABLETS BY MOUTH TWICE A DAY, Disp: 360 tablet, Rfl: 1    dexAMETHasone (DECADRON) 4 MG tablet, Take 10 tablets on D 1,8,15,22 and take 1 tablet on D 2,3.  Take in the morning with food., Disp: 42 tablet, Rfl: 5    FLUoxetine (PROzac) 40 MG capsule, Take 1 capsule by mouth Daily., Disp: 90 capsule, Rfl: 1    Fluticasone-Umeclidin-Vilant (TRELEGY) 100-62.5-25 MCG/ACT inhaler, Inhale 1 puff Daily., Disp: 1 each, Rfl: 11    lenalidomide (REVLIMID) 15 MG capsule, Take 1 capsule by mouth Daily. On Days 1-21, and off 7 days, on a 28 day cycle, Disp: 21 capsule, Rfl: 0    ondansetron (ZOFRAN) 8 MG tablet, TAKE 1 TABLET BY MOUTH THREE TIMES A DAY AS NEEDED FOR NAUSEA AND VOMITING (Patient taking differently: Take 1 tablet by mouth Every 8 (Eight) Hours As Needed.), Disp: 30  "tablet, Rfl: 5    promethazine (PHENERGAN) 25 MG tablet, Take 1 tablet by mouth Every 6 (Six) Hours As Needed for Nausea or Vomiting., Disp: 30 tablet, Rfl: 1    traMADol (ULTRAM) 50 MG tablet, Take 1 tablet by mouth Every 8 (Eight) Hours As Needed for Moderate Pain (knee pain)., Disp: 90 tablet, Rfl: 1     Allergies   Allergen Reactions    Morphine Anaphylaxis    Cephalexin Hives    Penicillins Hives     Beta lactam allergy details  Antibiotic reaction: hives  Age at reaction: child  Dose to reaction time: unknown  Reason for antibiotic: unknown  Epinephrine required for reaction?: unknown  Tolerated antibiotics: other (none per pt)        Sulfa Antibiotics Hives       Social History     Tobacco Use    Smoking status: Former     Current packs/day: 0.00     Average packs/day: 1 pack/day for 47.4 years (47.4 ttl pk-yrs)     Types: Cigarettes     Start date: 1/1/1975     Quit date: 6/3/2022     Years since quitting: 3.0     Passive exposure: Past    Smokeless tobacco: Never   Vaping Use    Vaping status: Never Used   Substance Use Topics    Alcohol use: Never    Drug use: Never        Objective     Vital Signs:   Vitals:    06/18/25 1540   BP: 122/84   Pulse: 70   Temp: 97.5 °F (36.4 °C)   Weight: 83 kg (183 lb)   Height: 152.4 cm (60\")       Tobacco Use: Medium Risk (6/18/2025)    Patient History     Smoking Tobacco Use: Former     Smokeless Tobacco Use: Never     Passive Exposure: Past         Physical Exam    Constitutional   Appearance  well developed, well-nourished, alert and in no acute distress, voice clear and strong    Head   Inspection  no deformities or lesions      Face   Inspection  no facial lesions; House-Brackmann I/VI bilaterally   Palpation  no TMJ crepitus nor  muscle tenderness bilaterally     Eyes/Vision   Visual Fields  extraocular movements are intact, no spontaneous or gaze-induced nystagmus  Conjunctivae  clear   Sclerae  clear   Pupils and Irises  pupils equal, round, and reactive " to light.   Nystagmus  not present     Ears, Nose, Mouth and Throat  Ears  External Ears  appearance within normal limits, no lesions present   Both mastoid on palpation was unremarkable there is no erythema or swelling.  Otoscopic Examination  tympanic membrane appearance within normal limits bilaterally without perforations, well-aerated middle ears   Hearing  intact to conversational voice both ears   Tunning fork testing    Rinne:  Brady:    Nose  External Nose  appearance normal   Intranasal Exam  mucosa within normal limits, vestibules normal, no intranasal lesions present, septum midline, sinuses non tender to percussion   Modified Balta Test:    Oral Cavity  Oral Mucosa  oral mucosa normal without pallor or cyanosis   Lips  lip appearance normal   Teeth  normal dentition for age   Gums  gums pink, non-swollen, no bleeding present   Tongue  tongue appearance normal; normal mobility   Palate  hard palate normal, soft palate appearance normal with symmetric mobility     Throat  Oropharynx  no inflammation or lesions present, tonsils within normal limits   Hypopharynx  appearance within normal limits   Larynx  voice normal     Neck  Inspection/Palpation  normal appearance, no masses or tenderness, trachea midline; thyroid size normal, nontender, no nodules or masses present on palpation     Lymphatic  Neck  no lymphadenopathy present   Supraclavicular Nodes  no lymphadenopathy present   Preauricular Nodes  no lymphadenopathy present     Respiratory  Respiratory Effort  breathing unlabored   Inspection of Chest  normal appearance, no retractions     Musculoskeletal   Cervical back: Normal range of motion and neck supple.      Skin and Subcutaneous Tissue  General Inspection  Regarding face and neck - there are no rashes present, no lesions present, and no areas of discoloration     Neurologic  Cranial Nerves  cranial nerves II-XII are grossly intact bilaterally   Gait and Station  normal gait, able to stand  without diffculty    Psychiatric  Judgement and Insight  judgment and insight intact   Mood and Affect  mood normal, affect appropriate       RESULTS REVIEWED    I have reviewed the following information:   [x]  Previous Internal Note  []  Previous External Note:   [x]  Ordered Tests & Results:      Pathology:   Lab Results   Component Value Date    Microscopic Description  03/20/2025     Microscopic examination performed.         TSH   Date Value Ref Range Status   09/11/2024 0.684 0.270 - 4.200 uIU/mL Final     Free T4   Date Value Ref Range Status   12/06/2023 1.55 0.93 - 1.70 ng/dL Final     Comment:     T4 results may be falsely increased if patient taking Biotin.     Calcium   Date Value Ref Range Status   06/11/2025 8.9 8.6 - 10.5 mg/dL Final     25 Hydroxy, Vitamin D   Date Value Ref Range Status   07/17/2024 49.5 30.0 - 100.0 ng/ml Final       MRI Knee Right Without Contrast  Result Date: 4/13/2025  Impression: Longitudinal oblique tear of the body and posterior horn of the medial meniscus. Small, thin Baker cyst. Trace knee joint effusion. Electronically Signed: Carlos Thomas MD  4/13/2025 4:43 PM EDT  Workstation ID: [id number]    CT Chest Low Dose Cancer Screening WO  Result Date: 3/5/2025  Impression: 1.Stable 5 mm pulmonary nodule within the right middle lobe. No new or enlarging pulmonary nodules. 2.Upper lobe predominant centrilobular emphysema. Recommendation: Continue annual screening with LDCT Lung Rads Assessment: Lung-RADS L2 - Benign appearance or <1% chance of malignancy. Electronically Signed: Crispin Shaffer  3/5/2025 11:49 AM EST  Workstation ID: [id number]      I have discussed the interpretation of the above results with the patient.    Procedures          Assessment and Plan   Diagnoses and all orders for this visit:    1. Chronic maxillary sinusitis (Primary)        (J32.0) Chronic maxillary sinusitis     Anca Samson  reports that she quit smoking about 3 years ago. Her smoking use  included cigarettes. She started smoking about 50 years ago. She has a 47.4 pack-year smoking history. She has been exposed to tobacco smoke. She has never used smokeless tobacco.         Plan:  Patient Instructions   1.  Patient had an MRI of brain for history of multiple myeloma which she is currently being treated and also for unsteadiness.  However incidental finding show bilateral maxillary sinus disease and also bilateral mastoid effusion which probably is chronic as she is not symptomatic.  2.  Based on clinical examination today she probably has chronic mastoid effusion and this is not acute and therefore probably not of concern.  3.  As for the maxillary sinuses she is asymptomatic but there seems to be either polyp or retention cyst that is present within both maxillary sinuses.  These require further evaluation as it may be more solid than cystic.  Therefore I recommend obtaining a CT of sinuses for further evaluation and follow-up afterwards.  3.  I will see patient after the CT scan.        Follow Up   Return in about 1 month (around 7/18/2025), or Follow-up after CT of sinuses.  Patient was given instructions and counseling regarding her condition or for health maintenance advice. Please see specific information pulled into the AVS if appropriate.

## 2025-06-18 NOTE — PROGRESS NOTES
Specialty Pharmacy Patient Management Program  Per Protocol Prescription Order or Refill     Patient will be filling or currently fills medications at [facility] and is enrolled in the Patient Management Program.    Requested Prescriptions     Pending Prescriptions Disp Refills    lenalidomide (REVLIMID) 15 MG capsule 21 capsule 0     Sig: Take 1 capsule by mouth Daily. On Days 1-21, and off 7 days, on a 28 day cycle     Prescription orders above were sent to the pharmacy per Collaborative Care Agreement Protocol.     Last Office Visit: 6/11/25  Next Office Visit: 8/6/25    Drug-Drug Interactions: The current medication list was reviewed and there are no relevant drug-drug interactions with the specialty medication.  Medication Allergies: The patient has no relevant allergies as it relates to their oral specialty medication  Review of Labs/Dose Adjustments: NO DOSE CHANGE - I reviewed the most recent note and labs and the patient will continue without any dose changes.  I sent refills as described below.    Sabrina Beebe PharmD  Oncology Clinical Specialty Pharmacist   6/18/2025  09:55 EDT

## 2025-06-18 NOTE — PATIENT INSTRUCTIONS
1.  Patient had an MRI of brain for history of multiple myeloma which she is currently being treated and also for unsteadiness.  However incidental finding show bilateral maxillary sinus disease and also bilateral mastoid effusion which probably is chronic as she is not symptomatic.  2.  Based on clinical examination today she probably has chronic mastoid effusion and this is not acute and therefore probably not of concern.  3.  As for the maxillary sinuses she is asymptomatic but there seems to be either polyp or retention cyst that is present within both maxillary sinuses.  These require further evaluation as it may be more solid than cystic.  Therefore I recommend obtaining a CT of sinuses for further evaluation and follow-up afterwards.  3.  I will see patient after the CT scan.

## 2025-06-19 ENCOUNTER — PATIENT ROUNDING (BHMG ONLY) (OUTPATIENT)
Dept: OTOLARYNGOLOGY | Facility: CLINIC | Age: 75
End: 2025-06-19
Payer: MEDICARE

## 2025-06-19 ENCOUNTER — OFFICE VISIT (OUTPATIENT)
Dept: ORTHOPEDIC SURGERY | Facility: CLINIC | Age: 75
End: 2025-06-19
Payer: MEDICARE

## 2025-06-19 ENCOUNTER — PREP FOR SURGERY (OUTPATIENT)
Dept: OTHER | Facility: HOSPITAL | Age: 75
End: 2025-06-19
Payer: MEDICARE

## 2025-06-19 VITALS
WEIGHT: 183 LBS | SYSTOLIC BLOOD PRESSURE: 115 MMHG | BODY MASS INDEX: 35.93 KG/M2 | DIASTOLIC BLOOD PRESSURE: 74 MMHG | OXYGEN SATURATION: 93 % | HEIGHT: 60 IN | HEART RATE: 75 BPM

## 2025-06-19 DIAGNOSIS — M25.561 RIGHT KNEE PAIN, UNSPECIFIED CHRONICITY: ICD-10-CM

## 2025-06-19 DIAGNOSIS — M17.11 PRIMARY OSTEOARTHRITIS OF RIGHT KNEE: Primary | ICD-10-CM

## 2025-06-19 DIAGNOSIS — S83.241D ACUTE MEDIAL MENISCUS TEAR OF RIGHT KNEE, SUBSEQUENT ENCOUNTER: ICD-10-CM

## 2025-06-19 PROBLEM — S83.241A ACUTE MEDIAL MENISCUS TEAR OF RIGHT KNEE: Status: ACTIVE | Noted: 2025-06-19

## 2025-06-19 RX ORDER — CLINDAMYCIN PHOSPHATE 900 MG/50ML
900 INJECTION, SOLUTION INTRAVENOUS ONCE
OUTPATIENT
Start: 2025-06-19 | End: 2025-06-19

## 2025-06-19 NOTE — PROGRESS NOTES
A [employer] message has been send to the patient for [place] [facility] [place]

## 2025-06-19 NOTE — PROGRESS NOTES
Chief Complaint  Follow-up of the Right Knee    Subjective          History of Present Illness      Anca Samson is a 74 y.o. female  presents to [facility] for     Patient presents for follow-up evaluation right knee pain right knee osteoarthritis and medial meniscus tear.  Patient states that her knee pain got no relief with the injection she received she points to the medial knee as her area of pain she also states it swells she uses a walker for ambulation.  Patient is currently on chemotherapy 1 time a month for multiple myeloma.  Patient states she would like to discontinue conservative treatment and is hoping to discuss knee and knee surgery today.  She has been taking tramadol with no relief she admits to buckling of her knee.  She has had an MRI, x-ray was performed today.      Allergies   Allergen Reactions    Morphine Anaphylaxis    Cephalexin Hives    Penicillins Hives     Beta lactam allergy details  Antibiotic reaction: hives  Age at reaction: child  Dose to reaction time: unknown  Reason for antibiotic: unknown  Epinephrine required for reaction?: unknown  Tolerated antibiotics: other (none per pt)        Sulfa Antibiotics Hives        Social History     Socioeconomic History    Marital status:    Tobacco Use    Smoking status: Former     Current packs/day: 0.00     Average packs/day: 1 pack/day for 47.4 years (47.4 ttl pk-yrs)     Types: Cigarettes     Start date: 1/1/1975     Quit date: 6/3/2022     Years since quitting: 3.0     Passive exposure: Past    Smokeless tobacco: Never   Vaping Use    Vaping status: Never Used   Substance and Sexual Activity    Alcohol use: Never    Drug use: Never    Sexual activity: Not Currently     Partners: Male     Birth control/protection: None        REVIEW OF SYSTEMS    Constitutional: Awake alert and oriented x3, no acute distress, denies fevers, chills, weight loss  Respiratory: No respiratory distress  Vascular: Brisk  "cap refill, Intact distal pulses, No cyanosis, compartments soft with no signs or symptoms of compartment syndrome or DVT.   Cardiovascular: Denies chest pain, shortness of breath  Skin: Denies rashes, acute skin changes  Neurologic: Denies headache, loss of consciousness  MSK: Right knee pain      Objective   Vital Signs:   /74   Pulse 75   Ht 152.4 cm (60\")   Wt 83 kg (183 lb)   SpO2 93%   BMI 35.74 kg/m²     Body mass index is 35.74 kg/m².    Physical Exam       Right lower extremity: full extension, flexion to 125 degrees, stable to varus/valgus stress, stable to anterior/posterior drawer, mild pain with Hui's, tender to the medial joint line , mild swelling to the medial knee, negative Lachman's, non tender to the lateral joint line, calf soft, distal neurovascularly intact, positive pulses, positive EHL, FHL, GS, and TA. Sensation intact to all 5 nerves of the foot.       Procedures    Imaging Results (Most Recent)       Procedure Component Value Units Date/Time    XR Knee 3 View Right - Preliminary [[id number]] Resulted: 06/19/25 1341     Updated: 06/19/25 1341    This result has not been signed. Information might be incomplete.      Narrative:      View:AP/Lateral and [place] view(s)  Site: Right knee  Indication: Right knee pain  Study: X-rays ordered, taken in the office, and reviewed today  X-ray: No fracture, no dislocation, no acute osseous abnormalities, mild   to moderate degenerative changes.  Comparative data: Previous studies                   Assessment and Plan    Diagnoses and all orders for this visit:    1. Primary osteoarthritis of right knee (Primary)    2. Acute medial meniscus tear of right knee, subsequent encounter    3. Right knee pain, unspecified chronicity  -     XR Knee 3 View Right        Reviewed x-rays with the patient, reviewed MRI with the patient Dr. Tipton also reviewed x-rays and MRI and patient was advised of conservative versus surgical intervention she " would like to pursue surgical intervention.  Risk benefits procedure and recovery of right knee arthroscopic partial medial meniscectomy chondroplasty debridement was discussed patient agreed to have surgery scheduled follow-up 2 weeks postop  Patient will require oncology clearance prior to procedure  Discussed surgery., Risks/benefits discussed with patient including, but not limited to: infection, bleeding, neurovascular damage, re-rupture, aesthetic deformity, need for further surgery, and death., Risks/benefits discussed with patient including, but not limited to: infection, bleeding, neurovascular damage, malunion, nonunion, aesthetic deformity, need for further surgery, and death., Discussed with patient the implant type being used during surgery and patient understands., Surgery pamphlet given., and Call or return if worsening symptoms.    Follow Up   Return for 2 WEEKS POST OP.  Patient was given instructions and counseling regarding her condition or for health maintenance advice. Please see specific information pulled into the AVS if appropriate.       EMR Dragon/Transcription disclaimer:  Part of this note may be an electronic transcription/translation of spoken language to printed text using the Dragon Dictation System

## 2025-06-19 NOTE — H&P (VIEW-ONLY)
Chief Complaint  Follow-up of the Right Knee    Subjective          History of Present Illness      Anca Samson is a 74 y.o. female  presents to [facility] for     Patient presents for follow-up evaluation right knee pain right knee osteoarthritis and medial meniscus tear.  Patient states that her knee pain got no relief with the injection she received she points to the medial knee as her area of pain she also states it swells she uses a walker for ambulation.  Patient is currently on chemotherapy 1 time a month for multiple myeloma.  Patient states she would like to discontinue conservative treatment and is hoping to discuss knee and knee surgery today.  She has been taking tramadol with no relief she admits to buckling of her knee.  She has had an MRI, x-ray was performed today.      Allergies   Allergen Reactions    Morphine Anaphylaxis    Cephalexin Hives    Penicillins Hives     Beta lactam allergy details  Antibiotic reaction: hives  Age at reaction: child  Dose to reaction time: unknown  Reason for antibiotic: unknown  Epinephrine required for reaction?: unknown  Tolerated antibiotics: other (none per pt)        Sulfa Antibiotics Hives        Social History     Socioeconomic History    Marital status:    Tobacco Use    Smoking status: Former     Current packs/day: 0.00     Average packs/day: 1 pack/day for 47.4 years (47.4 ttl pk-yrs)     Types: Cigarettes     Start date: 1/1/1975     Quit date: 6/3/2022     Years since quitting: 3.0     Passive exposure: Past    Smokeless tobacco: Never   Vaping Use    Vaping status: Never Used   Substance and Sexual Activity    Alcohol use: Never    Drug use: Never    Sexual activity: Not Currently     Partners: Male     Birth control/protection: None        REVIEW OF SYSTEMS    Constitutional: Awake alert and oriented x3, no acute distress, denies fevers, chills, weight loss  Respiratory: No respiratory distress  Vascular: Brisk  "cap refill, Intact distal pulses, No cyanosis, compartments soft with no signs or symptoms of compartment syndrome or DVT.   Cardiovascular: Denies chest pain, shortness of breath  Skin: Denies rashes, acute skin changes  Neurologic: Denies headache, loss of consciousness  MSK: Right knee pain      Objective   Vital Signs:   /74   Pulse 75   Ht 152.4 cm (60\")   Wt 83 kg (183 lb)   SpO2 93%   BMI 35.74 kg/m²     Body mass index is 35.74 kg/m².    Physical Exam       Right lower extremity: full extension, flexion to 125 degrees, stable to varus/valgus stress, stable to anterior/posterior drawer, mild pain with Hui's, tender to the medial joint line , mild swelling to the medial knee, negative Lachman's, non tender to the lateral joint line, calf soft, distal neurovascularly intact, positive pulses, positive EHL, FHL, GS, and TA. Sensation intact to all 5 nerves of the foot.       Procedures    Imaging Results (Most Recent)       Procedure Component Value Units Date/Time    XR Knee 3 View Right - Preliminary [[id number]] Resulted: 06/19/25 1341     Updated: 06/19/25 1341    This result has not been signed. Information might be incomplete.      Narrative:      View:AP/Lateral and [place] view(s)  Site: Right knee  Indication: Right knee pain  Study: X-rays ordered, taken in the office, and reviewed today  X-ray: No fracture, no dislocation, no acute osseous abnormalities, mild   to moderate degenerative changes.  Comparative data: Previous studies                   Assessment and Plan    Diagnoses and all orders for this visit:    1. Primary osteoarthritis of right knee (Primary)    2. Acute medial meniscus tear of right knee, subsequent encounter    3. Right knee pain, unspecified chronicity  -     XR Knee 3 View Right        Reviewed x-rays with the patient, reviewed MRI with the patient Dr. Tipton also reviewed x-rays and MRI and patient was advised of conservative versus surgical intervention she " would like to pursue surgical intervention.  Risk benefits procedure and recovery of right knee arthroscopic partial medial meniscectomy chondroplasty debridement was discussed patient agreed to have surgery scheduled follow-up 2 weeks postop  Patient will require oncology clearance prior to procedure  Discussed surgery., Risks/benefits discussed with patient including, but not limited to: infection, bleeding, neurovascular damage, re-rupture, aesthetic deformity, need for further surgery, and death., Risks/benefits discussed with patient including, but not limited to: infection, bleeding, neurovascular damage, malunion, nonunion, aesthetic deformity, need for further surgery, and death., Discussed with patient the implant type being used during surgery and patient understands., Surgery pamphlet given., and Call or return if worsening symptoms.    Follow Up   Return for 2 WEEKS POST OP.  Patient was given instructions and counseling regarding her condition or for health maintenance advice. Please see specific information pulled into the AVS if appropriate.       EMR Dragon/Transcription disclaimer:  Part of this note may be an electronic transcription/translation of spoken language to printed text using the Dragon Dictation System

## 2025-06-27 ENCOUNTER — HOSPITAL ENCOUNTER (OUTPATIENT)
Dept: CT IMAGING | Facility: HOSPITAL | Age: 75
Discharge: HOME | End: 2025-06-27
Payer: MEDICARE

## 2025-06-27 DIAGNOSIS — J32.0 CHRONIC MAXILLARY SINUSITIS: ICD-10-CM

## 2025-06-27 PROCEDURE — 70486 CT MAXILLOFACIAL W/O DYE: CPT

## 2025-06-30 ENCOUNTER — TELEPHONE (OUTPATIENT)
Dept: ORTHOPEDIC SURGERY | Facility: CLINIC | Age: 75
End: 2025-06-30
Payer: MEDICARE

## 2025-06-30 ENCOUNTER — TELEPHONE (OUTPATIENT)
Dept: ONCOLOGY | Facility: HOSPITAL | Age: 75
End: 2025-06-30
Payer: MEDICARE

## 2025-06-30 NOTE — TELEPHONE ENCOUNTER
SPOKE TO PATIENT IN REGARDS TO APPOINTMENTS FOR LAB/FOLLOW UP/INFUSION FOR 07/07/2025, PER DR JACKSON, WE HAVE PUSHED APPOINTMENTS OUT 2 WEEKS TO ALLOW FOR HEALING FROM KNEE PROCEDURE, SHE IS SCHEDULED FOR 07/23/2025 AND PATIENT IS AWARE, VERBALLY CONFIRMED.

## 2025-06-30 NOTE — TELEPHONE ENCOUNTER
PATIENT NOTIFIED PER MEDICAL CLEARANCE / MED DIRECTIVE RECEIVED FROM RADHA AGUERO SHE IS TO HOLD/STOP ASPIRIN 7 DAYS PRIOR TO SURGERY. PATIENT VERBALIZED UNDERSTANDING.

## 2025-07-01 NOTE — PRE-PROCEDURE INSTRUCTIONS
PATIENT INSTRUCTED TO BE:    - NOTHING TO EAT AFTER MIDNIGHT OR CHEW, EXCEPT CAN HAVE SIPS OF WATER WITH MEDICATIONS OR CLEAR LIQUIDS 2 HOURS PRIOR TO SURGERY ARRIVAL TIME , NO MORE THAN 8 OZ. (NOTHING RED)     - TO HOLD ALL VITAMINS, SUPPLEMENTS, NSAIDS FOR ONE WEEK PRIOR TO THEIR SURGICAL PROCEDURE    - DO NOT TAKE ______________________ 7 DAYS PRIOR TO PROCEDURE PER ANESTHESIA RECOMMENDATIONS/INSTRUCTIONS     - INSTRUCTED PT TO USE SURGICAL SOAP 1 TIME THE NIGHT PRIOR TO SURGERY ___________ OR THE AM OF SURGERY _____________   USE THE SOAP FROM NECK TO TOES, AVOID THEIR FACE, HAIR, AND PRIVATE PARTS. IF USE THE SOAP THE NIGHT PRIOR TO SURGERY, CHANGE BED LINENS AND NO PETS IN THE BED.     INSTRUCTED NO LOTIONS, JEWELRY, PIERCINGS,  NAIL POLISH, OR DEODORANT DAY OF SURGERY    - IF DIABETIC, CHECK BLOOD GLUCOSE IF LESS THAN 70 OR HAVING SYMPTOMS CALL THE PREOP AREA FOR INSTRUCTIONS ON AM OF SURGERY ([phone])    -INSTRUCTED TO TAKE THE FOLLOWING MEDICATIONS THE DAY OF SURGERY WITH SIPS OF WATER:   ALBUTEROL PRN, ACYCLOVIR, PROZAC, COLESTIPOL, TRELEGY INH., TRAMADOL AS NEEDED, DAPSONE, BUSPAR        - DO NOT BRING ANY MEDICATIONS WITH YOU TO THE HOSPITAL THE DAY OF SURGERY, EXCEPT IF USE INHALERS. BRING INHALERS DAY OF SURGERY       - BRING CPAP OR BIPAP TO THE HOSPITAL ONLY IF YOU ARE SPENDING THE NIGHT    - DO NOT SMOKE OR VAPE 24 HOURS PRIOR TO PROCEDURE PER ANESTHESIA REQUEST     -MAKE SURE YOU HAVE A RIDE HOME OR SOMEONE TO STAY WITH YOU THE DAY OF THE PROCEDURE AFTER YOU GO HOME     - FOLLOW ANY OTHER INSTRUCTIONS GIVEN TO YOU BY YOUR SURGEON'S OFFICE.     - DAY OF SURGERY ____________,AT [facility] ([facility]),  PARK IN THE OPEN LOT, COME TO [facility]/ [facility], FIRST FLOOR, CHECK IN AT THE DESK FOR REGISTRATION/ SURGERY      - YOU WILL RECEIVE A PHONE CALL THE DAY PRIOR TO SURGERY BETWEEN 1PM AND 4 PM WITH ARRIVAL TIME, IF YOUR SURGERY IS ON A MONDAY YOU WILL RECEIVE A CALL THE FRIDAY  PRIOR TO SURGERY DATE    - BRING CASH OR CREDIT CARD FOR COPAYMENT OF MEDICATIONS AFTER SURGERY IF YOU USE THE HOSPITAL PHARMACY (MEDS TO BED)    - PREADMISSION TESTING NURSE [phone] IF HAVE ANY QUESTIONS     -PATIENT PROVIDED THE NUMBER FOR PREOP SURGICAL DEPT IF HAD QUESTIONS AFTER HOURS PRIOR TO SURGERY ( [phone]).  INFORMED PT IF NO ANSWER, LEAVE A MESSAGE AND SOMEONE WILL RETURN THEIR CALL       PATIENT VERBALIZED UNDERSTANDING

## 2025-07-01 NOTE — PROGRESS NOTES
"Subjective   Anca Samson is a 74 y.o. female who presents today for initial evaluation     Referring Provider:  No referring provider defined for this encounter.    Chief Complaint:  anxiety, depression    History of Present Illness:     Chart Review By Dates:  07/02/2025: ENT x3, onc x3, ortho, unknown; abnl cmp gluc 161, cr 1.02 co2 19.9 tp 5.8. abnl cbc mcv 104.4 other abnl. Reassuring Mg, phosphorus.  03/28/2025: admitted s/p excision of lipoma. Gen surg, onc x4, pulm, unknown; abnl cbc cmp; reassuring mg.  02/14/2025: fam med, onc x6; abnl cmp cbc phos -- multiple abnl in each assay but cr 0.73.  09/19/2024: admitted for removal of biliary stent, ERCP, infusion for MM, onc, gen surg. Abnl gluc 212 on cmp, reassuring thyroid studies, abnl cbc  08/19/2024: admitted for lap anayeli, pulm, infusion with onc, onc, abnl cmp and cbc, but cr 0.76.  7/22/24: onc, dexa shows osteoporosis. High trigs, low A1c 4.4, abnl cmp, cbc. Cr 0.86.    VISITS/APPOINTMENTS (BELOW):    \"Sarah\" \"Robi\" her  of 46 years  Seizures  On chemo for multiple myeloma since 11/23 (monthly)    07/02/2025:   In person interview:  \"I've got surgery on Monday.\"  Not good, Robi had surgery, had an aortic aneurysm repaired recently  The meds kept me in a good spot  MH stable  Discussed medical conditions  SOA persists  Grief, complicated: her mom and dad, 20 years ago  Also LUCIA last month  MDD, seasonal: stable  CHAI: stable  Panic attacks: stable  Energy: down chronically  Concentration: down  Insomnia: hypersomnia -- improving  Eating/Weight: 186x2, 194, 188, 194 lbs  Refills: y  Substances: denies  Therapy: none  Medication compliant: y  SE: n  No SI HI AVH.      03/28/2025:   In person interview:  \"No wheelchair. A cane.\"  \"I'm better. I'm not sure why.\"  SOA improved  LUCIA passed last month  Buspar: led to dizziness, but two a day is working well  Grief, complicated: her mom and dad, 20 years ago  Also LUCIA last month  MDD, seasonal: " "depressed mood -- improving  CHAI: more worrying, on edge -- improving  Panic attacks: stable  Energy: down chronically  Concentration: down  Insomnia: hypersomnia -- improving  Eating/Weight: 186, 194, 188, 194 lbs  Refills: y  Substances: denies  Therapy: none  Medication compliant: y  SE: n  No SI HI AVH.      02/14/2025:   In person interview:  \"I've had 4 surgeries, all my teeth pulled, I have tumors in my arms so I need surgery.\"  Couldn't get BP today  Still having SOA  They just keep finding things  I want to be myself again, I want to get back to work    Grief, complicated: her mom and dad, 20 years ago  MDD: depressed mood  CHAI: more worrying, on edge  Panic attacks: stable  Energy: down chronically  Concentration: down  Insomnia: too much, hypersomnia  Eating/Weight: 194, 188, 194 lbs  Refills: y  Substances: denies  Therapy: none  Medication compliant: y  SE: n  No SI HI AVH.      09/19/2024:   In person interview:  \"I've got surgery in 2 weeks.\"  More SOA, past 2 days, doesn't need to go to ER or PCP, this happens  Anxious for the surgery  Not working  Grief: her mom and dad, 20 years ago  MDD: depressed mood -- now stable  CHAI: more worrying, on edge -- stable, some situational anxiety  Panic attacks: stable  Energy: down chronically  Concentration: down  Insomnia: too much, hypersomnia  Eating/Weight: 194, 188, 194 lbs  Refills: y  Substances: denies  Therapy: none  Medication compliant: y  SE: n  No [facility] AVH.    ...    06/20/2024: INITIAL VISIT Chart review:     Amandeep: Ativan short course in November, February, May.  Care Everywhere: a few non behavioral health notes    Psychotropic medication chart review:  Present:  Prozac 40 mg a day    Previously:  Prozac 20 mg a day  Zoloft 25 mg a day    EKG: February 2024: Rate 97, sinus, probable left atrial enlargement, QTc 461  Procedures: Sleep study in May 2024: NICK, BiPAP settings noted  Head imaging: May 2024: MRI of the brain shows white matter " "changes compatible with small vessel ischemic disease  Labs: June 2024: Abnormal CMP was chloride 112, CO2 20.9, glucose 160, total protein 5.3.  Reassuring thyroid studies, abnormal CBC with hemoglobin 10.6, other abnormalities.  Initial Chart Review Notes: Referred by primary care in April for anxiety.  History of significant shortness of breath due to comorbidities.  Patient requested to meet with a psychiatrist.      06/20/2024: In person.  Interview:  His/Her Story: \"[facility] for 2 weeks, that was 21 years ago.\"  P18, G10  I don't know why it happened. I don't think it was SI.  I've been on prozac 4-5 years  \"I'm awful nervous\"  I can't get up and do anything  I can't drive  I can't work  I can't walk  If I walk 20 feet I'm so out of breath  Has had a tremor since started nebulizer a year ago. Quit it for a few months, tremor stopped, restarted, tremor restarted  Perfectionistic tendencies  Sleeps well on bipap  Depression/Mood:  Depressed mood, anhedonia, hopelessness or guilt, poor energy, poor concentration.  Seasonal pattern:  Severity: Moderate  Duration: 21 years  Anxiety:  Uncontrolled worrying, muscle tension, fatigue, poor concentration, feeling on edge or restless, irritability.  Severity: Moderate  Duration: 21 years  Panic attacks: y  Psych ROS: Positive for depression, anxiety.  Negative for psychosis and padmaja.  ADHD: def  PTSD: def  No SI HI AVH.  Medication compliant: y    Access to Firearms: yes, not locked away    PHQ-9 Depression Screening  PHQ-9 Total Score:      Little interest or pleasure in doing things?     Feeling down, depressed, or hopeless?     Trouble falling or staying asleep, or sleeping too much?     Feeling tired or having little energy?     Poor appetite or overeating?     Feeling bad about yourself - or that you are a failure or have let yourself or your family down?     Trouble concentrating on things, such as reading the newspaper or watching television?     Moving or " speaking so slowly that other people could have noticed? Or the opposite - being so fidgety or restless that you have been moving around a lot more than usual?     Thoughts that you would be better off dead, or of hurting yourself in some way?     PHQ-9 Total Score       CHAI-7       Past Surgical History:  Past Surgical History:   Procedure Laterality Date    APPENDECTOMY      BONE MARROW BIOPSY  2016    CHOLECYSTECTOMY N/A 08/08/2024    Procedure: CHOLECYSTECTOMY LAPAROSCOPIC WITH DAVINCI ROBOT;  Surgeon: Eduardo Ingram MD;  Location: [facility] OR [facility] [place];  Service: Robotics - DaVinci;  Laterality: N/A;    COLON SURGERY  2017    COLONOSCOPY  2018,2017,2019    [facility]- [facility]:DIVERTICULOSIS AND ERYTHEMA OF MUCOSA    COLONOSCOPY N/A 12/20/2022    Procedure: COLONOSCOPY WITH ELEVIEW INJECTION, POLYPECTOMY, HOT SNARE, CLIP APPLICATION X3, BIOPSIES;  Surgeon: Jarvis Borges MD;  Location: [facility] ENDOSCOPY;  Service: Gastroenterology;  Laterality: N/A;  COLON POLYP, DIVERTICULOSIS, ANASTOMOSIS RIGHT COLON    COLONOSCOPY N/A 11/09/2023    Procedure: COLONOSCOPY;  Surgeon: Jarvis Borges MD;  Location: [facility] ENDOSCOPY;  Service: Gastroenterology;  Laterality: N/A;  DIVERTICULOSIS, ANASTOMOSIS IN ASCENDING COLON    ENDOSCOPY  2018    ERCP N/A 07/27/2024    Procedure: ENDOSCOPIC RETROGRADE CHOLANGIOPANCREATOGRAPHY WITH SPHINCTEROTOMY, BALLOON SWEEP, STENT PLACEMENT;  Surgeon: Ajay Kennedy MD;  Location: [facility] MAIN OR;  Service: Gastroenterology;  Laterality: N/A;  BILE DUCT STONE    ERCP N/A 09/05/2024    Procedure: ENDOSCOPIC RETROGRADE CHOLANGIOPANCREATOGRAPHY WITH SPINCHTEROTOMY. BALLOON DILATATION 8-10. BALLOON SWEEP 12-15. STENT REMOVAL;  Surgeon: Ajay Kennedy MD;  Location: [facility] ENDOSCOPY;  Service: Gastroenterology;  Laterality: N/A;  BILE DUCT STONES    EYE SURGERY      FECAL DISIMPACTION  06/2019    TRANSPLANT     GALLBLADDER SURGERY      HEMICOLECTOMY Right 05/2017     EEA    HERNIA REPAIR  2024    HYSTERECTOMY  1982,1984    KNEE ARTHROSCOPY Left 01/03/2022    Procedure: KNEE ARTHROSCOPY WITH PARTIAL MEDIAL MENISCECTOMY,  CHONDROPLASTY;  Surgeon: Nicholas Tipton MD;  Location: [facility] [place] [facility] [place];  Service: Orthopedics;  Laterality: Left;    LIPOMA EXCISION Bilateral 03/20/2025    Procedure: Excision of bilateral upper arm lipomas-removed subcutaneous fatty tumors from both upper arms;  Surgeon: Eduardo Ingram MD;  Location: [facility] MAIN OR;  Service: General;  Laterality: Bilateral;    MINI-LAPAROTOMY  2017    TONSILLECTOMY      TUBAL ABDOMINAL LIGATION      UPPER GASTROINTESTINAL ENDOSCOPY  2018    VENOUS ACCESS DEVICE (PORT) INSERTION N/A 10/31/2023    Procedure: Port-a-catheter removal and port-a-catheter placement;  Surgeon: Alcon Delgado MD;  Location: [facility] OR [facility] [place];  Service: General;  Laterality: N/A;    VENTRAL HERNIA REPAIR N/A 10/03/2024    Procedure: VENTRAL / INCISIONAL HERNIA REPAIR LAPAROSCOPIC WITH DAVINCI ROBOT;  Surgeon: Eduardo Ingram MD;  Location: [facility] [place];  Service: Robotics - DaVinci;  Laterality: N/A;       Problem List:  Patient Active Problem List   Diagnosis    Anxiety    Asthma    Malignant neoplasm of ascending colon    COPD (chronic obstructive pulmonary disease)    Diverticulitis    Dysphagia    Heartburn    Hyperlipidemia    Essential hypertension    Impaired fasting glucose    Low back pain    Major depressive disorder    Multiple myeloma    Renal insufficiency    Seizures    Vitamin D deficiency    Tobacco abuse    Class 2 severe obesity due to excess calories with serious comorbidity and body mass index (BMI) of 39.0 to 39.9 in adult    Chondromalacia of left knee    Diarrhea    S/P Left knee partial medial menisectomy and chondroplasty     Chronic dyspnea    Multiple lung nodules    Aftercare following surgery of left knee thorascopic partial medial meniscectomy, chondroplasty, 1/3/2022    Hypothyroidism    Atherosclerosis of native  coronary artery of native heart with angina pectoris    Chronic respiratory failure with hypoxia    Excessive daytime sleepiness    Snoring    Encounter for screening for malignant neoplasm of colon    History of colon polyps    NICK (obstructive sleep apnea)    Encounter for screening for lung cancer    Dehydration    Tobacco abuse, in remission    Hiatal hernia    Fatigue    Cough    Wheezing    Atelectasis    Acute gallstone pancreatitis    Choledocholithiasis with acute cholecystitis    Encounter for removal of biliary stent    Incisional hernia, without obstruction or gangrene    Encounter for long-term (current) use of medications    Incarcerated incisional hernia    Hypophosphatemia    Lipoma of left upper extremity    Lipoma of right upper extremity    Hypocalcemia    Acute medial meniscus tear of right knee    Primary osteoarthritis of right knee    Right knee pain       Allergy:   Allergies   Allergen Reactions    Morphine Anaphylaxis    Cephalexin Hives    Penicillins Hives     Beta lactam allergy details  Antibiotic reaction: hives  Age at reaction: child  Dose to reaction time: unknown  Reason for antibiotic: unknown  Epinephrine required for reaction?: unknown  Tolerated antibiotics: other (none per pt)        Sulfa Antibiotics Hives        Discontinued Medications:  There are no discontinued medications.              Current Medications:   Current Outpatient Medications   Medication Sig Dispense Refill    acyclovir (ZOVIRAX) 400 MG tablet TAKE 1 TABLET BY MOUTH 2 (TWO) TIMES A DAY. TAKE ONE TABLET BY MOUTH TWICE DAILY. 180 tablet 3    albuterol sulfate  (90 Base) MCG/ACT inhaler Take 2 puffs by mouth Every 4 (Four) to 6 (Six) Hours As Needed for Wheezing.      aspirin 81 MG EC tablet Take 1 tablet by mouth Daily.      busPIRone (BUSPAR) 15 MG tablet Take 1 tablet by mouth 3 (Three) Times a Day. 270 tablet 1    colestipol (COLESTID) 1 g tablet TAKE 2 TABLETS BY MOUTH TWICE A  tablet 1     dapsone 25 MG tablet TAKE 2 TABLETS BY MOUTH TWICE A  tablet 1    dexAMETHasone (DECADRON) 4 MG tablet Take 10 tablets on D 1,8,15,22 and take 1 tablet on D 2,3.  Take in the morning with food. (Patient taking differently: Take 10 tablets on D 1,8,15,22 and take 1 tablet on D 2,3.  Take in the morning with food.  TAKES ONLY AROUND CHEMO DATES, NOT CURRENT TAKING THIS WEEK 7/1/25) 42 tablet 5    FLUoxetine (PROzac) 40 MG capsule Take 1 capsule by mouth Daily. 90 capsule 1    Fluticasone-Umeclidin-Vilant (TRELEGY) 100-62.5-25 MCG/ACT inhaler Inhale 1 puff Daily. 1 each 11    lenalidomide (REVLIMID) 15 MG capsule Take 1 capsule by mouth Daily. On Days 1-21, and off 7 days, on a 28 day cycle 21 capsule 0    ondansetron (ZOFRAN) 8 MG tablet TAKE 1 TABLET BY MOUTH THREE TIMES A DAY AS NEEDED FOR NAUSEA AND VOMITING (Patient taking differently: Take 1 tablet by mouth Every 8 (Eight) Hours As Needed.) 30 tablet 5    traMADol (ULTRAM) 50 MG tablet Take 1 tablet by mouth Every 8 (Eight) Hours As Needed for Moderate Pain (knee pain). 90 tablet 1     No current facility-administered medications for this visit.       Past Medical History:  Past Medical History:   Diagnosis Date    Anxiety     Asthma 07/28/2021    Atherosclerosis of native coronary artery of native heart with angina pectoris 08/15/2023    CLEARED FOR PRIOR SURG. NO SCHED F/U.  NO CP, HX COPD SOAE    C. difficile diarrhea     DENIES ANY CURRENT ISSUES    Chronic diarrhea     Colon cancer 07/21/2017    REMOVED NO CURRENT TREATMENT    Colon polyp     COPD (chronic obstructive pulmonary disease) 10/23/2017    Depression     Disease of thyroid gland     Diverticulitis     Diverticulitis of colon     NO CURRENT S/S    Diverticulosis 7-    Dysphagia     Emphysema of lung     Emphysema/COPD     GERD (gastroesophageal reflux disease)     Head injury     CONCUSSION AT AGE 4    Hernia 2019    History of chemotherapy     MONTLY INFUSION FOR M.M.    Hx of  psychiatric care     Impaired fasting glucose 2015    Lumbago     Lung nodule 2022    MONITORING    Major depressive disorder 10/27/2016    Malignant neoplasm of ascending colon 2017    REMOVED    Multiple myeloma     CURRENTLY BEING TREATED/MAINTENANCE CHEMO INFUS. MONTHLY    Obesity     NICK (obstructive sleep apnea) 2023    BIPAP    Osteopenia     Oxygen dependent     PRN 2 LITER NC    Pancreatitis 2024    Panic disorder     Primary central sleep apnea     BIPAP, O2  2 L    Renal insufficiency 2021    NO RENAL DOCTOR/ 25 CMP CRT 1.0 GFR 57.5    Seizures     PSEUDO SEIZURES LAST ONE AROUND 2024    Shortness of breath     CAN GET VERY SOA WITH MINIMAL EXERTION    Sinusitis     Tinnitus     Tobacco use 2021    QUIT SMOKING 2022    Tremor     Visual impairment     Vitamin D deficiency        Past Psychiatric History:  Began Treatment: decades  Diagnoses: MDD, CHAI  Psychiatrist: Vitor for 20 years  Therapist:Denies  Admission History: LT 21 years ago for 2 weeks for depression (?)  Medication Trials:    Zoloft  pristiq    Self Harm: Denies  Suicide Attempts:Denies   Psychosis, Anxiety, Depression: denies    Substance Abuse History:   Types:Denies all, including illicit, quit smoking 2 years ago  Withdrawal Symptoms:Denies  Longest Period Sober:Not Applicable   AA: Not applicable     Social History:  Martial Status:  Employed:No  Kids:Yes  House:Lives in a house   History: Denies    Social History     Socioeconomic History    Marital status:    Tobacco Use    Smoking status: Former     Current packs/day: 0.00     Average packs/day: 1 pack/day for 47.4 years (47.4 ttl pk-yrs)     Types: Cigarettes     Start date: 1975     Quit date: 6/3/2022     Years since quitting: 3.0     Passive exposure: Past    Smokeless tobacco: Never   Vaping Use    Vaping status: Never Used   Substance and Sexual Activity    Alcohol use: Never    Drug use:  "Never    Sexual activity: Not Currently     Partners: Male     Birth control/protection: None       Family History:   Suicide Attempts: Denies  Suicide Completions:Denies      Family History   Problem Relation Age of Onset    Heart disease Mother     Lung cancer Mother     Cancer Mother     Stroke Father     Heart disease Father     Kidney cancer Father     Cancer Father     Stroke Other         UNCLE/AUNT    Neuropathy Brother     Hypertension Brother     Anxiety disorder Brother     Colon cancer Neg Hx     Malig Hyperthermia Neg Hx        Developmental History:       Childhood: Denies Abuse  High School:Completed  College: 2.5 years    Mental Status Exam  Appearance  : groomed, good eye contact, normal street clothes  Behavior  : pleasant and cooperative  Motor  : bilateral tremor, wheelchair before, but using rollator today  Speech  :normal rhythm, rate, volume, tone, not hyperverbal, not pressured, normal prosidy  Mood  : \"The meds are keeping me in a good spot\"  Affect  : euthymic, SOA, mood congruent, good variability  Thought Content  : negative suicidal ideations, negative homicidal ideations, negative obsessions  Perceptions  : negative auditory hallucinations, negative visual hallucinations  Thought Process  : linear  Insight/Judgement  : Fair/fair  Cognition  : grossly intact  Attention   : intact      Review of Systems:  Review of Systems   Constitutional:  Positive for fatigue. Negative for chills, diaphoresis and fever.   HENT:  Negative for congestion, drooling and sore throat.    Eyes:  Positive for visual disturbance.   Respiratory:  Positive for cough and shortness of breath.    Cardiovascular:  Negative for chest pain, palpitations and leg swelling.   Gastrointestinal:  Negative for abdominal pain, nausea and vomiting.   Endocrine: Negative for cold intolerance and heat intolerance.   Genitourinary:  Negative for difficulty urinating and dysuria.   Musculoskeletal:  Negative for joint swelling, " "myalgias and neck pain.   Skin:  Negative for rash.   Allergic/Immunologic: Positive for immunocompromised state.   Neurological:  Positive for dizziness. Negative for seizures, speech difficulty, weakness, numbness and headaches.   Psychiatric/Behavioral:  Positive for confusion.        Physical Exam:  Physical Exam    Vital Signs:   /74   Pulse 86   Ht 152.4 cm (60\")   Wt 84.4 kg (186 lb)   BMI 36.33 kg/m²      Lab Results:   Hospital Outpatient Visit on 06/11/2025   Component Date Value Ref Range Status    Glucose 06/11/2025 161 (H)  65 - 99 mg/dL Final    BUN 06/11/2025 16.1  8.0 - 23.0 mg/dL Final    Creatinine 06/11/2025 1.02 (H)  0.57 - 1.00 mg/dL Final    Sodium 06/11/2025 140  136 - 145 mmol/L Final    Potassium 06/11/2025 4.2  3.5 - 5.2 mmol/L Final    Chloride 06/11/2025 107  98 - 107 mmol/L Final    CO2 06/11/2025 19.9 (L)  22.0 - 29.0 mmol/L Final    Calcium 06/11/2025 8.9  8.6 - 10.5 mg/dL Final    Total Protein 06/11/2025 5.8 (L)  6.0 - 8.5 g/dL Final    Albumin 06/11/2025 3.8  3.5 - 5.2 g/dL Final    ALT (SGPT) 06/11/2025 21  1 - 33 U/L Final    AST (SGOT) 06/11/2025 16  1 - 32 U/L Final    Alkaline Phosphatase 06/11/2025 67  39 - 117 U/L Final    Total Bilirubin 06/11/2025 0.4  0.0 - 1.2 mg/dL Final    Globulin 06/11/2025 2.0  gm/dL Final    A/G Ratio 06/11/2025 1.9  g/dL Final    BUN/Creatinine Ratio 06/11/2025 15.8  7.0 - 25.0 Final    Anion Gap 06/11/2025 13.1  5.0 - 15.0 mmol/L Final    eGFR 06/11/2025 57.5 (L)  >60.0 mL/min/1.73 Final    Phosphorus 06/11/2025 3.1  2.5 - 4.5 mg/dL Final    Magnesium 06/11/2025 1.8  1.6 - 2.4 mg/dL Final    WBC 06/11/2025 4.51  3.40 - 10.80 10*3/mm3 Final    RBC 06/11/2025 3.90  3.77 - 5.28 10*6/mm3 Final    Hemoglobin 06/11/2025 13.2  12.0 - 15.9 g/dL Final    Hematocrit 06/11/2025 40.7  34.0 - 46.6 % Final    MCV 06/11/2025 104.4 (H)  79.0 - 97.0 fL Final    MCH 06/11/2025 33.8 (H)  26.6 - 33.0 pg Final    MCHC 06/11/2025 32.4  31.5 - 35.7 g/dL Final "    RDW 06/11/2025 15.3  12.3 - 15.4 % Final    RDW-SD 06/11/2025 58.7 (H)  37.0 - 54.0 fl Final    MPV 06/11/2025 9.4  6.0 - 12.0 fL Final    Platelets 06/11/2025 233  140 - 450 10*3/mm3 Final    Neutrophil % 06/11/2025 61.8  42.7 - 76.0 % Final    Lymphocyte % 06/11/2025 28.2  19.6 - 45.3 % Final    Monocyte % 06/11/2025 5.1  5.0 - 12.0 % Final    Eosinophil % 06/11/2025 1.3  0.3 - 6.2 % Final    Basophil % 06/11/2025 2.9 (H)  0.0 - 1.5 % Final    Immature Grans % 06/11/2025 0.7 (H)  0.0 - 0.5 % Final    Neutrophils, Absolute 06/11/2025 2.79  1.70 - 7.00 10*3/mm3 Final    Lymphocytes, Absolute 06/11/2025 1.27  0.70 - 3.10 10*3/mm3 Final    Monocytes, Absolute 06/11/2025 0.23  0.10 - 0.90 10*3/mm3 Final    Eosinophils, Absolute 06/11/2025 0.06  0.00 - 0.40 10*3/mm3 Final    Basophils, Absolute 06/11/2025 0.13  0.00 - 0.20 10*3/mm3 Final    Immature Grans, Absolute 06/11/2025 0.03  0.00 - 0.05 10*3/mm3 Final    nRBC 06/11/2025 0.0  0.0 - 0.2 /100 WBC Final   Hospital Outpatient Visit on 05/14/2025   Component Date Value Ref Range Status    Glucose 05/14/2025 125 (H)  65 - 99 mg/dL Final    BUN 05/14/2025 23  8 - 23 mg/dL Final    Creatinine 05/14/2025 0.92  0.57 - 1.00 mg/dL Final    Sodium 05/14/2025 140  136 - 145 mmol/L Final    Potassium 05/14/2025 4.4  3.5 - 5.2 mmol/L Final    Chloride 05/14/2025 111 (H)  98 - 107 mmol/L Final    CO2 05/14/2025 18.1 (L)  22.0 - 29.0 mmol/L Final    Calcium 05/14/2025 8.8  8.6 - 10.5 mg/dL Final    Total Protein 05/14/2025 6.3  6.0 - 8.5 g/dL Final    Albumin 05/14/2025 4.2  3.5 - 5.2 g/dL Final    ALT (SGPT) 05/14/2025 15  1 - 33 U/L Final    AST (SGOT) 05/14/2025 10  1 - 32 U/L Final    Alkaline Phosphatase 05/14/2025 69  39 - 117 U/L Final    Total Bilirubin 05/14/2025 0.5  0.0 - 1.2 mg/dL Final    Globulin 05/14/2025 2.1  gm/dL Final    A/G Ratio 05/14/2025 2.0  g/dL Final    BUN/Creatinine Ratio 05/14/2025 25.0  7.0 - 25.0 Final    Anion Gap 05/14/2025 10.9  5.0 - 15.0  mmol/L Final    eGFR 05/14/2025 65.1  >60.0 mL/min/1.73 Final    Magnesium 05/14/2025 1.9  1.6 - 2.4 mg/dL Final    Phosphorus 05/14/2025 2.8  2.5 - 4.5 mg/dL Final    WBC 05/14/2025 8.57  3.40 - 10.80 10*3/mm3 Final    RBC 05/14/2025 3.46 (L)  3.77 - 5.28 10*6/mm3 Final    Hemoglobin 05/14/2025 12.2  12.0 - 15.9 g/dL Final    Hematocrit 05/14/2025 37.5  34.0 - 46.6 % Final    MCV 05/14/2025 108.4 (H)  79.0 - 97.0 fL Final    MCH 05/14/2025 35.3 (H)  26.6 - 33.0 pg Final    MCHC 05/14/2025 32.5  31.5 - 35.7 g/dL Final    RDW 05/14/2025 16.5 (H)  12.3 - 15.4 % Final    RDW-SD 05/14/2025 66.6 (H)  37.0 - 54.0 fl Final    MPV 05/14/2025 9.4  6.0 - 12.0 fL Final    Platelets 05/14/2025 278  140 - 450 10*3/mm3 Final    Neutrophil % 05/14/2025 37.0 (L)  42.7 - 76.0 % Final    Lymphocyte % 05/14/2025 44.9  19.6 - 45.3 % Final    Monocyte % 05/14/2025 15.8 (H)  5.0 - 12.0 % Final    Eosinophil % 05/14/2025 1.1  0.3 - 6.2 % Final    Basophil % 05/14/2025 0.7  0.0 - 1.5 % Final    Immature Grans % 05/14/2025 0.5  0.0 - 0.5 % Final    Neutrophils, Absolute 05/14/2025 3.18  1.70 - 7.00 10*3/mm3 Final    Lymphocytes, Absolute 05/14/2025 3.85 (H)  0.70 - 3.10 10*3/mm3 Final    Monocytes, Absolute 05/14/2025 1.35 (H)  0.10 - 0.90 10*3/mm3 Final    Eosinophils, Absolute 05/14/2025 0.09  0.00 - 0.40 10*3/mm3 Final    Basophils, Absolute 05/14/2025 0.06  0.00 - 0.20 10*3/mm3 Final    Immature Grans, Absolute 05/14/2025 0.04  0.00 - 0.05 10*3/mm3 Final    nRBC 05/14/2025 0.0  0.0 - 0.2 /100 WBC Final    Anisocytosis 05/14/2025 Mod/2+  None Seen Final    Macrocytes 05/14/2025 Mod/2+  None Seen Final    WBC Morphology 05/14/2025 Normal  Normal Final    Platelet Morphology 05/14/2025 Normal  Normal Final   Office Visit on 05/12/2025   Component Date Value Ref Range Status    Amphetamine Screen, Urine 05/12/2025 Negative  Negative Final    AMP INTERNAL CONTROL 05/12/2025 Passed  Passed Final    Barbiturates Screen, Urine 05/12/2025  Negative  Negative Final    BARBITURATE INTERNAL CONTROL 05/12/2025 Passed  Passed Final    Buprenorphine, Screen, Urine 05/12/2025 Negative  Negative Final    BUPRENORPHINE INTERNAL CONTROL 05/12/2025 Passed  Passed Final    Benzodiazepine Screen, Urine 05/12/2025 Negative  Negative Final    BENZODIAZEPINE INTERNAL CONTROL 05/12/2025 Passed  Passed Final    Cocaine Screen, Urine 05/12/2025 Negative  Negative Final    COCAINE INTERNAL CONTROL 05/12/2025 Passed  Passed Final    MDMA (ECSTASY) 05/12/2025 Negative  Negative Final    MDMA (ECSTASY) INTERNAL CONTROL 05/12/2025 Passed  Passed Final    Methamphetamine, Ur 05/12/2025 Negative  Negative Final    METHAMPHETAMINE INTERNAL CONTROL 05/12/2025 Passed  Passed Final    Morphine/Opiates Screen, Urine 05/12/2025 Negative  Negative Final    MOR INTERNAL CONTROL 05/12/2025 Passed  Passed Final    Methadone Screen, Urine 05/12/2025 Negative  Negative Final    METHADONE INTERNAL CONTROL 05/12/2025 Passed  Passed Final    Oxycodone Screen, Urine 05/12/2025 Negative  Negative Final    OXYCODONE INTERNAL CONTROL 05/12/2025 Passed  Passed Final    Phencyclidine (PCP), Urine 05/12/2025 Negative  Negative Final    PHENCYCLIDINE INTERNAL CONTROL 05/12/2025 Passed  Passed Final    THC, Screen, Urine 05/12/2025 Negative  Negative Final    THC INTERNAL CONTROL 05/12/2025 Passed  Passed Final    Lot Number 05/12/2025 [id number]   Final    Expiration Date 05/12/2025 12/30/2026   Final   Hospital Outpatient Visit on 05/07/2025   Component Date Value Ref Range Status    CARL and PE, Serum 05/07/2025 Comment (A)   Final    Immunofixation shows IgG monoclonal protein with kappa light chain  specificity.  PLEASE NOTE:       This sample has been treated to eliminate IgG Kappa interference       from monoclonal therapy by DARZALEX(R) (daratumumab). Any       remaining IgG Kappa, if present, is due to the patient's own       immune response.    IgG 05/07/2025 396 (L)  586 - 1602 mg/dL Final  "   IgA 05/07/2025 41 (L)  64 - 422 mg/dL Final    Result confirmed on concentration.    IgM 05/07/2025 14 (L)  26 - 217 mg/dL Final    Result confirmed on concentration.    IgG 05/07/2025 410 (L)  586 - 1602 mg/dL Final    IgA 05/07/2025 43 (L)  64 - 422 mg/dL Final    Result confirmed on concentration.    IgM 05/07/2025 14 (L)  26 - 217 mg/dL Final    Result confirmed on concentration.    Total Protein 05/07/2025 5.6 (L)  6.0 - 8.5 g/dL Final    Albumin 05/07/2025 3.4  2.9 - 4.4 g/dL Final    Alpha-1-Globulin 05/07/2025 0.2  0.0 - 0.4 g/dL Final    Alpha-2-Globulin 05/07/2025 0.6  0.4 - 1.0 g/dL Final    Beta Globulin 05/07/2025 0.9  0.7 - 1.3 g/dL Final    Gamma Globulin 05/07/2025 0.4  0.4 - 1.8 g/dL Final    M-Sahil 05/07/2025 0.2 (H)  Not Observed g/dL Final    Globulin 05/07/2025 2.2  2.2 - 3.9 g/dL Final    A/G Ratio 05/07/2025 1.6  0.7 - 1.7 Final    Immunofixation Reflex, Serum 05/07/2025 Comment (A)   Final    Immunofixation shows IgG monoclonal protein with kappa light chain  specificity.   PLEASE NOTE:   Samples from patients receiving DARZALEX(R) (daratumumab) or   SARCLISA(R)(isatuximab-[facility]) treatment can appear as an   \"IgG kappa\" and mask a complete response (CR). If this patient   is receiving these therapies, this CARL assay interference   can be removed by ordering test number [id number]-\"Immunofixation,   Daratumumab-Specific, Serum\" or [id number]-\"Immunofixation,   Isatuximab-Specific, Serum\" and submitting a new sample for   testing or by calling the lab to add this test to the current   sample.    Please note 05/07/2025 Comment   Final    Protein electrophoresis scan will follow via computer, mail, or   delivery.    Free Light Chain, Kappa 05/07/2025 8.3  3.3 - 19.4 mg/L Final    Free Lambda Light Chains 05/07/2025 2.6 (L)  5.7 - 26.3 mg/L Final    Kappa/Lambda Ratio 05/07/2025 3.19 (H)  0.26 - 1.65 Final    Total Protein, Urine 05/07/2025 9.1  mg/dL Final   Hospital Outpatient Visit on " 04/16/2025   Component Date Value Ref Range Status    Glucose 04/16/2025 184 (H)  65 - 99 mg/dL Final    BUN 04/16/2025 19  8 - 23 mg/dL Final    Creatinine 04/16/2025 0.81  0.57 - 1.00 mg/dL Final    Sodium 04/16/2025 141  136 - 145 mmol/L Final    Potassium 04/16/2025 3.9  3.5 - 5.2 mmol/L Final    Chloride 04/16/2025 108 (H)  98 - 107 mmol/L Final    CO2 04/16/2025 17.0 (L)  22.0 - 29.0 mmol/L Final    Calcium 04/16/2025 8.6  8.6 - 10.5 mg/dL Final    Total Protein 04/16/2025 6.4  6.0 - 8.5 g/dL Final    Albumin 04/16/2025 4.2  3.5 - 5.2 g/dL Final    ALT (SGPT) 04/16/2025 16  1 - 33 U/L Final    AST (SGOT) 04/16/2025 11  1 - 32 U/L Final    Alkaline Phosphatase 04/16/2025 82  39 - 117 U/L Final    Total Bilirubin 04/16/2025 0.7  0.0 - 1.2 mg/dL Final    Globulin 04/16/2025 2.2  gm/dL Final    A/G Ratio 04/16/2025 1.9  g/dL Final    BUN/Creatinine Ratio 04/16/2025 23.5  7.0 - 25.0 Final    Anion Gap 04/16/2025 16.0 (H)  5.0 - 15.0 mmol/L Final    eGFR 04/16/2025 75.8  >60.0 mL/min/1.73 Final    Magnesium 04/16/2025 1.8  1.6 - 2.4 mg/dL Final    Phosphorus 04/16/2025 3.0  2.5 - 4.5 mg/dL Final    WBC 04/16/2025 6.17  3.40 - 10.80 10*3/mm3 Final    RBC 04/16/2025 3.63 (L)  3.77 - 5.28 10*6/mm3 Final    Hemoglobin 04/16/2025 12.6  12.0 - 15.9 g/dL Final    Hematocrit 04/16/2025 37.9  34.0 - 46.6 % Final    MCV 04/16/2025 104.4 (H)  79.0 - 97.0 fL Final    MCH 04/16/2025 34.7 (H)  26.6 - 33.0 pg Final    MCHC 04/16/2025 33.2  31.5 - 35.7 g/dL Final    RDW 04/16/2025 16.9 (H)  12.3 - 15.4 % Final    RDW-SD 04/16/2025 65.4 (H)  37.0 - 54.0 fl Final    MPV 04/16/2025 9.4  6.0 - 12.0 fL Final    Platelets 04/16/2025 342  140 - 450 10*3/mm3 Final    Neutrophil % 04/16/2025 70.7  42.7 - 76.0 % Final    Lymphocyte % 04/16/2025 24.0  19.6 - 45.3 % Final    Monocyte % 04/16/2025 2.9 (L)  5.0 - 12.0 % Final    Eosinophil % 04/16/2025 0.8  0.3 - 6.2 % Final    Basophil % 04/16/2025 1.0  0.0 - 1.5 % Final    Immature Grans %  "04/16/2025 0.6 (H)  0.0 - 0.5 % Final    Neutrophils, Absolute 04/16/2025 4.36  1.70 - 7.00 10*3/mm3 Final    Lymphocytes, Absolute 04/16/2025 1.48  0.70 - 3.10 10*3/mm3 Final    Monocytes, Absolute 04/16/2025 0.18  0.10 - 0.90 10*3/mm3 Final    Eosinophils, Absolute 04/16/2025 0.05  0.00 - 0.40 10*3/mm3 Final    Basophils, Absolute 04/16/2025 0.06  0.00 - 0.20 10*3/mm3 Final    Immature Grans, Absolute 04/16/2025 0.04  0.00 - 0.05 10*3/mm3 Final    nRBC 04/16/2025 0.0  0.0 - 0.2 /100 WBC Final   Admission on 03/20/2025, Discharged on 03/20/2025   Component Date Value Ref Range Status    Case Report 03/20/2025    Final                    Value:Surgical Pathology Report                         Case: [id number]                                  Authorizing Provider:  Eduardo Ingram MD       Collected:           03/20/2025 10:43 AM          Ordering Location:     [facility] MAIN Received:            03/20/2025 02:04 PM                                 OR                                                                           Pathologist:           Rosy Goodman MD                                                     Specimens:   1) - Arm, Left, LEFT UPPER ARM LIPOMA                                                               2) - Arm, Right, RIGHT UPPER ARM LIPOMA                                                    Clinical Information 03/20/2025    Final                    Value:Lipoma of left upper extremity      Final Diagnosis 03/20/2025    Final                    Value:1. Soft tissue, left upper arm lipoma, excision:   - Lipoma      2. Soft tissue, right upper arm lipoma, excision:   - Lipoma            Gross Description 03/20/2025    Final                    Value:1. Arm, Left.  The specimen is received in 1 formalin filled container labeled \" left upper arm lipoma\" and consists of a 3.2 x 2.5 x 1.7 cm unoriented fragment of adipose tissue.  No skin is present.  The specimen is inked blue, " "and sectioning reveals tan-yellow, lobulated, homogenous adipose tissue with no distinct lesions, lymph nodes, nodules, or calcifications.  Representative sections are submitted in 2 cassettes.    2. Arm, Right.  The specimen is received in 1 formalin filled container labeled \" right upper arm lipoma\" and consists of a 2.0 x 2.0 x 1.5 cm unoriented fragment of adipose tissue.  No skin is present.  The specimen is inked blue, and sectioning reveals tan-yellow, lobulated, homogenous adipose tissue with no distinct lesions, lymph nodes, nodules, or calcifications.  Representative sections are submitted in 1 cassette.   BARRY      Microscopic Description 03/20/2025    Final                    Value:Microscopic examination performed.     Hospital Outpatient Visit on 03/19/2025   Component Date Value Ref Range Status    Glucose 03/19/2025 162 (H)  65 - 99 mg/dL Final    BUN 03/19/2025 13  8 - 23 mg/dL Final    Creatinine 03/19/2025 0.86  0.57 - 1.00 mg/dL Final    Sodium 03/19/2025 136  136 - 145 mmol/L Final    Potassium 03/19/2025 3.9  3.5 - 5.2 mmol/L Final    Chloride 03/19/2025 108 (H)  98 - 107 mmol/L Final    CO2 03/19/2025 19.1 (L)  22.0 - 29.0 mmol/L Final    Calcium 03/19/2025 8.3 (L)  8.6 - 10.5 mg/dL Final    Total Protein 03/19/2025 5.6 (L)  6.0 - 8.5 g/dL Final    Albumin 03/19/2025 3.6  3.5 - 5.2 g/dL Final    ALT (SGPT) 03/19/2025 13  1 - 33 U/L Final    AST (SGOT) 03/19/2025 12  1 - 32 U/L Final    Alkaline Phosphatase 03/19/2025 82  39 - 117 U/L Final    Total Bilirubin 03/19/2025 0.4  0.0 - 1.2 mg/dL Final    Globulin 03/19/2025 2.0  gm/dL Final    A/G Ratio 03/19/2025 1.8  g/dL Final    BUN/Creatinine Ratio 03/19/2025 15.1  7.0 - 25.0 Final    Anion Gap 03/19/2025 8.9  5.0 - 15.0 mmol/L Final    eGFR 03/19/2025 70.6  >60.0 mL/min/1.73 Final    Magnesium 03/19/2025 1.8  1.6 - 2.4 mg/dL Final    Phosphorus 03/19/2025 3.2  2.5 - 4.5 mg/dL Final    WBC 03/19/2025 6.57  3.40 - 10.80 10*3/mm3 Final    RBC " 03/19/2025 3.58 (L)  3.77 - 5.28 10*6/mm3 Final    Hemoglobin 03/19/2025 11.7 (L)  12.0 - 15.9 g/dL Final    Hematocrit 03/19/2025 37.1  34.0 - 46.6 % Final    MCV 03/19/2025 103.6 (H)  79.0 - 97.0 fL Final    MCH 03/19/2025 32.7  26.6 - 33.0 pg Final    MCHC 03/19/2025 31.5  31.5 - 35.7 g/dL Final    RDW 03/19/2025 16.1 (H)  12.3 - 15.4 % Final    RDW-SD 03/19/2025 62.3 (H)  37.0 - 54.0 fl Final    MPV 03/19/2025 9.3  6.0 - 12.0 fL Final    Platelets 03/19/2025 272  140 - 450 10*3/mm3 Final    Neutrophil % 03/19/2025 20.1 (L)  42.7 - 76.0 % Final    Lymphocyte % 03/19/2025 66.2 (H)  19.6 - 45.3 % Final    Monocyte % 03/19/2025 9.1  5.0 - 12.0 % Final    Eosinophil % 03/19/2025 1.7  0.3 - 6.2 % Final    Basophil % 03/19/2025 2.7 (H)  0.0 - 1.5 % Final    Immature Grans % 03/19/2025 0.2  0.0 - 0.5 % Final    Neutrophils, Absolute 03/19/2025 1.32 (L)  1.70 - 7.00 10*3/mm3 Final    Lymphocytes, Absolute 03/19/2025 4.35 (H)  0.70 - 3.10 10*3/mm3 Final    Monocytes, Absolute 03/19/2025 0.60  0.10 - 0.90 10*3/mm3 Final    Eosinophils, Absolute 03/19/2025 0.11  0.00 - 0.40 10*3/mm3 Final    Basophils, Absolute 03/19/2025 0.18  0.00 - 0.20 10*3/mm3 Final    Immature Grans, Absolute 03/19/2025 0.01  0.00 - 0.05 10*3/mm3 Final    nRBC 03/19/2025 0.0  0.0 - 0.2 /100 WBC Final    Anisocytosis 03/19/2025 Slight/1+  None Seen Final    Macrocytes 03/19/2025 Slight/1+  None Seen Final    Poikilocytes 03/19/2025 Slight/1+  None Seen Final    Polychromasia 03/19/2025 Slight/1+  None Seen Final    WBC Morphology 03/19/2025 Normal  Normal Final    Platelet Estimate 03/19/2025 Adequate  Normal Final    Large Platelets 03/19/2025 Slight/1+  None Seen Final   Hospital Outpatient Visit on 02/19/2025   Component Date Value Ref Range Status    Glucose 02/19/2025 149 (H)  65 - 99 mg/dL Final    BUN 02/19/2025 15  8 - 23 mg/dL Final    Creatinine 02/19/2025 0.81  0.57 - 1.00 mg/dL Final    Sodium 02/19/2025 140  136 - 145 mmol/L Final     Potassium 02/19/2025 3.9  3.5 - 5.2 mmol/L Final    Chloride 02/19/2025 110 (H)  98 - 107 mmol/L Final    CO2 02/19/2025 16.4 (L)  22.0 - 29.0 mmol/L Final    Calcium 02/19/2025 8.9  8.6 - 10.5 mg/dL Final    Total Protein 02/19/2025 5.9 (L)  6.0 - 8.5 g/dL Final    Albumin 02/19/2025 3.8  3.5 - 5.2 g/dL Final    ALT (SGPT) 02/19/2025 11  1 - 33 U/L Final    AST (SGOT) 02/19/2025 10  1 - 32 U/L Final    Alkaline Phosphatase 02/19/2025 69  39 - 117 U/L Final    Total Bilirubin 02/19/2025 0.4  0.0 - 1.2 mg/dL Final    Globulin 02/19/2025 2.1  gm/dL Final    A/G Ratio 02/19/2025 1.8  g/dL Final    BUN/Creatinine Ratio 02/19/2025 18.5  7.0 - 25.0 Final    Anion Gap 02/19/2025 13.6  5.0 - 15.0 mmol/L Final    eGFR 02/19/2025 75.8  >60.0 mL/min/1.73 Final    WBC 02/19/2025 7.56  3.40 - 10.80 10*3/mm3 Final    RBC 02/19/2025 3.42 (L)  3.77 - 5.28 10*6/mm3 Final    Hemoglobin 02/19/2025 11.6 (L)  12.0 - 15.9 g/dL Final    Hematocrit 02/19/2025 36.2  34.0 - 46.6 % Final    MCV 02/19/2025 105.8 (H)  79.0 - 97.0 fL Final    MCH 02/19/2025 33.9 (H)  26.6 - 33.0 pg Final    MCHC 02/19/2025 32.0  31.5 - 35.7 g/dL Final    RDW 02/19/2025 16.8 (H)  12.3 - 15.4 % Final    RDW-SD 02/19/2025 65.6 (H)  37.0 - 54.0 fl Final    MPV 02/19/2025 9.5  6.0 - 12.0 fL Final    Platelets 02/19/2025 264  140 - 450 10*3/mm3 Final    Neutrophil % 02/19/2025 54.5  42.7 - 76.0 % Final    Lymphocyte % 02/19/2025 34.0  19.6 - 45.3 % Final    Monocyte % 02/19/2025 10.4  5.0 - 12.0 % Final    Eosinophil % 02/19/2025 0.1 (L)  0.3 - 6.2 % Final    Basophil % 02/19/2025 0.5  0.0 - 1.5 % Final    Immature Grans % 02/19/2025 0.5  0.0 - 0.5 % Final    Neutrophils, Absolute 02/19/2025 4.11  1.70 - 7.00 10*3/mm3 Final    Lymphocytes, Absolute 02/19/2025 2.57  0.70 - 3.10 10*3/mm3 Final    Monocytes, Absolute 02/19/2025 0.79  0.10 - 0.90 10*3/mm3 Final    Eosinophils, Absolute 02/19/2025 0.01  0.00 - 0.40 10*3/mm3 Final    Basophils, Absolute 02/19/2025 0.04   0.00 - 0.20 10*3/mm3 Final    Immature Grans, Absolute 02/19/2025 0.04  0.00 - 0.05 10*3/mm3 Final    nRBC 02/19/2025 0.0  0.0 - 0.2 /100 WBC Final    Anisocytosis 02/19/2025 Slight/1+  None Seen Final    Macrocytes 02/19/2025 Slight/1+  None Seen Final    WBC Morphology 02/19/2025 Normal  Normal Final    Platelet Estimate 02/19/2025 Adequate  Normal Final    Magnesium 02/19/2025 1.7  1.6 - 2.4 mg/dL Final    Phosphorus 02/19/2025 2.7  2.5 - 4.5 mg/dL Final   Hospital Outpatient Visit on 02/12/2025   Component Date Value Ref Range Status    CARL and PE, Serum 02/12/2025 Comment (A)   Final    Immunofixation shows IgG monoclonal protein with kappa light chain  specificity.  PLEASE NOTE:       This sample has been treated to eliminate IgG Kappa interference       from monoclonal therapy by DARZALEX(R) (daratumumab). Any       remaining IgG Kappa, if present, is due to the patient's own       immune response.    IgG 02/12/2025 432 (L)  586 - 1602 mg/dL Final    IgA 02/12/2025 40 (L)  64 - 422 mg/dL Final    Result confirmed on concentration.    IgM 02/12/2025 18 (L)  26 - 217 mg/dL Final    Result confirmed on concentration.    Total Protein 02/12/2025 5.1 (L)  6.0 - 8.5 g/dL Final    Albumin 02/12/2025 2.9  2.9 - 4.4 g/dL Final    Alpha-1-Globulin 02/12/2025 0.2  0.0 - 0.4 g/dL Final    Alpha-2-Globulin 02/12/2025 0.6  0.4 - 1.0 g/dL Final    Beta Globulin 02/12/2025 0.9  0.7 - 1.3 g/dL Final    Gamma Globulin 02/12/2025 0.4  0.4 - 1.8 g/dL Final    M-Sahil 02/12/2025 0.3 (H)  Not Observed g/dL Final    Globulin 02/12/2025 2.2  2.2 - 3.9 g/dL Final    A/G Ratio 02/12/2025 1.3  0.7 - 1.7 Final    Please note 02/12/2025 Comment   Final    Protein electrophoresis scan will follow via computer, mail, or   delivery.    SPE Interpretation 02/12/2025 Comment   Final    The SPE pattern demonstrates a single peak (M-spike) in the gamma  region which may represent monoclonal protein. This peak may also be  caused by  circulating immune complexes, cryoglobulins, C-reactive  protein, fibrinogen or hemolysis.  If clinically indicated, the  presence of a monoclonal gammopathy may be confirmed by immuno-  fixation, as well as an evaluation of the urine for the presence of  Bence-Garrido protein.   Office Visit on 01/27/2025   Component Date Value Ref Range Status    Respiratory Syncytial Virus 01/27/2025 not detected   Final    Internal Control 01/27/2025 Passed  Passed Final    Lot Number 01/27/2025 [id number]   Final    Expiration Date 01/27/2025 12/09/2025   Final    SARS Antigen 01/27/2025 Not Detected  Not Detected, Presumptive Negative Final    Influenza A Antigen NILESH 01/27/2025 Not Detected  Not Detected Final    Influenza B Antigen NILESH 01/27/2025 Not Detected  Not Detected Final    Internal Control 01/27/2025 Passed  Passed Final    Lot Number 01/27/2025 [id number]   Final    Expiration Date 01/27/2025 10/23/2025   Final   There may be more visits with results that are not included.       EKG Results:  No orders to display       Imaging Results:  MRI Brain With & Without Contrast    Result Date: 5/3/2024   1. White matter changes most compatible with small vessel ischemic disease in this age group.  2. No abnormal enhancement on postcontrast imaging within the brain parenchyma.  3. Prominent bilateral mastoid effusions left greater than right. Please correlate for mastoiditis  3. Mild paranasal sinus disease     Electronically Signed By-Raman Tran On:5/3/2024 9:22 AM      XR Chest 2 View    Result Date: 4/15/2024  Impression: 1.  No acute cardiopulmonary disease.   Electronically Signed By-Bryan Schultz MD On:4/15/2024 2:57 PM      CT Chest Low Dose Cancer Screening WO    Result Date: 3/1/2024    1. No evidence of lung cancer. 2. Stable bilateral pulmonary nodules, likely benign. 3. Mild bibasilar atelectasis. 4. Moderate emphysema.  LUNG RADS:                           2 (benign appearance, less than 1% chance of  malignancy)     YENIFER HANNA MD       Electronically Signed and Approved By: YENIFER HANNA MD on 3/01/2024 at 11:02                 Assessment & Plan   Diagnoses and all orders for this visit:    1. Generalized anxiety disorder (Primary)    2. Moderate episode of recurrent major depressive disorder    3. Insomnia due to mental condition    4. Seizures    5. Complicated grief        Visit Diagnoses:    ICD-10-CM ICD-9-CM   1. Generalized anxiety disorder  F41.1 300.02   2. Moderate episode of recurrent major depressive disorder  F33.1 296.32   3. Insomnia due to mental condition  F51.05 300.9     327.02   4. Seizures  R56.9 780.39   5. Complicated grief  F43.21 309.0     07/02/2025: Stable, no med changes. Discussed medical conditions, nasal polyps, issues with eyes. Discussed planning. Taking care of her brother; found out her sister is a hoarder recently.    Acknowledged and normalized patient's thoughts, feelings, and concerns. Allowed patient to freely discuss and process issues, such as:  Anxiety and depression regarding medical conditions.  Anxiety and depression regarding family's well-being.  ... using Rogerian psychotherapeutic techniques including unconditional positive regard, reflective listening, and demonstrating clear empathy, with the goal of ameliorating symptoms and maintaining, restoring, or improving self-esteem, adaptive skills, and ego or psychological functions (Eliane et al. 1991), the long-term goal of which is to develop a better, healthier perspective and help the patient bear their circumstances more easily.  Time (minutes) spent providing supportive psychotherapy: 17  (This time is exclusive to the therapy session and separate from the time spent on activities used to meet the criteria for the E/M service (history, exam, medical decision-making).)  Start: 9:35  Stop: 9:51  Functional status: mild impairment  Treatment plan: Medication management and supportive  psychotherapy  Prognosis: good  Progress: CHAI  6w    03/28/2025: Medical and MH improving, no changes. Buspar third dose too much (dizzy), reduce to BID.    02/14/2025: CHAI, increase buspar. Consider switching prozac to cymbalta (which can help with pain). Can't remember how pristiq affected her.    09/19/2024: Improved, stable, no changes, situational anxiety and complicated grief.     08/19/2024: MDD, CHAI, increase buspar.    07/22/2024: Much improved. No changes for now. 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    6/20/24: Pseudoseizures per pt (?). Start buspar together with prozac for anxiety, depression. Tremor nebulizer related, not due to prozac.    PLAN:  Safety: No acute safety concerns  Therapy: None  Risk Assessment: Risk of self-harm acutely is moderate.  Risk factors include anxiety disorder, mood disorder, access to firearms, and recent psychosocial stressors (pandemic). Protective factors include no family history, no present SI, no history of suicide attempts or self-harm in the past, minimal AODA, healthcare seeking, future orientation, willingness to engage in care.  Risk of self-harm chronically is also moderate, but could be further elevated in the event of treatment noncompliance and/or AODA.  Meds:  CONTINUE buspar 15 mg BID. Risks, benefits, alternatives discussed with patient including nausea, GI upset, mild sedation, falls risk.  Use care when operating vehicle, vessel, or machine. After discussion of these risks and benefits, the patient voiced understanding and agreed to proceed.  CONTINUE prozac 40 mg qam. Risks, benefits, alternatives discussed with patient including GI upset, nausea vomiting diarrhea, theoretical decrease of seizure threshold predisposing the patient to a slightly higher seizure risk, headaches, sexual dysfunction, serotonin syndrome, bleeding risk, increased suicidality in patients 24 years and younger,  switching to padmaja/hypomania.  After discussion of these risks and benefits, the patient voiced understanding and agreed to proceed.  Labs: none    Patient screened positive for depression based on a PHQ-9 score of  on . Follow-up recommendations include: Prescribed antidepressant medication treatment and Suicide Risk Assessment performed.           TREATMENT PLAN/GOALS: Continue supportive psychotherapy efforts and medications as indicated. Treatment and medication options discussed during today's visit. Patient acknowledged and verbally consented to continue with current treatment plan and was educated on the importance of compliance with treatment and follow-up appointments.    MEDICATION ISSUES:  SINGH reviewed as expected.  Discussed medication options and treatment plan of prescribed medication as well as the risks, benefits, and side effects including potential falls, possible impaired driving and metabolic adversities among others. Patient is agreeable to call the office with any worsening of symptoms or onset of side effects. Patient is agreeable to call 911 or go to the nearest ER should he/she begin having SI/HI. No medication side effects or related complaints today.     MEDS ORDERED DURING VISIT:  No orders of the defined types were placed in this encounter.      Return in about 6 weeks (around 8/13/2025).         This document has been electronically signed by Kalia Singre MD  July 2, 2025 09:55 EDT    Dictated Utilizing Dragon Dictation: Part of this note may be an electronic transcription/translation of spoken language to printed text using the Dragon Dictation System.

## 2025-07-02 ENCOUNTER — ANESTHESIA EVENT (OUTPATIENT)
Dept: PERIOP | Facility: HOSPITAL | Age: 75
End: 2025-07-02
Payer: MEDICARE

## 2025-07-02 ENCOUNTER — OFFICE VISIT (OUTPATIENT)
Dept: PSYCHIATRY | Facility: CLINIC | Age: 75
End: 2025-07-02
Payer: MEDICARE

## 2025-07-02 VITALS
DIASTOLIC BLOOD PRESSURE: 74 MMHG | HEIGHT: 60 IN | BODY MASS INDEX: 36.52 KG/M2 | HEART RATE: 86 BPM | WEIGHT: 186 LBS | SYSTOLIC BLOOD PRESSURE: 147 MMHG

## 2025-07-02 DIAGNOSIS — F43.21 COMPLICATED GRIEF: ICD-10-CM

## 2025-07-02 DIAGNOSIS — F51.05 INSOMNIA DUE TO MENTAL CONDITION: ICD-10-CM

## 2025-07-02 DIAGNOSIS — F41.1 GENERALIZED ANXIETY DISORDER: Primary | ICD-10-CM

## 2025-07-02 DIAGNOSIS — R56.9 SEIZURES: ICD-10-CM

## 2025-07-02 DIAGNOSIS — F33.1 MODERATE EPISODE OF RECURRENT MAJOR DEPRESSIVE DISORDER: ICD-10-CM

## 2025-07-02 NOTE — TREATMENT PLAN
Anxiety:  4/10 progressing    Goals:  Patient will develop and implement behavioral and cognitive strategies to reduce anxiety and irrational fears, monthly, using Rogerian psychotherapy and CBT where appropriate.  Help patient explore past emotional issues in relation to present anxiety, monthly, until remission of symptoms, using Rogerian psychotherapy and CBT where appropriate.  Help patient develop an awareness of their cognitive and physical responses to anxiety, monthly, until remission of symptoms, using Rogerian psychotherapy and CBT where appropriate.          Depression:  4/10 progressing    Goals:  Patient will demonstrate the ability to initiate new constructive life skills outside of sessions on a consistent basis, monthly, using Rogerian psychotherapy and CBT where appropriate.  Assist patient in setting attainable activities of daily living goals, monthly, using Rogerian psychotherapy and CBT where appropriate.  Provide education about depression, monthly, using Rogerian psychotherapy and CBT where appropriate.  Assist patient in developing healthy coping strategies, monthly, using Rogerian psychotherapy and CBT where appropriate.    Rogerian psychotherapy and CBT will be used to help accomplish the above goals and manage depression and anxiety related to medical conditions, not working, family conflict, upcoming surgery, grieving her parents       I have discussed and reviewed this treatment plan with the patient.      Reviewed by Kalia Singer MD   07/02/2025

## 2025-07-07 ENCOUNTER — HOSPITAL ENCOUNTER (OUTPATIENT)
Facility: HOSPITAL | Age: 75
Setting detail: HOSPITAL OUTPATIENT SURGERY
Discharge: HOME | End: 2025-07-07
Attending: ORTHOPAEDIC SURGERY | Admitting: ORTHOPAEDIC SURGERY
Payer: MEDICARE

## 2025-07-07 ENCOUNTER — ANESTHESIA (OUTPATIENT)
Dept: PERIOP | Facility: HOSPITAL | Age: 75
End: 2025-07-07
Payer: MEDICARE

## 2025-07-07 VITALS
OXYGEN SATURATION: 95 % | TEMPERATURE: 97.6 F | HEART RATE: 75 BPM | HEIGHT: 61 IN | BODY MASS INDEX: 34.38 KG/M2 | RESPIRATION RATE: 16 BRPM | WEIGHT: 182.1 LBS | SYSTOLIC BLOOD PRESSURE: 126 MMHG | DIASTOLIC BLOOD PRESSURE: 54 MMHG

## 2025-07-07 DIAGNOSIS — M17.11 PRIMARY OSTEOARTHRITIS OF RIGHT KNEE: ICD-10-CM

## 2025-07-07 DIAGNOSIS — S83.241A ACUTE MEDIAL MENISCUS TEAR OF RIGHT KNEE, INITIAL ENCOUNTER: Primary | ICD-10-CM

## 2025-07-07 DIAGNOSIS — M25.561 RIGHT KNEE PAIN, UNSPECIFIED CHRONICITY: ICD-10-CM

## 2025-07-07 DIAGNOSIS — S83.241D ACUTE MEDIAL MENISCUS TEAR OF RIGHT KNEE, SUBSEQUENT ENCOUNTER: ICD-10-CM

## 2025-07-07 PROCEDURE — 25010000002 BUPIVACAINE (PF) 0.5 % SOLUTION: Performed by: ORTHOPAEDIC SURGERY

## 2025-07-07 PROCEDURE — 25010000002 FENTANYL CITRATE (PF) 50 MCG/ML SOLUTION: Performed by: NURSE ANESTHETIST, CERTIFIED REGISTERED

## 2025-07-07 PROCEDURE — 25810000003 LACTATED RINGERS PER 1000 ML: Performed by: ANESTHESIOLOGY

## 2025-07-07 PROCEDURE — 25010000002 HYDROMORPHONE 1 MG/ML SOLUTION: Performed by: NURSE ANESTHETIST, CERTIFIED REGISTERED

## 2025-07-07 PROCEDURE — 25010000002 ONDANSETRON PER 1 MG: Performed by: NURSE ANESTHETIST, CERTIFIED REGISTERED

## 2025-07-07 PROCEDURE — 25010000002 MIDAZOLAM PER 1MG: Performed by: ANESTHESIOLOGY

## 2025-07-07 PROCEDURE — 25010000002 PROPOFOL 10 MG/ML EMULSION: Performed by: NURSE ANESTHETIST, CERTIFIED REGISTERED

## 2025-07-07 PROCEDURE — 25010000002 LIDOCAINE PF 2% 2 % SOLUTION: Performed by: NURSE ANESTHETIST, CERTIFIED REGISTERED

## 2025-07-07 PROCEDURE — 25010000002 DEXAMETHASONE PER 1 MG: Performed by: NURSE ANESTHETIST, CERTIFIED REGISTERED

## 2025-07-07 PROCEDURE — 29881 ARTHRS KNE SRG MNISECTMY M/L: CPT | Performed by: ORTHOPAEDIC SURGERY

## 2025-07-07 PROCEDURE — 25010000002 CLINDAMYCIN 900 MG/50ML SOLUTION: Performed by: PHYSICIAN ASSISTANT

## 2025-07-07 RX ORDER — ACETAMINOPHEN 500 MG
500 TABLET ORAL ONCE
Status: COMPLETED | OUTPATIENT
Start: 2025-07-07 | End: 2025-07-07

## 2025-07-07 RX ORDER — LIDOCAINE HYDROCHLORIDE 20 MG/ML
INJECTION, SOLUTION EPIDURAL; INFILTRATION; INTRACAUDAL; PERINEURAL AS NEEDED
Status: DISCONTINUED | OUTPATIENT
Start: 2025-07-07 | End: 2025-07-07 | Stop reason: SURG

## 2025-07-07 RX ORDER — PROMETHAZINE HYDROCHLORIDE 12.5 MG/1
25 TABLET ORAL ONCE AS NEEDED
Status: DISCONTINUED | OUTPATIENT
Start: 2025-07-07 | End: 2025-07-07 | Stop reason: HOSPADM

## 2025-07-07 RX ORDER — FENTANYL CITRATE 50 UG/ML
INJECTION, SOLUTION INTRAMUSCULAR; INTRAVENOUS AS NEEDED
Status: DISCONTINUED | OUTPATIENT
Start: 2025-07-07 | End: 2025-07-07 | Stop reason: SURG

## 2025-07-07 RX ORDER — DEXAMETHASONE SODIUM PHOSPHATE 4 MG/ML
INJECTION, SOLUTION INTRA-ARTICULAR; INTRALESIONAL; INTRAMUSCULAR; INTRAVENOUS; SOFT TISSUE AS NEEDED
Status: DISCONTINUED | OUTPATIENT
Start: 2025-07-07 | End: 2025-07-07 | Stop reason: SURG

## 2025-07-07 RX ORDER — OXYCODONE HYDROCHLORIDE 5 MG/1
5 TABLET ORAL
Status: DISCONTINUED | OUTPATIENT
Start: 2025-07-07 | End: 2025-07-07 | Stop reason: HOSPADM

## 2025-07-07 RX ORDER — BUPIVACAINE HYDROCHLORIDE 5 MG/ML
INJECTION, SOLUTION EPIDURAL; INTRACAUDAL; PERINEURAL AS NEEDED
Status: DISCONTINUED | OUTPATIENT
Start: 2025-07-07 | End: 2025-07-07 | Stop reason: HOSPADM

## 2025-07-07 RX ORDER — SODIUM CHLORIDE, SODIUM LACTATE, POTASSIUM CHLORIDE, CALCIUM CHLORIDE 600; 310; 30; 20 MG/100ML; MG/100ML; MG/100ML; MG/100ML
9 INJECTION, SOLUTION INTRAVENOUS CONTINUOUS PRN
Status: DISCONTINUED | OUTPATIENT
Start: 2025-07-07 | End: 2025-07-07 | Stop reason: HOSPADM

## 2025-07-07 RX ORDER — HYDROCODONE BITARTRATE AND ACETAMINOPHEN 7.5; 325 MG/1; MG/1
1 TABLET ORAL EVERY 4 HOURS PRN
Qty: 36 TABLET | Refills: 0 | Status: SHIPPED | OUTPATIENT
Start: 2025-07-07

## 2025-07-07 RX ORDER — PROPOFOL 10 MG/ML
VIAL (ML) INTRAVENOUS AS NEEDED
Status: DISCONTINUED | OUTPATIENT
Start: 2025-07-07 | End: 2025-07-07 | Stop reason: SURG

## 2025-07-07 RX ORDER — PROMETHAZINE HYDROCHLORIDE 25 MG/1
25 SUPPOSITORY RECTAL ONCE AS NEEDED
Status: DISCONTINUED | OUTPATIENT
Start: 2025-07-07 | End: 2025-07-07 | Stop reason: HOSPADM

## 2025-07-07 RX ORDER — ONDANSETRON 2 MG/ML
4 INJECTION INTRAMUSCULAR; INTRAVENOUS ONCE AS NEEDED
Status: DISCONTINUED | OUTPATIENT
Start: 2025-07-07 | End: 2025-07-07 | Stop reason: HOSPADM

## 2025-07-07 RX ORDER — BUPIVACAINE HCL/0.9 % NACL/PF 0.125 %
PLASTIC BAG, INJECTION (ML) EPIDURAL AS NEEDED
Status: DISCONTINUED | OUTPATIENT
Start: 2025-07-07 | End: 2025-07-07 | Stop reason: SURG

## 2025-07-07 RX ORDER — CLINDAMYCIN PHOSPHATE 900 MG/50ML
900 INJECTION, SOLUTION INTRAVENOUS ONCE
Status: COMPLETED | OUTPATIENT
Start: 2025-07-07 | End: 2025-07-07

## 2025-07-07 RX ORDER — ONDANSETRON 2 MG/ML
INJECTION INTRAMUSCULAR; INTRAVENOUS AS NEEDED
Status: DISCONTINUED | OUTPATIENT
Start: 2025-07-07 | End: 2025-07-07 | Stop reason: SURG

## 2025-07-07 RX ORDER — MIDAZOLAM HYDROCHLORIDE 2 MG/2ML
1 INJECTION, SOLUTION INTRAMUSCULAR; INTRAVENOUS ONCE
Status: COMPLETED | OUTPATIENT
Start: 2025-07-07 | End: 2025-07-07

## 2025-07-07 RX ADMIN — FENTANYL CITRATE 25 MCG: 50 INJECTION, SOLUTION INTRAMUSCULAR; INTRAVENOUS at 08:46

## 2025-07-07 RX ADMIN — OXYCODONE HYDROCHLORIDE 5 MG: 5 TABLET ORAL at 10:05

## 2025-07-07 RX ADMIN — FENTANYL CITRATE 25 MCG: 50 INJECTION, SOLUTION INTRAMUSCULAR; INTRAVENOUS at 08:34

## 2025-07-07 RX ADMIN — MIDAZOLAM HYDROCHLORIDE 1 MG: 1 INJECTION, SOLUTION INTRAMUSCULAR; INTRAVENOUS at 08:14

## 2025-07-07 RX ADMIN — FENTANYL CITRATE 25 MCG: 50 INJECTION, SOLUTION INTRAMUSCULAR; INTRAVENOUS at 08:49

## 2025-07-07 RX ADMIN — Medication 100 MCG: at 08:45

## 2025-07-07 RX ADMIN — FENTANYL CITRATE 25 MCG: 50 INJECTION, SOLUTION INTRAMUSCULAR; INTRAVENOUS at 08:23

## 2025-07-07 RX ADMIN — ONDANSETRON 4 MG: 2 INJECTION INTRAMUSCULAR; INTRAVENOUS at 09:02

## 2025-07-07 RX ADMIN — ACETAMINOPHEN 500 MG: 500 TABLET ORAL at 07:35

## 2025-07-07 RX ADMIN — DEXAMETHASONE SODIUM PHOSPHATE 4 MG: 4 INJECTION, SOLUTION INTRAMUSCULAR; INTRAVENOUS at 08:23

## 2025-07-07 RX ADMIN — HYDROMORPHONE HYDROCHLORIDE 0.5 MG: 1 INJECTION, SOLUTION INTRAMUSCULAR; INTRAVENOUS; SUBCUTANEOUS at 09:40

## 2025-07-07 RX ADMIN — LIDOCAINE HYDROCHLORIDE 60 MG: 20 INJECTION, SOLUTION INTRAVENOUS at 08:23

## 2025-07-07 RX ADMIN — CLINDAMYCIN PHOSPHATE 900 MG: 900 INJECTION, SOLUTION INTRAVENOUS at 08:29

## 2025-07-07 RX ADMIN — SODIUM CHLORIDE, POTASSIUM CHLORIDE, SODIUM LACTATE AND CALCIUM CHLORIDE 9 ML/HR: 600; 310; 30; 20 INJECTION, SOLUTION INTRAVENOUS at 07:35

## 2025-07-07 RX ADMIN — PROPOFOL 30 MG: 10 INJECTION, EMULSION INTRAVENOUS at 08:49

## 2025-07-07 RX ADMIN — PROPOFOL 120 MG: 10 INJECTION, EMULSION INTRAVENOUS at 08:23

## 2025-07-07 NOTE — ANESTHESIA PREPROCEDURE EVALUATION
Anesthesia Evaluation     Patient summary reviewed and Nursing notes reviewed   no history of anesthetic complications:   NPO Solid Status: > 8 hours  NPO Liquid Status: > 2 hours           Airway   Mallampati: II  TM distance: >3 FB  Neck ROM: full  No difficulty expected  Dental    (+) poor dentition and edentulous    Pulmonary     breath sounds clear to auscultation  (+) a smoker Former, COPD (at baseline this am),home oxygen (no longer on oxygen all the time, just with heavy exertion), sleep apnea  Cardiovascular - normal exam  Exercise tolerance: poor (<4 METS)    ECG reviewed  Rhythm: regular  Rate: normal    (+) hypertension, CAD, hyperlipidemia      Neuro/Psych  (+) seizures (pseudoseizures), psychiatric history Anxiety and Depression  GI/Hepatic/Renal/Endo    (+) obesity, GERD well controlled, thyroid problem hypothyroidism    Musculoskeletal     Abdominal    Substance History      OB/GYN          Other   arthritis,   history of cancer (multiple myeloma) remission    ROS/Med Hx Other:     EKG 7/6/24- SR    ECHO 4/6/23 nl EF    Stress 2023- no evidence of ischemia                    Anesthesia Plan    ASA 4     general     (Patient understands anesthesia not responsible for dental damage.    )  intravenous induction     Anesthetic plan, risks, benefits, and alternatives have been provided, discussed and informed consent has been obtained with: patient.    Plan discussed with CRNA.      CODE STATUS:

## 2025-07-07 NOTE — OP NOTE
KNEE ARTHROSCOPY  Procedure Report    Patient Name:  Anca Samson  YOB: 1950    Date of Surgery:  7/7/2025     Indications: The patient has failed conservative treatment and wishes to proceed with operative treatment.  We discussed the risk of surgery including bleeding, infection, damage to neurovascular structures, anesthesia complications, continued pain and disability, need for additional procedures among others.  Informed consent was obtained and they wished to proceed.    Pre-op Diagnosis:   Primary osteoarthritis of right knee [M17.11]  Acute medial meniscus tear of right knee, subsequent encounter [S83.241D]  Right knee pain, unspecified chronicity [M25.561]       Post-Op Diagnosis Codes:     * Primary osteoarthritis of right knee [M17.11]     * Acute medial meniscus tear of right knee, subsequent encounter [S83.241D]     * Right knee pain, unspecified chronicity [M25.561]    Procedure:  Procedure(s):  RIGHT KNEE ARTHROSCOPY WITH PARTIAL MEDIAL MENISCECTOMY AND CHONDROPLASTY    Staff:  Surgeon(s):  Nicholas Tipton MD         Anesthesia: General    Estimated Blood Loss: 5cc    Implants:    Nothing was implanted during the procedure    Specimen:          None        Findings: Grade III-IV chondromalacia medial femoral condyle, medial meniscus tear, grade III chondromalacia patellofemoral joint    Complications: None    Description of Procedure: The operative site was marked in the preoperative holding area.  The patient was brought to the operating room and placed supine on the operating room table.  General anesthesia was given.  All bony prominences were padded.  The operative leg had a nonsterile tourniquet placed on the thigh and was placed in arthroscopic leg rubio.  The opposite leg was placed in a padded leg rubio and the foot of the bed was lowered.      The patient received preoperative antibiotic.  After formal timeout was held, Esmarch was used to exsanguinate the limb and  tourniquet was inflated.  A stab incision was made over the anterolateral aspect of the knee and a trocar was inserted into the knee.  The knee was extended, and diagnostic arthroscopy was performed. Anteromedial portal was made with the spinal needle from outside in.  A stab incision was made.  An arthroscopic shaver was inserted into the knee and the knee was débrided.      Patellofemoral inspection revealed chondromalacia to the central patella and trochlea.  There is grade III chondromalacia to this area of the knee.  This was debrided with a motorized shaver for a chondroplasty back to a stable chondral surface.     The knee was flexed.  The valgus stress was placed to the knee.  A probe was inserted into the medial compartment.  Medial compartment exam revealed meniscal root tear and flap tear of the posterior horn medial meniscus.  This was debrided with the meniscal biter for a partial medial meniscectomy removing 5 to 6 mm of meniscal tissue back to a stable meniscal capsular rim.  There is also grade III-IV chondromalacia of the medial femoral condyle in the weightbearing area and a 2 x 3 cm area.  This is removed with a motorized shaver for a chondroplasty.     At this point attention was turned to the notch in the femur.  The ACL was probed and found to be stable. The PCL was intact.     The lateral compartment findings included intact lateral meniscus.  Intact lateral tibiofemoral cartilage.    At this point then fluid was drained from the knee.  Tourniquet was released.  The incisions were closed with 3-0 nylon in figure-of-8 fashion.  Local anesthetic, 20 mL Marcaine, were injected into the knee.  Xeroform, 4x4s, soft roll and an Ace wrap were placed.  The patient awoke from anesthesia in stable condition.  There were no complications.  All counts were correct.  The patient was stable to recovery.           Nicholas Tipton MD     Date: 7/7/2025  Time: 09:17 EDT

## 2025-07-07 NOTE — DISCHARGE INSTRUCTIONS
DISCHARGE INSTRUCTIONS  POST-OPERATIVE  Knee Arthroscopic Surgery      For your surgery you had:  General anesthesia (you may have a sore throat for the first 24 hours)  You received an anesthesia medication today that can cause hormonal forms of birth control to be ineffective. You should use a different form of birth control (to prevent pregnancy) for 7 days.   IV sedation.  Local anesthesia  Monitored anesthesia care  You received a medicated patch for nausea prevention today (behind your ear). It is recommended that you remove it 24-48 hours post-operatively. It must be removed within 72 hours.   You may experience dizziness, drowsiness, or light-headedness for several hours following surgery/procedure.  Do not stay alone today or tonight.  Limit your activity for 24 hours.  Resume your diet slowly.  Follow whatever special dietary instructions you may have been given by your doctor.  You should not drive or operate machinery or drink alcohol for 24 hours or while you are taking pain medication.  You should not sign legally binding documents.  [] If you had a regional block, you will not have control of the leg for up to 12 hours.  Protect the leg and follow your physician's specific instructions.  Post-Operative Day #1  Post-Operative Day #1 is counted as the 1st day after surgery.  Leave ace wrap on day one to aid in the reduction of swelling.  If dressing is too tight it can be rewrapped.  Post-Operative Day #3  Ace wrap and dressing may be removed.  Put a 4x4 dressing to suture or staple site.  Change dressing once or twice daily until suture or staples are removed.  It is normal to have some redness or irritation around skin sutures or stapes, however, if you have any expanding areas of redness with a persistent fever and increasing pain notify the physician as soon as possible.  On Post-Operative Day #2, you may want to reapply the ace wrap.  Put ace wrap directly over the knee to add compression and aid  in swelling reduction.  It is normal to have some swelling down into the calf and ankle after knee arthroscopy or Anterior Cruciate Ligament (ACL) reconstruction.  If you notice a significant amount of swelling or increasing pain in calf area, notify the physician immediately.   Post-Operative Day #3  You may shower.  During shower, avoid too much water to incision site.  Let water run down leg and then pat dry.  Do not put any ointments on the incision unless directed by surgeon.  Reapply dry dressing to sutures or staple sites.    General Information  Full weight bearing with crutches is allowed after a standard knee arthroscopy or ACL reconstruction unless instructed otherwise by surgeon.  You may put as much weight on knee as tolerable.  If given a knee immobilizer postoperatively, usually with an ACL reconstruction, this is for protection with early ambulation until you are steadier on your feet.  It is very important to take off immobilizer to do range of motion and flexion and extension exercises.  You do not have to sleep in the knee immobilizer.  Knee range of motion exercises should be started as early as the day of surgery.  Try to achieve full extension and start working on range of motion and flexion of the knee.  Move the ankle up and down periodically to help reduce swelling as well as reduce blood pooling.  To allow full extension of the knee and prevent blood clots it is very important to put pillows at the level of the mid-calf to the foot.  DO NOT put pillows directly behind knee.  SPECIAL INSTRUCTIONS:                                               DISCHARGE INSTRUCTIONS  POST-OPERATIVE   Knee Arthroscopic Surgery      General Instructions  You will receive a prescription for physical therapy, unless otherwise instructed by physician.  Physical therapy is imperative especially after ACL reconstruction and some knee arthroscopies for swelling reduction, knee range of motion and strengthening.  The  Cold Therapy System can help reduce swelling and decrease pain.  Utilize device for 30-60 minutes per session, with 30-60 minute breaks in between sessions.  It is recommended to use, as directed, for the first 72 hours after surgery until bedtime.  After 72 hours, continue using the device as needed until your follow-up appointment with your physician.  Never place directly on skin.  Please refer to the instruction sheet given. (IF PROVIDED BY INSURANCE COMPANY)   [] Use ice pack on the knee for 20 minutes on and 20 minutes off.  Never place ice directly on the skin.  By following this, you will cool the knee sufficiently.  Avoid getting dressing wet.  To help reduce swelling and minimize throbbing pain, use pillows to keep the knee elevated above the level of the heart, even while sleeping.  Sit with the leg propped up on a footstool or chair with pillows.  Exercises toes for 10 minutes every hour while awake.  If you are given a prescription for pain medication, take it as prescribed.  If you are able to take over-the-counter anti-inflammatory medications such as Motrin or Aleve, you may take as per package instructions in between the pain medication to help enhance pain control and reduce swelling.  If you are taking the pain medication prescribed, do not take Tylenol too unless you consult with the pharmacist. Generally, the pain medications prescribed have Tylenol in them and too much Tylenol can be harmful.  You should stop the anti-inflammatory medication if you experience any stomach/GI disturbances.  Consult with your physician or your pharmacist before taking over-the-counter pain medications/anti-inflammatories. It is not uncommon to have a fever post-operatively especially in the first 24-48 hours.  Deep breathing and coughing and early ambulation will help.  Anti-inflammatories can be used for fever reduction also.  If unable to urinate 6 to 8 hours after surgery or urinating frequently in small  amounts, notify the physician or go to the nearest Emergency Room.  NOTIFY YOUR PHYSICIAN IF YOU EXPERIENCE:  Numbness of toes.  Inability to move toes.  Extreme coldness, paleness or blue dis-coloration of toes.  Any pain other than from the incision, such as swelling of the leg that blocks circulation of the toes.  Follow verbal instructions from your doctor.  Medications per physician instructions as indicated on Discharge Medication Information Sheet.  You should see  ______________________for follow-up care  on   .  Phone number: _________________________  Missing your appointment/follow-up could lead to serious complications.  If you have an emergency and cannot reach your doctor, go to an Emergency Room nearest your home.  Access Lab and other Diagnostic Test results and manage your personal health records by going to: [email or web address]

## 2025-07-07 NOTE — ANESTHESIA POSTPROCEDURE EVALUATION
Patient: Anca Samson    Procedure Summary       Date: 07/07/25 Room / Location: [facility] OR  / [facility]    Anesthesia Start: 0819 Anesthesia Stop: 0916    Procedure: RIGHT KNEE ARTHROSCOPY WITH PARTIAL MEDIAL MENISCECTOMY AND CHONDROPLASTY (Right: Knee) Diagnosis:       Primary osteoarthritis of right knee      Acute medial meniscus tear of right knee, subsequent encounter      Right knee pain, unspecified chronicity      (Primary osteoarthritis of right knee [M17.11])      (Acute medial meniscus tear of right knee, subsequent encounter [S83.241D])      (Right knee pain, unspecified chronicity [M25.561])    Surgeons: Nicholas Tipton MD Provider: Tad Patterson MD    Anesthesia Type: general ASA Status: 4            Anesthesia Type: general    Vitals  Vitals Value Taken Time   /64 07/07/25 10:20   Temp     Pulse 75 07/07/25 10:21   Resp 16 07/07/25 09:50   SpO2 95 % 07/07/25 10:21   Vitals shown include unfiled device data.        Post Anesthesia Care and Evaluation    Patient location during evaluation: bedside  Patient participation: complete - patient participated  Level of consciousness: awake  Pain management: adequate    Airway patency: patent  PONV Status: none  Cardiovascular status: acceptable and stable  Respiratory status: acceptable  Hydration status: acceptable

## 2025-07-15 ENCOUNTER — SPECIALTY PHARMACY (OUTPATIENT)
Dept: PHARMACY | Facility: HOSPITAL | Age: 75
End: 2025-07-15
Payer: MEDICARE

## 2025-07-15 DIAGNOSIS — C90.00 MULTIPLE MYELOMA, REMISSION STATUS UNSPECIFIED: ICD-10-CM

## 2025-07-15 RX ORDER — LENALIDOMIDE 15 MG/1
15 CAPSULE ORAL DAILY
Qty: 21 CAPSULE | Refills: 0 | Status: SHIPPED | OUTPATIENT
Start: 2025-07-15

## 2025-07-15 NOTE — PROGRESS NOTES
Specialty Pharmacy Patient Management Program  Per Protocol Prescription Order or Refill     Patient will be filling or currently fills medications at [facility] and is enrolled in the Patient Management Program.    Requested Prescriptions     Signed Prescriptions Disp Refills    lenalidomide (REVLIMID) 15 MG capsule 21 capsule 0     Sig: Take 1 capsule by mouth Daily. On Days 1-21, and off 7 days, on a 28 day cycle     Prescription orders above were sent to the pharmacy per Collaborative Care Agreement Protocol.     Last Office Visit: 6/11/25  Next Office Visit: 8/6/25    Drug-Drug Interactions: The current medication list was reviewed and there are no relevant drug-drug interactions with the specialty medication.  Medication Allergies: The patient has no relevant allergies as it relates to their oral specialty medication  Review of Labs/Dose Adjustments: NO DOSE CHANGE - I reviewed the most recent note and labs and the patient will continue without any dose changes.  I sent refills as described below.    Sabrina Beebe PharmD  Oncology Clinical Specialty Pharmacist   7/15/2025  08:42 EDT

## 2025-07-23 ENCOUNTER — HOSPITAL ENCOUNTER (OUTPATIENT)
Dept: ONCOLOGY | Facility: HOSPITAL | Age: 75
Discharge: HOME | End: 2025-07-23
Payer: MEDICARE

## 2025-07-23 ENCOUNTER — OFFICE VISIT (OUTPATIENT)
Dept: ONCOLOGY | Facility: HOSPITAL | Age: 75
End: 2025-07-23
Payer: MEDICARE

## 2025-07-23 VITALS
OXYGEN SATURATION: 95 % | HEIGHT: 61 IN | WEIGHT: 186.95 LBS | DIASTOLIC BLOOD PRESSURE: 74 MMHG | BODY MASS INDEX: 35.3 KG/M2 | SYSTOLIC BLOOD PRESSURE: 133 MMHG | TEMPERATURE: 98.1 F | HEART RATE: 84 BPM | RESPIRATION RATE: 20 BRPM

## 2025-07-23 DIAGNOSIS — C90.00 MULTIPLE MYELOMA, REMISSION STATUS UNSPECIFIED: ICD-10-CM

## 2025-07-23 DIAGNOSIS — Z79.899 ENCOUNTER FOR LONG-TERM (CURRENT) USE OF MEDICATIONS: ICD-10-CM

## 2025-07-23 DIAGNOSIS — C90.00 MULTIPLE MYELOMA NOT HAVING ACHIEVED REMISSION: ICD-10-CM

## 2025-07-23 DIAGNOSIS — Z12.2 ENCOUNTER FOR SCREENING FOR LUNG CANCER: Primary | ICD-10-CM

## 2025-07-23 DIAGNOSIS — C90.00 MULTIPLE MYELOMA NOT HAVING ACHIEVED REMISSION: Primary | ICD-10-CM

## 2025-07-23 LAB
ALBUMIN SERPL-MCNC: 4 G/DL (ref 3.5–5.2)
ALBUMIN/GLOB SERPL: 2.2 G/DL
ALP SERPL-CCNC: 48 U/L (ref 39–117)
ALT SERPL W P-5'-P-CCNC: 21 U/L (ref 1–33)
ANION GAP SERPL CALCULATED.3IONS-SCNC: 13.8 MMOL/L (ref 5–15)
AST SERPL-CCNC: 11 U/L (ref 1–32)
BASOPHILS # BLD AUTO: 0.04 10*3/MM3 (ref 0–0.2)
BASOPHILS NFR BLD AUTO: 0.4 % (ref 0–1.5)
BILIRUB SERPL-MCNC: 0.7 MG/DL (ref 0–1.2)
BUN SERPL-MCNC: 15.4 MG/DL (ref 8–23)
BUN/CREAT SERPL: 19 (ref 7–25)
CALCIUM SPEC-SCNC: 8.7 MG/DL (ref 8.6–10.5)
CHLORIDE SERPL-SCNC: 113 MMOL/L (ref 98–107)
CO2 SERPL-SCNC: 15.2 MMOL/L (ref 22–29)
CREAT SERPL-MCNC: 0.81 MG/DL (ref 0.57–1)
DEPRECATED RDW RBC AUTO: 65.8 FL (ref 37–54)
EGFRCR SERPLBLD CKD-EPI 2021: 75.8 ML/MIN/1.73
EOSINOPHIL # BLD AUTO: 0.83 10*3/MM3 (ref 0–0.4)
EOSINOPHIL NFR BLD AUTO: 8.3 % (ref 0.3–6.2)
ERYTHROCYTE [DISTWIDTH] IN BLOOD BY AUTOMATED COUNT: 17.2 % (ref 12.3–15.4)
GLOBULIN UR ELPH-MCNC: 1.8 GM/DL
GLUCOSE SERPL-MCNC: 136 MG/DL (ref 65–99)
HCT VFR BLD AUTO: 36.2 % (ref 34–46.6)
HGB BLD-MCNC: 12.1 G/DL (ref 12–15.9)
IMM GRANULOCYTES # BLD AUTO: 0.11 10*3/MM3 (ref 0–0.05)
IMM GRANULOCYTES NFR BLD AUTO: 1.1 % (ref 0–0.5)
LARGE PLATELETS: NORMAL
LYMPHOCYTES # BLD AUTO: 1.86 10*3/MM3 (ref 0.7–3.1)
LYMPHOCYTES NFR BLD AUTO: 18.6 % (ref 19.6–45.3)
MACROCYTES BLD QL SMEAR: NORMAL
MAGNESIUM SERPL-MCNC: 1.8 MG/DL (ref 1.6–2.4)
MCH RBC QN AUTO: 34.9 PG (ref 26.6–33)
MCHC RBC AUTO-ENTMCNC: 33.4 G/DL (ref 31.5–35.7)
MCV RBC AUTO: 104.3 FL (ref 79–97)
MONOCYTES # BLD AUTO: 1.21 10*3/MM3 (ref 0.1–0.9)
MONOCYTES NFR BLD AUTO: 12.1 % (ref 5–12)
NEUTROPHILS NFR BLD AUTO: 5.93 10*3/MM3 (ref 1.7–7)
NEUTROPHILS NFR BLD AUTO: 59.5 % (ref 42.7–76)
NRBC BLD AUTO-RTO: 0 /100 WBC (ref 0–0.2)
PHOSPHATE SERPL-MCNC: 2.3 MG/DL (ref 2.5–4.5)
PLATELET # BLD AUTO: 214 10*3/MM3 (ref 140–450)
PMV BLD AUTO: 9.7 FL (ref 6–12)
POIKILOCYTOSIS BLD QL SMEAR: NORMAL
POTASSIUM SERPL-SCNC: 3.7 MMOL/L (ref 3.5–5.2)
PROT SERPL-MCNC: 5.8 G/DL (ref 6–8.5)
RBC # BLD AUTO: 3.47 10*6/MM3 (ref 3.77–5.28)
SODIUM SERPL-SCNC: 142 MMOL/L (ref 136–145)
WBC MORPH BLD: NORMAL
WBC NRBC COR # BLD AUTO: 9.98 10*3/MM3 (ref 3.4–10.8)

## 2025-07-23 PROCEDURE — 80053 COMPREHEN METABOLIC PANEL: CPT | Performed by: INTERNAL MEDICINE

## 2025-07-23 PROCEDURE — 85007 BL SMEAR W/DIFF WBC COUNT: CPT | Performed by: INTERNAL MEDICINE

## 2025-07-23 PROCEDURE — A9270 NON-COVERED ITEM OR SERVICE: HCPCS | Performed by: INTERNAL MEDICINE

## 2025-07-23 PROCEDURE — 96401 CHEMO ANTI-NEOPL SQ/IM: CPT

## 2025-07-23 PROCEDURE — 84100 ASSAY OF PHOSPHORUS: CPT | Performed by: INTERNAL MEDICINE

## 2025-07-23 PROCEDURE — 25010000002 DIPHENHYDRAMINE PER 50 MG: Performed by: INTERNAL MEDICINE

## 2025-07-23 PROCEDURE — 25010000002 ZOLEDRONIC ACID 4 MG/100ML SOLUTION: Performed by: INTERNAL MEDICINE

## 2025-07-23 PROCEDURE — 25010000002 HEPARIN LOCK FLUSH PER 10 UNITS: Performed by: INTERNAL MEDICINE

## 2025-07-23 PROCEDURE — 63710000001 ACETAMINOPHEN EXTRA STRENGTH 500 MG TABLET: Performed by: INTERNAL MEDICINE

## 2025-07-23 PROCEDURE — 25010000002 DARATUMUMAB-HYALURONIDASE-FIHJ 1800-30000 MG-UT/15ML SOLUTION: Performed by: INTERNAL MEDICINE

## 2025-07-23 PROCEDURE — 96375 TX/PRO/DX INJ NEW DRUG ADDON: CPT

## 2025-07-23 PROCEDURE — 25810000003 SODIUM CHLORIDE 0.9 % SOLUTION: Performed by: INTERNAL MEDICINE

## 2025-07-23 PROCEDURE — 83735 ASSAY OF MAGNESIUM: CPT | Performed by: INTERNAL MEDICINE

## 2025-07-23 PROCEDURE — 85025 COMPLETE CBC W/AUTO DIFF WBC: CPT | Performed by: INTERNAL MEDICINE

## 2025-07-23 PROCEDURE — 25010000002 METHYLPREDNISOLONE PER 125 MG: Performed by: INTERNAL MEDICINE

## 2025-07-23 PROCEDURE — 96374 THER/PROPH/DIAG INJ IV PUSH: CPT

## 2025-07-23 RX ORDER — HEPARIN SODIUM (PORCINE) LOCK FLUSH IV SOLN 100 UNIT/ML 100 UNIT/ML
500 SOLUTION INTRAVENOUS AS NEEDED
Status: CANCELLED | OUTPATIENT
Start: 2025-07-28

## 2025-07-23 RX ORDER — METHYLPREDNISOLONE SODIUM SUCCINATE 125 MG/2ML
60 INJECTION INTRAMUSCULAR; INTRAVENOUS ONCE
Status: CANCELLED | OUTPATIENT
Start: 2025-07-23

## 2025-07-23 RX ORDER — HEPARIN SODIUM (PORCINE) LOCK FLUSH IV SOLN 100 UNIT/ML 100 UNIT/ML
500 SOLUTION INTRAVENOUS AS NEEDED
Status: DISCONTINUED | OUTPATIENT
Start: 2025-07-23 | End: 2025-07-24 | Stop reason: HOSPADM

## 2025-07-23 RX ORDER — SODIUM CHLORIDE 0.9 % (FLUSH) 0.9 %
20 SYRINGE (ML) INJECTION AS NEEDED
Status: DISCONTINUED | OUTPATIENT
Start: 2025-07-23 | End: 2025-07-24 | Stop reason: HOSPADM

## 2025-07-23 RX ORDER — HYDROCORTISONE SODIUM SUCCINATE 100 MG/2ML
100 INJECTION INTRAMUSCULAR; INTRAVENOUS AS NEEDED
Status: CANCELLED | OUTPATIENT
Start: 2025-07-23

## 2025-07-23 RX ORDER — ACETAMINOPHEN 500 MG
1000 TABLET ORAL ONCE
Status: CANCELLED | OUTPATIENT
Start: 2025-07-23

## 2025-07-23 RX ORDER — SODIUM CHLORIDE 9 MG/ML
20 INJECTION, SOLUTION INTRAVENOUS ONCE
Status: CANCELLED | OUTPATIENT
Start: 2025-07-23

## 2025-07-23 RX ORDER — ZOLEDRONIC ACID 0.04 MG/ML
4 INJECTION, SOLUTION INTRAVENOUS ONCE
Status: CANCELLED | OUTPATIENT
Start: 2025-07-23

## 2025-07-23 RX ORDER — METHYLPREDNISOLONE SODIUM SUCCINATE 125 MG/2ML
60 INJECTION INTRAMUSCULAR; INTRAVENOUS ONCE
Status: COMPLETED | OUTPATIENT
Start: 2025-07-23 | End: 2025-07-23

## 2025-07-23 RX ORDER — SODIUM CHLORIDE 9 MG/ML
20 INJECTION, SOLUTION INTRAVENOUS ONCE
Status: COMPLETED | OUTPATIENT
Start: 2025-07-23 | End: 2025-07-23

## 2025-07-23 RX ORDER — ZOLEDRONIC ACID 0.04 MG/ML
4 INJECTION, SOLUTION INTRAVENOUS ONCE
Status: COMPLETED | OUTPATIENT
Start: 2025-07-23 | End: 2025-07-23

## 2025-07-23 RX ORDER — FAMOTIDINE 10 MG/ML
20 INJECTION, SOLUTION INTRAVENOUS AS NEEDED
Status: CANCELLED | OUTPATIENT
Start: 2025-07-23

## 2025-07-23 RX ORDER — SODIUM CHLORIDE 9 MG/ML
20 INJECTION, SOLUTION INTRAVENOUS ONCE
Status: DISCONTINUED | OUTPATIENT
Start: 2025-07-23 | End: 2025-07-24 | Stop reason: HOSPADM

## 2025-07-23 RX ORDER — ACETAMINOPHEN 500 MG
1000 TABLET ORAL ONCE
Status: COMPLETED | OUTPATIENT
Start: 2025-07-23 | End: 2025-07-23

## 2025-07-23 RX ORDER — SODIUM CHLORIDE 0.9 % (FLUSH) 0.9 %
20 SYRINGE (ML) INJECTION AS NEEDED
Status: CANCELLED | OUTPATIENT
Start: 2025-07-23

## 2025-07-23 RX ORDER — DIPHENHYDRAMINE HYDROCHLORIDE 50 MG/ML
50 INJECTION, SOLUTION INTRAMUSCULAR; INTRAVENOUS AS NEEDED
Status: CANCELLED | OUTPATIENT
Start: 2025-07-23

## 2025-07-23 RX ADMIN — DARATUMUMAB AND HYALURONIDASE-FIHJ (HUMAN RECOMBINANT) 1800 MG: 1800; 30000 INJECTION SUBCUTANEOUS at 13:02

## 2025-07-23 RX ADMIN — Medication 20 ML: at 12:41

## 2025-07-23 RX ADMIN — Medication 20 ML: at 09:21

## 2025-07-23 RX ADMIN — SODIUM CHLORIDE 25 MG: 9 INJECTION, SOLUTION INTRAVENOUS at 11:16

## 2025-07-23 RX ADMIN — ZOLEDRONIC ACID 4 MG: 0.04 INJECTION, SOLUTION INTRAVENOUS at 11:38

## 2025-07-23 RX ADMIN — METHYLPREDNISOLONE SODIUM SUCCINATE 60 MG: 125 INJECTION INTRAMUSCULAR; INTRAVENOUS at 11:12

## 2025-07-23 RX ADMIN — SODIUM CHLORIDE 20 ML/HR: 9 INJECTION, SOLUTION INTRAVENOUS at 11:09

## 2025-07-23 RX ADMIN — HEPARIN 500 UNITS: 100 SYRINGE at 12:41

## 2025-07-23 RX ADMIN — ACETAMINOPHEN 1000 MG: 500 TABLET ORAL at 11:09

## 2025-07-23 NOTE — PROGRESS NOTES
Chief Complaint  Multiple myeloma not having achieved remission    Rena Guerra,*  Rena Guerra, PA    Subjective          Anca Samson presents to [facility] for treatment of her myeloma.  She is on active therapy with daratumumab, lenalidomide, dexamethasone and zoledronic acid.  Compliant with the regimen.  Tolerating well.  She is trying to exercise.  She recently had knee surgery and still recovering from that.  She denies new masses, adenopathy, unusual aches or pains.  She notes adequate appetite.    Oncology/Hematology History Overview Note   Smoldering Myeloma    Initially diagnosed in 2016 with bone marrow biopsy demonstrating 30% CD56 positive monoclonal plasma cell population.  45 XX normal female chromosome pattern  FISH panel negative for myeloma associated mutations including 1q21, 9q34,11q13,14q32,15q24, 17q13  No anemia, renal insufficiency, hypercalcemia.  On observation since that time    Low grade adenocarcinoma of ascending colon      5/16/2017 right hemicolectomy which  revealed a low-grade 7.6 cm adenocarcinoma of the cecum. All margins were  negative. Lymphovascular invasion and perineural invasion were not identified.     26 lymph nodes were removed and unfortunately 1 was involved with metastatic deposit. pT3 pN1a colorectal cancer.        Clinical Staging      Smoldering Myeloma; Colon ([id number])            Treatments      Chemotherapy      11/2017 completed 6mo adjuvant Xeloda        6/2022 Stopped Smoking     Malignant neoplasm of ascending colon   7/21/2017 Initial Diagnosis    Colon cancer (CMS/HCC)     Multiple myeloma   7/28/2021 Initial Diagnosis    Multiple myeloma     11/7/2023 -  Chemotherapy    OP MULTIPLE MYELOMA Daratumumab / Lenalidomide / Dexamethasone     2/19/2025 -  Chemotherapy    OP SUPPORTIVE Zoledronic Acid Q28D           Current Outpatient Medications on File Prior to Visit   Medication Sig Dispense  Refill    albuterol sulfate  (90 Base) MCG/ACT inhaler Take 2 puffs by mouth Every 4 (Four) to 6 (Six) Hours As Needed for Wheezing.      aspirin 81 MG EC tablet Take 1 tablet by mouth Daily.      busPIRone (BUSPAR) 15 MG tablet Take 1 tablet by mouth 3 (Three) Times a Day. 270 tablet 1    colestipol (COLESTID) 1 g tablet TAKE 2 TABLETS BY MOUTH TWICE A  tablet 1    dapsone 25 MG tablet TAKE 2 TABLETS BY MOUTH TWICE A  tablet 1    dexAMETHasone (DECADRON) 4 MG tablet Take 10 tablets on D 1,8,15,22 and take 1 tablet on D 2,3.  Take in the morning with food. (Patient taking differently: Take 10 tablets on D 1,8,15,22 and take 1 tablet on D 2,3.  Take in the morning with food.  TAKES ONLY AROUND CHEMO DATES, NOT CURRENT TAKING THIS WEEK 7/1/25) 42 tablet 5    FLUoxetine (PROzac) 40 MG capsule Take 1 capsule by mouth Daily. 90 capsule 1    Fluticasone-Umeclidin-Vilant (TRELEGY) 100-62.5-25 MCG/ACT inhaler Inhale 1 puff Daily. 1 each 11    HYDROcodone-acetaminophen (NORCO) 7.5-325 MG per tablet Take 1 tablet by mouth Every 4 (Four) Hours As Needed for Moderate Pain. 36 tablet 0    lenalidomide (REVLIMID) 15 MG capsule Take 1 capsule by mouth Daily. On Days 1-21, and off 7 days, on a 28 day cycle 21 capsule 0    naloxone (NARCAN) 4 MG/0.1ML nasal spray Call 911. Don't prime. [place] in 1 nostril for overdose. Repeat in 2-3 minutes in other nostril if no or minimal breathing/responsiveness. 2 each 0    ondansetron (ZOFRAN) 8 MG tablet TAKE 1 TABLET BY MOUTH THREE TIMES A DAY AS NEEDED FOR NAUSEA AND VOMITING (Patient taking differently: Take 1 tablet by mouth Every 8 (Eight) Hours As Needed.) 30 tablet 5    traMADol (ULTRAM) 50 MG tablet Take 1 tablet by mouth Every 8 (Eight) Hours As Needed for Moderate Pain (knee pain). 90 tablet 1     Current Facility-Administered Medications on File Prior to Visit   Medication Dose Route Frequency Provider Last Rate Last Admin    [DISCONTINUED] heparin injection 500  Units  500 Units Intravenous PRN West Nicolas MD   500 Units at 07/23/25 1241    [DISCONTINUED] sodium chloride 0.9 % flush 20 mL  20 mL Intravenous PRN West Nicolas MD   20 mL at 07/23/25 1241       Allergies   Allergen Reactions    Morphine Anaphylaxis    Cephalexin Hives    Penicillins Hives     Beta lactam allergy details  Antibiotic reaction: hives  Age at reaction: child  Dose to reaction time: unknown  Reason for antibiotic: unknown  Epinephrine required for reaction?: unknown  Tolerated antibiotics: other (none per pt)        Sulfa Antibiotics Hives     Past Medical History:   Diagnosis Date    Anxiety     Asthma 07/28/2021    Atherosclerosis of native coronary artery of native heart with angina pectoris 08/15/2023    CLEARED FOR PRIOR SURG. NO SCHED F/U.  NO CP, HX COPD SOAE    C. difficile diarrhea     DENIES ANY CURRENT ISSUES    Chronic diarrhea     Colon cancer 07/21/2017    REMOVED NO CURRENT TREATMENT    Colon polyp     COPD (chronic obstructive pulmonary disease) 10/23/2017    Depression     Disease of thyroid gland     Diverticulitis     Diverticulitis of colon     NO CURRENT S/S    Diverticulosis 7-    Dysphagia     Emphysema of lung     Emphysema/COPD     GERD (gastroesophageal reflux disease)     Head injury     CONCUSSION AT AGE 4    Hernia 2019    History of chemotherapy     MONTLY INFUSION FOR M.M.    Hx of psychiatric care     Impaired fasting glucose 08/14/2015    Lumbago     Lung nodule 02/17/2022    MONITORING    Major depressive disorder 10/27/2016    Malignant neoplasm of ascending colon 07/21/2017    REMOVED    Multiple myeloma     CURRENTLY BEING TREATED/MAINTENANCE CHEMO INFUS. MONTHLY    Obesity     NICK (obstructive sleep apnea) 11/06/2023    BIPAP    Osteopenia     Oxygen dependent     PRN 2 LITER NC    Pancreatitis 7/26/2024    Panic disorder     Primary central sleep apnea     BIPAP, O2  2 L    Renal insufficiency 07/28/2021    NO RENAL DOCTOR/ 6/11/25 CMP CRT 1.0  GFR 57.5    Seizures     PSEUDO SEIZURES LAST ONE AROUND JULY 2024    Shortness of breath     CAN GET VERY SOA WITH MINIMAL EXERTION    Sinusitis     Tinnitus     Tobacco use 07/28/2021    QUIT SMOKING JUNE 2022    Tremor     Visual impairment     Vitamin D deficiency      Past Surgical History:   Procedure Laterality Date    APPENDECTOMY      BONE MARROW BIOPSY  2016    CHOLECYSTECTOMY N/A 08/08/2024    Procedure: CHOLECYSTECTOMY LAPAROSCOPIC WITH DAVINCI ROBOT;  Surgeon: Eduardo Ingram MD;  Location: [facility] [place];  Service: Robotics - DaVinci;  Laterality: N/A;    COLON SURGERY  2017    COLONOSCOPY  2018,2017,2019    [facility]:DIVERTICULOSIS AND ERYTHEMA OF MUCOSA    COLONOSCOPY N/A 12/20/2022    Procedure: COLONOSCOPY WITH ELEVIEW INJECTION, POLYPECTOMY, HOT SNARE, CLIP APPLICATION X3, BIOPSIES;  Surgeon: Jarvis Borges MD;  Location: [facility] ENDOSCOPY;  Service: Gastroenterology;  Laterality: N/A;  COLON POLYP, DIVERTICULOSIS, ANASTOMOSIS RIGHT COLON    COLONOSCOPY N/A 11/09/2023    Procedure: COLONOSCOPY;  Surgeon: Jarvis Borges MD;  Location: [facility] ENDOSCOPY;  Service: Gastroenterology;  Laterality: N/A;  DIVERTICULOSIS, ANASTOMOSIS IN ASCENDING COLON    ENDOSCOPY  2018    ERCP N/A 07/27/2024    Procedure: ENDOSCOPIC RETROGRADE CHOLANGIOPANCREATOGRAPHY WITH SPHINCTEROTOMY, BALLOON SWEEP, STENT PLACEMENT;  Surgeon: Ajay Kenneyd MD;  Location: [facility] MAIN OR;  Service: Gastroenterology;  Laterality: N/A;  BILE DUCT STONE    ERCP N/A 09/05/2024    Procedure: ENDOSCOPIC RETROGRADE CHOLANGIOPANCREATOGRAPHY WITH SPINCHTEROTOMY. BALLOON DILATATION 8-10. BALLOON SWEEP 12-15. STENT REMOVAL;  Surgeon: Ajay Kennedy MD;  Location: [facility] ENDOSCOPY;  Service: Gastroenterology;  Laterality: N/A;  BILE DUCT STONES    EYE SURGERY      FECAL DISIMPACTION  06/2019    TRANSPLANT     GALLBLADDER SURGERY      HEMICOLECTOMY Right 05/2017    EEA    HERNIA REPAIR  2024     HYSTERECTOMY  1982,1984    KNEE ARTHROSCOPY Left 01/03/2022    Procedure: KNEE ARTHROSCOPY WITH PARTIAL MEDIAL MENISCECTOMY,  CHONDROPLASTY;  Surgeon: Nicholas Tipton MD;  Location: [facility] [place] [facility];  Service: Orthopedics;  Laterality: Left;    KNEE ARTHROSCOPY Right 7/7/2025    Procedure: RIGHT KNEE ARTHROSCOPY WITH PARTIAL MEDIAL MENISCECTOMY AND CHONDROPLASTY;  Surgeon: Nicholas Tipton MD;  Location: [facility] OR [facility];  Service: Orthopedics;  Laterality: Right;    LIPOMA EXCISION Bilateral 03/20/2025    Procedure: Excision of bilateral upper arm lipomas-removed subcutaneous fatty tumors from both upper arms;  Surgeon: Eduardo Ingram MD;  Location: [facility] MAIN OR;  Service: General;  Laterality: Bilateral;    MINI-LAPAROTOMY  2017    TONSILLECTOMY      TUBAL ABDOMINAL LIGATION      UPPER GASTROINTESTINAL ENDOSCOPY  2018    VENOUS ACCESS DEVICE (PORT) INSERTION N/A 10/31/2023    Procedure: Port-a-catheter removal and port-a-catheter placement;  Surgeon: Alcon Delgado MD;  Location: [facility];  Service: General;  Laterality: N/A;    VENTRAL HERNIA REPAIR N/A 10/03/2024    Procedure: VENTRAL / INCISIONAL HERNIA REPAIR LAPAROSCOPIC WITH DAVINCI ROBOT;  Surgeon: Eduardo Ingram MD;  Location: [facility];  Service: Robotics - DaVinci;  Laterality: N/A;     Social History     Socioeconomic History    Marital status:    Tobacco Use    Smoking status: Former     Current packs/day: 0.00     Average packs/day: 1 pack/day for 47.4 years (47.4 ttl pk-yrs)     Types: Cigarettes     Start date: 1/1/1975     Quit date: 6/3/2022     Years since quitting: 3.1     Passive exposure: Past    Smokeless tobacco: Never   Vaping Use    Vaping status: Never Used   Substance and Sexual Activity    Alcohol use: Never    Drug use: Never    Sexual activity: Not Currently     Partners: Male     Birth control/protection: None     Family History   Problem Relation Age of Onset    Heart disease Mother     Lung cancer  "Mother     Cancer Mother     Stroke Father     Heart disease Father     Kidney cancer Father     Cancer Father     Stroke Other         UNCLE/AUNT    Neuropathy Brother     Hypertension Brother     Anxiety disorder Brother     Colon cancer Neg Hx     Malig Hyperthermia Neg Hx        Objective   Physical Exam  Vitals reviewed. Exam conducted with a chaperone present.   Cardiovascular:      Rate and Rhythm: Normal rate and regular rhythm.      Heart sounds: Normal heart sounds. No murmur heard.     No gallop.   Pulmonary:      Effort: Pulmonary effort is normal.      Breath sounds: Normal breath sounds.   Abdominal:      General: Bowel sounds are normal.   Lymphadenopathy:      Cervical: No cervical adenopathy.   Psychiatric:         Mood and Affect: Mood normal.         Behavior: Behavior normal.         Vitals:    07/23/25 0956   BP: 133/74   Pulse: 84   Resp: 20   Temp: 98.1 °F (36.7 °C)   TempSrc: Temporal   SpO2: 95%   Weight: 84.8 kg (186 lb 15.2 oz)   Height: 154.9 cm (60.98\")   PainSc: 0-No pain     ECOG score: 2         PHQ-9 Total Score:                    Result Review :   The following data was reviewed by: West Nicolas MD on 07/23/2025:  Lab Results   Component Value Date    HGB 12.1 07/23/2025    HCT 36.2 07/23/2025    .3 (H) 07/23/2025     07/23/2025    WBC 9.98 07/23/2025    NEUTROABS 5.93 07/23/2025    LYMPHSABS 1.86 07/23/2025    MONOSABS 1.21 (H) 07/23/2025    EOSABS 0.83 (H) 07/23/2025    BASOSABS 0.04 07/23/2025     Lab Results   Component Value Date    GLUCOSE 136 (H) 07/23/2025    BUN 15.4 07/23/2025    CREATININE 0.81 07/23/2025     07/23/2025    K 3.7 07/23/2025     (H) 07/23/2025    CO2 15.2 (L) 07/23/2025    CALCIUM 8.7 07/23/2025    PROTEINTOT 5.8 (L) 07/23/2025    ALBUMIN 4.0 07/23/2025    BILITOT 0.7 07/23/2025    ALKPHOS 48 07/23/2025    AST 11 07/23/2025    ALT 21 07/23/2025     Lab Results   Component Value Date    MG 1.8 07/23/2025    PHOS 2.3 (L) " "07/23/2025    FREET4 1.55 12/06/2023    TSH 0.684 09/11/2024     No results found for: \"IRON\", \"LABIRON\", \"TRANSFERRIN\", \"TIBC\"  Lab Results   Component Value Date     03/07/2023    CYIFGQLL27 612 04/15/2024    FOLATE 8.95 03/27/2024     No results found for: \"PSA\", \"CEA\", \"AFP\", \"\", \"\"          Assessment and Plan    Diagnoses and all orders for this visit:    1. Multiple myeloma not having achieved remission (Primary)  Assessment & Plan:  Patient is on treatment with daratumumab, lenalidomide, dexamethasone and zoledronic acid.  Tolerating her treatments well.  Her M spike continues to decline, now to 0.2 g/dL.  Her lab work today looks good.  Proceed with cycle 21 as planned.  Continue monthly zoledronic acid.  I will see her back for her next cycle with lab work prior to monitor for toxicities.  I will plan repeat myeloma protein studies at the 3-month interval.    Orders:  -     CBC and Differential; Future  -     Comprehensive metabolic panel; Future  -     Magnesium; Future  -     Phosphorus; Future  -     Cancel: CBC and Differential; Future  -     Cancel: Comprehensive metabolic panel; Future    2. Encounter for long-term (current) use of medications  -     CBC and Differential; Future  -     Comprehensive metabolic panel; Future    Other orders  -     Cancel: sodium chloride 0.9 % infusion  -     Cancel: acetaminophen (TYLENOL) tablet 1,000 mg  -     Cancel: methylPREDNISolone sodium succinate (SOLU-Medrol) injection 60 mg  -     Cancel: diphenhydrAMINE (BENADRYL) IVPB 25 mg  -     Cancel: daratumumab-hyaluronidase-fihj (DARZALEX FASPRO) 1800-30344 MG-UT/15ML injection 1,800 mg  -     Cancel: Hydrocortisone Sod Suc (PF) (Solu-CORTEF) injection 100 mg  -     Cancel: diphenhydrAMINE (BENADRYL) injection 50 mg  -     Cancel: famotidine (PEPCID) injection 20 mg  -     Cancel: sodium chloride 0.9 % infusion  -     Cancel: zoledronic acid (ZOMETA) infusion 4 mg/100 mL (premix)            Patient " Follow Up: Cycle 22-day 1    Patient was given instructions and counseling regarding her condition or for health maintenance advice. Please see specific information pulled into the AVS if appropriate.     West Nicolas MD    7/24/2025

## 2025-07-24 NOTE — ASSESSMENT & PLAN NOTE
Patient is on treatment with daratumumab, lenalidomide, dexamethasone and zoledronic acid.  Tolerating her treatments well.  Her M spike continues to decline, now to 0.2 g/dL.  Her lab work today looks good.  Proceed with cycle 21 as planned.  Continue monthly zoledronic acid.  I will see her back for her next cycle with lab work prior to monitor for toxicities.  I will plan repeat myeloma protein studies at the 3-month interval.

## 2025-07-25 ENCOUNTER — OFFICE VISIT (OUTPATIENT)
Dept: ORTHOPEDIC SURGERY | Facility: CLINIC | Age: 75
End: 2025-07-25
Payer: MEDICARE

## 2025-07-25 VITALS — BODY MASS INDEX: 35.12 KG/M2 | WEIGHT: 186 LBS | HEIGHT: 61 IN

## 2025-07-25 DIAGNOSIS — M17.11 PRIMARY OSTEOARTHRITIS OF RIGHT KNEE: Primary | ICD-10-CM

## 2025-07-25 NOTE — PROGRESS NOTES
Chief Complaint  Follow-up of the Right Knee and Suture / Staple Removal       Subjective      Anca Samson presents to [facility] for a follow up for her right knee. She recently underwent a right knee arthroscopy with partial medial  menisectomy and chondroplasty performed on 07/07/25. She present today for her two week post-operative appointment where her sutures were removed. She is ambulating today with a cane. She is overall doing well but states she still has occasional pain to the medial  aspect of her knee.     Allergies   Allergen Reactions    Morphine Anaphylaxis    Cephalexin Hives    Penicillins Hives     Beta lactam allergy details  Antibiotic reaction: hives  Age at reaction: child  Dose to reaction time: unknown  Reason for antibiotic: unknown  Epinephrine required for reaction?: unknown  Tolerated antibiotics: other (none per pt)        Sulfa Antibiotics Hives        Social History     Socioeconomic History    Marital status:    Tobacco Use    Smoking status: Former     Current packs/day: 0.00     Average packs/day: 1 pack/day for 47.4 years (47.4 ttl pk-yrs)     Types: Cigarettes     Start date: 1/1/1975     Quit date: 6/3/2022     Years since quitting: 3.1     Passive exposure: Past    Smokeless tobacco: Never   Vaping Use    Vaping status: Never Used   Substance and Sexual Activity    Alcohol use: Never    Drug use: Never    Sexual activity: Not Currently     Partners: Male     Birth control/protection: None        I reviewed the patient's chief complaint, history of present illness, review of systems, past medical history, surgical history, family history, social history, medications, and allergy list.     Review of Systems     Constitutional: Denies fevers, chills, weight loss  Cardiovascular: Denies chest pain, shortness of breath  Skin: Denies rashes, acute skin changes  Neurologic: Denies headache, loss of consciousness  MSK: right knee pain  "      Vital Signs:   Ht 154.9 cm (60.98\")   Wt 84.4 kg (186 lb)   BMI 35.17 kg/m²            Ortho Exam    Physical Exam  General:Alert. No acute distress   Right lower extremity: sutures were removed today in the office, no redness, no signs of infection, stable to varus/valgus stress, mild swelling and bruising, tender to the medial joint line , non tender to the lateral joint line, calf soft, distal neurovascularly intact, positive  pulses, positive EHL, FHL, GS, and TA. Sensation intact to all 5 nerves of the foot.     Procedures    Imaging Results (Most Recent)       None             Result Review :       CT Sinus Without Contrast  Result Date: 6/30/2025  Narrative: CT SINUS WO CONTRAST Date of Exam: 6/27/2025 3:02 PM EDT Indication: Bilateral maxillary sinus disease. Comparison: None available. Technique: Axial CT images were obtained from the inferior aspect of the mandible through the frontal sinuses without contrast administration.  Reconstructed coronal and sagittal images were also obtained. Automated exposure control and iterative construction methods were used. Findings: The frontal sinuses are underdeveloped and evaluated. The ethmoids, sphenoids are well aerated. There are mucous retention cysts or polyps in the bilateral maxillary sinuses. Mucosal thickening in the right maxillary sinus. No air-fluid levels. The bilateral ostiomeatal complexes are patent. The nasal septum is mildly deviated to the left. No acute facial fractures. The visualized facial soft tissues are unremarkable.     Impression: Impression: Mucous retention cysts or polyps in the bilateral maxillary sinuses. Mucosal thickening in the right maxillary sinus. No air-fluid levels. Electronically Signed: Que Potter MD  6/30/2025 1:06 PM EDT  Workstation ID: [id number]             Assessment and Plan     There are no diagnoses linked to this encounter.    The patient presents here today for a follow up for her right knee. "     Incison care was discussed and reviewed with the patient today in the office.     She will continue home exercises and current medications for pain control.     May consider injections in the future.     Call or return if worsening symptoms.    Follow Up     4 weeks     Patient was given instructions and counseling regarding her condition or for health maintenance advice. Please see specific information pulled into the AVS if appropriate.     Scribed for Nicholas Tipton MD by Cailin Hallman.  07/25/25   08:42 EDT    I have personally performed the services described in this document as scribed by the above individual and it is both accurate and complete. Nicholas Tipton MD 07/27/25

## 2025-07-28 ENCOUNTER — HOSPITAL ENCOUNTER (OUTPATIENT)
Dept: ONCOLOGY | Facility: HOSPITAL | Age: 75
Discharge: HOME | End: 2025-07-28
Admitting: INTERNAL MEDICINE
Payer: MEDICARE

## 2025-07-28 ENCOUNTER — OFFICE VISIT (OUTPATIENT)
Dept: OTOLARYNGOLOGY | Facility: CLINIC | Age: 75
End: 2025-07-28
Payer: MEDICARE

## 2025-07-28 VITALS
WEIGHT: 187 LBS | SYSTOLIC BLOOD PRESSURE: 137 MMHG | BODY MASS INDEX: 35.3 KG/M2 | HEIGHT: 61 IN | TEMPERATURE: 97.5 F | DIASTOLIC BLOOD PRESSURE: 81 MMHG | HEART RATE: 81 BPM

## 2025-07-28 DIAGNOSIS — Z12.2 ENCOUNTER FOR SCREENING FOR LUNG CANCER: Primary | ICD-10-CM

## 2025-07-28 DIAGNOSIS — J31.0 RHINITIS, UNSPECIFIED TYPE: Primary | ICD-10-CM

## 2025-07-28 DIAGNOSIS — C90.00 MULTIPLE MYELOMA NOT HAVING ACHIEVED REMISSION: ICD-10-CM

## 2025-07-28 PROCEDURE — 1159F MED LIST DOCD IN RCRD: CPT | Performed by: OTOLARYNGOLOGY

## 2025-07-28 PROCEDURE — 82784 ASSAY IGA/IGD/IGG/IGM EACH: CPT | Performed by: INTERNAL MEDICINE

## 2025-07-28 PROCEDURE — 36591 DRAW BLOOD OFF VENOUS DEVICE: CPT

## 2025-07-28 PROCEDURE — 1160F RVW MEDS BY RX/DR IN RCRD: CPT | Performed by: OTOLARYNGOLOGY

## 2025-07-28 PROCEDURE — 86334 IMMUNOFIX E-PHORESIS SERUM: CPT | Performed by: INTERNAL MEDICINE

## 2025-07-28 PROCEDURE — 99214 OFFICE O/P EST MOD 30 MIN: CPT | Performed by: OTOLARYNGOLOGY

## 2025-07-28 PROCEDURE — 3075F SYST BP GE 130 - 139MM HG: CPT | Performed by: OTOLARYNGOLOGY

## 2025-07-28 PROCEDURE — 25010000002 HEPARIN LOCK FLUSH PER 10 UNITS: Performed by: INTERNAL MEDICINE

## 2025-07-28 PROCEDURE — 3079F DIAST BP 80-89 MM HG: CPT | Performed by: OTOLARYNGOLOGY

## 2025-07-28 RX ORDER — FLUTICASONE PROPIONATE 50 MCG
2 SPRAY, SUSPENSION (ML) NASAL DAILY
Qty: 16 G | Refills: 6 | Status: SHIPPED | OUTPATIENT
Start: 2025-07-28

## 2025-07-28 RX ORDER — HEPARIN SODIUM (PORCINE) LOCK FLUSH IV SOLN 100 UNIT/ML 100 UNIT/ML
500 SOLUTION INTRAVENOUS AS NEEDED
Status: DISCONTINUED | OUTPATIENT
Start: 2025-07-28 | End: 2025-07-29 | Stop reason: HOSPADM

## 2025-07-28 RX ORDER — SODIUM CHLORIDE 0.9 % (FLUSH) 0.9 %
20 SYRINGE (ML) INJECTION AS NEEDED
Status: DISCONTINUED | OUTPATIENT
Start: 2025-07-28 | End: 2025-07-29 | Stop reason: HOSPADM

## 2025-07-28 RX ORDER — HEPARIN SODIUM (PORCINE) LOCK FLUSH IV SOLN 100 UNIT/ML 100 UNIT/ML
500 SOLUTION INTRAVENOUS AS NEEDED
OUTPATIENT
Start: 2025-07-28

## 2025-07-28 RX ORDER — SODIUM CHLORIDE 0.9 % (FLUSH) 0.9 %
20 SYRINGE (ML) INJECTION AS NEEDED
OUTPATIENT
Start: 2025-07-28

## 2025-07-28 RX ADMIN — HEPARIN 500 UNITS: 100 SYRINGE at 09:48

## 2025-07-28 RX ADMIN — Medication 20 ML: at 09:49

## 2025-07-28 NOTE — PATIENT INSTRUCTIONS
1.  Patient has history of multiple myeloma and MRI of the brain showed some mastoid and sinus disease.  Therefore we went ahead and obtain CT of sinuses for further evaluation.  I reviewed the CT scan with patient and showing bilateral maxillary sinus disease with mucosal thickening but also had some polyps or mucous retention cyst.  Right side worse than left and also ostiomeatal complex obstruction on the right side but there is no evidence of any acute sinusitis present.  Also patient is not symptomatic despite these.  Ethmoid sinuses are clear and so he has frontal and sphenoid sinuses.  2.  Since patient is totally asymptomatic I recommend no surgical intervention at this time.  3.  However patient does complain about some drainage and I wonder if this is just allergic rhinitis rather than vasomotor rhinitis.  I will try her on Flonase nasal spray.  4.  I am going to see patient in 6 months for follow-up examination to see how she is doing.

## 2025-07-28 NOTE — PROGRESS NOTES
Patient Name: Anca Samson   Visit Date: 07/28/2025   Patient ID: [id number]  Provider: Justo Casper MD    Sex: female  [place] [facility]   YOB: 1950  Location Address: [address] [place] [address], [address],?[place]?[id number]    Primary Care Provider Rena Guerra PA  Location Phone: [phone]    Referring Provider: No ref. provider found        Chief Complaint  chronic maxillary sinusitis    History of Present Illness  Anca Samson is a 74 y.o. female who presents to [facility] for chronic maxillary sinusitis.   Patient reports that she has been diagnosed with multiple myeloma back in 2016.  She has been treated with chemotherapy and still she is being treated.  Back in May 2024, due to unsteadiness with multiple myeloma and Ms. Rena Guerra went ahead and obtained MRI of brain with and without contrast.  MRI showed the following results.  1. White matter changes most compatible with small vessel ischemicdisease in this age group.2. No abnormal enhancement on postcontrast imaging within the brainparenchyma.3. Prominent bilateral mastoid effusions left greater than right. Pleasecorrelate for mastoiditis3. Mild paranasal sinus disease     Therefore in order to address these abnormal findings on the MRI scan patient was sent to ENT for further evaluation and management.  According the patient she has not had any ear issues and remotely as a child she might of but she certainly do not remember or have any records to suspect that she had any ear issues or surgeries.  Patient also does not have any symptoms of sinus problems.    Patient had an MRI of brain for history of multiple myeloma which she is currently being treated and also for unsteadiness.  However incidental finding show bilateral maxillary sinus disease and also bilateral mastoid effusion which probably is chronic as she is not symptomatic.  Based on clinical  examination today she probably has chronic mastoid effusion and this is not acute and therefore probably not of concern.  As for the maxillary sinuses she is asymptomatic but there seems to be either polyp or retention cyst that is present within both maxillary sinuses.  These require further evaluation as it may be more solid than cystic.  Therefore I recommend obtaining a CT of sinuses for further evaluation and follow-up afterwards.  I will see patient after the CT scan.  CT scan obtained on 6/30/2025 shows,  Impression:  Mucous retention cysts or polyps in the bilateral maxillary sinuses. Mucosal thickening in the right maxillary sinus. No air-fluid levels.  Past Medical History:   Diagnosis Date    Anxiety     Asthma 07/28/2021    Atherosclerosis of native coronary artery of native heart with angina pectoris 08/15/2023    CLEARED FOR PRIOR SURG. NO SCHED F/U.  NO CP, HX COPD SOAE    C. difficile diarrhea     DENIES ANY CURRENT ISSUES    Cholelithiasis 2024    Chronic diarrhea     Colon cancer 07/21/2017    REMOVED NO CURRENT TREATMENT    Colon polyp     COPD (chronic obstructive pulmonary disease) 10/23/2017    Dental disease     Depression     Disease of thyroid gland     Diverticulitis     Diverticulitis of colon     NO CURRENT S/S    Diverticulosis 7-    Dysphagia     Emphysema of lung     Emphysema/COPD     GERD (gastroesophageal reflux disease)     Head injury     CONCUSSION AT AGE 4    Hernia 2019    History of chemotherapy     MONTLY INFUSION FOR M.M.    Hx of psychiatric care     Impaired fasting glucose 08/14/2015    Lumbago     Lung nodule 02/17/2022    MONITORING    Major depressive disorder 10/27/2016    Malignant neoplasm of ascending colon 07/21/2017    REMOVED    Multiple myeloma     CURRENTLY BEING TREATED/MAINTENANCE CHEMO INFUS. MONTHLY    Obesity     NICK (obstructive sleep apnea) 11/06/2023    BIPAP    Osteopenia     Oxygen dependent     PRN 2 LITER NC    Pancreatitis 7/26/2024     Panic disorder     Primary central sleep apnea     BIPAP, O2  2 L    Renal insufficiency 07/28/2021    NO RENAL DOCTOR/ 6/11/25 CMP CRT 1.0 GFR 57.5    Seizures     PSEUDO SEIZURES LAST ONE AROUND JULY 2024    Shortness of breath     CAN GET VERY SOA WITH MINIMAL EXERTION    Sinusitis     Tinnitus     Tobacco use 07/28/2021    QUIT SMOKING JUNE 2022    Tremor     Visual impairment     Vitamin D deficiency        Past Surgical History:   Procedure Laterality Date    APPENDECTOMY      BONE MARROW BIOPSY  2016    CHOLECYSTECTOMY N/A 08/08/2024    Procedure: CHOLECYSTECTOMY LAPAROSCOPIC WITH DAVINCI ROBOT;  Surgeon: Eduardo Ingram MD;  Location: [facility] OR [facility];  Service: Robotics - DaVinci;  Laterality: N/A;    COLON SURGERY  2017    COLONOSCOPY  2018,2017,2019    Merged with [facility] DR LE:DIVERTICULOSIS AND ERYTHEMA OF MUCOSA    COLONOSCOPY N/A 12/20/2022    Procedure: COLONOSCOPY WITH ELEVIEW INJECTION, POLYPECTOMY, HOT SNARE, CLIP APPLICATION X3, BIOPSIES;  Surgeon: Jarvis Borges MD;  Location: [facility] ENDOSCOPY;  Service: Gastroenterology;  Laterality: N/A;  COLON POLYP, DIVERTICULOSIS, ANASTOMOSIS RIGHT COLON    COLONOSCOPY N/A 11/09/2023    Procedure: COLONOSCOPY;  Surgeon: Jarvis Borges MD;  Location: [facility] ENDOSCOPY;  Service: Gastroenterology;  Laterality: N/A;  DIVERTICULOSIS, ANASTOMOSIS IN ASCENDING COLON    ENDOSCOPY  2018    ERCP N/A 07/27/2024    Procedure: ENDOSCOPIC RETROGRADE CHOLANGIOPANCREATOGRAPHY WITH SPHINCTEROTOMY, BALLOON SWEEP, STENT PLACEMENT;  Surgeon: Ajay Kennedy MD;  Location: [facility] MAIN OR;  Service: Gastroenterology;  Laterality: N/A;  BILE DUCT STONE    ERCP N/A 09/05/2024    Procedure: ENDOSCOPIC RETROGRADE CHOLANGIOPANCREATOGRAPHY WITH SPINCHTEROTOMY. BALLOON DILATATION 8-10. BALLOON SWEEP 12-15. STENT REMOVAL;  Surgeon: Ajya Kennedy MD;  Location: [facility] ENDOSCOPY;  Service: Gastroenterology;  Laterality: N/A;  BILE DUCT STONES    EYE SURGERY       FECAL DISIMPACTION  06/2019    TRANSPLANT     GALLBLADDER SURGERY      HEMICOLECTOMY Right 05/2017    EEA    HERNIA REPAIR  2024    HYSTERECTOMY  1982,1984    KNEE ARTHROSCOPY Left 01/03/2022    Procedure: KNEE ARTHROSCOPY WITH PARTIAL MEDIAL MENISCECTOMY,  CHONDROPLASTY;  Surgeon: Nicholas Tipton MD;  [place] [facility] [place] [facility]  Service: Orthopedics;  Laterality: Left;    KNEE ARTHROSCOPY Right 07/07/2025    Procedure: RIGHT KNEE ARTHROSCOPY WITH PARTIAL MEDIAL MENISCECTOMY AND CHONDROPLASTY;  Surgeon: Nicholas Tipton MD;  [place] [facility] [place] [facility] [place];  Service: Orthopedics;  Laterality: Right;    KNEE SURGERY      LIPOMA EXCISION Bilateral 03/20/2025    Procedure: Excision of bilateral upper arm lipomas-removed subcutaneous fatty tumors from both upper arms;  Surgeon: Eduardo Ingram MD;  Location: [facility] MAIN OR;  Service: General;  Laterality: Bilateral;    MINI-LAPAROTOMY  2017    TONSILLECTOMY      TUBAL ABDOMINAL LIGATION      UPPER GASTROINTESTINAL ENDOSCOPY  2018    VENOUS ACCESS DEVICE (PORT) INSERTION N/A 10/31/2023    Procedure: Port-a-catheter removal and port-a-catheter placement;  Surgeon: Alcon Delgado MD;  [place]: [facility] [place] [facility] [place];  Service: General;  Laterality: N/A;    VENTRAL HERNIA REPAIR N/A 10/03/2024    Procedure: VENTRAL / INCISIONAL HERNIA REPAIR LAPAROSCOPIC WITH DAVINCI ROBOT;  Surgeon: Eduardo Ingram MD;  Location: [facility];  Service: Robotics - DaVinci;  Laterality: N/A;         Current Outpatient Medications:     albuterol sulfate  (90 Base) MCG/ACT inhaler, Take 2 puffs by mouth Every 4 (Four) to 6 (Six) Hours As Needed for Wheezing., Disp: , Rfl:     aspirin 81 MG EC tablet, Take 1 tablet by mouth Daily., Disp: , Rfl:     busPIRone (BUSPAR) 15 MG tablet, Take 1 tablet by mouth 3 (Three) Times a Day., Disp: 270 tablet, Rfl: 1    colestipol (COLESTID) 1 g tablet, TAKE 2 TABLETS BY MOUTH TWICE A DAY, Disp: 360 tablet, Rfl: 1    dapsone 25 MG tablet, TAKE 2  TABLETS BY MOUTH TWICE A DAY, Disp: 360 tablet, Rfl: 1    dexAMETHasone (DECADRON) 4 MG tablet, Take 10 tablets on D 1,8,15,22 and take 1 tablet on D 2,3.  Take in the morning with food. (Patient taking differently: Take 10 tablets on D 1,8,15,22 and take 1 tablet on D 2,3.  Take in the morning with food. TAKES ONLY AROUND CHEMO DATES, NOT CURRENT TAKING THIS WEEK 7/1/25), Disp: 42 tablet, Rfl: 5    FLUoxetine (PROzac) 40 MG capsule, Take 1 capsule by mouth Daily., Disp: 90 capsule, Rfl: 1    Fluticasone-Umeclidin-Vilant (TRELEGY) 100-62.5-25 MCG/ACT inhaler, Inhale 1 puff Daily., Disp: 1 each, Rfl: 11    HYDROcodone-acetaminophen (NORCO) 7.5-325 MG per tablet, Take 1 tablet by mouth Every 4 (Four) Hours As Needed for Moderate Pain., Disp: 36 tablet, Rfl: 0    lenalidomide (REVLIMID) 15 MG capsule, Take 1 capsule by mouth Daily. On Days 1-21, and off 7 days, on a 28 day cycle, Disp: 21 capsule, Rfl: 0    naloxone (NARCAN) 4 MG/0.1ML nasal spray, Call 911. Don't prime. [place] in 1 nostril for overdose. Repeat in 2-3 minutes in other nostril if no or minimal breathing/responsiveness., Disp: 2 each, Rfl: 0    ondansetron (ZOFRAN) 8 MG tablet, TAKE 1 TABLET BY MOUTH THREE TIMES A DAY AS NEEDED FOR NAUSEA AND VOMITING (Patient taking differently: Take 1 tablet by mouth Every 8 (Eight) Hours As Needed.), Disp: 30 tablet, Rfl: 5    traMADol (ULTRAM) 50 MG tablet, Take 1 tablet by mouth Every 8 (Eight) Hours As Needed for Moderate Pain (knee pain)., Disp: 90 tablet, Rfl: 1    fluticasone (FLONASE) 50 MCG/ACT nasal spray, Administer 2 sprays into the nostril(s) as directed by provider Daily. Administer 2 sprays in each nostril for each dose., Disp: 16 g, Rfl: 6  No current facility-administered medications for this visit.    Facility-Administered Medications Ordered in Other Visits:     heparin injection 500 Units, 500 Units, Intravenous, PRN, West Nicolas MD, 500 Units at 07/28/25 0984    sodium chloride 0.9 % flush 20  "mL, 20 mL, Intravenous, PRN, West Nicolas MD, 20 mL at 07/28/25 0949     Allergies   Allergen Reactions    Morphine Anaphylaxis    Cephalexin Hives    Penicillins Hives     Beta lactam allergy details  Antibiotic reaction: hives  Age at reaction: child  Dose to reaction time: unknown  Reason for antibiotic: unknown  Epinephrine required for reaction?: unknown  Tolerated antibiotics: other (none per pt)        Sulfa Antibiotics Hives       Social History     Tobacco Use    Smoking status: Former     Current packs/day: 0.00     Average packs/day: 1 pack/day for 49.0 years (49.0 ttl pk-yrs)     Types: Cigarettes     Start date: 1/1/1975     Quit date: 6/3/2022     Years since quitting: 3.1     Passive exposure: Past    Smokeless tobacco: Never   Vaping Use    Vaping status: Never Used   Substance Use Topics    Alcohol use: Never    Drug use: Never        Objective     Vital Signs:   Vitals:    07/28/25 1642   BP: 137/81   Pulse: 81   Temp: 97.5 °F (36.4 °C)   Weight: 84.8 kg (187 lb)   Height: 154.9 cm (60.98\")       Tobacco Use: Medium Risk (7/28/2025)    Patient History     Smoking Tobacco Use: Former     Smokeless Tobacco Use: Never     Passive Exposure: Past         Physical Exam    Constitutional   Appearance  well developed, well-nourished, alert and in no acute distress, voice clear and strong    Head   Inspection  no deformities or lesions      Face   Inspection  no facial lesions; House-Brackmann I/VI bilaterally   Palpation  no TMJ crepitus nor  muscle tenderness bilaterally     Eyes/Vision   Visual Fields  extraocular movements are intact, no spontaneous or gaze-induced nystagmus  Conjunctivae  clear   Sclerae  clear   Pupils and Irises  pupils equal, round, and reactive to light.   Nystagmus  not present     Ears, Nose, Mouth and Throat  Ears  External Ears  appearance within normal limits, no lesions present   Otoscopic Examination  tympanic membrane appearance within normal limits " bilaterally without perforations, well-aerated middle ears   Hearing  intact to conversational voice both ears   Tunning fork testing    Rinne:  Brady:    Nose  External Nose  appearance normal   Intranasal Exam  mucosa within normal limits, vestibules normal, no intranasal lesions present, septum midline, sinuses non tender to percussion   Modified Balta Test:    Oral Cavity  Oral Mucosa  oral mucosa normal without pallor or cyanosis   Lips  lip appearance normal   Teeth  normal dentition for age   Gums  gums pink, non-swollen, no bleeding present   Tongue  tongue appearance normal; normal mobility   Palate  hard palate normal, soft palate appearance normal with symmetric mobility     Throat  Oropharynx  no inflammation or lesions present, tonsils within normal limits   Hypopharynx  appearance within normal limits   Larynx  voice normal     Neck  Inspection/Palpation  normal appearance, no masses or tenderness, trachea midline; thyroid size normal, nontender, no nodules or masses present on palpation     Lymphatic  Neck  no lymphadenopathy present   Supraclavicular Nodes  no lymphadenopathy present   Preauricular Nodes  no lymphadenopathy present     Respiratory  Respiratory Effort  breathing unlabored   Inspection of Chest  normal appearance, no retractions     Musculoskeletal   Cervical back: Normal range of motion and neck supple.      Skin and Subcutaneous Tissue  General Inspection  Regarding face and neck - there are no rashes present, no lesions present, and no areas of discoloration     Neurologic  Cranial Nerves  cranial nerves II-XII are grossly intact bilaterally   Gait and Station  normal gait, able to stand without diffculty    Psychiatric  Judgement and Insight  judgment and insight intact   Mood and Affect  mood normal, affect appropriate       RESULTS REVIEWED    I have reviewed the following information:   [x]  Previous Internal Note  []  Previous External Note:   [x]  Ordered Tests & Results:       Pathology:   Lab Results   Component Value Date    Microscopic Description  03/20/2025     Microscopic examination performed.         TSH   Date Value Ref Range Status   09/11/2024 0.684 0.270 - 4.200 uIU/mL Final     Free T4   Date Value Ref Range Status   12/06/2023 1.55 0.93 - 1.70 ng/dL Final     Comment:     T4 results may be falsely increased if patient taking Biotin.     Calcium   Date Value Ref Range Status   07/23/2025 8.7 8.6 - 10.5 mg/dL Final     25 Hydroxy, Vitamin D   Date Value Ref Range Status   07/17/2024 49.5 30.0 - 100.0 ng/ml Final       CT Sinus Without Contrast  Result Date: 6/30/2025  Impression: Mucous retention cysts or polyps in the bilateral maxillary sinuses. Mucosal thickening in the right maxillary sinus. No air-fluid levels. Electronically Signed: Que Potter MD  6/30/2025 1:06 PM EDT  Workstation ID: [id number]    MRI Knee Right Without Contrast  Result Date: 4/13/2025  Impression: Longitudinal oblique tear of the body and posterior horn of the medial meniscus. Small, thin Baker cyst. Trace knee joint effusion. Electronically Signed: Carlos Thomas MD  4/13/2025 4:43 PM EDT  Workstation ID: [id number]      I have discussed the interpretation of the above results with the patient.    Procedures          Assessment and Plan   Diagnoses and all orders for this visit:    1. Rhinitis, unspecified type (Primary)  -     fluticasone (FLONASE) 50 MCG/ACT nasal spray; Administer 2 sprays into the nostril(s) as directed by provider Daily. Administer 2 sprays in each nostril for each dose.  Dispense: 16 g; Refill: 6        (J31.0) Rhinitis, unspecified type - Plan: fluticasone (FLONASE) 50 MCG/ACT nasal spray     Anca Samson  reports that she quit smoking about 3 years ago. Her smoking use included cigarettes. She started smoking about 50 years ago. She has a 49 pack-year smoking history. She has been exposed to tobacco smoke. She has never used smokeless tobacco.          Plan:  Patient Instructions   1.  Patient has history of multiple myeloma and MRI of the brain showed some mastoid and sinus disease.  Therefore we went ahead and obtain CT of sinuses for further evaluation.  I reviewed the CT scan with patient and showing bilateral maxillary sinus disease with mucosal thickening but also had some polyps or mucous retention cyst.  Right side worse than left and also ostiomeatal complex obstruction on the right side but there is no evidence of any acute sinusitis present.  Also patient is not symptomatic despite these.  Ethmoid sinuses are clear and so he has frontal and sphenoid sinuses.  2.  Since patient is totally asymptomatic I recommend no surgical intervention at this time.  3.  However patient does complain about some drainage and I wonder if this is just allergic rhinitis rather than vasomotor rhinitis.  I will try her on Flonase nasal spray.  4.  I am going to see patient in 6 months for follow-up examination to see how she is doing.        Follow Up   Return in about 6 months (around 1/28/2026), or Follow-up 6 months for reexamination.  Patient was given instructions and counseling regarding her condition or for health maintenance advice. Please see specific information pulled into the AVS if appropriate.

## 2025-07-29 ENCOUNTER — LAB (OUTPATIENT)
Facility: HOSPITAL | Age: 75
End: 2025-07-29
Payer: MEDICARE

## 2025-07-29 DIAGNOSIS — C90.00 MULTIPLE MYELOMA NOT HAVING ACHIEVED REMISSION: ICD-10-CM

## 2025-07-29 PROCEDURE — 84166 PROTEIN E-PHORESIS/URINE/CSF: CPT

## 2025-07-29 PROCEDURE — 84156 ASSAY OF PROTEIN URINE: CPT

## 2025-07-31 LAB
ALBUMIN 24H MFR UR ELPH: 28 %
ALPHA1 GLOB 24H MFR UR ELPH: 6.1 %
ALPHA2 GLOB 24H MFR UR ELPH: 19.5 %
B-GLOBULIN 24H MFR UR ELPH: 31.6 %
GAMMA GLOB 24H MFR UR ELPH: 14.7 %
LABORATORY COMMENT REPORT: NORMAL
M PROTEIN 24H MFR UR ELPH: NORMAL %
PROT 24H UR-MRATE: 74 MG/24 HR (ref 30–150)
PROT UR-MCNC: 9.8 MG/DL

## 2025-08-03 LAB
IGA SERPL-MCNC: 40 MG/DL (ref 64–422)
IGG SERPL-MCNC: 345 MG/DL (ref 586–1602)
IGM SERPL-MCNC: 15 MG/DL (ref 26–217)
PROT PATTERN SERPL IFE-IMP: ABNORMAL

## 2025-08-05 ENCOUNTER — TELEPHONE (OUTPATIENT)
Dept: ONCOLOGY | Facility: HOSPITAL | Age: 75
End: 2025-08-05
Payer: MEDICARE

## 2025-08-12 ENCOUNTER — PATIENT MESSAGE (OUTPATIENT)
Dept: ORTHOPEDIC SURGERY | Facility: CLINIC | Age: 75
End: 2025-08-12
Payer: MEDICARE

## 2025-08-12 DIAGNOSIS — M17.11 PRIMARY OSTEOARTHRITIS OF RIGHT KNEE: Primary | ICD-10-CM

## 2025-08-13 ENCOUNTER — OFFICE VISIT (OUTPATIENT)
Dept: PSYCHIATRY | Facility: CLINIC | Age: 75
End: 2025-08-13
Payer: MEDICARE

## 2025-08-13 ENCOUNTER — SPECIALTY PHARMACY (OUTPATIENT)
Dept: PHARMACY | Facility: HOSPITAL | Age: 75
End: 2025-08-13
Payer: MEDICARE

## 2025-08-13 VITALS
SYSTOLIC BLOOD PRESSURE: 120 MMHG | WEIGHT: 187 LBS | HEIGHT: 61 IN | OXYGEN SATURATION: 97 % | DIASTOLIC BLOOD PRESSURE: 84 MMHG | BODY MASS INDEX: 35.3 KG/M2 | HEART RATE: 92 BPM

## 2025-08-13 DIAGNOSIS — F41.1 GENERALIZED ANXIETY DISORDER: Primary | ICD-10-CM

## 2025-08-13 DIAGNOSIS — F43.21 COMPLICATED GRIEF: ICD-10-CM

## 2025-08-13 DIAGNOSIS — F33.1 MODERATE EPISODE OF RECURRENT MAJOR DEPRESSIVE DISORDER: ICD-10-CM

## 2025-08-13 DIAGNOSIS — F51.05 INSOMNIA DUE TO MENTAL CONDITION: ICD-10-CM

## 2025-08-13 DIAGNOSIS — R56.9 SEIZURES: ICD-10-CM

## 2025-08-13 DIAGNOSIS — C90.00 MULTIPLE MYELOMA, REMISSION STATUS UNSPECIFIED: ICD-10-CM

## 2025-08-13 RX ORDER — LENALIDOMIDE 15 MG/1
15 CAPSULE ORAL DAILY
Qty: 21 CAPSULE | Refills: 0 | Status: SHIPPED | OUTPATIENT
Start: 2025-08-13

## 2025-08-13 RX ORDER — FLUOXETINE HYDROCHLORIDE 40 MG/1
40 CAPSULE ORAL DAILY
Qty: 90 CAPSULE | Refills: 1 | Status: SHIPPED | OUTPATIENT
Start: 2025-08-13

## 2025-08-13 RX ORDER — BUSPIRONE HYDROCHLORIDE 15 MG/1
15 TABLET ORAL 3 TIMES DAILY
Qty: 270 TABLET | Refills: 1 | Status: SHIPPED | OUTPATIENT
Start: 2025-08-13

## 2025-08-20 ENCOUNTER — HOSPITAL ENCOUNTER (OUTPATIENT)
Dept: ONCOLOGY | Facility: HOSPITAL | Age: 75
Discharge: HOME | End: 2025-08-20
Payer: MEDICARE

## 2025-08-20 ENCOUNTER — OFFICE VISIT (OUTPATIENT)
Dept: ONCOLOGY | Facility: HOSPITAL | Age: 75
End: 2025-08-20
Payer: MEDICARE

## 2025-08-20 VITALS
SYSTOLIC BLOOD PRESSURE: 129 MMHG | OXYGEN SATURATION: 98 % | RESPIRATION RATE: 20 BRPM | DIASTOLIC BLOOD PRESSURE: 65 MMHG | HEIGHT: 61 IN | HEART RATE: 77 BPM | WEIGHT: 188.93 LBS | TEMPERATURE: 98 F | BODY MASS INDEX: 35.67 KG/M2

## 2025-08-20 VITALS
RESPIRATION RATE: 18 BRPM | HEART RATE: 90 BPM | SYSTOLIC BLOOD PRESSURE: 140 MMHG | TEMPERATURE: 97.7 F | WEIGHT: 188.8 LBS | BODY MASS INDEX: 35.65 KG/M2 | HEIGHT: 61 IN | OXYGEN SATURATION: 94 % | DIASTOLIC BLOOD PRESSURE: 74 MMHG

## 2025-08-20 DIAGNOSIS — C90.00 MULTIPLE MYELOMA, REMISSION STATUS UNSPECIFIED: ICD-10-CM

## 2025-08-20 DIAGNOSIS — C90.00 MULTIPLE MYELOMA NOT HAVING ACHIEVED REMISSION: ICD-10-CM

## 2025-08-20 DIAGNOSIS — Z12.2 ENCOUNTER FOR SCREENING FOR LUNG CANCER: Primary | ICD-10-CM

## 2025-08-20 DIAGNOSIS — Z79.899 ENCOUNTER FOR LONG-TERM (CURRENT) USE OF MEDICATIONS: ICD-10-CM

## 2025-08-20 DIAGNOSIS — Z79.899 ENCOUNTER FOR LONG-TERM (CURRENT) USE OF MEDICATIONS: Primary | ICD-10-CM

## 2025-08-20 DIAGNOSIS — C90.00 MULTIPLE MYELOMA NOT HAVING ACHIEVED REMISSION: Primary | ICD-10-CM

## 2025-08-20 LAB
ALBUMIN SERPL-MCNC: 4 G/DL (ref 3.5–5.2)
ALBUMIN/GLOB SERPL: 2.2 G/DL
ALP SERPL-CCNC: 48 U/L (ref 39–117)
ALT SERPL W P-5'-P-CCNC: 14 U/L (ref 1–33)
ANION GAP SERPL CALCULATED.3IONS-SCNC: 15.3 MMOL/L (ref 5–15)
AST SERPL-CCNC: 11 U/L (ref 1–32)
BASOPHILS # BLD MANUAL: 0.09 10*3/MM3 (ref 0–0.2)
BASOPHILS NFR BLD MANUAL: 1 % (ref 0–1.5)
BILIRUB SERPL-MCNC: 0.5 MG/DL (ref 0–1.2)
BUN SERPL-MCNC: 14.5 MG/DL (ref 8–23)
BUN/CREAT SERPL: 15.8 (ref 7–25)
BURR CELLS BLD QL SMEAR: ABNORMAL
CALCIUM SPEC-SCNC: 8.8 MG/DL (ref 8.6–10.5)
CHLORIDE SERPL-SCNC: 110 MMOL/L (ref 98–107)
CO2 SERPL-SCNC: 17.7 MMOL/L (ref 22–29)
CREAT SERPL-MCNC: 0.92 MG/DL (ref 0.57–1)
DEPRECATED RDW RBC AUTO: 63.8 FL (ref 37–54)
EGFRCR SERPLBLD CKD-EPI 2021: 65.1 ML/MIN/1.73
EOSINOPHIL # BLD MANUAL: 0.09 10*3/MM3 (ref 0–0.4)
EOSINOPHIL NFR BLD MANUAL: 1 % (ref 0.3–6.2)
ERYTHROCYTE [DISTWIDTH] IN BLOOD BY AUTOMATED COUNT: 16.1 % (ref 12.3–15.4)
GLOBULIN UR ELPH-MCNC: 1.8 GM/DL
GLUCOSE SERPL-MCNC: 141 MG/DL (ref 65–99)
HCT VFR BLD AUTO: 35.7 % (ref 34–46.6)
HGB BLD-MCNC: 11.7 G/DL (ref 12–15.9)
LYMPHOCYTES # BLD MANUAL: 2.21 10*3/MM3 (ref 0.7–3.1)
LYMPHOCYTES NFR BLD MANUAL: 9 % (ref 5–12)
MACROCYTES BLD QL SMEAR: ABNORMAL
MAGNESIUM SERPL-MCNC: 2 MG/DL (ref 1.6–2.4)
MCH RBC QN AUTO: 35.3 PG (ref 26.6–33)
MCHC RBC AUTO-ENTMCNC: 32.8 G/DL (ref 31.5–35.7)
MCV RBC AUTO: 107.9 FL (ref 79–97)
METAMYELOCYTES NFR BLD MANUAL: 4 % (ref 0–0)
MONOCYTES # BLD: 0.77 10*3/MM3 (ref 0.1–0.9)
NEUTROPHILS # BLD AUTO: 5.02 10*3/MM3 (ref 1.7–7)
NEUTROPHILS NFR BLD MANUAL: 55 % (ref 42.7–76)
NEUTS BAND NFR BLD MANUAL: 4 % (ref 0–5)
PHOSPHATE SERPL-MCNC: 2.5 MG/DL (ref 2.5–4.5)
PLAT MORPH BLD: NORMAL
PLATELET # BLD AUTO: 202 10*3/MM3 (ref 140–450)
PMV BLD AUTO: 10.6 FL (ref 6–12)
POTASSIUM SERPL-SCNC: 3.4 MMOL/L (ref 3.5–5.2)
PROT SERPL-MCNC: 5.8 G/DL (ref 6–8.5)
RBC # BLD AUTO: 3.31 10*6/MM3 (ref 3.77–5.28)
SCAN SLIDE: NORMAL
SCHISTOCYTES BLD QL SMEAR: ABNORMAL
SODIUM SERPL-SCNC: 143 MMOL/L (ref 136–145)
VARIANT LYMPHS NFR BLD MANUAL: 26 % (ref 19.6–45.3)
WBC MORPH BLD: NORMAL
WBC NRBC COR # BLD AUTO: 8.51 10*3/MM3 (ref 3.4–10.8)

## 2025-08-20 PROCEDURE — 83735 ASSAY OF MAGNESIUM: CPT | Performed by: INTERNAL MEDICINE

## 2025-08-20 PROCEDURE — 25010000002 DIPHENHYDRAMINE PER 50 MG: Performed by: INTERNAL MEDICINE

## 2025-08-20 PROCEDURE — 85025 COMPLETE CBC W/AUTO DIFF WBC: CPT | Performed by: INTERNAL MEDICINE

## 2025-08-20 PROCEDURE — 80053 COMPREHEN METABOLIC PANEL: CPT | Performed by: INTERNAL MEDICINE

## 2025-08-20 PROCEDURE — 25010000002 HEPARIN LOCK FLUSH PER 10 UNITS: Performed by: INTERNAL MEDICINE

## 2025-08-20 PROCEDURE — 25010000002 ZOLEDRONIC ACID 4 MG/100ML SOLUTION: Performed by: INTERNAL MEDICINE

## 2025-08-20 PROCEDURE — A9270 NON-COVERED ITEM OR SERVICE: HCPCS | Performed by: INTERNAL MEDICINE

## 2025-08-20 PROCEDURE — 84100 ASSAY OF PHOSPHORUS: CPT | Performed by: INTERNAL MEDICINE

## 2025-08-20 PROCEDURE — 85007 BL SMEAR W/DIFF WBC COUNT: CPT | Performed by: INTERNAL MEDICINE

## 2025-08-20 PROCEDURE — 25010000002 DARATUMUMAB-HYALURONIDASE-FIHJ 1800-30000 MG-UT/15ML SOLUTION: Performed by: INTERNAL MEDICINE

## 2025-08-20 PROCEDURE — 96401 CHEMO ANTI-NEOPL SQ/IM: CPT

## 2025-08-20 PROCEDURE — 63710000001 ACETAMINOPHEN EXTRA STRENGTH 500 MG TABLET: Performed by: INTERNAL MEDICINE

## 2025-08-20 PROCEDURE — 96375 TX/PRO/DX INJ NEW DRUG ADDON: CPT

## 2025-08-20 PROCEDURE — 96374 THER/PROPH/DIAG INJ IV PUSH: CPT

## 2025-08-20 PROCEDURE — 25010000002 METHYLPREDNISOLONE PER 125 MG: Performed by: INTERNAL MEDICINE

## 2025-08-20 PROCEDURE — 25810000003 SODIUM CHLORIDE 0.9 % SOLUTION: Performed by: INTERNAL MEDICINE

## 2025-08-20 RX ORDER — HYDROCORTISONE SODIUM SUCCINATE 100 MG/2ML
100 INJECTION INTRAMUSCULAR; INTRAVENOUS AS NEEDED
Status: CANCELLED | OUTPATIENT
Start: 2025-08-20

## 2025-08-20 RX ORDER — SODIUM CHLORIDE 0.9 % (FLUSH) 0.9 %
20 SYRINGE (ML) INJECTION AS NEEDED
OUTPATIENT
Start: 2025-08-20

## 2025-08-20 RX ORDER — SODIUM CHLORIDE 9 MG/ML
20 INJECTION, SOLUTION INTRAVENOUS ONCE
Status: COMPLETED | OUTPATIENT
Start: 2025-08-20 | End: 2025-08-20

## 2025-08-20 RX ORDER — ACETAMINOPHEN 500 MG
1000 TABLET ORAL ONCE
Status: COMPLETED | OUTPATIENT
Start: 2025-08-20 | End: 2025-08-20

## 2025-08-20 RX ORDER — HEPARIN SODIUM (PORCINE) LOCK FLUSH IV SOLN 100 UNIT/ML 100 UNIT/ML
500 SOLUTION INTRAVENOUS AS NEEDED
Status: DISCONTINUED | OUTPATIENT
Start: 2025-08-20 | End: 2025-08-21 | Stop reason: HOSPADM

## 2025-08-20 RX ORDER — SODIUM CHLORIDE 9 MG/ML
20 INJECTION, SOLUTION INTRAVENOUS ONCE
Status: CANCELLED | OUTPATIENT
Start: 2025-08-20

## 2025-08-20 RX ORDER — DEXAMETHASONE 4 MG/1
TABLET ORAL
Qty: 42 TABLET | Refills: 5 | Status: SHIPPED | OUTPATIENT
Start: 2025-08-20

## 2025-08-20 RX ORDER — ZOLEDRONIC ACID 0.04 MG/ML
4 INJECTION, SOLUTION INTRAVENOUS ONCE
Status: CANCELLED | OUTPATIENT
Start: 2025-08-20

## 2025-08-20 RX ORDER — HEPARIN SODIUM (PORCINE) LOCK FLUSH IV SOLN 100 UNIT/ML 100 UNIT/ML
500 SOLUTION INTRAVENOUS AS NEEDED
OUTPATIENT
Start: 2025-08-20

## 2025-08-20 RX ORDER — SODIUM CHLORIDE 0.9 % (FLUSH) 0.9 %
20 SYRINGE (ML) INJECTION AS NEEDED
Status: DISCONTINUED | OUTPATIENT
Start: 2025-08-20 | End: 2025-08-21 | Stop reason: HOSPADM

## 2025-08-20 RX ORDER — ACETAMINOPHEN 500 MG
1000 TABLET ORAL ONCE
Status: CANCELLED | OUTPATIENT
Start: 2025-08-20

## 2025-08-20 RX ORDER — HYDROCORTISONE SODIUM SUCCINATE 100 MG/2ML
100 INJECTION INTRAMUSCULAR; INTRAVENOUS AS NEEDED
Status: DISCONTINUED | OUTPATIENT
Start: 2025-08-20 | End: 2025-08-21 | Stop reason: HOSPADM

## 2025-08-20 RX ORDER — ZOLEDRONIC ACID 0.04 MG/ML
4 INJECTION, SOLUTION INTRAVENOUS ONCE
Status: COMPLETED | OUTPATIENT
Start: 2025-08-20 | End: 2025-08-20

## 2025-08-20 RX ORDER — DIPHENHYDRAMINE HYDROCHLORIDE 50 MG/ML
50 INJECTION, SOLUTION INTRAMUSCULAR; INTRAVENOUS AS NEEDED
Status: DISCONTINUED | OUTPATIENT
Start: 2025-08-20 | End: 2025-08-21 | Stop reason: HOSPADM

## 2025-08-20 RX ORDER — FAMOTIDINE 10 MG/ML
20 INJECTION, SOLUTION INTRAVENOUS AS NEEDED
Status: DISCONTINUED | OUTPATIENT
Start: 2025-08-20 | End: 2025-08-21 | Stop reason: HOSPADM

## 2025-08-20 RX ORDER — METHYLPREDNISOLONE SODIUM SUCCINATE 125 MG/2ML
60 INJECTION INTRAMUSCULAR; INTRAVENOUS ONCE
Status: COMPLETED | OUTPATIENT
Start: 2025-08-20 | End: 2025-08-20

## 2025-08-20 RX ORDER — FAMOTIDINE 10 MG/ML
20 INJECTION, SOLUTION INTRAVENOUS AS NEEDED
Status: CANCELLED | OUTPATIENT
Start: 2025-08-20

## 2025-08-20 RX ORDER — METHYLPREDNISOLONE SODIUM SUCCINATE 125 MG/2ML
60 INJECTION INTRAMUSCULAR; INTRAVENOUS ONCE
Status: CANCELLED | OUTPATIENT
Start: 2025-08-20

## 2025-08-20 RX ORDER — DIPHENHYDRAMINE HYDROCHLORIDE 50 MG/ML
50 INJECTION, SOLUTION INTRAMUSCULAR; INTRAVENOUS AS NEEDED
Status: CANCELLED | OUTPATIENT
Start: 2025-08-20

## 2025-08-20 RX ORDER — SODIUM CHLORIDE 9 MG/ML
20 INJECTION, SOLUTION INTRAVENOUS ONCE
Status: DISCONTINUED | OUTPATIENT
Start: 2025-08-20 | End: 2025-08-21 | Stop reason: HOSPADM

## 2025-08-20 RX ADMIN — SODIUM CHLORIDE 25 MG: 9 INJECTION, SOLUTION INTRAVENOUS at 10:31

## 2025-08-20 RX ADMIN — Medication 20 ML: at 08:51

## 2025-08-20 RX ADMIN — SODIUM CHLORIDE 20 ML/HR: 9 INJECTION, SOLUTION INTRAVENOUS at 10:25

## 2025-08-20 RX ADMIN — ZOLEDRONIC ACID 4 MG: 0.04 INJECTION, SOLUTION INTRAVENOUS at 10:53

## 2025-08-20 RX ADMIN — METHYLPREDNISOLONE SODIUM SUCCINATE 60 MG: 125 INJECTION INTRAMUSCULAR; INTRAVENOUS at 10:30

## 2025-08-20 RX ADMIN — Medication 20 ML: at 11:51

## 2025-08-20 RX ADMIN — HEPARIN 500 UNITS: 100 SYRINGE at 11:52

## 2025-08-20 RX ADMIN — ACETAMINOPHEN 1000 MG: 500 TABLET ORAL at 10:27

## 2025-08-20 RX ADMIN — DARATUMUMAB AND HYALURONIDASE-FIHJ (HUMAN RECOMBINANT) 1800 MG: 1800; 30000 INJECTION SUBCUTANEOUS at 11:45

## 2025-08-21 ENCOUNTER — SPECIALTY PHARMACY (OUTPATIENT)
Dept: PHARMACY | Facility: HOSPITAL | Age: 75
End: 2025-08-21
Payer: MEDICARE

## (undated) DEVICE — BNDG ESMARK STRL 6INX12FT LF

## (undated) DEVICE — GAUZE,SPONGE,4"X4",16PLY,STRL,LF,10/TRAY: Brand: MEDLINE

## (undated) DEVICE — Device: Brand: DEFENDO AIR/WATER/SUCTION AND BIOPSY VALVE

## (undated) DEVICE — TBG INSUFFLATION W/CPC CONNEC: Brand: MEDLINE INDUSTRIES, INC.

## (undated) DEVICE — GLV SURG SENSICARE PI ORTHO SZ8 LF STRL

## (undated) DEVICE — SYR LL TP 10ML STRL

## (undated) DEVICE — STERILE POLYISOPRENE POWDER-FREE SURGICAL GLOVES WITH EMOLLIENT COATING: Brand: PROTEXIS

## (undated) DEVICE — PENCL ES MEGADINE EZ/CLEAN BUTN W/HOLSTR 10FT

## (undated) DEVICE — CVR PROB ULTRASND CIVFLEX GEN/PURP TELESCP/FOLD 5.5X58IN LF

## (undated) DEVICE — BNDG ELAS ECON W/CLIP 6IN 5YD LF STRL

## (undated) DEVICE — THE SINGLE USE ETRAP – POLYP TRAP IS USED FOR SUCTION RETRIEVAL OF ENDOSCOPICALLY REMOVED POLYPS.: Brand: ETRAP

## (undated) DEVICE — SLV SCD KN/LEN ADJ EXPRSS BLENDED MD 1P/U

## (undated) DEVICE — BLD CUT FORMLA AGGR PLS 5.0MM

## (undated) DEVICE — SYS CLOSE PORTII CARTR/THOMASN XL

## (undated) DEVICE — VASCULAR ACCESS-LF: Brand: MEDLINE INDUSTRIES, INC.

## (undated) DEVICE — SOL IRR NACL 0.9PCT BT 1000ML

## (undated) DEVICE — KNEE ARTHROSCOPY-LF: Brand: MEDLINE INDUSTRIES, INC.

## (undated) DEVICE — SUT MERSILENE POLYSTR CT1 BR 0 75CM GRN

## (undated) DEVICE — THE STERILE LIGHT HANDLE COVER IS USED WITH STERIS SURGICAL LIGHTING AND VISUALIZATION SYSTEMS.

## (undated) DEVICE — RX DELIVERY SYSTEM: Brand: NAVIFLEX™ RX DELIVERY SYSTEM

## (undated) DEVICE — SPHINCT CANNULATOME2 2L DOME/TP .035IN 6F 2.8MM 200CM

## (undated) DEVICE — CONN JET HYDRA H20 AUXILIARY DISP

## (undated) DEVICE — ENDOPATH XCEL WITH OPTIVIEW TECHNOLOGY BLADELESS TROCARS WITH STABILITY SLEEVES: Brand: ENDOPATH XCEL OPTIVIEW

## (undated) DEVICE — GLV SURG SENSICARE SLT PF LF 7.5 STRL

## (undated) DEVICE — SOL IRR NACL 0.9PCT 500ML

## (undated) DEVICE — COVER,LIGHT HANDLE,FLX,1/PK: Brand: MEDLINE INDUSTRIES, INC.

## (undated) DEVICE — SINGLE USE DISTAL COVER MAJ-2315: Brand: SINGLE USE DISTAL COVER

## (undated) DEVICE — STERILE POLYISOPRENE POWDER-FREE SURGICAL GLOVES: Brand: PROTEXIS

## (undated) DEVICE — SOL IRRG H2O PL/BG 1000ML STRL

## (undated) DEVICE — LINER SURG CANSTR SXN S/RIGD 1500CC

## (undated) DEVICE — ANTIBACTERIAL VIOLET BRAIDED (POLYGLACTIN 910), SYNTHETIC ABSORBABLE SUTURE: Brand: COATED VICRYL

## (undated) DEVICE — LAPAROVUE VISIBILITY SYSTEM LAPAROSCOPIC SOLUTIONS: Brand: LAPAROVUE

## (undated) DEVICE — DRSNG GZ PETROLTM XEROFORM CURAD 1X8IN STRL

## (undated) DEVICE — ARM DRAPE

## (undated) DEVICE — INTENDED FOR TISSUE SEPARATION, AND OTHER PROCEDURES THAT REQUIRE A SHARP SURGICAL BLADE TO PUNCTURE OR CUT.: Brand: BARD-PARKER ® CARBON RIB-BACK BLADES

## (undated) DEVICE — REDUCER: Brand: ENDOWRIST

## (undated) DEVICE — APPL CHLORAPREP HI/LITE 26ML ORNG

## (undated) DEVICE — GOWN,SIRUS,POLYRNF,BRTHSLV,2XL,18/CS: Brand: MEDLINE

## (undated) DEVICE — SOLIDIFIER LIQLOC PLS 1500CC BT

## (undated) DEVICE — LAPAROSCOPIC SCISSORS: Brand: EPIX LAPAROSCOPIC SCISSORS

## (undated) DEVICE — BLUNT TIP LAPAROSCOPIC SEALER/DIVIDER NANO-COATED: Brand: LIGASURE

## (undated) DEVICE — DEV LK WIREGUIDE FUSN OLYMP SCP

## (undated) DEVICE — ROTATING HEMOSTATIC VALVE .096": Brand: RHV

## (undated) DEVICE — SUT MNCRYL 4/0 PS2 18 IN

## (undated) DEVICE — SOL IRR NACL 0.9PCT 3000ML

## (undated) DEVICE — PAD GRND REM POLYHESIVE A/ DISP

## (undated) DEVICE — DISPOSABLE TOURNIQUET CUFF SINGLE BLADDER, SINGLE PORT AND QUICK CONNECT CONNECTOR: Brand: COLOR CUFF

## (undated) DEVICE — TBG INFLOW/OUTFLOW DUALWAVE 1P/U

## (undated) DEVICE — TOTAL TRAY, 16FR 10ML SIL FOLEY, URN: Brand: MEDLINE

## (undated) DEVICE — DRESSING,GAUZE,XEROFORM,CURAD,1"X8",ST: Brand: CURAD

## (undated) DEVICE — SEAL

## (undated) DEVICE — DAVINCI-LF: Brand: MEDLINE INDUSTRIES, INC.

## (undated) DEVICE — TISSUE RETRIEVAL SYSTEM: Brand: INZII RETRIEVAL SYSTEM

## (undated) DEVICE — RETRIEVAL BALLOON CATHETER: Brand: EXTRACTOR™ PRO RX

## (undated) DEVICE — ADHS SKIN SURG TISS VISC PREMIERPRO EXOFIN HI/VISC FAST/DRY

## (undated) DEVICE — 2, DISPOSABLE SUCTION/IRRIGATOR WITHOUT DISPOSABLE TIP: Brand: STRYKEFLOW

## (undated) DEVICE — Device

## (undated) DEVICE — GLV SURG SENSICARE PI LF PF 8 GRN STRL

## (undated) DEVICE — NON-WOVEN ADHESIVE WOUND DRESSING: Brand: PRIMAPORE ADHESIVE WOUND DRSG 7.2*5CM

## (undated) DEVICE — Device: Brand: SINGLE USE INJECTOR NM600/610

## (undated) DEVICE — BILIARY BALLOON DILATATION CATHETER: Brand: CRE™ RX

## (undated) DEVICE — GW JAG STR .035IN 450CM

## (undated) DEVICE — SOL SUBMUCUS RTU 10ML

## (undated) DEVICE — SUT ETHLN 3-0 FS118IN 663H

## (undated) DEVICE — FLTR SMOKE EVAC SEECLEAR CHARCOAL 8L/MIN

## (undated) DEVICE — PENCL E/S SMOKEEVAC W/TELESCP CANN

## (undated) DEVICE — FMS FLUID MANAGEMENT SYSTEM OUTFLOW TUBING WITHOUT ONE-WAY VALVE (FMS VUE OR FMS DUO PLUS): Brand: FMS

## (undated) DEVICE — SNAR E/S POLYP SNAREMASTER OVL/10MM 2.8X2300MM YEL

## (undated) DEVICE — STRIP CLS WND SUTURESTRIP/PLS 0.5X4IN TP1103

## (undated) DEVICE — PENCL SMOKE/EVAC MEGADYNE TELESCP 10FT

## (undated) DEVICE — SINGLE-USE BIOPSY FORCEPS: Brand: RADIAL JAW 4

## (undated) DEVICE — ANTIBACTERIAL UNDYED BRAIDED (POLYGLACTIN 910), SYNTHETIC ABSORBABLE SUTURE: Brand: COATED VICRYL

## (undated) DEVICE — BLCK/BITE BLOX WO/DENTL/RIM W/STRAP 54F

## (undated) DEVICE — FMS FLUID MANAGEMENT SYSTEM INFLOW TUBING (FMS VUE): Brand: FMS

## (undated) DEVICE — DRAPE,U/ SHT,SPLIT,PLAS,STERIL: Brand: MEDLINE

## (undated) DEVICE — GLV SURG BIOGEL LTX PF 7 1/2

## (undated) DEVICE — TIP COVER ACCESSORY

## (undated) DEVICE — MAJOR-LF: Brand: MEDLINE INDUSTRIES, INC.

## (undated) DEVICE — SYR LUERLOK 30CC

## (undated) DEVICE — DEV INFL SPHERE 60ML/CAP

## (undated) DEVICE — SPHINCTEROTOME: Brand: DREAMTOME™ RX 44